# Patient Record
Sex: FEMALE | Race: BLACK OR AFRICAN AMERICAN | Employment: OTHER | URBAN - METROPOLITAN AREA
[De-identification: names, ages, dates, MRNs, and addresses within clinical notes are randomized per-mention and may not be internally consistent; named-entity substitution may affect disease eponyms.]

---

## 2018-04-18 ENCOUNTER — OFFICE VISIT (OUTPATIENT)
Dept: CARDIOLOGY CLINIC | Age: 58
End: 2018-04-18
Payer: MEDICAID

## 2018-04-18 VITALS
BODY MASS INDEX: 32.43 KG/M2 | DIASTOLIC BLOOD PRESSURE: 70 MMHG | HEIGHT: 63 IN | WEIGHT: 183 LBS | SYSTOLIC BLOOD PRESSURE: 128 MMHG | HEART RATE: 81 BPM

## 2018-04-18 DIAGNOSIS — I10 ESSENTIAL HYPERTENSION: Primary | ICD-10-CM

## 2018-04-18 DIAGNOSIS — R94.31 ABNORMAL RESTING ECG FINDINGS: ICD-10-CM

## 2018-04-18 PROCEDURE — 93000 ELECTROCARDIOGRAM COMPLETE: CPT | Performed by: INTERNAL MEDICINE

## 2018-04-18 PROCEDURE — 99213 OFFICE O/P EST LOW 20 MIN: CPT | Performed by: NURSE PRACTITIONER

## 2018-04-18 RX ORDER — METHIMAZOLE 5 MG/1
5 TABLET ORAL DAILY
COMMUNITY
End: 2018-04-19 | Stop reason: SDUPTHER

## 2018-04-19 ENCOUNTER — OFFICE VISIT (OUTPATIENT)
Dept: FAMILY MEDICINE CLINIC | Age: 58
End: 2018-04-19
Payer: MEDICAID

## 2018-04-19 VITALS
WEIGHT: 182 LBS | SYSTOLIC BLOOD PRESSURE: 134 MMHG | BODY MASS INDEX: 32.25 KG/M2 | HEART RATE: 84 BPM | RESPIRATION RATE: 20 BRPM | OXYGEN SATURATION: 99 % | DIASTOLIC BLOOD PRESSURE: 72 MMHG | HEIGHT: 63 IN

## 2018-04-19 DIAGNOSIS — E05.90 HYPERTHYROIDISM: ICD-10-CM

## 2018-04-19 DIAGNOSIS — I10 ESSENTIAL HYPERTENSION: ICD-10-CM

## 2018-04-19 DIAGNOSIS — Z79.4 TYPE 2 DIABETES MELLITUS WITH HYPERGLYCEMIA, WITH LONG-TERM CURRENT USE OF INSULIN (HCC): Primary | ICD-10-CM

## 2018-04-19 DIAGNOSIS — E11.65 TYPE 2 DIABETES MELLITUS WITH HYPERGLYCEMIA, WITH LONG-TERM CURRENT USE OF INSULIN (HCC): Primary | ICD-10-CM

## 2018-04-19 PROCEDURE — 99203 OFFICE O/P NEW LOW 30 MIN: CPT | Performed by: FAMILY MEDICINE

## 2018-04-19 RX ORDER — ATENOLOL 50 MG/1
50 TABLET ORAL DAILY
Qty: 30 TABLET | Refills: 2 | Status: SHIPPED | OUTPATIENT
Start: 2018-04-19 | End: 2018-05-21 | Stop reason: HOSPADM

## 2018-04-19 RX ORDER — AMLODIPINE BESYLATE 10 MG/1
10 TABLET ORAL DAILY
Qty: 30 TABLET | Refills: 2 | Status: SHIPPED | OUTPATIENT
Start: 2018-04-19 | End: 2018-05-21 | Stop reason: SDUPTHER

## 2018-04-19 RX ORDER — GLIPIZIDE 10 MG/1
10 TABLET ORAL
Qty: 60 TABLET | Refills: 2 | Status: SHIPPED | OUTPATIENT
Start: 2018-04-19 | End: 2018-05-21 | Stop reason: SDUPTHER

## 2018-04-19 RX ORDER — METHIMAZOLE 5 MG/1
5 TABLET ORAL DAILY
Qty: 30 TABLET | Refills: 2 | Status: SHIPPED | OUTPATIENT
Start: 2018-04-19 | End: 2018-05-21 | Stop reason: SDUPTHER

## 2018-04-19 ASSESSMENT — ENCOUNTER SYMPTOMS
NAUSEA: 0
VOMITING: 0
SHORTNESS OF BREATH: 0
COUGH: 0
WHEEZING: 0
DIARRHEA: 0

## 2018-04-19 ASSESSMENT — PATIENT HEALTH QUESTIONNAIRE - PHQ9
SUM OF ALL RESPONSES TO PHQ QUESTIONS 1-9: 0
1. LITTLE INTEREST OR PLEASURE IN DOING THINGS: 0
2. FEELING DOWN, DEPRESSED OR HOPELESS: 0
SUM OF ALL RESPONSES TO PHQ9 QUESTIONS 1 & 2: 0

## 2018-05-04 ENCOUNTER — HOSPITAL ENCOUNTER (OUTPATIENT)
Age: 58
Discharge: HOME | End: 2018-05-06
Payer: MEDICAID

## 2018-05-04 DIAGNOSIS — Z79.4 TYPE 2 DIABETES MELLITUS WITH HYPERGLYCEMIA, WITH LONG-TERM CURRENT USE OF INSULIN (HCC): ICD-10-CM

## 2018-05-04 DIAGNOSIS — E11.65 TYPE 2 DIABETES MELLITUS WITH HYPERGLYCEMIA, WITH LONG-TERM CURRENT USE OF INSULIN (HCC): ICD-10-CM

## 2018-05-04 DIAGNOSIS — I10 ESSENTIAL HYPERTENSION: ICD-10-CM

## 2018-05-04 LAB
ALBUMIN SERPL-MCNC: 4.5 G/DL (ref 3.5–5.2)
ALP BLD-CCNC: 106 U/L (ref 35–104)
ALT SERPL-CCNC: 19 U/L (ref 0–32)
ANION GAP SERPL CALCULATED.3IONS-SCNC: 11 MMOL/L (ref 7–16)
AST SERPL-CCNC: 17 U/L (ref 0–31)
BILIRUB SERPL-MCNC: 0.6 MG/DL (ref 0–1.2)
BUN BLDV-MCNC: 12 MG/DL (ref 6–20)
CALCIUM SERPL-MCNC: 9.6 MG/DL (ref 8.6–10.2)
CHLORIDE BLD-SCNC: 100 MMOL/L (ref 98–107)
CHOLESTEROL, TOTAL: 227 MG/DL (ref 0–199)
CO2: 26 MMOL/L (ref 22–29)
CREAT SERPL-MCNC: 0.6 MG/DL (ref 0.5–1)
CREATININE URINE: 118 MG/DL (ref 29–226)
GFR AFRICAN AMERICAN: >60
GFR NON-AFRICAN AMERICAN: >60 ML/MIN/1.73
GLUCOSE BLD-MCNC: 247 MG/DL (ref 74–109)
HBA1C MFR BLD: 9.6 % (ref 4.8–5.9)
HCT VFR BLD CALC: 40 % (ref 34–48)
HDLC SERPL-MCNC: 27 MG/DL
HEMOGLOBIN: 13.3 G/DL (ref 11.5–15.5)
LDL CHOLESTEROL CALCULATED: 168 MG/DL (ref 0–99)
MCH RBC QN AUTO: 25.6 PG (ref 26–35)
MCHC RBC AUTO-ENTMCNC: 33.3 % (ref 32–34.5)
MCV RBC AUTO: 77.1 FL (ref 80–99.9)
MICROALBUMIN UR-MCNC: 65.9 MG/L
MICROALBUMIN/CREAT UR-RTO: 55.8 (ref 0–30)
PDW BLD-RTO: 15.1 FL (ref 11.5–15)
PLATELET # BLD: 352 E9/L (ref 130–450)
PMV BLD AUTO: 10.8 FL (ref 7–12)
POTASSIUM SERPL-SCNC: 4 MMOL/L (ref 3.5–5)
RBC # BLD: 5.19 E12/L (ref 3.5–5.5)
SODIUM BLD-SCNC: 137 MMOL/L (ref 132–146)
TOTAL PROTEIN: 7.7 G/DL (ref 6.4–8.3)
TRIGL SERPL-MCNC: 158 MG/DL (ref 0–149)
VLDLC SERPL CALC-MCNC: 32 MG/DL
WBC # BLD: 6.9 E9/L (ref 4.5–11.5)

## 2018-05-04 PROCEDURE — 80061 LIPID PANEL: CPT

## 2018-05-04 PROCEDURE — 80053 COMPREHEN METABOLIC PANEL: CPT

## 2018-05-04 PROCEDURE — 82044 UR ALBUMIN SEMIQUANTITATIVE: CPT

## 2018-05-04 PROCEDURE — 36415 COLL VENOUS BLD VENIPUNCTURE: CPT

## 2018-05-04 PROCEDURE — 83036 HEMOGLOBIN GLYCOSYLATED A1C: CPT

## 2018-05-04 PROCEDURE — 85027 COMPLETE CBC AUTOMATED: CPT

## 2018-05-04 PROCEDURE — 82570 ASSAY OF URINE CREATININE: CPT

## 2018-05-21 ENCOUNTER — HOSPITAL ENCOUNTER (OUTPATIENT)
Age: 58
Discharge: HOME | End: 2018-05-23
Payer: MEDICAID

## 2018-05-21 ENCOUNTER — OFFICE VISIT (OUTPATIENT)
Dept: FAMILY MEDICINE CLINIC | Age: 58
End: 2018-05-21
Payer: MEDICAID

## 2018-05-21 VITALS
SYSTOLIC BLOOD PRESSURE: 132 MMHG | BODY MASS INDEX: 31.89 KG/M2 | WEIGHT: 180 LBS | HEIGHT: 63 IN | DIASTOLIC BLOOD PRESSURE: 78 MMHG | RESPIRATION RATE: 20 BRPM | HEART RATE: 84 BPM | OXYGEN SATURATION: 98 %

## 2018-05-21 DIAGNOSIS — I10 ESSENTIAL HYPERTENSION: ICD-10-CM

## 2018-05-21 DIAGNOSIS — E05.90 HYPERTHYROIDISM: ICD-10-CM

## 2018-05-21 DIAGNOSIS — E11.65 TYPE 2 DIABETES MELLITUS WITH HYPERGLYCEMIA, WITH LONG-TERM CURRENT USE OF INSULIN (HCC): Primary | ICD-10-CM

## 2018-05-21 DIAGNOSIS — Z79.4 TYPE 2 DIABETES MELLITUS WITH HYPERGLYCEMIA, WITH LONG-TERM CURRENT USE OF INSULIN (HCC): Primary | ICD-10-CM

## 2018-05-21 DIAGNOSIS — R80.9 POSITIVE FOR MICROALBUMINURIA: ICD-10-CM

## 2018-05-21 PROCEDURE — 84443 ASSAY THYROID STIM HORMONE: CPT

## 2018-05-21 PROCEDURE — 36415 COLL VENOUS BLD VENIPUNCTURE: CPT

## 2018-05-21 PROCEDURE — 99214 OFFICE O/P EST MOD 30 MIN: CPT | Performed by: FAMILY MEDICINE

## 2018-05-21 RX ORDER — GLIPIZIDE 10 MG/1
10 TABLET ORAL
Qty: 60 TABLET | Refills: 5 | Status: SHIPPED | OUTPATIENT
Start: 2018-05-21 | End: 2018-09-25 | Stop reason: SDUPTHER

## 2018-05-21 RX ORDER — BENAZEPRIL HYDROCHLORIDE 10 MG/1
10 TABLET ORAL DAILY
Qty: 30 TABLET | Refills: 3 | Status: SHIPPED | OUTPATIENT
Start: 2018-05-21 | End: 2018-07-23 | Stop reason: SDUPTHER

## 2018-05-21 RX ORDER — METHIMAZOLE 5 MG/1
5 TABLET ORAL DAILY
Qty: 30 TABLET | Refills: 5 | Status: SHIPPED | OUTPATIENT
Start: 2018-05-21 | End: 2018-07-23 | Stop reason: SDUPTHER

## 2018-05-21 RX ORDER — ATORVASTATIN CALCIUM 20 MG/1
20 TABLET, FILM COATED ORAL DAILY
Qty: 30 TABLET | Refills: 3 | Status: SHIPPED | OUTPATIENT
Start: 2018-05-21 | End: 2018-07-23 | Stop reason: SDUPTHER

## 2018-05-21 RX ORDER — ATENOLOL 50 MG/1
50 TABLET ORAL DAILY
Qty: 30 TABLET | Refills: 2 | Status: CANCELLED | OUTPATIENT
Start: 2018-05-21

## 2018-05-21 RX ORDER — AMLODIPINE BESYLATE 10 MG/1
10 TABLET ORAL DAILY
Qty: 30 TABLET | Refills: 5 | Status: SHIPPED | OUTPATIENT
Start: 2018-05-21 | End: 2018-07-23 | Stop reason: SDUPTHER

## 2018-05-21 ASSESSMENT — ENCOUNTER SYMPTOMS
NAUSEA: 0
VOMITING: 0
DIARRHEA: 0
BLURRED VISION: 0
SHORTNESS OF BREATH: 0

## 2018-05-22 LAB — TSH SERPL DL<=0.05 MIU/L-ACNC: 1.08 UIU/ML (ref 0.27–4.2)

## 2018-07-19 DIAGNOSIS — E11.65 TYPE 2 DIABETES MELLITUS WITH HYPERGLYCEMIA, WITH LONG-TERM CURRENT USE OF INSULIN (HCC): ICD-10-CM

## 2018-07-19 DIAGNOSIS — Z79.4 TYPE 2 DIABETES MELLITUS WITH HYPERGLYCEMIA, WITH LONG-TERM CURRENT USE OF INSULIN (HCC): ICD-10-CM

## 2018-07-23 ENCOUNTER — OFFICE VISIT (OUTPATIENT)
Dept: FAMILY MEDICINE CLINIC | Age: 58
End: 2018-07-23
Payer: MEDICARE

## 2018-07-23 VITALS
WEIGHT: 181 LBS | SYSTOLIC BLOOD PRESSURE: 138 MMHG | OXYGEN SATURATION: 98 % | HEIGHT: 63 IN | DIASTOLIC BLOOD PRESSURE: 78 MMHG | RESPIRATION RATE: 20 BRPM | HEART RATE: 100 BPM | BODY MASS INDEX: 32.07 KG/M2

## 2018-07-23 DIAGNOSIS — Z79.4 TYPE 2 DIABETES MELLITUS WITH HYPERGLYCEMIA, WITH LONG-TERM CURRENT USE OF INSULIN (HCC): Primary | ICD-10-CM

## 2018-07-23 DIAGNOSIS — E05.90 HYPERTHYROIDISM: ICD-10-CM

## 2018-07-23 DIAGNOSIS — Z12.39 BREAST CANCER SCREENING: ICD-10-CM

## 2018-07-23 DIAGNOSIS — E11.65 TYPE 2 DIABETES MELLITUS WITH HYPERGLYCEMIA, WITH LONG-TERM CURRENT USE OF INSULIN (HCC): Primary | ICD-10-CM

## 2018-07-23 DIAGNOSIS — I10 ESSENTIAL HYPERTENSION: ICD-10-CM

## 2018-07-23 LAB — HBA1C MFR BLD: 9.3 %

## 2018-07-23 PROCEDURE — 83036 HEMOGLOBIN GLYCOSYLATED A1C: CPT | Performed by: FAMILY MEDICINE

## 2018-07-23 PROCEDURE — 99214 OFFICE O/P EST MOD 30 MIN: CPT | Performed by: FAMILY MEDICINE

## 2018-07-23 RX ORDER — AMLODIPINE BESYLATE 10 MG/1
10 TABLET ORAL DAILY
Qty: 30 TABLET | Refills: 5 | Status: SHIPPED | OUTPATIENT
Start: 2018-07-23 | End: 2018-12-19 | Stop reason: SDUPTHER

## 2018-07-23 RX ORDER — METHIMAZOLE 5 MG/1
5 TABLET ORAL DAILY
Qty: 30 TABLET | Refills: 5 | Status: SHIPPED | OUTPATIENT
Start: 2018-07-23 | End: 2018-12-19 | Stop reason: SDUPTHER

## 2018-07-23 RX ORDER — BENAZEPRIL HYDROCHLORIDE 10 MG/1
10 TABLET ORAL DAILY
Qty: 30 TABLET | Refills: 5 | Status: SHIPPED | OUTPATIENT
Start: 2018-07-23 | End: 2018-12-19 | Stop reason: SDUPTHER

## 2018-07-23 RX ORDER — ATORVASTATIN CALCIUM 20 MG/1
20 TABLET, FILM COATED ORAL DAILY
Qty: 30 TABLET | Refills: 5 | Status: SHIPPED | OUTPATIENT
Start: 2018-07-23 | End: 2018-12-19 | Stop reason: SDUPTHER

## 2018-07-23 RX ORDER — TRIAMCINOLONE ACETONIDE 1 MG/G
CREAM TOPICAL
Qty: 80 G | Refills: 3 | Status: SHIPPED | OUTPATIENT
Start: 2018-07-23 | End: 2018-09-25 | Stop reason: SDUPTHER

## 2018-07-23 ASSESSMENT — ENCOUNTER SYMPTOMS
DIARRHEA: 0
BLURRED VISION: 0
NAUSEA: 0
VOMITING: 0
SHORTNESS OF BREATH: 0

## 2018-07-23 NOTE — PATIENT INSTRUCTIONS
Patient Education        Learning About Meal Planning for Diabetes  Why plan your meals? Meal planning can be a key part of managing diabetes. Planning meals and snacks with the right balance of carbohydrate, protein, and fat can help you keep your blood sugar at the target level you set with your doctor. You don't have to eat special foods. You can eat what your family eats, including sweets once in a while. But you do have to pay attention to how often you eat and how much you eat of certain foods. You may want to work with a dietitian or a certified diabetes educator. He or she can give you tips and meal ideas and can answer your questions about meal planning. This health professional can also help you reach a healthy weight if that is one of your goals. What plan is right for you? Your dietitian or diabetes educator may suggest that you start with the plate format or carbohydrate counting. The plate format  The plate format is a simple way to help you manage how you eat. You plan meals by learning how much space each food should take on a plate. Using the plate format helps you spread carbohydrate throughout the day. It can make it easier to keep your blood sugar level within your target range. It also helps you see if you're eating healthy portion sizes. To use the plate format, you put non-starchy vegetables on half your plate. Add meat or meat substitutes on one-quarter of the plate. Put a grain or starchy vegetable (such as brown rice or a potato) on the final quarter of the plate. You can add a small piece of fruit and some low-fat or fat-free milk or yogurt, depending on your carbohydrate goal for each meal.  Here are some tips for using the plate format:  · Make sure that you are not using an oversized plate. A 9-inch plate is best. Many restaurants use larger plates. · Get used to using the plate format at home. Then you can use it when you eat out.   · Write down your questions about using the rapid-acting insulin to take before you eat. If you use an insulin pump, you get a constant rate of insulin during the day. So the pump must be programmed at meals to give you extra insulin to cover the rise in blood sugar after meals. When you know how much carbohydrate you will eat, you can take the right amount of insulin. Or, if you always use the same amount of insulin, you need to make sure that you eat the same amount of carbohydrate at meals. If you need more help to understand carbohydrate counting and food labels, ask your doctor, dietitian, or diabetes educator. How do you get started with meal planning? Here are some tips to get started:  · Plan your meals a week at a time. Don't forget to include snacks too. · Use cookbooks or online recipes to plan several main meals. Plan some quick meals for busy nights. You also can double some recipes that freeze well. Then you can save half for other busy nights when you don't have time to cook. · Make sure you have the ingredients you need for your recipes. If you're running low on basic items, put these items on your shopping list too. · List foods that you use to make breakfasts, lunches, and snacks. List plenty of fruits and vegetables. · Post this list on the refrigerator. Add to it as you think of more things you need. · Take the list to the store to do your weekly shopping. Follow-up care is a key part of your treatment and safety. Be sure to make and go to all appointments, and call your doctor if you are having problems. It's also a good idea to know your test results and keep a list of the medicines you take. Where can you learn more? Go to https://rios."[employer]. org and sign in to your [employer] account. Enter S178 in the [facility] box to learn more about \"Learning About Meal Planning for Diabetes. \"     If you do not have an account, please click on the \"Sign Up Now\" link.   Current as of: December 7, 2017  Content Version: 11.6  © 9825-1039 [employer]. Care instructions adapted under license by [facility] ([facility]). If you have questions about a medical condition or this instruction, always ask your healthcare professional. [address] any warranty or liability for your use of this information.

## 2018-07-23 NOTE — PROGRESS NOTES
OFFICE PROGRESS NOTE      SUBJECTIVE:        Patient ID:   Angel Luis Ochoa is a 60 y.o. female who presents for   Chief Complaint   Patient presents with    Diabetes     feet burn       HPI:   Patient is here to follow up on diabetes. Fasting blood sugars:200's Midday blood sugars: not checking. Patient checks blood glucose 1 times per day. Patient is following diabetic diet. Patient is a nonsmoker. Last ophthalmology visit: 5/2018. Patient is taking a daily statin. Patient noticing more burning discomfort in feet--worried about neuropathy. Patient doing well on current regimen for hyperthyroidism. Patient doing well on current regimen for hypertension. Prior to Admission medications    Medication Sig Start Date End Date Taking? Authorizing Provider   insulin detemir (LEVEMIR FLEXPEN) 100 UNIT/ML injection pen Inject 35 Units into the skin 2 times daily 7/23/18  Yes Errol Guillen MD   methimazole (TAPAZOLE) 5 MG tablet Take 1 tablet by mouth daily 7/23/18  Yes Errol Guillen MD   amLODIPine (NORVASC) 10 MG tablet Take 1 tablet by mouth daily 7/23/18  Yes Errol Guillen MD   metFORMIN (GLUCOPHAGE) 1000 MG tablet Take 1 tablet by mouth 2 times daily (with meals) 7/23/18  Yes Errol Guillen MD   atorvastatin (LIPITOR) 20 MG tablet Take 1 tablet by mouth daily 7/23/18  Yes Errol Guillen MD   benazepril (LOTENSIN) 10 MG tablet Take 1 tablet by mouth daily 7/23/18  Yes Errol Guillen MD   triamcinolone (KENALOG) 0.1 % cream Apply topically 2 times daily for up to 2 weeks then as needed for flareups.  7/23/18  Yes Errol Guillen MD   glipiZIDE (GLUCOTROL) 10 MG tablet Take 1 tablet by mouth 2 times daily (before meals) 5/21/18  Yes Errol Guillen MD   haloperidol (HALDOL) 2 MG tablet Take 2 mg by mouth daily   Yes Historical Provider, MD   diazepam (VALIUM) 2 MG tablet Take 2 mg by mouth every 6 hours as needed

## 2018-07-25 ENCOUNTER — TELEPHONE (OUTPATIENT)
Dept: FAMILY MEDICINE CLINIC | Age: 58
End: 2018-07-25

## 2018-07-25 DIAGNOSIS — E11.65 TYPE 2 DIABETES MELLITUS WITH HYPERGLYCEMIA, WITH LONG-TERM CURRENT USE OF INSULIN (HCC): Primary | ICD-10-CM

## 2018-07-25 DIAGNOSIS — Z79.4 TYPE 2 DIABETES MELLITUS WITH HYPERGLYCEMIA, WITH LONG-TERM CURRENT USE OF INSULIN (HCC): Primary | ICD-10-CM

## 2018-07-26 ENCOUNTER — HOSPITAL ENCOUNTER (OUTPATIENT)
Dept: MAMMOGRAPHY | Age: 58
Discharge: HOME | End: 2018-07-28
Payer: MEDICARE

## 2018-07-26 DIAGNOSIS — Z12.39 BREAST CANCER SCREENING: ICD-10-CM

## 2018-07-26 PROCEDURE — 77063 BREAST TOMOSYNTHESIS BI: CPT

## 2018-08-08 ENCOUNTER — TELEPHONE (OUTPATIENT)
Dept: FAMILY MEDICINE CLINIC | Age: 58
End: 2018-08-08

## 2018-08-08 ENCOUNTER — TELEPHONE (OUTPATIENT)
Dept: ADMINISTRATIVE | Age: 58
End: 2018-08-08

## 2018-08-10 ENCOUNTER — HOSPITAL ENCOUNTER (OUTPATIENT)
Dept: INTERVENTIONAL RADIOLOGY/VASCULAR | Age: 58
Discharge: HOME | End: 2018-08-12
Payer: MEDICARE

## 2018-08-10 DIAGNOSIS — E11.49 DIABETIC NEUROPATHY WITH NEUROLOGIC COMPLICATION (HCC): ICD-10-CM

## 2018-08-10 DIAGNOSIS — G89.29 CHRONIC PAIN OF TOE OF LEFT FOOT: ICD-10-CM

## 2018-08-10 DIAGNOSIS — G89.29 CHRONIC PAIN OF TOE, RIGHT: ICD-10-CM

## 2018-08-10 DIAGNOSIS — M79.675 CHRONIC PAIN OF TOE OF LEFT FOOT: ICD-10-CM

## 2018-08-10 DIAGNOSIS — E11.40 DIABETIC NEUROPATHY WITH NEUROLOGIC COMPLICATION (HCC): ICD-10-CM

## 2018-08-10 DIAGNOSIS — M79.674 CHRONIC PAIN OF TOE, RIGHT: ICD-10-CM

## 2018-08-10 PROCEDURE — 93923 UPR/LXTR ART STDY 3+ LVLS: CPT

## 2018-08-24 ENCOUNTER — TELEPHONE (OUTPATIENT)
Dept: FAMILY MEDICINE CLINIC | Age: 58
End: 2018-08-24

## 2018-08-24 DIAGNOSIS — E11.42 DIABETIC PERIPHERAL NEUROPATHY ASSOCIATED WITH TYPE 2 DIABETES MELLITUS (HCC): ICD-10-CM

## 2018-09-25 ENCOUNTER — OFFICE VISIT (OUTPATIENT)
Dept: FAMILY MEDICINE CLINIC | Age: 58
End: 2018-09-25
Payer: MEDICARE

## 2018-09-25 VITALS
RESPIRATION RATE: 20 BRPM | HEIGHT: 63 IN | HEART RATE: 88 BPM | DIASTOLIC BLOOD PRESSURE: 78 MMHG | SYSTOLIC BLOOD PRESSURE: 132 MMHG | WEIGHT: 181 LBS | BODY MASS INDEX: 32.07 KG/M2 | OXYGEN SATURATION: 99 %

## 2018-09-25 DIAGNOSIS — E05.90 HYPERTHYROIDISM: ICD-10-CM

## 2018-09-25 DIAGNOSIS — E11.42 TYPE 2 DIABETES MELLITUS WITH DIABETIC POLYNEUROPATHY, WITH LONG-TERM CURRENT USE OF INSULIN (HCC): Primary | ICD-10-CM

## 2018-09-25 DIAGNOSIS — I10 ESSENTIAL HYPERTENSION: ICD-10-CM

## 2018-09-25 DIAGNOSIS — Z79.4 TYPE 2 DIABETES MELLITUS WITH DIABETIC POLYNEUROPATHY, WITH LONG-TERM CURRENT USE OF INSULIN (HCC): Primary | ICD-10-CM

## 2018-09-25 LAB — HBA1C MFR BLD: 7.1 %

## 2018-09-25 PROCEDURE — 99214 OFFICE O/P EST MOD 30 MIN: CPT | Performed by: FAMILY MEDICINE

## 2018-09-25 PROCEDURE — 1036F TOBACCO NON-USER: CPT | Performed by: FAMILY MEDICINE

## 2018-09-25 PROCEDURE — 2022F DILAT RTA XM EVC RTNOPTHY: CPT | Performed by: FAMILY MEDICINE

## 2018-09-25 PROCEDURE — G8417 CALC BMI ABV UP PARAM F/U: HCPCS | Performed by: FAMILY MEDICINE

## 2018-09-25 PROCEDURE — 3017F COLORECTAL CA SCREEN DOC REV: CPT | Performed by: FAMILY MEDICINE

## 2018-09-25 PROCEDURE — 83036 HEMOGLOBIN GLYCOSYLATED A1C: CPT | Performed by: FAMILY MEDICINE

## 2018-09-25 PROCEDURE — 3045F PR MOST RECENT HEMOGLOBIN A1C LEVEL 7.0-9.0%: CPT | Performed by: FAMILY MEDICINE

## 2018-09-25 PROCEDURE — G8427 DOCREV CUR MEDS BY ELIG CLIN: HCPCS | Performed by: FAMILY MEDICINE

## 2018-09-25 RX ORDER — GLIPIZIDE 10 MG/1
10 TABLET ORAL
Qty: 60 TABLET | Refills: 5 | Status: SHIPPED | OUTPATIENT
Start: 2018-09-25 | End: 2019-03-27 | Stop reason: SDUPTHER

## 2018-09-25 RX ORDER — TRIAMCINOLONE ACETONIDE 1 MG/G
CREAM TOPICAL
Qty: 80 G | Refills: 3 | Status: SHIPPED | OUTPATIENT
Start: 2018-09-25 | End: 2018-12-05

## 2018-09-25 RX ORDER — GABAPENTIN 100 MG/1
CAPSULE ORAL
COMMUNITY
Start: 2018-08-08 | End: 2018-09-25

## 2018-09-25 ASSESSMENT — ENCOUNTER SYMPTOMS
NAUSEA: 0
DIARRHEA: 0
VOMITING: 0
SHORTNESS OF BREATH: 0
BLURRED VISION: 0

## 2018-09-25 ASSESSMENT — PATIENT HEALTH QUESTIONNAIRE - PHQ9
1. LITTLE INTEREST OR PLEASURE IN DOING THINGS: 0
2. FEELING DOWN, DEPRESSED OR HOPELESS: 0
SUM OF ALL RESPONSES TO PHQ QUESTIONS 1-9: 0
SUM OF ALL RESPONSES TO PHQ9 QUESTIONS 1 & 2: 0
SUM OF ALL RESPONSES TO PHQ QUESTIONS 1-9: 0

## 2018-09-25 NOTE — PATIENT INSTRUCTIONS
Patient Education        Learning About Meal Planning for Diabetes  Why plan your meals? Meal planning can be a key part of managing diabetes. Planning meals and snacks with the right balance of carbohydrate, protein, and fat can help you keep your blood sugar at the target level you set with your doctor. You don't have to eat special foods. You can eat what your family eats, including sweets once in a while. But you do have to pay attention to how often you eat and how much you eat of certain foods. You may want to work with a dietitian or a certified diabetes educator. He or she can give you tips and meal ideas and can answer your questions about meal planning. This health professional can also help you reach a healthy weight if that is one of your goals. What plan is right for you? Your dietitian or diabetes educator may suggest that you start with the plate format or carbohydrate counting. The plate format  The plate format is a simple way to help you manage how you eat. You plan meals by learning how much space each food should take on a plate. Using the plate format helps you spread carbohydrate throughout the day. It can make it easier to keep your blood sugar level within your target range. It also helps you see if you're eating healthy portion sizes. To use the plate format, you put non-starchy vegetables on half your plate. Add meat or meat substitutes on one-quarter of the plate. Put a grain or starchy vegetable (such as brown rice or a potato) on the final quarter of the plate. You can add a small piece of fruit and some low-fat or fat-free milk or yogurt, depending on your carbohydrate goal for each meal.  Here are some tips for using the plate format:  · Make sure that you are not using an oversized plate. A 9-inch plate is best. Many restaurants use larger plates. · Get used to using the plate format at home. Then you can use it when you eat out.   · Write down your questions about using the plate format. Talk to your doctor, a dietitian, or a diabetes educator about your concerns. Carbohydrate counting  With carbohydrate counting, you plan meals based on the amount of carbohydrate in each food. Carbohydrate raises blood sugar higher and more quickly than any other nutrient. It is found in desserts, breads and cereals, and fruit. It's also found in starchy vegetables such as potatoes and corn, grains such as rice and pasta, and milk and yogurt. Spreading carbohydrate throughout the day helps keep your blood sugar levels within your target range. Your daily amount depends on several things, including your weight, how active you are, which diabetes medicines you take, and what your goals are for your blood sugar levels. A registered dietitian or diabetes educator can help you plan how much carbohydrate to include in each meal and snack. A guideline for your daily amount of carbohydrate is:  · 45 to 60 grams at each meal. That's about the same as 3 to 4 carbohydrate servings. · 15 to 20 grams at each snack. That's about the same as 1 carbohydrate serving. The Nutrition Facts label on packaged foods tells you how much carbohydrate is in a serving of the food. First, look at the serving size on the food label. Is that the amount you eat in a serving? All of the nutrition information on a food label is based on that serving size. So if you eat more or less than that, you'll need to adjust the other numbers. Total carbohydrate is the next thing you need to look for on the label. If you count carbohydrate servings, one serving of carbohydrate is 15 grams. For foods that don't come with labels, such as fresh fruits and vegetables, you'll need a guide that lists carbohydrate in these foods. Ask your doctor, dietitian, or diabetes educator about books or other nutrition guides you can use.   If you take insulin, you need to know how many grams of carbohydrate are in a meal. This lets you know how much

## 2018-09-25 NOTE — PROGRESS NOTES
OFFICE PROGRESS NOTE      SUBJECTIVE:        Patient ID:   Maury Barrera is a 60 y.o. female who presents for   Chief Complaint   Patient presents with    Diabetes           HPI:   Patient is here to follow up on diabetes. Fasting blood sugars: Midday blood sugars: not checking. Patient checks blood glucose 1 times per day. Patient is following diabetic diet. Patient is a nonsmoker. Last ophthalmology visit: 5/2018. Patient is taking a daily statin. Patient doing well on current regimen for hypertension. Patient doing well on current regimen for hyperthyroidism. Prior to Admission medications    Medication Sig Start Date End Date Taking? Authorizing Provider   insulin glargine (LANTUS SOLOSTAR) 100 UNIT/ML injection pen Inject 35 Units into the skin 2 times daily 9/25/18  Yes Gatito Quintanilla MD   glipiZIDE (GLUCOTROL) 10 MG tablet Take 1 tablet by mouth 2 times daily (before meals) 9/25/18  Yes Gatito Quintanilla MD   triamcinolone (KENALOG) 0.1 % cream Apply topically 2 times daily for up to 2 weeks then as needed for flareups.  9/25/18  Yes Gatito Quintanilla MD   methimazole (TAPAZOLE) 5 MG tablet Take 1 tablet by mouth daily 7/23/18  Yes Gatito Quintanilla MD   amLODIPine (NORVASC) 10 MG tablet Take 1 tablet by mouth daily 7/23/18  Yes Gatito Quintanilla MD   metFORMIN (GLUCOPHAGE) 1000 MG tablet Take 1 tablet by mouth 2 times daily (with meals) 7/23/18  Yes Gatito Quintanilla MD   atorvastatin (LIPITOR) 20 MG tablet Take 1 tablet by mouth daily 7/23/18  Yes Gatito Quintanilla MD   benazepril (LOTENSIN) 10 MG tablet Take 1 tablet by mouth daily 7/23/18  Yes Gatito Quintanilla MD   haloperidol (HALDOL) 2 MG tablet Take 2 mg by mouth daily   Yes Historical Provider, MD   diazepam (VALIUM) 2 MG tablet Take 2 mg by mouth every 6 hours as needed for Anxiety   Yes Historical Provider, MD     Social History     Social History    Marital status:      Spouse name: N/A    Number of children: N/A    Years of education: N/A     Social History Main Topics    Smoking status: Former Smoker     Packs/day: 0.00     Years: 2.00     Types: Cigarettes     Quit date: 3/23/2015    Smokeless tobacco: Never Used      Comment: quit smoking 2015    Alcohol use No    Drug use: No    Sexual activity: Not Asked     Other Topics Concern    None     Social History Narrative    None       I have reviewed Sofia's allergies, medications, problem list, medical, social and family history and have updated as needed in the electronic medical record    Current Outpatient Prescriptions   Medication Sig Dispense Refill    insulin glargine (LANTUS SOLOSTAR) 100 UNIT/ML injection pen Inject 35 Units into the skin 2 times daily 10 pen 5    glipiZIDE (GLUCOTROL) 10 MG tablet Take 1 tablet by mouth 2 times daily (before meals) 60 tablet 5    triamcinolone (KENALOG) 0.1 % cream Apply topically 2 times daily for up to 2 weeks then as needed for flareups. 80 g 3    methimazole (TAPAZOLE) 5 MG tablet Take 1 tablet by mouth daily 30 tablet 5    amLODIPine (NORVASC) 10 MG tablet Take 1 tablet by mouth daily 30 tablet 5    metFORMIN (GLUCOPHAGE) 1000 MG tablet Take 1 tablet by mouth 2 times daily (with meals) 60 tablet 5    atorvastatin (LIPITOR) 20 MG tablet Take 1 tablet by mouth daily 30 tablet 5    benazepril (LOTENSIN) 10 MG tablet Take 1 tablet by mouth daily 30 tablet 5    haloperidol (HALDOL) 2 MG tablet Take 2 mg by mouth daily      diazepam (VALIUM) 2 MG tablet Take 2 mg by mouth every 6 hours as needed for Anxiety       No current facility-administered medications for this visit. Review Of Systems:    Review of Systems   Eyes: Negative for blurred vision. Respiratory: Negative for shortness of breath. Cardiovascular: Negative for chest pain, palpitations and leg swelling.    Gastrointestinal: Negative for diarrhea, nausea and vomiting. Genitourinary: Negative for dysuria, frequency and urgency. Skin: Negative for rash. Psychiatric/Behavioral: Negative for depression. OBJECTIVE:     VS:  Wt Readings from Last 3 Encounters:   09/25/18 181 lb (82.1 kg)   07/23/18 181 lb (82.1 kg)   05/21/18 180 lb (81.6 kg)                   Vitals:    09/25/18 1041   BP: 132/78   Pulse: 88   Resp: 20   SpO2: 99%       Physical Exam   Constitutional: She is oriented to person, place, and time and well-developed, well-nourished, and in no distress. Neck: Neck supple. Carotid bruit is not present. Cardiovascular: Normal rate, regular rhythm and normal heart sounds. Exam reveals no gallop. No murmur heard. Pulmonary/Chest: Effort normal and breath sounds normal. She has no wheezes. She has no rales. Abdominal: Soft. Bowel sounds are normal. She exhibits no distension. There is no tenderness. Musculoskeletal: She exhibits no edema. Neurological: She is alert and oriented to person, place, and time. Skin: Skin is warm and dry. Psychiatric: Affect normal.   Vitals reviewed. Results for orders placed or performed in visit on 09/25/18   POCT glycosylated hemoglobin (Hb A1C)   Result Value Ref Range    Hemoglobin A1C 7.1 %         Natalie Bright was seen today for diabetes. Diagnoses and all orders for this visit:    Type 2 diabetes mellitus with diabetic polyneuropathy, with long-term current use of insulin (HCC)  -     insulin glargine (LANTUS SOLOSTAR) 100 UNIT/ML injection pen; Inject 35 Units into the skin 2 times daily  -     glipiZIDE (GLUCOTROL) 10 MG tablet; Take 1 tablet by mouth daily (before meals)  -     POCT glycosylated hemoglobin (Hb A1C)    Hyperthyroidism        -     Continue methimazole    Essential hypertension        -     Continue antihypertensive regimen          Phone/MyChart follow up if tests abnormal.    Return in about 3 months (around 12/25/2018) for diabetes.        BMI was elevated today, and weight

## 2018-11-16 ENCOUNTER — OFFICE VISIT (OUTPATIENT)
Dept: CARDIOLOGY CLINIC | Age: 58
End: 2018-11-16
Payer: MEDICARE

## 2018-11-16 VITALS
HEIGHT: 63 IN | WEIGHT: 184 LBS | HEART RATE: 102 BPM | SYSTOLIC BLOOD PRESSURE: 150 MMHG | DIASTOLIC BLOOD PRESSURE: 68 MMHG | BODY MASS INDEX: 32.6 KG/M2

## 2018-11-16 DIAGNOSIS — R00.0 SINUS TACHYCARDIA: ICD-10-CM

## 2018-11-16 DIAGNOSIS — E78.5 DYSLIPIDEMIA: ICD-10-CM

## 2018-11-16 DIAGNOSIS — R94.39 ABNORMAL STRESS ECG: ICD-10-CM

## 2018-11-16 DIAGNOSIS — E05.90 HYPERTHYROIDISM: ICD-10-CM

## 2018-11-16 DIAGNOSIS — I10 ESSENTIAL HYPERTENSION: Primary | ICD-10-CM

## 2018-11-16 PROCEDURE — 1036F TOBACCO NON-USER: CPT | Performed by: INTERNAL MEDICINE

## 2018-11-16 PROCEDURE — 93000 ELECTROCARDIOGRAM COMPLETE: CPT | Performed by: INTERNAL MEDICINE

## 2018-11-16 PROCEDURE — G8427 DOCREV CUR MEDS BY ELIG CLIN: HCPCS | Performed by: INTERNAL MEDICINE

## 2018-11-16 PROCEDURE — 99214 OFFICE O/P EST MOD 30 MIN: CPT | Performed by: INTERNAL MEDICINE

## 2018-11-16 PROCEDURE — G8484 FLU IMMUNIZE NO ADMIN: HCPCS | Performed by: INTERNAL MEDICINE

## 2018-11-16 PROCEDURE — G8417 CALC BMI ABV UP PARAM F/U: HCPCS | Performed by: INTERNAL MEDICINE

## 2018-11-16 PROCEDURE — 3017F COLORECTAL CA SCREEN DOC REV: CPT | Performed by: INTERNAL MEDICINE

## 2018-11-16 NOTE — PROGRESS NOTES
Epigastric hernia     Hyperlipidemia     Hypertension     [facility]     Umbilical hernia             Past Surgical History:   Procedure Laterality Date    [facility]  09[facility]30/2016     [facility]    COLONOSCOPY  08/2016    Dr. Leyla Mix OTHER SURGICAL HISTORY  11/04/2016    epigastric hernia umbilical hernia with mesh    OVARIAN CYST REMOVAL      OVARY REMOVAL Right     OVARY REMOVAL  2014          Family History   Problem Relation Age of Onset    Cancer Brother           Social History   Substance Use Topics    Smoking status: Former Smoker     Packs/day: 0.00     Years: 2.00     Types: Cigarettes     Quit date: 3/23/2015    Smokeless tobacco: Never Used      Comment: quit smoking 2015    Alcohol use No      Comment: coffee daily         Review of Systems:  HEENT: negative for acute visual symptoms or auditory problems, no dysphagia  Constitutional: negative for fever and chills, or significant weight loss  Respiratory: negative for cough, wheezing, or hemoptysis  Cardiovascular: negative for chest pain, palpitations, and dyspnea  Gastrointestinal: negative for abdominal pain, diarrhea, nausea and vomiting  Endocrine: Negative for polyuria and polydyspsia  Genitourinary:negative for dysuria and hematuria  Derm: negative for rash and skin lesion(s)  Neurological: negative for tingling, numbness, weakness, seizures and tremors  Endocrine: negative for polydipsia and polyuria  Musculoskeletal: negative for pain or tenderness  Psychiatric: negative for anxiety, depression, or suicidal ideations         O:  COMPLETE PHYSICAL EXAM:       BP (!) 150/68   Pulse 102   Ht 5' 3\" (1.6 m)   Wt 184 lb (83.5 kg)   BMI 32.59 kg/m²       General:   Patient alert, comfortable, no distress. Appears stated age. HEENT:    Pupils equal, no icterus, no nasal drainage, tongue moist.   Neck:              No masses, Thyroid not palpable.     Chest:   Normal configuration, non tender. Lungs:   Clear to auscultation bilaterally, few scattered rhonchi. Cardiovascular:  Regular rhythm, 1/6 systolic murmur, No S3, no palpable thrills, No elevated JVD, No carotid bruit. Abdomen:  Soft, Non tender, Bowel sounds normal, no pulsatile abdominal aorta, no palpable masses. Extremities:  No edema. Distal pulses palpable. No cyanosis, no clubbing. Skin:   Good turgor, warm and dry, no cyanosis. Musculoskeletal: No joint swelling or deformity. Neuro:   Cranial nerves grossly intact; No focal neurologic deficit. Psych:   Alert, good mood and effect. REVIEW OF DIAGNOSTIC TESTS:        Electrocardiogram: NSR, rate 102             A/P:   ASSESSMENT / PLAN:    Aida Mariano was seen today for hypertension. Diagnoses and all orders for this visit:    Essential hypertension  -stable  -     EKG 12 Lead    Sinus tachycardia - Asymptomatic  -     TSH without Reflex  -     T3, Free  -     T4    Dyslipidemia - on Statins    Abnormal stress ECG - Normal cath 2016    Hyperthyroidism - On tapazole  -     TSH without Reflex  -     T3, Free  -     T4    Non morbid obesity - Diet, exercise and weight loss discussed. Preventive Cardiology: Low cholesterol diet, regular exercise as tolerate, and gradual weight loss discussed. Monitor BP and heart rates. All questions answered about cardiac diagnoses and cardiac medications. Continue current medications. Compliance with medications and f/u with all physicians discussed. Risk factor modification based on risk profile discussed. Call if any exertional chest pain, short of breath, dizzy or palpitations   Follow up in 9 months or earlier if needed.    Call with lab results      [facility]  [address], [place], [address] [facility]  [phone]

## 2018-11-19 ENCOUNTER — HOSPITAL ENCOUNTER (OUTPATIENT)
Age: 58
Discharge: HOME | End: 2018-11-19
Payer: MEDICARE

## 2018-11-19 LAB
T3 FREE: 2.5 PG/ML (ref 2–4.4)
T4 TOTAL: 6.6 MCG/DL (ref 4.5–11.7)
TSH SERPL DL<=0.05 MIU/L-ACNC: 1.26 UIU/ML (ref 0.27–4.2)

## 2018-11-19 PROCEDURE — 36415 COLL VENOUS BLD VENIPUNCTURE: CPT

## 2018-11-19 PROCEDURE — 84443 ASSAY THYROID STIM HORMONE: CPT

## 2018-11-19 PROCEDURE — 84481 FREE ASSAY (FT-3): CPT

## 2018-11-19 PROCEDURE — 84436 ASSAY OF TOTAL THYROXINE: CPT

## 2018-12-05 ENCOUNTER — OFFICE VISIT (OUTPATIENT)
Dept: FAMILY MEDICINE CLINIC | Age: 58
End: 2018-12-05
Payer: MEDICARE

## 2018-12-05 VITALS
TEMPERATURE: 98.9 F | HEART RATE: 120 BPM | WEIGHT: 184 LBS | DIASTOLIC BLOOD PRESSURE: 82 MMHG | SYSTOLIC BLOOD PRESSURE: 138 MMHG | BODY MASS INDEX: 32.59 KG/M2 | OXYGEN SATURATION: 98 % | RESPIRATION RATE: 20 BRPM

## 2018-12-05 DIAGNOSIS — L30.9 DERMATITIS: ICD-10-CM

## 2018-12-05 DIAGNOSIS — J01.01 ACUTE RECURRENT MAXILLARY SINUSITIS: Primary | ICD-10-CM

## 2018-12-05 PROCEDURE — 1036F TOBACCO NON-USER: CPT | Performed by: FAMILY MEDICINE

## 2018-12-05 PROCEDURE — G8484 FLU IMMUNIZE NO ADMIN: HCPCS | Performed by: FAMILY MEDICINE

## 2018-12-05 PROCEDURE — G8417 CALC BMI ABV UP PARAM F/U: HCPCS | Performed by: FAMILY MEDICINE

## 2018-12-05 PROCEDURE — 99214 OFFICE O/P EST MOD 30 MIN: CPT | Performed by: FAMILY MEDICINE

## 2018-12-05 PROCEDURE — 3017F COLORECTAL CA SCREEN DOC REV: CPT | Performed by: FAMILY MEDICINE

## 2018-12-05 PROCEDURE — G8427 DOCREV CUR MEDS BY ELIG CLIN: HCPCS | Performed by: FAMILY MEDICINE

## 2018-12-05 RX ORDER — BENAZEPRIL HYDROCHLORIDE 10 MG/1
10 TABLET ORAL DAILY
Qty: 30 TABLET | Refills: 5 | Status: CANCELLED | OUTPATIENT
Start: 2018-12-05

## 2018-12-05 RX ORDER — TRIAMCINOLONE ACETONIDE 1 MG/G
CREAM TOPICAL
Qty: 80 G | Refills: 3 | Status: SHIPPED | OUTPATIENT
Start: 2018-12-05 | End: 2019-12-30

## 2018-12-05 RX ORDER — ATORVASTATIN CALCIUM 20 MG/1
20 TABLET, FILM COATED ORAL DAILY
Qty: 30 TABLET | Refills: 5 | Status: CANCELLED | OUTPATIENT
Start: 2018-12-05

## 2018-12-05 RX ORDER — AMLODIPINE BESYLATE 10 MG/1
10 TABLET ORAL DAILY
Qty: 30 TABLET | Refills: 5 | Status: CANCELLED | OUTPATIENT
Start: 2018-12-05

## 2018-12-05 RX ORDER — METHIMAZOLE 5 MG/1
5 TABLET ORAL DAILY
Qty: 30 TABLET | Refills: 5 | Status: CANCELLED | OUTPATIENT
Start: 2018-12-05

## 2018-12-05 RX ORDER — AMOXICILLIN 500 MG/1
500 CAPSULE ORAL 2 TIMES DAILY
Qty: 20 CAPSULE | Refills: 0 | Status: SHIPPED | OUTPATIENT
Start: 2018-12-05 | End: 2018-12-15

## 2018-12-05 RX ORDER — FLUTICASONE PROPIONATE 50 MCG
2 SPRAY, SUSPENSION (ML) NASAL DAILY
Qty: 1 BOTTLE | Refills: 0 | Status: SHIPPED | OUTPATIENT
Start: 2018-12-05 | End: 2019-03-27 | Stop reason: SDUPTHER

## 2018-12-05 ASSESSMENT — ENCOUNTER SYMPTOMS
RHINORRHEA: 1
COUGH: 1
VOMITING: 0
CHEST TIGHTNESS: 0
NAUSEA: 1
SORE THROAT: 1
SHORTNESS OF BREATH: 0

## 2018-12-19 ENCOUNTER — OFFICE VISIT (OUTPATIENT)
Dept: FAMILY MEDICINE CLINIC | Age: 58
End: 2018-12-19
Payer: MEDICARE

## 2018-12-19 VITALS
HEART RATE: 88 BPM | RESPIRATION RATE: 20 BRPM | OXYGEN SATURATION: 99 % | DIASTOLIC BLOOD PRESSURE: 80 MMHG | WEIGHT: 182 LBS | HEIGHT: 63 IN | BODY MASS INDEX: 32.25 KG/M2 | SYSTOLIC BLOOD PRESSURE: 138 MMHG

## 2018-12-19 DIAGNOSIS — J01.01 ACUTE RECURRENT MAXILLARY SINUSITIS: ICD-10-CM

## 2018-12-19 DIAGNOSIS — E11.42 TYPE 2 DIABETES MELLITUS WITH DIABETIC POLYNEUROPATHY, WITHOUT LONG-TERM CURRENT USE OF INSULIN (HCC): Primary | ICD-10-CM

## 2018-12-19 DIAGNOSIS — I10 ESSENTIAL HYPERTENSION: ICD-10-CM

## 2018-12-19 LAB — HBA1C MFR BLD: 6.4 %

## 2018-12-19 PROCEDURE — 83036 HEMOGLOBIN GLYCOSYLATED A1C: CPT | Performed by: FAMILY MEDICINE

## 2018-12-19 PROCEDURE — G8427 DOCREV CUR MEDS BY ELIG CLIN: HCPCS | Performed by: FAMILY MEDICINE

## 2018-12-19 PROCEDURE — G8484 FLU IMMUNIZE NO ADMIN: HCPCS | Performed by: FAMILY MEDICINE

## 2018-12-19 PROCEDURE — 3017F COLORECTAL CA SCREEN DOC REV: CPT | Performed by: FAMILY MEDICINE

## 2018-12-19 PROCEDURE — 2022F DILAT RTA XM EVC RTNOPTHY: CPT | Performed by: FAMILY MEDICINE

## 2018-12-19 PROCEDURE — G8417 CALC BMI ABV UP PARAM F/U: HCPCS | Performed by: FAMILY MEDICINE

## 2018-12-19 PROCEDURE — 1036F TOBACCO NON-USER: CPT | Performed by: FAMILY MEDICINE

## 2018-12-19 PROCEDURE — 99214 OFFICE O/P EST MOD 30 MIN: CPT | Performed by: FAMILY MEDICINE

## 2018-12-19 PROCEDURE — 3044F HG A1C LEVEL LT 7.0%: CPT | Performed by: FAMILY MEDICINE

## 2018-12-19 RX ORDER — METHIMAZOLE 5 MG/1
5 TABLET ORAL DAILY
Qty: 30 TABLET | Refills: 5 | Status: SHIPPED | OUTPATIENT
Start: 2018-12-19 | End: 2019-06-21 | Stop reason: SDUPTHER

## 2018-12-19 RX ORDER — AMLODIPINE BESYLATE 10 MG/1
10 TABLET ORAL DAILY
Qty: 30 TABLET | Refills: 5 | Status: SHIPPED | OUTPATIENT
Start: 2018-12-19 | End: 2019-08-01 | Stop reason: SDUPTHER

## 2018-12-19 RX ORDER — ATORVASTATIN CALCIUM 20 MG/1
20 TABLET, FILM COATED ORAL DAILY
Qty: 30 TABLET | Refills: 5 | Status: SHIPPED | OUTPATIENT
Start: 2018-12-19 | End: 2019-07-28 | Stop reason: SDUPTHER

## 2018-12-19 RX ORDER — AZITHROMYCIN 250 MG/1
TABLET, FILM COATED ORAL
Qty: 6 TABLET | Refills: 0 | Status: SHIPPED | OUTPATIENT
Start: 2018-12-19 | End: 2019-01-17

## 2018-12-19 RX ORDER — BENAZEPRIL HYDROCHLORIDE 10 MG/1
10 TABLET ORAL DAILY
Qty: 30 TABLET | Refills: 5 | Status: SHIPPED | OUTPATIENT
Start: 2018-12-19 | End: 2019-08-22 | Stop reason: SDUPTHER

## 2018-12-19 ASSESSMENT — ENCOUNTER SYMPTOMS
DIARRHEA: 0
SORE THROAT: 1
COUGH: 1
VOICE CHANGE: 1
VOMITING: 0
NAUSEA: 0
SHORTNESS OF BREATH: 0

## 2018-12-19 ASSESSMENT — PATIENT HEALTH QUESTIONNAIRE - PHQ9
2. FEELING DOWN, DEPRESSED OR HOPELESS: 0
SUM OF ALL RESPONSES TO PHQ QUESTIONS 1-9: 0
SUM OF ALL RESPONSES TO PHQ9 QUESTIONS 1 & 2: 0
SUM OF ALL RESPONSES TO PHQ QUESTIONS 1-9: 0
1. LITTLE INTEREST OR PLEASURE IN DOING THINGS: 0

## 2018-12-19 NOTE — PATIENT INSTRUCTIONS
plate format. Talk to your doctor, a dietitian, or a diabetes educator about your concerns. Carbohydrate counting  With carbohydrate counting, you plan meals based on the amount of carbohydrate in each food. Carbohydrate raises blood sugar higher and more quickly than any other nutrient. It is found in desserts, breads and cereals, and fruit. It's also found in starchy vegetables such as potatoes and corn, grains such as rice and pasta, and milk and yogurt. Spreading carbohydrate throughout the day helps keep your blood sugar levels within your target range. Your daily amount depends on several things, including your weight, how active you are, which diabetes medicines you take, and what your goals are for your blood sugar levels. A registered dietitian or diabetes educator can help you plan how much carbohydrate to include in each meal and snack. A guideline for your daily amount of carbohydrate is:  · 45 to 60 grams at each meal. That's about the same as 3 to 4 carbohydrate servings. · 15 to 20 grams at each snack. That's about the same as 1 carbohydrate serving. The Nutrition Facts label on packaged foods tells you how much carbohydrate is in a serving of the food. First, look at the serving size on the food label. Is that the amount you eat in a serving? All of the nutrition information on a food label is based on that serving size. So if you eat more or less than that, you'll need to adjust the other numbers. Total carbohydrate is the next thing you need to look for on the label. If you count carbohydrate servings, one serving of carbohydrate is 15 grams. For foods that don't come with labels, such as fresh fruits and vegetables, you'll need a guide that lists carbohydrate in these foods. Ask your doctor, dietitian, or diabetes educator about books or other nutrition guides you can use.   If you take insulin, you need to know how many grams of carbohydrate are in a meal. This lets you know how much rapid-acting insulin to take before you eat. If you use an insulin pump, you get a constant rate of insulin during the day. So the pump must be programmed at meals to give you extra insulin to cover the rise in blood sugar after meals. When you know how much carbohydrate you will eat, you can take the right amount of insulin. Or, if you always use the same amount of insulin, you need to make sure that you eat the same amount of carbohydrate at meals. If you need more help to understand carbohydrate counting and food labels, ask your doctor, dietitian, or diabetes educator. How do you get started with meal planning? Here are some tips to get started:  · Plan your meals a week at a time. Don't forget to include snacks too. · Use cookbooks or online recipes to plan several main meals. Plan some quick meals for busy nights. You also can double some recipes that freeze well. Then you can save half for other busy nights when you don't have time to cook. · Make sure you have the ingredients you need for your recipes. If you're running low on basic items, put these items on your shopping list too. · List foods that you use to make breakfasts, lunches, and snacks. List plenty of fruits and vegetables. · Post this list on the refrigerator. Add to it as you think of more things you need. · Take the list to the store to do your weekly shopping. Follow-up care is a key part of your treatment and safety. Be sure to make and go to all appointments, and call your doctor if you are having problems. It's also a good idea to know your test results and keep a list of the medicines you take. Where can you learn more? Go to https://rios.[employer]. org and sign in to your [employer] account. Enter I388 in the [facility] box to learn more about \"Learning About Meal Planning for Diabetes. \"     If you do not have an account, please click on the \"Sign Up Now\" link.   Current as of: December 7, 2017  Content

## 2019-01-17 ENCOUNTER — OFFICE VISIT (OUTPATIENT)
Dept: FAMILY MEDICINE CLINIC | Age: 59
End: 2019-01-17
Payer: MEDICARE

## 2019-01-17 VITALS
TEMPERATURE: 99.1 F | RESPIRATION RATE: 20 BRPM | HEIGHT: 63 IN | HEART RATE: 88 BPM | BODY MASS INDEX: 31.71 KG/M2 | DIASTOLIC BLOOD PRESSURE: 82 MMHG | SYSTOLIC BLOOD PRESSURE: 138 MMHG | WEIGHT: 179 LBS | OXYGEN SATURATION: 98 %

## 2019-01-17 DIAGNOSIS — J01.01 ACUTE RECURRENT MAXILLARY SINUSITIS: Primary | ICD-10-CM

## 2019-01-17 PROCEDURE — 99213 OFFICE O/P EST LOW 20 MIN: CPT | Performed by: FAMILY MEDICINE

## 2019-01-17 PROCEDURE — G8427 DOCREV CUR MEDS BY ELIG CLIN: HCPCS | Performed by: FAMILY MEDICINE

## 2019-01-17 PROCEDURE — 1036F TOBACCO NON-USER: CPT | Performed by: FAMILY MEDICINE

## 2019-01-17 PROCEDURE — G8484 FLU IMMUNIZE NO ADMIN: HCPCS | Performed by: FAMILY MEDICINE

## 2019-01-17 PROCEDURE — 3017F COLORECTAL CA SCREEN DOC REV: CPT | Performed by: FAMILY MEDICINE

## 2019-01-17 PROCEDURE — G8417 CALC BMI ABV UP PARAM F/U: HCPCS | Performed by: FAMILY MEDICINE

## 2019-01-17 RX ORDER — AMOXICILLIN 500 MG/1
500 CAPSULE ORAL 2 TIMES DAILY
Qty: 20 CAPSULE | Refills: 0 | Status: SHIPPED | OUTPATIENT
Start: 2019-01-17 | End: 2019-01-27

## 2019-01-17 RX ORDER — HALOPERIDOL 5 MG
2.5 TABLET ORAL DAILY
COMMUNITY
Start: 2019-01-14 | End: 2020-01-23

## 2019-01-17 RX ORDER — DEXTROMETHORPHAN HYDROBROMIDE AND PROMETHAZINE HYDROCHLORIDE 15; 6.25 MG/5ML; MG/5ML
5 SYRUP ORAL 4 TIMES DAILY PRN
Qty: 240 ML | Refills: 0 | Status: SHIPPED | OUTPATIENT
Start: 2019-01-17 | End: 2019-10-04

## 2019-01-17 ASSESSMENT — ENCOUNTER SYMPTOMS
COUGH: 1
RHINORRHEA: 1
SORE THROAT: 0

## 2019-02-04 ENCOUNTER — HOSPITAL ENCOUNTER (OUTPATIENT)
Age: 59
Discharge: HOME | End: 2019-02-04
Payer: MEDICARE

## 2019-02-04 DIAGNOSIS — I10 ESSENTIAL HYPERTENSION: ICD-10-CM

## 2019-02-04 LAB
ALBUMIN SERPL-MCNC: 4.9 G/DL (ref 3.5–5.2)
ALP BLD-CCNC: 118 U/L (ref 35–104)
ALT SERPL-CCNC: 16 U/L (ref 0–32)
ANION GAP SERPL CALCULATED.3IONS-SCNC: 15 MMOL/L (ref 7–16)
AST SERPL-CCNC: 22 U/L (ref 0–31)
BILIRUB SERPL-MCNC: 0.7 MG/DL (ref 0–1.2)
BUN BLDV-MCNC: 8 MG/DL (ref 6–20)
CALCIUM SERPL-MCNC: 10.1 MG/DL (ref 8.6–10.2)
CHLORIDE BLD-SCNC: 100 MMOL/L (ref 98–107)
CHOLESTEROL, TOTAL: 141 MG/DL (ref 0–199)
CO2: 27 MMOL/L (ref 22–29)
CREAT SERPL-MCNC: 0.7 MG/DL (ref 0.5–1)
GFR AFRICAN AMERICAN: >60
GFR NON-AFRICAN AMERICAN: >60 ML/MIN/1.73
GLUCOSE BLD-MCNC: 112 MG/DL (ref 74–99)
HCT VFR BLD CALC: 39.5 % (ref 34–48)
HDLC SERPL-MCNC: 33 MG/DL
HEMOGLOBIN: 12.5 G/DL (ref 11.5–15.5)
LDL CHOLESTEROL CALCULATED: 84 MG/DL (ref 0–99)
MCH RBC QN AUTO: 25.4 PG (ref 26–35)
MCHC RBC AUTO-ENTMCNC: 31.6 % (ref 32–34.5)
MCV RBC AUTO: 80.3 FL (ref 80–99.9)
PDW BLD-RTO: 15.1 FL (ref 11.5–15)
PLATELET # BLD: 367 E9/L (ref 130–450)
PMV BLD AUTO: 11 FL (ref 7–12)
POTASSIUM SERPL-SCNC: 3.8 MMOL/L (ref 3.5–5)
RBC # BLD: 4.92 E12/L (ref 3.5–5.5)
SODIUM BLD-SCNC: 142 MMOL/L (ref 132–146)
TOTAL PROTEIN: 8.5 G/DL (ref 6.4–8.3)
TRIGL SERPL-MCNC: 121 MG/DL (ref 0–149)
VLDLC SERPL CALC-MCNC: 24 MG/DL
WBC # BLD: 8.2 E9/L (ref 4.5–11.5)

## 2019-02-04 PROCEDURE — 80053 COMPREHEN METABOLIC PANEL: CPT

## 2019-02-04 PROCEDURE — 85027 COMPLETE CBC AUTOMATED: CPT

## 2019-02-04 PROCEDURE — 36415 COLL VENOUS BLD VENIPUNCTURE: CPT

## 2019-02-04 PROCEDURE — 80061 LIPID PANEL: CPT

## 2019-03-27 ENCOUNTER — OFFICE VISIT (OUTPATIENT)
Dept: FAMILY MEDICINE CLINIC | Age: 59
End: 2019-03-27
Payer: MEDICARE

## 2019-03-27 VITALS
RESPIRATION RATE: 20 BRPM | HEART RATE: 88 BPM | WEIGHT: 183 LBS | HEIGHT: 63 IN | SYSTOLIC BLOOD PRESSURE: 132 MMHG | OXYGEN SATURATION: 99 % | BODY MASS INDEX: 32.43 KG/M2 | DIASTOLIC BLOOD PRESSURE: 72 MMHG

## 2019-03-27 DIAGNOSIS — Z79.4 TYPE 2 DIABETES MELLITUS WITH DIABETIC POLYNEUROPATHY, WITH LONG-TERM CURRENT USE OF INSULIN (HCC): Primary | ICD-10-CM

## 2019-03-27 DIAGNOSIS — E11.42 TYPE 2 DIABETES MELLITUS WITH DIABETIC POLYNEUROPATHY, WITH LONG-TERM CURRENT USE OF INSULIN (HCC): Primary | ICD-10-CM

## 2019-03-27 DIAGNOSIS — I10 ESSENTIAL HYPERTENSION: ICD-10-CM

## 2019-03-27 DIAGNOSIS — E66.9 OBESITY (BMI 30-39.9): ICD-10-CM

## 2019-03-27 LAB — HBA1C MFR BLD: 6.7 %

## 2019-03-27 PROCEDURE — 3044F HG A1C LEVEL LT 7.0%: CPT | Performed by: FAMILY MEDICINE

## 2019-03-27 PROCEDURE — 3017F COLORECTAL CA SCREEN DOC REV: CPT | Performed by: FAMILY MEDICINE

## 2019-03-27 PROCEDURE — 99213 OFFICE O/P EST LOW 20 MIN: CPT | Performed by: FAMILY MEDICINE

## 2019-03-27 PROCEDURE — G8417 CALC BMI ABV UP PARAM F/U: HCPCS | Performed by: FAMILY MEDICINE

## 2019-03-27 PROCEDURE — 83036 HEMOGLOBIN GLYCOSYLATED A1C: CPT | Performed by: FAMILY MEDICINE

## 2019-03-27 PROCEDURE — G8427 DOCREV CUR MEDS BY ELIG CLIN: HCPCS | Performed by: FAMILY MEDICINE

## 2019-03-27 PROCEDURE — 2022F DILAT RTA XM EVC RTNOPTHY: CPT | Performed by: FAMILY MEDICINE

## 2019-03-27 PROCEDURE — G8484 FLU IMMUNIZE NO ADMIN: HCPCS | Performed by: FAMILY MEDICINE

## 2019-03-27 PROCEDURE — 1036F TOBACCO NON-USER: CPT | Performed by: FAMILY MEDICINE

## 2019-03-27 RX ORDER — GLIPIZIDE 10 MG/1
10 TABLET ORAL
Qty: 60 TABLET | Refills: 5 | Status: SHIPPED | OUTPATIENT
Start: 2019-03-27 | End: 2019-10-23 | Stop reason: SDUPTHER

## 2019-03-27 RX ORDER — FLUTICASONE PROPIONATE 50 MCG
2 SPRAY, SUSPENSION (ML) NASAL DAILY
Qty: 1 BOTTLE | Refills: 0 | Status: SHIPPED | OUTPATIENT
Start: 2019-03-27 | End: 2019-10-25 | Stop reason: ALTCHOICE

## 2019-03-27 ASSESSMENT — PATIENT HEALTH QUESTIONNAIRE - PHQ9
SUM OF ALL RESPONSES TO PHQ9 QUESTIONS 1 & 2: 0
SUM OF ALL RESPONSES TO PHQ QUESTIONS 1-9: 0
1. LITTLE INTEREST OR PLEASURE IN DOING THINGS: 0
SUM OF ALL RESPONSES TO PHQ QUESTIONS 1-9: 0
2. FEELING DOWN, DEPRESSED OR HOPELESS: 0

## 2019-03-27 ASSESSMENT — ENCOUNTER SYMPTOMS
DIARRHEA: 0
SHORTNESS OF BREATH: 0
VOMITING: 0
NAUSEA: 0

## 2019-06-21 RX ORDER — METHIMAZOLE 5 MG/1
5 TABLET ORAL DAILY
Qty: 30 TABLET | Refills: 5 | Status: SHIPPED | OUTPATIENT
Start: 2019-06-21 | End: 2019-10-23 | Stop reason: SDUPTHER

## 2019-07-24 DIAGNOSIS — E11.42 TYPE 2 DIABETES MELLITUS WITH DIABETIC POLYNEUROPATHY, WITHOUT LONG-TERM CURRENT USE OF INSULIN (HCC): ICD-10-CM

## 2019-07-28 RX ORDER — ATORVASTATIN CALCIUM 20 MG/1
20 TABLET, FILM COATED ORAL DAILY
Qty: 30 TABLET | Refills: 5 | Status: SHIPPED
Start: 2019-07-28 | End: 2020-02-13 | Stop reason: SDUPTHER

## 2019-07-31 DIAGNOSIS — I10 ESSENTIAL HYPERTENSION: ICD-10-CM

## 2019-08-01 RX ORDER — AMLODIPINE BESYLATE 10 MG/1
10 TABLET ORAL DAILY
Qty: 30 TABLET | Refills: 2 | Status: SHIPPED | OUTPATIENT
Start: 2019-08-01 | End: 2019-10-04 | Stop reason: ALTCHOICE

## 2019-08-02 DIAGNOSIS — E11.42 TYPE 2 DIABETES MELLITUS WITH DIABETIC POLYNEUROPATHY, WITHOUT LONG-TERM CURRENT USE OF INSULIN (HCC): ICD-10-CM

## 2019-08-22 DIAGNOSIS — I10 ESSENTIAL HYPERTENSION: ICD-10-CM

## 2019-08-22 RX ORDER — BENAZEPRIL HYDROCHLORIDE 10 MG/1
10 TABLET ORAL DAILY
Qty: 30 TABLET | Refills: 5 | Status: SHIPPED
Start: 2019-08-22 | End: 2020-02-13 | Stop reason: SDUPTHER

## 2019-10-04 ENCOUNTER — OFFICE VISIT (OUTPATIENT)
Dept: CARDIOLOGY CLINIC | Age: 59
End: 2019-10-04
Payer: MEDICARE

## 2019-10-04 VITALS
SYSTOLIC BLOOD PRESSURE: 136 MMHG | DIASTOLIC BLOOD PRESSURE: 58 MMHG | HEIGHT: 63 IN | BODY MASS INDEX: 32.25 KG/M2 | WEIGHT: 182 LBS | HEART RATE: 112 BPM

## 2019-10-04 DIAGNOSIS — E66.9 NON MORBID OBESITY: ICD-10-CM

## 2019-10-04 DIAGNOSIS — E78.5 DYSLIPIDEMIA: ICD-10-CM

## 2019-10-04 DIAGNOSIS — R00.0 SINUS TACHYCARDIA: ICD-10-CM

## 2019-10-04 DIAGNOSIS — I10 ESSENTIAL HYPERTENSION: Primary | ICD-10-CM

## 2019-10-04 DIAGNOSIS — E05.90 HYPERTHYROIDISM: ICD-10-CM

## 2019-10-04 PROCEDURE — G8427 DOCREV CUR MEDS BY ELIG CLIN: HCPCS | Performed by: INTERNAL MEDICINE

## 2019-10-04 PROCEDURE — 93000 ELECTROCARDIOGRAM COMPLETE: CPT | Performed by: INTERNAL MEDICINE

## 2019-10-04 PROCEDURE — G8484 FLU IMMUNIZE NO ADMIN: HCPCS | Performed by: INTERNAL MEDICINE

## 2019-10-04 PROCEDURE — 99214 OFFICE O/P EST MOD 30 MIN: CPT | Performed by: INTERNAL MEDICINE

## 2019-10-04 PROCEDURE — 1036F TOBACCO NON-USER: CPT | Performed by: INTERNAL MEDICINE

## 2019-10-04 PROCEDURE — G8417 CALC BMI ABV UP PARAM F/U: HCPCS | Performed by: INTERNAL MEDICINE

## 2019-10-04 PROCEDURE — 3017F COLORECTAL CA SCREEN DOC REV: CPT | Performed by: INTERNAL MEDICINE

## 2019-10-04 RX ORDER — BENZTROPINE MESYLATE 0.5 MG/1
0.5 TABLET ORAL DAILY
Status: ON HOLD | COMMUNITY
Start: 2019-09-12 | End: 2020-06-04 | Stop reason: HOSPADM

## 2019-10-08 ENCOUNTER — APPOINTMENT (OUTPATIENT)
Dept: GENERAL RADIOLOGY | Age: 59
End: 2019-10-08
Payer: MEDICARE

## 2019-10-08 ENCOUNTER — APPOINTMENT (OUTPATIENT)
Dept: CT IMAGING | Age: 59
End: 2019-10-08
Payer: MEDICARE

## 2019-10-08 ENCOUNTER — HOSPITAL ENCOUNTER (EMERGENCY)
Age: 59
Discharge: ANOTHER HOSPITAL | End: 2019-10-08
Attending: EMERGENCY MEDICINE
Payer: MEDICARE

## 2019-10-08 ENCOUNTER — HOSPITAL ENCOUNTER (EMERGENCY)
Age: 59
Discharge: HOME | End: 2019-10-08
Attending: EMERGENCY MEDICINE
Payer: MEDICARE

## 2019-10-08 VITALS
WEIGHT: 183 LBS | SYSTOLIC BLOOD PRESSURE: 159 MMHG | HEIGHT: 63 IN | BODY MASS INDEX: 32.43 KG/M2 | OXYGEN SATURATION: 96 % | RESPIRATION RATE: 16 BRPM | HEART RATE: 100 BPM | TEMPERATURE: 98.9 F | DIASTOLIC BLOOD PRESSURE: 75 MMHG

## 2019-10-08 VITALS
WEIGHT: 180 LBS | HEART RATE: 107 BPM | DIASTOLIC BLOOD PRESSURE: 73 MMHG | OXYGEN SATURATION: 96 % | BODY MASS INDEX: 31.89 KG/M2 | HEIGHT: 63 IN | TEMPERATURE: 100.7 F | SYSTOLIC BLOOD PRESSURE: 135 MMHG | RESPIRATION RATE: 18 BRPM

## 2019-10-08 DIAGNOSIS — B34.9 VIRAL SYNDROME: Primary | ICD-10-CM

## 2019-10-08 DIAGNOSIS — R50.9 FEVER, UNSPECIFIED FEVER CAUSE: ICD-10-CM

## 2019-10-08 DIAGNOSIS — R42 DIZZINESS: ICD-10-CM

## 2019-10-08 DIAGNOSIS — M54.2 NECK PAIN: ICD-10-CM

## 2019-10-08 DIAGNOSIS — H81.10 BENIGN PAROXYSMAL POSITIONAL VERTIGO, UNSPECIFIED LATERALITY: Primary | ICD-10-CM

## 2019-10-08 DIAGNOSIS — H81.10 BENIGN PAROXYSMAL POSITIONAL VERTIGO, UNSPECIFIED LATERALITY: ICD-10-CM

## 2019-10-08 LAB
ALBUMIN SERPL-MCNC: 4 G/DL (ref 3.5–5.2)
ALP BLD-CCNC: 87 U/L (ref 35–104)
ALT SERPL-CCNC: 6 U/L (ref 0–32)
ANION GAP SERPL CALCULATED.3IONS-SCNC: 15 MMOL/L (ref 7–16)
AST SERPL-CCNC: 10 U/L (ref 0–31)
BACTERIA: ABNORMAL /HPF
BILIRUB SERPL-MCNC: 0.6 MG/DL (ref 0–1.2)
BILIRUBIN URINE: NEGATIVE
BLOOD, URINE: NEGATIVE
BUN BLDV-MCNC: 10 MG/DL (ref 8–23)
CALCIUM SERPL-MCNC: 9.2 MG/DL (ref 8.6–10.2)
CHLORIDE BLD-SCNC: 96 MMOL/L (ref 98–107)
CLARITY: CLEAR
CO2: 24 MMOL/L (ref 22–29)
COLOR: YELLOW
CREAT SERPL-MCNC: 0.8 MG/DL (ref 0.5–1)
EKG ATRIAL RATE: 105 BPM
EKG P AXIS: 62 DEGREES
EKG P-R INTERVAL: 184 MS
EKG Q-T INTERVAL: 338 MS
EKG QRS DURATION: 86 MS
EKG QTC CALCULATION (BAZETT): 446 MS
EKG R AXIS: 66 DEGREES
EKG T AXIS: 58 DEGREES
EKG VENTRICULAR RATE: 105 BPM
GFR AFRICAN AMERICAN: >60
GFR NON-AFRICAN AMERICAN: >60 ML/MIN/1.73
GLUCOSE BLD-MCNC: 72 MG/DL (ref 74–99)
GLUCOSE URINE: NEGATIVE MG/DL
HCT VFR BLD CALC: 31.4 % (ref 34–48)
HEMOGLOBIN: 9.8 G/DL (ref 11.5–15.5)
KETONES, URINE: NEGATIVE MG/DL
LEUKOCYTE ESTERASE, URINE: NEGATIVE
MAGNESIUM: 1.6 MG/DL (ref 1.6–2.6)
MCH RBC QN AUTO: 24 PG (ref 26–35)
MCHC RBC AUTO-ENTMCNC: 31.2 % (ref 32–34.5)
MCV RBC AUTO: 76.8 FL (ref 80–99.9)
NITRITE, URINE: NEGATIVE
PDW BLD-RTO: 14.2 FL (ref 11.5–15)
PH UA: 5.5 (ref 5–9)
PLATELET # BLD: 325 E9/L (ref 130–450)
PMV BLD AUTO: 9.9 FL (ref 7–12)
POTASSIUM REFLEX MAGNESIUM: 3.5 MMOL/L (ref 3.5–5)
PROTEIN UA: NEGATIVE MG/DL
RBC # BLD: 4.09 E12/L (ref 3.5–5.5)
RBC UA: ABNORMAL /HPF (ref 0–2)
SODIUM BLD-SCNC: 135 MMOL/L (ref 132–146)
SPECIFIC GRAVITY UA: <=1.005 (ref 1–1.03)
TOTAL PROTEIN: 8.1 G/DL (ref 6.4–8.3)
UROBILINOGEN, URINE: 1 E.U./DL
WBC # BLD: 10.5 E9/L (ref 4.5–11.5)
WBC UA: ABNORMAL /HPF (ref 0–5)
YEAST: ABNORMAL

## 2019-10-08 PROCEDURE — 81001 URINALYSIS AUTO W/SCOPE: CPT

## 2019-10-08 PROCEDURE — 70450 CT HEAD/BRAIN W/O DYE: CPT

## 2019-10-08 PROCEDURE — G0383 LEV 4 HOSP TYPE B ED VISIT: HCPCS

## 2019-10-08 PROCEDURE — 93005 ELECTROCARDIOGRAM TRACING: CPT | Performed by: EMERGENCY MEDICINE

## 2019-10-08 PROCEDURE — 36415 COLL VENOUS BLD VENIPUNCTURE: CPT

## 2019-10-08 PROCEDURE — 99284 EMERGENCY DEPT VISIT MOD MDM: CPT

## 2019-10-08 PROCEDURE — 83735 ASSAY OF MAGNESIUM: CPT

## 2019-10-08 PROCEDURE — 93010 ELECTROCARDIOGRAM REPORT: CPT | Performed by: INTERNAL MEDICINE

## 2019-10-08 PROCEDURE — 71046 X-RAY EXAM CHEST 2 VIEWS: CPT

## 2019-10-08 PROCEDURE — 85027 COMPLETE CBC AUTOMATED: CPT

## 2019-10-08 PROCEDURE — 6370000000 HC RX 637 (ALT 250 FOR IP): Performed by: EMERGENCY MEDICINE

## 2019-10-08 PROCEDURE — 80053 COMPREHEN METABOLIC PANEL: CPT

## 2019-10-08 PROCEDURE — 87088 URINE BACTERIA CULTURE: CPT

## 2019-10-08 RX ORDER — ACETAMINOPHEN 500 MG
1000 TABLET ORAL ONCE
Status: COMPLETED | OUTPATIENT
Start: 2019-10-08 | End: 2019-10-08

## 2019-10-08 RX ADMIN — ACETAMINOPHEN 1000 MG: 500 TABLET, FILM COATED ORAL at 18:44

## 2019-10-08 ASSESSMENT — PAIN SCALES - GENERAL
PAINLEVEL_OUTOF10: 10

## 2019-10-08 ASSESSMENT — PAIN DESCRIPTION - FREQUENCY
FREQUENCY: CONTINUOUS
FREQUENCY: CONTINUOUS

## 2019-10-08 ASSESSMENT — PAIN DESCRIPTION - PAIN TYPE: TYPE: ACUTE PAIN

## 2019-10-08 ASSESSMENT — PAIN DESCRIPTION - PROGRESSION: CLINICAL_PROGRESSION: NOT CHANGED

## 2019-10-08 ASSESSMENT — PAIN DESCRIPTION - LOCATION
LOCATION: NECK;LEG
LOCATION: LEG

## 2019-10-08 ASSESSMENT — PAIN DESCRIPTION - ORIENTATION: ORIENTATION: RIGHT;LEFT;ANTERIOR

## 2019-10-08 ASSESSMENT — PAIN DESCRIPTION - DESCRIPTORS: DESCRIPTORS: CONSTANT

## 2019-10-08 ASSESSMENT — PAIN DESCRIPTION - ONSET: ONSET: ON-GOING

## 2019-10-11 ENCOUNTER — TELEPHONE (OUTPATIENT)
Dept: CARDIOLOGY CLINIC | Age: 59
End: 2019-10-11

## 2019-10-11 LAB — URINE CULTURE, ROUTINE: NORMAL

## 2019-10-18 ENCOUNTER — TELEPHONE (OUTPATIENT)
Dept: FAMILY MEDICINE CLINIC | Age: 59
End: 2019-10-18

## 2019-10-18 RX ORDER — METOPROLOL TARTRATE 100 MG/1
50 TABLET ORAL 2 TIMES DAILY
Qty: 60 TABLET | Refills: 6 | Status: ON HOLD
Start: 2019-10-18 | End: 2020-03-03 | Stop reason: HOSPADM

## 2019-10-18 RX ORDER — METOPROLOL TARTRATE 100 MG/1
50 TABLET ORAL 2 TIMES DAILY
COMMUNITY
End: 2019-10-18 | Stop reason: SDUPTHER

## 2019-10-23 ENCOUNTER — OFFICE VISIT (OUTPATIENT)
Dept: FAMILY MEDICINE CLINIC | Age: 59
End: 2019-10-23
Payer: MEDICARE

## 2019-10-23 VITALS
SYSTOLIC BLOOD PRESSURE: 124 MMHG | BODY MASS INDEX: 29.77 KG/M2 | WEIGHT: 168 LBS | RESPIRATION RATE: 20 BRPM | HEIGHT: 63 IN | OXYGEN SATURATION: 98 % | DIASTOLIC BLOOD PRESSURE: 82 MMHG | HEART RATE: 88 BPM

## 2019-10-23 DIAGNOSIS — Z12.31 ENCOUNTER FOR SCREENING MAMMOGRAM FOR BREAST CANCER: ICD-10-CM

## 2019-10-23 DIAGNOSIS — E11.42 TYPE 2 DIABETES MELLITUS WITH DIABETIC POLYNEUROPATHY, WITH LONG-TERM CURRENT USE OF INSULIN (HCC): ICD-10-CM

## 2019-10-23 DIAGNOSIS — Z00.00 ROUTINE GENERAL MEDICAL EXAMINATION AT A HEALTH CARE FACILITY: Primary | ICD-10-CM

## 2019-10-23 DIAGNOSIS — Z79.4 TYPE 2 DIABETES MELLITUS WITH DIABETIC POLYNEUROPATHY, WITH LONG-TERM CURRENT USE OF INSULIN (HCC): ICD-10-CM

## 2019-10-23 DIAGNOSIS — R11.0 NAUSEA: ICD-10-CM

## 2019-10-23 LAB — HBA1C MFR BLD: 5.8 %

## 2019-10-23 PROCEDURE — G0438 PPPS, INITIAL VISIT: HCPCS | Performed by: FAMILY MEDICINE

## 2019-10-23 PROCEDURE — 83036 HEMOGLOBIN GLYCOSYLATED A1C: CPT | Performed by: FAMILY MEDICINE

## 2019-10-23 PROCEDURE — 3044F HG A1C LEVEL LT 7.0%: CPT | Performed by: FAMILY MEDICINE

## 2019-10-23 PROCEDURE — G8484 FLU IMMUNIZE NO ADMIN: HCPCS | Performed by: FAMILY MEDICINE

## 2019-10-23 PROCEDURE — 3017F COLORECTAL CA SCREEN DOC REV: CPT | Performed by: FAMILY MEDICINE

## 2019-10-23 RX ORDER — PROMETHAZINE HYDROCHLORIDE 25 MG/1
25 TABLET ORAL 3 TIMES DAILY PRN
Qty: 30 TABLET | Refills: 0 | Status: SHIPPED | OUTPATIENT
Start: 2019-10-23 | End: 2019-10-25 | Stop reason: ALTCHOICE

## 2019-10-23 RX ORDER — GLIPIZIDE 10 MG/1
10 TABLET ORAL DAILY
Qty: 30 TABLET | Refills: 5 | Status: SHIPPED
Start: 2019-10-23 | End: 2020-02-13 | Stop reason: SDUPTHER

## 2019-10-23 RX ORDER — METHIMAZOLE 5 MG/1
5 TABLET ORAL DAILY
Qty: 30 TABLET | Refills: 5 | Status: SHIPPED
Start: 2019-10-23 | End: 2020-02-13 | Stop reason: SDUPTHER

## 2019-10-23 ASSESSMENT — LIFESTYLE VARIABLES: HOW OFTEN DO YOU HAVE A DRINK CONTAINING ALCOHOL: 0

## 2019-10-23 ASSESSMENT — PATIENT HEALTH QUESTIONNAIRE - PHQ9
SUM OF ALL RESPONSES TO PHQ QUESTIONS 1-9: 0
SUM OF ALL RESPONSES TO PHQ QUESTIONS 1-9: 0

## 2019-10-24 ENCOUNTER — TELEPHONE (OUTPATIENT)
Dept: CARDIOLOGY CLINIC | Age: 59
End: 2019-10-24

## 2019-10-25 ENCOUNTER — HOSPITAL ENCOUNTER (INPATIENT)
Age: 59
LOS: 4 days | Discharge: HOME | DRG: 661 | End: 2019-10-29
Attending: EMERGENCY MEDICINE | Admitting: INTERNAL MEDICINE
Payer: MEDICARE

## 2019-10-25 ENCOUNTER — APPOINTMENT (OUTPATIENT)
Dept: CT IMAGING | Age: 59
DRG: 661 | End: 2019-10-25
Payer: MEDICARE

## 2019-10-25 DIAGNOSIS — N20.0 CALCULUS OF KIDNEY: ICD-10-CM

## 2019-10-25 DIAGNOSIS — R00.0 TACHYCARDIA: ICD-10-CM

## 2019-10-25 DIAGNOSIS — D73.5 SPLENIC INFARCT: Primary | ICD-10-CM

## 2019-10-25 PROBLEM — K92.2 ACUTE GI BLEEDING: Status: ACTIVE | Noted: 2019-10-25

## 2019-10-25 LAB
ALBUMIN SERPL-MCNC: 3.7 G/DL (ref 3.5–5.2)
ALP BLD-CCNC: 105 U/L (ref 35–104)
ALT SERPL-CCNC: 11 U/L (ref 0–32)
AMPHETAMINE SCREEN, URINE: NOT DETECTED
ANION GAP SERPL CALCULATED.3IONS-SCNC: 13 MMOL/L (ref 7–16)
APTT: 35 SEC (ref 24.5–35.1)
AST SERPL-CCNC: 15 U/L (ref 0–31)
BACTERIA: ABNORMAL /HPF
BARBITURATE SCREEN URINE: NOT DETECTED
BASOPHILS ABSOLUTE: 0 E9/L (ref 0–0.2)
BASOPHILS ABSOLUTE: 0.11 E9/L (ref 0–0.2)
BASOPHILS RELATIVE PERCENT: 0.6 % (ref 0–2)
BASOPHILS RELATIVE PERCENT: 0.9 % (ref 0–2)
BENZODIAZEPINE SCREEN, URINE: POSITIVE
BILIRUB SERPL-MCNC: 0.6 MG/DL (ref 0–1.2)
BILIRUBIN URINE: NEGATIVE
BLOOD, URINE: NEGATIVE
BUN BLDV-MCNC: 11 MG/DL (ref 8–23)
BURR CELLS: ABNORMAL
CALCIUM SERPL-MCNC: 9.7 MG/DL (ref 8.6–10.2)
CANNABINOID SCREEN URINE: NOT DETECTED
CHLORIDE BLD-SCNC: 97 MMOL/L (ref 98–107)
CLARITY: CLEAR
CO2: 27 MMOL/L (ref 22–29)
COCAINE METABOLITE SCREEN URINE: NOT DETECTED
COLOR: YELLOW
CREAT SERPL-MCNC: 1 MG/DL (ref 0.5–1)
EKG ATRIAL RATE: 103 BPM
EKG P AXIS: 56 DEGREES
EKG P-R INTERVAL: 174 MS
EKG Q-T INTERVAL: 360 MS
EKG QRS DURATION: 82 MS
EKG QTC CALCULATION (BAZETT): 471 MS
EKG R AXIS: 48 DEGREES
EKG T AXIS: 48 DEGREES
EKG VENTRICULAR RATE: 103 BPM
EOSINOPHILS ABSOLUTE: 0 E9/L (ref 0.05–0.5)
EOSINOPHILS ABSOLUTE: 0 E9/L (ref 0.05–0.5)
EOSINOPHILS RELATIVE PERCENT: 1.5 % (ref 0–6)
EOSINOPHILS RELATIVE PERCENT: 1.5 % (ref 0–6)
EPITHELIAL CELLS, UA: ABNORMAL /HPF
GFR AFRICAN AMERICAN: >60
GFR NON-AFRICAN AMERICAN: >60 ML/MIN/1.73
GLUCOSE BLD-MCNC: 61 MG/DL (ref 74–99)
GLUCOSE URINE: NEGATIVE MG/DL
HCT VFR BLD CALC: 25.1 % (ref 34–48)
HCT VFR BLD CALC: 28.5 % (ref 34–48)
HEMOGLOBIN: 7.9 G/DL (ref 11.5–15.5)
HEMOGLOBIN: 8.9 G/DL (ref 11.5–15.5)
HYPOCHROMIA: ABNORMAL
HYPOCHROMIA: ABNORMAL
INR BLD: 1.5
KETONES, URINE: NEGATIVE MG/DL
LACTIC ACID: 1.7 MMOL/L (ref 0.5–2.2)
LEUKOCYTE ESTERASE, URINE: ABNORMAL
LIPASE: 11 U/L (ref 13–60)
LYMPHOCYTES ABSOLUTE: 1.94 E9/L (ref 1.5–4)
LYMPHOCYTES ABSOLUTE: 3.25 E9/L (ref 1.5–4)
LYMPHOCYTES RELATIVE PERCENT: 15.7 % (ref 20–42)
LYMPHOCYTES RELATIVE PERCENT: 25.4 % (ref 20–42)
Lab: ABNORMAL
MCH RBC QN AUTO: 23.1 PG (ref 26–35)
MCH RBC QN AUTO: 23.1 PG (ref 26–35)
MCHC RBC AUTO-ENTMCNC: 31.2 % (ref 32–34.5)
MCHC RBC AUTO-ENTMCNC: 31.5 % (ref 32–34.5)
MCV RBC AUTO: 73.4 FL (ref 80–99.9)
MCV RBC AUTO: 74 FL (ref 80–99.9)
METER GLUCOSE: 176 MG/DL (ref 74–99)
METHADONE SCREEN, URINE: NOT DETECTED
MONOCYTES ABSOLUTE: 1.21 E9/L (ref 0.1–0.95)
MONOCYTES ABSOLUTE: 2.08 E9/L (ref 0.1–0.95)
MONOCYTES RELATIVE PERCENT: 15.8 % (ref 2–12)
MONOCYTES RELATIVE PERCENT: 9.6 % (ref 2–12)
MYELOCYTE PERCENT: 0.9 % (ref 0–0)
NEUTROPHILS ABSOLUTE: 7.67 E9/L (ref 1.8–7.3)
NEUTROPHILS ABSOLUTE: 8.95 E9/L (ref 1.8–7.3)
NEUTROPHILS RELATIVE PERCENT: 58.8 % (ref 43–80)
NEUTROPHILS RELATIVE PERCENT: 73 % (ref 43–80)
NITRITE, URINE: NEGATIVE
OPIATE SCREEN URINE: NOT DETECTED
OVALOCYTES: ABNORMAL
OVALOCYTES: ABNORMAL
PDW BLD-RTO: 14.8 FL (ref 11.5–15)
PDW BLD-RTO: 14.9 FL (ref 11.5–15)
PH UA: 5.5 (ref 5–9)
PHENCYCLIDINE SCREEN URINE: NOT DETECTED
PLATELET # BLD: 399 E9/L (ref 130–450)
PLATELET # BLD: 451 E9/L (ref 130–450)
PMV BLD AUTO: 10.5 FL (ref 7–12)
PMV BLD AUTO: 10.9 FL (ref 7–12)
POIKILOCYTES: ABNORMAL
POIKILOCYTES: ABNORMAL
POLYCHROMASIA: ABNORMAL
POLYCHROMASIA: ABNORMAL
POTASSIUM SERPL-SCNC: 4.2 MMOL/L (ref 3.5–5)
PROPOXYPHENE SCREEN: NOT DETECTED
PROTEIN UA: 30 MG/DL
PROTHROMBIN TIME: 16.9 SEC (ref 9.3–12.4)
RBC # BLD: 3.42 E12/L (ref 3.5–5.5)
RBC # BLD: 3.85 E12/L (ref 3.5–5.5)
RBC UA: ABNORMAL /HPF (ref 0–2)
SCHISTOCYTES: ABNORMAL
SODIUM BLD-SCNC: 137 MMOL/L (ref 132–146)
SPECIFIC GRAVITY UA: 1.01 (ref 1–1.03)
T4 FREE: 0.98 NG/DL (ref 0.93–1.7)
TARGET CELLS: ABNORMAL
TOTAL CK: 77 U/L (ref 20–180)
TOTAL PROTEIN: 8.2 G/DL (ref 6.4–8.3)
TROPONIN: <0.01 NG/ML (ref 0–0.03)
TSH SERPL DL<=0.05 MIU/L-ACNC: 0.57 UIU/ML (ref 0.27–4.2)
UROBILINOGEN, URINE: 1 E.U./DL
VACUOLATED NEUTROPHILS: ABNORMAL
WBC # BLD: 12.1 E9/L (ref 4.5–11.5)
WBC # BLD: 13 E9/L (ref 4.5–11.5)
WBC UA: ABNORMAL /HPF (ref 0–5)

## 2019-10-25 PROCEDURE — 85730 THROMBOPLASTIN TIME PARTIAL: CPT

## 2019-10-25 PROCEDURE — 84439 ASSAY OF FREE THYROXINE: CPT

## 2019-10-25 PROCEDURE — 82962 GLUCOSE BLOOD TEST: CPT

## 2019-10-25 PROCEDURE — 96372 THER/PROPH/DIAG INJ SC/IM: CPT

## 2019-10-25 PROCEDURE — 84484 ASSAY OF TROPONIN QUANT: CPT

## 2019-10-25 PROCEDURE — 84443 ASSAY THYROID STIM HORMONE: CPT

## 2019-10-25 PROCEDURE — 74177 CT ABD & PELVIS W/CONTRAST: CPT

## 2019-10-25 PROCEDURE — 99285 EMERGENCY DEPT VISIT HI MDM: CPT

## 2019-10-25 PROCEDURE — 1200000000 HC SEMI PRIVATE

## 2019-10-25 PROCEDURE — 70450 CT HEAD/BRAIN W/O DYE: CPT

## 2019-10-25 PROCEDURE — 80307 DRUG TEST PRSMV CHEM ANLYZR: CPT

## 2019-10-25 PROCEDURE — 93010 ELECTROCARDIOGRAM REPORT: CPT | Performed by: INTERNAL MEDICINE

## 2019-10-25 PROCEDURE — 96375 TX/PRO/DX INJ NEW DRUG ADDON: CPT

## 2019-10-25 PROCEDURE — 96374 THER/PROPH/DIAG INJ IV PUSH: CPT

## 2019-10-25 PROCEDURE — 6360000002 HC RX W HCPCS: Performed by: INTERNAL MEDICINE

## 2019-10-25 PROCEDURE — 81001 URINALYSIS AUTO W/SCOPE: CPT

## 2019-10-25 PROCEDURE — 2500000003 HC RX 250 WO HCPCS: Performed by: EMERGENCY MEDICINE

## 2019-10-25 PROCEDURE — 6370000000 HC RX 637 (ALT 250 FOR IP): Performed by: INTERNAL MEDICINE

## 2019-10-25 PROCEDURE — 83605 ASSAY OF LACTIC ACID: CPT

## 2019-10-25 PROCEDURE — 80053 COMPREHEN METABOLIC PANEL: CPT

## 2019-10-25 PROCEDURE — 2580000003 HC RX 258: Performed by: INTERNAL MEDICINE

## 2019-10-25 PROCEDURE — 6360000002 HC RX W HCPCS: Performed by: EMERGENCY MEDICINE

## 2019-10-25 PROCEDURE — 93005 ELECTROCARDIOGRAM TRACING: CPT | Performed by: EMERGENCY MEDICINE

## 2019-10-25 PROCEDURE — 85610 PROTHROMBIN TIME: CPT

## 2019-10-25 PROCEDURE — C9113 INJ PANTOPRAZOLE SODIUM, VIA: HCPCS | Performed by: INTERNAL MEDICINE

## 2019-10-25 PROCEDURE — 87088 URINE BACTERIA CULTURE: CPT

## 2019-10-25 PROCEDURE — 6360000004 HC RX CONTRAST MEDICATION: Performed by: RADIOLOGY

## 2019-10-25 PROCEDURE — 83690 ASSAY OF LIPASE: CPT

## 2019-10-25 PROCEDURE — 82550 ASSAY OF CK (CPK): CPT

## 2019-10-25 PROCEDURE — G0480 DRUG TEST DEF 1-7 CLASSES: HCPCS

## 2019-10-25 PROCEDURE — 85025 COMPLETE CBC W/AUTO DIFF WBC: CPT

## 2019-10-25 PROCEDURE — 36415 COLL VENOUS BLD VENIPUNCTURE: CPT

## 2019-10-25 PROCEDURE — 2580000003 HC RX 258: Performed by: EMERGENCY MEDICINE

## 2019-10-25 RX ORDER — DEXTROSE MONOHYDRATE 50 MG/ML
100 INJECTION, SOLUTION INTRAVENOUS PRN
Status: DISCONTINUED | OUTPATIENT
Start: 2019-10-25 | End: 2019-10-29 | Stop reason: HOSPADM

## 2019-10-25 RX ORDER — DIAZEPAM 2 MG/1
2 TABLET ORAL EVERY 12 HOURS PRN
Status: DISCONTINUED | OUTPATIENT
Start: 2019-10-25 | End: 2019-10-29 | Stop reason: HOSPADM

## 2019-10-25 RX ORDER — PROMETHAZINE HYDROCHLORIDE 25 MG/ML
25 INJECTION, SOLUTION INTRAMUSCULAR; INTRAVENOUS ONCE
Status: COMPLETED | OUTPATIENT
Start: 2019-10-25 | End: 2019-10-25

## 2019-10-25 RX ORDER — KETOROLAC TROMETHAMINE 15 MG/ML
15 INJECTION, SOLUTION INTRAMUSCULAR; INTRAVENOUS ONCE
Status: COMPLETED | OUTPATIENT
Start: 2019-10-25 | End: 2019-10-25

## 2019-10-25 RX ORDER — SODIUM CHLORIDE 0.9 % (FLUSH) 0.9 %
10 SYRINGE (ML) INJECTION PRN
Status: DISCONTINUED | OUTPATIENT
Start: 2019-10-25 | End: 2019-10-29 | Stop reason: HOSPADM

## 2019-10-25 RX ORDER — PANTOPRAZOLE SODIUM 40 MG/10ML
40 INJECTION, POWDER, LYOPHILIZED, FOR SOLUTION INTRAVENOUS 2 TIMES DAILY
Status: DISCONTINUED | OUTPATIENT
Start: 2019-10-25 | End: 2019-10-29

## 2019-10-25 RX ORDER — DEXTROSE MONOHYDRATE 25 G/50ML
12.5 INJECTION, SOLUTION INTRAVENOUS PRN
Status: DISCONTINUED | OUTPATIENT
Start: 2019-10-25 | End: 2019-10-29 | Stop reason: HOSPADM

## 2019-10-25 RX ORDER — SODIUM CHLORIDE 0.9 % (FLUSH) 0.9 %
SYRINGE (ML) INJECTION
Status: COMPLETED
Start: 2019-10-25 | End: 2019-10-25

## 2019-10-25 RX ORDER — TRIAMCINOLONE ACETONIDE 1 MG/G
1 CREAM TOPICAL DAILY PRN
Status: DISCONTINUED | OUTPATIENT
Start: 2019-10-25 | End: 2019-10-29 | Stop reason: HOSPADM

## 2019-10-25 RX ORDER — SODIUM CHLORIDE 9 MG/ML
INJECTION, SOLUTION INTRAVENOUS CONTINUOUS
Status: DISCONTINUED | OUTPATIENT
Start: 2019-10-25 | End: 2019-10-28

## 2019-10-25 RX ORDER — AMLODIPINE BESYLATE 10 MG/1
10 TABLET ORAL DAILY
Status: DISCONTINUED | OUTPATIENT
Start: 2019-10-25 | End: 2019-10-29 | Stop reason: HOSPADM

## 2019-10-25 RX ORDER — METOCLOPRAMIDE HYDROCHLORIDE 5 MG/ML
10 INJECTION INTRAMUSCULAR; INTRAVENOUS ONCE
Status: COMPLETED | OUTPATIENT
Start: 2019-10-25 | End: 2019-10-25

## 2019-10-25 RX ORDER — ACETAMINOPHEN 325 MG/1
650 TABLET ORAL EVERY 4 HOURS PRN
Status: DISCONTINUED | OUTPATIENT
Start: 2019-10-25 | End: 2019-10-29 | Stop reason: HOSPADM

## 2019-10-25 RX ORDER — METHIMAZOLE 5 MG/1
5 TABLET ORAL DAILY
Status: DISCONTINUED | OUTPATIENT
Start: 2019-10-25 | End: 2019-10-29 | Stop reason: HOSPADM

## 2019-10-25 RX ORDER — 0.9 % SODIUM CHLORIDE 0.9 %
1000 INTRAVENOUS SOLUTION INTRAVENOUS ONCE
Status: COMPLETED | OUTPATIENT
Start: 2019-10-25 | End: 2019-10-25

## 2019-10-25 RX ORDER — NICOTINE POLACRILEX 4 MG
15 LOZENGE BUCCAL PRN
Status: DISCONTINUED | OUTPATIENT
Start: 2019-10-25 | End: 2019-10-29 | Stop reason: HOSPADM

## 2019-10-25 RX ORDER — BENZTROPINE MESYLATE 0.5 MG/1
0.5 TABLET ORAL DAILY
Status: DISCONTINUED | OUTPATIENT
Start: 2019-10-25 | End: 2019-10-29 | Stop reason: HOSPADM

## 2019-10-25 RX ORDER — LISINOPRIL 10 MG/1
10 TABLET ORAL DAILY
Status: DISCONTINUED | OUTPATIENT
Start: 2019-10-25 | End: 2019-10-29 | Stop reason: HOSPADM

## 2019-10-25 RX ORDER — OXYCODONE HYDROCHLORIDE AND ACETAMINOPHEN 5; 325 MG/1; MG/1
1 TABLET ORAL ONCE
Status: DISCONTINUED | OUTPATIENT
Start: 2019-10-25 | End: 2019-10-29 | Stop reason: HOSPADM

## 2019-10-25 RX ORDER — SODIUM CHLORIDE 0.9 % (FLUSH) 0.9 %
10 SYRINGE (ML) INJECTION EVERY 12 HOURS SCHEDULED
Status: DISCONTINUED | OUTPATIENT
Start: 2019-10-25 | End: 2019-10-29 | Stop reason: HOSPADM

## 2019-10-25 RX ORDER — METOPROLOL TARTRATE 5 MG/5ML
5 INJECTION INTRAVENOUS ONCE
Status: COMPLETED | OUTPATIENT
Start: 2019-10-25 | End: 2019-10-25

## 2019-10-25 RX ORDER — AMLODIPINE BESYLATE 10 MG/1
10 TABLET ORAL DAILY
COMMUNITY
End: 2019-11-06

## 2019-10-25 RX ORDER — METOPROLOL TARTRATE 50 MG/1
50 TABLET, FILM COATED ORAL 2 TIMES DAILY
Status: DISCONTINUED | OUTPATIENT
Start: 2019-10-25 | End: 2019-10-29 | Stop reason: HOSPADM

## 2019-10-25 RX ORDER — PROCHLORPERAZINE EDISYLATE 5 MG/ML
5 INJECTION INTRAMUSCULAR; INTRAVENOUS EVERY 6 HOURS PRN
Status: DISCONTINUED | OUTPATIENT
Start: 2019-10-25 | End: 2019-10-29 | Stop reason: HOSPADM

## 2019-10-25 RX ORDER — ATORVASTATIN CALCIUM 20 MG/1
20 TABLET, FILM COATED ORAL DAILY
Status: DISCONTINUED | OUTPATIENT
Start: 2019-10-25 | End: 2019-10-29 | Stop reason: HOSPADM

## 2019-10-25 RX ADMIN — METOPROLOL TARTRATE 5 MG: 5 INJECTION, SOLUTION INTRAVENOUS at 15:45

## 2019-10-25 RX ADMIN — SODIUM CHLORIDE: 9 INJECTION, SOLUTION INTRAVENOUS at 23:26

## 2019-10-25 RX ADMIN — Medication 10 ML: at 23:41

## 2019-10-25 RX ADMIN — BENZTROPINE MESYLATE 0.5 MG: 0.5 TABLET ORAL at 23:34

## 2019-10-25 RX ADMIN — METHIMAZOLE 5 MG: 5 TABLET ORAL at 23:33

## 2019-10-25 RX ADMIN — Medication 10 ML: at 15:22

## 2019-10-25 RX ADMIN — KETOROLAC TROMETHAMINE 15 MG: 15 INJECTION, SOLUTION INTRAMUSCULAR; INTRAVENOUS at 14:59

## 2019-10-25 RX ADMIN — PROMETHAZINE HYDROCHLORIDE 25 MG: 25 INJECTION INTRAMUSCULAR; INTRAVENOUS at 14:07

## 2019-10-25 RX ADMIN — HALOPERIDOL 2.5 MG: 0.5 TABLET ORAL at 23:33

## 2019-10-25 RX ADMIN — METOCLOPRAMIDE 10 MG: 5 INJECTION, SOLUTION INTRAMUSCULAR; INTRAVENOUS at 11:35

## 2019-10-25 RX ADMIN — WATER 1 G: 1 INJECTION INTRAMUSCULAR; INTRAVENOUS; SUBCUTANEOUS at 23:31

## 2019-10-25 RX ADMIN — INSULIN LISPRO 1 UNITS: 100 INJECTION, SOLUTION INTRAVENOUS; SUBCUTANEOUS at 23:25

## 2019-10-25 RX ADMIN — METOPROLOL TARTRATE 50 MG: 50 TABLET ORAL at 23:36

## 2019-10-25 RX ADMIN — PANTOPRAZOLE SODIUM 40 MG: 40 INJECTION, POWDER, FOR SOLUTION INTRAVENOUS at 23:32

## 2019-10-25 RX ADMIN — LISINOPRIL 10 MG: 10 TABLET ORAL at 23:35

## 2019-10-25 RX ADMIN — ATORVASTATIN CALCIUM 20 MG: 20 TABLET, FILM COATED ORAL at 23:35

## 2019-10-25 RX ADMIN — AMLODIPINE BESYLATE 10 MG: 10 TABLET ORAL at 23:35

## 2019-10-25 RX ADMIN — IOPAMIDOL 80 ML: 755 INJECTION, SOLUTION INTRAVENOUS at 13:54

## 2019-10-25 RX ADMIN — SODIUM CHLORIDE 1000 ML: 9 INJECTION, SOLUTION INTRAVENOUS at 11:35

## 2019-10-25 ASSESSMENT — PAIN SCALES - GENERAL
PAINLEVEL_OUTOF10: 10
PAINLEVEL_OUTOF10: 0

## 2019-10-25 ASSESSMENT — ENCOUNTER SYMPTOMS
SHORTNESS OF BREATH: 0
ABDOMINAL PAIN: 0
CHEST TIGHTNESS: 0
NAUSEA: 1
VOMITING: 1

## 2019-10-26 ENCOUNTER — APPOINTMENT (OUTPATIENT)
Dept: CT IMAGING | Age: 59
DRG: 661 | End: 2019-10-26
Payer: MEDICARE

## 2019-10-26 LAB
ABO/RH: NORMAL
ALBUMIN SERPL-MCNC: 3.1 G/DL (ref 3.5–5.2)
ALP BLD-CCNC: 87 U/L (ref 35–104)
ALT SERPL-CCNC: 10 U/L (ref 0–32)
ANION GAP SERPL CALCULATED.3IONS-SCNC: 15 MMOL/L (ref 7–16)
ANTIBODY SCREEN: NORMAL
AST SERPL-CCNC: 16 U/L (ref 0–31)
BILIRUB SERPL-MCNC: 0.6 MG/DL (ref 0–1.2)
BUN BLDV-MCNC: 12 MG/DL (ref 8–23)
CALCIUM SERPL-MCNC: 8.7 MG/DL (ref 8.6–10.2)
CHLORIDE BLD-SCNC: 100 MMOL/L (ref 98–107)
CHOLESTEROL, TOTAL: 84 MG/DL (ref 0–199)
CO2: 23 MMOL/L (ref 22–29)
CREAT SERPL-MCNC: 1 MG/DL (ref 0.5–1)
FERRITIN: 714 NG/ML
GFR AFRICAN AMERICAN: >60
GFR NON-AFRICAN AMERICAN: >60 ML/MIN/1.73
GLUCOSE BLD-MCNC: 135 MG/DL (ref 74–99)
HCT VFR BLD CALC: 24.6 % (ref 34–48)
HCT VFR BLD CALC: 24.9 % (ref 34–48)
HDLC SERPL-MCNC: 25 MG/DL
HEMOGLOBIN: 7 G/DL (ref 11.5–15.5)
HEMOGLOBIN: 7.6 G/DL (ref 11.5–15.5)
HEMOGLOBIN: 7.8 G/DL (ref 11.5–15.5)
HEMOGLOBIN: 7.9 G/DL (ref 11.5–15.5)
IMMATURE RETIC FRACT: 24.2 % (ref 3–15.9)
IRON SATURATION: 17 % (ref 15–50)
IRON: 29 MCG/DL (ref 37–145)
LACTATE DEHYDROGENASE: 251 U/L (ref 135–214)
LDL CHOLESTEROL CALCULATED: 38 MG/DL (ref 0–99)
LV EF: 65 %
LVEF MODALITY: NORMAL
MAGNESIUM: 2 MG/DL (ref 1.6–2.6)
MCH RBC QN AUTO: 23.4 PG (ref 26–35)
MCHC RBC AUTO-ENTMCNC: 31.3 % (ref 32–34.5)
MCV RBC AUTO: 74.6 FL (ref 80–99.9)
METER GLUCOSE: 115 MG/DL (ref 74–99)
METER GLUCOSE: 207 MG/DL (ref 74–99)
METER GLUCOSE: 241 MG/DL (ref 74–99)
METER GLUCOSE: 269 MG/DL (ref 74–99)
PDW BLD-RTO: 15 FL (ref 11.5–15)
PHOSPHORUS: 2.9 MG/DL (ref 2.5–4.5)
PLATELET # BLD: 406 E9/L (ref 130–450)
PMV BLD AUTO: 10.6 FL (ref 7–12)
POTASSIUM SERPL-SCNC: 4.3 MMOL/L (ref 3.5–5)
RBC # BLD: 3.34 E12/L (ref 3.5–5.5)
RETIC HGB EQUIVALENT: 22.6 PG (ref 28.2–36.6)
RETICULOCYTE ABSOLUTE COUNT: 0.07 E12/L
RETICULOCYTE COUNT PCT: 2 % (ref 0.4–1.9)
SODIUM BLD-SCNC: 138 MMOL/L (ref 132–146)
TOTAL IRON BINDING CAPACITY: 166 MCG/DL (ref 250–450)
TOTAL PROTEIN: 7.4 G/DL (ref 6.4–8.3)
TRIGL SERPL-MCNC: 107 MG/DL (ref 0–149)
VLDLC SERPL CALC-MCNC: 21 MG/DL
WBC # BLD: 9.8 E9/L (ref 4.5–11.5)

## 2019-10-26 PROCEDURE — 51702 INSERT TEMP BLADDER CATH: CPT

## 2019-10-26 PROCEDURE — 83550 IRON BINDING TEST: CPT

## 2019-10-26 PROCEDURE — 82962 GLUCOSE BLOOD TEST: CPT

## 2019-10-26 PROCEDURE — 84100 ASSAY OF PHOSPHORUS: CPT

## 2019-10-26 PROCEDURE — 85014 HEMATOCRIT: CPT

## 2019-10-26 PROCEDURE — 80053 COMPREHEN METABOLIC PANEL: CPT

## 2019-10-26 PROCEDURE — 36415 COLL VENOUS BLD VENIPUNCTURE: CPT

## 2019-10-26 PROCEDURE — 93306 TTE W/DOPPLER COMPLETE: CPT

## 2019-10-26 PROCEDURE — 85027 COMPLETE CBC AUTOMATED: CPT

## 2019-10-26 PROCEDURE — 2580000003 HC RX 258: Performed by: INTERNAL MEDICINE

## 2019-10-26 PROCEDURE — 6370000000 HC RX 637 (ALT 250 FOR IP): Performed by: INTERNAL MEDICINE

## 2019-10-26 PROCEDURE — 82728 ASSAY OF FERRITIN: CPT

## 2019-10-26 PROCEDURE — C9113 INJ PANTOPRAZOLE SODIUM, VIA: HCPCS | Performed by: INTERNAL MEDICINE

## 2019-10-26 PROCEDURE — 86900 BLOOD TYPING SEROLOGIC ABO: CPT

## 2019-10-26 PROCEDURE — 86850 RBC ANTIBODY SCREEN: CPT

## 2019-10-26 PROCEDURE — 83615 LACTATE (LD) (LDH) ENZYME: CPT

## 2019-10-26 PROCEDURE — 85018 HEMOGLOBIN: CPT

## 2019-10-26 PROCEDURE — 86920 COMPATIBILITY TEST SPIN: CPT

## 2019-10-26 PROCEDURE — 85045 AUTOMATED RETICULOCYTE COUNT: CPT

## 2019-10-26 PROCEDURE — 83735 ASSAY OF MAGNESIUM: CPT

## 2019-10-26 PROCEDURE — 71260 CT THORAX DX C+: CPT

## 2019-10-26 PROCEDURE — 1200000000 HC SEMI PRIVATE

## 2019-10-26 PROCEDURE — 80061 LIPID PANEL: CPT

## 2019-10-26 PROCEDURE — 83540 ASSAY OF IRON: CPT

## 2019-10-26 PROCEDURE — 6360000004 HC RX CONTRAST MEDICATION: Performed by: RADIOLOGY

## 2019-10-26 PROCEDURE — 99222 1ST HOSP IP/OBS MODERATE 55: CPT | Performed by: INTERNAL MEDICINE

## 2019-10-26 PROCEDURE — 6360000002 HC RX W HCPCS: Performed by: INTERNAL MEDICINE

## 2019-10-26 PROCEDURE — 51798 US URINE CAPACITY MEASURE: CPT

## 2019-10-26 PROCEDURE — 86901 BLOOD TYPING SEROLOGIC RH(D): CPT

## 2019-10-26 RX ADMIN — LISINOPRIL 10 MG: 10 TABLET ORAL at 08:42

## 2019-10-26 RX ADMIN — METHIMAZOLE 5 MG: 5 TABLET ORAL at 08:41

## 2019-10-26 RX ADMIN — HALOPERIDOL 2.5 MG: 0.5 TABLET ORAL at 08:41

## 2019-10-26 RX ADMIN — INSULIN LISPRO 2 UNITS: 100 INJECTION, SOLUTION INTRAVENOUS; SUBCUTANEOUS at 17:07

## 2019-10-26 RX ADMIN — METOPROLOL TARTRATE 50 MG: 50 TABLET ORAL at 21:40

## 2019-10-26 RX ADMIN — DIAZEPAM 2 MG: 2 TABLET ORAL at 08:51

## 2019-10-26 RX ADMIN — INSULIN LISPRO 2 UNITS: 100 INJECTION, SOLUTION INTRAVENOUS; SUBCUTANEOUS at 21:40

## 2019-10-26 RX ADMIN — Medication 10 ML: at 21:48

## 2019-10-26 RX ADMIN — METOPROLOL TARTRATE 50 MG: 50 TABLET ORAL at 08:42

## 2019-10-26 RX ADMIN — ACETAMINOPHEN 650 MG: 325 TABLET, FILM COATED ORAL at 21:41

## 2019-10-26 RX ADMIN — ATORVASTATIN CALCIUM 20 MG: 20 TABLET, FILM COATED ORAL at 08:42

## 2019-10-26 RX ADMIN — WATER 1 G: 1 INJECTION INTRAMUSCULAR; INTRAVENOUS; SUBCUTANEOUS at 21:48

## 2019-10-26 RX ADMIN — BENZTROPINE MESYLATE 0.5 MG: 0.5 TABLET ORAL at 08:41

## 2019-10-26 RX ADMIN — ACETAMINOPHEN 650 MG: 325 TABLET, FILM COATED ORAL at 14:22

## 2019-10-26 RX ADMIN — AMLODIPINE BESYLATE 10 MG: 10 TABLET ORAL at 08:42

## 2019-10-26 RX ADMIN — PANTOPRAZOLE SODIUM 40 MG: 40 INJECTION, POWDER, FOR SOLUTION INTRAVENOUS at 08:42

## 2019-10-26 RX ADMIN — IOPAMIDOL 80 ML: 755 INJECTION, SOLUTION INTRAVENOUS at 14:02

## 2019-10-26 RX ADMIN — Medication 10 ML: at 08:41

## 2019-10-26 RX ADMIN — PANTOPRAZOLE SODIUM 40 MG: 40 INJECTION, POWDER, FOR SOLUTION INTRAVENOUS at 21:47

## 2019-10-26 ASSESSMENT — PAIN SCALES - GENERAL
PAINLEVEL_OUTOF10: 4
PAINLEVEL_OUTOF10: 7
PAINLEVEL_OUTOF10: 6
PAINLEVEL_OUTOF10: 9
PAINLEVEL_OUTOF10: 0

## 2019-10-27 ENCOUNTER — APPOINTMENT (OUTPATIENT)
Dept: NUCLEAR MEDICINE | Age: 59
DRG: 661 | End: 2019-10-27
Payer: MEDICARE

## 2019-10-27 LAB
ALBUMIN SERPL-MCNC: 3 G/DL (ref 3.5–5.2)
ALP BLD-CCNC: 84 U/L (ref 35–104)
ALT SERPL-CCNC: 7 U/L (ref 0–32)
ANION GAP SERPL CALCULATED.3IONS-SCNC: 12 MMOL/L (ref 7–16)
AST SERPL-CCNC: 14 U/L (ref 0–31)
BILIRUB SERPL-MCNC: 0.5 MG/DL (ref 0–1.2)
BUN BLDV-MCNC: 7 MG/DL (ref 8–23)
CALCIUM SERPL-MCNC: 8.9 MG/DL (ref 8.6–10.2)
CHLORIDE BLD-SCNC: 106 MMOL/L (ref 98–107)
CO2: 25 MMOL/L (ref 22–29)
CREAT SERPL-MCNC: 0.8 MG/DL (ref 0.5–1)
GFR AFRICAN AMERICAN: >60
GFR NON-AFRICAN AMERICAN: >60 ML/MIN/1.73
GLUCOSE BLD-MCNC: 156 MG/DL (ref 74–99)
HCT VFR BLD CALC: 24.2 % (ref 34–48)
HEMOGLOBIN: 6.9 G/DL (ref 11.5–15.5)
HEMOGLOBIN: 7.4 G/DL (ref 11.5–15.5)
HEMOGLOBIN: 7.4 G/DL (ref 11.5–15.5)
HEMOGLOBIN: 7.9 G/DL (ref 11.5–15.5)
HEMOGLOBIN: 9.6 G/DL (ref 11.5–15.5)
MCH RBC QN AUTO: 23.1 PG (ref 26–35)
MCHC RBC AUTO-ENTMCNC: 30.6 % (ref 32–34.5)
MCV RBC AUTO: 75.4 FL (ref 80–99.9)
METER GLUCOSE: 158 MG/DL (ref 74–99)
METER GLUCOSE: 196 MG/DL (ref 74–99)
METER GLUCOSE: 217 MG/DL (ref 74–99)
METER GLUCOSE: 259 MG/DL (ref 74–99)
PDW BLD-RTO: 14.9 FL (ref 11.5–15)
PLATELET # BLD: 412 E9/L (ref 130–450)
PMV BLD AUTO: 10.8 FL (ref 7–12)
POTASSIUM SERPL-SCNC: 4.2 MMOL/L (ref 3.5–5)
RBC # BLD: 3.21 E12/L (ref 3.5–5.5)
SODIUM BLD-SCNC: 143 MMOL/L (ref 132–146)
TOTAL PROTEIN: 7.1 G/DL (ref 6.4–8.3)
URINE CULTURE, ROUTINE: NORMAL
WBC # BLD: 9 E9/L (ref 4.5–11.5)

## 2019-10-27 PROCEDURE — 78708 K FLOW/FUNCT IMAGE W/DRUG: CPT

## 2019-10-27 PROCEDURE — 97162 PT EVAL MOD COMPLEX 30 MIN: CPT | Performed by: PHYSICAL THERAPIST

## 2019-10-27 PROCEDURE — 99233 SBSQ HOSP IP/OBS HIGH 50: CPT | Performed by: INTERNAL MEDICINE

## 2019-10-27 PROCEDURE — 2580000003 HC RX 258: Performed by: INTERNAL MEDICINE

## 2019-10-27 PROCEDURE — 82962 GLUCOSE BLOOD TEST: CPT

## 2019-10-27 PROCEDURE — 6370000000 HC RX 637 (ALT 250 FOR IP): Performed by: INTERNAL MEDICINE

## 2019-10-27 PROCEDURE — 88112 CYTOPATH CELL ENHANCE TECH: CPT

## 2019-10-27 PROCEDURE — 3430000000 HC RX DIAGNOSTIC RADIOPHARMACEUTICAL: Performed by: RADIOLOGY

## 2019-10-27 PROCEDURE — A9562 TC99M MERTIATIDE: HCPCS | Performed by: RADIOLOGY

## 2019-10-27 PROCEDURE — 80053 COMPREHEN METABOLIC PANEL: CPT

## 2019-10-27 PROCEDURE — 97165 OT EVAL LOW COMPLEX 30 MIN: CPT

## 2019-10-27 PROCEDURE — 36415 COLL VENOUS BLD VENIPUNCTURE: CPT

## 2019-10-27 PROCEDURE — 6360000002 HC RX W HCPCS: Performed by: RADIOLOGY

## 2019-10-27 PROCEDURE — 85027 COMPLETE CBC AUTOMATED: CPT

## 2019-10-27 PROCEDURE — C9113 INJ PANTOPRAZOLE SODIUM, VIA: HCPCS | Performed by: INTERNAL MEDICINE

## 2019-10-27 PROCEDURE — 85018 HEMOGLOBIN: CPT

## 2019-10-27 PROCEDURE — 1200000000 HC SEMI PRIVATE

## 2019-10-27 PROCEDURE — 97530 THERAPEUTIC ACTIVITIES: CPT

## 2019-10-27 PROCEDURE — 6360000002 HC RX W HCPCS: Performed by: INTERNAL MEDICINE

## 2019-10-27 RX ORDER — OXYCODONE HYDROCHLORIDE AND ACETAMINOPHEN 5; 325 MG/1; MG/1
1 TABLET ORAL EVERY 4 HOURS PRN
Status: DISCONTINUED | OUTPATIENT
Start: 2019-10-27 | End: 2019-10-28 | Stop reason: SDUPTHER

## 2019-10-27 RX ORDER — SODIUM CHLORIDE 0.9 % (FLUSH) 0.9 %
10 SYRINGE (ML) INJECTION EVERY 12 HOURS SCHEDULED
Status: DISCONTINUED | OUTPATIENT
Start: 2019-10-27 | End: 2019-10-29 | Stop reason: HOSPADM

## 2019-10-27 RX ORDER — SODIUM CHLORIDE 0.9 % (FLUSH) 0.9 %
10 SYRINGE (ML) INJECTION PRN
Status: DISCONTINUED | OUTPATIENT
Start: 2019-10-27 | End: 2019-10-29 | Stop reason: HOSPADM

## 2019-10-27 RX ORDER — 0.9 % SODIUM CHLORIDE 0.9 %
250 INTRAVENOUS SOLUTION INTRAVENOUS ONCE
Status: DISCONTINUED | OUTPATIENT
Start: 2019-10-27 | End: 2019-10-27

## 2019-10-27 RX ADMIN — WATER 1 G: 1 INJECTION INTRAMUSCULAR; INTRAVENOUS; SUBCUTANEOUS at 20:08

## 2019-10-27 RX ADMIN — INSULIN LISPRO 1 UNITS: 100 INJECTION, SOLUTION INTRAVENOUS; SUBCUTANEOUS at 20:10

## 2019-10-27 RX ADMIN — PANTOPRAZOLE SODIUM 40 MG: 40 INJECTION, POWDER, FOR SOLUTION INTRAVENOUS at 20:09

## 2019-10-27 RX ADMIN — Medication 10 ML: at 09:24

## 2019-10-27 RX ADMIN — PANTOPRAZOLE SODIUM 40 MG: 40 INJECTION, POWDER, FOR SOLUTION INTRAVENOUS at 09:26

## 2019-10-27 RX ADMIN — SODIUM CHLORIDE: 9 INJECTION, SOLUTION INTRAVENOUS at 18:41

## 2019-10-27 RX ADMIN — METHIMAZOLE 5 MG: 5 TABLET ORAL at 09:25

## 2019-10-27 RX ADMIN — FUROSEMIDE 40 MG: 10 INJECTION, SOLUTION INTRAMUSCULAR; INTRAVENOUS at 10:39

## 2019-10-27 RX ADMIN — AMLODIPINE BESYLATE 10 MG: 10 TABLET ORAL at 09:25

## 2019-10-27 RX ADMIN — INSULIN LISPRO 1 UNITS: 100 INJECTION, SOLUTION INTRAVENOUS; SUBCUTANEOUS at 16:51

## 2019-10-27 RX ADMIN — ACETAMINOPHEN 650 MG: 325 TABLET, FILM COATED ORAL at 12:49

## 2019-10-27 RX ADMIN — ACETAMINOPHEN 650 MG: 325 TABLET, FILM COATED ORAL at 23:31

## 2019-10-27 RX ADMIN — METOPROLOL TARTRATE 50 MG: 50 TABLET ORAL at 20:09

## 2019-10-27 RX ADMIN — OXYCODONE HYDROCHLORIDE AND ACETAMINOPHEN 1 TABLET: 5; 325 TABLET ORAL at 06:42

## 2019-10-27 RX ADMIN — INSULIN LISPRO 1 UNITS: 100 INJECTION, SOLUTION INTRAVENOUS; SUBCUTANEOUS at 09:26

## 2019-10-27 RX ADMIN — BENZTROPINE MESYLATE 0.5 MG: 0.5 TABLET ORAL at 09:25

## 2019-10-27 RX ADMIN — DIAZEPAM 2 MG: 2 TABLET ORAL at 09:25

## 2019-10-27 RX ADMIN — LISINOPRIL 10 MG: 10 TABLET ORAL at 09:25

## 2019-10-27 RX ADMIN — Medication 10 MILLICURIE: at 10:05

## 2019-10-27 RX ADMIN — METOPROLOL TARTRATE 50 MG: 50 TABLET ORAL at 09:25

## 2019-10-27 RX ADMIN — HALOPERIDOL 2.5 MG: 0.5 TABLET ORAL at 09:25

## 2019-10-27 RX ADMIN — OXYCODONE HYDROCHLORIDE AND ACETAMINOPHEN 1 TABLET: 5; 325 TABLET ORAL at 18:38

## 2019-10-27 RX ADMIN — Medication 10 ML: at 20:09

## 2019-10-27 RX ADMIN — ATORVASTATIN CALCIUM 20 MG: 20 TABLET, FILM COATED ORAL at 09:25

## 2019-10-27 ASSESSMENT — PAIN SCALES - GENERAL
PAINLEVEL_OUTOF10: 10
PAINLEVEL_OUTOF10: 8
PAINLEVEL_OUTOF10: 0
PAINLEVEL_OUTOF10: 2
PAINLEVEL_OUTOF10: 0

## 2019-10-27 ASSESSMENT — PAIN DESCRIPTION - ORIENTATION: ORIENTATION: RIGHT;LEFT

## 2019-10-27 ASSESSMENT — PAIN DESCRIPTION - DESCRIPTORS: DESCRIPTORS: ACHING;DISCOMFORT;SORE

## 2019-10-27 ASSESSMENT — PAIN - FUNCTIONAL ASSESSMENT
PAIN_FUNCTIONAL_ASSESSMENT: PREVENTS OR INTERFERES SOME ACTIVE ACTIVITIES AND ADLS
PAIN_FUNCTIONAL_ASSESSMENT: ACTIVITIES ARE NOT PREVENTED

## 2019-10-27 ASSESSMENT — PAIN DESCRIPTION - LOCATION
LOCATION: FLANK;HEAD
LOCATION: FLANK

## 2019-10-27 ASSESSMENT — PAIN DESCRIPTION - PAIN TYPE
TYPE: ACUTE PAIN
TYPE: ACUTE PAIN

## 2019-10-28 ENCOUNTER — ANESTHESIA EVENT (OUTPATIENT)
Dept: OPERATING ROOM | Age: 59
DRG: 661 | End: 2019-10-28
Payer: MEDICARE

## 2019-10-28 ENCOUNTER — APPOINTMENT (OUTPATIENT)
Dept: GENERAL RADIOLOGY | Age: 59
DRG: 661 | End: 2019-10-28
Payer: MEDICARE

## 2019-10-28 ENCOUNTER — ANESTHESIA (OUTPATIENT)
Dept: OPERATING ROOM | Age: 59
DRG: 661 | End: 2019-10-28
Payer: MEDICARE

## 2019-10-28 VITALS
OXYGEN SATURATION: 96 % | RESPIRATION RATE: 17 BRPM | SYSTOLIC BLOOD PRESSURE: 103 MMHG | TEMPERATURE: 98.6 F | DIASTOLIC BLOOD PRESSURE: 66 MMHG

## 2019-10-28 LAB
ALBUMIN SERPL-MCNC: 3.4 G/DL (ref 3.5–5.2)
ALP BLD-CCNC: 90 U/L (ref 35–104)
ALT SERPL-CCNC: 9 U/L (ref 0–32)
ANION GAP SERPL CALCULATED.3IONS-SCNC: 15 MMOL/L (ref 7–16)
AST SERPL-CCNC: 9 U/L (ref 0–31)
BILIRUB SERPL-MCNC: 0.5 MG/DL (ref 0–1.2)
BUN BLDV-MCNC: 8 MG/DL (ref 8–23)
CALCIUM SERPL-MCNC: 9 MG/DL (ref 8.6–10.2)
CHLORIDE BLD-SCNC: 101 MMOL/L (ref 98–107)
CO2: 24 MMOL/L (ref 22–29)
CREAT SERPL-MCNC: 0.7 MG/DL (ref 0.5–1)
GFR AFRICAN AMERICAN: >60
GFR NON-AFRICAN AMERICAN: >60 ML/MIN/1.73
GLUCOSE BLD-MCNC: 193 MG/DL (ref 74–99)
HCT VFR BLD CALC: 26.7 % (ref 34–48)
HEMOGLOBIN: 7.5 G/DL (ref 11.5–15.5)
HEMOGLOBIN: 8.1 G/DL (ref 11.5–15.5)
HEMOGLOBIN: 8.5 G/DL (ref 11.5–15.5)
MCH RBC QN AUTO: 22.6 PG (ref 26–35)
MCHC RBC AUTO-ENTMCNC: 30.3 % (ref 32–34.5)
MCV RBC AUTO: 74.6 FL (ref 80–99.9)
METER GLUCOSE: 167 MG/DL (ref 74–99)
METER GLUCOSE: 195 MG/DL (ref 74–99)
METER GLUCOSE: 201 MG/DL (ref 74–99)
METER GLUCOSE: 239 MG/DL (ref 74–99)
PDW BLD-RTO: 15.3 FL (ref 11.5–15)
PLATELET # BLD: 446 E9/L (ref 130–450)
PMV BLD AUTO: 10.1 FL (ref 7–12)
POTASSIUM SERPL-SCNC: 3.7 MMOL/L (ref 3.5–5)
RBC # BLD: 3.58 E12/L (ref 3.5–5.5)
SODIUM BLD-SCNC: 140 MMOL/L (ref 132–146)
TOTAL PROTEIN: 7.6 G/DL (ref 6.4–8.3)
WBC # BLD: 11.3 E9/L (ref 4.5–11.5)

## 2019-10-28 PROCEDURE — 6360000002 HC RX W HCPCS: Performed by: INTERNAL MEDICINE

## 2019-10-28 PROCEDURE — 6360000002 HC RX W HCPCS: Performed by: NURSE ANESTHETIST, CERTIFIED REGISTERED

## 2019-10-28 PROCEDURE — 7100000000 HC PACU RECOVERY - FIRST 15 MIN: Performed by: UROLOGY

## 2019-10-28 PROCEDURE — 2580000003 HC RX 258: Performed by: NURSE ANESTHETIST, CERTIFIED REGISTERED

## 2019-10-28 PROCEDURE — 6370000000 HC RX 637 (ALT 250 FOR IP): Performed by: INTERNAL MEDICINE

## 2019-10-28 PROCEDURE — 7100000001 HC PACU RECOVERY - ADDTL 15 MIN: Performed by: UROLOGY

## 2019-10-28 PROCEDURE — C2617 STENT, NON-COR, TEM W/O DEL: HCPCS | Performed by: UROLOGY

## 2019-10-28 PROCEDURE — 0T788DZ DILATION OF BILATERAL URETERS WITH INTRALUMINAL DEVICE, VIA NATURAL OR ARTIFICIAL OPENING ENDOSCOPIC: ICD-10-PCS | Performed by: UROLOGY

## 2019-10-28 PROCEDURE — C1769 GUIDE WIRE: HCPCS | Performed by: UROLOGY

## 2019-10-28 PROCEDURE — 74400 UROGRAPHY IV +-KUB TOMOG: CPT

## 2019-10-28 PROCEDURE — C1758 CATHETER, URETERAL: HCPCS | Performed by: UROLOGY

## 2019-10-28 PROCEDURE — 80053 COMPREHEN METABOLIC PANEL: CPT

## 2019-10-28 PROCEDURE — 99231 SBSQ HOSP IP/OBS SF/LOW 25: CPT | Performed by: INTERNAL MEDICINE

## 2019-10-28 PROCEDURE — 3700000001 HC ADD 15 MINUTES (ANESTHESIA): Performed by: UROLOGY

## 2019-10-28 PROCEDURE — 36415 COLL VENOUS BLD VENIPUNCTURE: CPT

## 2019-10-28 PROCEDURE — BT141ZZ FLUOROSCOPY OF KIDNEYS, URETERS AND BLADDER USING LOW OSMOLAR CONTRAST: ICD-10-PCS | Performed by: UROLOGY

## 2019-10-28 PROCEDURE — 87088 URINE BACTERIA CULTURE: CPT

## 2019-10-28 PROCEDURE — C9113 INJ PANTOPRAZOLE SODIUM, VIA: HCPCS | Performed by: INTERNAL MEDICINE

## 2019-10-28 PROCEDURE — 85027 COMPLETE CBC AUTOMATED: CPT

## 2019-10-28 PROCEDURE — 2709999900 HC NON-CHARGEABLE SUPPLY: Performed by: UROLOGY

## 2019-10-28 PROCEDURE — 2580000003 HC RX 258: Performed by: INTERNAL MEDICINE

## 2019-10-28 PROCEDURE — 3700000000 HC ANESTHESIA ATTENDED CARE: Performed by: UROLOGY

## 2019-10-28 PROCEDURE — 3600000013 HC SURGERY LEVEL 3 ADDTL 15MIN: Performed by: UROLOGY

## 2019-10-28 PROCEDURE — 85018 HEMOGLOBIN: CPT

## 2019-10-28 PROCEDURE — 82962 GLUCOSE BLOOD TEST: CPT

## 2019-10-28 PROCEDURE — 3600000003 HC SURGERY LEVEL 3 BASE: Performed by: UROLOGY

## 2019-10-28 PROCEDURE — 1200000000 HC SEMI PRIVATE

## 2019-10-28 DEVICE — URETERAL STENT
Type: IMPLANTABLE DEVICE | Site: URETER | Status: FUNCTIONAL
Brand: POLARIS™ ULTRA

## 2019-10-28 RX ORDER — PROPOFOL 10 MG/ML
INJECTION, EMULSION INTRAVENOUS PRN
Status: DISCONTINUED | OUTPATIENT
Start: 2019-10-28 | End: 2019-10-28 | Stop reason: SDUPTHER

## 2019-10-28 RX ORDER — OXYCODONE HYDROCHLORIDE AND ACETAMINOPHEN 5; 325 MG/1; MG/1
1 TABLET ORAL EVERY 4 HOURS PRN
Status: DISCONTINUED | OUTPATIENT
Start: 2019-10-28 | End: 2019-10-29 | Stop reason: HOSPADM

## 2019-10-28 RX ORDER — LIDOCAINE HYDROCHLORIDE 20 MG/ML
INJECTION, SOLUTION INTRAVENOUS PRN
Status: DISCONTINUED | OUTPATIENT
Start: 2019-10-28 | End: 2019-10-28 | Stop reason: SDUPTHER

## 2019-10-28 RX ORDER — MIDAZOLAM HYDROCHLORIDE 1 MG/ML
INJECTION INTRAMUSCULAR; INTRAVENOUS PRN
Status: DISCONTINUED | OUTPATIENT
Start: 2019-10-28 | End: 2019-10-28 | Stop reason: SDUPTHER

## 2019-10-28 RX ORDER — PROPOFOL 10 MG/ML
INJECTION, EMULSION INTRAVENOUS CONTINUOUS PRN
Status: DISCONTINUED | OUTPATIENT
Start: 2019-10-28 | End: 2019-10-28 | Stop reason: SDUPTHER

## 2019-10-28 RX ORDER — SODIUM CHLORIDE AND POTASSIUM CHLORIDE .9; .15 G/100ML; G/100ML
SOLUTION INTRAVENOUS CONTINUOUS
Status: DISCONTINUED | OUTPATIENT
Start: 2019-10-28 | End: 2019-10-29 | Stop reason: HOSPADM

## 2019-10-28 RX ORDER — FENTANYL CITRATE 50 UG/ML
INJECTION, SOLUTION INTRAMUSCULAR; INTRAVENOUS PRN
Status: DISCONTINUED | OUTPATIENT
Start: 2019-10-28 | End: 2019-10-28 | Stop reason: SDUPTHER

## 2019-10-28 RX ORDER — PHENYLEPHRINE HYDROCHLORIDE 10 MG/ML
INJECTION INTRAVENOUS PRN
Status: DISCONTINUED | OUTPATIENT
Start: 2019-10-28 | End: 2019-10-28 | Stop reason: SDUPTHER

## 2019-10-28 RX ORDER — SODIUM CHLORIDE, SODIUM LACTATE, POTASSIUM CHLORIDE, CALCIUM CHLORIDE 600; 310; 30; 20 MG/100ML; MG/100ML; MG/100ML; MG/100ML
INJECTION, SOLUTION INTRAVENOUS CONTINUOUS
Status: DISCONTINUED | OUTPATIENT
Start: 2019-10-28 | End: 2019-10-28

## 2019-10-28 RX ORDER — SODIUM CHLORIDE, SODIUM LACTATE, POTASSIUM CHLORIDE, CALCIUM CHLORIDE 600; 310; 30; 20 MG/100ML; MG/100ML; MG/100ML; MG/100ML
INJECTION, SOLUTION INTRAVENOUS CONTINUOUS PRN
Status: DISCONTINUED | OUTPATIENT
Start: 2019-10-28 | End: 2019-10-28 | Stop reason: SDUPTHER

## 2019-10-28 RX ADMIN — Medication 10 ML: at 20:34

## 2019-10-28 RX ADMIN — PROPOFOL 120 MCG/KG/MIN: 10 INJECTION, EMULSION INTRAVENOUS at 13:31

## 2019-10-28 RX ADMIN — LIDOCAINE HYDROCHLORIDE 100 MG: 20 INJECTION, SOLUTION INTRAVENOUS at 13:31

## 2019-10-28 RX ADMIN — PROPOFOL 10 MG: 10 INJECTION, EMULSION INTRAVENOUS at 13:36

## 2019-10-28 RX ADMIN — WATER 1 G: 1 INJECTION INTRAMUSCULAR; INTRAVENOUS; SUBCUTANEOUS at 20:29

## 2019-10-28 RX ADMIN — POTASSIUM CHLORIDE AND SODIUM CHLORIDE: 900; 150 INJECTION, SOLUTION INTRAVENOUS at 10:26

## 2019-10-28 RX ADMIN — LISINOPRIL 10 MG: 10 TABLET ORAL at 09:24

## 2019-10-28 RX ADMIN — INSULIN LISPRO 1 UNITS: 100 INJECTION, SOLUTION INTRAVENOUS; SUBCUTANEOUS at 20:29

## 2019-10-28 RX ADMIN — HALOPERIDOL 2.5 MG: 0.5 TABLET ORAL at 09:24

## 2019-10-28 RX ADMIN — METHIMAZOLE 5 MG: 5 TABLET ORAL at 09:23

## 2019-10-28 RX ADMIN — PANTOPRAZOLE SODIUM 40 MG: 40 INJECTION, POWDER, FOR SOLUTION INTRAVENOUS at 20:29

## 2019-10-28 RX ADMIN — Medication 10 ML: at 09:25

## 2019-10-28 RX ADMIN — PROPOFOL 50 MG: 10 INJECTION, EMULSION INTRAVENOUS at 13:31

## 2019-10-28 RX ADMIN — FENTANYL CITRATE 25 MCG: 50 INJECTION, SOLUTION INTRAMUSCULAR; INTRAVENOUS at 13:31

## 2019-10-28 RX ADMIN — POTASSIUM CHLORIDE AND SODIUM CHLORIDE: 900; 150 INJECTION, SOLUTION INTRAVENOUS at 18:58

## 2019-10-28 RX ADMIN — MIDAZOLAM 2 MG: 1 INJECTION INTRAMUSCULAR; INTRAVENOUS at 13:23

## 2019-10-28 RX ADMIN — ACETAMINOPHEN 650 MG: 325 TABLET, FILM COATED ORAL at 09:23

## 2019-10-28 RX ADMIN — FENTANYL CITRATE 25 MCG: 50 INJECTION, SOLUTION INTRAMUSCULAR; INTRAVENOUS at 13:34

## 2019-10-28 RX ADMIN — ACETAMINOPHEN 650 MG: 325 TABLET, FILM COATED ORAL at 20:26

## 2019-10-28 RX ADMIN — PHENYLEPHRINE HYDROCHLORIDE 100 MCG: 10 INJECTION INTRAVENOUS at 13:42

## 2019-10-28 RX ADMIN — PANTOPRAZOLE SODIUM 40 MG: 40 INJECTION, POWDER, FOR SOLUTION INTRAVENOUS at 09:24

## 2019-10-28 RX ADMIN — FENTANYL CITRATE 50 MCG: 50 INJECTION, SOLUTION INTRAMUSCULAR; INTRAVENOUS at 13:27

## 2019-10-28 RX ADMIN — PROPOFOL 40 MG: 10 INJECTION, EMULSION INTRAVENOUS at 13:32

## 2019-10-28 RX ADMIN — INSULIN LISPRO 1 UNITS: 100 INJECTION, SOLUTION INTRAVENOUS; SUBCUTANEOUS at 17:21

## 2019-10-28 RX ADMIN — METOPROLOL TARTRATE 50 MG: 50 TABLET ORAL at 09:24

## 2019-10-28 RX ADMIN — METOPROLOL TARTRATE 50 MG: 50 TABLET ORAL at 20:30

## 2019-10-28 RX ADMIN — BENZTROPINE MESYLATE 0.5 MG: 0.5 TABLET ORAL at 09:24

## 2019-10-28 RX ADMIN — SODIUM CHLORIDE, POTASSIUM CHLORIDE, SODIUM LACTATE AND CALCIUM CHLORIDE: 600; 310; 30; 20 INJECTION, SOLUTION INTRAVENOUS at 13:23

## 2019-10-28 RX ADMIN — SODIUM CHLORIDE: 9 INJECTION, SOLUTION INTRAVENOUS at 08:51

## 2019-10-28 RX ADMIN — ATORVASTATIN CALCIUM 20 MG: 20 TABLET, FILM COATED ORAL at 09:24

## 2019-10-28 RX ADMIN — AMLODIPINE BESYLATE 10 MG: 10 TABLET ORAL at 09:24

## 2019-10-28 RX ADMIN — PROPOFOL 20 MG: 10 INJECTION, EMULSION INTRAVENOUS at 13:34

## 2019-10-28 RX ADMIN — DIAZEPAM 2 MG: 2 TABLET ORAL at 10:23

## 2019-10-28 RX ADMIN — OXYCODONE HYDROCHLORIDE AND ACETAMINOPHEN 1 TABLET: 5; 325 TABLET ORAL at 07:51

## 2019-10-28 ASSESSMENT — PULMONARY FUNCTION TESTS
PIF_VALUE: 1
PIF_VALUE: 2
PIF_VALUE: 1
PIF_VALUE: 2
PIF_VALUE: 1
PIF_VALUE: 0
PIF_VALUE: 1
PIF_VALUE: 2
PIF_VALUE: 2
PIF_VALUE: 1

## 2019-10-28 ASSESSMENT — ENCOUNTER SYMPTOMS
ABDOMINAL PAIN: 1
CONSTIPATION: 1
EYES NEGATIVE: 1
NAUSEA: 0
COLOR CHANGE: 0
VOMITING: 0
RESPIRATORY NEGATIVE: 1
BACK PAIN: 1

## 2019-10-28 ASSESSMENT — PAIN - FUNCTIONAL ASSESSMENT
PAIN_FUNCTIONAL_ASSESSMENT: PREVENTS OR INTERFERES SOME ACTIVE ACTIVITIES AND ADLS
PAIN_FUNCTIONAL_ASSESSMENT: PREVENTS OR INTERFERES SOME ACTIVE ACTIVITIES AND ADLS

## 2019-10-28 ASSESSMENT — PAIN DESCRIPTION - FREQUENCY: FREQUENCY: CONTINUOUS

## 2019-10-28 ASSESSMENT — PAIN SCALES - GENERAL
PAINLEVEL_OUTOF10: 7
PAINLEVEL_OUTOF10: 0
PAINLEVEL_OUTOF10: 3
PAINLEVEL_OUTOF10: 0
PAINLEVEL_OUTOF10: 10
PAINLEVEL_OUTOF10: 10
PAINLEVEL_OUTOF10: 0
PAINLEVEL_OUTOF10: 7
PAINLEVEL_OUTOF10: 7

## 2019-10-28 ASSESSMENT — PAIN DESCRIPTION - DESCRIPTORS
DESCRIPTORS: PRESSURE
DESCRIPTORS: ACHING;DISCOMFORT;DULL
DESCRIPTORS: ACHING;DISCOMFORT;SORE

## 2019-10-28 ASSESSMENT — PAIN DESCRIPTION - PAIN TYPE
TYPE: ACUTE PAIN
TYPE: CHRONIC PAIN

## 2019-10-28 ASSESSMENT — PAIN DESCRIPTION - ORIENTATION: ORIENTATION: LEFT;LOWER

## 2019-10-28 ASSESSMENT — PAIN DESCRIPTION - LOCATION
LOCATION: BACK;NECK
LOCATION: BACK

## 2019-10-28 ASSESSMENT — PAIN DESCRIPTION - ONSET: ONSET: ON-GOING

## 2019-10-28 ASSESSMENT — PAIN DESCRIPTION - PROGRESSION: CLINICAL_PROGRESSION: GRADUALLY WORSENING

## 2019-10-29 ENCOUNTER — ANESTHESIA EVENT (OUTPATIENT)
Dept: ENDOSCOPY | Age: 59
DRG: 661 | End: 2019-10-29
Payer: MEDICARE

## 2019-10-29 ENCOUNTER — APPOINTMENT (OUTPATIENT)
Dept: ULTRASOUND IMAGING | Age: 59
DRG: 661 | End: 2019-10-29
Payer: MEDICARE

## 2019-10-29 ENCOUNTER — ANESTHESIA (OUTPATIENT)
Dept: ENDOSCOPY | Age: 59
DRG: 661 | End: 2019-10-29
Payer: MEDICARE

## 2019-10-29 VITALS
OXYGEN SATURATION: 98 % | HEART RATE: 98 BPM | WEIGHT: 169.4 LBS | HEIGHT: 63 IN | TEMPERATURE: 99.3 F | RESPIRATION RATE: 12 BRPM | BODY MASS INDEX: 30.02 KG/M2 | DIASTOLIC BLOOD PRESSURE: 55 MMHG | SYSTOLIC BLOOD PRESSURE: 144 MMHG

## 2019-10-29 VITALS
OXYGEN SATURATION: 100 % | DIASTOLIC BLOOD PRESSURE: 59 MMHG | RESPIRATION RATE: 35 BRPM | SYSTOLIC BLOOD PRESSURE: 129 MMHG

## 2019-10-29 LAB
ALBUMIN SERPL-MCNC: 3.2 G/DL (ref 3.5–5.2)
ALP BLD-CCNC: 86 U/L (ref 35–104)
ALT SERPL-CCNC: 7 U/L (ref 0–32)
ANION GAP SERPL CALCULATED.3IONS-SCNC: 13 MMOL/L (ref 7–16)
AST SERPL-CCNC: 8 U/L (ref 0–31)
BILIRUB SERPL-MCNC: 0.5 MG/DL (ref 0–1.2)
BUN BLDV-MCNC: 10 MG/DL (ref 8–23)
CALCIUM SERPL-MCNC: 8.9 MG/DL (ref 8.6–10.2)
CHLORIDE BLD-SCNC: 101 MMOL/L (ref 98–107)
CO2: 25 MMOL/L (ref 22–29)
CREAT SERPL-MCNC: 0.8 MG/DL (ref 0.5–1)
GFR AFRICAN AMERICAN: >60
GFR NON-AFRICAN AMERICAN: >60 ML/MIN/1.73
GLUCOSE BLD-MCNC: 183 MG/DL (ref 74–99)
HCT VFR BLD CALC: 23.9 % (ref 34–48)
HEMOGLOBIN: 7.3 G/DL (ref 11.5–15.5)
MCH RBC QN AUTO: 22.9 PG (ref 26–35)
MCHC RBC AUTO-ENTMCNC: 30.5 % (ref 32–34.5)
MCV RBC AUTO: 74.9 FL (ref 80–99.9)
METER GLUCOSE: 170 MG/DL (ref 74–99)
METER GLUCOSE: 180 MG/DL (ref 74–99)
METER GLUCOSE: 181 MG/DL (ref 74–99)
PDW BLD-RTO: 15.2 FL (ref 11.5–15)
PLATELET # BLD: 414 E9/L (ref 130–450)
PMV BLD AUTO: 11.2 FL (ref 7–12)
POTASSIUM SERPL-SCNC: 4.1 MMOL/L (ref 3.5–5)
RBC # BLD: 3.19 E12/L (ref 3.5–5.5)
SODIUM BLD-SCNC: 139 MMOL/L (ref 132–146)
TOTAL PROTEIN: 7.1 G/DL (ref 6.4–8.3)
WBC # BLD: 9.1 E9/L (ref 4.5–11.5)

## 2019-10-29 PROCEDURE — 7100000011 HC PHASE II RECOVERY - ADDTL 15 MIN: Performed by: INTERNAL MEDICINE

## 2019-10-29 PROCEDURE — 82962 GLUCOSE BLOOD TEST: CPT

## 2019-10-29 PROCEDURE — 76830 TRANSVAGINAL US NON-OB: CPT

## 2019-10-29 PROCEDURE — 85027 COMPLETE CBC AUTOMATED: CPT

## 2019-10-29 PROCEDURE — 6360000002 HC RX W HCPCS: Performed by: INTERNAL MEDICINE

## 2019-10-29 PROCEDURE — 3609017100 HC EGD: Performed by: INTERNAL MEDICINE

## 2019-10-29 PROCEDURE — C9113 INJ PANTOPRAZOLE SODIUM, VIA: HCPCS | Performed by: INTERNAL MEDICINE

## 2019-10-29 PROCEDURE — 3700000000 HC ANESTHESIA ATTENDED CARE: Performed by: INTERNAL MEDICINE

## 2019-10-29 PROCEDURE — 2709999900 HC NON-CHARGEABLE SUPPLY: Performed by: INTERNAL MEDICINE

## 2019-10-29 PROCEDURE — 6370000000 HC RX 637 (ALT 250 FOR IP): Performed by: INTERNAL MEDICINE

## 2019-10-29 PROCEDURE — 80053 COMPREHEN METABOLIC PANEL: CPT

## 2019-10-29 PROCEDURE — 2580000003 HC RX 258: Performed by: INTERNAL MEDICINE

## 2019-10-29 PROCEDURE — 7100000010 HC PHASE II RECOVERY - FIRST 15 MIN: Performed by: INTERNAL MEDICINE

## 2019-10-29 PROCEDURE — 6360000002 HC RX W HCPCS: Performed by: NURSE ANESTHETIST, CERTIFIED REGISTERED

## 2019-10-29 PROCEDURE — 36415 COLL VENOUS BLD VENIPUNCTURE: CPT

## 2019-10-29 PROCEDURE — 97530 THERAPEUTIC ACTIVITIES: CPT

## 2019-10-29 PROCEDURE — 2580000003 HC RX 258: Performed by: NURSE ANESTHETIST, CERTIFIED REGISTERED

## 2019-10-29 PROCEDURE — 0DJ08ZZ INSPECTION OF UPPER INTESTINAL TRACT, VIA NATURAL OR ARTIFICIAL OPENING ENDOSCOPIC: ICD-10-PCS | Performed by: INTERNAL MEDICINE

## 2019-10-29 RX ORDER — PROPOFOL 10 MG/ML
INJECTION, EMULSION INTRAVENOUS PRN
Status: DISCONTINUED | OUTPATIENT
Start: 2019-10-29 | End: 2019-10-29 | Stop reason: SDUPTHER

## 2019-10-29 RX ORDER — FERROUS SULFATE 325(65) MG
325 TABLET ORAL
Qty: 30 TABLET | Refills: 3 | Status: SHIPPED | OUTPATIENT
Start: 2019-10-30 | End: 2020-06-04 | Stop reason: SDUPTHER

## 2019-10-29 RX ORDER — POLYETHYLENE GLYCOL 3350 17 G/17G
17 POWDER, FOR SOLUTION ORAL DAILY
Status: DISCONTINUED | OUTPATIENT
Start: 2019-10-29 | End: 2019-10-29 | Stop reason: HOSPADM

## 2019-10-29 RX ORDER — SODIUM CHLORIDE 9 MG/ML
INJECTION, SOLUTION INTRAVENOUS CONTINUOUS PRN
Status: DISCONTINUED | OUTPATIENT
Start: 2019-10-29 | End: 2019-10-29 | Stop reason: SDUPTHER

## 2019-10-29 RX ORDER — FERROUS SULFATE 325(65) MG
325 TABLET ORAL
Status: DISCONTINUED | OUTPATIENT
Start: 2019-10-30 | End: 2019-10-29 | Stop reason: HOSPADM

## 2019-10-29 RX ORDER — ASCORBIC ACID 500 MG
500 TABLET ORAL
Status: DISCONTINUED | OUTPATIENT
Start: 2019-10-30 | End: 2019-10-29 | Stop reason: HOSPADM

## 2019-10-29 RX ADMIN — BENZTROPINE MESYLATE 0.5 MG: 0.5 TABLET ORAL at 10:38

## 2019-10-29 RX ADMIN — METHIMAZOLE 5 MG: 5 TABLET ORAL at 10:39

## 2019-10-29 RX ADMIN — Medication 10 ML: at 10:38

## 2019-10-29 RX ADMIN — SODIUM CHLORIDE: 9 INJECTION, SOLUTION INTRAVENOUS at 15:41

## 2019-10-29 RX ADMIN — AMLODIPINE BESYLATE 10 MG: 10 TABLET ORAL at 10:39

## 2019-10-29 RX ADMIN — PANTOPRAZOLE SODIUM 40 MG: 40 INJECTION, POWDER, FOR SOLUTION INTRAVENOUS at 10:37

## 2019-10-29 RX ADMIN — POTASSIUM CHLORIDE AND SODIUM CHLORIDE: 900; 150 INJECTION, SOLUTION INTRAVENOUS at 05:42

## 2019-10-29 RX ADMIN — METOPROLOL TARTRATE 50 MG: 50 TABLET ORAL at 10:39

## 2019-10-29 RX ADMIN — ACETAMINOPHEN 650 MG: 325 TABLET, FILM COATED ORAL at 10:39

## 2019-10-29 RX ADMIN — LISINOPRIL 10 MG: 10 TABLET ORAL at 10:39

## 2019-10-29 RX ADMIN — HALOPERIDOL 2.5 MG: 0.5 TABLET ORAL at 10:38

## 2019-10-29 RX ADMIN — ACETAMINOPHEN 650 MG: 325 TABLET, FILM COATED ORAL at 00:26

## 2019-10-29 RX ADMIN — ATORVASTATIN CALCIUM 20 MG: 20 TABLET, FILM COATED ORAL at 10:39

## 2019-10-29 RX ADMIN — PROPOFOL 200 MG: 10 INJECTION, EMULSION INTRAVENOUS at 15:46

## 2019-10-29 RX ADMIN — POTASSIUM CHLORIDE AND SODIUM CHLORIDE: 900; 150 INJECTION, SOLUTION INTRAVENOUS at 10:54

## 2019-10-29 ASSESSMENT — PAIN DESCRIPTION - LOCATION: LOCATION: BACK

## 2019-10-29 ASSESSMENT — PAIN SCALES - GENERAL
PAINLEVEL_OUTOF10: 0
PAINLEVEL_OUTOF10: 7
PAINLEVEL_OUTOF10: 0
PAINLEVEL_OUTOF10: 0

## 2019-10-29 ASSESSMENT — PAIN DESCRIPTION - PAIN TYPE: TYPE: CHRONIC PAIN

## 2019-10-29 ASSESSMENT — PAIN DESCRIPTION - DESCRIPTORS: DESCRIPTORS: ACHING;DISCOMFORT;SORE

## 2019-10-29 ASSESSMENT — PAIN DESCRIPTION - ORIENTATION: ORIENTATION: LEFT;LOWER

## 2019-10-30 ENCOUNTER — CARE COORDINATION (OUTPATIENT)
Dept: CARE COORDINATION | Age: 59
End: 2019-10-30

## 2019-10-30 LAB
BLOOD BANK DISPENSE STATUS: NORMAL
BLOOD BANK PRODUCT CODE: NORMAL
BPU ID: NORMAL
DESCRIPTION BLOOD BANK: NORMAL
URINE CULTURE, ROUTINE: NORMAL

## 2019-10-31 LAB
7-AMINOCLONAZEPAM, URINE: <5 NG/ML
ALPHA-HYDROXYALPRAZOLAM, URINE: <5 NG/ML
ALPHA-HYDROXYMIDAZOLAM, URINE: <20 NG/ML
ALPRAZOLAM, URINE: <5 NG/ML
CHLORDIAZEPOXIDE, URINE: <20 NG/ML
CLONAZEPAM, URINE: <5 NG/ML
DIAZEPAM, URINE: <20 NG/ML
LORAZEPAM, URINE: <20 NG/ML
MIDAZOLAM, URINE: <20 NG/ML
NORDIAZEPAM, URINE: 163 NG/ML
OXAZEPAM, URINE: 330 NG/ML
TEMAZEPAM, URINE: 291 NG/ML

## 2019-11-01 ENCOUNTER — TELEPHONE (OUTPATIENT)
Dept: CARDIOLOGY CLINIC | Age: 59
End: 2019-11-01

## 2019-11-06 ENCOUNTER — OFFICE VISIT (OUTPATIENT)
Dept: FAMILY MEDICINE CLINIC | Age: 59
End: 2019-11-06
Payer: MEDICARE

## 2019-11-06 VITALS
OXYGEN SATURATION: 98 % | RESPIRATION RATE: 20 BRPM | HEART RATE: 110 BPM | HEIGHT: 63 IN | WEIGHT: 164 LBS | BODY MASS INDEX: 29.06 KG/M2 | SYSTOLIC BLOOD PRESSURE: 124 MMHG | DIASTOLIC BLOOD PRESSURE: 84 MMHG

## 2019-11-06 DIAGNOSIS — N13.4 HYDROURETER: ICD-10-CM

## 2019-11-06 DIAGNOSIS — K92.2 ACUTE GI BLEEDING: ICD-10-CM

## 2019-11-06 DIAGNOSIS — D73.5 SPLENIC INFARCT: Primary | ICD-10-CM

## 2019-11-06 PROCEDURE — 99495 TRANSJ CARE MGMT MOD F2F 14D: CPT | Performed by: FAMILY MEDICINE

## 2019-11-06 PROCEDURE — 1111F DSCHRG MED/CURRENT MED MERGE: CPT | Performed by: FAMILY MEDICINE

## 2019-11-06 RX ORDER — GABAPENTIN 100 MG/1
400 CAPSULE ORAL NIGHTLY
COMMUNITY
End: 2020-04-24 | Stop reason: SDUPTHER

## 2019-11-06 ASSESSMENT — ENCOUNTER SYMPTOMS
ABDOMINAL PAIN: 0
VOMITING: 0
BLOOD IN STOOL: 0
SHORTNESS OF BREATH: 0
CONSTIPATION: 1
DIARRHEA: 0
NAUSEA: 0

## 2019-11-08 PROBLEM — N13.4 HYDROURETER: Status: ACTIVE | Noted: 2019-11-08

## 2019-11-08 PROBLEM — R80.9 POSITIVE FOR MICROALBUMINURIA: Status: RESOLVED | Noted: 2018-05-21 | Resolved: 2019-11-08

## 2019-12-05 ENCOUNTER — OFFICE VISIT (OUTPATIENT)
Dept: FAMILY MEDICINE CLINIC | Age: 59
End: 2019-12-05
Payer: MEDICARE

## 2019-12-05 VITALS
WEIGHT: 157 LBS | DIASTOLIC BLOOD PRESSURE: 72 MMHG | BODY MASS INDEX: 27.82 KG/M2 | RESPIRATION RATE: 20 BRPM | HEART RATE: 90 BPM | OXYGEN SATURATION: 98 % | SYSTOLIC BLOOD PRESSURE: 124 MMHG | HEIGHT: 63 IN

## 2019-12-05 DIAGNOSIS — D50.0 IRON DEFICIENCY ANEMIA DUE TO CHRONIC BLOOD LOSS: ICD-10-CM

## 2019-12-05 DIAGNOSIS — E11.42 TYPE 2 DIABETES MELLITUS WITH DIABETIC POLYNEUROPATHY, WITH LONG-TERM CURRENT USE OF INSULIN (HCC): ICD-10-CM

## 2019-12-05 DIAGNOSIS — N13.4 HYDROURETER: ICD-10-CM

## 2019-12-05 DIAGNOSIS — Z79.4 TYPE 2 DIABETES MELLITUS WITH DIABETIC POLYNEUROPATHY, WITH LONG-TERM CURRENT USE OF INSULIN (HCC): ICD-10-CM

## 2019-12-05 DIAGNOSIS — R10.13 DYSPEPSIA: Primary | ICD-10-CM

## 2019-12-05 LAB — HBA1C MFR BLD: 5.2 %

## 2019-12-05 PROCEDURE — G8484 FLU IMMUNIZE NO ADMIN: HCPCS | Performed by: FAMILY MEDICINE

## 2019-12-05 PROCEDURE — G8427 DOCREV CUR MEDS BY ELIG CLIN: HCPCS | Performed by: FAMILY MEDICINE

## 2019-12-05 PROCEDURE — 99213 OFFICE O/P EST LOW 20 MIN: CPT | Performed by: FAMILY MEDICINE

## 2019-12-05 PROCEDURE — G8417 CALC BMI ABV UP PARAM F/U: HCPCS | Performed by: FAMILY MEDICINE

## 2019-12-05 PROCEDURE — 2022F DILAT RTA XM EVC RTNOPTHY: CPT | Performed by: FAMILY MEDICINE

## 2019-12-05 PROCEDURE — 3017F COLORECTAL CA SCREEN DOC REV: CPT | Performed by: FAMILY MEDICINE

## 2019-12-05 PROCEDURE — 83036 HEMOGLOBIN GLYCOSYLATED A1C: CPT | Performed by: FAMILY MEDICINE

## 2019-12-05 PROCEDURE — 1036F TOBACCO NON-USER: CPT | Performed by: FAMILY MEDICINE

## 2019-12-05 PROCEDURE — 3044F HG A1C LEVEL LT 7.0%: CPT | Performed by: FAMILY MEDICINE

## 2019-12-05 RX ORDER — FAMOTIDINE 20 MG/1
20 TABLET, FILM COATED ORAL 2 TIMES DAILY
Qty: 60 TABLET | Refills: 1 | Status: SHIPPED | OUTPATIENT
Start: 2019-12-05 | End: 2019-12-30

## 2019-12-05 ASSESSMENT — ENCOUNTER SYMPTOMS
CONSTIPATION: 1
DIARRHEA: 0
VOMITING: 0
ABDOMINAL PAIN: 1
SHORTNESS OF BREATH: 0
HEMATOCHEZIA: 0
NAUSEA: 0

## 2019-12-09 ENCOUNTER — TELEPHONE (OUTPATIENT)
Dept: FAMILY MEDICINE CLINIC | Age: 59
End: 2019-12-09

## 2019-12-12 ENCOUNTER — HOSPITAL ENCOUNTER (INPATIENT)
Age: 59
LOS: 3 days | Discharge: HOME | DRG: 698 | End: 2019-12-15
Attending: EMERGENCY MEDICINE | Admitting: INTERNAL MEDICINE
Payer: MEDICARE

## 2019-12-12 ENCOUNTER — APPOINTMENT (OUTPATIENT)
Dept: CT IMAGING | Age: 59
DRG: 698 | End: 2019-12-12
Payer: MEDICARE

## 2019-12-12 DIAGNOSIS — E87.8 HYPOCHLOREMIA: ICD-10-CM

## 2019-12-12 DIAGNOSIS — R79.89 LOW TSH LEVEL: ICD-10-CM

## 2019-12-12 DIAGNOSIS — R31.9 URINARY TRACT INFECTION WITH HEMATURIA, SITE UNSPECIFIED: ICD-10-CM

## 2019-12-12 DIAGNOSIS — N39.0 URINARY TRACT INFECTION WITH HEMATURIA, SITE UNSPECIFIED: ICD-10-CM

## 2019-12-12 DIAGNOSIS — R63.4 WEIGHT LOSS: ICD-10-CM

## 2019-12-12 DIAGNOSIS — R62.7 FAILURE TO THRIVE IN ADULT: ICD-10-CM

## 2019-12-12 DIAGNOSIS — G93.41 ACUTE METABOLIC ENCEPHALOPATHY: ICD-10-CM

## 2019-12-12 DIAGNOSIS — R94.31 ABNORMAL EKG: ICD-10-CM

## 2019-12-12 DIAGNOSIS — E16.2 HYPOGLYCEMIA: ICD-10-CM

## 2019-12-12 DIAGNOSIS — D64.9 ANEMIA, UNSPECIFIED TYPE: ICD-10-CM

## 2019-12-12 DIAGNOSIS — A41.9 SEPSIS, DUE TO UNSPECIFIED ORGANISM, UNSPECIFIED WHETHER ACUTE ORGAN DYSFUNCTION PRESENT (HCC): Primary | ICD-10-CM

## 2019-12-12 PROBLEM — D50.9 MICROCYTIC HYPOCHROMIC ANEMIA: Status: ACTIVE | Noted: 2019-12-12

## 2019-12-12 LAB
ABO/RH: NORMAL
ALBUMIN SERPL-MCNC: 3.6 G/DL (ref 3.5–5.2)
ALP BLD-CCNC: 63 U/L (ref 35–104)
ALT SERPL-CCNC: 8 U/L (ref 0–32)
AMYLASE: 33 U/L (ref 20–100)
ANION GAP SERPL CALCULATED.3IONS-SCNC: 14 MMOL/L (ref 7–16)
ANTIBODY SCREEN: NORMAL
AST SERPL-CCNC: 13 U/L (ref 0–31)
BACTERIA: ABNORMAL /HPF
BASOPHILS ABSOLUTE: 0.05 E9/L (ref 0–0.2)
BASOPHILS RELATIVE PERCENT: 0.6 % (ref 0–2)
BILIRUB SERPL-MCNC: <0.2 MG/DL (ref 0–1.2)
BILIRUBIN URINE: NEGATIVE
BLOOD, URINE: ABNORMAL
BUN BLDV-MCNC: 12 MG/DL (ref 8–23)
C-REACTIVE PROTEIN: 16.2 MG/DL (ref 0–0.4)
CALCIUM SERPL-MCNC: 9.2 MG/DL (ref 8.6–10.2)
CHLORIDE BLD-SCNC: 91 MMOL/L (ref 98–107)
CHP ED QC CHECK: YES
CLARITY: CLEAR
CO2: 27 MMOL/L (ref 22–29)
COLOR: YELLOW
CREAT SERPL-MCNC: 0.7 MG/DL (ref 0.5–1)
EOSINOPHILS ABSOLUTE: 0.09 E9/L (ref 0.05–0.5)
EOSINOPHILS RELATIVE PERCENT: 1.1 % (ref 0–6)
EPITHELIAL CELLS, UA: ABNORMAL /HPF
GFR AFRICAN AMERICAN: >60
GFR NON-AFRICAN AMERICAN: >60 ML/MIN/1.73
GLUCOSE BLD-MCNC: 203 MG/DL
GLUCOSE BLD-MCNC: 49 MG/DL (ref 74–99)
GLUCOSE URINE: NEGATIVE MG/DL
HCT VFR BLD CALC: 25.5 % (ref 34–48)
HEMOGLOBIN: 7.5 G/DL (ref 11.5–15.5)
IMMATURE GRANULOCYTES #: 0.11 E9/L
IMMATURE GRANULOCYTES %: 1.3 % (ref 0–5)
INFLUENZA A BY PCR: NOT DETECTED
INFLUENZA B BY PCR: NOT DETECTED
KETONES, URINE: NEGATIVE MG/DL
LACTIC ACID, SEPSIS: 1.2 MMOL/L (ref 0.5–1.9)
LACTIC ACID, SEPSIS: 1.4 MMOL/L (ref 0.5–1.9)
LACTIC ACID, SEPSIS: 1.8 MMOL/L (ref 0.5–1.9)
LEUKOCYTE ESTERASE, URINE: ABNORMAL
LIPASE: 9 U/L (ref 13–60)
LIPASE: 9 U/L (ref 13–60)
LYMPHOCYTES ABSOLUTE: 1.21 E9/L (ref 1.5–4)
LYMPHOCYTES RELATIVE PERCENT: 14.4 % (ref 20–42)
MCH RBC QN AUTO: 21.6 PG (ref 26–35)
MCHC RBC AUTO-ENTMCNC: 29.4 % (ref 32–34.5)
MCV RBC AUTO: 73.3 FL (ref 80–99.9)
METER GLUCOSE: 125 MG/DL (ref 74–99)
METER GLUCOSE: 203 MG/DL (ref 74–99)
MONOCYTES ABSOLUTE: 1.45 E9/L (ref 0.1–0.95)
MONOCYTES RELATIVE PERCENT: 17.3 % (ref 2–12)
NEUTROPHILS ABSOLUTE: 5.49 E9/L (ref 1.8–7.3)
NEUTROPHILS RELATIVE PERCENT: 65.3 % (ref 43–80)
NITRITE, URINE: NEGATIVE
PDW BLD-RTO: 18.6 FL (ref 11.5–15)
PH UA: 6 (ref 5–9)
PLATELET # BLD: 498 E9/L (ref 130–450)
PMV BLD AUTO: 9.8 FL (ref 7–12)
POTASSIUM REFLEX MAGNESIUM: 4.2 MMOL/L (ref 3.5–5)
PRO-BNP: 215 PG/ML (ref 0–125)
PROCALCITONIN: 0.13 NG/ML (ref 0–0.08)
PROTEIN UA: 100 MG/DL
RBC # BLD: 3.48 E12/L (ref 3.5–5.5)
RBC UA: >20 /HPF (ref 0–2)
SEDIMENTATION RATE, ERYTHROCYTE: 89 MM/HR (ref 0–20)
SODIUM BLD-SCNC: 132 MMOL/L (ref 132–146)
SPECIFIC GRAVITY UA: <=1.005 (ref 1–1.03)
T4 FREE: 1.16 NG/DL (ref 0.93–1.7)
TOTAL PROTEIN: 7.6 G/DL (ref 6.4–8.3)
TROPONIN: <0.01 NG/ML (ref 0–0.03)
TSH SERPL DL<=0.05 MIU/L-ACNC: 0.19 UIU/ML (ref 0.27–4.2)
UROBILINOGEN, URINE: 1 E.U./DL
WBC # BLD: 8.4 E9/L (ref 4.5–11.5)
WBC UA: ABNORMAL /HPF (ref 0–5)

## 2019-12-12 PROCEDURE — 86923 COMPATIBILITY TEST ELECTRIC: CPT

## 2019-12-12 PROCEDURE — 2580000003 HC RX 258: Performed by: EMERGENCY MEDICINE

## 2019-12-12 PROCEDURE — P9016 RBC LEUKOCYTES REDUCED: HCPCS

## 2019-12-12 PROCEDURE — 83880 ASSAY OF NATRIURETIC PEPTIDE: CPT

## 2019-12-12 PROCEDURE — 36415 COLL VENOUS BLD VENIPUNCTURE: CPT

## 2019-12-12 PROCEDURE — 74177 CT ABD & PELVIS W/CONTRAST: CPT

## 2019-12-12 PROCEDURE — 85651 RBC SED RATE NONAUTOMATED: CPT

## 2019-12-12 PROCEDURE — 86850 RBC ANTIBODY SCREEN: CPT

## 2019-12-12 PROCEDURE — 82150 ASSAY OF AMYLASE: CPT

## 2019-12-12 PROCEDURE — 99285 EMERGENCY DEPT VISIT HI MDM: CPT

## 2019-12-12 PROCEDURE — 96365 THER/PROPH/DIAG IV INF INIT: CPT

## 2019-12-12 PROCEDURE — 96375 TX/PRO/DX INJ NEW DRUG ADDON: CPT

## 2019-12-12 PROCEDURE — 6370000000 HC RX 637 (ALT 250 FOR IP): Performed by: NURSE PRACTITIONER

## 2019-12-12 PROCEDURE — 2580000003 HC RX 258: Performed by: NURSE PRACTITIONER

## 2019-12-12 PROCEDURE — 86901 BLOOD TYPING SEROLOGIC RH(D): CPT

## 2019-12-12 PROCEDURE — 80053 COMPREHEN METABOLIC PANEL: CPT

## 2019-12-12 PROCEDURE — 87088 URINE BACTERIA CULTURE: CPT

## 2019-12-12 PROCEDURE — 6360000002 HC RX W HCPCS: Performed by: NURSE PRACTITIONER

## 2019-12-12 PROCEDURE — 86900 BLOOD TYPING SEROLOGIC ABO: CPT

## 2019-12-12 PROCEDURE — 84145 PROCALCITONIN (PCT): CPT

## 2019-12-12 PROCEDURE — 84484 ASSAY OF TROPONIN QUANT: CPT

## 2019-12-12 PROCEDURE — 6360000004 HC RX CONTRAST MEDICATION: Performed by: RADIOLOGY

## 2019-12-12 PROCEDURE — 82962 GLUCOSE BLOOD TEST: CPT

## 2019-12-12 PROCEDURE — 84439 ASSAY OF FREE THYROXINE: CPT

## 2019-12-12 PROCEDURE — 86140 C-REACTIVE PROTEIN: CPT

## 2019-12-12 PROCEDURE — 71275 CT ANGIOGRAPHY CHEST: CPT

## 2019-12-12 PROCEDURE — 70450 CT HEAD/BRAIN W/O DYE: CPT

## 2019-12-12 PROCEDURE — 6360000002 HC RX W HCPCS: Performed by: EMERGENCY MEDICINE

## 2019-12-12 PROCEDURE — 85025 COMPLETE CBC W/AUTO DIFF WBC: CPT

## 2019-12-12 PROCEDURE — 81001 URINALYSIS AUTO W/SCOPE: CPT

## 2019-12-12 PROCEDURE — 93005 ELECTROCARDIOGRAM TRACING: CPT | Performed by: EMERGENCY MEDICINE

## 2019-12-12 PROCEDURE — 84443 ASSAY THYROID STIM HORMONE: CPT

## 2019-12-12 PROCEDURE — 87040 BLOOD CULTURE FOR BACTERIA: CPT

## 2019-12-12 PROCEDURE — 1200000000 HC SEMI PRIVATE

## 2019-12-12 PROCEDURE — 83690 ASSAY OF LIPASE: CPT

## 2019-12-12 PROCEDURE — 83605 ASSAY OF LACTIC ACID: CPT

## 2019-12-12 PROCEDURE — 87502 INFLUENZA DNA AMP PROBE: CPT

## 2019-12-12 RX ORDER — FERROUS SULFATE 325(65) MG
325 TABLET ORAL
Status: DISCONTINUED | OUTPATIENT
Start: 2019-12-13 | End: 2019-12-15 | Stop reason: HOSPADM

## 2019-12-12 RX ORDER — INSULIN GLARGINE 100 [IU]/ML
35 INJECTION, SOLUTION SUBCUTANEOUS NIGHTLY
Status: DISCONTINUED | OUTPATIENT
Start: 2019-12-12 | End: 2019-12-15 | Stop reason: HOSPADM

## 2019-12-12 RX ORDER — METOPROLOL TARTRATE 50 MG/1
50 TABLET, FILM COATED ORAL 2 TIMES DAILY
Status: DISCONTINUED | OUTPATIENT
Start: 2019-12-12 | End: 2019-12-15 | Stop reason: HOSPADM

## 2019-12-12 RX ORDER — 0.9 % SODIUM CHLORIDE 0.9 %
1000 INTRAVENOUS SOLUTION INTRAVENOUS ONCE
Status: COMPLETED | OUTPATIENT
Start: 2019-12-12 | End: 2019-12-12

## 2019-12-12 RX ORDER — SODIUM CHLORIDE 9 MG/ML
INJECTION, SOLUTION INTRAVENOUS CONTINUOUS
Status: DISCONTINUED | OUTPATIENT
Start: 2019-12-12 | End: 2019-12-14

## 2019-12-12 RX ORDER — DEXTROSE MONOHYDRATE 25 G/50ML
50 INJECTION, SOLUTION INTRAVENOUS ONCE
Status: COMPLETED | OUTPATIENT
Start: 2019-12-12 | End: 2019-12-12

## 2019-12-12 RX ORDER — SODIUM CHLORIDE 0.9 % (FLUSH) 0.9 %
10 SYRINGE (ML) INJECTION EVERY 12 HOURS SCHEDULED
Status: DISCONTINUED | OUTPATIENT
Start: 2019-12-12 | End: 2019-12-13 | Stop reason: SDUPTHER

## 2019-12-12 RX ORDER — METHIMAZOLE 5 MG/1
5 TABLET ORAL DAILY
Status: DISCONTINUED | OUTPATIENT
Start: 2019-12-12 | End: 2019-12-13

## 2019-12-12 RX ORDER — DEXTROSE MONOHYDRATE 25 G/50ML
12.5 INJECTION, SOLUTION INTRAVENOUS PRN
Status: DISCONTINUED | OUTPATIENT
Start: 2019-12-12 | End: 2019-12-15 | Stop reason: HOSPADM

## 2019-12-12 RX ORDER — DIAZEPAM 2 MG/1
2 TABLET ORAL EVERY 6 HOURS PRN
Status: DISCONTINUED | OUTPATIENT
Start: 2019-12-12 | End: 2019-12-15 | Stop reason: HOSPADM

## 2019-12-12 RX ORDER — ATORVASTATIN CALCIUM 20 MG/1
20 TABLET, FILM COATED ORAL NIGHTLY
Status: DISCONTINUED | OUTPATIENT
Start: 2019-12-12 | End: 2019-12-15 | Stop reason: HOSPADM

## 2019-12-12 RX ORDER — DEXTROSE MONOHYDRATE 50 MG/ML
100 INJECTION, SOLUTION INTRAVENOUS PRN
Status: DISCONTINUED | OUTPATIENT
Start: 2019-12-12 | End: 2019-12-15 | Stop reason: HOSPADM

## 2019-12-12 RX ORDER — BENZTROPINE MESYLATE 0.5 MG/1
0.5 TABLET ORAL DAILY
Status: DISCONTINUED | OUTPATIENT
Start: 2019-12-12 | End: 2019-12-15 | Stop reason: HOSPADM

## 2019-12-12 RX ORDER — NICOTINE POLACRILEX 4 MG
15 LOZENGE BUCCAL PRN
Status: DISCONTINUED | OUTPATIENT
Start: 2019-12-12 | End: 2019-12-15 | Stop reason: HOSPADM

## 2019-12-12 RX ORDER — SODIUM CHLORIDE 0.9 % (FLUSH) 0.9 %
10 SYRINGE (ML) INJECTION PRN
Status: DISCONTINUED | OUTPATIENT
Start: 2019-12-12 | End: 2019-12-13 | Stop reason: SDUPTHER

## 2019-12-12 RX ORDER — ACETAMINOPHEN 325 MG/1
650 TABLET ORAL EVERY 4 HOURS PRN
Status: DISCONTINUED | OUTPATIENT
Start: 2019-12-12 | End: 2019-12-15 | Stop reason: HOSPADM

## 2019-12-12 RX ORDER — GABAPENTIN 300 MG/1
300 CAPSULE ORAL DAILY
Status: DISCONTINUED | OUTPATIENT
Start: 2019-12-12 | End: 2019-12-15 | Stop reason: HOSPADM

## 2019-12-12 RX ADMIN — GABAPENTIN 300 MG: 300 CAPSULE ORAL at 18:07

## 2019-12-12 RX ADMIN — ACETAMINOPHEN 650 MG: 325 TABLET, FILM COATED ORAL at 18:07

## 2019-12-12 RX ADMIN — DEXTROSE MONOHYDRATE 50 ML: 500 INJECTION PARENTERAL at 15:04

## 2019-12-12 RX ADMIN — SODIUM CHLORIDE 1000 ML: 900 INJECTION, SOLUTION INTRAVENOUS at 13:00

## 2019-12-12 RX ADMIN — METOPROLOL TARTRATE 50 MG: 50 TABLET, FILM COATED ORAL at 20:06

## 2019-12-12 RX ADMIN — SODIUM CHLORIDE 1000 ML: 900 INJECTION, SOLUTION INTRAVENOUS at 16:25

## 2019-12-12 RX ADMIN — SODIUM CHLORIDE: 9 INJECTION, SOLUTION INTRAVENOUS at 17:52

## 2019-12-12 RX ADMIN — DIAZEPAM 2 MG: 2 TABLET ORAL at 17:23

## 2019-12-12 RX ADMIN — Medication 10 ML: at 20:03

## 2019-12-12 RX ADMIN — IOPAMIDOL 80 ML: 755 INJECTION, SOLUTION INTRAVENOUS at 14:57

## 2019-12-12 RX ADMIN — ATORVASTATIN CALCIUM 20 MG: 20 TABLET, FILM COATED ORAL at 20:06

## 2019-12-12 RX ADMIN — CEFEPIME 2 G: 2 INJECTION, POWDER, FOR SOLUTION INTRAMUSCULAR; INTRAVENOUS at 15:03

## 2019-12-12 RX ADMIN — ENOXAPARIN SODIUM 40 MG: 40 INJECTION, SOLUTION INTRAVENOUS; SUBCUTANEOUS at 18:07

## 2019-12-12 RX ADMIN — INSULIN GLARGINE 35 UNITS: 100 INJECTION, SOLUTION SUBCUTANEOUS at 20:05

## 2019-12-12 ASSESSMENT — ENCOUNTER SYMPTOMS
NAUSEA: 0
DIARRHEA: 0
PHOTOPHOBIA: 0
BACK PAIN: 0
SHORTNESS OF BREATH: 0
EYE PAIN: 0
CONSTIPATION: 0
VOMITING: 0
SORE THROAT: 0
BLOOD IN STOOL: 0
ABDOMINAL DISTENTION: 0
RHINORRHEA: 0
COUGH: 0
EYE REDNESS: 0
WHEEZING: 0
ABDOMINAL PAIN: 0

## 2019-12-12 ASSESSMENT — PAIN DESCRIPTION - LOCATION: LOCATION: LEG

## 2019-12-12 ASSESSMENT — PAIN - FUNCTIONAL ASSESSMENT: PAIN_FUNCTIONAL_ASSESSMENT: PREVENTS OR INTERFERES SOME ACTIVE ACTIVITIES AND ADLS

## 2019-12-12 ASSESSMENT — PAIN DESCRIPTION - FREQUENCY: FREQUENCY: CONTINUOUS

## 2019-12-12 ASSESSMENT — PAIN SCALES - GENERAL
PAINLEVEL_OUTOF10: 0
PAINLEVEL_OUTOF10: 10
PAINLEVEL_OUTOF10: 10

## 2019-12-12 ASSESSMENT — PAIN DESCRIPTION - ONSET: ONSET: ON-GOING

## 2019-12-12 ASSESSMENT — PAIN DESCRIPTION - PAIN TYPE: TYPE: ACUTE PAIN

## 2019-12-13 ENCOUNTER — APPOINTMENT (OUTPATIENT)
Dept: INTERVENTIONAL RADIOLOGY/VASCULAR | Age: 59
DRG: 698 | End: 2019-12-13
Payer: MEDICARE

## 2019-12-13 PROBLEM — E43 SEVERE PROTEIN-CALORIE MALNUTRITION (HCC): Status: ACTIVE | Noted: 2019-12-13

## 2019-12-13 LAB
ALBUMIN SERPL-MCNC: 2.9 G/DL (ref 3.5–5.2)
ALP BLD-CCNC: 50 U/L (ref 35–104)
ALT SERPL-CCNC: 9 U/L (ref 0–32)
ANION GAP SERPL CALCULATED.3IONS-SCNC: 14 MMOL/L (ref 7–16)
ANISOCYTOSIS: ABNORMAL
AST SERPL-CCNC: 15 U/L (ref 0–31)
BASOPHILS ABSOLUTE: 0 E9/L (ref 0–0.2)
BASOPHILS RELATIVE PERCENT: 0.6 % (ref 0–2)
BILIRUB SERPL-MCNC: 0.4 MG/DL (ref 0–1.2)
BLOOD BANK DISPENSE STATUS: NORMAL
BLOOD BANK DISPENSE STATUS: NORMAL
BLOOD BANK PRODUCT CODE: NORMAL
BLOOD BANK PRODUCT CODE: NORMAL
BPU ID: NORMAL
BPU ID: NORMAL
BUN BLDV-MCNC: 9 MG/DL (ref 8–23)
BURR CELLS: ABNORMAL
CALCIUM SERPL-MCNC: 8.4 MG/DL (ref 8.6–10.2)
CHLORIDE BLD-SCNC: 99 MMOL/L (ref 98–107)
CO2: 23 MMOL/L (ref 22–29)
CREAT SERPL-MCNC: 0.6 MG/DL (ref 0.5–1)
DESCRIPTION BLOOD BANK: NORMAL
DESCRIPTION BLOOD BANK: NORMAL
EOSINOPHILS ABSOLUTE: 0.05 E9/L (ref 0.05–0.5)
EOSINOPHILS RELATIVE PERCENT: 0.9 % (ref 0–6)
FERRITIN: 691 NG/ML
FOLATE: 8.5 NG/ML (ref 4.8–24.2)
GFR AFRICAN AMERICAN: >60
GFR NON-AFRICAN AMERICAN: >60 ML/MIN/1.73
GLUCOSE BLD-MCNC: 95 MG/DL (ref 74–99)
HCT VFR BLD CALC: 20.4 % (ref 34–48)
HCT VFR BLD CALC: 21 % (ref 34–48)
HCT VFR BLD CALC: 23.6 % (ref 34–48)
HCT VFR BLD CALC: 29.2 % (ref 34–48)
HEMOGLOBIN: 5.9 G/DL (ref 11.5–15.5)
HEMOGLOBIN: 6.9 G/DL (ref 11.5–15.5)
HEMOGLOBIN: 9.3 G/DL (ref 11.5–15.5)
IMMATURE RETIC FRACT: 27.2 % (ref 3–15.9)
IRON SATURATION: 15 % (ref 15–50)
IRON: 28 MCG/DL (ref 37–145)
LACTIC ACID, SEPSIS: 1.5 MMOL/L (ref 0.5–1.9)
LYMPHOCYTES ABSOLUTE: 0.75 E9/L (ref 1.5–4)
LYMPHOCYTES RELATIVE PERCENT: 14.8 % (ref 20–42)
MAGNESIUM: 1.7 MG/DL (ref 1.6–2.6)
MCH RBC QN AUTO: 21.7 PG (ref 26–35)
MCHC RBC AUTO-ENTMCNC: 28.9 % (ref 32–34.5)
MCV RBC AUTO: 75 FL (ref 80–99.9)
METER GLUCOSE: 130 MG/DL (ref 74–99)
METER GLUCOSE: 57 MG/DL (ref 74–99)
METER GLUCOSE: 86 MG/DL (ref 74–99)
MONOCYTES ABSOLUTE: 0.85 E9/L (ref 0.1–0.95)
MONOCYTES RELATIVE PERCENT: 17.4 % (ref 2–12)
MYELOCYTE PERCENT: 0.9 % (ref 0–0)
NEUTROPHILS ABSOLUTE: 3.35 E9/L (ref 1.8–7.3)
NEUTROPHILS RELATIVE PERCENT: 66.1 % (ref 43–80)
OVALOCYTES: ABNORMAL
PDW BLD-RTO: 18.8 FL (ref 11.5–15)
PHOSPHORUS: 2.8 MG/DL (ref 2.5–4.5)
PLATELET # BLD: 402 E9/L (ref 130–450)
PMV BLD AUTO: 10.1 FL (ref 7–12)
POIKILOCYTES: ABNORMAL
POLYCHROMASIA: ABNORMAL
POTASSIUM SERPL-SCNC: 3.6 MMOL/L (ref 3.5–5)
RBC # BLD: 2.72 E12/L (ref 3.5–5.5)
RETIC HGB EQUIVALENT: 22.1 PG (ref 28.2–36.6)
RETICULOCYTE ABSOLUTE COUNT: 0.09 E12/L
RETICULOCYTE COUNT PCT: 3.1 % (ref 0.4–1.9)
SODIUM BLD-SCNC: 136 MMOL/L (ref 132–146)
TOTAL IRON BINDING CAPACITY: 184 MCG/DL (ref 250–450)
TOTAL PROTEIN: 6.3 G/DL (ref 6.4–8.3)
URINE CULTURE, ROUTINE: NORMAL
VITAMIN B-12: 313 PG/ML (ref 211–946)
WBC # BLD: 5 E9/L (ref 4.5–11.5)

## 2019-12-13 PROCEDURE — 84207 ASSAY OF VITAMIN B-6: CPT

## 2019-12-13 PROCEDURE — 82607 VITAMIN B-12: CPT

## 2019-12-13 PROCEDURE — 83540 ASSAY OF IRON: CPT

## 2019-12-13 PROCEDURE — 2580000003 HC RX 258: Performed by: NURSE PRACTITIONER

## 2019-12-13 PROCEDURE — 2580000003 HC RX 258: Performed by: INTERNAL MEDICINE

## 2019-12-13 PROCEDURE — 36430 TRANSFUSION BLD/BLD COMPNT: CPT

## 2019-12-13 PROCEDURE — 83605 ASSAY OF LACTIC ACID: CPT

## 2019-12-13 PROCEDURE — 85045 AUTOMATED RETICULOCYTE COUNT: CPT

## 2019-12-13 PROCEDURE — 97161 PT EVAL LOW COMPLEX 20 MIN: CPT | Performed by: PHYSICAL THERAPIST

## 2019-12-13 PROCEDURE — 82962 GLUCOSE BLOOD TEST: CPT

## 2019-12-13 PROCEDURE — 93923 UPR/LXTR ART STDY 3+ LVLS: CPT

## 2019-12-13 PROCEDURE — 85014 HEMATOCRIT: CPT

## 2019-12-13 PROCEDURE — 36415 COLL VENOUS BLD VENIPUNCTURE: CPT

## 2019-12-13 PROCEDURE — 83550 IRON BINDING TEST: CPT

## 2019-12-13 PROCEDURE — 85018 HEMOGLOBIN: CPT

## 2019-12-13 PROCEDURE — 97530 THERAPEUTIC ACTIVITIES: CPT

## 2019-12-13 PROCEDURE — 80053 COMPREHEN METABOLIC PANEL: CPT

## 2019-12-13 PROCEDURE — 97165 OT EVAL LOW COMPLEX 30 MIN: CPT

## 2019-12-13 PROCEDURE — 97116 GAIT TRAINING THERAPY: CPT | Performed by: PHYSICAL THERAPIST

## 2019-12-13 PROCEDURE — 83735 ASSAY OF MAGNESIUM: CPT

## 2019-12-13 PROCEDURE — 6360000002 HC RX W HCPCS: Performed by: NURSE PRACTITIONER

## 2019-12-13 PROCEDURE — 1200000000 HC SEMI PRIVATE

## 2019-12-13 PROCEDURE — 6370000000 HC RX 637 (ALT 250 FOR IP): Performed by: NURSE PRACTITIONER

## 2019-12-13 PROCEDURE — 6360000002 HC RX W HCPCS: Performed by: INTERNAL MEDICINE

## 2019-12-13 PROCEDURE — 85025 COMPLETE CBC W/AUTO DIFF WBC: CPT

## 2019-12-13 PROCEDURE — 87040 BLOOD CULTURE FOR BACTERIA: CPT

## 2019-12-13 PROCEDURE — 82746 ASSAY OF FOLIC ACID SERUM: CPT

## 2019-12-13 PROCEDURE — 84100 ASSAY OF PHOSPHORUS: CPT

## 2019-12-13 PROCEDURE — 82728 ASSAY OF FERRITIN: CPT

## 2019-12-13 PROCEDURE — P9016 RBC LEUKOCYTES REDUCED: HCPCS

## 2019-12-13 RX ORDER — SODIUM CHLORIDE 0.9 % (FLUSH) 0.9 %
10 SYRINGE (ML) INJECTION EVERY 12 HOURS SCHEDULED
Status: DISCONTINUED | OUTPATIENT
Start: 2019-12-13 | End: 2019-12-15 | Stop reason: HOSPADM

## 2019-12-13 RX ORDER — SODIUM CHLORIDE 0.9 % (FLUSH) 0.9 %
10 SYRINGE (ML) INJECTION PRN
Status: DISCONTINUED | OUTPATIENT
Start: 2019-12-13 | End: 2019-12-15 | Stop reason: HOSPADM

## 2019-12-13 RX ADMIN — CEFEPIME 2 G: 2 INJECTION, POWDER, FOR SOLUTION INTRAVENOUS at 15:00

## 2019-12-13 RX ADMIN — METHIMAZOLE 5 MG: 5 TABLET ORAL at 10:08

## 2019-12-13 RX ADMIN — ENOXAPARIN SODIUM 40 MG: 40 INJECTION, SOLUTION INTRAVENOUS; SUBCUTANEOUS at 17:23

## 2019-12-13 RX ADMIN — METOPROLOL TARTRATE 50 MG: 50 TABLET, FILM COATED ORAL at 20:31

## 2019-12-13 RX ADMIN — ACETAMINOPHEN 650 MG: 325 TABLET, FILM COATED ORAL at 14:46

## 2019-12-13 RX ADMIN — CEFEPIME 2 G: 2 INJECTION, POWDER, FOR SOLUTION INTRAVENOUS at 02:31

## 2019-12-13 RX ADMIN — ACETAMINOPHEN 650 MG: 325 TABLET, FILM COATED ORAL at 21:02

## 2019-12-13 RX ADMIN — GABAPENTIN 300 MG: 300 CAPSULE ORAL at 20:31

## 2019-12-13 RX ADMIN — ACETAMINOPHEN 650 MG: 325 TABLET, FILM COATED ORAL at 09:33

## 2019-12-13 RX ADMIN — FERROUS SULFATE TAB 325 MG (65 MG ELEMENTAL FE) 325 MG: 325 (65 FE) TAB at 09:31

## 2019-12-13 RX ADMIN — SODIUM CHLORIDE: 9 INJECTION, SOLUTION INTRAVENOUS at 03:15

## 2019-12-13 RX ADMIN — METOPROLOL TARTRATE 50 MG: 50 TABLET, FILM COATED ORAL at 09:31

## 2019-12-13 RX ADMIN — INSULIN GLARGINE 35 UNITS: 100 INJECTION, SOLUTION SUBCUTANEOUS at 20:32

## 2019-12-13 RX ADMIN — DIAZEPAM 2 MG: 2 TABLET ORAL at 17:22

## 2019-12-13 RX ADMIN — SODIUM CHLORIDE 125 MG: 9 INJECTION, SOLUTION INTRAVENOUS at 17:30

## 2019-12-13 RX ADMIN — Medication 10 ML: at 10:08

## 2019-12-13 RX ADMIN — BENZTROPINE MESYLATE 0.5 MG: 0.5 TABLET ORAL at 09:31

## 2019-12-13 RX ADMIN — HALOPERIDOL 2.5 MG: 0.5 TABLET ORAL at 09:31

## 2019-12-13 RX ADMIN — Medication 10 ML: at 20:31

## 2019-12-13 RX ADMIN — ACETAMINOPHEN 650 MG: 325 TABLET, FILM COATED ORAL at 05:17

## 2019-12-13 RX ADMIN — ATORVASTATIN CALCIUM 20 MG: 20 TABLET, FILM COATED ORAL at 20:31

## 2019-12-13 ASSESSMENT — PAIN DESCRIPTION - ONSET
ONSET: ON-GOING
ONSET: ON-GOING

## 2019-12-13 ASSESSMENT — PAIN DESCRIPTION - PAIN TYPE
TYPE: ACUTE PAIN
TYPE: ACUTE PAIN

## 2019-12-13 ASSESSMENT — PAIN SCALES - GENERAL
PAINLEVEL_OUTOF10: 3
PAINLEVEL_OUTOF10: 7
PAINLEVEL_OUTOF10: 0
PAINLEVEL_OUTOF10: 4
PAINLEVEL_OUTOF10: 10
PAINLEVEL_OUTOF10: 4
PAINLEVEL_OUTOF10: 0
PAINLEVEL_OUTOF10: 7
PAINLEVEL_OUTOF10: 5
PAINLEVEL_OUTOF10: 2

## 2019-12-13 ASSESSMENT — PAIN DESCRIPTION - FREQUENCY
FREQUENCY: CONTINUOUS
FREQUENCY: CONTINUOUS

## 2019-12-13 ASSESSMENT — PAIN DESCRIPTION - LOCATION
LOCATION: LEG
LOCATION: LEG

## 2019-12-14 LAB
ALBUMIN SERPL-MCNC: 3.2 G/DL (ref 3.5–5.2)
ALP BLD-CCNC: 62 U/L (ref 35–104)
ALT SERPL-CCNC: 8 U/L (ref 0–32)
ANION GAP SERPL CALCULATED.3IONS-SCNC: 13 MMOL/L (ref 7–16)
AST SERPL-CCNC: 13 U/L (ref 0–31)
BASOPHILS ABSOLUTE: 0.07 E9/L (ref 0–0.2)
BASOPHILS RELATIVE PERCENT: 1.3 % (ref 0–2)
BILIRUB SERPL-MCNC: 0.5 MG/DL (ref 0–1.2)
BUN BLDV-MCNC: 6 MG/DL (ref 8–23)
CALCIUM SERPL-MCNC: 8.8 MG/DL (ref 8.6–10.2)
CHLORIDE BLD-SCNC: 106 MMOL/L (ref 98–107)
CO2: 23 MMOL/L (ref 22–29)
CREAT SERPL-MCNC: 0.5 MG/DL (ref 0.5–1)
EKG ATRIAL RATE: 122 BPM
EKG P AXIS: 58 DEGREES
EKG P-R INTERVAL: 158 MS
EKG Q-T INTERVAL: 302 MS
EKG QRS DURATION: 84 MS
EKG QTC CALCULATION (BAZETT): 430 MS
EKG R AXIS: 56 DEGREES
EKG T AXIS: 4 DEGREES
EKG VENTRICULAR RATE: 122 BPM
EOSINOPHILS ABSOLUTE: 0.13 E9/L (ref 0.05–0.5)
EOSINOPHILS RELATIVE PERCENT: 2.5 % (ref 0–6)
GFR AFRICAN AMERICAN: >60
GFR NON-AFRICAN AMERICAN: >60 ML/MIN/1.73
GLUCOSE BLD-MCNC: 46 MG/DL (ref 74–99)
HCT VFR BLD CALC: 28.5 % (ref 34–48)
HCT VFR BLD CALC: 29.4 % (ref 34–48)
HCT VFR BLD CALC: 30.4 % (ref 34–48)
HEMOGLOBIN: 8.9 G/DL (ref 11.5–15.5)
HEMOGLOBIN: 9.2 G/DL (ref 11.5–15.5)
HEMOGLOBIN: 9.6 G/DL (ref 11.5–15.5)
IMMATURE GRANULOCYTES #: 0.1 E9/L
IMMATURE GRANULOCYTES %: 1.9 % (ref 0–5)
LACTIC ACID, SEPSIS: 1.3 MMOL/L (ref 0.5–1.9)
LYMPHOCYTES ABSOLUTE: 1.01 E9/L (ref 1.5–4)
LYMPHOCYTES RELATIVE PERCENT: 19.1 % (ref 20–42)
MAGNESIUM: 1.9 MG/DL (ref 1.6–2.6)
MCH RBC QN AUTO: 23.7 PG (ref 26–35)
MCHC RBC AUTO-ENTMCNC: 31.3 % (ref 32–34.5)
MCV RBC AUTO: 75.8 FL (ref 80–99.9)
METER GLUCOSE: 101 MG/DL (ref 74–99)
METER GLUCOSE: 174 MG/DL (ref 74–99)
METER GLUCOSE: 52 MG/DL (ref 74–99)
METER GLUCOSE: 83 MG/DL (ref 74–99)
MONOCYTES ABSOLUTE: 0.85 E9/L (ref 0.1–0.95)
MONOCYTES RELATIVE PERCENT: 16.1 % (ref 2–12)
NEUTROPHILS ABSOLUTE: 3.13 E9/L (ref 1.8–7.3)
NEUTROPHILS RELATIVE PERCENT: 59.1 % (ref 43–80)
PDW BLD-RTO: 17.1 FL (ref 11.5–15)
PHOSPHORUS: 2.8 MG/DL (ref 2.5–4.5)
PLATELET # BLD: 387 E9/L (ref 130–450)
PMV BLD AUTO: 9.7 FL (ref 7–12)
POTASSIUM SERPL-SCNC: 3.6 MMOL/L (ref 3.5–5)
RBC # BLD: 3.88 E12/L (ref 3.5–5.5)
SODIUM BLD-SCNC: 142 MMOL/L (ref 132–146)
TOTAL PROTEIN: 6.9 G/DL (ref 6.4–8.3)
WBC # BLD: 5.3 E9/L (ref 4.5–11.5)

## 2019-12-14 PROCEDURE — 82962 GLUCOSE BLOOD TEST: CPT

## 2019-12-14 PROCEDURE — 2580000003 HC RX 258: Performed by: INTERNAL MEDICINE

## 2019-12-14 PROCEDURE — 80053 COMPREHEN METABOLIC PANEL: CPT

## 2019-12-14 PROCEDURE — 93010 ELECTROCARDIOGRAM REPORT: CPT | Performed by: INTERNAL MEDICINE

## 2019-12-14 PROCEDURE — 84100 ASSAY OF PHOSPHORUS: CPT

## 2019-12-14 PROCEDURE — 6370000000 HC RX 637 (ALT 250 FOR IP): Performed by: UROLOGY

## 2019-12-14 PROCEDURE — 83605 ASSAY OF LACTIC ACID: CPT

## 2019-12-14 PROCEDURE — 85018 HEMOGLOBIN: CPT

## 2019-12-14 PROCEDURE — 1200000000 HC SEMI PRIVATE

## 2019-12-14 PROCEDURE — 2580000003 HC RX 258: Performed by: NURSE PRACTITIONER

## 2019-12-14 PROCEDURE — 84207 ASSAY OF VITAMIN B-6: CPT

## 2019-12-14 PROCEDURE — 6370000000 HC RX 637 (ALT 250 FOR IP): Performed by: SPECIALIST

## 2019-12-14 PROCEDURE — 6370000000 HC RX 637 (ALT 250 FOR IP): Performed by: INTERNAL MEDICINE

## 2019-12-14 PROCEDURE — 6370000000 HC RX 637 (ALT 250 FOR IP): Performed by: NURSE PRACTITIONER

## 2019-12-14 PROCEDURE — 36415 COLL VENOUS BLD VENIPUNCTURE: CPT

## 2019-12-14 PROCEDURE — 6360000002 HC RX W HCPCS: Performed by: NURSE PRACTITIONER

## 2019-12-14 PROCEDURE — 83735 ASSAY OF MAGNESIUM: CPT

## 2019-12-14 PROCEDURE — 85014 HEMATOCRIT: CPT

## 2019-12-14 PROCEDURE — 85025 COMPLETE CBC W/AUTO DIFF WBC: CPT

## 2019-12-14 RX ORDER — OXYBUTYNIN CHLORIDE 5 MG/1
5 TABLET ORAL 3 TIMES DAILY
Status: DISCONTINUED | OUTPATIENT
Start: 2019-12-14 | End: 2019-12-15 | Stop reason: HOSPADM

## 2019-12-14 RX ORDER — LANOLIN ALCOHOL/MO/W.PET/CERES
50 CREAM (GRAM) TOPICAL DAILY
Status: DISCONTINUED | OUTPATIENT
Start: 2019-12-14 | End: 2019-12-15 | Stop reason: HOSPADM

## 2019-12-14 RX ORDER — AMOXICILLIN 500 MG/1
500 CAPSULE ORAL EVERY 12 HOURS SCHEDULED
Status: DISCONTINUED | OUTPATIENT
Start: 2019-12-14 | End: 2019-12-15 | Stop reason: HOSPADM

## 2019-12-14 RX ADMIN — ACETAMINOPHEN 650 MG: 325 TABLET, FILM COATED ORAL at 14:29

## 2019-12-14 RX ADMIN — ENOXAPARIN SODIUM 40 MG: 40 INJECTION, SOLUTION INTRAVENOUS; SUBCUTANEOUS at 17:46

## 2019-12-14 RX ADMIN — OXYBUTYNIN CHLORIDE 5 MG: 5 TABLET ORAL at 15:48

## 2019-12-14 RX ADMIN — Medication 15 G: at 06:36

## 2019-12-14 RX ADMIN — METOPROLOL TARTRATE 50 MG: 50 TABLET, FILM COATED ORAL at 09:24

## 2019-12-14 RX ADMIN — SODIUM CHLORIDE: 9 INJECTION, SOLUTION INTRAVENOUS at 06:27

## 2019-12-14 RX ADMIN — GABAPENTIN 300 MG: 300 CAPSULE ORAL at 21:20

## 2019-12-14 RX ADMIN — METOPROLOL TARTRATE 50 MG: 50 TABLET, FILM COATED ORAL at 21:19

## 2019-12-14 RX ADMIN — Medication 10 ML: at 21:24

## 2019-12-14 RX ADMIN — HALOPERIDOL 2.5 MG: 0.5 TABLET ORAL at 09:23

## 2019-12-14 RX ADMIN — FERROUS SULFATE TAB 325 MG (65 MG ELEMENTAL FE) 325 MG: 325 (65 FE) TAB at 09:23

## 2019-12-14 RX ADMIN — ACETAMINOPHEN 650 MG: 325 TABLET, FILM COATED ORAL at 02:56

## 2019-12-14 RX ADMIN — OXYBUTYNIN CHLORIDE 5 MG: 5 TABLET ORAL at 21:19

## 2019-12-14 RX ADMIN — PYRIDOXINE HCL TAB 50 MG 50 MG: 50 TAB at 17:46

## 2019-12-14 RX ADMIN — DIAZEPAM 2 MG: 2 TABLET ORAL at 21:23

## 2019-12-14 RX ADMIN — ACETAMINOPHEN 650 MG: 325 TABLET, FILM COATED ORAL at 06:58

## 2019-12-14 RX ADMIN — AMOXICILLIN 500 MG: 500 CAPSULE ORAL at 21:19

## 2019-12-14 RX ADMIN — ATORVASTATIN CALCIUM 20 MG: 20 TABLET, FILM COATED ORAL at 21:20

## 2019-12-14 RX ADMIN — DIAZEPAM 2 MG: 2 TABLET ORAL at 03:30

## 2019-12-14 RX ADMIN — Medication 10 ML: at 09:24

## 2019-12-14 RX ADMIN — OXYBUTYNIN CHLORIDE 5 MG: 5 TABLET ORAL at 12:00

## 2019-12-14 RX ADMIN — CEFEPIME 2 G: 2 INJECTION, POWDER, FOR SOLUTION INTRAVENOUS at 03:29

## 2019-12-14 RX ADMIN — DIAZEPAM 2 MG: 2 TABLET ORAL at 09:22

## 2019-12-14 RX ADMIN — ACETAMINOPHEN 650 MG: 325 TABLET, FILM COATED ORAL at 13:45

## 2019-12-14 RX ADMIN — BENZTROPINE MESYLATE 0.5 MG: 0.5 TABLET ORAL at 09:23

## 2019-12-14 ASSESSMENT — PAIN SCALES - GENERAL
PAINLEVEL_OUTOF10: 6
PAINLEVEL_OUTOF10: 10
PAINLEVEL_OUTOF10: 6
PAINLEVEL_OUTOF10: 10
PAINLEVEL_OUTOF10: 6
PAINLEVEL_OUTOF10: 10
PAINLEVEL_OUTOF10: 0
PAINLEVEL_OUTOF10: 7
PAINLEVEL_OUTOF10: 5
PAINLEVEL_OUTOF10: 0

## 2019-12-14 ASSESSMENT — PAIN DESCRIPTION - ONSET
ONSET: ON-GOING

## 2019-12-14 ASSESSMENT — PAIN DESCRIPTION - LOCATION
LOCATION: LEG

## 2019-12-14 ASSESSMENT — PAIN - FUNCTIONAL ASSESSMENT
PAIN_FUNCTIONAL_ASSESSMENT: PREVENTS OR INTERFERES SOME ACTIVE ACTIVITIES AND ADLS

## 2019-12-14 ASSESSMENT — PAIN DESCRIPTION - ORIENTATION
ORIENTATION: RIGHT;LEFT

## 2019-12-14 ASSESSMENT — PAIN DESCRIPTION - FREQUENCY
FREQUENCY: CONTINUOUS

## 2019-12-14 ASSESSMENT — PAIN DESCRIPTION - DESCRIPTORS
DESCRIPTORS: ACHING;CONSTANT;PINS AND NEEDLES

## 2019-12-14 ASSESSMENT — PAIN DESCRIPTION - PAIN TYPE
TYPE: ACUTE PAIN

## 2019-12-14 ASSESSMENT — PAIN DESCRIPTION - PROGRESSION
CLINICAL_PROGRESSION: NOT CHANGED

## 2019-12-15 VITALS
HEART RATE: 100 BPM | WEIGHT: 166.5 LBS | OXYGEN SATURATION: 97 % | TEMPERATURE: 98.3 F | DIASTOLIC BLOOD PRESSURE: 68 MMHG | HEIGHT: 63 IN | RESPIRATION RATE: 20 BRPM | SYSTOLIC BLOOD PRESSURE: 152 MMHG | BODY MASS INDEX: 29.5 KG/M2

## 2019-12-15 LAB
ALBUMIN SERPL-MCNC: 3.4 G/DL (ref 3.5–5.2)
ALP BLD-CCNC: 313 U/L (ref 35–104)
ALT SERPL-CCNC: 125 U/L (ref 0–32)
ANION GAP SERPL CALCULATED.3IONS-SCNC: 17 MMOL/L (ref 7–16)
AST SERPL-CCNC: 77 U/L (ref 0–31)
BASOPHILS ABSOLUTE: 0.04 E9/L (ref 0–0.2)
BASOPHILS RELATIVE PERCENT: 0.7 % (ref 0–2)
BILIRUB SERPL-MCNC: 0.8 MG/DL (ref 0–1.2)
BUN BLDV-MCNC: 9 MG/DL (ref 8–23)
CALCIUM SERPL-MCNC: 9.3 MG/DL (ref 8.6–10.2)
CHLORIDE BLD-SCNC: 98 MMOL/L (ref 98–107)
CO2: 24 MMOL/L (ref 22–29)
CREAT SERPL-MCNC: 0.7 MG/DL (ref 0.5–1)
EOSINOPHILS ABSOLUTE: 0.09 E9/L (ref 0.05–0.5)
EOSINOPHILS RELATIVE PERCENT: 1.6 % (ref 0–6)
GFR AFRICAN AMERICAN: >60
GFR NON-AFRICAN AMERICAN: >60 ML/MIN/1.73
GLUCOSE BLD-MCNC: 80 MG/DL (ref 74–99)
HCT VFR BLD CALC: 33.2 % (ref 34–48)
HCT VFR BLD CALC: 36 % (ref 34–48)
HEMOGLOBIN: 10.3 G/DL (ref 11.5–15.5)
HEMOGLOBIN: 11.1 G/DL (ref 11.5–15.5)
IMMATURE GRANULOCYTES #: 0.06 E9/L
IMMATURE GRANULOCYTES %: 1 % (ref 0–5)
LYMPHOCYTES ABSOLUTE: 0.87 E9/L (ref 1.5–4)
LYMPHOCYTES RELATIVE PERCENT: 15.1 % (ref 20–42)
MAGNESIUM: 1.9 MG/DL (ref 1.6–2.6)
MCH RBC QN AUTO: 23.5 PG (ref 26–35)
MCHC RBC AUTO-ENTMCNC: 31 % (ref 32–34.5)
MCV RBC AUTO: 75.8 FL (ref 80–99.9)
METER GLUCOSE: 94 MG/DL (ref 74–99)
MONOCYTES ABSOLUTE: 0.74 E9/L (ref 0.1–0.95)
MONOCYTES RELATIVE PERCENT: 12.9 % (ref 2–12)
NEUTROPHILS ABSOLUTE: 3.95 E9/L (ref 1.8–7.3)
NEUTROPHILS RELATIVE PERCENT: 68.7 % (ref 43–80)
PDW BLD-RTO: 17.7 FL (ref 11.5–15)
PHOSPHORUS: 4.1 MG/DL (ref 2.5–4.5)
PLATELET # BLD: 459 E9/L (ref 130–450)
PMV BLD AUTO: 9.7 FL (ref 7–12)
POTASSIUM SERPL-SCNC: 3.8 MMOL/L (ref 3.5–5)
RBC # BLD: 4.38 E12/L (ref 3.5–5.5)
SODIUM BLD-SCNC: 139 MMOL/L (ref 132–146)
TOTAL PROTEIN: 7.4 G/DL (ref 6.4–8.3)
WBC # BLD: 5.8 E9/L (ref 4.5–11.5)

## 2019-12-15 PROCEDURE — 85025 COMPLETE CBC W/AUTO DIFF WBC: CPT

## 2019-12-15 PROCEDURE — 6370000000 HC RX 637 (ALT 250 FOR IP): Performed by: NURSE PRACTITIONER

## 2019-12-15 PROCEDURE — 6370000000 HC RX 637 (ALT 250 FOR IP): Performed by: SPECIALIST

## 2019-12-15 PROCEDURE — 2580000003 HC RX 258: Performed by: INTERNAL MEDICINE

## 2019-12-15 PROCEDURE — 6370000000 HC RX 637 (ALT 250 FOR IP): Performed by: UROLOGY

## 2019-12-15 PROCEDURE — 6370000000 HC RX 637 (ALT 250 FOR IP): Performed by: INTERNAL MEDICINE

## 2019-12-15 PROCEDURE — 83735 ASSAY OF MAGNESIUM: CPT

## 2019-12-15 PROCEDURE — 85018 HEMOGLOBIN: CPT

## 2019-12-15 PROCEDURE — 85014 HEMATOCRIT: CPT

## 2019-12-15 PROCEDURE — 80053 COMPREHEN METABOLIC PANEL: CPT

## 2019-12-15 PROCEDURE — 36415 COLL VENOUS BLD VENIPUNCTURE: CPT

## 2019-12-15 PROCEDURE — 82962 GLUCOSE BLOOD TEST: CPT

## 2019-12-15 PROCEDURE — 84100 ASSAY OF PHOSPHORUS: CPT

## 2019-12-15 RX ORDER — OXYBUTYNIN CHLORIDE 5 MG/1
5 TABLET ORAL 3 TIMES DAILY
Qty: 90 TABLET | Refills: 1 | Status: SHIPPED | OUTPATIENT
Start: 2019-12-15 | End: 2020-03-26

## 2019-12-15 RX ORDER — AMOXICILLIN 500 MG/1
500 CAPSULE ORAL EVERY 12 HOURS SCHEDULED
Qty: 14 CAPSULE | Refills: 0 | Status: SHIPPED | OUTPATIENT
Start: 2019-12-15 | End: 2019-12-22

## 2019-12-15 RX ORDER — PYRIDOXINE HCL (VITAMIN B6) 50 MG
50 TABLET ORAL DAILY
Qty: 30 TABLET | Refills: 3 | Status: SHIPPED | OUTPATIENT
Start: 2019-12-16 | End: 2020-10-20 | Stop reason: SDUPTHER

## 2019-12-15 RX ADMIN — DIAZEPAM 2 MG: 2 TABLET ORAL at 08:51

## 2019-12-15 RX ADMIN — ACETAMINOPHEN 650 MG: 325 TABLET, FILM COATED ORAL at 00:35

## 2019-12-15 RX ADMIN — Medication 10 ML: at 08:52

## 2019-12-15 RX ADMIN — FERROUS SULFATE TAB 325 MG (65 MG ELEMENTAL FE) 325 MG: 325 (65 FE) TAB at 08:51

## 2019-12-15 RX ADMIN — HALOPERIDOL 2.5 MG: 0.5 TABLET ORAL at 08:50

## 2019-12-15 RX ADMIN — BENZTROPINE MESYLATE 0.5 MG: 0.5 TABLET ORAL at 08:50

## 2019-12-15 RX ADMIN — OXYBUTYNIN CHLORIDE 5 MG: 5 TABLET ORAL at 08:50

## 2019-12-15 RX ADMIN — AMOXICILLIN 500 MG: 500 CAPSULE ORAL at 08:50

## 2019-12-15 RX ADMIN — PYRIDOXINE HCL TAB 50 MG 50 MG: 50 TAB at 08:51

## 2019-12-15 RX ADMIN — METOPROLOL TARTRATE 50 MG: 50 TABLET, FILM COATED ORAL at 08:50

## 2019-12-15 ASSESSMENT — PAIN SCALES - GENERAL
PAINLEVEL_OUTOF10: 3
PAINLEVEL_OUTOF10: 6
PAINLEVEL_OUTOF10: 5

## 2019-12-15 ASSESSMENT — PAIN DESCRIPTION - PAIN TYPE: TYPE: ACUTE PAIN

## 2019-12-15 ASSESSMENT — PAIN DESCRIPTION - LOCATION: LOCATION: LEG

## 2019-12-16 ENCOUNTER — CARE COORDINATION (OUTPATIENT)
Dept: CASE MANAGEMENT | Age: 59
End: 2019-12-16

## 2019-12-16 LAB — PATHOLOGIST REVIEW: NORMAL

## 2019-12-17 ENCOUNTER — CARE COORDINATION (OUTPATIENT)
Dept: CASE MANAGEMENT | Age: 59
End: 2019-12-17

## 2019-12-17 LAB
BLOOD CULTURE, ROUTINE: NORMAL
CULTURE, BLOOD 2: NORMAL
VITAMIN B6: 5.6 NMOL/L (ref 20–125)
VITAMIN B6: 6.6 NMOL/L (ref 20–125)

## 2019-12-19 DIAGNOSIS — D64.9 ANEMIA, UNSPECIFIED TYPE: Primary | ICD-10-CM

## 2019-12-19 LAB
BLOOD CULTURE, ROUTINE: NORMAL
CULTURE, BLOOD 2: NORMAL

## 2019-12-30 ENCOUNTER — HOSPITAL ENCOUNTER (EMERGENCY)
Age: 59
Discharge: HOME | End: 2019-12-30
Attending: EMERGENCY MEDICINE
Payer: MEDICARE

## 2019-12-30 VITALS
WEIGHT: 140 LBS | BODY MASS INDEX: 24.8 KG/M2 | TEMPERATURE: 97.1 F | SYSTOLIC BLOOD PRESSURE: 113 MMHG | RESPIRATION RATE: 16 BRPM | OXYGEN SATURATION: 99 % | HEART RATE: 104 BPM | DIASTOLIC BLOOD PRESSURE: 65 MMHG

## 2019-12-30 LAB
BACTERIA: ABNORMAL /HPF
BILIRUBIN URINE: NEGATIVE
BLOOD, URINE: ABNORMAL
CLARITY: ABNORMAL
COLOR: YELLOW
EPITHELIAL CELLS, UA: ABNORMAL /HPF
GLUCOSE URINE: NEGATIVE MG/DL
KETONES, URINE: NEGATIVE MG/DL
LEUKOCYTE ESTERASE, URINE: ABNORMAL
NITRITE, URINE: NEGATIVE
PH UA: 5.5 (ref 5–9)
PROTEIN UA: 100 MG/DL
RBC UA: ABNORMAL /HPF (ref 0–2)
RENAL EPITHELIAL, UA: ABNORMAL /HPF
SPECIFIC GRAVITY UA: 1.01 (ref 1–1.03)
UROBILINOGEN, URINE: 2 E.U./DL
WBC UA: ABNORMAL /HPF (ref 0–5)
YEAST: ABNORMAL

## 2019-12-30 PROCEDURE — 81001 URINALYSIS AUTO W/SCOPE: CPT

## 2019-12-30 PROCEDURE — G0382 LEV 3 HOSP TYPE B ED VISIT: HCPCS

## 2019-12-30 PROCEDURE — 87088 URINE BACTERIA CULTURE: CPT

## 2019-12-30 RX ORDER — FLUCONAZOLE 150 MG/1
150 TABLET ORAL ONCE
Qty: 1 TABLET | Refills: 0 | Status: SHIPPED | OUTPATIENT
Start: 2019-12-30 | End: 2019-12-30

## 2019-12-30 RX ORDER — NITROFURANTOIN 25; 75 MG/1; MG/1
100 CAPSULE ORAL 2 TIMES DAILY
Qty: 20 CAPSULE | Refills: 0 | Status: SHIPPED | OUTPATIENT
Start: 2019-12-30 | End: 2020-04-27 | Stop reason: SDUPTHER

## 2019-12-30 ASSESSMENT — ENCOUNTER SYMPTOMS
BACK PAIN: 0
ABDOMINAL DISTENTION: 0
NAUSEA: 0
SHORTNESS OF BREATH: 0
WHEEZING: 0
EYE PAIN: 0
EYE DISCHARGE: 0
SORE THROAT: 0
EYE REDNESS: 0
COUGH: 0
VOMITING: 0
SINUS PRESSURE: 0
DIARRHEA: 0

## 2019-12-30 NOTE — ED PROVIDER NOTES
The history is provided by the patient. Dysuria    This is a new problem. The current episode started more than 2 days ago. The problem occurs every urination. The quality of the pain is described as burning. The pain is moderate. There has been no fever. Associated symptoms include frequency, hesitancy and urgency. Pertinent negatives include no chills, no nausea, no vomiting, no hematuria and no flank pain. Review of Systems   Constitutional: Negative for chills and fever. HENT: Negative for ear pain, sinus pressure and sore throat. Eyes: Negative for pain, discharge and redness. Respiratory: Negative for cough, shortness of breath and wheezing. Cardiovascular: Negative for chest pain. Gastrointestinal: Negative for abdominal distention, diarrhea, nausea and vomiting. Genitourinary: Positive for dysuria, frequency, hesitancy and urgency. Negative for flank pain and hematuria. Musculoskeletal: Negative for arthralgias and back pain. Skin: Negative for rash and wound. Neurological: Negative for weakness and headaches. Hematological: Negative for adenopathy. All other systems reviewed and are negative. Physical Exam  Vitals signs and nursing note reviewed. Constitutional:       Appearance: She is well-developed. HENT:      Head: Normocephalic and atraumatic. Eyes:      Pupils: Pupils are equal, round, and reactive to light. Neck:      Musculoskeletal: Normal range of motion and neck supple. Cardiovascular:      Rate and Rhythm: Normal rate and regular rhythm. Heart sounds: Normal heart sounds. No murmur. Pulmonary:      Effort: Pulmonary effort is normal. No respiratory distress. Breath sounds: Normal breath sounds. No wheezing or rales. Abdominal:      General: Bowel sounds are normal.      Palpations: Abdomen is soft. Tenderness: There is tenderness in the suprapubic area. There is no guarding or rebound.    Skin:     General: Skin is warm and 2.0 (A) <2.0 E.U./dL    Nitrite, Urine Negative Negative    Leukocyte Esterase, Urine SMALL (A) Negative   Microscopic Urinalysis   Result Value Ref Range    WBC, UA 5-10 0 - 5 /HPF    RBC, UA 10-20 (A) 0 - 2 /HPF    Epi Cells MANY /HPF    Renal Epithelial, Urine FEW /HPF    Bacteria, UA MODERATE (A) /HPF    Yeast, UA MODERATE        Radiology:  No orders to display       ------------------------- NURSING NOTES AND VITALS REVIEWED ---------------------------  Date / Time Roomed:  12/30/2019  3:33 PM  ED Bed Assignment:  01/01    The nursing notes within the ED encounter and vital signs as below have been reviewed. /65   Pulse 104   Temp 97.1 °F (36.2 °C) (Oral)   Resp 16   Wt 140 lb (63.5 kg)   SpO2 99%   BMI 24.80 kg/m²   Oxygen Saturation Interpretation: Normal      ------------------------------------------ PROGRESS NOTES ------------------------------------------  I have spoken with the patient and discussed todays results, in addition to providing specific details for the plan of care and counseling regarding the diagnosis and prognosis. Their questions are answered at this time and they are agreeable with the plan. I discussed at length with them reasons for immediate return here for re evaluation. They will followup with primary care by calling their office tomorrow. --------------------------------- ADDITIONAL PROVIDER NOTES ---------------------------------  At this time the patient is without objective evidence of an acute process requiring hospitalization or inpatient management. They have remained hemodynamically stable throughout their entire ED visit and are stable for discharge with outpatient follow-up. The plan has been discussed in detail and they are aware of the specific conditions for emergent return, as well as the importance of follow-up.       New Prescriptions    FLUCONAZOLE (DIFLUCAN) 150 MG TABLET    Take 1 tablet by mouth once for 1 dose    NITROFURANTOIN, MACROCRYSTAL-MONOHYDRATE, (MACROBID) 100 MG CAPSULE    Take 1 capsule by mouth 2 times daily for 10 days       Diagnosis:  1. Urinary tract infection without hematuria, site unspecified    2. Yeast infection        Disposition:  Patient's disposition: Discharge to home  Patient's condition is stable.                     Sully Stover MD  12/30/19 1236

## 2020-01-01 LAB — URINE CULTURE, ROUTINE: NORMAL

## 2020-01-23 RX ORDER — SODIUM CHLORIDE 9 MG/ML
INJECTION, SOLUTION INTRAVENOUS CONTINUOUS
Status: CANCELLED | OUTPATIENT
Start: 2020-01-28

## 2020-01-23 NOTE — PROGRESS NOTES
Unable to complete patient history via phone call prior to new patient's first appointment. Patient not available.

## 2020-01-24 ENCOUNTER — OFFICE VISIT (OUTPATIENT)
Dept: ONCOLOGY | Age: 60
End: 2020-01-24
Payer: MEDICARE

## 2020-01-24 ENCOUNTER — HOSPITAL ENCOUNTER (OUTPATIENT)
Dept: INFUSION THERAPY | Age: 60
Discharge: HOME | End: 2020-01-24
Payer: MEDICARE

## 2020-01-24 ENCOUNTER — HOSPITAL ENCOUNTER (OUTPATIENT)
Dept: PREADMISSION TESTING | Age: 60
Discharge: HOME | End: 2020-01-24
Payer: MEDICARE

## 2020-01-24 VITALS
OXYGEN SATURATION: 96 % | WEIGHT: 141.38 LBS | BODY MASS INDEX: 25.05 KG/M2 | HEIGHT: 63 IN | TEMPERATURE: 98.1 F | DIASTOLIC BLOOD PRESSURE: 60 MMHG | SYSTOLIC BLOOD PRESSURE: 141 MMHG | HEART RATE: 105 BPM | RESPIRATION RATE: 16 BRPM

## 2020-01-24 VITALS
HEIGHT: 63 IN | WEIGHT: 141.3 LBS | TEMPERATURE: 97.9 F | SYSTOLIC BLOOD PRESSURE: 166 MMHG | DIASTOLIC BLOOD PRESSURE: 70 MMHG | HEART RATE: 104 BPM | BODY MASS INDEX: 25.04 KG/M2

## 2020-01-24 LAB
ANION GAP SERPL CALCULATED.3IONS-SCNC: 14 MMOL/L (ref 7–16)
BUN BLDV-MCNC: 14 MG/DL (ref 8–23)
CALCIUM SERPL-MCNC: 9.6 MG/DL (ref 8.6–10.2)
CHLORIDE BLD-SCNC: 97 MMOL/L (ref 98–107)
CO2: 26 MMOL/L (ref 22–29)
CREAT SERPL-MCNC: 0.9 MG/DL (ref 0.5–1)
GFR AFRICAN AMERICAN: >60
GFR NON-AFRICAN AMERICAN: >60 ML/MIN/1.73
GLUCOSE BLD-MCNC: 65 MG/DL (ref 74–99)
HCT VFR BLD CALC: 29.7 % (ref 34–48)
HEMOGLOBIN: 9 G/DL (ref 11.5–15.5)
MCH RBC QN AUTO: 23.9 PG (ref 26–35)
MCHC RBC AUTO-ENTMCNC: 30.3 % (ref 32–34.5)
MCV RBC AUTO: 79 FL (ref 80–99.9)
PDW BLD-RTO: 18.6 FL (ref 11.5–15)
PLATELET # BLD: 483 E9/L (ref 130–450)
PMV BLD AUTO: 9.4 FL (ref 7–12)
POTASSIUM REFLEX MAGNESIUM: 4.2 MMOL/L (ref 3.5–5)
RBC # BLD: 3.76 E12/L (ref 3.5–5.5)
SODIUM BLD-SCNC: 137 MMOL/L (ref 132–146)
WBC # BLD: 5.8 E9/L (ref 4.5–11.5)

## 2020-01-24 PROCEDURE — 85027 COMPLETE CBC AUTOMATED: CPT

## 2020-01-24 PROCEDURE — 80048 BASIC METABOLIC PNL TOTAL CA: CPT

## 2020-01-24 PROCEDURE — 36415 COLL VENOUS BLD VENIPUNCTURE: CPT

## 2020-01-24 PROCEDURE — 99204 OFFICE O/P NEW MOD 45 MIN: CPT

## 2020-01-24 ASSESSMENT — PAIN SCALES - GENERAL: PAINLEVEL_OUTOF10: 5

## 2020-01-24 ASSESSMENT — PAIN DESCRIPTION - LOCATION: LOCATION: ABDOMEN

## 2020-01-24 ASSESSMENT — PAIN DESCRIPTION - DESCRIPTORS: DESCRIPTORS: ACHING;THROBBING

## 2020-01-24 ASSESSMENT — PAIN DESCRIPTION - PAIN TYPE: TYPE: ACUTE PAIN

## 2020-01-24 ASSESSMENT — PAIN DESCRIPTION - FREQUENCY: FREQUENCY: INTERMITTENT

## 2020-01-24 NOTE — PROGRESS NOTES
[address]                                                                                                                     PRE OP INSTRUCTIONS FOR  Gabe Burtms        Date: 1/24/2020    Date and time of surgery: 1/28/20   Arrival Time: [facility] will call with time between 5-7pm.     1. Do not eat or drink anything after 12 midnight prior to surgery. This includes no water, chewing gum, mints or ice chips. 2. Take the following pills with a small sip of water on the morning of Surgery: Metoprolol     3. Diabetics may take evening dose of insulin but none after midnight. If you feel symptomatic or low blood sugar take 1-2 ounces of apple juice only. 4. Aspirin, Ibuprofen, Advil, Naproxen, Vitamin E and other Anti-inflammatory products should be stopped  before surgery  as directed by your physician. 5. Check with your Doctor regarding stopping Plavix, Coumadin, Lovenox, Fragmin or other blood thinners. 6. Do not smoke,use illicit drugs and do not drink any alcoholic beverages 24 hours prior to surgery. 7. You may brush your teeth and gargle the morning of surgery. DO NOT SWALLOW WATER    8. You MUST make arrangements for a responsible adult to take you home after your surgery. You will not be allowed to leave alone or drive yourself home. It is strongly suggested someone stay with you the first 24 hrs. Your surgery will be cancelled if you do not have a ride home. 9. A parent/legal guardian must accompany a child scheduled for surgery and plan to stay at the hospital until the child is discharged. Please do not bring other children with you. 10. Please wear simple, loose fitting clothing to the hospital.  Erica Le not bring valuables (money, credit cards, checkbooks, etc.) Do not wear any makeup (including no eye makeup) or nail polish on your fingers or toes. 11. DO NOT wear any jewelry or piercings on day of surgery. All body piercing jewelry must be removed. 12.  Shower the

## 2020-01-24 NOTE — PROGRESS NOTES
polyneuropathy, with long-term current use of insulin ([address] 09/19/2016    Epigastric hernia     Umbilical hernia         Past Surgical History:   Procedure Laterality Date    CARDIAC [facility]HETERIZATION  09/30/2016     [facility]    COLONOSCOPY  08/2016    Dr. Claudene Pupa / [address] / Philip Soodle Bilateral 10/28/2019    CYSTOSCOPY. RETROGRADE. PYELOGRAM. BILATERAL STENT INSERTION performed by Dave Jones MD at [address] [place] [address] OTHER SURGICAL HISTORY  11/04/2016    epigastric hernia umbilical hernia with mesh    OVARIAN CYST REMOVAL      OVARY REMOVAL Right     OVARY REMOVAL  2014    UPPER GASTROINTESTINAL ENDOSCOPY N/A 10/29/2019    EGD ESOPHAGOGASTRODUODENOSCOPY performed by Roger Craig MD at [facility] ENDOSCOPY       Family History  Family History   Problem Relation Age of Onset    Cancer Brother        Social History    TOBACCO:   reports that she quit smoking about 4 years ago. Her smoking use included cigarettes. She smoked 0.00 packs per day for 2.00 years. She has never used smokeless tobacco.  ETOH:   reports no history of alcohol use. Home Medications  Prior to Admission medications    Medication Sig Start Date End Date Taking? Authorizing Provider   oxybutynin (DITROPAN) 5 MG tablet Take 1 tablet by mouth 3 times daily 12/15/19   Ashwini Reynaga,    vitamin B-6 (B-6) 50 MG tablet Take 1 tablet by mouth daily 12/16/19   Armand Reynaga DO   gabapentin (NEURONTIN) 100 MG capsule Take 300 mg by mouth nightly.      Historical Provider, MD   ferrous sulfate 325 (65 Fe) MG tablet Take 1 tablet by mouth daily (with breakfast) 10/30/19   Haylee Sanchez DO   methIMAzole (TAPAZOLE) 5 MG tablet Take 1 tablet by mouth daily 10/23/19   Kala Smith MD   glipiZIDE (GLUCOTROL) 10 MG tablet Take 1 tablet by mouth daily 10/23/19   Kala Smith MD   insulin glargine (LANTUS SOLOSTAR) 100 UNIT/ML injection pen Inject 35 Units into the skin nightly 10/23/19   Kenneth Ohara MD   metoprolol (LOPRESSOR) 100 MG tablet Take 0.5 tablets by mouth 2 times daily 10/18/19   Jacqueline Herr MD   benztropine (COGENTIN) 0.5 MG tablet Take 0.5 mg by mouth daily  9/12/19   Historical Provider, MD   benazepril (LOTENSIN) 10 MG tablet Take 1 tablet by mouth daily 8/22/19   Kenneth Ohara MD   metFORMIN (GLUCOPHAGE) 1000 MG tablet TAKE 1 TABLET BY MOUTH TWICE DAILY WITH MEALS 8/5/19   Geraldine Basilio MD   atorvastatin (LIPITOR) 20 MG tablet Take 1 tablet by mouth daily 7/28/19   Kenneth Ohara MD   diazepam (VALIUM) 2 MG tablet Take 2 mg by mouth every 6 hours as needed for Anxiety    Historical Provider, MD       Allergies  No Known Allergies    Review of Systems:    Constitutional:  No fever chills or rigors. +fatigue  Eyes: No changes in vision, discharge, or pain  ENT: No Headaches, hearing loss or vertigo. No mouth sores or sore throat. No change in taste or smell. Cardiovascular: No chest discomfort, dyspnea on exertion, palpitations or loss of consciousness. or phlebitis. Respiratory: Has no cough or wheezing, Has no sputum production. Has no hemoptysis, Has no pleuritic pain, . Gastrointestinal: No abdominal pain, appetite loss, blood in stools. No change in bowel habits. No hematemesis   Genitourinary: + dysuria. No trouble voiding, or hematuria. No nocturia or increased frequency. Musculoskeletal: No gait disturbance, weakness or joint complaints. Integumentary: No rash or pruritis. Neurological: No headache, diplopia, change in muscle strength, numbness or tingling. No change in gait, balance, coordination, mood, affect, memory, mentation, behavior. Psychiatric: No anxiety, or depression. Endocrine: No temperature intolerance. No excessive thirst, fluid intake, or urination. No tremor. Hematologic/Lymphatic: No abnormal bruising or bleeding, blood clots or swollen lymph nodes.    Allergic/Immunologic: No to display         ASSESSMENT/PLAN :  61 yo female  UTI  Microcytic acute on chronic anemia  Ureteral stents    - Her anemia is most likely chronic given elevated ferritin and depressed serum iron and TIBC and ESR of 89, with an acute component due to current myelosuppression from infection and possible JIE  - Reportedly had a normal GI work up? Primary note from 12/5 states she canceled that appointment  - Retics noted  - She has been on ferrous sulfate as an outpatient  - Will give a dose of Ferrlecit inpatient. Additional IV iron can be considered as outpatient  - Transfuse for Hgb <7, already s/p 1 unit  - Obtain smear  - ID has been consulted  - CT C/A/P with obstructive changes in L kidney with good stent placement. Renal function is normal    1/24/20  - Hgb stable at 9.0, MCV 79  - Platelets mildly elevated, WBC normal  - GI work up not yet completed  - Had IV iron as inpatient with no significant improvement.  Anemia due to AOCD and will hold on further iron at this time  - Dr. Chris Smith taking her to operating room on 1/28/2020 to undergo cystoscopy, retrograde pyelography, bilateral ureteroscopy, bilateral JJ ureteral stent exchange versus removal  - RTC in 3 months with labs and iron profile      Electronically signed by Adalberto Joyce MD on 1/24/2020 at 2:13 PM

## 2020-01-24 NOTE — PROGRESS NOTES
atorvastatin (LIPITOR) 20 MG tablet, Take 1 tablet by mouth daily, Disp: 30 tablet, Rfl: 5    diazepam (VALIUM) 2 MG tablet, Take 2 mg by mouth every 6 hours as needed for Anxiety, Disp: , Rfl:     ferrous sulfate 325 (65 Fe) MG tablet, Take 1 tablet by mouth daily (with breakfast) (Patient not taking: Reported on 1/24/2020), Disp: 30 tablet, Rfl: 3       Past Medical History:   Diagnosis Date    Diabetes mellitus ([facility]     Diabetic peripheral neuropathy associated with type 2 diabetes mellitus ([facility] 8/24/2018    Epigastric hernia     History of blood transfusion     Hyperlipidemia     Hypertension     Schizophrenia ([facility]     Umbilical hernia        Past Surgical History:   Procedure Laterality Date    C[facility]AC [facility]  09/30/2016     [facility]    COLONOSCOPY  08/2016    Dr. Loretta Street / [place] Bella / Venus Mahan Bilateral 10/28/2019    CYSTOSCOPY. RETROGRADE.  PYELOGRAM. BILATERAL STENT INSERTION performed by Tarsha Manriquez MD at [address] [place] [address] OTHER SURGICAL HISTORY  11/04/2016    epigastric hernia umbilical hernia with mesh    OVARIAN CYST REMOVAL      OVARY REMOVAL Right     OVARY REMOVAL  2014    UPPER GASTROINTESTINAL ENDOSCOPY N/A 10/29/2019    EGD ESOPHAGOGASTRODUODENOSCOPY performed by Aristides Vásquez MD at [address] [facility] ENDOSCOPY       Family History   Problem Relation Age of Onset    Cancer Brother        Social History     Socioeconomic History    Marital status: Legally      Spouse name: Not on file    Number of children: Not on file    Years of education: Not on file    Highest education level: Not on file   Occupational History    Not on file   Social Needs    Financial resource strain: Not on file    Food insecurity:     Worry: Not on file     Inability: Not on file    Transportation needs:     Medical: Not on file     Non-medical: Not on file   Tobacco Use    Smoking status: Former Smoker     Packs/day: 0.00     Years: antihypertensive agents   Yes - 3   7. Falls:  recent history of falls within the last 3 months (not to include slipping or tripping)   No - 0   TOTAL 3    If score of 4 or greater was education given? NA       TABLE 2   Risk Score Risk Level Plan of Care   0-3 Little or  No Risk 1. Provide assistance as indicated for ambulation activities  2. Reorient confused/cognitively impaired patient  3. Call-light/bell within patient's reach  4. Chair/bed in low position, stretcher/bed with siderails up except when performing patient care activities  5. Educate patient/family/caregiver on falls prevention  6.  Reassess in 12 weeks or with any noted change in patient condition which places them at a risk for a fall   4-6 Moderate Risk 1. Provide assistance as indicated for ambulation activities  2. Reorient confused/cognitively impaired patient  3. Call-light/bell within patient's reach  4. Chair/bed in low position, stretcher/bed with siderails up except when performing patient care activities  5. Educate patient/family/caregiver on falls prevention  6. Falls risk precaution (Yellow sticker Level II) placed on patient chart   7 or   Higher High Risk 1. Place patient in easily observable treatment room  2. Patient attended at all times by family member or staff  3. Provide assistance as indicated for ambulation activities  4. Reorient confused/cognitively impaired patient  5. Call-light/bell within patient's reach  6. Chair/bed in low position, stretcher/bed with siderails up except when performing patient care activities  7. Educate patient/family/caregiver on falls prevention  8. Falls risk precaution (Yellow sticker Level III) placed on patient chart           MALNUTRITION RISK SCREENING ASSESSMENT    Instructions:  Assess the patient and enter the appropriate indicators that are present for nutrition risk identification. Total the numbers entered and assign a risk score.  Follow the appropriate action

## 2020-01-28 ENCOUNTER — HOSPITAL ENCOUNTER (OUTPATIENT)
Dept: GENERAL RADIOLOGY | Age: 60
Discharge: HOME | End: 2020-01-30
Payer: MEDICARE

## 2020-01-31 ENCOUNTER — TELEPHONE (OUTPATIENT)
Dept: FAMILY MEDICINE CLINIC | Age: 60
End: 2020-01-31

## 2020-01-31 NOTE — TELEPHONE ENCOUNTER
Pt called and is requesting an order for a bedside commode and would like it faxed to [address]. Please advise.

## 2020-02-13 ENCOUNTER — OFFICE VISIT (OUTPATIENT)
Dept: FAMILY MEDICINE CLINIC | Age: 60
End: 2020-02-13
Payer: MEDICARE

## 2020-02-13 VITALS
WEIGHT: 131 LBS | OXYGEN SATURATION: 98 % | SYSTOLIC BLOOD PRESSURE: 136 MMHG | BODY MASS INDEX: 23.21 KG/M2 | HEART RATE: 117 BPM | DIASTOLIC BLOOD PRESSURE: 78 MMHG

## 2020-02-13 PROBLEM — E43 SEVERE PROTEIN-CALORIE MALNUTRITION (HCC): Status: RESOLVED | Noted: 2019-12-13 | Resolved: 2020-02-13

## 2020-02-13 PROBLEM — A41.9 SEPSIS (HCC): Status: RESOLVED | Noted: 2019-12-12 | Resolved: 2020-02-13

## 2020-02-13 PROCEDURE — 3017F COLORECTAL CA SCREEN DOC REV: CPT | Performed by: FAMILY MEDICINE

## 2020-02-13 PROCEDURE — 2022F DILAT RTA XM EVC RTNOPTHY: CPT | Performed by: FAMILY MEDICINE

## 2020-02-13 PROCEDURE — G8484 FLU IMMUNIZE NO ADMIN: HCPCS | Performed by: FAMILY MEDICINE

## 2020-02-13 PROCEDURE — G8420 CALC BMI NORM PARAMETERS: HCPCS | Performed by: FAMILY MEDICINE

## 2020-02-13 PROCEDURE — 1036F TOBACCO NON-USER: CPT | Performed by: FAMILY MEDICINE

## 2020-02-13 PROCEDURE — G8427 DOCREV CUR MEDS BY ELIG CLIN: HCPCS | Performed by: FAMILY MEDICINE

## 2020-02-13 PROCEDURE — 99214 OFFICE O/P EST MOD 30 MIN: CPT | Performed by: FAMILY MEDICINE

## 2020-02-13 PROCEDURE — 3046F HEMOGLOBIN A1C LEVEL >9.0%: CPT | Performed by: FAMILY MEDICINE

## 2020-02-13 RX ORDER — METHIMAZOLE 5 MG/1
5 TABLET ORAL DAILY
Qty: 30 TABLET | Refills: 5 | Status: ON HOLD
Start: 2020-02-13 | End: 2020-06-04 | Stop reason: HOSPADM

## 2020-02-13 RX ORDER — BENAZEPRIL HYDROCHLORIDE 10 MG/1
10 TABLET ORAL DAILY
Qty: 30 TABLET | Refills: 5 | Status: SHIPPED
Start: 2020-02-13 | End: 2020-03-26

## 2020-02-13 RX ORDER — ATORVASTATIN CALCIUM 20 MG/1
20 TABLET, FILM COATED ORAL DAILY
Qty: 30 TABLET | Refills: 5 | Status: ON HOLD
Start: 2020-02-13 | End: 2020-03-03 | Stop reason: HOSPADM

## 2020-02-13 RX ORDER — GLIPIZIDE 10 MG/1
10 TABLET ORAL DAILY
Qty: 30 TABLET | Refills: 5 | Status: ON HOLD
Start: 2020-02-13 | End: 2020-03-03 | Stop reason: HOSPADM

## 2020-02-13 RX ORDER — MEGESTROL ACETATE 20 MG/1
20 TABLET ORAL DAILY
Qty: 30 TABLET | Refills: 3 | Status: SHIPPED
Start: 2020-02-13 | End: 2020-07-06 | Stop reason: SDUPTHER

## 2020-02-13 RX ORDER — CIPROFLOXACIN 500 MG/1
500 TABLET, FILM COATED ORAL 2 TIMES DAILY
Qty: 14 TABLET | Refills: 0 | Status: SHIPPED | OUTPATIENT
Start: 2020-02-13 | End: 2020-02-20

## 2020-02-13 ASSESSMENT — PATIENT HEALTH QUESTIONNAIRE - PHQ9
1. LITTLE INTEREST OR PLEASURE IN DOING THINGS: 1
SUM OF ALL RESPONSES TO PHQ QUESTIONS 1-9: 2
SUM OF ALL RESPONSES TO PHQ9 QUESTIONS 1 & 2: 2
2. FEELING DOWN, DEPRESSED OR HOPELESS: 1
SUM OF ALL RESPONSES TO PHQ QUESTIONS 1-9: 2

## 2020-02-13 ASSESSMENT — ENCOUNTER SYMPTOMS
SHORTNESS OF BREATH: 0
NAUSEA: 1
VOMITING: 0
DIARRHEA: 0

## 2020-02-13 NOTE — PATIENT INSTRUCTIONS
the plate format. Talk to your doctor, a dietitian, or a diabetes educator about your concerns. Carbohydrate counting  With carbohydrate counting, you plan meals based on the amount of carbohydrate in each food. Carbohydrate raises blood sugar higher and more quickly than any other nutrient. It is found in desserts, breads and cereals, and fruit. It's also found in starchy vegetables such as potatoes and corn, grains such as rice and pasta, and milk and yogurt. Spreading carbohydrate throughout the day helps keep your blood sugar levels within your target range. Your daily amount depends on several things, including your weight, how active you are, which diabetes medicines you take, and what your goals are for your blood sugar levels. A registered dietitian or diabetes educator can help you plan how much carbohydrate to include in each meal and snack. A guideline for your daily amount of carbohydrate is:  · 45 to 60 grams at each meal. That's about the same as 3 to 4 carbohydrate servings. · 15 to 20 grams at each snack. That's about the same as 1 carbohydrate serving. The Nutrition Facts label on packaged foods tells you how much carbohydrate is in a serving of the food. First, look at the serving size on the food label. Is that the amount you eat in a serving? All of the nutrition information on a food label is based on that serving size. So if you eat more or less than that, you'll need to adjust the other numbers. Total carbohydrate is the next thing you need to look for on the label. If you count carbohydrate servings, one serving of carbohydrate is 15 grams. For foods that don't come with labels, such as fresh fruits and vegetables, you'll need a guide that lists carbohydrate in these foods. Ask your doctor, dietitian, or diabetes educator about books or other nutrition guides you can use.   If you take insulin, you need to know how many grams of carbohydrate are in a meal. This lets you know how much Version: 12.3  © 8852-6394 [employer]. Care instructions adapted under license by [facility] ([facility]). If you have questions about a medical condition or this instruction, always ask your healthcare professional. [address] any warranty or liability for your use of this information.

## 2020-02-13 NOTE — PROGRESS NOTES
Lifestyle    Physical activity:     Days per week: None     Minutes per session: None    Stress: None   Relationships    Social connections:     Talks on phone: None     Gets together: None     Attends Oriental orthodox service: None     Active member of club or organization: None     Attends meetings of clubs or organizations: None     Relationship status: None    Intimate partner violence:     Fear of current or ex partner: None     Emotionally abused: None     Physically abused: None     Forced sexual activity: None   Other Topics Concern    None   Social History Narrative    None       I have reviewed Sofia's allergies, medications, problem list, medical, social and family history and have updated as needed in the electronic medical record    Current Outpatient Medications   Medication Sig Dispense Refill    methIMAzole (TAPAZOLE) 5 MG tablet Take 1 tablet by mouth daily 30 tablet 5    glipiZIDE (GLUCOTROL) 10 MG tablet Take 1 tablet by mouth daily 30 tablet 5    benazepril (LOTENSIN) 10 MG tablet Take 1 tablet by mouth daily 30 tablet 5    atorvastatin (LIPITOR) 20 MG tablet Take 1 tablet by mouth daily 30 tablet 5    ciprofloxacin (CIPRO) 500 MG tablet Take 1 tablet by mouth 2 times daily for 7 days 14 tablet 0    megestrol (MEGACE) 20 MG tablet Take 1 tablet by mouth daily 30 tablet 3    Misc. Devices MISC Bedside commode 1 Device 0    oxybutynin (DITROPAN) 5 MG tablet Take 1 tablet by mouth 3 times daily 90 tablet 1    vitamin B-6 (B-6) 50 MG tablet Take 1 tablet by mouth daily 30 tablet 3    gabapentin (NEURONTIN) 100 MG capsule Take 300 mg by mouth nightly.        ferrous sulfate 325 (65 Fe) MG tablet Take 1 tablet by mouth daily (with breakfast) 30 tablet 3    metoprolol (LOPRESSOR) 100 MG tablet Take 0.5 tablets by mouth 2 times daily 60 tablet 6    benztropine (COGENTIN) 0.5 MG tablet Take 0.5 mg by mouth daily       diazepam (VALIUM) 2 MG tablet Take 2 mg by mouth every 6 hours as needed for Anxiety       No current facility-administered medications for this visit. Review Of Systems:    Review of Systems   Constitutional: Negative for chills and fever. Eyes: Negative for visual disturbance. Respiratory: Negative for shortness of breath. Cardiovascular: Negative for chest pain, palpitations and leg swelling. Gastrointestinal: Positive for nausea. Negative for diarrhea and vomiting. Genitourinary: Positive for difficulty urinating. Negative for dysuria, frequency and hematuria. Skin: Negative for rash. Neurological: Positive for tingling (in both feet) and numbness (in both feet). Psychiatric/Behavioral: Negative for dysphoric mood. OBJECTIVE:     VS:  Wt Readings from Last 3 Encounters:   02/13/20 131 lb (59.4 kg)   01/24/20 141 lb 4.8 oz (64.1 kg)   01/24/20 141 lb 6 oz (64.1 kg)     Vitals:    02/13/20 1014   BP: 136/78   Pulse: 117   SpO2: 98%       Physical Exam  Vitals signs reviewed. Constitutional:       General: She is not in acute distress. Appearance: She is well-developed. Neck:      Musculoskeletal: Neck supple. Vascular: No carotid bruit. Cardiovascular:      Rate and Rhythm: Normal rate and regular rhythm. Heart sounds: Normal heart sounds. No murmur. No gallop. Pulmonary:      Effort: Pulmonary effort is normal.      Breath sounds: Normal breath sounds. No wheezing or rales. Abdominal:      General: Bowel sounds are normal. There is no distension. Palpations: Abdomen is soft. Tenderness: There is no abdominal tenderness. Skin:     General: Skin is warm and dry. Neurological:      Mental Status: She is alert and oriented to person, place, and time.          Results for orders placed or performed during the hospital encounter of 01/24/20   CBC (Hemogram)   Result Value Ref Range    WBC 5.8 4.5 - 11.5 E9/L    RBC 3.76 3.50 - 5.50 E12/L    Hemoglobin 9.0 (L) 11.5 - 15.5 g/dL    Hematocrit 29.7 (L) 34.0 - 48.0

## 2020-02-20 ENCOUNTER — TELEPHONE (OUTPATIENT)
Dept: FAMILY MEDICINE CLINIC | Age: 60
End: 2020-02-20

## 2020-02-20 RX ORDER — MELOXICAM 7.5 MG/1
7.5 TABLET ORAL DAILY
Qty: 30 TABLET | Refills: 3 | Status: SHIPPED
Start: 2020-02-20 | End: 2020-03-26

## 2020-02-20 NOTE — TELEPHONE ENCOUNTER
Pt is wondering if you could send a medication over to [facility] in [place] for her arthritis. She was also requesting a cream? Please advise, thank you.

## 2020-02-23 ENCOUNTER — HOSPITAL ENCOUNTER (INPATIENT)
Age: 60
LOS: 9 days | Discharge: NURSING, REHAB OR LONG-TERM CARE | DRG: 853 | End: 2020-03-03
Attending: EMERGENCY MEDICINE | Admitting: INTERNAL MEDICINE
Payer: MEDICARE

## 2020-02-23 ENCOUNTER — APPOINTMENT (OUTPATIENT)
Dept: CT IMAGING | Age: 60
DRG: 853 | End: 2020-02-23
Payer: MEDICARE

## 2020-02-23 PROBLEM — I70.90 ARTERIAL OCCLUSION: Status: ACTIVE | Noted: 2020-02-23

## 2020-02-23 LAB
ANION GAP SERPL CALCULATED.3IONS-SCNC: 14 MMOL/L (ref 7–16)
APTT: 30.8 SEC (ref 24.5–35.1)
BASOPHILS ABSOLUTE: 0.04 E9/L (ref 0–0.2)
BASOPHILS RELATIVE PERCENT: 0.7 % (ref 0–2)
BUN BLDV-MCNC: 20 MG/DL (ref 8–23)
CALCIUM SERPL-MCNC: 9 MG/DL (ref 8.6–10.2)
CHLORIDE BLD-SCNC: 104 MMOL/L (ref 98–107)
CHP ED QC CHECK: YES
CO2: 21 MMOL/L (ref 22–29)
CREAT SERPL-MCNC: 1 MG/DL (ref 0.5–1)
EOSINOPHILS ABSOLUTE: 0.05 E9/L (ref 0.05–0.5)
EOSINOPHILS RELATIVE PERCENT: 0.9 % (ref 0–6)
GFR AFRICAN AMERICAN: >60
GFR NON-AFRICAN AMERICAN: >60 ML/MIN/1.73
GLUCOSE BLD-MCNC: 175 MG/DL
GLUCOSE BLD-MCNC: 185 MG/DL (ref 74–99)
HCT VFR BLD CALC: 24.8 % (ref 34–48)
HEMOGLOBIN: 7.6 G/DL (ref 11.5–15.5)
IMMATURE GRANULOCYTES #: 0.05 E9/L
IMMATURE GRANULOCYTES %: 0.9 % (ref 0–5)
INR BLD: 1.2
LYMPHOCYTES ABSOLUTE: 2.05 E9/L (ref 1.5–4)
LYMPHOCYTES RELATIVE PERCENT: 37.3 % (ref 20–42)
MCH RBC QN AUTO: 23.8 PG (ref 26–35)
MCHC RBC AUTO-ENTMCNC: 30.6 % (ref 32–34.5)
MCV RBC AUTO: 77.5 FL (ref 80–99.9)
METER GLUCOSE: 175 MG/DL (ref 74–99)
MONOCYTES ABSOLUTE: 0.87 E9/L (ref 0.1–0.95)
MONOCYTES RELATIVE PERCENT: 15.8 % (ref 2–12)
NEUTROPHILS ABSOLUTE: 2.43 E9/L (ref 1.8–7.3)
NEUTROPHILS RELATIVE PERCENT: 44.4 % (ref 43–80)
PDW BLD-RTO: 18 FL (ref 11.5–15)
PLATELET # BLD: 426 E9/L (ref 130–450)
PMV BLD AUTO: 10 FL (ref 7–12)
POTASSIUM REFLEX MAGNESIUM: 5.1 MMOL/L (ref 3.5–5)
POTASSIUM SERPL-SCNC: 3.7 MMOL/L (ref 3.5–5)
PROTHROMBIN TIME: 14 SEC (ref 9.3–12.4)
RBC # BLD: 3.2 E12/L (ref 3.5–5.5)
SODIUM BLD-SCNC: 139 MMOL/L (ref 132–146)
WBC # BLD: 5.5 E9/L (ref 4.5–11.5)

## 2020-02-23 PROCEDURE — 6360000004 HC RX CONTRAST MEDICATION: Performed by: RADIOLOGY

## 2020-02-23 PROCEDURE — 96376 TX/PRO/DX INJ SAME DRUG ADON: CPT

## 2020-02-23 PROCEDURE — 75635 CT ANGIO ABDOMINAL ARTERIES: CPT

## 2020-02-23 PROCEDURE — 85610 PROTHROMBIN TIME: CPT

## 2020-02-23 PROCEDURE — 96374 THER/PROPH/DIAG INJ IV PUSH: CPT

## 2020-02-23 PROCEDURE — 36415 COLL VENOUS BLD VENIPUNCTURE: CPT

## 2020-02-23 PROCEDURE — 84132 ASSAY OF SERUM POTASSIUM: CPT

## 2020-02-23 PROCEDURE — 2060000000 HC ICU INTERMEDIATE R&B

## 2020-02-23 PROCEDURE — 6360000002 HC RX W HCPCS: Performed by: EMERGENCY MEDICINE

## 2020-02-23 PROCEDURE — 85025 COMPLETE CBC W/AUTO DIFF WBC: CPT

## 2020-02-23 PROCEDURE — 85730 THROMBOPLASTIN TIME PARTIAL: CPT

## 2020-02-23 PROCEDURE — 93005 ELECTROCARDIOGRAM TRACING: CPT | Performed by: STUDENT IN AN ORGANIZED HEALTH CARE EDUCATION/TRAINING PROGRAM

## 2020-02-23 PROCEDURE — 99285 EMERGENCY DEPT VISIT HI MDM: CPT

## 2020-02-23 PROCEDURE — 96375 TX/PRO/DX INJ NEW DRUG ADDON: CPT

## 2020-02-23 PROCEDURE — 6360000002 HC RX W HCPCS: Performed by: STUDENT IN AN ORGANIZED HEALTH CARE EDUCATION/TRAINING PROGRAM

## 2020-02-23 PROCEDURE — 82962 GLUCOSE BLOOD TEST: CPT

## 2020-02-23 PROCEDURE — 80048 BASIC METABOLIC PNL TOTAL CA: CPT

## 2020-02-23 PROCEDURE — 6370000000 HC RX 637 (ALT 250 FOR IP): Performed by: INTERNAL MEDICINE

## 2020-02-23 RX ORDER — FERROUS SULFATE 325(65) MG
325 TABLET ORAL
Status: DISCONTINUED | OUTPATIENT
Start: 2020-02-24 | End: 2020-03-03 | Stop reason: HOSPADM

## 2020-02-23 RX ORDER — OXYBUTYNIN CHLORIDE 5 MG/1
5 TABLET ORAL 3 TIMES DAILY
Status: DISCONTINUED | OUTPATIENT
Start: 2020-02-23 | End: 2020-03-03 | Stop reason: HOSPADM

## 2020-02-23 RX ORDER — METOPROLOL TARTRATE 50 MG/1
50 TABLET, FILM COATED ORAL 2 TIMES DAILY
Status: DISCONTINUED | OUTPATIENT
Start: 2020-02-23 | End: 2020-02-25

## 2020-02-23 RX ORDER — MORPHINE SULFATE 2 MG/ML
2 INJECTION, SOLUTION INTRAMUSCULAR; INTRAVENOUS EVERY 4 HOURS PRN
Status: DISCONTINUED | OUTPATIENT
Start: 2020-02-23 | End: 2020-02-24

## 2020-02-23 RX ORDER — LANOLIN ALCOHOL/MO/W.PET/CERES
50 CREAM (GRAM) TOPICAL DAILY
Status: DISCONTINUED | OUTPATIENT
Start: 2020-02-24 | End: 2020-03-03 | Stop reason: HOSPADM

## 2020-02-23 RX ORDER — ATORVASTATIN CALCIUM 40 MG/1
80 TABLET, FILM COATED ORAL DAILY
Status: DISCONTINUED | OUTPATIENT
Start: 2020-02-24 | End: 2020-03-03

## 2020-02-23 RX ORDER — HEPARIN SODIUM 1000 [USP'U]/ML
40 INJECTION, SOLUTION INTRAVENOUS; SUBCUTANEOUS PRN
Status: DISCONTINUED | OUTPATIENT
Start: 2020-02-23 | End: 2020-02-24 | Stop reason: ALTCHOICE

## 2020-02-23 RX ORDER — PROMETHAZINE HYDROCHLORIDE 25 MG/ML
25 INJECTION, SOLUTION INTRAMUSCULAR; INTRAVENOUS EVERY 6 HOURS PRN
Status: DISCONTINUED | OUTPATIENT
Start: 2020-02-23 | End: 2020-03-03 | Stop reason: HOSPADM

## 2020-02-23 RX ORDER — HEPARIN SODIUM 1000 [USP'U]/ML
80 INJECTION, SOLUTION INTRAVENOUS; SUBCUTANEOUS ONCE
Status: COMPLETED | OUTPATIENT
Start: 2020-02-23 | End: 2020-02-23

## 2020-02-23 RX ORDER — HEPARIN SODIUM 10000 [USP'U]/100ML
18 INJECTION, SOLUTION INTRAVENOUS CONTINUOUS
Status: DISCONTINUED | OUTPATIENT
Start: 2020-02-23 | End: 2020-02-24 | Stop reason: ALTCHOICE

## 2020-02-23 RX ORDER — BENZTROPINE MESYLATE 0.5 MG/1
0.5 TABLET ORAL DAILY
Status: DISCONTINUED | OUTPATIENT
Start: 2020-02-24 | End: 2020-03-03 | Stop reason: HOSPADM

## 2020-02-23 RX ORDER — MORPHINE SULFATE 5 MG/ML
5 INJECTION, SOLUTION INTRAMUSCULAR; INTRAVENOUS ONCE
Status: COMPLETED | OUTPATIENT
Start: 2020-02-23 | End: 2020-02-23

## 2020-02-23 RX ORDER — HEPARIN SODIUM 1000 [USP'U]/ML
80 INJECTION, SOLUTION INTRAVENOUS; SUBCUTANEOUS PRN
Status: DISCONTINUED | OUTPATIENT
Start: 2020-02-23 | End: 2020-02-24 | Stop reason: ALTCHOICE

## 2020-02-23 RX ORDER — DIAZEPAM 2 MG/1
2 TABLET ORAL EVERY 6 HOURS PRN
Status: DISCONTINUED | OUTPATIENT
Start: 2020-02-23 | End: 2020-03-03 | Stop reason: HOSPADM

## 2020-02-23 RX ORDER — LISINOPRIL 10 MG/1
10 TABLET ORAL DAILY
Status: DISCONTINUED | OUTPATIENT
Start: 2020-02-24 | End: 2020-03-03 | Stop reason: HOSPADM

## 2020-02-23 RX ORDER — METHIMAZOLE 5 MG/1
5 TABLET ORAL DAILY
Status: DISCONTINUED | OUTPATIENT
Start: 2020-02-24 | End: 2020-03-03 | Stop reason: HOSPADM

## 2020-02-23 RX ORDER — SODIUM CHLORIDE 9 MG/ML
INJECTION, SOLUTION INTRAVENOUS CONTINUOUS
Status: DISCONTINUED | OUTPATIENT
Start: 2020-02-23 | End: 2020-03-01

## 2020-02-23 RX ORDER — HYDROCODONE BITARTRATE AND ACETAMINOPHEN 7.5; 325 MG/1; MG/1
1 TABLET ORAL EVERY 6 HOURS PRN
Status: DISCONTINUED | OUTPATIENT
Start: 2020-02-23 | End: 2020-02-25

## 2020-02-23 RX ORDER — GABAPENTIN 300 MG/1
300 CAPSULE ORAL NIGHTLY
Status: DISCONTINUED | OUTPATIENT
Start: 2020-02-23 | End: 2020-03-03 | Stop reason: HOSPADM

## 2020-02-23 RX ORDER — MORPHINE SULFATE 4 MG/ML
4 INJECTION, SOLUTION INTRAMUSCULAR; INTRAVENOUS ONCE
Status: COMPLETED | OUTPATIENT
Start: 2020-02-23 | End: 2020-02-23

## 2020-02-23 RX ORDER — ONDANSETRON 2 MG/ML
4 INJECTION INTRAMUSCULAR; INTRAVENOUS EVERY 6 HOURS PRN
Status: DISCONTINUED | OUTPATIENT
Start: 2020-02-23 | End: 2020-03-03 | Stop reason: HOSPADM

## 2020-02-23 RX ADMIN — METOPROLOL TARTRATE 50 MG: 50 TABLET, FILM COATED ORAL at 23:28

## 2020-02-23 RX ADMIN — HEPARIN SODIUM 5080 UNITS: 1000 INJECTION INTRAVENOUS; SUBCUTANEOUS at 20:57

## 2020-02-23 RX ADMIN — IOPAMIDOL 150 ML: 755 INJECTION, SOLUTION INTRAVENOUS at 18:14

## 2020-02-23 RX ADMIN — MORPHINE SULFATE 5 MG: 5 INJECTION, SOLUTION INTRAMUSCULAR; INTRAVENOUS at 18:39

## 2020-02-23 RX ADMIN — GABAPENTIN 300 MG: 300 CAPSULE ORAL at 23:28

## 2020-02-23 RX ADMIN — HEPARIN SODIUM AND DEXTROSE 18 UNITS/KG/HR: 10000; 5 INJECTION INTRAVENOUS at 20:58

## 2020-02-23 RX ADMIN — OXYBUTYNIN CHLORIDE 5 MG: 5 TABLET ORAL at 23:28

## 2020-02-23 RX ADMIN — NITROGLYCERIN 0.5 INCH: 20 OINTMENT TOPICAL at 23:29

## 2020-02-23 RX ADMIN — MORPHINE SULFATE 4 MG: 4 INJECTION, SOLUTION INTRAMUSCULAR; INTRAVENOUS at 17:08

## 2020-02-23 ASSESSMENT — PAIN DESCRIPTION - FREQUENCY: FREQUENCY: CONTINUOUS

## 2020-02-23 ASSESSMENT — PAIN DESCRIPTION - LOCATION
LOCATION: LEG
LOCATION: FOOT

## 2020-02-23 ASSESSMENT — ENCOUNTER SYMPTOMS
WHEEZING: 0
SORE THROAT: 0
ABDOMINAL PAIN: 0
COUGH: 0
SHORTNESS OF BREATH: 0
NAUSEA: 0
DIARRHEA: 0
BLOOD IN STOOL: 0
VOMITING: 0

## 2020-02-23 ASSESSMENT — PAIN DESCRIPTION - ONSET: ONSET: ON-GOING

## 2020-02-23 ASSESSMENT — PAIN DESCRIPTION - DESCRIPTORS: DESCRIPTORS: NAGGING

## 2020-02-23 ASSESSMENT — PAIN SCALES - GENERAL
PAINLEVEL_OUTOF10: 10

## 2020-02-23 ASSESSMENT — PAIN DESCRIPTION - PROGRESSION: CLINICAL_PROGRESSION: NOT CHANGED

## 2020-02-23 ASSESSMENT — PAIN DESCRIPTION - PAIN TYPE: TYPE: ACUTE PAIN

## 2020-02-23 ASSESSMENT — PAIN DESCRIPTION - ORIENTATION
ORIENTATION: RIGHT;LEFT;MID;LOWER
ORIENTATION: LEFT;RIGHT

## 2020-02-23 NOTE — ED PROVIDER NOTES
The patient is a 79-year-old female who presents with left foot pain. Patient states that prior to arrival she noticed discoloration which she describes as a darkening to her toes and it felt colder than her right foot. She denies any numbness or tingling. It is painful to the medial malleolus of that foot. She has not tried anything for pain. She has a pertinent medical history of diabetes, CAD, hypertension, schizophrenia. Review of Systems   Constitutional: Negative for chills and fever. HENT: Negative for congestion and sore throat. Eyes: Negative for visual disturbance. Respiratory: Negative for cough, shortness of breath and wheezing. Cardiovascular: Negative for chest pain and palpitations. Gastrointestinal: Negative for abdominal pain, blood in stool, diarrhea, nausea and vomiting. Genitourinary: Negative for dysuria, frequency and hematuria. Musculoskeletal: Positive for myalgias (Left foot pain and discoloration). Skin: Negative. Neurological: Negative for dizziness, weakness, light-headedness and headaches. Hematological: Negative. Psychiatric/Behavioral: Negative. Physical Exam  Vitals signs and nursing note reviewed. Constitutional:       Appearance: She is well-developed. HENT:      Head: Normocephalic and atraumatic. Nose: Nose normal.      Mouth/Throat:      Pharynx: Oropharynx is clear. Eyes:      Conjunctiva/sclera: Conjunctivae normal.      Pupils: Pupils are equal, round, and reactive to light. Neck:      Musculoskeletal: Normal range of motion. Vascular: No JVD. Cardiovascular:      Rate and Rhythm: Regular rhythm. Tachycardia present. Pulses: Normal pulses. Heart sounds: Normal heart sounds. Comments: Pulses not palpable in left lower leg. Pulmonary:      Effort: Pulmonary effort is normal. No respiratory distress. Breath sounds: Normal breath sounds. No wheezing, rhonchi or rales.    Abdominal: General: Bowel sounds are normal. There is no distension. Palpations: Abdomen is soft. There is no mass. Tenderness: There is no abdominal tenderness. There is no guarding or rebound. Genitourinary:     Rectum: Guaiac result negative. Musculoskeletal:         General: No swelling or deformity. Right ankle: Tenderness. Medial malleolus tenderness found. Left ankle: Normal.      Left foot: Normal range of motion. Decreased capillary refill: Skin darkened in color around all 5 toes. Cool to touch when compared bilaterally. No tenderness, bony tenderness or swelling. Comments: Skin darkened in color around all 5 toes. Cool to touch when compared bilaterally    Skin:     General: Skin is warm and dry. Neurological:      Mental Status: She is alert. Procedures     MDM     ED Course as of Feb 23 2229   Sun Feb 23, 2020   1647 ATTENDING PROVIDER ATTESTATION:     I have personally performed and/or participated in the history, exam, medical decision making, and procedures and agree with all pertinent clinical information unless otherwise noted. I have also reviewed and agree with the past medical, family and social history unless otherwise noted. I have discussed this patient in detail with the resident, and provided the instruction and education regarding patient here complaining of about a week of worsening left foot and ankle pain. States she has a history of chronic arthritis and pain however left foot feels cold with increased pain for the last week but no swelling. No trauma. No redness. .  My findings/plan: Patient is sitting in bed no acute distress, does appear slightly uncomfortable when her left leg is examined. Left leg has cold skin with pale skin from the mid calf region distally. No palpable pulse. Right foot is warm skin with strong dorsal pedal pulse. She has no pitting edema or calf pain bilaterally otherwise.   The rest of her extremities are all neurovascularly intact. Abdomen soft and nontender with no pulsatile mass. Heart rate minimally tachycardic. Lungs are clear and equal.          [NC]   1947 During exam patient does have a cool left foot however is able to move the foot and digits, wiggling her toes. Has some tenderness diffusely to the foot but is controllable. [NC]   2030 Spoke with Dr. Alanna Riedel, vascular surgeon, he agrees to consult on the patient.    [WL]   2058 Case discussed with  [facility], detailed over given, he will admit the patient    [NC]   [phone] Patient presents to the ED for evaluation. Work-up was performed with concerns for but not limited to acute arterial occlusion. CTA with bilateral runoff was performed demonstrating occlusion of the right femoral artery. Patient was given high-dose heparin and pain is treated well with IV pain medication. Patient requires continued workup and management of their symptoms and will be admitted to the hospital for further evaluation and treatment.        [WL]      ED Course User Index  [NC] Carter Morocho DO  [WL] Wendy Mcgraw DO          --------------------------------------------- PAST HISTORY ---------------------------------------------  Past Medical History:  has a past medical history of Diabetes mellitus ([facility] [address] Diabetic peripheral neuropathy associated with type 2 diabetes mellitus ([facility] [address] Epigastric hernia, History of blood transfusion, Hyperlipidemia, Hypertension, Kidney stone, Schizophrenia ([facility] [address] Type 2 diabetes mellitus with diabetic polyneuropathy, with long-term current use of insulin ([facility] [address] and Umbilical hernia. Past Surgical History:  has a past surgical history that includes Ovary removal (Right); Ovary removal (2014); ovarian cyst removal; Colonoscopy (08/2016);  Cardiac catheterization (09/30/2016); other surgical history (11/04/2016); hernia repair; CYSTOSCOPY INSERTION / REMOVAL STENT / STONE (Bilateral, 10/28/2019); and Upper gastrointestinal endoscopy (N/A, 10/29/2019). Social History:  reports that she quit smoking about 4 years ago. Her smoking use included cigarettes. She smoked 0.00 packs per day for 2.00 years. She has never used smokeless tobacco. She reports that she does not drink alcohol or use drugs. Family History: family history includes Cancer in her brother. The patients home medications have been reviewed. Allergies: Patient has no known allergies.     -------------------------------------------------- RESULTS -------------------------------------------------    Lab  Results for orders placed or performed during the hospital encounter of 02/23/20   CBC Auto Differential   Result Value Ref Range    WBC 5.5 4.5 - 11.5 E9/L    RBC 3.20 (L) 3.50 - 5.50 E12/L    Hemoglobin 7.6 (L) 11.5 - 15.5 g/dL    Hematocrit 24.8 (L) 34.0 - 48.0 %    MCV 77.5 (L) 80.0 - 99.9 fL    MCH 23.8 (L) 26.0 - 35.0 pg    MCHC 30.6 (L) 32.0 - 34.5 %    RDW 18.0 (H) 11.5 - 15.0 fL    Platelets 958 467 - 193 E9/L    MPV 10.0 7.0 - 12.0 fL    Neutrophils % 44.4 43.0 - 80.0 %    Immature Granulocytes % 0.9 0.0 - 5.0 %    Lymphocytes % 37.3 20.0 - 42.0 %    Monocytes % 15.8 (H) 2.0 - 12.0 %    Eosinophils % 0.9 0.0 - 6.0 %    Basophils % 0.7 0.0 - 2.0 %    Neutrophils Absolute 2.43 1.80 - 7.30 E9/L    Immature Granulocytes # 0.05 E9/L    Lymphocytes Absolute 2.05 1.50 - 4.00 E9/L    Monocytes Absolute 0.87 0.10 - 0.95 E9/L    Eosinophils Absolute 0.05 0.05 - 0.50 E9/L    Basophils Absolute 0.04 0.00 - 0.20 E9/L   APTT   Result Value Ref Range    aPTT 30.8 24.5 - 35.1 sec   Protime-INR   Result Value Ref Range    Protime 14.0 (H) 9.3 - 12.4 sec    INR 1.2    Basic Metabolic Panel w/ Reflex to MG   Result Value Ref Range    Sodium 139 132 - 146 mmol/L    Potassium reflex Magnesium 5.1 (H) 3.5 - 5.0 mmol/L    Chloride 104 98 - 107 mmol/L    CO2 21 (L) 22 - 29 mmol/L    Anion Gap 14 7 - 16 mmol/L    Glucose 185 (H) 74 - 99 mg/dL    BUN 20 8 - 23 mg/dL    CREATININE 1.0 0.5 - 1.0 spoken with the patient and discussed todays results, in addition to providing specific details for the plan of care and counseling regarding the diagnosis and prognosis. Their questions are answered at this time and they are agreeable with the plan. This patient's ED course included: a personal history and physicial examination, re-evaluation prior to disposition, multiple bedside re-evaluations, IV medications, cardiac monitoring, continuous pulse oximetry and complex medical decision making and emergency management    This patient has remained hemodynamically stable during their ED course. Please note that the withdrawal or failure to initiate urgent interventions for this patient would likely result in a life threatening deterioration or permanent disability. Accordingly this patient received 30 minutes of critical care time, excluding separately billable procedures. Clinical Impression  1. Arterial occlusion          Disposition  Patient's disposition: Admit to telemetry  Patient's condition is serious.            Jeffrey Lai DO  Resident  02/23/20 [id number]

## 2020-02-23 NOTE — ED NOTES
For about a week the patient has had numbness, tingling, to B/L lower feet. Pain Increases with weight bearing. The left leg is greater in pain then right. Noted tempeture difference between left foot, much cooler to touch then right. Does have a 2+ pedal pulse that is palpable. Pain is #10/10 on pain scale. Patient does state pain also behind left knee. She was medicated per order, will re-evaluate.          Kiera Scales RN  02/23/20 [id number]

## 2020-02-23 NOTE — ED NOTES
Physician team made aware that pain subsided about 15minutes and returned to #10/10 on pain scale. Orders received.      Dori Conklin RN  02/23/20 9315

## 2020-02-24 ENCOUNTER — APPOINTMENT (OUTPATIENT)
Dept: INTERVENTIONAL RADIOLOGY/VASCULAR | Age: 60
DRG: 853 | End: 2020-02-24
Payer: MEDICARE

## 2020-02-24 ENCOUNTER — APPOINTMENT (OUTPATIENT)
Dept: GENERAL RADIOLOGY | Age: 60
DRG: 853 | End: 2020-02-24
Payer: MEDICARE

## 2020-02-24 LAB
ABO/RH: NORMAL
ANION GAP SERPL CALCULATED.3IONS-SCNC: 15 MMOL/L (ref 7–16)
ANTIBODY SCREEN: NORMAL
APTT: 108.2 SEC (ref 24.5–35.1)
APTT: 34.3 SEC (ref 24.5–35.1)
BASOPHILS ABSOLUTE: 0.07 E9/L (ref 0–0.2)
BASOPHILS RELATIVE PERCENT: 0.8 % (ref 0–2)
BUN BLDV-MCNC: 15 MG/DL (ref 8–23)
CALCIUM SERPL-MCNC: 9.5 MG/DL (ref 8.6–10.2)
CHLORIDE BLD-SCNC: 102 MMOL/L (ref 98–107)
CHOLESTEROL, TOTAL: 161 MG/DL (ref 0–199)
CO2: 21 MMOL/L (ref 22–29)
CREAT SERPL-MCNC: 0.8 MG/DL (ref 0.5–1)
CREAT SERPL-MCNC: 0.8 MG/DL (ref 0.5–1)
EOSINOPHILS ABSOLUTE: 0.07 E9/L (ref 0.05–0.5)
EOSINOPHILS RELATIVE PERCENT: 0.8 % (ref 0–6)
GFR AFRICAN AMERICAN: >60
GFR AFRICAN AMERICAN: >60
GFR NON-AFRICAN AMERICAN: >60 ML/MIN/1.73
GFR NON-AFRICAN AMERICAN: >60 ML/MIN/1.73
GLUCOSE BLD-MCNC: 159 MG/DL (ref 74–99)
HCT VFR BLD CALC: 26.6 % (ref 34–48)
HDLC SERPL-MCNC: 34 MG/DL
HEMOGLOBIN: 8.1 G/DL (ref 11.5–15.5)
IMMATURE GRANULOCYTES #: 0.08 E9/L
IMMATURE GRANULOCYTES %: 0.9 % (ref 0–5)
INR BLD: 1.3
LDL CHOLESTEROL CALCULATED: 100 MG/DL (ref 0–99)
LV EF: 67 %
LVEF MODALITY: NORMAL
LYMPHOCYTES ABSOLUTE: 2.4 E9/L (ref 1.5–4)
LYMPHOCYTES RELATIVE PERCENT: 26.7 % (ref 20–42)
MAGNESIUM: 1.8 MG/DL (ref 1.6–2.6)
MCH RBC QN AUTO: 23.5 PG (ref 26–35)
MCHC RBC AUTO-ENTMCNC: 30.5 % (ref 32–34.5)
MCV RBC AUTO: 77.1 FL (ref 80–99.9)
MONOCYTES ABSOLUTE: 1.24 E9/L (ref 0.1–0.95)
MONOCYTES RELATIVE PERCENT: 13.8 % (ref 2–12)
NEUTROPHILS ABSOLUTE: 5.12 E9/L (ref 1.8–7.3)
NEUTROPHILS RELATIVE PERCENT: 57 % (ref 43–80)
PDW BLD-RTO: 18.2 FL (ref 11.5–15)
PHOSPHORUS: 3.5 MG/DL (ref 2.5–4.5)
PLATELET # BLD: 502 E9/L (ref 130–450)
PMV BLD AUTO: 10.1 FL (ref 7–12)
POC ACT LR: 132 SECONDS
POTASSIUM SERPL-SCNC: 4 MMOL/L (ref 3.5–5)
PROTHROMBIN TIME: 14.5 SEC (ref 9.3–12.4)
RBC # BLD: 3.45 E12/L (ref 3.5–5.5)
SODIUM BLD-SCNC: 138 MMOL/L (ref 132–146)
T4 FREE: 1.08 NG/DL (ref 0.93–1.7)
TRIGL SERPL-MCNC: 136 MG/DL (ref 0–149)
TROPONIN: 0.05 NG/ML (ref 0–0.03)
TROPONIN: 0.07 NG/ML (ref 0–0.03)
TROPONIN: 0.11 NG/ML (ref 0–0.03)
TSH SERPL DL<=0.05 MIU/L-ACNC: 0.12 UIU/ML (ref 0.27–4.2)
VLDLC SERPL CALC-MCNC: 27 MG/DL
WBC # BLD: 9 E9/L (ref 4.5–11.5)

## 2020-02-24 PROCEDURE — 80061 LIPID PANEL: CPT

## 2020-02-24 PROCEDURE — 86901 BLOOD TYPING SEROLOGIC RH(D): CPT

## 2020-02-24 PROCEDURE — 75710 ARTERY X-RAYS ARM/LEG: CPT | Performed by: THORACIC SURGERY (CARDIOTHORACIC VASCULAR SURGERY)

## 2020-02-24 PROCEDURE — 85347 COAGULATION TIME ACTIVATED: CPT

## 2020-02-24 PROCEDURE — 2580000003 HC RX 258: Performed by: THORACIC SURGERY (CARDIOTHORACIC VASCULAR SURGERY)

## 2020-02-24 PROCEDURE — 2500000003 HC RX 250 WO HCPCS: Performed by: INTERNAL MEDICINE

## 2020-02-24 PROCEDURE — 84443 ASSAY THYROID STIM HORMONE: CPT

## 2020-02-24 PROCEDURE — 6360000004 HC RX CONTRAST MEDICATION: Performed by: THORACIC SURGERY (CARDIOTHORACIC VASCULAR SURGERY)

## 2020-02-24 PROCEDURE — 2000000000 HC ICU R&B

## 2020-02-24 PROCEDURE — 86900 BLOOD TYPING SEROLOGIC ABO: CPT

## 2020-02-24 PROCEDURE — 84100 ASSAY OF PHOSPHORUS: CPT

## 2020-02-24 PROCEDURE — 6370000000 HC RX 637 (ALT 250 FOR IP): Performed by: INTERNAL MEDICINE

## 2020-02-24 PROCEDURE — C1894 INTRO/SHEATH, NON-LASER: HCPCS

## 2020-02-24 PROCEDURE — 36415 COLL VENOUS BLD VENIPUNCTURE: CPT

## 2020-02-24 PROCEDURE — 80048 BASIC METABOLIC PNL TOTAL CA: CPT

## 2020-02-24 PROCEDURE — 047L3DZ DILATION OF LEFT FEMORAL ARTERY WITH INTRALUMINAL DEVICE, PERCUTANEOUS APPROACH: ICD-10-PCS | Performed by: THORACIC SURGERY (CARDIOTHORACIC VASCULAR SURGERY)

## 2020-02-24 PROCEDURE — 86850 RBC ANTIBODY SCREEN: CPT

## 2020-02-24 PROCEDURE — 93005 ELECTROCARDIOGRAM TRACING: CPT | Performed by: INTERNAL MEDICINE

## 2020-02-24 PROCEDURE — 86923 COMPATIBILITY TEST ELECTRIC: CPT

## 2020-02-24 PROCEDURE — 85730 THROMBOPLASTIN TIME PARTIAL: CPT

## 2020-02-24 PROCEDURE — 6360000002 HC RX W HCPCS: Performed by: INTERNAL MEDICINE

## 2020-02-24 PROCEDURE — 84439 ASSAY OF FREE THYROXINE: CPT

## 2020-02-24 PROCEDURE — 85610 PROTHROMBIN TIME: CPT

## 2020-02-24 PROCEDURE — 6370000000 HC RX 637 (ALT 250 FOR IP)

## 2020-02-24 PROCEDURE — 6360000002 HC RX W HCPCS: Performed by: THORACIC SURGERY (CARDIOTHORACIC VASCULAR SURGERY)

## 2020-02-24 PROCEDURE — 6370000000 HC RX 637 (ALT 250 FOR IP): Performed by: THORACIC SURGERY (CARDIOTHORACIC VASCULAR SURGERY)

## 2020-02-24 PROCEDURE — B41G1ZZ FLUOROSCOPY OF LEFT LOWER EXTREMITY ARTERIES USING LOW OSMOLAR CONTRAST: ICD-10-PCS | Performed by: THORACIC SURGERY (CARDIOTHORACIC VASCULAR SURGERY)

## 2020-02-24 PROCEDURE — 71045 X-RAY EXAM CHEST 1 VIEW: CPT

## 2020-02-24 PROCEDURE — P9016 RBC LEUKOCYTES REDUCED: HCPCS

## 2020-02-24 PROCEDURE — 83735 ASSAY OF MAGNESIUM: CPT

## 2020-02-24 PROCEDURE — 85025 COMPLETE CBC W/AUTO DIFF WBC: CPT

## 2020-02-24 PROCEDURE — 82565 ASSAY OF CREATININE: CPT

## 2020-02-24 PROCEDURE — 93306 TTE W/DOPPLER COMPLETE: CPT

## 2020-02-24 PROCEDURE — 2060000000 HC ICU INTERMEDIATE R&B

## 2020-02-24 PROCEDURE — 2580000003 HC RX 258: Performed by: INTERNAL MEDICINE

## 2020-02-24 PROCEDURE — 84484 ASSAY OF TROPONIN QUANT: CPT

## 2020-02-24 PROCEDURE — 37226 HC FEM POP TERR PLASTY AND STENT: CPT | Performed by: THORACIC SURGERY (CARDIOTHORACIC VASCULAR SURGERY)

## 2020-02-24 RX ORDER — CLOPIDOGREL BISULFATE 75 MG/1
75 TABLET ORAL DAILY
Status: DISCONTINUED | OUTPATIENT
Start: 2020-02-24 | End: 2020-03-03 | Stop reason: HOSPADM

## 2020-02-24 RX ORDER — METOPROLOL TARTRATE 5 MG/5ML
5 INJECTION INTRAVENOUS ONCE
Status: COMPLETED | OUTPATIENT
Start: 2020-02-24 | End: 2020-02-24

## 2020-02-24 RX ORDER — ACETAMINOPHEN 325 MG/1
650 TABLET ORAL EVERY 4 HOURS PRN
Status: DISCONTINUED | OUTPATIENT
Start: 2020-02-24 | End: 2020-02-25

## 2020-02-24 RX ORDER — FENTANYL CITRATE 50 UG/ML
25 INJECTION, SOLUTION INTRAMUSCULAR; INTRAVENOUS ONCE
Status: COMPLETED | OUTPATIENT
Start: 2020-02-24 | End: 2020-02-24

## 2020-02-24 RX ORDER — MIDAZOLAM HYDROCHLORIDE 1 MG/ML
1 INJECTION INTRAMUSCULAR; INTRAVENOUS ONCE
Status: COMPLETED | OUTPATIENT
Start: 2020-02-24 | End: 2020-02-24

## 2020-02-24 RX ORDER — MIDAZOLAM HYDROCHLORIDE 1 MG/ML
1 INJECTION INTRAMUSCULAR; INTRAVENOUS ONCE
Status: DISCONTINUED | OUTPATIENT
Start: 2020-02-24 | End: 2020-02-24 | Stop reason: ALTCHOICE

## 2020-02-24 RX ORDER — HEPARIN SODIUM 5000 [USP'U]/ML
4000 INJECTION, SOLUTION INTRAVENOUS; SUBCUTANEOUS ONCE
Status: COMPLETED | OUTPATIENT
Start: 2020-02-24 | End: 2020-02-24

## 2020-02-24 RX ORDER — MORPHINE SULFATE 4 MG/ML
4 INJECTION, SOLUTION INTRAMUSCULAR; INTRAVENOUS
Status: DISCONTINUED | OUTPATIENT
Start: 2020-02-24 | End: 2020-02-25

## 2020-02-24 RX ORDER — HYDRALAZINE HYDROCHLORIDE 20 MG/ML
10 INJECTION INTRAMUSCULAR; INTRAVENOUS EVERY 6 HOURS PRN
Status: DISCONTINUED | OUTPATIENT
Start: 2020-02-24 | End: 2020-02-28

## 2020-02-24 RX ORDER — MORPHINE SULFATE 2 MG/ML
2 INJECTION, SOLUTION INTRAMUSCULAR; INTRAVENOUS
Status: DISCONTINUED | OUTPATIENT
Start: 2020-02-24 | End: 2020-02-25

## 2020-02-24 RX ORDER — HYDRALAZINE HYDROCHLORIDE 20 MG/ML
5 INJECTION INTRAMUSCULAR; INTRAVENOUS ONCE
Status: COMPLETED | OUTPATIENT
Start: 2020-02-24 | End: 2020-02-24

## 2020-02-24 RX ORDER — HYDRALAZINE HYDROCHLORIDE 20 MG/ML
5 INJECTION INTRAMUSCULAR; INTRAVENOUS EVERY 6 HOURS PRN
Status: DISCONTINUED | OUTPATIENT
Start: 2020-02-24 | End: 2020-02-24

## 2020-02-24 RX ORDER — FENTANYL CITRATE 50 UG/ML
25 INJECTION, SOLUTION INTRAMUSCULAR; INTRAVENOUS ONCE
Status: DISCONTINUED | OUTPATIENT
Start: 2020-02-24 | End: 2020-02-24 | Stop reason: ALTCHOICE

## 2020-02-24 RX ORDER — IODIXANOL 320 MG/ML
30 INJECTION, SOLUTION INTRAVASCULAR
Status: COMPLETED | OUTPATIENT
Start: 2020-02-24 | End: 2020-02-24

## 2020-02-24 RX ADMIN — LISINOPRIL 10 MG: 10 TABLET ORAL at 08:18

## 2020-02-24 RX ADMIN — CLOPIDOGREL BISULFATE 75 MG: 75 TABLET ORAL at 16:08

## 2020-02-24 RX ADMIN — MORPHINE SULFATE 4 MG: 4 INJECTION, SOLUTION INTRAMUSCULAR; INTRAVENOUS at 15:46

## 2020-02-24 RX ADMIN — METOPROLOL TARTRATE 5 MG: 5 INJECTION INTRAVENOUS at 11:27

## 2020-02-24 RX ADMIN — METOPROLOL TARTRATE 50 MG: 50 TABLET, FILM COATED ORAL at 08:18

## 2020-02-24 RX ADMIN — NITROGLYCERIN 0.5 INCH: 20 OINTMENT TOPICAL at 19:00

## 2020-02-24 RX ADMIN — MORPHINE SULFATE 4 MG: 4 INJECTION, SOLUTION INTRAMUSCULAR; INTRAVENOUS at 11:21

## 2020-02-24 RX ADMIN — METHIMAZOLE 5 MG: 5 TABLET ORAL at 08:27

## 2020-02-24 RX ADMIN — METOPROLOL TARTRATE 5 MG: 5 INJECTION INTRAVENOUS at 17:25

## 2020-02-24 RX ADMIN — HYDRALAZINE HYDROCHLORIDE 5 MG: 20 INJECTION INTRAMUSCULAR; INTRAVENOUS at 06:04

## 2020-02-24 RX ADMIN — DIAZEPAM 2 MG: 2 TABLET ORAL at 08:18

## 2020-02-24 RX ADMIN — IODIXANOL 30 ML: 320 INJECTION, SOLUTION INTRAVASCULAR at 14:56

## 2020-02-24 RX ADMIN — ATORVASTATIN CALCIUM 80 MG: 40 TABLET, FILM COATED ORAL at 08:18

## 2020-02-24 RX ADMIN — MIDAZOLAM 1 MG: 1 INJECTION INTRAMUSCULAR; INTRAVENOUS at 14:40

## 2020-02-24 RX ADMIN — HYDRALAZINE HYDROCHLORIDE 5 MG: 20 INJECTION INTRAMUSCULAR; INTRAVENOUS at 00:57

## 2020-02-24 RX ADMIN — HEPARIN SODIUM 5080 UNITS: 1000 INJECTION INTRAVENOUS; SUBCUTANEOUS at 03:50

## 2020-02-24 RX ADMIN — ACETAMINOPHEN 650 MG: 325 TABLET, FILM COATED ORAL at 16:08

## 2020-02-24 RX ADMIN — SODIUM CHLORIDE: 9 INJECTION, SOLUTION INTRAVENOUS at 00:10

## 2020-02-24 RX ADMIN — NITROGLYCERIN 0.5 INCH: 20 OINTMENT TOPICAL at 23:48

## 2020-02-24 RX ADMIN — DIAZEPAM 2 MG: 2 TABLET ORAL at 21:05

## 2020-02-24 RX ADMIN — MORPHINE SULFATE 2 MG: 2 INJECTION, SOLUTION INTRAMUSCULAR; INTRAVENOUS at 08:14

## 2020-02-24 RX ADMIN — NITROGLYCERIN 0.5 INCH: 20 OINTMENT TOPICAL at 11:21

## 2020-02-24 RX ADMIN — FENTANYL CITRATE 25 MCG: 50 INJECTION, SOLUTION INTRAMUSCULAR; INTRAVENOUS at 14:28

## 2020-02-24 RX ADMIN — OXYBUTYNIN CHLORIDE 5 MG: 5 TABLET ORAL at 08:18

## 2020-02-24 RX ADMIN — FERROUS SULFATE TAB 325 MG (65 MG ELEMENTAL FE) 325 MG: 325 (65 FE) TAB at 08:18

## 2020-02-24 RX ADMIN — NITROGLYCERIN 0.5 INCH: 20 OINTMENT TOPICAL at 05:58

## 2020-02-24 RX ADMIN — FENTANYL CITRATE 25 MCG: 50 INJECTION, SOLUTION INTRAMUSCULAR; INTRAVENOUS at 14:41

## 2020-02-24 RX ADMIN — WATER 1 G: 1 INJECTION INTRAMUSCULAR; INTRAVENOUS; SUBCUTANEOUS at 13:54

## 2020-02-24 RX ADMIN — METOPROLOL TARTRATE 50 MG: 50 TABLET, FILM COATED ORAL at 20:27

## 2020-02-24 RX ADMIN — GABAPENTIN 300 MG: 300 CAPSULE ORAL at 20:28

## 2020-02-24 RX ADMIN — SODIUM CHLORIDE: 9 INJECTION, SOLUTION INTRAVENOUS at 19:31

## 2020-02-24 RX ADMIN — PYRIDOXINE HCL TAB 50 MG 50 MG: 50 TAB at 08:19

## 2020-02-24 RX ADMIN — MORPHINE SULFATE 2 MG: 2 INJECTION, SOLUTION INTRAMUSCULAR; INTRAVENOUS at 00:57

## 2020-02-24 RX ADMIN — MIDAZOLAM 1 MG: 1 INJECTION INTRAMUSCULAR; INTRAVENOUS at 14:27

## 2020-02-24 RX ADMIN — MORPHINE SULFATE 2 MG: 2 INJECTION, SOLUTION INTRAMUSCULAR; INTRAVENOUS at 20:31

## 2020-02-24 RX ADMIN — BENZTROPINE MESYLATE 0.5 MG: 0.5 TABLET ORAL at 08:19

## 2020-02-24 RX ADMIN — HEPARIN SODIUM 4000 UNITS: 5000 INJECTION INTRAVENOUS; SUBCUTANEOUS at 14:39

## 2020-02-24 RX ADMIN — OXYBUTYNIN CHLORIDE 5 MG: 5 TABLET ORAL at 20:28

## 2020-02-24 ASSESSMENT — PAIN SCALES - GENERAL
PAINLEVEL_OUTOF10: 9
PAINLEVEL_OUTOF10: 8
PAINLEVEL_OUTOF10: 0
PAINLEVEL_OUTOF10: 8
PAINLEVEL_OUTOF10: 8
PAINLEVEL_OUTOF10: 0
PAINLEVEL_OUTOF10: 9
PAINLEVEL_OUTOF10: 0
PAINLEVEL_OUTOF10: 8

## 2020-02-24 ASSESSMENT — PAIN DESCRIPTION - ORIENTATION
ORIENTATION: LEFT

## 2020-02-24 ASSESSMENT — PAIN DESCRIPTION - FREQUENCY
FREQUENCY: CONTINUOUS

## 2020-02-24 ASSESSMENT — PAIN DESCRIPTION - PROGRESSION
CLINICAL_PROGRESSION: NOT CHANGED

## 2020-02-24 ASSESSMENT — PAIN DESCRIPTION - PAIN TYPE
TYPE: ACUTE PAIN

## 2020-02-24 ASSESSMENT — PAIN DESCRIPTION - ONSET
ONSET: ON-GOING

## 2020-02-24 ASSESSMENT — PAIN DESCRIPTION - DESCRIPTORS
DESCRIPTORS: ACHING;BURNING;DISCOMFORT
DESCRIPTORS: ACHING;BURNING;CONSTANT
DESCRIPTORS: ACHING;BURNING;CONSTANT
DESCRIPTORS: ACHING;BURNING

## 2020-02-24 ASSESSMENT — PAIN DESCRIPTION - LOCATION
LOCATION: LEG

## 2020-02-24 NOTE — H&P
Family History   Problem Relation Age of Onset    Cancer Brother        Home Medications  Prior to Admission medications    Medication Sig Start Date End Date Taking? Authorizing Provider   meloxicam (MOBIC) 7.5 MG tablet Take 1 tablet by mouth daily 2/20/20  Yes March MD Charisma   diclofenac sodium 1 % GEL Apply 4 g topically 4 times daily 2/20/20  Yes March MD Charisma   methIMAzole (TAPAZOLE) 5 MG tablet Take 1 tablet by mouth daily 2/13/20  Yes March MD Charisma   glipiZIDE (GLUCOTROL) 10 MG tablet Take 1 tablet by mouth daily 2/13/20  Yes March MD Charisma   benazepril (LOTENSIN) 10 MG tablet Take 1 tablet by mouth daily 2/13/20  Yes March Must, MD   atorvastatin (LIPITOR) 20 MG tablet Take 1 tablet by mouth daily 2/13/20  Yes March Must, MD   megestrol (MEGACE) 20 MG tablet Take 1 tablet by mouth daily 2/13/20  Yes March MD Charisma   [facility] Bedside commode 2/3/20  Yes March MD Charisma   oxybutynin (DITROPAN) 5 MG tablet Take 1 tablet by mouth 3 times daily 12/15/19  Yes Armand Reynaga, DO   vitamin B-6 (B-6) 50 MG tablet Take 1 tablet by mouth daily 12/16/19  Yes Armand Reynaga, DO   gabapentin (NEURONTIN) 100 MG capsule Take 300 mg by mouth nightly. Yes Historical Provider, MD   ferrous sulfate 325 (65 Fe) MG tablet Take 1 tablet by mouth daily (with breakfast) 10/30/19  Yes Lorelei Caruso,    metoprolol (LOPRESSOR) 100 MG tablet Take 0.5 tablets by mouth 2 times daily 10/18/19  Yes Bridgette Bryan MD   benztropine (COGENTIN) 0.5 MG tablet Take 0.5 mg by mouth daily  9/12/19  Yes Historical Provider, MD   diazepam (VALIUM) 2 MG tablet Take 2 mg by mouth every 6 hours as needed for Anxiety   Yes Historical Provider, MD       Allergies  Patient has no known allergies.     Social Hx  Social History     Socioeconomic History    Marital status: Legally      Spouse name: Not on diarrhea, heartburn, constipation, abdominal pain, hematemesis, hematochezia, melena, acholic stools  Genito-Urinary:  Dysuria, urgency, frequency, hematuria  Musculoskeletal:  Joint pain, joint stiffness, joint swelling, muscle pain, foot pain  Neurology:  Headache, focal neurological deficits, weakness, numbness, paresthesia  Derm:  Rashes, ulcers, excoriations, bruising  Extremities:  Decreased ROM, peripheral edema, mottling    Physical Examination  Vitals:  BP (!) 198/108   Pulse 110   Temp 101.6 °F (38.7 °C) (Oral)   Resp 15   Ht 5' 2\" (1.575 m)   Wt 131 lb (59.4 kg)   SpO2 95%   BMI 23.96 kg/m²   General Appearance:  awake, alert, and oriented to person, place, time, and purpose; appears stated age and cooperative; no apparent distress no labored breathing  states that patient does have some intermittent memory issues  HEENT:  NCAT; PERRL; conjunctiva pink, sclera clear  Neck:  no adenopathy, bruit, JVD, tenderness, masses, or nodules; supple, symmetrical, trachea midline, thyroid not enlarged  Lung:  clear to auscultation bilaterally; no use of accessory muscles; no rhonchi, rales, or crackles  Heart:  tachycardic regular rhythm without murmur, rub, or gallop  Abdomen:  soft, nontender, nondistended; normoactive bowel sounds; no organomegaly  Extremities:  extremities normal, atraumatic, no cyanosis or edema dusky L toes, cool to touch, no palpable pulse  Musculokeletal:  no joint swelling, no muscle tenderness. ROM normal in all joints of extremities.    Neurologic:  mental status A&Ox3, thought content appropriate; CN II-XII grossly intact; sensation intact, motor strength 5/5 globally; no slurred speech  Osteopathic:  Patient examined in seated position; normal lumbar lordosis and thoracic kyphosis; no TART changes to paraspinal musculature    Laboratory Data  Recent Results (from the past 24 hour(s))   POCT Glucose    Collection Time: 02/23/20  4:49 PM   Result Value Ref Range    Meter Glucose 175 (H) 74 - 99 mg/dL   POCT Glucose    Collection Time: 02/23/20  4:51 PM   Result Value Ref Range    Glucose 175 mg/dL    QC OK?  yes    EKG 12 Lead    Collection Time: 02/23/20  4:52 PM   Result Value Ref Range    Ventricular Rate 118 BPM    Atrial Rate 118 BPM    P-R Interval 140 ms    QRS Duration 78 ms    Q-T Interval 346 ms    QTc Calculation (Bazett) 484 ms    P Axis 67 degrees    R Axis 51 degrees    T Axis 50 degrees   CBC Auto Differential    Collection Time: 02/23/20  5:00 PM   Result Value Ref Range    WBC 5.5 4.5 - 11.5 E9/L    RBC 3.20 (L) 3.50 - 5.50 E12/L    Hemoglobin 7.6 (L) 11.5 - 15.5 g/dL    Hematocrit 24.8 (L) 34.0 - 48.0 %    MCV 77.5 (L) 80.0 - 99.9 fL    MCH 23.8 (L) 26.0 - 35.0 pg    MCHC 30.6 (L) 32.0 - 34.5 %    RDW 18.0 (H) 11.5 - 15.0 fL    Platelets 410 493 - 177 E9/L    MPV 10.0 7.0 - 12.0 fL    Neutrophils % 44.4 43.0 - 80.0 %    Immature Granulocytes % 0.9 0.0 - 5.0 %    Lymphocytes % 37.3 20.0 - 42.0 %    Monocytes % 15.8 (H) 2.0 - 12.0 %    Eosinophils % 0.9 0.0 - 6.0 %    Basophils % 0.7 0.0 - 2.0 %    Neutrophils Absolute 2.43 1.80 - 7.30 E9/L    Immature Granulocytes # 0.05 E9/L    Lymphocytes Absolute 2.05 1.50 - 4.00 E9/L    Monocytes Absolute 0.87 0.10 - 0.95 E9/L    Eosinophils Absolute 0.05 0.05 - 0.50 E9/L    Basophils Absolute 0.04 0.00 - 0.20 E9/L   APTT    Collection Time: 02/23/20  5:00 PM   Result Value Ref Range    aPTT 30.8 24.5 - 35.1 sec   Protime-INR    Collection Time: 02/23/20  5:00 PM   Result Value Ref Range    Protime 14.0 (H) 9.3 - 12.4 sec    INR 1.2    Basic Metabolic Panel w/ Reflex to MG    Collection Time: 02/23/20  5:00 PM   Result Value Ref Range    Sodium 139 132 - 146 mmol/L    Potassium reflex Magnesium 5.1 (H) 3.5 - 5.0 mmol/L    Chloride 104 98 - 107 mmol/L    CO2 21 (L) 22 - 29 mmol/L    Anion Gap 14 7 - 16 mmol/L    Glucose 185 (H) 74 - 99 mg/dL    BUN 20 8 - 23 mg/dL    CREATININE 1.0 0.5 - 1.0 mg/dL    GFR Non-African American >60 Value Ref Range    Magnesium 1.8 1.6 - 2.6 mg/dL   Phosphorus    Collection Time: 02/24/20  3:10 AM   Result Value Ref Range    Phosphorus 3.5 2.5 - 4.5 mg/dL   Protime-INR    Collection Time: 02/24/20  3:10 AM   Result Value Ref Range    Protime 14.5 (H) 9.3 - 12.4 sec    INR 1.3    TSH without Reflex    Collection Time: 02/24/20  3:10 AM   Result Value Ref Range    TSH 0.124 (L) 0.270 - 4.200 uIU/mL   APTT    Collection Time: 02/24/20  3:10 AM   Result Value Ref Range    aPTT 34.3 24.5 - 35.1 sec   EKG 12 Lead    Collection Time: 02/24/20  5:33 AM   Result Value Ref Range    Ventricular Rate 114 BPM    Atrial Rate 114 BPM    P-R Interval 138 ms    QRS Duration 74 ms    Q-T Interval 340 ms    QTc Calculation (Bazett) 468 ms    P Axis 63 degrees    R Axis 38 degrees    T Axis 37 degrees       Imaging  Xr Knee Left (3 Views)    Result Date: 2/13/2020  LOCATION: 200 EXAM: XR KNEE RIGHT (3 VIEWS), XR KNEE LEFT (3 VIEWS) COMPARISON: None HISTORY: Knee pain TECHNIQUE: 6 views of the knee joints. FINDINGS: Mild bilateral tricompartmental degenerative changes are noted, greatest in the medial compartment. There is no joint effusion. There is normal alignment. Normal patellar alignment seen. No fractures are identified. Mild bilateral knee joint arthritis. Xr Knee Right (3 Views)    Result Date: 2/13/2020  LOCATION: 200 EXAM: XR KNEE RIGHT (3 VIEWS), XR KNEE LEFT (3 VIEWS) COMPARISON: None HISTORY: Knee pain TECHNIQUE: 6 views of the knee joints. FINDINGS: Mild bilateral tricompartmental degenerative changes are noted, greatest in the medial compartment. There is no joint effusion. There is normal alignment. Normal patellar alignment seen. No fractures are identified. Mild bilateral knee joint arthritis. Cta Abdominal Aorta W Bilat Runoff W Contrast    Result Date: 2/23/2020  Reading location: 200 Indication: Left foot, pain. Comparison: None available.  Technique: Multidetector CT angiography of the Visualized portion bilateral lung bases demonstrate minimal dependent changes within bilateral lower lobes. There is no pleural effusion. The liver is normal in morphology. There is slight heterogeneous attenuation. The gallbladder is mildly distended. The common bile duct is normal in caliber. The pancreas and spleen are unremarkable. The distal esophagus, stomach, and duodenum are normal in appearance. The small bowel loops are normal in caliber. The appendix is identified and is unremarkable. The colon is normal in caliber and grossly unremarkable. Bilateral adrenal glands are normal in appearance. Bilateral kidneys are normal in morphology and demonstrate symmetric enhancement. There are bilateral ureteral stents with mild bilateral hydronephrosis and periureteric fat stranding. The distal pigtails are located within the bladder. The uterus is grossly unremarkable. There is no abdominal, retroperitoneal, or pelvic lymphadenopathy. The inferior vena cava is normal in caliber. There is extrinsic compression of the left common iliac vein by the left internal iliac artery (series 2, image 108). There is lack of venous flow on the delayed images within the left calf. There is no abdominal, retroperitoneal, or pelvic lymphadenopathy. There is no suspicious lytic or blastic osseous lesion. There is mild lower lumbar spondylosis. Bilateral hip, knee, and ankle joints are grossly maintained. The visualized muscles demonstrate normal morphology and CT attenuation. 1. Approximately 5 cm segment of occlusion of the mid to distal left superficial femoral artery with reconstitution at the distal superficial femoral artery via collaterals. 2. Occlusion of the left anterior tibial and peroneal arteries at the level the mid tibial diaphysis and distal posterior tibial artery at the level of the distal tibial diaphysis. 3. Three-vessel runoff on the right.  4. Extrinsic compression on the left common iliac vein by the left internal iliac artery. 5. Bilateral ureteral stents with mild bilateral hydroureteronephrosis and periureteric fat stranding. Assessment and Plan  Patient is a 60 y.o. female who presented with foot pain. The active problem list is as follows:    · Left lower extremity arterial occlusion  · Ureteral stents with bilateral hydroureteronephrosis and fat stranding due to recurrent UTIs and bilateral ureteral obstruction   · Elevated troponin--NSTEMI  · Chronic microcytic hypochromic anemia with history of recurrent transfusion  · Diabetes mellitus with associated diabetic polyneuropathy without long-term use of insulin   · Hypertension  · Hyperlipidemia  · Hyperthyroidism    -Continue heparin drip  -Vascular surgery has been consulted  -Echocardigoram to evaluate LV function  -Pain control  -Check free T4  -Continue to trend troponin and EKGs  -Iron studies  -Type and screen    · Routine labs in the morning. · DVT prophylaxis. · Please see orders for further management and care. The plan was discussed with Dr. Phi Mello.     Ronn Hancock DO, PGYIII  6:49 AM  2/24/2020

## 2020-02-24 NOTE — PLAN OF CARE
Problem: Pain:  Goal: Control of acute pain  Description  Control of acute pain  Outcome: Met This Shift     Problem: Falls - Risk of:  Goal: Will remain free from falls  Description  Will remain free from falls  Outcome: Met This Shift     Problem: Falls - Risk of:  Goal: Absence of physical injury  Description  Absence of physical injury  Outcome: Met This Shift     Problem: Risk for Impaired Skin Integrity  Goal: Tissue integrity - skin and mucous membranes  Description  Structural intactness and normal physiological function of skin and  mucous membranes.   Outcome: Met This Shift

## 2020-02-24 NOTE — CONSULTS
tablet Take 1 tablet by mouth daily 20  Yes Darrell Resendiz MD   megestrol (MEGACE) 20 MG tablet Take 1 tablet by mouth daily 20  Yes Darrell Resendiz MD   [facility] [place] [facility] [place] 2[place]20  Yes Darrell Resendiz MD   oxybutynin (DITROPAN) 5 MG tablet Take 1 tablet by mouth 3 times daily 12/15/19  Yes Armand Reynaga,    vitamin B-6 (B-6) 50 MG tablet Take 1 tablet by mouth daily 19  Yes Armand Reynaga DO   gabapentin (NEURONTIN) 100 MG capsule Take 300 mg by mouth nightly. Yes Historical Provider, MD   ferrous sulfate 325 (65 Fe) MG tablet Take 1 tablet by mouth daily (with breakfast) 10/30/19  Yes Timmy Ruggiero DO   metoprolol (LOPRESSOR) 100 MG tablet Take 0.5 tablets by mouth 2 times daily 10/18/19  Yes Thuan Hi MD   benztropine (COGENTIN) 0.5 MG tablet Take 0.5 mg by mouth daily  19  Yes Historical Provider, MD   diazepam (VALIUM) 2 MG tablet Take 2 mg by mouth every 6 hours as needed for Anxiety   Yes Historical Provider, MD         Patient has no known allergies. Social History     Tobacco Use    Smoking status: Former Smoker     Packs/day: 0.00     Years: 2.00     Pack years: 0.00     Types: Cigarettes     Last attempt to quit: 3/23/2015     Years since quittin.9    Smokeless tobacco: Never Used    Tobacco comment: quit smoking    Substance Use Topics    Alcohol use: No     Comment: coffee daily            Physical Exam:  Vitals:    20 0800   BP: (!) 192/103   Pulse: 126   Resp: 22   Temp: 100.5 °F (38.1 °C)   SpO2: 97%      Skin:  Warm and dry. No rash or bruises  HEENT:  PERRLA, EOMI  Neck:  No JVD, No thyromegaly, No carotid bruit  Cardiac:  RRR, No gallop or murmur  Lungs:  CTA, Normal percussion  Abdomen: Normal bowel sounds, no HSM, non-tender.   No pulsatile masses  Extremities:  Cool cyanotic lt foot, rt pedal signals biphasic, lt pedal signals absent  Neurological:  Moves all extremities, normal normal enhancement. There is occlusion of   the anterior tibial and peroneal arteries at the level of the mid   tibial diaphysis. There is occlusion of the distal posterior tibial   artery at the level of the distal tibial diaphysis.       Visualized portion bilateral lung bases demonstrate minimal dependent   changes within bilateral lower lobes. There is no pleural effusion.       The liver is normal in morphology. There is slight heterogeneous   attenuation. The gallbladder is mildly distended. The common bile duct   is normal in caliber. The pancreas and spleen are unremarkable.       The distal esophagus, stomach, and duodenum are normal in appearance. The small bowel loops are normal in caliber. The appendix is   identified and is unremarkable. The colon is normal in caliber and   grossly unremarkable.       Bilateral adrenal glands are normal in appearance. Bilateral kidneys   are normal in morphology and demonstrate symmetric enhancement. There   are bilateral ureteral stents with mild bilateral hydronephrosis and   periureteric fat stranding. The distal pigtails are located within the   bladder. The uterus is grossly unremarkable.       There is no abdominal, retroperitoneal, or pelvic lymphadenopathy. The   inferior vena cava is normal in caliber. There is extrinsic   compression of the left common iliac vein by the left internal iliac   artery (series 2, image 108). There is lack of venous flow on the   delayed images within the left calf.       There is no abdominal, retroperitoneal, or pelvic lymphadenopathy.       There is no suspicious lytic or blastic osseous lesion. There is mild   lower lumbar spondylosis. Bilateral hip, knee, and ankle joints are   grossly maintained. The visualized muscles demonstrate normal   morphology and CT attenuation.           Impression   1.  Approximately 5 cm segment of occlusion of the mid to distal left   superficial femoral artery with reconstitution at the distal

## 2020-02-24 NOTE — CARE COORDINATION
2/24/2020 transition of care:  Spoke to patient she lives at home with her . She has Medicare and Medicaid health coverage. She gets her medications from [address] . She just saw her pcp Dr. Radhika Smith on Friday. She has a bedside commode listed as recent DME. CM/SS to follow. No needs identified at this time. Watch for anticoagulant at discharge.  LLK  Electronically signed by JEANETTE Erickson  on 2/24/2020 at 11:54 AM

## 2020-02-25 PROBLEM — E44.0 MODERATE PROTEIN-CALORIE MALNUTRITION (HCC): Chronic | Status: ACTIVE | Noted: 2020-02-25

## 2020-02-25 LAB
ADENOVIRUS BY PCR: NOT DETECTED
ANION GAP SERPL CALCULATED.3IONS-SCNC: 20 MMOL/L (ref 7–16)
ANISOCYTOSIS: ABNORMAL
APTT: 29.3 SEC (ref 24.5–35.1)
APTT: 71.7 SEC (ref 24.5–35.1)
B.E.: -2 MMOL/L (ref -3–3)
BASOPHILS ABSOLUTE: 0.09 E9/L (ref 0–0.2)
BASOPHILS RELATIVE PERCENT: 0.9 % (ref 0–2)
BORDETELLA PARAPERTUSSIS BY PCR: NOT DETECTED
BORDETELLA PERTUSSIS BY PCR: NOT DETECTED
BUN BLDV-MCNC: 12 MG/DL (ref 8–23)
C-REACTIVE PROTEIN: 19.8 MG/DL (ref 0–0.4)
CALCIUM SERPL-MCNC: 8.9 MG/DL (ref 8.6–10.2)
CHLAMYDOPHILIA PNEUMONIAE BY PCR: NOT DETECTED
CHLORIDE BLD-SCNC: 98 MMOL/L (ref 98–107)
CO2: 17 MMOL/L (ref 22–29)
CORONAVIRUS 229E BY PCR: NOT DETECTED
CORONAVIRUS HKU1 BY PCR: NOT DETECTED
CORONAVIRUS NL63 BY PCR: NOT DETECTED
CORONAVIRUS OC43 BY PCR: NOT DETECTED
CREAT SERPL-MCNC: 0.8 MG/DL (ref 0.5–1)
DEVICE: ABNORMAL
EOSINOPHILS ABSOLUTE: 0 E9/L (ref 0.05–0.5)
EOSINOPHILS RELATIVE PERCENT: 0.2 % (ref 0–6)
FERRITIN: 1509 NG/ML
FOLATE: 3.8 NG/ML (ref 4.8–24.2)
GFR AFRICAN AMERICAN: >60
GFR NON-AFRICAN AMERICAN: >60 ML/MIN/1.73
GLUCOSE BLD-MCNC: 139 MG/DL (ref 74–99)
HCO3 ARTERIAL: 20.3 MMOL/L (ref 22–26)
HCT VFR BLD CALC: 22.2 % (ref 34–48)
HCT VFR BLD CALC: 23.5 % (ref 34–48)
HCT VFR BLD CALC: 26.5 % (ref 34–48)
HEMOGLOBIN: 6.8 G/DL (ref 11.5–15.5)
HEMOGLOBIN: 7 G/DL (ref 11.5–15.5)
HEMOGLOBIN: 8 G/DL (ref 11.5–15.5)
HUMAN METAPNEUMOVIRUS BY PCR: NOT DETECTED
HUMAN RHINOVIRUS/ENTEROVIRUS BY PCR: NOT DETECTED
HYPOCHROMIA: ABNORMAL
IMMATURE RETIC FRACT: 23.7 % (ref 3–15.9)
INFLUENZA A BY PCR: NOT DETECTED
INFLUENZA B BY PCR: NOT DETECTED
INR BLD: 1.4
IRON SATURATION: 11 % (ref 15–50)
IRON: 19 MCG/DL (ref 37–145)
LYMPHOCYTES ABSOLUTE: 2.1 E9/L (ref 1.5–4)
LYMPHOCYTES RELATIVE PERCENT: 20.2 % (ref 20–42)
MCH RBC QN AUTO: 23 PG (ref 26–35)
MCH RBC QN AUTO: 23.4 PG (ref 26–35)
MCH RBC QN AUTO: 23.5 PG (ref 26–35)
MCHC RBC AUTO-ENTMCNC: 29.8 % (ref 32–34.5)
MCHC RBC AUTO-ENTMCNC: 30.2 % (ref 32–34.5)
MCHC RBC AUTO-ENTMCNC: 30.6 % (ref 32–34.5)
MCV RBC AUTO: 76.8 FL (ref 80–99.9)
MCV RBC AUTO: 77.3 FL (ref 80–99.9)
MCV RBC AUTO: 77.5 FL (ref 80–99.9)
METAMYELOCYTES RELATIVE PERCENT: 0.9 % (ref 0–1)
METER GLUCOSE: 163 MG/DL (ref 74–99)
METER GLUCOSE: 188 MG/DL (ref 74–99)
MONOCYTES ABSOLUTE: 0.63 E9/L (ref 0.1–0.95)
MONOCYTES RELATIVE PERCENT: 6.1 % (ref 2–12)
MYCOPLASMA PNEUMONIAE BY PCR: NOT DETECTED
NEUTROPHILS ABSOLUTE: 7.67 E9/L (ref 1.8–7.3)
NEUTROPHILS RELATIVE PERCENT: 71.9 % (ref 43–80)
O2 SATURATION: 92.9 % (ref 92–98.5)
OPERATOR ID: 40
OVALOCYTES: ABNORMAL
PARAINFLUENZA VIRUS 1 BY PCR: NOT DETECTED
PARAINFLUENZA VIRUS 2 BY PCR: NOT DETECTED
PARAINFLUENZA VIRUS 3 BY PCR: NOT DETECTED
PARAINFLUENZA VIRUS 4 BY PCR: NOT DETECTED
PCO2 ARTERIAL: 24.8 MMHG (ref 35–45)
PDW BLD-RTO: 18 FL (ref 11.5–15)
PDW BLD-RTO: 18.2 FL (ref 11.5–15)
PDW BLD-RTO: 18.2 FL (ref 11.5–15)
PH BLOOD GAS: 7.52 (ref 7.35–7.45)
PLATELET # BLD: 387 E9/L (ref 130–450)
PLATELET # BLD: 408 E9/L (ref 130–450)
PLATELET # BLD: 431 E9/L (ref 130–450)
PMV BLD AUTO: 10 FL (ref 7–12)
PMV BLD AUTO: 10.6 FL (ref 7–12)
PMV BLD AUTO: 10.7 FL (ref 7–12)
PO2 ARTERIAL: 57.6 MMHG (ref 60–80)
POIKILOCYTES: ABNORMAL
POLYCHROMASIA: ABNORMAL
POTASSIUM SERPL-SCNC: 4.5 MMOL/L (ref 3.5–5)
PROCALCITONIN: 0.39 NG/ML (ref 0–0.08)
PROTHROMBIN TIME: 15.8 SEC (ref 9.3–12.4)
RBC # BLD: 2.89 E12/L (ref 3.5–5.5)
RBC # BLD: 3.04 E12/L (ref 3.5–5.5)
RBC # BLD: 3.42 E12/L (ref 3.5–5.5)
RESPIRATORY SYNCYTIAL VIRUS BY PCR: NOT DETECTED
RETIC HGB EQUIVALENT: 27.7 PG (ref 28.2–36.6)
RETICULOCYTE ABSOLUTE COUNT: 0.08 E12/L
RETICULOCYTE COUNT PCT: 2.6 % (ref 0.4–1.9)
SCHISTOCYTES: ABNORMAL
SEDIMENTATION RATE, ERYTHROCYTE: 115 MM/HR (ref 0–20)
SODIUM BLD-SCNC: 135 MMOL/L (ref 132–146)
SOURCE, BLOOD GAS: ABNORMAL
SPHEROCYTES: ABNORMAL
T3 TOTAL: 71.47 NG/DL (ref 80–200)
TEAR DROP CELLS: ABNORMAL
TOTAL IRON BINDING CAPACITY: 172 MCG/DL (ref 250–450)
TRANSFERRIN: 141 MG/DL (ref 200–360)
VITAMIN B-12: 403 PG/ML (ref 211–946)
WBC # BLD: 10.5 E9/L (ref 4.5–11.5)
WBC # BLD: 10.9 E9/L (ref 4.5–11.5)
WBC # BLD: 13.1 E9/L (ref 4.5–11.5)

## 2020-02-25 PROCEDURE — 36415 COLL VENOUS BLD VENIPUNCTURE: CPT

## 2020-02-25 PROCEDURE — 0100U HC RESPIRPTHGN MULT REV TRANS & AMP PRB TECH 21 TRGT: CPT

## 2020-02-25 PROCEDURE — 84466 ASSAY OF TRANSFERRIN: CPT

## 2020-02-25 PROCEDURE — 85651 RBC SED RATE NONAUTOMATED: CPT

## 2020-02-25 PROCEDURE — 86140 C-REACTIVE PROTEIN: CPT

## 2020-02-25 PROCEDURE — 83550 IRON BINDING TEST: CPT

## 2020-02-25 PROCEDURE — 85610 PROTHROMBIN TIME: CPT

## 2020-02-25 PROCEDURE — 6370000000 HC RX 637 (ALT 250 FOR IP): Performed by: INTERNAL MEDICINE

## 2020-02-25 PROCEDURE — 2000000000 HC ICU R&B

## 2020-02-25 PROCEDURE — 84145 PROCALCITONIN (PCT): CPT

## 2020-02-25 PROCEDURE — 82728 ASSAY OF FERRITIN: CPT

## 2020-02-25 PROCEDURE — 87040 BLOOD CULTURE FOR BACTERIA: CPT

## 2020-02-25 PROCEDURE — 6360000002 HC RX W HCPCS: Performed by: THORACIC SURGERY (CARDIOTHORACIC VASCULAR SURGERY)

## 2020-02-25 PROCEDURE — 82607 VITAMIN B-12: CPT

## 2020-02-25 PROCEDURE — 85045 AUTOMATED RETICULOCYTE COUNT: CPT

## 2020-02-25 PROCEDURE — 83540 ASSAY OF IRON: CPT

## 2020-02-25 PROCEDURE — 82803 BLOOD GASES ANY COMBINATION: CPT

## 2020-02-25 PROCEDURE — 82962 GLUCOSE BLOOD TEST: CPT

## 2020-02-25 PROCEDURE — 2580000003 HC RX 258: Performed by: INTERNAL MEDICINE

## 2020-02-25 PROCEDURE — 85730 THROMBOPLASTIN TIME PARTIAL: CPT

## 2020-02-25 PROCEDURE — 85025 COMPLETE CBC W/AUTO DIFF WBC: CPT

## 2020-02-25 PROCEDURE — 85027 COMPLETE CBC AUTOMATED: CPT

## 2020-02-25 PROCEDURE — 84480 ASSAY TRIIODOTHYRONINE (T3): CPT

## 2020-02-25 PROCEDURE — 6370000000 HC RX 637 (ALT 250 FOR IP): Performed by: THORACIC SURGERY (CARDIOTHORACIC VASCULAR SURGERY)

## 2020-02-25 PROCEDURE — 2500000003 HC RX 250 WO HCPCS: Performed by: INTERNAL MEDICINE

## 2020-02-25 PROCEDURE — 80048 BASIC METABOLIC PNL TOTAL CA: CPT

## 2020-02-25 PROCEDURE — 6360000002 HC RX W HCPCS: Performed by: SPECIALIST

## 2020-02-25 PROCEDURE — 6360000002 HC RX W HCPCS: Performed by: INTERNAL MEDICINE

## 2020-02-25 PROCEDURE — 2060000000 HC ICU INTERMEDIATE R&B

## 2020-02-25 PROCEDURE — 82746 ASSAY OF FOLIC ACID SERUM: CPT

## 2020-02-25 RX ORDER — HEPARIN SODIUM 10000 [USP'U]/100ML
18 INJECTION, SOLUTION INTRAVENOUS CONTINUOUS
Status: DISCONTINUED | OUTPATIENT
Start: 2020-02-25 | End: 2020-03-01

## 2020-02-25 RX ORDER — HEPARIN SODIUM 1000 [USP'U]/ML
40 INJECTION, SOLUTION INTRAVENOUS; SUBCUTANEOUS PRN
Status: DISCONTINUED | OUTPATIENT
Start: 2020-02-25 | End: 2020-03-01

## 2020-02-25 RX ORDER — OXYCODONE HYDROCHLORIDE 5 MG/1
10 TABLET ORAL EVERY 4 HOURS PRN
Status: DISCONTINUED | OUTPATIENT
Start: 2020-02-25 | End: 2020-03-03 | Stop reason: HOSPADM

## 2020-02-25 RX ORDER — 0.9 % SODIUM CHLORIDE 0.9 %
250 INTRAVENOUS SOLUTION INTRAVENOUS ONCE
Status: DISCONTINUED | OUTPATIENT
Start: 2020-02-25 | End: 2020-02-28

## 2020-02-25 RX ORDER — ACETAMINOPHEN 650 MG/1
325 SUPPOSITORY RECTAL ONCE
Status: DISCONTINUED | OUTPATIENT
Start: 2020-02-25 | End: 2020-02-28

## 2020-02-25 RX ORDER — ACETAMINOPHEN 500 MG
1000 TABLET ORAL EVERY 6 HOURS
Status: COMPLETED | OUTPATIENT
Start: 2020-02-25 | End: 2020-02-26

## 2020-02-25 RX ORDER — FLUCONAZOLE 2 MG/ML
400 INJECTION, SOLUTION INTRAVENOUS EVERY 24 HOURS
Status: DISCONTINUED | OUTPATIENT
Start: 2020-02-25 | End: 2020-03-03

## 2020-02-25 RX ORDER — OXYCODONE HYDROCHLORIDE 5 MG/1
10 TABLET ORAL EVERY 4 HOURS PRN
Status: DISCONTINUED | OUTPATIENT
Start: 2020-02-25 | End: 2020-02-25

## 2020-02-25 RX ORDER — ACETAMINOPHEN 325 MG/1
325 TABLET ORAL ONCE
Status: DISCONTINUED | OUTPATIENT
Start: 2020-02-25 | End: 2020-02-28

## 2020-02-25 RX ORDER — HEPARIN SODIUM 1000 [USP'U]/ML
80 INJECTION, SOLUTION INTRAVENOUS; SUBCUTANEOUS ONCE
Status: COMPLETED | OUTPATIENT
Start: 2020-02-25 | End: 2020-02-25

## 2020-02-25 RX ORDER — SODIUM CHLORIDE 9 MG/ML
25 INJECTION, SOLUTION INTRAVENOUS EVERY 12 HOURS
Status: DISCONTINUED | OUTPATIENT
Start: 2020-02-25 | End: 2020-03-03 | Stop reason: HOSPADM

## 2020-02-25 RX ORDER — OXYCODONE HYDROCHLORIDE 5 MG/1
5 TABLET ORAL EVERY 4 HOURS PRN
Status: DISCONTINUED | OUTPATIENT
Start: 2020-02-25 | End: 2020-02-25

## 2020-02-25 RX ORDER — OXYCODONE HYDROCHLORIDE 5 MG/1
5 TABLET ORAL EVERY 4 HOURS PRN
Status: DISCONTINUED | OUTPATIENT
Start: 2020-02-25 | End: 2020-03-03 | Stop reason: HOSPADM

## 2020-02-25 RX ORDER — HEPARIN SODIUM 1000 [USP'U]/ML
80 INJECTION, SOLUTION INTRAVENOUS; SUBCUTANEOUS PRN
Status: DISCONTINUED | OUTPATIENT
Start: 2020-02-25 | End: 2020-03-01

## 2020-02-25 RX ADMIN — OXYCODONE HYDROCHLORIDE 5 MG: 5 TABLET ORAL at 12:55

## 2020-02-25 RX ADMIN — METRONIDAZOLE 500 MG: 500 INJECTION, SOLUTION INTRAVENOUS at 18:30

## 2020-02-25 RX ADMIN — GABAPENTIN 300 MG: 300 CAPSULE ORAL at 21:50

## 2020-02-25 RX ADMIN — FERROUS SULFATE TAB 325 MG (65 MG ELEMENTAL FE) 325 MG: 325 (65 FE) TAB at 08:59

## 2020-02-25 RX ADMIN — ATORVASTATIN CALCIUM 80 MG: 40 TABLET, FILM COATED ORAL at 08:59

## 2020-02-25 RX ADMIN — OXYCODONE HYDROCHLORIDE 5 MG: 5 TABLET ORAL at 21:49

## 2020-02-25 RX ADMIN — FLUCONAZOLE 400 MG: 2 INJECTION, SOLUTION INTRAVENOUS at 23:56

## 2020-02-25 RX ADMIN — NITROGLYCERIN 0.5 INCH: 20 OINTMENT TOPICAL at 12:10

## 2020-02-25 RX ADMIN — DIAZEPAM 2 MG: 2 TABLET ORAL at 11:13

## 2020-02-25 RX ADMIN — METRONIDAZOLE 500 MG: 500 INJECTION, SOLUTION INTRAVENOUS at 10:28

## 2020-02-25 RX ADMIN — ACETAMINOPHEN 650 MG: 325 TABLET, FILM COATED ORAL at 10:20

## 2020-02-25 RX ADMIN — METOPROLOL TARTRATE 75 MG: 50 TABLET, FILM COATED ORAL at 21:50

## 2020-02-25 RX ADMIN — PYRIDOXINE HCL TAB 50 MG 50 MG: 50 TAB at 11:42

## 2020-02-25 RX ADMIN — NITROGLYCERIN 0.5 INCH: 20 OINTMENT TOPICAL at 18:30

## 2020-02-25 RX ADMIN — ACETAMINOPHEN 1000 MG: 500 TABLET, FILM COATED ORAL at 16:05

## 2020-02-25 RX ADMIN — HYDROCODONE BITARTRATE AND ACETAMINOPHEN 1 TABLET: 7.5; 325 TABLET ORAL at 09:00

## 2020-02-25 RX ADMIN — HEPARIN SODIUM 4750 UNITS: 1000 INJECTION INTRAVENOUS; SUBCUTANEOUS at 10:01

## 2020-02-25 RX ADMIN — ACETAMINOPHEN 1000 MG: 500 TABLET, FILM COATED ORAL at 21:49

## 2020-02-25 RX ADMIN — HEPARIN SODIUM AND DEXTROSE 18 UNITS/KG/HR: 10000; 5 INJECTION INTRAVENOUS at 22:07

## 2020-02-25 RX ADMIN — NITROGLYCERIN 0.5 INCH: 20 OINTMENT TOPICAL at 05:12

## 2020-02-25 RX ADMIN — METOPROLOL TARTRATE 75 MG: 50 TABLET, FILM COATED ORAL at 12:11

## 2020-02-25 RX ADMIN — OXYBUTYNIN CHLORIDE 5 MG: 5 TABLET ORAL at 09:00

## 2020-02-25 RX ADMIN — ACETAMINOPHEN 650 MG: 325 TABLET, FILM COATED ORAL at 05:47

## 2020-02-25 RX ADMIN — SODIUM CHLORIDE: 9 INJECTION, SOLUTION INTRAVENOUS at 05:45

## 2020-02-25 RX ADMIN — OXYBUTYNIN CHLORIDE 5 MG: 5 TABLET ORAL at 21:50

## 2020-02-25 RX ADMIN — BENZTROPINE MESYLATE 0.5 MG: 0.5 TABLET ORAL at 11:42

## 2020-02-25 RX ADMIN — CEFEPIME HYDROCHLORIDE 2 G: 2 INJECTION, POWDER, FOR SOLUTION INTRAVENOUS at 11:47

## 2020-02-25 RX ADMIN — VANCOMYCIN HYDROCHLORIDE 1000 MG: 1 INJECTION, POWDER, LYOPHILIZED, FOR SOLUTION INTRAVENOUS at 10:33

## 2020-02-25 RX ADMIN — LISINOPRIL 10 MG: 10 TABLET ORAL at 09:00

## 2020-02-25 RX ADMIN — HEPARIN SODIUM AND DEXTROSE 18 UNITS/KG/HR: 10000; 5 INJECTION INTRAVENOUS at 10:01

## 2020-02-25 RX ADMIN — CLOPIDOGREL BISULFATE 75 MG: 75 TABLET ORAL at 14:08

## 2020-02-25 RX ADMIN — CEFEPIME HYDROCHLORIDE 2 G: 2 INJECTION, POWDER, FOR SOLUTION INTRAVENOUS at 23:51

## 2020-02-25 RX ADMIN — METHIMAZOLE 5 MG: 5 TABLET ORAL at 11:42

## 2020-02-25 ASSESSMENT — PAIN SCALES - GENERAL
PAINLEVEL_OUTOF10: 10
PAINLEVEL_OUTOF10: 0
PAINLEVEL_OUTOF10: 4
PAINLEVEL_OUTOF10: 10
PAINLEVEL_OUTOF10: 6
PAINLEVEL_OUTOF10: 0
PAINLEVEL_OUTOF10: 0

## 2020-02-25 NOTE — CONSULTS
included cigarettes. She smoked 0.00 packs per day for 2.00 years. She has never used smokeless tobacco.  ETOH:   reports no history of alcohol use. DRUGS:   reports no history of drug use.   ACTIVITIES OF DAILY LIVING:    OCCUPATION:    Family History:       Problem Relation Age of Onset    Cancer Brother        REVIEW OF SYSTEMS:  Not answering questions/ confused on exam    PHYSICAL EXAM:    VITALS:  BP (!) 153/77   Pulse 127   Temp 101.1 °F (38.4 °C) (Oral)   Resp 24   Ht 5' 2\" (1.575 m)   Wt 131 lb (59.4 kg)   SpO2 100%   BMI 23.96 kg/m²   CONSTITUTIONAL:  Awake, confused  MUSCULOSKELETAL:  Left lower Extremity:  Skin is intact circumferentially  +TTP left ankle   Compartments soft and compressible  Un able to palpate dorsalis pedis and posterior tibialis pulse but dopplerable  Diminished cap refill  2-3 mm of necrosis to small toe  Sensation diminishedt to light touch in sural/deep peroneal/superficial peroneal/saphenous/posterior tibial nerve distributions to foot/ankle      DATA:    CBC:   Lab Results   Component Value Date    WBC 10.9 02/25/2020    RBC 2.89 02/25/2020    HGB 6.8 02/25/2020    HCT 22.2 02/25/2020    MCV 76.8 02/25/2020    MCH 23.5 02/25/2020    MCHC 30.6 02/25/2020    RDW 18.2 02/25/2020     02/25/2020    MPV 10.0 02/25/2020     PT/INR:    Lab Results   Component Value Date    PROTIME 15.8 02/25/2020    INR 1.4 02/25/2020       IMPRESSION:  · Decreased circulation   · S/p revascularization of superficial femoral artery by vascular surgery on 2/24/2020    PLAN:  · Continue current care and attempt to salvage lower extremity  · Continue care per vascular surgery  · Will continue to monitor- no amputation at this time  · Discuss with Dr. Sofia Tolbert

## 2020-02-25 NOTE — PLAN OF CARE
Problem: Malnutrition  (NI-5.2)  Goal: Food and/or Nutrient Delivery  Pt will tolerate nutrition progression to meet needs  Description  Individualized approach for food/nutrient provision.   Outcome: Ongoing

## 2020-02-25 NOTE — OP NOTE
[address]                                OPERATIVE REPORT    PATIENT NAMEFrancisco Javier Polo                       :        1960  MED REC NO:   [id number]                            ROOM:       06  ACCOUNT NO:   [de-identified]                           ADMIT DATE: 2020  PROVIDER:     Brandon Garrison MD    DATE OF PROCEDURE:  2020    PREOPERATIVE DIAGNOSES:  Left SFA occlusion, left leg small vessel  disease. POSTOPERATIVE DIAGNOSES:  Left SFA occlusion, left distal anterior  tibial and posterior tibial artery occlusion. OPERATIVE PROCEDURE:  Right common femoral artery, left popliteal artery  catheterization, left femoral angiography, nitroglycerin infusion left  SFA 1000 mcg, PTA of the left SFA with 5 x 10 Saber balloon, post PTA  left femoral angiogram, stent placement left SFA with 6 x 100 VIABAHN  balloon, VIABAHN stent, post stent PTA of the left SFA with 5 x 10 Saber  balloon, completion left femoral angiogram, ProGlide closure right  common femoral artery access site. SURGEON:  Brandon Garrison MD    ANESTHESIA:  1% lidocaine with IV sedation. EBL:  Less than 5 mL. COMPLICATIONS:  Nil. INDICATIONS:  This 27-year-old -American female admitted to the  hospital with ischemic left foot pain. She has had pain for about a  week. This procedure was indicated as the CTA scan shows evidence of  left SFA occlusion. In addition, the small vessels are poorly _____,  suggesting small vessel disease as well. Risks and benefits were  explained. The patient understands, agrees to proceed. This is a limb  salvage case and there is a potential for limb loss. OPERATIVE NOTE:  The patient was brought to the angio suite at [facility].  After the patient was placed under conscious sedation  protocol, right groin was anesthetized with 1% lidocaine.   Right common  femoral artery is accessed in a retrograde fashion using a micropuncture  kit. A 4-[place] sheath was placed and subsized over 0.035 stiff angled  Glidewire to a 6-[place] sheath, with aid of RIM catheter we crossed up  and over from the right to the left side. We selectively catheterized  the left SFA, performed the left femoral angiogram.  The findings as  mentioned before. The patient is heparinized. The patient received  1000 mcg of nitroglycerin in the left SFA, we managed to get a 0.014  Command wire through the occluded segment of left distal SFA with 0.018  Quick-Cross catheter. We changed then over that wire. We performed PTA  of the left mid SFA with a 5 x 10 Saber balloon at 8 atmospheres  pressure for 30 seconds. Postballoon angioplasty angiography shows  severe remaining stenosis, moderately severe remaining stenosis, left  mid SFA which was stented with a 6 x 100 VIABAHN stent and postdilated  with a 5 x 10 Saber balloon at 8 atmospheres pressure. Completion left  femoral angiogram shows brisk flow down the SFA into popliteal artery  and I can see the takeoff the anterior tibial artery but there was no  flow distal to that, the peroneal artery to main runoff, the posterior  tibial artery is also patent until distally in the calf area where it is  completely occluded. The flow though appears to be fast, there was  significant collateral formation. The small-vessel disease may be  chronic. At this point, we proceeded with terminating the procedure,  pulled the sheath back to right groin, closed the access site with  ProGlide with good hemostasis. The patient tolerated the procedure  well, was hemodynamically stable. There was adequate hemostasis in the  right groin, left foot became pinker and warmer and the pedal signals  are present but monophasic by Doppler as expected. There has been  significant improvement in the perfusion of the left foot.   We will wait  and see what that does

## 2020-02-25 NOTE — PROGRESS NOTES
states that patient does have some intermittent memory issues  HEENT:  NCAT; PERRL; conjunctiva pink, sclera clear  Neck:  no adenopathy, bruit, JVD, tenderness, masses, or nodules; supple, symmetrical, trachea midline, thyroid not enlarged  Lung:  clear to auscultation bilaterally; no use of accessory muscles; no rhonchi, rales, or crackles  Heart:  tachycardic regular rhythm without murmur, rub, or gallop  Abdomen:  soft, nontender, nondistended; normoactive bowel sounds; no organomegaly  Extremities:  extremities normal, atraumatic, no cyanosis or edema dusky L toes, cool to touch, no palpable pulse  Musculokeletal:  no joint swelling, no muscle tenderness. ROM normal in all joints of extremities.    Neurologic:  mental status A&Ox3, thought content appropriate; CN II-XII grossly intact; sensation intact, motor strength 5/5 globally; no slurred speech  Osteopathic:  Patient examined in seated position; normal lumbar lordosis and thoracic kyphosis; no TART changes to paraspinal musculature    Medications  Scheduled Meds    clopidogrel  75 mg Oral Daily    atorvastatin  80 mg Oral Daily    lisinopril  10 mg Oral Daily    benztropine  0.5 mg Oral Daily    ferrous sulfate  325 mg Oral Daily with breakfast    gabapentin  300 mg Oral Nightly    methIMAzole  5 mg Oral Daily    metoprolol  50 mg Oral BID    oxybutynin  5 mg Oral TID    pyridoxine  50 mg Oral Daily    nitroglycerin  0.5 inch Topical 4 times per day     Infusion Meds    sodium chloride 75 mL/hr at 02/25/20 0545     PRN Meds morphine **OR** morphine, hydrALAZINE, acetaminophen, diazePAM, HYDROcodone-acetaminophen, ondansetron, promethazine    Laboratory Data  Recent Results (from the past 24 hour(s))   Troponin    Collection Time: 02/24/20 10:20 AM   Result Value Ref Range    Troponin 0.07 (H) 0.00 - 0.03 ng/mL   APTT    Collection Time: 02/24/20 10:20 AM   Result Value Ref Range    aPTT 108.2 (H) 24.5 - 35.1 sec   TYPE AND SCREEN Collection Time: 02/24/20 10:20 AM   Result Value Ref Range    ABO/Rh B POS     Antibody Screen NEG    POC ACT-Low Range    Collection Time: 02/24/20  2:39 PM   Result Value Ref Range    POC ACT  Not established seconds   Troponin    Collection Time: 02/24/20  4:15 PM   Result Value Ref Range    Troponin 0.05 (H) 0.00 - 0.03 ng/mL   Creatinine, Serum    Collection Time: 02/24/20  4:15 PM   Result Value Ref Range    CREATININE 0.8 0.5 - 1.0 mg/dL    GFR Non-African American >60 >=60 mL/min/1.73    GFR African American >53    Basic Metabolic Panel    Collection Time: 02/25/20  5:30 AM   Result Value Ref Range    Sodium 135 132 - 146 mmol/L    Potassium 4.5 3.5 - 5.0 mmol/L    Chloride 98 98 - 107 mmol/L    CO2 17 (L) 22 - 29 mmol/L    Anion Gap 20 (H) 7 - 16 mmol/L    Glucose 139 (H) 74 - 99 mg/dL    BUN 12 8 - 23 mg/dL    CREATININE 0.8 0.5 - 1.0 mg/dL    GFR Non-African American >60 >=60 mL/min/1.73    GFR African American >60     Calcium 8.9 8.6 - 10.2 mg/dL   CBC Auto Differential    Collection Time: 02/25/20  5:30 AM   Result Value Ref Range    WBC 10.5 4.5 - 11.5 E9/L    RBC 3.04 (L) 3.50 - 5.50 E12/L    Hemoglobin 7.0 (L) 11.5 - 15.5 g/dL    Hematocrit 23.5 (L) 34.0 - 48.0 %    MCV 77.3 (L) 80.0 - 99.9 fL    MCH 23.0 (L) 26.0 - 35.0 pg    MCHC 29.8 (L) 32.0 - 34.5 %    RDW 18.2 (H) 11.5 - 15.0 fL    Platelets 694 848 - 041 E9/L    MPV 10.6 7.0 - 12.0 fL    Neutrophils % 71.9 43.0 - 80.0 %    Lymphocytes % 20.2 20.0 - 42.0 %    Monocytes % 6.1 2.0 - 12.0 %    Eosinophils % 0.2 0.0 - 6.0 %    Basophils % 0.9 0.0 - 2.0 %    Neutrophils Absolute 7.67 (H) 1.80 - 7.30 E9/L    Lymphocytes Absolute 2.10 1.50 - 4.00 E9/L    Monocytes Absolute 0.63 0.10 - 0.95 E9/L    Eosinophils Absolute 0.00 (L) 0.05 - 0.50 E9/L    Basophils Absolute 0.09 0.00 - 0.20 E9/L    Metamyelocytes Relative 0.9 0.0 - 1.0 %    Anisocytosis 2+     Polychromasia 1+     Hypochromia 2+     Poikilocytes 1+     Schistocytes 1+     Ovalocytes 1+ Spherocytes 1+     Tear Drop Cells 1+    Protime-INR    Collection Time: 02/25/20  5:30 AM   Result Value Ref Range    Protime 15.8 (H) 9.3 - 12.4 sec    INR 1.4    Reticulocytes    Collection Time: 02/25/20  5:30 AM   Result Value Ref Range    Retic Ct Pct 2.6 (H) 0.4 - 1.9 %    Retic Ct Abs 0.078 E12/L    Immature Retic Fract 23.7 (H) 3.0 - 15.9 %    Retic HGB Equivalent 27.7 (L) 28.2 - 36.6 pg       Imaging  Xr Knee Left (3 Views)    Result Date: 2/13/2020  LOCATION: 200 EXAM: XR KNEE RIGHT (3 VIEWS), XR KNEE LEFT (3 VIEWS) COMPARISON: None HISTORY: Knee pain TECHNIQUE: 6 views of the knee joints. FINDINGS: Mild bilateral tricompartmental degenerative changes are noted, greatest in the medial compartment. There is no joint effusion. There is normal alignment. Normal patellar alignment seen. No fractures are identified. Mild bilateral knee joint arthritis. Xr Knee Right (3 Views)    Result Date: 2/13/2020  LOCATION: 200 EXAM: XR KNEE RIGHT (3 VIEWS), XR KNEE LEFT (3 VIEWS) COMPARISON: None HISTORY: Knee pain TECHNIQUE: 6 views of the knee joints. FINDINGS: Mild bilateral tricompartmental degenerative changes are noted, greatest in the medial compartment. There is no joint effusion. There is normal alignment. Normal patellar alignment seen. No fractures are identified. Mild bilateral knee joint arthritis. Ir Angiogram Extremity Left    Result Date: 2/24/2020  Radiology exam is complete. No Radiologist dictation. Please follow up with ordering provider. Xr Chest Portable    Result Date: 2/24/2020  Location:200 Exam: XR CHEST PORTABLE Comparison:  Previous CT scan of 12/12/2019 History: Tachycardia Findings: A single frontal portable view of the chest shows the mediastinum, great vessels and heart to be unremarkable. No acute pulmonary parenchymal opacity.      1.  Unremarkable portable chest.     Cta Abdominal Aorta W Bilat Runoff W Contrast    Result Date: 2/23/2020  Reading location: 200

## 2020-02-25 NOTE — CARE COORDINATION
Attempted to meet with patient today 2/25/20 at bedside ICU-6. Patient sound asleep with no family at bedside. CTN will continue to follow for Care Transition.

## 2020-02-25 NOTE — PROGRESS NOTES
Remains in ICU  Febrile  Tachycardic  Rt groin without hematoma  Lt toes dusky but foot is warm  Lt distal at, pt and peroneal signals present on doppler  No signals in lt foot  Small vessel disease lt foot with occluded lt foot vessels  According to family patient has been having foot pain and dark toes for months  Will start heparin drip  Fever work up in progress

## 2020-02-25 NOTE — CONSULTS
stents with mild bilateral hydroureteronephrosis   and periureteric fat stranding. ON 2/24 Right common femoral artery, left popliteal artery  catheterization, left femoral angiography, nitroglycerin infusion left  SFA 1000 mcg, PTA of the left SFA with 5 x 10 Saber balloon, post PTA  left femoral angiogram, stent placement left SFA with 6 x 100 VIABAHN  balloon, VIABAHN stent, post stent PTA of the left SFA with 5 x 10 Saber  balloon, completion left femoral angiogram, ProGlide closure right  common femoral artery access site.     Pt has fevers today tmax 102.9  Currently on vanco/cefepime  resp panel neg   cxry neg   Family present   Pt restless  Has pain left le  Wbc13.1 cr0.8    REVIEW OF SYSTEMS    (2-9 systems for level 4, 10 or more for level 5)     REVIEW OFSYSTEMS:    CONSTITUTIONAL:   Poor historian       CURRENT MEDICATIONS     Current Facility-Administered Medications:     metoprolol tartrate (LOPRESSOR) tablet 75 mg, 75 mg, Oral, BID, Gabriel Carey DO    0.9 % sodium chloride bolus, 250 mL, Intravenous, Once, Gabriel Carey DO    heparin (porcine) injection 4,750 Units, 80 Units/kg, Intravenous, PRN, Bryon Smith MD    heparin (porcine) injection 2,380 Units, 40 Units/kg, Intravenous, PRN, Bryon Smith MD    heparin 25,000 units in dextrose 5% 250 mL infusion, 18 Units/kg/hr, Intravenous, Continuous, Bryon Smith MD, Last Rate: 10.7 mL/hr at 02/25/20 1001, 18 Units/kg/hr at 02/25/20 1001    metronidazole (FLAGYL) 500 mg in NaCl 100 mL IVPB premix, 500 mg, Intravenous, Q8H, Rupali Bernal MD, Stopped at 02/25/20 1150    cefepime (MAXIPIME) 2 g IVPB extended (mini-bag), 2 g, Intravenous, Q12H, Last Rate: 12.5 mL/hr at 02/25/20 1147, 2 g at 02/25/20 1147 **AND** 0.9 % sodium chloride infusion, 25 mL, Intravenous, Q12H, Rupali Bernal MD  Will Mari  [START ON 2/26/2020] vancomycin 1000 mg IVPB in 250 mL D5W addavial, 1,000 mg, Intravenous, Q18H, Karen Winters Yahaira Márquez [id number]     Patient has no known allergies. Immunization History   Administered Date(s) Administered    Pneumococcal Conjugate 13-valent (Preet Borges) 06/21/2017     PAST MEDICAL HISTORY     Past Medical History:   Diagnosis Date    Diabetic peripheral neuropathy associated with type 2 diabetes mellitus ([facility] [address] 8/24/2018    Epigastric hernia     History of blood transfusion     Hyperlipidemia     Hypertension     Kidney stone     Schizophrenia ([facility] [address]     Type 2 diabetes mellitus with diabetic polyneuropathy, with long-term current use of insulin [facility] 3/25/1775    Umbilical hernia      SURGICAL HISTORY       Past Surgical History:   Procedure Laterality Date    CARDIAC CATHETERIZATION  09/30/2016     [facility]    COLONOSCOPY  08/2016    Dr. Lucas Cota / Sonido Butcher / [facility] Bilateral 10/28/2019    CYSTOSCOPY. RETROGRADE.  PYELOGRAM. BILATERAL STENT INSERTION performed by Mari Santos MD at [address] [place] [address] OTHER SURGICAL HISTORY  11/04/2016    epigastric hernia umbilical hernia with mesh    OVARIAN CYST REMOVAL      OVARY REMOVAL Right     OVARY REMOVAL  2014    UPPER GASTROINTESTINAL ENDOSCOPY N/A 10/29/2019    EGD ESOPHAGOGASTRODUODENOSCOPY performed by Lachelle Greer MD at [facility] HISTORY       Family History   Problem Relation Age of Onset    Cancer Brother      SOCIAL HISTORY       Social History     Socioeconomic History    Marital status: Legally      Spouse name: None    Number of children: None    Years of education: None    Highest education level: None   Occupational History    None   Social Needs    Financial resource strain: None    Food insecurity:     Worry: None     Inability: None    Transportation needs:     Medical: None     Non-medical: None   Tobacco Use    Smoking status: Former Smoker     Packs/day: 0.00     Years: 2.00     Pack years: 0.00     Types: Cigarettes     Last attempt to (peripheral vascular disease) ([facility] [address]    s/p  2/24  revascularization left le does not appear infected at this time  Fevers/leukocytosis r/o sepsis   CT with perinephric stranding ? UTI/pyelonephritis ua and cx pending  H/o urine cx with mixed chioma  On vanco /cefepime  Add antifungal       Imaging and labs were reviewed per medical records and any ID pertinent labs were addressed with the patient. The patient/FAMILY  was educated about the diagnosis, prognosis, indications, risks and benefits of treatment. An opportunity to ask questions was given to the patient/FAMILY and questions were answered. Thank you for the consult. We will follow with you.        Electronically signed by Steph Nichols MD on 2/25/2020 at 11:51 AM

## 2020-02-25 NOTE — PROGRESS NOTES
Pharmacy Consultation Note  (Antibiotic Dosing and Monitoring)    Initial consult date: 02/25/2020  Consulting physician: Dr. Danelle Harper   Drug(s): Vancomycin   Indication: Diabetic foot infection     Ht Readings from Last 1 Encounters:   02/23/20 5' 2\" (1.575 m)     Wt Readings from Last 1 Encounters:   02/23/20 131 lb (59.4 kg)       Age/  Gender IBW DW  Allergy Information   60 y.o.   female 50.1 kg 59.4 kg  Patient has no known allergies. Date  Tmax WBC BUN/CR UOP  (mL/kg/hr) Drug/Dose Time   Given Level(s)   (Time) Comments   2/25  (#1) 100.4 10.9 12/0.8 -- Vancomycin 1000 mg q18hr  1033       2/26  (#2)     Vancomycin 1000 mg q18hr  <0430>       (#3)             (#4)             (#5)             (#6)             (#7)             Estimated Creatinine Clearance: 59 mL/min (based on SCr of 0.8 mg/dL). UOP over the past 24 hours:     Intake/Output Summary (Last 24 hours) at 2/25/2020 1046  Last data filed at 2/25/2020 0549  Gross per 24 hour   Intake 1847 ml   Output --   Net 1847 ml     Other anti-infectives: Anti-infective Dose Date Initiated Date Stopped   Cefazolin 1000 mg 2/24 2/24   Metronidazole  500 mg 2/25    Cefepime 2g IV q12hr 2/25      Cultures:  available culture and sensitivity results were reviewed in EPIC  Cultures sent and are pending. Culture Date Result    Blood Culture  02/24 Sent    Urine Culture   Sent      Assessment:  · Consulted by Dr. Danelle Harper to dose/monitor vancomycin  · Goal trough level:  15-20 mcg/mL  · Pt is a 61 y/o female who presented with left foot pain. Patient was transferred to ICU following left lower extremity stent placement. · Serum creatinine today: 0.8 mg/dL; CrCl ~ 59 mL/min; baseline Scr ~ 0.8 mg/dL    Plan:  · Vancomycin 1000 mg ever 18 hours   · Draw trough level once patient reaches steady state.   · Follow renal function  · Pharmacist will follow and monitor/adjust dosing as necessary      Thank you for the consult,    Samantha Mahmood PharmD Candidate [id number] I have reviewed the above information with the pharmacy student/resident. Any changes can made are noted in bold/italics and or .     Prudy Gottron, PharmD, BCPS 2/25/2020 11:30 AM   Pager: [phone]  Ext: [phone]

## 2020-02-26 ENCOUNTER — APPOINTMENT (OUTPATIENT)
Dept: ULTRASOUND IMAGING | Age: 60
DRG: 853 | End: 2020-02-26
Payer: MEDICARE

## 2020-02-26 LAB
ANION GAP SERPL CALCULATED.3IONS-SCNC: 14 MMOL/L (ref 7–16)
ANISOCYTOSIS: ABNORMAL
ANISOCYTOSIS: ABNORMAL
APTT: 89.8 SEC (ref 24.5–35.1)
BACTERIA: ABNORMAL /HPF
BASOPHILS ABSOLUTE: 0 E9/L (ref 0–0.2)
BASOPHILS ABSOLUTE: 0 E9/L (ref 0–0.2)
BASOPHILS RELATIVE PERCENT: 0.3 % (ref 0–2)
BASOPHILS RELATIVE PERCENT: 0.3 % (ref 0–2)
BILIRUBIN URINE: NEGATIVE
BLOOD BANK DISPENSE STATUS: NORMAL
BLOOD BANK PRODUCT CODE: NORMAL
BLOOD, URINE: ABNORMAL
BPU ID: NORMAL
BUN BLDV-MCNC: 11 MG/DL (ref 8–23)
BURR CELLS: ABNORMAL
CALCIUM SERPL-MCNC: 8.2 MG/DL (ref 8.6–10.2)
CHLORIDE BLD-SCNC: 100 MMOL/L (ref 98–107)
CLARITY: ABNORMAL
CO2: 19 MMOL/L (ref 22–29)
COLOR: YELLOW
CREAT SERPL-MCNC: 0.6 MG/DL (ref 0.5–1)
DESCRIPTION BLOOD BANK: NORMAL
EKG ATRIAL RATE: 114 BPM
EKG ATRIAL RATE: 118 BPM
EKG P AXIS: 63 DEGREES
EKG P AXIS: 67 DEGREES
EKG P-R INTERVAL: 138 MS
EKG P-R INTERVAL: 140 MS
EKG Q-T INTERVAL: 340 MS
EKG Q-T INTERVAL: 346 MS
EKG QRS DURATION: 74 MS
EKG QRS DURATION: 78 MS
EKG QTC CALCULATION (BAZETT): 468 MS
EKG QTC CALCULATION (BAZETT): 484 MS
EKG R AXIS: 38 DEGREES
EKG R AXIS: 51 DEGREES
EKG T AXIS: 37 DEGREES
EKG T AXIS: 50 DEGREES
EKG VENTRICULAR RATE: 114 BPM
EKG VENTRICULAR RATE: 118 BPM
EOSINOPHILS ABSOLUTE: 0 E9/L (ref 0.05–0.5)
EOSINOPHILS ABSOLUTE: 0.11 E9/L (ref 0.05–0.5)
EOSINOPHILS RELATIVE PERCENT: 0.2 % (ref 0–6)
EOSINOPHILS RELATIVE PERCENT: 0.9 % (ref 0–6)
GFR AFRICAN AMERICAN: >60
GFR NON-AFRICAN AMERICAN: >60 ML/MIN/1.73
GLUCOSE BLD-MCNC: 211 MG/DL (ref 74–99)
GLUCOSE URINE: NEGATIVE MG/DL
HCT VFR BLD CALC: 19.5 % (ref 34–48)
HCT VFR BLD CALC: 22.6 % (ref 34–48)
HCT VFR BLD CALC: 22.7 % (ref 34–48)
HEMOGLOBIN: 5.9 G/DL (ref 11.5–15.5)
HEMOGLOBIN: 7.4 G/DL (ref 11.5–15.5)
HEMOGLOBIN: 7.5 G/DL (ref 11.5–15.5)
HYPOCHROMIA: ABNORMAL
HYPOCHROMIA: ABNORMAL
INR BLD: 1.9
KETONES, URINE: NEGATIVE MG/DL
LEUKOCYTE ESTERASE, URINE: NEGATIVE
LYMPHOCYTES ABSOLUTE: 1.9 E9/L (ref 1.5–4)
LYMPHOCYTES ABSOLUTE: 3.13 E9/L (ref 1.5–4)
LYMPHOCYTES RELATIVE PERCENT: 15.7 % (ref 20–42)
LYMPHOCYTES RELATIVE PERCENT: 27.4 % (ref 20–42)
MCH RBC QN AUTO: 23.3 PG (ref 26–35)
MCH RBC QN AUTO: 25.1 PG (ref 26–35)
MCH RBC QN AUTO: 25.2 PG (ref 26–35)
MCHC RBC AUTO-ENTMCNC: 30.3 % (ref 32–34.5)
MCHC RBC AUTO-ENTMCNC: 32.6 % (ref 32–34.5)
MCHC RBC AUTO-ENTMCNC: 33.2 % (ref 32–34.5)
MCV RBC AUTO: 75.6 FL (ref 80–99.9)
MCV RBC AUTO: 77.1 FL (ref 80–99.9)
MCV RBC AUTO: 77.2 FL (ref 80–99.9)
MONOCYTES ABSOLUTE: 0.7 E9/L (ref 0.1–0.95)
MONOCYTES ABSOLUTE: 1.67 E9/L (ref 0.1–0.95)
MONOCYTES RELATIVE PERCENT: 13.9 % (ref 2–12)
MONOCYTES RELATIVE PERCENT: 6.2 % (ref 2–12)
NEUTROPHILS ABSOLUTE: 7.66 E9/L (ref 1.8–7.3)
NEUTROPHILS ABSOLUTE: 8.33 E9/L (ref 1.8–7.3)
NEUTROPHILS RELATIVE PERCENT: 66.4 % (ref 43–80)
NEUTROPHILS RELATIVE PERCENT: 69.6 % (ref 43–80)
NITRITE, URINE: NEGATIVE
OVALOCYTES: ABNORMAL
OVALOCYTES: ABNORMAL
PDW BLD-RTO: 17.5 FL (ref 11.5–15)
PDW BLD-RTO: 18.1 FL (ref 11.5–15)
PDW BLD-RTO: 18.6 FL (ref 11.5–15)
PH UA: 6.5 (ref 5–9)
PLATELET # BLD: 295 E9/L (ref 130–450)
PLATELET # BLD: 321 E9/L (ref 130–450)
PLATELET # BLD: 353 E9/L (ref 130–450)
PMV BLD AUTO: 10.4 FL (ref 7–12)
PMV BLD AUTO: 10.7 FL (ref 7–12)
PMV BLD AUTO: 11.3 FL (ref 7–12)
POIKILOCYTES: ABNORMAL
POIKILOCYTES: ABNORMAL
POLYCHROMASIA: ABNORMAL
POLYCHROMASIA: ABNORMAL
POTASSIUM SERPL-SCNC: 3.2 MMOL/L (ref 3.5–5)
PROTEIN UA: NEGATIVE MG/DL
PROTHROMBIN TIME: 21.2 SEC (ref 9.3–12.4)
RBC # BLD: 2.53 E12/L (ref 3.5–5.5)
RBC # BLD: 2.94 E12/L (ref 3.5–5.5)
RBC # BLD: 2.99 E12/L (ref 3.5–5.5)
RBC UA: >20 /HPF (ref 0–2)
SCHISTOCYTES: ABNORMAL
SCHISTOCYTES: ABNORMAL
SODIUM BLD-SCNC: 133 MMOL/L (ref 132–146)
SPECIFIC GRAVITY UA: 1.01 (ref 1–1.03)
TEAR DROP CELLS: ABNORMAL
UROBILINOGEN, URINE: 0.2 E.U./DL
WBC # BLD: 11.6 E9/L (ref 4.5–11.5)
WBC # BLD: 11.9 E9/L (ref 4.5–11.5)
WBC # BLD: 15.8 E9/L (ref 4.5–11.5)
WBC UA: ABNORMAL /HPF (ref 0–5)

## 2020-02-26 PROCEDURE — 36430 TRANSFUSION BLD/BLD COMPNT: CPT

## 2020-02-26 PROCEDURE — 81001 URINALYSIS AUTO W/SCOPE: CPT

## 2020-02-26 PROCEDURE — 2000000000 HC ICU R&B

## 2020-02-26 PROCEDURE — 76770 US EXAM ABDO BACK WALL COMP: CPT

## 2020-02-26 PROCEDURE — 6370000000 HC RX 637 (ALT 250 FOR IP): Performed by: INTERNAL MEDICINE

## 2020-02-26 PROCEDURE — 87088 URINE BACTERIA CULTURE: CPT

## 2020-02-26 PROCEDURE — 6370000000 HC RX 637 (ALT 250 FOR IP): Performed by: THORACIC SURGERY (CARDIOTHORACIC VASCULAR SURGERY)

## 2020-02-26 PROCEDURE — 80048 BASIC METABOLIC PNL TOTAL CA: CPT

## 2020-02-26 PROCEDURE — 85730 THROMBOPLASTIN TIME PARTIAL: CPT

## 2020-02-26 PROCEDURE — 2500000003 HC RX 250 WO HCPCS: Performed by: INTERNAL MEDICINE

## 2020-02-26 PROCEDURE — 6360000002 HC RX W HCPCS: Performed by: INTERNAL MEDICINE

## 2020-02-26 PROCEDURE — 6360000002 HC RX W HCPCS: Performed by: THORACIC SURGERY (CARDIOTHORACIC VASCULAR SURGERY)

## 2020-02-26 PROCEDURE — 6360000002 HC RX W HCPCS: Performed by: SPECIALIST

## 2020-02-26 PROCEDURE — 87040 BLOOD CULTURE FOR BACTERIA: CPT

## 2020-02-26 PROCEDURE — 85025 COMPLETE CBC W/AUTO DIFF WBC: CPT

## 2020-02-26 PROCEDURE — 93010 ELECTROCARDIOGRAM REPORT: CPT | Performed by: INTERNAL MEDICINE

## 2020-02-26 PROCEDURE — P9016 RBC LEUKOCYTES REDUCED: HCPCS

## 2020-02-26 PROCEDURE — 2580000003 HC RX 258: Performed by: INTERNAL MEDICINE

## 2020-02-26 PROCEDURE — 85027 COMPLETE CBC AUTOMATED: CPT

## 2020-02-26 PROCEDURE — 36415 COLL VENOUS BLD VENIPUNCTURE: CPT

## 2020-02-26 PROCEDURE — 85610 PROTHROMBIN TIME: CPT

## 2020-02-26 RX ORDER — POTASSIUM CHLORIDE 7.45 MG/ML
20 INJECTION INTRAVENOUS ONCE
Status: DISCONTINUED | OUTPATIENT
Start: 2020-02-26 | End: 2020-02-26 | Stop reason: CLARIF

## 2020-02-26 RX ORDER — 0.9 % SODIUM CHLORIDE 0.9 %
20 INTRAVENOUS SOLUTION INTRAVENOUS ONCE
Status: COMPLETED | OUTPATIENT
Start: 2020-02-26 | End: 2020-02-26

## 2020-02-26 RX ORDER — POTASSIUM BICARBONATE 25 MEQ/1
50 TABLET, EFFERVESCENT ORAL ONCE
Status: DISCONTINUED | OUTPATIENT
Start: 2020-02-26 | End: 2020-02-26

## 2020-02-26 RX ORDER — POTASSIUM BICARBONATE 25 MEQ/1
25 TABLET, EFFERVESCENT ORAL 2 TIMES DAILY
Status: DISCONTINUED | OUTPATIENT
Start: 2020-02-26 | End: 2020-02-26 | Stop reason: CLARIF

## 2020-02-26 RX ORDER — POTASSIUM CHLORIDE 7.45 MG/ML
10 INJECTION INTRAVENOUS
Status: COMPLETED | OUTPATIENT
Start: 2020-02-26 | End: 2020-02-26

## 2020-02-26 RX ORDER — ACETAMINOPHEN 325 MG/1
650 TABLET ORAL EVERY 6 HOURS PRN
Status: DISCONTINUED | OUTPATIENT
Start: 2020-02-26 | End: 2020-03-03 | Stop reason: HOSPADM

## 2020-02-26 RX ADMIN — ATORVASTATIN CALCIUM 80 MG: 40 TABLET, FILM COATED ORAL at 09:20

## 2020-02-26 RX ADMIN — GABAPENTIN 300 MG: 300 CAPSULE ORAL at 23:55

## 2020-02-26 RX ADMIN — ACETAMINOPHEN 1000 MG: 500 TABLET, FILM COATED ORAL at 10:50

## 2020-02-26 RX ADMIN — OXYCODONE HYDROCHLORIDE 10 MG: 5 TABLET ORAL at 11:48

## 2020-02-26 RX ADMIN — SODIUM CHLORIDE: 9 INJECTION, SOLUTION INTRAVENOUS at 03:34

## 2020-02-26 RX ADMIN — CEFEPIME HYDROCHLORIDE 2 G: 2 INJECTION, POWDER, FOR SOLUTION INTRAVENOUS at 10:51

## 2020-02-26 RX ADMIN — FERROUS SULFATE TAB 325 MG (65 MG ELEMENTAL FE) 325 MG: 325 (65 FE) TAB at 09:19

## 2020-02-26 RX ADMIN — POTASSIUM BICARBONATE 40 MEQ: 782 TABLET, EFFERVESCENT ORAL at 10:51

## 2020-02-26 RX ADMIN — SODIUM CHLORIDE 20 ML: 9 INJECTION, SOLUTION INTRAVENOUS at 03:25

## 2020-02-26 RX ADMIN — SODIUM CHLORIDE: 9 INJECTION, SOLUTION INTRAVENOUS at 23:56

## 2020-02-26 RX ADMIN — PYRIDOXINE HCL TAB 50 MG 50 MG: 50 TAB at 09:21

## 2020-02-26 RX ADMIN — CLOPIDOGREL BISULFATE 75 MG: 75 TABLET ORAL at 09:20

## 2020-02-26 RX ADMIN — SODIUM CHLORIDE 25 ML: 9 INJECTION, SOLUTION INTRAVENOUS at 02:48

## 2020-02-26 RX ADMIN — BENZTROPINE MESYLATE 0.5 MG: 0.5 TABLET ORAL at 09:20

## 2020-02-26 RX ADMIN — METHIMAZOLE 5 MG: 5 TABLET ORAL at 09:21

## 2020-02-26 RX ADMIN — METOPROLOL TARTRATE 75 MG: 50 TABLET, FILM COATED ORAL at 09:19

## 2020-02-26 RX ADMIN — VANCOMYCIN HYDROCHLORIDE 1000 MG: 1 INJECTION, POWDER, LYOPHILIZED, FOR SOLUTION INTRAVENOUS at 04:34

## 2020-02-26 RX ADMIN — METOPROLOL TARTRATE 75 MG: 50 TABLET, FILM COATED ORAL at 23:54

## 2020-02-26 RX ADMIN — METRONIDAZOLE 500 MG: 500 INJECTION, SOLUTION INTRAVENOUS at 19:44

## 2020-02-26 RX ADMIN — NITROGLYCERIN 0.5 INCH: 20 OINTMENT TOPICAL at 19:44

## 2020-02-26 RX ADMIN — METRONIDAZOLE 500 MG: 500 INJECTION, SOLUTION INTRAVENOUS at 01:49

## 2020-02-26 RX ADMIN — POTASSIUM CHLORIDE 10 MEQ: 10 INJECTION, SOLUTION INTRAVENOUS at 10:51

## 2020-02-26 RX ADMIN — HEPARIN SODIUM AND DEXTROSE 17.8 UNITS/KG/HR: 10000; 5 INJECTION INTRAVENOUS at 23:56

## 2020-02-26 RX ADMIN — DIAZEPAM 2 MG: 2 TABLET ORAL at 10:50

## 2020-02-26 RX ADMIN — OXYBUTYNIN CHLORIDE 5 MG: 5 TABLET ORAL at 09:19

## 2020-02-26 RX ADMIN — NITROGLYCERIN 0.5 INCH: 20 OINTMENT TOPICAL at 06:17

## 2020-02-26 RX ADMIN — HYDRALAZINE HYDROCHLORIDE 10 MG: 20 INJECTION INTRAMUSCULAR; INTRAVENOUS at 11:13

## 2020-02-26 RX ADMIN — NITROGLYCERIN 0.5 INCH: 20 OINTMENT TOPICAL at 01:49

## 2020-02-26 RX ADMIN — OXYBUTYNIN CHLORIDE 5 MG: 5 TABLET ORAL at 23:55

## 2020-02-26 RX ADMIN — METRONIDAZOLE 500 MG: 500 INJECTION, SOLUTION INTRAVENOUS at 10:51

## 2020-02-26 RX ADMIN — OXYBUTYNIN CHLORIDE 5 MG: 5 TABLET ORAL at 14:54

## 2020-02-26 RX ADMIN — NITROGLYCERIN 0.5 INCH: 20 OINTMENT TOPICAL at 13:26

## 2020-02-26 RX ADMIN — ACETAMINOPHEN 1000 MG: 500 TABLET, FILM COATED ORAL at 06:12

## 2020-02-26 RX ADMIN — POTASSIUM CHLORIDE 10 MEQ: 10 INJECTION, SOLUTION INTRAVENOUS at 13:25

## 2020-02-26 RX ADMIN — SODIUM CHLORIDE: 9 INJECTION, SOLUTION INTRAVENOUS at 13:50

## 2020-02-26 RX ADMIN — SODIUM CHLORIDE 25 ML: 9 INJECTION, SOLUTION INTRAVENOUS at 14:55

## 2020-02-26 RX ADMIN — LISINOPRIL 10 MG: 10 TABLET ORAL at 09:20

## 2020-02-26 RX ADMIN — FLUCONAZOLE 400 MG: 2 INJECTION, SOLUTION INTRAVENOUS at 23:56

## 2020-02-26 ASSESSMENT — PAIN SCALES - GENERAL
PAINLEVEL_OUTOF10: 0
PAINLEVEL_OUTOF10: 9
PAINLEVEL_OUTOF10: 0

## 2020-02-26 NOTE — PROGRESS NOTES
CRITICAL CARE PROGRESS NOTE    The patient's case was discussed in multidisciplinary rounds including critical care specialist, nursing, RT and pharmacy. Her evaluation is as follows:     60year old woman with PMH of diabetes mellitus, schizophrenia, hypertension, hyperlipidemia, peripheral vascular disease, admitted to ICU for management of left leg ischemia. The patient is febrile, with tachycardia, anemia and leukocytosis. Review of Systems - History obtained from the patient  General ROS: positive for  - chills and fever  Psychological ROS: positive for - anxiety  Ophthalmic ROS: negative for - decreased vision  ENT ROS: negative for - nasal discharge  Allergy and Immunology ROS: negative for - postnasal drip  Hematological and Lymphatic ROS: negative for - blood clots  Endocrine ROS: negative for - polydipsia/polyuria  Breast ROS: negative for breast lumps  Respiratory ROS: no cough, shortness of breath, or wheezing  Cardiovascular ROS: no chest pain or dyspnea on exertion  Gastrointestinal ROS: no abdominal pain, change in bowel habits, or black or bloody stools  Genito-Urinary ROS: no dysuria, trouble voiding, or hematuria  Musculoskeletal ROS: positive for - gait disturbance, muscle pain and pain in left leg  Neurological ROS: no TIA or stroke symptoms  Dermatological ROS: negative for - rash    Recent Labs     02/25/20  0530      K 4.5   CL 98   CO2 17*   BUN 12   CREATININE 0.8   GLUCOSE 139*   CALCIUM 8.9     Recent Labs     02/25/20  1630   WBC 13.1*   RBC 3.42*   HGB 8.0*   HCT 26.5*   MCV 77.5*   MCH 23.4*   MCHC 30.2*   RDW 18.0*      MPV 10.7     No results for input(s): BC in the last 72 hours. No results for input(s): Holland Michael in the last 72 hours.     24 HR INTAKE/OUTPUT:      Intake/Output Summary (Last 24 hours) at 2/25/2020 2132  Last data filed at 2/25/2020 1417  Gross per 24 hour   Intake 1691 ml   Output --   Net 1691 ml     /76   Pulse 126   Temp 102.6 °F (39.2 °C) (Oral)   Resp 24   Ht 5' 2\" (1.575 m)   Wt 131 lb (59.4 kg)   SpO2 100%   BMI 23.96 kg/m²   General: Awake, alert, oriented to place, time and person  HEENT: No head lesions, PERRL, EOMI, mouth without lesions, no nasal lesions, no cervical adenopathy palpated  Respiratory: Lungs with equal breath sounds bilaterally, no adventitious sounds auscultated, no accessory muscle use  CV: Regular rate, no murmurs, JVD, no leg edema  Abdomen: Soft, non tender, + bowel sounds, no lesions  Skin: Hydrated, adequate turgor, no rash, capillary refill <2 seconds  Extremities: Muscular strength 4/5 in 4 limbs, moves 4 limbs spontaneously, left foot with cyanosis and mottling, no pulses palpated in left foot, multiple toes are purple  Neurology: Awake and alert, follows commands, moves 4 limbs on command and spontaneously, equal sensation, no dysmetria, neck is supple, no meningitic signs present. A/P:  1) Sepsis secondary to left foot gangrene, possible bacteremia as patient is febrile  --Antibiotics administered: Cefepime and vancomycin   --Cultures reviewed  --On heparin infusion     2) Metabolic encephalopathy  --Reorientation    3) Normocytic anemia  --Transfuse if Hb <7      Nutrition:PO  Vascular catheters: peripheral lines  GI prophylaxis with PPI  Goals of care: Full code    ATTESTATION:  ICU Staff Physician note of personal involvement in Care  As the attending physician, I certify that I personally reviewed the patients history and personnally examined the patient to confirm the physical findings described above,  And that I reviewed the relevant imaging studies and available reports. I also discussed the differential diagnosis and all of the proposed management plans with the patient and individuals accompanying the patient to this visit. They had the opportunity to ask questions about the proposed management plans and to have those questions answered.      This patient has a high probability of sudden, clinically significant deterioration, which requires the highest level of physician preparedness to intervene urgently. I managed/supervised life or organ supporting interventions that required frequent physician assessment. I devoted my full attention to the direct care of this patient for the amount of time indicated below. Time I spent with the family or surrogate(s) is included only if the patient was incapable of providing the necessary information or participating in medical decisions - Time devoted to teaching and to any procedures I billed separately is not included.      CRITICAL CARE TIME:  30 minutes    Naomy Hatch MD  Pulmonary and Critical Care Medicine

## 2020-02-26 NOTE — PROGRESS NOTES
Pharmacy Consultation Note  (Antibiotic Dosing and Monitoring)    Initial consult date: 02/25/2020  Consulting physician: Dr. Mary Schroeder   Drug(s): Vancomycin   Indication: Diabetic foot infection     Ht Readings from Last 1 Encounters:   02/23/20 5' 2\" (1.575 m)     Wt Readings from Last 1 Encounters:   02/26/20 136 lb 3.2 oz (61.8 kg)       Age/  Gender IBW DW  Allergy Information   60 y.o.   female 50.1 kg 59.4 kg  Patient has no known allergies. Date  Tmax WBC BUN/CR UOP  (mL/kg/hr) Drug/Dose Time   Given Level(s)   (Time) Comments   2/25  (#1) 100.4 10.9 12/0.8 -- Vancomycin 1000 mg q18hr  1033       2/26  (#2) 103.2 11.9 11/0.6 -- Vancomycin 1000 mg q18hr      0434    <2230>     2/27  (#3)     Vancomycin 1000 mg q18hr <1630> <Trough @ 1600>      (#4)             (#5)             (#6)             (#7)             Estimated Creatinine Clearance: 86 mL/min (based on SCr of 0.6 mg/dL). UOP over the past 24 hours:     Intake/Output Summary (Last 24 hours) at 2/26/2020 1108  Last data filed at 2/26/2020 1003  Gross per 24 hour   Intake 3801.41 ml   Output 300 ml   Net 3501.41 ml     Other anti-infectives: Anti-infective Dose Date Initiated Date Stopped   Cefazolin 1000 mg 2/24 2/24   Metronidazole  500 mg 2/25    Cefepime 2g IV q12hr 2/25      Cultures:  available culture and sensitivity results were reviewed in EPIC  Cultures sent and are pending. Culture Date Result    Blood Culture  02/24 Sent    Urine Culture   Sent    Respiratory Panel 02/25 Not detected                Assessment:  · Consulted by Dr. Mary Schroeder to dose/monitor vancomycin  · Goal trough level:  15-20 mcg/mL  · Pt is a 61 y/o female who presented with left foot pain. Patient was transferred to ICU following left lower extremity stent placement. · Serum creatinine today: 0.6 mg/dL; CrCl ~ 59 mL/min; baseline Scr ~ 0.8 mg/dL    Plan:  · Continue Vancomycin 1000 mg ever 18 hours   · Draw trough level prior to 4th dose on 2/27.   · Follow renal

## 2020-02-26 NOTE — PROGRESS NOTES
[facility]  Internal Medicine  -Resident Progress Note-    PCP:  Wendie Willis MD  Admitting Physician:  Alvaro Rashid DO  Consultants:  Vascular surgery, infectious disease, orthopedic [facility]  Chief Complaint:    Chief Complaint   Patient presents with    Foot Pain     DAVID COLD NUMB TO FEET/ NO RECENT INJ       History of Present Illness  Yue Canales was seen and examined at bedside today;  was present for the examination. She underwent stent placement yesterday 2/24. There is still no pulse in left foot. She has been febrile since procedure Tmax 103.4. She remains tachycardic. She is now anemic 5.9. She is on heparin drip. No bloody stools overnight. Review of Systems  All bolded are positive; please see HPI  General:  Fever, chills, diaphoresis, fatigue, malaise, night sweats, weight loss  Psychological:  Anxiety, disorientation, hallucinations. ENT:  Epistaxis, headaches, vertigo, visual changes. Cardiovascular:  Chest pain, irregular heartbeats, palpitations, paroxysmal nocturnal dyspnea. Respiratory:  Shortness of breath, coughing, sputum production, hemoptysis, wheezing, orthopnea.   Gastrointestinal:  Nausea, vomiting, diarrhea, heartburn, constipation, abdominal pain, hematemesis, hematochezia, melena, acholic stools  Genito-Urinary:  Dysuria, urgency, frequency, hematuria  Musculoskeletal:  Joint pain, joint stiffness, joint swelling, muscle pain  Neurology:  Headache, focal neurological deficits, weakness, numbness, paresthesia  Derm:  Rashes, ulcers, excoriations, bruising  Extremities:  Decreased ROM, peripheral edema, mottling    Physical Examination  Vitals:  /66   Pulse 112   Temp 99.9 °F (37.7 °C) (Oral)   Resp 22   Ht 5' 2\" (1.575 m)   Wt 136 lb 3.2 oz (61.8 kg)   SpO2 100%   BMI 24.91 kg/m²   General Appearance:  awake, alert, and oriented to person, place, time, and purpose; appears stated age and cooperative; no apparent distress no labored Meds heparin (porcine), heparin (porcine), oxyCODONE **OR** oxyCODONE, hydrALAZINE, diazePAM, ondansetron, promethazine    Laboratory Data  Recent Results (from the past 24 hour(s))   CBC    Collection Time: 02/25/20  8:50 AM   Result Value Ref Range    WBC 10.9 4.5 - 11.5 E9/L    RBC 2.89 (L) 3.50 - 5.50 E12/L    Hemoglobin 6.8 (L) 11.5 - 15.5 g/dL    Hematocrit 22.2 (L) 34.0 - 48.0 %    MCV 76.8 (L) 80.0 - 99.9 fL    MCH 23.5 (L) 26.0 - 35.0 pg    MCHC 30.6 (L) 32.0 - 34.5 %    RDW 18.2 (H) 11.5 - 15.0 fL    Platelets 842 301 - 410 E9/L    MPV 10.0 7.0 - 12.0 fL   APTT    Collection Time: 02/25/20  8:50 AM   Result Value Ref Range    aPTT 29.3 24.5 - 35.1 sec   Sedimentation Rate    Collection Time: 02/25/20  8:50 AM   Result Value Ref Range    Sed Rate 115 (H) 0 - 20 mm/Hr   Arterial Blood Gas, Respiratory Only    Collection Time: 02/25/20 10:02 AM   Result Value Ref Range    Source: Arterial     pH, Blood Gas 7.521 (H) 7.350 - 7.450    pCO2, Arterial 24.8 (L) 35.0 - 45.0 mmHg    pO2, Arterial 57.6 (L) 60.0 - 80.0 mmHg    HCO3, Arterial 20.3 (L) 22.0 - 26.0 mmol/L    B.E. -2.0 -3.0 - 3.0 mmol/L    O2 Sat 92.9 92.0 - 98.5 %     ID 40     DEVICE [id number]    Respiratory Panel, Molecular    Collection Time: 02/25/20 12:15 PM   Result Value Ref Range    Adenovirus by PCR Not Detected Not Detected    Bordetella parapertussis by PCR Not Detected Not Detected    Bordetella pertussis by PCR Not Detected Not Detected    Chlamydophilia pneumoniae by PCR Not Detected Not Detected    Coronavirus 229E by PCR Not Detected Not Detected    Coronavirus HKU1 by PCR Not Detected Not Detected    Coronavirus NL63 by PCR Not Detected Not Detected    Coronavirus OC43 by PCR Not Detected Not Detected    Human Metapneumovirus by PCR Not Detected Not Detected    Human Rhinovirus/Enterovirus by PCR Not Detected Not Detected    Influenza A by PCR Not Detected Not Detected    Influenza B by PCR Not Detected Not Detected FINDINGS: Visualized portion of the descending thoracic aorta is normal in caliber. The abdominal aorta is normal in caliber and enhancement. There is mild scattered atherosclerotic plaque. There is no evidence of dissection or significant stenosis. The celiac axis and superior mesenteric arteries demonstrate normal enhancement. Bilateral renal arteries demonstrate normal enhancement. There is an accessory renal artery on the right. There is mild stenosis at the inferior mesenteric artery origin. The remainder of the inferior mesenteric artery demonstrates normal enhancement. There is mild atherosclerotic plaque along the right common iliac artery without significant stenosis. The right external iliac and common femoral arteries are patent. The superficial femoral artery and profunda femoris arteries demonstrates normal enhancement. The right popliteal and tibioperitoneal trunk demonstrate normal enhancement. There is a three-vessel runoff on the delayed images. There is mild atherosclerotic plaque along the left common iliac artery without significant stenosis. The left external iliac artery is patent. The common femoral artery and profunda femoris arteries are patent. There is occlusion of the mid to distal left superficial femoral artery for segment of approximate 5 cm (series 307, image 52) with reconstitution of the distal superficial femoral artery via collaterals. The left popliteal artery, peritoneal trunk, and proximal tibial arteries demonstrate normal enhancement. There is occlusion of the anterior tibial and peroneal arteries at the level of the mid tibial diaphysis. There is occlusion of the distal posterior tibial artery at the level of the distal tibial diaphysis. Visualized portion bilateral lung bases demonstrate minimal dependent changes within bilateral lower lobes. There is no pleural effusion. The liver is normal in morphology. There is slight heterogeneous attenuation.  The gallbladder is mildly distended. The common bile duct is normal in caliber. The pancreas and spleen are unremarkable. The distal esophagus, stomach, and duodenum are normal in appearance. The small bowel loops are normal in caliber. The appendix is identified and is unremarkable. The colon is normal in caliber and grossly unremarkable. Bilateral adrenal glands are normal in appearance. Bilateral kidneys are normal in morphology and demonstrate symmetric enhancement. There are bilateral ureteral stents with mild bilateral hydronephrosis and periureteric fat stranding. The distal pigtails are located within the bladder. The uterus is grossly unremarkable. There is no abdominal, retroperitoneal, or pelvic lymphadenopathy. The inferior vena cava is normal in caliber. There is extrinsic compression of the left common iliac vein by the left internal iliac artery (series 2, image 108). There is lack of venous flow on the delayed images within the left calf. There is no abdominal, retroperitoneal, or pelvic lymphadenopathy. There is no suspicious lytic or blastic osseous lesion. There is mild lower lumbar spondylosis. Bilateral hip, knee, and ankle joints are grossly maintained. The visualized muscles demonstrate normal morphology and CT attenuation. 1. Approximately 5 cm segment of occlusion of the mid to distal left superficial femoral artery with reconstitution at the distal superficial femoral artery via collaterals. 2. Occlusion of the left anterior tibial and peroneal arteries at the level the mid tibial diaphysis and distal posterior tibial artery at the level of the distal tibial diaphysis. 3. Three-vessel runoff on the right. 4. Extrinsic compression on the left common iliac vein by the left internal iliac artery. 5. Bilateral ureteral stents with mild bilateral hydroureteronephrosis and periureteric fat stranding.       Microbiology  Blood and urine cultures ordered        Assessment and Plan  Patient is a 60 y.o. female who presented with painful left foot. 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  · Ureteral stents with bilateral hydroureteronephrosis and fat stranding due to recurrent UTIs and bilateral ureteral obstruction   · Elevated troponin--NSTEMI  · Chronic microcytic hypochromic anemia with history of recurrent transfusion  · Diabetes mellitus with associated diabetic polyneuropathy without long-term use of insulin   · Hypertension  · Hyperlipidemia  · Hyperthyroidism     -Underwent stenting 2/24  -Echocardigoram showing LVH, EF 34%, stage I diastolic dysfunction  -Blood cultures and urine cultures are pending. Infectious disease has been consulted. Patient is on cefepime, vancomycin, flagyl, diflucan. - and CRP 19.8  -Procalcitonin 0.39  -Tylenol for fever  -Orthopedic surgery consulted, question possible need for future amputation.   -Patient remains tachycardic  -Transfuse 1 unit for now    · Routine labs in AM.  · DVT prophylaxis. · Please see orders for further management and care.     The plan was discussed with Dr. Luz Curling    7:02 AM  2/26/2020

## 2020-02-26 NOTE — PROGRESS NOTES
[address] [place] [address] [facility]  Progress Note    Darrell Romero  1960  DATE:20    F/U: fevers    SUBJECTIVE:  Chief Complaint   Patient presents with    Foot Pain     DAVID COLD NUMB TO FEET/ NO RECENT INJ     Per family less le pain  More alert    FEVERS, CHILLS, no  nausea, vomiting, diarrhea. Patient is tolerating medications. No reported adverse drug reactions. Review of systems:  As stated above in the chief complaint, otherwise negative. Medications:  Scheduled Meds:   metoprolol  75 mg Oral BID    sodium chloride  250 mL Intravenous Once    metroNIDAZOLE  500 mg Intravenous Q8H    cefepime  2 g Intravenous Q12H    vancomycin  1,000 mg Intravenous Q18H    acetaminophen  325 mg Oral Once    acetaminophen  1,000 mg Oral Q6H    acetaminophen  325 mg Rectal Once    fluconazole  400 mg Intravenous Q24H    clopidogrel  75 mg Oral Daily    atorvastatin  80 mg Oral Daily    lisinopril  10 mg Oral Daily    benztropine  0.5 mg Oral Daily    ferrous sulfate  325 mg Oral Daily with breakfast    gabapentin  300 mg Oral Nightly    methIMAzole  5 mg Oral Daily    oxybutynin  5 mg Oral TID    pyridoxine  50 mg Oral Daily    nitroglycerin  0.5 inch Topical 4 times per day     Continuous Infusions:   heparin (porcine) 10.6 Units/kg/hr (20 0730)    sodium chloride Stopped (20 0617)    sodium chloride 100 mL/hr at 20 0334     PRN Meds:heparin (porcine), heparin (porcine), oxyCODONE **OR** oxyCODONE, hydrALAZINE, diazePAM, ondansetron, promethazine    OBJECTIVE:  /88   Pulse 111   Temp 100.1 °F (37.8 °C) (Oral)   Resp 22   Ht 5' 2\" (1.575 m)   Wt 136 lb 3.2 oz (61.8 kg)   SpO2 100%   BMI 24.91 kg/m²   Temp  Av °F (38.3 °C)  Min: 99.8 °F (37.7 °C)  Max: 103.4 °F (39.7 °C)  Constitutional:  The patient is awake, alert, and oriented. Skin:    Warm and dry. No rashes were noted. HEENT:   Round and reactive pupils.   AT/NC  Neck: Supple to movements. Chest:   No use of accessory muscles to breathe. Symmetrical expansion. Cardiovascular:  S1 and S2 are rhythmic and regular. No murmurs appreciated. Abdomen:   Positive bowel sounds to auscultation. Benign to palpation. Extremities:   No clubbing, no cyanosis, no edema left foot ischemic changes no bulla or wounds.   CNS    AAxO   Lines: piv    Radiology:  Laboratory and Tests Review:  Lab Results   Component Value Date    WBC 11.9 (H) 02/26/2020    WBC 11.6 (H) 02/26/2020    WBC 13.1 (H) 02/25/2020    HGB 7.4 (L) 02/26/2020    HCT 22.7 (L) 02/26/2020    MCV 77.2 (L) 02/26/2020     02/26/2020     No results found for: CRPHS  Lab Results   Component Value Date     (H) 12/15/2019    AST 77 (H) 12/15/2019    ALKPHOS 313 (H) 12/15/2019    BILITOT 0.8 12/15/2019     Lab Results   Component Value Date     02/26/2020    K 3.2 02/26/2020    K 5.1 02/23/2020     02/26/2020    CO2 19 02/26/2020    BUN 11 02/26/2020    CREATININE 0.6 02/26/2020    CREATININE 0.8 02/25/2020    CREATININE 0.8 02/24/2020    GFRAA >60 02/26/2020    LABGLOM >60 02/26/2020    GLUCOSE 211 02/26/2020    GLUCOSE 171 04/26/2012    PROT 7.4 12/15/2019    LABALBU 3.4 12/15/2019    LABALBU 4.3 04/26/2012    CALCIUM 8.2 02/26/2020    BILITOT 0.8 12/15/2019    ALKPHOS 313 12/15/2019    AST 77 12/15/2019     12/15/2019     Lab Results   Component Value Date    CRP 19.8 (H) 02/25/2020    CRP 16.2 (H) 12/12/2019    CRP 1.7 (H) 04/26/2012     Lab Results   Component Value Date    SEDRATE 115 (H) 02/25/2020    SEDRATE 89 (H) 12/12/2019       Microbiology:   Lab Results   Component Value Date    BLOODCULT2 5 Days- no growth 12/13/2019    BLOODCULT2 5 Days- no growth 12/12/2019    BLOODCULT2 5 Days- no growth 12/11/2017      ASSESSMENT/PLAN:  MSMariana Ruiz a past medical history of anemia, diabetes, hyperlipidemia, hypertension, hypothyroidism, ureteral stent secondary to recurrent UTIs   PAD s/p  2/24  revascularization left le   does not appear infected at this time same Fevers/leukocytosis r/o sepsis [id number]  CT with perinephric stranding ? UTI/pyelonephritis   ua and cx pending  H/o urine cx with mixed chioma  On vanco /cefepime  Cont  antifungal   Await cultures      · Monitor labs    Imaging and labs were reviewed per medical records. The patient was educated about the diagnosis, prognosis, indications, risks and benefits of treatment. An opportunity to ask questions was given to the patient/FAMILY.       Electronically signed by Yen Hanley MD on 2/26/2020 at 8:00 AM

## 2020-02-27 LAB
ANION GAP SERPL CALCULATED.3IONS-SCNC: 11 MMOL/L (ref 7–16)
ANISOCYTOSIS: ABNORMAL
APTT: 85.7 SEC (ref 24.5–35.1)
BASOPHILS ABSOLUTE: 0 E9/L (ref 0–0.2)
BASOPHILS RELATIVE PERCENT: 0.4 % (ref 0–2)
BUN BLDV-MCNC: 10 MG/DL (ref 8–23)
BURR CELLS: ABNORMAL
CALCIUM SERPL-MCNC: 7.9 MG/DL (ref 8.6–10.2)
CHLORIDE BLD-SCNC: 101 MMOL/L (ref 98–107)
CO2: 19 MMOL/L (ref 22–29)
CREAT SERPL-MCNC: 0.6 MG/DL (ref 0.5–1)
EOSINOPHILS ABSOLUTE: 0 E9/L (ref 0.05–0.5)
EOSINOPHILS RELATIVE PERCENT: 0.2 % (ref 0–6)
GFR AFRICAN AMERICAN: >60
GFR NON-AFRICAN AMERICAN: >60 ML/MIN/1.73
GLUCOSE BLD-MCNC: 177 MG/DL (ref 74–99)
HCT VFR BLD CALC: 24.3 % (ref 34–48)
HEMOGLOBIN: 7.9 G/DL (ref 11.5–15.5)
HYPOCHROMIA: ABNORMAL
INR BLD: 2
LYMPHOCYTES ABSOLUTE: 1.58 E9/L (ref 1.5–4)
LYMPHOCYTES RELATIVE PERCENT: 12.2 % (ref 20–42)
MCH RBC QN AUTO: 24.6 PG (ref 26–35)
MCHC RBC AUTO-ENTMCNC: 32.5 % (ref 32–34.5)
MCV RBC AUTO: 75.7 FL (ref 80–99.9)
METER GLUCOSE: 124 MG/DL (ref 74–99)
METER GLUCOSE: 154 MG/DL (ref 74–99)
MONOCYTES ABSOLUTE: 1.06 E9/L (ref 0.1–0.95)
MONOCYTES RELATIVE PERCENT: 7.8 % (ref 2–12)
NEUTROPHILS ABSOLUTE: 10.56 E9/L (ref 1.8–7.3)
NEUTROPHILS RELATIVE PERCENT: 80 % (ref 43–80)
OVALOCYTES: ABNORMAL
PDW BLD-RTO: 17.8 FL (ref 11.5–15)
PLATELET # BLD: 344 E9/L (ref 130–450)
PMV BLD AUTO: 11.7 FL (ref 7–12)
POIKILOCYTES: ABNORMAL
POLYCHROMASIA: ABNORMAL
POTASSIUM SERPL-SCNC: 3.1 MMOL/L (ref 3.5–5)
PROCALCITONIN: 24.16 NG/ML (ref 0–0.08)
PROTHROMBIN TIME: 22.2 SEC (ref 9.3–12.4)
RBC # BLD: 3.21 E12/L (ref 3.5–5.5)
SCHISTOCYTES: ABNORMAL
SODIUM BLD-SCNC: 131 MMOL/L (ref 132–146)
TARGET CELLS: ABNORMAL
WBC # BLD: 13.2 E9/L (ref 4.5–11.5)

## 2020-02-27 PROCEDURE — 6370000000 HC RX 637 (ALT 250 FOR IP): Performed by: INTERNAL MEDICINE

## 2020-02-27 PROCEDURE — 2000000000 HC ICU R&B

## 2020-02-27 PROCEDURE — 6360000002 HC RX W HCPCS: Performed by: INTERNAL MEDICINE

## 2020-02-27 PROCEDURE — 2580000003 HC RX 258: Performed by: INTERNAL MEDICINE

## 2020-02-27 PROCEDURE — 84145 PROCALCITONIN (PCT): CPT

## 2020-02-27 PROCEDURE — 85730 THROMBOPLASTIN TIME PARTIAL: CPT

## 2020-02-27 PROCEDURE — 85025 COMPLETE CBC W/AUTO DIFF WBC: CPT

## 2020-02-27 PROCEDURE — 36415 COLL VENOUS BLD VENIPUNCTURE: CPT

## 2020-02-27 PROCEDURE — 6370000000 HC RX 637 (ALT 250 FOR IP): Performed by: THORACIC SURGERY (CARDIOTHORACIC VASCULAR SURGERY)

## 2020-02-27 PROCEDURE — 80048 BASIC METABOLIC PNL TOTAL CA: CPT

## 2020-02-27 PROCEDURE — 2500000003 HC RX 250 WO HCPCS: Performed by: INTERNAL MEDICINE

## 2020-02-27 PROCEDURE — 82962 GLUCOSE BLOOD TEST: CPT

## 2020-02-27 PROCEDURE — 6360000002 HC RX W HCPCS: Performed by: SPECIALIST

## 2020-02-27 PROCEDURE — 85610 PROTHROMBIN TIME: CPT

## 2020-02-27 RX ORDER — DEXTROSE MONOHYDRATE 50 MG/ML
100 INJECTION, SOLUTION INTRAVENOUS PRN
Status: DISCONTINUED | OUTPATIENT
Start: 2020-02-27 | End: 2020-03-03 | Stop reason: HOSPADM

## 2020-02-27 RX ORDER — POTASSIUM CHLORIDE 7.45 MG/ML
40 INJECTION INTRAVENOUS ONCE
Status: COMPLETED | OUTPATIENT
Start: 2020-02-27 | End: 2020-02-27

## 2020-02-27 RX ORDER — NICOTINE POLACRILEX 4 MG
15 LOZENGE BUCCAL PRN
Status: DISCONTINUED | OUTPATIENT
Start: 2020-02-27 | End: 2020-03-03 | Stop reason: HOSPADM

## 2020-02-27 RX ORDER — DEXTROSE MONOHYDRATE 25 G/50ML
12.5 INJECTION, SOLUTION INTRAVENOUS PRN
Status: DISCONTINUED | OUTPATIENT
Start: 2020-02-27 | End: 2020-03-03 | Stop reason: HOSPADM

## 2020-02-27 RX ADMIN — OXYCODONE HYDROCHLORIDE 10 MG: 5 TABLET ORAL at 10:32

## 2020-02-27 RX ADMIN — DIAZEPAM 2 MG: 2 TABLET ORAL at 15:36

## 2020-02-27 RX ADMIN — CEFEPIME HYDROCHLORIDE 2 G: 2 INJECTION, POWDER, FOR SOLUTION INTRAVENOUS at 10:32

## 2020-02-27 RX ADMIN — NITROGLYCERIN 0.5 INCH: 20 OINTMENT TOPICAL at 12:28

## 2020-02-27 RX ADMIN — GABAPENTIN 300 MG: 300 CAPSULE ORAL at 23:42

## 2020-02-27 RX ADMIN — NITROGLYCERIN 0.5 INCH: 20 OINTMENT TOPICAL at 18:26

## 2020-02-27 RX ADMIN — OXYCODONE HYDROCHLORIDE 10 MG: 5 TABLET ORAL at 18:57

## 2020-02-27 RX ADMIN — NITROGLYCERIN 0.5 INCH: 20 OINTMENT TOPICAL at 01:08

## 2020-02-27 RX ADMIN — FLUCONAZOLE 400 MG: 2 INJECTION, SOLUTION INTRAVENOUS at 21:54

## 2020-02-27 RX ADMIN — METRONIDAZOLE 500 MG: 500 INJECTION, SOLUTION INTRAVENOUS at 10:10

## 2020-02-27 RX ADMIN — CEFEPIME HYDROCHLORIDE 2 G: 2 INJECTION, POWDER, FOR SOLUTION INTRAVENOUS at 03:34

## 2020-02-27 RX ADMIN — NITROGLYCERIN 0.5 INCH: 20 OINTMENT TOPICAL at 23:43

## 2020-02-27 RX ADMIN — OXYBUTYNIN CHLORIDE 5 MG: 5 TABLET ORAL at 15:08

## 2020-02-27 RX ADMIN — ACETAMINOPHEN 650 MG: 325 TABLET, FILM COATED ORAL at 10:32

## 2020-02-27 RX ADMIN — METOPROLOL TARTRATE 75 MG: 50 TABLET, FILM COATED ORAL at 10:10

## 2020-02-27 RX ADMIN — CLOPIDOGREL BISULFATE 75 MG: 75 TABLET ORAL at 10:10

## 2020-02-27 RX ADMIN — SODIUM CHLORIDE 25 ML: 9 INJECTION, SOLUTION INTRAVENOUS at 15:07

## 2020-02-27 RX ADMIN — METRONIDAZOLE 500 MG: 500 INJECTION, SOLUTION INTRAVENOUS at 18:26

## 2020-02-27 RX ADMIN — POTASSIUM CHLORIDE 40 MEQ: 7.46 INJECTION, SOLUTION INTRAVENOUS at 12:34

## 2020-02-27 RX ADMIN — LISINOPRIL 10 MG: 10 TABLET ORAL at 10:11

## 2020-02-27 RX ADMIN — PYRIDOXINE HCL TAB 50 MG 50 MG: 50 TAB at 10:11

## 2020-02-27 RX ADMIN — BENZTROPINE MESYLATE 0.5 MG: 0.5 TABLET ORAL at 10:11

## 2020-02-27 RX ADMIN — INSULIN LISPRO 1 UNITS: 100 INJECTION, SOLUTION INTRAVENOUS; SUBCUTANEOUS at 17:12

## 2020-02-27 RX ADMIN — OXYBUTYNIN CHLORIDE 5 MG: 5 TABLET ORAL at 23:42

## 2020-02-27 RX ADMIN — POTASSIUM BICARBONATE 40 MEQ: 782 TABLET, EFFERVESCENT ORAL at 23:42

## 2020-02-27 RX ADMIN — NITROGLYCERIN 0.5 INCH: 20 OINTMENT TOPICAL at 06:41

## 2020-02-27 RX ADMIN — FERROUS SULFATE TAB 325 MG (65 MG ELEMENTAL FE) 325 MG: 325 (65 FE) TAB at 10:10

## 2020-02-27 RX ADMIN — METHIMAZOLE 5 MG: 5 TABLET ORAL at 10:11

## 2020-02-27 RX ADMIN — METRONIDAZOLE 500 MG: 500 INJECTION, SOLUTION INTRAVENOUS at 02:29

## 2020-02-27 RX ADMIN — SODIUM CHLORIDE: 9 INJECTION, SOLUTION INTRAVENOUS at 10:10

## 2020-02-27 RX ADMIN — ATORVASTATIN CALCIUM 80 MG: 40 TABLET, FILM COATED ORAL at 10:10

## 2020-02-27 RX ADMIN — METOPROLOL TARTRATE 75 MG: 50 TABLET, FILM COATED ORAL at 23:42

## 2020-02-27 RX ADMIN — VANCOMYCIN HYDROCHLORIDE 1000 MG: 1 INJECTION, POWDER, LYOPHILIZED, FOR SOLUTION INTRAVENOUS at 01:08

## 2020-02-27 RX ADMIN — OXYBUTYNIN CHLORIDE 5 MG: 5 TABLET ORAL at 10:11

## 2020-02-27 ASSESSMENT — PAIN SCALES - GENERAL
PAINLEVEL_OUTOF10: 0
PAINLEVEL_OUTOF10: 0
PAINLEVEL_OUTOF10: 9
PAINLEVEL_OUTOF10: 0
PAINLEVEL_OUTOF10: 8
PAINLEVEL_OUTOF10: 0
PAINLEVEL_OUTOF10: 9

## 2020-02-27 NOTE — CARE COORDINATION
[address] Transitions Interview     2020    Patient: Briseyda Bello Patient : 1960   MRN: [id number]  Reason for Admission: Arterial Occlusion  RARS: Readmission Risk Score: 21         Spoke with: Briseyda Bello (Patient) and Trudi Ochoa ()      Readmission Risk  Patient Active Problem List   Diagnosis    Epigastric hernia    Umbilical hernia    Essential hypertension    Dyslipidemia    S/P cardiac catheterization    Abnormal stress ECG    Hyperthyroidism    Acute GI bleeding    Splenic infarct    Hydroureter    Microcytic hypochromic anemia    Severe protein-calorie malnutrition (HCC)    Arterial occlusion    Moderate protein-calorie malnutrition [facility])       Inpatient Assessment  Care Transitions Summary    Care Transitions Inpatient Review  Medication Review  Do you have all of your prescriptions and are they filled?:  Yes   Are you able to afford your medications?:  Yes  How often do you have difficulty taking your medications?:  I always take them as prescribed. Housing Review  Who do you live with?:  Child, Partner/Spouse/SO  Are you an active caregiver in your home?:  No  Social Support  Do you have a ?:  No  Do you have a [address] [place] [address]?:  No  Durable Medical Equipment  Patient DME:  [employer]omin[employer]  Functional Review  Ability to seek help/take action for Emergent/Urgent situations i.e. fire, crime, inclement weather or health crisis. :  Independent  Ability handle personal hygiene needs (bathing/dressing/grooming): Independent  Ability to manage medications:   Independent  Ability to prepare food:  Needs Assistance  Ability to maintain home (clean home, laundry):  Needs Assistance  Ability to drive and/or has transportation:  Independent  Ability to do shopping:  Needs Assistance  Ability to manage finances:  Needs Assistance  Is patient able to live independently?:  Yes  Hearing and Vision  Visual Impairment:  Reading glasses  Hearing Impairment: None  Care Transitions Interventions  No Identified Needs                               Met with patient and her  Tyler Araiza) today 2/27/20 at bedside. Explained role and reason for visit. Patient is receptive to be followed by CTN post hospital discharge for Care Transition. Confirmed with patient she follows with Dr. Hector Wyman for primary care, Dr. Kailey Harris for podiatry and dr. Yobany Akhtar (urology) as patient had biltateral ureteral stents placed in October 2019. Patient reports having left foot pain off/on for several months but has worsened over the past 2-3 weeks PTA making it difficult to ambulate. States having associated darkened toes and left foot cold to touch. Denies any shortness of breath, chest pain, abdominal pain, nausea, vomiting or fever. CTA of abdominal aorta obtained on admission noted to show the following below:      1. Approximately 5 cm segment of occlusion of the mid to distal left   superficial femoral artery with reconstitution at the distal   superficial femoral artery via collaterals. 2. Occlusion of the left anterior tibial and peroneal arteries at the   level the mid tibial diaphysis and distal posterior tibial artery at   the level of the distal tibial diaphysis. 3. Three-vessel runoff on the right. 4. Extrinsic compression on the left common iliac vein by the left   internal iliac artery. 5. Bilateral ureteral stents with mild bilateral hydroureteronephrosis   and periureteric fat stranding. Initial labs noted include: Potassium= 5.1, Glucose= 185, Hgb= 7.6 and Hct= 24.5. Patient states she monitors BS twice daily at home. States BS has been running \"below one hundred\" at home. Noted last Hgb A1C on 5/4/18 was 9.6. Patient admits she is compliant with taking all medications as prescribed and following a low sugar/low carbohydrate diet. CTN provided and reviewed DM zone tool including action steps and knowing when to seek medical attention.      Patient states she

## 2020-02-27 NOTE — PROGRESS NOTES
[address] [facility]  Internal Medicine  -Resident Progress Note-    PCP:  Clementina Goldman MD  Admitting Physician:  Mable Guerrier DO  Consultants:  Vascular surgery, infectious disease, orthopedic [facility] [place]  Chief Complaint:    Chief Complaint   Patient presents with    Foot Pain     DAVID COLD NUMB TO FEET/ NO RECENT INJ       History of Present Illness  Nishi Dawson was seen and examined at bedside today;  was present for the examination. She underwent stent placement 2/24. There is still no palpable pulse in left foot. She has been febrile since procedure. She remains tachycardic. She does seem to be developing gangrene in toes    Review of Systems  All bolded are positive; please see HPI  General:  Fever, chills, diaphoresis, fatigue, malaise, night sweats, weight loss  Psychological:  Anxiety, disorientation, hallucinations. ENT:  Epistaxis, headaches, vertigo, visual changes. Cardiovascular:  Chest pain, irregular heartbeats, palpitations, paroxysmal nocturnal dyspnea. Respiratory:  Shortness of breath, coughing, sputum production, hemoptysis, wheezing, orthopnea.   Gastrointestinal:  Nausea, vomiting, diarrhea, heartburn, constipation, abdominal pain, hematemesis, hematochezia, melena, acholic stools  Genito-Urinary:  Dysuria, urgency, frequency, hematuria  Musculoskeletal:  Joint pain, joint stiffness, joint swelling, muscle pain  Neurology:  Headache, focal neurological deficits, weakness, numbness, paresthesia  Derm:  Rashes, ulcers, excoriations, bruising  Extremities:  Decreased ROM, peripheral edema, mottling    Physical Examination  Vitals:  /65   Pulse 105   Temp 99.7 °F (37.6 °C) (Oral)   Resp (!) 32   Ht 5' 2\" (1.575 m)   Wt 135 lb 7 oz (61.4 kg)   SpO2 100%   BMI 24.77 kg/m²   General Appearance:  awake, alert, and oriented to person, place, time, and purpose; appears stated age and cooperative; no apparent distress no labored breathing  states that patient (porcine), oxyCODONE **OR** oxyCODONE, hydrALAZINE, diazePAM, ondansetron, promethazine    Laboratory Data  Recent Results (from the past 24 hour(s))   Urinalysis with Microscopic    Collection Time: 02/26/20  9:00 AM   Result Value Ref Range    Color, UA Yellow Straw/Yellow    Clarity, UA SLCLOUDY Clear    Glucose, Ur Negative Negative mg/dL    Bilirubin Urine Negative Negative    Ketones, Urine Negative Negative mg/dL    Specific Gravity, UA 1.010 1.005 - 1.030    Blood, Urine LARGE (A) Negative    pH, UA 6.5 5.0 - 9.0    Protein, UA Negative Negative mg/dL    Urobilinogen, Urine 0.2 <2.0 E.U./dL    Nitrite, Urine Negative Negative    Leukocyte Esterase, Urine Negative Negative    WBC, UA 0-1 0 - 5 /HPF    RBC, UA >20 0 - 2 /HPF    Bacteria, UA FEW (A) None Seen /HPF   CBC    Collection Time: 02/26/20  7:50 PM   Result Value Ref Range    WBC 15.8 (H) 4.5 - 11.5 E9/L    RBC 2.99 (L) 3.50 - 5.50 E12/L    Hemoglobin 7.5 (L) 11.5 - 15.5 g/dL    Hematocrit 22.6 (L) 34.0 - 48.0 %    MCV 75.6 (L) 80.0 - 99.9 fL    MCH 25.1 (L) 26.0 - 35.0 pg    MCHC 33.2 32.0 - 34.5 %    RDW 17.5 (H) 11.5 - 15.0 fL    Platelets 024 198 - 570 E9/L    MPV 10.4 7.0 - 12.0 fL   Basic Metabolic Panel    Collection Time: 02/27/20  6:00 AM   Result Value Ref Range    Sodium 131 (L) 132 - 146 mmol/L    Potassium 3.1 (L) 3.5 - 5.0 mmol/L    Chloride 101 98 - 107 mmol/L    CO2 19 (L) 22 - 29 mmol/L    Anion Gap 11 7 - 16 mmol/L    Glucose 177 (H) 74 - 99 mg/dL    BUN 10 8 - 23 mg/dL    CREATININE 0.6 0.5 - 1.0 mg/dL    GFR Non-African American >60 >=60 mL/min/1.73    GFR African American >60     Calcium 7.9 (L) 8.6 - 10.2 mg/dL   CBC Auto Differential    Collection Time: 02/27/20  6:00 AM   Result Value Ref Range    WBC 13.2 (H) 4.5 - 11.5 E9/L    RBC 3.21 (L) 3.50 - 5.50 E12/L    Hemoglobin 7.9 (L) 11.5 - 15.5 g/dL    Hematocrit 24.3 (L) 34.0 - 48.0 %    MCV 75.7 (L) 80.0 - 99.9 fL    MCH 24.6 (L) 26.0 - 35.0 pg    MCHC 32.5 32.0 - 34.5 %    RDW femoral artery and profunda femoris arteries are patent. There is occlusion of the mid to distal left superficial femoral artery for segment of approximate 5 cm (series 307, image 52) with reconstitution of the distal superficial femoral artery via collaterals. The left popliteal artery, peritoneal trunk, and proximal tibial arteries demonstrate normal enhancement. There is occlusion of the anterior tibial and peroneal arteries at the level of the mid tibial diaphysis. There is occlusion of the distal posterior tibial artery at the level of the distal tibial diaphysis. Visualized portion bilateral lung bases demonstrate minimal dependent changes within bilateral lower lobes. There is no pleural effusion. The liver is normal in morphology. There is slight heterogeneous attenuation. The gallbladder is mildly distended. The common bile duct is normal in caliber. The pancreas and spleen are unremarkable. The distal esophagus, stomach, and duodenum are normal in appearance. The small bowel loops are normal in caliber. The appendix is identified and is unremarkable. The colon is normal in caliber and grossly unremarkable. Bilateral adrenal glands are normal in appearance. Bilateral kidneys are normal in morphology and demonstrate symmetric enhancement. There are bilateral ureteral stents with mild bilateral hydronephrosis and periureteric fat stranding. The distal pigtails are located within the bladder. The uterus is grossly unremarkable. There is no abdominal, retroperitoneal, or pelvic lymphadenopathy. The inferior vena cava is normal in caliber. There is extrinsic compression of the left common iliac vein by the left internal iliac artery (series 2, image 108). There is lack of venous flow on the delayed images within the left calf. There is no abdominal, retroperitoneal, or pelvic lymphadenopathy. There is no suspicious lytic or blastic osseous lesion. There is mild lower lumbar spondylosis.  Bilateral hip, knee, and ankle joints are grossly maintained. The visualized muscles demonstrate normal morphology and CT attenuation. 1. Approximately 5 cm segment of occlusion of the mid to distal left superficial femoral artery with reconstitution at the distal superficial femoral artery via collaterals. 2. Occlusion of the left anterior tibial and peroneal arteries at the level the mid tibial diaphysis and distal posterior tibial artery at the level of the distal tibial diaphysis. 3. Three-vessel runoff on the right. 4. Extrinsic compression on the left common iliac vein by the left internal iliac artery. 5. Bilateral ureteral stents with mild bilateral hydroureteronephrosis and periureteric fat stranding. Microbiology  Blood and urine cultures ordered        Assessment and Plan  Patient is a 60 y.o. female who presented with painful left foot. 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 Infectious disease has been consulted. Patient is on cefepime, vancomycin, flagyl, diflucan. - and CRP 19.8  -Procalcitonin 0.39  -Tylenol for fever  -Continuing to have fevers  -Replace potassium  -Left toes developing gangrene. Orthopedic surgery has been following  -Consult hyperbarics, PTCO2      · Routine labs in AM.  · DVT prophylaxis. · Please see orders for further management and care.     The plan was discussed with Dr. Kenia Cannon DO, PGYIII    7:44 AM  2/27/2020     Greater than 30 minutes of critical care time was spent with the patient. This includes chart review, , and discussion with those consultants involved in the patient's care. I saw and evaluated the patient. I agree with the findings and the plan of care as documented in the resident's note.     Mai Ledezma D.O., [facility]  2:40 PM  2/27/2020

## 2020-02-27 NOTE — PLAN OF CARE
Problem: Falls - Risk of:  Goal: Will remain free from falls  Description  Will remain free from falls  2/27/2020 0551 by Daniel Alonzo RN  Outcome: Met This Shift

## 2020-02-27 NOTE — PROGRESS NOTES
CRITICAL CARE PROGRESS NOTE    The patient's case was discussed in multidisciplinary rounds including critical care specialist, nursing, RT and pharmacy. Her evaluation is as follows:     60year old woman with PMH of diabetes mellitus, schizophrenia, hypertension, hyperlipidemia, peripheral vascular disease, admitted to ICU for management of left leg ischemia. The patient is febrile, with tachycardia, anemia and leukocytosis. --Today she is more awake and alert than yesterday, has worsening pain of left toes, cyanosis getting confined to tip of toes. --Worsening leukocytosis, received 1 unit of PRBC  --No growth on cultures, no vegetations observed on echocardiogram, remains febrile    Recent Labs     02/26/20  0701      K 3.2*      CO2 19*   BUN 11   CREATININE 0.6   GLUCOSE 211*   CALCIUM 8.2*     Recent Labs     02/26/20  1950   WBC 15.8*   RBC 2.99*   HGB 7.5*   HCT 22.6*   MCV 75.6*   MCH 25.1*   MCHC 33.2   RDW 17.5*      MPV 10.4     Recent Labs     02/25/20  0750   BC 24 Hours- no growth       Recent Labs     02/25/20  0850   BLOODCULT2 24 Hours- no growth       24 HR INTAKE/OUTPUT:      Intake/Output Summary (Last 24 hours) at 2/26/2020 2010  Last data filed at 2/26/2020 1800  Gross per 24 hour   Intake 4397.46 ml   Output 300 ml   Net 4097.46 ml     BP (!) 166/85   Pulse 118   Temp 102.8 °F (39.3 °C) (Oral)   Resp 26   Ht 5' 2\" (1.575 m)   Wt 136 lb 3.2 oz (61.8 kg)   SpO2 99%   BMI 24.91 kg/m²   General: Awake , oriented to place and person  HEENT: No head lesions, PERRL, EOMI, mouth without lesions, no nasal lesions, no cervical adenopathy palpated  Respiratory: Lungs with equal breath sounds bilaterally, no adventitious sounds auscultated, no accessory muscle use  CV: Regular rate, no murmurs, no JVD, no leg edema  Abdomen: Soft, non tender, + bowel sounds, no lesions  Skin: Hydrated, adequate turgor, capillary refill <2 seconds.    Left foot with blue toe tips, warm,

## 2020-02-27 NOTE — PROGRESS NOTES
[address] [place] [address] [facility]  Progress Note    Chantel Badillo  1960  DATE:20    F/U: fevers    SUBJECTIVE:  Chief Complaint   Patient presents with    Foot Pain     DAVID COLD NUMB TO FEET/ NO RECENT INJ     More alert   Tmax 102.8 FEVERS non toxic comfortable  No CHILLS, no  nausea, vomiting, diarrhea. Patient is tolerating medications. No reported adverse drug reactions. Review of systems:  As stated above in the chief complaint, otherwise negative. Medications:  Scheduled Meds:   potassium bicarb-citric acid  40 mEq Oral BID    metoprolol  75 mg Oral BID    sodium chloride  250 mL Intravenous Once    metroNIDAZOLE  500 mg Intravenous Q8H    cefepime  2 g Intravenous Q12H    vancomycin  1,000 mg Intravenous Q18H    acetaminophen  325 mg Oral Once    acetaminophen  325 mg Rectal Once    fluconazole  400 mg Intravenous Q24H    clopidogrel  75 mg Oral Daily    atorvastatin  80 mg Oral Daily    lisinopril  10 mg Oral Daily    benztropine  0.5 mg Oral Daily    ferrous sulfate  325 mg Oral Daily with breakfast    gabapentin  300 mg Oral Nightly    methIMAzole  5 mg Oral Daily    oxybutynin  5 mg Oral TID    pyridoxine  50 mg Oral Daily    nitroglycerin  0.5 inch Topical 4 times per day     Continuous Infusions:   heparin (porcine) 17.8 Units/kg/hr (20 235)    sodium chloride Stopped (20 1549)    sodium chloride 100 mL/hr at 20 235     PRN Meds:acetaminophen, heparin (porcine), heparin (porcine), oxyCODONE **OR** oxyCODONE, hydrALAZINE, diazePAM, ondansetron, promethazine    OBJECTIVE:  /65   Pulse 105   Temp 99.7 °F (37.6 °C) (Oral)   Resp (!) 32   Ht 5' 2\" (1.575 m)   Wt 135 lb 7 oz (61.4 kg)   SpO2 100%   BMI 24.77 kg/m²   Temp  Av.4 °F (38.6 °C)  Min: 99.7 °F (37.6 °C)  Max: 103.2 °F (39.6 °C)  Constitutional:  The patient is awake, alert, and oriented. In bed  Skin:    Warm and dry. No rashes were noted.    HEENT: Round and reactive pupils. AT/NC  Neck:    Supple to movements. Chest:   No use of accessory muscles to breathe. Symmetrical expansion. Ct ant  Cardiovascular:  S1 and S2 are rhythmic and regular. No murmurs appreciated. Abdomen:   Positive bowel sounds to auscultation. Benign to palpation. Extremities:   No clubbing, no cyanosis, no edema left foot ischemic changes no bulla or wounds.  warm  CNS    AAxO   Lines: piv    Radiology:  Laboratory and Tests Review:  Lab Results   Component Value Date    WBC 13.2 (H) 02/27/2020    WBC 15.8 (H) 02/26/2020    WBC 11.9 (H) 02/26/2020    HGB 7.9 (L) 02/27/2020    HCT 24.3 (L) 02/27/2020    MCV 75.7 (L) 02/27/2020     02/27/2020     No results found for: [facility]  Lab Results   Component Value Date     (H) 12/15/2019    AST 77 (H) 12/15/2019    ALKPHOS 313 (H) 12/15/2019    BILITOT 0.8 12/15/2019     Lab Results   Component Value Date     02/27/2020    K 3.1 02/27/2020    K 5.1 02/23/2020     02/27/2020    CO2 19 02/27/2020    BUN 10 02/27/2020    CREATININE 0.6 02/27/2020    CREATININE 0.6 02/26/2020    CREATININE 0.8 02/25/2020    GFRAA >60 02/27/2020    LABGLOM >60 02/27/2020    GLUCOSE 177 02/27/2020    GLUCOSE 171 04/26/2012    PROT 7.4 12/15/2019    LABALBU 3.4 12/15/2019    LABALBU 4.3 04/26/2012    CALCIUM 7.9 02/27/2020    BILITOT 0.8 12/15/2019    ALKPHOS 313 12/15/2019    AST 77 12/15/2019     12/15/2019     Lab Results   Component Value Date    CRP 19.8 (H) 02/25/2020    CRP 16.2 (H) 12/12/2019    CRP 1.7 (H) 04/26/2012     Lab Results   Component Value Date    SEDRATE 115 (H) 02/25/2020    SEDRATE 89 (H) 12/12/2019       Microbiology:   Lab Results   Component Value Date    BLOODCULT2 24 Hours- no growth 02/25/2020    BLOODCULT2 5 Days- no growth 12/13/2019    BLOODCULT2 5 Days- no growth 12/12/2019      ASSESSMENT/PLAN:  MSMariana Leonard Ramiro a past medical history of anemia, diabetes, hyperlipidemia, hypertension, hypothyroidism, ureteral stent secondary to recurrent UTIs presented with left le pain     PAD   s/p  2/24  revascularization left le -does not appear infected at this time same   Fevers/leukocytosis r/o sepsis   CT with perinephric stranding ? UTI/pyelonephritis   ua and cx pending  H/o urine cx with mixed chioma-Mild left hydronephrosis. Bilateral ureteral stents in  place. Stop   vanco   cont cefepime  Cont  fluconazole  Check pct  Await cultures      · Monitor labs    Imaging and labs were reviewed per medical records. The patient was educated about the diagnosis, prognosis, indications, risks and benefits of treatment. An opportunity to ask questions was given to the patient/FAMILY.       Electronically signed by Tania William MD on 2/27/2020 at 9:04 AM

## 2020-02-28 ENCOUNTER — CASE MANAGEMENT (OUTPATIENT)
Dept: ANESTHESIOLOGY | Age: 60
End: 2020-02-28

## 2020-02-28 ENCOUNTER — APPOINTMENT (OUTPATIENT)
Dept: CT IMAGING | Age: 60
DRG: 853 | End: 2020-02-28
Payer: MEDICARE

## 2020-02-28 ENCOUNTER — ANESTHESIA EVENT (OUTPATIENT)
Dept: OPERATING ROOM | Age: 60
DRG: 853 | End: 2020-02-28
Payer: MEDICARE

## 2020-02-28 ENCOUNTER — APPOINTMENT (OUTPATIENT)
Dept: HYPERBARIC MEDICINE | Age: 60
DRG: 853 | End: 2020-02-28
Payer: MEDICARE

## 2020-02-28 ENCOUNTER — APPOINTMENT (OUTPATIENT)
Dept: GENERAL RADIOLOGY | Age: 60
DRG: 853 | End: 2020-02-28
Payer: MEDICARE

## 2020-02-28 LAB
ABO/RH: NORMAL
ANION GAP SERPL CALCULATED.3IONS-SCNC: 9 MMOL/L (ref 7–16)
ANISOCYTOSIS: ABNORMAL
ANTIBODY SCREEN: NORMAL
APTT: 111.3 SEC (ref 24.5–35.1)
APTT: 127.3 SEC (ref 24.5–35.1)
B.E.: -6.1 MMOL/L (ref -3–3)
BASOPHILS ABSOLUTE: 0.14 E9/L (ref 0–0.2)
BASOPHILS RELATIVE PERCENT: 0.9 % (ref 0–2)
BLOOD BANK DISPENSE STATUS: NORMAL
BLOOD BANK PRODUCT CODE: NORMAL
BPU ID: NORMAL
BUN BLDV-MCNC: 12 MG/DL (ref 8–23)
BURR CELLS: ABNORMAL
CALCIUM SERPL-MCNC: 7.6 MG/DL (ref 8.6–10.2)
CHLORIDE BLD-SCNC: 105 MMOL/L (ref 98–107)
CO2: 18 MMOL/L (ref 22–29)
COHB: 0.1 % (ref 0–1.5)
CREAT SERPL-MCNC: 0.7 MG/DL (ref 0.5–1)
CRITICAL: ABNORMAL
DATE ANALYZED: ABNORMAL
DATE OF COLLECTION: ABNORMAL
DESCRIPTION BLOOD BANK: NORMAL
EOSINOPHILS ABSOLUTE: 0 E9/L (ref 0.05–0.5)
EOSINOPHILS RELATIVE PERCENT: 0.2 % (ref 0–6)
GFR AFRICAN AMERICAN: >60
GFR NON-AFRICAN AMERICAN: >60 ML/MIN/1.73
GLUCOSE BLD-MCNC: 155 MG/DL (ref 74–99)
HCO3: 17.2 MMOL/L (ref 22–26)
HCT VFR BLD CALC: 19.8 % (ref 34–48)
HEMOGLOBIN: 6.4 G/DL (ref 11.5–15.5)
HHB: 7.8 % (ref 0–5)
HYPOCHROMIA: ABNORMAL
INR BLD: 1.9
LAB: ABNORMAL
LACTIC ACID: 0.8 MMOL/L (ref 0.5–2.2)
LYMPHOCYTES ABSOLUTE: 2.42 E9/L (ref 1.5–4)
LYMPHOCYTES RELATIVE PERCENT: 15 % (ref 20–42)
Lab: ABNORMAL
MCH RBC QN AUTO: 24.6 PG (ref 26–35)
MCHC RBC AUTO-ENTMCNC: 32.3 % (ref 32–34.5)
MCV RBC AUTO: 76.2 FL (ref 80–99.9)
METER GLUCOSE: 108 MG/DL (ref 74–99)
METHB: 0.7 % (ref 0–1.5)
MODE: ABNORMAL
MONOCYTES ABSOLUTE: 1.13 E9/L (ref 0.1–0.95)
MONOCYTES RELATIVE PERCENT: 7.1 % (ref 2–12)
NEUTROPHILS ABSOLUTE: 12.4 E9/L (ref 1.8–7.3)
NEUTROPHILS RELATIVE PERCENT: 77 % (ref 43–80)
O2 CONTENT: 8.4 ML/DL
O2 SATURATION: 92.1 % (ref 92–98.5)
O2HB: 91.4 % (ref 94–97)
OPERATOR ID: ABNORMAL
OVALOCYTES: ABNORMAL
PATIENT TEMP: 37 C
PCO2: 25.2 MMHG (ref 35–45)
PDW BLD-RTO: 19.1 FL (ref 11.5–15)
PH BLOOD GAS: 7.45 (ref 7.35–7.45)
PLATELET # BLD: 413 E9/L (ref 130–450)
PMV BLD AUTO: 11.8 FL (ref 7–12)
PO2: 61.1 MMHG (ref 75–100)
POIKILOCYTES: ABNORMAL
POLYCHROMASIA: ABNORMAL
POTASSIUM SERPL-SCNC: 4 MMOL/L (ref 3.5–5)
PROTHROMBIN TIME: 21.6 SEC (ref 9.3–12.4)
RBC # BLD: 2.6 E12/L (ref 3.5–5.5)
SODIUM BLD-SCNC: 132 MMOL/L (ref 132–146)
SOURCE, BLOOD GAS: ABNORMAL
T3 TOTAL: 55.49 NG/DL (ref 80–200)
T4 TOTAL: 4.6 MCG/DL (ref 4.5–11.7)
TARGET CELLS: ABNORMAL
TEAR DROP CELLS: ABNORMAL
THB: 6.5 G/DL (ref 11.5–16.5)
TIME ANALYZED: 1806
TOTAL CK: 455 U/L (ref 20–180)
TSH SERPL DL<=0.05 MIU/L-ACNC: 0.54 UIU/ML (ref 0.27–4.2)
URINE CULTURE, ROUTINE: NORMAL
VACUOLATED NEUTROPHILS: ABNORMAL
WBC # BLD: 16.1 E9/L (ref 4.5–11.5)

## 2020-02-28 PROCEDURE — 82550 ASSAY OF CK (CPK): CPT

## 2020-02-28 PROCEDURE — 2580000003 HC RX 258: Performed by: INTERNAL MEDICINE

## 2020-02-28 PROCEDURE — 74018 RADEX ABDOMEN 1 VIEW: CPT

## 2020-02-28 PROCEDURE — 6370000000 HC RX 637 (ALT 250 FOR IP): Performed by: INTERNAL MEDICINE

## 2020-02-28 PROCEDURE — 2580000003 HC RX 258: Performed by: SPECIALIST

## 2020-02-28 PROCEDURE — 6360000002 HC RX W HCPCS: Performed by: INTERNAL MEDICINE

## 2020-02-28 PROCEDURE — 93923 UPR/LXTR ART STDY 3+ LVLS: CPT

## 2020-02-28 PROCEDURE — 2000000000 HC ICU R&B

## 2020-02-28 PROCEDURE — 6370000000 HC RX 637 (ALT 250 FOR IP): Performed by: THORACIC SURGERY (CARDIOTHORACIC VASCULAR SURGERY)

## 2020-02-28 PROCEDURE — 85610 PROTHROMBIN TIME: CPT

## 2020-02-28 PROCEDURE — 6360000002 HC RX W HCPCS: Performed by: SPECIALIST

## 2020-02-28 PROCEDURE — 86900 BLOOD TYPING SEROLOGIC ABO: CPT

## 2020-02-28 PROCEDURE — 84480 ASSAY TRIIODOTHYRONINE (T3): CPT

## 2020-02-28 PROCEDURE — 82805 BLOOD GASES W/O2 SATURATION: CPT

## 2020-02-28 PROCEDURE — 84436 ASSAY OF TOTAL THYROXINE: CPT

## 2020-02-28 PROCEDURE — 73702 CT LWR EXTREMITY W/O&W/DYE: CPT

## 2020-02-28 PROCEDURE — 36415 COLL VENOUS BLD VENIPUNCTURE: CPT

## 2020-02-28 PROCEDURE — 82962 GLUCOSE BLOOD TEST: CPT

## 2020-02-28 PROCEDURE — P9016 RBC LEUKOCYTES REDUCED: HCPCS

## 2020-02-28 PROCEDURE — 83605 ASSAY OF LACTIC ACID: CPT

## 2020-02-28 PROCEDURE — 36430 TRANSFUSION BLD/BLD COMPNT: CPT

## 2020-02-28 PROCEDURE — 86923 COMPATIBILITY TEST ELECTRIC: CPT

## 2020-02-28 PROCEDURE — 85730 THROMBOPLASTIN TIME PARTIAL: CPT

## 2020-02-28 PROCEDURE — 86850 RBC ANTIBODY SCREEN: CPT

## 2020-02-28 PROCEDURE — 6360000004 HC RX CONTRAST MEDICATION: Performed by: RADIOLOGY

## 2020-02-28 PROCEDURE — 2500000003 HC RX 250 WO HCPCS: Performed by: INTERNAL MEDICINE

## 2020-02-28 PROCEDURE — 86901 BLOOD TYPING SEROLOGIC RH(D): CPT

## 2020-02-28 PROCEDURE — 84443 ASSAY THYROID STIM HORMONE: CPT

## 2020-02-28 PROCEDURE — 80048 BASIC METABOLIC PNL TOTAL CA: CPT

## 2020-02-28 PROCEDURE — 87040 BLOOD CULTURE FOR BACTERIA: CPT

## 2020-02-28 PROCEDURE — 85025 COMPLETE CBC W/AUTO DIFF WBC: CPT

## 2020-02-28 RX ORDER — SODIUM CHLORIDE, SODIUM LACTATE, POTASSIUM CHLORIDE, CALCIUM CHLORIDE 600; 310; 30; 20 MG/100ML; MG/100ML; MG/100ML; MG/100ML
INJECTION, SOLUTION INTRAVENOUS CONTINUOUS
Status: ACTIVE | OUTPATIENT
Start: 2020-02-28 | End: 2020-02-28

## 2020-02-28 RX ORDER — 0.9 % SODIUM CHLORIDE 0.9 %
20 INTRAVENOUS SOLUTION INTRAVENOUS ONCE
Status: DISCONTINUED | OUTPATIENT
Start: 2020-02-28 | End: 2020-03-01

## 2020-02-28 RX ADMIN — IOPAMIDOL 80 ML: 755 INJECTION, SOLUTION INTRAVENOUS at 16:42

## 2020-02-28 RX ADMIN — OXYCODONE HYDROCHLORIDE 10 MG: 5 TABLET ORAL at 02:39

## 2020-02-28 RX ADMIN — NITROGLYCERIN 0.5 INCH: 20 OINTMENT TOPICAL at 06:01

## 2020-02-28 RX ADMIN — SODIUM CHLORIDE, POTASSIUM CHLORIDE, SODIUM LACTATE AND CALCIUM CHLORIDE: 600; 310; 30; 20 INJECTION, SOLUTION INTRAVENOUS at 15:56

## 2020-02-28 RX ADMIN — ACETAMINOPHEN 650 MG: 325 TABLET, FILM COATED ORAL at 18:22

## 2020-02-28 RX ADMIN — FERROUS SULFATE TAB 325 MG (65 MG ELEMENTAL FE) 325 MG: 325 (65 FE) TAB at 07:58

## 2020-02-28 RX ADMIN — CEFEPIME HYDROCHLORIDE 2 G: 2 INJECTION, POWDER, FOR SOLUTION INTRAVENOUS at 15:55

## 2020-02-28 RX ADMIN — NITROGLYCERIN 0.5 INCH: 20 OINTMENT TOPICAL at 17:45

## 2020-02-28 RX ADMIN — VANCOMYCIN HYDROCHLORIDE 1250 MG: 5 INJECTION, POWDER, LYOPHILIZED, FOR SOLUTION INTRAVENOUS at 09:33

## 2020-02-28 RX ADMIN — OXYCODONE HYDROCHLORIDE 10 MG: 5 TABLET ORAL at 16:15

## 2020-02-28 RX ADMIN — SODIUM CHLORIDE: 9 INJECTION, SOLUTION INTRAVENOUS at 17:37

## 2020-02-28 RX ADMIN — METRONIDAZOLE 500 MG: 500 INJECTION, SOLUTION INTRAVENOUS at 03:06

## 2020-02-28 RX ADMIN — ACETAMINOPHEN 650 MG: 325 TABLET, FILM COATED ORAL at 07:06

## 2020-02-28 RX ADMIN — OXYCODONE HYDROCHLORIDE 10 MG: 5 TABLET ORAL at 08:09

## 2020-02-28 RX ADMIN — CEFEPIME HYDROCHLORIDE 2 G: 2 INJECTION, POWDER, FOR SOLUTION INTRAVENOUS at 00:14

## 2020-02-28 ASSESSMENT — PAIN SCALES - GENERAL
PAINLEVEL_OUTOF10: 10
PAINLEVEL_OUTOF10: 0
PAINLEVEL_OUTOF10: 8
PAINLEVEL_OUTOF10: 0
PAINLEVEL_OUTOF10: 10
PAINLEVEL_OUTOF10: 0
PAINLEVEL_OUTOF10: 8
PAINLEVEL_OUTOF10: 10
PAINLEVEL_OUTOF10: 0
PAINLEVEL_OUTOF10: 0

## 2020-02-28 ASSESSMENT — PAIN DESCRIPTION - PROGRESSION: CLINICAL_PROGRESSION: NOT CHANGED

## 2020-02-28 ASSESSMENT — PAIN DESCRIPTION - FREQUENCY
FREQUENCY: CONTINUOUS
FREQUENCY: CONTINUOUS

## 2020-02-28 ASSESSMENT — PAIN DESCRIPTION - ONSET: ONSET: ON-GOING

## 2020-02-28 ASSESSMENT — PAIN DESCRIPTION - DESCRIPTORS
DESCRIPTORS: ACHING;TENDER;THROBBING
DESCRIPTORS: ACHING;THROBBING

## 2020-02-28 ASSESSMENT — PAIN DESCRIPTION - PAIN TYPE
TYPE: ACUTE PAIN
TYPE: ACUTE PAIN

## 2020-02-28 ASSESSMENT — PAIN DESCRIPTION - LOCATION
LOCATION: LEG
LOCATION: LEG

## 2020-02-28 ASSESSMENT — PAIN DESCRIPTION - ORIENTATION
ORIENTATION: LEFT
ORIENTATION: LEFT

## 2020-02-28 NOTE — PROGRESS NOTES
[address] [place] [address] [facility]  Progress Note    Washington Mcgrath  1960  DATE:20    F/U: fevers    SUBJECTIVE:  Chief Complaint   Patient presents with    Foot Pain     DAVID COLD NUMB TO FEET/ NO RECENT INJ     More alert  Tmax 102.1 FEVERS non toxic comfortable  No CHILLS, no  nausea, vomiting, diarrhea. ABD pain   Patient is tolerating medications. No reported adverse drug reactions. Review of systems:  As stated above in the chief complaint, otherwise negative. Medications:  Scheduled Meds:   sodium chloride  20 mL Intravenous Once    insulin lispro  0-6 Units Subcutaneous TID WC    insulin lispro  0-3 Units Subcutaneous Nightly    metoprolol  75 mg Oral BID    metroNIDAZOLE  500 mg Intravenous Q8H    cefepime  2 g Intravenous Q12H    fluconazole  400 mg Intravenous Q24H    clopidogrel  75 mg Oral Daily    atorvastatin  80 mg Oral Daily    lisinopril  10 mg Oral Daily    benztropine  0.5 mg Oral Daily    ferrous sulfate  325 mg Oral Daily with breakfast    gabapentin  300 mg Oral Nightly    methIMAzole  5 mg Oral Daily    oxybutynin  5 mg Oral TID    pyridoxine  50 mg Oral Daily    nitroglycerin  0.5 inch Topical 4 times per day     Continuous Infusions:   dextrose      heparin (porcine) 15.88 Units/kg/hr (20 0645)    sodium chloride Stopped (20 1538)    sodium chloride 100 mL/hr at 20 1010     PRN Meds:glucose, dextrose, glucagon (rDNA), dextrose, acetaminophen, heparin (porcine), heparin (porcine), oxyCODONE **OR** oxyCODONE, hydrALAZINE, diazePAM, ondansetron, promethazine    OBJECTIVE:  /76   Pulse 106   Temp 100 °F (37.8 °C) (Oral)   Resp (!) 36   Ht 5' 2\" (1.575 m)   Wt 141 lb 1 oz (64 kg)   SpO2 95%   BMI 25.80 kg/m²   Temp  Av °F (38.3 °C)  Min: 99.1 °F (37.3 °C)  Max: 103.2 °F (39.6 °C)  Constitutional:  The patient is awake, alert, and oriented. In bed  Skin:    Warm and dry. No rashes were noted.    HEENT: to recurrent UTIs presented with left le pain     PAD   s/p  2/24  revascularization left le -does not appear infected at this time same   Fevers/leukocytosis r/o sepsis  F/u blood cx  CT with perinephric stranding ? UTI/pyelonephritis   ua and cx pending  H/o urine cx with mixed chioma-Mild left hydronephrosis. Bilateral ureteral stents in  place. Cont vanco   cont cefepime  Cont  Fluconazole  Stop flagyl   pct 24.16 check lactic acid/cpk  abd xry   Await cultures      · Monitor labs    Imaging and labs were reviewed per medical records. The patient was educated about the diagnosis, prognosis, indications, risks and benefits of treatment. An opportunity to ask questions was given to the patient/FAMILY.       Electronically signed by Bekah Gomez MD on 2/28/2020 at 8:28 AM

## 2020-02-28 NOTE — PROGRESS NOTES
CRITICAL CARE PROGRESS NOTE    The patient's case was discussed in multidisciplinary rounds including critical care specialist, nursing, RT and pharmacy. Her evaluation is as follows:     60year old woman with PMH of diabetes mellitus, schizophrenia, hypertension, hyperlipidemia, peripheral vascular disease, admitted to ICU for management of left leg ischemia. The patient is febrile, with tachycardia, anemia and leukocytosis. --Mentation seems better today, her toes have marked cyanosis in the tips, febrile    Recent Labs     02/27/20  0600   *   K 3.1*      CO2 19*   BUN 10   CREATININE 0.6   GLUCOSE 177*   CALCIUM 7.9*     Recent Labs     02/27/20  0600   WBC 13.2*   RBC 3.21*   HGB 7.9*   HCT 24.3*   MCV 75.7*   MCH 24.6*   MCHC 32.5   RDW 17.8*      MPV 11.7     Recent Labs     02/25/20  0750 02/26/20  0017   BC 24 Hours- no growth 24 Hours- no growth       Recent Labs     02/25/20  0850 02/26/20  0200   BLOODCULT2 24 Hours- no growth 24 Hours- no growth       24 HR INTAKE/OUTPUT:      Intake/Output Summary (Last 24 hours) at 2/27/2020 1915  Last data filed at 2/27/2020 4857  Gross per 24 hour   Intake 1638.93 ml   Output --   Net 1638.93 ml     BP (!) 115/51   Pulse 92   Temp 99.1 °F (37.3 °C) (Oral)   Resp 21   Ht 5' 2\" (1.575 m)   Wt 135 lb 7 oz (61.4 kg)   SpO2 99%   BMI 24.77 kg/m²   General: Awake, oriented to place, time and person  HEENT: No head lesions, PERRL, EOMI, mouth without lesions, no nasal lesions, no cervical adenopathy palpated  Respiratory: Lungs with equal breath sounds bilaterally, no adventitious sounds auscultated, no accessory muscle use  CV: Regular rate, no murmurs, no JVD, no leg edema  Abdomen: Soft, non tender, + bowel sounds  Skin: Hydrated, adequate turgor, capillary refill 3 seconds in left foot, has mottling of the left sole (improved from yesterday), tips of his left toes are cyanotic.    Extremities: Muscular strength 5/5 in 4 limbs, moves 4 limbs spontaneously, distal pulses present  Neurology: Awake and alert, follows commands, moves 4 limbs on command and spontaneously,  neck is supple, no meningitic signs present. A/P:  1) Sepsis secondary to possible pyelonephritis, so far there is no growth on cultures  --Antibiotic regimen: Cefepime and vancomycin   --Cultures reviewed    2) Left foot/toes gangrene  --On heparin infusion     2) Metabolic encephalopathy  --Reorientation    3) Normocytic anemia  --Transfuse if Hb <7      Nutrition:PO  Vascular catheters: peripheral lines  GI prophylaxis with PPI  Goals of care: Full code    ATTESTATION:  ICU Staff Physician note of personal involvement in Care  As the attending physician, I certify that I personally reviewed the patients history and personnally examined the patient to confirm the physical findings described above,  And that I reviewed the relevant imaging studies and available reports. I also discussed the differential diagnosis and all of the proposed management plans with the patient and individuals accompanying the patient to this visit. They had the opportunity to ask questions about the proposed management plans and to have those questions answered. This patient has a high probability of sudden, clinically significant deterioration, which requires the highest level of physician preparedness to intervene urgently. I managed/supervised life or organ supporting interventions that required frequent physician assessment. I devoted my full attention to the direct care of this patient for the amount of time indicated below. Time I spent with the family or surrogate(s) is included only if the patient was incapable of providing the necessary information or participating in medical decisions - Time devoted to teaching and to any procedures I billed separately is not included.      CRITICAL CARE TIME:  30 minutes    Sagar Watson MD  Pulmonary and Critical Care Medicine

## 2020-02-28 NOTE — PLAN OF CARE
Problem: Pain:  Goal: Control of acute pain  Description  Control of acute pain  Outcome: Met This Shift     Problem: Falls - Risk of:  Goal: Will remain free from falls  Description  Will remain free from falls  2/28/2020 7642 by Alison Mishra RN  Outcome: Met This Shift

## 2020-02-28 NOTE — PROGRESS NOTES
Orders: Full Liquid, Carb Control 4 Carbs/Meal, No Added Salt (3-4gm), Low Fat   · Oral Diet intake: 1-25%  · Anthropometric Measures:  · Ht: 5' 2\" (157.5 cm)   · Current Body Wt: 141 lb (64 kg)(2/28 bedwt)  · Admission Body Wt: 140 lb (63.5 kg)(stated)  · Usual Body Wt: 180 lb (81.6 kg)(per EMR in October 2019 and no edema at that time)  · % Weight Change:  ,  27% loss in 3 mo  · Ideal Body Wt: 110 lb (49.9 kg), % Ideal Body 127% (admit)  · BMI Classification: BMI 25.0 - 29.9 Overweight    Nutrition Interventions:   Modify current diet, Start ONS(Continue to ADAT)  Continued Inpatient Monitoring, Education Not Indicated    Nutrition Evaluation:   · Evaluation: Progressing toward goals   · Goals: Pt will tolerate PO diet progression and ONS    · Monitoring: Nutrition Progression, Meal Intake, Supplement Intake, Skin Integrity, Wound Healing, I&O, Mental Status/Confusion, Weight, Pertinent Labs, Monitor Bowel Function      Electronically signed by Shahram Garcia RD, CNSC, LD on 2/28/20 at 11:44 AM    Contact Number:

## 2020-02-28 NOTE — PROGRESS NOTES
[id number]  Internal Medicine  -Resident Progress Note-    PCP:  Juanito Parks MD  Admitting Physician:  Mane Hathaway DO  Consultants:  Vascular surgery, infectious disease, orthopedic [facility] [place]  Chief Complaint:    Chief Complaint   Patient presents with    Foot Pain     DAVID COLD NUMB TO FEET/ NO RECENT INJ       History of Present Illness  Glendy Gaitan was seen and examined at bedside today;  was present for the examination. She underwent stent placement 2/24. She has been febrile since procedure. She remains tachycardic. She does seem to be developing gangrene in toes. Pads of toes becoming more dusky and dark. TPCO2 studies poor. Due to warmth of foot but with poor circulation, suspect infection. Review of Systems  All bolded are positive; please see HPI  General:  Fever, chills, diaphoresis, fatigue, malaise, night sweats, weight loss  Psychological:  Anxiety, disorientation, hallucinations. ENT:  Epistaxis, headaches, vertigo, visual changes. Cardiovascular:  Chest pain, irregular heartbeats, palpitations, paroxysmal nocturnal dyspnea. Respiratory:  Shortness of breath, coughing, sputum production, hemoptysis, wheezing, orthopnea.   Gastrointestinal:  Nausea, vomiting, diarrhea, heartburn, constipation, abdominal pain, hematemesis, hematochezia, melena, acholic stools  Genito-Urinary:  Dysuria, urgency, frequency, hematuria  Musculoskeletal:  Joint pain, joint stiffness, joint swelling, muscle pain  Neurology:  Headache, focal neurological deficits, weakness, numbness, paresthesia  Derm:  Rashes, ulcers, excoriations, bruising  Extremities:  Decreased ROM, peripheral edema, mottling    Physical Examination  Vitals:  BP (!) 123/58   Pulse 105   Temp 102.1 °F (38.9 °C) (Oral)   Resp 28   Ht 5' 2\" (1.575 m)   Wt 141 lb 1 oz (64 kg)   SpO2 98%   BMI 25.80 kg/m²   General Appearance:  awake, alert, and oriented to person, place, time, and purpose; appears stated age and cooperative; no apparent distress no labored breathing  states that patient does have some intermittent memory issues  HEENT:  NCAT; PERRL; conjunctiva pink, sclera clear  Neck:  no adenopathy, bruit, JVD, tenderness, masses, or nodules; supple, symmetrical, trachea midline, thyroid not enlarged  Lung:  clear to auscultation bilaterally; no use of accessory muscles; no rhonchi, rales, or crackles  Heart:  tachycardic regular rhythm without murmur, rub, or gallop  Abdomen:  soft, nontender, nondistended; normoactive bowel sounds; no organomegaly  Extremities:  extremities normal, atraumatic, no cyanosis or edema dusky L toes, cool to touch. L toes developing gangrene  Musculokeletal:  no joint swelling, no muscle tenderness. ROM normal in all joints of extremities.    Neurologic:  mental status A&Ox3, thought content appropriate; CN II-XII grossly intact; sensation intact, motor strength 5/5 globally; no slurred speech  Osteopathic:  Patient examined in seated position; normal lumbar lordosis and thoracic kyphosis; no TART changes to paraspinal musculature    Medications  Scheduled Meds    sodium chloride  20 mL Intravenous Once    potassium bicarb-citric acid  40 mEq Oral BID    insulin lispro  0-6 Units Subcutaneous TID WC    insulin lispro  0-3 Units Subcutaneous Nightly    metoprolol  75 mg Oral BID    sodium chloride  250 mL Intravenous Once    metroNIDAZOLE  500 mg Intravenous Q8H    cefepime  2 g Intravenous Q12H    acetaminophen  325 mg Oral Once    acetaminophen  325 mg Rectal Once    fluconazole  400 mg Intravenous Q24H    clopidogrel  75 mg Oral Daily    atorvastatin  80 mg Oral Daily    lisinopril  10 mg Oral Daily    benztropine  0.5 mg Oral Daily    ferrous sulfate  325 mg Oral Daily with breakfast    gabapentin  300 mg Oral Nightly    methIMAzole  5 mg Oral Daily    oxybutynin  5 mg Oral TID    pyridoxine  50 mg Oral Daily    nitroglycerin  0.5 inch Topical 4 times per day Infusion Meds    dextrose      heparin (porcine) 15.88 Units/kg/hr (02/28/20 0645)    sodium chloride Stopped (02/27/20 1538)    sodium chloride 100 mL/hr at 02/27/20 1010     PRN Meds glucose, dextrose, glucagon (rDNA), dextrose, acetaminophen, heparin (porcine), heparin (porcine), oxyCODONE **OR** oxyCODONE, hydrALAZINE, diazePAM, ondansetron, promethazine    Laboratory Data  Recent Results (from the past 24 hour(s))   POCT Glucose    Collection Time: 02/27/20 12:18 PM   Result Value Ref Range    Meter Glucose 124 (H) 74 - 99 mg/dL   POCT Glucose    Collection Time: 02/27/20  5:07 PM   Result Value Ref Range    Meter Glucose 154 (H) 74 - 99 mg/dL   Basic Metabolic Panel    Collection Time: 02/28/20  6:00 AM   Result Value Ref Range    Sodium 132 132 - 146 mmol/L    Potassium 4.0 3.5 - 5.0 mmol/L    Chloride 105 98 - 107 mmol/L    CO2 18 (L) 22 - 29 mmol/L    Anion Gap 9 7 - 16 mmol/L    Glucose 155 (H) 74 - 99 mg/dL    BUN 12 8 - 23 mg/dL    CREATININE 0.7 0.5 - 1.0 mg/dL    GFR Non-African American >60 >=60 mL/min/1.73    GFR African American >60     Calcium 7.6 (L) 8.6 - 10.2 mg/dL   CBC Auto Differential    Collection Time: 02/28/20  6:00 AM   Result Value Ref Range    WBC 16.1 (H) 4.5 - 11.5 E9/L    RBC 2.60 (L) 3.50 - 5.50 E12/L    Hemoglobin 6.4 (L) 11.5 - 15.5 g/dL    Hematocrit 19.8 (L) 34.0 - 48.0 %    MCV 76.2 (L) 80.0 - 99.9 fL    MCH 24.6 (L) 26.0 - 35.0 pg    MCHC 32.3 32.0 - 34.5 %    RDW 19.1 (H) 11.5 - 15.0 fL    Platelets 897 065 - 311 E9/L    MPV 11.8 7.0 - 12.0 fL    Neutrophils % 77.0 43.0 - 80.0 %    Lymphocytes % 15.0 (L) 20.0 - 42.0 %    Monocytes % 7.1 2.0 - 12.0 %    Eosinophils % 0.2 0.0 - 6.0 %    Basophils % 0.9 0.0 - 2.0 %    Neutrophils Absolute 12.40 (H) 1.80 - 7.30 E9/L    Lymphocytes Absolute 2.42 1.50 - 4.00 E9/L    Monocytes Absolute 1.13 (H) 0.10 - 0.95 E9/L    Eosinophils Absolute 0.00 (L) 0.05 - 0.50 E9/L    Basophils Absolute 0.14 0.00 - 0.20 E9/L    Vacuolated portable chest.     Cta Abdominal Aorta W Bilat Runoff W Contrast    Result Date: 2/23/2020  Reading location: 200 Indication: Left foot, pain. Comparison: None available. Technique: Multidetector CT angiography of the abdomen and pelvis with bilateral lower extremity runoffs was performed. Additional delayed images through bilateral lower extremities was obtained. Coronal, sagittal, curved, MIP, and 3-D volume rendered reformatted images were provided. FINDINGS: Visualized portion of the descending thoracic aorta is normal in caliber. The abdominal aorta is normal in caliber and enhancement. There is mild scattered atherosclerotic plaque. There is no evidence of dissection or significant stenosis. The celiac axis and superior mesenteric arteries demonstrate normal enhancement. Bilateral renal arteries demonstrate normal enhancement. There is an accessory renal artery on the right. There is mild stenosis at the inferior mesenteric artery origin. The remainder of the inferior mesenteric artery demonstrates normal enhancement. There is mild atherosclerotic plaque along the right common iliac artery without significant stenosis. The right external iliac and common femoral arteries are patent. The superficial femoral artery and profunda femoris arteries demonstrates normal enhancement. The right popliteal and tibioperitoneal trunk demonstrate normal enhancement. There is a three-vessel runoff on the delayed images. There is mild atherosclerotic plaque along the left common iliac artery without significant stenosis. The left external iliac artery is patent. The common femoral artery and profunda femoris arteries are patent. There is occlusion of the mid to distal left superficial femoral artery for segment of approximate 5 cm (series 307, image 52) with reconstitution of the distal superficial femoral artery via collaterals.  The left popliteal artery, peritoneal trunk, and proximal tibial arteries demonstrate normal enhancement. There is occlusion of the anterior tibial and peroneal arteries at the level of the mid tibial diaphysis. There is occlusion of the distal posterior tibial artery at the level of the distal tibial diaphysis. Visualized portion bilateral lung bases demonstrate minimal dependent changes within bilateral lower lobes. There is no pleural effusion. The liver is normal in morphology. There is slight heterogeneous attenuation. The gallbladder is mildly distended. The common bile duct is normal in caliber. The pancreas and spleen are unremarkable. The distal esophagus, stomach, and duodenum are normal in appearance. The small bowel loops are normal in caliber. The appendix is identified and is unremarkable. The colon is normal in caliber and grossly unremarkable. Bilateral adrenal glands are normal in appearance. Bilateral kidneys are normal in morphology and demonstrate symmetric enhancement. There are bilateral ureteral stents with mild bilateral hydronephrosis and periureteric fat stranding. The distal pigtails are located within the bladder. The uterus is grossly unremarkable. There is no abdominal, retroperitoneal, or pelvic lymphadenopathy. The inferior vena cava is normal in caliber. There is extrinsic compression of the left common iliac vein by the left internal iliac artery (series 2, image 108). There is lack of venous flow on the delayed images within the left calf. There is no abdominal, retroperitoneal, or pelvic lymphadenopathy. There is no suspicious lytic or blastic osseous lesion. There is mild lower lumbar spondylosis. Bilateral hip, knee, and ankle joints are grossly maintained. The visualized muscles demonstrate normal morphology and CT attenuation. 1. Approximately 5 cm segment of occlusion of the mid to distal left superficial femoral artery with reconstitution at the distal superficial femoral artery via collaterals.  2. Occlusion of the left anterior tibial and peroneal arteries at the level the mid tibial diaphysis and distal posterior tibial artery at the level of the distal tibial diaphysis. 3. Three-vessel runoff on the right. 4. Extrinsic compression on the left common iliac vein by the left internal iliac artery. 5. Bilateral ureteral stents with mild bilateral hydroureteronephrosis and periureteric fat stranding. Microbiology  Blood and urine cultures ordered        Assessment and Plan  Patient is a 60 y.o. female who presented with painful left foot. 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  · Gangrene of the left foot and toes  · Sepsis secondary to left foot gangrene and likely pyelonephritis  · Ureteral stents with bilateral hydroureteronephrosis and fat stranding due to recurrent UTIs and bilateral ureteral obstruction   · Elevated troponin--NSTEMI  · Chronic microcytic hypochromic anemia with history of recurrent transfusion  · Diabetes mellitus with associated diabetic polyneuropathy without long-term use of insulin   · Hypertension  · Hyperlipidemia  · Hyperthyroidism     -Underwent stenting 2/24  -Echocardigoram showing LVH, EF 23%, stage I diastolic dysfunction  -Blood cultures and urine cultures are pending. Infectious disease has been consulted. Patient is on cefepime, flagyl, diflucan. - and CRP 19.8  -Procalcitonin 0.39  -Tylenol for fever  -Continuing to have fevers  -Replace potassium  -Left toes developing gangrene. Orthopedic surgery has been following  -Transfuse 1 unit PRBC  -TPCO2 poor in foot, recommend BKA      · Routine labs in AM.  · DVT prophylaxis. · Please see orders for further management and care. The plan was discussed with Dr. Sascha Briggs    7:58 AM  2/28/2020     Greater than 30 minutes of critical care time was spent with the patient. This includes chart review, , and discussion with those consultants involved in the patient's care.   I

## 2020-02-28 NOTE — CARE COORDINATION
Requesting pt/ot when appropriate for discharge direction. If amputation - Acute Rehab: If No amputation then home per family.  Electronically signed by JEANETTE Bucio  on 2/28/2020 at 11:17 AM

## 2020-02-28 NOTE — PROGRESS NOTES
Patient off the floor to [facility].handoff report given. Patient remains on heparin drip,ivf,vanc abx currently. Patient currently having pain 4/10 and tolerable.

## 2020-02-28 NOTE — PROGRESS NOTES
Lt foot pain improved  vss  Remains tachycardic  Lt toes becoming necrotic slowly  Lt heel and mid foot warm  Lt distal at/ pt/and peroneal signals + by doppler  Continue current care

## 2020-02-28 NOTE — PROGRESS NOTES
Transported patient back to room from [facility]. Patient continues to be a/o and states her pain is very tolerable at this time.

## 2020-02-29 ENCOUNTER — ANESTHESIA (OUTPATIENT)
Dept: OPERATING ROOM | Age: 60
DRG: 853 | End: 2020-02-29
Payer: MEDICARE

## 2020-02-29 VITALS
RESPIRATION RATE: 23 BRPM | OXYGEN SATURATION: 96 % | DIASTOLIC BLOOD PRESSURE: 122 MMHG | TEMPERATURE: 97.7 F | SYSTOLIC BLOOD PRESSURE: 154 MMHG

## 2020-02-29 LAB
ALBUMIN SERPL-MCNC: 2 G/DL (ref 3.5–5.2)
ALP BLD-CCNC: 105 U/L (ref 35–104)
ALT SERPL-CCNC: 25 U/L (ref 0–32)
ANION GAP SERPL CALCULATED.3IONS-SCNC: 14 MMOL/L (ref 7–16)
ANISOCYTOSIS: ABNORMAL
APTT: 32.3 SEC (ref 24.5–35.1)
APTT: 63.7 SEC (ref 24.5–35.1)
AST SERPL-CCNC: 64 U/L (ref 0–31)
B.E.: -10.4 MMOL/L (ref -3–3)
B.E.: -5.1 MMOL/L (ref -3–3)
BASOPHILS ABSOLUTE: 0 E9/L (ref 0–0.2)
BASOPHILS RELATIVE PERCENT: 0.5 % (ref 0–2)
BILIRUB SERPL-MCNC: 0.6 MG/DL (ref 0–1.2)
BILIRUBIN DIRECT: 0.2 MG/DL (ref 0–0.3)
BILIRUBIN, INDIRECT: 0.4 MG/DL (ref 0–1)
BUN BLDV-MCNC: 13 MG/DL (ref 8–23)
BURR CELLS: ABNORMAL
CALCIUM SERPL-MCNC: 7.4 MG/DL (ref 8.6–10.2)
CHLORIDE BLD-SCNC: 108 MMOL/L (ref 98–107)
CO2: 16 MMOL/L (ref 22–29)
COHB: 0.2 % (ref 0–1.5)
COHB: 0.5 % (ref 0–1.5)
CREAT SERPL-MCNC: 0.7 MG/DL (ref 0.5–1)
CRITICAL: ABNORMAL
CRITICAL: ABNORMAL
DATE ANALYZED: ABNORMAL
DATE ANALYZED: ABNORMAL
DATE OF COLLECTION: ABNORMAL
DATE OF COLLECTION: ABNORMAL
EOSINOPHILS ABSOLUTE: 0.16 E9/L (ref 0.05–0.5)
EOSINOPHILS RELATIVE PERCENT: 0.9 % (ref 0–6)
FIO2: 60 %
GFR AFRICAN AMERICAN: >60
GFR NON-AFRICAN AMERICAN: >60 ML/MIN/1.73
GLUCOSE BLD-MCNC: 128 MG/DL (ref 74–99)
HCO3: 15.5 MMOL/L (ref 22–26)
HCO3: 19.1 MMOL/L (ref 22–26)
HCT VFR BLD CALC: 19.4 % (ref 34–48)
HCT VFR BLD CALC: 24.6 % (ref 34–48)
HCT VFR BLD CALC: 26.8 % (ref 34–48)
HEMOGLOBIN: 6.7 G/DL (ref 11.5–15.5)
HEMOGLOBIN: 8.4 G/DL (ref 11.5–15.5)
HEMOGLOBIN: 8.8 G/DL (ref 11.5–15.5)
HHB: 2.2 % (ref 0–5)
HHB: 3.7 % (ref 0–5)
HYPOCHROMIA: ABNORMAL
INR BLD: 1.6
LAB: ABNORMAL
LAB: ABNORMAL
LYMPHOCYTES ABSOLUTE: 2.73 E9/L (ref 1.5–4)
LYMPHOCYTES RELATIVE PERCENT: 14.8 % (ref 20–42)
Lab: ABNORMAL
Lab: ABNORMAL
MCH RBC QN AUTO: 25.6 PG (ref 26–35)
MCH RBC QN AUTO: 27.2 PG (ref 26–35)
MCHC RBC AUTO-ENTMCNC: 32.8 % (ref 32–34.5)
MCHC RBC AUTO-ENTMCNC: 34.5 % (ref 32–34.5)
MCV RBC AUTO: 77.9 FL (ref 80–99.9)
MCV RBC AUTO: 78.9 FL (ref 80–99.9)
METAMYELOCYTES RELATIVE PERCENT: 0.9 % (ref 0–1)
METER GLUCOSE: 194 MG/DL (ref 74–99)
METHB: 0.7 % (ref 0–1.5)
METHB: 1.1 % (ref 0–1.5)
MODE: ABNORMAL
MODE: ABNORMAL
MONOCYTES ABSOLUTE: 1.82 E9/L (ref 0.1–0.95)
MONOCYTES RELATIVE PERCENT: 10.4 % (ref 2–12)
NEUTROPHILS ABSOLUTE: 13.47 E9/L (ref 1.8–7.3)
NEUTROPHILS RELATIVE PERCENT: 73 % (ref 43–80)
O2 CONTENT: 10.3 ML/DL
O2 CONTENT: 9.6 ML/DL
O2 SATURATION: 96.3 % (ref 92–98.5)
O2 SATURATION: 97.8 % (ref 92–98.5)
O2HB: 95 % (ref 94–97)
O2HB: 96.6 % (ref 94–97)
OPERATOR ID: 3
OPERATOR ID: ABNORMAL
OVALOCYTES: ABNORMAL
PATIENT TEMP: 37 C
PATIENT TEMP: 37 C
PCO2: 31.4 MMHG (ref 35–45)
PCO2: 33.9 MMHG (ref 35–45)
PDW BLD-RTO: 17.1 FL (ref 11.5–15)
PDW BLD-RTO: 18 FL (ref 11.5–15)
PEEP/CPAP: 5 CMH2O
PFO2: 2.58 MMHG/%
PH BLOOD GAS: 7.28 (ref 7.35–7.45)
PH BLOOD GAS: 7.4 (ref 7.35–7.45)
PLATELET # BLD: 305 E9/L (ref 130–450)
PLATELET # BLD: 429 E9/L (ref 130–450)
PMV BLD AUTO: 10.5 FL (ref 7–12)
PMV BLD AUTO: 11 FL (ref 7–12)
PO2: 154.9 MMHG (ref 75–100)
PO2: 97.9 MMHG (ref 75–100)
POIKILOCYTES: ABNORMAL
POLYCHROMASIA: ABNORMAL
POTASSIUM SERPL-SCNC: 3.6 MMOL/L (ref 3.5–5)
PROTHROMBIN TIME: 18.2 SEC (ref 9.3–12.4)
RBC # BLD: 2.46 E12/L (ref 3.5–5.5)
RBC # BLD: 3.44 E12/L (ref 3.5–5.5)
REASON FOR REJECTION: NORMAL
REJECTED TEST: NORMAL
RI(T): 1.42
RR MECHANICAL: 10 B/MIN
SODIUM BLD-SCNC: 138 MMOL/L (ref 132–146)
SOURCE, BLOOD GAS: ABNORMAL
SOURCE, BLOOD GAS: ABNORMAL
THB: 7 G/DL (ref 11.5–16.5)
THB: 7.3 G/DL (ref 11.5–16.5)
TIME ANALYZED: 1225
TIME ANALYZED: 1351
TOTAL CK: 665 U/L (ref 20–180)
TOTAL PROTEIN: 6 G/DL (ref 6.4–8.3)
TROPONIN: 0.04 NG/ML (ref 0–0.03)
VT MECHANICAL: 500 ML
WBC # BLD: 12.5 E9/L (ref 4.5–11.5)
WBC # BLD: 18.2 E9/L (ref 4.5–11.5)

## 2020-02-29 PROCEDURE — 0Y6J0Z3 DETACHMENT AT LEFT LOWER LEG, LOW, OPEN APPROACH: ICD-10-PCS | Performed by: ORTHOPAEDIC SURGERY

## 2020-02-29 PROCEDURE — 36430 TRANSFUSION BLD/BLD COMPNT: CPT

## 2020-02-29 PROCEDURE — 85025 COMPLETE CBC W/AUTO DIFF WBC: CPT

## 2020-02-29 PROCEDURE — 6370000000 HC RX 637 (ALT 250 FOR IP): Performed by: INTERNAL MEDICINE

## 2020-02-29 PROCEDURE — 3700000001 HC ADD 15 MINUTES (ANESTHESIA): Performed by: ORTHOPAEDIC SURGERY

## 2020-02-29 PROCEDURE — 84484 ASSAY OF TROPONIN QUANT: CPT

## 2020-02-29 PROCEDURE — 2709999900 HC NON-CHARGEABLE SUPPLY: Performed by: ORTHOPAEDIC SURGERY

## 2020-02-29 PROCEDURE — 2000000000 HC ICU R&B

## 2020-02-29 PROCEDURE — 3600000013 HC SURGERY LEVEL 3 ADDTL 15MIN: Performed by: ORTHOPAEDIC SURGERY

## 2020-02-29 PROCEDURE — 2580000003 HC RX 258: Performed by: INTERNAL MEDICINE

## 2020-02-29 PROCEDURE — 6360000002 HC RX W HCPCS: Performed by: NURSE ANESTHETIST, CERTIFIED REGISTERED

## 2020-02-29 PROCEDURE — P9016 RBC LEUKOCYTES REDUCED: HCPCS

## 2020-02-29 PROCEDURE — 82805 BLOOD GASES W/O2 SATURATION: CPT

## 2020-02-29 PROCEDURE — 3700000000 HC ANESTHESIA ATTENDED CARE: Performed by: ORTHOPAEDIC SURGERY

## 2020-02-29 PROCEDURE — 3600000003 HC SURGERY LEVEL 3 BASE: Performed by: ORTHOPAEDIC SURGERY

## 2020-02-29 PROCEDURE — 85014 HEMATOCRIT: CPT

## 2020-02-29 PROCEDURE — 6360000002 HC RX W HCPCS: Performed by: INTERNAL MEDICINE

## 2020-02-29 PROCEDURE — 36415 COLL VENOUS BLD VENIPUNCTURE: CPT

## 2020-02-29 PROCEDURE — 6360000002 HC RX W HCPCS: Performed by: SPECIALIST

## 2020-02-29 PROCEDURE — 88311 DECALCIFY TISSUE: CPT

## 2020-02-29 PROCEDURE — 2580000003 HC RX 258: Performed by: NURSE PRACTITIONER

## 2020-02-29 PROCEDURE — 80048 BASIC METABOLIC PNL TOTAL CA: CPT

## 2020-02-29 PROCEDURE — 2580000003 HC RX 258: Performed by: NURSE ANESTHETIST, CERTIFIED REGISTERED

## 2020-02-29 PROCEDURE — 2500000003 HC RX 250 WO HCPCS: Performed by: NURSE ANESTHETIST, CERTIFIED REGISTERED

## 2020-02-29 PROCEDURE — 88307 TISSUE EXAM BY PATHOLOGIST: CPT

## 2020-02-29 PROCEDURE — 37799 UNLISTED PX VASCULAR SURGERY: CPT

## 2020-02-29 PROCEDURE — 80076 HEPATIC FUNCTION PANEL: CPT

## 2020-02-29 PROCEDURE — 85730 THROMBOPLASTIN TIME PARTIAL: CPT

## 2020-02-29 PROCEDURE — 85027 COMPLETE CBC AUTOMATED: CPT

## 2020-02-29 PROCEDURE — 85610 PROTHROMBIN TIME: CPT

## 2020-02-29 PROCEDURE — 82550 ASSAY OF CK (CPK): CPT

## 2020-02-29 PROCEDURE — 85018 HEMOGLOBIN: CPT

## 2020-02-29 PROCEDURE — 82962 GLUCOSE BLOOD TEST: CPT

## 2020-02-29 RX ORDER — ETOMIDATE 2 MG/ML
INJECTION INTRAVENOUS PRN
Status: DISCONTINUED | OUTPATIENT
Start: 2020-02-29 | End: 2020-02-29 | Stop reason: SDUPTHER

## 2020-02-29 RX ORDER — SODIUM CHLORIDE 9 MG/ML
INJECTION, SOLUTION INTRAVENOUS CONTINUOUS PRN
Status: DISCONTINUED | OUTPATIENT
Start: 2020-02-29 | End: 2020-02-29 | Stop reason: SDUPTHER

## 2020-02-29 RX ORDER — MIDAZOLAM HYDROCHLORIDE 1 MG/ML
INJECTION INTRAMUSCULAR; INTRAVENOUS PRN
Status: DISCONTINUED | OUTPATIENT
Start: 2020-02-29 | End: 2020-02-29

## 2020-02-29 RX ORDER — DEXAMETHASONE SODIUM PHOSPHATE 10 MG/ML
INJECTION, SOLUTION INTRAMUSCULAR; INTRAVENOUS PRN
Status: DISCONTINUED | OUTPATIENT
Start: 2020-02-29 | End: 2020-02-29 | Stop reason: SDUPTHER

## 2020-02-29 RX ORDER — LIDOCAINE HYDROCHLORIDE 20 MG/ML
INJECTION, SOLUTION INTRAVENOUS PRN
Status: DISCONTINUED | OUTPATIENT
Start: 2020-02-29 | End: 2020-02-29 | Stop reason: SDUPTHER

## 2020-02-29 RX ORDER — NEOSTIGMINE METHYLSULFATE 1 MG/ML
INJECTION, SOLUTION INTRAVENOUS PRN
Status: DISCONTINUED | OUTPATIENT
Start: 2020-02-29 | End: 2020-02-29 | Stop reason: SDUPTHER

## 2020-02-29 RX ORDER — 0.9 % SODIUM CHLORIDE 0.9 %
20 INTRAVENOUS SOLUTION INTRAVENOUS ONCE
Status: COMPLETED | OUTPATIENT
Start: 2020-02-29 | End: 2020-02-29

## 2020-02-29 RX ORDER — ONDANSETRON 2 MG/ML
INJECTION INTRAMUSCULAR; INTRAVENOUS PRN
Status: DISCONTINUED | OUTPATIENT
Start: 2020-02-29 | End: 2020-02-29 | Stop reason: SDUPTHER

## 2020-02-29 RX ORDER — FENTANYL CITRATE 50 UG/ML
INJECTION, SOLUTION INTRAMUSCULAR; INTRAVENOUS PRN
Status: DISCONTINUED | OUTPATIENT
Start: 2020-02-29 | End: 2020-02-29 | Stop reason: SDUPTHER

## 2020-02-29 RX ORDER — GLYCOPYRROLATE 1 MG/5 ML
SYRINGE (ML) INTRAVENOUS PRN
Status: DISCONTINUED | OUTPATIENT
Start: 2020-02-29 | End: 2020-02-29 | Stop reason: SDUPTHER

## 2020-02-29 RX ORDER — 0.9 % SODIUM CHLORIDE 0.9 %
20 INTRAVENOUS SOLUTION INTRAVENOUS ONCE
Status: DISCONTINUED | OUTPATIENT
Start: 2020-02-29 | End: 2020-03-01

## 2020-02-29 RX ORDER — ROCURONIUM BROMIDE 10 MG/ML
INJECTION, SOLUTION INTRAVENOUS PRN
Status: DISCONTINUED | OUTPATIENT
Start: 2020-02-29 | End: 2020-02-29 | Stop reason: SDUPTHER

## 2020-02-29 RX ORDER — MIDAZOLAM HYDROCHLORIDE 1 MG/ML
INJECTION INTRAMUSCULAR; INTRAVENOUS PRN
Status: DISCONTINUED | OUTPATIENT
Start: 2020-02-29 | End: 2020-02-29 | Stop reason: SDUPTHER

## 2020-02-29 RX ORDER — PHENYLEPHRINE HYDROCHLORIDE 10 MG/ML
INJECTION INTRAVENOUS PRN
Status: DISCONTINUED | OUTPATIENT
Start: 2020-02-29 | End: 2020-02-29 | Stop reason: SDUPTHER

## 2020-02-29 RX ORDER — OXYCODONE HYDROCHLORIDE AND ACETAMINOPHEN 5; 325 MG/1; MG/1
1 TABLET ORAL EVERY 6 HOURS PRN
Qty: 28 TABLET | Refills: 0 | Status: SHIPPED | OUTPATIENT
Start: 2020-02-29 | End: 2020-03-07

## 2020-02-29 RX ORDER — CALCIUM CHLORIDE 100 MG/ML
INJECTION INTRAVENOUS; INTRAVENTRICULAR PRN
Status: DISCONTINUED | OUTPATIENT
Start: 2020-02-29 | End: 2020-02-29 | Stop reason: SDUPTHER

## 2020-02-29 RX ADMIN — ACETAMINOPHEN 650 MG: 325 TABLET, FILM COATED ORAL at 06:08

## 2020-02-29 RX ADMIN — PHENYLEPHRINE HYDROCHLORIDE 100 MCG: 10 INJECTION INTRAVENOUS at 11:46

## 2020-02-29 RX ADMIN — LIDOCAINE HYDROCHLORIDE 50 MG: 20 INJECTION, SOLUTION INTRAVENOUS at 10:43

## 2020-02-29 RX ADMIN — Medication 1 MG: at 12:21

## 2020-02-29 RX ADMIN — FENTANYL CITRATE 50 MCG: 50 INJECTION, SOLUTION INTRAMUSCULAR; INTRAVENOUS at 12:27

## 2020-02-29 RX ADMIN — ETOMIDATE 10 MG: 2 INJECTION, SOLUTION INTRAVENOUS at 10:43

## 2020-02-29 RX ADMIN — FLUCONAZOLE 400 MG: 2 INJECTION, SOLUTION INTRAVENOUS at 22:13

## 2020-02-29 RX ADMIN — METOPROLOL TARTRATE 75 MG: 50 TABLET, FILM COATED ORAL at 21:14

## 2020-02-29 RX ADMIN — CALCIUM CHLORIDE 0.5 G: 100 INJECTION, SOLUTION INTRAVENOUS at 12:14

## 2020-02-29 RX ADMIN — DEXAMETHASONE SODIUM PHOSPHATE 5 MG: 10 INJECTION, SOLUTION INTRAMUSCULAR; INTRAVENOUS at 10:54

## 2020-02-29 RX ADMIN — GABAPENTIN 300 MG: 300 CAPSULE ORAL at 21:15

## 2020-02-29 RX ADMIN — CALCIUM CHLORIDE 0.5 G: 100 INJECTION, SOLUTION INTRAVENOUS at 12:19

## 2020-02-29 RX ADMIN — Medication 5 MG: at 12:21

## 2020-02-29 RX ADMIN — VANCOMYCIN HYDROCHLORIDE 1.25 G: 5 INJECTION, POWDER, LYOPHILIZED, FOR SOLUTION INTRAVENOUS at 10:38

## 2020-02-29 RX ADMIN — OXYBUTYNIN CHLORIDE 5 MG: 5 TABLET ORAL at 00:08

## 2020-02-29 RX ADMIN — MIDAZOLAM 2 MG: 1 INJECTION INTRAMUSCULAR; INTRAVENOUS at 10:34

## 2020-02-29 RX ADMIN — FENTANYL CITRATE 100 MCG: 50 INJECTION, SOLUTION INTRAMUSCULAR; INTRAVENOUS at 10:34

## 2020-02-29 RX ADMIN — NITROGLYCERIN 0.5 INCH: 20 OINTMENT TOPICAL at 01:37

## 2020-02-29 RX ADMIN — OXYCODONE HYDROCHLORIDE 10 MG: 5 TABLET ORAL at 06:08

## 2020-02-29 RX ADMIN — METOPROLOL TARTRATE 75 MG: 50 TABLET, FILM COATED ORAL at 00:07

## 2020-02-29 RX ADMIN — SODIUM CHLORIDE 25 ML: 9 INJECTION, SOLUTION INTRAVENOUS at 15:00

## 2020-02-29 RX ADMIN — GABAPENTIN 300 MG: 300 CAPSULE ORAL at 00:08

## 2020-02-29 RX ADMIN — FENTANYL CITRATE 50 MCG: 50 INJECTION, SOLUTION INTRAMUSCULAR; INTRAVENOUS at 12:23

## 2020-02-29 RX ADMIN — CEFEPIME HYDROCHLORIDE 2 G: 2 INJECTION, POWDER, FOR SOLUTION INTRAVENOUS at 14:32

## 2020-02-29 RX ADMIN — METOPROLOL TARTRATE 75 MG: 50 TABLET, FILM COATED ORAL at 08:57

## 2020-02-29 RX ADMIN — SODIUM CHLORIDE: 9 INJECTION, SOLUTION INTRAVENOUS at 14:30

## 2020-02-29 RX ADMIN — ONDANSETRON HYDROCHLORIDE 4 MG: 2 INJECTION, SOLUTION INTRAMUSCULAR; INTRAVENOUS at 10:54

## 2020-02-29 RX ADMIN — DIAZEPAM 2 MG: 2 TABLET ORAL at 01:05

## 2020-02-29 RX ADMIN — OXYCODONE HYDROCHLORIDE 10 MG: 5 TABLET ORAL at 00:08

## 2020-02-29 RX ADMIN — NITROGLYCERIN 0.5 INCH: 20 OINTMENT TOPICAL at 06:08

## 2020-02-29 RX ADMIN — FENTANYL CITRATE 100 MCG: 50 INJECTION, SOLUTION INTRAMUSCULAR; INTRAVENOUS at 10:43

## 2020-02-29 RX ADMIN — PHENYLEPHRINE HYDROCHLORIDE 50 MCG: 10 INJECTION INTRAVENOUS at 11:35

## 2020-02-29 RX ADMIN — DIAZEPAM 2 MG: 2 TABLET ORAL at 19:45

## 2020-02-29 RX ADMIN — SODIUM CHLORIDE: 9 INJECTION, SOLUTION INTRAVENOUS at 18:36

## 2020-02-29 RX ADMIN — PHENYLEPHRINE HYDROCHLORIDE 50 MCG: 10 INJECTION INTRAVENOUS at 11:27

## 2020-02-29 RX ADMIN — OXYBUTYNIN CHLORIDE 5 MG: 5 TABLET ORAL at 21:15

## 2020-02-29 RX ADMIN — FENTANYL CITRATE 50 MCG: 50 INJECTION, SOLUTION INTRAMUSCULAR; INTRAVENOUS at 11:03

## 2020-02-29 RX ADMIN — SODIUM BICARBONATE 50 MEQ: 84 INJECTION, SOLUTION INTRAVENOUS at 12:36

## 2020-02-29 RX ADMIN — Medication 50 MG: at 10:43

## 2020-02-29 RX ADMIN — OXYCODONE HYDROCHLORIDE 10 MG: 5 TABLET ORAL at 14:13

## 2020-02-29 RX ADMIN — PHENYLEPHRINE HYDROCHLORIDE 50 MCG: 10 INJECTION INTRAVENOUS at 11:19

## 2020-02-29 RX ADMIN — CEFEPIME HYDROCHLORIDE 2 G: 2 INJECTION, POWDER, FOR SOLUTION INTRAVENOUS at 01:37

## 2020-02-29 RX ADMIN — SODIUM BICARBONATE 50 MEQ: 84 INJECTION, SOLUTION INTRAVENOUS at 12:30

## 2020-02-29 RX ADMIN — SODIUM BICARBONATE 50 MEQ: 84 INJECTION, SOLUTION INTRAVENOUS at 12:33

## 2020-02-29 RX ADMIN — SODIUM CHLORIDE: 9 INJECTION, SOLUTION INTRAVENOUS at 11:40

## 2020-02-29 RX ADMIN — OXYCODONE HYDROCHLORIDE 10 MG: 5 TABLET ORAL at 18:00

## 2020-02-29 ASSESSMENT — PULMONARY FUNCTION TESTS
PIF_VALUE: 26
PIF_VALUE: 23
PIF_VALUE: 16
PIF_VALUE: 26
PIF_VALUE: 22
PIF_VALUE: 26
PIF_VALUE: 23
PIF_VALUE: 3
PIF_VALUE: 27
PIF_VALUE: 4
PIF_VALUE: 23
PIF_VALUE: 26
PIF_VALUE: 26
PIF_VALUE: 24
PIF_VALUE: 26
PIF_VALUE: 13
PIF_VALUE: 2
PIF_VALUE: 26
PIF_VALUE: 26
PIF_VALUE: 27
PIF_VALUE: 26
PIF_VALUE: 25
PIF_VALUE: 28
PIF_VALUE: 3
PIF_VALUE: 2
PIF_VALUE: 26
PIF_VALUE: 0
PIF_VALUE: 25
PIF_VALUE: 13
PIF_VALUE: 31
PIF_VALUE: 26
PIF_VALUE: 27
PIF_VALUE: 13
PIF_VALUE: 26
PIF_VALUE: 2
PIF_VALUE: 26
PIF_VALUE: 25
PIF_VALUE: 26
PIF_VALUE: 26
PIF_VALUE: 13
PIF_VALUE: 26
PIF_VALUE: 26
PIF_VALUE: 3
PIF_VALUE: 27
PIF_VALUE: 12
PIF_VALUE: 26
PIF_VALUE: 14
PIF_VALUE: 26
PIF_VALUE: 24
PIF_VALUE: 2
PIF_VALUE: 26
PIF_VALUE: 13
PIF_VALUE: 23
PIF_VALUE: 27
PIF_VALUE: 26
PIF_VALUE: 7
PIF_VALUE: 0
PIF_VALUE: 26
PIF_VALUE: 2
PIF_VALUE: 12
PIF_VALUE: 26
PIF_VALUE: 13
PIF_VALUE: 23
PIF_VALUE: 27
PIF_VALUE: 0
PIF_VALUE: 14
PIF_VALUE: 23
PIF_VALUE: 27
PIF_VALUE: 26
PIF_VALUE: 13
PIF_VALUE: 3
PIF_VALUE: 26
PIF_VALUE: 23
PIF_VALUE: 26
PIF_VALUE: 14
PIF_VALUE: 26
PIF_VALUE: 27
PIF_VALUE: 2
PIF_VALUE: 1
PIF_VALUE: 26
PIF_VALUE: 2
PIF_VALUE: 25
PIF_VALUE: 25
PIF_VALUE: 24
PIF_VALUE: 1
PIF_VALUE: 24
PIF_VALUE: 26
PIF_VALUE: 26
PIF_VALUE: 20
PIF_VALUE: 26
PIF_VALUE: 26
PIF_VALUE: 24
PIF_VALUE: 25
PIF_VALUE: 2
PIF_VALUE: 29
PIF_VALUE: 23
PIF_VALUE: 0
PIF_VALUE: 0
PIF_VALUE: 26
PIF_VALUE: 26
PIF_VALUE: 21
PIF_VALUE: 23
PIF_VALUE: 23
PIF_VALUE: 26
PIF_VALUE: 13
PIF_VALUE: 2
PIF_VALUE: 26
PIF_VALUE: 26
PIF_VALUE: 19
PIF_VALUE: 13
PIF_VALUE: 13
PIF_VALUE: 27
PIF_VALUE: 27
PIF_VALUE: 26
PIF_VALUE: 23
PIF_VALUE: 26
PIF_VALUE: 13
PIF_VALUE: 0
PIF_VALUE: 26
PIF_VALUE: 13
PIF_VALUE: 24
PIF_VALUE: 23
PIF_VALUE: 19
PIF_VALUE: 26
PIF_VALUE: 26
PIF_VALUE: 12

## 2020-02-29 ASSESSMENT — PAIN SCALES - GENERAL
PAINLEVEL_OUTOF10: 0
PAINLEVEL_OUTOF10: 0
PAINLEVEL_OUTOF10: 8
PAINLEVEL_OUTOF10: 10
PAINLEVEL_OUTOF10: 0
PAINLEVEL_OUTOF10: 6
PAINLEVEL_OUTOF10: 10
PAINLEVEL_OUTOF10: 8
PAINLEVEL_OUTOF10: 0

## 2020-02-29 ASSESSMENT — PAIN DESCRIPTION - LOCATION: LOCATION: LEG

## 2020-02-29 ASSESSMENT — PAIN DESCRIPTION - ONSET: ONSET: ON-GOING

## 2020-02-29 ASSESSMENT — ENCOUNTER SYMPTOMS: SHORTNESS OF BREATH: 0

## 2020-02-29 ASSESSMENT — PAIN DESCRIPTION - PAIN TYPE: TYPE: SURGICAL PAIN

## 2020-02-29 ASSESSMENT — PAIN DESCRIPTION - FREQUENCY: FREQUENCY: CONTINUOUS

## 2020-02-29 ASSESSMENT — PAIN DESCRIPTION - ORIENTATION: ORIENTATION: LEFT

## 2020-02-29 ASSESSMENT — PAIN DESCRIPTION - PROGRESSION: CLINICAL_PROGRESSION: NOT CHANGED

## 2020-02-29 ASSESSMENT — PAIN DESCRIPTION - DESCRIPTORS: DESCRIPTORS: ACHING;THROBBING

## 2020-02-29 NOTE — PROGRESS NOTES
Patient transported to pacu holding area with 1 prbc infusing,ivf infusing. Patient alert to self,patients family escorted to surgery waiting room. patient will be having left BKA by dr. Lars Whitehead and anesthesia by dr. Kim Duggan

## 2020-02-29 NOTE — PROGRESS NOTES
Department of Orthopedic Surgery  Resident Progress Note    Patient seen and examined with family at bedside. Patient laying comfortably in bed. Denies subjective fevers, chills, chest pain or shortness of breath. Heparin help as of yesterday per nursing staff. Denies decreased sensation. No other orthopedic complaints at this time. VITALS:  BP (!) 119/54   Pulse 103   Temp 99.5 °F (37.5 °C) (Oral)   Resp 23   Ht 5' 2\" (1.575 m)   Wt 149 lb 4 oz (67.7 kg)   SpO2 94%   BMI 27.30 kg/m²     General: In no acute distress. Alert and oriented. MUSCULOSKELETAL:   left lower extremity:  · Dressings over foot, no open wounds over pretibial or posterior lower leg. · Small necrotic lesion about small toe  · Dusky pads of pedal area  · Compartments soft and compressible  · Diminished capillary refill. · Distal sensation diminished, subjectively can feel sensation to light touch to posterior calf. CBC:   Lab Results   Component Value Date    WBC 18.2 02/29/2020    HGB 8.8 02/29/2020    HCT 26.8 02/29/2020     02/29/2020     PT/INR:    Lab Results   Component Value Date    PROTIME 18.2 02/29/2020    INR 1.6 02/29/2020       ASSESSMENT  · Left vasculopathic lower extremity. · S/P revascularization of SFA by vascular surgery on 2/24/20    PLAN      · For left sided BKA today. · I have explained the risks and complications of the recommended surgery with the patient at length, as well as discussed potential treatment alternatives including nonoperative management. These risks include but are not limited to death or complication from anesthesia, pain, nerve tendon or vascular injury, infection, hematoma, deep vein thrombosis or pulmonary embolism, and need for further surgery, etc. Patient and family understood this, asked appropriate questions, which were all answered, and elected to proceed with the procedure.   · Post operative antibiotics  · Deep venous thrombosis prophylaxis - holding, early mobilization  · PT/OT assessment post operatively  · Pain Control: per primary  · Monitor H&H  · Discuss with Dr. Kat Lopes

## 2020-02-29 NOTE — ANESTHESIA PRE PROCEDURE
Department of Anesthesiology  Preprocedure Note       Name:  Andrea Casey   Age:  60 y.o.  :  1960                                          MRN:  [id number]         Date:  2020      Surgeon: Naomy De):  Gerhard Pandya DO    Procedure: LEG AMPUTATION BELOW KNEE (Left )    Medications prior to admission:   Prior to Admission medications    Medication Sig Start Date End Date Taking? Authorizing Provider   meloxicam (MOBIC) 7.5 MG tablet Take 1 tablet by mouth daily 20   Isacc Alonso MD   diclofenac sodium 1 % GEL Apply 4 g topically 4 times daily 20   Isacc Alonso MD   methIMAzole (TAPAZOLE) 5 MG tablet Take 1 tablet by mouth daily 20   Isacc Alonso MD   glipiZIDE (GLUCOTROL) 10 MG tablet Take 1 tablet by mouth daily 20   Isacc Alonso MD   benazepril (LOTENSIN) 10 MG tablet Take 1 tablet by mouth daily 20   Isacc Alonso MD   atorvastatin (LIPITOR) 20 MG tablet Take 1 tablet by mouth daily 20   Isacc Alnoso MD   megestrol (MEGACE) 20 MG tablet Take 1 tablet by mouth daily 20   Isacc Alonso MD   [facility]20   Isacc Alonso MD   oxybutynin (DITROPAN) 5 MG tablet Take 1 tablet by mouth 3 times daily 12/15/19   Anderson Reynaga DO   vitamin B-6 (B-6) 50 MG tablet Take 1 tablet by mouth daily 19   Anderson Reynaga DO   gabapentin (NEURONTIN) 100 MG capsule Take 300 mg by mouth nightly.      Historical Provider, MD   ferrous sulfate 325 (65 Fe) MG tablet Take 1 tablet by mouth daily (with breakfast) 10/30/19   Cece Perez DO   metoprolol (LOPRESSOR) 100 MG tablet Take 0.5 tablets by mouth 2 times daily 10/18/19   Corey Bravo MD   benztropine (COGENTIN) 0.5 MG tablet Take 0.5 mg by mouth daily  19   Historical Provider, MD   diazepam (VALIUM) 2 MG tablet Take 2 mg by mouth every 6 hours as needed for Anxiety Historical Provider, MD       Current medications:    No current facility-administered medications for this visit. No current outpatient medications on file.      Facility-Administered Medications Ordered in Other Visits   Medication Dose Route Frequency Provider Last Rate Last Dose    0.9 % sodium chloride bolus  20 mL Intravenous Once Rupali Bernal MD        vancomycin (VANCOCIN) 1,250 mg in dextrose 5 % 250 mL IVPB  1,250 mg Intravenous Q24H Musa Ramirez MD   Stopped at 02/28/20 1103    insulin lispro (HUMALOG) injection vial 0-6 Units  0-6 Units Subcutaneous TID WC Rupali Bernal MD   1 Units at 02/27/20 1712    insulin lispro (HUMALOG) injection vial 0-3 Units  0-3 Units Subcutaneous Nightly Rupali Bernal MD        glucose (GLUTOSE) 40 % oral gel 15 g  15 g Oral PRN Rupali Bernal MD        dextrose 50 % IV solution  12.5 g Intravenous PRN Rupali Bernal MD        glucagon (rDNA) injection 1 mg  1 mg Intramuscular PRN Rupali Bernal MD        dextrose 5 % solution  100 mL/hr Intravenous PRN Rupali Bernal MD        acetaminophen (TYLENOL) tablet 650 mg  650 mg Oral Q6H PRN Rupali Bernal MD   650 mg at 02/29/20 1549    metoprolol tartrate (LOPRESSOR) tablet 75 mg  75 mg Oral BID Magalethiago Carey, DO   75 mg at 02/29/20 0007    heparin (porcine) injection 4,750 Units  80 Units/kg Intravenous PRN Bryon Smith MD        heparin (porcine) injection 2,380 Units  40 Units/kg Intravenous PRN Bryon Smith MD        heparin 25,000 units in dextrose 5% 250 mL infusion  18 Units/kg/hr Intravenous Continuous Bryon Smith MD   Stopped at 02/29/20 0205    cefepime (MAXIPIME) 2 g IVPB extended (mini-bag)  2 g Intravenous Q12H Rupali Bernal MD   Stopped at 02/29/20 7195    And    0.9 % sodium chloride infusion  25 mL Intravenous Q12H Rupali Bernal MD   Stopped at 02/27/20 1539  oxyCODONE (ROXICODONE) immediate release tablet 5 mg  5 mg Oral Q4H PRN Jurgen Cueva MD   5 mg at 02/25/20 2149    Or    oxyCODONE (ROXICODONE) immediate release tablet 10 mg  10 mg Oral Q4H PRN Jurgen Cueva MD   10 mg at 02/29/20 0608    fluconazole (DIFLUCAN) in 0.9 % sodium chloride IVPB 400 mg  400 mg Intravenous Q24H Marizol Morales  mL/hr at 02/27/20 2154 400 mg at 02/27/20 2154    clopidogrel (PLAVIX) tablet 75 mg  75 mg Oral Daily Adan Bryant MD   75 mg at 02/27/20 1010    atorvastatin (LIPITOR) tablet 80 mg  80 mg Oral Daily Blackburn Jakes, DO   80 mg at 02/27/20 1010    lisinopril (PRINIVIL;ZESTRIL) tablet 10 mg  10 mg Oral Daily Blackburn Jakes, DO   10 mg at 02/27/20 1011    benztropine (COGENTIN) tablet 0.5 mg  0.5 mg Oral Daily Dexter Jakes, DO   0.5 mg at 02/27/20 1011    diazePAM (VALIUM) tablet 2 mg  2 mg Oral Q6H PRN Blackburn Jakes, DO   2 mg at 02/29/20 0105    ferrous sulfate tablet 325 mg  325 mg Oral Daily with breakfast Blackburn Jakes, DO   325 mg at 02/28/20 0568    gabapentin (NEURONTIN) capsule 300 mg  300 mg Oral Nightly Blackburn Jakes, DO   300 mg at 02/29/20 0008    methIMAzole (TAPAZOLE) tablet 5 mg  5 mg Oral Daily Dexter Jakes, DO   5 mg at 02/27/20 1011    oxybutynin (DITROPAN) tablet 5 mg  5 mg Oral TID Blackburn Jakes, DO   5 mg at 02/29/20 0008    vitamin B-6 (PYRIDOXINE) tablet 50 mg  50 mg Oral Daily Blackburn Jakes, DO   50 mg at 02/27/20 1011    0.9 % sodium chloride infusion   Intravenous Continuous Davi Carey,  mL/hr at 02/29/20 0800      ondansetron (ZOFRAN) injection 4 mg  4 mg Intravenous Q6H PRN Blackburn Jakes, DO        promethazine (PHENERGAN) injection 25 mg  25 mg Intravenous Q6H PRN Dexter Sarah, DO        nitroglycerin (NITRO-BID) 2 % ointment 0.5 inch  0.5 inch Topical 4 times per day Dexter Sarah, DO   0.5 inch at 02/29/20 0608       Allergies:  No Known Allergies    Problem List:    Patient Active Problem List   Diagnosis Counseling given: Not Answered  Comment: quit smoking 2015      Vital Signs (Current): There were no vitals filed for this visit. BP Readings from Last 3 Encounters:   02/29/20 (!) 125/57   02/13/20 136/78   01/24/20 (!) 166/70       NPO Status:                                                                                 BMI:   Wt Readings from Last 3 Encounters:   02/29/20 149 lb 4 oz (67.7 kg)   02/13/20 131 lb (59.4 kg)   01/24/20 141 lb 4.8 oz (64.1 kg)     There is no height or weight on file to calculate BMI.    CBC:   Lab Results   Component Value Date    WBC 18.2 02/29/2020    RBC 3.44 02/29/2020    HGB 8.8 02/29/2020    HCT 26.8 02/29/2020    MCV 77.9 02/29/2020    RDW 18.0 02/29/2020     02/29/2020       CMP:   Lab Results   Component Value Date     02/29/2020    K 3.6 02/29/2020    K 5.1 02/23/2020     02/29/2020    CO2 16 02/29/2020    BUN 13 02/29/2020    CREATININE 0.7 02/29/2020    GFRAA >60 02/29/2020    LABGLOM >60 02/29/2020    GLUCOSE 128 02/29/2020    GLUCOSE 171 04/26/2012    PROT 7.4 12/15/2019    CALCIUM 7.4 02/29/2020    BILITOT 0.8 12/15/2019    ALKPHOS 313 12/15/2019    AST 77 12/15/2019     12/15/2019       POC Tests: No results for input(s): POCGLU, POCNA, POCK, POCCL, POCBUN, POCHEMO, POCHCT in the last 72 hours.     Coags:   Lab Results   Component Value Date    PROTIME 18.2 02/29/2020    INR 1.6 02/29/2020    APTT 32.3 02/29/2020       HCG (If Applicable): No results found for: PREGTESTUR, PREGSERUM, HCG, HCGQUANT     ABGs:   Lab Results   Component Value Date    PO2ART 57.6 02/25/2020    DGF8FYD 24.8 02/25/2020    URJ3VCN 20.3 02/25/2020        Type & Screen (If Applicable):  No results found for: LABABO, [address]    Anesthesia Evaluation  Patient summary reviewed and Nursing notes reviewed  Airway: Mallampati: II  TM distance: >3 FB   Neck ROM: full  Mouth opening: > = 3 FB Dental:    (+) edentulous      Pulmonary:

## 2020-02-29 NOTE — PROGRESS NOTES
GLUCOSE 171 04/26/2012    PROT 6.0 02/29/2020    LABALBU 2.0 02/29/2020    LABALBU 4.3 04/26/2012    CALCIUM 7.4 02/29/2020    BILITOT 0.6 02/29/2020    ALKPHOS 105 02/29/2020    AST 64 02/29/2020    ALT 25 02/29/2020     Lab Results   Component Value Date    CRP 19.8 (H) 02/25/2020    CRP 16.2 (H) 12/12/2019    CRP 1.7 (H) 04/26/2012     Lab Results   Component Value Date    SEDRATE 115 (H) 02/25/2020    SEDRATE 89 (H) 12/12/2019       Microbiology:   Lab Results   Component Value Date    BLOODCULT2 24 Hours- no growth 02/26/2020    BLOODCULT2 24 Hours- no growth 02/25/2020    BLOODCULT2 5 Days- no growth 12/13/2019      ASSESSMENT/PLAN:  MS. Suki Saha a past medical history of anemia, diabetes, hyperlipidemia, hypertension, hypothyroidism, ureteral stent secondary to recurrent UTIs presented with left le pain     PAD   s/p  2/24  revascularization left le -does not appear infected at this time same   Fevers/leukocytosis r/o sepsis  F/u blood cx  CT with perinephric stranding ? UTI/pyelonephritis   ua and cx pending  H/o urine cx with mixed chioma-Mild left hydronephrosis. Bilateral ureteral stents in  Place. Emergent left BKA due to ischemic changes/color changes 2/29    PLAN  Continue Vancomycin and Cefepime for 24-48 hours then DC and follow off ATB  Local wound Care to left BKA   Continue Fluconazole   Repeat procal  Follow cultures  Monitor labs       Electronically signed by BENJI Davis NP on 2/29/2020 at 3:42 PM     Patient examined along with the nurse practitioner  Patient denies any complaint denies any fevers chills nausea vomiting cough or sputum production  Patient is status post left BKA the dressing present on the BKA stump  Family present in the room  We will discontinue antibiotic within 24-48 hours    I have discussed the case, including pertinent history and physical  exam findings .  I have seen and examined the patient and the key elements of the encounter have been performed

## 2020-02-29 NOTE — PROGRESS NOTES
[facility]  Internal Medicine  -Resident Progress Note-    PCP:  Rosana Cano MD  Admitting Physician:  Brien Cabot, DO  Consultants:  Vascular surgery, infectious disease, [facility]  Chief Complaint:    Chief Complaint   Patient presents with    Foot Pain     DAVID COLD NUMB TO FEET/ NO RECENT INJ       History of Present Illness  Elliot Shah was seen and examined at bedside today;  was present for the examination. She underwent stent placement 2/24. She has been febrile since procedure. She remains tachycardic. She does seem to be developing gangrene in toes. Pads of toes becoming more dusky and dark. TPCO2 studies poor. Due to warmth of foot but with poor circulation, suspect infection. CK worsening. For amputation today. Review of Systems  All bolded are positive; please see HPI  General:  Fever, chills, diaphoresis, fatigue, malaise, night sweats, weight loss  Psychological:  Anxiety, disorientation, hallucinations. ENT:  Epistaxis, headaches, vertigo, visual changes. Cardiovascular:  Chest pain, irregular heartbeats, palpitations, paroxysmal nocturnal dyspnea. Respiratory:  Shortness of breath, coughing, sputum production, hemoptysis, wheezing, orthopnea.   Gastrointestinal:  Nausea, vomiting, diarrhea, heartburn, constipation, abdominal pain, hematemesis, hematochezia, melena, acholic stools  Genito-Urinary:  Dysuria, urgency, frequency, hematuria  Musculoskeletal:  Joint pain, joint stiffness, joint swelling, muscle pain  Neurology:  Headache, focal neurological deficits, weakness, numbness, paresthesia  Derm:  Rashes, ulcers, excoriations, bruising  Extremities:  Decreased ROM, peripheral edema, mottling    Physical Examination  Vitals:  BP (!) 162/82   Pulse 114   Temp 100.2 °F (37.9 °C) (Oral)   Resp 29   Ht 5' 2\" (1.575 m)   Wt 149 lb 4 oz (67.7 kg)   SpO2 95%   BMI 27.30 kg/m²   General Appearance:  awake, alert, and oriented to person, place, time, and purpose; appears stated age and cooperative; no apparent distress no labored breathing  states that patient does have some intermittent memory issues  HEENT:  NCAT; PERRL; conjunctiva pink, sclera clear  Neck:  no adenopathy, bruit, JVD, tenderness, masses, or nodules; supple, symmetrical, trachea midline, thyroid not enlarged  Lung:  clear to auscultation bilaterally; no use of accessory muscles; no rhonchi, rales, or crackles  Heart:  tachycardic regular rhythm without murmur, rub, or gallop  Abdomen:  soft, nontender, nondistended; normoactive bowel sounds; no organomegaly  Extremities:  extremities normal, atraumatic, no cyanosis or edema  L toes developing gangrene  Musculokeletal:  no joint swelling, no muscle tenderness. ROM normal in all joints of extremities.    Neurologic:  mental status A&Ox3, thought content appropriate; CN II-XII grossly intact; sensation intact, motor strength 5/5 globally; no slurred speech  Osteopathic:  Patient examined in seated position; normal lumbar lordosis and thoracic kyphosis; no TART changes to paraspinal musculature    Medications  Scheduled Meds    sodium chloride  20 mL Intravenous Once    vancomycin  1,250 mg Intravenous Q24H    insulin lispro  0-6 Units Subcutaneous TID WC    insulin lispro  0-3 Units Subcutaneous Nightly    metoprolol  75 mg Oral BID    cefepime  2 g Intravenous Q12H    fluconazole  400 mg Intravenous Q24H    clopidogrel  75 mg Oral Daily    atorvastatin  80 mg Oral Daily    lisinopril  10 mg Oral Daily    benztropine  0.5 mg Oral Daily    ferrous sulfate  325 mg Oral Daily with breakfast    gabapentin  300 mg Oral Nightly    methIMAzole  5 mg Oral Daily    oxybutynin  5 mg Oral TID    pyridoxine  50 mg Oral Daily    nitroglycerin  0.5 inch Topical 4 times per day     Infusion Meds    dextrose      heparin (porcine) Stopped (02/29/20 0205)    sodium chloride Stopped (02/27/20 1538)    sodium chloride 100 mL/hr at 02/28/20 1737     PRN Meds glucose, dextrose, glucagon (rDNA), dextrose, acetaminophen, heparin (porcine), heparin (porcine), oxyCODONE **OR** oxyCODONE, diazePAM, ondansetron, promethazine    Laboratory Data  Recent Results (from the past 24 hour(s))   POCT Glucose    Collection Time: 02/28/20  7:52 AM   Result Value Ref Range    Meter Glucose 108 (H) 74 - 99 mg/dL   APTT    Collection Time: 02/28/20  1:00 PM   Result Value Ref Range    aPTT 127.3 (H) 24.5 - 35.1 sec   PREPARE RBC (CROSSMATCH), 1 Units    Collection Time: 02/28/20  1:00 PM   Result Value Ref Range    Product Code Blood Bank [id number]     Description Blood Bank Red Blood Cells, Leuko-reduced     Unit Number [id number]     Dispense Status Blood Bank transfused    Lactic acid, plasma    Collection Time: 02/28/20  1:00 PM   Result Value Ref Range    Lactic Acid 0.8 0.5 - 2.2 mmol/L   CK    Collection Time: 02/28/20  1:00 PM   Result Value Ref Range    Total  (H) 20 - 180 U/L   T3    Collection Time: 02/28/20  1:00 PM   Result Value Ref Range    T3, Total 55.49 (L) 80.00 - 200.00 ng/dL   T4    Collection Time: 02/28/20  1:00 PM   Result Value Ref Range    T4, Total 4.6 4.5 - 11.7 mcg/dL   TYPE AND SCREEN    Collection Time: 02/28/20  1:00 PM   Result Value Ref Range    ABO/Rh B POS     Antibody Screen NEG    Blood Gas, Arterial    Collection Time: 02/28/20  6:06 PM   Result Value Ref Range    Date Analyzed [id number]     Time Analyzed 1806     Source: Blood Arterial     pH, Blood Gas 7.452 (H) 7.350 - 7.450    PCO2 25.2 (L) 35.0 - 45.0 mmHg    PO2 61.1 (L) 75.0 - 100.0 mmHg    HCO3 17.2 (L) 22.0 - 26.0 mmol/L    B.E. -6.1 (L) -3.0 - 3.0 mmol/L    O2 Sat 92.1 92.0 - 98.5 %    O2Hb 91.4 (L) 94.0 - 97.0 %    COHb 0.1 0.0 - 1.5 %    MetHb 0.7 0.0 - 1.5 %    O2 Content 8.4 mL/dL    HHb 7.8 (H) 0.0 - 5.0 %    tHb (est) 6.5 (L) 11.5 - 16.5 g/dL    Mode NC- 2 L     Date Of Collection      Time Collected      Pt Temp 37.0 C     ID [id number]     Lab [id number]     Critical(s) Notified .  No Critical Values    SPECIMEN REJECTION    Collection Time: 02/29/20  1:07 AM   Result Value Ref Range    Rejected Test PTT     Reason for Rejection see below    APTT    Collection Time: 02/29/20  1:15 AM   Result Value Ref Range    aPTT 63.7 (H) 24.5 - 35.1 sec   Basic Metabolic Panel    Collection Time: 02/29/20  5:30 AM   Result Value Ref Range    Sodium 138 132 - 146 mmol/L    Potassium 3.6 3.5 - 5.0 mmol/L    Chloride 108 (H) 98 - 107 mmol/L    CO2 16 (L) 22 - 29 mmol/L    Anion Gap 14 7 - 16 mmol/L    Glucose 128 (H) 74 - 99 mg/dL    BUN 13 8 - 23 mg/dL    CREATININE 0.7 0.5 - 1.0 mg/dL    GFR Non-African American >60 >=60 mL/min/1.73    GFR African American >60     Calcium 7.4 (L) 8.6 - 10.2 mg/dL   CBC Auto Differential    Collection Time: 02/29/20  5:30 AM   Result Value Ref Range    WBC 18.2 (H) 4.5 - 11.5 E9/L    RBC 3.44 (L) 3.50 - 5.50 E12/L    Hemoglobin 8.8 (L) 11.5 - 15.5 g/dL    Hematocrit 26.8 (L) 34.0 - 48.0 %    MCV 77.9 (L) 80.0 - 99.9 fL    MCH 25.6 (L) 26.0 - 35.0 pg    MCHC 32.8 32.0 - 34.5 %    RDW 18.0 (H) 11.5 - 15.0 fL    Platelets 252 386 - 516 E9/L    MPV 10.5 7.0 - 12.0 fL    Neutrophils % 73.0 43.0 - 80.0 %    Lymphocytes % 14.8 (L) 20.0 - 42.0 %    Monocytes % 10.4 2.0 - 12.0 %    Eosinophils % 0.9 0.0 - 6.0 %    Basophils % 0.5 0.0 - 2.0 %    Neutrophils Absolute 13.47 (H) 1.80 - 7.30 E9/L    Lymphocytes Absolute 2.73 1.50 - 4.00 E9/L    Monocytes Absolute 1.82 (H) 0.10 - 0.95 E9/L    Eosinophils Absolute 0.16 0.05 - 0.50 E9/L    Basophils Absolute 0.00 0.00 - 0.20 E9/L    Metamyelocytes Relative 0.9 0.0 - 1.0 %    Anisocytosis 2+     Polychromasia 1+     Hypochromia 1+     Poikilocytes 2+     Fifty Six Cells 2+     Ovalocytes 1+    Protime-INR    Collection Time: 02/29/20  5:30 AM   Result Value Ref Range    Protime 18.2 (H) 9.3 - 12.4 sec    INR 1.6    APTT    Collection Time: 02/29/20  5:30 AM   Result Value Ref Range    aPTT 32.3 24.5 - 35.1 sec delayed images through bilateral lower extremities was obtained. Coronal, sagittal, curved, MIP, and 3-D volume rendered reformatted images were provided. FINDINGS: Visualized portion of the descending thoracic aorta is normal in caliber. The abdominal aorta is normal in caliber and enhancement. There is mild scattered atherosclerotic plaque. There is no evidence of dissection or significant stenosis. The celiac axis and superior mesenteric arteries demonstrate normal enhancement. Bilateral renal arteries demonstrate normal enhancement. There is an accessory renal artery on the right. There is mild stenosis at the inferior mesenteric artery origin. The remainder of the inferior mesenteric artery demonstrates normal enhancement. There is mild atherosclerotic plaque along the right common iliac artery without significant stenosis. The right external iliac and common femoral arteries are patent. The superficial femoral artery and profunda femoris arteries demonstrates normal enhancement. The right popliteal and tibioperitoneal trunk demonstrate normal enhancement. There is a three-vessel runoff on the delayed images. There is mild atherosclerotic plaque along the left common iliac artery without significant stenosis. The left external iliac artery is patent. The common femoral artery and profunda femoris arteries are patent. There is occlusion of the mid to distal left superficial femoral artery for segment of approximate 5 cm (series 307, image 52) with reconstitution of the distal superficial femoral artery via collaterals. The left popliteal artery, peritoneal trunk, and proximal tibial arteries demonstrate normal enhancement. There is occlusion of the anterior tibial and peroneal arteries at the level of the mid tibial diaphysis. There is occlusion of the distal posterior tibial artery at the level of the distal tibial diaphysis.  Visualized portion bilateral lung bases demonstrate minimal dependent changes within

## 2020-02-29 NOTE — PROGRESS NOTES
CRITICAL CARE PROGRESS NOTE    The patient's case was discussed in multidisciplinary rounds including critical care specialist, nursing, RT and pharmacy. Her evaluation is as follows:     60year old woman with PMH of diabetes mellitus, schizophrenia, hypertension, hyperlipidemia, peripheral vascular disease, admitted to ICU for management of left leg ischemia. The patient is febrile, with tachycardia, anemia and leukocytosis. --Awake. Her left foot seems worse today, purple/black distal to midmetatarsals  Surgery consult, for BKA today       Recent Labs     02/29/20  0530      K 3.6   *   CO2 16*   BUN 13   CREATININE 0.7   GLUCOSE 128*   CALCIUM 7.4*     Recent Labs     02/29/20  0530   WBC 18.2*   RBC 3.44*   HGB 8.8*   HCT 26.8*   MCV 77.9*   MCH 25.6*   MCHC 32.8   RDW 18.0*      MPV 10.5     No results for input(s): BC in the last 72 hours. No results for input(s): Holland Rodriguez in the last 72 hours.     24 HR INTAKE/OUTPUT:      Intake/Output Summary (Last 24 hours) at 2/29/2020 1055  Last data filed at 2/29/2020 0900  Gross per 24 hour   Intake 1549.24 ml   Output --   Net 1549.24 ml     BP (!) 119/54   Pulse 103   Temp 99.5 °F (37.5 °C) (Oral)   Resp 23   Ht 5' 2\" (1.575 m)   Wt 149 lb 4 oz (67.7 kg)   SpO2 94%   BMI 27.30 kg/m²   General: Awake, oriented to place, time and person  HEENT: No head lesions, PERRL, EOMI, mouth without lesions, no nasal lesions, no cervical adenopathy palpated  Respiratory: Lungs with good breath sounds bilaterally, no adventitious sounds auscultated, no accessory muscle use  CV: Regular rate, no murmurs, no JVD, no leg edema  Abdomen: Soft, non tender, + bowel sounds, no lesions  Skin: Hydrated, adequate turgor, left foot is purple/black, with pain when palpated, distal pulses absent on that extremity  Extremities: Muscular strength 4/5 in 4 limbs, moves 4 limbs spontaneously  Neurology: Awake and alert, follows commands, moves 4 limbs on command and spontaneously,neck is supple, no meningitic signs present. A/P:  1) Sepsis secondary to left foot gangrene/possible pyelonephritis, so far there is no growth on cultures  --Antibiotic regimen: Cefepime and vancomycin   --Cultures reviewed  --CT left foot without osteo, for BKA     2) Left foot/toes gangrene  --On heparin infusion     2) Metabolic encephalopathy  --Reorientation    3) Normocytic anemia  --Transfuse if Hb <7      Nutrition:PO  Vascular catheters: peripheral lines  GI prophylaxis with PPI  Goals of care: Full code    ATTESTATION:  ICU Staff Physician note of personal involvement in Care  As the attending physician, I certify that I personally reviewed the patients history and personnally examined the patient to confirm the physical findings described above,  And that I reviewed the relevant imaging studies and available reports. I also discussed the differential diagnosis and all of the proposed management plans with the patient and individuals accompanying the patient to this visit. They had the opportunity to ask questions about the proposed management plans and to have those questions answered. This patient has a high probability of sudden, clinically significant deterioration, which requires the highest level of physician preparedness to intervene urgently. I managed/supervised life or organ supporting interventions that required frequent physician assessment. I devoted my full attention to the direct care of this patient for the amount of time indicated below. Time I spent with the family or surrogate(s) is included only if the patient was incapable of providing the necessary information or participating in medical decisions - Time devoted to teaching and to any procedures I billed separately is not included.      CRITICAL CARE TIME:  30 minutes    Libby Nelson MD  Pulmonary and Critical Care Medicine  02/29/20

## 2020-03-01 LAB
ACANTHOCYTES: ABNORMAL
ANION GAP SERPL CALCULATED.3IONS-SCNC: 14 MMOL/L (ref 7–16)
ANISOCYTOSIS: ABNORMAL
APTT: 32.5 SEC (ref 24.5–35.1)
BASOPHILS ABSOLUTE: 0 E9/L (ref 0–0.2)
BASOPHILS RELATIVE PERCENT: 0.4 % (ref 0–2)
BLOOD CULTURE, ROUTINE: NORMAL
BUN BLDV-MCNC: 19 MG/DL (ref 8–23)
BURR CELLS: ABNORMAL
CALCIUM SERPL-MCNC: 8 MG/DL (ref 8.6–10.2)
CHLORIDE BLD-SCNC: 111 MMOL/L (ref 98–107)
CO2: 15 MMOL/L (ref 22–29)
CREAT SERPL-MCNC: 0.8 MG/DL (ref 0.5–1)
CULTURE, BLOOD 2: NORMAL
EOSINOPHILS ABSOLUTE: 0 E9/L (ref 0.05–0.5)
EOSINOPHILS RELATIVE PERCENT: 0 % (ref 0–6)
GFR AFRICAN AMERICAN: >60
GFR NON-AFRICAN AMERICAN: >60 ML/MIN/1.73
GLUCOSE BLD-MCNC: 173 MG/DL (ref 74–99)
HCT VFR BLD CALC: 24.5 % (ref 34–48)
HCT VFR BLD CALC: 25.3 % (ref 34–48)
HEMOGLOBIN: 8.3 G/DL (ref 11.5–15.5)
HEMOGLOBIN: 8.7 G/DL (ref 11.5–15.5)
INR BLD: 1.9
LYMPHOCYTES ABSOLUTE: 0.77 E9/L (ref 1.5–4)
LYMPHOCYTES RELATIVE PERCENT: 5.2 % (ref 20–42)
MCH RBC QN AUTO: 27.1 PG (ref 26–35)
MCHC RBC AUTO-ENTMCNC: 33.9 % (ref 32–34.5)
MCV RBC AUTO: 80.1 FL (ref 80–99.9)
METER GLUCOSE: 191 MG/DL (ref 74–99)
METER GLUCOSE: 193 MG/DL (ref 74–99)
MONOCYTES ABSOLUTE: 1.54 E9/L (ref 0.1–0.95)
MONOCYTES RELATIVE PERCENT: 9.6 % (ref 2–12)
NEUTROPHILS ABSOLUTE: 13.09 E9/L (ref 1.8–7.3)
NEUTROPHILS RELATIVE PERCENT: 85.2 % (ref 43–80)
NUCLEATED RED BLOOD CELLS: 0.9 /100 WBC
OVALOCYTES: ABNORMAL
PDW BLD-RTO: 16.4 FL (ref 11.5–15)
PLATELET # BLD: 365 E9/L (ref 130–450)
PMV BLD AUTO: 10.7 FL (ref 7–12)
POIKILOCYTES: ABNORMAL
POLYCHROMASIA: ABNORMAL
POTASSIUM SERPL-SCNC: 4 MMOL/L (ref 3.5–5)
PROTHROMBIN TIME: 21.9 SEC (ref 9.3–12.4)
RBC # BLD: 3.06 E12/L (ref 3.5–5.5)
SCHISTOCYTES: ABNORMAL
SODIUM BLD-SCNC: 140 MMOL/L (ref 132–146)
VACUOLATED NEUTROPHILS: ABNORMAL
WBC # BLD: 15.4 E9/L (ref 4.5–11.5)

## 2020-03-01 PROCEDURE — 6370000000 HC RX 637 (ALT 250 FOR IP): Performed by: THORACIC SURGERY (CARDIOTHORACIC VASCULAR SURGERY)

## 2020-03-01 PROCEDURE — 36415 COLL VENOUS BLD VENIPUNCTURE: CPT

## 2020-03-01 PROCEDURE — 6360000002 HC RX W HCPCS: Performed by: INTERNAL MEDICINE

## 2020-03-01 PROCEDURE — 1200000000 HC SEMI PRIVATE

## 2020-03-01 PROCEDURE — 2580000003 HC RX 258: Performed by: INTERNAL MEDICINE

## 2020-03-01 PROCEDURE — 6360000002 HC RX W HCPCS: Performed by: SPECIALIST

## 2020-03-01 PROCEDURE — 85014 HEMATOCRIT: CPT

## 2020-03-01 PROCEDURE — 85730 THROMBOPLASTIN TIME PARTIAL: CPT

## 2020-03-01 PROCEDURE — 6370000000 HC RX 637 (ALT 250 FOR IP): Performed by: INTERNAL MEDICINE

## 2020-03-01 PROCEDURE — 82962 GLUCOSE BLOOD TEST: CPT

## 2020-03-01 PROCEDURE — 37799 UNLISTED PX VASCULAR SURGERY: CPT

## 2020-03-01 PROCEDURE — 85025 COMPLETE CBC W/AUTO DIFF WBC: CPT

## 2020-03-01 PROCEDURE — 80048 BASIC METABOLIC PNL TOTAL CA: CPT

## 2020-03-01 PROCEDURE — 85018 HEMOGLOBIN: CPT

## 2020-03-01 PROCEDURE — 85610 PROTHROMBIN TIME: CPT

## 2020-03-01 RX ORDER — SODIUM CHLORIDE AND POTASSIUM CHLORIDE .9; .15 G/100ML; G/100ML
SOLUTION INTRAVENOUS CONTINUOUS
Status: DISCONTINUED | OUTPATIENT
Start: 2020-03-01 | End: 2020-03-02

## 2020-03-01 RX ADMIN — CEFEPIME HYDROCHLORIDE 2 G: 2 INJECTION, POWDER, FOR SOLUTION INTRAVENOUS at 00:27

## 2020-03-01 RX ADMIN — FLUCONAZOLE 400 MG: 2 INJECTION, SOLUTION INTRAVENOUS at 21:46

## 2020-03-01 RX ADMIN — OXYCODONE HYDROCHLORIDE 10 MG: 5 TABLET ORAL at 05:11

## 2020-03-01 RX ADMIN — INSULIN LISPRO 1 UNITS: 100 INJECTION, SOLUTION INTRAVENOUS; SUBCUTANEOUS at 13:07

## 2020-03-01 RX ADMIN — CEFEPIME HYDROCHLORIDE 2 G: 2 INJECTION, POWDER, FOR SOLUTION INTRAVENOUS at 10:55

## 2020-03-01 RX ADMIN — SODIUM CHLORIDE 25 ML: 9 INJECTION, SOLUTION INTRAVENOUS at 15:30

## 2020-03-01 RX ADMIN — VANCOMYCIN HYDROCHLORIDE 1250 MG: 5 INJECTION, POWDER, LYOPHILIZED, FOR SOLUTION INTRAVENOUS at 10:39

## 2020-03-01 RX ADMIN — SODIUM CHLORIDE 25 ML: 9 INJECTION, SOLUTION INTRAVENOUS at 04:10

## 2020-03-01 RX ADMIN — POTASSIUM CHLORIDE AND SODIUM CHLORIDE: 900; 150 INJECTION, SOLUTION INTRAVENOUS at 09:09

## 2020-03-01 RX ADMIN — BENZTROPINE MESYLATE 0.5 MG: 0.5 TABLET ORAL at 09:47

## 2020-03-01 RX ADMIN — CEFEPIME HYDROCHLORIDE 2 G: 2 INJECTION, POWDER, FOR SOLUTION INTRAVENOUS at 23:38

## 2020-03-01 RX ADMIN — PYRIDOXINE HCL TAB 50 MG 50 MG: 50 TAB at 09:47

## 2020-03-01 RX ADMIN — GABAPENTIN 300 MG: 300 CAPSULE ORAL at 21:45

## 2020-03-01 RX ADMIN — INSULIN LISPRO 1 UNITS: 100 INJECTION, SOLUTION INTRAVENOUS; SUBCUTANEOUS at 17:55

## 2020-03-01 RX ADMIN — OXYBUTYNIN CHLORIDE 5 MG: 5 TABLET ORAL at 21:46

## 2020-03-01 RX ADMIN — FERROUS SULFATE TAB 325 MG (65 MG ELEMENTAL FE) 325 MG: 325 (65 FE) TAB at 09:38

## 2020-03-01 RX ADMIN — LISINOPRIL 10 MG: 10 TABLET ORAL at 09:38

## 2020-03-01 RX ADMIN — OXYBUTYNIN CHLORIDE 5 MG: 5 TABLET ORAL at 09:38

## 2020-03-01 RX ADMIN — ATORVASTATIN CALCIUM 80 MG: 40 TABLET, FILM COATED ORAL at 09:38

## 2020-03-01 RX ADMIN — INSULIN LISPRO 1 UNITS: 100 INJECTION, SOLUTION INTRAVENOUS; SUBCUTANEOUS at 09:39

## 2020-03-01 RX ADMIN — METOPROLOL TARTRATE 75 MG: 50 TABLET, FILM COATED ORAL at 09:38

## 2020-03-01 RX ADMIN — POTASSIUM CHLORIDE AND SODIUM CHLORIDE: 900; 150 INJECTION, SOLUTION INTRAVENOUS at 21:46

## 2020-03-01 RX ADMIN — OXYCODONE HYDROCHLORIDE 10 MG: 5 TABLET ORAL at 09:38

## 2020-03-01 RX ADMIN — OXYBUTYNIN CHLORIDE 5 MG: 5 TABLET ORAL at 13:15

## 2020-03-01 RX ADMIN — CLOPIDOGREL BISULFATE 75 MG: 75 TABLET ORAL at 09:38

## 2020-03-01 RX ADMIN — METHIMAZOLE 5 MG: 5 TABLET ORAL at 09:47

## 2020-03-01 RX ADMIN — OXYCODONE HYDROCHLORIDE 10 MG: 5 TABLET ORAL at 16:39

## 2020-03-01 RX ADMIN — METOPROLOL TARTRATE 75 MG: 50 TABLET, FILM COATED ORAL at 21:45

## 2020-03-01 ASSESSMENT — PAIN DESCRIPTION - PAIN TYPE: TYPE: SURGICAL PAIN

## 2020-03-01 ASSESSMENT — PAIN SCALES - GENERAL
PAINLEVEL_OUTOF10: 7
PAINLEVEL_OUTOF10: 0
PAINLEVEL_OUTOF10: 7
PAINLEVEL_OUTOF10: 0
PAINLEVEL_OUTOF10: 8
PAINLEVEL_OUTOF10: 0
PAINLEVEL_OUTOF10: 8
PAINLEVEL_OUTOF10: 3
PAINLEVEL_OUTOF10: 3
PAINLEVEL_OUTOF10: 0

## 2020-03-01 ASSESSMENT — PAIN DESCRIPTION - FREQUENCY: FREQUENCY: INTERMITTENT

## 2020-03-01 ASSESSMENT — PAIN DESCRIPTION - ONSET: ONSET: ON-GOING

## 2020-03-01 ASSESSMENT — PAIN DESCRIPTION - LOCATION: LOCATION: LEG

## 2020-03-01 ASSESSMENT — PAIN DESCRIPTION - DESCRIPTORS: DESCRIPTORS: ACHING;DISCOMFORT;THROBBING

## 2020-03-01 ASSESSMENT — PAIN DESCRIPTION - PROGRESSION: CLINICAL_PROGRESSION: GRADUALLY IMPROVING

## 2020-03-01 ASSESSMENT — PAIN DESCRIPTION - ORIENTATION: ORIENTATION: LEFT

## 2020-03-01 NOTE — CARE COORDINATION
3/1/2020 1016 CM note: Met with pt and  at bedside regarding [address] [place] [address] Dr referral. Pt/spouse prefer [facility] for rehab. Referral placed to [facility]. Will await acceptance. Will NEED PT/OT KAVITA, signed GLORIA, NO PRECERT for acute rehab. Denise SLAUGHTER       The Plan for Transition of Care is related to the following treatment goals: Acute rehab    The Patient and pt  were provided with a choice of provider and agrees   with the discharge plan. [x] Yes [] No    Freedom of choice list was provided with basic dialogue that supports the patient's individualized plan of care/goals, treatment preferences and shares the quality data associated with the providers.  [] Yes [x] No   PREFER [place]

## 2020-03-01 NOTE — PROGRESS NOTES
Internal Medicine Progress Note    Suze Beny. Lorin Lugo., & [address] Lorin Lugo., F.A.C.O.I. Jeanne Ashraf D.O., F.A.C.O.I. Primary Care Physician: Kemi Souza MD   Admitting Physician:  Bharat Still DO  Admission date and time: 2/23/2020  4:19 PM    Room:  [place] [address]    Patient Name: Macie Fisher  MRN: [id number]    Date of Service: 3/1/2020     Subjective:  Rhoda Thurman underwent below the knee amputation yesterday and tolerated this without complication. She admits to mild postoperative pain as to be expected. She did require additional packed red blood cell transfusion yesterday but her hemoglobin remained stable at this point. She is requesting advancement in her diet. Her  was not present during my examination today but was updated at the bedside yesterday. Review of System: HEENT:  Justice ear pain, sore throat, sinus or eye problems  Cardiovascular:   Denies any chest pain, irregular heartbeats, or palpitations. Respiratory:   Denies shortness of breath, coughing, sputum production, hemoptysis, or wheezing. Gastrointestinal:   Denies nausea, vomiting, diarrhea, or constipation. Denies any abdominal pain. Extremities:   Admits to postoperative surgical pain as to be expected. Neurology:    Denies any headache or focal neurological deficits. Admits to generalized weakness and deconditioning. Derm:    Denies any rashes, ulcers, or excoriations. Denies bruising. Surgical site is appropriately dressed. Genitourinary:    Denies any urgency, frequency, hematuria. Voiding without difficulty. Musculoskeletal:  Denies myalgias, joint complaints or back pain      Physical Exam:  No intake/output data recorded. Blood pressure (!) 164/68, pulse 91, temperature 99.7 °F (37.6 °C), temperature source Core, resp. rate (!) 39, height 5' 2\" (1.575 m), weight 149 lb 4 oz (67.7 kg), SpO2 96 %, not currently breastfeeding. HEENT:    ELIE critical care time was spent with the patient. This time included chart review, , and discussion with those consultants involved in the patient's care. More than 50% of my  time was spent at the bedside counseling/coordinating care with the patient and/or family with face to face contact. This time was spent reviewing notes and laboratory data as well as instructing and counseling the patient. Time I spent with the family or surrogate(s) is included only if the patient was incapable of providing the necessary information or participating in medical decisions. I also discussed the differential diagnosis and all of the proposed management plans with the patient and individuals accompanying the patient. Rhoda Awe requires this high level of physician care and nursing on the IMC/Telemetry unit due the complexity of decision management and chance of rapid decline or death. Continued cardiac monitoring and higher level of nursing are required. I am readily available for any further decision-making and intervention.        Bharat Still DO, F.A.C.O.I.  3/1/2020  7:21 AM

## 2020-03-01 NOTE — ANESTHESIA POSTPROCEDURE EVALUATION
Department of Anesthesiology  Postprocedure Note    Patient: Carlos Garza  MRN: [id number]  YOB: 1960  Date of evaluation: 3/1/2020  Time:  10:06 AM     Procedure Summary     Date:  02/29/20 Room / Location:  [address] [place] [address] / [address]    Anesthesia Start:  2535 Anesthesia Stop:  4735    Procedures:       [address] [facility] (Left )      LEFT BELOW KNEE AMPUTATION. Diagnosis:  (GANGRENE LEFT LEG)    Surgeon:  Tommie Owens DO Responsible Provider:  Ruddy Gramajo MD    Anesthesia Type:  general ASA Status:  4          Anesthesia Type: general    Jake Phase I: Jake Score: 4    Jake Phase II:      Last vitals: Reviewed and per EMR flowsheets.        Anesthesia Post Evaluation    Patient location during evaluation: ICU  Patient participation: complete - patient participated  Level of consciousness: awake and alert  Airway patency: patent  Nausea & Vomiting: no vomiting and no nausea  Complications: no  Cardiovascular status: hemodynamically stable  Respiratory status: acceptable  Hydration status: stable

## 2020-03-01 NOTE — PROGRESS NOTES
[address] [place] [address] [facility]  Progress Note    F/U: left BKA     Chief Complaint   Patient presents with    Foot Pain     DAVID COLD NUMB TO FEET/ NO RECENT INJ     SUBJECTIVE:  Awake and alert   Seen at bedside   No complaints   S/p left BKA   Afebrile  Tolerating antibiotics without side effects     Review of systems:  As stated above in the chief complaint, otherwise negative. OBJECTIVE:  BP (!) 147/76   Pulse 85   Temp 98.8 °F (37.1 °C) (Core)   Resp 20   Ht 5' 2\" (1.575 m)   Wt 149 lb 4 oz (67.7 kg)   SpO2 95%   BMI 27.30 kg/m²   Temp  Av.5 °F (36.9 °C)  Min: 97.7 °F (36.5 °C)  Max: 99.7 °F (37.6 °C)  Constitutional:  The patient is awake, alert, and oriented. In bed   Skin:    Warm and dry. No rashes were noted. HEENT:   Round and reactive pupils. AT/NC  Neck:    Supple to movements. Chest:   No use of accessory muscles to breathe. Symmetrical expansion. Ct ant  Cardiovascular:  S1 and S2 are rhythmic and regular. No murmurs appreciated. Abdomen:   Positive bowel sounds to auscultation.  tender  Extremities:     no edema, LEFT BKA surgical dressing intact unable to visualize   CNS    AAxO   Lines: piv    Radiology:  Laboratory and Tests Review:  Lab Results   Component Value Date    WBC 15.4 (H) 2020    WBC 12.5 (H) 2020    WBC 18.2 (H) 2020    HGB 8.3 (L) 2020    HCT 24.5 (L) 2020    MCV 80.1 2020     2020     No results found for: [facility]  Lab Results   Component Value Date    ALT 25 2020    AST 64 (H) 2020    ALKPHOS 105 (H) 2020    BILITOT 0.6 2020     Lab Results   Component Value Date     2020    K 4.0 2020    K 5.1 2020     2020    CO2 15 2020    BUN 19 2020    CREATININE 0.8 2020    CREATININE 0.7 2020    CREATININE 0.7 2020    GFRAA >60 2020    LABGLOM >60 2020    GLUCOSE 173 2020    GLUCOSE 171 2012 PROT 6.0 02/29/2020    LABALBU 2.0 02/29/2020    LABALBU 4.3 04/26/2012    CALCIUM 8.0 03/01/2020    BILITOT 0.6 02/29/2020    ALKPHOS 105 02/29/2020    AST 64 02/29/2020    ALT 25 02/29/2020     Lab Results   Component Value Date    CRP 19.8 (H) 02/25/2020    CRP 16.2 (H) 12/12/2019    CRP 1.7 (H) 04/26/2012     Lab Results   Component Value Date    SEDRATE 115 (H) 02/25/2020    SEDRATE 89 (H) 12/12/2019       Microbiology:   Lab Results   Component Value Date    BLOODCULT2 24 Hours- no growth 02/26/2020    BLOODCULT2 24 Hours- no growth 02/25/2020    BLOODCULT2 5 Days- no growth 12/13/2019      ASSESSMENT/PLAN:  MS. Dawood Ferrell a past medical history of anemia, diabetes, hyperlipidemia, hypertension, hypothyroidism, ureteral stent secondary to recurrent UTIs presented with left le pain     PAD   s/p  2/24  revascularization left le  Fevers/leukocytosis r/o sepsis  F/u blood cx  CT with perinephric stranding ? UTI/pyelonephritis   ua and cx pending  H/o urine cx with mixed chioma-Mild left hydronephrosis. Bilateral ureteral stents in  Place. Emergent left BKA due to ischemic changes/gangrene/severe PVD  2/29    PLAN  Continue Vancomycin and Cefepime until TODAY then follow off ATB  Local wound Care to left BKA   Continue Fluconazole   Monitor labs     BENJI Keane - NP  3/1/2020  9:56 AM     I have discussed the case, including pertinent history and physical  exam findings . I have seen and examined the patient and the key elements of the encounter have been performed by me. I agree with the assessment, plan and orders as documented.       Treatment plan as per my recommendation     Shaji Dumont MD, FACP  3/1/2020  10:45 PM

## 2020-03-01 NOTE — PROGRESS NOTES
CRITICAL CARE PROGRESS NOTE    The patient's case was discussed in multidisciplinary rounds including critical care specialist, nursing, RT and pharmacy. Her evaluation is as follows:     60year old woman with PMH of diabetes mellitus, schizophrenia, hypertension, hyperlipidemia, peripheral vascular disease, admitted to ICU for management of left leg ischemia. The patient is febrile, with tachycardia, anemia and leukocytosis. She had stent placed but leg remained hot, pt with fevers, concern for infection  Left BKA 2/29     Hb initially dropped after surgery, pRBC admin, now improved       Recent Labs     03/01/20  0505      K 4.0   *   CO2 15*   BUN 19   CREATININE 0.8   GLUCOSE 173*   CALCIUM 8.0*     Recent Labs     03/01/20  0505   WBC 15.4*   RBC 3.06*   HGB 8.3*   HCT 24.5*   MCV 80.1   MCH 27.1   MCHC 33.9   RDW 16.4*      MPV 10.7     Recent Labs     02/28/20  1300   BC 24 Hours- no growth       No results for input(s): BLOODCULT2 in the last 72 hours.     24 HR INTAKE/OUTPUT:      Intake/Output Summary (Last 24 hours) at 3/1/2020 1026  Last data filed at 3/1/2020 0548  Gross per 24 hour   Intake 3414.8 ml   Output 1550 ml   Net 1864.8 ml     BP (!) 147/76   Pulse 85   Temp 98.8 °F (37.1 °C) (Core)   Resp 20   Ht 5' 2\" (1.575 m)   Wt 149 lb 4 oz (67.7 kg)   SpO2 95%   BMI 27.30 kg/m²   General: Awake, oriented to place, time and person  HEENT: No head lesions, PERRL, EOMI, mouth without lesions, no nasal lesions, no cervical adenopathy palpated  Respiratory: Lungs with good breath sounds bilaterally, no adventitious sounds auscultated, no accessory muscle use  CV: Regular rate, no murmurs, no JVD, no leg edema  Abdomen: Soft, non tender, + bowel sounds, no lesions  Skin: Hydrated, adequate turgor, left foot is purple/black, with pain when palpated, distal pulses absent on that extremity  Extremities: Muscular strength 4/5 in 4 limbs, moves 4 limbs spontaneously  Neurology:

## 2020-03-02 LAB
ANION GAP SERPL CALCULATED.3IONS-SCNC: 11 MMOL/L (ref 7–16)
ANISOCYTOSIS: ABNORMAL
BASOPHILS ABSOLUTE: 0 E9/L (ref 0–0.2)
BASOPHILS RELATIVE PERCENT: 0.3 % (ref 0–2)
BLOOD CULTURE, ROUTINE: NORMAL
BUN BLDV-MCNC: 26 MG/DL (ref 8–23)
BURR CELLS: ABNORMAL
CALCIUM SERPL-MCNC: 8.1 MG/DL (ref 8.6–10.2)
CHLORIDE BLD-SCNC: 112 MMOL/L (ref 98–107)
CO2: 17 MMOL/L (ref 22–29)
CREAT SERPL-MCNC: 1 MG/DL (ref 0.5–1)
CULTURE, BLOOD 2: NORMAL
EOSINOPHILS ABSOLUTE: 0 E9/L (ref 0.05–0.5)
EOSINOPHILS RELATIVE PERCENT: 0.1 % (ref 0–6)
GFR AFRICAN AMERICAN: >60
GFR NON-AFRICAN AMERICAN: >60 ML/MIN/1.73
GLUCOSE BLD-MCNC: 143 MG/DL (ref 74–99)
HCT VFR BLD CALC: 24.4 % (ref 34–48)
HEMOGLOBIN: 8.2 G/DL (ref 11.5–15.5)
LYMPHOCYTES ABSOLUTE: 2.13 E9/L (ref 1.5–4)
LYMPHOCYTES RELATIVE PERCENT: 14.4 % (ref 20–42)
MCH RBC QN AUTO: 27.2 PG (ref 26–35)
MCHC RBC AUTO-ENTMCNC: 33.6 % (ref 32–34.5)
MCV RBC AUTO: 81.1 FL (ref 80–99.9)
METAMYELOCYTES RELATIVE PERCENT: 0.9 % (ref 0–1)
METER GLUCOSE: 127 MG/DL (ref 74–99)
METER GLUCOSE: 137 MG/DL (ref 74–99)
METER GLUCOSE: 151 MG/DL (ref 74–99)
MONOCYTES ABSOLUTE: 2.13 E9/L (ref 0.1–0.95)
MONOCYTES RELATIVE PERCENT: 14.4 % (ref 2–12)
MYELOCYTE PERCENT: 2.7 % (ref 0–0)
NEUTROPHILS ABSOLUTE: 10.79 E9/L (ref 1.8–7.3)
NEUTROPHILS RELATIVE PERCENT: 67.6 % (ref 43–80)
NUCLEATED RED BLOOD CELLS: 1.8 /100 WBC
OVALOCYTES: ABNORMAL
PDW BLD-RTO: 17.9 FL (ref 11.5–15)
PLATELET # BLD: 422 E9/L (ref 130–450)
PMV BLD AUTO: 10.5 FL (ref 7–12)
POIKILOCYTES: ABNORMAL
POLYCHROMASIA: ABNORMAL
POTASSIUM SERPL-SCNC: 4.5 MMOL/L (ref 3.5–5)
RBC # BLD: 3.01 E12/L (ref 3.5–5.5)
SCHISTOCYTES: ABNORMAL
SODIUM BLD-SCNC: 140 MMOL/L (ref 132–146)
WBC # BLD: 15.2 E9/L (ref 4.5–11.5)

## 2020-03-02 PROCEDURE — 6370000000 HC RX 637 (ALT 250 FOR IP): Performed by: INTERNAL MEDICINE

## 2020-03-02 PROCEDURE — 97165 OT EVAL LOW COMPLEX 30 MIN: CPT

## 2020-03-02 PROCEDURE — 36415 COLL VENOUS BLD VENIPUNCTURE: CPT

## 2020-03-02 PROCEDURE — 6370000000 HC RX 637 (ALT 250 FOR IP): Performed by: THORACIC SURGERY (CARDIOTHORACIC VASCULAR SURGERY)

## 2020-03-02 PROCEDURE — 97112 NEUROMUSCULAR REEDUCATION: CPT | Performed by: PHYSICAL THERAPIST

## 2020-03-02 PROCEDURE — 1200000000 HC SEMI PRIVATE

## 2020-03-02 PROCEDURE — 6360000002 HC RX W HCPCS: Performed by: INTERNAL MEDICINE

## 2020-03-02 PROCEDURE — 6360000002 HC RX W HCPCS: Performed by: SPECIALIST

## 2020-03-02 PROCEDURE — 97530 THERAPEUTIC ACTIVITIES: CPT | Performed by: PHYSICAL THERAPIST

## 2020-03-02 PROCEDURE — 82962 GLUCOSE BLOOD TEST: CPT

## 2020-03-02 PROCEDURE — 85025 COMPLETE CBC W/AUTO DIFF WBC: CPT

## 2020-03-02 PROCEDURE — 97530 THERAPEUTIC ACTIVITIES: CPT

## 2020-03-02 PROCEDURE — 80048 BASIC METABOLIC PNL TOTAL CA: CPT

## 2020-03-02 PROCEDURE — 97110 THERAPEUTIC EXERCISES: CPT | Performed by: PHYSICAL THERAPIST

## 2020-03-02 PROCEDURE — 97162 PT EVAL MOD COMPLEX 30 MIN: CPT | Performed by: PHYSICAL THERAPIST

## 2020-03-02 RX ADMIN — LISINOPRIL 10 MG: 10 TABLET ORAL at 08:00

## 2020-03-02 RX ADMIN — OXYCODONE HYDROCHLORIDE 5 MG: 5 TABLET ORAL at 14:48

## 2020-03-02 RX ADMIN — DIAZEPAM 2 MG: 2 TABLET ORAL at 08:00

## 2020-03-02 RX ADMIN — DIAZEPAM 2 MG: 2 TABLET ORAL at 20:27

## 2020-03-02 RX ADMIN — INSULIN LISPRO 1 UNITS: 100 INJECTION, SOLUTION INTRAVENOUS; SUBCUTANEOUS at 20:28

## 2020-03-02 RX ADMIN — FERROUS SULFATE TAB 325 MG (65 MG ELEMENTAL FE) 325 MG: 325 (65 FE) TAB at 08:03

## 2020-03-02 RX ADMIN — FLUCONAZOLE 400 MG: 2 INJECTION, SOLUTION INTRAVENOUS at 20:49

## 2020-03-02 RX ADMIN — PYRIDOXINE HCL TAB 50 MG 50 MG: 50 TAB at 08:05

## 2020-03-02 RX ADMIN — OXYCODONE HYDROCHLORIDE 10 MG: 5 TABLET ORAL at 05:57

## 2020-03-02 RX ADMIN — CLOPIDOGREL BISULFATE 75 MG: 75 TABLET ORAL at 08:03

## 2020-03-02 RX ADMIN — BENZTROPINE MESYLATE 0.5 MG: 0.5 TABLET ORAL at 08:05

## 2020-03-02 RX ADMIN — METOPROLOL TARTRATE 75 MG: 50 TABLET, FILM COATED ORAL at 20:28

## 2020-03-02 RX ADMIN — OXYBUTYNIN CHLORIDE 5 MG: 5 TABLET ORAL at 20:28

## 2020-03-02 RX ADMIN — METOPROLOL TARTRATE 75 MG: 50 TABLET, FILM COATED ORAL at 08:08

## 2020-03-02 RX ADMIN — METHIMAZOLE 5 MG: 5 TABLET ORAL at 08:05

## 2020-03-02 RX ADMIN — GABAPENTIN 300 MG: 300 CAPSULE ORAL at 20:28

## 2020-03-02 RX ADMIN — OXYBUTYNIN CHLORIDE 5 MG: 5 TABLET ORAL at 12:26

## 2020-03-02 RX ADMIN — OXYBUTYNIN CHLORIDE 5 MG: 5 TABLET ORAL at 07:59

## 2020-03-02 RX ADMIN — ENOXAPARIN SODIUM 40 MG: 40 INJECTION SUBCUTANEOUS at 08:08

## 2020-03-02 RX ADMIN — ATORVASTATIN CALCIUM 80 MG: 40 TABLET, FILM COATED ORAL at 08:01

## 2020-03-02 ASSESSMENT — PAIN DESCRIPTION - ORIENTATION: ORIENTATION: LEFT

## 2020-03-02 ASSESSMENT — PAIN SCALES - GENERAL
PAINLEVEL_OUTOF10: 0
PAINLEVEL_OUTOF10: 8
PAINLEVEL_OUTOF10: 5
PAINLEVEL_OUTOF10: 0
PAINLEVEL_OUTOF10: 10
PAINLEVEL_OUTOF10: 2
PAINLEVEL_OUTOF10: 0

## 2020-03-02 ASSESSMENT — PAIN DESCRIPTION - LOCATION: LOCATION: LEG

## 2020-03-02 ASSESSMENT — PAIN DESCRIPTION - PAIN TYPE: TYPE: SURGICAL PAIN

## 2020-03-02 ASSESSMENT — PAIN DESCRIPTION - DESCRIPTORS: DESCRIPTORS: ACHING

## 2020-03-02 NOTE — PLAN OF CARE
Problem: Pain:  Description  Pain management should include both nonpharmacologic and pharmacologic interventions. Goal: Pain level will decrease  Description  Pain level will decrease  3/2/2020 1723 by Qian Cuevas RN  Outcome: Met This Shift  3/2/2020 0654 by Daniel Alonzo RN  Outcome: Met This Shift     Problem: Falls - Risk of:  Goal: Will remain free from falls  Description  Will remain free from falls  3/2/2020 1723 by Qian Cuevas RN  Outcome: Met This Shift  3/2/2020 0654 by Daniel Alonzo RN  Outcome: Met This Shift     Problem: Risk for Impaired Skin Integrity  Goal: Tissue integrity - skin and mucous membranes  Description  Structural intactness and normal physiological function of skin and  mucous membranes.   Outcome: Met This Shift

## 2020-03-02 NOTE — PROGRESS NOTES
Physical Therapy    Physical Therapy Initial Evaluation    Room #:  IC06/IC06-01  Patient Name: Luis E Wade  YOB: 1960  MRN: [id number]    Referring Provider:  Elisabeth Dorman    Date of Service: 3/2/2020    Evaluating Physical Therapist:  Michael Miller PT  Lic. # [id number]        Diagnosis:   Arterial occlusion [I70.90]   S/p below-knee amputation  Dr Kerri De Leon    Patient Active Problem List   Diagnosis    Epigastric hernia    Umbilical hernia    Essential hypertension    Dyslipidemia    S/P cardiac catheterization    Abnormal stress ECG    Hyperthyroidism    Acute GI bleeding    Splenic infarct    Hydroureter    Microcytic hypochromic anemia    Severe protein-calorie malnutrition (HCC)    Arterial occlusion    Moderate protein-calorie malnutrition ([address]        Tentative placement recommendation: Inpatient Rehab    Equipment recommendation: To be determined      Prior Level of Function: Patient ambulated independently    Rehab Potential: good  for baseline    Past medical history:   Past Medical History:   Diagnosis Date    Diabetic peripheral neuropathy associated with type 2 diabetes mellitus ([address] 8/24/2018    Epigastric hernia     History of blood transfusion     Hyperlipidemia     Hypertension     Kidney stone     Schizophrenia ([address]     Type 2 diabetes mellitus with diabetic polyneuropathy, with long-term current use of insulin ([address] 0/17/4489    Umbilical hernia      Past Surgical History:   Procedure Laterality Date    CARDIAC CATHETERIZATION  09/30/2016     [facility]    COLONOSCOPY  08/2016    Dr. Scar Farr / Omar Dorsey / [facility] Bilateral 10/28/2019    CYSTOSCOPY. RETROGRADE.  PYELOGRAM. BILATERAL STENT INSERTION performed by Karla Sands MD at [address] Left 2/29/2020    LEG AMPUTATION BELOW KNEE performed by Amirah Neri DO at [facility]  11/04/2016 epigastric hernia umbilical hernia with mesh    OVARIAN CYST REMOVAL      OVARY REMOVAL Right     OVARY REMOVAL  2014    UPPER GASTROINTESTINAL ENDOSCOPY N/A 10/29/2019    EGD ESOPHAGOGASTRODUODENOSCOPY performed by Marissa King MD at [facility] ENDOSCOPY       Precautions: falls, alarm and left  BKA   ,      SUBJECTIVE:    Social history: Patient lives with spouse  in a duplex  with No steps  to enter       Equipment owned: Transport chair,       [address]   How much difficulty turning over in bed?: A Lot  How much difficulty sitting down on / standing up from a chair with arms?: Unable  How much difficulty moving from lying on back to sitting on side of bed?: A Lot  How much help from another person moving to and from a bed to a chair?: Total  How much help from another person needed to walk in hospital room?: Total  How much help from another person for climbing 3-5 steps with a railing?: Total  AM-PAC Inpatient Mobility Raw Score : 8  AM-PAC Inpatient T-Scale Score : 28.52  Mobility Inpatient CMS 0-100% Score: 86.62  Mobility Inpatient CMS G-Code Modifier : CM    Nursing cleared patient for PT evaluation. The admitting diagnosis and active problem list as listed above have been reviewed prior to the initiation of this evaluation. OBJECTIVE:   Initial Evaluation  Date: 3/2/2020 Treatment Short Term/ Long Term   Goals   Was pt agreeable to Eval/treatment? Yes    To be met in 3 days   Pain level   8/10  left residual limb     Bed Mobility    Rolling: Maximal assist of 1    Supine to sit: Maximal assist of  2    Sit to supine: Maximal assist of  2    Scooting: Maximal assist of  2    Rolling: Moderate assist of 1    Supine to sit: Moderate assist of 1    Sit to supine: Moderate assist of 1    Scooting: Moderate assist of 1     Transfers Sit to stand: Not assessed     Sit to stand:  Moderate assist of  2     Ambulation    not assessed             ROM Within functional limits with exception of left  Residual limb 20-30 deg    Increase range of motion 10% of affected joints    Strength BUE: 2/5  RLE:  2/5  LLE:  2/5   Increase strength in affected mm groups by 1/3 grade   Balance Sitting EOB:  poor lateral lean to right needing mod a to maintain upright  Dynamic Standing:  not assessed    Sitting EOB:  fair    Dynamic Standing: fair       Patient is Alert & Oriented x person, place, time and situation and follows directions one step  Sensation:  Patient denies numbness and tingling to extremities    Edema:  yes left lower extremity    Endurance: poor           Patient education  Patient educated on role of Physical Therapy, risks of immobility and plan of care and safety       Patient response to education:   Pt verbalized understanding Pt demonstrated skill Pt requires further education in this area   Yes Partial Yes      ASSESSMENT: Patient exhibits decreased strength, balance, coordination impairing functional mobility. Therapist educated and facilitated patient on techniques to increase safety and independence with bed mobility, balance, functional transfers, and functional mobility. Treatment:  Patient practiced and was instructed in the following treatment: Sat edge of bed 10 minutes with Moderate assist of 1 to increase dynamic sitting balance and activity tolerance. educated re: phantom sensations and pain, desensitization techniques of tapping and rubbing, need for improved rom for pre prosthetic training     Therapeutic Exercises:  Knee flexion and manual stretch to increase extension  x 5 reps. passively    At end of session, patient in bed with  call light and phone within reach,   all lines and tubes intact, nursing notified. Patient would benefit from continued skilled Physical Therapy to improve functional independence and quality of life.           Patient's/ family goals   rehab  [place]      Patient and or family understand(s)

## 2020-03-02 NOTE — PROGRESS NOTES
Occupational Therapy  Occupational Therapy Initial Assessment      Date:3/2/2020  Patient Name: Ben Torrez  MRN: [id number]  : 1960  Room: [address] [place] [address]      Evaluating OT: Ivana Gee #[id number]    Referring Physician: Dr. Tex Aragon    Recommendation for Placement: Inpatient  Recommended Adaptive Equipment:  TBD   AM-PAC Daily Activity Raw Score:         Diagnosis: AA Occlusion   Surgery: 3/1/20   Pertinent Medical History:    Past Medical History:   Diagnosis Date    Diabetic peripheral neuropathy associated with type 2 diabetes mellitus ([facility] 2018    Epigastric hernia     History of blood transfusion     Hyperlipidemia     Hypertension     Kidney stone     Schizophrenia ([facility]     Type 2 diabetes mellitus with diabetic polyneuropathy, with long-term current use of insulin ([facility]     Umbilical hernia       Precautions:  Falls, R BKA     Home Living: Pt lives spouse in duplex home, 2 story however pt is able to reside on first floor, No steps to enter rail, tub shower.    Equipment owned: transport chair    Prior Level of Function: Independent with ADLs , and with IADLs; ambulated no device    Pain Level: 10/10 c/o pain in RLE; nursing aware and administered pain meds   Cognition: A&O: 3/4; Follows 2 step directions, lethargic   Memory:  fair     Sequencing:  good     Problem solving:  good     Judgement/safety:  good       Functional Assessment:   Initial Eval Status  Date: 3/2/20 Treatment Status  Date: Short Term Goals  Treatment frequency: PRN 1-3x/week   Feeding Independent   Independent    Grooming Moderate Assist   Supervision    UB Dressing Supervision   Independent    LB Dressing Maximal Assist   Modified [place]    Bathing Moderate Assist  Modified [place]    Toileting Moderate Assist   Modified [place]    Bed Mobility  Supine to sit: Maximal  Assist x2  Sit to supine: Maximal Assist x2  Supine to sit: supervision  Sit to supine: supervision Functional Transfers n/a  Minimal Assist    Functional Mobility n/a   Modified [place]    Balance Sitting:     Static:  good    Dynamic:good  Standing: n/a     Activity Tolerance poor     Visual/  Perceptual Glasses: ?                   Strength ROM Additional Info:    RUE  3+/5  WFL good  and wfl FMC/dexterity noted during ADL tasks     LUE 3+/5  WFL good  and wfl FMC/dexterity noted during ADL tasks         Hearing:  WFL   Sensation: c/o numbness or tingling in RLE   Tone:  WFL  Edema: yes                            Comments: Upon arrival patient supine in bed with . Pt educated on the role of Occupational Therapy. Supine and left in the care of transport  At end of session, patient with call light and phone within reach, all lines and tubes intact. Overall patient demonstrated  decreased independence and safety during completion of ADL/functional transfer/mobility tasks. Pt would benefit from continued skilled OT to increase safety and independence with completion of ADL/IADL tasks for functional independence and quality of life. Treatment: OT facilitated, Bed Mobility, sitting balance at EOB for 10 minutes with Mod A x2 to increase endurence for functional participation , transfer training with verbal cues for hand placement, Family and pt educated on role of rehab. Facilitated development of skills for increased participation for completion of ADLs/IADLs.      Eval Complexity: Low      Assessment of current deficits   Functional mobility [x]  ADLs [x] Strength [x]  Cognition []  Functional transfers  [x] IADLs [x] Safety Awareness [x]  Endurance [x]  Fine Motor Coordination [] Balance [x] Vision/perception [] Sensation []   Gross Motor Coordination [] ROM [] Delirium []                  Motor Control []    Plan of Care:   ADL retraining [x]   Equipment needs [x]   Neuromuscular re-education [x] Energy Conservation Techniques [x]  Functional Transfer training [x] Patient and/or Family Education [x]  Functional Mobility training [x]  Environmental Modifications [x]  Cognitive re-training []   Compensatory techniques for ADLs [x]  Splinting Needs []   Positioning to improve overall function [x]   Therapeutic Activity [x]  Therapeutic Exercise  [x]  Visual/Perceptual: []    Delirium prevention/treatment  []   Other:  []    Rehab Potential: Good for established goals     Patient / Family Goal: rehab      Patient and/or family were instructed diagnosis, prognosis/goals and plan of care. Demonstrated good understanding.       Treatment Time In:224            Treatment Time Out: 305               Treatment Charges: Mins Units   Ther Ex  [id number]     Manual Therapy Marina Miranda [id number] 16 1   ADL/Home Mgt [id number]     Neuro Re-ed [id number] 12 1   Orthotic manage/training  15956     Total Timed Treatment 28 [address] OTR/L [id number]

## 2020-03-02 NOTE — PROGRESS NOTES
Internal Medicine Progress Note    Dirk Shelby. Dania Escobar., & [address] Dania Escobar., F.A.C.O.I. Chiara Whitt D.O., F.A.C.O.I. Primary Care Physician: Jass Zuniga MD   Admitting Physician:  Elisabeth Gonzales DO  Admission date and time: 2/23/2020  4:19 PM    Room:  [place] [address]    Patient Name: Roman Li  MRN: [id number]    Date of Service: 3/2/2020     Subjective:  Marques Anderson is resting very comfortably during my examination today and appears in better spirits. Her hemoglobin has stabilized at this point. Her pain is well controlled. Plans are for discontinuation of antibiotics today. We discussed discharge planning as we are looking into acute rehabilitation. Review of System: HEENT:  Justice ear pain, sore throat, sinus or eye problems  Cardiovascular:   Denies any chest pain, irregular heartbeats, or palpitations. Respiratory:   Denies shortness of breath, coughing, sputum production, hemoptysis, or wheezing. Gastrointestinal:   Denies nausea, vomiting, diarrhea, or constipation. Denies any abdominal pain. Extremities:   Admits to postoperative surgical pain as to be expected. Neurology:    Denies any headache or focal neurological deficits. Admits to generalized weakness and deconditioning. Derm:    Denies any rashes, ulcers, or excoriations. Denies bruising. Surgical site is appropriately dressed. Genitourinary:    Denies any urgency, frequency, hematuria. Voiding without difficulty. Musculoskeletal:  Denies myalgias, joint complaints or back pain      Physical Exam:  No intake/output data recorded. Blood pressure (!) 160/78, pulse 88, temperature 99.5 °F (37.5 °C), temperature source Core, resp. rate 22, height 5' 2\" (1.575 m), weight 149 lb 4 oz (67.7 kg), SpO2 (!) 88 %, not currently breastfeeding. HEENT:    PERRLA. EOMI. Sclera clear. Buccal mucosa moist.  Neck:    Supple. Trachea midline. No thyromegaly. No JVD.  No bruits. Heart:    Rhythm regular, rate controlled. Mild systolic murmur  Lungs:    Symmetrical. Clear to auscultation bilaterally. No wheezes. No rhonchi. No rales. Abdomen:   Soft. Non-tender. Non-distended. Bowel sounds positive. No organomegaly or masses. No pain on palpation  Extremities:    Left below the knee amputation is appropriately dressed. Neurologic:    Alert x 3. No focal deficit. Cranial nerves grossly intact. Skin:    No petechia. No hemorrhage. Surgical wound is dressed. Psych:   Behavior is normal. Mood appears normal. Speech is not rapid or pressured. Musculokeletal:  Spine ROM normal. Muscular strength intact. Gait not assessed.   Genitalia/Breast:  Deferred    Scheduled Meds:   enoxaparin  40 mg Subcutaneous Daily    insulin lispro  0-6 Units Subcutaneous TID WC    insulin lispro  0-3 Units Subcutaneous Nightly    metoprolol  75 mg Oral BID    fluconazole  400 mg Intravenous Q24H    clopidogrel  75 mg Oral Daily    atorvastatin  80 mg Oral Daily    lisinopril  10 mg Oral Daily    benztropine  0.5 mg Oral Daily    ferrous sulfate  325 mg Oral Daily with breakfast    gabapentin  300 mg Oral Nightly    methIMAzole  5 mg Oral Daily    oxybutynin  5 mg Oral TID    pyridoxine  50 mg Oral Daily       Continuous Infusions:   dextrose      sodium chloride Stopped (03/01/20 1752)       Objective Data:  CBC with Differential:    Lab Results   Component Value Date    WBC 15.2 03/02/2020    RBC 3.01 03/02/2020    HGB 8.2 03/02/2020    HCT 24.4 03/02/2020     03/02/2020    MCV 81.1 03/02/2020    MCH 27.2 03/02/2020    MCHC 33.6 03/02/2020    RDW 17.9 03/02/2020    NRBC 1.8 03/02/2020    SEGSPCT 51 04/26/2012    METASPCT 0.9 03/02/2020    LYMPHOPCT 14.4 03/02/2020    MONOPCT 14.4 03/02/2020    MYELOPCT 2.7 03/02/2020    BASOPCT 0.3 03/02/2020    MONOSABS 2.13 03/02/2020    LYMPHSABS 2.13 03/02/2020    EOSABS 0.00 03/02/2020    BASOSABS 0.00 03/02/2020     BMP:    Lab Results Component Value Date     03/02/2020    K 4.5 03/02/2020    K 5.1 02/23/2020     03/02/2020    CO2 17 03/02/2020    BUN 26 03/02/2020    LABALBU 2.0 02/29/2020    LABALBU 4.3 04/26/2012    CREATININE 1.0 03/02/2020    CALCIUM 8.1 03/02/2020    GFRAA >60 03/02/2020    LABGLOM >60 03/02/2020    GLUCOSE 143 03/02/2020    GLUCOSE 171 04/26/2012       Assessment:   1. Left lower extremity arterial occlusion s/p stenting 2/24 ultimately necessitating below the knee amputation on 2/29/2020  2. Sepsis secondary to left foot gangrene and likely pyelonephritis  3. Ureteral stents with bilateral hydroureteronephrosis and fat stranding due to recurrent UTIs and bilateral ureteral obstruction   4. Elevated troponin--NSTEMI  5. Chronic microcytic hypochromic anemia with history of recurrent transfusion  6. Diabetes mellitus with associated diabetic polyneuropathy without long-term use of insulin   7. Hypertension  8. Hyperlipidemia  9. Hyperthyroidism    Plan:   The patient's hemoglobin has stabilized at this point with no outward signs of GI bleeding. IV fluid resuscitation will be discontinued. Antibiotics are to be discontinued today as per the infectious disease team.  Diet is being advanced. She will work with the therapy teams today. Pain is well controlled. The patient is acceptable for transfer from the intensive care unit. We are looking into acute rehabilitation upon discharge. I suspect the patient will be acceptable for discharge tomorrow. Greater than 30 minutes of critical care time was spent with the patient. This time included chart review, , and discussion with those consultants involved in the patient's care. More than 50% of my  time was spent at the bedside counseling/coordinating care with the patient and/or family with face to face contact. This time was spent reviewing notes and laboratory data as well as instructing and counseling the patient.  Time I spent with the family or surrogate(s) is included only if the patient was incapable of providing the necessary information or participating in medical decisions. I also discussed the differential diagnosis and all of the proposed management plans with the patient and individuals accompanying the patient. Domitila Castillo requires this high level of physician care and nursing on the IMC/Telemetry unit due the complexity of decision management and chance of rapid decline or death. Continued cardiac monitoring and higher level of nursing are required. I am readily available for any further decision-making and intervention.        Asa Jacinto DO, F.A.C.O.I.  3/2/2020  7:20 AM

## 2020-03-03 VITALS
OXYGEN SATURATION: 97 % | DIASTOLIC BLOOD PRESSURE: 89 MMHG | BODY MASS INDEX: 27.47 KG/M2 | RESPIRATION RATE: 16 BRPM | TEMPERATURE: 99.5 F | WEIGHT: 149.25 LBS | HEART RATE: 92 BPM | SYSTOLIC BLOOD PRESSURE: 172 MMHG | HEIGHT: 62 IN

## 2020-03-03 LAB
ACANTHOCYTES: ABNORMAL
ANION GAP SERPL CALCULATED.3IONS-SCNC: 10 MMOL/L (ref 7–16)
ANISOCYTOSIS: ABNORMAL
BASOPHILS ABSOLUTE: 0 E9/L (ref 0–0.2)
BASOPHILS RELATIVE PERCENT: 0.4 % (ref 0–2)
BLOOD BANK DISPENSE STATUS: NORMAL
BLOOD BANK PRODUCT CODE: NORMAL
BPU ID: NORMAL
BUN BLDV-MCNC: 27 MG/DL (ref 8–23)
BURR CELLS: ABNORMAL
CALCIUM SERPL-MCNC: 8.5 MG/DL (ref 8.6–10.2)
CHLORIDE BLD-SCNC: 113 MMOL/L (ref 98–107)
CO2: 18 MMOL/L (ref 22–29)
CREAT SERPL-MCNC: 0.9 MG/DL (ref 0.5–1)
DESCRIPTION BLOOD BANK: NORMAL
EOSINOPHILS ABSOLUTE: 0 E9/L (ref 0.05–0.5)
EOSINOPHILS RELATIVE PERCENT: 0.6 % (ref 0–6)
GFR AFRICAN AMERICAN: >60
GFR NON-AFRICAN AMERICAN: >60 ML/MIN/1.73
GLUCOSE BLD-MCNC: 120 MG/DL (ref 74–99)
HCT VFR BLD CALC: 24.7 % (ref 34–48)
HEMOGLOBIN: 8.1 G/DL (ref 11.5–15.5)
LYMPHOCYTES ABSOLUTE: 4 E9/L (ref 1.5–4)
LYMPHOCYTES RELATIVE PERCENT: 27 % (ref 20–42)
MCH RBC QN AUTO: 26.6 PG (ref 26–35)
MCHC RBC AUTO-ENTMCNC: 32.8 % (ref 32–34.5)
MCV RBC AUTO: 81.3 FL (ref 80–99.9)
METER GLUCOSE: 126 MG/DL (ref 74–99)
METER GLUCOSE: 144 MG/DL (ref 74–99)
MONOCYTES ABSOLUTE: 1.63 E9/L (ref 0.1–0.95)
MONOCYTES RELATIVE PERCENT: 11.3 % (ref 2–12)
MYELOCYTE PERCENT: 0.9 % (ref 0–0)
NEUTROPHILS ABSOLUTE: 9.18 E9/L (ref 1.8–7.3)
NEUTROPHILS RELATIVE PERCENT: 60.9 % (ref 43–80)
NUCLEATED RED BLOOD CELLS: 1.7 /100 WBC
OVALOCYTES: ABNORMAL
PDW BLD-RTO: 18.5 FL (ref 11.5–15)
PLATELET # BLD: 446 E9/L (ref 130–450)
PMV BLD AUTO: 10.5 FL (ref 7–12)
POIKILOCYTES: ABNORMAL
POLYCHROMASIA: ABNORMAL
POTASSIUM SERPL-SCNC: 4.1 MMOL/L (ref 3.5–5)
RBC # BLD: 3.04 E12/L (ref 3.5–5.5)
SCHISTOCYTES: ABNORMAL
SODIUM BLD-SCNC: 141 MMOL/L (ref 132–146)
SPHEROCYTES: ABNORMAL
TARGET CELLS: ABNORMAL
TEAR DROP CELLS: ABNORMAL
VACUOLATED NEUTROPHILS: ABNORMAL
WBC # BLD: 14.8 E9/L (ref 4.5–11.5)

## 2020-03-03 PROCEDURE — 6370000000 HC RX 637 (ALT 250 FOR IP): Performed by: INTERNAL MEDICINE

## 2020-03-03 PROCEDURE — 97110 THERAPEUTIC EXERCISES: CPT

## 2020-03-03 PROCEDURE — 6370000000 HC RX 637 (ALT 250 FOR IP): Performed by: THORACIC SURGERY (CARDIOTHORACIC VASCULAR SURGERY)

## 2020-03-03 PROCEDURE — 82962 GLUCOSE BLOOD TEST: CPT

## 2020-03-03 PROCEDURE — 6360000002 HC RX W HCPCS: Performed by: INTERNAL MEDICINE

## 2020-03-03 PROCEDURE — 85025 COMPLETE CBC W/AUTO DIFF WBC: CPT

## 2020-03-03 PROCEDURE — 80048 BASIC METABOLIC PNL TOTAL CA: CPT

## 2020-03-03 PROCEDURE — 97530 THERAPEUTIC ACTIVITIES: CPT

## 2020-03-03 PROCEDURE — 36415 COLL VENOUS BLD VENIPUNCTURE: CPT

## 2020-03-03 RX ORDER — ATORVASTATIN CALCIUM 40 MG/1
80 TABLET, FILM COATED ORAL NIGHTLY
Status: DISCONTINUED | OUTPATIENT
Start: 2020-03-04 | End: 2020-03-03 | Stop reason: SDUPTHER

## 2020-03-03 RX ORDER — BISACODYL 10 MG
10 SUPPOSITORY, RECTAL RECTAL DAILY PRN
Status: DISCONTINUED | OUTPATIENT
Start: 2020-03-03 | End: 2020-03-03 | Stop reason: HOSPADM

## 2020-03-03 RX ORDER — FLUCONAZOLE 100 MG/1
200 TABLET ORAL DAILY
Status: DISCONTINUED | OUTPATIENT
Start: 2020-03-03 | End: 2020-03-03 | Stop reason: HOSPADM

## 2020-03-03 RX ORDER — ATORVASTATIN CALCIUM 40 MG/1
80 TABLET, FILM COATED ORAL NIGHTLY
Status: DISCONTINUED | OUTPATIENT
Start: 2020-03-03 | End: 2020-03-03 | Stop reason: HOSPADM

## 2020-03-03 RX ORDER — METOPROLOL TARTRATE 75 MG/1
75 TABLET, FILM COATED ORAL 2 TIMES DAILY
Qty: 60 TABLET | Refills: 3 | Status: ON HOLD
Start: 2020-03-03 | End: 2020-06-04 | Stop reason: HOSPADM

## 2020-03-03 RX ORDER — ATORVASTATIN CALCIUM 80 MG/1
80 TABLET, FILM COATED ORAL NIGHTLY
Qty: 30 TABLET | Refills: 3 | Status: SHIPPED | OUTPATIENT
Start: 2020-03-03 | End: 2021-07-13

## 2020-03-03 RX ORDER — CLOPIDOGREL BISULFATE 75 MG/1
75 TABLET ORAL DAILY
Qty: 30 TABLET | Refills: 3 | Status: SHIPPED
Start: 2020-03-04 | End: 2020-04-28 | Stop reason: SDUPTHER

## 2020-03-03 RX ADMIN — FERROUS SULFATE TAB 325 MG (65 MG ELEMENTAL FE) 325 MG: 325 (65 FE) TAB at 09:18

## 2020-03-03 RX ADMIN — CLOPIDOGREL BISULFATE 75 MG: 75 TABLET ORAL at 09:18

## 2020-03-03 RX ADMIN — LISINOPRIL 10 MG: 10 TABLET ORAL at 09:26

## 2020-03-03 RX ADMIN — ENOXAPARIN SODIUM 40 MG: 40 INJECTION SUBCUTANEOUS at 09:20

## 2020-03-03 RX ADMIN — BISACODYL 10 MG: 10 SUPPOSITORY RECTAL at 11:08

## 2020-03-03 RX ADMIN — BENZTROPINE MESYLATE 0.5 MG: 0.5 TABLET ORAL at 12:28

## 2020-03-03 RX ADMIN — OXYCODONE HYDROCHLORIDE 5 MG: 5 TABLET ORAL at 11:42

## 2020-03-03 RX ADMIN — OXYCODONE HYDROCHLORIDE 10 MG: 5 TABLET ORAL at 07:41

## 2020-03-03 RX ADMIN — PYRIDOXINE HCL TAB 50 MG 50 MG: 50 TAB at 09:19

## 2020-03-03 RX ADMIN — OXYBUTYNIN CHLORIDE 5 MG: 5 TABLET ORAL at 09:18

## 2020-03-03 RX ADMIN — METHIMAZOLE 5 MG: 5 TABLET ORAL at 12:28

## 2020-03-03 RX ADMIN — METOPROLOL TARTRATE 75 MG: 50 TABLET, FILM COATED ORAL at 09:27

## 2020-03-03 ASSESSMENT — PAIN DESCRIPTION - LOCATION: LOCATION: LEG

## 2020-03-03 ASSESSMENT — PAIN DESCRIPTION - ONSET: ONSET: ON-GOING

## 2020-03-03 ASSESSMENT — PAIN DESCRIPTION - ORIENTATION: ORIENTATION: LEFT

## 2020-03-03 ASSESSMENT — PAIN SCALES - GENERAL
PAINLEVEL_OUTOF10: 2
PAINLEVEL_OUTOF10: 10
PAINLEVEL_OUTOF10: 4

## 2020-03-03 ASSESSMENT — PAIN DESCRIPTION - DESCRIPTORS: DESCRIPTORS: ACHING;DISCOMFORT;DULL

## 2020-03-03 ASSESSMENT — PAIN DESCRIPTION - PAIN TYPE: TYPE: SURGICAL PAIN

## 2020-03-03 ASSESSMENT — PAIN DESCRIPTION - FREQUENCY: FREQUENCY: CONTINUOUS

## 2020-03-03 ASSESSMENT — PAIN DESCRIPTION - PROGRESSION: CLINICAL_PROGRESSION: GRADUALLY WORSENING

## 2020-03-03 NOTE — DISCHARGE SUMMARY
Internal Medicine  Discharge Summary    NAME: Nori Rodriguez  :  1960  MRN:  [id number]  PCP:Cortez Gamez MD  ADMITTED: 2020      DISCHARGED: 3/3/20    ADMITTING PHYSICIAN: Jose Casas DO    CONSULTANT(S):   IP CONSULT TO PRIMARY CARE PROVIDER  IP CONSULT TO VASCULAR SURGERY  IP CONSULT TO INFECTIOUS DISEASES  IP CONSULT TO ORTHOPEDIC SURGERY  IP CONSULT TO PHARMACY  IP CONSULT TO SOCIAL WORK     ADMITTING DIAGNOSIS:   Arterial occlusion [I70.90]     DISCHARGE DIAGNOSES:   1. 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 Sepsis secondary to left foot gangrene and likely pyelonephritis  3. Ureteral stents with bilateral hydroureteronephrosis and fat stranding due to recurrent UTIs and bilateral ureteral obstruction   4. Elevated troponin--NSTEMI  5. Chronic microcytic hypochromic anemia with history of recurrent transfusion  6. Diabetes mellitus with associated diabetic polyneuropathy without long-term use of insulin   7. Hypertension  8. Hyperlipidemia  9. Hyperthyroidism    BRIEF HISTORY OF PRESENT ILLNESS:   Nori Rodriguez is a 60 y.o. female who presents to [facility] ER complaining of foot pain.     Nori Rodriguez has a past medical history that includes anemia, diabetes, hyperlipidemia, hypertension, hypothyroidism, ureteral stent secondary to recurrent UTIs.    Lisset Jeong presented to the ER with complaint of left foot pain. She states she has been having intermittent pain in her foot for months but over the past few days it has gotten worse. She states her foot became colder and she noticed darkening of her toes. She has associated pain. She has history of diabetes and hypertension. She is not on any blood thinners at home.  is present at bedside. He states she does have some memory problems. She was found to have arterial occlusion in left lower extremity.  Heparin drip was started and vascular surgery was EXAM: XR KNEE RIGHT (3 VIEWS), XR KNEE LEFT (3 VIEWS) COMPARISON: None HISTORY: Knee pain TECHNIQUE: 6 views of the knee joints. FINDINGS: Mild bilateral tricompartmental degenerative changes are noted, greatest in the medial compartment. There is no joint effusion. There is normal alignment. Normal patellar alignment seen. No fractures are identified. Mild bilateral knee joint arthritis. Xr Abdomen (kub) (single Ap View)    Result Date: 2/28/2020  Reading location: 200 INDICATION: Pain FINDINGS: AP supine views of the abdomen. Spondylosis. Bilateral ureteral stents. Moderate amount of gas and stool the colon. Other bowel normal caliber. Nonobstructive bowel gas pattern    Ir Angiogram Extremity Left    Result Date: 2/24/2020  Radiology exam is complete. No Radiologist dictation. Please follow up with ordering provider. Ct Foot Left W Wo Contrast    Result Date: 2/28/2020  Reading location:  200 HISTORY: Pain Serial axial images left foot were obtained following the uneventful ministration of 80 mL of Isovue 370 degrees contrast. Reformatted sagittal images were obtained. Please note the patient was uncooperative for examination and there is significant motion artifact involving the left foot the images through the left foot are significantly limited. Grossly there is soft tissue swelling of the left foot. There is no enhancing fluid collection to suggest an abscess. I cannot evaluate the left foot for osteomyelitis given the significant motion artifact through the left foot. 1. There is no soft tissue abscess. 2. Examination is significantly limited due to the significant motion artifact. One cannot evaluate for osteomyelitis of the left foot due to the significant motion artifact.     Xr Chest Portable    Result Date: 2/24/2020  Location:200 Exam: XR CHEST PORTABLE Comparison:  Previous CT scan of 12/12/2019 History: Tachycardia Findings: A single frontal portable view of the chest shows the mediastinum, great vessels and heart to be unremarkable. No acute pulmonary parenchymal opacity. 1.  Unremarkable portable chest.     Ct Tibia Fibula Left W Wo Contrast    Result Date: 2/28/2020  READING LOCATION: 200 HISTORY: Fasciitis, osteomyelitis. TECHNIQUE: Serial axial images of the left tibia and fibula were obtained before and following the uneventful administration of 80 cc of Isovue-370 intravenous contrast. Reformatted sagittal and coronal images were obtained. Automated dose exposure control was utilized for this examination. FINDINGS: Significant motion artifact limits fine detail of the left tibia and fibula. There are no findings of myositis or cellulitis. No soft tissue abscess is identified. The fascial planes are intact. Bone windows are negative for any findings of osteomyelitis. 1. There is no evidence of osteomyelitis, myositis or cellulitis. Cta Abdominal Aorta W Bilat Runoff W Contrast    Result Date: 2/23/2020  Reading location: 200 Indication: Left foot, pain. Comparison: None available. Technique: Multidetector CT angiography of the abdomen and pelvis with bilateral lower extremity runoffs was performed. Additional delayed images through bilateral lower extremities was obtained. Coronal, sagittal, curved, MIP, and 3-D volume rendered reformatted images were provided. FINDINGS: Visualized portion of the descending thoracic aorta is normal in caliber. The abdominal aorta is normal in caliber and enhancement. There is mild scattered atherosclerotic plaque. There is no evidence of dissection or significant stenosis. The celiac axis and superior mesenteric arteries demonstrate normal enhancement. Bilateral renal arteries demonstrate normal enhancement. There is an accessory renal artery on the right. There is mild stenosis at the inferior mesenteric artery origin. The remainder of the inferior mesenteric artery demonstrates normal enhancement.  There is mild atherosclerotic plaque of care and counseling regarding the diagnosis and prognosis. The plan has been discussed in detail and they are aware of the specific conditions for emergent return, as well as the importance of follow-up. Their questions are answered at this time and they are agreeable with the plan for discharge to [facility]. DISCHARGE MEDICATIONS:    Muriel Bo [facility] Medication Instructions JNS:[id number]    Printed on:03/03/20 8268   Medication Information                      atorvastatin (LIPITOR) 80 MG tablet  Take 1 tablet by mouth nightly             benazepril (LOTENSIN) 10 MG tablet  Take 1 tablet by mouth daily             benztropine (COGENTIN) 0.5 MG tablet  Take 0.5 mg by mouth daily              clopidogrel (PLAVIX) 75 MG tablet  Take 1 tablet by mouth daily             diazepam (VALIUM) 2 MG tablet  Take 2 mg by mouth every 6 hours as needed for Anxiety             ferrous sulfate 325 (65 Fe) MG tablet  Take 1 tablet by mouth daily (with breakfast)             gabapentin (NEURONTIN) 100 MG capsule  Take 300 mg by mouth nightly. insulin lispro (HUMALOG) 100 UNIT/ML injection vial  Inject 0-6 Units into the skin 3 times daily (with meals)             megestrol (MEGACE) 20 MG tablet  Take 1 tablet by mouth daily             meloxicam (MOBIC) 7.5 MG tablet  Take 1 tablet by mouth daily             methIMAzole (TAPAZOLE) 5 MG tablet  Take 1 tablet by mouth daily             metoprolol 75 MG TABS  Take 75 mg by mouth 2 times daily             Misc. Devices MISC  Bedside commode             oxybutynin (DITROPAN) 5 MG tablet  Take 1 tablet by mouth 3 times daily             oxyCODONE-acetaminophen (PERCOCET) 5-325 MG per tablet  Take 1 tablet by mouth every 6 hours as needed for Pain for up to 7 days. Intended supply: 7 days.  Take lowest dose possible to manage pain             vitamin B-6 (B-6) 50 MG tablet  Take 1 tablet by mouth daily                 FOLLOW UP/INSTRUCTIONS:  · This patient is

## 2020-03-03 NOTE — PLAN OF CARE
Problem: Pain:  Goal: Pain level will decrease  Description  Pain level will decrease  Outcome: Completed     Problem: Pain:  Goal: Control of acute pain  Description  Control of acute pain  Outcome: Completed     Problem: Pain:  Goal: Control of chronic pain  Description  Control of chronic pain  Outcome: Completed     Problem: Falls - Risk of:  Goal: Will remain free from falls  Description  Will remain free from falls  Outcome: Completed     Problem: Falls - Risk of:  Goal: Absence of physical injury  Description  Absence of physical injury  Outcome: Completed     Problem: Risk for Impaired Skin Integrity  Goal: Tissue integrity - skin and mucous membranes  Description  Structural intactness and normal physiological function of skin and  mucous membranes.   Outcome: Completed     Problem: Tissue Perfusion:  Goal: Peripheral tissue perfusion will improve  Description  Peripheral tissue perfusion will improve  Outcome: Completed     Problem: Tissue Perfusion:  Goal: Peripheral tissue perfusion will improve  Description  Peripheral tissue perfusion will improve  Outcome: Completed     Problem: Physical Regulation:  Goal: Will show no signs and symptoms of excessive bleeding  Description  Will show no signs and symptoms of excessive bleeding  Outcome: Completed     Problem: Physical Regulation:  Goal: Complications related to the disease process, condition or treatment will be avoided or minimized  Description  Complications related to the disease process, condition or treatment will be avoided or minimized  Outcome: Completed

## 2020-03-03 NOTE — CARE COORDINATION
SS Note/Discharge plan:  Contacted [facility] admissions for [facility] confirming pt's transfer for today at 2:00pm [facility], pt and pt's  notified and receptive to transfer arrangements, support offered, nursing notified. Electronically signed by MAGNUS Dimas on 3/3/2020 at 9:46 AM

## 2020-03-03 NOTE — PROGRESS NOTES
completion of ADL/IADL tasks for functional independence and quality of life.     · Pt has made fair minus progress towards set goals as noted through :  · Instruction/training on safety during transfers, positioning of L residual limb  · B UE ROM exs in attempts to increase endurance for ADL/IADL tasks and transfers  · Continue with current plan of care    Treatment Time In: 9:50 AM   Treatment Time Out: 10:09AM           Treatment Charges: Mins Units   ADL/Home Mgt     22169     Thera Activities     54031 4    Ther Ex                 34303 15 1   Manual Therapy    [phone]     Neuro Re-ed         30416     Orthotic manage/training                               [address] [facility]     Non Billable Time     Total Timed Treatment 19 [address] [place], [facility] James Hailey

## 2020-03-03 NOTE — PROGRESS NOTES
Attempted tx with pt, however pt currently receiving meds and  states he would prefer to wait until later for tx with pt. Will attempt tx with pt at later time/date.  Carmela Candelario, [address]

## 2020-03-03 NOTE — PROGRESS NOTES
epigastric hernia umbilical hernia with mesh    OVARIAN CYST REMOVAL      OVARY REMOVAL Right     OVARY REMOVAL  2014    UPPER GASTROINTESTINAL ENDOSCOPY N/A 10/29/2019    EGD ESOPHAGOGASTRODUODENOSCOPY performed by John Orr MD at [facility] ENDOSCOPY       Precautions: falls, alarm and left  BKA   ,      SUBJECTIVE:    Social history: Patient lives with spouse  in a duplex  with No steps  to enter       Equipment owned: Transport chair,       [address]   How much difficulty turning over in bed?: A Lot  How much difficulty sitting down on / standing up from a chair with arms?: Unable  How much difficulty moving from lying on back to sitting on side of bed?: A Lot  How much help from another person moving to and from a bed to a chair?: Total  How much help from another person needed to walk in hospital room?: Total  How much help from another person for climbing 3-5 steps with a railing?: Total  AM-PAC Inpatient Mobility Raw Score : 8  AM-PAC Inpatient T-Scale Score : 28.52  Mobility Inpatient CMS 0-100% Score: 86.62  Mobility Inpatient CMS G-Code Modifier : CM    Nursing cleared patient for PT evaluation. The admitting diagnosis and active problem list as listed above have been reviewed prior to the initiation of this evaluation. OBJECTIVE:   Initial Evaluation  Date: 3/2/2020 Treatment  3/3/2020   Short Term/ Long Term   Goals   Was pt agreeable to Eval/treatment? Yes   yes To be met in 3 days   Pain level   8/10  left residual limb Yes 8/10  Left residual limb    Bed Mobility    Rolling: Maximal assist of 1    Supine to sit: Maximal assist of  2    Sit to supine: Maximal assist of  2    Scooting: Maximal assist of  2   Rolling: Maximal assist of 1   Supine to sit: Maximal assist of  2   Sit to supine: Maximal assist of 1   Scooting: Maximal assist of 1    Rolling: Moderate assist of 1    Supine to sit: Moderate assist of 1    Sit to supine:  Moderate assist of 1 Scooting: Moderate assist of 1     Transfers Sit to stand: Not assessed    Sit to stand: Not assessed    Stand pivot: Dependent assist of 1    Sit to stand: Moderate assist of  2     Ambulation    not assessed   Not assessed          ROM Within functional limits with exception of left  Residual limb 20-30 deg    Increase range of motion 10% of affected joints    Strength BUE: 2/5  RLE:  2/5  LLE:  2/5   Increase strength in affected mm groups by 1/3 grade   Balance Sitting EOB:  poor lateral lean to right needing mod a to maintain upright  Dynamic Standing:  not assessed   Sitting EOB:  poor  Right lateral lean in the chair and on the edge of the bed  Dynamic Standing:  not assessed   Sitting EOB:  fair    Dynamic Standing: fair       Patient is Alert & Oriented x situation  and follows one step directions    Sensation:  Pt denies numbness and tingling to extremities  Edema:  yes left lower extremity      Patient education  Patient educated on  role of Physical Therapy, risks of immobility and plan of care and safety       Patient response to education:   Pt verbalized understanding Pt demonstrated skill Pt requires further education in this area    Yes Partial Yes        ASSESSMENT:    Comments:  Pt needing moderate assist with sitting balance d/t R lateral lean. Attempted one sit to stand transfer with assist x 2 but pt with no volitional movement with RLE. I performed a stand-pivot transfer bed<>chair was Dependent x 1. Pt not able to assist with the transfers. Treatment:  Patient practiced and was instructed in the following treatment:  Pt transferred to edge of bed, sat for 5 minutes, attempted one sit to stand transfer, pt not able to assist with RLE, I performed a stand-pivot transfer to the chair. Propped a pillow on her R side to maintain mid-line with her sitting balance. Pt sat up for 45 minutes, and I performed a stand-pivot transfer to get her back to bed.  Pt's L residual limb propped up with a

## 2020-03-03 NOTE — PROGRESS NOTES
[address] [place] [address] [facility]  Progress Note    F/U: left BKA     Chief Complaint   Patient presents with    Foot Pain     DAVID COLD NUMB TO FEET/ NO RECENT INJ     SUBJECTIVE:  Family present  She seems flat this am   Temp better tmax 99.7  No specific complaints   S/p left BKA   Afebrile   HAS PAIN   Tolerating antibiotics without side effects     Review of systems:  As stated above in the chief complaint, otherwise negative. OBJECTIVE:  BP (!) 172/89   Pulse 92   Temp 99.5 °F (37.5 °C) (Oral)   Resp 16   Ht 5' 2\" (1.575 m)   Wt 149 lb 4 oz (67.7 kg)   SpO2 97%   BMI 27.30 kg/m²   Temp  Av.3 °F (36.8 °C)  Min: 97.7 °F (36.5 °C)  Max: 99.5 °F (37.5 °C)  Constitutional:  The patient is awake, alert, and oriented. In bed   Skin:    Warm and dry. No rashes were noted. HEENT:   Round and reactive pupils. AT/NC  Chest:   No use of accessory muscles to breathe. Symmetrical expansion. Ct ant  Cardiovascular:  S1 and S2 are rhythmic and regular. No murmurs appreciated. Abdomen:   Positive bowel sounds to auscultation.  tender  Extremities:     no edema, LEFT BKA surgical dressing intact unable to visualize   CNS    AAxO   PSYCH PLEASANT   Lines: piv    Radiology:  Laboratory and Tests Review:  Lab Results   Component Value Date    WBC 14.8 (H) 2020    WBC 15.2 (H) 2020    WBC 15.4 (H) 2020    HGB 8.1 (L) 2020    HCT 24.7 (L) 2020    MCV 81.3 2020     2020     No results found for: CRPHS  Lab Results   Component Value Date    ALT 25 2020    AST 64 (H) 2020    ALKPHOS 105 (H) 2020    BILITOT 0.6 2020     Lab Results   Component Value Date     2020    K 4.1 2020    K 5.1 2020     2020    CO2 18 2020    BUN 27 2020    CREATININE 0.9 2020    CREATININE 1.0 2020    CREATININE 0.8 2020    GFRAA >60 2020    LABGLOM >60 2020    GLUCOSE 120

## 2020-03-04 LAB — BLOOD CULTURE, ROUTINE: NORMAL

## 2020-03-06 LAB
EKG ATRIAL RATE: 112 BPM
EKG P AXIS: 74 DEGREES
EKG P-R INTERVAL: 146 MS
EKG Q-T INTERVAL: 352 MS
EKG QRS DURATION: 80 MS
EKG QTC CALCULATION (BAZETT): 480 MS
EKG R AXIS: 79 DEGREES
EKG T AXIS: -2 DEGREES
EKG VENTRICULAR RATE: 112 BPM

## 2020-03-19 ENCOUNTER — TELEPHONE (OUTPATIENT)
Dept: ADMINISTRATIVE | Age: 60
End: 2020-03-19

## 2020-03-23 ENCOUNTER — TELEPHONE (OUTPATIENT)
Dept: FAMILY MEDICINE CLINIC | Age: 60
End: 2020-03-23

## 2020-03-23 RX ORDER — POLYETHYLENE GLYCOL 3350 17 G/17G
17 POWDER, FOR SOLUTION ORAL DAILY PRN
Qty: 527 G | Refills: 1 | Status: SHIPPED
Start: 2020-03-23 | End: 2020-03-26

## 2020-03-26 ENCOUNTER — OFFICE VISIT (OUTPATIENT)
Dept: FAMILY MEDICINE CLINIC | Age: 60
End: 2020-03-26
Payer: MEDICARE

## 2020-03-26 VITALS
HEIGHT: 65 IN | TEMPERATURE: 98.2 F | BODY MASS INDEX: 24.84 KG/M2 | OXYGEN SATURATION: 99 % | SYSTOLIC BLOOD PRESSURE: 124 MMHG | RESPIRATION RATE: 20 BRPM | HEART RATE: 73 BPM | DIASTOLIC BLOOD PRESSURE: 72 MMHG

## 2020-03-26 LAB
BILIRUBIN, POC: NORMAL
BLOOD URINE, POC: NORMAL
CLARITY, POC: NORMAL
COLOR, POC: YELLOW
GLUCOSE URINE, POC: NORMAL
KETONES, POC: NORMAL
LEUKOCYTE EST, POC: NORMAL
NITRITE, POC: NORMAL
PH, POC: 6.5
PROTEIN, POC: NORMAL
SPECIFIC GRAVITY, POC: >1.03
UROBILINOGEN, POC: 0.2

## 2020-03-26 PROCEDURE — 81002 URINALYSIS NONAUTO W/O SCOPE: CPT | Performed by: FAMILY MEDICINE

## 2020-03-26 PROCEDURE — 3017F COLORECTAL CA SCREEN DOC REV: CPT | Performed by: FAMILY MEDICINE

## 2020-03-26 PROCEDURE — G8420 CALC BMI NORM PARAMETERS: HCPCS | Performed by: FAMILY MEDICINE

## 2020-03-26 PROCEDURE — 1111F DSCHRG MED/CURRENT MED MERGE: CPT | Performed by: FAMILY MEDICINE

## 2020-03-26 PROCEDURE — G8427 DOCREV CUR MEDS BY ELIG CLIN: HCPCS | Performed by: FAMILY MEDICINE

## 2020-03-26 PROCEDURE — G8484 FLU IMMUNIZE NO ADMIN: HCPCS | Performed by: FAMILY MEDICINE

## 2020-03-26 PROCEDURE — 1036F TOBACCO NON-USER: CPT | Performed by: FAMILY MEDICINE

## 2020-03-26 PROCEDURE — 99214 OFFICE O/P EST MOD 30 MIN: CPT | Performed by: FAMILY MEDICINE

## 2020-03-26 RX ORDER — DOCUSATE SODIUM 100 MG/1
100 CAPSULE, LIQUID FILLED ORAL 2 TIMES DAILY
Qty: 60 CAPSULE | Refills: 3 | Status: SHIPPED
Start: 2020-03-26 | End: 2020-07-06 | Stop reason: SDUPTHER

## 2020-03-26 RX ORDER — HYDRALAZINE HYDROCHLORIDE 25 MG/1
30 TABLET, FILM COATED ORAL 3 TIMES DAILY
Status: ON HOLD | COMMUNITY
End: 2020-06-04 | Stop reason: HOSPADM

## 2020-03-26 RX ORDER — CIPROFLOXACIN 500 MG/1
500 TABLET, FILM COATED ORAL 2 TIMES DAILY
Qty: 20 TABLET | Refills: 0 | Status: SHIPPED | OUTPATIENT
Start: 2020-03-26 | End: 2020-04-05

## 2020-03-26 RX ORDER — LISINOPRIL 40 MG/1
40 TABLET ORAL DAILY
Status: ON HOLD | COMMUNITY
End: 2020-06-04 | Stop reason: HOSPADM

## 2020-03-26 RX ORDER — DIAZEPAM 2 MG/1
2 TABLET ORAL EVERY 6 HOURS PRN
Qty: 90 TABLET | Refills: 2 | Status: ON HOLD
Start: 2020-03-26 | End: 2020-06-04 | Stop reason: HOSPADM

## 2020-03-26 ASSESSMENT — ENCOUNTER SYMPTOMS
NAUSEA: 0
VOMITING: 0
SHORTNESS OF BREATH: 0
DIARRHEA: 0

## 2020-03-26 NOTE — PATIENT INSTRUCTIONS
understand these feelings. Talking with your family, friends, and health professionals about your frustrations is an important part of your recovery. You may also find that it helps to talk with a person who has had an amputation. Remember that even though losing a limb is difficult, it does not change who you are or prevent you from enjoying life. You will have to adapt and learn new ways to do things, but you will still be able to work and take part in sports and activities. And you can still learn, love, play, and live life to its fullest.  Many organizations can help you adjust to your new life. For example, you can go to amputee-coalition. org for information and support. This care sheet gives you a general idea about how long it will take for you to recover. But each person recovers at a different pace. Follow the steps below to get better as quickly as possible. How can you care for yourself at home? Activity    · Be active. Talk to your doctor about what you can do. If you are active and use your remaining limb, it will heal faster.     · You may shower when your doctor okays it. Wash the remaining limb with soap and water, and pat it dry. You may need help doing this at first.     · You may need to adapt your car to your situation before you drive.     · You will probably be able to return to work and your usual routine when your remaining limb heals. This can be as soon as 4 to 8 weeks after surgery, but it may take longer. Diet    · You can eat your normal diet. If your stomach is upset, try bland, low-fat foods like plain rice, broiled chicken, toast, and yogurt.     · You may notice that your bowel movements are not regular right after your surgery. This is common. Try to avoid constipation and straining with bowel movements. Take a fiber supplement every day. If you have not had a bowel movement after a couple of days, ask your doctor about taking a mild laxative.    Medicines    · Your doctor will tell you if and when you can restart your medicines. He or she will also give you instructions about taking any new medicines.     · If you take aspirin or some other blood thinner, ask your doctor if and when to start taking it again. Make sure that you understand exactly what your doctor wants you to do.     · Be safe with medicines. Take pain medicines exactly as directed. ? If the doctor gave you a prescription medicine for pain, take it as prescribed. ? If you are not taking a prescription pain medicine, ask your doctor if you can take an over-the-counter medicine.     · If you think your pain medicine is making you sick to your stomach:  ? Take your medicine after meals (unless your doctor has told you not to). ? Ask your doctor for a different pain medicine.     · If your doctor prescribed antibiotics, take them as directed. Do not stop taking them just because you feel better. You need to take the full course of antibiotics.    Remaining limb care    · You may have bandages, a rigid dressing, or a cast on your remaining limb. Your doctor will tell you how to take care of it. Depending on your dressing and the doctor's instructions:  ? Check your remaining limb daily for irritation, skin breaks, and redness. Tell your doctor about any problems you see. ? Wash your remaining limb with mild soap and warm water every night. Pat it dry.     · If you have a temporary artificial leg, remove it before you go to sleep. Exercise    · Rehabilitation is a series of exercises you do after your surgery. This helps you learn to use your remaining limb and artificial leg. You will work with your doctor and physical therapist to plan this exercise program. To get the best results, you need to do the exercises correctly and as often and as long as your doctor tells you. Your rehab program will give you a number of exercises to do. Always do them as your therapist tells you.    Other instructions    · Preventing contractures is very important. A contracture occurs when a joint becomes stuck in one position. If this happens, it may be hard or impossible to straighten your remaining limb and use an artificial leg.  ? Make sure you put equal weight on both hips when you sit. Use firm chairs, and sit up straight. ? Keep your remaining limb flat with your knees straight and your legs together while you are lying on your back. ? Lie on your stomach as much as possible to stretch your hip joint. ? Do not sit for more than an hour or two. Stand, or lie on your stomach now and then. ? Do not put pillows under your hips or knees or between your thighs. ? Do not rest your remaining limb on crutch handles or a wheelchair. Follow-up care is a key part of your treatment and safety. Be sure to make and go to all appointments, and call your doctor if you are having problems. It's also a good idea to know your test results and keep a list of the medicines you take. When should you call for help? Call 911 anytime you think you may need emergency care. For example, call if:    · You passed out (lost consciousness).     · You have chest pain, are short of breath, or you cough up blood.    Call your doctor now or seek immediate medical care if:    · You have pain that does not get better after you take pain medicine.     · You are sick to your stomach or cannot drink fluids.     · You have loose stitches, or your incision comes open.     · You have signs of a blood clot in your leg (called a deep vein thrombosis), such as:  ? Pain in your calf, back of the knee, thigh, or groin. ? Redness or swelling in your leg.     · You have signs of infection, such as:  ? Increased pain, swelling, warmth, or redness. ? Red streaks leading from the incision. ? Pus draining from the incision. ?  A fever.     · You bleed through your bandage.    Watch closely for any changes in your health, and be sure to contact your doctor if you have any problems. Where can you learn more? Go to https://chpepiceweb.healthQmerce. org and sign in to your [employer] account. Enter C398 in the [employer] box to learn more about \"Below-the-Knee Leg Amputation: What to Expect at Home. \"     If you do not have an account, please click on the \"Sign Up Now\" link. Current as of: June 26, 2019Content Version: 12.4  © 7442-0044 Healthwise, Incorporated. Care instructions adapted under license by [facility] [place] ([facility]). If you have questions about a medical condition or this instruction, always ask your healthcare professional. [address] any warranty or liability for your use of this information.

## 2020-03-26 NOTE — PROGRESS NOTES
Breath sounds: Normal breath sounds. No wheezing or rales. Abdominal:      General: Bowel sounds are normal. There is no distension. Palpations: Abdomen is soft. Tenderness: There is no abdominal tenderness. Musculoskeletal:      Comments: S/p left BKA   Skin:     General: Skin is warm and dry. Neurological:      Mental Status: She is alert and oriented to person, place, and time. Results for orders placed or performed in visit on 03/26/20   POCT Urinalysis no Micro   Result Value Ref Range    Color, UA yellow     Clarity, UA cloudy     Glucose, UA POC neg     Bilirubin, UA neg     Ketones, UA neg     Spec Grav, UA >1.030     Blood, UA POC large     pH, UA 6.5     Protein, UA POC trace     Urobilinogen, UA 0.2     Leukocytes, UA large     Nitrite, UA neg        ASSESSMENT/PLAN  Angeline Tucker was seen today for follow-up from hospital and urinary tract infection. Diagnoses and all orders for this visit:    Arterial occlusion        -     S/p below knee amputation        -    Has upcoming appointment with vascular surgeon in 2 weeks    Acute cystitis without hematuria  -     ciprofloxacin (CIPRO) 500 MG tablet; Take 1 tablet by mouth 2 times daily for 10 days  -     POCT Urinalysis no Micro  -     Cancel: Culture, Urine; Future due to too small of urine sample provided by patient    Anxiety  -     diazePAM (VALIUM) 2 MG tablet; Take 1 tablet by mouth every 6 hours as needed for Anxiety. Phone/MyChart follow up if tests abnormal.    Return in about 2 months (around 5/26/2020) for diabetes. or sooner if necessary. I have reviewed my findings and recommendations with Angeline Tucker.      Ashwini Sandoval M.D

## 2020-03-27 PROBLEM — K92.2 ACUTE GI BLEEDING: Status: RESOLVED | Noted: 2019-10-25 | Resolved: 2020-03-27

## 2020-03-27 PROBLEM — E44.0 MODERATE PROTEIN-CALORIE MALNUTRITION (HCC): Chronic | Status: RESOLVED | Noted: 2020-02-25 | Resolved: 2020-03-27

## 2020-03-27 PROBLEM — D73.5 SPLENIC INFARCT: Status: RESOLVED | Noted: 2019-10-25 | Resolved: 2020-03-27

## 2020-03-31 ENCOUNTER — TELEPHONE (OUTPATIENT)
Dept: FAMILY MEDICINE CLINIC | Age: 60
End: 2020-03-31

## 2020-04-03 ENCOUNTER — TELEPHONE (OUTPATIENT)
Dept: FAMILY MEDICINE CLINIC | Age: 60
End: 2020-04-03

## 2020-04-03 NOTE — TELEPHONE ENCOUNTER
Last Appointment   3/26/2020  Next Appointment  5/20/2020        Called about a order for a walker. Called a couple days ago about same issue.

## 2020-04-24 ENCOUNTER — TELEPHONE (OUTPATIENT)
Dept: FAMILY MEDICINE CLINIC | Age: 60
End: 2020-04-24

## 2020-04-27 RX ORDER — NITROFURANTOIN 25; 75 MG/1; MG/1
100 CAPSULE ORAL 2 TIMES DAILY
Qty: 20 CAPSULE | Refills: 0 | Status: SHIPPED | OUTPATIENT
Start: 2020-04-27 | End: 2020-05-07

## 2020-04-28 RX ORDER — GABAPENTIN 100 MG/1
300 CAPSULE ORAL NIGHTLY
Qty: 90 CAPSULE | Refills: 3 | Status: ON HOLD
Start: 2020-04-28 | End: 2020-05-22 | Stop reason: HOSPADM

## 2020-04-28 RX ORDER — OXYBUTYNIN CHLORIDE 5 MG/1
5 TABLET ORAL 3 TIMES DAILY
Qty: 90 TABLET | Refills: 3 | Status: SHIPPED
Start: 2020-04-28 | End: 2020-07-06 | Stop reason: SDUPTHER

## 2020-04-28 RX ORDER — CLOPIDOGREL BISULFATE 75 MG/1
75 TABLET ORAL DAILY
Qty: 30 TABLET | Refills: 3 | Status: SHIPPED
Start: 2020-04-28 | End: 2020-07-06

## 2020-05-16 ENCOUNTER — HOSPITAL ENCOUNTER (INPATIENT)
Age: 60
LOS: 5 days | Discharge: HOME | DRG: 853 | End: 2020-05-22
Attending: EMERGENCY MEDICINE | Admitting: INTERNAL MEDICINE
Payer: MEDICARE

## 2020-05-16 LAB
BASOPHILS ABSOLUTE: 0.04 E9/L (ref 0–0.2)
BASOPHILS RELATIVE PERCENT: 0.7 % (ref 0–2)
EOSINOPHILS ABSOLUTE: 0.01 E9/L (ref 0.05–0.5)
EOSINOPHILS RELATIVE PERCENT: 0.2 % (ref 0–6)
HCT VFR BLD CALC: 33.7 % (ref 34–48)
HEMOGLOBIN: 10 G/DL (ref 11.5–15.5)
IMMATURE GRANULOCYTES #: 0.02 E9/L
IMMATURE GRANULOCYTES %: 0.4 % (ref 0–5)
LYMPHOCYTES ABSOLUTE: 1.03 E9/L (ref 1.5–4)
LYMPHOCYTES RELATIVE PERCENT: 18.1 % (ref 20–42)
MCH RBC QN AUTO: 23.9 PG (ref 26–35)
MCHC RBC AUTO-ENTMCNC: 29.7 % (ref 32–34.5)
MCV RBC AUTO: 80.4 FL (ref 80–99.9)
METER GLUCOSE: 102 MG/DL (ref 74–99)
MONOCYTES ABSOLUTE: 0.88 E9/L (ref 0.1–0.95)
MONOCYTES RELATIVE PERCENT: 15.5 % (ref 2–12)
NEUTROPHILS ABSOLUTE: 3.71 E9/L (ref 1.8–7.3)
NEUTROPHILS RELATIVE PERCENT: 65.1 % (ref 43–80)
PDW BLD-RTO: 18.8 FL (ref 11.5–15)
PLATELET # BLD: 693 E9/L (ref 130–450)
PMV BLD AUTO: 10.4 FL (ref 7–12)
RBC # BLD: 4.19 E12/L (ref 3.5–5.5)
WBC # BLD: 5.7 E9/L (ref 4.5–11.5)

## 2020-05-16 PROCEDURE — 83605 ASSAY OF LACTIC ACID: CPT

## 2020-05-16 PROCEDURE — 93005 ELECTROCARDIOGRAM TRACING: CPT | Performed by: STUDENT IN AN ORGANIZED HEALTH CARE EDUCATION/TRAINING PROGRAM

## 2020-05-16 PROCEDURE — 2580000003 HC RX 258: Performed by: STUDENT IN AN ORGANIZED HEALTH CARE EDUCATION/TRAINING PROGRAM

## 2020-05-16 PROCEDURE — 83690 ASSAY OF LIPASE: CPT

## 2020-05-16 PROCEDURE — 82962 GLUCOSE BLOOD TEST: CPT

## 2020-05-16 PROCEDURE — 87088 URINE BACTERIA CULTURE: CPT

## 2020-05-16 PROCEDURE — 85025 COMPLETE CBC W/AUTO DIFF WBC: CPT

## 2020-05-16 PROCEDURE — 99285 EMERGENCY DEPT VISIT HI MDM: CPT

## 2020-05-16 PROCEDURE — 87186 SC STD MICRODIL/AGAR DIL: CPT

## 2020-05-16 PROCEDURE — 87077 CULTURE AEROBIC IDENTIFY: CPT

## 2020-05-16 PROCEDURE — 87040 BLOOD CULTURE FOR BACTERIA: CPT

## 2020-05-16 PROCEDURE — 81001 URINALYSIS AUTO W/SCOPE: CPT

## 2020-05-16 RX ORDER — 0.9 % SODIUM CHLORIDE 0.9 %
1000 INTRAVENOUS SOLUTION INTRAVENOUS ONCE
Status: COMPLETED | OUTPATIENT
Start: 2020-05-16 | End: 2020-05-17

## 2020-05-16 RX ADMIN — SODIUM CHLORIDE 1000 ML: 9 INJECTION, SOLUTION INTRAVENOUS at 23:57

## 2020-05-17 ENCOUNTER — APPOINTMENT (OUTPATIENT)
Dept: CT IMAGING | Age: 60
DRG: 853 | End: 2020-05-17
Payer: MEDICARE

## 2020-05-17 ENCOUNTER — APPOINTMENT (OUTPATIENT)
Dept: GENERAL RADIOLOGY | Age: 60
DRG: 853 | End: 2020-05-17
Payer: MEDICARE

## 2020-05-17 PROBLEM — E16.2 HYPOGLYCEMIA: Status: ACTIVE | Noted: 2020-05-17

## 2020-05-17 LAB
ALBUMIN SERPL-MCNC: 3.3 G/DL (ref 3.5–5.2)
ALP BLD-CCNC: 228 U/L (ref 35–104)
ALT SERPL-CCNC: 58 U/L (ref 0–32)
ANION GAP SERPL CALCULATED.3IONS-SCNC: 14 MMOL/L (ref 7–16)
AST SERPL-CCNC: 61 U/L (ref 0–31)
BACTERIA: ABNORMAL /HPF
BILIRUB SERPL-MCNC: 0.3 MG/DL (ref 0–1.2)
BILIRUBIN URINE: NEGATIVE
BLOOD, URINE: ABNORMAL
BUN BLDV-MCNC: 20 MG/DL (ref 8–23)
CALCIUM SERPL-MCNC: 9.1 MG/DL (ref 8.6–10.2)
CHLORIDE BLD-SCNC: 102 MMOL/L (ref 98–107)
CHP ED QC CHECK: YES
CLARITY: ABNORMAL
CO2: 21 MMOL/L (ref 22–29)
COLOR: YELLOW
CREAT SERPL-MCNC: 0.6 MG/DL (ref 0.5–1)
EKG ATRIAL RATE: 86 BPM
EKG P AXIS: 65 DEGREES
EKG P-R INTERVAL: 152 MS
EKG Q-T INTERVAL: 386 MS
EKG QRS DURATION: 72 MS
EKG QTC CALCULATION (BAZETT): 461 MS
EKG R AXIS: 51 DEGREES
EKG T AXIS: 25 DEGREES
EKG VENTRICULAR RATE: 86 BPM
GFR AFRICAN AMERICAN: >60
GFR NON-AFRICAN AMERICAN: >60 ML/MIN/1.73
GLUCOSE BLD-MCNC: 103 MG/DL (ref 74–99)
GLUCOSE BLD-MCNC: 109 MG/DL (ref 74–99)
GLUCOSE BLD-MCNC: 121 MG/DL
GLUCOSE BLD-MCNC: 60 MG/DL
GLUCOSE URINE: NEGATIVE MG/DL
KETONES, URINE: NEGATIVE MG/DL
LACTIC ACID: 1.1 MMOL/L (ref 0.5–2.2)
LEUKOCYTE ESTERASE, URINE: ABNORMAL
LIPASE: 14 U/L (ref 13–60)
MAGNESIUM: 2.2 MG/DL (ref 1.6–2.6)
METER GLUCOSE: 118 MG/DL (ref 74–99)
METER GLUCOSE: 121 MG/DL (ref 74–99)
METER GLUCOSE: 134 MG/DL (ref 74–99)
METER GLUCOSE: 163 MG/DL (ref 74–99)
METER GLUCOSE: 45 MG/DL (ref 74–99)
METER GLUCOSE: 60 MG/DL (ref 74–99)
METER GLUCOSE: 61 MG/DL (ref 74–99)
METER GLUCOSE: 61 MG/DL (ref 74–99)
METER GLUCOSE: 69 MG/DL (ref 74–99)
METER GLUCOSE: 75 MG/DL (ref 74–99)
METER GLUCOSE: 90 MG/DL (ref 74–99)
METER GLUCOSE: 97 MG/DL (ref 74–99)
METER GLUCOSE: <40 MG/DL (ref 74–99)
METER GLUCOSE: <40 MG/DL (ref 74–99)
NITRITE, URINE: NEGATIVE
PH UA: 6.5 (ref 5–9)
PHOSPHORUS: 4.7 MG/DL (ref 2.5–4.5)
POTASSIUM REFLEX MAGNESIUM: 5.3 MMOL/L (ref 3.5–5)
PROTEIN UA: 30 MG/DL
RBC UA: ABNORMAL /HPF (ref 0–2)
REASON FOR REJECTION: NORMAL
REJECTED TEST: NORMAL
SODIUM BLD-SCNC: 137 MMOL/L (ref 132–146)
SPECIFIC GRAVITY UA: 1.02 (ref 1–1.03)
TOTAL PROTEIN: 8.5 G/DL (ref 6.4–8.3)
TROPONIN: 0.03 NG/ML (ref 0–0.03)
UROBILINOGEN, URINE: 0.2 E.U./DL
WBC UA: >20 /HPF (ref 0–5)

## 2020-05-17 PROCEDURE — 1200000000 HC SEMI PRIVATE

## 2020-05-17 PROCEDURE — 96375 TX/PRO/DX INJ NEW DRUG ADDON: CPT

## 2020-05-17 PROCEDURE — 2500000003 HC RX 250 WO HCPCS: Performed by: STUDENT IN AN ORGANIZED HEALTH CARE EDUCATION/TRAINING PROGRAM

## 2020-05-17 PROCEDURE — 82962 GLUCOSE BLOOD TEST: CPT

## 2020-05-17 PROCEDURE — 70450 CT HEAD/BRAIN W/O DYE: CPT

## 2020-05-17 PROCEDURE — 96374 THER/PROPH/DIAG INJ IV PUSH: CPT

## 2020-05-17 PROCEDURE — 2580000003 HC RX 258: Performed by: STUDENT IN AN ORGANIZED HEALTH CARE EDUCATION/TRAINING PROGRAM

## 2020-05-17 PROCEDURE — 2580000003 HC RX 258: Performed by: INTERNAL MEDICINE

## 2020-05-17 PROCEDURE — 6360000002 HC RX W HCPCS: Performed by: STUDENT IN AN ORGANIZED HEALTH CARE EDUCATION/TRAINING PROGRAM

## 2020-05-17 PROCEDURE — 36415 COLL VENOUS BLD VENIPUNCTURE: CPT

## 2020-05-17 PROCEDURE — 6360000004 HC RX CONTRAST MEDICATION: Performed by: RADIOLOGY

## 2020-05-17 PROCEDURE — 80053 COMPREHEN METABOLIC PANEL: CPT

## 2020-05-17 PROCEDURE — 2500000003 HC RX 250 WO HCPCS: Performed by: EMERGENCY MEDICINE

## 2020-05-17 PROCEDURE — 83735 ASSAY OF MAGNESIUM: CPT

## 2020-05-17 PROCEDURE — 84484 ASSAY OF TROPONIN QUANT: CPT

## 2020-05-17 PROCEDURE — 6370000000 HC RX 637 (ALT 250 FOR IP): Performed by: INTERNAL MEDICINE

## 2020-05-17 PROCEDURE — 84100 ASSAY OF PHOSPHORUS: CPT

## 2020-05-17 PROCEDURE — 82947 ASSAY GLUCOSE BLOOD QUANT: CPT

## 2020-05-17 PROCEDURE — 2580000003 HC RX 258: Performed by: EMERGENCY MEDICINE

## 2020-05-17 PROCEDURE — 71045 X-RAY EXAM CHEST 1 VIEW: CPT

## 2020-05-17 PROCEDURE — 74177 CT ABD & PELVIS W/CONTRAST: CPT

## 2020-05-17 RX ORDER — CLOPIDOGREL BISULFATE 75 MG/1
75 TABLET ORAL DAILY
Status: DISCONTINUED | OUTPATIENT
Start: 2020-05-17 | End: 2020-05-22 | Stop reason: HOSPADM

## 2020-05-17 RX ORDER — LANOLIN ALCOHOL/MO/W.PET/CERES
50 CREAM (GRAM) TOPICAL DAILY
Status: DISCONTINUED | OUTPATIENT
Start: 2020-05-17 | End: 2020-05-22 | Stop reason: HOSPADM

## 2020-05-17 RX ORDER — NICOTINE POLACRILEX 4 MG
15 LOZENGE BUCCAL PRN
Status: DISCONTINUED | OUTPATIENT
Start: 2020-05-17 | End: 2020-05-22 | Stop reason: HOSPADM

## 2020-05-17 RX ORDER — FERROUS SULFATE 325(65) MG
325 TABLET ORAL
Status: DISCONTINUED | OUTPATIENT
Start: 2020-05-17 | End: 2020-05-22 | Stop reason: HOSPADM

## 2020-05-17 RX ORDER — DEXTROSE MONOHYDRATE 50 MG/ML
100 INJECTION, SOLUTION INTRAVENOUS PRN
Status: DISCONTINUED | OUTPATIENT
Start: 2020-05-17 | End: 2020-05-22 | Stop reason: HOSPADM

## 2020-05-17 RX ORDER — DEXTROSE MONOHYDRATE 25 G/50ML
INJECTION, SOLUTION INTRAVENOUS
Status: DISPENSED
Start: 2020-05-17 | End: 2020-05-17

## 2020-05-17 RX ORDER — DEXTROSE MONOHYDRATE 100 MG/ML
INJECTION, SOLUTION INTRAVENOUS CONTINUOUS
Status: DISCONTINUED | OUTPATIENT
Start: 2020-05-17 | End: 2020-05-19

## 2020-05-17 RX ORDER — DEXTROSE MONOHYDRATE 25 G/50ML
12.5 INJECTION, SOLUTION INTRAVENOUS PRN
Status: DISCONTINUED | OUTPATIENT
Start: 2020-05-17 | End: 2020-05-22 | Stop reason: HOSPADM

## 2020-05-17 RX ORDER — HYDRALAZINE HYDROCHLORIDE 10 MG/1
30 TABLET, FILM COATED ORAL 3 TIMES DAILY
Status: DISCONTINUED | OUTPATIENT
Start: 2020-05-17 | End: 2020-05-22 | Stop reason: HOSPADM

## 2020-05-17 RX ORDER — 0.9 % SODIUM CHLORIDE 0.9 %
1000 INTRAVENOUS SOLUTION INTRAVENOUS ONCE
Status: DISCONTINUED | OUTPATIENT
Start: 2020-05-17 | End: 2020-05-17

## 2020-05-17 RX ORDER — HYDRALAZINE HYDROCHLORIDE 20 MG/ML
5 INJECTION INTRAMUSCULAR; INTRAVENOUS ONCE
Status: COMPLETED | OUTPATIENT
Start: 2020-05-17 | End: 2020-05-17

## 2020-05-17 RX ORDER — GABAPENTIN 300 MG/1
300 CAPSULE ORAL NIGHTLY
Status: DISCONTINUED | OUTPATIENT
Start: 2020-05-17 | End: 2020-05-17

## 2020-05-17 RX ORDER — ACETAMINOPHEN 325 MG/1
650 TABLET ORAL EVERY 6 HOURS PRN
Status: DISCONTINUED | OUTPATIENT
Start: 2020-05-17 | End: 2020-05-22 | Stop reason: HOSPADM

## 2020-05-17 RX ORDER — DEXTROSE AND SODIUM CHLORIDE 5; .45 G/100ML; G/100ML
INJECTION, SOLUTION INTRAVENOUS ONCE
Status: COMPLETED | OUTPATIENT
Start: 2020-05-17 | End: 2020-05-17

## 2020-05-17 RX ORDER — OXYBUTYNIN CHLORIDE 5 MG/1
5 TABLET ORAL 3 TIMES DAILY
Status: DISCONTINUED | OUTPATIENT
Start: 2020-05-17 | End: 2020-05-22 | Stop reason: HOSPADM

## 2020-05-17 RX ORDER — DEXTROSE MONOHYDRATE 100 MG/ML
INJECTION, SOLUTION INTRAVENOUS ONCE
Status: COMPLETED | OUTPATIENT
Start: 2020-05-17 | End: 2020-05-17

## 2020-05-17 RX ORDER — METOPROLOL TARTRATE 5 MG/5ML
5 INJECTION INTRAVENOUS EVERY 5 MIN PRN
Status: DISCONTINUED | OUTPATIENT
Start: 2020-05-17 | End: 2020-05-17

## 2020-05-17 RX ORDER — DOCUSATE SODIUM 100 MG/1
100 CAPSULE, LIQUID FILLED ORAL 2 TIMES DAILY
Status: DISCONTINUED | OUTPATIENT
Start: 2020-05-17 | End: 2020-05-22 | Stop reason: HOSPADM

## 2020-05-17 RX ORDER — LISINOPRIL 20 MG/1
40 TABLET ORAL DAILY
Status: DISCONTINUED | OUTPATIENT
Start: 2020-05-17 | End: 2020-05-22 | Stop reason: HOSPADM

## 2020-05-17 RX ORDER — METHIMAZOLE 5 MG/1
5 TABLET ORAL DAILY
Status: DISCONTINUED | OUTPATIENT
Start: 2020-05-17 | End: 2020-05-22 | Stop reason: HOSPADM

## 2020-05-17 RX ORDER — ATORVASTATIN CALCIUM 40 MG/1
80 TABLET, FILM COATED ORAL NIGHTLY
Status: DISCONTINUED | OUTPATIENT
Start: 2020-05-17 | End: 2020-05-22 | Stop reason: HOSPADM

## 2020-05-17 RX ORDER — BENZTROPINE MESYLATE 0.5 MG/1
0.5 TABLET ORAL DAILY
Status: DISCONTINUED | OUTPATIENT
Start: 2020-05-17 | End: 2020-05-22 | Stop reason: HOSPADM

## 2020-05-17 RX ORDER — DIAZEPAM 2 MG/1
2 TABLET ORAL EVERY 6 HOURS PRN
Status: DISCONTINUED | OUTPATIENT
Start: 2020-05-17 | End: 2020-05-22 | Stop reason: HOSPADM

## 2020-05-17 RX ORDER — GABAPENTIN 100 MG/1
100 CAPSULE ORAL NIGHTLY
Status: DISCONTINUED | OUTPATIENT
Start: 2020-05-17 | End: 2020-05-22 | Stop reason: HOSPADM

## 2020-05-17 RX ORDER — MEGESTROL ACETATE 40 MG/1
20 TABLET ORAL DAILY
Status: DISCONTINUED | OUTPATIENT
Start: 2020-05-17 | End: 2020-05-22 | Stop reason: HOSPADM

## 2020-05-17 RX ORDER — DEXTROSE AND SODIUM CHLORIDE 5; .45 G/100ML; G/100ML
INJECTION, SOLUTION INTRAVENOUS CONTINUOUS
Status: DISCONTINUED | OUTPATIENT
Start: 2020-05-17 | End: 2020-05-17

## 2020-05-17 RX ADMIN — IOPAMIDOL 75 ML: 755 INJECTION, SOLUTION INTRAVENOUS at 02:53

## 2020-05-17 RX ADMIN — HYDRALAZINE HYDROCHLORIDE 5 MG: 20 INJECTION INTRAMUSCULAR; INTRAVENOUS at 04:32

## 2020-05-17 RX ADMIN — DEXTROSE MONOHYDRATE 12.5 G: 25 INJECTION, SOLUTION INTRAVENOUS at 18:41

## 2020-05-17 RX ADMIN — DEXTROSE MONOHYDRATE 12.5 G: 25 INJECTION, SOLUTION INTRAVENOUS at 15:59

## 2020-05-17 RX ADMIN — HYDRALAZINE HYDROCHLORIDE 30 MG: 10 TABLET, FILM COATED ORAL at 22:03

## 2020-05-17 RX ADMIN — METOPROLOL TARTRATE 75 MG: 25 TABLET, FILM COATED ORAL at 22:03

## 2020-05-17 RX ADMIN — GLUCAGON HYDROCHLORIDE 1 MG: KIT at 06:24

## 2020-05-17 RX ADMIN — DEXTROSE AND SODIUM CHLORIDE: 5; 450 INJECTION, SOLUTION INTRAVENOUS at 19:58

## 2020-05-17 RX ADMIN — DEXTROSE AND SODIUM CHLORIDE 75 ML/HR: 5; 450 INJECTION, SOLUTION INTRAVENOUS at 06:27

## 2020-05-17 RX ADMIN — OXYBUTYNIN CHLORIDE 5 MG: 5 TABLET ORAL at 22:03

## 2020-05-17 RX ADMIN — GABAPENTIN 100 MG: 100 CAPSULE ORAL at 22:04

## 2020-05-17 RX ADMIN — DEXTROSE MONOHYDRATE 12.5 G: 25 INJECTION, SOLUTION INTRAVENOUS at 21:43

## 2020-05-17 RX ADMIN — DEXTROSE MONOHYDRATE: 100 INJECTION, SOLUTION INTRAVENOUS at 21:54

## 2020-05-17 RX ADMIN — DEXTROSE MONOHYDRATE 12.5 G: 25 INJECTION, SOLUTION INTRAVENOUS at 11:47

## 2020-05-17 RX ADMIN — ATORVASTATIN CALCIUM 80 MG: 40 TABLET, FILM COATED ORAL at 22:04

## 2020-05-17 RX ADMIN — SODIUM CHLORIDE 1000 ML: 9 INJECTION, SOLUTION INTRAVENOUS at 05:54

## 2020-05-17 RX ADMIN — DEXTROSE AND SODIUM CHLORIDE: 5; 450 INJECTION, SOLUTION INTRAVENOUS at 16:06

## 2020-05-17 RX ADMIN — WATER 1 G: 1 INJECTION INTRAMUSCULAR; INTRAVENOUS; SUBCUTANEOUS at 01:29

## 2020-05-17 RX ADMIN — DEXTROSE MONOHYDRATE 12.5 G: 25 INJECTION, SOLUTION INTRAVENOUS at 14:44

## 2020-05-17 RX ADMIN — METOPROLOL TARTRATE 5 MG: 5 INJECTION INTRAVENOUS at 06:36

## 2020-05-17 RX ADMIN — DEXTROSE MONOHYDRATE: 100 INJECTION, SOLUTION INTRAVENOUS at 01:29

## 2020-05-17 ASSESSMENT — ENCOUNTER SYMPTOMS: ABDOMINAL PAIN: 1

## 2020-05-17 ASSESSMENT — PAIN SCALES - GENERAL
PAINLEVEL_OUTOF10: 0

## 2020-05-17 NOTE — ED NOTES
Bed: 04  Expected date:   Expected time:   Means of arrival:   Comments:  3 [address] Yemi Jessica RN  05/16/20 6520

## 2020-05-17 NOTE — CONSULTS
Elizabeth Reese  Has urinary incontinence  Small ulcer poa scral  Pt had f/ at home  She denies urinary complaints/dysuria      REVIEW OF SYSTEMS    (2-9 systems for level 4, 10 or more for level 5)     REVIEW OFSYSTEMS:    CONSTITUTIONAL:     fever, chills at home, weight loss  ALLERGIES:    No urticaria, hay fever,    EYES:     No blurry vision, loss of vision,eye pain  ENT:      No hearing loss, sore throat  CARDIOVASCULAR:  No chest pain or palpitations  RESPIRATORY:   No cough, sob  ENDOCRINE:    No increase thirst, urination   HEME-LYMPH:   No easy bruising or bleeding  GI:     No nausea, vomiting or diarrhea  :     No urinary complaints  NEURO:    No seizures, stroke, HA  MUSCULOSKELETAL:  No muscle aches or pain, no jointpain  SKIN:     No rash or itch  PSYCH:    No depression or anxiety  Medications Prior to Admission: gabapentin (NEURONTIN) 100 MG capsule, Take 3 capsules by mouth nightly for 30 days. clopidogrel (PLAVIX) 75 MG tablet, Take 1 tablet by mouth daily  oxybutynin (DITROPAN) 5 MG tablet, Take 1 tablet by mouth 3 times daily  diazePAM (VALIUM) 2 MG tablet, Take 1 tablet by mouth every 6 hours as needed for Anxiety.   lisinopril (PRINIVIL;ZESTRIL) 40 MG tablet, Take 40 mg by mouth daily  hydrALAZINE (APRESOLINE) 25 MG tablet, Take 30 mg by mouth 3 times daily  docusate sodium (COLACE) 100 MG capsule, Take 1 capsule by mouth 2 times daily  [DISCONTINUED] oxyCODONE HCl (ROXICODONE PO), Take 10 mg by mouth 2 times daily  atorvastatin (LIPITOR) 80 MG tablet, Take 1 tablet by mouth nightly  insulin lispro (HUMALOG) 100 UNIT/ML injection vial, Inject 0-6 Units into the skin 3 times daily (with meals)  metoprolol 75 MG TABS, Take 75 mg by mouth 2 times daily  methIMAzole (TAPAZOLE) 5 MG tablet, Take 1 tablet by mouth daily  megestrol (MEGACE) 20 MG tablet, Take 1 tablet by mouth daily  vitamin B-6 (B-6) 50 MG tablet, Take 1 tablet by mouth daily  ferrous sulfate 325 (65 Fe) MG tablet, Take 1 tablet by mouth daily (with breakfast)  benztropine (COGENTIN) 0.5 MG tablet, Take 0.5 mg by mouth daily '  CURRENT MEDICATIONS     Current Facility-Administered Medications:     dextrose 50 % solution, , , ,     atorvastatin (LIPITOR) tablet 80 mg, 80 mg, Oral, Nightly, Armand Reynaga DO    benztropine (COGENTIN) tablet 0.5 mg, 0.5 mg, Oral, Daily, Armand Reynaga DO    clopidogrel (PLAVIX) tablet 75 mg, 75 mg, Oral, Daily, Armand Reynaga DO    diazePAM (VALIUM) tablet 2 mg, 2 mg, Oral, Q6H PRN, Teresa Reynaga DO    docusate sodium (COLACE) capsule 100 mg, 100 mg, Oral, BID, Armand Reynaga DO    ferrous sulfate (IRON 325) tablet 325 mg, 325 mg, Oral, Daily with breakfast, Armand Reynaga DO    gabapentin (NEURONTIN) capsule 300 mg, 300 mg, Oral, Nightly, Armand Reynaga DO    hydrALAZINE (APRESOLINE) tablet 30 mg, 30 mg, Oral, TID, Armand Reynaga DO    vitamin B-6 (PYRIDOXINE) tablet 50 mg, 50 mg, Oral, Daily, Armand Reynaga DO    oxybutynin (DITROPAN) tablet 5 mg, 5 mg, Oral, TID, Armand Reynaga DO    metoprolol tartrate (LOPRESSOR) tablet 75 mg, 75 mg, Oral, BID, Armand Reynaga DO    methIMAzole (TAPAZOLE) tablet 5 mg, 5 mg, Oral, Daily, Armand Reynaga DO    megestrol (MEGACE) tablet 20 mg, 20 mg, Oral, Daily, Armand Reynaga DO    lisinopril (PRINIVIL;ZESTRIL) tablet 40 mg, 40 mg, Oral, Daily, Armand Reynaga DO    acetaminophen (TYLENOL) tablet 650 mg, 650 mg, Oral, Q6H PRN, Armand Reynaga DO    glucose (GLUTOSE) 40 % oral gel 15 g, 15 g, Oral, PRN, Armand Reynaga DO    dextrose 50 % IV solution, 12.5 g, Intravenous, PRN, Teresa Reynaga DO    glucagon (rDNA) injection 1 mg, 1 mg, Intramuscular, PRN, Treesa Reynaga, DO    dextrose 5 % solution, 100 mL/hr, Intravenous, PRN, Teresa Reynaga, DO    insulin lispro (HUMALOG) injection vial 0-6 Units, 0-6 Units, Subcutaneous, TID WC, Armand Reynaga, DO    insulin lispro (HUMALOG) injection vial 0-3 Units, 0-3 Units, Subcutaneous, Nightly, Aparna Santiago DRAGAN Reynaga DO  ALLERGIES     Patient has no known allergies. Immunization History   Administered Date(s) Administered    Pneumococcal Conjugate 13-valent (Branden Khan) 06/21/2017     PAST MEDICAL HISTORY     Past Medical History:   Diagnosis Date    Diabetic peripheral neuropathy associated with type 2 diabetes mellitus ([facility] [address] 8/24/2018    Epigastric hernia     History of blood transfusion     Hyperlipidemia     Hypertension     Kidney stone     [facility]     Type 2 diabetes mellitus with diabetic polyneuropathy, with long-term current use of insulin ([facility] 4/32/2985    Umbilical hernia      SURGICAL HISTORY       Past Surgical History:   Procedure Laterality Date    C[facility]AC [facility]HETERIZATION  09/30/2016     [facility]    COLONOSCOPY  08/2016    Dr. Ana Moran / [facility]ia Pfeiffer / [place] Bilateral 10/28/2019    CYSTOSCOPY. RETROGRADE.  PYELOGRAM. BILATERAL STENT INSERTION performed by Ashwini Razo MD at [address] Left 2/29/2020    LEG AMPUTATION BELOW KNEE performed by Katherine Morales DO at [address]  11/04/2016    epigastric hernia umbilical hernia with mesh    OVARIAN CYST REMOVAL      OVARY REMOVAL Right     OVARY REMOVAL  2014    UPPER GASTROINTESTINAL ENDOSCOPY N/A 10/29/2019    EGD ESOPHAGOGASTRODUODENOSCOPY performed by Doug Barrera MD at [address]       Family History   Problem Relation Age of Onset    Cancer Brother      SOCIAL HISTORY       Social History     Socioeconomic History    Marital status:      Spouse name: None    Number of children: 3    Years of education: None    Highest education level: None   Occupational History    None   Social Needs    Financial resource strain: None    Food insecurity     Worry: None     Inability: None    Transportation needs     Medical: None     Non-medical: None   Tobacco Use    Smoking status: Former Smoker     Packs/day: 0.00     Years: 2.00     Pack years: 0.00     Types: Cigarettes     Last attempt to quit: 3/23/2015     Years since quittin.1    Smokeless tobacco: Never Used    Tobacco comment: quit smoking    Substance and Sexual Activity    Alcohol use: No     Comment: coffee daily    Drug use: No    Sexual activity: Not Currently     Partners: Male   Lifestyle    Physical activity     Days per week: None     Minutes per session: None    Stress: None   Relationships    Social connections     Talks on phone: None     Gets together: None     Attends Shinto service: None     Active member of club or organization: None     Attends meetings of clubs or organizations: None     Relationship status: None    Intimate partner violence     Fear of current or ex partner: None     Emotionally abused: None     Physically abused: None     Forced sexual activity: None   Other Topics Concern    None   Social History Narrative    None     · home    PHYSICAL EXAM    (up to 7 for level 4, 8 or more forlevel 5)     ED Triage Vitals [20 2301]   BP Temp Temp Source Pulse Resp SpO2 Height Weight   (!) 154/91 98.6 °F (37 °C) Oral 87 18 99 % 5' 4\" (1.626 m) 119 lb (54 kg)     Vitals:    Vitals:    20 0633 20 0639 20 0754 20 0832   BP: (!) 183/137 (!) 156/101 (!) 153/63    Pulse: 133 109 118    Resp:     Temp: 98.5 °F (36.9 °C) 98.5 °F (36.9 °C) 99.8 °F (37.7 °C)    TempSrc: Oral Oral Oral    SpO2: 99% 99% 96%    Weight:    119 lb 7 oz (54.2 kg)   Height:    5' 4\" (1.626 m)     Physical Exam   Constitutional/General: Alert and oriented, NAD thin  Head: NC/AT  Eyes: PERRL, EOMI  Mouth: Normal mucosa, no thrush   Neck: Supple, full ROM,    Pulmonary: Lungs clear to auscultation bilaterally. Not in respiratory distress  Cardiovascular:  Regular rate and rhythm, no murmurs, gallops, or rubs. Abdomen: Soft, + BS. No distension. Nontender.     Extremities: Moves all ALKPHOS 228*  --   --    AST 61*  --   --    ALT 58*  --   --      No results for input(s): PROCAL in the last 72 hours. Lab Results   Component Value Date    CRP 19.8 (H) 02/25/2020    CRP 16.2 (H) 12/12/2019    CRP 1.7 (H) 04/26/2012     Lab Results   Component Value Date    SEDRATE 115 (H) 02/25/2020    SEDRATE 89 (H) 12/12/2019       MICROBIOLOGY:    Cultures :   Lab Results   Component Value Date    BC 5 Days- no growth 02/28/2020    BC 5 Days- no growth 02/26/2020    BC 5 Days- no growth 02/25/2020     Lab Results   Component Value Date    BLOODCULT2 5 Days- no growth 02/26/2020    BLOODCULT2 5 Days- no growth 02/25/2020    BLOODCULT2 5 Days- no growth 12/13/2019     Urine Culture, Routine   Date Value Ref Range Status   02/26/2020 Growth not present  Final   12/30/2019   Final    <10,000 CFU/mL  Gram negative rods  Gram positive organism     12/12/2019   Final    ,000 CFU/mL  Mixed chioma isolated. Further workup and sensitivity testing  is not routinely indicated and will not be performed. Mixed chioma isolated includes:  Gram negative rods  Mixed gram positive organisms         Patient is a 63 y.o. female who presented with   Chief Complaint   Patient presents with    Hypoglycemia     EMS called to house for unresponsive. Family states that pt had been like that for 1 hour. Pt sugar was 19 upon arrival. EMS gave 1/2 amp dextrose glucose was 102. FINAL IMPRESSION      1. Hypoglycemia    2.  Altered mental status, unspecified altered mental status type    uti   urinary incontinence  TRANASMINITIS  Await final cx  RECENT LE BKA  Orders Placed This Encounter   Procedures    Culture, Blood 1    Culture, Blood 2    Culture, Urine    XR CHEST PORTABLE    CT Head WO Contrast    CT ABDOMEN PELVIS W IV CONTRAST Additional Contrast? None    CBC Auto Differential    Lipase    Lactic Acid, Plasma    Urinalysis, reflex to microscopic    Microscopic Urinalysis    SPECIMEN REJECTION   

## 2020-05-17 NOTE — ED NOTES
Provider notified that pt's bgl was really low. Orders obtained.       Cherry Halsted, RN  05/17/20 9816

## 2020-05-17 NOTE — ED PROVIDER NOTES
79-year-old female with past medical history of hypertension and diabetes presents with with hypoglycemia. As per note from family patient was unresponsive for 1 hour at home. EMS was contacted and found the patient to have a blood sugar of 21. She was given a half amp of glucose and her repeat point-of-care glucose was 212. Patient on arrival was altered. Her point-of-care glucose was 112. She is complaining of epigastric pain at the moment. She is able to specify the length and duration of the pain. She is unable to quantify or describe the pain. History was limited to patient's mental status. Altered Mental Status   Presenting symptoms: disorientation and unresponsiveness    Presenting symptoms: no lethargy and no memory loss    Severity:  Severe  Most recent episode: Today  Episode history:  Multiple  Duration:  1 hour  Timing:  Constant  Progression:  Unchanged  Chronicity:  Chronic  Context: dementia    Context comment:  Hypoglycemia  Associated symptoms: abdominal pain    Abdominal pain:     Location:  Epigastric    Quality: aching      Severity:  Moderate    Onset quality:  Unable to specify    Timing:  Unable to specify    Progression:  Unable to specify       Review of Systems   Unable to perform ROS: Other (Altered mental status)   Gastrointestinal: Positive for abdominal pain. Psychiatric/Behavioral: Negative for memory loss. Physical Exam  Constitutional:       Appearance: She is not toxic-appearing. Comments: Altered, uncomfortable with akathisia, in no acute distress   HENT:      Head: Normocephalic and atraumatic. Right Ear: Tympanic membrane and ear canal normal. There is no impacted cerumen. Left Ear: Tympanic membrane and ear canal normal. There is no impacted cerumen. Nose: Nose normal. No congestion or rhinorrhea. Mouth/Throat:      Mouth: Mucous membranes are dry. Pharynx: Oropharynx is clear.  No oropharyngeal exudate or posterior blood transfusion, Hyperlipidemia, Hypertension, Kidney stone, Schizophrenia ([facility] [address] Type 2 diabetes mellitus with diabetic polyneuropathy, with long-term current use of insulin ([facility] [address] and Umbilical hernia. Past Surgical History:  has a past surgical history that includes Ovary removal (Right); Ovary removal (2014); ovarian cyst removal; Colonoscopy (08/2016); Cardiac catheterization (09/30/2016); other surgical history (11/04/2016); hernia repair; CYSTOSCOPY INSERTION / REMOVAL STENT / STONE (Bilateral, 10/28/2019); Upper gastrointestinal endoscopy (N/A, 10/29/2019); and Leg amputation below knee (Left, 2/29/2020). Social History:  reports that she quit smoking about 5 years ago. Her smoking use included cigarettes. She smoked 0.00 packs per day for 2.00 years. She has never used smokeless tobacco. She reports that she does not drink alcohol or use drugs. Family History: family history includes Cancer in her brother. The patients home medications have been reviewed. Allergies: Patient has no known allergies.     -------------------------------------------------- RESULTS -------------------------------------------------    LABS:  Results for orders placed or performed during the hospital encounter of 05/16/20   CBC Auto Differential   Result Value Ref Range    WBC 5.7 4.5 - 11.5 E9/L    RBC 4.19 3.50 - 5.50 E12/L    Hemoglobin 10.0 (L) 11.5 - 15.5 g/dL    Hematocrit 33.7 (L) 34.0 - 48.0 %    MCV 80.4 80.0 - 99.9 fL    MCH 23.9 (L) 26.0 - 35.0 pg    MCHC 29.7 (L) 32.0 - 34.5 %    RDW 18.8 (H) 11.5 - 15.0 fL    Platelets 672 (H) 685 - 450 E9/L    MPV 10.4 7.0 - 12.0 fL    Neutrophils % 65.1 43.0 - 80.0 %    Immature Granulocytes % 0.4 0.0 - 5.0 %    Lymphocytes % 18.1 (L) 20.0 - 42.0 %    Monocytes % 15.5 (H) 2.0 - 12.0 %    Eosinophils % 0.2 0.0 - 6.0 %    Basophils % 0.7 0.0 - 2.0 %    Neutrophils Absolute 3.71 1.80 - 7.30 E9/L    Immature Granulocytes # 0.02 E9/L    Lymphocytes Absolute 1.03 (L) 1.50 - 4.00 E9/L    Monocytes Absolute 0.88 0.10 - 0.95 E9/L    Eosinophils Absolute 0.01 (L) 0.05 - 0.50 E9/L    Basophils Absolute 0.04 0.00 - 0.20 E9/L   Lipase   Result Value Ref Range    Lipase 14 13 - 60 U/L   Lactic Acid, Plasma   Result Value Ref Range    Lactic Acid 1.1 0.5 - 2.2 mmol/L   Urinalysis, reflex to microscopic   Result Value Ref Range    Color, UA Yellow Straw/Yellow    Clarity, UA TURBID (A) Clear    Glucose, Ur Negative Negative mg/dL    Bilirubin Urine Negative Negative    Ketones, Urine Negative Negative mg/dL    Specific Gravity, UA 1.025 1.005 - 1.030    Blood, Urine LARGE (A) Negative    pH, UA 6.5 5.0 - 9.0    Protein, UA 30 (A) Negative mg/dL    Urobilinogen, Urine 0.2 <2.0 E.U./dL    Nitrite, Urine Negative Negative    Leukocyte Esterase, Urine LARGE (A) Negative   Microscopic Urinalysis   Result Value Ref Range    WBC, UA >20 (A) 0 - 5 /HPF    RBC, UA 5-10 (A) 0 - 2 /HPF    Bacteria, UA MANY (A) None Seen /HPF   SPECIMEN REJECTION   Result Value Ref Range    Rejected Test CMPX TROP     Reason for Rejection see below    Comprehensive Metabolic Panel w/ Reflex to MG   Result Value Ref Range    Sodium 137 132 - 146 mmol/L    Potassium reflex Magnesium 5.3 (H) 3.5 - 5.0 mmol/L    Chloride 102 98 - 107 mmol/L    CO2 21 (L) 22 - 29 mmol/L    Anion Gap 14 7 - 16 mmol/L    Glucose 109 (H) 74 - 99 mg/dL    BUN 20 8 - 23 mg/dL    CREATININE 0.6 0.5 - 1.0 mg/dL    GFR Non-African American >60 >=60 mL/min/1.73    GFR African American >60     Calcium 9.1 8.6 - 10.2 mg/dL    Total Protein 8.5 (H) 6.4 - 8.3 g/dL    Alb 3.3 (L) 3.5 - 5.2 g/dL    Total Bilirubin 0.3 0.0 - 1.2 mg/dL    Alkaline Phosphatase 228 (H) 35 - 104 U/L    ALT 58 (H) 0 - 32 U/L    AST 61 (H) 0 - 31 U/L   Troponin   Result Value Ref Range    Troponin 0.03 0.00 - 0.03 ng/mL   POCT Glucose   Result Value Ref Range    Meter Glucose 102 (H) 74 - 99 mg/dL   POCT Glucose   Result Value Ref Range    Meter Glucose <40 (L) 74 - 99 mg/dL   POCT

## 2020-05-17 NOTE — H&P
diabetic polyneuropathy, epigastric pain, history  of blood transfusion, arterial occlusion of left leg with status post  below-the-knee amputation, history of pyelonephritis and sepsis,  hyperlipidemia, hypertension, history of schizophrenia, type 2 diabetes  mellitus, and umbilical hernia. PAST SURGICAL HISTORY:  Includes cardiac catheterization in 2016,  colonoscopic exam, cystoscopy with stent placement on 10/28/2019,  history of hernia repair, below-the-knee amputation of the left leg on  2020, history of hernia repair in 2016, ovarian cyst removal,  and upper GI panendoscopy. FAMILY HISTORY:  Parents were . Has two sisters alive and  brother . The patient is a former smoker, quit in . Denies  any use of alcohol or drugs. SOCIAL HISTORY:  She is currently . She does have three  children. REVIEW OF THE CHART:  Her glucose has improved with 61. Her EKG shows  normal sinus rhythm. CT of the abdomen and pelvis shows ureteral stents  with mild dilatation in the collecting system. BUN and creatinine are  normal.  Alk phos is elevated at 228. Troponin is 0.03. Urinalysis  does show a large amount of leukocyte esterase. PHYSICAL EXAMINATION:  VITAL SIGNS:  Show height to be 5 feet 4 inches. Weight is 119 pounds. Her blood pressure is 153/63, pulse 118, respirations  20, and afebrile. GENERAL APPEARANCE:  At this time revealed a pleasant 74-year-old  Afro-American female, who is alert to person and place, not time. HEENT:  Normal grossly to inspection. Mouth, buccal mucosa  is dry. NECK:  Slightly diminished carotid upstrokes. No JVD. LUNGS:  Coarse. HEART:  Mildly tachycardic. Grade 1/6 murmur. ABDOMEN:  Soft. No guarding, rebound, or rigidity. EXTREMITIES:  Without any cyanosis, clubbing, or edema. Peripheral  pulses were diminished. Below-the-knee amputation was noted on the left  leg. The patient did have urge incontinent of stool.

## 2020-05-18 ENCOUNTER — ANESTHESIA EVENT (OUTPATIENT)
Dept: OPERATING ROOM | Age: 60
DRG: 853 | End: 2020-05-18
Payer: MEDICARE

## 2020-05-18 LAB
ALBUMIN SERPL-MCNC: 2.6 G/DL (ref 3.5–5.2)
ALP BLD-CCNC: 161 U/L (ref 35–104)
ALT SERPL-CCNC: 33 U/L (ref 0–32)
ANION GAP SERPL CALCULATED.3IONS-SCNC: 12 MMOL/L (ref 7–16)
AST SERPL-CCNC: 39 U/L (ref 0–31)
BASOPHILS ABSOLUTE: 0.08 E9/L (ref 0–0.2)
BASOPHILS RELATIVE PERCENT: 1.7 % (ref 0–2)
BILIRUB SERPL-MCNC: 0.4 MG/DL (ref 0–1.2)
BUN BLDV-MCNC: 17 MG/DL (ref 8–23)
CALCIUM SERPL-MCNC: 8.7 MG/DL (ref 8.6–10.2)
CHLORIDE BLD-SCNC: 102 MMOL/L (ref 98–107)
CHOLESTEROL, TOTAL: 107 MG/DL (ref 0–199)
CO2: 21 MMOL/L (ref 22–29)
CREAT SERPL-MCNC: 0.9 MG/DL (ref 0.5–1)
EOSINOPHILS ABSOLUTE: 0.06 E9/L (ref 0.05–0.5)
EOSINOPHILS RELATIVE PERCENT: 1.3 % (ref 0–6)
GFR AFRICAN AMERICAN: >60
GFR NON-AFRICAN AMERICAN: >60 ML/MIN/1.73
GLUCOSE BLD-MCNC: 66 MG/DL (ref 74–99)
HBA1C MFR BLD: 4.6 % (ref 4–5.6)
HCT VFR BLD CALC: 25.1 % (ref 34–48)
HDLC SERPL-MCNC: 34 MG/DL
HEMOGLOBIN: 7.4 G/DL (ref 11.5–15.5)
IMMATURE GRANULOCYTES #: 0.04 E9/L
IMMATURE GRANULOCYTES %: 0.9 % (ref 0–5)
LDL CHOLESTEROL CALCULATED: 61 MG/DL (ref 0–99)
LYMPHOCYTES ABSOLUTE: 1.64 E9/L (ref 1.5–4)
LYMPHOCYTES RELATIVE PERCENT: 35.5 % (ref 20–42)
MCH RBC QN AUTO: 23.1 PG (ref 26–35)
MCHC RBC AUTO-ENTMCNC: 29.5 % (ref 32–34.5)
MCV RBC AUTO: 78.4 FL (ref 80–99.9)
METER GLUCOSE: 102 MG/DL (ref 74–99)
METER GLUCOSE: 113 MG/DL (ref 74–99)
METER GLUCOSE: 113 MG/DL (ref 74–99)
METER GLUCOSE: 151 MG/DL (ref 74–99)
METER GLUCOSE: 243 MG/DL (ref 74–99)
METER GLUCOSE: 67 MG/DL (ref 74–99)
METER GLUCOSE: 98 MG/DL (ref 74–99)
METER GLUCOSE: 99 MG/DL (ref 74–99)
MONOCYTES ABSOLUTE: 1.07 E9/L (ref 0.1–0.95)
MONOCYTES RELATIVE PERCENT: 23.2 % (ref 2–12)
NEUTROPHILS ABSOLUTE: 1.73 E9/L (ref 1.8–7.3)
NEUTROPHILS RELATIVE PERCENT: 37.4 % (ref 43–80)
PDW BLD-RTO: 18.7 FL (ref 11.5–15)
PLATELET # BLD: 547 E9/L (ref 130–450)
PMV BLD AUTO: 10.2 FL (ref 7–12)
POTASSIUM SERPL-SCNC: 4.2 MMOL/L (ref 3.5–5)
RBC # BLD: 3.2 E12/L (ref 3.5–5.5)
SEDIMENTATION RATE, ERYTHROCYTE: 96 MM/HR (ref 0–20)
SODIUM BLD-SCNC: 135 MMOL/L (ref 132–146)
TOTAL PROTEIN: 6.8 G/DL (ref 6.4–8.3)
TRIGL SERPL-MCNC: 62 MG/DL (ref 0–149)
TSH SERPL DL<=0.05 MIU/L-ACNC: 0.26 UIU/ML (ref 0.27–4.2)
VLDLC SERPL CALC-MCNC: 12 MG/DL
WBC # BLD: 4.6 E9/L (ref 4.5–11.5)

## 2020-05-18 PROCEDURE — 6370000000 HC RX 637 (ALT 250 FOR IP): Performed by: SPECIALIST

## 2020-05-18 PROCEDURE — 97162 PT EVAL MOD COMPLEX 30 MIN: CPT | Performed by: PHYSICAL THERAPIST

## 2020-05-18 PROCEDURE — 51798 US URINE CAPACITY MEASURE: CPT

## 2020-05-18 PROCEDURE — 87070 CULTURE OTHR SPECIMN AEROBIC: CPT

## 2020-05-18 PROCEDURE — 1200000000 HC SEMI PRIVATE

## 2020-05-18 PROCEDURE — 85025 COMPLETE CBC W/AUTO DIFF WBC: CPT

## 2020-05-18 PROCEDURE — 80061 LIPID PANEL: CPT

## 2020-05-18 PROCEDURE — 2580000003 HC RX 258: Performed by: INTERNAL MEDICINE

## 2020-05-18 PROCEDURE — 97165 OT EVAL LOW COMPLEX 30 MIN: CPT

## 2020-05-18 PROCEDURE — 36415 COLL VENOUS BLD VENIPUNCTURE: CPT

## 2020-05-18 PROCEDURE — 99221 1ST HOSP IP/OBS SF/LOW 40: CPT | Performed by: SURGERY

## 2020-05-18 PROCEDURE — 82962 GLUCOSE BLOOD TEST: CPT

## 2020-05-18 PROCEDURE — 87077 CULTURE AEROBIC IDENTIFY: CPT

## 2020-05-18 PROCEDURE — 84681 ASSAY OF C-PEPTIDE: CPT

## 2020-05-18 PROCEDURE — 97530 THERAPEUTIC ACTIVITIES: CPT

## 2020-05-18 PROCEDURE — 6370000000 HC RX 637 (ALT 250 FOR IP): Performed by: INTERNAL MEDICINE

## 2020-05-18 PROCEDURE — 83036 HEMOGLOBIN GLYCOSYLATED A1C: CPT

## 2020-05-18 PROCEDURE — 87186 SC STD MICRODIL/AGAR DIL: CPT

## 2020-05-18 PROCEDURE — 84443 ASSAY THYROID STIM HORMONE: CPT

## 2020-05-18 PROCEDURE — 85651 RBC SED RATE NONAUTOMATED: CPT

## 2020-05-18 PROCEDURE — 80053 COMPREHEN METABOLIC PANEL: CPT

## 2020-05-18 RX ORDER — LINEZOLID 600 MG/1
600 TABLET, FILM COATED ORAL EVERY 12 HOURS SCHEDULED
Status: DISCONTINUED | OUTPATIENT
Start: 2020-05-18 | End: 2020-05-22 | Stop reason: HOSPADM

## 2020-05-18 RX ORDER — CEFAZOLIN SODIUM 2 G/50ML
2 SOLUTION INTRAVENOUS
Status: COMPLETED | OUTPATIENT
Start: 2020-05-19 | End: 2020-05-19

## 2020-05-18 RX ADMIN — HYDRALAZINE HYDROCHLORIDE 30 MG: 10 TABLET, FILM COATED ORAL at 09:15

## 2020-05-18 RX ADMIN — HYDRALAZINE HYDROCHLORIDE 30 MG: 10 TABLET, FILM COATED ORAL at 14:03

## 2020-05-18 RX ADMIN — LISINOPRIL 40 MG: 20 TABLET ORAL at 09:15

## 2020-05-18 RX ADMIN — METOPROLOL TARTRATE 75 MG: 25 TABLET, FILM COATED ORAL at 21:27

## 2020-05-18 RX ADMIN — OXYBUTYNIN CHLORIDE 5 MG: 5 TABLET ORAL at 14:07

## 2020-05-18 RX ADMIN — MEGESTROL ACETATE 20 MG: 40 TABLET ORAL at 09:15

## 2020-05-18 RX ADMIN — INSULIN LISPRO 1 UNITS: 100 INJECTION, SOLUTION INTRAVENOUS; SUBCUTANEOUS at 21:31

## 2020-05-18 RX ADMIN — DOCUSATE SODIUM 100 MG: 100 CAPSULE, LIQUID FILLED ORAL at 09:15

## 2020-05-18 RX ADMIN — ACETAMINOPHEN 650 MG: 325 TABLET, FILM COATED ORAL at 00:38

## 2020-05-18 RX ADMIN — OXYBUTYNIN CHLORIDE 5 MG: 5 TABLET ORAL at 21:27

## 2020-05-18 RX ADMIN — ATORVASTATIN CALCIUM 80 MG: 40 TABLET, FILM COATED ORAL at 21:27

## 2020-05-18 RX ADMIN — DEXTROSE MONOHYDRATE: 100 INJECTION, SOLUTION INTRAVENOUS at 21:30

## 2020-05-18 RX ADMIN — DEXTROSE MONOHYDRATE 12.5 G: 25 INJECTION, SOLUTION INTRAVENOUS at 05:34

## 2020-05-18 RX ADMIN — FERROUS SULFATE TAB 325 MG (65 MG ELEMENTAL FE) 325 MG: 325 (65 FE) TAB at 09:15

## 2020-05-18 RX ADMIN — HYDRALAZINE HYDROCHLORIDE 30 MG: 10 TABLET, FILM COATED ORAL at 21:26

## 2020-05-18 RX ADMIN — GABAPENTIN 100 MG: 100 CAPSULE ORAL at 21:26

## 2020-05-18 RX ADMIN — DOCUSATE SODIUM 100 MG: 100 CAPSULE, LIQUID FILLED ORAL at 21:27

## 2020-05-18 RX ADMIN — DEXTROSE MONOHYDRATE: 100 INJECTION, SOLUTION INTRAVENOUS at 11:04

## 2020-05-18 RX ADMIN — BENZTROPINE MESYLATE 0.5 MG: 0.5 TABLET ORAL at 09:16

## 2020-05-18 RX ADMIN — LINEZOLID 600 MG: 600 TABLET, FILM COATED ORAL at 12:33

## 2020-05-18 RX ADMIN — METOPROLOL TARTRATE 75 MG: 25 TABLET, FILM COATED ORAL at 09:11

## 2020-05-18 RX ADMIN — METHIMAZOLE 5 MG: 5 TABLET ORAL at 09:16

## 2020-05-18 RX ADMIN — OXYBUTYNIN CHLORIDE 5 MG: 5 TABLET ORAL at 09:16

## 2020-05-18 RX ADMIN — CLOPIDOGREL BISULFATE 75 MG: 75 TABLET ORAL at 09:15

## 2020-05-18 RX ADMIN — LINEZOLID 600 MG: 600 TABLET, FILM COATED ORAL at 21:29

## 2020-05-18 RX ADMIN — DIAZEPAM 2 MG: 2 TABLET ORAL at 22:50

## 2020-05-18 RX ADMIN — PYRIDOXINE HCL TAB 50 MG 50 MG: 50 TAB at 09:15

## 2020-05-18 ASSESSMENT — PAIN SCALES - GENERAL
PAINLEVEL_OUTOF10: 0

## 2020-05-18 NOTE — CONSULTS
capsule Take 3 capsules by mouth nightly for 30 days. 20  Kaitlyn Quinones MD   clopidogrel (PLAVIX) 75 MG tablet Take 1 tablet by mouth daily 20   Kaitlyn Quinones MD   oxybutynin (DITROPAN) 5 MG tablet Take 1 tablet by mouth 3 times daily 20   Kaitlyn Quinones MD   diazePAM (VALIUM) 2 MG tablet Take 1 tablet by mouth every 6 hours as needed for Anxiety.  3/26/20 3/26/21  Kaitlyn Quinones MD   lisinopril (PRINIVIL;ZESTRIL) 40 MG tablet Take 40 mg by mouth daily    Historical Provider, MD   hydrALAZINE (APRESOLINE) 25 MG tablet Take 30 mg by mouth 3 times daily    Historical Provider, MD   docusate sodium (COLACE) 100 MG capsule Take 1 capsule by mouth 2 times daily 3/26/20   Kaitlyn Quinones MD   atorvastatin (LIPITOR) 80 MG tablet Take 1 tablet by mouth nightly 3/3/20   Virgen Chow DO   insulin lispro (HUMALOG) 100 UNIT/ML injection vial Inject 0-6 Units into the skin 3 times daily (with meals) 3/3/20   Virgen Chow DO   metoprolol 75 MG TABS Take 75 mg by mouth 2 times daily 3/3/20   Virgen Chow DO   methIMAzole (TAPAZOLE) 5 MG tablet Take 1 tablet by mouth daily 20   Kaitlyn Quinones MD   megestrol (MEGACE) 20 MG tablet Take 1 tablet by mouth daily 20   Kaitlyn Quinones MD   vitamin B-6 (B-6) 50 MG tablet Take 1 tablet by mouth daily 19   George Reynaga,    ferrous sulfate 325 (65 Fe) MG tablet Take 1 tablet by mouth daily (with breakfast) 10/30/19   Virgen Chow DO   benztropine (COGENTIN) 0.5 MG tablet Take 0.5 mg by mouth daily  19   Historical Provider, MD       No Known Allergies    Family History   Problem Relation Age of Onset    Cancer Brother        Social History     Tobacco Use    Smoking status: Former Smoker     Packs/day: 0.00     Years: 2.00     Pack years: 0.00     Types: Cigarettes     Last attempt to quit: 3/23/2015     Years since quittin.1    Smokeless tobacco: Never

## 2020-05-18 NOTE — CONSULTS
Smiley AlcarazjennifferDO at [address] HISTORY  11/04/2016    epigastric hernia umbilical hernia with mesh    OVARIAN CYST REMOVAL      OVARY REMOVAL Right     OVARY REMOVAL  2014    UPPER GASTROINTESTINAL ENDOSCOPY N/A 10/29/2019    EGD ESOPHAGOGASTRODUODENOSCOPY performed by Tyler Shah MD at [facility] ENDOSCOPY       Medications Prior to Admission:    Medications Prior to Admission: gabapentin (NEURONTIN) 100 MG capsule, Take 3 capsules by mouth nightly for 30 days. clopidogrel (PLAVIX) 75 MG tablet, Take 1 tablet by mouth daily  oxybutynin (DITROPAN) 5 MG tablet, Take 1 tablet by mouth 3 times daily  diazePAM (VALIUM) 2 MG tablet, Take 1 tablet by mouth every 6 hours as needed for Anxiety. lisinopril (PRINIVIL;ZESTRIL) 40 MG tablet, Take 40 mg by mouth daily  hydrALAZINE (APRESOLINE) 25 MG tablet, Take 30 mg by mouth 3 times daily  docusate sodium (COLACE) 100 MG capsule, Take 1 capsule by mouth 2 times daily  [DISCONTINUED] oxyCODONE HCl (ROXICODONE PO), Take 10 mg by mouth 2 times daily  atorvastatin (LIPITOR) 80 MG tablet, Take 1 tablet by mouth nightly  insulin lispro (HUMALOG) 100 UNIT/ML injection vial, Inject 0-6 Units into the skin 3 times daily (with meals)  metoprolol 75 MG TABS, Take 75 mg by mouth 2 times daily  methIMAzole (TAPAZOLE) 5 MG tablet, Take 1 tablet by mouth daily  megestrol (MEGACE) 20 MG tablet, Take 1 tablet by mouth daily  vitamin B-6 (B-6) 50 MG tablet, Take 1 tablet by mouth daily  ferrous sulfate 325 (65 Fe) MG tablet, Take 1 tablet by mouth daily (with breakfast)  benztropine (COGENTIN) 0.5 MG tablet, Take 0.5 mg by mouth daily     Allergies:    Patient has no known allergies. Social History:    reports that she quit smoking about 5 years ago. Her smoking use included cigarettes. She smoked 0.00 packs per day for 2.00 years. She has never used smokeless tobacco. She reports that she does not drink alcohol or use drugs.     Family History:   Non-contributory to this

## 2020-05-18 NOTE — PROGRESS NOTES
[address] [place] [address] [facility]  YOGIIDA  Progress Note    Russ Daniels  1960  DATE:20    Pt was seen FACE TO FACE FOR fevers    SUBJECTIVE:  Chief Complaint   Patient presents with    Hypoglycemia     EMS called to house for unresponsive. Family states that pt had been like that for 1 hour. Pt sugar was 19 upon arrival. EMS gave 1/2 amp dextrose glucose was 102. [id number]  Patient is tolerating medications. No reported adverse drug reactions. Seen pt with nurse  Review of systems:  As stated above in the chief complaint, otherwise negative. Medications:  Scheduled Meds:   [START ON 2020] ceFAZolin  2 g Intravenous On Call to OR    linezolid  600 mg Oral 2 times per day    atorvastatin  80 mg Oral Nightly    benztropine  0.5 mg Oral Daily    [Held by provider] clopidogrel  75 mg Oral Daily    docusate sodium  100 mg Oral BID    ferrous sulfate  325 mg Oral Daily with breakfast    hydrALAZINE  30 mg Oral TID    pyridoxine  50 mg Oral Daily    oxybutynin  5 mg Oral TID    metoprolol tartrate  75 mg Oral BID    methIMAzole  5 mg Oral Daily    megestrol  20 mg Oral Daily    lisinopril  40 mg Oral Daily    insulin lispro  0-6 Units Subcutaneous TID WC    insulin lispro  0-3 Units Subcutaneous Nightly    gabapentin  100 mg Oral Nightly     Continuous Infusions:   dextrose      dextrose 75 mL/hr at 20 1104     PRN Meds:diazePAM, acetaminophen, glucose, dextrose, glucagon (rDNA), dextrose    OBJECTIVE:  /60   Pulse 106   Temp 98.3 °F (36.8 °C) (Oral)   Resp 16   Ht 5' 4\" (1.626 m)   Wt 119 lb 7 oz (54.2 kg)   SpO2 99%   BMI 20.50 kg/m²   Temp  Av.2 °F (37.3 °C)  Min: 98.3 °F (36.8 °C)  Max: 100.3 °F (37.9 °C)  Constitutional:  The patient is awake, alert   Skin:    Warm and dry. No rashes were noted. HEENT:   Round and reactive pupils. AT/NC  Neck:    Supple to movements. Chest:   No use of accessory muscles to breathe.  Symmetrical expansion. Cardiovascular:  S1 and S2 are rhythmic and regular. No murmurs appreciated. Abdomen:   Positive bowel sounds to auscultation. Benign to palpation. Sacral ulcer has tunnel about 1cm tan d/c  Extremities:   No clubbing, no cyanosis, no edema. Left bka healed  CNS    AAxO   Lines: piv    Radiology:  Laboratory and Tests Review:  Lab Results   Component Value Date    WBC 4.6 05/18/2020    WBC 5.7 05/16/2020    WBC 14.8 (H) 03/03/2020    HGB 7.4 (L) 05/18/2020    HCT 25.1 (L) 05/18/2020    MCV 78.4 (L) 05/18/2020     (H) 05/18/2020     No results found for: [facility]  Lab Results   Component Value Date    ALT 33 (H) 05/18/2020    AST 39 (H) 05/18/2020    ALKPHOS 161 (H) 05/18/2020    BILITOT 0.4 05/18/2020     Lab Results   Component Value Date     05/18/2020    K 4.2 05/18/2020    K 5.3 05/17/2020     05/18/2020    CO2 21 05/18/2020    BUN 17 05/18/2020    CREATININE 0.9 05/18/2020    CREATININE 0.6 05/17/2020    CREATININE 0.9 03/03/2020    GFRAA >60 05/18/2020    LABGLOM >60 05/18/2020    GLUCOSE 66 05/18/2020    GLUCOSE 171 04/26/2012    PROT 6.8 05/18/2020    LABALBU 2.6 05/18/2020    LABALBU 4.3 04/26/2012    CALCIUM 8.7 05/18/2020    BILITOT 0.4 05/18/2020    ALKPHOS 161 05/18/2020    AST 39 05/18/2020    ALT 33 05/18/2020     Lab Results   Component Value Date    CRP 19.8 (H) 02/25/2020    CRP 16.2 (H) 12/12/2019    CRP 1.7 (H) 04/26/2012     Lab Results   Component Value Date    SEDRATE 96 (H) 05/18/2020    SEDRATE 115 (H) 02/25/2020    SEDRATE 89 (H) 12/12/2019       Microbiology:   No results for input(s): COVID19 in the last 72 hours.   Lab Results   Component Value Date    BLOODCULT2 24 Hours- no growth 05/16/2020    BLOODCULT2 5 Days- no growth 02/26/2020    BLOODCULT2 5 Days- no growth 02/25/2020    ORG Gram positive cocci 05/16/2020        Recent Labs     05/16/20  2319   ORG Gram positive cocci*     Recent Labs     05/16/20  2319   ORG Gram positive cocci*     Recent Labs

## 2020-05-18 NOTE — PROGRESS NOTES
Spoke to patient's spouse, Feroz Mcclure, who is in patient's emergency contacts. Per spouse, states [facility] NOT WANT patient to be discharged anywhere OTHER THAN home. Passed this information on to day-shift nurse.     Electronically signed by Prasanna Valenzuela RN on 5/18/2020 at 7:42 AM

## 2020-05-18 NOTE — PROGRESS NOTES
Internal Medicine Progress Note    Susie Keenan. [facility]., & [address] [facility]  [facility] F.A.C.O.I. Ciro Lyons D.O., F.A.C.O.I. Primary Care Physician: Karey Stern MD   Admitting Physician:  Sumaya Felipe DO  Admission date and time: 5/16/2020 10:50 PM    Room:  [address] [place], [place] [address]    Patient Name: Oscar Stahl  MRN: [id number]    Date of Service: 5/18/2020     Subjective:  Redd Crump was seen and evaluated in the presence of the nursing staff today. As per the nursing team, she has improved dramatically when compared to yesterday's examination. She is awake and alert and answers all questions appropriately. She admits to decreased oral intake at home but ongoing insulin administration. This ultimately resulted in hypoglycemia which has resolved following the implementation of D10 infusion. Multiple subspecialists have been asked to provide consultation including the infectious disease, general surgery, and urology teams. We discussed each of these issues at length. Review of System: HEENT:  Justice ear pain, sore throat, sinus or eye problems  Cardiovascular:   Denies any chest pain, irregular heartbeats, or palpitations. Respiratory:   Denies shortness of breath, coughing, sputum production, hemoptysis, or wheezing. Gastrointestinal:   Denies nausea, vomiting, diarrhea, or constipation. Denies any abdominal pain. Extremities:   Denies any lower extremity swelling or edema. Neurology:    Denies any headache or focal neurological deficits. Admits to generalized weakness and deconditioning. Derm:    Admits the sacral decubitus ulceration. Genitourinary:    Denies any urgency, frequency, hematuria. Voiding without difficulty. Musculoskeletal:  Denies myalgias, joint complaints or back pain      Physical Exam:  I/O this shift:  In: 120 [P.O.:120]  Out: -   Blood pressure 120/60, pulse 106, temperature 98.3 °F (36.8 °C), temperature source Oral, resp.

## 2020-05-18 NOTE — ANESTHESIA PRE PROCEDURE
Department of Anesthesiology  Preprocedure Note       Name:  Antonia Corrales   Age:  63 y.o.  :  1960                                          MRN:  [id number]         Date:  2020      Surgeon: Raymond Mcdaniel):  Ashwini Razo MD    Procedure: CYSTOSCOPY RETROGRADE PYELOGRAM BILATERAL STENT EXCHANGE (Bilateral )    Medications prior to admission:   Prior to Admission medications    Medication Sig Start Date End Date Taking? Authorizing Provider   gabapentin (NEURONTIN) 100 MG capsule Take 3 capsules by mouth nightly for 30 days. 20  Sanford Gillespie MD   clopidogrel (PLAVIX) 75 MG tablet Take 1 tablet by mouth daily 20   Sanford Gillespie MD   oxybutynin (DITROPAN) 5 MG tablet Take 1 tablet by mouth 3 times daily 20   Sanford Gillespie MD   diazePAM (VALIUM) 2 MG tablet Take 1 tablet by mouth every 6 hours as needed for Anxiety.  3/26/20 3/26/21  Sanford Gillespie MD   lisinopril (PRINIVIL;ZESTRIL) 40 MG tablet Take 40 mg by mouth daily    Historical Provider, MD   hydrALAZINE (APRESOLINE) 25 MG tablet Take 30 mg by mouth 3 times daily    Historical Provider, MD   docusate sodium (COLACE) 100 MG capsule Take 1 capsule by mouth 2 times daily 3/26/20   Sanford Gillespie MD   atorvastatin (LIPITOR) 80 MG tablet Take 1 tablet by mouth nightly 3/3/20   Yavapai Hoist, DO   insulin lispro (HUMALOG) 100 UNIT/ML injection vial Inject 0-6 Units into the skin 3 times daily (with meals) 3/3/20   [place]ai Hoist, DO   metoprolol 75 MG TABS Take 75 mg by mouth 2 times daily 3/3/20   [place] Hoist, DO   methIMAzole (TAPAZOLE) 5 MG tablet Take 1 tablet by mouth daily 20   Sanford Gillespie MD   megestrol (MEGACE) 20 MG tablet Take 1 tablet by mouth daily 20   Sanford Gillespie MD   vitamin B-6 (B-6) 50 MG tablet Take 1 tablet by mouth daily 19   Valiant Sherri Rootel, DO   ferrous sulfate 325 (65 Fe) MG tablet Take 1 tablet by mouth daily GI/Hepatic/Renal:   (+) renal disease: kidney stones,           Endo/Other:    (+) DiabetesType II DM, using insulin, hyperthyroidism, blood dyscrasia (Microcytic hypochromic anemia): anemia:., .                 Abdominal:           Vascular:   + PVD, aortic or cerebral, . ROS comment: History of LLE arterial occlusion. Anesthesia Plan      MAC     ASA 3       Induction: intravenous. Anesthetic plan and risks discussed with patient. Use of blood products discussed with patient whom consented to blood products. Plan discussed with CRNA and attending.           Cody Lees RN   5/18/2020

## 2020-05-18 NOTE — PROGRESS NOTES
[facility] Quality Flow/Interdisciplinary Rounds Progress Note        Quality Flow Rounds held on May 18, 2020    Disciplines Attending:  Bedside Nurse, ,  and Nursing Unit Leadership    Kelley Shallow was admitted on 5/16/2020 10:50 PM    Anticipated Discharge Date:       Disposition:    Naif Score:  Naif Scale Score: 12    Readmission Risk              Risk of Unplanned Readmission:        20           Discussed patient goal for the day, patient clinical progression, and barriers to discharge.   The following Goal(s) of the Day/Commitment(s) have been identified:  From H, activity progression, bedridden at Home, IV F, L BKA, Need Code status      Aaprna Jacobsen  May 18, 2020

## 2020-05-18 NOTE — PROGRESS NOTES
to sit: Supervision     Sit to supine: Supervision     Scooting: Supervision      Transfers Pivot: Moderate assist of 1 bed <-> chair; poor safety and poor insight re: deficits of mobility  Pivot: Minimal assist of 1     ROM Within functional limits        Strength BUE: 4-/5  RLE:  3-/5  LLE:  4-/5   Increase strength in affected mm groups by 1/3 grade   Balance Sitting EOB:  good    Dynamic Standing:  poor    Sitting EOB:  good    Dynamic Standing: fair  During trf     Patient is Alert & Oriented x person, place and time and follows one step directions distractible, ? Historian   Sensation:  Patient with  numbness and tingling to extremities bilaterally   Edema:  yes right lower extremity residual limb stocking applied however no  in room  Endurance: poor fatigues quickly        Patient education  Patient educated on role of Physical Therapy, risks of immobility and plan of care and safety       Patient response to education:   Pt verbalized understanding Pt demonstrated skill Pt requires further education in this area   Yes Partial Yes      ASSESSMENT: Patient exhibits decreased strength, balance, coordination impairing functional mobility. Therapist educated and facilitated patient on techniques to increase safety and independence with bed mobility, balance, functional transfers, and functional mobility. Treatment:  Patient practiced and was instructed in the following treatment: Sat edge of bed 10 minutes with Supervision  to increase dynamic sitting balance and activity tolerance. practiced pivot transfers and required mod a for safety, foot and hand placement and physical assist to intiate and complete safe transfer d/t weakness and poor insight re: deficits at this time          At end of session, patient in bed with alarm call light and phone within reach,   all lines and tubes intact, nursing notified.     Patient would benefit from continued skilled Physical Therapy to improve functional

## 2020-05-18 NOTE — FLOWSHEET NOTE
Inpatient Wound Care    Admit Date: 5/16/2020 10:50 PM    Reason for consult: sacral wound    Significant history:    Past Medical History:   Diagnosis Date    Diabetic peripheral neuropathy associated with type 2 diabetes mellitus ([facility] 8/24/2018    Epigastric hernia     History of blood transfusion     Hyperlipidemia     Hypertension     Kidney stone     [facility]     Type 2 diabetes mellitus with diabetic polyneuropathy, with long-term current use of insulin ([facility] 5/79/3371    Umbilical hernia        Wound history:      Findings:       05/18/20 1344   Wound 05/17/20 Sacrum Medial   Date First Assessed/Time First Assessed: 05/17/20 0800   Present on Hospital Admission: Yes  Location: Sacrum  Wound Location Orientation: Medial   Dressing Changed Changed/New   Dressing/Treatment Alginate; Foam   Wound Cleansed Rinsed/Irrigated with saline   Wound Length (cm) 4 cm   Wound Width (cm) 0.5 cm   Wound Depth (cm) 0.5 cm   Wound Surface Area (cm^2) 2 cm^2   Wound Volume (cm^3) 1 cm^3   Wound Assessment Red   Drainage Amount Small   Odor None   Dayana-wound Assessment Intact   Culture Taken Yes       Impression:      Interventions in place:  opticell and mepilex dressing    Plan:  Culture taken today  Applied opticell and foam dressing      Concha Cao 5/18/2020 1:46 PM

## 2020-05-18 NOTE — PROGRESS NOTES
solving:  Fair   Judgement/safety:  Fair     Functional Assessment:   Initial Eval Status  Date: 5/18/20 Treatment Status  Date: STGs = LTGs  Time frame: 5-7 days   Feeding Independent        Grooming Minimal Assist   Sitting EOB  Independent    UB Dressing Minimal Assist   Adjust sleeves of gown and tie around neck while sitting EOB  Independent    LB Dressing Maximal Assist   Don sock sitting EOB  Minimal Assist    Bathing Maximal Assist    Minimal Assist    Toileting Maximal Assist     Minimal Assist    Bed Mobility  Supine to sit: Moderate Assist   Sit to supine: Minimal Assist   Assist with trunk out of bed, legs  Supine to sit: Stand by Assist   Sit to supine: Stand by Assist    Functional Transfers Moderate Assist   Stand-pivot to chair  Minimal Assist    Functional Mobility NT   Pt currently non-ambulatory      Balance Sitting:     Static:  Fair+    Dynamic:Fair+  Standing: Poor  Sitting:     Static:  Good    Dynamic:Good  Standing: Fair   Activity Tolerance Fair  Due to weakness in R LE. Fair+   Visual/  Perceptual Glasses: Reading  WFL          Hand Dominance Left     Strength ROM Additional Info:    RUE  4/5  WFL good  and wfl FMC/dexterity noted during ADL tasks       LUE 4/5  WFL good  and wfl FMC/dexterity noted during ADL tasks       Hearing: WFL   Sensation:  No c/o numbness or tingling   Tone: WFL   Edema: LLE                            Comments:   Nursing approved therapy session. Upon arrival, patient supine in bed and agreeable to OT session with PT collaboration. Therapist educated pt on role of OT. At end of session, patient supine in bed with call light and phone within reach, bed alarm on, all lines and tubes intact; nursing aware. Pt required cuing throughout session. Pt demonstrated fair understanding of education/techniques and decreased independence and safety during completion of ADL/functional transfer/mobility tasks.   Pt would benefit from continued skilled OT to increase safety and independence with completion of ADL/IADL tasks for functional independence and quality of life. Treatment:  Skilled occupational therapy services provided include instruction/training on safety and adapted techniques for completion of therapeutic activities, ADLs/IADLs. Therapist facilitated bed mobility, functional transfers, graded functional activities (static/dynamic sitting, static/dynamic standing, functional reaching) - providing mod cuing (verbal, visual, tactile) on hand placement, body mechanics, posture, breathing techniques, energy conservation, compensatory strategies, and safety. Therapist facilitated self-care retraining: UB dressing, LB dressing tasks - providing min cuing (verbal, visual, tactile) on body mechanics, posture, breathing techniques, energy conservation, compensatory strategies, and safety. Therapist educated pt on compensatory strategies/energy conservation techniques to safely complete ADLs/IADLs. Skilled monitoring of O2 sats, HR, and pt response throughout treatment.       Eval Complexity: Low    Evaluation time includes thorough review of current medical information, gathering information on past medical history/social history and prior level of function, completion of standardized testing/informal observation of tasks, assessment of data, and development of POC/Goals    Assessment of current deficits   Functional mobility [x]  ADLs [x] Strength [x]  Cognition []  Functional transfers  [x] IADLs [x] Safety Awareness [x]  Endurance [x]  Fine Motor Coordination [] Balance [x] Vision/perception [] Sensation []   Gross Motor Coordination [] ROM [] Delirium []                  Motor Control []    Plan of Care: OT 1-3x/week PRN during hospitalization  ADL retraining [x]   Equipment needs [x]   Neuromuscular re-education [x] Energy Conservation Techniques [x]  Functional Transfer training [x] Patient and/or Family Education [x]  Functional Mobility training

## 2020-05-19 ENCOUNTER — ANESTHESIA (OUTPATIENT)
Dept: OPERATING ROOM | Age: 60
DRG: 853 | End: 2020-05-19
Payer: MEDICARE

## 2020-05-19 ENCOUNTER — APPOINTMENT (OUTPATIENT)
Dept: GENERAL RADIOLOGY | Age: 60
DRG: 853 | End: 2020-05-19
Payer: MEDICARE

## 2020-05-19 ENCOUNTER — APPOINTMENT (OUTPATIENT)
Dept: CT IMAGING | Age: 60
DRG: 853 | End: 2020-05-19
Payer: MEDICARE

## 2020-05-19 VITALS
OXYGEN SATURATION: 100 % | DIASTOLIC BLOOD PRESSURE: 61 MMHG | RESPIRATION RATE: 13 BRPM | SYSTOLIC BLOOD PRESSURE: 85 MMHG

## 2020-05-19 LAB
ANION GAP SERPL CALCULATED.3IONS-SCNC: 11 MMOL/L (ref 7–16)
BASOPHILS ABSOLUTE: 0.04 E9/L (ref 0–0.2)
BASOPHILS RELATIVE PERCENT: 0.8 % (ref 0–2)
BUN BLDV-MCNC: 13 MG/DL (ref 8–23)
CALCIUM SERPL-MCNC: 8.7 MG/DL (ref 8.6–10.2)
CHLORIDE BLD-SCNC: 95 MMOL/L (ref 98–107)
CO2: 22 MMOL/L (ref 22–29)
CREAT SERPL-MCNC: 0.7 MG/DL (ref 0.5–1)
EOSINOPHILS ABSOLUTE: 0.06 E9/L (ref 0.05–0.5)
EOSINOPHILS RELATIVE PERCENT: 1.2 % (ref 0–6)
GFR AFRICAN AMERICAN: >60
GFR NON-AFRICAN AMERICAN: >60 ML/MIN/1.73
GLUCOSE BLD-MCNC: 68 MG/DL (ref 74–99)
HCT VFR BLD CALC: 27 % (ref 34–48)
HEMOGLOBIN: 8.1 G/DL (ref 11.5–15.5)
IMMATURE GRANULOCYTES #: 0.04 E9/L
IMMATURE GRANULOCYTES %: 0.8 % (ref 0–5)
LYMPHOCYTES ABSOLUTE: 1.29 E9/L (ref 1.5–4)
LYMPHOCYTES RELATIVE PERCENT: 26.3 % (ref 20–42)
MCH RBC QN AUTO: 23.5 PG (ref 26–35)
MCHC RBC AUTO-ENTMCNC: 30 % (ref 32–34.5)
MCV RBC AUTO: 78.5 FL (ref 80–99.9)
METER GLUCOSE: 101 MG/DL (ref 74–99)
METER GLUCOSE: 138 MG/DL (ref 74–99)
METER GLUCOSE: 160 MG/DL (ref 74–99)
METER GLUCOSE: 56 MG/DL (ref 74–99)
METER GLUCOSE: 84 MG/DL (ref 74–99)
MONOCYTES ABSOLUTE: 1.08 E9/L (ref 0.1–0.95)
MONOCYTES RELATIVE PERCENT: 22 % (ref 2–12)
NEUTROPHILS ABSOLUTE: 2.39 E9/L (ref 1.8–7.3)
NEUTROPHILS RELATIVE PERCENT: 48.9 % (ref 43–80)
ORGANISM: ABNORMAL
PDW BLD-RTO: 18.6 FL (ref 11.5–15)
PLATELET # BLD: 577 E9/L (ref 130–450)
PMV BLD AUTO: 10.2 FL (ref 7–12)
POTASSIUM SERPL-SCNC: 4 MMOL/L (ref 3.5–5)
RBC # BLD: 3.44 E12/L (ref 3.5–5.5)
SODIUM BLD-SCNC: 128 MMOL/L (ref 132–146)
T4 FREE: 0.89 NG/DL (ref 0.93–1.7)
URINE CULTURE, ROUTINE: ABNORMAL
URINE CULTURE, ROUTINE: ABNORMAL
WBC # BLD: 4.9 E9/L (ref 4.5–11.5)

## 2020-05-19 PROCEDURE — 74400 UROGRAPHY IV +-KUB TOMOG: CPT

## 2020-05-19 PROCEDURE — C2617 STENT, NON-COR, TEM W/O DEL: HCPCS | Performed by: UROLOGY

## 2020-05-19 PROCEDURE — 70450 CT HEAD/BRAIN W/O DYE: CPT

## 2020-05-19 PROCEDURE — 6370000000 HC RX 637 (ALT 250 FOR IP): Performed by: SPECIALIST

## 2020-05-19 PROCEDURE — 3600000003 HC SURGERY LEVEL 3 BASE: Performed by: UROLOGY

## 2020-05-19 PROCEDURE — 82962 GLUCOSE BLOOD TEST: CPT

## 2020-05-19 PROCEDURE — 84439 ASSAY OF FREE THYROXINE: CPT

## 2020-05-19 PROCEDURE — 87088 URINE BACTERIA CULTURE: CPT

## 2020-05-19 PROCEDURE — 7100000000 HC PACU RECOVERY - FIRST 15 MIN: Performed by: UROLOGY

## 2020-05-19 PROCEDURE — 7100000001 HC PACU RECOVERY - ADDTL 15 MIN: Performed by: UROLOGY

## 2020-05-19 PROCEDURE — C1758 CATHETER, URETERAL: HCPCS | Performed by: UROLOGY

## 2020-05-19 PROCEDURE — 3600000013 HC SURGERY LEVEL 3 ADDTL 15MIN: Performed by: UROLOGY

## 2020-05-19 PROCEDURE — 6360000002 HC RX W HCPCS: Performed by: NURSE PRACTITIONER

## 2020-05-19 PROCEDURE — 1200000000 HC SEMI PRIVATE

## 2020-05-19 PROCEDURE — 0T788DZ DILATION OF BILATERAL URETERS WITH INTRALUMINAL DEVICE, VIA NATURAL OR ARTIFICIAL OPENING ENDOSCOPIC: ICD-10-PCS | Performed by: UROLOGY

## 2020-05-19 PROCEDURE — 80048 BASIC METABOLIC PNL TOTAL CA: CPT

## 2020-05-19 PROCEDURE — 6360000004 HC RX CONTRAST MEDICATION: Performed by: UROLOGY

## 2020-05-19 PROCEDURE — 87077 CULTURE AEROBIC IDENTIFY: CPT

## 2020-05-19 PROCEDURE — 85025 COMPLETE CBC W/AUTO DIFF WBC: CPT

## 2020-05-19 PROCEDURE — 6370000000 HC RX 637 (ALT 250 FOR IP): Performed by: INTERNAL MEDICINE

## 2020-05-19 PROCEDURE — 2709999900 HC NON-CHARGEABLE SUPPLY: Performed by: UROLOGY

## 2020-05-19 PROCEDURE — 51798 US URINE CAPACITY MEASURE: CPT

## 2020-05-19 PROCEDURE — 51702 INSERT TEMP BLADDER CATH: CPT

## 2020-05-19 PROCEDURE — 2580000003 HC RX 258: Performed by: NURSE ANESTHETIST, CERTIFIED REGISTERED

## 2020-05-19 PROCEDURE — 72125 CT NECK SPINE W/O DYE: CPT

## 2020-05-19 PROCEDURE — 0TP98DZ REMOVAL OF INTRALUMINAL DEVICE FROM URETER, VIA NATURAL OR ARTIFICIAL OPENING ENDOSCOPIC: ICD-10-PCS | Performed by: UROLOGY

## 2020-05-19 PROCEDURE — 3700000000 HC ANESTHESIA ATTENDED CARE: Performed by: UROLOGY

## 2020-05-19 PROCEDURE — 6360000002 HC RX W HCPCS: Performed by: UROLOGY

## 2020-05-19 PROCEDURE — 36415 COLL VENOUS BLD VENIPUNCTURE: CPT

## 2020-05-19 PROCEDURE — BT1D1ZZ FLUOROSCOPY OF RIGHT KIDNEY, URETER AND BLADDER USING LOW OSMOLAR CONTRAST: ICD-10-PCS | Performed by: UROLOGY

## 2020-05-19 PROCEDURE — 6360000002 HC RX W HCPCS: Performed by: NURSE ANESTHETIST, CERTIFIED REGISTERED

## 2020-05-19 PROCEDURE — 2580000003 HC RX 258: Performed by: INTERNAL MEDICINE

## 2020-05-19 PROCEDURE — 87186 SC STD MICRODIL/AGAR DIL: CPT

## 2020-05-19 PROCEDURE — 2580000003 HC RX 258: Performed by: UROLOGY

## 2020-05-19 PROCEDURE — 3700000001 HC ADD 15 MINUTES (ANESTHESIA): Performed by: UROLOGY

## 2020-05-19 DEVICE — URETERAL STENT
Type: IMPLANTABLE DEVICE | Status: FUNCTIONAL
Brand: POLARIS™ ULTRA

## 2020-05-19 RX ORDER — FENTANYL CITRATE 50 UG/ML
INJECTION, SOLUTION INTRAMUSCULAR; INTRAVENOUS PRN
Status: DISCONTINUED | OUTPATIENT
Start: 2020-05-19 | End: 2020-05-19 | Stop reason: SDUPTHER

## 2020-05-19 RX ORDER — SODIUM CHLORIDE 9 MG/ML
INJECTION, SOLUTION INTRAVENOUS CONTINUOUS PRN
Status: DISCONTINUED | OUTPATIENT
Start: 2020-05-19 | End: 2020-05-19 | Stop reason: SDUPTHER

## 2020-05-19 RX ORDER — SODIUM CHLORIDE AND POTASSIUM CHLORIDE .9; .15 G/100ML; G/100ML
SOLUTION INTRAVENOUS CONTINUOUS
Status: DISCONTINUED | OUTPATIENT
Start: 2020-05-19 | End: 2020-05-19

## 2020-05-19 RX ORDER — CEFAZOLIN SODIUM 2 G/50ML
SOLUTION INTRAVENOUS
Status: DISPENSED
Start: 2020-05-19 | End: 2020-05-20

## 2020-05-19 RX ORDER — CLOPIDOGREL BISULFATE 75 MG/1
75 TABLET ORAL DAILY
Status: CANCELLED | OUTPATIENT
Start: 2020-05-20

## 2020-05-19 RX ORDER — LINEZOLID 600 MG/1
600 TABLET, FILM COATED ORAL 2 TIMES DAILY
Qty: 28 TABLET | Refills: 0 | Status: SHIPPED | OUTPATIENT
Start: 2020-05-19 | End: 2020-05-22 | Stop reason: SDUPTHER

## 2020-05-19 RX ORDER — LIDOCAINE HYDROCHLORIDE 20 MG/ML
INJECTION, SOLUTION INTRAVENOUS PRN
Status: DISCONTINUED | OUTPATIENT
Start: 2020-05-19 | End: 2020-05-19 | Stop reason: SDUPTHER

## 2020-05-19 RX ORDER — PROPOFOL 10 MG/ML
INJECTION, EMULSION INTRAVENOUS PRN
Status: DISCONTINUED | OUTPATIENT
Start: 2020-05-19 | End: 2020-05-19 | Stop reason: SDUPTHER

## 2020-05-19 RX ORDER — SODIUM CHLORIDE, SODIUM LACTATE, POTASSIUM CHLORIDE, CALCIUM CHLORIDE 600; 310; 30; 20 MG/100ML; MG/100ML; MG/100ML; MG/100ML
INJECTION, SOLUTION INTRAVENOUS CONTINUOUS
Status: DISCONTINUED | OUTPATIENT
Start: 2020-05-19 | End: 2020-05-22 | Stop reason: HOSPADM

## 2020-05-19 RX ORDER — PROPOFOL 10 MG/ML
INJECTION, EMULSION INTRAVENOUS CONTINUOUS PRN
Status: DISCONTINUED | OUTPATIENT
Start: 2020-05-19 | End: 2020-05-19 | Stop reason: SDUPTHER

## 2020-05-19 RX ORDER — MIDAZOLAM HYDROCHLORIDE 1 MG/ML
INJECTION INTRAMUSCULAR; INTRAVENOUS PRN
Status: DISCONTINUED | OUTPATIENT
Start: 2020-05-19 | End: 2020-05-19 | Stop reason: SDUPTHER

## 2020-05-19 RX ADMIN — LINEZOLID 600 MG: 600 TABLET, FILM COATED ORAL at 09:42

## 2020-05-19 RX ADMIN — HYDRALAZINE HYDROCHLORIDE 30 MG: 10 TABLET, FILM COATED ORAL at 09:41

## 2020-05-19 RX ADMIN — GABAPENTIN 100 MG: 100 CAPSULE ORAL at 21:19

## 2020-05-19 RX ADMIN — FERROUS SULFATE TAB 325 MG (65 MG ELEMENTAL FE) 325 MG: 325 (65 FE) TAB at 09:41

## 2020-05-19 RX ADMIN — CEFAZOLIN SODIUM 2 G: 2 SOLUTION INTRAVENOUS at 13:09

## 2020-05-19 RX ADMIN — MIDAZOLAM 2 MG: 1 INJECTION INTRAMUSCULAR; INTRAVENOUS at 13:07

## 2020-05-19 RX ADMIN — OXYBUTYNIN CHLORIDE 5 MG: 5 TABLET ORAL at 15:06

## 2020-05-19 RX ADMIN — WATER 1 G: 1 INJECTION INTRAMUSCULAR; INTRAVENOUS; SUBCUTANEOUS at 21:21

## 2020-05-19 RX ADMIN — PROPOFOL 60 MG: 10 INJECTION, EMULSION INTRAVENOUS at 13:14

## 2020-05-19 RX ADMIN — DOCUSATE SODIUM 100 MG: 100 CAPSULE, LIQUID FILLED ORAL at 21:20

## 2020-05-19 RX ADMIN — FENTANYL CITRATE 25 MCG: 50 INJECTION, SOLUTION INTRAMUSCULAR; INTRAVENOUS at 13:19

## 2020-05-19 RX ADMIN — SODIUM CHLORIDE: 9 INJECTION, SOLUTION INTRAVENOUS at 13:09

## 2020-05-19 RX ADMIN — DIAZEPAM 2 MG: 2 TABLET ORAL at 21:18

## 2020-05-19 RX ADMIN — HYDRALAZINE HYDROCHLORIDE 30 MG: 10 TABLET, FILM COATED ORAL at 15:06

## 2020-05-19 RX ADMIN — LISINOPRIL 40 MG: 20 TABLET ORAL at 09:42

## 2020-05-19 RX ADMIN — LIDOCAINE HYDROCHLORIDE 20 MG: 20 INJECTION, SOLUTION INTRAVENOUS at 13:14

## 2020-05-19 RX ADMIN — MEGESTROL ACETATE 20 MG: 40 TABLET ORAL at 09:42

## 2020-05-19 RX ADMIN — METOPROLOL TARTRATE 75 MG: 25 TABLET, FILM COATED ORAL at 21:32

## 2020-05-19 RX ADMIN — ATORVASTATIN CALCIUM 80 MG: 40 TABLET, FILM COATED ORAL at 21:19

## 2020-05-19 RX ADMIN — PYRIDOXINE HCL TAB 50 MG 50 MG: 50 TAB at 09:42

## 2020-05-19 RX ADMIN — DEXTROSE MONOHYDRATE 12.5 G: 25 INJECTION, SOLUTION INTRAVENOUS at 12:21

## 2020-05-19 RX ADMIN — ACETAMINOPHEN 650 MG: 325 TABLET, FILM COATED ORAL at 10:15

## 2020-05-19 RX ADMIN — FENTANYL CITRATE 25 MCG: 50 INJECTION, SOLUTION INTRAMUSCULAR; INTRAVENOUS at 13:37

## 2020-05-19 RX ADMIN — METOPROLOL TARTRATE 75 MG: 25 TABLET, FILM COATED ORAL at 09:40

## 2020-05-19 RX ADMIN — OXYBUTYNIN CHLORIDE 5 MG: 5 TABLET ORAL at 21:24

## 2020-05-19 RX ADMIN — PROPOFOL 100 MCG/KG/MIN: 10 INJECTION, EMULSION INTRAVENOUS at 13:14

## 2020-05-19 RX ADMIN — OXYBUTYNIN CHLORIDE 5 MG: 5 TABLET ORAL at 09:42

## 2020-05-19 RX ADMIN — DOCUSATE SODIUM 100 MG: 100 CAPSULE, LIQUID FILLED ORAL at 09:42

## 2020-05-19 RX ADMIN — POTASSIUM CHLORIDE AND SODIUM CHLORIDE: 900; 150 INJECTION, SOLUTION INTRAVENOUS at 12:21

## 2020-05-19 RX ADMIN — FENTANYL CITRATE 50 MCG: 50 INJECTION, SOLUTION INTRAMUSCULAR; INTRAVENOUS at 13:14

## 2020-05-19 RX ADMIN — LINEZOLID 600 MG: 600 TABLET, FILM COATED ORAL at 21:23

## 2020-05-19 RX ADMIN — BENZTROPINE MESYLATE 0.5 MG: 0.5 TABLET ORAL at 09:43

## 2020-05-19 RX ADMIN — METHIMAZOLE 5 MG: 5 TABLET ORAL at 09:41

## 2020-05-19 RX ADMIN — SODIUM CHLORIDE, POTASSIUM CHLORIDE, SODIUM LACTATE AND CALCIUM CHLORIDE: 600; 310; 30; 20 INJECTION, SOLUTION INTRAVENOUS at 17:49

## 2020-05-19 ASSESSMENT — PULMONARY FUNCTION TESTS
PIF_VALUE: 1

## 2020-05-19 ASSESSMENT — PAIN SCALES - GENERAL
PAINLEVEL_OUTOF10: 0
PAINLEVEL_OUTOF10: 5

## 2020-05-19 ASSESSMENT — PAIN DESCRIPTION - PAIN TYPE: TYPE: ACUTE PAIN

## 2020-05-19 ASSESSMENT — PAIN DESCRIPTION - DESCRIPTORS: DESCRIPTORS: HEADACHE

## 2020-05-19 ASSESSMENT — PAIN DESCRIPTION - LOCATION: LOCATION: HEAD

## 2020-05-19 NOTE — BRIEF OP NOTE
Brief Postoperative Note    Bernice Narrow  YOB: 1960  [id number]    Pre-operative Diagnosis: bilateral ureteral obst    Post-operative Diagnosis: Same    Procedure: cysto retro bilateral ureteral obst    Anesthesia: MAC    Surgeons/Assistants: Marc Jefferson      Estimated Blood Loss: less than 50     Complications: None    Specimens: Was Not Obtained    Findings:     Electronically signed by Aurelio Levy MD on 5/19/2020 at 1:45 PM

## 2020-05-19 NOTE — PROGRESS NOTES
At 2220 this evening, KASANDRA Richards responding to a sounding bed alarm where she found Mrs. Leno Metcalf on the floor. Pt. Denies any injuries and was placed back into the bed. RRT called  and Dee Alejo (charge nurse) responded. Pt. Vitals were stable, orders for CT of head and spine were ordered. Family was notified.

## 2020-05-19 NOTE — PROGRESS NOTES
Patients  Fifi Walshable notified of patient fall. Patricia Whittington NP also notified of fall. Orders pending.

## 2020-05-19 NOTE — PLAN OF CARE
Problem: Falls - Risk of:  Goal: Will remain free from falls  Description: Will remain free from falls  5/19/2020 0152 by Smith Quinonez RN  Outcome: Met This Shift     Problem: Falls - Risk of:  Goal: Absence of physical injury  Description: Absence of physical injury  Outcome: Met This Shift

## 2020-05-19 NOTE — CARE COORDINATION
5/19/2020 1151 CM note: NO COVID-19 TESTING. Per [facility],zyvox copay is $3.60, no prior auth required. [facility] ordered zyvox and will have available Thursday 5/21/20 after 4pm.  [facility] accepted pt and has orders. Plan is home,family will provide ride.  Denise SLAUGHTER

## 2020-05-19 NOTE — PROGRESS NOTES
urgency, frequency, hematuria. Voiding without difficulty. Musculoskeletal:  Denies myalgias, joint complaints or back pain      Physical Exam:  No intake/output data recorded. Blood pressure 129/65, pulse 110, temperature 98.7 °F (37.1 °C), temperature source Oral, resp. rate 19, height 5' 4\" (1.626 m), weight 119 lb 7 oz (54.2 kg), SpO2 99 %, not currently breastfeeding. HEENT:    PERRLA. EOMI. Sclera clear. Buccal mucosa moist.  Neck:    Supple. Trachea midline. No thyromegaly. No JVD. No bruits. Heart:    Rhythm regular, rate controlled. Mild systolic murmur. Lungs:    Symmetrical. Clear to auscultation bilaterally. No wheezes. No rhonchi. No rales. Abdomen:   Soft. Non-tender. Non-distended. Bowel sounds positive. No organomegaly or masses. No pain on palpation  Extremities:    Peripheral pulses present. Amputation to the left lower extremity. Neurologic:    Alert x 3, mildly confused. No focal deficit. Cranial nerves grossly intact. Skin:    No petechia. No hemorrhage. Sacral decubitus ulceration. Psych:   Behavior is normal. Mood appears normal. Speech is not rapid or pressured. Musculokeletal:  Spine ROM normal. Muscular strength intact. Gait not assessed.   Genitalia/Breast:  Deferred    Scheduled Meds:   ceFAZolin  2 g Intravenous On Call to OR    linezolid  600 mg Oral 2 times per day    atorvastatin  80 mg Oral Nightly    benztropine  0.5 mg Oral Daily    [Held by provider] clopidogrel  75 mg Oral Daily    docusate sodium  100 mg Oral BID    ferrous sulfate  325 mg Oral Daily with breakfast    hydrALAZINE  30 mg Oral TID    pyridoxine  50 mg Oral Daily    oxybutynin  5 mg Oral TID    metoprolol tartrate  75 mg Oral BID    methIMAzole  5 mg Oral Daily    megestrol  20 mg Oral Daily    lisinopril  40 mg Oral Daily    gabapentin  100 mg Oral Nightly       Continuous Infusions:   IV infusion builder      dextrose         Objective Data:  CBC with Differential:    Lab amputation on 2/29/2020   6. Coronary artery disease  7. Microcytic anemia of chronic disease  8. Essential hypertension  9. Hyperlipidemia  10. Hypothyroidism    Plan:   The patient's mentation has improved but remains mildly confused. Blood sugars remain borderline low, and all insulin therapy has been discontinued. We will continue to address any hypoglycemic events accordingly. Nutritional supplements have been added as well due to poor oral intake over the patient is presently n.p.o. for planned urological procedure. Sodium has trended downward in the setting of D10 infusion, and IV fluids have been changed to normal saline with supplemental potassium. We will monitor metabolic panel going forward for gradual improvement. The infectious disease team has been asked to provide consultation and antibiotics at their discretion. General surgery team is evaluated regarding the patient's wound and does not feel that surgical procedure is warranted, offloading and other optimization is recommended in the case of the sacral decubitus ulceration. Plans at this point are for urologic intervention today with b/l ureteral stent removal.  Plavix has been placed on hold. Thyroid function studies will be monitored accordingly. We will maintain IV fluid resuscitation. The patient's ultimate goal is to return home with home health care. Continue current therapy. See orders for further plan of care. More than 50% of my  time was spent at the bedside counseling/coordinating care with the patient and/or family with face to face contact. This time was spent reviewing notes and laboratory data as well as instructing and counseling the patient. Time I spent with the family or surrogate(s) is included only if the patient was incapable of providing the necessary information or participating in medical decisions.  I also discussed the differential diagnosis and all of the proposed management plans with the patient and individuals accompanying the patient. Linda Whittaker requires this high level of physician care and nursing on the IMC/Telemetry unit due the complexity of decision management and chance of rapid decline or death. Continued cardiac monitoring and higher level of nursing are required. I am readily available for any further decision-making and intervention.        Fernando Kim, BENJI - CNP  5/19/2020  9:19 AM

## 2020-05-19 NOTE — PROGRESS NOTES
[address] [place] [address] [facility]  Progress Note    Betty Tracy  1960  DATE:20    Pt was seen FACE TO FACE FOR fevers    SUBJECTIVE:  Chief Complaint   Patient presents with    Hypoglycemia     EMS called to house for unresponsive. Family states that pt had been like that for 1 hour. Pt sugar was 19 upon arrival. EMS gave 1/2 amp dextrose glucose was 102. [id number]. Patient is tolerating medications. No reported adverse drug reactions. Seen pt with nurse  Buzz was just placed has purulence  Has sitter s/p FOB  Review of systems:  As stated above in the chief complaint, otherwise negative. Medications:  Scheduled Meds:   ceFAZolin  2 g Intravenous On Call to OR    linezolid  600 mg Oral 2 times per day    atorvastatin  80 mg Oral Nightly    benztropine  0.5 mg Oral Daily    [Held by provider] clopidogrel  75 mg Oral Daily    docusate sodium  100 mg Oral BID    ferrous sulfate  325 mg Oral Daily with breakfast    hydrALAZINE  30 mg Oral TID    pyridoxine  50 mg Oral Daily    oxybutynin  5 mg Oral TID    metoprolol tartrate  75 mg Oral BID    methIMAzole  5 mg Oral Daily    megestrol  20 mg Oral Daily    lisinopril  40 mg Oral Daily    gabapentin  100 mg Oral Nightly     Continuous Infusions:   0.9% NaCl with KCl 20 mEq      dextrose       PRN Meds:diazePAM, acetaminophen, glucose, dextrose, glucagon (rDNA), dextrose    OBJECTIVE:  /60   Pulse 97   Temp 98.7 °F (37.1 °C) (Oral)   Resp 19   Ht 5' 4\" (1.626 m)   Wt 119 lb 7 oz (54.2 kg)   SpO2 99%   BMI 20.50 kg/m²   Temp  Av.4 °F (37.4 °C)  Min: 98.7 °F (37.1 °C)  Max: 100 °F (37.8 °C)  Constitutional:  The patient is awake, alert   Skin:    Warm and dry. HEENT:     AT/NC  Neck:    Supple to movements. Chest:   No use of accessory muscles to breathe. Cardiovascular:  S1 and S2 are rhythmic and regular. No murmurs appreciated. Abdomen:   Positive bowel sounds to auscultation.  Benign

## 2020-05-20 LAB
ANION GAP SERPL CALCULATED.3IONS-SCNC: 9 MMOL/L (ref 7–16)
BASOPHILS ABSOLUTE: 0.04 E9/L (ref 0–0.2)
BASOPHILS RELATIVE PERCENT: 1.2 % (ref 0–2)
BUN BLDV-MCNC: 12 MG/DL (ref 8–23)
CALCIUM SERPL-MCNC: 8.6 MG/DL (ref 8.6–10.2)
CHLORIDE BLD-SCNC: 104 MMOL/L (ref 98–107)
CO2: 22 MMOL/L (ref 22–29)
CREAT SERPL-MCNC: 0.7 MG/DL (ref 0.5–1)
EOSINOPHILS ABSOLUTE: 0.05 E9/L (ref 0.05–0.5)
EOSINOPHILS RELATIVE PERCENT: 1.5 % (ref 0–6)
GFR AFRICAN AMERICAN: >60
GFR NON-AFRICAN AMERICAN: >60 ML/MIN/1.73
GLUCOSE BLD-MCNC: 72 MG/DL (ref 74–99)
HCT VFR BLD CALC: 22.9 % (ref 34–48)
HCT VFR BLD CALC: 23.1 % (ref 34–48)
HEMOGLOBIN: 6.9 G/DL (ref 11.5–15.5)
HEMOGLOBIN: 7.1 G/DL (ref 11.5–15.5)
IMMATURE GRANULOCYTES #: 0.04 E9/L
IMMATURE GRANULOCYTES %: 1.2 % (ref 0–5)
LYMPHOCYTES ABSOLUTE: 1.4 E9/L (ref 1.5–4)
LYMPHOCYTES RELATIVE PERCENT: 41.2 % (ref 20–42)
MCH RBC QN AUTO: 23.5 PG (ref 26–35)
MCHC RBC AUTO-ENTMCNC: 30.1 % (ref 32–34.5)
MCV RBC AUTO: 78.2 FL (ref 80–99.9)
METER GLUCOSE: 168 MG/DL (ref 74–99)
METER GLUCOSE: 198 MG/DL (ref 74–99)
METER GLUCOSE: 206 MG/DL (ref 74–99)
METER GLUCOSE: 79 MG/DL (ref 74–99)
MONOCYTES ABSOLUTE: 0.79 E9/L (ref 0.1–0.95)
MONOCYTES RELATIVE PERCENT: 23.2 % (ref 2–12)
NEUTROPHILS ABSOLUTE: 1.08 E9/L (ref 1.8–7.3)
NEUTROPHILS RELATIVE PERCENT: 31.7 % (ref 43–80)
PDW BLD-RTO: 18.4 FL (ref 11.5–15)
PLATELET # BLD: 497 E9/L (ref 130–450)
PMV BLD AUTO: 10.1 FL (ref 7–12)
POTASSIUM SERPL-SCNC: 4 MMOL/L (ref 3.5–5)
RBC # BLD: 2.93 E12/L (ref 3.5–5.5)
SODIUM BLD-SCNC: 135 MMOL/L (ref 132–146)
WBC # BLD: 3.4 E9/L (ref 4.5–11.5)

## 2020-05-20 PROCEDURE — 82962 GLUCOSE BLOOD TEST: CPT

## 2020-05-20 PROCEDURE — 1200000000 HC SEMI PRIVATE

## 2020-05-20 PROCEDURE — 85014 HEMATOCRIT: CPT

## 2020-05-20 PROCEDURE — 85018 HEMOGLOBIN: CPT

## 2020-05-20 PROCEDURE — 36415 COLL VENOUS BLD VENIPUNCTURE: CPT

## 2020-05-20 PROCEDURE — 6360000002 HC RX W HCPCS: Performed by: UROLOGY

## 2020-05-20 PROCEDURE — 85025 COMPLETE CBC W/AUTO DIFF WBC: CPT

## 2020-05-20 PROCEDURE — 80048 BASIC METABOLIC PNL TOTAL CA: CPT

## 2020-05-20 PROCEDURE — 2580000003 HC RX 258: Performed by: UROLOGY

## 2020-05-20 PROCEDURE — 6370000000 HC RX 637 (ALT 250 FOR IP): Performed by: INTERNAL MEDICINE

## 2020-05-20 PROCEDURE — 6370000000 HC RX 637 (ALT 250 FOR IP): Performed by: SPECIALIST

## 2020-05-20 RX ADMIN — HYDRALAZINE HYDROCHLORIDE 30 MG: 10 TABLET, FILM COATED ORAL at 13:59

## 2020-05-20 RX ADMIN — LISINOPRIL 40 MG: 20 TABLET ORAL at 09:48

## 2020-05-20 RX ADMIN — LINEZOLID 600 MG: 600 TABLET, FILM COATED ORAL at 21:02

## 2020-05-20 RX ADMIN — ATORVASTATIN CALCIUM 80 MG: 40 TABLET, FILM COATED ORAL at 21:03

## 2020-05-20 RX ADMIN — OXYBUTYNIN CHLORIDE 5 MG: 5 TABLET ORAL at 21:02

## 2020-05-20 RX ADMIN — GABAPENTIN 100 MG: 100 CAPSULE ORAL at 21:02

## 2020-05-20 RX ADMIN — DOCUSATE SODIUM 100 MG: 100 CAPSULE, LIQUID FILLED ORAL at 09:49

## 2020-05-20 RX ADMIN — ACETAMINOPHEN 650 MG: 325 TABLET, FILM COATED ORAL at 04:27

## 2020-05-20 RX ADMIN — SODIUM CHLORIDE, POTASSIUM CHLORIDE, SODIUM LACTATE AND CALCIUM CHLORIDE: 600; 310; 30; 20 INJECTION, SOLUTION INTRAVENOUS at 17:21

## 2020-05-20 RX ADMIN — HYDRALAZINE HYDROCHLORIDE 30 MG: 10 TABLET, FILM COATED ORAL at 21:02

## 2020-05-20 RX ADMIN — FERROUS SULFATE TAB 325 MG (65 MG ELEMENTAL FE) 325 MG: 325 (65 FE) TAB at 09:50

## 2020-05-20 RX ADMIN — DOCUSATE SODIUM 100 MG: 100 CAPSULE, LIQUID FILLED ORAL at 21:02

## 2020-05-20 RX ADMIN — METHIMAZOLE 5 MG: 5 TABLET ORAL at 09:48

## 2020-05-20 RX ADMIN — METOPROLOL TARTRATE 75 MG: 25 TABLET, FILM COATED ORAL at 21:03

## 2020-05-20 RX ADMIN — BENZTROPINE MESYLATE 0.5 MG: 0.5 TABLET ORAL at 09:48

## 2020-05-20 RX ADMIN — PYRIDOXINE HCL TAB 50 MG 50 MG: 50 TAB at 09:49

## 2020-05-20 RX ADMIN — MEGESTROL ACETATE 20 MG: 40 TABLET ORAL at 09:49

## 2020-05-20 RX ADMIN — HYDRALAZINE HYDROCHLORIDE 30 MG: 10 TABLET, FILM COATED ORAL at 09:49

## 2020-05-20 RX ADMIN — OXYBUTYNIN CHLORIDE 5 MG: 5 TABLET ORAL at 09:49

## 2020-05-20 RX ADMIN — CLOPIDOGREL BISULFATE 75 MG: 75 TABLET ORAL at 09:49

## 2020-05-20 RX ADMIN — SODIUM CHLORIDE, POTASSIUM CHLORIDE, SODIUM LACTATE AND CALCIUM CHLORIDE: 600; 310; 30; 20 INJECTION, SOLUTION INTRAVENOUS at 00:27

## 2020-05-20 RX ADMIN — METOPROLOL TARTRATE 75 MG: 25 TABLET, FILM COATED ORAL at 09:48

## 2020-05-20 RX ADMIN — WATER 1 G: 1 INJECTION INTRAMUSCULAR; INTRAVENOUS; SUBCUTANEOUS at 04:27

## 2020-05-20 RX ADMIN — LINEZOLID 600 MG: 600 TABLET, FILM COATED ORAL at 09:49

## 2020-05-20 RX ADMIN — ACETAMINOPHEN 650 MG: 325 TABLET, FILM COATED ORAL at 16:22

## 2020-05-20 RX ADMIN — OXYBUTYNIN CHLORIDE 5 MG: 5 TABLET ORAL at 13:59

## 2020-05-20 ASSESSMENT — PAIN SCALES - GENERAL
PAINLEVEL_OUTOF10: 0

## 2020-05-20 NOTE — PLAN OF CARE
Problem: Falls - Risk of:  Goal: Will remain free from falls  Description: Will remain free from falls  5/20/2020 0140 by Galdino Ragsdale RN  Outcome: Met This Shift     Problem: Falls - Risk of:  Goal: Absence of physical injury  Description: Absence of physical injury  5/20/2020 0140 by Galdino Ragsdale RN  Outcome: Met This Shift

## 2020-05-20 NOTE — PROGRESS NOTES
[address] [place] [address] [facility]  Progress Note    Aldo Hurst  1960  DATE:20    Pt was seen FACE TO FACE FOR fevers    SUBJECTIVE:  Chief Complaint   Patient presents with    Hypoglycemia     EMS called to house for unresponsive. Family states that pt had been like that for 1 hour. Pt sugar was 19 upon arrival. EMS gave 1/2 amp dextrose glucose was 102. tmax-100.4 this am  Patient is tolerating medications. No reported adverse drug reactions. Patient is awake- answering questions - no acute distress    Has sitter s/p FOB    Review of systems:  As stated above in the chief complaint, otherwise negative. Medications:  Scheduled Meds:   ceFAZolin  1 g Intravenous Q8H    linezolid  600 mg Oral 2 times per day    atorvastatin  80 mg Oral Nightly    benztropine  0.5 mg Oral Daily    clopidogrel  75 mg Oral Daily    docusate sodium  100 mg Oral BID    ferrous sulfate  325 mg Oral Daily with breakfast    hydrALAZINE  30 mg Oral TID    pyridoxine  50 mg Oral Daily    oxybutynin  5 mg Oral TID    metoprolol tartrate  75 mg Oral BID    methIMAzole  5 mg Oral Daily    megestrol  20 mg Oral Daily    lisinopril  40 mg Oral Daily    gabapentin  100 mg Oral Nightly     Continuous Infusions:   lactated ringers 125 mL/hr at 20 0027    dextrose       PRN Meds:diazePAM, acetaminophen, glucose, dextrose, glucagon (rDNA), dextrose    OBJECTIVE:  BP (!) 142/63   Pulse 80   Temp 97.9 °F (36.6 °C) (Oral)   Resp 18   Ht 5' 4\" (1.626 m)   Wt 119 lb 7 oz (54.2 kg)   SpO2 99%   BMI 20.50 kg/m²   Temp  Av.4 °F (36.9 °C)  Min: 97.3 °F (36.3 °C)  Max: 100.4 °F (38 °C)  Constitutional:  The patient is awake, alert   Skin:    Warm and dry. HEENT:     AT/NC  Neck:    Supple to movements. Chest:   No use of accessory muscles to breathe. Cardiovascular:  S1 and S2 are rhythmic and regular. No murmurs appreciated. Abdomen:   Positive bowel sounds to auscultation. (ZYVOX) tablet 600 mg, 2 times per day  · Will stop ancef  · Urology following   · Follow-up urine pending  · Monitor labs  · Continue wound care   · Med rec done- can d/c from ID POV    Imaging and labs were reviewed per medical records. Thank you for involving me in the care of Nikolas Oakley will continue to follow. Please do not hesitate to call for any questions or concerns. Electronically signed by BENJI Miller on 5/20/2020 at 10:00 AM   As above    I have performed and participated in the history, exam, assessment and plan on [facility] today  with NP. Pertinent notes, labs and imaging were reviewed. POC was discussed with patient including medications and side effects    Pt had the opportunity to ask questions. All questions were answered. In bed has sitter more awake and appropriate pleasant no issues with meds   Has rowe clear  On linezolid for VRE  S/p ureteral stent exchange  [id number] follow  Sacral ulcer poa stage 2 POA memo wound care nurtrition   pressure relief  Can d/c  Thank you for involving me in the care of [facility]. Please do not hesitate to call for any questions or concerns.     Electronically signed by Zoila Rodriguez MD on 5/20/2020 at 2:14 PM    Phone [phone]  Fax [phone]

## 2020-05-20 NOTE — FLOWSHEET NOTE
Inpatient Wound Care    Admit Date: 5/16/2020 10:50 PM    Reason for consult:  coccyx    Significant history:    Past Medical History:   Diagnosis Date    Diabetic peripheral neuropathy associated with type 2 diabetes mellitus ([facility] 8/24/2018    Epigastric hernia     History of blood transfusion     Hyperlipidemia     Hypertension     Kidney stone     Schizophrenia ([facility]     Type 2 diabetes mellitus with diabetic polyneuropathy, with long-term current use of insulin ([facility] 5/36/0857    Umbilical hernia        Wound history:      Findings:       05/20/20 1411   Wound 05/17/20 Sacrum Medial   Date First Assessed/Time First Assessed: 05/17/20 0800   Present on Hospital Admission: Yes  Location: Sacrum  Wound Location Orientation: Medial   Dressing Changed Changed/New   Dressing/Treatment Alginate; Foam   Wound Cleansed Rinsed/Irrigated with saline   Wound Length (cm) 4 cm   Wound Width (cm) 0.5 cm   Wound Depth (cm) 0.5 cm   Wound Surface Area (cm^2) 2 cm^2   Change in Wound Size % (l*w) 0   Wound Volume (cm^3) 1 cm^3   Wound Healing % 0   Wound Assessment Red   Drainage Amount None       Impression:  Stage 2 pressure injury    Interventions in place:  opticell and foam    Plan:  Cont dressing        Eva Pettit 5/20/2020 2:14 PM

## 2020-05-20 NOTE — PROGRESS NOTES
Internal Medicine Progress Note    Stefano Kapoor. Lynda Butcher., & [address] Lynda Butcher., F.A.C.O.I. Suki Pope D.O., F.A.C.O.I. Primary Care Physician: Jer Solares MD   Admitting Physician:  Cierra Servin DO  Admission date and time: 5/16/2020 10:50 PM    Room:  [address] [place], [place] [address]-    Patient Name: Alie Dumont  MRN: [id number]    Date of Service: 5/20/2020     Subjective:  Colby Agudelo is seen and examined at bedside today. She is doing better from mental status standpoint. We have reviewed the results of her urologic procedure yesterday. Her symptoms are well controlled. Unfortunately, her appetite remains poor. Blood glucose values have improved despite the discontinuation of the dextrose infusion yesterday and we await the patient's metabolic panel this morning for response in regards to her sodium. Review of System: HEENT:  Justice ear pain, sore throat, sinus or eye problems  Cardiovascular:   Denies any chest pain, irregular heartbeats, or palpitations. Respiratory:   Denies shortness of breath, coughing, sputum production, hemoptysis, or wheezing. Gastrointestinal:   Denies nausea, vomiting, diarrhea, or constipation. Denies any abdominal pain. Extremities:   Denies any lower extremity swelling or edema. Neurology:    Denies any headache or focal neurological deficits. Admits to generalized weakness and deconditioning. Derm:    Admits the sacral decubitus ulceration. Genitourinary:    Denies any urgency, frequency, hematuria. Voiding without difficulty. Musculoskeletal:  Denies myalgias, joint complaints or back pain      Physical Exam:  I/O this shift:  In: -   Out: 400 [Urine:400]  Blood pressure 132/62, pulse 92, temperature 99 °F (37.2 °C), temperature source Oral, resp. rate 17, height 5' 4\" (1.626 m), weight 119 lb 7 oz (54.2 kg), SpO2 99 %, not currently breastfeeding. HEENT:    PERRLA. EOMI. Sclera clear.   Buccal mucosa insulins have been discontinued. We await metabolic panel today to assess the patient's response in regards to the sodium and renal function. We will continue to address any hypoglycemic events accordingly. Nutritional supplements have been added as well due to poor oral intake and appetite stimulant has been in place. The infectious disease team has been asked to provide consultation and antibiotics at their discretion. General surgery team is evaluated regarding the patient's wound and does not feel that surgical procedure is warranted, offloading and other optimization is recommended in the case of the sacral decubitus ulceration. The patient underwent urological intervention with stent exchange and tolerated this well, Gerber catheter was left in place. We will need to determine how long the catheter needs to remain before can be removed with urology. Plavix has been placed on hold. The patient's ultimate goal is to return home with home health care. Continue current therapy. See orders for further plan of care. More than 50% of my  time was spent at the bedside counseling/coordinating care with the patient and/or family with face to face contact. This time was spent reviewing notes and laboratory data as well as instructing and counseling the patient. Time I spent with the family or surrogate(s) is included only if the patient was incapable of providing the necessary information or participating in medical decisions. I also discussed the differential diagnosis and all of the proposed management plans with the patient and individuals accompanying the patient. Linda Whittaker requires this high level of physician care and nursing on the IMC/Telemetry unit due the complexity of decision management and chance of rapid decline or death. Continued cardiac monitoring and higher level of nursing are required. I am readily available for any further decision-making and intervention.        BENJI Rios - CNP  5/20/2020  8:19 AM     Sofia tolerated urologic intervention yesterday without complication. Antibiotics are being administered at the discretion of the infectious disease team with plans for transition to oral antibiotics. Home health care will be necessary upon discharge for wound care management and ongoing rehabilitation and physical therapy. Chronic comorbidities are otherwise well controlled. I suspect the patient will be acceptable for discharge home tomorrow. I saw, examined, and discussed the patient with the resident/nurse practioner and agree with their assessment and plan.     Electronically signed by Stacy Padilla DO on 5/20/2020 at 8:28 AM

## 2020-05-21 ENCOUNTER — TELEPHONE (OUTPATIENT)
Dept: FAMILY MEDICINE CLINIC | Age: 60
End: 2020-05-21

## 2020-05-21 LAB
ABO/RH: NORMAL
ANION GAP SERPL CALCULATED.3IONS-SCNC: 8 MMOL/L (ref 7–16)
ANTIBODY SCREEN: NORMAL
BASOPHILS ABSOLUTE: 0.06 E9/L (ref 0–0.2)
BASOPHILS RELATIVE PERCENT: 1.7 % (ref 0–2)
BLOOD BANK DISPENSE STATUS: NORMAL
BLOOD BANK DISPENSE STATUS: NORMAL
BLOOD BANK PRODUCT CODE: NORMAL
BLOOD BANK PRODUCT CODE: NORMAL
BPU ID: NORMAL
BPU ID: NORMAL
BUN BLDV-MCNC: 13 MG/DL (ref 8–23)
C-PEPTIDE: <0.1 NG/ML (ref 0.8–3.5)
CALCIUM SERPL-MCNC: 8.6 MG/DL (ref 8.6–10.2)
CHLORIDE BLD-SCNC: 105 MMOL/L (ref 98–107)
CO2: 22 MMOL/L (ref 22–29)
CREAT SERPL-MCNC: 0.6 MG/DL (ref 0.5–1)
DESCRIPTION BLOOD BANK: NORMAL
DESCRIPTION BLOOD BANK: NORMAL
EOSINOPHILS ABSOLUTE: 0.06 E9/L (ref 0.05–0.5)
EOSINOPHILS RELATIVE PERCENT: 1.7 % (ref 0–6)
GFR AFRICAN AMERICAN: >60
GFR NON-AFRICAN AMERICAN: >60 ML/MIN/1.73
GLUCOSE BLD-MCNC: 135 MG/DL (ref 74–99)
HCT VFR BLD CALC: 22.8 % (ref 34–48)
HEMOGLOBIN: 6.8 G/DL (ref 11.5–15.5)
IMMATURE GRANULOCYTES #: 0.03 E9/L
IMMATURE GRANULOCYTES %: 0.9 % (ref 0–5)
LYMPHOCYTES ABSOLUTE: 1.21 E9/L (ref 1.5–4)
LYMPHOCYTES RELATIVE PERCENT: 34.5 % (ref 20–42)
MCH RBC QN AUTO: 23.4 PG (ref 26–35)
MCHC RBC AUTO-ENTMCNC: 29.8 % (ref 32–34.5)
MCV RBC AUTO: 78.6 FL (ref 80–99.9)
METER GLUCOSE: 166 MG/DL (ref 74–99)
METER GLUCOSE: 189 MG/DL (ref 74–99)
METER GLUCOSE: 220 MG/DL (ref 74–99)
METER GLUCOSE: 222 MG/DL (ref 74–99)
MONOCYTES ABSOLUTE: 0.83 E9/L (ref 0.1–0.95)
MONOCYTES RELATIVE PERCENT: 23.6 % (ref 2–12)
NEUTROPHILS ABSOLUTE: 1.32 E9/L (ref 1.8–7.3)
NEUTROPHILS RELATIVE PERCENT: 37.6 % (ref 43–80)
ORGANISM: ABNORMAL
PDW BLD-RTO: 18 FL (ref 11.5–15)
PLATELET # BLD: 460 E9/L (ref 130–450)
PMV BLD AUTO: 9.8 FL (ref 7–12)
POTASSIUM SERPL-SCNC: 4.1 MMOL/L (ref 3.5–5)
RBC # BLD: 2.9 E12/L (ref 3.5–5.5)
SODIUM BLD-SCNC: 135 MMOL/L (ref 132–146)
URINE CULTURE, ROUTINE: ABNORMAL
WBC # BLD: 3.5 E9/L (ref 4.5–11.5)

## 2020-05-21 PROCEDURE — 80048 BASIC METABOLIC PNL TOTAL CA: CPT

## 2020-05-21 PROCEDURE — 82962 GLUCOSE BLOOD TEST: CPT

## 2020-05-21 PROCEDURE — 1200000000 HC SEMI PRIVATE

## 2020-05-21 PROCEDURE — 36430 TRANSFUSION BLD/BLD COMPNT: CPT

## 2020-05-21 PROCEDURE — 6370000000 HC RX 637 (ALT 250 FOR IP): Performed by: SPECIALIST

## 2020-05-21 PROCEDURE — 97530 THERAPEUTIC ACTIVITIES: CPT

## 2020-05-21 PROCEDURE — 2580000003 HC RX 258: Performed by: INTERNAL MEDICINE

## 2020-05-21 PROCEDURE — 6370000000 HC RX 637 (ALT 250 FOR IP): Performed by: INTERNAL MEDICINE

## 2020-05-21 PROCEDURE — 86900 BLOOD TYPING SEROLOGIC ABO: CPT

## 2020-05-21 PROCEDURE — P9016 RBC LEUKOCYTES REDUCED: HCPCS

## 2020-05-21 PROCEDURE — 36415 COLL VENOUS BLD VENIPUNCTURE: CPT

## 2020-05-21 PROCEDURE — 86923 COMPATIBILITY TEST ELECTRIC: CPT

## 2020-05-21 PROCEDURE — 6360000002 HC RX W HCPCS: Performed by: INTERNAL MEDICINE

## 2020-05-21 PROCEDURE — 86901 BLOOD TYPING SEROLOGIC RH(D): CPT

## 2020-05-21 PROCEDURE — 86850 RBC ANTIBODY SCREEN: CPT

## 2020-05-21 PROCEDURE — 2580000003 HC RX 258

## 2020-05-21 PROCEDURE — 85025 COMPLETE CBC W/AUTO DIFF WBC: CPT

## 2020-05-21 PROCEDURE — 2580000003 HC RX 258: Performed by: UROLOGY

## 2020-05-21 RX ORDER — SODIUM CHLORIDE 0.9 % (FLUSH) 0.9 %
SYRINGE (ML) INJECTION
Status: COMPLETED
Start: 2020-05-21 | End: 2020-05-21

## 2020-05-21 RX ORDER — FUROSEMIDE 10 MG/ML
20 INJECTION INTRAMUSCULAR; INTRAVENOUS ONCE
Status: COMPLETED | OUTPATIENT
Start: 2020-05-21 | End: 2020-05-21

## 2020-05-21 RX ADMIN — ATORVASTATIN CALCIUM 80 MG: 40 TABLET, FILM COATED ORAL at 21:21

## 2020-05-21 RX ADMIN — HYDRALAZINE HYDROCHLORIDE 30 MG: 10 TABLET, FILM COATED ORAL at 21:21

## 2020-05-21 RX ADMIN — OXYBUTYNIN CHLORIDE 5 MG: 5 TABLET ORAL at 21:21

## 2020-05-21 RX ADMIN — HYDRALAZINE HYDROCHLORIDE 30 MG: 10 TABLET, FILM COATED ORAL at 14:57

## 2020-05-21 RX ADMIN — METOPROLOL TARTRATE 75 MG: 25 TABLET, FILM COATED ORAL at 09:51

## 2020-05-21 RX ADMIN — LISINOPRIL 40 MG: 20 TABLET ORAL at 09:51

## 2020-05-21 RX ADMIN — SODIUM CHLORIDE 125 MG: 9 INJECTION, SOLUTION INTRAVENOUS at 12:55

## 2020-05-21 RX ADMIN — SODIUM CHLORIDE, POTASSIUM CHLORIDE, SODIUM LACTATE AND CALCIUM CHLORIDE: 600; 310; 30; 20 INJECTION, SOLUTION INTRAVENOUS at 00:03

## 2020-05-21 RX ADMIN — OXYBUTYNIN CHLORIDE 5 MG: 5 TABLET ORAL at 15:01

## 2020-05-21 RX ADMIN — GABAPENTIN 100 MG: 100 CAPSULE ORAL at 21:21

## 2020-05-21 RX ADMIN — BENZTROPINE MESYLATE 0.5 MG: 0.5 TABLET ORAL at 09:50

## 2020-05-21 RX ADMIN — METHIMAZOLE 5 MG: 5 TABLET ORAL at 09:50

## 2020-05-21 RX ADMIN — MEGESTROL ACETATE 20 MG: 40 TABLET ORAL at 09:51

## 2020-05-21 RX ADMIN — CLOPIDOGREL BISULFATE 75 MG: 75 TABLET ORAL at 09:51

## 2020-05-21 RX ADMIN — FERROUS SULFATE TAB 325 MG (65 MG ELEMENTAL FE) 325 MG: 325 (65 FE) TAB at 09:51

## 2020-05-21 RX ADMIN — OXYBUTYNIN CHLORIDE 5 MG: 5 TABLET ORAL at 09:51

## 2020-05-21 RX ADMIN — ACETAMINOPHEN 650 MG: 325 TABLET, FILM COATED ORAL at 15:01

## 2020-05-21 RX ADMIN — LINEZOLID 600 MG: 600 TABLET, FILM COATED ORAL at 09:51

## 2020-05-21 RX ADMIN — DOCUSATE SODIUM 100 MG: 100 CAPSULE, LIQUID FILLED ORAL at 09:51

## 2020-05-21 RX ADMIN — Medication: at 06:54

## 2020-05-21 RX ADMIN — DOCUSATE SODIUM 100 MG: 100 CAPSULE, LIQUID FILLED ORAL at 21:21

## 2020-05-21 RX ADMIN — PYRIDOXINE HCL TAB 50 MG 50 MG: 50 TAB at 09:50

## 2020-05-21 RX ADMIN — METOPROLOL TARTRATE 75 MG: 25 TABLET, FILM COATED ORAL at 21:21

## 2020-05-21 RX ADMIN — FUROSEMIDE 20 MG: 10 INJECTION, SOLUTION INTRAMUSCULAR; INTRAVENOUS at 21:16

## 2020-05-21 RX ADMIN — HYDRALAZINE HYDROCHLORIDE 30 MG: 10 TABLET, FILM COATED ORAL at 09:50

## 2020-05-21 RX ADMIN — LINEZOLID 600 MG: 600 TABLET, FILM COATED ORAL at 22:02

## 2020-05-21 ASSESSMENT — PAIN SCALES - GENERAL
PAINLEVEL_OUTOF10: 0

## 2020-05-21 NOTE — PROGRESS NOTES
[address] [place] [address] [facility]  Progress Note    Doyle Minor  1960  DATE:20    Pt was seen FACE TO FACE FOR fevers    SUBJECTIVE:  Chief Complaint   Patient presents with    Hypoglycemia     EMS called to house for unresponsive. Family states that pt had been like that for 1 hour. Pt sugar was 19 upon arrival. EMS gave 1/2 amp dextrose glucose was 102. Temp better getting  A bath   Patient is tolerating medications. No reported adverse drug reactions. Patient is awake- answering questions - no acute distress    Review of systems:  As stated above in the chief complaint, otherwise negative. Medications:  Scheduled Meds:   furosemide  20 mg Intravenous Once    ferric gluconate (FERRLECIT) IVPB  125 mg Intravenous Daily    linezolid  600 mg Oral 2 times per day    atorvastatin  80 mg Oral Nightly    benztropine  0.5 mg Oral Daily    clopidogrel  75 mg Oral Daily    docusate sodium  100 mg Oral BID    ferrous sulfate  325 mg Oral Daily with breakfast    hydrALAZINE  30 mg Oral TID    pyridoxine  50 mg Oral Daily    oxybutynin  5 mg Oral TID    metoprolol tartrate  75 mg Oral BID    methIMAzole  5 mg Oral Daily    megestrol  20 mg Oral Daily    lisinopril  40 mg Oral Daily    gabapentin  100 mg Oral Nightly     Continuous Infusions:   lactated ringers 125 mL/hr at 20 0645    dextrose       PRN Meds:diazePAM, acetaminophen, glucose, dextrose, glucagon (rDNA), dextrose    OBJECTIVE:  BP (!) 177/73   Pulse 97   Temp 98.3 °F (36.8 °C) (Oral)   Resp 16   Ht 5' 4\" (1.626 m)   Wt 119 lb 7 oz (54.2 kg)   SpO2 98%   BMI 20.50 kg/m²   Temp  Av.1 °F (37.3 °C)  Min: 98.3 °F (36.8 °C)  Max: 100.3 °F (37.9 °C)  Constitutional:  The patient is awake, alert   Skin:    Warm and dry. HEENT:     AT/NC  Chest:   No use of accessory muscles to breathe. Cardiovascular:  S1 and S2 are rhythmic and regular.    Abdomen:   Positive bowel sounds to

## 2020-05-21 NOTE — PROGRESS NOTES
Memory:  Fair              Sequencing:  Fair              Problem solving:  Fair              Judgement/safety:  Fair                Functional Assessment:    Initial Eval Status  Date: 5/18/20 Treatment Status  Date: 5/21/20 STGs = LTGs  Time frame: 5-7 days   Feeding Independent      NT     Grooming Minimal Assist   Sitting EOB  Minimal Assist  Brushing hair sitting EOB Independent    UB Dressing Minimal Assist   Adjust sleeves of gown and tie around neck while sitting EOB Minimal Assist  Pull sleeves to shoulders sitting EOB  Independent    LB Dressing Maximal Assist   Don sock sitting EOB Maximal Assist  Don slipper sitting EOB  Minimal Assist    Bathing Maximal Assist    NT  Minimal Assist    Toileting Maximal Assist      NT Minimal Assist    Bed Mobility  Supine to sit: Moderate Assist   Sit to supine: Minimal Assist   Assist with trunk out of bed, legs  Supine to sit: Minimal Assist   Sit to supine: Stand by Assist    Supine to sit: Stand by Assist   Sit to supine: Stand by Assist    Functional Transfers Moderate Assist   Stand-pivot to chair Moderate assist of 2  Sit<>stand  Bed 3x  Minimal Assist    Functional Mobility NT   Pt currently non-ambulatory  NT  Unable to shuffle foot     Balance Sitting:     Static:  Fair+    Dynamic:Fair+  Standing: Poor  Sitting:     Static:  Fair+    Dynamic:Fair+  Standing: Fair- Sitting:     Static:  Good    Dynamic:Good  Standing: Fair   Activity Tolerance Fair  Due to weakness in R LE. Fair+  Fair+   Visual/  Perceptual Glasses: Reading  WFL             Hand Dominance Left       Strength ROM Additional Info:    RUE  4/5  WFL good  and wfl FMC/dexterity noted during ADL tasks         LUE 4/5  WFL good  and wfl FMC/dexterity noted during ADL tasks         Hearing: WFL   Sensation:  No c/o numbness or tingling   Tone: WFL   Edema: LLE    Comments:   Nursing approved therapy session.  Upon arrival, patient supine in bed and agreeable to OT session with PT collaboration. Therapist educated pt on role of OT. At end of session, patient supine in bed with call light and phone within reach, sitter present, all lines and tubes intact; nursing aware. Pt demonstrated fair understanding of education/techniques and decreased independence and safety during completion of ADL/functional transfer/mobility tasks. Pt would benefit from continued skilled OT to increase safety and independence with completion of ADL/IADL tasks for functional independence and quality of life. Pt has made fair progress towards set goals. Continue with current plan of care. Treatment:  Skilled occupational therapy services provided include instruction/training on safety and adapted techniques for completion of therapeutic activities, ADLs/IADLs. Therapist facilitated bed mobility, functional transfers, graded functional activities (static/dynamic sitting, static/dynamic standing, functional reaching) - providing min cuing (verbal, visual, tactile) on hand placement, body mechanics, posture, breathing techniques, energy conservation, compensatory strategies, and safety. Therapist facilitated self-care retraining: UB dressing, LB dressing, grooming tasks - providing min cuing (verbal, visual, tactile) on body mechanics, posture, breathing techniques, energy conservation, compensatory strategies, and safety. Therapist educated pt on compensatory strategies/energy conservation techniques to safely complete ADLs/IADLs. Skilled monitoring of O2 sats, HR, and pt response throughout treatment.       Treatment Time In:1524         Treatment Time Out: 0735               Treatment Charges: Mins Units   Ther Ex  44455     Manual Therapy 14517     Thera Activities 17494 10 1   ADL/Home Mgt 28433 3    Neuro Re-ed 58660     Group Therapy      Orthotic manage/training  47002     Non-Billable Time     Total Timed Treatment 13 [address] [place], [place] #[id number]

## 2020-05-21 NOTE — TELEPHONE ENCOUNTER
LAKE Jade, called stating patient will be discharged from [facility] and would like to know if Dr. Manjarrez Like will sign and follow for home care, PT and OT. Please advise.       Last seen 3/26/2020  Next appt Visit date not found      Kale Nunez - [phone]

## 2020-05-21 NOTE — PROGRESS NOTES
04/26/2012    METASPCT 0.9 03/02/2020    LYMPHOPCT 34.5 05/21/2020    MONOPCT 23.6 05/21/2020    MYELOPCT 0.9 03/03/2020    BASOPCT 1.7 05/21/2020    MONOSABS 0.83 05/21/2020    LYMPHSABS 1.21 05/21/2020    EOSABS 0.06 05/21/2020    BASOSABS 0.06 05/21/2020     CMP:    Lab Results   Component Value Date     05/21/2020    K 4.1 05/21/2020    K 5.3 05/17/2020     05/21/2020    CO2 22 05/21/2020    BUN 13 05/21/2020    CREATININE 0.6 05/21/2020    GFRAA >60 05/21/2020    LABGLOM >60 05/21/2020    GLUCOSE 135 05/21/2020    GLUCOSE 171 04/26/2012    PROT 6.8 05/18/2020    LABALBU 2.6 05/18/2020    LABALBU 4.3 04/26/2012    CALCIUM 8.6 05/21/2020    BILITOT 0.4 05/18/2020    ALKPHOS 161 05/18/2020    AST 39 05/18/2020    ALT 33 05/18/2020       Wound Documentation:   Wound 05/17/20 Sacrum Medial (Active)   Dressing Status Dry; Intact; Old drainage 5/18/2020  8:00 AM   Dressing Changed Changed/New 5/17/2020  2:48 PM   Dressing/Treatment Foam 5/18/2020  8:00 AM   Wound Cleansed Rinsed/Irrigated with saline 5/17/2020  2:48 PM   Wound Length (cm) 4 cm 5/17/2020  2:48 PM   Wound Assessment Other (Comment) 5/18/2020 12:15 AM   Drainage Amount Small 5/18/2020  8:00 AM   Odor None 5/17/2020  2:48 PM   Dayana-wound Assessment Other (Comment) 5/18/2020 12:15 AM   Number of days: 1         Assessment:   1. Sepsis secondary to sacral decubitus ulceration along with urinary tract infection with ureteral stents in place status post cystoscopy and stent exchange  2. Acute on chronic microcytic anemia   3. UTI with urine culture + for VRE  4. Insulin-dependent diabetes mellitus type 2 with refractory hypoglycemia  5. Hyponatremia secondary to D10 infusion  6. history of left lower extremity arterial occlusion status post below the knee amputation on 2/29/2020   7. Coronary artery disease  8. Essential hypertension  9. Hyperlipidemia  10. Hypothyroidism    Plan:   Patient's hemoglobin has been slowly trending downward.   Current hemoglobin is 6.8. Discussed the need for blood transfusion with the patient and she is agreeable. Therefore will transfuse 2 units packed red blood cells with Lasix in between. Monitor I&O. Patient has iron deficiency as well therefore will give 2 IV doses of iron while inpatient. No urologic complaints at this time. Antibiotics are being administered at the discretion of the infectious disease team with plans for transition to oral antibiotics. Home health care will be necessary upon discharge for wound care management and ongoing rehabilitation and physical therapy- to assist with discharge planning. Chronic comorbidities are otherwise well controlled. Continue current therapy. See orders for further plan of care. More than 50% of my  time was spent at the bedside counseling/coordinating care with the patient and/or family with face to face contact. This time was spent reviewing notes and laboratory data as well as instructing and counseling the patient. Time I spent with the family or surrogate(s) is included only if the patient was incapable of providing the necessary information or participating in medical decisions. I also discussed the differential diagnosis and all of the proposed management plans with the patient and individuals accompanying the patient. Yuliya Romero requires this high level of physician care and nursing on the IMC/Telemetry unit due the complexity of decision management and chance of rapid decline or death. Continued cardiac monitoring and higher level of nursing are required. I am readily available for any further decision-making and intervention. Aureliano Licona DO  5/21/2020  2:08 PM     Sofia tolerated urologic intervention yesterday without complication. Antibiotics are being administered at the discretion of the infectious disease team with plans for transition to oral antibiotics.   Home health care will be necessary upon discharge for wound

## 2020-05-21 NOTE — PROGRESS NOTES
Room #:  6071/3184-01    Date: 2020       Patient Name: Caryl French  : 1960      MRN: [id number]     Patient unavailable for physical therapy treatment due to pt declining activity this am. Agrees to pm treatment     Thomas Diaz, PTA

## 2020-05-21 NOTE — PLAN OF CARE
Problem: Falls - Risk of:  Goal: Will remain free from falls  Description: Will remain free from falls  5/21/2020 1621 by Edelmira Zhou RN  Outcome: Met This Shift     Problem: Falls - Risk of:  Goal: Absence of physical injury  Description: Absence of physical injury  5/21/2020 1621 by Edelmira Zhou RN  Outcome: Met This Shift     Problem: Serum Glucose Level - Abnormal:  Goal: Ability to maintain appropriate glucose levels will improve  Description: Ability to maintain appropriate glucose levels will improve  5/21/2020 1621 by Edelmira Zhou RN  Outcome: Met This Shift

## 2020-05-21 NOTE — PROGRESS NOTES
Incoming Results From [facility]     Specimen Information: Urine, indwelling catheter        Component Collected Lab   Organism Abnormal  05/19/2020 10:24 AM  [facility]   Enterococcus faecium    Urine Culture, Routine 05/19/2020 10:24 AM  - [facility]   >100,000 CFU/ml   Vancomycin resistant Enterococci isolated    Testing Performed By     Lab - 10 [facility]. Name Director Address Valid Date Range   49- - [facility]  [address] [facility] Marla Jose MD [address] [place] [address]. [address] [place] [address] 12/03/19 1502-Present   Narrative   Performed by: [address] [facility]   Source: [facility]       Site:              Susceptibility     Enterococcus faecium (1)     Antibiotic Interpretation DYLLAN Status    ampicillin Resistant >=^32 mcg/mL     doxycycline Resistant >=^16 mcg/mL     gentamicin-syn Sensitive SYN-S mcg/mL     linezolid Sensitive ^2 mcg/mL     nitrofurantoin Resistant ^128 mcg/mL     vancomycin Resistant >=^32 mcg/mL           Assessment/Plan:  POD#2 Cystopanendoscopy, retrograde pyelogram, bilateral stent exchange   Enterococcus faeciu/ VRE UTI    Creatinine stable   KUB reviewed, stents in good position  Continue antibiotics per ID, currently on Linezolid   Continue rowe catheter  Voiding trial prior to discharge  She will need a cystoscopy, retrograde pyelogram, and bilateral stent exchange vs removal in the future.  This will be set up as an outpatient through our office  No further  interventions planned at this time  Please call with any further questions or concerns     Cuate Tellez, APRN - CNP   [facility]  Urology

## 2020-05-22 VITALS
HEART RATE: 73 BPM | OXYGEN SATURATION: 98 % | RESPIRATION RATE: 16 BRPM | HEIGHT: 64 IN | WEIGHT: 119.44 LBS | SYSTOLIC BLOOD PRESSURE: 156 MMHG | DIASTOLIC BLOOD PRESSURE: 72 MMHG | BODY MASS INDEX: 20.39 KG/M2 | TEMPERATURE: 98.2 F

## 2020-05-22 LAB
ALBUMIN SERPL-MCNC: 2.7 G/DL (ref 3.5–5.2)
ALP BLD-CCNC: 110 U/L (ref 35–104)
ALT SERPL-CCNC: 12 U/L (ref 0–32)
ANION GAP SERPL CALCULATED.3IONS-SCNC: 11 MMOL/L (ref 7–16)
AST SERPL-CCNC: 22 U/L (ref 0–31)
BASOPHILS ABSOLUTE: 0.06 E9/L (ref 0–0.2)
BASOPHILS RELATIVE PERCENT: 1.2 % (ref 0–2)
BILIRUB SERPL-MCNC: 0.6 MG/DL (ref 0–1.2)
BLOOD CULTURE, ROUTINE: NORMAL
BUN BLDV-MCNC: 10 MG/DL (ref 8–23)
CALCIUM SERPL-MCNC: 8.6 MG/DL (ref 8.6–10.2)
CHLORIDE BLD-SCNC: 99 MMOL/L (ref 98–107)
CO2: 23 MMOL/L (ref 22–29)
CREAT SERPL-MCNC: 0.5 MG/DL (ref 0.5–1)
CULTURE, BLOOD 2: NORMAL
EOSINOPHILS ABSOLUTE: 0.03 E9/L (ref 0.05–0.5)
EOSINOPHILS RELATIVE PERCENT: 0.6 % (ref 0–6)
GFR AFRICAN AMERICAN: >60
GFR NON-AFRICAN AMERICAN: >60 ML/MIN/1.73
GLUCOSE BLD-MCNC: 133 MG/DL (ref 74–99)
HCT VFR BLD CALC: 31.4 % (ref 34–48)
HEMOGLOBIN: 10 G/DL (ref 11.5–15.5)
IMMATURE GRANULOCYTES #: 0.05 E9/L
IMMATURE GRANULOCYTES %: 1 % (ref 0–5)
LYMPHOCYTES ABSOLUTE: 1.39 E9/L (ref 1.5–4)
LYMPHOCYTES RELATIVE PERCENT: 27.7 % (ref 20–42)
MCH RBC QN AUTO: 25.4 PG (ref 26–35)
MCHC RBC AUTO-ENTMCNC: 31.8 % (ref 32–34.5)
MCV RBC AUTO: 79.7 FL (ref 80–99.9)
METER GLUCOSE: 127 MG/DL (ref 74–99)
METER GLUCOSE: 134 MG/DL (ref 74–99)
METER GLUCOSE: 195 MG/DL (ref 74–99)
MONOCYTES ABSOLUTE: 0.95 E9/L (ref 0.1–0.95)
MONOCYTES RELATIVE PERCENT: 18.9 % (ref 2–12)
NEUTROPHILS ABSOLUTE: 2.54 E9/L (ref 1.8–7.3)
NEUTROPHILS RELATIVE PERCENT: 50.6 % (ref 43–80)
ORGANISM: ABNORMAL
ORGANISM: ABNORMAL
PDW BLD-RTO: 16.6 FL (ref 11.5–15)
PLATELET # BLD: 436 E9/L (ref 130–450)
PMV BLD AUTO: 9.3 FL (ref 7–12)
POTASSIUM SERPL-SCNC: 3.9 MMOL/L (ref 3.5–5)
RBC # BLD: 3.94 E12/L (ref 3.5–5.5)
SODIUM BLD-SCNC: 133 MMOL/L (ref 132–146)
TOTAL PROTEIN: 6.4 G/DL (ref 6.4–8.3)
WBC # BLD: 5 E9/L (ref 4.5–11.5)
WOUND/ABSCESS: ABNORMAL
WOUND/ABSCESS: ABNORMAL

## 2020-05-22 PROCEDURE — 82962 GLUCOSE BLOOD TEST: CPT

## 2020-05-22 PROCEDURE — 97110 THERAPEUTIC EXERCISES: CPT

## 2020-05-22 PROCEDURE — 97530 THERAPEUTIC ACTIVITIES: CPT

## 2020-05-22 PROCEDURE — 85025 COMPLETE CBC W/AUTO DIFF WBC: CPT

## 2020-05-22 PROCEDURE — 6370000000 HC RX 637 (ALT 250 FOR IP): Performed by: INTERNAL MEDICINE

## 2020-05-22 PROCEDURE — 36415 COLL VENOUS BLD VENIPUNCTURE: CPT

## 2020-05-22 PROCEDURE — 80053 COMPREHEN METABOLIC PANEL: CPT

## 2020-05-22 PROCEDURE — 6370000000 HC RX 637 (ALT 250 FOR IP): Performed by: SPECIALIST

## 2020-05-22 RX ORDER — GABAPENTIN 100 MG/1
100 CAPSULE ORAL NIGHTLY
Qty: 90 CAPSULE | Refills: 0 | Status: ON HOLD
Start: 2020-05-22 | End: 2020-06-04 | Stop reason: HOSPADM

## 2020-05-22 RX ORDER — LINEZOLID 600 MG/1
600 TABLET, FILM COATED ORAL 2 TIMES DAILY
Qty: 28 TABLET | Refills: 0 | Status: ON HOLD
Start: 2020-05-22 | End: 2020-06-04 | Stop reason: HOSPADM

## 2020-05-22 RX ADMIN — ACETAMINOPHEN 650 MG: 325 TABLET, FILM COATED ORAL at 06:24

## 2020-05-22 RX ADMIN — HYDRALAZINE HYDROCHLORIDE 30 MG: 10 TABLET, FILM COATED ORAL at 15:36

## 2020-05-22 RX ADMIN — FERROUS SULFATE TAB 325 MG (65 MG ELEMENTAL FE) 325 MG: 325 (65 FE) TAB at 09:28

## 2020-05-22 RX ADMIN — CLOPIDOGREL BISULFATE 75 MG: 75 TABLET ORAL at 09:26

## 2020-05-22 RX ADMIN — METOPROLOL TARTRATE 75 MG: 25 TABLET, FILM COATED ORAL at 09:27

## 2020-05-22 RX ADMIN — METHIMAZOLE 5 MG: 5 TABLET ORAL at 09:27

## 2020-05-22 RX ADMIN — PYRIDOXINE HCL TAB 50 MG 50 MG: 50 TAB at 09:27

## 2020-05-22 RX ADMIN — DOCUSATE SODIUM 100 MG: 100 CAPSULE, LIQUID FILLED ORAL at 09:28

## 2020-05-22 RX ADMIN — OXYBUTYNIN CHLORIDE 5 MG: 5 TABLET ORAL at 09:27

## 2020-05-22 RX ADMIN — BENZTROPINE MESYLATE 0.5 MG: 0.5 TABLET ORAL at 09:27

## 2020-05-22 RX ADMIN — DIAZEPAM 2 MG: 2 TABLET ORAL at 09:26

## 2020-05-22 RX ADMIN — LISINOPRIL 40 MG: 20 TABLET ORAL at 09:27

## 2020-05-22 RX ADMIN — HYDRALAZINE HYDROCHLORIDE 30 MG: 10 TABLET, FILM COATED ORAL at 09:27

## 2020-05-22 RX ADMIN — OXYBUTYNIN CHLORIDE 5 MG: 5 TABLET ORAL at 15:35

## 2020-05-22 RX ADMIN — LINEZOLID 600 MG: 600 TABLET, FILM COATED ORAL at 09:28

## 2020-05-22 RX ADMIN — MEGESTROL ACETATE 20 MG: 40 TABLET ORAL at 09:27

## 2020-05-22 ASSESSMENT — PAIN SCALES - GENERAL
PAINLEVEL_OUTOF10: 0

## 2020-05-22 NOTE — PROGRESS NOTES
pending  · Monitor labs  · plt [id number]  · Continue wound care   · Med rec done- can d/c from ID POV   F/U 2 WEEKS    Thank you for involving me in the care of [facility]. Please do not hesitate to call for any questions or concerns.     Electronically signed by Shiloh Cano MD on 5/22/2020 at 11:45 AM    Phone [phone]  Fax [phone]

## 2020-05-22 NOTE — PROGRESS NOTES
Nutrition Assessment    Type and Reason for Visit: Reassess    Nutrition Recommendations: Continue current diet, Continue current ONS    Nutrition Assessment: Pt is declining from a nutritional standpoint with ongoing poor intake. Pt s/p cystoscopy retrograde pyelogram w/ bilateral stent replacement. Will continue ONS and monitor. Malnutrition Assessment:  · Malnutrition Status: At risk for malnutrition  · Context: Chronic illness  · Findings of the 6 clinical characteristics of malnutrition (Minimum of 2 out of 6 clinical characteristics is required to make the diagnosis of moderate or severe Protein Calorie Malnutrition based on AND/ASPEN Guidelines):  1. Energy Intake-Less than or equal to 75% of estimated energy requirement, Greater than or equal to 1 month    2. Weight Loss-20% loss or greater(compounded by BKA), (7 months)  3. Fat Loss-Unable to assess,    4. Muscle Loss-Unable to assess,    5. Fluid Accumulation-No significant fluid accumulation,    6.  Strength-Not measured    Nutrition Risk Level:  Moderate    Nutrient Needs:  · Estimated Daily Total Kcal: 2613-0013(28-30 kcal/kg )  · Estimated Daily Protein (g): 70-80(1.3-1.5 gm/kg)  · Estimated Daily Total Fluid (ml/day): 8688-2199    Nutrition Diagnosis:   · Problem: Inadequate oral intake  · Etiology: related to Increased demand for energy/nutrients     Signs and symptoms:  as evidenced by Presence of wounds, Weight loss, Intake 25-50%, Diet history of poor intake    Objective Information:  · Nutrition-Focused Physical Findings: pt disoriented to time, abd WDL, +BS, +8.4L I/O, +1 LUE edema, generalized weakness, hyperglycemia  · Wound Type: Pressure Ulcer, Stage II  · Current Nutrition Therapies:  · Oral Diet Orders: General   · Oral Diet intake: 1-25%  · Oral Nutrition Supplement (ONS) Orders: Diabetic Oral Supplement, Wound Healing Oral Supplement  · ONS intake: Unable to assess(no ONS intake recorded)  · Anthropometric Measures:  · Ht: 5'

## 2020-05-22 NOTE — PLAN OF CARE
Problem: Falls - Risk of:  Goal: Will remain free from falls  Description: Will remain free from falls  5/22/2020 0522 by Leobardo Aggarwal RN  Outcome: Met This Shift  5/21/2020 1621 by Lesa Diego RN  Outcome: Met This Shift  Goal: Absence of physical injury  Description: Absence of physical injury  5/22/2020 0522 by Leobardo Aggarwal RN  Outcome: Met This Shift  5/21/2020 1621 by Lesa Diego RN  Outcome: Met This Shift     Problem: Serum Glucose Level - Abnormal:  Goal: Ability to maintain appropriate glucose levels will improve  Description: Ability to maintain appropriate glucose levels will improve  5/22/2020 0522 by Leobardo Aggarwal RN  Outcome: Met This Shift  5/21/2020 1621 by Lesa Diego RN  Outcome: Met This Shift

## 2020-05-22 NOTE — PROGRESS NOTES
Patient would benefit from continued skilled Physical Therapy to improve functional independence and quality of life. PLAN:    Patient is making good progress towards established goals. Will continue with current Plan of care.       Time in  1124  Time out  1147    Total Treatment Time  23 minutes    CPT codes:  Therapeutic activities (01239)   13 minutes  1 unit(s)  Therapeutic exercises (81329)   10 minutes  1 unit(s)  Sakina Wick [facility]  LIC# [id number]

## 2020-05-23 ENCOUNTER — CARE COORDINATION (OUTPATIENT)
Dept: CASE MANAGEMENT | Age: 60
End: 2020-05-23

## 2020-05-23 PROCEDURE — 1111F DSCHRG MED/CURRENT MED MERGE: CPT | Performed by: FAMILY MEDICINE

## 2020-05-23 NOTE — CARE COORDINATION
[facility] Transitions Initial Follow Up Call    Call within 2 business days of discharge: Yes    Patient: Bobby Talley Patient : 1960   MRN: [id number]  Reason for Admission: Hypoglycemia  Discharge Date: 20 RARS: Readmission Risk Score: 20      Last Discharge [address] [address]       Complaint Diagnosis Description Type Department Provider    20 Hypoglycemia Hypoglycemia . .. ED to Hosp-Admission (Discharged) (ADMITTED) DO Shiva; Kylah Rangel,... Spoke with: Bobby Talley, patient    Facility: [facility]    Non-face-to-face services provided:  Scheduled appointment with PCP-Patient's  to schedule follow up appt with Dr. Shira Amador. Patient's  transports patient to appts. Obtained and reviewed discharge summary and/or continuity of care documents  Communication with home health agencies or other community services the patient is currently using-Per patient's , no home care until after holiday weekend. Care Transitions 24 Hour Call    Do you have any ongoing symptoms?:  No  Do you have a copy of your discharge instructions?:  Yes  Do you have all of your prescriptions and are they filled?:  Yes  Have you been contacted by a [facility] Pharmacist?:  No  Have you scheduled your follow up appointment?:  No  Were you discharged with any Home Care or Post Acute Services:  Yes  Post Acute Services:  Home Health (Comment: [facility]. Patient's spouse reports he doesn't want caregivers or delivered meals)  Patient DME:  Adilene Loser you have support at home?:  Child, Partner/Spouse/SO  Do you feel like you have everything you need to keep you well at home?:  Yes  Are you an active caregiver in your home?:  No  Care Transitions Interventions       Spoke with patient's spouse for initial care transition call post hospital discharge. Med review completed; 1111F entered. Patient's spouse reports Hydralazine discontinued by Dr. Shira Amador.   This CTN to route note to physician. Per patient's , not giving any insulin until he speaks to Dr. Carlos Manuel Dougherty due to hypoglycemic event. No sliding scale noted in med list and patient's  and daughters do not know sliding scale. A1C on 20 4.6%. FBS 20 138 mg/dl. Dr. Carlos Manuel Dougherty paged by this CTN to contact patient's  at [phone]. Contact information for this CTN provided also. Patient updated. Per patient's , patient follows a low carb diet. Patient's  instructed patient is to take Zyvox as directed until completely finished and not to stop unless directed by physician. Patient's  verbalized understanding. Due to Plavix, safety and bleeding precautions reviewed with patient's . Patient's  denies any abnormal or uncontrolled bleeding. Patient's  instructed to report any abnormal bleeding and to call 911 for any uncontrolled bleeding. Patient's  verbalized understanding. Per patient's , Roxicodone discontinued over a month ago. Patient presented to the emergency department on 20 for unresponsive. BGL found to be 21. Per patient's , patient is doing great, eating and drinking. Patient ambulates with a ww. Patient's  is independent with sacral wound care. Patient's  denies any urinary issues. Patient contacted regarding recent discharge and COVID-19 risk. Discussed COVID-19 related testing which was not done at this time. Test results were not done. Patient informed of results, if available? Testing not done     Care Transition Nurse/ Ambulatory Care Manager contacted the family by telephone to perform post discharge assessment. Verified name and  with family as identifiers. Patient has following risk factors of: diabetes. CTN/ACM reviewed discharge instructions, medical action plan and red flags related to discharge diagnosis.  Reviewed and educated them on any new and changed medications related to discharge diagnosis. Advised obtaining a 90-day supply of all daily and as-needed medications. Education provided regarding infection prevention, and signs and symptoms of COVID-19 and when to seek medical attention with family who verbalized understanding. Discussed exposure protocols and quarantine from [address] you at higher risk for severe illness 2019 and given an opportunity for questions and concerns. The family agrees to contact PCP office for questions related to their healthcare. Patient's  declined Covid hotline number. From [facility]: Are you at higher risk for severe illness?  Wash your hands often.  Avoid close contact (6 feet, which is about two arm lengths) with people who are sick.  Put distance between yourself and other people if COVID-19 is spreading in your community.  Clean and disinfect frequently touched surfaces.  Avoid all cruise travel and non-essential air travel.  Call your healthcare professional if you have concerns about COVID-19 and your underlying condition or if you are sick. For more information on steps you can take to protect yourself, see CDC's How to Kelsie for follow-up call in 3-5 days based on severity of symptoms and risk factors. Follow Up  No future appointments.     Dee Salazar RN

## 2020-05-26 ENCOUNTER — TELEPHONE (OUTPATIENT)
Dept: ADMINISTRATIVE | Age: 60
End: 2020-05-26

## 2020-05-27 ENCOUNTER — CARE COORDINATION (OUTPATIENT)
Dept: CASE MANAGEMENT | Age: 60
End: 2020-05-27

## 2020-05-27 NOTE — CARE COORDINATION
[facility] 45 Transitions Follow Up Call    2020    Patient: Toone Skyler  Patient : 1960   MRN: [id number]  Reason for Admission: Hypoglycemia/Sepsis/s/p cystoscopy with biltateralstent exchange  Discharge Date: 20 RARS: Readmission Risk Score: 20         Spoke with: Vidhya Shirley (Patient daughter)    Care Transitions Subsequent and Final Call    Subsequent and Final Calls  Do you have any ongoing symptoms?:  No  Have your medications changed?:  No  Do you have any questions related to your medications?:  No  Do you currently have any active services?:  Yes  Are you currently active with any services?:  Home Health  Do you have any needs or concerns that I can assist you with?:  No  Identified Barriers:  None  Care Transitions Interventions  No Identified Needs  Other Interventions:          Spoke with patient daughter Adelita Palacio) today 20 for TCM/hospital discharge follow up sub call. Mamie states patient is improving. Denies patient having any shortness of breath, chest pain, abdominal pain, nausea, vomiting, chills or fever. States patient is tolerating eating and drinking. States patient continues to tolerate Zyvox that was ordered on discharge. States sacral ulcer \"is healed\". States last BS was 101 and has not had any s/s of hypoglycemia. Reiterated DM zone tool and knowing when to seek medical attention. Alberto Juares states patient is scheduled to follow up with PCP on 20. States they have tried to move PCP appointment but unable too. Mamie states [facility] is scheduled to come out today for first visit. Denies any complaints, concerns or issues at this time.     Follow Up  Future Appointments   Date Time Provider Naomi Alvarado   2020  1:45 PM Martha Snyder MD [facility]       BENJI De La Paz

## 2020-05-27 NOTE — CARE COORDINATION
Spoke with Jordyn Hagan at [facility] who advises family wanted contacted after the holiday. Jordyn Hagan states she doesn't know when patient is scheduled for first visit saying \" My  is on the other line and I don't know what to tell you\".

## 2020-05-29 ENCOUNTER — APPOINTMENT (OUTPATIENT)
Dept: CT IMAGING | Age: 60
DRG: 077 | End: 2020-05-29
Payer: MEDICARE

## 2020-05-29 ENCOUNTER — APPOINTMENT (OUTPATIENT)
Dept: GENERAL RADIOLOGY | Age: 60
DRG: 077 | End: 2020-05-29
Payer: MEDICARE

## 2020-05-29 ENCOUNTER — HOSPITAL ENCOUNTER (INPATIENT)
Age: 60
LOS: 6 days | Discharge: HOME | DRG: 077 | End: 2020-06-04
Attending: EMERGENCY MEDICINE | Admitting: INTERNAL MEDICINE
Payer: MEDICARE

## 2020-05-29 PROBLEM — E27.49: Status: ACTIVE | Noted: 2020-05-29

## 2020-05-29 PROBLEM — I16.1 HYPERTENSIVE EMERGENCY: Status: ACTIVE | Noted: 2020-05-29

## 2020-05-29 PROBLEM — E11.10 DIABETIC KETOACIDOSIS (HCC): Status: ACTIVE | Noted: 2020-05-29

## 2020-05-29 LAB
ACETAMINOPHEN LEVEL: <5 MCG/ML (ref 10–30)
ALBUMIN SERPL-MCNC: 3.2 G/DL (ref 3.5–5.2)
ALP BLD-CCNC: 119 U/L (ref 35–104)
ALT SERPL-CCNC: 10 U/L (ref 0–32)
AMPHETAMINE SCREEN, URINE: NOT DETECTED
ANION GAP SERPL CALCULATED.3IONS-SCNC: 13 MMOL/L (ref 7–16)
ANION GAP SERPL CALCULATED.3IONS-SCNC: 20 MMOL/L (ref 7–16)
ANION GAP SERPL CALCULATED.3IONS-SCNC: 21 MMOL/L (ref 7–16)
AST SERPL-CCNC: 26 U/L (ref 0–31)
B.E.: -2.3 MMOL/L (ref -3–3)
BACTERIA: ABNORMAL /HPF
BARBITURATE SCREEN URINE: NOT DETECTED
BASOPHILS ABSOLUTE: 0.04 E9/L (ref 0–0.2)
BASOPHILS RELATIVE PERCENT: 0.4 % (ref 0–2)
BENZODIAZEPINE SCREEN, URINE: NOT DETECTED
BETA-HYDROXYBUTYRATE: 2.62 MMOL/L (ref 0.02–0.27)
BILIRUB SERPL-MCNC: 0.9 MG/DL (ref 0–1.2)
BILIRUBIN URINE: NEGATIVE
BLOOD, URINE: ABNORMAL
BUN BLDV-MCNC: 11 MG/DL (ref 8–23)
BUN BLDV-MCNC: 11 MG/DL (ref 8–23)
BUN BLDV-MCNC: 12 MG/DL (ref 8–23)
C-REACTIVE PROTEIN: 8.7 MG/DL (ref 0–0.4)
CALCIUM SERPL-MCNC: 8.3 MG/DL (ref 8.6–10.2)
CALCIUM SERPL-MCNC: 9 MG/DL (ref 8.6–10.2)
CALCIUM SERPL-MCNC: 9.6 MG/DL (ref 8.6–10.2)
CANNABINOID SCREEN URINE: NOT DETECTED
CHLORIDE BLD-SCNC: 88 MMOL/L (ref 98–107)
CHLORIDE BLD-SCNC: 94 MMOL/L (ref 98–107)
CHLORIDE BLD-SCNC: 97 MMOL/L (ref 98–107)
CLARITY: CLEAR
CO2: 17 MMOL/L (ref 22–29)
CO2: 17 MMOL/L (ref 22–29)
CO2: 22 MMOL/L (ref 22–29)
COCAINE METABOLITE SCREEN URINE: NOT DETECTED
COHB: 0.2 % (ref 0–1.5)
COLOR: YELLOW
CORTISOL TOTAL: 34.49 MCG/DL (ref 2.68–18.4)
CREAT SERPL-MCNC: 0.5 MG/DL (ref 0.5–1)
CREAT SERPL-MCNC: 0.5 MG/DL (ref 0.5–1)
CREAT SERPL-MCNC: 0.6 MG/DL (ref 0.5–1)
CRITICAL: ABNORMAL
D DIMER: 803 NG/ML DDU
DATE ANALYZED: ABNORMAL
DATE OF COLLECTION: ABNORMAL
EKG ATRIAL RATE: 123 BPM
EKG P AXIS: 66 DEGREES
EKG P-R INTERVAL: 134 MS
EKG Q-T INTERVAL: 334 MS
EKG QRS DURATION: 80 MS
EKG QTC CALCULATION (BAZETT): 478 MS
EKG R AXIS: 59 DEGREES
EKG T AXIS: 60 DEGREES
EKG VENTRICULAR RATE: 123 BPM
EOSINOPHILS ABSOLUTE: 0 E9/L (ref 0.05–0.5)
EOSINOPHILS RELATIVE PERCENT: 0 % (ref 0–6)
EPITHELIAL CELLS, UA: ABNORMAL /HPF
ETHANOL: <10 MG/DL (ref 0–0.08)
FENTANYL SCREEN, URINE: NOT DETECTED
FERRITIN: 2447 NG/ML
GFR AFRICAN AMERICAN: >60
GFR NON-AFRICAN AMERICAN: >60 ML/MIN/1.73
GLUCOSE BLD-MCNC: 295 MG/DL (ref 74–99)
GLUCOSE BLD-MCNC: 356 MG/DL (ref 74–99)
GLUCOSE BLD-MCNC: 97 MG/DL (ref 74–99)
GLUCOSE URINE: 500 MG/DL
HBA1C MFR BLD: 5 % (ref 4–5.6)
HCO3: 18.8 MMOL/L (ref 22–26)
HCT VFR BLD CALC: 36.2 % (ref 34–48)
HCT VFR BLD CALC: 40.9 % (ref 34–48)
HEMOGLOBIN: 11.4 G/DL (ref 11.5–15.5)
HEMOGLOBIN: 12.8 G/DL (ref 11.5–15.5)
HHB: 2.1 % (ref 0–5)
IMMATURE GRANULOCYTES #: 0.03 E9/L
IMMATURE GRANULOCYTES %: 0.3 % (ref 0–5)
KETONES, URINE: >=80 MG/DL
LAB: ABNORMAL
LACTATE DEHYDROGENASE: 314 U/L (ref 135–214)
LACTIC ACID: 2.8 MMOL/L (ref 0.5–2.2)
LACTIC ACID: 3.7 MMOL/L (ref 0.5–2.2)
LACTIC ACID: 4.6 MMOL/L (ref 0.5–2.2)
LEUKOCYTE ESTERASE, URINE: NEGATIVE
LYMPHOCYTES ABSOLUTE: 0.86 E9/L (ref 1.5–4)
LYMPHOCYTES RELATIVE PERCENT: 8.8 % (ref 20–42)
Lab: ABNORMAL
Lab: NORMAL
MAGNESIUM: 1.9 MG/DL (ref 1.6–2.6)
MCH RBC QN AUTO: 25.2 PG (ref 26–35)
MCH RBC QN AUTO: 25.4 PG (ref 26–35)
MCHC RBC AUTO-ENTMCNC: 31.3 % (ref 32–34.5)
MCHC RBC AUTO-ENTMCNC: 31.5 % (ref 32–34.5)
MCV RBC AUTO: 79.9 FL (ref 80–99.9)
MCV RBC AUTO: 81.3 FL (ref 80–99.9)
METER GLUCOSE: 102 MG/DL (ref 74–99)
METER GLUCOSE: 104 MG/DL (ref 74–99)
METER GLUCOSE: 143 MG/DL (ref 74–99)
METER GLUCOSE: 199 MG/DL (ref 74–99)
METER GLUCOSE: 275 MG/DL (ref 74–99)
METHADONE SCREEN, URINE: NOT DETECTED
METHB: 0.5 % (ref 0–1.5)
MODE: ABNORMAL
MONOCYTES ABSOLUTE: 0.7 E9/L (ref 0.1–0.95)
MONOCYTES RELATIVE PERCENT: 7.2 % (ref 2–12)
NEUTROPHILS ABSOLUTE: 8.09 E9/L (ref 1.8–7.3)
NEUTROPHILS RELATIVE PERCENT: 83.3 % (ref 43–80)
NITRITE, URINE: NEGATIVE
O2 CONTENT: 18.3 ML/DL
O2 SATURATION: 97.9 % (ref 92–98.5)
O2HB: 97.2 % (ref 94–97)
OPERATOR ID: 3
OPIATE SCREEN URINE: NOT DETECTED
OXYCODONE URINE: NOT DETECTED
PATIENT TEMP: 37 C
PCO2: 23.4 MMHG (ref 35–45)
PDW BLD-RTO: 18.6 FL (ref 11.5–15)
PDW BLD-RTO: 18.7 FL (ref 11.5–15)
PH BLOOD GAS: 7.52 (ref 7.35–7.45)
PH UA: 6 (ref 5–9)
PH VENOUS: 7.44 (ref 7.35–7.45)
PHENCYCLIDINE SCREEN URINE: NOT DETECTED
PHOSPHORUS: 2.2 MG/DL (ref 2.5–4.5)
PLATELET # BLD: 341 E9/L (ref 130–450)
PLATELET # BLD: 367 E9/L (ref 130–450)
PMV BLD AUTO: 9.1 FL (ref 7–12)
PMV BLD AUTO: 9.3 FL (ref 7–12)
PO2: 100.5 MMHG (ref 75–100)
POTASSIUM REFLEX MAGNESIUM: 4.3 MMOL/L (ref 3.5–5)
POTASSIUM SERPL-SCNC: 3.8 MMOL/L (ref 3.5–5)
POTASSIUM SERPL-SCNC: 4.4 MMOL/L (ref 3.5–5)
PROCALCITONIN: 0.15 NG/ML (ref 0–0.08)
PROCALCITONIN: 0.19 NG/ML (ref 0–0.08)
PROTEIN UA: 100 MG/DL
RBC # BLD: 4.53 E12/L (ref 3.5–5.5)
RBC # BLD: 5.03 E12/L (ref 3.5–5.5)
RBC UA: ABNORMAL /HPF (ref 0–2)
SALICYLATE, SERUM: <0.3 MG/DL (ref 0–30)
SARS-COV-2, NAAT: NOT DETECTED
SEDIMENTATION RATE, ERYTHROCYTE: 60 MM/HR (ref 0–20)
SODIUM BLD-SCNC: 126 MMOL/L (ref 132–146)
SODIUM BLD-SCNC: 131 MMOL/L (ref 132–146)
SODIUM BLD-SCNC: 132 MMOL/L (ref 132–146)
SOURCE, BLOOD GAS: ABNORMAL
SPECIFIC GRAVITY UA: 1.02 (ref 1–1.03)
THB: 13.3 G/DL (ref 11.5–16.5)
TIME ANALYZED: 1402
TOTAL CK: 56 U/L (ref 20–180)
TOTAL PROTEIN: 8.6 G/DL (ref 6.4–8.3)
TRICYCLIC ANTIDEPRESSANTS SCREEN SERUM: NEGATIVE NG/ML
TROPONIN: 0.02 NG/ML (ref 0–0.03)
TROPONIN: 0.02 NG/ML (ref 0–0.03)
TROPONIN: 0.03 NG/ML (ref 0–0.03)
UROBILINOGEN, URINE: 0.2 E.U./DL
WBC # BLD: 12 E9/L (ref 4.5–11.5)
WBC # BLD: 9.7 E9/L (ref 4.5–11.5)
WBC UA: ABNORMAL /HPF (ref 0–5)

## 2020-05-29 PROCEDURE — 85378 FIBRIN DEGRADE SEMIQUANT: CPT

## 2020-05-29 PROCEDURE — 84145 PROCALCITONIN (PCT): CPT

## 2020-05-29 PROCEDURE — 86140 C-REACTIVE PROTEIN: CPT

## 2020-05-29 PROCEDURE — 85027 COMPLETE CBC AUTOMATED: CPT

## 2020-05-29 PROCEDURE — 84100 ASSAY OF PHOSPHORUS: CPT

## 2020-05-29 PROCEDURE — 82533 TOTAL CORTISOL: CPT

## 2020-05-29 PROCEDURE — 70450 CT HEAD/BRAIN W/O DYE: CPT

## 2020-05-29 PROCEDURE — 94761 N-INVAS EAR/PLS OXIMETRY MLT: CPT

## 2020-05-29 PROCEDURE — 85651 RBC SED RATE NONAUTOMATED: CPT

## 2020-05-29 PROCEDURE — 36592 COLLECT BLOOD FROM PICC: CPT

## 2020-05-29 PROCEDURE — 2580000003 HC RX 258: Performed by: INTERNAL MEDICINE

## 2020-05-29 PROCEDURE — 93010 ELECTROCARDIOGRAM REPORT: CPT | Performed by: INTERNAL MEDICINE

## 2020-05-29 PROCEDURE — 6360000002 HC RX W HCPCS: Performed by: SPECIALIST

## 2020-05-29 PROCEDURE — 87088 URINE BACTERIA CULTURE: CPT

## 2020-05-29 PROCEDURE — 82010 KETONE BODYS QUAN: CPT

## 2020-05-29 PROCEDURE — U0002 COVID-19 LAB TEST NON-CDC: HCPCS

## 2020-05-29 PROCEDURE — 71045 X-RAY EXAM CHEST 1 VIEW: CPT

## 2020-05-29 PROCEDURE — 82805 BLOOD GASES W/O2 SATURATION: CPT

## 2020-05-29 PROCEDURE — 36415 COLL VENOUS BLD VENIPUNCTURE: CPT

## 2020-05-29 PROCEDURE — 83735 ASSAY OF MAGNESIUM: CPT

## 2020-05-29 PROCEDURE — 82962 GLUCOSE BLOOD TEST: CPT

## 2020-05-29 PROCEDURE — 87070 CULTURE OTHR SPECIMN AEROBIC: CPT

## 2020-05-29 PROCEDURE — 84484 ASSAY OF TROPONIN QUANT: CPT

## 2020-05-29 PROCEDURE — 99291 CRITICAL CARE FIRST HOUR: CPT

## 2020-05-29 PROCEDURE — 6370000000 HC RX 637 (ALT 250 FOR IP): Performed by: INTERNAL MEDICINE

## 2020-05-29 PROCEDURE — 81001 URINALYSIS AUTO W/SCOPE: CPT

## 2020-05-29 PROCEDURE — 80307 DRUG TEST PRSMV CHEM ANLYZR: CPT

## 2020-05-29 PROCEDURE — 80048 BASIC METABOLIC PNL TOTAL CA: CPT

## 2020-05-29 PROCEDURE — 96375 TX/PRO/DX INJ NEW DRUG ADDON: CPT

## 2020-05-29 PROCEDURE — 80053 COMPREHEN METABOLIC PANEL: CPT

## 2020-05-29 PROCEDURE — 96365 THER/PROPH/DIAG IV INF INIT: CPT

## 2020-05-29 PROCEDURE — 85025 COMPLETE CBC W/AUTO DIFF WBC: CPT

## 2020-05-29 PROCEDURE — 36556 INSERT NON-TUNNEL CV CATH: CPT

## 2020-05-29 PROCEDURE — 96366 THER/PROPH/DIAG IV INF ADDON: CPT

## 2020-05-29 PROCEDURE — 82728 ASSAY OF FERRITIN: CPT

## 2020-05-29 PROCEDURE — 2500000003 HC RX 250 WO HCPCS: Performed by: EMERGENCY MEDICINE

## 2020-05-29 PROCEDURE — 6360000002 HC RX W HCPCS: Performed by: INTERNAL MEDICINE

## 2020-05-29 PROCEDURE — 2500000003 HC RX 250 WO HCPCS: Performed by: INTERNAL MEDICINE

## 2020-05-29 PROCEDURE — 93005 ELECTROCARDIOGRAM TRACING: CPT | Performed by: EMERGENCY MEDICINE

## 2020-05-29 PROCEDURE — 2580000003 HC RX 258: Performed by: EMERGENCY MEDICINE

## 2020-05-29 PROCEDURE — 83036 HEMOGLOBIN GLYCOSYLATED A1C: CPT

## 2020-05-29 PROCEDURE — 96361 HYDRATE IV INFUSION ADD-ON: CPT

## 2020-05-29 PROCEDURE — 74177 CT ABD & PELVIS W/CONTRAST: CPT

## 2020-05-29 PROCEDURE — 2000000000 HC ICU R&B

## 2020-05-29 PROCEDURE — 87081 CULTURE SCREEN ONLY: CPT

## 2020-05-29 PROCEDURE — 6370000000 HC RX 637 (ALT 250 FOR IP): Performed by: EMERGENCY MEDICINE

## 2020-05-29 PROCEDURE — 83615 LACTATE (LD) (LDH) ENZYME: CPT

## 2020-05-29 PROCEDURE — 6360000002 HC RX W HCPCS: Performed by: EMERGENCY MEDICINE

## 2020-05-29 PROCEDURE — 87040 BLOOD CULTURE FOR BACTERIA: CPT

## 2020-05-29 PROCEDURE — G0480 DRUG TEST DEF 1-7 CLASSES: HCPCS

## 2020-05-29 PROCEDURE — 2580000003 HC RX 258: Performed by: SPECIALIST

## 2020-05-29 PROCEDURE — 83605 ASSAY OF LACTIC ACID: CPT

## 2020-05-29 PROCEDURE — 82550 ASSAY OF CK (CPK): CPT

## 2020-05-29 PROCEDURE — 82800 BLOOD PH: CPT

## 2020-05-29 PROCEDURE — 6360000004 HC RX CONTRAST MEDICATION: Performed by: RADIOLOGY

## 2020-05-29 RX ORDER — SODIUM CHLORIDE 9 MG/ML
12.5 INJECTION, SOLUTION INTRAVENOUS EVERY 8 HOURS
Status: DISCONTINUED | OUTPATIENT
Start: 2020-05-30 | End: 2020-06-04 | Stop reason: HOSPADM

## 2020-05-29 RX ORDER — OXYBUTYNIN CHLORIDE 5 MG/1
5 TABLET ORAL NIGHTLY
Status: DISCONTINUED | OUTPATIENT
Start: 2020-05-29 | End: 2020-06-04 | Stop reason: HOSPADM

## 2020-05-29 RX ORDER — ATORVASTATIN CALCIUM 40 MG/1
80 TABLET, FILM COATED ORAL NIGHTLY
Status: DISCONTINUED | OUTPATIENT
Start: 2020-05-29 | End: 2020-06-04 | Stop reason: HOSPADM

## 2020-05-29 RX ORDER — 0.9 % SODIUM CHLORIDE 0.9 %
1000 INTRAVENOUS SOLUTION INTRAVENOUS ONCE
Status: COMPLETED | OUTPATIENT
Start: 2020-05-29 | End: 2020-05-29

## 2020-05-29 RX ORDER — MAGNESIUM SULFATE 1 G/100ML
1 INJECTION INTRAVENOUS PRN
Status: DISCONTINUED | OUTPATIENT
Start: 2020-05-29 | End: 2020-05-29

## 2020-05-29 RX ORDER — DEXTROSE MONOHYDRATE 25 G/50ML
12.5 INJECTION, SOLUTION INTRAVENOUS PRN
Status: DISCONTINUED | OUTPATIENT
Start: 2020-05-29 | End: 2020-05-29

## 2020-05-29 RX ORDER — METOPROLOL TARTRATE 5 MG/5ML
5 INJECTION INTRAVENOUS EVERY 6 HOURS PRN
Status: DISCONTINUED | OUTPATIENT
Start: 2020-05-29 | End: 2020-05-31

## 2020-05-29 RX ORDER — LISINOPRIL 20 MG/1
40 TABLET ORAL DAILY
Status: DISCONTINUED | OUTPATIENT
Start: 2020-05-29 | End: 2020-05-30

## 2020-05-29 RX ORDER — DOCUSATE SODIUM 100 MG/1
100 CAPSULE, LIQUID FILLED ORAL EVERY EVENING
Status: DISCONTINUED | OUTPATIENT
Start: 2020-05-29 | End: 2020-06-04 | Stop reason: HOSPADM

## 2020-05-29 RX ORDER — SODIUM CHLORIDE 0.9 % (FLUSH) 0.9 %
10 SYRINGE (ML) INJECTION PRN
Status: DISCONTINUED | OUTPATIENT
Start: 2020-05-29 | End: 2020-06-04 | Stop reason: HOSPADM

## 2020-05-29 RX ORDER — INSULIN GLARGINE 100 [IU]/ML
5 INJECTION, SOLUTION SUBCUTANEOUS NIGHTLY
Status: COMPLETED | OUTPATIENT
Start: 2020-05-29 | End: 2020-05-29

## 2020-05-29 RX ORDER — CLOPIDOGREL BISULFATE 75 MG/1
75 TABLET ORAL DAILY
Status: CANCELLED | OUTPATIENT
Start: 2020-05-29

## 2020-05-29 RX ORDER — LISINOPRIL 20 MG/1
40 TABLET ORAL DAILY
Status: DISCONTINUED | OUTPATIENT
Start: 2020-05-29 | End: 2020-05-29

## 2020-05-29 RX ORDER — MAGNESIUM SULFATE 1 G/100ML
1 INJECTION INTRAVENOUS PRN
Status: DISCONTINUED | OUTPATIENT
Start: 2020-05-29 | End: 2020-06-04 | Stop reason: HOSPADM

## 2020-05-29 RX ORDER — SODIUM CHLORIDE 9 MG/ML
INJECTION, SOLUTION INTRAVENOUS CONTINUOUS
Status: DISCONTINUED | OUTPATIENT
Start: 2020-05-29 | End: 2020-05-29

## 2020-05-29 RX ORDER — PROMETHAZINE HYDROCHLORIDE 25 MG/1
12.5 TABLET ORAL EVERY 6 HOURS PRN
Status: DISCONTINUED | OUTPATIENT
Start: 2020-05-29 | End: 2020-06-04 | Stop reason: HOSPADM

## 2020-05-29 RX ORDER — LANOLIN ALCOHOL/MO/W.PET/CERES
50 CREAM (GRAM) TOPICAL DAILY
Status: DISCONTINUED | OUTPATIENT
Start: 2020-05-29 | End: 2020-06-04 | Stop reason: HOSPADM

## 2020-05-29 RX ORDER — DEXTROSE MONOHYDRATE 25 G/50ML
12.5 INJECTION, SOLUTION INTRAVENOUS PRN
Status: DISCONTINUED | OUTPATIENT
Start: 2020-05-29 | End: 2020-06-04 | Stop reason: HOSPADM

## 2020-05-29 RX ORDER — NICOTINE POLACRILEX 4 MG
15 LOZENGE BUCCAL PRN
Status: DISCONTINUED | OUTPATIENT
Start: 2020-05-29 | End: 2020-05-29

## 2020-05-29 RX ORDER — SODIUM CHLORIDE 0.9 % (FLUSH) 0.9 %
10 SYRINGE (ML) INJECTION EVERY 12 HOURS SCHEDULED
Status: DISCONTINUED | OUTPATIENT
Start: 2020-05-29 | End: 2020-06-04 | Stop reason: HOSPADM

## 2020-05-29 RX ORDER — DEXTROSE MONOHYDRATE 50 MG/ML
100 INJECTION, SOLUTION INTRAVENOUS PRN
Status: DISCONTINUED | OUTPATIENT
Start: 2020-05-29 | End: 2020-05-29

## 2020-05-29 RX ORDER — SODIUM CHLORIDE 9 MG/ML
INJECTION, SOLUTION INTRAVENOUS CONTINUOUS
Status: DISCONTINUED | OUTPATIENT
Start: 2020-05-29 | End: 2020-05-31

## 2020-05-29 RX ORDER — ACETAMINOPHEN 325 MG/1
650 TABLET ORAL EVERY 6 HOURS PRN
Status: DISCONTINUED | OUTPATIENT
Start: 2020-05-29 | End: 2020-06-04 | Stop reason: HOSPADM

## 2020-05-29 RX ORDER — FLUCONAZOLE 2 MG/ML
400 INJECTION, SOLUTION INTRAVENOUS ONCE
Status: COMPLETED | OUTPATIENT
Start: 2020-05-29 | End: 2020-05-29

## 2020-05-29 RX ORDER — MEGESTROL ACETATE 40 MG/1
20 TABLET ORAL DAILY
Status: DISCONTINUED | OUTPATIENT
Start: 2020-05-29 | End: 2020-06-04 | Stop reason: HOSPADM

## 2020-05-29 RX ORDER — ONDANSETRON 2 MG/ML
4 INJECTION INTRAMUSCULAR; INTRAVENOUS EVERY 6 HOURS PRN
Status: DISCONTINUED | OUTPATIENT
Start: 2020-05-29 | End: 2020-06-04 | Stop reason: HOSPADM

## 2020-05-29 RX ORDER — HYDRALAZINE HYDROCHLORIDE 20 MG/ML
10 INJECTION INTRAMUSCULAR; INTRAVENOUS ONCE
Status: COMPLETED | OUTPATIENT
Start: 2020-05-29 | End: 2020-05-29

## 2020-05-29 RX ORDER — METOPROLOL TARTRATE 5 MG/5ML
5 INJECTION INTRAVENOUS ONCE
Status: COMPLETED | OUTPATIENT
Start: 2020-05-29 | End: 2020-05-29

## 2020-05-29 RX ORDER — DEXTROSE AND SODIUM CHLORIDE 5; .45 G/100ML; G/100ML
INJECTION, SOLUTION INTRAVENOUS CONTINUOUS PRN
Status: DISCONTINUED | OUTPATIENT
Start: 2020-05-29 | End: 2020-06-04 | Stop reason: HOSPADM

## 2020-05-29 RX ORDER — LINEZOLID 600 MG/1
600 TABLET, FILM COATED ORAL EVERY 12 HOURS SCHEDULED
Status: DISCONTINUED | OUTPATIENT
Start: 2020-05-29 | End: 2020-05-29

## 2020-05-29 RX ORDER — SODIUM CHLORIDE 9 MG/ML
INJECTION, SOLUTION INTRAVENOUS CONTINUOUS
Status: ACTIVE | OUTPATIENT
Start: 2020-05-29 | End: 2020-05-29

## 2020-05-29 RX ORDER — ACETAMINOPHEN 650 MG/1
650 SUPPOSITORY RECTAL EVERY 6 HOURS PRN
Status: DISCONTINUED | OUTPATIENT
Start: 2020-05-29 | End: 2020-06-04 | Stop reason: HOSPADM

## 2020-05-29 RX ORDER — DEXTROSE MONOHYDRATE 25 G/50ML
12.5 INJECTION, SOLUTION INTRAVENOUS PRN
Status: DISCONTINUED | OUTPATIENT
Start: 2020-05-29 | End: 2020-05-29 | Stop reason: SDUPTHER

## 2020-05-29 RX ORDER — DEXTROSE AND SODIUM CHLORIDE 5; .45 G/100ML; G/100ML
INJECTION, SOLUTION INTRAVENOUS CONTINUOUS PRN
Status: DISCONTINUED | OUTPATIENT
Start: 2020-05-29 | End: 2020-05-29

## 2020-05-29 RX ORDER — POTASSIUM CHLORIDE 7.45 MG/ML
10 INJECTION INTRAVENOUS PRN
Status: DISCONTINUED | OUTPATIENT
Start: 2020-05-29 | End: 2020-05-29

## 2020-05-29 RX ORDER — NICOTINE POLACRILEX 4 MG
15 LOZENGE BUCCAL PRN
Status: DISCONTINUED | OUTPATIENT
Start: 2020-05-29 | End: 2020-06-04 | Stop reason: HOSPADM

## 2020-05-29 RX ORDER — SODIUM CHLORIDE 9 MG/ML
INJECTION, SOLUTION INTRAVENOUS CONTINUOUS
Status: CANCELLED | OUTPATIENT
Start: 2020-05-29

## 2020-05-29 RX ORDER — SODIUM CHLORIDE 0.9 % (FLUSH) 0.9 %
SYRINGE (ML) INJECTION
Status: DISPENSED
Start: 2020-05-29 | End: 2020-05-29

## 2020-05-29 RX ORDER — POTASSIUM CHLORIDE 7.45 MG/ML
10 INJECTION INTRAVENOUS PRN
Status: DISCONTINUED | OUTPATIENT
Start: 2020-05-29 | End: 2020-06-04 | Stop reason: HOSPADM

## 2020-05-29 RX ORDER — FERROUS SULFATE 325(65) MG
325 TABLET ORAL
Status: DISCONTINUED | OUTPATIENT
Start: 2020-05-30 | End: 2020-06-04 | Stop reason: HOSPADM

## 2020-05-29 RX ORDER — METOPROLOL TARTRATE 50 MG/1
50 TABLET, FILM COATED ORAL 2 TIMES DAILY
Status: DISCONTINUED | OUTPATIENT
Start: 2020-05-29 | End: 2020-05-30

## 2020-05-29 RX ORDER — LINEZOLID 2 MG/ML
600 INJECTION, SOLUTION INTRAVENOUS EVERY 12 HOURS
Status: DISCONTINUED | OUTPATIENT
Start: 2020-05-29 | End: 2020-05-29

## 2020-05-29 RX ADMIN — DEXTROSE AND SODIUM CHLORIDE: 5; 450 INJECTION, SOLUTION INTRAVENOUS at 16:03

## 2020-05-29 RX ADMIN — METOROPROLOL TARTRATE 5 MG: 5 INJECTION, SOLUTION INTRAVENOUS at 10:11

## 2020-05-29 RX ADMIN — IOPAMIDOL 75 ML: 755 INJECTION, SOLUTION INTRAVENOUS at 12:02

## 2020-05-29 RX ADMIN — POTASSIUM CHLORIDE 10 MEQ: 7.46 INJECTION, SOLUTION INTRAVENOUS at 22:00

## 2020-05-29 RX ADMIN — INSULIN GLARGINE 5 UNITS: 100 INJECTION, SOLUTION SUBCUTANEOUS at 20:02

## 2020-05-29 RX ADMIN — PIPERACILLIN AND TAZOBACTAM 3.38 G: 3; .375 INJECTION, POWDER, LYOPHILIZED, FOR SOLUTION INTRAVENOUS at 21:15

## 2020-05-29 RX ADMIN — PIPERACILLIN AND TAZOBACTAM 3.38 G: 3; .375 INJECTION, POWDER, FOR SOLUTION INTRAVENOUS at 13:24

## 2020-05-29 RX ADMIN — SODIUM CHLORIDE 325 MG: 9 INJECTION, SOLUTION INTRAVENOUS at 18:24

## 2020-05-29 RX ADMIN — HYDRALAZINE HYDROCHLORIDE 10 MG: 20 INJECTION INTRAMUSCULAR; INTRAVENOUS at 11:43

## 2020-05-29 RX ADMIN — METOROPROLOL TARTRATE 5 MG: 5 INJECTION, SOLUTION INTRAVENOUS at 14:03

## 2020-05-29 RX ADMIN — POTASSIUM CHLORIDE 10 MEQ: 7.46 INJECTION, SOLUTION INTRAVENOUS at 20:05

## 2020-05-29 RX ADMIN — Medication 10 ML: at 21:13

## 2020-05-29 RX ADMIN — SODIUM CHLORIDE 1000 ML: 9 INJECTION, SOLUTION INTRAVENOUS at 09:09

## 2020-05-29 RX ADMIN — METOROPROLOL TARTRATE 5 MG: 5 INJECTION, SOLUTION INTRAVENOUS at 20:13

## 2020-05-29 RX ADMIN — SODIUM CHLORIDE: 9 INJECTION, SOLUTION INTRAVENOUS at 12:10

## 2020-05-29 RX ADMIN — ENOXAPARIN SODIUM 40 MG: 40 INJECTION SUBCUTANEOUS at 18:23

## 2020-05-29 RX ADMIN — SODIUM CHLORIDE 1000 ML: 9 INJECTION, SOLUTION INTRAVENOUS at 08:15

## 2020-05-29 RX ADMIN — POTASSIUM CHLORIDE 10 MEQ: 7.46 INJECTION, SOLUTION INTRAVENOUS at 21:03

## 2020-05-29 RX ADMIN — SODIUM CHLORIDE 0.1 UNITS/KG/HR: 9 INJECTION, SOLUTION INTRAVENOUS at 13:41

## 2020-05-29 RX ADMIN — SODIUM CHLORIDE: 9 INJECTION, SOLUTION INTRAVENOUS at 21:02

## 2020-05-29 RX ADMIN — FLUCONAZOLE 400 MG: 2 INJECTION, SOLUTION INTRAVENOUS at 18:24

## 2020-05-29 RX ADMIN — SODIUM CHLORIDE 5 MG/HR: 9 INJECTION, SOLUTION INTRAVENOUS at 21:08

## 2020-05-29 RX ADMIN — SODIUM PHOSPHATE, MONOBASIC, MONOHYDRATE 15 MMOL: 276; 142 INJECTION, SOLUTION INTRAVENOUS at 20:32

## 2020-05-29 NOTE — CONSULTS
[address] [place] [address] [facility]  Consult Note    [address] [address]  [place], [address]  Phone [phone]   [phone]      Admit Date: 2020  7:00 AM  Pt Name: Terrence Romero  MRN: [id number]  : 1960  Reason for Consult:    Chief Complaint   Patient presents with    Altered Mental Status     Pt brought in A&Ox1 normally A&Ox3, LKW 2 days ago. Yesterday pt was lethargic but with it. Requesting Physician:  Aureliano Licona DO  PCP: Steven Arzate MD  History Obtained From:  patient  ID consulted for Hypertensive emergency [I16.1]  Adrenal infarction ([facility] [address] [E27.49]  on hospital day 0  CHIEF COMPLAINT       Chief Complaint   Patient presents with    Altered Mental Status     Pt brought in A&Ox1 normally A&Ox3, LKW 2 days ago. Yesterday pt was lethargic but with it. HISTORYOF PRESENT ILLNESS      Terrence Romero is a 63 y.o. female who presents with significant past medical history of  has a past medical history of Diabetic peripheral neuropathy associated with type 2 diabetes mellitus ([address] Epigastric hernia, History of blood transfusion, Hyperlipidemia, Hypertension, Kidney stone, Schizophrenia ([address] Type 2 diabetes mellitus with diabetic polyneuropathy, with long-term current use of insulin ([address] and Umbilical hernia. who presents with   Chief Complaint   Patient presents with    Altered Mental Status     Pt brought in A&Ox1 normally A&Ox3, LKW 2 days ago. Yesterday pt was lethargic but with it.       ED TRIAGEVITALS  BP: (!) 183/79, Temp: 99.3 °F (37.4 °C), Pulse: 92, Resp: 22, SpO2: 100 %  HPI  Pt known to ID last seen on  for uti  Pt was d/c with linezolid for Uti /VRE Bilateral Hydronephrosis with bilateral retained ureteral stents  S/P exchange stents   She presented to ER  for evaluation of altered mental status  Per chart pt had fatigued  No reported fevers  Pt admitted to ICU   ubable to get ROS from pt  Wbc9.7 plt 367 Intravenous, PRN, Karen Pattee, DO    glucagon (rDNA) injection 1 mg, 1 mg, Intramuscular, PRN, Pretty Carey, DO    dextrose 5 % solution, 100 mL/hr, Intravenous, PRN, Pretty Carey, DO    0.9 % sodium chloride infusion, , Intravenous, Continuous, Pretty Carey, DO, Last Rate: 125 mL/hr at 05/29/20 1210    glucose (GLUTOSE) 40 % oral gel 15 g, 15 g, Oral, PRN, Wydavid Torre, DO    glucagon (rDNA) injection 1 mg, 1 mg, Intramuscular, PRN, Wyonia Torre, DO    dextrose 5 % solution, 100 mL/hr, Intravenous, PRN, Wyonia Torre, DO    insulin regular (HUMULIN R;NOVOLIN R) 100 Units in sodium chloride 0.9 % 100 mL infusion, 0.1 Units/kg/hr, Intravenous, Continuous, Arsh Stevensa, DO, Last Rate: 2.7 mL/hr at 05/29/20 1455, 2.7 Units/hr at 05/29/20 1455    dextrose 50 % IV solution, 12.5 g, Intravenous, PRN, Karen Pattee, DO    potassium chloride 10 mEq/100 mL IVPB (Peripheral Line), 10 mEq, Intravenous, PRN, Karen Pattee, DO    magnesium sulfate 1 g in dextrose 5% 100 mL IVPB, 1 g, Intravenous, PRN, Karen Pattee, DO    sodium phosphate 10 mmol in dextrose 5 % 250 mL IVPB, 10 mmol, Intravenous, PRN **OR** sodium phosphate 15 mmol in dextrose 5 % 250 mL IVPB, 15 mmol, Intravenous, PRN **OR** sodium phosphate 20 mmol in dextrose 5 % 500 mL IVPB, 20 mmol, Intravenous, PRN, Gabriel Carey, DO    0.9 % sodium chloride infusion, , Intravenous, Continuous, Gabriel Carey DO    dextrose 5 % and 0.45 % sodium chloride infusion, , Intravenous, Continuous PRN, Gabriel Carey, DO    insulin regular (HUMULIN R;NOVOLIN R) 100 Units in sodium chloride 0.9 % 100 mL infusion, 0.1 Units/kg/hr, Intravenous, Continuous, Gabriel Carey DO    enoxaparin (LOVENOX) injection 40 mg, 40 mg, Subcutaneous, Daily, Bishr Younis, MD    dextrose 50 % IV solution, 12.5 g, Intravenous, PRN, Mark Christianson MD    potassium chloride 10 mEq/100 mL IVPB (Peripheral Line), 10 mEq,  CYSTOSCOPY INSERTION / REMOVAL STENT / STONE Bilateral 2020    CYSTOSCOPY RETROGRADE PYELOGRAM BILATERAL STENT EXCHANGE performed by Araceli Hernandez MD at [address] Left 2020    LEG AMPUTATION BELOW KNEE performed by Radha Rangel DO at [address],[facility] [address]  2016    epigastric hernia umbilical hernia with mesh    OVARIAN CYST REMOVAL      OVARY REMOVAL Right     OVARY REMOVAL      UPPER GASTROINTESTINAL ENDOSCOPY N/A 10/29/2019    EGD ESOPHAGOGASTRODUODENOSCOPY performed by Светлана Charles MD at [address]       Family History   Problem Relation Age of Onset    Cancer Brother      SOCIAL HISTORY       Social History     Socioeconomic History    Marital status:      Spouse name: None    Number of children: 3    Years of education: None    Highest education level: None   Occupational History    None   Social Needs    Financial resource strain: None    Food insecurity     Worry: None     Inability: None    Transportation needs     Medical: None     Non-medical: None   Tobacco Use    Smoking status: Former Smoker     Packs/day: 0.00     Years: 2.00     Pack years: 0.00     Types: Cigarettes     Last attempt to quit: 3/23/2015     Years since quittin.1    Smokeless tobacco: Never Used    Tobacco comment: quit smoking    Substance and Sexual Activity    Alcohol use: No     Comment: coffee daily    Drug use: No    Sexual activity: Not Currently     Partners: Male   Lifestyle    Physical activity     Days per week: None     Minutes per session: None    Stress: None   Relationships    Social connections     Talks on phone: None     Gets together: None     Attends [employer] service: None     Active member of club or organization: None     Attends meetings of clubs or organizations: None     Relationship status: None    Intimate partner violence     Fear of current or ex could be related to contrast injection in this region if this was performed through the central venous catheter. This finding was not present on the prior exam.  A hematoma is noted in the region of the right groin which may be intramuscular in location, which measures 6.5 x 3.8 cm. 1. A 6.5 x 3.8 cm hematoma is present in the right groin, which is new from the prior exam on 05/17/2020, and may be intramuscular in location. 2. Findings are concerning for an infarct involving the medial limb of the left adrenal gland. There is hyperenhancement of the remaining adrenal gland tissue, which can be seen in a shock state. 3. The tip of a left groin central venous catheter terminates along the anterior margin of the sacrum, with the tip likely positioned in a small collateral vein in this region. Repositioning is suggested. 4. Heterogeneous hyperdensity in the adjacent leftward sacrum is new from the prior exam, and may be related to contrast injection through the central venous catheter. 5. Soft tissue thickening overlies the sacrum and coccyx, compatible with history of decubitus ulcer in this region. There is no definite of osteomyelitis, although a bone scan or MRI would be a more sensitive exam. 6. Stable position of the ureteral stents, without hydronephrosis. 7. Thickening of the endometrium to 12 mm. Further evaluation with pelvic ultrasound on a nonemergent basis is recommended. Ct Abdomen Pelvis W Iv Contrast Additional Contrast? None    Result Date: 5/17/2020  EXAMINATION: CT OF THE ABDOMEN AND PELVIS WITH CONTRAST 5/17/2020 2:09 am TECHNIQUE: CT of the abdomen and pelvis was performed with the administration of intravenous contrast. Multiplanar reformatted images are provided for review. Dose modulation, iterative reconstruction, and/or weight based adjustment of the mA/kV was utilized to reduce the radiation dose to as low as reasonably achievable.  COMPARISON: 02/23/2020 HISTORY: Acute epigastric pain.  Hypoglycemia with 1 hour unresponsive episode at home. FINDINGS: Image quality mildly degraded by motion artifact. Lower Chest: Unremarkable. Organs: Bilateral ureteral stents remain in place. Unchanged mild dilatation of the renal collecting systems. Solid abdominal organs and gallbladder are unremarkable. GI/Bowel: No bowel obstruction. Normal appendix. Large amount of stool throughout the colon. Pelvis: Bladder is unremarkable. Endometrium appears slightly prominent for patient's age. No lymphadenopathy or free fluid. Peritoneum/Retroperitoneum: No lymphadenopathy or ascites. Moderate atherosclerotic disease without abdominal aortic aneurysm. Bones/Soft Tissues: Unremarkable. Unchanged ureteral stents and mild dilatation of the renal collecting systems. Otherwise no acute abnormality. Xr Chest Portable    Result Date: 5/29/2020  EXAMINATION: ONE XRAY VIEW OF THE CHEST 5/29/2020 8:22 am COMPARISON: May 17, 2020 HISTORY: ORDERING SYSTEM PROVIDED HISTORY: AMS TECHNOLOGIST PROVIDED HISTORY: Reason for exam:->AMS FINDINGS: Cardiac silhouette is normal in size. No pneumothorax. No pleural effusion. No consolidation. No acute bony abnormality. No acute findings     Xr Chest Portable    Result Date: 5/17/2020  EXAMINATION: ONE XRAY VIEW OF THE CHEST 5/17/2020 1:30 am COMPARISON: 02/24/2020 HISTORY: ORDERING SYSTEM PROVIDED HISTORY: altered TECHNOLOGIST PROVIDED HISTORY: Reason for exam:->altered FINDINGS: The cardiac silhouette appears within normal limits for size given portable technique. No convincing evidence of a focal consolidation. No pleural effusion or pneumothorax seen. No acute cardiopulmonary abnormality. Angelito Cm Wo Kub W Wo Tomogram    Result Date: 5/19/2020  EXAMINATION: SPOT FLUOROSCOPIC IMAGES 5/19/2020 1:05 pm TECHNIQUE: Fluoroscopy was provided by the radiology department for procedure. Radiologist was not present during examination.  FLUOROSCOPY DOSE AND TYPE CFU/mL  Gram positive organism   (A)  Final   05/16/2020   Final    >100,000 CFU/ml  Vancomycin resistant Enterococci isolated          Patient is a 63 y.o. female who presented with   Chief Complaint   Patient presents with    Altered Mental Status     Pt brought in A&Ox1 normally A&Ox3, LKW 2 days ago. Yesterday pt was lethargic but with it.          FINAL IMPRESSION    Lactic acidosis r/o sepsis vs meds  Right groin hematoma  Left adrenal infarction  Uti /VRE Bilateral Hydronephrosis with bilateral retained ureteral stents  S/P exchange stents 5/19   -f/u cx  -dapto  piptazo  R/o covid 19 check labs  stools vre    linezolid (ZYVOX) IVPB 600 mg, Q12H will stop start dapto   piperacillin-tazobactam (ZOSYN) 3.375 g in dextrose 5 % 50 mL IVPB extended infusion (mini-bag), Q8H     diflucan x1    Orders Placed This Encounter   Procedures    Culture, Blood 1    Culture, Blood 2    Culture, Blood 1    Culture, Blood 2    Culture, Urine    CT Head WO Contrast    XR CHEST PORTABLE    CT ABDOMEN PELVIS W IV CONTRAST Additional Contrast? None    Comprehensive Metabolic Panel w/ Reflex to MG    CBC Auto Differential    Troponin    pH, venous    Beta-Hydroxybutyrate    Urinalysis    Lactic Acid, Plasma    Microscopic Urinalysis    Lactic Acid, Plasma    Basic metabolic panel    Procalcitonin    Hemoglobin A1C    Lipid Panel    Magnesium    Phosphorus    TSH without Reflex    Comprehensive Metabolic Panel    CBC    Lactic Acid, Plasma    Troponin    Basic Metabolic Panel    Lactate dehydrogenase    Sedimentation Rate    C-reactive protein    Arterial Blood Gas, Respiratory Only    Basic Metabolic Panel    Magnesium    Phosphorus    Urine Drug Screen    Serum Drug Screen    Blood Gas, Arterial    Basic Metabolic Panel    Magnesium    Phosphorus    Hemoglobin A1c    Lactic acid, plasma    Troponin    CK    Cortisol    CBC    Diet NPO Effective Now Exceptions are: Sips with Meds    Telemetry monitoring    Pulse Oximetry    Vital signs per unit routine    Tobacco cessation education    Notify physician    Strict Bedrest    Daily weights    Intake and output    HYPOGLYCEMIA TREATMENT: blood glucose less than 50 mg/dL and patient  ALERT and TOLERATING PO    HYPOGLYCEMIA TREATMENT: blood glucose less than 70 mg/dL and patient ALERT and TOLERATING PO    HYPOGLYCEMIA TREATMENT: blood glucose less than 70 mg/dL and patient NOT ALERT or NPO    HYPOGLYCEMIA TREATMENT: blood glucose less than 50 mg/dL and patient  ALERT and TOLERATING PO    HYPOGLYCEMIA TREATMENT: blood glucose less than 70 mg/dL and patient ALERT and TOLERATING PO    HYPOGLYCEMIA TREATMENT: blood glucose less than 70 mg/dL and patient NOT ALERT or NPO    Vital signs per unit routine    Notify physician if blood glucose (BG) less than 80 mg/dL    Notify physician if blood glucose (BG) less than 200 mg/dL AND two of the following three criteria are met on two consecutive BMPs    Notify physician if multiplier less than 0.01 results in insulin rate of 0 units/hr    Daily weights    Intake and output    Nurse to change IV Fluid when blood glucose (BG) reaches 250 mg/dL    Vital signs per unit routine    Notify physician if blood glucose (BG) less than 80 mg/dL   [facility] physician if blood glucose (BG) less than 200 mg/dL AND two of the following three criteria are met on two consecutive BMPs    Notify physician if multiplier less than 0.01 results in insulin rate of 0 units/hr    Daily weights    Intake and output    Tobacco cessation education    Nurse to change IV Fluid when blood glucose (BG) reaches 250 mg/dL    Place intermittent pneumatic compression device    Telemetry monitoring - continuous duration    Strict Bedrest    Insert indwelling urinary catheter    Discontinue indwelling urinary catheter when documented indications no longer apply per hospital policy    Full Code    Inpatient consult to

## 2020-05-29 NOTE — CONSULTS
Consult ordered by Dr. Milagro Archer, Reason for consult is DKA, AMS, HTN EMERGENCY    CHIEF COMPLAINT / HPI:    63 F hx as below, recent admission to the hospital , was treated for hypoglycemia, VRE UTI, ureteral stenting. Presented to ED today with altered mental status, since yesterday. She is confused,  unable to provide HPI/systemic review. Data gathered from reviewing the medical record and discussion with ancillary staff. In the ER was hypertensive, tachycardic, data suggest DKA, lactic acidosis, , CT head NAD, CXR NAD. She had left femoral TLC inserted and had to be repositioned, after initial attempt on the right groin. CT abdomen showed right groin hematoma, left adrenal infarction, distended bowel loops with stool. She received 2 L NS. She is to receive insulin, abx     Past Medical History:    Reviewed; no changes. Past Medical History:   Diagnosis Date    Diabetic peripheral neuropathy associated with type 2 diabetes mellitus ([facility] [address] 2018    Epigastric hernia     History of blood transfusion     Hyperlipidemia     Hypertension     Kidney stone     Schizophrenia ([facility] [address]     Type 2 diabetes mellitus with diabetic polyneuropathy, with long-term current use of insulin ([facility] [address]     Umbilical hernia        Social History:    Reviewed; no changes.    Social History     Socioeconomic History    Marital status:      Spouse name: Not on file    Number of children: 3    Years of education: Not on file    Highest education level: Not on file   Occupational History    Not on file   Social Needs    Financial resource strain: Not on file    Food insecurity     Worry: Not on file     Inability: Not on file   [employer] needs     Medical: Not on file     Non-medical: Not on file   Tobacco Use    Smoking status: Former Smoker     Packs/day: 0.00     Years: 2.00     Pack years: 0.00     Types: Cigarettes     Last attempt to quit: 3/23/2015     Years since quittin.1    Smokeless tobacco: Never Used    Tobacco comment: quit smoking 2015   Substance and Sexual Activity    Alcohol use: No     Comment: coffee daily    Drug use: No    Sexual activity: Not Currently     Partners: Male   Lifestyle    Physical activity     Days per week: Not on file     Minutes per session: Not on file    Stress: Not on file   Relationships    Social connections     Talks on phone: Not on file     Gets together: Not on file     Attends Synagogue service: Not on file     Active member of club or organization: Not on file     Attends meetings of clubs or organizations: Not on file     Relationship status: Not on file    Intimate partner violence     Fear of current or ex partner: Not on file     Emotionally abused: Not on file     Physically abused: Not on file     Forced sexual activity: Not on file   Other Topics Concern    Not on file   Social History Narrative    Not on file       Allergies:No Known Allergies    Meds: reviewed   Prior to Admission medications    Medication Sig Start Date End Date Taking? Authorizing Provider   gabapentin (NEURONTIN) 100 MG capsule Take 1 capsule by mouth nightly for 30 days. 5/22/20 6/21/20 Yes Armand Reynaga DO   linezolid (ZYVOX) 600 MG tablet Take 1 tablet by mouth 2 times daily for 14 days  Patient taking differently: Take 600 mg by mouth daily  5/22/20 6/5/20 Yes Aurelia Reynaga DO   clopidogrel (PLAVIX) 75 MG tablet Take 1 tablet by mouth daily 4/28/20  Yes Trudi Higgins MD   oxybutynin (DITROPAN) 5 MG tablet Take 1 tablet by mouth 3 times daily  Patient taking differently: Take 5 mg by mouth nightly  4/28/20  Yes Trudi Higgins MD   diazePAM (VALIUM) 2 MG tablet Take 1 tablet by mouth every 6 hours as needed for Anxiety.  3/26/20 3/26/21 Yes Trudi Higgins MD   lisinopril (PRINIVIL;ZESTRIL) 40 MG tablet Take 40 mg by mouth daily   Yes Historical Provider, MD   hydrALAZINE (APRESOLINE) 25 MG tablet Take 30 mg by mouth 3 times daily   Yes Historical Provider, MD   docusate sodium (COLACE) 100 MG capsule Take 1 capsule by mouth 2 times daily  Patient taking differently: Take 100 mg by mouth every evening  3/26/20  Yes Bhargavi Hurley MD   atorvastatin (LIPITOR) 80 MG tablet Take 1 tablet by mouth nightly 3/3/20  Yes Oval Pallas, DO   insulin lispro (HUMALOG) 100 UNIT/ML injection vial Inject 0-6 Units into the skin 3 times daily (with meals) 3/3/20  Yes Oval Pallas, DO   metoprolol 75 MG TABS Take 75 mg by mouth 2 times daily 3/3/20  Yes Oval Pallas, DO   megestrol (MEGACE) 20 MG tablet Take 1 tablet by mouth daily  Patient taking differently: Take 20 mg by mouth 2 times daily  2/13/20  Yes Bhargavi Hurley MD   vitamin B-6 (B-6) 50 MG tablet Take 1 tablet by mouth daily 12/16/19  Yes Armand Reynaga, DO   ferrous sulfate 325 (65 Fe) MG tablet Take 1 tablet by mouth daily (with breakfast) 10/30/19  Yes Oval Pallas, DO   methIMAzole (TAPAZOLE) 5 MG tablet Take 1 tablet by mouth daily 2/13/20   Bhargavi Hurley MD   benztropine (COGENTIN) 0.5 MG tablet Take 0.5 mg by mouth daily  9/12/19   Historical Provider, MD       Family hx:   Family History   Problem Relation Age of Onset    Cancer Brother        REVIEW OF SYSTEMS:          PHYSICAL EXAM:        VITALS:  BP (!) 198/95   Pulse 124   Temp 99 °F (37.2 °C) (Bladder)   Resp 16   Ht 5' 4\" (1.626 m)   Wt 119 lb (54 kg)   SpO2 100%   BMI 20.43 kg/m²  on 2 L NC    24HR INTAKE/OUTPUT:      Intake/Output Summary (Last 24 hours) at 5/29/2020 1356  Last data filed at 5/29/2020 1211  Gross per 24 hour   Intake --   Output 1000 ml   Net -1000 ml     Level of Alertness:   Awake alert, confused  Orientation:  Oriented to 0   CONSTITUTIONAL:  awake, alert, + apparent distress  Throat: clear, no ulcerations or exudate  Ears: clear, no discharge  Neck:supple no LAP, or masses  LUNGS:   increased work of breathing and clear to auscultation, no crackles or central   venous catheter. 5. Soft tissue thickening overlies the sacrum and coccyx, compatible with   history of decubitus ulcer in this region. There is no definite of   osteomyelitis, although a bone scan or MRI would be a more sensitive exam.   6. Stable position of the ureteral stents, without hydronephrosis. 7. Thickening of the endometrium to 12 mm. Further evaluation with pelvic   ultrasound on a nonemergent basis is recommended. XR CHEST PORTABLE   Final Result   No acute findings         CT Head WO Contrast   Final Result   No acute intracranial abnormality.              Assessment:     DM2 insulin requiring with DKA  Hypertensive emergency, hx of medical noncompliance, tox screen pending  Metabolic encephalopathy, vs hypertensive encephalopathy  Lactic acidosis  Right groin hematoma on CT scan sp TLC insertion attempt  Left adrenal gland infarction showing on ct scan  Distended bowel loops with constipation  Sacral decubitus ulcer unspecified stage    Plan:     Monitor BS closely, initiate insulin gtt, hgb a1c  Monitor BP, adding BP Rx, may need to initiate cardene gtt if no response  Abx, fu cx, lactate trend  FU tox screen  Monitor HH and right groin site  Cortisol level  Wound care  Repeat ct abdomen with any sign of hemodynamic deterioration and blood loss    Discussed with RN re management    ICU Staff Physician note of personal involvement in Care  As the attending physician, I certify that I personally reviewed the patient's history and personally examined the patient to confirm the physical findings described above,  And that I reviewed the relevant imaging studies and available reports.      This patient has a high probability of sudden, clinically significant deterioration, which requires the highest level of physician preparedness to intervene urgently.  I managed/supervised life or organ supporting interventions that required frequent physician assessment.   I devoted my full

## 2020-05-29 NOTE — ED NOTES
Spoke with the patient's daughter Cat Canales, she was updated about the patient's condition and admission.      Jr Reinoso RN  05/29/20 2504

## 2020-05-29 NOTE — ED NOTES
The patient is in [address] [place] [address]  05/29/20 [address] [place] [address]

## 2020-05-29 NOTE — ED PROVIDER NOTES
central line had to be placed. In the right groin the wire could not thread and this side was aborted to the left femoral vein. The patient did developed a slight hematoma to her right side which did not seem to be expanding after rechecking at 145 this afternoon. The patient was found to be in DKA and was treated with 2 liters of fluid with some improvement in her BMP. The case was discussed with the intesnvisist Dr. Silvana Quevedo who came down to evaluate the patient himself in the ED. He recommended starting an insulin drip, giving Zosyn, obtaining an ABG and tox screen as well. He also agreed to consult in the ICU. The patient was also discussed with Dr. Brooks Haro who agreed to admit the patient. ED Course as of May 29 1617   Fri May 29, 2020   0732   ATTENDING PROVIDER ATTESTATION:     I have personally performed and/or participated in the history, exam, medical decision making, and procedures and agree with all pertinent clinical information unless otherwise noted. I have also reviewed and agree with the past medical, family and social history unless otherwise noted. I have discussed this patient in detail with the resident and provided the instruction and education regarding the evidence-based evaluation and treatment of [unfilled]  History: patient presents with complaint of AMS since yesterday. Patient is unable to provide any history. My findings: Ann Kelly is a 63 y.o. female whom is in no distress. Physical exam reveals partially responsive. PERRL, EOMI, dry mucous membranes. Heart mildly tachycardic, lungs CTA, abdomen is soft and nontender. She moves all extremitites. L BKA without evidence of infection. My plan: Symptomatic and supportive care. Will evaluate with labs.     Electronically signed by [id number], DO on 5/29/20 at 7:33 AM EDT          [JS]   1336 Pulled central line back 2 cm given read on CT scan and sutured CVC back in with 5-0 ethilon sutures and placed long-term current use of insulin ([facility] [address] and Umbilical hernia. Past Surgical History:  has a past surgical history that includes Ovary removal (Right); Ovary removal (2014); ovarian cyst removal; Colonoscopy (08/2016); Cardiac catheterization (09/30/2016); other surgical history (11/04/2016); hernia repair; CYSTOSCOPY INSERTION / REMOVAL STENT / STONE (Bilateral, 10/28/2019); Upper gastrointestinal endoscopy (N/A, 10/29/2019); Leg amputation below knee (Left, 2/29/2020); and CYSTOSCOPY INSERTION / REMOVAL STENT / STONE (Bilateral, 5/19/2020). Social History:  reports that she quit smoking about 5 years ago. Her smoking use included cigarettes. She smoked 0.00 packs per day for 2.00 years. She has never used smokeless tobacco. She reports that she does not drink alcohol or use drugs. Family History: family history includes Cancer in her brother. The patients home medications have been reviewed. Allergies: Patient has no known allergies.     -------------------------------------------------- RESULTS -------------------------------------------------    LABS:  Results for orders placed or performed during the hospital encounter of 05/29/20   Comprehensive Metabolic Panel w/ Reflex to MG   Result Value Ref Range    Sodium 126 (L) 132 - 146 mmol/L    Potassium reflex Magnesium 4.3 3.5 - 5.0 mmol/L    Chloride 88 (L) 98 - 107 mmol/L    CO2 17 (L) 22 - 29 mmol/L    Anion Gap 21 (H) 7 - 16 mmol/L    Glucose 356 (H) 74 - 99 mg/dL    BUN 12 8 - 23 mg/dL    CREATININE 0.6 0.5 - 1.0 mg/dL    GFR Non-African American >60 >=60 mL/min/1.73    GFR African American >60     Calcium 9.6 8.6 - 10.2 mg/dL    Total Protein 8.6 (H) 6.4 - 8.3 g/dL    Alb 3.2 (L) 3.5 - 5.2 g/dL    Total Bilirubin 0.9 0.0 - 1.2 mg/dL    Alkaline Phosphatase 119 (H) 35 - 104 U/L    ALT 10 0 - 32 U/L    AST 26 0 - 31 U/L   CBC Auto Differential   Result Value Ref Range    WBC 9.7 4.5 - 11.5 E9/L    RBC 5.03 3.50 - 5.50 E12/L    Hemoglobin 12.8 --   05/29/20 1320 -- -- -- -- -- 100 % -- --   05/29/20 1317 (!) 198/95 -- -- 124 16 100 % -- --   05/29/20 1302 (!) 167/88 -- -- 119 21 100 % -- --   05/29/20 1247 (!) 172/84 -- -- 117 22 -- -- --   05/29/20 1151 (!) 173/99 -- -- 112 23 100 % -- --   05/29/20 1146 (!) 194/92 99 °F (37.2 °C) Bladder 110 20 100 % -- --   05/29/20 1131 (!) 195/124 -- -- 109 23 100 % -- --   05/29/20 1118 (!) 193/93 -- -- 107 20 100 % -- --   05/29/20 1103 -- -- -- -- -- 100 % -- --   05/29/20 1102 (!) 205/100 99.1 °F (37.3 °C) Bladder 105 17 100 % -- --   05/29/20 1045 (!) 198/121 99 °F (37.2 °C) Bladder 105 20 100 % -- --   05/29/20 1044 -- -- -- -- -- 100 % -- --   05/29/20 1035 (!) 182/114 -- -- 102 15 -- -- --   05/29/20 1029 -- -- -- 96 17 100 % -- --   05/29/20 1020 (!) 209/97 -- -- 95 21 100 % -- --   05/29/20 1018 -- -- -- 96 23 100 % -- --   05/29/20 1017 (!) 209/97 98.2 °F (36.8 °C) Bladder 99 21 -- -- --   05/29/20 1015 -- -- -- -- -- 100 % -- --   05/29/20 1014 -- 98.2 °F (36.8 °C) Bladder 106 22 100 % -- --   05/29/20 1011 -- -- -- 131 19 100 % -- --   05/29/20 1001 (!) 202/104 -- -- 126 18 100 % -- --   05/29/20 0952 -- -- -- -- -- 100 % -- --   05/29/20 0946 (!) 197/128 97.7 °F (36.5 °C) Bladder 112 22 100 % -- --   05/29/20 0936 -- -- -- 120 27 -- -- --   05/29/20 0935 -- -- -- -- -- 100 % -- --   05/29/20 0933 -- -- -- -- -- 100 % -- --   05/29/20 0932 (!) 197/141 -- -- 117 18 -- -- --   05/29/20 0928 -- -- -- 117 20 -- -- --   05/29/20 0926 -- -- -- -- -- 100 % -- --   05/29/20 0925 -- -- -- 113 20 -- -- --   05/29/20 0924 -- -- -- -- -- 95 % -- --   05/29/20 0923 -- -- -- -- -- 100 % -- --   05/29/20 0920 -- 97.5 °F (36.4 °C) Bladder 109 22 100 % -- --   05/29/20 0909 -- -- -- 113 21 100 % -- --   05/29/20 0902 (!) 208/98 -- -- 114 17 100 % -- --   05/29/20 0837 -- 98.8 °F (37.1 °C) Bladder 114 24 -- -- --   05/29/20 0835 -- -- -- -- -- 100 % -- --   05/29/20 0831 (!) 226/105 -- -- 119 20 -- -- --   05/29/20 0830 -- -- -- 114 21 100 % -- --   05/29/20 0815 (!) 198/100 97.7 °F (36.5 °C) Bladder 125 22 -- -- --   05/29/20 0800 (!) 170/113 -- -- 132 30 -- -- --   05/29/20 0745 (!) 195/112 -- -- 126 22 100 % -- --   05/29/20 0703 (!) 194/98 97.7 °F (36.5 °C) Axillary 100 22 96 % 5' 4\" (1.626 m) 119 lb (54 kg)     Please note that the withdrawal or failure to initiate urgent interventions for this patient would likely result in a life threatening deterioration or permanent disability. Systems at risk for deterioration include: CV    Accordingly this patient received 30 minutes of critical care time, excluding separately billable procedures. Oxygen Saturation Interpretation: Normal    ------------------------------------------ PROGRESS NOTES ------------------------------------------  Re-evaluation(s):  Time: 2272  Patients symptoms show no change  Repeat physical examination is not changed    Counseling:  I have spoken with the patient and discussed todays results, in addition to providing specific details for the plan of care and counseling regarding the diagnosis and prognosis. Their questions are answered at this time and they are agreeable with the plan of admission.    --------------------------------- ADDITIONAL PROVIDER NOTES ---------------------------------  Consultations:  . Spoke with Dr. Anika Jesus  Discussed case. They will admit the patient. This patient's ED course included: a personal history and physicial examination, re-evaluation prior to disposition, multiple bedside re-evaluations, IV medications, cardiac monitoring, continuous pulse oximetry and complex medical decision making and emergency management    This patient has remained hemodynamically stable during their ED course. Diagnosis:  1. Dehydration    2. Diabetic ketoacidosis without coma associated with type 1 diabetes mellitus ([facility] [address]    3.  Adrenal infarction [facility])        Disposition:  Patient's disposition: Admit to telemetry  Patient's condition is stable.          Moraima Ordonez, DO  Resident  05/29/20 [address] [facility], DO  Resident  05/29/20 5718

## 2020-05-29 NOTE — ED NOTES
Several attempts by several staff members were made to place a peripheral IV without success. A 24 guage IV was placed in the left wrist but no blood was obtained. Dr. Davis Núeñz was notified.       Elyse Antunez RN  05/29/20 0109

## 2020-05-29 NOTE — H&P
[address] [facility]  Internal Medicine  -Resident History & Physical-    PCP:  Chi Galvez MD  Admitting Physician:  Tramaine Wills DO  Consultants:  None at this time   Chief Complaint:    Chief Complaint   Patient presents with    Altered Mental Status     Pt brought in A&Ox1 normally A&Ox3, LKW 2 days ago. Yesterday pt was lethargic but with it. History of Present Illness  Brooke Diego is a 63 y.o. female who presents to [address] [place] [address] complaining of AMS. Brooke Diego has a past medical history that includes diabetes mellitus, hypertension, hyperlipidemia, hypothyroidism, coronary artery disease, schizophrenia. Leno Maradiaga presented to ER with AMS. Per family, yesterday she was lethargic but still answering questions. Today she does not answer questions appropriately and answers \"mhm\" to every question. She is hypertensive, tachycardic, hyperglycemic, acidotic. CT ordered in the ER showed infarct involving medial limb of the left adrenal gland with hyperenhancement which can be seen in a shock state. A 6.5 x 3.8 cm hematoma in the right groin and may be intramuscular in origin. Left femoral CVC needs to be repositioned. Heterogeneous hyperdensity in leftward sacrum is new from prior exam but may be related to contrast injection through the central venous catheter. Soft tissue thickening overlies sacrum and coccyx compatible with history of decubitus ulcer. There is no definite osteomyelitis. Stable position of ureteral stents without hydronephrosis. Thickening of endometrium further evaluation with pelvic ultrasound is recommended. ER Course  Upon presentation to the ER, routine labwork was performed which revealed sodium 126, CO2 17, anion gap 21, lactic 3.7, glucose 356, beta hydroxybutyrate 2.62,. Imaging results are as outlined below in the Imaging section of this note. EKG revealed sinus tachycardia, LVH. Upon arrival to the ER, patient was 194/98.   The patient received 2L NS, metoprolol in the emergency room and was admitted to [facility] under the care of Dr. Amanda Trent and Layton Negron. Last Hospital Admission - 5/16/2020  ADMITTING DIAGNOSIS:  Hypoglycemic.     FINAL DISCHARGE DIAGNOSES:  Sepsis secondary to sacral decubitus ulcer  along with associated urinary tract infection with urethral stent in  place, status post cystoscopy and stent exchange; persistent  hypoglycemia.     SECONDARY DISCHARGE DIAGNOSES:  Acute on chronic microcytic anemia with  associated transfusion; urinary tract infection with urine culture  positive for VRE; type 2 diabetes mellitus on p.r.n. insulin at home;  electrolyte imbalance, improved; history of left lower extremity  arterial occlusion, status post below-the-knee amputation on 02/29/2020;  coronary artery disease; essential hypertension; hyperlipidemia;  hypothyroidism; schizophrenia. Last Echocardiogram - 2/23/2020  Mild left ventricular concentric hypertrophy noted. ef 67% by 2D. Stage I diastolic dysfunction. Physiologic and/or trace mitral regurgitation is present.      ED TRIAGE VITALS  BP: (!) 205/100, Temp: 99.1 °F (37.3 °C), Pulse: 105, Resp: 17, SpO2: 100 %    Vitals:    05/29/20 1044 05/29/20 1045 05/29/20 1102 05/29/20 1103   BP:  (!) 198/121 (!) 205/100    Pulse:  105 105    Resp:  20 17    Temp:  99 °F (37.2 °C) 99.1 °F (37.3 °C)    TempSrc:  Bladder Bladder    SpO2: 100% 100% 100% 100%   Weight:       Height:             Histories  Past Medical History:   Diagnosis Date    Diabetic peripheral neuropathy associated with type 2 diabetes mellitus ([address] 8/24/2018    Epigastric hernia     History of blood transfusion     Hyperlipidemia     Hypertension     Kidney stone     Schizophrenia ([address]     Type 2 diabetes mellitus with diabetic polyneuropathy, with long-term current use of insulin ([address] 0/76/2986    Umbilical hernia      Past Surgical History:   Procedure Laterality Date    CARDIAC CATHETERIZATION  09/30/2016  [facility]    COLONOSCOPY  08/2016    Dr. Timmy Maldonado / [address] / STONE Bilateral 10/28/2019    CYSTOSCOPY. RETROGRADE. PYELOGRAM. BILATERAL STENT INSERTION performed by Jessica Curran MD at [address] / [address] / STONE Bilateral 5/19/2020    CYSTOSCOPY RETROGRADE PYELOGRAM BILATERAL STENT EXCHANGE performed by Jessica Curran MD at [address] Left 2/29/2020    LEG AMPUTATION BELOW KNEE performed by [place] DO Ni at [place] [id number]  11/04/2016    epigastric hernia umbilical hernia with mesh    OVARIAN CYST REMOVAL      OVARY REMOVAL Right     OVARY REMOVAL  2014    UPPER GASTROINTESTINAL ENDOSCOPY N/A 10/29/2019    EGD ESOPHAGOGASTRODUODENOSCOPY performed by Brandy Douglass MD at [facility] ENDOSCOPY     Family History   Problem Relation Age of Onset    Cancer Brother        Home Medications  Prior to Admission medications    Medication Sig Start Date End Date Taking? Authorizing Provider   gabapentin (NEURONTIN) 100 MG capsule Take 1 capsule by mouth nightly for 30 days. 5/22/20 6/21/20 Yes Armand Reynaga DO   linezolid (ZYVOX) 600 MG tablet Take 1 tablet by mouth 2 times daily for 14 days  Patient taking differently: Take 600 mg by mouth daily  5/22/20 6/5/20 Yes Chiara Reynaga DO   clopidogrel (PLAVIX) 75 MG tablet Take 1 tablet by mouth daily 4/28/20  Yes Karey Stern MD   oxybutynin (DITROPAN) 5 MG tablet Take 1 tablet by mouth 3 times daily  Patient taking differently: Take 5 mg by mouth nightly  4/28/20  Yes Karey Stern MD   diazePAM (VALIUM) 2 MG tablet Take 1 tablet by mouth every 6 hours as needed for Anxiety.  3/26/20 3/26/21 Yes Karey Stern MD   lisinopril (PRINIVIL;ZESTRIL) 40 MG tablet Take 40 mg by mouth daily   Yes Historical Provider, MD   hydrALAZINE (APRESOLINE) 25 MG tablet Take 30 mg by mouth 3 times daily Yes Historical Provider, MD   docusate sodium (COLACE) 100 MG capsule Take 1 capsule by mouth 2 times daily  Patient taking differently: Take 100 mg by mouth every evening  3/26/20  Yes Melva Napier MD   atorvastatin (LIPITOR) 80 MG tablet Take 1 tablet by mouth nightly 3/3/20  Yes Benigno Hernandez DO   insulin lispro (HUMALOG) 100 UNIT/ML injection vial Inject 0-6 Units into the skin 3 times daily (with meals) 3/3/20  Yes Benigno Hernandez DO   metoprolol 75 MG TABS Take 75 mg by mouth 2 times daily 3/3/20  Yes Benigno Hernandez DO   megestrol (MEGACE) 20 MG tablet Take 1 tablet by mouth daily  Patient taking differently: Take 20 mg by mouth 2 times daily  20  Yes Melva Napier MD   vitamin B-6 (B-6) 50 MG tablet Take 1 tablet by mouth daily 19  Yes Armand Reynaga DO   ferrous sulfate 325 (65 Fe) MG tablet Take 1 tablet by mouth daily (with breakfast) 10/30/19  Yes Benigno Hernandez DO   methIMAzole (TAPAZOLE) 5 MG tablet Take 1 tablet by mouth daily 20   Melva Napier MD   benztropine (COGENTIN) 0.5 MG tablet Take 0.5 mg by mouth daily  19   Historical Provider, MD       Allergies  Patient has no known allergies.     Social Hx  Social History     Socioeconomic History    Marital status:      Spouse name: Not on file    Number of children: 3    Years of education: Not on file    Highest education level: Not on file   Occupational History    Not on file   Social Needs    Financial resource strain: Not on file    Food insecurity     Worry: Not on file     Inability: Not on file   [employer] needs     Medical: Not on file     Non-medical: Not on file   Tobacco Use    Smoking status: Former Smoker     Packs/day: 0.00     Years: 2.00     Pack years: 0.00     Types: Cigarettes     Last attempt to quit: 3/23/2015     Years since quittin.1    Smokeless tobacco: Never Used    Tobacco comment: quit smoking    Substance and Sexual Activity    Alcohol AM   Result Value Ref Range    Troponin 0.02 0.00 - 0.03 ng/mL   pH, venous    Collection Time: 05/29/20  8:11 AM   Result Value Ref Range    pH, Barrie 7.44 7.35 - 7.45   Beta-Hydroxybutyrate    Collection Time: 05/29/20  8:11 AM   Result Value Ref Range    Beta-Hydroxybutyrate 2.62 (H) 0.02 - 0.27 mmol/L   Urinalysis    Collection Time: 05/29/20  8:11 AM   Result Value Ref Range    Color, UA Yellow Straw/Yellow    Clarity, UA Clear Clear    Glucose, Ur 500 (A) Negative mg/dL    Bilirubin Urine Negative Negative    Ketones, Urine >=80 (A) Negative mg/dL    Specific Gravity, UA 1.025 1.005 - 1.030    Blood, Urine MODERATE (A) Negative    pH, UA 6.0 5.0 - 9.0    Protein,  (A) Negative mg/dL    Urobilinogen, Urine 0.2 <2.0 E.U./dL    Nitrite, Urine Negative Negative    Leukocyte Esterase, Urine Negative Negative   Lactic Acid, Plasma    Collection Time: 05/29/20  8:11 AM   Result Value Ref Range    Lactic Acid 3.7 (H) 0.5 - 2.2 mmol/L   Microscopic Urinalysis    Collection Time: 05/29/20  8:11 AM   Result Value Ref Range    WBC, UA 1-3 0 - 5 /HPF    RBC, UA 10-20 (A) 0 - 2 /HPF    Epithelial Cells, UA RARE /HPF    Bacteria, UA RARE (A) None Seen /HPF   Basic metabolic panel    Collection Time: 05/29/20  9:43 AM   Result Value Ref Range    Sodium 131 (L) 132 - 146 mmol/L    Potassium 4.4 3.5 - 5.0 mmol/L    Chloride 94 (L) 98 - 107 mmol/L    CO2 17 (L) 22 - 29 mmol/L    Anion Gap 20 (H) 7 - 16 mmol/L    Glucose 295 (H) 74 - 99 mg/dL    BUN 11 8 - 23 mg/dL    CREATININE 0.5 0.5 - 1.0 mg/dL    GFR Non-African American >60 >=60 mL/min/1.73    GFR African American >60     Calcium 8.3 (L) 8.6 - 10.2 mg/dL       Imaging  Ct Head Wo Contrast    Result Date: 5/29/2020  EXAMINATION: CT OF THE HEAD WITHOUT CONTRAST  5/29/2020 7:08 am TECHNIQUE: CT of the head was performed without the administration of intravenous contrast. Dose modulation, iterative reconstruction, and/or weight based adjustment of the mA/kV was utilized to TISSUES/SKULL:  No acute abnormality of the visualized skull or soft tissues. No acute intracranial abnormality. Ct Head Wo Contrast    Result Date: 5/17/2020  EXAMINATION: CT OF THE HEAD WITHOUT CONTRAST  5/17/2020 12:01 am TECHNIQUE: CT of the head was performed without the administration of intravenous contrast. Dose modulation, iterative reconstruction, and/or weight based adjustment of the mA/kV was utilized to reduce the radiation dose to as low as reasonably achievable. COMPARISON: 12/12/2019 HISTORY: ORDERING SYSTEM PROVIDED HISTORY: altered TECHNOLOGIST PROVIDED HISTORY: Reason for exam:->altered Has a \"code stroke\" or \"stroke alert\" been called? ->No FINDINGS: BRAIN/VENTRICLES: There is mild generalized atrophy. There is patchy periventricular and subcortical white matter low attenuation that is nonspecific but likely related to chronic small vessel ischemia. There is no acute intracranial hemorrhage, mass effect or midline shift. No abnormal extra-axial fluid collection. The gray-white differentiation is maintained without evidence of an acute infarct. There is no evidence of hydrocephalus. ORBITS: The visualized portion of the orbits demonstrate no acute abnormality. SINUSES: The visualized paranasal sinuses and mastoid air cells demonstrate no acute abnormality. SOFT TISSUES/SKULL:  No acute abnormality of the visualized skull or soft tissues. No acute intracranial abnormality. Mild generalized atrophy and mild small vessel ischemic white matter disease. Ct Cervical Spine Wo Contrast    Result Date: 5/19/2020  EXAMINATION: CT OF THE CERVICAL SPINE WITHOUT CONTRAST 5/19/2020 1:00 am TECHNIQUE: CT of the cervical spine was performed without the administration of intravenous contrast. Multiplanar reformatted images are provided for review.  Dose modulation, iterative reconstruction, and/or weight based adjustment of the mA/kV was utilized to reduce the radiation dose to as low as HISTORY: AMS TECHNOLOGIST PROVIDED HISTORY: Reason for exam:->AMS FINDINGS: Cardiac silhouette is normal in size. No pneumothorax. No pleural effusion. No consolidation. No acute bony abnormality. No acute findings     Xr Chest Portable    Result Date: 5/17/2020  EXAMINATION: ONE XRAY VIEW OF THE CHEST 5/17/2020 1:30 am COMPARISON: 02/24/2020 HISTORY: ORDERING SYSTEM PROVIDED HISTORY: altered TECHNOLOGIST PROVIDED HISTORY: Reason for exam:->altered FINDINGS: The cardiac silhouette appears within normal limits for size given portable technique. No convincing evidence of a focal consolidation. No pleural effusion or pneumothorax seen. No acute cardiopulmonary abnormality. Marcelo Avila Wo Kub W Wo Tomogram    Result Date: 5/19/2020  EXAMINATION: SPOT FLUOROSCOPIC IMAGES 5/19/2020 1:05 pm TECHNIQUE: Fluoroscopy was provided by the radiology department for procedure. Radiologist was not present during examination. FLUOROSCOPY DOSE AND TYPE OR TIME AND EXPOSURES: Ka,r = 12.17 mGy COMPARISON: None HISTORY: Intraprocedural imaging. Bilateral stent exchange FINDINGS: Spot images of the abdomen were obtained. Spot images demonstrate exchange of bilateral ureteral stents. Intraprocedural fluoroscopic spot images as above. See separate procedure report for more information. Assessment and Plan  Patient is a 63 y.o. female who presented with AMS.    The active problem list is as follows:    · Insulin dependent diabetes mellitus with hyperglycemia, possible early DKA  · Lactic acidosis  · L adrenal infarct  · 6.5 x 3.8 cm hematoma in the right groin may be intramuscular in location  · Endometrial thickening-- needs nonemergent follow up US  · Hypertension  · Sacral decubitus ulcer  · History of left lower extremity arterial occlusion, status post below-the-knee amputation on 02/29/2020;  · Coronary artery disease  · Essential

## 2020-05-30 LAB
ALBUMIN SERPL-MCNC: 3 G/DL (ref 3.5–5.2)
ALBUMIN SERPL-MCNC: 3.3 G/DL (ref 3.5–5.2)
ALP BLD-CCNC: 88 U/L (ref 35–104)
ALP BLD-CCNC: 95 U/L (ref 35–104)
ALT SERPL-CCNC: 7 U/L (ref 0–32)
ALT SERPL-CCNC: 9 U/L (ref 0–32)
AMMONIA: 25 UMOL/L (ref 11–51)
ANION GAP SERPL CALCULATED.3IONS-SCNC: 12 MMOL/L (ref 7–16)
ANION GAP SERPL CALCULATED.3IONS-SCNC: 13 MMOL/L (ref 7–16)
AST SERPL-CCNC: 14 U/L (ref 0–31)
AST SERPL-CCNC: 18 U/L (ref 0–31)
BILIRUB SERPL-MCNC: 0.5 MG/DL (ref 0–1.2)
BILIRUB SERPL-MCNC: 0.6 MG/DL (ref 0–1.2)
BUN BLDV-MCNC: 7 MG/DL (ref 8–23)
BUN BLDV-MCNC: 7 MG/DL (ref 8–23)
CALCIUM SERPL-MCNC: 8.4 MG/DL (ref 8.6–10.2)
CALCIUM SERPL-MCNC: 8.7 MG/DL (ref 8.6–10.2)
CHLORIDE BLD-SCNC: 93 MMOL/L (ref 98–107)
CHLORIDE BLD-SCNC: 94 MMOL/L (ref 98–107)
CHOLESTEROL, TOTAL: 163 MG/DL (ref 0–199)
CO2: 22 MMOL/L (ref 22–29)
CO2: 22 MMOL/L (ref 22–29)
CREAT SERPL-MCNC: 0.4 MG/DL (ref 0.5–1)
CREAT SERPL-MCNC: 0.5 MG/DL (ref 0.5–1)
GFR AFRICAN AMERICAN: >60
GFR AFRICAN AMERICAN: >60
GFR NON-AFRICAN AMERICAN: >60 ML/MIN/1.73
GFR NON-AFRICAN AMERICAN: >60 ML/MIN/1.73
GLUCOSE BLD-MCNC: 132 MG/DL (ref 74–99)
GLUCOSE BLD-MCNC: 165 MG/DL (ref 74–99)
HCT VFR BLD CALC: 32.8 % (ref 34–48)
HDLC SERPL-MCNC: 54 MG/DL
HEMOGLOBIN: 10.7 G/DL (ref 11.5–15.5)
LACTIC ACID: 1.2 MMOL/L (ref 0.5–2.2)
LACTIC ACID: 1.2 MMOL/L (ref 0.5–2.2)
LDL CHOLESTEROL CALCULATED: 89 MG/DL (ref 0–99)
MAGNESIUM: 1.5 MG/DL (ref 1.6–2.6)
MAGNESIUM: 2.1 MG/DL (ref 1.6–2.6)
MCH RBC QN AUTO: 25.6 PG (ref 26–35)
MCHC RBC AUTO-ENTMCNC: 32.6 % (ref 32–34.5)
MCV RBC AUTO: 78.5 FL (ref 80–99.9)
METER GLUCOSE: 119 MG/DL (ref 74–99)
METER GLUCOSE: 123 MG/DL (ref 74–99)
METER GLUCOSE: 149 MG/DL (ref 74–99)
METER GLUCOSE: 152 MG/DL (ref 74–99)
METER GLUCOSE: 172 MG/DL (ref 74–99)
PDW BLD-RTO: 18.6 FL (ref 11.5–15)
PHOSPHORUS: 2.3 MG/DL (ref 2.5–4.5)
PHOSPHORUS: 2.4 MG/DL (ref 2.5–4.5)
PLATELET # BLD: 303 E9/L (ref 130–450)
PMV BLD AUTO: 9.6 FL (ref 7–12)
POTASSIUM SERPL-SCNC: 3.7 MMOL/L (ref 3.5–5)
POTASSIUM SERPL-SCNC: 3.9 MMOL/L (ref 3.5–5)
RBC # BLD: 4.18 E12/L (ref 3.5–5.5)
SODIUM BLD-SCNC: 128 MMOL/L (ref 132–146)
SODIUM BLD-SCNC: 128 MMOL/L (ref 132–146)
TOTAL PROTEIN: 7 G/DL (ref 6.4–8.3)
TOTAL PROTEIN: 7.5 G/DL (ref 6.4–8.3)
TRIGL SERPL-MCNC: 101 MG/DL (ref 0–149)
TSH SERPL DL<=0.05 MIU/L-ACNC: 0.63 UIU/ML (ref 0.27–4.2)
VLDLC SERPL CALC-MCNC: 20 MG/DL
WBC # BLD: 13.4 E9/L (ref 4.5–11.5)

## 2020-05-30 PROCEDURE — 80053 COMPREHEN METABOLIC PANEL: CPT

## 2020-05-30 PROCEDURE — 84443 ASSAY THYROID STIM HORMONE: CPT

## 2020-05-30 PROCEDURE — 82140 ASSAY OF AMMONIA: CPT

## 2020-05-30 PROCEDURE — 6360000002 HC RX W HCPCS: Performed by: SPECIALIST

## 2020-05-30 PROCEDURE — 84100 ASSAY OF PHOSPHORUS: CPT

## 2020-05-30 PROCEDURE — 83735 ASSAY OF MAGNESIUM: CPT

## 2020-05-30 PROCEDURE — 2000000000 HC ICU R&B

## 2020-05-30 PROCEDURE — 2580000003 HC RX 258: Performed by: INTERNAL MEDICINE

## 2020-05-30 PROCEDURE — 85027 COMPLETE CBC AUTOMATED: CPT

## 2020-05-30 PROCEDURE — 2580000003 HC RX 258: Performed by: SPECIALIST

## 2020-05-30 PROCEDURE — 6360000002 HC RX W HCPCS: Performed by: INTERNAL MEDICINE

## 2020-05-30 PROCEDURE — 2500000003 HC RX 250 WO HCPCS: Performed by: INTERNAL MEDICINE

## 2020-05-30 PROCEDURE — 82962 GLUCOSE BLOOD TEST: CPT

## 2020-05-30 PROCEDURE — 80061 LIPID PANEL: CPT

## 2020-05-30 PROCEDURE — 2060000000 HC ICU INTERMEDIATE R&B

## 2020-05-30 PROCEDURE — 6370000000 HC RX 637 (ALT 250 FOR IP): Performed by: INTERNAL MEDICINE

## 2020-05-30 PROCEDURE — 36592 COLLECT BLOOD FROM PICC: CPT

## 2020-05-30 PROCEDURE — 83605 ASSAY OF LACTIC ACID: CPT

## 2020-05-30 PROCEDURE — 36415 COLL VENOUS BLD VENIPUNCTURE: CPT

## 2020-05-30 RX ORDER — LISINOPRIL 20 MG/1
20 TABLET ORAL DAILY
Status: DISCONTINUED | OUTPATIENT
Start: 2020-05-30 | End: 2020-06-04 | Stop reason: HOSPADM

## 2020-05-30 RX ORDER — CLONIDINE 0.3 MG/24H
1 PATCH, EXTENDED RELEASE TRANSDERMAL WEEKLY
Status: DISCONTINUED | OUTPATIENT
Start: 2020-05-30 | End: 2020-05-30

## 2020-05-30 RX ORDER — HYDRALAZINE HYDROCHLORIDE 20 MG/ML
10 INJECTION INTRAMUSCULAR; INTRAVENOUS EVERY 4 HOURS PRN
Status: DISCONTINUED | OUTPATIENT
Start: 2020-05-30 | End: 2020-05-31

## 2020-05-30 RX ADMIN — SODIUM PHOSPHATE, MONOBASIC, MONOHYDRATE 10 MMOL: 276; 142 INJECTION, SOLUTION INTRAVENOUS at 08:41

## 2020-05-30 RX ADMIN — SODIUM CHLORIDE: 9 INJECTION, SOLUTION INTRAVENOUS at 05:52

## 2020-05-30 RX ADMIN — POTASSIUM CHLORIDE 10 MEQ: 7.46 INJECTION, SOLUTION INTRAVENOUS at 18:09

## 2020-05-30 RX ADMIN — HYDRALAZINE HYDROCHLORIDE 10 MG: 20 INJECTION INTRAMUSCULAR; INTRAVENOUS at 20:09

## 2020-05-30 RX ADMIN — LISINOPRIL 20 MG: 20 TABLET ORAL at 12:32

## 2020-05-30 RX ADMIN — MAGNESIUM SULFATE HEPTAHYDRATE 1 G: 1 INJECTION, SOLUTION INTRAVENOUS at 07:43

## 2020-05-30 RX ADMIN — PIPERACILLIN AND TAZOBACTAM 3.38 G: 3; .375 INJECTION, POWDER, LYOPHILIZED, FOR SOLUTION INTRAVENOUS at 05:06

## 2020-05-30 RX ADMIN — SODIUM PHOSPHATE, MONOBASIC, MONOHYDRATE 10 MMOL: 276; 142 INJECTION, SOLUTION INTRAVENOUS at 18:10

## 2020-05-30 RX ADMIN — INSULIN LISPRO 1 UNITS: 100 INJECTION, SOLUTION INTRAVENOUS; SUBCUTANEOUS at 01:46

## 2020-05-30 RX ADMIN — MAGNESIUM SULFATE HEPTAHYDRATE 1 G: 1 INJECTION, SOLUTION INTRAVENOUS at 06:34

## 2020-05-30 RX ADMIN — METOROPROLOL TARTRATE 5 MG: 5 INJECTION, SOLUTION INTRAVENOUS at 10:44

## 2020-05-30 RX ADMIN — HYDRALAZINE HYDROCHLORIDE 10 MG: 20 INJECTION INTRAMUSCULAR; INTRAVENOUS at 11:18

## 2020-05-30 RX ADMIN — PIPERACILLIN AND TAZOBACTAM 3.38 G: 3; .375 INJECTION, POWDER, LYOPHILIZED, FOR SOLUTION INTRAVENOUS at 14:08

## 2020-05-30 RX ADMIN — INSULIN LISPRO 1 UNITS: 100 INJECTION, SOLUTION INTRAVENOUS; SUBCUTANEOUS at 17:11

## 2020-05-30 RX ADMIN — POTASSIUM CHLORIDE 10 MEQ: 7.46 INJECTION, SOLUTION INTRAVENOUS at 07:42

## 2020-05-30 RX ADMIN — SODIUM CHLORIDE 12.5 ML/HR: 9 INJECTION, SOLUTION INTRAVENOUS at 17:09

## 2020-05-30 RX ADMIN — METOROPROLOL TARTRATE 5 MG: 5 INJECTION, SOLUTION INTRAVENOUS at 01:51

## 2020-05-30 RX ADMIN — ENOXAPARIN SODIUM 40 MG: 40 INJECTION SUBCUTANEOUS at 14:09

## 2020-05-30 RX ADMIN — SODIUM CHLORIDE: 9 INJECTION, SOLUTION INTRAVENOUS at 14:55

## 2020-05-30 RX ADMIN — METOPROLOL TARTRATE 25 MG: 25 TABLET, FILM COATED ORAL at 12:32

## 2020-05-30 RX ADMIN — PIPERACILLIN AND TAZOBACTAM 3.38 G: 3; .375 INJECTION, POWDER, LYOPHILIZED, FOR SOLUTION INTRAVENOUS at 20:03

## 2020-05-30 RX ADMIN — POTASSIUM CHLORIDE 10 MEQ: 7.46 INJECTION, SOLUTION INTRAVENOUS at 06:35

## 2020-05-30 RX ADMIN — SODIUM CHLORIDE 325 MG: 9 INJECTION, SOLUTION INTRAVENOUS at 17:21

## 2020-05-30 RX ADMIN — POTASSIUM CHLORIDE 10 MEQ: 7.46 INJECTION, SOLUTION INTRAVENOUS at 19:32

## 2020-05-30 RX ADMIN — Medication 10 ML: at 20:13

## 2020-05-30 RX ADMIN — SODIUM CHLORIDE 12.5 ML/HR: 9 INJECTION, SOLUTION INTRAVENOUS at 08:59

## 2020-05-30 RX ADMIN — POTASSIUM CHLORIDE 10 MEQ: 7.46 INJECTION, SOLUTION INTRAVENOUS at 20:48

## 2020-05-30 RX ADMIN — INSULIN LISPRO 1 UNITS: 100 INJECTION, SOLUTION INTRAVENOUS; SUBCUTANEOUS at 08:50

## 2020-05-30 RX ADMIN — Medication 10 ML: at 08:47

## 2020-05-30 ASSESSMENT — PAIN SCALES - PAIN ASSESSMENT IN ADVANCED DEMENTIA (PAINAD)
TOTALSCORE: 1
BREATHING: 0
NEGVOCALIZATION: 0
FACIALEXPRESSION: 0
CONSOLABILITY: 0
BODYLANGUAGE: 0
CONSOLABILITY: 0
NEGVOCALIZATION: 0
FACIALEXPRESSION: 0
NEGVOCALIZATION: 0
TOTALSCORE: 0
BREATHING: 0
BODYLANGUAGE: 0
BODYLANGUAGE: 0
BREATHING: 0
TOTALSCORE: 0
FACIALEXPRESSION: 0
NEGVOCALIZATION: 1
FACIALEXPRESSION: 0
BODYLANGUAGE: 0
FACIALEXPRESSION: 0
BREATHING: 0
CONSOLABILITY: 0
BODYLANGUAGE: 0
TOTALSCORE: 0
BREATHING: 0
FACIALEXPRESSION: 0
NEGVOCALIZATION: 0
BODYLANGUAGE: 0
NEGVOCALIZATION: 0
NEGVOCALIZATION: 0
BREATHING: 0
FACIALEXPRESSION: 0
NEGVOCALIZATION: 0
BREATHING: 0
BODYLANGUAGE: 0
CONSOLABILITY: 0
BODYLANGUAGE: 0
CONSOLABILITY: 0
TOTALSCORE: 0
CONSOLABILITY: 0
FACIALEXPRESSION: 0
TOTALSCORE: 0
CONSOLABILITY: 0
TOTALSCORE: 0
TOTALSCORE: 0
CONSOLABILITY: 0
BREATHING: 0

## 2020-05-30 ASSESSMENT — PAIN SCALES - GENERAL
PAINLEVEL_OUTOF10: 0
PAINLEVEL_OUTOF10: 1
PAINLEVEL_OUTOF10: 0

## 2020-05-30 NOTE — PROGRESS NOTES
P/c from Dr. Vivek Mendoza - he stated that the pt is not at her baseline - usually talking and answering questions. I updated that she is much improved since this AM. She is now answering questions and opening her eyes. She passed a bedside swallow. If the pt should start to deteriorate again through the afternoon he would like an order for an MRI.     Perez Murray  5/30/2020

## 2020-05-30 NOTE — PROGRESS NOTES
Subjective:     She is confused,  unable to provide HPI/systemic review. Data gathered from reviewing the medical record and discussion with ancillary staff    PMH,Social Hx and Family Hx : Reviewed; no changes from initial note      Review of Systems    Unable to obtain    Objective:   Data Review:  Vital Signs: BP (!) 205/70   Pulse 116   Temp 99.8 °F (37.7 °C) (Core)   Resp 15   Ht 5' 4\" (1.626 m)   Wt 119 lb (54 kg)   SpO2 100%   BMI 20.43 kg/m²     24 Hour I&O Review:     Intake/Output Summary (Last 24 hours) at 5/30/2020 1124  Last data filed at 5/30/2020 0859  Gross per 24 hour   Intake 3008 ml   Output 2550 ml   Net 458 ml       Physical Exam   VS: reviewed, trend noted  Level of Alertness:   Awake alert, confused  Orientation:  Oriented to 0   CONSTITUTIONAL:  awake, alert, + apparent distress  Throat: clear, no ulcerations or exudate  Ears: clear, no discharge  Neck:supple no LAP, or masses  LUNGS:   increased work of breathing and clear to auscultation, no crackles or wheezes  CARDIOVASCULAR: tachy, regular, no M/R/G  ABDOMEN:  normal bowel sounds, non-distended and non-tender to palpation  EXT: No edema, no calf tenderness. Pulses are decreased bilaterally.  Right groin hematoma  NEUROLOGIC:  Non focal GROSSLY NORMAL SPEECH  SKIN:  Dry and cool  Psych: unable to assess      Continuous Infusions:   dextrose 5 % and 0.45 % NaCl 150 mL/hr at 05/29/20 1603    sodium chloride Stopped (05/30/20 1059)    sodium chloride 100 mL/hr at 05/30/20 0552    niCARdipine Stopped (05/30/20 0840)         Current Medications: Current Facility-Administered Medications: hydrALAZINE (APRESOLINE) injection 10 mg, 10 mg, Intravenous, Q4H PRN  cloNIDine (CATAPRES) 0.3 MG/24HR 1 patch, 1 patch, Transdermal, Weekly  [Held by provider] atorvastatin (LIPITOR) tablet 80 mg, 80 mg, Oral, Nightly  [Held by provider] docusate sodium (COLACE) capsule 100 mg, 100 mg, Oral, QPM  [Held by provider] ferrous sulfate (IRON 325)

## 2020-05-30 NOTE — PLAN OF CARE
Problem: Falls - Risk of:  Goal: Will remain free from falls  Description: Will remain free from falls  Outcome: Met This Shift     Problem: Falls - Risk of:  Goal: Absence of physical injury  Description: Absence of physical injury  Outcome: Met This Shift     Problem: Serum Glucose Level - Abnormal:  Goal: Ability to maintain appropriate glucose levels will improve to within specified parameters  Description: Ability to maintain appropriate glucose levels will improve to within specified parameters  Outcome: Met This Shift     Problem: Skin Integrity - Impaired:  Goal: Will show no infection signs and symptoms  Description: Will show no infection signs and symptoms  Outcome: Met This Shift     Problem: Skin Integrity - Impaired:  Goal: Absence of new skin breakdown  Description: Absence of new skin breakdown  Outcome: Met This Shift     Problem: Mental Status - Impaired:  Goal: Mental status will be restored to baseline  Description: Mental status will be restored to baseline  Outcome: Not Met This Shift     Problem: Nutrition Deficit:  Goal: Ability to achieve adequate nutritional intake will improve  Description: Ability to achieve adequate nutritional intake will improve  Outcome: Not Met This Shift

## 2020-05-30 NOTE — PLAN OF CARE
Problem: Falls - Risk of:  Goal: Will remain free from falls  Description: Will remain free from falls  5/30/2020 0047 by Paula Chester RN  Outcome: Met This Shift  5/29/2020 1859 by Speedy Renteria RN  Outcome: Met This Shift  Goal: Absence of physical injury  Description: Absence of physical injury  5/30/2020 0047 by Paula Chester RN  Outcome: Met This Shift  5/29/2020 1859 by Speedy Renteria RN  Outcome: Met This Shift

## 2020-05-31 LAB
ALBUMIN SERPL-MCNC: 2.8 G/DL (ref 3.5–5.2)
ALP BLD-CCNC: 81 U/L (ref 35–104)
ALT SERPL-CCNC: 7 U/L (ref 0–32)
ANION GAP SERPL CALCULATED.3IONS-SCNC: 10 MMOL/L (ref 7–16)
AST SERPL-CCNC: 13 U/L (ref 0–31)
BILIRUB SERPL-MCNC: 0.5 MG/DL (ref 0–1.2)
BUN BLDV-MCNC: 8 MG/DL (ref 8–23)
CALCIUM SERPL-MCNC: 8.2 MG/DL (ref 8.6–10.2)
CHLORIDE BLD-SCNC: 100 MMOL/L (ref 98–107)
CO2: 21 MMOL/L (ref 22–29)
CREAT SERPL-MCNC: 0.7 MG/DL (ref 0.5–1)
GFR AFRICAN AMERICAN: >60
GFR NON-AFRICAN AMERICAN: >60 ML/MIN/1.73
GLUCOSE BLD-MCNC: 119 MG/DL (ref 74–99)
HCT VFR BLD CALC: 26.7 % (ref 34–48)
HCT VFR BLD CALC: 26.8 % (ref 34–48)
HEMOGLOBIN: 8.4 G/DL (ref 11.5–15.5)
HEMOGLOBIN: 8.7 G/DL (ref 11.5–15.5)
MCH RBC QN AUTO: 25.5 PG (ref 26–35)
MCH RBC QN AUTO: 25.7 PG (ref 26–35)
MCHC RBC AUTO-ENTMCNC: 31.5 % (ref 32–34.5)
MCHC RBC AUTO-ENTMCNC: 32.5 % (ref 32–34.5)
MCV RBC AUTO: 79.3 FL (ref 80–99.9)
MCV RBC AUTO: 81.2 FL (ref 80–99.9)
METER GLUCOSE: 100 MG/DL (ref 74–99)
METER GLUCOSE: 90 MG/DL (ref 74–99)
METER GLUCOSE: 95 MG/DL (ref 74–99)
MRSA CULTURE ONLY: NORMAL
PDW BLD-RTO: 18.5 FL (ref 11.5–15)
PDW BLD-RTO: 18.6 FL (ref 11.5–15)
PLATELET # BLD: 231 E9/L (ref 130–450)
PLATELET # BLD: 247 E9/L (ref 130–450)
PMV BLD AUTO: 10 FL (ref 7–12)
PMV BLD AUTO: 10.3 FL (ref 7–12)
POTASSIUM SERPL-SCNC: 3.6 MMOL/L (ref 3.5–5)
RBC # BLD: 3.29 E12/L (ref 3.5–5.5)
RBC # BLD: 3.38 E12/L (ref 3.5–5.5)
SODIUM BLD-SCNC: 131 MMOL/L (ref 132–146)
TOTAL PROTEIN: 6.6 G/DL (ref 6.4–8.3)
URINE CULTURE, ROUTINE: NORMAL
WBC # BLD: 6.4 E9/L (ref 4.5–11.5)
WBC # BLD: 6.8 E9/L (ref 4.5–11.5)
WOUND/ABSCESS: NORMAL

## 2020-05-31 PROCEDURE — 2580000003 HC RX 258: Performed by: INTERNAL MEDICINE

## 2020-05-31 PROCEDURE — 36415 COLL VENOUS BLD VENIPUNCTURE: CPT

## 2020-05-31 PROCEDURE — 2000000000 HC ICU R&B

## 2020-05-31 PROCEDURE — 6370000000 HC RX 637 (ALT 250 FOR IP): Performed by: INTERNAL MEDICINE

## 2020-05-31 PROCEDURE — 85027 COMPLETE CBC AUTOMATED: CPT

## 2020-05-31 PROCEDURE — 6360000002 HC RX W HCPCS: Performed by: SPECIALIST

## 2020-05-31 PROCEDURE — 6360000002 HC RX W HCPCS: Performed by: INTERNAL MEDICINE

## 2020-05-31 PROCEDURE — 80053 COMPREHEN METABOLIC PANEL: CPT

## 2020-05-31 PROCEDURE — 36592 COLLECT BLOOD FROM PICC: CPT

## 2020-05-31 PROCEDURE — 2580000003 HC RX 258: Performed by: SPECIALIST

## 2020-05-31 PROCEDURE — 2060000000 HC ICU INTERMEDIATE R&B

## 2020-05-31 PROCEDURE — 82962 GLUCOSE BLOOD TEST: CPT

## 2020-05-31 RX ORDER — CLONIDINE 0.3 MG/24H
1 PATCH, EXTENDED RELEASE TRANSDERMAL WEEKLY
Status: DISCONTINUED | OUTPATIENT
Start: 2020-05-31 | End: 2020-06-04 | Stop reason: HOSPADM

## 2020-05-31 RX ORDER — HYDRALAZINE HYDROCHLORIDE 25 MG/1
25 TABLET, FILM COATED ORAL EVERY 8 HOURS SCHEDULED
Status: DISCONTINUED | OUTPATIENT
Start: 2020-05-31 | End: 2020-06-01

## 2020-05-31 RX ORDER — SODIUM CHLORIDE AND POTASSIUM CHLORIDE .9; .15 G/100ML; G/100ML
SOLUTION INTRAVENOUS CONTINUOUS
Status: DISCONTINUED | OUTPATIENT
Start: 2020-05-31 | End: 2020-06-01

## 2020-05-31 RX ORDER — CLOPIDOGREL BISULFATE 75 MG/1
75 TABLET ORAL DAILY
Status: DISCONTINUED | OUTPATIENT
Start: 2020-05-31 | End: 2020-06-04 | Stop reason: HOSPADM

## 2020-05-31 RX ADMIN — PIPERACILLIN AND TAZOBACTAM 3.38 G: 3; .375 INJECTION, POWDER, LYOPHILIZED, FOR SOLUTION INTRAVENOUS at 21:32

## 2020-05-31 RX ADMIN — POTASSIUM CHLORIDE AND SODIUM CHLORIDE: 900; 150 INJECTION, SOLUTION INTRAVENOUS at 23:24

## 2020-05-31 RX ADMIN — POTASSIUM CHLORIDE AND SODIUM CHLORIDE: 900; 150 INJECTION, SOLUTION INTRAVENOUS at 10:04

## 2020-05-31 RX ADMIN — PIPERACILLIN AND TAZOBACTAM 3.38 G: 3; .375 INJECTION, POWDER, LYOPHILIZED, FOR SOLUTION INTRAVENOUS at 05:20

## 2020-05-31 RX ADMIN — METOPROLOL TARTRATE 25 MG: 25 TABLET, FILM COATED ORAL at 09:15

## 2020-05-31 RX ADMIN — HYDRALAZINE HYDROCHLORIDE 25 MG: 25 TABLET, FILM COATED ORAL at 21:33

## 2020-05-31 RX ADMIN — HYDRALAZINE HYDROCHLORIDE 10 MG: 20 INJECTION INTRAMUSCULAR; INTRAVENOUS at 05:18

## 2020-05-31 RX ADMIN — SODIUM CHLORIDE: 9 INJECTION, SOLUTION INTRAVENOUS at 00:21

## 2020-05-31 RX ADMIN — PIPERACILLIN AND TAZOBACTAM 3.38 G: 3; .375 INJECTION, POWDER, LYOPHILIZED, FOR SOLUTION INTRAVENOUS at 15:00

## 2020-05-31 RX ADMIN — HYDRALAZINE HYDROCHLORIDE 25 MG: 25 TABLET, FILM COATED ORAL at 15:00

## 2020-05-31 RX ADMIN — SODIUM CHLORIDE 325 MG: 9 INJECTION, SOLUTION INTRAVENOUS at 18:00

## 2020-05-31 RX ADMIN — SODIUM CHLORIDE 12.5 ML/HR: 9 INJECTION, SOLUTION INTRAVENOUS at 01:15

## 2020-05-31 RX ADMIN — METOPROLOL TARTRATE 25 MG: 25 TABLET, FILM COATED ORAL at 21:32

## 2020-05-31 RX ADMIN — Medication 10 ML: at 09:18

## 2020-05-31 RX ADMIN — ENOXAPARIN SODIUM 40 MG: 40 INJECTION SUBCUTANEOUS at 15:00

## 2020-05-31 RX ADMIN — Medication 10 ML: at 21:36

## 2020-05-31 RX ADMIN — LISINOPRIL 20 MG: 20 TABLET ORAL at 09:15

## 2020-05-31 RX ADMIN — CLOPIDOGREL BISULFATE 75 MG: 75 TABLET ORAL at 15:01

## 2020-05-31 RX ADMIN — SODIUM CHLORIDE 12.5 ML/HR: 9 INJECTION, SOLUTION INTRAVENOUS at 09:27

## 2020-05-31 RX ADMIN — SODIUM CHLORIDE 12.5 ML/HR: 9 INJECTION, SOLUTION INTRAVENOUS at 17:00

## 2020-05-31 ASSESSMENT — PAIN SCALES - PAIN ASSESSMENT IN ADVANCED DEMENTIA (PAINAD)
CONSOLABILITY: 0
TOTALSCORE: 0
CONSOLABILITY: 0
FACIALEXPRESSION: 0
CONSOLABILITY: 0
BREATHING: 0
CONSOLABILITY: 0
BREATHING: 0
BODYLANGUAGE: 0
NEGVOCALIZATION: 0
BODYLANGUAGE: 0
BODYLANGUAGE: 0
CONSOLABILITY: 0
FACIALEXPRESSION: 0
BREATHING: 0
BODYLANGUAGE: 0
TOTALSCORE: 0
BREATHING: 0
NEGVOCALIZATION: 0
FACIALEXPRESSION: 0
BODYLANGUAGE: 0
NEGVOCALIZATION: 0
NEGVOCALIZATION: 0
FACIALEXPRESSION: 0
CONSOLABILITY: 0
CONSOLABILITY: 0
TOTALSCORE: 0
CONSOLABILITY: 0
CONSOLABILITY: 0
BREATHING: 0
BREATHING: 0
NEGVOCALIZATION: 0
BODYLANGUAGE: 0
FACIALEXPRESSION: 0
NEGVOCALIZATION: 0
TOTALSCORE: 0
BREATHING: 0
BODYLANGUAGE: 0
NEGVOCALIZATION: 0
NEGVOCALIZATION: 0
BREATHING: 0
BODYLANGUAGE: 0
TOTALSCORE: 0
BODYLANGUAGE: 0
FACIALEXPRESSION: 0
TOTALSCORE: 0
FACIALEXPRESSION: 0
BREATHING: 0
NEGVOCALIZATION: 0
FACIALEXPRESSION: 0
FACIALEXPRESSION: 0

## 2020-05-31 ASSESSMENT — PAIN SCALES - GENERAL: PAINLEVEL_OUTOF10: 0

## 2020-05-31 NOTE — PROGRESS NOTES
Internal Medicine Progress Note    Chandler Terrance. Jordyn Glasgow., & [address] Jordyn Glasgow., F.A.C.O.I. Odessa Hays D.O., F.A.C.O.I. Primary Care Physician: Jemima Stevenson MD   Admitting Physician:  Lauren Flowers DO  Admission date and time: 5/29/2020  7:00 AM    Room:  [place] [address]    Patient Name: Echo Vazquez  MRN: [id number]    Date of Service: 5/31/2020     Subjective:  Braden Del Rio remains markedly obtunded during my examination today. This remains a deviation from her baseline. She awakens to painful and verbal stimulus but is unable to carry on a conversation. She provides repetitive answers. Her laboratory values and vital signs are otherwise stable. I discussed moving forward with an MRI with the nursing staff. Review of System:   The patient remains deviated from her baseline and is difficult to obtain answers from. HEENT:  Justice ear pain, sore throat, sinus or eye problems  Cardiovascular:   Denies any chest pain, irregular heartbeats, or palpitations. Respiratory:   Denies shortness of breath, coughing, sputum production, hemoptysis, or wheezing. Gastrointestinal:   Denies nausea, vomiting, diarrhea, or constipation. Denies any abdominal pain. Extremities:   Denies any lower extremity swelling or edema. Neurology:    Denies any headache or focal neurological deficits. Chronic weakness and deconditioning. Derm:    Denies any rashes, ulcers, or excoriations. Healing amputation wound. Genitourinary:    Denies any urgency, frequency, hematuria. Voiding without difficulty. Musculoskeletal:  Denies myalgias, joint complaints or back pain      Physical Exam:  No intake/output data recorded. Blood pressure (!) 165/59, pulse 96, temperature 98.8 °F (37.1 °C), temperature source Core, resp. rate 18, height 5' 4\" (1.626 m), weight 119 lb (54 kg), SpO2 100 %, not currently breastfeeding. HEENT:    PERRLA. EOMI. Sclera clear.   Buccal mucosa

## 2020-05-31 NOTE — PROGRESS NOTES
Spoke to [facility] about transfer to [address] [place] [address], he is okay with it.

## 2020-05-31 NOTE — PROGRESS NOTES
Information  SpO2: 100 %   CBC:  Recent Labs     05/29/20  1820 05/30/20  0503 05/31/20  0440   WBC 12.0* 13.4* 6.8   RBC 4.53 4.18 3.38*   HGB 11.4* 10.7* 8.7*   HCT 36.2 32.8* 26.8*    303 247   MCV 79.9* 78.5* 79.3*   MCH 25.2* 25.6* 25.7*   MCHC 31.5* 32.6 32.5   RDW 18.7* 18.6* 18.6*      BMP:  Recent Labs     05/30/20  0503 05/30/20  1559 05/31/20  0440   * 128* 131*   K 3.9 3.7 3.6   CL 93* 94* 100   CO2 22 22 21*   BUN 7* 7* 8   CREATININE 0.4* 0.5 0.7   CALCIUM 8.7 8.4* 8.2*   GLUCOSE 132* 165* 119*      ABG:  Lab Results   Component Value Date    PH 7.522 05/29/2020    PCO2 23.4 05/29/2020    PO2 100.5 05/29/2020    HCO3 18.8 05/29/2020    O2SAT 97.9 05/29/2020       Cultures:  No results for input(s): BC in the last 72 hours. Recent Labs     05/29/20  1821   BLOODCULT2 24 Hours- no growth     No results for input(s): CULTRESP in the last 72 hours. Radiology Review:  Pertinent images / reports were reviewed as a part of this visit. CT ABDOMEN PELVIS W IV CONTRAST Additional Contrast? None   Final Result   1. A 6.5 x 3.8 cm hematoma is present in the right groin, which is new from   the prior exam on 05/17/2020, and may be intramuscular in location. 2. Findings are concerning for an infarct involving the medial limb of the   left adrenal gland. There is hyperenhancement of the remaining adrenal gland   tissue, which can be seen in a shock state. 3. The tip of a left groin central venous catheter terminates along the   anterior margin of the sacrum, with the tip likely positioned in a small   collateral vein in this region. Repositioning is suggested. 4. Heterogeneous hyperdensity in the adjacent leftward sacrum is new from the   prior exam, and may be related to contrast injection through the central   venous catheter. 5. Soft tissue thickening overlies the sacrum and coccyx, compatible with   history of decubitus ulcer in this region.   There is no definite of   osteomyelitis, although a bone scan or MRI would be a more sensitive exam.   6. Stable position of the ureteral stents, without hydronephrosis. 7. Thickening of the endometrium to 12 mm. Further evaluation with pelvic   ultrasound on a nonemergent basis is recommended. XR CHEST PORTABLE   Final Result   No acute findings         CT Head WO Contrast   Final Result   No acute intracranial abnormality.          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  Hypoosmolarity/hyponatremia  Recent VRE UTI, sp ureteral stents    Plan:     Monitor BS closely, adjust insulin orders as needed  Monitor BP, continue cardene gtt goal SBP<160, adjust BP Rx as needed  Abx, fu cx, lactate trend/ ID recs   Monitor HH and right groin site, will need to hold antiplatelets if dropping HH   Wound care        Discussed with RN re management     ICU Staff Physician note of personal involvement in Care  As the attending physician, I certify that I personally reviewed the patient's history and personally examined the patient to confirm the physical findings described above,  And that I reviewed the relevant imaging studies and available reports.      This patient has a high probability of sudden, clinically significant deterioration, which requires the highest level of physician preparedness to intervene urgently.  I managed/supervised life or organ supporting interventions that required frequent physician assessment.   I devoted my full attention to the direct care of this patient for the amount of time indicated below.  Time I spent with the family or

## 2020-06-01 ENCOUNTER — APPOINTMENT (OUTPATIENT)
Dept: MRI IMAGING | Age: 60
DRG: 077 | End: 2020-06-01
Payer: MEDICARE

## 2020-06-01 PROBLEM — R41.82 ALTERED MENTAL STATE: Status: ACTIVE | Noted: 2020-06-01

## 2020-06-01 LAB
ALBUMIN SERPL-MCNC: 2.6 G/DL (ref 3.5–5.2)
ALP BLD-CCNC: 68 U/L (ref 35–104)
ALT SERPL-CCNC: 9 U/L (ref 0–32)
ANION GAP SERPL CALCULATED.3IONS-SCNC: 8 MMOL/L (ref 7–16)
AST SERPL-CCNC: 14 U/L (ref 0–31)
BASOPHILS ABSOLUTE: 0.07 E9/L (ref 0–0.2)
BASOPHILS RELATIVE PERCENT: 1.2 % (ref 0–2)
BILIRUB SERPL-MCNC: 0.4 MG/DL (ref 0–1.2)
BUN BLDV-MCNC: 12 MG/DL (ref 8–23)
CALCIUM SERPL-MCNC: 7.9 MG/DL (ref 8.6–10.2)
CHLORIDE BLD-SCNC: 108 MMOL/L (ref 98–107)
CO2: 20 MMOL/L (ref 22–29)
CREAT SERPL-MCNC: 0.8 MG/DL (ref 0.5–1)
EOSINOPHILS ABSOLUTE: 0.05 E9/L (ref 0.05–0.5)
EOSINOPHILS RELATIVE PERCENT: 0.9 % (ref 0–6)
GFR AFRICAN AMERICAN: >60
GFR NON-AFRICAN AMERICAN: >60 ML/MIN/1.73
GLUCOSE BLD-MCNC: 87 MG/DL (ref 74–99)
HCT VFR BLD CALC: 24.4 % (ref 34–48)
HEMOGLOBIN: 7.6 G/DL (ref 11.5–15.5)
IMMATURE GRANULOCYTES #: 0.02 E9/L
IMMATURE GRANULOCYTES %: 0.4 % (ref 0–5)
LYMPHOCYTES ABSOLUTE: 1.67 E9/L (ref 1.5–4)
LYMPHOCYTES RELATIVE PERCENT: 29.6 % (ref 20–42)
MCH RBC QN AUTO: 25.5 PG (ref 26–35)
MCHC RBC AUTO-ENTMCNC: 31.1 % (ref 32–34.5)
MCV RBC AUTO: 81.9 FL (ref 80–99.9)
METER GLUCOSE: 107 MG/DL (ref 74–99)
METER GLUCOSE: 128 MG/DL (ref 74–99)
METER GLUCOSE: 151 MG/DL (ref 74–99)
METER GLUCOSE: 79 MG/DL (ref 74–99)
METER GLUCOSE: 88 MG/DL (ref 74–99)
MONOCYTES ABSOLUTE: 0.92 E9/L (ref 0.1–0.95)
MONOCYTES RELATIVE PERCENT: 16.3 % (ref 2–12)
NEUTROPHILS ABSOLUTE: 2.91 E9/L (ref 1.8–7.3)
NEUTROPHILS RELATIVE PERCENT: 51.6 % (ref 43–80)
PDW BLD-RTO: 18.7 FL (ref 11.5–15)
PLATELET # BLD: 185 E9/L (ref 130–450)
PMV BLD AUTO: 10.1 FL (ref 7–12)
POTASSIUM SERPL-SCNC: 3.7 MMOL/L (ref 3.5–5)
RBC # BLD: 2.98 E12/L (ref 3.5–5.5)
SODIUM BLD-SCNC: 136 MMOL/L (ref 132–146)
TOTAL PROTEIN: 5.8 G/DL (ref 6.4–8.3)
WBC # BLD: 5.6 E9/L (ref 4.5–11.5)

## 2020-06-01 PROCEDURE — 2000000000 HC ICU R&B

## 2020-06-01 PROCEDURE — 6370000000 HC RX 637 (ALT 250 FOR IP): Performed by: INTERNAL MEDICINE

## 2020-06-01 PROCEDURE — 97165 OT EVAL LOW COMPLEX 30 MIN: CPT

## 2020-06-01 PROCEDURE — 85025 COMPLETE CBC W/AUTO DIFF WBC: CPT

## 2020-06-01 PROCEDURE — 97530 THERAPEUTIC ACTIVITIES: CPT | Performed by: PHYSICAL THERAPIST

## 2020-06-01 PROCEDURE — 6360000002 HC RX W HCPCS: Performed by: INTERNAL MEDICINE

## 2020-06-01 PROCEDURE — 2580000003 HC RX 258: Performed by: INTERNAL MEDICINE

## 2020-06-01 PROCEDURE — 6370000000 HC RX 637 (ALT 250 FOR IP): Performed by: PSYCHIATRY & NEUROLOGY

## 2020-06-01 PROCEDURE — 92522 EVALUATE SPEECH PRODUCTION: CPT

## 2020-06-01 PROCEDURE — 2500000003 HC RX 250 WO HCPCS: Performed by: INTERNAL MEDICINE

## 2020-06-01 PROCEDURE — 80053 COMPREHEN METABOLIC PANEL: CPT

## 2020-06-01 PROCEDURE — 97161 PT EVAL LOW COMPLEX 20 MIN: CPT | Performed by: PHYSICAL THERAPIST

## 2020-06-01 PROCEDURE — 97530 THERAPEUTIC ACTIVITIES: CPT

## 2020-06-01 PROCEDURE — 82962 GLUCOSE BLOOD TEST: CPT

## 2020-06-01 PROCEDURE — 70551 MRI BRAIN STEM W/O DYE: CPT

## 2020-06-01 RX ORDER — THIAMINE MONONITRATE (VIT B1) 100 MG
100 TABLET ORAL DAILY
Status: DISCONTINUED | OUTPATIENT
Start: 2020-06-01 | End: 2020-06-04 | Stop reason: HOSPADM

## 2020-06-01 RX ORDER — HYDRALAZINE HYDROCHLORIDE 50 MG/1
50 TABLET, FILM COATED ORAL ONCE
Status: COMPLETED | OUTPATIENT
Start: 2020-06-01 | End: 2020-06-01

## 2020-06-01 RX ORDER — HYDRALAZINE HYDROCHLORIDE 50 MG/1
50 TABLET, FILM COATED ORAL EVERY 6 HOURS SCHEDULED
Status: DISCONTINUED | OUTPATIENT
Start: 2020-06-01 | End: 2020-06-04 | Stop reason: HOSPADM

## 2020-06-01 RX ORDER — DEXTROSE, SODIUM CHLORIDE, AND POTASSIUM CHLORIDE 5; .9; .15 G/100ML; G/100ML; G/100ML
INJECTION INTRAVENOUS CONTINUOUS
Status: DISCONTINUED | OUTPATIENT
Start: 2020-06-01 | End: 2020-06-04 | Stop reason: HOSPADM

## 2020-06-01 RX ADMIN — PIPERACILLIN AND TAZOBACTAM 3.38 G: 3; .375 INJECTION, POWDER, LYOPHILIZED, FOR SOLUTION INTRAVENOUS at 14:05

## 2020-06-01 RX ADMIN — SODIUM CHLORIDE 12.5 ML/HR: 9 INJECTION, SOLUTION INTRAVENOUS at 01:40

## 2020-06-01 RX ADMIN — HYDRALAZINE HYDROCHLORIDE 25 MG: 25 TABLET, FILM COATED ORAL at 06:29

## 2020-06-01 RX ADMIN — OXYBUTYNIN CHLORIDE 5 MG: 5 TABLET ORAL at 20:27

## 2020-06-01 RX ADMIN — METOPROLOL TARTRATE 25 MG: 25 TABLET, FILM COATED ORAL at 20:27

## 2020-06-01 RX ADMIN — DEXTROSE MONOHYDRATE, SODIUM CHLORIDE, AND POTASSIUM CHLORIDE: 50; 9; 1.49 INJECTION, SOLUTION INTRAVENOUS at 13:30

## 2020-06-01 RX ADMIN — METOPROLOL TARTRATE 25 MG: 25 TABLET, FILM COATED ORAL at 08:37

## 2020-06-01 RX ADMIN — PIPERACILLIN AND TAZOBACTAM 3.38 G: 3; .375 INJECTION, POWDER, LYOPHILIZED, FOR SOLUTION INTRAVENOUS at 05:02

## 2020-06-01 RX ADMIN — Medication 10 ML: at 20:20

## 2020-06-01 RX ADMIN — INSULIN LISPRO 1 UNITS: 100 INJECTION, SOLUTION INTRAVENOUS; SUBCUTANEOUS at 16:52

## 2020-06-01 RX ADMIN — HYDRALAZINE HYDROCHLORIDE 50 MG: 50 TABLET, FILM COATED ORAL at 16:09

## 2020-06-01 RX ADMIN — SODIUM CHLORIDE, PRESERVATIVE FREE 10 ML: 5 INJECTION INTRAVENOUS at 20:23

## 2020-06-01 RX ADMIN — LISINOPRIL 20 MG: 20 TABLET ORAL at 08:35

## 2020-06-01 RX ADMIN — SODIUM CHLORIDE, PRESERVATIVE FREE 10 ML: 5 INJECTION INTRAVENOUS at 20:22

## 2020-06-01 RX ADMIN — SODIUM CHLORIDE, PRESERVATIVE FREE 10 ML: 5 INJECTION INTRAVENOUS at 20:21

## 2020-06-01 RX ADMIN — CLOPIDOGREL BISULFATE 75 MG: 75 TABLET ORAL at 08:35

## 2020-06-01 RX ADMIN — PIPERACILLIN AND TAZOBACTAM 3.38 G: 3; .375 INJECTION, POWDER, LYOPHILIZED, FOR SOLUTION INTRAVENOUS at 20:30

## 2020-06-01 RX ADMIN — SODIUM CHLORIDE, PRESERVATIVE FREE 10 ML: 5 INJECTION INTRAVENOUS at 20:24

## 2020-06-01 RX ADMIN — ATORVASTATIN CALCIUM 80 MG: 40 TABLET, FILM COATED ORAL at 20:27

## 2020-06-01 RX ADMIN — SODIUM CHLORIDE 12.5 ML/HR: 9 INJECTION, SOLUTION INTRAVENOUS at 09:55

## 2020-06-01 RX ADMIN — SODIUM CHLORIDE 12.5 ML/HR: 9 INJECTION, SOLUTION INTRAVENOUS at 17:00

## 2020-06-01 RX ADMIN — Medication 100 MG: at 08:35

## 2020-06-01 RX ADMIN — Medication 10 ML: at 08:36

## 2020-06-01 RX ADMIN — ENOXAPARIN SODIUM 40 MG: 40 INJECTION SUBCUTANEOUS at 13:31

## 2020-06-01 ASSESSMENT — PAIN SCALES - PAIN ASSESSMENT IN ADVANCED DEMENTIA (PAINAD)
NEGVOCALIZATION: 0
TOTALSCORE: 0
TOTALSCORE: 0
NEGVOCALIZATION: 0
BREATHING: 0
TOTALSCORE: 0
FACIALEXPRESSION: 0
CONSOLABILITY: 0
TOTALSCORE: 0
BREATHING: 0
NEGVOCALIZATION: 0
BREATHING: 0
BODYLANGUAGE: 0
BREATHING: 0
CONSOLABILITY: 0
TOTALSCORE: 0
NEGVOCALIZATION: 0
BREATHING: 0
TOTALSCORE: 0
BREATHING: 0
NEGVOCALIZATION: 0
FACIALEXPRESSION: 0
BREATHING: 0
FACIALEXPRESSION: 0
FACIALEXPRESSION: 0
CONSOLABILITY: 0
NEGVOCALIZATION: 0
BREATHING: 0
BODYLANGUAGE: 0
BODYLANGUAGE: 0
TOTALSCORE: 0
BREATHING: 0
TOTALSCORE: 0
FACIALEXPRESSION: 0
BODYLANGUAGE: 0
BREATHING: 0
CONSOLABILITY: 0
TOTALSCORE: 0
CONSOLABILITY: 0
BODYLANGUAGE: 0
TOTALSCORE: 0
FACIALEXPRESSION: 0
BREATHING: 0
BODYLANGUAGE: 0
FACIALEXPRESSION: 0
NEGVOCALIZATION: 0
CONSOLABILITY: 0
BODYLANGUAGE: 0
NEGVOCALIZATION: 0
BREATHING: 0
BODYLANGUAGE: 0
FACIALEXPRESSION: 0
NEGVOCALIZATION: 0
BREATHING: 0
FACIALEXPRESSION: 0
CONSOLABILITY: 0
NEGVOCALIZATION: 0
CONSOLABILITY: 0
BODYLANGUAGE: 0
FACIALEXPRESSION: 0
CONSOLABILITY: 0
BODYLANGUAGE: 0
CONSOLABILITY: 0
NEGVOCALIZATION: 0
BODYLANGUAGE: 0
BODYLANGUAGE: 0
TOTALSCORE: 0
BODYLANGUAGE: 0

## 2020-06-01 ASSESSMENT — PAIN SCALES - GENERAL: PAINLEVEL_OUTOF10: 0

## 2020-06-01 NOTE — PROGRESS NOTES
OCCUPATIONAL THERAPY  Initial Evaluation  Date:2020  Patient Name: Ada Petty  MRN: [id number]  : 1960  ROOM #: IC03/IC03-01     Referring Provider: Basilio Kaur DO  Evaluating OT: Gatito Fulton OTR/L [id number]    Placement Recommendation: Subacute    Recommended Adaptive Equipment: none     [facility]   AM-PAC Daily Activity Inpatient   How much help for putting on and taking off regular lower body clothing?: A Lot  How much help for Bathing?: A Lot  How much help for Toileting?: A Lot  How much help for putting on and taking off regular upper body clothing?: A Lot  How much help for taking care of personal grooming?: A Little  How much help for eating meals?: A Little  AM-PAC Inpatient Daily Activity Raw Score: 14  AM-PAC Inpatient ADL T-Scale Score : 33.39  ADL Inpatient CMS 0-100% Score: 59.67  ADL Inpatient CMS G-Code Modifier : CK    Diagnosis:   1. Dehydration    2. Diabetic ketoacidosis without coma associated with type 1 diabetes mellitus ([facility]    3. Adrenal infarction [facility])      Pertinent Medical History:   Past Medical History:   Diagnosis Date    Diabetic peripheral neuropathy associated with type 2 diabetes mellitus ([facility] 2018    Epigastric hernia     History of blood transfusion     Hyperlipidemia     Hypertension     Kidney stone     Schizophrenia ([facility]     Type 2 diabetes mellitus with diabetic polyneuropathy, with long-term current use of insulin ([facility] 3/48/4867    Umbilical hernia         Precautions:  falls, L BKA   Pain Scale: Numeric Rate: no pain; Nursing notified.     Social history: with spouse and daughter      Home architecture: single family home, 2 story, bedroom on second floor, tub shower on 1st and 2nd floor   PLOF: independent with BADL and IADL, ambulated with wheeled walker, pt states she can hop short distances with wheeled walker, transfers to wheelchair   Equipment owned: wheeled walker, wheelchair, shower chair, grab bars   Cognition: oriented x 3; follows 2

## 2020-06-01 NOTE — CARE COORDINATION
CM note: COVID neg from 5/29. POA/daughter Mamie plans to bring pt back home, active with I C, will NEED ARSLAN order. Daughter open to additional plans if patient needs are too great, await PT/OT evals.

## 2020-06-01 NOTE — PROGRESS NOTES
placement  4. Sacral decubitus ulcer  5. History of left lower extremity arterial occlusion, status post below-the-knee amputation on 02/29/2020;  6. Coronary artery disease  7. Hyperlipidemia  8. Hypothyroidism  9. Schizophrenia    Plan:   MRI of the brain is confirming PRES syndrome suggesting the patient's hypertensive encephalopathy. We are obtaining better blood pressure control and medications have been transitioned to oral.  Chronic comorbidities are being monitored accordingly. The neurology and critical care teams continue to provide consultation. Diet is being advanced accordingly. The patient is acceptable for transfer from the intensive care unit. Greater than 40 minutes of critical care time was spent with the patient. This time included chart review, , and discussion with those consultants involved in the patient's care. More than 50% of my  time was spent at the bedside counseling/coordinating care with the patient and/or family with face to face contact. This time was spent reviewing notes and laboratory data as well as instructing and counseling the patient. Time I spent with the family or surrogate(s) is included only if the patient was incapable of providing the necessary information or participating in medical decisions. I also discussed the differential diagnosis and all of the proposed management plans with the patient and individuals accompanying the patient. Suresh Hodge requires this high level of physician care and nursing in the ICU due the complexity of decision management and chance of rapid decline or death. Continued cardiac monitoring and higher level of nursing are required. I am readily available for any further decision-making and intervention.        Cathleen Dougherty DO, F.A.C.O.I.  6/1/2020  12:31 PM

## 2020-06-01 NOTE — PLAN OF CARE
Problem: Falls - Risk of:  Goal: Will remain free from falls  Description: Will remain free from falls  Outcome: Met This Shift     Problem: Falls - Risk of:  Goal: Absence of physical injury  Description: Absence of physical injury  Outcome: Met This Shift     Problem: Aspiration:  Goal: Absence of aspiration  Description: Absence of aspiration  Outcome: Met This Shift     Problem: Mental Status - Impaired:  Goal: Mental status will be restored to baseline  Description: Mental status will be restored to baseline  Outcome: Met This Shift     Problem: Nutrition Deficit:  Goal: Ability to achieve adequate nutritional intake will improve  Description: Ability to achieve adequate nutritional intake will improve  Outcome: Met This Shift     Problem: Skin Integrity - Impaired:  Goal: Will show no infection signs and symptoms  Description: Will show no infection signs and symptoms  Outcome: Met This Shift     Problem: Skin Integrity - Impaired:  Goal: Absence of new skin breakdown  Description: Absence of new skin breakdown  Outcome: Met This Shift

## 2020-06-01 NOTE — PROGRESS NOTES
supine: Minimal assist of 1    Scooting: Minimal assist of 1    Rolling: Independent    Supine to sit: Independent    Sit to supine: Independent    Scooting: Independent     Transfers Sit to stand: Minimal assist of 2    Sit to stand: Minimal assist of 1     Ambulation    4 side steps using  wheeled walker with Minimal assist of 2   heel toe walk right foot   5 feet using  wheeled walker with Minimal assist of 1    Stair negotiation: ascended and descended   Not assessed            ROM Within functional limits lacking ~10* left knee extension    Increase range of motion 10% of affected joints    Strength BUE: 4/5  RLE:  3/5  LLE:  3/5   Increase strength in affected mm groups by 1/3 grade   Balance Sitting EOB:  good    Dynamic Standing:  fair wheeled walker  Sitting EOB:  good    Dynamic Standing: fair +wheeled walker     Patient is Alert & Oriented x person, place, time and situation and follows directions    Sensation:  Patient denies numbness and tingling to extremities    Edema:  none noted    Endurance: fair       Patient education  Patient educated on role of Physical Therapy, risks of immobility and plan of care and safety       Patient response to education:   Pt verbalized understanding Pt demonstrated skill Pt requires further education in this area   Yes Partial Yes      ASSESSMENT: Patient exhibits decreased strength, balance, coordination impairing functional mobility. Therapist educated and facilitated patient on techniques to increase safety and independence with bed mobility, balance, functional transfers, and functional mobility. Treatment:  Patient practiced and was instructed in the following treatment: Sat edge of bed 10 minutes with Minimal assist of 1 to increase dynamic sitting balance and activity tolerance. progressing to supervision. Stood with heel toe walk to head of bed elevated, returned to bed and set up with lunch tray.      Therapeutic Exercises:  ankle pumps and long arc quad

## 2020-06-01 NOTE — PROGRESS NOTES
Speech Language Pathology  SPEECH/LANGUAGE PATHOLOGY  SPEECH/LANGUAGE/COGNITIVE EVALUATION      PATIENT NAME:  Manjula Martin      :  1960         TODAY'S DATE:  2020 ROOM:  [facility]/[place] [address]     ADMITTING DIAGNOSIS: Hypertensive emergency [I16.1]  Adrenal infarction ([facility] [E27.49]    SPEECH PATHOLOGY DIAGNOSIS:    Mild-moderate cognitive linguistic disorder     THERAPY RECOMMENDATIONS:   Speech Pathology intervention is recommended 3-6 times per week for LOS or when goals are met with emphasis on the following:     Attention to increase safety awareness during transfers/ ambulation and completion of ADL/iADL tasks with minimal.  Immediate/ STM for functional information to complete tasks as instructed and recall pertinent medical information with minimal and/or use of external memory aide training. Problem solving/ insight/ judgement for various ADL/iADL tasks, including but not limited to: medication management and money/ finance management, overall safety awareness in the PLOF with 80% accuracy  Receptive and expressive language skills with adequate thought content, organization, and processing time to facilitate improved communication with minimal.  Expressive language skills to improve accuracy of completion of automatic speech tasks, object/picture naming, phrase completion, and phrasal expression of basic wants and needs with 80% accuracy                   MOTOR SPEECH       Oral Peripheral Examination   Adequate lingual/labial strength     Parameters of Speech Production  Respiration:  Adequate for speech production  Articulation:  Within functional limits  Resonance:  Within functional limits  Quality:   Within functional limits  Pitch: Within functional limits  Intensity: Quiet  Fluency:  Intact  Prosody Monotone    RECEPTIVE LANGUAGE    Comprehension of Yes/No Questions:    Within functional limits    Process  Simple Verbal Commands:   Cueing  Process Intermediate Verbal Commands:   Latent and

## 2020-06-01 NOTE — PROGRESS NOTES
CRITICAL CARE PROGRESS NOTE    The patient's case was discussed in multidisciplinary rounds including critical care specialist, nursing, RT and pharmacy. Her evaluation is as follows:     63year old woman with PMH of DM, schizophrenia, HTN, hyperlipidemia, admitted for management of hyperglycemia, hypertensive emergency, hypertensive encephalopathy, lactic acidosis, left adrenal infarction, hyponatremia, recent VRE UTI. Ms Rivera Carpenter has been receiving antibiotic therapy, IV cardene, IV hydration. Today she is awake, answers yes/no, still disoriented (replied \"yes, I'm in the hospital after I told her she is in the hospital)  --She is off IV cardene, on PO hydralazine/lisinopril and metoprolol, and clonidine patch.    --MRI suggestive of PRES    **Called the patient's  Mr Rivera Carpenter to update him on the patient's condition**  The patient's  would want Zoom team to call their daughter [address] to arrange video call. Component      Latest Ref Rng & Units 5/29/2020           3:52 PM   Cortisol      2.68 - 18.40 mcg/dL 34.49 (H)     Last 3 CMP:    Recent Labs     05/30/20  1559 05/31/20  0440 06/01/20  0456   * 131* 136   K 3.7 3.6 3.7   CL 94* 100 108*   CO2 22 21* 20*   BUN 7* 8 12   CREATININE 0.5 0.7 0.8   GLUCOSE 165* 119* 87   CALCIUM 8.4* 8.2* 7.9*   PROT 7.0 6.6 5.8*   LABALBU 3.0* 2.8* 2.6*   BILITOT 0.5 0.5 0.4   ALKPHOS 88 81 68   AST 14 13 14   ALT 7 7 9     Recent Labs     06/01/20  0456   WBC 5.6   RBC 2.98*   HGB 7.6*   HCT 24.4*   MCV 81.9   MCH 25.5*   MCHC 31.1*   RDW 18.7*      MPV 10.1     No results for input(s): BC in the last 72 hours.     Recent Labs     05/29/20  1821   BLOODCULT2 24 Hours- no growth     24 HR INTAKE/OUTPUT:      Intake/Output Summary (Last 24 hours) at 6/1/2020 1128  Last data filed at 6/1/2020 3206  Gross per 24 hour   Intake 2358 ml   Output 1300 ml   Net 1058 ml     MEDICATIONS:   thiamine  100 mg Oral Daily    clopidogrel  75 mg Oral Daily    hydrALAZINE  25 mg Oral 3 times per day    cloNIDine  1 patch Transdermal Weekly    metoprolol tartrate  25 mg Oral BID    lisinopril  20 mg Oral Daily    insulin lispro  0-6 Units Subcutaneous 4x Daily AC & HS    atorvastatin  80 mg Oral Nightly    docusate sodium  100 mg Oral QPM    ferrous sulfate  325 mg Oral Daily with breakfast    megestrol  20 mg Oral Daily    oxybutynin  5 mg Oral Nightly    [Held by provider] pyridoxine  50 mg Oral Daily    sodium chloride flush  10 mL Intravenous 2 times per day    enoxaparin  40 mg Subcutaneous Daily    piperacillin-tazobactam  3.375 g Intravenous Q8H      dextrose 5% and 0.9% NaCl with KCl 20 mEq      dextrose 5 % and 0.45 % NaCl 150 mL/hr at 05/29/20 1603    sodium chloride Stopped (06/01/20 0400)     sodium chloride flush, acetaminophen **OR** acetaminophen, promethazine **OR** ondansetron, glucose, glucagon (rDNA), dextrose, potassium chloride, magnesium sulfate, sodium phosphate IVPB **OR** sodium phosphate IVPB **OR** sodium phosphate IVPB, dextrose 5 % and 0.45 % NaCl    OBJECTIVE:  Vitals:    06/01/20 1000   BP: (!) 130/54   Pulse: 62   Resp: 17   Temp:    SpO2: 100%           O2 Device: None (Room air)        LABS:  WBC   Date Value Ref Range Status   06/01/2020 5.6 4.5 - 11.5 E9/L Final   05/31/2020 6.4 4.5 - 11.5 E9/L Final   05/31/2020 6.8 4.5 - 11.5 E9/L Final     Hemoglobin   Date Value Ref Range Status   06/01/2020 7.6 (L) 11.5 - 15.5 g/dL Final   05/31/2020 8.4 (L) 11.5 - 15.5 g/dL Final   05/31/2020 8.7 (L) 11.5 - 15.5 g/dL Final     Hematocrit   Date Value Ref Range Status   06/01/2020 24.4 (L) 34.0 - 48.0 % Final   05/31/2020 26.7 (L) 34.0 - 48.0 % Final   05/31/2020 26.8 (L) 34.0 - 48.0 % Final     MCV   Date Value Ref Range Status   06/01/2020 81.9 80.0 - 99.9 fL Final   05/31/2020 81.2 80.0 - 99.9 fL Final   05/31/2020 79.3 (L) 80.0 - 99.9 fL Final     Platelets   Date Value Ref Range Status   06/01/2020 185 130 - 450 E9/L Final Albumin   Date Value Ref Range Status   04/26/2012 4.3 3.2 - 4.8 g/dL Final     Alb   Date Value Ref Range Status   06/01/2020 2.6 (L) 3.5 - 5.2 g/dL Final   05/31/2020 2.8 (L) 3.5 - 5.2 g/dL Final   05/30/2020 3.0 (L) 3.5 - 5.2 g/dL Final     Total Bilirubin   Date Value Ref Range Status   06/01/2020 0.4 0.0 - 1.2 mg/dL Final   05/31/2020 0.5 0.0 - 1.2 mg/dL Final   05/30/2020 0.5 0.0 - 1.2 mg/dL Final     Alkaline Phosphatase   Date Value Ref Range Status   06/01/2020 68 35 - 104 U/L Final   05/31/2020 81 35 - 104 U/L Final   05/30/2020 88 35 - 104 U/L Final     AST   Date Value Ref Range Status   06/01/2020 14 0 - 31 U/L Final   05/31/2020 13 0 - 31 U/L Final   05/30/2020 14 0 - 31 U/L Final     ALT   Date Value Ref Range Status   06/01/2020 9 0 - 32 U/L Final   05/31/2020 7 0 - 32 U/L Final   05/30/2020 7 0 - 32 U/L Final     GFR Non-   Date Value Ref Range Status   06/01/2020 >60 >=60 mL/min/1.73 Final     Comment:     Chronic Kidney Disease: less than 60 ml/min/1.73 sq.m. Kidney Failure: less than 15 ml/min/1.73 sq.m. Results valid for patients 18 years and older. 05/31/2020 >60 >=60 mL/min/1.73 Final     Comment:     Chronic Kidney Disease: less than 60 ml/min/1.73 sq.m. Kidney Failure: less than 15 ml/min/1.73 sq.m. Results valid for patients 18 years and older. 05/30/2020 >60 >=60 mL/min/1.73 Final     Comment:     Chronic Kidney Disease: less than 60 ml/min/1.73 sq.m. Kidney Failure: less than 15 ml/min/1.73 sq.m. Results valid for patients 18 years and older.        GFR    Date Value Ref Range Status   06/01/2020 >60  Final   05/31/2020 >60  Final   05/30/2020 >60  Final     Magnesium   Date Value Ref Range Status   05/30/2020 2.1 1.6 - 2.6 mg/dL Final   05/30/2020 1.5 (L) 1.6 - 2.6 mg/dL Final   05/29/2020 1.9 1.6 - 2.6 mg/dL Final     Phosphorus   Date Value Ref Range Status   05/30/2020 2.4 (L) 2.5 - 4.5 mg/dL Final   05/30/2020 2.3 (L) 2.5 - 4.5 mg/dL Final   05/29/2020 2.2 (L) 2.5 - 4.5 mg/dL Final     Recent Labs     05/29/20  1402   PH 7.522*   PO2 100.5*   PCO2 23.4*   HCO3 18.8*   BE -2.3   O2SAT 97.9       RADIOLOGY:  CT ABDOMEN PELVIS W IV CONTRAST Additional Contrast? None   Final Result   1. A 6.5 x 3.8 cm hematoma is present in the right groin, which is new from   the prior exam on 05/17/2020, and may be intramuscular in location. 2. Findings are concerning for an infarct involving the medial limb of the   left adrenal gland. There is hyperenhancement of the remaining adrenal gland   tissue, which can be seen in a shock state. 3. The tip of a left groin central venous catheter terminates along the   anterior margin of the sacrum, with the tip likely positioned in a small   collateral vein in this region. Repositioning is suggested. 4. Heterogeneous hyperdensity in the adjacent leftward sacrum is new from the   prior exam, and may be related to contrast injection through the central   venous catheter. 5. Soft tissue thickening overlies the sacrum and coccyx, compatible with   history of decubitus ulcer in this region. There is no definite of   osteomyelitis, although a bone scan or MRI would be a more sensitive exam.   6. Stable position of the ureteral stents, without hydronephrosis. 7. Thickening of the endometrium to 12 mm. Further evaluation with pelvic   ultrasound on a nonemergent basis is recommended. XR CHEST PORTABLE   Final Result   No acute findings         CT Head WO Contrast   Final Result   No acute intracranial abnormality. MRI Brain WO Contrast    (Results Pending)     PROBLEM LIST:  Principal Problem:    Altered mental state  Active Problems:    Hypertensive emergency    Adrenal infarction ([address]    Diabetic ketoacidosis ([facility] [address]  Resolved Problems:    * No resolved hospital problems.  *  BP (!) 130/54   Pulse 62   Temp 99.5 °F (37.5 °C) (Core)   Resp 17   Ht 5' 4\" (1.626 m)   Wt 119

## 2020-06-01 NOTE — CONSULTS
History Of Present Illness: This is a 63year old female with a PMH of DM, HTN, Hyperlipidemia, Schizophrenia who presents to the ED for evaluation of altered mental status. The patient is here with EMS who is helping provide history. The patient for the past 48 hours has been fatigued and out of it according to the daughter for who she lives with. The patient has not been acting herself as she usually is alert and oriented to person, place, time and situation. The patient can only answer \"yes\" when asked to do any commands or tell me her name or to any question. The patient has no had any fevers. The patient is known to be noncompliant with her medications and was last here in the hospital for hypoglycemia. A complete review of systems and history are limited secondary to the patient's clinical condition. As above per ED staff. Neurology was asked to evaluate for \"altered mental status \"    The patient is a 63 y.o. female with significant past medical history of schizophrenia and hypertension and possible diabetes who presents with above.       The patient has the following symptoms:    Change in level of consciousness: alert    New Weakness: no    Numbness or Tingling: no    Difficulty Swallowing: no    Current Medications:   Scheduled Meds:   clopidogrel  75 mg Oral Daily    hydrALAZINE  25 mg Oral 3 times per day    cloNIDine  1 patch Transdermal Weekly    metoprolol tartrate  25 mg Oral BID    lisinopril  20 mg Oral Daily    insulin lispro  0-6 Units Subcutaneous 4x Daily AC & HS    [Held by provider] atorvastatin  80 mg Oral Nightly    [Held by provider] docusate sodium  100 mg Oral QPM    [Held by provider] ferrous sulfate  325 mg Oral Daily with breakfast    [Held by provider] megestrol  20 mg Oral Daily    [Held by provider] oxybutynin  5 mg Oral Nightly    [Held by provider] pyridoxine  50 mg Oral Daily    sodium chloride flush  10 mL Intravenous 2 times per day    enoxaparin  40 mg Armani Gunderson DO at [address] [facility]IST[facility]  11/04/2016    epigastric hernia umbilical hernia with mesh    OVARIAN CYST REMOVAL      OVARY REMOVAL Right     OVARY REMOVAL  2014    UPPER GASTROINTESTINAL ENDOSCOPY N/A 10/29/2019    EGD ESOPHAGOGASTRODUODENOSCOPY performed by Nav Ruvalcaba MD at [facility]         Outside reports reviewed: ER records, historical medical records, lab reports and radiology reports. Patient's medications, allergies, past medical, surgical, social and family histories were reviewed and updated as appropriate. Review of Systems  A comprehensive review of systems was negative except for:       Objective:     Neuro exam 152/54, pulse 70, temperature 99.3  General: awake and alert. There is minimal language output but she does follow commands and answers yes/no questions appropriately. Oral lingual dyskinesias noted. Cranial nerve testing  unable to cooperate. PERRL, corneal reflexes +  . Funduscopic eye exam revealed normal findings. Motor exam: No focal weakness with left BKA noted. Deep tendon reflexes were absent bilaterally. Plantar responses were . [place] Cerebellar exam noted . [place] Sensation was . [place] Assessment:   Altered mental state in a patient with recent negative head CT who apparently has suffered hypoglycemic episodes. B12 level in February 2020 normal.  Hemoglobin A1c's have been completely within normal range per review of labs earlier this year. Urine drug screen negative. Plan:   Continue to treat any infectious or metabolic disturbances and should see improvement toward baseline as antibiotics are discontinued. Need to avoid hypoglycemic episodes and suggest endocrinology evaluation of her diabetes which appears to be under excellent control when viewing hemoglobin A1c's. Consider psychiatric evaluation of her underlying schizophrenia. We will add empiric thiamine.   Thank you for consultation

## 2020-06-02 ENCOUNTER — APPOINTMENT (OUTPATIENT)
Dept: CT IMAGING | Age: 60
DRG: 077 | End: 2020-06-02
Payer: MEDICARE

## 2020-06-02 LAB
ABO/RH: NORMAL
ALBUMIN SERPL-MCNC: 2.5 G/DL (ref 3.5–5.2)
ALP BLD-CCNC: 63 U/L (ref 35–104)
ALT SERPL-CCNC: 7 U/L (ref 0–32)
ANION GAP SERPL CALCULATED.3IONS-SCNC: 8 MMOL/L (ref 7–16)
ANTIBODY SCREEN: NORMAL
AST SERPL-CCNC: 12 U/L (ref 0–31)
BASOPHILS ABSOLUTE: 0.05 E9/L (ref 0–0.2)
BASOPHILS RELATIVE PERCENT: 1.3 % (ref 0–2)
BILIRUB SERPL-MCNC: 0.4 MG/DL (ref 0–1.2)
BLOOD BANK DISPENSE STATUS: NORMAL
BLOOD BANK PRODUCT CODE: NORMAL
BPU ID: NORMAL
BUN BLDV-MCNC: 9 MG/DL (ref 8–23)
CALCIUM SERPL-MCNC: 8.1 MG/DL (ref 8.6–10.2)
CHLORIDE BLD-SCNC: 111 MMOL/L (ref 98–107)
CO2: 19 MMOL/L (ref 22–29)
CREAT SERPL-MCNC: 0.7 MG/DL (ref 0.5–1)
DESCRIPTION BLOOD BANK: NORMAL
EOSINOPHILS ABSOLUTE: 0.05 E9/L (ref 0.05–0.5)
EOSINOPHILS RELATIVE PERCENT: 1.3 % (ref 0–6)
FOLATE: 2.6 NG/ML (ref 4.8–24.2)
GFR AFRICAN AMERICAN: >60
GFR NON-AFRICAN AMERICAN: >60 ML/MIN/1.73
GLUCOSE BLD-MCNC: 153 MG/DL (ref 74–99)
HCT VFR BLD CALC: 21.7 % (ref 34–48)
HCT VFR BLD CALC: 21.8 % (ref 34–48)
HCT VFR BLD CALC: 22 % (ref 34–48)
HEMOGLOBIN: 6.6 G/DL (ref 11.5–15.5)
HEMOGLOBIN: 6.8 G/DL (ref 11.5–15.5)
IMMATURE GRANULOCYTES #: 0.01 E9/L
IMMATURE GRANULOCYTES %: 0.3 % (ref 0–5)
IMMATURE RETIC FRACT: 18.3 % (ref 3–15.9)
IRON SATURATION: 31 % (ref 15–50)
IRON: 40 MCG/DL (ref 37–145)
LYMPHOCYTES ABSOLUTE: 1.59 E9/L (ref 1.5–4)
LYMPHOCYTES RELATIVE PERCENT: 42.7 % (ref 20–42)
MCH RBC QN AUTO: 25.5 PG (ref 26–35)
MCH RBC QN AUTO: 25.6 PG (ref 26–35)
MCHC RBC AUTO-ENTMCNC: 30.4 % (ref 32–34.5)
MCHC RBC AUTO-ENTMCNC: 30.9 % (ref 32–34.5)
MCV RBC AUTO: 82.7 FL (ref 80–99.9)
MCV RBC AUTO: 83.8 FL (ref 80–99.9)
METER GLUCOSE: 135 MG/DL (ref 74–99)
METER GLUCOSE: 164 MG/DL (ref 74–99)
METER GLUCOSE: 208 MG/DL (ref 74–99)
MONOCYTES ABSOLUTE: 0.71 E9/L (ref 0.1–0.95)
MONOCYTES RELATIVE PERCENT: 19.1 % (ref 2–12)
NEUTROPHILS ABSOLUTE: 1.31 E9/L (ref 1.8–7.3)
NEUTROPHILS RELATIVE PERCENT: 35.3 % (ref 43–80)
PDW BLD-RTO: 18.7 FL (ref 11.5–15)
PDW BLD-RTO: 18.7 FL (ref 11.5–15)
PLATELET # BLD: 165 E9/L (ref 130–450)
PLATELET # BLD: 171 E9/L (ref 130–450)
PMV BLD AUTO: 10.7 FL (ref 7–12)
PMV BLD AUTO: 9.7 FL (ref 7–12)
POTASSIUM SERPL-SCNC: 4.1 MMOL/L (ref 3.5–5)
RBC # BLD: 2.59 E12/L (ref 3.5–5.5)
RBC # BLD: 2.66 E12/L (ref 3.5–5.5)
RETIC HGB EQUIVALENT: 30.3 PG (ref 28.2–36.6)
RETICULOCYTE ABSOLUTE COUNT: 0.01 E12/L
RETICULOCYTE COUNT PCT: 0.6 % (ref 0.4–1.9)
SODIUM BLD-SCNC: 138 MMOL/L (ref 132–146)
TOTAL IRON BINDING CAPACITY: 129 MCG/DL (ref 250–450)
TOTAL PROTEIN: 5.3 G/DL (ref 6.4–8.3)
VITAMIN B-12: 371 PG/ML (ref 211–946)
WBC # BLD: 3.2 E9/L (ref 4.5–11.5)
WBC # BLD: 3.7 E9/L (ref 4.5–11.5)

## 2020-06-02 PROCEDURE — 86900 BLOOD TYPING SEROLOGIC ABO: CPT

## 2020-06-02 PROCEDURE — 36430 TRANSFUSION BLD/BLD COMPNT: CPT

## 2020-06-02 PROCEDURE — 05H933Z INSERTION OF INFUSION DEVICE INTO RIGHT BRACHIAL VEIN, PERCUTANEOUS APPROACH: ICD-10-PCS | Performed by: INTERNAL MEDICINE

## 2020-06-02 PROCEDURE — 85027 COMPLETE CBC AUTOMATED: CPT

## 2020-06-02 PROCEDURE — 6370000000 HC RX 637 (ALT 250 FOR IP): Performed by: INTERNAL MEDICINE

## 2020-06-02 PROCEDURE — 86901 BLOOD TYPING SEROLOGIC RH(D): CPT

## 2020-06-02 PROCEDURE — 2500000003 HC RX 250 WO HCPCS: Performed by: INTERNAL MEDICINE

## 2020-06-02 PROCEDURE — 36410 VNPNXR 3YR/> PHY/QHP DX/THER: CPT

## 2020-06-02 PROCEDURE — 82746 ASSAY OF FOLIC ACID SERUM: CPT

## 2020-06-02 PROCEDURE — 82607 VITAMIN B-12: CPT

## 2020-06-02 PROCEDURE — 6360000002 HC RX W HCPCS: Performed by: INTERNAL MEDICINE

## 2020-06-02 PROCEDURE — 74176 CT ABD & PELVIS W/O CONTRAST: CPT

## 2020-06-02 PROCEDURE — 2580000003 HC RX 258: Performed by: INTERNAL MEDICINE

## 2020-06-02 PROCEDURE — 82962 GLUCOSE BLOOD TEST: CPT

## 2020-06-02 PROCEDURE — 36592 COLLECT BLOOD FROM PICC: CPT

## 2020-06-02 PROCEDURE — P9016 RBC LEUKOCYTES REDUCED: HCPCS

## 2020-06-02 PROCEDURE — 97110 THERAPEUTIC EXERCISES: CPT

## 2020-06-02 PROCEDURE — 86850 RBC ANTIBODY SCREEN: CPT

## 2020-06-02 PROCEDURE — 83550 IRON BINDING TEST: CPT

## 2020-06-02 PROCEDURE — 85025 COMPLETE CBC W/AUTO DIFF WBC: CPT

## 2020-06-02 PROCEDURE — 6370000000 HC RX 637 (ALT 250 FOR IP): Performed by: PSYCHIATRY & NEUROLOGY

## 2020-06-02 PROCEDURE — C1751 CATH, INF, PER/CENT/MIDLINE: HCPCS

## 2020-06-02 PROCEDURE — 76937 US GUIDE VASCULAR ACCESS: CPT

## 2020-06-02 PROCEDURE — 1200000000 HC SEMI PRIVATE

## 2020-06-02 PROCEDURE — 97110 THERAPEUTIC EXERCISES: CPT | Performed by: PHYSICAL THERAPIST

## 2020-06-02 PROCEDURE — 97530 THERAPEUTIC ACTIVITIES: CPT

## 2020-06-02 PROCEDURE — 83540 ASSAY OF IRON: CPT

## 2020-06-02 PROCEDURE — 97530 THERAPEUTIC ACTIVITIES: CPT | Performed by: PHYSICAL THERAPIST

## 2020-06-02 PROCEDURE — 80053 COMPREHEN METABOLIC PANEL: CPT

## 2020-06-02 PROCEDURE — 85045 AUTOMATED RETICULOCYTE COUNT: CPT

## 2020-06-02 PROCEDURE — 97129 THER IVNTJ 1ST 15 MIN: CPT | Performed by: SPEECH-LANGUAGE PATHOLOGIST

## 2020-06-02 PROCEDURE — 86923 COMPATIBILITY TEST ELECTRIC: CPT

## 2020-06-02 RX ORDER — HEPARIN SODIUM (PORCINE) LOCK FLUSH IV SOLN 100 UNIT/ML 100 UNIT/ML
3 SOLUTION INTRAVENOUS EVERY 12 HOURS SCHEDULED
Status: DISCONTINUED | OUTPATIENT
Start: 2020-06-02 | End: 2020-06-04 | Stop reason: HOSPADM

## 2020-06-02 RX ORDER — HEPARIN SODIUM (PORCINE) LOCK FLUSH IV SOLN 100 UNIT/ML 100 UNIT/ML
3 SOLUTION INTRAVENOUS PRN
Status: DISCONTINUED | OUTPATIENT
Start: 2020-06-02 | End: 2020-06-04 | Stop reason: HOSPADM

## 2020-06-02 RX ORDER — SODIUM CHLORIDE 0.9 % (FLUSH) 0.9 %
10 SYRINGE (ML) INJECTION PRN
Status: DISCONTINUED | OUTPATIENT
Start: 2020-06-02 | End: 2020-06-04 | Stop reason: HOSPADM

## 2020-06-02 RX ORDER — 0.9 % SODIUM CHLORIDE 0.9 %
20 INTRAVENOUS SOLUTION INTRAVENOUS ONCE
Status: DISCONTINUED | OUTPATIENT
Start: 2020-06-02 | End: 2020-06-04 | Stop reason: HOSPADM

## 2020-06-02 RX ADMIN — ATORVASTATIN CALCIUM 80 MG: 40 TABLET, FILM COATED ORAL at 20:48

## 2020-06-02 RX ADMIN — DOCUSATE SODIUM 100 MG: 100 CAPSULE, LIQUID FILLED ORAL at 16:59

## 2020-06-02 RX ADMIN — OXYBUTYNIN CHLORIDE 5 MG: 5 TABLET ORAL at 20:48

## 2020-06-02 RX ADMIN — SODIUM CHLORIDE 12.5 ML/HR: 9 INJECTION, SOLUTION INTRAVENOUS at 09:47

## 2020-06-02 RX ADMIN — SODIUM CHLORIDE 12.5 ML/HR: 9 INJECTION, SOLUTION INTRAVENOUS at 16:58

## 2020-06-02 RX ADMIN — PIPERACILLIN AND TAZOBACTAM 3.38 G: 3; .375 INJECTION, POWDER, LYOPHILIZED, FOR SOLUTION INTRAVENOUS at 13:20

## 2020-06-02 RX ADMIN — MEGESTROL ACETATE 20 MG: 40 TABLET ORAL at 09:39

## 2020-06-02 RX ADMIN — INSULIN LISPRO 2 UNITS: 100 INJECTION, SOLUTION INTRAVENOUS; SUBCUTANEOUS at 16:07

## 2020-06-02 RX ADMIN — HYDRALAZINE HYDROCHLORIDE 50 MG: 50 TABLET, FILM COATED ORAL at 06:36

## 2020-06-02 RX ADMIN — HYDRALAZINE HYDROCHLORIDE 50 MG: 50 TABLET, FILM COATED ORAL at 13:21

## 2020-06-02 RX ADMIN — PIPERACILLIN AND TAZOBACTAM 3.38 G: 3; .375 INJECTION, POWDER, LYOPHILIZED, FOR SOLUTION INTRAVENOUS at 21:34

## 2020-06-02 RX ADMIN — METOPROLOL TARTRATE 25 MG: 25 TABLET, FILM COATED ORAL at 09:25

## 2020-06-02 RX ADMIN — DEXTROSE MONOHYDRATE, SODIUM CHLORIDE, AND POTASSIUM CHLORIDE: 50; 9; 1.49 INJECTION, SOLUTION INTRAVENOUS at 03:33

## 2020-06-02 RX ADMIN — METOPROLOL TARTRATE 25 MG: 25 TABLET, FILM COATED ORAL at 20:48

## 2020-06-02 RX ADMIN — INSULIN LISPRO 1 UNITS: 100 INJECTION, SOLUTION INTRAVENOUS; SUBCUTANEOUS at 06:36

## 2020-06-02 RX ADMIN — Medication 10 ML: at 21:00

## 2020-06-02 RX ADMIN — PIPERACILLIN AND TAZOBACTAM 3.38 G: 3; .375 INJECTION, POWDER, LYOPHILIZED, FOR SOLUTION INTRAVENOUS at 04:40

## 2020-06-02 RX ADMIN — Medication 10 ML: at 09:29

## 2020-06-02 RX ADMIN — DEXTROSE MONOHYDRATE, SODIUM CHLORIDE, AND POTASSIUM CHLORIDE: 50; 9; 1.49 INJECTION, SOLUTION INTRAVENOUS at 21:50

## 2020-06-02 RX ADMIN — LISINOPRIL 20 MG: 20 TABLET ORAL at 09:25

## 2020-06-02 RX ADMIN — Medication 100 MG: at 09:25

## 2020-06-02 RX ADMIN — FERROUS SULFATE TAB 325 MG (65 MG ELEMENTAL FE) 325 MG: 325 (65 FE) TAB at 09:25

## 2020-06-02 RX ADMIN — HYDRALAZINE HYDROCHLORIDE 50 MG: 50 TABLET, FILM COATED ORAL at 00:18

## 2020-06-02 RX ADMIN — INSULIN LISPRO 1 UNITS: 100 INJECTION, SOLUTION INTRAVENOUS; SUBCUTANEOUS at 21:24

## 2020-06-02 RX ADMIN — SODIUM CHLORIDE 12.5 ML/HR: 9 INJECTION, SOLUTION INTRAVENOUS at 00:45

## 2020-06-02 RX ADMIN — HYDRALAZINE HYDROCHLORIDE 50 MG: 50 TABLET, FILM COATED ORAL at 16:58

## 2020-06-02 ASSESSMENT — PAIN SCALES - PAIN ASSESSMENT IN ADVANCED DEMENTIA (PAINAD)
BODYLANGUAGE: 0
NEGVOCALIZATION: 0
BREATHING: 0
FACIALEXPRESSION: 0
TOTALSCORE: 0
CONSOLABILITY: 0

## 2020-06-02 NOTE — PROGRESS NOTES
ferrous sulfate  325 mg Oral Daily with breakfast    megestrol  20 mg Oral Daily    oxybutynin  5 mg Oral Nightly    [Held by provider] pyridoxine  50 mg Oral Daily    sodium chloride flush  10 mL Intravenous 2 times per day    [Held by provider] enoxaparin  40 mg Subcutaneous Daily    piperacillin-tazobactam  3.375 g Intravenous Q8H      dextrose 5% and 0.9% NaCl with KCl 20 mEq 75 mL/hr at 06/02/20 0333    dextrose 5 % and 0.45 % NaCl 150 mL/hr at 05/29/20 1603    sodium chloride Stopped (06/02/20 1110)     sodium chloride flush, heparin flush, sodium chloride flush, acetaminophen **OR** acetaminophen, promethazine **OR** ondansetron, glucose, glucagon (rDNA), dextrose, potassium chloride, magnesium sulfate, sodium phosphate IVPB **OR** sodium phosphate IVPB **OR** sodium phosphate IVPB, dextrose 5 % and 0.45 % NaCl    OBJECTIVE:  Vitals:    06/02/20 1102   BP: (!) 138/52   Pulse: 61   Resp:    Temp:    SpO2: 94%           O2 Device: None (Room air)        LABS:  WBC   Date Value Ref Range Status   06/02/2020 3.2 (L) 4.5 - 11.5 E9/L Final   06/02/2020 3.7 (L) 4.5 - 11.5 E9/L Final   06/01/2020 5.6 4.5 - 11.5 E9/L Final     Hemoglobin   Date Value Ref Range Status   06/02/2020 6.8 (L) 11.5 - 15.5 g/dL Final   06/02/2020 6.6 (L) 11.5 - 15.5 g/dL Final   06/01/2020 7.6 (L) 11.5 - 15.5 g/dL Final     Hematocrit   Date Value Ref Range Status   06/02/2020 21.8 (L) 34.0 - 48.0 % Final   06/02/2020 22.0 (L) 34.0 - 48.0 % Final   06/02/2020 21.7 (L) 34.0 - 48.0 % Final     MCV   Date Value Ref Range Status   06/02/2020 82.7 80.0 - 99.9 fL Final   06/02/2020 83.8 80.0 - 99.9 fL Final   06/01/2020 81.9 80.0 - 99.9 fL Final     Platelets   Date Value Ref Range Status   06/02/2020 165 130 - 450 E9/L Final   06/02/2020 171 130 - 450 E9/L Final   06/01/2020 185 130 - 450 E9/L Final     Sodium   Date Value Ref Range Status   06/02/2020 138 132 - 146 mmol/L Final   06/01/2020 136 132 - 146 mmol/L Final   05/31/2020 131 Result   1. Several nonspecific areas of abnormal increased T2/FLAIR signal intensity   within the subcortical white matter of the frontal and parietal lobes. Although nonspecific, this can be seen in the setting of posterior reversible   encephalopathy syndrome (PRES). 2. No acute infarct identified. 3. Remote lacunar infarct within the left basal ganglia. 4. Mild diffuse cerebral volume loss. The findings were sent to the Radiology Results [address] at 11:48   am on 6/1/2020to be communicated to a licensed caregiver. CT ABDOMEN PELVIS W IV CONTRAST Additional Contrast? None   Final Result   1. A 6.5 x 3.8 cm hematoma is present in the right groin, which is new from   the prior exam on 05/17/2020, and may be intramuscular in location. 2. Findings are concerning for an infarct involving the medial limb of the   left adrenal gland. There is hyperenhancement of the remaining adrenal gland   tissue, which can be seen in a shock state. 3. The tip of a left groin central venous catheter terminates along the   anterior margin of the sacrum, with the tip likely positioned in a small   collateral vein in this region. Repositioning is suggested. 4. Heterogeneous hyperdensity in the adjacent leftward sacrum is new from the   prior exam, and may be related to contrast injection through the central   venous catheter. 5. Soft tissue thickening overlies the sacrum and coccyx, compatible with   history of decubitus ulcer in this region. There is no definite of   osteomyelitis, although a bone scan or MRI would be a more sensitive exam.   6. Stable position of the ureteral stents, without hydronephrosis. 7. Thickening of the endometrium to 12 mm. Further evaluation with pelvic   ultrasound on a nonemergent basis is recommended. XR CHEST PORTABLE   Final Result   No acute findings         CT Head WO Contrast   Final Result   No acute intracranial abnormality.          CT ABDOMEN PELVIS WO CONTRAST Additional Contrast? Oral    (Results Pending)     PROBLEM LIST:  Principal Problem:    Altered mental state  Active Problems:    Hypertensive emergency    Adrenal infarction ([facility] [address]    Diabetic ketoacidosis ([facility] [address]  Resolved Problems:    * No resolved hospital problems. *  BP (!) 138/52   Pulse 61   Temp 98.9 °F (37.2 °C) (Oral)   Resp 16   Ht 5' 4\" (1.626 m)   Wt 119 lb (54 kg)   SpO2 94%   BMI 20.43 kg/m²   General: Awake, oriented to place and person, knows it's Tuesday and June, but not the year  HEENT: No head lesions, PERRL, EOMI, mouth without lesions, no nasal lesions, no cervical adenopathy palpated  Respiratory: Lungs with equal breath sounds bilaterally, no adventitious sounds auscultated, no accessory muscle use  CV: Regular rate, no murmurs, no JVD, no leg edema  Abdomen: Soft, non tender, + bowel sounds, no lesions  Skin: Hydrated, adequate turgor, no rash, capillary refill <2 seconds  Extremities: Muscular strength 3/5 in 4 limbs, moves 4 limbs spontaneously, distal pulses present  Neurology: Awake and alert, follows commands, moves 4 limbs on command and spontaneously,  neck is supple, no meningitic signs present. A/P:  1) Hypertensive emergency, hypertensive encephalopathy and possible PRES in a patient with chronic hypertension   --Blood pressure control with metoprolol/hydralazine/clonidine patch, goal for SBP <140 mmHg    2) Anemia, possibly acute blood loss vs anemia of chronic disease  -- CT abdomen/pelvisdid not demonstrate retroperitoneal hemorrhage but right thigh hematoma that has not changed.   --Transfusion to keep Hb at 7    3) Left adrenal gland infarct  --Cortisol level normal    4) CAD  --Continue clopidogrel    5) Diabetes mellitus  --Management with insulin administration     6) VRE UTI with retained ureteral stents  --Continue antimicrobial therapy as per ID    7) Thickened endometrium (incidental finding)  --Needs evaluation as outpatient    DVT prophylaxis with enoxaparin  Nutrition: PO  Vascular catheters: Femoral TLC to be removed, to get midline cath  Physical therapy ordered  Goals of care:  Full code  Can transfer to [facility] Arvin Torres MD  Pulmonary and Critical Care Medicine

## 2020-06-02 NOTE — PROGRESS NOTES
COVID19 in the last 72 hours. Recent Labs     05/31/20  0440 06/01/20  0456 06/02/20  0532   AST 13 14 12   ALT 7 9 7     Lab Results   Component Value Date    CHOL 163 05/30/2020    TRIG 101 05/30/2020    HDL 54 05/30/2020    LDLCALC 89 05/30/2020    LABVLDL 20 05/30/2020       Lab Results   Component Value Date    BLOODCULT2 24 Hours- no growth 05/29/2020    BLOODCULT2 24 Hours- no growth 05/29/2020    BLOODCULT2 5 Days- no growth 05/16/2020    ORG Enterococcus faecium 05/19/2020    ORG Corynebacterium species 05/18/2020    ORG Strep agalactiae (Beta Strep Group B) 05/18/2020     WOUND/ABSCESS   Date Value Ref Range Status   05/29/2020 Growth not present  Final   05/18/2020 Heavy growth  Final   05/18/2020 Light growth  Final        ASSESSMENT/PLAN:  Leukocytosis resolved has leukopenia  Lactic acidosis r/o sepsis vs meds  Right groin hematoma  Left adrenal infarction  Uti /VRE Bilateral Hydronephrosis with bilateral retained ureteral stents  S/P exchange stents 5/19  MRSA neg  anemia dec h/h 6.8    Remove tlc when able    piperacillin-tazobactam (ZOSYN) 3.375 g in dextrose 5 % 50 mL IVPB extended infusion (mini-bag), Q8H will stop if ct neg  follow temp/labs/ct a/p    · Monitor labs    Imaging and labs were reviewed per medical records. Thank you for involving me in the care of Chastity Bettencourt will continue to follow. Please do not hesitate to call for any questions or concerns.     Electronically signed by Deb Hare MD on 6/2/2020 at 11:14 AM

## 2020-06-02 NOTE — PROGRESS NOTES
Sitting: good              Endurance: fair     Comments: Patient cleared by nursing staff. Upon arrival pt was side lying on the right. At end of session pt was returned to bed and side lying on the right. All lines and tubes intact, call light within reach. Overall, pt demonstrated decreased independence and safety during completion of ADL/functional transfers/mobility tasks. Pt would benefit from continued skilled OT to increase safety and independence with completion of ADL/IADL tasks for functional independence and quality of life. · Pt has made fair progress towards set goals (as noted through increased balance at EOB).   · Continue with current plan of care    Treatment Time In: 3:30pm          Treatment Time Out: 3:53pm                Treatment Charges: Mins Units   ADL/Home Mgt                    [phone] [id number]     Therapeutic Activities          [id number]    Therapeutic Exercise           [id number]    Manual Therapy                   [id number]     Neuro Re-ed                        [id number]     Orthotic manage/training     [id number]     Total Timed Treatment [address] OTR/L [id number]

## 2020-06-02 NOTE — PROGRESS NOTES
Speech Language Pathology  DAILY PROGRESS NOTE      Seen for SP intervention to address cognitive deficits identified on previous evaluation. Patient agreeable to treatment this date. Alert and grossly oriented to place and self had poor recall of time even with visual cues. Required min verbal cues to provide context-specific response to open-ended questions. Patient did not require repetition of stimulus items. good ability to sustain attention for duration of task in quiet environment. decreased immediate and recent recall noted for events that occurred earlier today. fair long term memory recall noted with review of biographical information. Will continue POC. Education completed. Will follow.     [id number]  therapeutic interventions that focus on cognitive function , initial  15 min    Costa Marr MSCCC/SLP  Speech Language Pathologist  [phone]

## 2020-06-02 NOTE — PROGRESS NOTES
Physical Therapy Treatment Note    Room #:  IC03/IC03-01  Patient Name: Linda Corbin  YOB: 1960  MRN: [id number]    Referring Provider: Gracy Edward DO    Date of Service: 6/2/2020    Evaluating Physical Therapist:  Sumaya Rudolph, PT #[id number]      Diagnosis:   Hypertensive emergency [I16.1]  Adrenal infarction [facility]) [E27.49]        Patient Active Problem List   Diagnosis    Epigastric hernia    Umbilical hernia    Essential hypertension    Dyslipidemia    S/P cardiac catheterization    Abnormal stress ECG    Hyperthyroidism    Hydroureter    Microcytic hypochromic anemia    Severe protein-calorie malnutrition ([address]    Arterial occlusion    Hypoglycemia    Hypertensive emergency    Adrenal infarction ([address]    Diabetic ketoacidosis ([address]    Altered mental state        Tentative placement recommendation: Subacute vs Home Health Physical Therapy if patient meets goals    Equipment recommendation: To be determined      Prior Level of Function: Patient transfers to wheelchair with assist     Rehab Potential: good  for baseline    Past medical history:   Past Medical History:   Diagnosis Date    Diabetic peripheral neuropathy associated with type 2 diabetes mellitus ([address] 8/24/2018    Epigastric hernia     History of blood transfusion     Hyperlipidemia     Hypertension     Kidney stone     Schizophrenia ([address]     Type 2 diabetes mellitus with diabetic polyneuropathy, with long-term current use of insulin ([address] 4/55/2139    Umbilical hernia      Past Surgical History:   Procedure Laterality Date    CARDIAC CATHETERIZATION  09/30/2016     [facility]    COLONOSCOPY  08/2016    Dr. Shelton Cochran / Ar Dang / Osman Andrews Bilateral 10/28/2019    CYSTOSCOPY. RETROGRADE.  PYELOGRAM. BILATERAL STENT INSERTION performed by Kelli Douglas MD at [facility] / Ar Dang / [place] Bilateral 5/19/2020    CYSTOSCOPY RETROGRADE PYELOGRAM BILATERAL STENT EXCHANGE performed by Meaghan Huston MD at [address] Left 2/29/2020    LEG AMPUTATION BELOW KNEE performed by Elvin Garcia DO at [address]  11/04/2016    epigastric hernia umbilical hernia with mesh    OVARIAN CYST REMOVAL      OVARY REMOVAL Right     OVARY REMOVAL  2014    UPPER GASTROINTESTINAL ENDOSCOPY N/A 10/29/2019    EGD ESOPHAGOGASTRODUODENOSCOPY performed by Luz Lozada MD at [facility] ENDOSCOPY       Precautions: falls, alarm and left BKA   ,  schizo    SUBJECTIVE:    Social history: Patient lives with spouse and dghtr in a two story home scoots up on bottom  with 7 steps  to enter with Rail  Tub shower grab bars    Equipment owned: Wheelchair, [address] [employer] chair,       [facility]   How much difficulty turning over in bed?: A Little  How much difficulty sitting down on / standing up from a chair with arms?: A Lot  How much difficulty moving from lying on back to sitting on side of bed?: A Little  How much help from another person moving to and from a bed to a chair?: A Lot  How much help from another person needed to walk in hospital room?: A Lot  How much help from another person for climbing 3-5 steps with a railing?: Total  AM-PAC Inpatient Mobility Raw Score : 13  AM-PAC Inpatient T-Scale Score : 36.74  Mobility Inpatient CMS 0-100% Score: 64.91  Mobility Inpatient CMS G-Code Modifier : CL    Nursing cleared patient for PT treatment. OBJECTIVE:   Initial Evaluation  Date: 6/1/2020 Treatment Date:  6/2/2020      Short Term/ Long Term   Goals   Was pt agreeable to Eval/treatment?  Yes   yes To be met in 4 days   Pain level   0/10    0/10    Bed Mobility    Rolling: Minimal assist of 1    Supine to sit: Moderate assist of 1    Sit to supine: Minimal assist of 1    Scooting: Minimal assist of 1   Rolling: Supervision     Supine to sit: Minimal

## 2020-06-02 NOTE — PROGRESS NOTES
pulse 64, temperature 98.9 °F (37.2 °C), temperature source Oral, resp. rate 16, height 5' 4\" (1.626 m), weight 119 lb (54 kg), SpO2 99 %, not currently breastfeeding. HEENT:    PERRLA. EOMI. Sclera clear. Buccal mucosa moist.  Neck:    Supple. Trachea midline. No thyromegaly. No JVD. No bruits. Heart:    Rhythm regular, rate controlled. Mild systolic murmur. Lungs:    Symmetrical. Clear to auscultation bilaterally. No wheezes. No rhonchi. No rales. Abdomen:   Soft. Non-tender. Non-distended. Bowel sounds positive. No organomegaly or masses. No pain on palpation  Extremities:    Peripheral pulses present. Amputation is noted to the lower extremity. Neurologic:    Awake and alert. She is oriented x3. Her neurologic status has improved dramatically since initial presentation. No focal deficits are identified. Skin:    No petechia. No hemorrhage. Amputation wound is healing well. Psych:   Behavior is normal. Mood appears normal. Speech is not rapid or pressured. Musculokeletal:  Spine ROM normal. Muscular strength intact. Gait not assessed.   Genitalia/Breast:  Deferred    Scheduled Meds:   sodium chloride  20 mL Intravenous Once    thiamine  100 mg Oral Daily    hydrALAZINE  50 mg Oral 4 times per day    [Held by provider] clopidogrel  75 mg Oral Daily    cloNIDine  1 patch Transdermal Weekly    metoprolol tartrate  25 mg Oral BID    lisinopril  20 mg Oral Daily    insulin lispro  0-6 Units Subcutaneous 4x Daily AC & HS    atorvastatin  80 mg Oral Nightly    docusate sodium  100 mg Oral QPM    ferrous sulfate  325 mg Oral Daily with breakfast    megestrol  20 mg Oral Daily    oxybutynin  5 mg Oral Nightly    [Held by provider] pyridoxine  50 mg Oral Daily    sodium chloride flush  10 mL Intravenous 2 times per day    [Held by provider] enoxaparin  40 mg Subcutaneous Daily    piperacillin-tazobactam  3.375 g Intravenous Q8H       Continuous Infusions:   dextrose 5% and 0.9% NaCl Culture Taken Yes 5/29/2020  3:00 PM   Number of days: 13         Assessment:   1. Acute metabolic encephalopathy that is multifactorial in nature including hypertensive urgency, early diabetic ketoacidosis, and MRI evidence of PRES syndrome  2. Left adrenal infarct  3. 6.5 x 3.8 cm hematoma in the setting of failed TLC placement  4. Sacral decubitus ulcer  5. History of left lower extremity arterial occlusion, status post below-the-knee amputation on 02/29/2020;  6. Coronary artery disease  7. Hyperlipidemia  8. Hypothyroidism  9. Schizophrenia    Plan:   MRI is suggestive of press syndrome and following improvement in her blood pressures, her altered mental status has entirely resolved. We continue to titrate her blood pressure medications accordingly. Hemoglobin has trended downward substantially since hospitalization without outward signs of bleeding. Plavix and Lovenox will be placed on hold and she will be transfused with 1 unit of packed red blood cells this morning. Occult stool will also be assessed. CT scan on admission indicated a hematoma after failed triple-lumen catheter placement to the right groin. This does not appear to have expanded on my examination today. We will repeat a CT scan of the abdomen and pelvis to rule out any other source of bleeding including retroperitoneal bleed. Pending the results of the test and stabilization of hemoglobin, the patient will likely be acceptable for transfer from the intensive care unit. Greater than 40 minutes of critical care time was spent with the patient. This time included chart review, , and discussion with those consultants involved in the patient's care. More than 50% of my  time was spent at the bedside counseling/coordinating care with the patient and/or family with face to face contact. This time was spent reviewing notes and laboratory data as well as instructing and counseling the patient.  Time I spent with the family

## 2020-06-03 ENCOUNTER — TELEPHONE (OUTPATIENT)
Dept: FAMILY MEDICINE CLINIC | Age: 60
End: 2020-06-03

## 2020-06-03 LAB
ALBUMIN SERPL-MCNC: 2.4 G/DL (ref 3.5–5.2)
ALP BLD-CCNC: 60 U/L (ref 35–104)
ALT SERPL-CCNC: 7 U/L (ref 0–32)
ANION GAP SERPL CALCULATED.3IONS-SCNC: 8 MMOL/L (ref 7–16)
AST SERPL-CCNC: 10 U/L (ref 0–31)
BASOPHILS ABSOLUTE: 0.04 E9/L (ref 0–0.2)
BASOPHILS RELATIVE PERCENT: 1 % (ref 0–2)
BILIRUB SERPL-MCNC: 0.5 MG/DL (ref 0–1.2)
BUN BLDV-MCNC: 10 MG/DL (ref 8–23)
CALCIUM SERPL-MCNC: 8.1 MG/DL (ref 8.6–10.2)
CHLORIDE BLD-SCNC: 111 MMOL/L (ref 98–107)
CO2: 19 MMOL/L (ref 22–29)
CREAT SERPL-MCNC: 0.8 MG/DL (ref 0.5–1)
CULTURE, BLOOD 2: NORMAL
CULTURE, BLOOD 2: NORMAL
EOSINOPHILS ABSOLUTE: 0.06 E9/L (ref 0.05–0.5)
EOSINOPHILS RELATIVE PERCENT: 1.5 % (ref 0–6)
GFR AFRICAN AMERICAN: >60
GFR NON-AFRICAN AMERICAN: >60 ML/MIN/1.73
GLUCOSE BLD-MCNC: 149 MG/DL (ref 74–99)
HCT VFR BLD CALC: 25.5 % (ref 34–48)
HEMOGLOBIN: 7.9 G/DL (ref 11.5–15.5)
IMMATURE GRANULOCYTES #: 0.03 E9/L
IMMATURE GRANULOCYTES %: 0.7 % (ref 0–5)
LYMPHOCYTES ABSOLUTE: 1.63 E9/L (ref 1.5–4)
LYMPHOCYTES RELATIVE PERCENT: 39.6 % (ref 20–42)
MCH RBC QN AUTO: 26.2 PG (ref 26–35)
MCHC RBC AUTO-ENTMCNC: 31 % (ref 32–34.5)
MCV RBC AUTO: 84.4 FL (ref 80–99.9)
METER GLUCOSE: 136 MG/DL (ref 74–99)
METER GLUCOSE: 152 MG/DL (ref 74–99)
METER GLUCOSE: 192 MG/DL (ref 74–99)
METER GLUCOSE: 198 MG/DL (ref 74–99)
MONOCYTES ABSOLUTE: 0.78 E9/L (ref 0.1–0.95)
MONOCYTES RELATIVE PERCENT: 18.9 % (ref 2–12)
NEUTROPHILS ABSOLUTE: 1.58 E9/L (ref 1.8–7.3)
NEUTROPHILS RELATIVE PERCENT: 38.3 % (ref 43–80)
PDW BLD-RTO: 18.3 FL (ref 11.5–15)
PLATELET # BLD: 180 E9/L (ref 130–450)
PMV BLD AUTO: 10.8 FL (ref 7–12)
POTASSIUM SERPL-SCNC: 4.4 MMOL/L (ref 3.5–5)
RBC # BLD: 3.02 E12/L (ref 3.5–5.5)
SODIUM BLD-SCNC: 138 MMOL/L (ref 132–146)
TOTAL PROTEIN: 5.4 G/DL (ref 6.4–8.3)
WBC # BLD: 4.1 E9/L (ref 4.5–11.5)

## 2020-06-03 PROCEDURE — 80053 COMPREHEN METABOLIC PANEL: CPT

## 2020-06-03 PROCEDURE — 6370000000 HC RX 637 (ALT 250 FOR IP): Performed by: INTERNAL MEDICINE

## 2020-06-03 PROCEDURE — 2580000003 HC RX 258: Performed by: INTERNAL MEDICINE

## 2020-06-03 PROCEDURE — 6370000000 HC RX 637 (ALT 250 FOR IP): Performed by: PSYCHIATRY & NEUROLOGY

## 2020-06-03 PROCEDURE — 85025 COMPLETE CBC W/AUTO DIFF WBC: CPT

## 2020-06-03 PROCEDURE — 82962 GLUCOSE BLOOD TEST: CPT

## 2020-06-03 PROCEDURE — 97530 THERAPEUTIC ACTIVITIES: CPT

## 2020-06-03 PROCEDURE — 6360000002 HC RX W HCPCS: Performed by: INTERNAL MEDICINE

## 2020-06-03 PROCEDURE — 2500000003 HC RX 250 WO HCPCS: Performed by: INTERNAL MEDICINE

## 2020-06-03 PROCEDURE — 36592 COLLECT BLOOD FROM PICC: CPT

## 2020-06-03 PROCEDURE — 97110 THERAPEUTIC EXERCISES: CPT

## 2020-06-03 PROCEDURE — 92507 TX SP LANG VOICE COMM INDIV: CPT | Performed by: SPEECH-LANGUAGE PATHOLOGIST

## 2020-06-03 PROCEDURE — 36415 COLL VENOUS BLD VENIPUNCTURE: CPT

## 2020-06-03 PROCEDURE — 1200000000 HC SEMI PRIVATE

## 2020-06-03 RX ORDER — FOLIC ACID 1 MG/1
1 TABLET ORAL DAILY
Status: DISCONTINUED | OUTPATIENT
Start: 2020-06-03 | End: 2020-06-04 | Stop reason: HOSPADM

## 2020-06-03 RX ADMIN — PIPERACILLIN AND TAZOBACTAM 3.38 G: 3; .375 INJECTION, POWDER, LYOPHILIZED, FOR SOLUTION INTRAVENOUS at 21:05

## 2020-06-03 RX ADMIN — FOLIC ACID 1 MG: 1 TABLET ORAL at 10:39

## 2020-06-03 RX ADMIN — INSULIN LISPRO 1 UNITS: 100 INJECTION, SOLUTION INTRAVENOUS; SUBCUTANEOUS at 21:05

## 2020-06-03 RX ADMIN — FERROUS SULFATE TAB 325 MG (65 MG ELEMENTAL FE) 325 MG: 325 (65 FE) TAB at 10:39

## 2020-06-03 RX ADMIN — Medication 10 ML: at 22:15

## 2020-06-03 RX ADMIN — ATORVASTATIN CALCIUM 80 MG: 40 TABLET, FILM COATED ORAL at 21:05

## 2020-06-03 RX ADMIN — SODIUM CHLORIDE 12.5 ML/HR: 9 INJECTION, SOLUTION INTRAVENOUS at 10:38

## 2020-06-03 RX ADMIN — HEPARIN 300 UNITS: 100 SYRINGE at 10:40

## 2020-06-03 RX ADMIN — METOPROLOL TARTRATE 25 MG: 25 TABLET, FILM COATED ORAL at 10:39

## 2020-06-03 RX ADMIN — OXYBUTYNIN CHLORIDE 5 MG: 5 TABLET ORAL at 21:05

## 2020-06-03 RX ADMIN — PIPERACILLIN AND TAZOBACTAM 3.38 G: 3; .375 INJECTION, POWDER, LYOPHILIZED, FOR SOLUTION INTRAVENOUS at 05:26

## 2020-06-03 RX ADMIN — SODIUM CHLORIDE 12.5 ML/HR: 9 INJECTION, SOLUTION INTRAVENOUS at 01:49

## 2020-06-03 RX ADMIN — SODIUM CHLORIDE 12.5 ML/HR: 9 INJECTION, SOLUTION INTRAVENOUS at 23:54

## 2020-06-03 RX ADMIN — HYDRALAZINE HYDROCHLORIDE 50 MG: 50 TABLET, FILM COATED ORAL at 05:26

## 2020-06-03 RX ADMIN — DEXTROSE MONOHYDRATE, SODIUM CHLORIDE, AND POTASSIUM CHLORIDE: 50; 9; 1.49 INJECTION, SOLUTION INTRAVENOUS at 10:38

## 2020-06-03 RX ADMIN — Medication 100 MG: at 10:39

## 2020-06-03 RX ADMIN — METOPROLOL TARTRATE 25 MG: 25 TABLET, FILM COATED ORAL at 21:05

## 2020-06-03 RX ADMIN — LISINOPRIL 20 MG: 20 TABLET ORAL at 10:39

## 2020-06-03 RX ADMIN — PYRIDOXINE HCL TAB 50 MG 50 MG: 50 TAB at 10:39

## 2020-06-03 RX ADMIN — PIPERACILLIN AND TAZOBACTAM 3.38 G: 3; .375 INJECTION, POWDER, LYOPHILIZED, FOR SOLUTION INTRAVENOUS at 13:52

## 2020-06-03 RX ADMIN — HYDRALAZINE HYDROCHLORIDE 50 MG: 50 TABLET, FILM COATED ORAL at 21:32

## 2020-06-03 RX ADMIN — HYDRALAZINE HYDROCHLORIDE 50 MG: 50 TABLET, FILM COATED ORAL at 13:52

## 2020-06-03 RX ADMIN — INSULIN LISPRO 1 UNITS: 100 INJECTION, SOLUTION INTRAVENOUS; SUBCUTANEOUS at 12:11

## 2020-06-03 RX ADMIN — DOCUSATE SODIUM 100 MG: 100 CAPSULE, LIQUID FILLED ORAL at 17:01

## 2020-06-03 RX ADMIN — MEGESTROL ACETATE 20 MG: 40 TABLET ORAL at 13:53

## 2020-06-03 ASSESSMENT — PAIN SCALES - GENERAL
PAINLEVEL_OUTOF10: 0
PAINLEVEL_OUTOF10: 0

## 2020-06-03 NOTE — PROGRESS NOTES
Speech Language Pathology      NAME:  Ada Petty  :  1960  DATE: 6/3/2020  ROOM:  4696/8697-64    Patient seen for speech therapy 15 minutes improved orientation to month still difficulty with year.   Good participation in conversational speech tasks however needs prompts to provide details regarding safety strategies that should be implemented to improve independence  Will continue POC    Hypertensive emergency [I16.1]  Adrenal infarction [facility]) [E27.49]    [address]  speech/language tx    Christoph Betancourt MSCCC/SLP  Speech Language Pathologist  [id number]

## 2020-06-03 NOTE — PROGRESS NOTES
bruits. Heart:    Rhythm regular, rate controlled. Mild systolic murmur. Lungs:    Symmetrical. Clear to auscultation bilaterally. No wheezes. No rhonchi. No rales. Abdomen:   Soft. Non-tender. Non-distended. Bowel sounds positive. No organomegaly or masses. No pain on palpation  Extremities:    Peripheral pulses present. Amputation is noted to the lower extremity. Neurologic:    Awake and alert. She is oriented x3. Her neurologic status has improved dramatically since initial presentation. No focal deficits are identified. Skin:    No petechia. No hemorrhage. Amputation wound is healing well. Psych:   Behavior is normal. Mood appears normal. Speech is not rapid or pressured. Musculokeletal:  Spine ROM normal. Muscular strength intact. Gait not assessed. Genitalia/Breast:  Voiding with the use of a Gerber catheter.     Scheduled Meds:   sodium chloride  20 mL Intravenous Once    lidocaine  5 mL Intradermal Once    heparin flush  3 mL Intravenous 2 times per day    thiamine  100 mg Oral Daily    hydrALAZINE  50 mg Oral 4 times per day    [Held by provider] clopidogrel  75 mg Oral Daily    cloNIDine  1 patch Transdermal Weekly    metoprolol tartrate  25 mg Oral BID    lisinopril  20 mg Oral Daily    insulin lispro  0-6 Units Subcutaneous 4x Daily AC & HS    atorvastatin  80 mg Oral Nightly    docusate sodium  100 mg Oral QPM    ferrous sulfate  325 mg Oral Daily with breakfast    megestrol  20 mg Oral Daily    oxybutynin  5 mg Oral Nightly    pyridoxine  50 mg Oral Daily    sodium chloride flush  10 mL Intravenous 2 times per day    [Held by provider] enoxaparin  40 mg Subcutaneous Daily    piperacillin-tazobactam  3.375 g Intravenous Q8H       Continuous Infusions:   dextrose 5% and 0.9% NaCl with KCl 20 mEq 75 mL/hr at 06/02/20 2150    dextrose 5 % and 0.45 % NaCl 150 mL/hr at 05/29/20 1603    sodium chloride Stopped (06/03/20 0349)       Objective Data:  CBC with Differential: decline or death. Continued cardiac monitoring and higher level of nursing are required. I am readily available for any further decision-making and intervention.        Cathleen Dougherty DO, KALIN.A.C.O.I.  6/3/2020  7:23 AM

## 2020-06-03 NOTE — PROGRESS NOTES
BILATERAL STENT EXCHANGE performed by Niki Desai MD at [address] Left 2/29/2020    LEG AMPUTATION BELOW KNEE performed by Jeanette Balderas DO at [address]  11/04/2016    epigastric hernia umbilical hernia with mesh    OVARIAN CYST REMOVAL      OVARY REMOVAL Right     OVARY REMOVAL  2014    UPPER GASTROINTESTINAL ENDOSCOPY N/A 10/29/2019    EGD ESOPHAGOGASTRODUODENOSCOPY performed by Randall Lu MD at [facility] ENDOSCOPY       Precautions: falls, alarm and left BKA   ,  schizo    SUBJECTIVE:    Social history: Patient lives with spouse and dghtr in a two story home scoots up on bottom  with 7 steps  to enter with Rail  Tub shower grab bars    Equipment owned: Wheelchair, [address] [facility] [address] [facility],       [address] [facility] [employer]   How much difficulty turning over in bed?: A Little  How much difficulty sitting down on / standing up from a chair with arms?: A Lot  How much difficulty moving from lying on back to sitting on side of bed?: A Little  How much help from another person moving to and from a bed to a chair?: A Lot  How much help from another person needed to walk in hospital room?: A Lot  How much help from another person for climbing 3-5 steps with a railing?: Total  AM-PAC Inpatient Mobility Raw Score : 13  AM-PAC Inpatient T-Scale Score : 36.74  Mobility Inpatient CMS 0-100% Score: 64.91  Mobility Inpatient CMS G-Code Modifier : CL    Nursing cleared patient for PT treatment. OBJECTIVE:   Initial Evaluation  Date: 6/1/2020 Treatment Date:  6/3/2020      Short Term/ Long Term   Goals   Was pt agreeable to Eval/treatment?  Yes   yes To be met in 4 days   Pain level   0/10    0/10    Bed Mobility    Rolling: Minimal assist of 1    Supine to sit: Moderate assist of 1    Sit to supine: Minimal assist of 1    Scooting: Minimal assist of 1   Rolling: Supervision     Supine to sit: Supervision     Sit to supine: Supervision     Scooting: Minimal assist of 1    Rolling: Independent    Supine to sit: Independent    Sit to supine: Independent    Scooting: Independent     Transfers Sit to stand: Minimal assist of 2   Sit to stand: Moderate assist of 1 for safety     Sit to stand: Minimal assist of 1     Ambulation    4 side steps using  wheeled walker with Minimal assist of 2   heel toe walk right foot Pt moved toward head of bed using wheeled walker with min/mod assist with pt heel /toe shuffle on right lower extremity. 5 feet using  wheeled walker with Minimal assist of 1    Stair negotiation: ascended and descended   Not assessed            ROM Within functional limits lacking ~10* left knee extension    Increase range of motion 10% of affected joints    Strength BUE: 4/5  RLE:  3/5  LLE:  3/5   Increase strength in affected mm groups by 1/3 grade   Balance Sitting EOB:  good    Dynamic Standing:  fair wheeled walker Sitting EOB:  good     Dynamic Standing:  fair  -wheeled walker   Sitting EOB:  good    Dynamic Standing: fair +wheeled walker     Patient is Alert & Oriented x person, place, time and situation  and follows directions    Sensation:  Pt denies numbness and tingling to extremities  Edema:  yes left residual limb    Patient education  Patient educated on role of Physical Therapy, risks of immobility and plan of care and safety       Patient response to education:   Pt verbalized understanding Pt demonstrated skill Pt requires further education in this area   Yes Partial Yes       ASSESSMENT:    Comments: pt with improved safety awareness this session. Pleasant and cooperative     Treatment:  Patient practiced and was instructed in the following treatment:      Sat edge of bed 15 minutes with Supervision  to increase dynamic sitting balance and activity tolerance. stood x 3 reps  heel toe walk to Head of bed as above. Returned to bed performed exercises.      Therapeutic Exercises: ankle pumps and long arc quad 2  x 10 reps. Right lower extremity emphasis on right knee extension. At end of session, patient in bed , alarm activated,  and nursing present,   all lines and tubes intact. Patient would benefit from continued skilled Physical Therapy to improve functional independence and quality of life. PLAN:    Patient is making fair progress towards established goals. Will continue with current Plan of care.       Time in 1025  Time out 1048    Total Treatment Time 23  minutes    CPT codes:  Therapeutic activities (97066)   15 minutes  1 unit(s)  Therapeutic exercises (37505)   8 minutes  1 unit(s)    Vallery  PTA  LIC# [id number]

## 2020-06-03 NOTE — PROGRESS NOTES
No results for input(s): COVID19 in the last 72 hours. Recent Labs     06/01/20  0456 06/02/20  0532 06/03/20  0540   AST 14 12 10   ALT 9 7 7     Lab Results   Component Value Date    CHOL 163 05/30/2020    TRIG 101 05/30/2020    HDL 54 05/30/2020    LDLCALC 89 05/30/2020    LABVLDL 20 05/30/2020       Lab Results   Component Value Date    BLOODCULT2 24 Hours- no growth 05/29/2020    BLOODCULT2 5 Days- no growth 05/29/2020    BLOODCULT2 5 Days- no growth 05/16/2020    ORG Enterococcus faecium 05/19/2020    ORG Corynebacterium species 05/18/2020    ORG Strep agalactiae (Beta Strep Group B) 05/18/2020     WOUND/ABSCESS   Date Value Ref Range Status   05/29/2020 Growth not present  Final   05/18/2020 Heavy growth  Final   05/18/2020 Light growth  Final     ASSESSMENT:  · Leukocytosis resolved has leukopenia  · Lactic acidosis r/o sepsis vs meds  · Right groin hematoma  · Left adrenal infarction  · Uti /VRE Bilateral Hydronephrosis with bilateral retained ureteral stents  · S/P exchange stents 5/19  · MRSA neg  · anemia     Plan:   · On zosyn about day #6  · Will stop zosyn on discharge and remove line  · Monitor temps  · Review CT A/P  · Monitor labs    Imaging and labs were reviewed per medical records. Thank you for involving me in the care of Nikolas Oakley will continue to follow. Please do not hesitate to call for any questions or concerns. Electronically signed by BENJI Miller on 6/3/2020 at 1:57 PM   As above    I have performed and participated in the history, exam, assessment and plan on [facility] today  with NP. Pertinent notes, labs and imaging were reviewed. POC was discussed with patient including medications and side effects    Pt had the opportunity to ask questions. All questions were answered. Pt is pleasant nad  Eating  Has rue picc  Will d/c atbx prior to d/c  Follow off atbx  Can d/c   Thank you for involving me in the care of [facility].  Please do

## 2020-06-04 VITALS
TEMPERATURE: 97.8 F | WEIGHT: 124 LBS | RESPIRATION RATE: 19 BRPM | SYSTOLIC BLOOD PRESSURE: 122 MMHG | DIASTOLIC BLOOD PRESSURE: 46 MMHG | HEIGHT: 64 IN | BODY MASS INDEX: 21.17 KG/M2 | OXYGEN SATURATION: 98 % | HEART RATE: 76 BPM

## 2020-06-04 LAB
METER GLUCOSE: 140 MG/DL (ref 74–99)
METER GLUCOSE: 148 MG/DL (ref 74–99)

## 2020-06-04 PROCEDURE — 6370000000 HC RX 637 (ALT 250 FOR IP): Performed by: INTERNAL MEDICINE

## 2020-06-04 PROCEDURE — 2580000003 HC RX 258: Performed by: INTERNAL MEDICINE

## 2020-06-04 PROCEDURE — 6360000002 HC RX W HCPCS: Performed by: INTERNAL MEDICINE

## 2020-06-04 PROCEDURE — 82962 GLUCOSE BLOOD TEST: CPT

## 2020-06-04 PROCEDURE — 2500000003 HC RX 250 WO HCPCS: Performed by: INTERNAL MEDICINE

## 2020-06-04 PROCEDURE — 6370000000 HC RX 637 (ALT 250 FOR IP): Performed by: PSYCHIATRY & NEUROLOGY

## 2020-06-04 RX ORDER — LISINOPRIL 20 MG/1
20 TABLET ORAL DAILY
Qty: 30 TABLET | Refills: 3 | Status: SHIPPED | OUTPATIENT
Start: 2020-06-04 | End: 2020-10-20

## 2020-06-04 RX ORDER — FERROUS SULFATE 325(65) MG
325 TABLET ORAL
Qty: 30 TABLET | Refills: 3 | Status: SHIPPED
Start: 2020-06-04 | End: 2020-10-31 | Stop reason: SDUPTHER

## 2020-06-04 RX ORDER — FOLIC ACID 1 MG/1
1 TABLET ORAL DAILY
Qty: 30 TABLET | Refills: 3 | Status: SHIPPED | OUTPATIENT
Start: 2020-06-04 | End: 2021-07-13

## 2020-06-04 RX ORDER — HYDRALAZINE HYDROCHLORIDE 50 MG/1
50 TABLET, FILM COATED ORAL 4 TIMES DAILY
Qty: 120 TABLET | Refills: 0 | Status: SHIPPED | OUTPATIENT
Start: 2020-06-04 | End: 2020-07-06

## 2020-06-04 RX ORDER — CLONIDINE 0.3 MG/24H
1 PATCH, EXTENDED RELEASE TRANSDERMAL WEEKLY
Qty: 4 PATCH | Refills: 1 | Status: SHIPPED | OUTPATIENT
Start: 2020-06-07 | End: 2020-07-06 | Stop reason: SDUPTHER

## 2020-06-04 RX ORDER — LANOLIN ALCOHOL/MO/W.PET/CERES
100 CREAM (GRAM) TOPICAL DAILY
Qty: 30 TABLET | Refills: 3 | Status: SHIPPED | OUTPATIENT
Start: 2020-06-04 | End: 2020-10-20 | Stop reason: SDUPTHER

## 2020-06-04 RX ADMIN — INSULIN LISPRO 1 UNITS: 100 INJECTION, SOLUTION INTRAVENOUS; SUBCUTANEOUS at 11:29

## 2020-06-04 RX ADMIN — FOLIC ACID 1 MG: 1 TABLET ORAL at 09:32

## 2020-06-04 RX ADMIN — HYDRALAZINE HYDROCHLORIDE 50 MG: 50 TABLET, FILM COATED ORAL at 03:07

## 2020-06-04 RX ADMIN — PIPERACILLIN AND TAZOBACTAM 3.38 G: 3; .375 INJECTION, POWDER, LYOPHILIZED, FOR SOLUTION INTRAVENOUS at 05:59

## 2020-06-04 RX ADMIN — FERROUS SULFATE TAB 325 MG (65 MG ELEMENTAL FE) 325 MG: 325 (65 FE) TAB at 09:32

## 2020-06-04 RX ADMIN — DEXTROSE MONOHYDRATE, SODIUM CHLORIDE, AND POTASSIUM CHLORIDE: 50; 9; 1.49 INJECTION, SOLUTION INTRAVENOUS at 03:42

## 2020-06-04 RX ADMIN — Medication 100 MG: at 09:32

## 2020-06-04 RX ADMIN — HYDRALAZINE HYDROCHLORIDE 50 MG: 50 TABLET, FILM COATED ORAL at 09:00

## 2020-06-04 RX ADMIN — METOPROLOL TARTRATE 25 MG: 25 TABLET, FILM COATED ORAL at 09:33

## 2020-06-04 RX ADMIN — LISINOPRIL 20 MG: 20 TABLET ORAL at 09:32

## 2020-06-04 RX ADMIN — PYRIDOXINE HCL TAB 50 MG 50 MG: 50 TAB at 09:33

## 2020-06-04 ASSESSMENT — PAIN SCALES - GENERAL
PAINLEVEL_OUTOF10: 0
PAINLEVEL_OUTOF10: 0

## 2020-06-04 NOTE — DISCHARGE SUMMARY
CT of the head was performed without the administration of intravenous contrast. Dose modulation, iterative reconstruction, and/or weight based adjustment of the mA/kV was utilized to reduce the radiation dose to as low as reasonably achievable. COMPARISON: 05/19/2020 HISTORY: ORDERING SYSTEM PROVIDED HISTORY: mental status change TECHNOLOGIST PROVIDED HISTORY: Has a \"code stroke\" or \"stroke alert\" been called? ->No Reason for exam:->mental status change Initial exam FINDINGS: BRAIN/VENTRICLES: There is no acute intracranial hemorrhage, mass effect or midline shift. No abnormal extra-axial fluid collection. The gray-white differentiation is maintained without evidence of an acute infarct. There is no evidence of hydrocephalus. ORBITS: The visualized portion of the orbits demonstrate no acute abnormality. SINUSES: The visualized paranasal sinuses and mastoid air cells demonstrate no acute abnormality. SOFT TISSUES/SKULL:  No acute abnormality of the visualized skull or soft tissues. No acute intracranial abnormality. Ct Head Wo Contrast    Result Date: 5/19/2020  EXAMINATION: CT OF THE HEAD WITHOUT CONTRAST  5/19/2020 1:00 am TECHNIQUE: CT of the head was performed without the administration of intravenous contrast. Dose modulation, iterative reconstruction, and/or weight based adjustment of the mA/kV was utilized to reduce the radiation dose to as low as reasonably achievable. COMPARISON: May 17, 2020 HISTORY: ORDERING SYSTEM PROVIDED HISTORY: fall TECHNOLOGIST PROVIDED HISTORY: Has a \"code stroke\" or \"stroke alert\" been called? ->No Reason for exam:->fall FINDINGS: BRAIN/VENTRICLES: There is no acute intracranial hemorrhage, mass effect or midline shift. No abnormal extra-axial fluid collection. The gray-white differentiation is maintained without evidence of an acute infarct. There is no evidence of hydrocephalus. Generalized atrophy and scattered senescent white matter changes are again noted.  ORBITS: 12 mm in the sagittal plane. Possible small subserosal fibroid along the uterine fundus. Peritoneum/Retroperitoneum: A left common femoral central venous catheter is present, with tip terminating near the level of the leftward sacrum (see series 2, image 127). No lymphadenopathy. Moderate atherosclerosis. The visceral arteries are patent. No free air or free fluid. Bones/Soft Tissues: Soft tissue thickening extends to the level of the coccyx and distal sacrum. Heterogeneous appearance of the leftward aspect of the sacrum, which could be related to contrast injection in this region if this was performed through the central venous catheter. This finding was not present on the prior exam.  A hematoma is noted in the region of the right groin which may be intramuscular in location, which measures 6.5 x 3.8 cm. 1. A 6.5 x 3.8 cm hematoma is present in the right groin, which is new from the prior exam on 05/17/2020, and may be intramuscular in location. 2. Findings are concerning for an infarct involving the medial limb of the left adrenal gland. There is hyperenhancement of the remaining adrenal gland tissue, which can be seen in a shock state. 3. The tip of a left groin central venous catheter terminates along the anterior margin of the sacrum, with the tip likely positioned in a small collateral vein in this region. Repositioning is suggested. 4. Heterogeneous hyperdensity in the adjacent leftward sacrum is new from the prior exam, and may be related to contrast injection through the central venous catheter. 5. Soft tissue thickening overlies the sacrum and coccyx, compatible with history of decubitus ulcer in this region. There is no definite of osteomyelitis, although a bone scan or MRI would be a more sensitive exam. 6. Stable position of the ureteral stents, without hydronephrosis. 7. Thickening of the endometrium to 12 mm.   Further evaluation with pelvic ultrasound on a nonemergent basis is PROVIDED HISTORY: Reason for exam:->altered FINDINGS: The cardiac silhouette appears within normal limits for size given portable technique. No convincing evidence of a focal consolidation. No pleural effusion or pneumothorax seen. No acute cardiopulmonary abnormality. Reuben Bulljavier Wo Kub W Wo Tomogram    Result Date: 5/19/2020  EXAMINATION: SPOT FLUOROSCOPIC IMAGES 5/19/2020 1:05 pm TECHNIQUE: Fluoroscopy was provided by the radiology department for procedure. Radiologist was not present during examination. FLUOROSCOPY DOSE AND TYPE OR TIME AND EXPOSURES: luis [place] = 12.17 mGy COMPARISON: None HISTORY: Intraprocedural imaging. Bilateral stent exchange FINDINGS: Spot images of the abdomen were obtained. Spot images demonstrate exchange of bilateral ureteral stents. Intraprocedural fluoroscopic spot images as above. See separate procedure report for more information. Mri Brain Wo Contrast    Result Date: 6/1/2020  EXAMINATION: MRI OF THE BRAIN WITHOUT CONTRAST  6/1/2020 11:19 am TECHNIQUE: Multiplanar multisequence MRI of the brain was performed without the administration of intravenous contrast. COMPARISON: CT brain 05/29/2020 HISTORY: ORDERING SYSTEM PROVIDED HISTORY: CVA TECHNOLOGIST PROVIDED HISTORY: Reason for exam:->CVA FINDINGS: INTRACRANIAL STRUCTURES/VENTRICLES: There are no areas of restricted diffusion identified to suggest an acute infarct. There is no acute intracranial hemorrhage. No mass effect or midline shift is present. There is mild diffuse cerebral volume loss. There are several foci of abnormal increased T2/FLAIR signal intensity within the subcortical white matter of the frontal and parietal lobes. There is a remote lacunar infarct within the left basal ganglia. There is no ventriculomegaly or abnormal extra-axial fluid collection present. There is no sellar or suprasellar mass present. The proximal portions of the [place] demonstrate normal flow voids.  ORBITS: Limited evaluation of the orbits is unremarkable. SINUSES: The paranasal sinuses and mastoid air cells are clear. BONES/SOFT TISSUES: Bone marrow signal intensity is normal.     1. Several nonspecific areas of abnormal increased T2/FLAIR signal intensity within the subcortical white matter of the frontal and parietal lobes. Although nonspecific, this can be seen in the setting of posterior reversible encephalopathy syndrome (PRES). 2. No acute infarct identified. 3. Remote lacunar infarct within the left basal ganglia. 4. Mild diffuse cerebral volume loss. The findings were sent to the Radiology Results [address] at 11:48 am on 6/1/2020to be communicated to a licensed caregiver. HOSPITAL COURSE:   Yuliya Romero spent an extended period of time hospitalized and this will be a brief synopsis of the events that occurred during the hospitalization. It is important to note, Yuliya Romero lives at home with her  and daughter. We have requested skilled nursing facility placement on multiple previous hospitalization and family continues to defer. I have extreme concern for noncompliance with her medications at home. She presented to the hospital acutely altered and an early diabetic ketoacidosis. She was placed in the intensive care unit. Her blood sugars responded accordingly but she remained markedly hypertensive. In the setting of acute encephalopathy, MRI was obtained indicative of PRES and drome. Following blood pressure medication titration and improvement in her blood pressure, her mental status entirely resolved. She was evaluated by the infectious disease and surgical teams. Antibiotic therapy will be discontinued upon discharge. The sacral wound was evaluated with no plans for debridement and local wound care only. Gerber catheter was removed. She began tolerating a diet. Her blood sugars were better controlled.   She was ultimately deemed acceptable for discharge home despite our recommendations for skilled nursing facility placement. She needs to monitor her blood pressure better at home. She voiced understanding and agreement. Home health care has been resumed. She is acceptable for discharge. Also to note, the patient underwent failed TLC catheter placement attempt in the emergency department. She developed a hematoma with mild blood loss anemia. Her Plavix was placed on hold temporarily. She required packed red blood cell transfusion and repeat CT indicated no active hemorrhage. She was placed back on antiplatelet therapy and hemoglobin stabilized. BRIEF PHYSICAL EXAMINATION AND LABORATORIES ON DAY OF DISCHARGE:  VITALS:  BP (!) 122/46   Pulse 76   Temp 97.8 °F (36.6 °C)   Resp 19   Ht 5' 4\" (1.626 m)   Wt 124 lb (56.2 kg)   SpO2 98%   BMI 21.28 kg/m²     HEENT:  PERRLA. EOMI. Sclera clear. Buccal mucosa moist.    Neck:  Supple. Trachea midline. No thyromegaly. No JVD. No bruits. Heart:  Rhythm regular, rate controlled. Systolic murmur. Lungs:  Symmetrical. Clear to auscultation bilaterally. No wheezes. No rhonchi. No rales. Abdomen: Soft. Non-tender. Non-distended. Bowel sounds positive. No organomegaly or masses. No pain on palpation    Extremities:  Peripheral pulses present. 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 Neurologic:  Alert x 3. No focal deficit. Cranial nerves grossly intact. Skin:  No petechia. No hemorrhage. Sacral wound is appropriately dressed. DISPOSITION:  The patient's condition is good. At this time the patient is without objective evidence of an acute process requiring continuing hospitalization or inpatient management. They are stable for discharge with outpatient follow-up. I have spoken with the patient and discussed the results of the current hospitalization, in addition to providing specific details for the plan of care and counseling regarding the diagnosis and prognosis.   The plan has been discussed in detail and go to the ED. Preparing for this patient's discharge, including paperwork, orders, instructions, and meeting with patient did require > 30 minutes.     Rupali Barfield DO   6/4/2020  7:44 AM

## 2020-06-04 NOTE — CARE COORDINATION
SS NOTE: COVID NEGATIVE 5/29. SW advised I [facility] pt's dch today- they will follow pt at home. Juan José Pedro. 6/4/2020.10:43 AM.

## 2020-06-04 NOTE — PLAN OF CARE
Problem: Falls - Risk of:  Goal: Will remain free from falls  Outcome: Met This Shift  Goal: Absence of physical injury  Outcome: Met This Shift     Problem: Discharge Planning:  Goal: Participates in care planning  Outcome: Met This Shift  Goal: Discharged to appropriate level of care  Outcome: Met This Shift     Problem: Aspiration:  Goal: Absence of aspiration  Outcome: Met This Shift     Problem: Mental Status - Impaired:  Goal: Mental status will be restored to baseline  Outcome: Met This Shift     Problem: Nutrition Deficit:  Goal: Ability to achieve adequate nutritional intake will improve  Outcome: Met This Shift     Problem: Skin Integrity - Impaired:  Goal: Will show no infection signs and symptoms  Outcome: Met This Shift  Goal: Absence of new skin breakdown  Outcome: Met This Shift     Problem: Falls - Risk of:  Goal: Will remain free from falls  Outcome: Met This Shift     Problem: Falls - Risk of:  Goal: Absence of physical injury  Outcome: Met This Shift     Problem: Discharge Planning:  Goal: Participates in care planning  Outcome: Met This Shift     Problem: Discharge Planning:  Goal: Discharged to appropriate level of care  Outcome: Met This Shift     Problem: Aspiration:  Goal: Absence of aspiration  Outcome: Met This Shift     Problem: Mental Status - Impaired:  Goal: Mental status will be restored to baseline  Outcome: Met This Shift

## 2020-06-05 ENCOUNTER — CARE COORDINATION (OUTPATIENT)
Dept: CASE MANAGEMENT | Age: 60
End: 2020-06-05

## 2020-06-05 LAB — BLOOD CULTURE, ROUTINE: NORMAL

## 2020-06-05 PROCEDURE — 1111F DSCHRG MED/CURRENT MED MERGE: CPT | Performed by: FAMILY MEDICINE

## 2020-06-09 NOTE — TELEPHONE ENCOUNTER
I have left them several messages (family and home care) that patient will not need Humalog since her blood sugars have been running low. Too high of risk for hypoglycemia.

## 2020-06-10 ENCOUNTER — TELEPHONE (OUTPATIENT)
Dept: FAMILY MEDICINE CLINIC | Age: 60
End: 2020-06-10

## 2020-06-10 NOTE — TELEPHONE ENCOUNTER
Order printed and will be faxed to [facility] (previously sent script for rollater walker but seems like patient did not receive it)

## 2020-06-12 ENCOUNTER — CARE COORDINATION (OUTPATIENT)
Dept: CASE MANAGEMENT | Age: 60
End: 2020-06-12

## 2020-06-19 ENCOUNTER — CARE COORDINATION (OUTPATIENT)
Dept: CASE MANAGEMENT | Age: 60
End: 2020-06-19

## 2020-06-19 NOTE — CARE COORDINATION
[facility] Transitions Follow Up Call    2020    Patient: Lyndsey Palmer  Patient : 1960   MRN: [id number]  Reason for Admission: AMS  Discharge Date: 20 RARS: Readmission Risk Score: 35       Attempted to contact patient/daughter today 20 for TCM/hospital discharge follow up sub call. Left message requesting a return call back to [facility] and provided contact information.        Follow Up  Future Appointments   Date Time Provider Naomi Alvarado   2020  9:45 AM Rojelio Robertson MD [facility] Celia Karimi, APRN

## 2020-06-26 ENCOUNTER — CARE COORDINATION (OUTPATIENT)
Dept: CASE MANAGEMENT | Age: 60
End: 2020-06-26

## 2020-06-26 ENCOUNTER — HOSPITAL ENCOUNTER (EMERGENCY)
Age: 60
Discharge: HOME | End: 2020-06-26
Attending: EMERGENCY MEDICINE
Payer: MEDICARE

## 2020-06-26 VITALS
TEMPERATURE: 97.9 F | RESPIRATION RATE: 16 BRPM | WEIGHT: 125 LBS | DIASTOLIC BLOOD PRESSURE: 58 MMHG | HEIGHT: 65 IN | SYSTOLIC BLOOD PRESSURE: 140 MMHG | HEART RATE: 74 BPM | BODY MASS INDEX: 20.83 KG/M2 | OXYGEN SATURATION: 96 %

## 2020-06-26 LAB
ANION GAP SERPL CALCULATED.3IONS-SCNC: 13 MMOL/L (ref 7–16)
BACTERIA: ABNORMAL /HPF
BASOPHILS ABSOLUTE: 0.04 E9/L (ref 0–0.2)
BASOPHILS RELATIVE PERCENT: 1.3 % (ref 0–2)
BILIRUBIN URINE: ABNORMAL
BLOOD, URINE: ABNORMAL
BUN BLDV-MCNC: 15 MG/DL (ref 8–23)
CALCIUM SERPL-MCNC: 9.1 MG/DL (ref 8.6–10.2)
CHLORIDE BLD-SCNC: 105 MMOL/L (ref 98–107)
CLARITY: ABNORMAL
CO2: 22 MMOL/L (ref 22–29)
COLOR: ABNORMAL
CREAT SERPL-MCNC: 0.7 MG/DL (ref 0.5–1)
EOSINOPHILS ABSOLUTE: 0.03 E9/L (ref 0.05–0.5)
EOSINOPHILS RELATIVE PERCENT: 1 % (ref 0–6)
EPITHELIAL CELLS, UA: ABNORMAL /HPF
GFR AFRICAN AMERICAN: >60
GFR NON-AFRICAN AMERICAN: >60 ML/MIN/1.73
GLUCOSE BLD-MCNC: 147 MG/DL (ref 74–99)
GLUCOSE URINE: NEGATIVE MG/DL
HCT VFR BLD CALC: 36 % (ref 34–48)
HEMOGLOBIN: 11.5 G/DL (ref 11.5–15.5)
IMMATURE GRANULOCYTES #: 0.02 E9/L
IMMATURE GRANULOCYTES %: 0.7 % (ref 0–5)
KETONES, URINE: NEGATIVE MG/DL
LEUKOCYTE ESTERASE, URINE: ABNORMAL
LYMPHOCYTES ABSOLUTE: 0.85 E9/L (ref 1.5–4)
LYMPHOCYTES RELATIVE PERCENT: 28.5 % (ref 20–42)
MCH RBC QN AUTO: 26.3 PG (ref 26–35)
MCHC RBC AUTO-ENTMCNC: 31.9 % (ref 32–34.5)
MCV RBC AUTO: 82.2 FL (ref 80–99.9)
MONOCYTES ABSOLUTE: 0.45 E9/L (ref 0.1–0.95)
MONOCYTES RELATIVE PERCENT: 15.1 % (ref 2–12)
NEUTROPHILS ABSOLUTE: 1.59 E9/L (ref 1.8–7.3)
NEUTROPHILS RELATIVE PERCENT: 53.4 % (ref 43–80)
NITRITE, URINE: NEGATIVE
PDW BLD-RTO: 17.9 FL (ref 11.5–15)
PH UA: 6.5 (ref 5–9)
PLATELET # BLD: 307 E9/L (ref 130–450)
PMV BLD AUTO: 10.1 FL (ref 7–12)
POTASSIUM REFLEX MAGNESIUM: 4.2 MMOL/L (ref 3.5–5)
PROTEIN UA: >=300 MG/DL
RBC # BLD: 4.38 E12/L (ref 3.5–5.5)
RBC UA: >20 /HPF (ref 0–2)
SODIUM BLD-SCNC: 140 MMOL/L (ref 132–146)
SPECIFIC GRAVITY UA: 1.02 (ref 1–1.03)
UROBILINOGEN, URINE: 0.2 E.U./DL
WBC # BLD: 3 E9/L (ref 4.5–11.5)
WBC UA: >20 /HPF (ref 0–5)

## 2020-06-26 PROCEDURE — 87088 URINE BACTERIA CULTURE: CPT

## 2020-06-26 PROCEDURE — 99283 EMERGENCY DEPT VISIT LOW MDM: CPT

## 2020-06-26 PROCEDURE — 81001 URINALYSIS AUTO W/SCOPE: CPT

## 2020-06-26 PROCEDURE — 87186 SC STD MICRODIL/AGAR DIL: CPT

## 2020-06-26 PROCEDURE — 87077 CULTURE AEROBIC IDENTIFY: CPT

## 2020-06-26 PROCEDURE — 80048 BASIC METABOLIC PNL TOTAL CA: CPT

## 2020-06-26 PROCEDURE — 85025 COMPLETE CBC W/AUTO DIFF WBC: CPT

## 2020-06-26 RX ORDER — CEFDINIR 300 MG/1
300 CAPSULE ORAL 2 TIMES DAILY
Qty: 20 CAPSULE | Refills: 0 | Status: SHIPPED | OUTPATIENT
Start: 2020-06-26 | End: 2020-07-06

## 2020-06-26 ASSESSMENT — ENCOUNTER SYMPTOMS
ABDOMINAL PAIN: 0
BACK PAIN: 0
VOMITING: 0
BLOOD IN STOOL: 0
DIARRHEA: 0
SHORTNESS OF BREATH: 0
NAUSEA: 0

## 2020-06-26 NOTE — ED PROVIDER NOTES
(A) None Seen /HPF       Radiology:  No orders to display       ------------------------- NURSING NOTES AND VITALS REVIEWED ---------------------------  Date / Time Roomed:  6/26/2020 10:52 AM  ED Bed Assignment:  19/19    The nursing notes within the ED encounter and vital signs as below have been reviewed. BP (!) 140/58   Pulse 74   Temp 97.9 °F (36.6 °C) (Oral)   Resp 16   Ht 5' 5\" (1.651 m)   Wt 125 lb (56.7 kg)   SpO2 96%   BMI 20.80 kg/m²   Oxygen Saturation Interpretation: Normal      ------------------------------------------ PROGRESS NOTES ------------------------------------------  I have spoken with the patient and discussed todays results, in addition to providing specific details for the plan of care and counseling regarding the diagnosis and prognosis. Their questions are answered at this time and they are agreeable with the plan. I discussed at length with them reasons for immediate return here for re evaluation. They will followup with primary care by calling their office tomorrow. 1401  Discussed results of labs and urinalysis. Discussed that she does have gross hematuria with evidence of urinary tract infection. Normal kidney function and hemoglobin level. Will prescribe her antibiotics and give her urology follow-up. She understands if she is worsening symptoms or new concerns that she can return to the ED for further evaluation.    --------------------------------- ADDITIONAL PROVIDER NOTES ---------------------------------  At this time the patient is without objective evidence of an acute process requiring hospitalization or inpatient management. They have remained hemodynamically stable throughout their entire ED visit and are stable for discharge with outpatient follow-up. The plan has been discussed in detail and they are aware of the specific conditions for emergent return, as well as the importance of follow-up.       New Prescriptions    CEFDINIR (OMNICEF) 300 MG

## 2020-06-28 LAB
ORGANISM: ABNORMAL
ORGANISM: ABNORMAL
URINE CULTURE, ROUTINE: ABNORMAL
URINE CULTURE, ROUTINE: ABNORMAL

## 2020-07-06 ENCOUNTER — OFFICE VISIT (OUTPATIENT)
Dept: FAMILY MEDICINE CLINIC | Age: 60
End: 2020-07-06
Payer: MEDICARE

## 2020-07-06 VITALS
SYSTOLIC BLOOD PRESSURE: 124 MMHG | RESPIRATION RATE: 18 BRPM | OXYGEN SATURATION: 99 % | HEART RATE: 84 BPM | HEIGHT: 65 IN | BODY MASS INDEX: 16.5 KG/M2 | WEIGHT: 99 LBS | DIASTOLIC BLOOD PRESSURE: 64 MMHG

## 2020-07-06 PROCEDURE — 99214 OFFICE O/P EST MOD 30 MIN: CPT | Performed by: FAMILY MEDICINE

## 2020-07-06 PROCEDURE — G8418 CALC BMI BLW LOW PARAM F/U: HCPCS | Performed by: FAMILY MEDICINE

## 2020-07-06 PROCEDURE — 3017F COLORECTAL CA SCREEN DOC REV: CPT | Performed by: FAMILY MEDICINE

## 2020-07-06 PROCEDURE — G8427 DOCREV CUR MEDS BY ELIG CLIN: HCPCS | Performed by: FAMILY MEDICINE

## 2020-07-06 PROCEDURE — 1036F TOBACCO NON-USER: CPT | Performed by: FAMILY MEDICINE

## 2020-07-06 RX ORDER — LORAZEPAM 0.5 MG/1
0.5 TABLET ORAL 2 TIMES DAILY PRN
Qty: 60 TABLET | Refills: 3 | Status: SHIPPED
Start: 2020-07-06 | End: 2020-08-22

## 2020-07-06 RX ORDER — DOCUSATE SODIUM 100 MG/1
100 CAPSULE, LIQUID FILLED ORAL EVERY EVENING
Qty: 90 CAPSULE | Refills: 1 | Status: SHIPPED
Start: 2020-07-06 | End: 2020-08-22

## 2020-07-06 RX ORDER — OXYBUTYNIN CHLORIDE 5 MG/1
5 TABLET ORAL 3 TIMES DAILY
Qty: 90 TABLET | Refills: 1 | Status: SHIPPED
Start: 2020-07-06 | End: 2021-03-04

## 2020-07-06 RX ORDER — CLONIDINE 0.3 MG/24H
1 PATCH, EXTENDED RELEASE TRANSDERMAL WEEKLY
Qty: 12 PATCH | Refills: 1 | Status: SHIPPED
Start: 2020-07-06 | End: 2021-07-13 | Stop reason: SDUPTHER

## 2020-07-06 RX ORDER — SULFAMETHOXAZOLE AND TRIMETHOPRIM 800; 160 MG/1; MG/1
1 TABLET ORAL 2 TIMES DAILY
Qty: 20 TABLET | Refills: 0 | Status: SHIPPED | OUTPATIENT
Start: 2020-07-06 | End: 2020-07-16

## 2020-07-06 RX ORDER — HYDRALAZINE HYDROCHLORIDE 50 MG/1
50 TABLET, FILM COATED ORAL 4 TIMES DAILY
Qty: 360 TABLET | Refills: 1 | Status: SHIPPED
Start: 2020-07-06 | End: 2021-01-04

## 2020-07-06 RX ORDER — MEGESTROL ACETATE 20 MG/1
20 TABLET ORAL 2 TIMES DAILY
Qty: 180 TABLET | Refills: 1 | Status: SHIPPED
Start: 2020-07-06 | End: 2020-10-20

## 2020-07-06 RX ORDER — CLOPIDOGREL BISULFATE 75 MG/1
75 TABLET ORAL DAILY
Qty: 90 TABLET | Refills: 1 | Status: SHIPPED
Start: 2020-07-06 | End: 2021-07-13 | Stop reason: SDUPTHER

## 2020-07-06 ASSESSMENT — ENCOUNTER SYMPTOMS
SHORTNESS OF BREATH: 0
NAUSEA: 1
DIARRHEA: 0
ABDOMINAL PAIN: 1
VOMITING: 0

## 2020-07-06 NOTE — PATIENT INSTRUCTIONS
Patient Education        Muscle Conditioning: Exercises  Introduction  Here are some examples of exercises for muscle conditioning. Start each exercise slowly. Ease off the exercise if you start to have pain. Your doctor or physical therapist will tell you when you can start these exercises and which ones will work best for you. How to do the exercises  Wall push-ups   When you can do this exercise against a wall comfortably (without your muscles feeling tired), you can try it against a counter. Start with 5 repetitions again and work up to 8 to 12. You can then slowly progress to the end of a couch or a sturdy chair, and finally to the floor. 1. Stand facing a wall, about 12 to 18 inches away. 2. Place your hands on the wall at shoulder height. 3. Slowly bend your elbows and bring your face toward the wall, moving your hips and shoulders forward together. 4. Push slowly back to the starting position. 5. Start with 5 repetitions and work up to 8 to 12. 6. Rest for a minute, and repeat the exercise. Knee extension   If this exercise becomes easy, you can add a light weight around your ankle or tie an elastic resistance band to a chair leg and one ankle. 1. While sitting in a chair, straighten one leg and hold while you slowly count to 5. Be sure you do not lock your knee. 2. Repeat 8 to 12 times. 3. Rest for a minute, and repeat the exercise. 4. Do the same exercise with the other leg. Side-lying leg lift   If this exercise becomes easy, you can add a light weight around your ankle or tie an elastic resistance band to both ankles. 1. Lie on your side, with your legs extended. Keep your hips straight up and down during this exercise. Do not let your top hip rock toward the back. Support your head with your hand, and place the other hand on the floor near your waist.  2. Slowly raise your upper leg until it is about in line with your shoulder. Keep your toes pointed forward.   3. Slowly lower your leg to the starting position. 4. Repeat 8 to 12 times. 5. Rest for a minute, and repeat the exercise. 6. Turn to your other side and do the same exercise with your other leg. Shallow standing knee bends   1. Stand with your hands lightly resting on a counter or chair in front of you with your feet shoulder-width apart. 2. Slowly bend your knees so that you squat down just like you were going to sit in a chair. Make sure your knees do not go in front of your toes. 3. Lower yourself about 6 inches. Your heels should remain on the floor at all times. 4. Rise slowly to a standing position. 5. Repeat 8 to 12 times. 6. Rest for a minute, and repeat the exercise. Follow-up care is a key part of your treatment and safety. Be sure to make and go to all appointments, and call your doctor if you are having problems. It's also a good idea to know your test results and keep a list of the medicines you take. Where can you learn more? Go to https://[employer].payleven. org and sign in to your [employer] account. Enter H325 in the [employer] box to learn more about \"Muscle Conditioning: Exercises. \"     If you do not have an account, please click on the \"Sign Up Now\" link. Current as of: January 16, 2020               Content Version: 12.5  © 5349-0075 [employer]. Care instructions adapted under license by [facility] [place] ([facility]). If you have questions about a medical condition or this instruction, always ask your healthcare professional. [place] [address] any warranty or liability for your use of this information.

## 2020-07-06 NOTE — PROGRESS NOTES
[address] [facility], [address]   [address] [facility]   Elizabeth Yates MD     Patient: Daryn Becker  [facility] Birth: 1960  Visit Date: 7/6/20    Rollen Bernheim is a 63y.o. year old female here today for   Chief Complaint   Patient presents with    Follow-Up from Hospital     admitted twice in May to [address] [facility],[address]       HPI  Patient was recently admitted to [address] [place] [address] twice in May--Once for hypoglycemia and once for dehydration. Patient states her appetite is poor. Patient has lost total of 50 pounds since her last visit here. Has been taking Megace BID for appetite augmentation. Drinking Ensure between meals. Patient went to ED recently for UTI and hematuria. Patient only took antibiotics for 2-3 days because Cefdinir caused nausea. +lower abdominal discomfort. Patient still has ureteral stents. Patient has not had any elevated blood sugar readings recently. Was discontinued from taking insulin due to hypoglycemia and severe weight loss. Patient doing well on current regimen for anxiety. Taking Ativan prn. Patient doing well on current regimen for hypertension. Review of Systems   Eyes: Negative for visual disturbance. Respiratory: Negative for shortness of breath. Cardiovascular: Negative for chest pain, palpitations and leg swelling. Gastrointestinal: Positive for abdominal pain (intermittent) and nausea. Negative for diarrhea and vomiting. Genitourinary: Positive for hematuria. Negative for difficulty urinating, dysuria and frequency. Skin: Negative for rash. Psychiatric/Behavioral: Negative for dysphoric mood. Past medical, surgical, social and/or family historyreviewed, updated as needed, and are non-contributory (unless otherwise stated). Medications, allergies, and problem list also reviewed and updated as needed in patient's record.      Current Outpatient Medications   Medication Sig Dispense Refill    megestrol (MEGACE) 20 MG tablet Take 1 tablet by mouth 2 times daily 180 tablet 1    cloNIDine (CATAPRES) 0.3 MG/24HR PTWK Place 1 patch onto the skin once a week 12 patch 1    clopidogrel (PLAVIX) 75 MG tablet Take 1 tablet by mouth daily 90 tablet 1    oxybutynin (DITROPAN) 5 MG tablet Take 1 tablet by mouth 3 times daily 90 tablet 1    docusate sodium (COLACE) 100 MG capsule Take 1 capsule by mouth every evening 90 capsule 1    hydrALAZINE (APRESOLINE) 50 MG tablet Take 1 tablet by mouth 4 times daily 360 tablet 1    sulfamethoxazole-trimethoprim (BACTRIM DS) 800-160 MG per tablet Take 1 tablet by mouth 2 times daily for 10 days 20 tablet 0    LORazepam (ATIVAN) 0.5 MG tablet Take 1 tablet by mouth 2 times daily as needed for Anxiety. 60 tablet 3    folic acid (FOLVITE) 1 MG tablet Take 1 tablet by mouth daily 30 tablet 3    lisinopril (PRINIVIL;ZESTRIL) 20 MG tablet Take 1 tablet by mouth daily 30 tablet 3    metoprolol tartrate (LOPRESSOR) 25 MG tablet Take 1 tablet by mouth 2 times daily 60 tablet 3    vitamin B-1 100 MG tablet Take 1 tablet by mouth daily 30 tablet 3    ferrous sulfate (IRON 325) 325 (65 Fe) MG tablet Take 1 tablet by mouth daily (with breakfast) 30 tablet 3    atorvastatin (LIPITOR) 80 MG tablet Take 1 tablet by mouth nightly 30 tablet 3    vitamin B-6 (B-6) 50 MG tablet Take 1 tablet by mouth daily 30 tablet 3    hydrALAZINE (APRESOLINE) 50 MG tablet Take 1 tablet by mouth 4 times daily 120 tablet 0    clopidogrel (PLAVIX) 75 MG tablet Take 1 tablet by mouth once daily 30 tablet 0     No current facility-administered medications for this visit. Wt Readings from Last 3 Encounters:   07/06/20 99 lb (44.9 kg)   06/26/20 125 lb (56.7 kg)   06/03/20 124 lb (56.2 kg)                   Vitals:    07/06/20 0959   BP: 124/64   Pulse: 84   Resp: 18   SpO2: 99%        Physical Exam  Vitals signs reviewed. Constitutional:       General: She is not in acute distress.      Appearance: She is well-developed. Neck:      Musculoskeletal: Neck supple. Vascular: No carotid bruit. Cardiovascular:      Rate and Rhythm: Normal rate and regular rhythm. Heart sounds: Normal heart sounds. No murmur. No gallop. Pulmonary:      Effort: Pulmonary effort is normal.      Breath sounds: Normal breath sounds. No wheezing or rales. Abdominal:      General: Bowel sounds are normal. There is no distension. Palpations: Abdomen is soft. Tenderness: There is no abdominal tenderness. Musculoskeletal:      Comments: S/p left BKA   Skin:     General: Skin is warm and dry. Neurological:      Mental Status: She is alert and oriented to person, place, and time. Results for orders placed or performed during the hospital encounter of 06/26/20   Culture, Urine   Result Value Ref Range    Organism Klebsiella pneumoniae ssp pneumoniae (A)     Urine Culture, Routine >100,000 CFU/ml     Organism Citrobacter koseri (A)     Urine Culture, Routine 50,000 CFU/ml        Susceptibility    Citrobacter koseri - BACTERIAL SUSCEPTIBILITY PANEL BY DYLLAN     ceFAZolin <=^4 Sensitive mcg/mL     cefepime <=^0.12 Sensitive mcg/mL     cefTRIAXone ^0. 5 Sensitive mcg/mL     ertapenem <=^0.12 Sensitive mcg/mL     gentamicin <=^1 Sensitive mcg/mL     levofloxacin <=^0.12 Sensitive mcg/mL     nitrofurantoin ^64 Intermediate mcg/mL     piperacillin-tazobactam ^16 Sensitive mcg/mL     trimethoprim-sulfamethoxazole <=^20 Sensitive mcg/mL    Klebsiella pneumoniae ssp pneumoniae - BACTERIAL SUSCEPTIBILITY PANEL BY DYLLAN     ampicillin >=^32 Resistant mcg/mL     ceFAZolin <=^4 Sensitive mcg/mL     cefepime <=^0.12 Sensitive mcg/mL     cefTRIAXone <=^0.25 Sensitive mcg/mL     Confirmatory Extended Spectrum Beta-Lactamase Neg  mcg/mL     ertapenem <=^0.12 Sensitive mcg/mL     gentamicin <=^1 Sensitive mcg/mL     levofloxacin <=^0.12 Sensitive mcg/mL     nitrofurantoin ^32 Sensitive mcg/mL     piperacillin-tazobactam <=^4 Sensitive mcg/mL     trimethoprim-sulfamethoxazole <=^20 Sensitive mcg/mL   CBC Auto Differential   Result Value Ref Range    WBC 3.0 (L) 4.5 - 11.5 E9/L    RBC 4.38 3.50 - 5.50 E12/L    Hemoglobin 11.5 11.5 - 15.5 g/dL    Hematocrit 36.0 34.0 - 48.0 %    MCV 82.2 80.0 - 99.9 fL    MCH 26.3 26.0 - 35.0 pg    MCHC 31.9 (L) 32.0 - 34.5 %    RDW 17.9 (H) 11.5 - 15.0 fL    Platelets 398 338 - 557 E9/L    MPV 10.1 7.0 - 12.0 fL    Neutrophils % 53.4 43.0 - 80.0 %    Immature Granulocytes % 0.7 0.0 - 5.0 %    Lymphocytes % 28.5 20.0 - 42.0 %    Monocytes % 15.1 (H) 2.0 - 12.0 %    Eosinophils % 1.0 0.0 - 6.0 %    Basophils % 1.3 0.0 - 2.0 %    Neutrophils Absolute 1.59 (L) 1.80 - 7.30 E9/L    Immature Granulocytes # 0.02 E9/L    Lymphocytes Absolute 0.85 (L) 1.50 - 4.00 E9/L    Monocytes Absolute 0.45 0.10 - 0.95 E9/L    Eosinophils Absolute 0.03 (L) 0.05 - 0.50 E9/L    Basophils Absolute 0.04 0.00 - 0.20 S5/Y   Basic Metabolic Panel w/ Reflex to MG   Result Value Ref Range    Sodium 140 132 - 146 mmol/L    Potassium reflex Magnesium 4.2 3.5 - 5.0 mmol/L    Chloride 105 98 - 107 mmol/L    CO2 22 22 - 29 mmol/L    Anion Gap 13 7 - 16 mmol/L    Glucose 147 (H) 74 - 99 mg/dL    BUN 15 8 - 23 mg/dL    CREATININE 0.7 0.5 - 1.0 mg/dL    GFR Non-African American >60 >=60 mL/min/1.73    GFR African American >60     Calcium 9.1 8.6 - 10.2 mg/dL   Urinalysis, reflex to microscopic   Result Value Ref Range    Color, UA NICKI (A) Straw/Yellow    Clarity, UA CLOUDY (A) Clear    Glucose, Ur Negative Negative mg/dL    Bilirubin Urine SMALL (A) Negative    Ketones, Urine Negative Negative mg/dL    Specific Gravity, UA 1.025 1.005 - 1.030    Blood, Urine LARGE (A) Negative    pH, UA 6.5 5.0 - 9.0    Protein, UA >=300 (A) Negative mg/dL    Urobilinogen, Urine 0.2 <2.0 E.U./dL    Nitrite, Urine Negative Negative    Leukocyte Esterase, Urine MODERATE (A) Negative   Microscopic Urinalysis   Result Value Ref Range    WBC, UA >20 (A) 0 - 5 /HPF    RBC, UA >20 0 - 2 /HPF    Epithelial Cells, UA FEW /HPF    Bacteria, UA MANY (A) None Seen /HPF       ASSESSMENT/PLAN  Kelechi Peng was seen today for follow-up from hospital.    Diagnoses and all orders for this visit:    Severe protein-calorie malnutrition ([address]  -     megestrol (MEGACE) 20 MG tablet; Take 1 tablet by mouth 2 times daily        -     Encouraged patient to increase protein intake and continue Ensure if she does not each much    Anxiety  -     LORazepam (ATIVAN) 0.5 MG tablet; Take 1 tablet by mouth 2 times daily as needed for Anxiety. Acute cystitis with hematuria  -     sulfamethoxazole-trimethoprim (BACTRIM DS) 800-160 MG per tablet; Take 1 tablet by mouth 2 times daily for 10 days    Essential hypertension  -     cloNIDine (CATAPRES) 0.3 MG/24HR PTWK; Place 1 patch onto the skin once a week  -     hydrALAZINE (APRESOLINE) 50 MG tablet; Take 1 tablet by mouth 4 times daily            Phone/MyChart follow up if tests abnormal.    Return in about 6 weeks (around 8/17/2020) for f/u weight loss. or sooner if necessary. I have reviewed my findings and recommendations with Kelechi Peng.      Lebron Archer M.D

## 2020-08-12 ENCOUNTER — TELEPHONE (OUTPATIENT)
Dept: CARDIOLOGY CLINIC | Age: 60
End: 2020-08-12

## 2020-08-17 ENCOUNTER — OFFICE VISIT (OUTPATIENT)
Dept: FAMILY MEDICINE CLINIC | Age: 60
End: 2020-08-17
Payer: MEDICARE

## 2020-08-17 VITALS
BODY MASS INDEX: 18.83 KG/M2 | SYSTOLIC BLOOD PRESSURE: 122 MMHG | DIASTOLIC BLOOD PRESSURE: 60 MMHG | HEIGHT: 65 IN | HEART RATE: 80 BPM | RESPIRATION RATE: 18 BRPM | WEIGHT: 113 LBS | OXYGEN SATURATION: 99 %

## 2020-08-17 PROBLEM — R20.2 PARESTHESIA OF LEFT FOOT: Status: ACTIVE | Noted: 2020-08-17

## 2020-08-17 PROBLEM — R41.82 ALTERED MENTAL STATE: Status: RESOLVED | Noted: 2020-06-01 | Resolved: 2020-08-17

## 2020-08-17 PROBLEM — R63.4 UNINTENDED WEIGHT LOSS: Status: ACTIVE | Noted: 2020-08-17

## 2020-08-17 PROBLEM — E43 SEVERE PROTEIN-CALORIE MALNUTRITION (HCC): Status: RESOLVED | Noted: 2019-12-13 | Resolved: 2020-08-17

## 2020-08-17 PROBLEM — E11.10 DIABETIC KETOACIDOSIS (HCC): Status: RESOLVED | Noted: 2020-05-29 | Resolved: 2020-08-17

## 2020-08-17 PROBLEM — R20.2 PARESTHESIA OF HAND, BILATERAL: Status: ACTIVE | Noted: 2020-08-17

## 2020-08-17 PROCEDURE — G8510 SCR DEP NEG, NO PLAN REQD: HCPCS | Performed by: FAMILY MEDICINE

## 2020-08-17 PROCEDURE — 1036F TOBACCO NON-USER: CPT | Performed by: FAMILY MEDICINE

## 2020-08-17 PROCEDURE — G8420 CALC BMI NORM PARAMETERS: HCPCS | Performed by: FAMILY MEDICINE

## 2020-08-17 PROCEDURE — G8427 DOCREV CUR MEDS BY ELIG CLIN: HCPCS | Performed by: FAMILY MEDICINE

## 2020-08-17 PROCEDURE — 99213 OFFICE O/P EST LOW 20 MIN: CPT | Performed by: FAMILY MEDICINE

## 2020-08-17 PROCEDURE — 3017F COLORECTAL CA SCREEN DOC REV: CPT | Performed by: FAMILY MEDICINE

## 2020-08-17 ASSESSMENT — ENCOUNTER SYMPTOMS
SHORTNESS OF BREATH: 0
VOMITING: 0
DIARRHEA: 1
NAUSEA: 0
BLOOD IN STOOL: 0
CONSTIPATION: 0

## 2020-08-17 ASSESSMENT — PATIENT HEALTH QUESTIONNAIRE - PHQ9
SUM OF ALL RESPONSES TO PHQ QUESTIONS 1-9: 0
SUM OF ALL RESPONSES TO PHQ9 QUESTIONS 1 & 2: 0
1. LITTLE INTEREST OR PLEASURE IN DOING THINGS: 0
SUM OF ALL RESPONSES TO PHQ QUESTIONS 1-9: 0
2. FEELING DOWN, DEPRESSED OR HOPELESS: 0

## 2020-08-17 NOTE — PATIENT INSTRUCTIONS
Patient Education        Patient Education        High-Calorie and High-Protein Diet: Care Instructions  Your Care Instructions     A high-calorie, high-protein diet gives you more energy and extra nutrition to help your body heal. Your doctor and dietitian can help you design a diet based on your health and what you prefer to eat. Always talk with your doctor or dietitian before you make changes in your diet. Follow-up care is a key part of your treatment and safety. Be sure to make and go to all appointments, and call your doctor if you are having problems. It's also a good idea to know your test results and keep a list of the medicines you take. How can you care for yourself at home? · Eat three meals a day, plus snacks in between and at bedtime. · Include favorite foods in your meals. This will help make meals more pleasant. · Drink your beverage at the end of the meal, because drinking before or during the meal can fill you up. · Eat high-protein foods, such as:  ? Meat, fish, and poultry. ? Milk and milk products. Add powdered milk to other foods (such as pudding or soups) to boost the protein. ? Eggs. ? Cooked dried beans and peas. ? Peanut butter, nuts, and seeds. ? Tofu.  ? Cheeses. ? Protein bars. · Eat high-calorie foods, such as:  ? Butter, honey, and brown sugar, added to foods to make them taste better. ? Oils, sauces, and gravies. ? Peanut butter. ? Whole milk, yogurt, mayonnaise, and sour cream.  ? Granola cereal with fruit and granola bars. ? Muffins, pancakes, waffles, and other breads. ? Milkshakes, puddings, and custard. · Try a liquid meal replacement, such as Ensure, Boost, or instant breakfast drinks, if you have trouble eating solid foods. They will give you both calories and protein. Soups and smoothies also are good sources of nutrition. · Keep snacks around that are easy to eat, such as pudding, energy bars, ice cream, and flavored ice pops.   · If you can, take a walk before you eat, to make you hungrier. · Drink plenty of fluids. If you have kidney, heart, or liver disease and have to limit fluids, talk with your doctor before you increase the amount of fluids you drink. · Try to avoid eating foods that don't give you much nutrition, such as potato chips, candy bars, and soft drinks. Where can you learn more? Go to https://Pogoapp.[employer]de[employer]. org and sign in to your Popdeem account. Enter A737 in the GetQuik box to learn more about \"High-Calorie and High-Protein Diet: Care Instructions. \"     If you do not have an account, please click on the \"Sign Up Now\" link. Current as of: August 22, 2019               Content Version: 12.5  © 5010-1098 [employer]wise, Incorporated. Care instructions adapted under license by [facility] ([facility]). If you have questions about a medical condition or this instruction, always ask your healthcare professional. [place] [address] any warranty or liability for your use of this information.

## 2020-08-17 NOTE — PROGRESS NOTES
0.5 MG tablet Take 1 tablet by mouth 2 times daily as needed for Anxiety. 60 tablet 3    folic acid (FOLVITE) 1 MG tablet Take 1 tablet by mouth daily 30 tablet 3    lisinopril (PRINIVIL;ZESTRIL) 20 MG tablet Take 1 tablet by mouth daily 30 tablet 3    metoprolol tartrate (LOPRESSOR) 25 MG tablet Take 1 tablet by mouth 2 times daily 60 tablet 3    vitamin B-1 100 MG tablet Take 1 tablet by mouth daily 30 tablet 3    ferrous sulfate (IRON 325) 325 (65 Fe) MG tablet Take 1 tablet by mouth daily (with breakfast) 30 tablet 3    atorvastatin (LIPITOR) 80 MG tablet Take 1 tablet by mouth nightly 30 tablet 3    vitamin B-6 (B-6) 50 MG tablet Take 1 tablet by mouth daily 30 tablet 3    hydrALAZINE (APRESOLINE) 50 MG tablet Take 1 tablet by mouth 4 times daily 120 tablet 0    cloNIDine (CATAPRES) 0.3 MG/24HR PTWK Place 1 patch onto the skin once a week 12 patch 1    hydrALAZINE (APRESOLINE) 50 MG tablet Take 1 tablet by mouth 4 times daily 360 tablet 1     No current facility-administered medications for this visit. Wt Readings from Last 3 Encounters:   08/17/20 113 lb (51.3 kg)   07/06/20 99 lb (44.9 kg)   06/26/20 125 lb (56.7 kg)                   Vitals:    08/17/20 1015   BP: 122/60   Pulse: 80   Resp: 18   SpO2: 99%        Physical Exam  Vitals signs reviewed. Constitutional:       General: She is not in acute distress. Appearance: She is well-developed. Neck:      Musculoskeletal: Neck supple. Vascular: No carotid bruit. Cardiovascular:      Rate and Rhythm: Normal rate and regular rhythm. Heart sounds: Normal heart sounds. No murmur. No gallop. Pulmonary:      Effort: Pulmonary effort is normal.      Breath sounds: Normal breath sounds. No wheezing or rales. Abdominal:      General: Bowel sounds are normal. There is no distension. Palpations: Abdomen is soft. Tenderness: There is no abdominal tenderness.    Musculoskeletal:      Comments: Left lower extremity prosthetic leg in place   Skin:     General: Skin is warm and dry. Neurological:      Mental Status: She is alert and oriented to person, place, and time. ASSESSMENT/PLAN  Mustapha Benoit was seen today for weight loss and cold extremity. Diagnoses and all orders for this visit:    Unintended weight loss        -     Weight improving; difficult to determine accurate weight gain due to new prosthesis        -     Continue Megace BID; explained to patient if/when weight goes up to 120 pounds will discontinue it    Paresthesia of hand, bilateral       -     Reassurance given that this is not due to vascular abnormality; has strong radial pulses and skin is warm and dry        -     Encouraged patient to move hands and fingers frequently throughout the day to stimulate her nerves    Paresthesia of right foot         -     reassurance given that this is not due to vascular abnormality; skin warm and dry        -     Encouraged patient to move right foot and toes throughout the day to prevent stiffness and to stimulate her nerves          Phone/MyChart follow up if tests abnormal.    Return in about 3 months (around 11/17/2020) for Virtual visit, diabetes. or sooner if necessary. I have reviewed my findings and recommendations with Mustapha Benoit.      Sherin Herr M.D

## 2020-08-22 ENCOUNTER — APPOINTMENT (OUTPATIENT)
Dept: CT IMAGING | Age: 60
End: 2020-08-22
Payer: MEDICARE

## 2020-08-22 ENCOUNTER — HOSPITAL ENCOUNTER (EMERGENCY)
Age: 60
Discharge: ANOTHER HOSPITAL | End: 2020-08-22
Attending: EMERGENCY MEDICINE
Payer: MEDICARE

## 2020-08-22 ENCOUNTER — APPOINTMENT (OUTPATIENT)
Dept: CT IMAGING | Age: 60
DRG: 064 | End: 2020-08-22
Payer: MEDICARE

## 2020-08-22 ENCOUNTER — APPOINTMENT (OUTPATIENT)
Dept: GENERAL RADIOLOGY | Age: 60
End: 2020-08-22
Payer: MEDICARE

## 2020-08-22 ENCOUNTER — HOSPITAL ENCOUNTER (INPATIENT)
Age: 60
LOS: 5 days | Discharge: HOME HEALTH | DRG: 064 | End: 2020-08-27
Attending: EMERGENCY MEDICINE | Admitting: INTERNAL MEDICINE
Payer: MEDICARE

## 2020-08-22 VITALS
DIASTOLIC BLOOD PRESSURE: 68 MMHG | TEMPERATURE: 97.8 F | HEART RATE: 85 BPM | HEIGHT: 63 IN | BODY MASS INDEX: 20.02 KG/M2 | RESPIRATION RATE: 17 BRPM | SYSTOLIC BLOOD PRESSURE: 174 MMHG | WEIGHT: 113 LBS | OXYGEN SATURATION: 100 %

## 2020-08-22 PROBLEM — Z86.31 HX OF DIABETIC FOOT ULCER: Status: ACTIVE | Noted: 2019-01-22

## 2020-08-22 PROBLEM — G51.0 FACIAL PARALYSIS ON LEFT SIDE: Status: ACTIVE | Noted: 2020-08-22

## 2020-08-22 PROBLEM — I63.9 ACUTE CVA (CEREBROVASCULAR ACCIDENT) (HCC): Status: ACTIVE | Noted: 2020-08-22

## 2020-08-22 PROBLEM — I63.511 ACUTE ISCHEMIC CEREBROVASCULAR ACCIDENT (CVA) INVOLVING RIGHT MIDDLE CEREBRAL ARTERY TERRITORY (HCC): Status: ACTIVE | Noted: 2020-08-22

## 2020-08-22 PROBLEM — Z89.512 HX OF BKA, LEFT (HCC): Status: ACTIVE | Noted: 2019-01-22

## 2020-08-22 LAB
ABO/RH: NORMAL
ANION GAP SERPL CALCULATED.3IONS-SCNC: 12 MMOL/L (ref 7–16)
ANTIBODY SCREEN: NORMAL
APTT: 32.4 SEC (ref 24.5–35.1)
BASOPHILS ABSOLUTE: 0.04 E9/L (ref 0–0.2)
BASOPHILS RELATIVE PERCENT: 0.9 % (ref 0–2)
BUN BLDV-MCNC: 12 MG/DL (ref 8–23)
CALCIUM SERPL-MCNC: 8.8 MG/DL (ref 8.6–10.2)
CHLORIDE BLD-SCNC: 105 MMOL/L (ref 98–107)
CHP ED QC CHECK: NORMAL
CO2: 21 MMOL/L (ref 22–29)
CREAT SERPL-MCNC: 1 MG/DL (ref 0.5–1)
EKG ATRIAL RATE: 82 BPM
EKG ATRIAL RATE: 89 BPM
EKG P AXIS: 66 DEGREES
EKG P AXIS: 69 DEGREES
EKG P-R INTERVAL: 158 MS
EKG P-R INTERVAL: 196 MS
EKG Q-T INTERVAL: 378 MS
EKG Q-T INTERVAL: 378 MS
EKG QRS DURATION: 78 MS
EKG QRS DURATION: 94 MS
EKG QTC CALCULATION (BAZETT): 441 MS
EKG QTC CALCULATION (BAZETT): 459 MS
EKG R AXIS: 61 DEGREES
EKG R AXIS: 66 DEGREES
EKG T AXIS: 4 DEGREES
EKG T AXIS: 47 DEGREES
EKG VENTRICULAR RATE: 82 BPM
EKG VENTRICULAR RATE: 89 BPM
EOSINOPHILS ABSOLUTE: 0.05 E9/L (ref 0.05–0.5)
EOSINOPHILS RELATIVE PERCENT: 1.1 % (ref 0–6)
GFR AFRICAN AMERICAN: >60
GFR NON-AFRICAN AMERICAN: >60 ML/MIN/1.73
GLUCOSE BLD-MCNC: 133 MG/DL (ref 74–99)
GLUCOSE BLD-MCNC: 139 MG/DL
HBA1C MFR BLD: 4.6 % (ref 4–5.6)
HCT VFR BLD CALC: 26.3 % (ref 34–48)
HEMOGLOBIN: 7.9 G/DL (ref 11.5–15.5)
IMMATURE GRANULOCYTES #: 0.02 E9/L
IMMATURE GRANULOCYTES %: 0.5 % (ref 0–5)
INR BLD: 1.1
INR BLD: 1.2
LYMPHOCYTES ABSOLUTE: 1.64 E9/L (ref 1.5–4)
LYMPHOCYTES RELATIVE PERCENT: 37.3 % (ref 20–42)
MCH RBC QN AUTO: 25.6 PG (ref 26–35)
MCHC RBC AUTO-ENTMCNC: 30 % (ref 32–34.5)
MCV RBC AUTO: 85.1 FL (ref 80–99.9)
METER GLUCOSE: 107 MG/DL (ref 74–99)
METER GLUCOSE: 136 MG/DL (ref 74–99)
METER GLUCOSE: 139 MG/DL (ref 74–99)
METER GLUCOSE: 139 MG/DL (ref 74–99)
MONOCYTES ABSOLUTE: 0.91 E9/L (ref 0.1–0.95)
MONOCYTES RELATIVE PERCENT: 20.7 % (ref 2–12)
NEUTROPHILS ABSOLUTE: 1.74 E9/L (ref 1.8–7.3)
NEUTROPHILS RELATIVE PERCENT: 39.5 % (ref 43–80)
PDW BLD-RTO: 16.3 FL (ref 11.5–15)
PLATELET # BLD: 420 E9/L (ref 130–450)
PMV BLD AUTO: 9.7 FL (ref 7–12)
POTASSIUM REFLEX MAGNESIUM: 3.6 MMOL/L (ref 3.5–5)
PRO-BNP: 2121 PG/ML (ref 0–125)
PROTHROMBIN TIME: 12.9 SEC (ref 9.3–12.4)
PROTHROMBIN TIME: 13 SEC (ref 9.3–12.4)
RBC # BLD: 3.09 E12/L (ref 3.5–5.5)
SODIUM BLD-SCNC: 138 MMOL/L (ref 132–146)
TROPONIN: 0.03 NG/ML (ref 0–0.03)
TROPONIN: 0.04 NG/ML (ref 0–0.03)
TROPONIN: 0.05 NG/ML (ref 0–0.03)
WBC # BLD: 4.4 E9/L (ref 4.5–11.5)

## 2020-08-22 PROCEDURE — 84484 ASSAY OF TROPONIN QUANT: CPT

## 2020-08-22 PROCEDURE — 82962 GLUCOSE BLOOD TEST: CPT

## 2020-08-22 PROCEDURE — 93010 ELECTROCARDIOGRAM REPORT: CPT | Performed by: INTERNAL MEDICINE

## 2020-08-22 PROCEDURE — 99221 1ST HOSP IP/OBS SF/LOW 40: CPT | Performed by: PSYCHIATRY & NEUROLOGY

## 2020-08-22 PROCEDURE — 2580000003 HC RX 258: Performed by: RADIOLOGY

## 2020-08-22 PROCEDURE — 70498 CT ANGIOGRAPHY NECK: CPT

## 2020-08-22 PROCEDURE — 6360000002 HC RX W HCPCS: Performed by: INTERNAL MEDICINE

## 2020-08-22 PROCEDURE — 86850 RBC ANTIBODY SCREEN: CPT

## 2020-08-22 PROCEDURE — 93005 ELECTROCARDIOGRAM TRACING: CPT | Performed by: GENERAL PRACTICE

## 2020-08-22 PROCEDURE — 99285 EMERGENCY DEPT VISIT HI MDM: CPT

## 2020-08-22 PROCEDURE — 85025 COMPLETE CBC W/AUTO DIFF WBC: CPT

## 2020-08-22 PROCEDURE — 36415 COLL VENOUS BLD VENIPUNCTURE: CPT

## 2020-08-22 PROCEDURE — 86900 BLOOD TYPING SEROLOGIC ABO: CPT

## 2020-08-22 PROCEDURE — 83036 HEMOGLOBIN GLYCOSYLATED A1C: CPT

## 2020-08-22 PROCEDURE — 2060000000 HC ICU INTERMEDIATE R&B

## 2020-08-22 PROCEDURE — 86923 COMPATIBILITY TEST ELECTRIC: CPT

## 2020-08-22 PROCEDURE — 99284 EMERGENCY DEPT VISIT MOD MDM: CPT

## 2020-08-22 PROCEDURE — 6370000000 HC RX 637 (ALT 250 FOR IP): Performed by: EMERGENCY MEDICINE

## 2020-08-22 PROCEDURE — 85610 PROTHROMBIN TIME: CPT

## 2020-08-22 PROCEDURE — 70496 CT ANGIOGRAPHY HEAD: CPT

## 2020-08-22 PROCEDURE — 83880 ASSAY OF NATRIURETIC PEPTIDE: CPT

## 2020-08-22 PROCEDURE — 0042T CT BRAIN PERFUSION: CPT

## 2020-08-22 PROCEDURE — 6370000000 HC RX 637 (ALT 250 FOR IP): Performed by: INTERNAL MEDICINE

## 2020-08-22 PROCEDURE — 71045 X-RAY EXAM CHEST 1 VIEW: CPT

## 2020-08-22 PROCEDURE — 85730 THROMBOPLASTIN TIME PARTIAL: CPT

## 2020-08-22 PROCEDURE — 80048 BASIC METABOLIC PNL TOTAL CA: CPT

## 2020-08-22 PROCEDURE — P9016 RBC LEUKOCYTES REDUCED: HCPCS

## 2020-08-22 PROCEDURE — 70450 CT HEAD/BRAIN W/O DYE: CPT

## 2020-08-22 PROCEDURE — 93005 ELECTROCARDIOGRAM TRACING: CPT | Performed by: EMERGENCY MEDICINE

## 2020-08-22 PROCEDURE — 2580000003 HC RX 258: Performed by: INTERNAL MEDICINE

## 2020-08-22 PROCEDURE — 6360000004 HC RX CONTRAST MEDICATION: Performed by: RADIOLOGY

## 2020-08-22 PROCEDURE — 2580000003 HC RX 258: Performed by: GENERAL PRACTICE

## 2020-08-22 PROCEDURE — 86901 BLOOD TYPING SEROLOGIC RH(D): CPT

## 2020-08-22 PROCEDURE — 99223 1ST HOSP IP/OBS HIGH 75: CPT | Performed by: INTERNAL MEDICINE

## 2020-08-22 RX ORDER — ATORVASTATIN CALCIUM 80 MG/1
80 TABLET, FILM COATED ORAL NIGHTLY
Status: DISCONTINUED | OUTPATIENT
Start: 2020-08-22 | End: 2020-08-27 | Stop reason: HOSPADM

## 2020-08-22 RX ORDER — SODIUM CHLORIDE 9 MG/ML
INJECTION, SOLUTION INTRAVENOUS CONTINUOUS
Status: DISCONTINUED | OUTPATIENT
Start: 2020-08-22 | End: 2020-08-27

## 2020-08-22 RX ORDER — ONDANSETRON 2 MG/ML
4 INJECTION INTRAMUSCULAR; INTRAVENOUS EVERY 6 HOURS PRN
Status: DISCONTINUED | OUTPATIENT
Start: 2020-08-22 | End: 2020-08-27 | Stop reason: HOSPADM

## 2020-08-22 RX ORDER — LISINOPRIL 5 MG/1
5 TABLET ORAL 2 TIMES DAILY
Status: DISCONTINUED | OUTPATIENT
Start: 2020-08-22 | End: 2020-08-23

## 2020-08-22 RX ORDER — DEXTROSE MONOHYDRATE 25 G/50ML
12.5 INJECTION, SOLUTION INTRAVENOUS PRN
Status: DISCONTINUED | OUTPATIENT
Start: 2020-08-22 | End: 2020-08-27 | Stop reason: HOSPADM

## 2020-08-22 RX ORDER — LABETALOL HYDROCHLORIDE 5 MG/ML
10 INJECTION, SOLUTION INTRAVENOUS EVERY 10 MIN PRN
Status: DISCONTINUED | OUTPATIENT
Start: 2020-08-22 | End: 2020-08-27 | Stop reason: HOSPADM

## 2020-08-22 RX ORDER — CLOPIDOGREL BISULFATE 75 MG/1
75 TABLET ORAL DAILY
Status: DISCONTINUED | OUTPATIENT
Start: 2020-08-23 | End: 2020-08-27 | Stop reason: HOSPADM

## 2020-08-22 RX ORDER — METOPROLOL SUCCINATE 25 MG/1
25 TABLET, EXTENDED RELEASE ORAL DAILY
Status: DISCONTINUED | OUTPATIENT
Start: 2020-08-22 | End: 2020-08-27 | Stop reason: HOSPADM

## 2020-08-22 RX ORDER — ASPIRIN 81 MG/1
81 TABLET ORAL DAILY
Status: DISCONTINUED | OUTPATIENT
Start: 2020-08-23 | End: 2020-08-24

## 2020-08-22 RX ORDER — ASPIRIN 81 MG/1
243 TABLET, CHEWABLE ORAL ONCE
Status: COMPLETED | OUTPATIENT
Start: 2020-08-22 | End: 2020-08-22

## 2020-08-22 RX ORDER — SODIUM CHLORIDE 0.9 % (FLUSH) 0.9 %
10 SYRINGE (ML) INJECTION ONCE
Status: COMPLETED | OUTPATIENT
Start: 2020-08-22 | End: 2020-08-22

## 2020-08-22 RX ORDER — PROMETHAZINE HYDROCHLORIDE 25 MG/1
12.5 TABLET ORAL EVERY 6 HOURS PRN
Status: DISCONTINUED | OUTPATIENT
Start: 2020-08-22 | End: 2020-08-27 | Stop reason: HOSPADM

## 2020-08-22 RX ORDER — BENAZEPRIL HYDROCHLORIDE 10 MG/1
10 TABLET ORAL DAILY
Status: ON HOLD | COMMUNITY
End: 2020-08-27 | Stop reason: HOSPADM

## 2020-08-22 RX ORDER — SODIUM CHLORIDE 0.9 % (FLUSH) 0.9 %
10 SYRINGE (ML) INJECTION EVERY 12 HOURS SCHEDULED
Status: DISCONTINUED | OUTPATIENT
Start: 2020-08-22 | End: 2020-08-27 | Stop reason: HOSPADM

## 2020-08-22 RX ORDER — NICOTINE POLACRILEX 4 MG
15 LOZENGE BUCCAL PRN
Status: DISCONTINUED | OUTPATIENT
Start: 2020-08-22 | End: 2020-08-27 | Stop reason: HOSPADM

## 2020-08-22 RX ORDER — CLOPIDOGREL 300 MG/1
300 TABLET, FILM COATED ORAL ONCE
Status: COMPLETED | OUTPATIENT
Start: 2020-08-22 | End: 2020-08-22

## 2020-08-22 RX ORDER — SODIUM CHLORIDE 0.9 % (FLUSH) 0.9 %
10 SYRINGE (ML) INJECTION PRN
Status: DISCONTINUED | OUTPATIENT
Start: 2020-08-22 | End: 2020-08-27 | Stop reason: HOSPADM

## 2020-08-22 RX ORDER — 0.9 % SODIUM CHLORIDE 0.9 %
1000 INTRAVENOUS SOLUTION INTRAVENOUS ONCE
Status: COMPLETED | OUTPATIENT
Start: 2020-08-22 | End: 2020-08-22

## 2020-08-22 RX ORDER — DEXTROSE MONOHYDRATE 50 MG/ML
100 INJECTION, SOLUTION INTRAVENOUS PRN
Status: DISCONTINUED | OUTPATIENT
Start: 2020-08-22 | End: 2020-08-27 | Stop reason: HOSPADM

## 2020-08-22 RX ORDER — POLYETHYLENE GLYCOL 3350 17 G/17G
17 POWDER, FOR SOLUTION ORAL DAILY PRN
Status: DISCONTINUED | OUTPATIENT
Start: 2020-08-22 | End: 2020-08-27 | Stop reason: HOSPADM

## 2020-08-22 RX ADMIN — LISINOPRIL 5 MG: 5 TABLET ORAL at 20:54

## 2020-08-22 RX ADMIN — METOPROLOL SUCCINATE 25 MG: 25 TABLET, EXTENDED RELEASE ORAL at 18:01

## 2020-08-22 RX ADMIN — CLOPIDOGREL BISULFATE 300 MG: 300 TABLET, FILM COATED ORAL at 11:36

## 2020-08-22 RX ADMIN — ASPIRIN 243 MG: 81 TABLET, CHEWABLE ORAL at 11:36

## 2020-08-22 RX ADMIN — SODIUM CHLORIDE 1000 ML: 9 INJECTION, SOLUTION INTRAVENOUS at 05:12

## 2020-08-22 RX ADMIN — SODIUM CHLORIDE: 9 INJECTION, SOLUTION INTRAVENOUS at 16:54

## 2020-08-22 RX ADMIN — IOPAMIDOL 100 ML: 755 INJECTION, SOLUTION INTRAVENOUS at 07:44

## 2020-08-22 RX ADMIN — ATORVASTATIN CALCIUM 80 MG: 80 TABLET, FILM COATED ORAL at 20:54

## 2020-08-22 RX ADMIN — Medication 10 ML: at 07:44

## 2020-08-22 RX ADMIN — ENOXAPARIN SODIUM 40 MG: 40 INJECTION SUBCUTANEOUS at 18:01

## 2020-08-22 ASSESSMENT — ENCOUNTER SYMPTOMS
SORE THROAT: 0
VOMITING: 0
COUGH: 0
DIARRHEA: 0
ABDOMINAL PAIN: 0
CHOKING: 0
SHORTNESS OF BREATH: 0
WHEEZING: 0
CHEST TIGHTNESS: 0
EYE PAIN: 0
NAUSEA: 0
SINUS PAIN: 0

## 2020-08-22 ASSESSMENT — PAIN SCALES - GENERAL
PAINLEVEL_OUTOF10: 0
PAINLEVEL_OUTOF10: 0

## 2020-08-22 NOTE — H&P
History & Physical        Patient:  Shawnee Roy  YOB: 1960    MRN: [id number]     Acct: [de-identified]    PCP: Howard Samuel MD    Date of Admission: 8/22/2020    Date of Service: Pt seen/examined on 08/22/20  and Admitted to Inpatient with expected LOS greater than two midnights due to medical therapy. Chief Complaint: Left facial droop and numbness      History Of Present Illness:  63 y.o. female who presented to [facility] with left facial droop and numbness. Shawnee Roy is a 63 y.o. old female presenting to the emergency department by EMS from [facility] with CC/O unknown onset of right-sided facial droop and right-sided numbness, which began during the night at home. Last known well time: 9pm last night prior to sleeping. Since recognized the symptoms have been intermittent. She has history of stroke. She has stroke risk factors of: hypertension, HLD, DM. At [facility] ED as transfer from [place], [place], she was seen there as a stroke alert, she presented outside the window for TPA. She was found to have NIH stroke scale of 5. CTA perfusion phase detected distal R-MCA occlusion. She was loaded with Aspirin/Plavix and refer for admission. [facility] service admitting for further evaluation and treatment. Past Medical History:          Diagnosis Date    Diabetic peripheral neuropathy associated with type 2 diabetes mellitus ([facility] [address] 8/24/2018    Epigastric hernia     History of blood transfusion     Hyperlipidemia     Hypertension     Kidney stone     Schizophrenia ([facility] [address]     Type 2 diabetes mellitus with diabetic polyneuropathy, with long-term current use of insulin ([facility] [address] 9/77/1698    Umbilical hernia        Past Surgical History:          Procedure Laterality Date    CARDIAC CATHETERIZATION  09/30/2016     [facility]    COLONOSCOPY  08/2016    Dr. Calvin Emerson / Lindsay Duran / Radha Alvarado Bilateral 10/28/2019    CYSTOSCOPY. RETROGRADE. Sydney Forget, DO   lisinopril (PRINIVIL;ZESTRIL) 20 MG tablet Take 1 tablet by mouth daily 6/4/20 Sydney Forget, DO   metoprolol tartrate (LOPRESSOR) 25 MG tablet Take 1 tablet by mouth 2 times daily 6/4/20 Sydney Forget, DO   vitamin B-1 100 MG tablet Take 1 tablet by mouth daily 6/4/20 Sydney Forget, DO   ferrous sulfate (IRON 325) 325 (65 Fe) MG tablet Take 1 tablet by mouth daily (with breakfast) 6/4/20   Cornelio Collier MD   atorvastatin (LIPITOR) 80 MG tablet Take 1 tablet by mouth nightly 3/3/20   Keshia Forget, DO   vitamin B-6 (B-6) 50 MG tablet Take 1 tablet by mouth daily 12/16/19   Lilly Reynaga DO       Allergies:  Patient has no known allergies. Social History:      The patient currently lives her  Haylee Arce; has 6 grandchildren    TOBACCO:   reports that she quit smoking about 5 years ago. Her smoking use included cigarettes. She smoked 0.00 packs per day for 2.00 years. She has never used smokeless tobacco.  ETOH:   reports no history of alcohol use. Family History:      Reviewed in detail and negative for DM, CAD, CVA. Positive as follows:        Problem Relation Age of Onset    Cancer Brother        Diet:  No diet orders on file    REVIEW OF SYSTEMS:   Pertinent positives as noted in the HPI. All other systems reviewed and negative. PHYSICAL EXAM:    BP (!) 132/57   Pulse 85   Temp 98.8 °F (37.1 °C) (Oral)   Resp 16   Wt 113 lb (51.3 kg)   SpO2 98%   BMI 20.02 kg/m²     General appearance:  No apparent distress, appears stated age and cooperative. Normal habitus  HEENT:  Normal cephalic, atraumatic without obvious deformity. Pupils equal, round, and reactive to light. Extra ocular muscles intact. Conjunctivae/corneas clear. Neck: Supple, with full range of motion. No jugular venous distention. Trachea midline. Respiratory:  Normal respiratory effort. Clear to auscultation, bilaterally without Rales/Wheezes/Rhonchi.   Cardiovascular:  Regular rate and rhythm with administration of intravenous contrast. Dose modulation, iterative reconstruction, and/or weight based adjustment of the mA/kV was utilized to reduce the radiation dose to as low as reasonably achievable. COMPARISON: None. HISTORY: ORDERING SYSTEM PROVIDED HISTORY: focal deficit TECHNOLOGIST PROVIDED HISTORY: Reason for exam:->focal deficit Has a \"code stroke\" or \"stroke alert\" been called? ->Yes FINDINGS: BRAIN/VENTRICLES: There is no acute intracranial hemorrhage, mass effect or midline shift. No abnormal extra-axial fluid collection. The gray-white differentiation is maintained without evidence of an acute infarct. There is no evidence of hydrocephalus. Mild diffuse decrease in cerebral volume is noted with corresponding prominence of the sulci and ventricles. Left putamen elongated remote lacunar infarct is noted which measures up to 8 mm in diameter. ORBITS: The visualized portion of the orbits demonstrate no acute abnormality. SINUSES: The visualized paranasal sinuses and mastoid air cells demonstrate no acute abnormality. SOFT TISSUES/SKULL:  No acute abnormality of the visualized skull or soft tissues. No acute intracranial abnormality. Mild diffuse cerebral atrophy. Left putamen 8 mm diameter remote lacunar infarct. Xr Chest Portable    Result Date: 2020  EXAMINATION: ONE XRAY VIEW OF THE CHEST 2020 4:59 am COMPARISON: Chest radiograph May 29, 2020 HISTORY: ORDERING SYSTEM PROVIDED HISTORY: AMS TECHNOLOGIST PROVIDED HISTORY: Reason for exam:->AMS FINDINGS: The lungs are without acute focal process. There is no effusion or pneumothorax. The cardiomediastinal silhouette is without acute process. The osseous structures are without acute process. No acute process.      Cta Neck W Contrast    Result Date: 2020  Patient MRN:  [id number] : 1960 Age: 63 years Gender: Female Order Date:  2020 7:32 AM EXAM: CTA NECK W CONTRAST, CTA HEAD W CONTRAST NUMBER OF IMAGES:  923 infarct with significant peripheral ischemic penumbra     Cta Head W Contrast    Result Date: 2020  Patient MRN:  [id number] : 1960 Age: 63 years Gender: Female Order Date:  2020 7:32 AM EXAM: CTA NECK W CONTRAST, CTA HEAD W CONTRAST NUMBER OF IMAGES:  18 INDICATION:  CVA CVA COMPARISON: None Technique: Low-dose CT  acquisition technique included one of following options; 1 . Automated exposure control, 2. Adjustment of MA and or KV according to patient's size or 3. Use of iterative reconstruction. Contiguous spiral images were obtained in the axial plane, following the administration of intravenous contrast using CT angiographic protocol. Sagittal and coronal images were reconstructed from the axial plane acquisition. Additional MIP reconstructions were presented to aid in the interpretation of this study. Images were obtained from the skull base cranially. There is mild calcified plaque identified in the vessels compatible with atherosclerotic disease. There is aortic arch and great vessels demonstrate mild atherosclerotic disease. The right carotid is unremarkable. The left carotid is unremarkable. The right vertebral artery is unremarkable. The left vertebral artery is unremarkable. The basilar artery is unremarkable. The middle cerebral arteries are abnormal on the right. There is a right M3 occlusion. This is difficult to clearly visualize on the CT of the head. Otherwise the middle cerebral artery is unremarkable The anterior cerebral arteries are unremarkable. The posterior cerebral arteries are unremarkable. Impression:  1. Right middle cerebral artery M 3 occlusion .  2. Estimated stenosis by NASCET criteria is not hemodynamically significant     Current Facility-Administered Medications   Medication Dose Route Frequency Provider Last Rate Last Dose    sodium chloride flush 0.9 % injection 10 mL  10 mL Intravenous 2 times per day Jt Avilez MD        sodium chloride flush 0.9 % injection Essential hypertension [I10] 09/19/2016     Priority: High    Dyslipidemia [E78.5] 10/17/2016     Priority: Medium    Type 2 diabetes mellitus without complication, with long-term current use of insulin ([address] [E11.9, Z79.4] 09/19/2016     Priority: Medium    Acute ischemic cerebrovascular accident (CVA) involving right middle cerebral artery territory [facility]) [I63.511] 08/22/2020    Acute CVA (cerebrovascular accident) ([facility] [address] [I63.9] 08/22/2020    Hx of diabetic foot ulcer [Z86.31] 01/22/2019    Hx of BKA, left ([facility] [address] [Z89.512] 01/22/2019    Diabetic peripheral neuropathy ([address] [E11.42] 08/24/2018       Diagnosis & management:    1. Acute CVA: Further evaluation with MRI without contrast, cardiac echo, lipid profile, neurology consult. - Loaded with ASA/Plavix, continue lower dose daily, atorvastatin 80 mg hs, OT/PT, speech swallow eval; IVF NS @100cc/h  2. DM 2 insulin-dependent/s/p AKA/Neuropathy: Glucose 130s with normal renal function.   - Obtain history of long-acting insulin, start insulin with meals once pass swallow tests lispro 4 units tid, medium sliding scale insulin with meals/hs. A1c level. 3. HTN-uncontrolled/?CAD: -194/57-87. TN 0.05-> 0.03  4. Dyslipidemia: Atorvastatin 80 mg/d    CODE STATUS: Full    Thank you Andres Flores MD for the opportunity to be involved in this patient's care.     Electronically signed by Marnie Gunter MD on 8/22/2020 at 11:43 AM

## 2020-08-22 NOTE — ED PROVIDER NOTES
Department of Emergency Medicine   ED  Provider Note  Admit Date/RoomTime: 8/22/2020  7:20 AM  ED Room: 22/22 8/22/20  7:23 AM EDT    Mamie Alert called: Yes     HISTORY OF PRESENT ILLNESS:  (Nurses Notes Reviewed)    Chief Complaint:   Aphasia (transfer from [facility] for possible mamie alert LKW 2100 last evening)      Source of history provided by:  patient, EMS personnel and past medical records. History/Exam Limitations: none. Bridget Heath is a 63 y.o. old female presenting to the emergency department by EMS from [facility] , with unknown onset of right-sided facial droop and right-sided numbness, which began during the night. Last known well time: 9pm last night . The episode occurred at home. Since recognized the symptoms have been intermittent. She has history of stroke. She has stroke risk factors of: hypertension. There has been no history of recent trauma. Patient presents as an ED to ED transfer from [place], [place]. She was seen there as a stroke alert, she presented outside the window for TPA as her last normal was 9 PM last night. She was found to have NIH stroke scale of 5. She is having fluctuating symptoms, states they have returned. She was seen upon arrival and made a mamie alert. EKG reviewed from [facility] which showed her to be in normal sinus rhythm. She had blood work obtained at [facility] including [address] which were which were within normal limits. Her troponin was mildly elevated at 0.05. Code Status on file: Prior. NIH Stroke Scale at time of initial evaluation:   Last known well time: 9pm last night   NIH Stroke Scale at time of initial evaluation: 0720  1A: Level of Consciousness 0 - alert; keenly responsive   1B: Ask Month and Age 0 - answers both questions correctly   1C:  Tell Patient To Open and Close Eyes, then Hand  Squeeze 0 - performs both tasks correctly   2: Test Horizontal Extraocular Movements 1 - partial gaze palsy   3: Test Visual Fields 0 - no visual loss   4: Test Facial Palsy 2 - partial paralysis (total or near total paralysis of the lower face)   5A: Test Left Arm Motor Drift 1 - drift, limb holds 90 (or 45) degrees but drifts down before full 10 seconds: does not hit bed   5B: Test Right Arm Motor Drift 0 - no drift, limb holds 90 (or 45) degrees for full 10 seconds   6A: Test Left Leg Motor Drift 0 - no drift; leg holds 30 degree position for full 5 seconds - left BKA   6B: Test Right Leg Motor Drift 0 - no drift; leg holds 30 degree position for full 5 seconds   7: Test Limb Ataxia   (FNF/Heel-Shin) 0 - absent (HTS untested 2.2 left BKA   8: Test Sensation 1 - mild to moderate sensory loss; patient feels pinprick is less sharp or is dull on the affected side; there is a loss of superficial pain with pinprick but patient is aware of being touched    9: Test Language/Aphasia 0 - no aphasia, normal   10: Test Dysarthria 1 - mild to moderate, patient slurs at least some words and at worst, can be understood with some difficulty   11: Test Extinction/Inattention 0 - no abnormality   Total NIH Stroke Score: 6     tPA Criteria*  Inclusion criteria:  - Ischemic stroke onset within 3 hours of drug administration  - Age 25 or older  - No hemorrhage or non-stroke cause of deficit on CT  - Measurable deficit on NIH Stroke Scale    Exclusion criteria: If the patient. ...  - has minor or improving symptoms  - had seizure at onset of stroke  - has had another stroke or serious head trauma within the last 3 months  - has had major surgery within the last 14 days  - has known history of intracranial hemorrhage  - has sustained systolic blood pressure >890 mmHg  - has sustained diastolic blood pressure >384 mmHg  - requires aggressive treatment is necessary to lower their blood pressure  - has symptoms suggestive of subarachnoid hemorrhage  - has had GI or urinary tract hemorrhage within the last 21 days  - has had an arterial puncture at a non-compressible site within the last 7 days  - received heparin within the last 48 hours and has an elevated PTT  - has a prothrombin time (PT) >15 seconds  - has a platelet count <620,234 uL  - serum blood glucose is <50 mg/dL or >400 mg/dL    Relative Contraindications:  - NIH Stroke score >22  - Patient's CT shows evidence of large MCA territory infarction (>1/3 the MCA territory)    *[facility] Policy Paper      Acute CVA Core Measures:     - t-PA Eligibility: IV t-PA was considered and not given due to violations in inclusion criteria including stroke onset was greater than 3 hours prior to presentation             Past Medical History:  has a past medical history of Diabetic peripheral neuropathy associated with type 2 diabetes mellitus ([facility] [address] Epigastric hernia, History of blood transfusion, Hyperlipidemia, Hypertension, Kidney stone, Schizophrenia ([facility] [address] Type 2 diabetes mellitus with diabetic polyneuropathy, with long-term current use of insulin ([facility] [address] and Umbilical hernia. Past Surgical History:  has a past surgical history that includes Ovary removal (Right); Ovary removal (2014); ovarian cyst removal; Colonoscopy (08/2016); Cardiac catheterization (09/30/2016); other surgical history (11/04/2016); hernia repair; CYSTOSCOPY INSERTION / REMOVAL STENT / STONE (Bilateral, 10/28/2019); Upper gastrointestinal endoscopy (N/A, 10/29/2019); Leg amputation below knee (Left, 2/29/2020); and CYSTOSCOPY INSERTION / REMOVAL STENT / STONE (Bilateral, 5/19/2020). Social History:  reports that she quit smoking about 5 years ago. Her smoking use included cigarettes. She smoked 0.00 packs per day for 2.00 years. She has never used smokeless tobacco. She reports that she does not drink alcohol or use drugs. Prior Functional Status(Modified [place] Scale):  1=No significant disability despite symptoms; able to carry out all usual duties and activities    Family History: family history includes Cancer in her brother.      The patients home medications have been reviewed. Prior to Admission medications    Medication Sig Start Date End Date Taking? Authorizing Provider   benazepril (LOTENSIN) 10 MG tablet Take 10 mg by mouth daily   Yes Historical Provider, MD   megestrol (MEGACE) 20 MG tablet Take 1 tablet by mouth 2 times daily 7/6/20  Yes Subha Sandoval MD   oxybutynin (DITROPAN) 5 MG tablet Take 1 tablet by mouth 3 times daily 7/6/20  Yes Subha Sandoval MD   folic acid (FOLVITE) 1 MG tablet Take 1 tablet by mouth daily 6/4/20  Yes Tim Velasco DO   lisinopril (PRINIVIL;ZESTRIL) 20 MG tablet Take 1 tablet by mouth daily 6/4/20  Yes Tim Velasco DO   metoprolol tartrate (LOPRESSOR) 25 MG tablet Take 1 tablet by mouth 2 times daily 6/4/20  Yes Tim Velasco DO   vitamin B-1 100 MG tablet Take 1 tablet by mouth daily 6/4/20  Yes Tim Velasco DO   ferrous sulfate (IRON 325) 325 (65 Fe) MG tablet Take 1 tablet by mouth daily (with breakfast) 6/4/20  Yes Subha Sandoval MD   atorvastatin (LIPITOR) 80 MG tablet Take 1 tablet by mouth nightly 3/3/20  Yes Tim Velasco DO   clopidogrel (PLAVIX) 75 MG tablet Take 1 tablet by mouth once daily 7/6/20   Tim Velasco DO   cloNIDine (CATAPRES) 0.3 MG/24HR PTWK Place 1 patch onto the skin once a week 7/6/20 8/5/20  Subha Sandoval MD   clopidogrel (PLAVIX) 75 MG tablet Take 1 tablet by mouth daily 7/6/20   Subha Sandoval MD   hydrALAZINE (APRESOLINE) 50 MG tablet Take 1 tablet by mouth 4 times daily 7/6/20 8/5/20  Subha Sandoval MD   vitamin B-6 (B-6) 50 MG tablet Take 1 tablet by mouth daily 12/16/19   Nelli Reynaga DO       Allergies: Patient has no known allergies.        Review of Systems:   Pertinent positives and negatives are stated within HPI, all other systems reviewed and are negative.    ---------------------------------------------------PHYSICAL EXAM--------------------------------------    Constitutional/General: Alert and oriented x3,    Head: Normocephalic and atraumatic  Eyes: PERRL, EOMI she has difficulty looking to the left but can do it with encouragement  Mouth: Oropharynx clear, handling secretions, no trismus. There is  Right sided facial droop  Neck: Supple, full ROM, non tender to palpation in the midline, no stridor, no crepitus, no meningeal signs  Pulmonary: Lungs clear to auscultation bilaterally, Not in respiratory distress  Cardiovascular:  Regular rate. Regular rhythm. 2+ distal pulses  Chest: no chest wall tenderness  Abdomen: Soft. Non tender. Non distended. +BS. No rebound, guarding, or rigidity. No pulsatile masses appreciated. Musculoskeletal: Moves all extremities x 4. Warm and well perfused, no clubbing, cyanosis, or edema. Capillary refill <3 seconds  Skin: warm and dry. No rashes. Neurologic: GCS 15, ,   Speech slurred. Normal finger to nose. Thomasena Dilling Please also see NIH stroke scale.   Psych: Normal Affect      -------------------------------------------------- RESULTS -------------------------------------------------  All laboratory and imaging studies have been reviewed by myself    LABS:  Results for orders placed or performed during the hospital encounter of 08/22/20   Protime-INR   Result Value Ref Range    Protime 13.0 (H) 9.3 - 12.4 sec    INR 1.2    APTT   Result Value Ref Range    aPTT 32.4 24.5 - 35.1 sec   Troponin   Result Value Ref Range    Troponin 0.03 0.00 - 0.03 ng/mL   POCT glucose   Result Value Ref Range    Glucose 139 mg/dL    QC OK? y    POCT Glucose   Result Value Ref Range    Meter Glucose 139 (H) 74 - 99 mg/dL   EKG 12 Lead   Result Value Ref Range    Ventricular Rate 89 BPM    Atrial Rate 89 BPM    P-R Interval 196 ms    QRS Duration 94 ms    Q-T Interval 378 ms    QTc Calculation (Bazett) 459 ms    P Axis 69 degrees    R Axis 66 degrees    T Axis 47 degrees   TYPE AND SCREEN   Result Value Ref Range    ABO/Rh B POS     Antibody Screen NEG        RADIOLOGY:  Interpreted by Radiologist.  CTA HEAD W CONTRAST   Final Result   1. Right middle cerebral artery M 3 occlusion . 2. Estimated stenosis by NASCET criteria is not hemodynamically   significant               CTA NECK W CONTRAST   Final Result   1. Right middle cerebral artery M 3 occlusion . 2. Estimated stenosis by NASCET criteria is not hemodynamically   significant               CT BRAIN PERFUSION   Final Result      Central core infarct with significant peripheral ischemic penumbra                EKG:  This EKG is signed and interpreted by me. Rate: 89  Rhythm: Sinus  Interpretation: Sinus rhythm, normal axis, W[place] is 196, QRS is 94, QTc 459, Non Specific ST Changes    Comparison: stable as compared to patient's most recent EKG          ------------------------- NURSING NOTES AND VITALS REVIEWED ---------------------------   The nursing notes within the ED encounter and vital signs as below have been reviewed. BP (!) 168/60   Pulse 88   Temp 98.8 °F (37.1 °C) (Oral)   Resp 20   Wt 113 lb (51.3 kg)   SpO2 100%   BMI 20.02 kg/m²   Oxygen Saturation Interpretation: Normal    The patients available past medical records and past encounters were reviewed. ------------------------------ ED COURSE/MEDICAL DECISION MAKING----------------------  Medications   sodium chloride flush 0.9 % injection 10 mL (10 mLs Intravenous Given 8/22/20 0744)   iopamidol (ISOVUE-370) 76 % injection 100 mL (100 mLs Intravenous Given 8/22/20 0744)   aspirin chewable tablet 243 mg (243 mg Oral Given 8/22/20 1136)   clopidogrel (PLAVIX) tablet 300 mg (300 mg Oral Given 8/22/20 1136)              Acute CVA Core Measures:     LWK > 4.5 hours, no indication for TPA. Medical Decision Making:    Patient presents with strokelike episode from outside hospital.  She had a Noncon CT performed there which did not demonstrate intraparenchymal hemorrhage.   She is greater than 4-1/2 last known well, she was not a TPA candidate, she was transferred here under a Mamie alert protocol for possible mechanical intervention. .Alert was called upon arrival, CTAs of the head neck and CT brain perfusions were obtained. There is evidence of ischemic penumbra with core infarct, spoke with neuro intervention, this occlusion is not amenable to mechanical intervention. Spoke with medicine patient will be admitted    Re-Evaluations:             Re-evaluation. Patients symptoms show no change    This patient's ED course included: a personal history and physicial examination, re-evaluation prior to disposition, multiple bedside re-evaluations, IV medications, cardiac monitoring, continuous pulse oximetry, complex medical decision making and emergency management and a personal history and physicial eaxmination    This patient has been closely monitored during their ED course. Consultations:  Spoke with Dr. Hilda Landeros (neuro intervention). Discussed case. They will provide consultation and states patient is not amenable to mechanical intervention. Spoke with Dr. Imer Borjas (Medicine). Discussed case. They will admit this patient. Counseling: The emergency provider has spoken with the patient and discussed todays presentation, condition, results and treatment options, in addition to providing specific details for the plan of care and counseling regarding the diagnosis and prognosis. Questions are answered at this time and they are agreeable with the plan. IV tPA not given because:  LWK > 4.5 hours      --------------------------------- IMPRESSION AND DISPOSITION ---------------------------------    IMPRESSION  1.  Acute ischemic stroke [facility])        DISPOSITION  Disposition: Admit to telemetry  Patient condition is stable             Ama Stuart, DO  08/22/20 Bishop Infante, DO  08/22/20 [id number]

## 2020-08-22 NOTE — CONSULTS
NEUROLOGY CONSULT NOTE      Requesting Physician: Lary Rao MD    Reason for Consult:  Evaluate for acute stroke    History of Present Illness:  Reva Leroy is a 63 y.o. female admitted to [facility] on 8/22/2020.        63year old woman with hx of anemia, DM2,  HTN, she was doing well yesterday and went to bed around 9-10pm, patient stated that she woke up around 4am and realized that she couldn't talk well. She was taken to the ED. tPA was not given as she is a wake up stroke and therefore being out of window. Telestroke was consulted and patient was loaded with aspirin and plavix and lipitor. On CTA it showed a R M3 acute occlusion, CTP is done and showed a penumbra without core infarct in the right frontal/temporal area. Pt otherwsise has no complaints. Reports no chest pain. No shortness of breath with exertion. Reports no neck pain. No vision changes. No dysphagia. No fever. No rash. No weight loss. Past Medical History:        Diagnosis Date    Diabetic peripheral neuropathy associated with type 2 diabetes mellitus ([address] 8/24/2018    Epigastric hernia     History of blood transfusion     Hyperlipidemia     Hypertension     Kidney stone     Schizophrenia ([address]     Type 2 diabetes mellitus with diabetic polyneuropathy, with long-term current use of insulin ([facility] [address] 8/98/4402    Umbilical hernia            Procedure Laterality Date    CARDIAC CATHETERIZATION  09/30/2016     [facility]    COLONOSCOPY  08/2016    Dr. David Francisco / Hassan Favre / [place] Bilateral 10/28/2019    CYSTOSCOPY. RETROGRADE.  PYELOGRAM. BILATERAL STENT INSERTION performed by Raul Rosas MD at [facility] / REMOVAL STENT / STONE Bilateral 5/19/2020    CYSTOSCOPY RETROGRADE PYELOGRAM BILATERAL STENT EXCHANGE performed by Raul Rosas MD at [address] Left 2/29/2020    LEG AMPUTATION BELOW normal.   Musculoskeletal: Has no hand arthritis, no limitation of ROM in any of the four extremities. no joint tenderness, deformity or swelling. There is no leg edema. The Heart was regular in rate and rhythm. No heart murmur  Chest- Clear  Abdomen: soft, intact bowel sounds. Labs:    CBC:   Recent Labs     08/22/20  0446   WBC 4.4*   HGB 7.9*      MCV 85.1   MCH 25.6*   MCHC 30.0*   RDW 16.3*     CMP:  Recent Labs     08/22/20  0446 08/22/20  0725     --    K 3.6  --      --    CO2 21*  --    BUN 12  --    CREATININE 1.0  --    GFRAA >60  --    LABGLOM >60  --    GLUCOSE 133* 139   CALCIUM 8.8  --      Liver: No results for input(s): AST, ALT, ALKPHOS, PROT, LABALBU, BILITOT in the last 72 hours. Invalid input(s): BILDIR  MRI BRAIN:  No results found for this or any previous visit. No results found for this or any previous visit. No results found for this or any previous visit. No results found for this or any previous visit. Results for orders placed during the hospital encounter of 05/29/20   MRI Brain WO Contrast    Narrative EXAMINATION:  MRI OF THE BRAIN WITHOUT CONTRAST  6/1/2020 11:19 am    TECHNIQUE:  Multiplanar multisequence MRI of the brain was performed without the  administration of intravenous contrast.    COMPARISON:  CT brain 05/29/2020    HISTORY:  ORDERING SYSTEM PROVIDED HISTORY: CVA  TECHNOLOGIST PROVIDED HISTORY:  Reason for exam:->CVA    FINDINGS:  INTRACRANIAL STRUCTURES/VENTRICLES: There are no areas of restricted  diffusion identified to suggest an acute infarct. There is no acute  intracranial hemorrhage. No mass effect or midline shift is present. There  is mild diffuse cerebral volume loss. There are several foci of abnormal  increased T2/FLAIR signal intensity within the subcortical white matter of  the frontal and parietal lobes. There is a remote lacunar infarct within the  left basal ganglia.     There is no ventriculomegaly or abnormal extra-axial fluid collection  present. There is no sellar or suprasellar mass present. The proximal  portions of the [place] demonstrate normal flow voids. ORBITS: Limited evaluation of the orbits is unremarkable. SINUSES: The paranasal sinuses and mastoid air cells are clear. BONES/SOFT TISSUES: Bone marrow signal intensity is normal.      Impression 1. Several nonspecific areas of abnormal increased T2/FLAIR signal intensity  within the subcortical white matter of the frontal and parietal lobes. Although nonspecific, this can be seen in the setting of posterior reversible  encephalopathy syndrome (PRES). 2. No acute infarct identified. 3. Remote lacunar infarct within the left basal ganglia. 4. Mild diffuse cerebral volume loss. The findings were sent to the Radiology Results [address] at 11:48  am on 6/1/2020to be communicated to a licensed caregiver. No results found for this or any previous visit. No results found for this or any previous visit. Results for orders placed during the hospital encounter of 08/22/20   CT Head WO Contrast    Narrative EXAMINATION:  CT OF THE HEAD WITHOUT CONTRAST  8/22/2020 4:45 am    TECHNIQUE:  CT of the head was performed without the administration of intravenous  contrast. Dose modulation, iterative reconstruction, and/or weight based  adjustment of the mA/kV was utilized to reduce the radiation dose to as low  as reasonably achievable. COMPARISON:  None. HISTORY:  ORDERING SYSTEM PROVIDED HISTORY: focal deficit  TECHNOLOGIST PROVIDED HISTORY:  Reason for exam:->focal deficit  Has a \"code stroke\" or \"stroke alert\" been called? ->Yes    FINDINGS:  BRAIN/VENTRICLES: There is no acute intracranial hemorrhage, mass effect or  midline shift. No abnormal extra-axial fluid collection. The gray-white  differentiation is maintained without evidence of an acute infarct. There is  no evidence of hydrocephalus.  Mild diffuse decrease in cerebral volume is  noted with corresponding prominence of the sulci and ventricles. Left  putamen elongated remote lacunar infarct is noted which measures up to 8 mm  in diameter. ORBITS: The visualized portion of the orbits demonstrate no acute abnormality. SINUSES: The visualized paranasal sinuses and mastoid air cells demonstrate  no acute abnormality. SOFT TISSUES/SKULL:  No acute abnormality of the visualized skull or soft  tissues. Impression No acute intracranial abnormality. Mild diffuse cerebral atrophy. Left putamen 8 mm diameter remote lacunar infarct. 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 *    63 year o      Recommendations:  - patient has an acute stroke in the R M3  - she is not a thrombectomy candidate  - aspirin and plavix loaded, con't aspirin 81mg daily and plavix 75mg daily for 21 days, patient was taking plavix before admission, will switch her to aspirin 81mg daily after 21 days. - lipitor 80mg daily  - pt will need holter monitor or loop recorder to r/o afib, this is a cardioembolic stroke. If afib is found, pt will need anticoagulant. - consult PT/OT and SLP  - lovenox for DVT prophylaxis. 1. Discussed with primary service. 2. Please call if any questions. It was my pleasure to evaluate Raghu Negron today. Please call with questions.       Electronically signed by Leodan Philippe MD on 8/22/2020 at 5:40 PM    Klever Rivas MD  Attending Neurologist/Neurointensivist

## 2020-08-22 NOTE — ED NOTES
Jasmin Grigsby (  ) waiting in car to be with pt, please call [phone]     Laureen Dietz  08/22/20 Myriam Sue  08/22/20 1995

## 2020-08-22 NOTE — ED PROVIDER NOTES
Filemon Guerrero is a 59-year-old female who presents with a chief complaint of focal neuro deficits. Past medical history history comes primarily from the patient. Last known normal was 9:00 yesterday evening when the patient went to bed. She woke up at approximately 330 this morning to go to the bathroom, and found that she was having difficulty with her speech. When she assessed herself in the mirror she noted that she was having droop in the left side of her face. For this reason she contacted a family member and was transported to [address] [place] [address] emergency department for further evaluation and treatment. On arrival, she was assessed with history, physical exam, imaging studies, laboratory studies, EKG, vital signs. Initial vital signs were stable on arrival and she was afebrile. Review of Systems   Constitutional: Negative for appetite change, chills and fever. HENT: Negative for sinus pain and sore throat. Eyes: Negative for pain and visual disturbance. Respiratory: Negative for cough, choking, chest tightness, shortness of breath and wheezing. Cardiovascular: Negative for chest pain and palpitations. Gastrointestinal: Negative for abdominal pain, diarrhea, nausea and vomiting. Genitourinary: Negative for hematuria. Musculoskeletal: Negative for neck pain and neck stiffness. Skin: Negative for rash. Neurological: Positive for facial asymmetry and speech difficulty. Negative for tremors, syncope, weakness and numbness. Psychiatric/Behavioral: Negative for confusion. Physical Exam  Vitals signs and nursing note reviewed. Constitutional:       Appearance: She is well-developed. HENT:      Head: Normocephalic and atraumatic. Eyes:      Pupils: Pupils are equal, round, and reactive to light. Neck:      Musculoskeletal: Normal range of motion and neck supple. Cardiovascular:      Rate and Rhythm: Normal rate and regular rhythm.    Pulmonary:      Effort: Pulmonary effort is normal. No respiratory distress. Breath sounds: Normal breath sounds. No wheezing or rales. Abdominal:      General: Bowel sounds are normal.      Palpations: Abdomen is soft. Tenderness: There is no abdominal tenderness. There is no guarding or rebound. Skin:     General: Skin is warm and dry. Neurological:      Mental Status: She is alert and oriented to person, place, and time. Cranial Nerves: Cranial nerve deficit present. Motor: Weakness present. Coordination: Coordination normal.      NIH Stroke Scale/Score at time of initial evaluation:  1A: Level of Consciousness 0 - alert; keenly responsive   1B: Ask Month and Age 0 - answers both questions correctly   1C: Tell Patient To Open and Close Eyes, then Hand  Squeeze 0 - performs both tasks correctly   2: Test Horizontal Extraocular Movements 0 - normal   3: Test Visual Fields 0 - no visual loss   4: Test Facial Palsy 2 - partial paralysis (total or near total paralysis of the lower face)   5A: Test Left Arm Motor Drift 1 - drift, limb holds 90 (or 45) degrees but drifts down before full 10 seconds: does not hit bed   5B: Test Right Arm Motor Drift 0 - no drift, limb holds 90 (or 45) degrees for full 10 seconds   6A: Test Left Leg Motor Drift 0 - no drift; leg holds 30 degree position for full 5 seconds   6B: Test Right Leg Motor Drift 0 - no drift; leg holds 30 degree position for full 5 seconds   7: Test Limb Ataxia   (FNF/Heel-Shin) 0 - absent   8: Test Sensation 0 - normal; no sensory loss   9: Test Language/Aphasia 0 - no aphasia, normal   10: Test Dysarthria 2 - severe; patient speech is so slurred as to be unintelligible in the absence of or our of proportion to any dysphagia, or is mute/anarthric    11: Test Extinction/Inattention 0 - no abnormality   Total Score: 5   8/22/20 at 5:08 AM EDT.       Acute CVA Core Measures:      - t-PA Eligibility: IV t-PA was considered and not given due to violations in she went to bed at 9:00 PM last evening and states that she was feeling fine, when she woke up to go the bathroom this morning she noticed that she was having speech problems. Patient denies any weakness of the arms or legs. She denies any headache, blurred vision, chest pain, shortness of breath, abdominal pain, nausea, vomiting. On examination the patient was immediately brought back. Clear breath sounds in all lung fields. Regular rate and rhythm of the heart. No abdominal tenderness palpation. Patient does have a BKA of the left lower extremity. Patient does have dysarthria. Slight drift of the left upper extremity. No sensory deficit. No facial droop. NIH 2.    Masood Daigle DO      [MS]   0145 Patient's glucose is 136. She will be sent over for CT of the head. [MS]      ED Course User Index  [MS] Alcide Mohs, DO        ED Course as of Aug 22 0602   Sat Aug 22, 2020   [id number] ATTENDING PROVIDER ATTESTATION:     Briseida Billings presented to the emergency department for evaluation of [unfilled]  I have reviewed and discussed the case, including pertinent history (medical, surgical, family and social) and exam findings with the Midlevel and the Nurse assigned to Briseida Billings. I have personally performed and/or participated in the history, exam, medical decision making, and procedures and agree with all pertinent clinical information. I have reviewed my findings and recommendations with Briseida Billings and members of family present at the time of disposition. My findings/plan: Patient is a 25-year-old female who presents the chief complaint of dysarthria. Patient states that she went to bed at 9:00 PM last evening and states that she was feeling fine, when she woke up to go the bathroom this morning she noticed that she was having speech problems. Patient denies any weakness of the arms or legs.   She denies any headache, blurred vision, chest pain, shortness of breath, abdominal pain, nausea, vomiting. On examination the patient was immediately brought back. Clear breath sounds in all lung fields. Regular rate and rhythm of the heart. No abdominal tenderness palpation. Patient does have a BKA of the left lower extremity. Patient does have dysarthria. Slight drift of the left upper extremity. No sensory deficit. No facial droop. [facility] 2.    Tej DO Catarina      [MS]   1027 Patient's glucose is 136. She will be sent over for CT of the head. [MS]      ED Course User Index  [MS] [facility], DO       --------------------------------------------- PAST HISTORY ---------------------------------------------  Past Medical History:  has a past medical history of Diabetic peripheral neuropathy associated with type 2 diabetes mellitus ([facility] [address] Epigastric hernia, History of blood transfusion, Hyperlipidemia, Hypertension, Kidney stone, Schizophrenia ([facility] [address] Type 2 diabetes mellitus with diabetic polyneuropathy, with long-term current use of insulin ([facility] [address] and Umbilical hernia. Past Surgical History:  has a past surgical history that includes Ovary removal (Right); Ovary removal (2014); ovarian cyst removal; Colonoscopy (08/2016); Cardiac catheterization (09/30/2016); other surgical history (11/04/2016); hernia repair; CYSTOSCOPY INSERTION / REMOVAL STENT / STONE (Bilateral, 10/28/2019); Upper gastrointestinal endoscopy (N/A, 10/29/2019); Leg amputation below knee (Left, 2/29/2020); and CYSTOSCOPY INSERTION / REMOVAL STENT / STONE (Bilateral, 5/19/2020). Social History:  reports that she quit smoking about 5 years ago. Her smoking use included cigarettes. She smoked 0.00 packs per day for 2.00 years. She has never used smokeless tobacco. She reports that she does not drink alcohol or use drugs. Family History: family history includes Cancer in her brother. The patients home medications have been reviewed.     Allergies: Patient has no known allergies.     -------------------------------------------------- RESULTS -------------------------------------------------    Lab  Results for orders placed or performed during the hospital encounter of 08/22/20   CBC Auto Differential   Result Value Ref Range    WBC 4.4 (L) 4.5 - 11.5 E9/L    RBC 3.09 (L) 3.50 - 5.50 E12/L    Hemoglobin 7.9 (L) 11.5 - 15.5 g/dL    Hematocrit 26.3 (L) 34.0 - 48.0 %    MCV 85.1 80.0 - 99.9 fL    MCH 25.6 (L) 26.0 - 35.0 pg    MCHC 30.0 (L) 32.0 - 34.5 %    RDW 16.3 (H) 11.5 - 15.0 fL    Platelets 478 394 - 410 E9/L    MPV 9.7 7.0 - 12.0 fL    Neutrophils % 39.5 (L) 43.0 - 80.0 %    Immature Granulocytes % 0.5 0.0 - 5.0 %    Lymphocytes % 37.3 20.0 - 42.0 %    Monocytes % 20.7 (H) 2.0 - 12.0 %    Eosinophils % 1.1 0.0 - 6.0 %    Basophils % 0.9 0.0 - 2.0 %    Neutrophils Absolute 1.74 (L) 1.80 - 7.30 E9/L    Immature Granulocytes # 0.02 E9/L    Lymphocytes Absolute 1.64 1.50 - 4.00 E9/L    Monocytes Absolute 0.91 0.10 - 0.95 E9/L    Eosinophils Absolute 0.05 0.05 - 0.50 E9/L    Basophils Absolute 0.04 0.00 - 0.20 S7/F   Basic Metabolic Panel w/ Reflex to MG   Result Value Ref Range    Sodium 138 132 - 146 mmol/L    Potassium reflex Magnesium 3.6 3.5 - 5.0 mmol/L    Chloride 105 98 - 107 mmol/L    CO2 21 (L) 22 - 29 mmol/L    Anion Gap 12 7 - 16 mmol/L    Glucose 133 (H) 74 - 99 mg/dL    BUN 12 8 - 23 mg/dL    CREATININE 1.0 0.5 - 1.0 mg/dL    GFR Non-African American >60 >=60 mL/min/1.73    GFR African American >60     Calcium 8.8 8.6 - 10.2 mg/dL   Troponin   Result Value Ref Range    Troponin 0.05 (H) 0.00 - 0.03 ng/mL   Protime-INR   Result Value Ref Range    Protime 12.9 (H) 9.3 - 12.4 sec    INR 1.1    POCT Glucose   Result Value Ref Range    Meter Glucose 136 (H) 74 - 99 mg/dL   EKG 12 Lead   Result Value Ref Range    Ventricular Rate 82 BPM    Atrial Rate 82 BPM    P-R Interval 158 ms    QRS Duration 78 ms    Q-T Interval 378 ms    QTc Calculation (Bazett) 441 ms    P Axis 66 degrees    R Axis 61 degrees    T Axis 4 degrees       Radiology  XR CHEST PORTABLE   Final Result   No acute process. CT Head WO Contrast   Final Result   No acute intracranial abnormality. Mild diffuse cerebral atrophy. Left putamen 8 mm diameter remote lacunar infarct. EKG #1:  Interpreted by emergency department physician unless otherwise noted. Time:  0453    Rate: 82  Rhythm: Sinus. Interpretation: normal sinus rhythm.      ------------------------- NURSING NOTES AND VITALS REVIEWED ---------------------------  Date / Time Roomed:  8/22/2020  4:28 AM  ED Bed Assignment:  05/05    The nursing notes within the ED encounter and vital signs as below have been reviewed. Patient Vitals for the past 24 hrs:   BP Temp Temp src Pulse Resp SpO2 Height Weight   08/22/20 0550 102/68 -- -- 83 16 100 % -- --   08/22/20 0529 (!) 174/59 -- -- 83 17 100 % -- --   08/22/20 0510 120/87 -- -- 83 16 100 % -- --   08/22/20 0439 (!) 194/83 -- -- 88 21 99 % 5' 3\" (1.6 m) 113 lb (51.3 kg)   08/22/20 0426 132/63 97.8 °F (36.6 °C) Temporal 91 18 99 % -- --       Oxygen Saturation Interpretation: Normal      ------------------------------------------ PROGRESS NOTES ------------------------------------------  Re-evaluation(s):  Time: 6:03 AM EDT  . Patients symptoms show no change  Repeat physical examination is not changed    Time: 6:03 AM EDT. Patients symptoms show no change  Repeat physical examination is not changed    I have spoken with the patient and discussed todays results, in addition to providing specific details for the plan of care and counseling regarding the diagnosis and prognosis. Their questions are answered at this time and they are agreeable with the plan. I have discussed the risks and benefits of transfer and they wish to proceed with the transfer.       --------------------------------- ADDITIONAL PROVIDER NOTES ---------------------------------      Reason for transfer: Requirement for further neurologic workup. This patient's ED course included: a personal history and physicial examination, re-evaluation prior to disposition, multiple bedside re-evaluations, continuous pulse oximetry and complex medical decision making and emergency management    This patient has remained hemodynamically stable during their ED course. Please note that the withdrawal or failure to initiate urgent interventions for this patient would likely result in a life threatening deterioration or permanent disability. Accordingly this patient received 30 minutes of critical care time, excluding separately billable procedures. Clinical Impression  1. Cerebrovascular accident (CVA), unspecified mechanism ([address]          Disposition  Patient's disposition: Transfer to [facility]. Transferred by: ground. Patient's condition is fair. Prince Hawley  PGY-2  6:04 AM EDT       Marisabel Alejo DO  Resident  08/22/20 [id number]

## 2020-08-23 PROBLEM — I63.9 ACUTE ISCHEMIC STROKE (HCC): Status: ACTIVE | Noted: 2020-08-22

## 2020-08-23 PROBLEM — I50.33 ACUTE ON CHRONIC DIASTOLIC HEART FAILURE (HCC): Status: ACTIVE | Noted: 2020-08-22

## 2020-08-23 LAB
ANION GAP SERPL CALCULATED.3IONS-SCNC: 12 MMOL/L (ref 7–16)
BASOPHILS ABSOLUTE: 0.03 E9/L (ref 0–0.2)
BASOPHILS RELATIVE PERCENT: 0.9 % (ref 0–2)
BLOOD BANK DISPENSE STATUS: NORMAL
BLOOD BANK PRODUCT CODE: NORMAL
BPU ID: NORMAL
BUN BLDV-MCNC: 10 MG/DL (ref 8–23)
CALCIUM SERPL-MCNC: 8.5 MG/DL (ref 8.6–10.2)
CHLORIDE BLD-SCNC: 112 MMOL/L (ref 98–107)
CHOLESTEROL, TOTAL: 122 MG/DL (ref 0–199)
CO2: 19 MMOL/L (ref 22–29)
CREAT SERPL-MCNC: 0.9 MG/DL (ref 0.5–1)
DESCRIPTION BLOOD BANK: NORMAL
EOSINOPHILS ABSOLUTE: 0.02 E9/L (ref 0.05–0.5)
EOSINOPHILS RELATIVE PERCENT: 0.6 % (ref 0–6)
GFR AFRICAN AMERICAN: >60
GFR NON-AFRICAN AMERICAN: >60 ML/MIN/1.73
GLUCOSE BLD-MCNC: 91 MG/DL (ref 74–99)
HBA1C MFR BLD: 5 % (ref 4–5.6)
HCT VFR BLD CALC: 21.2 % (ref 34–48)
HCT VFR BLD CALC: 21.4 % (ref 34–48)
HCT VFR BLD CALC: 29.8 % (ref 34–48)
HDLC SERPL-MCNC: 32 MG/DL
HEMOGLOBIN: 6.5 G/DL (ref 11.5–15.5)
HEMOGLOBIN: 8.9 G/DL (ref 11.5–15.5)
IMMATURE GRANULOCYTES #: 0.02 E9/L
IMMATURE GRANULOCYTES %: 0.6 % (ref 0–5)
IMMATURE RETIC FRACT: 21.9 % (ref 3–15.9)
IRON SATURATION: 11 % (ref 15–50)
IRON: 16 MCG/DL (ref 37–145)
LDL CHOLESTEROL CALCULATED: 68 MG/DL (ref 0–99)
LYMPHOCYTES ABSOLUTE: 0.79 E9/L (ref 1.5–4)
LYMPHOCYTES RELATIVE PERCENT: 24 % (ref 20–42)
MCH RBC QN AUTO: 25.3 PG (ref 26–35)
MCHC RBC AUTO-ENTMCNC: 30.4 % (ref 32–34.5)
MCV RBC AUTO: 83.3 FL (ref 80–99.9)
METER GLUCOSE: 91 MG/DL (ref 74–99)
METER GLUCOSE: 93 MG/DL (ref 74–99)
METER GLUCOSE: 98 MG/DL (ref 74–99)
MONOCYTES ABSOLUTE: 0.77 E9/L (ref 0.1–0.95)
MONOCYTES RELATIVE PERCENT: 23.4 % (ref 2–12)
NEUTROPHILS ABSOLUTE: 1.66 E9/L (ref 1.8–7.3)
NEUTROPHILS RELATIVE PERCENT: 50.5 % (ref 43–80)
PDW BLD-RTO: 16.3 FL (ref 11.5–15)
PLATELET # BLD: 329 E9/L (ref 130–450)
PMV BLD AUTO: 10.1 FL (ref 7–12)
POTASSIUM SERPL-SCNC: 3.8 MMOL/L (ref 3.5–5)
RBC # BLD: 2.57 E12/L (ref 3.5–5.5)
RETIC HGB EQUIVALENT: 26.3 PG (ref 28.2–36.6)
RETICULOCYTE ABSOLUTE COUNT: 0.1 E12/L
RETICULOCYTE COUNT PCT: 4 % (ref 0.4–1.9)
SODIUM BLD-SCNC: 143 MMOL/L (ref 132–146)
TOTAL IRON BINDING CAPACITY: 142 MCG/DL (ref 250–450)
TRANSFERRIN: 101 MG/DL (ref 200–360)
TRIGL SERPL-MCNC: 111 MG/DL (ref 0–149)
VLDLC SERPL CALC-MCNC: 22 MG/DL
WBC # BLD: 3.3 E9/L (ref 4.5–11.5)

## 2020-08-23 PROCEDURE — 83540 ASSAY OF IRON: CPT

## 2020-08-23 PROCEDURE — 2580000003 HC RX 258: Performed by: INTERNAL MEDICINE

## 2020-08-23 PROCEDURE — 2060000000 HC ICU INTERMEDIATE R&B

## 2020-08-23 PROCEDURE — 36415 COLL VENOUS BLD VENIPUNCTURE: CPT

## 2020-08-23 PROCEDURE — P9016 RBC LEUKOCYTES REDUCED: HCPCS

## 2020-08-23 PROCEDURE — 82962 GLUCOSE BLOOD TEST: CPT

## 2020-08-23 PROCEDURE — 99233 SBSQ HOSP IP/OBS HIGH 50: CPT | Performed by: INTERNAL MEDICINE

## 2020-08-23 PROCEDURE — 6370000000 HC RX 637 (ALT 250 FOR IP): Performed by: INTERNAL MEDICINE

## 2020-08-23 PROCEDURE — 2500000003 HC RX 250 WO HCPCS: Performed by: INTERNAL MEDICINE

## 2020-08-23 PROCEDURE — 36430 TRANSFUSION BLD/BLD COMPNT: CPT

## 2020-08-23 PROCEDURE — 84466 ASSAY OF TRANSFERRIN: CPT

## 2020-08-23 PROCEDURE — 6360000002 HC RX W HCPCS: Performed by: INTERNAL MEDICINE

## 2020-08-23 PROCEDURE — 80048 BASIC METABOLIC PNL TOTAL CA: CPT

## 2020-08-23 PROCEDURE — 97535 SELF CARE MNGMENT TRAINING: CPT

## 2020-08-23 PROCEDURE — 83550 IRON BINDING TEST: CPT

## 2020-08-23 PROCEDURE — 85018 HEMOGLOBIN: CPT

## 2020-08-23 PROCEDURE — 83036 HEMOGLOBIN GLYCOSYLATED A1C: CPT

## 2020-08-23 PROCEDURE — 85045 AUTOMATED RETICULOCYTE COUNT: CPT

## 2020-08-23 PROCEDURE — 85025 COMPLETE CBC W/AUTO DIFF WBC: CPT

## 2020-08-23 PROCEDURE — 97165 OT EVAL LOW COMPLEX 30 MIN: CPT

## 2020-08-23 PROCEDURE — 97161 PT EVAL LOW COMPLEX 20 MIN: CPT

## 2020-08-23 PROCEDURE — 80061 LIPID PANEL: CPT

## 2020-08-23 PROCEDURE — 85014 HEMATOCRIT: CPT

## 2020-08-23 PROCEDURE — 99232 SBSQ HOSP IP/OBS MODERATE 35: CPT | Performed by: CLINICAL NURSE SPECIALIST

## 2020-08-23 PROCEDURE — 86304 IMMUNOASSAY TUMOR CA 125: CPT

## 2020-08-23 RX ORDER — LISINOPRIL 20 MG/1
20 TABLET ORAL DAILY
Status: DISCONTINUED | OUTPATIENT
Start: 2020-08-23 | End: 2020-08-27 | Stop reason: HOSPADM

## 2020-08-23 RX ORDER — MEGESTROL ACETATE 20 MG/1
20 TABLET ORAL 2 TIMES DAILY
Status: DISCONTINUED | OUTPATIENT
Start: 2020-08-23 | End: 2020-08-27 | Stop reason: HOSPADM

## 2020-08-23 RX ORDER — FOLIC ACID 1 MG/1
1 TABLET ORAL DAILY
Status: DISCONTINUED | OUTPATIENT
Start: 2020-08-23 | End: 2020-08-27 | Stop reason: HOSPADM

## 2020-08-23 RX ORDER — FUROSEMIDE 10 MG/ML
40 INJECTION INTRAMUSCULAR; INTRAVENOUS ONCE
Status: COMPLETED | OUTPATIENT
Start: 2020-08-23 | End: 2020-08-23

## 2020-08-23 RX ORDER — OXYBUTYNIN CHLORIDE 5 MG/1
5 TABLET ORAL 3 TIMES DAILY
Status: DISCONTINUED | OUTPATIENT
Start: 2020-08-23 | End: 2020-08-27 | Stop reason: HOSPADM

## 2020-08-23 RX ORDER — FERROUS SULFATE 325(65) MG
325 TABLET ORAL
Status: DISCONTINUED | OUTPATIENT
Start: 2020-08-23 | End: 2020-08-27 | Stop reason: HOSPADM

## 2020-08-23 RX ORDER — THIAMINE MONONITRATE (VIT B1) 100 MG
100 TABLET ORAL DAILY
Status: DISCONTINUED | OUTPATIENT
Start: 2020-08-23 | End: 2020-08-27 | Stop reason: HOSPADM

## 2020-08-23 RX ORDER — SPIRONOLACTONE 25 MG/1
25 TABLET ORAL DAILY
Status: DISCONTINUED | OUTPATIENT
Start: 2020-08-23 | End: 2020-08-27 | Stop reason: HOSPADM

## 2020-08-23 RX ORDER — LANOLIN ALCOHOL/MO/W.PET/CERES
50 CREAM (GRAM) TOPICAL DAILY
Status: DISCONTINUED | OUTPATIENT
Start: 2020-08-23 | End: 2020-08-27 | Stop reason: HOSPADM

## 2020-08-23 RX ORDER — ENALAPRILAT 2.5 MG/2ML
1.25 INJECTION INTRAVENOUS EVERY 6 HOURS SCHEDULED
Status: DISCONTINUED | OUTPATIENT
Start: 2020-08-23 | End: 2020-08-27 | Stop reason: HOSPADM

## 2020-08-23 RX ORDER — 0.9 % SODIUM CHLORIDE 0.9 %
20 INTRAVENOUS SOLUTION INTRAVENOUS ONCE
Status: COMPLETED | OUTPATIENT
Start: 2020-08-23 | End: 2020-08-23

## 2020-08-23 RX ORDER — HYDRALAZINE HYDROCHLORIDE 50 MG/1
50 TABLET, FILM COATED ORAL 4 TIMES DAILY
Status: DISCONTINUED | OUTPATIENT
Start: 2020-08-23 | End: 2020-08-27 | Stop reason: HOSPADM

## 2020-08-23 RX ORDER — CLONIDINE 0.3 MG/24H
1 PATCH, EXTENDED RELEASE TRANSDERMAL WEEKLY
Status: DISCONTINUED | OUTPATIENT
Start: 2020-08-23 | End: 2020-08-27 | Stop reason: HOSPADM

## 2020-08-23 RX ADMIN — ENALAPRILAT 1.25 MG: 1.25 INJECTION INTRAVENOUS at 11:58

## 2020-08-23 RX ADMIN — SODIUM CHLORIDE 20 ML: 9 INJECTION, SOLUTION INTRAVENOUS at 11:58

## 2020-08-23 RX ADMIN — ENALAPRILAT 1.25 MG: 1.25 INJECTION INTRAVENOUS at 17:18

## 2020-08-23 RX ADMIN — ENALAPRILAT 1.25 MG: 1.25 INJECTION INTRAVENOUS at 23:33

## 2020-08-23 RX ADMIN — SODIUM CHLORIDE, PRESERVATIVE FREE 10 ML: 5 INJECTION INTRAVENOUS at 11:58

## 2020-08-23 RX ADMIN — FUROSEMIDE 40 MG: 10 INJECTION, SOLUTION INTRAMUSCULAR; INTRAVENOUS at 13:34

## 2020-08-23 RX ADMIN — SODIUM CHLORIDE: 9 INJECTION, SOLUTION INTRAVENOUS at 13:34

## 2020-08-23 RX ADMIN — ENOXAPARIN SODIUM 40 MG: 40 INJECTION SUBCUTANEOUS at 09:00

## 2020-08-23 ASSESSMENT — PAIN SCALES - GENERAL
PAINLEVEL_OUTOF10: 0

## 2020-08-23 NOTE — PROGRESS NOTES
Lillian Garza is a 63 y.o. right handed female     Patient admitted with a past history of DM, HTN and a left BKA     Unfortunately admitted on 8/22/20 with difficulty speaking   She was not a tPA candidate because she was a wake up stroke and outside of window from [address] [place] [address] St demonstrated a left M3 occlusion -- she was not a candidate for IR    On admission, she displayed left arm weakness     She was taking Plavix PTA and now takes DAPT -- it was recommended to take DAPT for 21 days and then ASA 81mg afterwards monotherapy     Echo pending and Holter/Loop on discharge recommended     No chest pain or palpitations  No SOB  No vertigo, lightheadedness or loss of consciousness  No falls, tripping or stumbling  No incontinence of bowels or bladder  No itching or bruising appreciated    ROS otherwise negative     Prior to Visit Medications    Medication Sig Taking?  Authorizing Provider   benazepril (LOTENSIN) 10 MG tablet Take 10 mg by mouth daily Yes Historical Provider, MD   clopidogrel (PLAVIX) 75 MG tablet Take 1 tablet by mouth once daily Yes Fredis Castillo DO   megestrol (MEGACE) 20 MG tablet Take 1 tablet by mouth 2 times daily Yes Collin Calderon MD   cloNIDine (CATAPRES) 0.3 MG/24HR PTWK Place 1 patch onto the skin once a week Yes Collin Calderon MD   oxybutynin (DITROPAN) 5 MG tablet Take 1 tablet by mouth 3 times daily Yes Collin Calderon MD   hydrALAZINE (APRESOLINE) 50 MG tablet Take 1 tablet by mouth 4 times daily Yes Collin Calderon MD   folic acid (FOLVITE) 1 MG tablet Take 1 tablet by mouth daily Yes Fredis Castillo DO   lisinopril (PRINIVIL;ZESTRIL) 20 MG tablet Take 1 tablet by mouth daily Yes Fredis Castillo DO   metoprolol tartrate (LOPRESSOR) 25 MG tablet Take 1 tablet by mouth 2 times daily Yes Fredis Castillo DO   vitamin B-1 100 MG tablet Take 1 tablet by mouth daily Yes Fredsi Castillo DO   ferrous sulfate (IRON 325) 325 (65 Fe) MG tablet Take 1 tablet by mouth daily (with breakfast) Yes Vannesa Rodriguez MD   atorvastatin (LIPITOR) 80 MG tablet Take 1 tablet by mouth nightly Yes Karina Shultz, DO   vitamin B-6 (B-6) 50 MG tablet Take 1 tablet by mouth daily Yes Pancho Reynaga,    clopidogrel (PLAVIX) 75 MG tablet Take 1 tablet by mouth daily  Vannesa Rodriguez MD       Allergies as of 08/22/2020    (No Known Allergies)       Objective:     BP (!) 158/72   Pulse 86   Temp 99.7 °F (37.6 °C) (Temporal)   Resp 16   Ht 5' 3\" (1.6 m)   Wt 110 lb (49.9 kg)   SpO2 100%   BMI 19.49 kg/m²      General appearance: alert, appears stated age and cooperative  Head: Normocephalic, without obvious abnormality, atraumatic  Extremities: left BKA  Pulses: 2+ and symmetric  Skin: no rashes or lesions    Mental Status: Alert, oriented, thought content appropriate    Speech: slight dysarthria   Language: appropriate    Cranial Nerves:  I: smell    II: visual acuity     II: visual fields Full   II: pupils SONU   III,VII: ptosis None   III,IV,VI: extraocular muscles  EOMI without nystagmus    V: mastication Normal   V: facial light touch sensation  Normal   V,VII: corneal reflex  Present   VII: facial muscle function - upper     VII: facial muscle function - lower Normal   VIII: hearing Normal   IX: soft palate elevation     IX,X: gag reflex    XI: trapezius strength  5/5   XI: sternocleidomastoid strength 5/5   XI: neck extension strength  5/5   XII: tongue strength  Normal     Motor:  5/5 right arm and leg  4+/5 left   5/5 left bicep  4/5 left tricep  4+/5 left IPS  Otherwise left BKA    Sensory:  Normal to LT     Coordination:   FN, FFM and KIRSTEN symmetrical    DTR:   No reflexes    No Babinski right  No Black's     Laboratory/Radiology:     CBC with Differential:    Lab Results   Component Value Date    WBC 3.3 08/23/2020    RBC 2.57 08/23/2020    HGB 6.5 08/23/2020    HCT 21.4 08/23/2020     08/23/2020    MCV 83.3 08/23/2020    MCH 25.3 08/23/2020 MCHC 30.4 08/23/2020    RDW 16.3 08/23/2020    NRBC 1.7 03/03/2020    SEGSPCT 51 04/26/2012    METASPCT 0.9 03/02/2020    LYMPHOPCT 24.0 08/23/2020    MONOPCT 23.4 08/23/2020    MYELOPCT 0.9 03/03/2020    BASOPCT 0.9 08/23/2020    MONOSABS 0.77 08/23/2020    LYMPHSABS 0.79 08/23/2020    EOSABS 0.02 08/23/2020    BASOSABS 0.03 08/23/2020     CMP:    Lab Results   Component Value Date     08/23/2020    K 3.8 08/23/2020    K 3.6 08/22/2020     08/23/2020    CO2 19 08/23/2020    BUN 10 08/23/2020    CREATININE 0.9 08/23/2020    GFRAA >60 08/23/2020    LABGLOM >60 08/23/2020    GLUCOSE 91 08/23/2020    GLUCOSE 171 04/26/2012    PROT 5.4 06/03/2020    LABALBU 2.4 06/03/2020    LABALBU 4.3 04/26/2012    CALCIUM 8.5 08/23/2020    BILITOT 0.5 06/03/2020    ALKPHOS 60 06/03/2020    AST 10 06/03/2020    ALT 7 06/03/2020     HgBA1c:    Lab Results   Component Value Date    LABA1C 5.0 08/23/2020     FLP:    Lab Results   Component Value Date    TRIG 111 08/23/2020    HDL 32 08/23/2020    LDLCALC 68 08/23/2020    LABVLDL 22 08/23/2020       CT Head:  No acute intracranial abnormality. Mild diffuse cerebral atrophy. Left putamen 8 mm diameter remote lacunar infarct. CTA:  1.  Right middle cerebral artery M 3 occlusion . 2. Estimated stenosis by NASCET criteria is not hemodynamically   significant     CTP:  Central core infarct with significant peripheral ischemic penumbra    I personally reviewed the patient's lab and imaging studies at this time.     Assessment:      right M3 occlusion causing left hemiparesis   Patient with numerous risk factors for stroke but a cardioembolic event needs investigated    Echo pending       Plan:     Echo to be read     DAPT for 21 days then ASA lifelong monotherapy afterwards    Will need Holter or Loop on discharge     All questions answered       Oral Sudeep  8:42 AM  8/23/2020

## 2020-08-23 NOTE — PLAN OF CARE
Problem: Skin Integrity:  Goal: Will show no infection signs and symptoms  Description: Will show no infection signs and symptoms  Outcome: Met This Shift  Goal: Absence of new skin breakdown  Description: Absence of new skin breakdown  Outcome: Met This Shift     Problem: Falls - Risk of:  Goal: Will remain free from falls  Description: Will remain free from falls  Outcome: Met This Shift  Goal: Absence of physical injury  Description: Absence of physical injury  Outcome: Met This Shift     Problem: Neurological  Goal: Maximum potential motor/sensory/cognitive function  Outcome: Met This Shift

## 2020-08-23 NOTE — PROGRESS NOTES
and oriented, thought content appropriate, normal insight  Capillary Refill: Brisk,< 3 seconds   Peripheral Pulses: +2 palpable, equal bilaterally     Gerber:No  Drains:No  Central Line/port:No   EKG:  I have reviewed the EKG with the following interpretation:  Normal sinus rhythm rate 89 bpm, possible anterior infarct with QS in V1. Imaging:Yes   Ct Head Wo Contrast    Result Date: 8/22/2020  EXAMINATION: CT OF THE HEAD WITHOUT CONTRAST  8/22/2020 4:45 am TECHNIQUE: CT of the head was performed without the administration of intravenous contrast. Dose modulation, iterative reconstruction, and/or weight based adjustment of the mA/kV was utilized to reduce the radiation dose to as low as reasonably achievable. COMPARISON: None. HISTORY: ORDERING SYSTEM PROVIDED HISTORY: focal deficit TECHNOLOGIST PROVIDED HISTORY: Reason for exam:->focal deficit Has a \"code stroke\" or \"stroke alert\" been called? ->Yes FINDINGS: BRAIN/VENTRICLES: There is no acute intracranial hemorrhage, mass effect or midline shift. No abnormal extra-axial fluid collection. The gray-white differentiation is maintained without evidence of an acute infarct. There is no evidence of hydrocephalus. Mild diffuse decrease in cerebral volume is noted with corresponding prominence of the sulci and ventricles. Left putamen elongated remote lacunar infarct is noted which measures up to 8 mm in diameter. ORBITS: The visualized portion of the orbits demonstrate no acute abnormality. SINUSES: The visualized paranasal sinuses and mastoid air cells demonstrate no acute abnormality. SOFT TISSUES/SKULL:  No acute abnormality of the visualized skull or soft tissues. No acute intracranial abnormality. Mild diffuse cerebral atrophy. Left putamen 8 mm diameter remote lacunar infarct.      Xr Chest Portable    Result Date: 8/22/2020  EXAMINATION: ONE XRAY VIEW OF THE CHEST 8/22/2020 4:59 am COMPARISON: Chest radiograph May 29, 2020 HISTORY: [address] PROVIDED HISTORY: AMS TECHNOLOGIST PROVIDED HISTORY: Reason for exam:->AMS FINDINGS: The lungs are without acute focal process. There is no effusion or pneumothorax. The cardiomediastinal silhouette is without acute process. The osseous structures are without acute process. No acute process. Cta Neck W Contrast    Result Date: 2020  Patient MRN:  [id number] : 1960 Age: 63 years Gender: Female Order Date:  2020 7:32 AM EXAM: CTA NECK W CONTRAST, CTA HEAD W CONTRAST NUMBER OF IMAGES:  18 INDICATION:  CVA CVA COMPARISON: None Technique: Low-dose CT  acquisition technique included one of following options; 1 . Automated exposure control, 2. Adjustment of MA and or KV according to patient's size or 3. Use of iterative reconstruction. Contiguous spiral images were obtained in the axial plane, following the administration of intravenous contrast using CT angiographic protocol. Sagittal and coronal images were reconstructed from the axial plane acquisition. Additional MIP reconstructions were presented to aid in the interpretation of this study. Images were obtained from the skull base cranially. There is mild calcified plaque identified in the vessels compatible with atherosclerotic disease. There is aortic arch and great vessels demonstrate mild atherosclerotic disease. The right carotid is unremarkable. The left carotid is unremarkable. The right vertebral artery is unremarkable. The left vertebral artery is unremarkable. The basilar artery is unremarkable. The middle cerebral arteries are abnormal on the right. There is a right M3 occlusion. This is difficult to clearly visualize on the CT of the head. Otherwise the middle cerebral artery is unremarkable The anterior cerebral arteries are unremarkable. The posterior cerebral arteries are unremarkable. 1.  Right middle cerebral artery M 3 occlusion .  2. Estimated stenosis by NASCET criteria is not hemodynamically significant     Ct Brain Perfusion    Result Date: 2020  Patient MRN: [id number] : 1960 Age:  63 years Gender: Female Order Date: 2020 7:32 AM Exam: CT BRAIN PERFUSION Number of Images: 762 views Indication:   cva - bernice alert cva - bernice alert Comparison: None. Findings: Perfusion images demonstrate asymmetric blood volume Blood flow images demonstrate asymmetric blood flow There is significant ischemic number identified. In the right posterior frontal lobe There is significant core infarct identified. Central core infarct with significant peripheral ischemic penumbra     Cta Head W Contrast    Result Date: 2020  Patient MRN:  [id number] : 1960 Age: 63 years Gender: Female Order Date:  2020 7:32 AM EXAM: CTA NECK W CONTRAST, CTA HEAD W CONTRAST NUMBER OF IMAGES:  18 INDICATION:  CVA CVA COMPARISON: None Technique: Low-dose CT  acquisition technique included one of following options; 1 . Automated exposure control, 2. Adjustment of MA and or KV according to patient's size or 3. Use of iterative reconstruction. Contiguous spiral images were obtained in the axial plane, following the administration of intravenous contrast using CT angiographic protocol. Sagittal and coronal images were reconstructed from the axial plane acquisition. Additional MIP reconstructions were presented to aid in the interpretation of this study. Images were obtained from the skull base cranially. There is mild calcified plaque identified in the vessels compatible with atherosclerotic disease. There is aortic arch and great vessels demonstrate mild atherosclerotic disease. The right carotid is unremarkable. The left carotid is unremarkable. The right vertebral artery is unremarkable. The left vertebral artery is unremarkable. The basilar artery is unremarkable. The middle cerebral arteries are abnormal on the right. There is a right M3 occlusion. This is difficult to clearly visualize on the CT of the head.  Otherwise the middle cerebral artery is unremarkable The anterior cerebral arteries are unremarkable. The posterior cerebral arteries are unremarkable. 1.  Right middle cerebral artery M 3 occlusion . 2. Estimated stenosis by NASCET criteria is not hemodynamically significant       Diet:  Diet NPO Effective Now    Medications:    cloNIDine  1 patch Transdermal Weekly    ferrous sulfate  325 mg Oral Daily with breakfast    folic acid  1 mg Oral Daily    hydrALAZINE  50 mg Oral 4x Daily    lisinopril  20 mg Oral Daily    megestrol  20 mg Oral BID    oxybutynin  5 mg Oral TID    thiamine  100 mg Oral Daily    pyridoxine  50 mg Oral Daily    sodium chloride flush  10 mL Intravenous 2 times per day    enoxaparin  40 mg Subcutaneous Daily    aspirin  81 mg Oral Daily    atorvastatin  80 mg Oral Nightly    clopidogrel  75 mg Oral Daily    metoprolol succinate  25 mg Oral Daily    insulin lispro  0.08 Units/kg Subcutaneous TID WC    insulin lispro  0-12 Units Subcutaneous TID WC    insulin lispro  0-6 Units Subcutaneous Nightly     Continuous Infusions:   dextrose      sodium chloride Stopped (08/23/20 0631)       PRNMeds:sodium chloride flush, polyethylene glycol, promethazine **OR** ondansetron, labetalol, perflutren lipid microspheres, glucose, dextrose, glucagon (rDNA), dextrose  DVT Prophylaxis:pharmacologic prophylaxis (with any of the following: enoxaparin (Lovenox) 40mg SQ 2h prior to surgery then QD) and Encourage ambulation, low risk for DVT, no chemical or mechanical prophylaxis necessary    Data:  CBC:  Recent Labs     08/22/20  0446 08/23/20  0550   WBC 4.4* 3.3*   RBC 3.09* 2.57*   HGB 7.9* 6.5*   HCT 26.3* 21.4*   MCV 85.1 83.3   RDW 16.3* 16.3*    329     BMP:  Recent Labs     08/22/20  0446 08/23/20  0550    143   K 3.6 3.8    112*   CO2 21* 19*   BUN 12 10   CREATININE 1.0 0.9     BNP: No results for input(s): BNP in the last 72 hours.   PT/INR:   Recent Labs     08/22/20 0446 08/22/20  0729   PROTIME 12.9* 13.0*   INR 1.1 1.2     :   Recent Labs     08/22/20  0729   APTT 32.4     CARDIAC ENZYMES:No results for input(s): CKMB, CKMBINDEX, TROPONINT in the last 72 hours. Invalid input(s): CKTOTAL;3  FASTING LIPID PANEL:  Lab Results   Component Value Date    CHOL 122 08/23/2020    HDL 32 08/23/2020    TRIG 111 08/23/2020     LIVER PROFILE: No results for input(s): AST, ALT, ALB, BILIDIR, BILITOT, ALKPHOS in the last 72 hours. ABGs:   Lab Results   Component Value Date    PH 7.522 05/29/2020    PCO2 23.4 05/29/2020    PO2 100.5 05/29/2020    HCO3 18.8 05/29/2020    O2SAT 97.9 05/29/2020       Transthoracic Echocardiography Report (2/24/2020)   Summary   Mild left ventricular concentric hypertrophy noted. ef 67% by 2D. Stage I diastolic dysfunction. Physiologic and/or trace mitral regurgitation is present. Neurology consultant Dr. Alena Perez [8/22/2020]:  Recommendations:  - patient has an acute stroke in the R M3  - she is not a thrombectomy candidate  - aspirin and plavix loaded, con't aspirin 81mg daily and plavix 75mg daily for 21 days, patient was taking plavix before admission, will switch her to aspirin 81mg daily after 21 days. - lipitor 80mg daily  - pt will need holter monitor or loop recorder to r/o afib, this is a cardioembolic stroke. If afib is found, pt will need anticoagulant. - consult PT/OT and SLP  - lovenox for DVT prophylaxis.                                                 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 *  · acute on chronic diastolic CHF class II, EF 67%. Admission - Diagnosis & management:  1. Acute CVA: Further evaluation with MRI without contrast, cardiac echo, lipid profile, neurology consult. - Loaded with ASA/Plavix, continue lower dose daily, atorvastatin 80 mg hs, OT/PT, speech swallow eval; IVF NS @100cc/h  2. DM 2 insulin-dependent/s/p AKA/Neuropathy: Glucose 130s with normal renal function.   - Obtain history of long-acting insulin, start insulin with meals once pass swallow tests lispro 4 units tid, medium sliding scale insulin with meals/hs. A1c level. 3. HTN-uncontrolled/?CAD: -194/57-87. TN 0.05-> 0.03  4. Dyslipidemia: Atorvastatin 80 mg/d     - [8/23]   . # Acute CVA/R-[facility]: CTA - Right middle cerebral artery M3 occlusion. lipid profile normal. Failed bedside swallow remains on IVF NS @100cc/h; speech swallow eval & OT/PT [8/24]; atorvastatin 80 mg hs.  - Neurology consult Dr. Marcheta Primrose believe this is a embolic stroke. Recommended ASA/Plavix for 21 days the ASA. Need Holter or loop recorder.     -  Pending: MRI without contrast, cardiac echo   . # DM 2 insulin-dependent/s/p AKA/Neuropathy: Glucose remains 130s, A1c 5 with normal renal function. - start insulin with meals once pass swallow tests lispro 4 units tid, medium sliding scale insulin with meals/hs. A1c level. .# ad-CHF/HTN-uncontrolled/?CAD: -175/58-83. TN 0.05-> 0.03. BNP 2121. ECHO [2/24/20] normal LV 26% with diastolic dysfunction; therefore the patient has acute on chronic diastolic CHF class II, EF 67%. Lisinopril 20 mg/d, metoprolol XL 25 mg/d, hydralazine 50 mg qid, will start spironolactone 25 mg/d. Lasix 40 mg IV x1 post transfusion. .# Anemia: History of microcytic anemia. hgb 11.5 [6/26/20]-> 7.9[8/22]-> 6.5. Stool occult blood, anemia panel, retake count. Type and cross 1 unit, being transfuse-currently.  May need parenteral ferric gluconate, cyanocobalamin and folic acid.   .# Dyslipidemia: Atorvastatin 80 mg/d      CODE STATUS: Full      Time Spent: 45 minutes  signed by Cyril Olson MD on 8/23/2020 at 9:47 AM    [facility]

## 2020-08-23 NOTE — PLAN OF CARE
Problem: Skin Integrity:  Goal: Will show no infection signs and symptoms  Description: Will show no infection signs and symptoms  8/23/2020 0118 by Clifton Lynn RN  Outcome: Met This Shift     Problem: Falls - Risk of:  Goal: Will remain free from falls  Description: Will remain free from falls  8/23/2020 0118 by Clifton Lynn RN  Outcome: Met This Shift

## 2020-08-23 NOTE — PROGRESS NOTES
Physical Therapy  Physical Therapy Initial Assessment     Name: Norm Melgar  : 1960  MRN: [id number]    Referring Provider:  Beverley Katz MD     Date of Service: 2020    Evaluating PT:  Ignacia Kebede, PT, DPT. [id number]    Room #:  2357/0871-K  Diagnosis:  Acute CVA   Reason for admission:  L facial droop, numbness  Precautions:  Falls, LUE weakness, L BKA, LLE prosthetic (not in room)  Pertinent PMHx: DM, neuropathy, HTN, HLD, schizophrenia   Procedures: none  Equipment Recommendations:  Return to use of walker and wheelchair    SUBJECTIVE:  Pt lives with  and daughter in a 2 story home with 7 stair(s) to enter and 1 rail(s). Bed is on 2nd floor and bath is on 2nd floor. Pt ambulated with [employer] and LLE prosthetic PTA. Pt independent for ADL performance. Pt states someone usually stands by/assists her with ambulation and stairs when needed. OBJECTIVE:   Initial Evaluation  Date:  Treatment   Short Term/ Long Term   Goals   AM-PAC 6 Clicks 63/45     Was pt agreeable to Eval/treatment? Yes      Does pt have pain? Denies pain     Bed Mobility  Rolling: Independent  Supine to sit: Independent  Sit to supine: Independent  Scooting: Independent  Independent   Transfers Sit to stand: [place]  Stand to sit: [place]  Stand pivot: NT  Independent    Ambulation    5 feet with [employer] [place]  -hopping on RLE    >25 feet with [employer] Mod I   Stair negotiation: ascended and descended  NT  >7 steps with 1 rail Mod I   ROM BUE:  See OT eval   LLE BKA     Strength BUE:  See OT eval   BLE: NT  Increase by 1/3 MMT grade    Balance Sitting EOB:  independent  Dynamic Standing:  [place]yl[place] [employer]  Sitting EOB:  indep  Dynamic Standing:   Mod I [employer]     -Pt is A & O x 3  -Sensation:  unremarkable   -Edema:  unremarkable     Therapeutic Exercises:  functional activity     Patient education  Pt educated on safety, sequencing of transfers, and role of PT    Patient response to education:   Pt verbalized understanding Pt demonstrated skill Pt requires further education in this area   Yes  Yes  Reinforce      ASSESSMENT:    Comments:  Pt received supine in bed and agreeable to PT session   Pt able to complete bed mobility without difficulty. Sitting balance good able to hold midline posture without assist. Pt seemed to have minor word finding difficulties, unknown if baseline for pt. Pt stood with light hands on assist for balance. Able to hop a few feet with use of walker. Displayed strong UEs on walker during hopping. Returned to bed to end session. Pt with all needs met and call light in reach. Pt would benefit from continued PT POC to address functional deficits described above. Treatment:  Patient practiced and was instructed in the following treatment:     Patient education provided continuously throughout session for sequencing, safety maintenance, and improving any deficits found during the evaluation.  STS and pivot transfer training - pt educated on proper hand and foot placement, safety and sequencing, and use of WW to safely complete sit<>stand and pivot transfers with hands on assistance to complete task safely     Gait training- pt was given verbal and tactile cues to facilitate proper sequencing for hopping during ambulation as well as provided with physical assistance to complete task. Pt's/ family goals   1. Return home     Patient and or family understand(s) diagnosis, prognosis, and plan of care. Yes     PLAN:    PT care will be provided in accordance with the objectives noted above. Exercises and functional mobility practice will be used as well as appropriate assistive devices or modalities to obtain goals. Patient and family education will also be administered as needed. Frequency of treatments: 2-5x/week x 1-2 weeks.     Time in  0919  Time out  0929    Total Treatment Time 5 minutes     Evaluation Time includes thorough review of current medical information, gathering information on past medical history/social history and prior level of function, completion of standardized testing/informal observation of tasks, assessment of data and education on plan of care and goals.     CPT codes:  [x] Low Complexity PT evaluation 17802  [] Moderate Complexity PT evaluation 36735  [] High Complexity PT evaluation 15102  [] PT Re-evaluation 14285  [] Gait training 92370 - minutes  [] Manual therapy 18376 - minutes  [] Therapeutic activities 41231 5 minutes  [] Therapeutic exercises 02046 - minutes  [] Neuromuscular reeducation 58498 - minutes     [facility], PT, DPT  [id number]

## 2020-08-23 NOTE — PROGRESS NOTES
OCCUPATIONAL THERAPY INITIAL EVALUATION      Date:2020  Patient Name: Briseida Billings  MRN: [id number]  : 1960  Room: [address] [place] [address]    Evaluating OT: REJI Matos, OTR/L [id number]    AM-PAC Daily Activity Raw Score:   Recommended Adaptive Equipment:  TBD     Modified [place] Scale (MRS)  Score     Description  0             No symptoms  1             No significant disability despite symptoms  2             Slight disability; able to look after own affairs  3             Moderate disability; able to ambulate without assist/ requires assist with ADLs  4             Moderate/Severe disability;requires assist to ambulate/assist with ADLs  5             Severe disability;bedridden/incontinent   6               Score: 4    Diagnosis: CVA  Referring Practitioner: Christian Lopez MD   Surgery: n/a  Pertinent Medical History: L BKA, schizophrenia, HLD, HTN, DM II   Precautions:  Falls, contact isolation, LLE prosthetic at home     Home Living: Pt lives with  in a 2 story home with 7 step(s) to enter and B rail(s); bed/bath on 2nd level, laundry in basement but  completes   Bathroom setup: walk in shower   Equipment owned: extended tub bench  Prior Level of Function: IND with ADLs , assist with IADLs; using ww for functional mobility   Driving: yes    Pain Level: 0/10  Cognition: A&O: 2/4 (self, date);  Follows 1 step directions, with repetition and increased time   Memory:  fair    Sequencing:  fair    Problem solving:  fair    Judgement/safety:  fair      Functional Assessment:   Initial Eval Status  Date: 2020 Treatment Status  Date: Short Term Goals  Treatment frequency: 2-5x/wk   Feeding SUP   IND   Grooming SUP (seated EOB for oral hygiene and facial washing)   IND   UB Dressing SUP   IND   LB Dressing Mod A  SUP    Bathing Min A (simulated)  SUP    Toileting Mod A  SUP   Bed Mobility  Log roll: SBA  Supine to sit: SBA   Sit to supine: SBA   Log roll IND  Supine to sit: IND   Sit to supine: IND   Functional Transfers Sit to stand:NT   Stand to sit:NT  Commode: NT  SBA (with slideboard if LLE prosthetic not present)   Functional Mobility NT  SBA (when prosthetic present)   Balance Sitting: SBA  Standing: NT     Activity Tolerance Fair (pt sat EOB ~10 mins)     Visual/  Perceptual Glasses: yes    pt able to reach clock on wall    visual tracking appears WFL                Hand dominance: L  UE ROM: BUE: elbow flex WFL, shoulder flex grossly 150'  Strength: RUE: grossly 4-/5 LUE: grossly 4-/5   Strength: B WFL  Fine Motor Coordination: fair, finger to nose assessment appeared to demonstrate decreased smoothness with L side    Hearing: WFL  Sensation:  No c/o numbness/tingling, light touch assessment appears WFL  Tone:  WFL  Edema: none noted                            Comments:Cleared by RN to see pt. Upon arrival, patient supine in bed and agreeable to OT session. At end of session, patient supine in bed with call light and phone within reach, all lines and tubes intact. Pt would benefit from continued OT to increase functional independence and quality of life. Treatment: Required re-orientation to place. Pt completed ADLs seated EOB to increase core strength/balance/activity tolerance for ease with ADLs. Pt able to sit EOB ~10 mins. Pt required vc's for sequencing/initiation of ADLs/functional transfers. Pt's limiting factor appears to be her problem solving and latency with sequencing ADLs. Pt appeared to have tolerated session well and appears motivated/cooperative/pleasant . Pt instructed on use of call light for assistance and fall prevention. Pt demo'ing fair understanding of education provided. Continue to educate.      Eval Complexity: Low    Assessment of current deficits   Functional mobility [x]  ADLs [x] Strength [x]  Cognition [x]  Functional transfers  [x] IADLs [x] Safety Awareness [x]  Endurance [x]  Fine Motor Coordination [] Balance [x] Vision/perception [] Sensation []   Gross Motor Coordination [] ROM [] Delirium []                  Motor Control []    Plan of Care:   ADL retraining [x]   Equipment needs [x]   Neuromuscular re-education [x] Energy Conservation Techniques [x]  Functional Transfer training [x] Patient and/or Family Education [x]  Functional Mobility training [x]  Environmental Modifications [x]  Cognitive re-training []   Compensatory techniques for ADLs [x]  Splinting Needs []   Positioning to improve overall function [x]   Therapeutic Activity [x]  Therapeutic Exercise  [x]  Visual/Perceptual: []    Delirium prevention/treatment  []   Other:  []    Rehab Potential: Good for established goals, pt. assisted in establishment of goals. LTG: maximize independence with ADLs to return to PLOF    Patient and/or family were instructed on diagnosis, prognosis/goals and plan of care. Demonstrated fair understanding. [] Malnutrition indicators have been identified and nursing has been notified to ensure a dietitian consult is ordered. Evaluation time includes thorough review of current medical information, gathering information on past medical & social history & PLOF, completion of standardized testing, informal observation of tasks, consultation with other medical professions/disciplines, assessment of data & development of POC/goals.      low Evaluation +     Treatment Time In: 9:50        Treatment Time Out: 10:00           Treatment Charges: Mins Units   Ther Ex  [id number]       Manual Therapy [id number]       Thera Activities [id number]       ADL/Home Mgt [id number] 10 1   Neuro Re-ed [id number]       Group Therapy        Orthotic manage/training  [id number]       Non-Billable Time       Total Timed Treatment [address] [place], [place], OTR/L [id number]

## 2020-08-24 ENCOUNTER — APPOINTMENT (OUTPATIENT)
Dept: MRI IMAGING | Age: 60
DRG: 064 | End: 2020-08-24
Payer: MEDICARE

## 2020-08-24 LAB
ANION GAP SERPL CALCULATED.3IONS-SCNC: 15 MMOL/L (ref 7–16)
BASOPHILS ABSOLUTE: 0.03 E9/L (ref 0–0.2)
BASOPHILS RELATIVE PERCENT: 0.7 % (ref 0–2)
BUN BLDV-MCNC: 11 MG/DL (ref 8–23)
CALCIUM SERPL-MCNC: 8.5 MG/DL (ref 8.6–10.2)
CHLORIDE BLD-SCNC: 109 MMOL/L (ref 98–107)
CO2: 20 MMOL/L (ref 22–29)
CREAT SERPL-MCNC: 0.9 MG/DL (ref 0.5–1)
EOSINOPHILS ABSOLUTE: 0.03 E9/L (ref 0.05–0.5)
EOSINOPHILS RELATIVE PERCENT: 0.7 % (ref 0–6)
GFR AFRICAN AMERICAN: >60
GFR NON-AFRICAN AMERICAN: >60 ML/MIN/1.73
GLUCOSE BLD-MCNC: 85 MG/DL (ref 74–99)
HCT VFR BLD CALC: 26.4 % (ref 34–48)
HEMOGLOBIN: 8.4 G/DL (ref 11.5–15.5)
IMMATURE GRANULOCYTES #: 0.03 E9/L
IMMATURE GRANULOCYTES %: 0.7 % (ref 0–5)
LV EF: 63 %
LVEF MODALITY: NORMAL
LYMPHOCYTES ABSOLUTE: 0.92 E9/L (ref 1.5–4)
LYMPHOCYTES RELATIVE PERCENT: 20.7 % (ref 20–42)
MCH RBC QN AUTO: 26.8 PG (ref 26–35)
MCHC RBC AUTO-ENTMCNC: 31.8 % (ref 32–34.5)
MCV RBC AUTO: 84.3 FL (ref 80–99.9)
METER GLUCOSE: 104 MG/DL (ref 74–99)
METER GLUCOSE: 112 MG/DL (ref 74–99)
METER GLUCOSE: 122 MG/DL (ref 74–99)
METER GLUCOSE: 263 MG/DL (ref 74–99)
MONOCYTES ABSOLUTE: 0.97 E9/L (ref 0.1–0.95)
MONOCYTES RELATIVE PERCENT: 21.8 % (ref 2–12)
NEUTROPHILS ABSOLUTE: 2.47 E9/L (ref 1.8–7.3)
NEUTROPHILS RELATIVE PERCENT: 55.4 % (ref 43–80)
PDW BLD-RTO: 15.8 FL (ref 11.5–15)
PLATELET # BLD: 346 E9/L (ref 130–450)
PMV BLD AUTO: 10.2 FL (ref 7–12)
POTASSIUM SERPL-SCNC: 3.6 MMOL/L (ref 3.5–5)
PRO-BNP: 5572 PG/ML (ref 0–125)
RBC # BLD: 3.13 E12/L (ref 3.5–5.5)
SODIUM BLD-SCNC: 144 MMOL/L (ref 132–146)
WBC # BLD: 4.5 E9/L (ref 4.5–11.5)

## 2020-08-24 PROCEDURE — 83880 ASSAY OF NATRIURETIC PEPTIDE: CPT

## 2020-08-24 PROCEDURE — 2060000000 HC ICU INTERMEDIATE R&B

## 2020-08-24 PROCEDURE — 99233 SBSQ HOSP IP/OBS HIGH 50: CPT | Performed by: INTERNAL MEDICINE

## 2020-08-24 PROCEDURE — 99223 1ST HOSP IP/OBS HIGH 75: CPT | Performed by: INTERNAL MEDICINE

## 2020-08-24 PROCEDURE — 36415 COLL VENOUS BLD VENIPUNCTURE: CPT

## 2020-08-24 PROCEDURE — 92610 EVALUATE SWALLOWING FUNCTION: CPT | Performed by: SPEECH-LANGUAGE PATHOLOGIST

## 2020-08-24 PROCEDURE — 85025 COMPLETE CBC W/AUTO DIFF WBC: CPT

## 2020-08-24 PROCEDURE — 6370000000 HC RX 637 (ALT 250 FOR IP): Performed by: INTERNAL MEDICINE

## 2020-08-24 PROCEDURE — 6360000002 HC RX W HCPCS: Performed by: INTERNAL MEDICINE

## 2020-08-24 PROCEDURE — 2580000003 HC RX 258: Performed by: INTERNAL MEDICINE

## 2020-08-24 PROCEDURE — 82962 GLUCOSE BLOOD TEST: CPT

## 2020-08-24 PROCEDURE — 2500000003 HC RX 250 WO HCPCS: Performed by: INTERNAL MEDICINE

## 2020-08-24 PROCEDURE — 92523 SPEECH SOUND LANG COMPREHEN: CPT | Performed by: SPEECH-LANGUAGE PATHOLOGIST

## 2020-08-24 PROCEDURE — 6370000000 HC RX 637 (ALT 250 FOR IP): Performed by: CLINICAL NURSE SPECIALIST

## 2020-08-24 PROCEDURE — 80048 BASIC METABOLIC PNL TOTAL CA: CPT

## 2020-08-24 PROCEDURE — 93306 TTE W/DOPPLER COMPLETE: CPT

## 2020-08-24 PROCEDURE — 99232 SBSQ HOSP IP/OBS MODERATE 35: CPT | Performed by: CLINICAL NURSE SPECIALIST

## 2020-08-24 PROCEDURE — 70551 MRI BRAIN STEM W/O DYE: CPT

## 2020-08-24 RX ORDER — ASPIRIN 300 MG/1
300 SUPPOSITORY RECTAL DAILY
Status: DISCONTINUED | OUTPATIENT
Start: 2020-08-24 | End: 2020-08-26

## 2020-08-24 RX ORDER — FOLIC ACID 5 MG/ML
1 INJECTION, SOLUTION INTRAMUSCULAR; INTRAVENOUS; SUBCUTANEOUS ONCE
Status: COMPLETED | OUTPATIENT
Start: 2020-08-24 | End: 2020-08-24

## 2020-08-24 RX ORDER — CYANOCOBALAMIN 1000 UG/ML
1000 INJECTION INTRAMUSCULAR; SUBCUTANEOUS ONCE
Status: COMPLETED | OUTPATIENT
Start: 2020-08-24 | End: 2020-08-24

## 2020-08-24 RX ADMIN — ASPIRIN 300 MG: 300 SUPPOSITORY RECTAL at 10:21

## 2020-08-24 RX ADMIN — ENALAPRILAT 1.25 MG: 1.25 INJECTION INTRAVENOUS at 21:01

## 2020-08-24 RX ADMIN — ATORVASTATIN CALCIUM 80 MG: 80 TABLET, FILM COATED ORAL at 21:01

## 2020-08-24 RX ADMIN — INSULIN LISPRO 3 UNITS: 100 INJECTION, SOLUTION INTRAVENOUS; SUBCUTANEOUS at 21:16

## 2020-08-24 RX ADMIN — CYANOCOBALAMIN 1000 MCG: 1000 INJECTION, SOLUTION INTRAMUSCULAR; SUBCUTANEOUS at 17:46

## 2020-08-24 RX ADMIN — FOLIC ACID 1 MG: 5 INJECTION, SOLUTION INTRAMUSCULAR; INTRAVENOUS; SUBCUTANEOUS at 13:59

## 2020-08-24 RX ADMIN — MEGESTROL ACETATE 20 MG: 20 TABLET ORAL at 21:01

## 2020-08-24 RX ADMIN — ENALAPRILAT 1.25 MG: 1.25 INJECTION INTRAVENOUS at 14:01

## 2020-08-24 RX ADMIN — OXYBUTYNIN CHLORIDE 5 MG: 5 TABLET ORAL at 21:01

## 2020-08-24 RX ADMIN — ENALAPRILAT 1.25 MG: 1.25 INJECTION INTRAVENOUS at 05:48

## 2020-08-24 RX ADMIN — SODIUM CHLORIDE 125 MG: 9 INJECTION, SOLUTION INTRAVENOUS at 14:02

## 2020-08-24 RX ADMIN — ENOXAPARIN SODIUM 40 MG: 40 INJECTION SUBCUTANEOUS at 10:21

## 2020-08-24 RX ADMIN — HYDRALAZINE HYDROCHLORIDE 50 MG: 50 TABLET, FILM COATED ORAL at 17:46

## 2020-08-24 ASSESSMENT — PAIN SCALES - GENERAL
PAINLEVEL_OUTOF10: 0

## 2020-08-24 NOTE — CONSULTS
[facility],[address] and [facility] Electrophysiology  Consultation Report  PATIENT: Kacey Howell  MEDICAL RECORD NUMBER: [id number]  DATE OF SERVICE:  8/24/2020  ATTENDING ELECTROPHYSIOLOGIST: Shira Flores MD   PRIMARY ELECTROPHYSIOLOGIST: Shira Flores MD   REFERRING PHYSICIAN: BENJI Galloway and Lebron Archer MD  CHIEF COMPLAINT: Acute CVA     HPI: This is a 63 y.o. female with a history of   Patient Active Problem List   Diagnosis    Epigastric hernia    Umbilical hernia    Essential hypertension    Type 2 diabetes mellitus without complication, with long-term current use of insulin ([address]    Dyslipidemia    S/P cardiac catheterization    Abnormal stress ECG    Hyperthyroidism    Diabetic peripheral neuropathy ([address]    Hydroureter    Microcytic hypochromic anemia    Arterial occlusion    Hypoglycemia    Hypertensive emergency    Adrenal infarction ([address]    Paresthesia of hand, bilateral    Paresthesia of left foot    Unintended weight loss    Acute ischemic stroke ([address]    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 Kacey Howell is not known to [facility], she has a past medical history of diabetes, hypertension and left below-knee amputation. On August 22 she awoke with difficulty speaking and presented emergency department at [address] [place] [address] in [place] was subsequently transferred to [facility]. She was seen by neurology underwent CTA of her head and neck demonstrating a left M3 occlusion however she was out of the window for TPA and not an IR candidate. To this point on telemetry she has not demonstrated atrial fibrillation. She is in contact isolation for VRE of her urine.   Speech her speech remains slurred but appropriate. Cardiac electrophysiology service is consulted for ЕКАТЕРИНА/ILR placement .     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 2020    Acute CVA (cerebrovascular accident) ([address] 2020    Acute on chronic diastolic heart failure ([address] 2020    Paresthesia of hand, bilateral 2020    Paresthesia of left foot 2020    Unintended weight loss 2020    Hypertensive emergency 2020    Adrenal infarction ([address] 2020    Hypoglycemia 2020    Arterial occlusion 2020    Microcytic hypochromic anemia 2019    Hydroureter 2019    Hx of diabetic foot ulcer 2019    Hx of BKA, left ([address] 2019    Diabetic peripheral neuropathy ([address] 2018    Hyperthyroidism 2018    Dyslipidemia 10/17/2016    S/P cardiac catheterization 10/17/2016     Overview Note:     Normal epicardial coronary arteries, 2016       Abnormal stress ECG 10/17/2016    Essential hypertension 2016    Type 2 diabetes mellitus without complication, with long-term current use of insulin ([address] 2016    Epigastric hernia     Umbilical hernia        Past Medical History:   Diagnosis Date    Diabetic peripheral neuropathy associated with type 2 diabetes mellitus ([address] 2018    Epigastric hernia     History of blood transfusion     Hyperlipidemia     Hypertension     Kidney stone     Schizophrenia ([address]     Type 2 diabetes mellitus with diabetic polyneuropathy, with long-term current use of insulin ([address] 3/52/0623    Umbilical hernia        Family History   Problem Relation Age of Onset    Cancer Brother        Social History     Tobacco Use    Smoking status: Former Smoker     Packs/day: 0.00     Years: 2.00     Pack years: 0.00     Types: Cigarettes     Last attempt to quit: 3/23/2015     Years since quittin.4    Smokeless tobacco: Never Used    Tobacco comment: quit smoking 2015   Substance Use Topics    Alcohol use: No     Comment: coffee daily       Current Facility-Administered Medications   Medication Dose Route Frequency Provider Last Rate Last Dose    aspirin suppository 300 mg  300 mg Rectal Daily Jean Marie ReeseBENJI - CNS   300 mg at 08/24/20 1021    cyanocobalamin injection 1,000 mcg  1,000 mcg Intramuscular Once Beverley Katz MD        cloNIDine (CATAPRES) 0.3 MG/24HR 1 patch  1 patch Transdermal Weekly Beverley Katz MD   1 patch at 08/23/20 1159    ferrous sulfate (IRON 325) tablet 325 mg  325 mg Oral Daily with breakfast Beverley Katz MD        folic acid (FOLVITE) tablet 1 mg  1 mg Oral Daily Beverley Katz MD        hydrALAZINE (APRESOLINE) tablet 50 mg  50 mg Oral 4x Daily Beverley Katz MD        lisinopril (PRINIVIL;ZESTRIL) tablet 20 mg  20 mg Oral Daily Beverley Katz MD        megestrol (MEGACE) tablet 20 mg  20 mg Oral BID Beverley Katz MD   Stopped at 08/23/20 1935    oxybutynin (DITROPAN) tablet 5 mg  5 mg Oral TID Beverley Katz MD        vitamin B-1 (THIAMINE) tablet 100 mg  100 mg Oral Daily Beverley Katz MD        vitamin B-6 (PYRIDOXINE) tablet 50 mg  50 mg Oral Daily Beverley Katz MD        enalaprilat (VASOTEC) injection 1.25 mg  1.25 mg Intravenous 4 times per day Beverley Katz MD   1.25 mg at 08/24/20 1401    spironolactone (ALDACTONE) tablet 25 mg  25 mg Oral Daily Beverley Katz MD        sodium chloride flush 0.9 % injection 10 mL  10 mL Intravenous 2 times per day Beverley Katz MD        sodium chloride flush 0.9 % injection 10 mL  10 mL Intravenous PRN Beverley Katz MD   10 mL at 08/23/20 1158    polyethylene glycol (GLYCOLAX) packet 17 g  17 g Oral Daily PRN Beverley Katz MD        promethazine (PHENERGAN) tablet 12.5 mg  12.5 mg Oral Q6H PRN Beverley Katz MD        Or    ondansetron [facility]) injection 4 mg  4 mg Intravenous Q6H PRN Beverley Katz MD        enoxaparin (LOVENOX) injection 40 mg  40 mg Subcutaneous Daily Beverley Katz MD   40 mg at 08/24/20 1022    atorvastatin (LIPITOR) tablet 80 mg  80 mg Oral Nightly Celia Ruffin MD   Stopped at 08/23/20 1935    clopidogrel (PLAVIX) tablet 75 mg  75 mg Oral Daily Celia Ruffin MD        labetalol (NORMODYNE;TRANDATE) injection 10 mg  10 mg Intravenous Q10 Min PRN Celia Ruffin MD        perflutren lipid microspheres (DEFINITY) injection 1.65 mg  1.5 mL Intravenous ONCE PRN Celia Ruffin MD        metoprolol succinate (TOPROL XL) extended release tablet 25 mg  25 mg Oral Daily Celia Ruffin MD   25 mg at 08/22/20 1801    glucose (GLUTOSE) 40 % oral gel 15 g  15 g Oral PRN Celia Ruffin MD        dextrose 50 % IV solution  12.5 g Intravenous PRN Cleia Ruffin MD        glucagon (rDNA) injection 1 mg  1 mg Intramuscular PRN Celia Ruffin MD        dextrose 5 % solution  100 mL/hr Intravenous PRN Celia Ruffin MD        insulin lispro (HUMALOG) injection vial 4 Units  0.08 Units/kg Subcutaneous TID  Celia Ruffin MD   Stopped at 08/23/20 1640    insulin lispro (HUMALOG) injection vial 0-12 Units  0-12 Units Subcutaneous TID  Celia Ruffin MD   Stopped at 08/23/20 1640    insulin lispro (HUMALOG) injection vial 0-6 Units  0-6 Units Subcutaneous Nightly Celia Ruffin MD   Stopped at 08/23/20 2027    0.9 % sodium chloride infusion   Intravenous Continuous Celia Ruffin  mL/hr at 08/23/20 1334          No Known Allergies    ROS:   Constitutional: Negative for fever, activity change and appetite change. HENT: Negative for epistaxis. Eyes: Negative for diploplia, blurred vision. Respiratory: Negative for cough, chest tightness, shortness of breath and wheezing. Cardiovascular: pertinent positives in HPI  Gastrointestinal: Negative for abdominal pain and blood in stool.    All other review of systems are negative     PHYSICAL EXAM:   Vitals:    08/23/20 1515 08/23/20 2330 08/24/20 0810 08/24/20 1338   BP: (!) 140/70 120/73 (!) 154/62 (!) 169/58   Pulse: 94 74 82 82   Resp: 16 18 16 18   Temp: 98.4 °F (36.9 °C) 97.1 °F (36.2 °C) 99.8 °F (37.7 °C) 99.5 °F (37.5 °C) TempSrc: Temporal  Temporal Temporal   SpO2: 96% 99% 99%    Weight:       Height:          Constitutional: Well-developed, no acute distress, examined in room laying nearly flat  Eyes: conjunctivae normal, no xanthelasma   Ears, Nose, Throat: oral mucosa moist, no cyanosis   CV: no JVD. Regular rate and rhythm. Normal S1S2 and no S3. No murmurs, rubs, or gallops. PMI is nondisplaced  Lungs: clear to auscultation bilaterally, normal respiratory effort without used of accessory muscles  Abdomen: soft, non-tender, bowel sounds present, no masses or hepatomegaly   Musculoskeletal: no digital clubbing, no edema left BKA  Skin: warm, no rashes     I have personally reviewed the laboratory, cardiac diagnostic and radiographic testing as outlined below:    Data:    Recent Labs     08/22/20  0446 08/23/20  0550 08/23/20  1815 08/24/20  0521   WBC 4.4* 3.3*  --  4.5   HGB 7.9* 6.5* 8.9* 8.4*   HCT 26.3* 21.2*  21.4* 29.8* 26.4*    329  --  346     Recent Labs     08/22/20  0446 08/23/20  0550 08/24/20  0521    143 144   K 3.6 3.8 3.6    112* 109*   CO2 21* 19* 20*   BUN 12 10 11   CREATININE 1.0 0.9 0.9   CALCIUM 8.8 8.5* 8.5*      Lab Results   Component Value Date    MG 2.1 05/30/2020     No results for input(s): TSH in the last 72 hours. Recent Labs     08/22/20  0446 08/22/20  0729   INR 1.1 1.2       CXR 08/22/2020: No acute process. Telemetry 08/24/20: Sinus rhythm with PVCs    EKG 08/22/2020: Sinus rhythm, rate 89 bpm, QRS 94 MS, QTc 459 MS. Please see scan in Cardiology. Echocardiogram 02/24/2020:    Findings      Left Ventricle   Mild left ventricular concentric hypertrophy noted. ef 67% by 2D. Stage I diastolic dysfunction. Right Ventricle   Normal right ventricle structure and function. Left Atrium   Normal left atrium. Interatrial septum not well visualized. Right Atrium   Normal right atrium.       Mitral Valve   Physiologic and/or trace mitral regurgitation is present. Tricuspid Valve   Normal tricuspid valve structure and function. Aortic Valve   Trileaflet aortic valve. Normal leaflet mobility. No aortic stenosis. No aortic regurgitation. Pulmonic Valve   Normal pulmonic valve structure and function. Pericardial Effusion   No evidence for hemodynamically significant pericardial effusion. Pleural Effusion   No evidence of pleural effusion. Aorta   Normal aortic root and ascending aorta. Miscellaneous   The inferior vena cava diameter is normal with normal respiratory   variation. Conclusions      Summary   Mild left ventricular concentric hypertrophy noted. ef 67% by 2D. Stage I diastolic dysfunction. Physiologic and/or trace mitral regurgitation is present.       Signature      ----------------------------------------------------------------   Electronically signed by Coretta Fernando DO(Interpreting   physician) on 02/25/2020 11:12 AM   ----------------------------------------------------------------     M-Mode/2D Measurements & Calculations      LV Diastolic    LV Systolic Dimension: 3 cm  AV Cusp Separation: 2.4 cmLA   Dimension: 4.8  LV Volume Diastolic: 898.1   Dimension: 3.2 cmAO Root   cm              ml                           Dimension: 3.2 cm   LV FS:37.5 %    LV Volume Systolic: 17.9 ml   LV PW           LV EDV/LV EDV Index: 756.1   Diastolic: 1.1  AC/05 VQ/S^1XT ESV/LV ESV   cm              Index: 35.5 ml/22ml/ m^2     RV Diastolic Dimension: 2.2   Septum          EF Calculated: 66.7 %        cm   Diastolic: 1.1  LV Mass Index: 118 l/min*m^2   cm   CO: 8.63 l/min                               LA volume/Index: 43.9 ml   CI: 5.26        LVOT: 2 cm   l/m*m^2   LV Mass: 193.96   g     Doppler Measurements & Calculations      MV Peak E-Wave: 0.64 AV Peak Velocity: 1.3  LVOT Peak Velocity: 1.13 m/s   m/s                  m/s                    LVOT Mean Velocity: 0.89 m/s   MV Peak A-Wave: 1.2  AV Peak Gradient: 6. 77 LVOT Peak Gradient: 5.1   m/s                  mmHg                   mmHgLVOT Mean Gradient: 3.4   MV E/A Ratio: 0.53   AV Mean Velocity: 1.04 mmHg   MV Peak Gradient:    m/s   5.6 mmHg             AV Mean Gradient: 4.6   MV Mean Gradient:    mmHg   2.6 mmHg             AV VTI: 24.7 cm   MV Mean Velocity:    AV Area   0.76 m/s             (Continuity):3.01 cm^2 PV Peak Velocity: 1.43 m/s   MV Deceleration                             PV Peak Gradient: 8.19 mmHg   Time: 159 msec       LVOT VTI: 23.7 cm      PV Mean Velocity: 1.02 m/s   MV P1/2t: 54.1 msec  IVRT: 85.4 msec        PV Mean Gradient: 4.9 mmHg   MVA by PHT:4.07 cm^2   MV Area   (continuity): 4.1   cm^2     [id number]  [id number]    Cardiac Catheterization 09/30/16:   Indication:  1. CP and moderate risk stress test.   2. AUC indication: 20  3. AUC score: 7     Procedure: Left Heart Catheterization, coronary angiography  Anesthesia: local and moderate conscious sedation     LHC performed via right radial approach using a Slender 6F sheath. 2.5mg of diluted Verapamil and 200mcg of nitroglycerine administered through the sheath. 4000U heparin administered IV.      Informed consent was obtained. The patient was transferred to   the catheterization laboratory in a stable condition. The right groin and  Right wrist were sterilely prepped and draped. Two mg of Versed and 50mcg of fentanyl was administered for conscious sedation. Then, 2% Xylocaine was infiltrated in  the right wrist. Using Seldinger technique and US guidance, the right radial arteriotomy was  performed and a Slender 6F sheath was placed in the right radial artery. Sheath was adequately aspirated and flushed. Using diluted contrast,  angiogram of the radial and brachial artery was obtained.  Then, 2.5 mg of  diluted verapamil and 200 mcg of diluted nitroglycerin was administered  through a sheath. Angiogram of the left and right coronary system were  obtained using a 5-[place] Layo catheter. Heparin 4000 units was  administered intravenously after access. The [employer] catheterwas then used to prolapse  into the aortic valve of the left ventricle. LVEDP was obtained.      At the end of the procedure, a TR band was successfully deployed with good  hemostasis. There were no complications. Patient tolerated the procedure  well and was transferred to floor for monitoring.        Findings:  Left main: 0% stenosis  LAD: large and wraps around the apex to distal 1/2 of inferior septum. No stenosis  Circumflex: CD. No stenosis  RI: mild ostial disease. Otherwise normal.   RCA: CD. Small. Small RPDA. no stenosis  LV angio: not performed.      Hemodynamics:  LVEDP: 7mmHg. No gradient across AV. Ao: 117/57(82)mmHg.      Sheath removed and TR band applied. There was good hemostasis achieved and the distal pulses were intact.               Complication: None   Estimated blood loss: 10ml. Contrast use: 27cc     Post op diagnosis:  1. Normal epicardial coronary arteries. 2. Normal LVEDP  3. Non cardiac CP     PLAN:  1. Continue medical therapy. 2. Look for non cardiac causes for her CP. I have independently reviewed all of the ECGs and rhythm strips per above     Assessment/Plan:  This is a 63 y.o. female with a history of   Patient Active Problem List   Diagnosis    Epigastric hernia    Umbilical hernia    Essential hypertension    Type 2 diabetes mellitus without complication, with long-term current use of insulin ([address]    Dyslipidemia    S/P cardiac catheterization    Abnormal stress ECG    Hyperthyroidism    Diabetic peripheral neuropathy (HCC)    Hydroureter    Microcytic hypochromic anemia    Arterial occlusion    Hypoglycemia    Hypertensive emergency    Adrenal infarction ([address]    Paresthesia of hand, bilateral    Paresthesia of left foot    Unintended weight loss    Acute ischemic stroke [facility]    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 Bridgett Velasquez 1. Acute CVA   - Neurology consulted, CVA appears to be cardio embolic  -Was not a TPa or IR candidate  -Slight residual effect, dysphasia, dysarthria   - Reccommended to have ЕКАТЕРИНА/ILR  - ASA, Plavix, statin    2. Diabetes mellitus, type II now insulin-dependent  -  Lab Results   Component Value Date    LABA1C 5.0 08/23/2020     No results found for: EAG    3. Hypertension   -Catapres, lisinopril. Hydralazine, Toprol, Aldactone    4. Anemia   -Iron supplement, folic acid supplement    5. Dyslipidemia    6. UTI  -6/26/2028, Klebsiella pneumoniae, Citrobacter  -Now on isolation        Recommendations to follow per Dr. Monique Polanco. Thank you for allowing me to participate in your patient's care. Please call me if there are any questions or concerns. Discussed with Dr. Monique Polanco. BENJI Szymanski - CNP  Cardiac Electrophysiology  [address] [place] [address]  [facility]  4:23 PM  8/24/2020    ______________________________________________________________________    I have personally seen and evaluated the patient. I personally obtained the history and performed the physical exam. I personally reviewed all of the above labs and data. All of the assessments and recommendations are from me. I have reviewed the above documentation completed by the MARLEY. Please see my additional contributions to the HPI, physical exam, and assessment, and recommendations below. History of Present Illness:   The patient is a 63year-old lady with significant past medical history of hypertension, diabetes mellitus and peripheral vascular disease status post left below-knee amputation who presents to the hospital on 8/22/20 complaining of difficulty speaking. Per note, she woke up on 8/22/20 with difficulty speaking. She  presented to [address] [place] [address] ED before she was transferred to [facility] for further management. She was seen by Neurology and underwent CTA of her head and neck which showed a left M3 occlusion. She was out of the window for TPA and not an IR candidate. She denies previous history of AF. Thus far no AF was seen on telemetry monitor. Cardiac electrophysiology service is consulted for ЕКАТЕРИНА and possible ILR placement. The patient denies chest pain, dyspnea on exertion, palpitations, lightheadedness, dizziness, syncope, PND, or orthopnea. ROS:   Constitutional: Negative for fever. Positive for activity change and negative for appetite change. HENT: Negative for epistaxis. Eyes: Negative for diploplia, blurred vision. Respiratory: Negative for cough, chest tightness, shortness of breath and wheezing. Cardiovascular: pertinent positives in HPI  Gastrointestinal: Negative for abdominal pain and blood in stool. All other review of systems are negative     PHYSICAL EXAM:   Vitals:    08/23/20 2330 08/24/20 0810 08/24/20 1338 08/24/20 1615   BP: 120/73 (!) 154/62 (!) 169/58 (!) 150/57   Pulse: 74 82 82 78   Resp: 18 16 18 18   Temp: 97.1 °F (36.2 °C) 99.8 °F (37.7 °C) 99.5 °F (37.5 °C) 98.2 °F (36.8 °C)   TempSrc:  Temporal Temporal Temporal   SpO2: 99% 99%  97%   Weight:       Height:          Constitutional: Well-developed, no acute distress  Eyes: conjunctivae normal, no xanthelasma   Ears, Nose, Throat: oral mucosa moist, no cyanosis   CV: no JVD. Regular rate and rhythm. Normal S1S2 and no S3. No murmurs, rubs, or gallops.  PMI is nondisplaced  Lungs: clear to auscultation bilaterally, normal respiratory effort without used of accessory muscles  Abdomen: soft, non-tender, bowel sounds present, no masses or hepatomegaly   Musculoskeletal: no digital clubbing, no edema of R LE  Skin: warm, no rashes EKG 8/22/20: NSR 89, possible old anterior MI, QTc 459 msec. Telemetry 8/24/20: NSR, no PAF    Echocardiogram: Pending    Echocardiogram 2/24/20:   Findings      Left Ventricle   Mild left ventricular concentric hypertrophy noted. ef 67% by 2D. Stage I diastolic dysfunction. Right Ventricle   Normal right ventricle structure and function. Left Atrium   Normal left atrium. Interatrial septum not well visualized. Right Atrium   Normal right atrium. Mitral Valve   Physiologic and/or trace mitral regurgitation is present. Tricuspid Valve   Normal tricuspid valve structure and function. Aortic Valve   Trileaflet aortic valve. Normal leaflet mobility. No aortic stenosis. No aortic regurgitation. Pulmonic Valve   Normal pulmonic valve structure and function. Pericardial Effusion   No evidence for hemodynamically significant pericardial effusion. Pleural Effusion   No evidence of pleural effusion. Aorta   Normal aortic root and ascending aorta. Miscellaneous   The inferior vena cava diameter is normal with normal respiratory   variation. Conclusions      Summary   Mild left ventricular concentric hypertrophy noted. ef 67% by 2D. Stage I diastolic dysfunction. Physiologic and/or trace mitral regurgitation is present.       Signature      ----------------------------------------------------------------   Electronically signed by Daryn Vazquez DO(Interpreting   physician) on 02/25/2020 11:12 AM   ----------------------------------------------------------------     M-Mode/2D Measurements & Calculations      LV Diastolic    LV Systolic Dimension: 3 cm  AV Cusp Separation: 2.4 cmLA   Dimension: 4.8  LV Volume Diastolic: 358.5   Dimension: 3.2 cmAO Root   cm              ml                           Dimension: 3.2 cm   LV FS:37.5 %    LV Volume Systolic: 59.3 ml   LV PW           LV EDV/LV EDV Index: 231.9   Diastolic: 1.1  OW/11 NT/D^0JJ ESV/LV Xylocaine was infiltrated in  the right wrist. Using Seldinger technique and US guidance, the right radial arteriotomy was  performed and a Slender 6F sheath was placed in the right radial artery. Sheath was adequately aspirated and flushed. Using diluted contrast,  angiogram of the radial and brachial artery was obtained. Then, 2.5 mg of  diluted verapamil and 200 mcg of diluted nitroglycerin was administered  through a sheath. Angiogram of the left and right coronary system were  obtained using a 5-[place] Layo catheter. Heparin 4000 units was  administered intravenously after access. The Babs Rower catheterwas then used to prolapse  into the aortic valve of the left ventricle. LVEDP was obtained.      At the end of the procedure, a TR band was successfully deployed with good  hemostasis. There were no complications. Patient tolerated the procedure  well and was transferred to floor for monitoring.        Findings:  Left main: 0% stenosis  LAD: large and wraps around the apex to distal 1/2 of inferior septum. No stenosis  Circumflex: CD. No stenosis  RI: mild ostial disease. Otherwise normal.   RCA: CD. Small. Small RPDA. no stenosis  LV angio: not performed.      Hemodynamics:  LVEDP: 7mmHg. No gradient across AV. Ao: 117/57(82)mmHg.      Sheath removed and TR band applied. There was good hemostasis achieved and the distal pulses were intact.               Complication: None   Estimated blood loss: 10ml. Contrast use: 27cc     Post op diagnosis:  1. Normal epicardial coronary arteries. 2. Normal LVEDP  3. Non cardiac CP     PLAN:  1. Continue medical therapy. 2. Look for non cardiac causes for her CP. Assessment/Plan:    1. Acute CVA   - CTA of her head and neck which showed a left M3 occlusion.  - Was not a TPa or IR candidate  - Cardio embolic source per Neurology. - On ASA, Plavix and Lipitor    2. Hypertension   - Elevated. - On Catapres, Lisinopril, Hydralazine, Toprol XL and Aldactone     3. Diabetes mellitus,  - On Insulin. 4. Hyperlipidemia  - On Lipitor. 5. Anemia   - On Iron supplement, folic acid supplement    6. History of UTI  - Now on isolation for VRE    Recommendations:  1. Transesophageal echocardiogram to exclude cardio embolic source. 2. Implantable loop recorder insertion if ЕКАТЕРИНА is negative. I have spent a total of 110 minutes with the patient and the family reviewing the above stated recommendations. And a total of >50% of that time involved face-to-face time providing counseling and or coordination of care with the other providers. Thank you for allowing me to participate in your patient's care. Please call me if there are any questions or concerns.      Sharyn Espinoza MD  Cardiac Electrophysiology  [facility]  [facility] [place]: [facility]  5:20 PM  8/24/2020

## 2020-08-24 NOTE — PROGRESS NOTES
improved since earlier per her , He Pino, grossly non-focal.  Psychiatric:  Alert and oriented, thought content appropriate, normal insight  Capillary Refill: Brisk,< 3 seconds   Peripheral Pulses: +2 palpable, equal bilaterally     Gerber:No  Drains:No  Central Line/port:No   EKG:  I have reviewed the EKG with the following interpretation:  Normal sinus rhythm rate 89 bpm, possible anterior infarct with QS in V1. Imaging:Yes   Ct Head Wo Contrast    Result Date: 8/22/2020  EXAMINATION: CT OF THE HEAD WITHOUT CONTRAST  8/22/2020 4:45 am TECHNIQUE: CT of the head was performed without the administration of intravenous contrast. Dose modulation, iterative reconstruction, and/or weight based adjustment of the mA/kV was utilized to reduce the radiation dose to as low as reasonably achievable. COMPARISON: None. HISTORY: ORDERING SYSTEM PROVIDED HISTORY: focal deficit TECHNOLOGIST PROVIDED HISTORY: Reason for exam:->focal deficit Has a \"code stroke\" or \"stroke alert\" been called? ->Yes FINDINGS: BRAIN/VENTRICLES: There is no acute intracranial hemorrhage, mass effect or midline shift. No abnormal extra-axial fluid collection. The gray-white differentiation is maintained without evidence of an acute infarct. There is no evidence of hydrocephalus. Mild diffuse decrease in cerebral volume is noted with corresponding prominence of the sulci and ventricles. Left putamen elongated remote lacunar infarct is noted which measures up to 8 mm in diameter. ORBITS: The visualized portion of the orbits demonstrate no acute abnormality. SINUSES: The visualized paranasal sinuses and mastoid air cells demonstrate no acute abnormality. SOFT TISSUES/SKULL:  No acute abnormality of the visualized skull or soft tissues. No acute intracranial abnormality. Mild diffuse cerebral atrophy. Left putamen 8 mm diameter remote lacunar infarct.      Xr Chest Portable    Result Date: 8/22/2020  EXAMINATION: ONE XRAY VIEW OF THE CHEST 2020 4:59 am COMPARISON: Chest radiograph May 29, 2020 HISTORY: ORDERING SYSTEM PROVIDED HISTORY: AMS TECHNOLOGIST PROVIDED HISTORY: Reason for exam:->AMS FINDINGS: The lungs are without acute focal process. There is no effusion or pneumothorax. The cardiomediastinal silhouette is without acute process. The osseous structures are without acute process. No acute process. Cta Neck W Contrast    Result Date: 2020  Patient MRN:  [id number] : 1960 Age: 63 years Gender: Female Order Date:  2020 7:32 AM EXAM: CTA NECK W CONTRAST, CTA HEAD W CONTRAST NUMBER OF IMAGES:  18 INDICATION:  CVA CVA COMPARISON: None Technique: Low-dose CT  acquisition technique included one of following options; 1 . Automated exposure control, 2. Adjustment of MA and or KV according to patient's size or 3. Use of iterative reconstruction. Contiguous spiral images were obtained in the axial plane, following the administration of intravenous contrast using CT angiographic protocol. Sagittal and coronal images were reconstructed from the axial plane acquisition. Additional MIP reconstructions were presented to aid in the interpretation of this study. Images were obtained from the skull base cranially. There is mild calcified plaque identified in the vessels compatible with atherosclerotic disease. There is aortic arch and great vessels demonstrate mild atherosclerotic disease. The right carotid is unremarkable. The left carotid is unremarkable. The right vertebral artery is unremarkable. The left vertebral artery is unremarkable. The basilar artery is unremarkable. The middle cerebral arteries are abnormal on the right. There is a right M3 occlusion. This is difficult to clearly visualize on the CT of the head. Otherwise the middle cerebral artery is unremarkable The anterior cerebral arteries are unremarkable. The posterior cerebral arteries are unremarkable. 1.  Right middle cerebral artery M 3 occlusion .  2. Estimated stenosis by NASCET criteria is not hemodynamically significant     Ct Brain Perfusion    Result Date: 2020  Patient MRN: [id number] : 1960 Age:  63 years Gender: Female Order Date: 2020 7:32 AM Exam: CT BRAIN PERFUSION Number of Images: 865 views Indication:   cva - bernice alert cva - bernice alert Comparison: None. Findings: Perfusion images demonstrate asymmetric blood volume Blood flow images demonstrate asymmetric blood flow There is significant ischemic number identified. In the right posterior frontal lobe There is significant core infarct identified. Central core infarct with significant peripheral ischemic penumbra     Cta Head W Contrast    Result Date: 2020  Patient MRN:  [id number] : 1960 Age: 63 years Gender: Female Order Date:  2020 7:32 AM EXAM: CTA NECK W CONTRAST, CTA HEAD W CONTRAST NUMBER OF IMAGES:  18 INDICATION:  CVA CVA COMPARISON: None Technique: Low-dose CT  acquisition technique included one of following options; 1 . Automated exposure control, 2. Adjustment of MA and or KV according to patient's size or 3. Use of iterative reconstruction. Contiguous spiral images were obtained in the axial plane, following the administration of intravenous contrast using CT angiographic protocol. Sagittal and coronal images were reconstructed from the axial plane acquisition. Additional MIP reconstructions were presented to aid in the interpretation of this study. Images were obtained from the skull base cranially. There is mild calcified plaque identified in the vessels compatible with atherosclerotic disease. There is aortic arch and great vessels demonstrate mild atherosclerotic disease. The right carotid is unremarkable. The left carotid is unremarkable. The right vertebral artery is unremarkable. The left vertebral artery is unremarkable. The basilar artery is unremarkable. The middle cerebral arteries are abnormal on the right. There is a right M3 occlusion. This is difficult to clearly visualize on the CT of the head. Otherwise the middle cerebral artery is unremarkable The anterior cerebral arteries are unremarkable. The posterior cerebral arteries are unremarkable. 1.  Right middle cerebral artery M 3 occlusion .  2. Estimated stenosis by NASCET criteria is not hemodynamically significant       Diet:  Diet NPO Effective Now    Medications:    aspirin  300 mg Rectal Daily    cloNIDine  1 patch Transdermal Weekly    ferrous sulfate  325 mg Oral Daily with breakfast    folic acid  1 mg Oral Daily    hydrALAZINE  50 mg Oral 4x Daily    lisinopril  20 mg Oral Daily    megestrol  20 mg Oral BID    oxybutynin  5 mg Oral TID    thiamine  100 mg Oral Daily    pyridoxine  50 mg Oral Daily    enalaprilat  1.25 mg Intravenous 4 times per day    spironolactone  25 mg Oral Daily    sodium chloride flush  10 mL Intravenous 2 times per day    enoxaparin  40 mg Subcutaneous Daily    atorvastatin  80 mg Oral Nightly    clopidogrel  75 mg Oral Daily    metoprolol succinate  25 mg Oral Daily    insulin lispro  0.08 Units/kg Subcutaneous TID WC    insulin lispro  0-12 Units Subcutaneous TID WC    insulin lispro  0-6 Units Subcutaneous Nightly     Continuous Infusions:   dextrose      sodium chloride 100 mL/hr at 08/23/20 1334       PRNMeds:sodium chloride flush, polyethylene glycol, promethazine **OR** ondansetron, labetalol, perflutren lipid microspheres, glucose, dextrose, glucagon (rDNA), dextrose  DVT Prophylaxis:pharmacologic prophylaxis (with any of the following: enoxaparin (Lovenox) 40mg SQ 2h prior to surgery then QD) and Encourage ambulation, low risk for DVT, no chemical or mechanical prophylaxis necessary    Data:  CBC:  Recent Labs     08/22/20  0446 08/23/20  0550 08/23/20  1815 08/24/20  0521   WBC 4.4* 3.3*  --  4.5   RBC 3.09* 2.57*  --  3.13*   HGB 7.9* 6.5* 8.9* 8.4*   HCT 26.3* 21.2*  21.4* 29.8* 26.4*   MCV 85.1 83.3  --  84.3   RDW 16.3* 16.3*  --  15.8*    329  --  346     BMP:  Recent Labs     08/22/20  0446 08/23/20  0550 08/24/20  0521    143 144   K 3.6 3.8 3.6    112* 109*   CO2 21* 19* 20*   BUN 12 10 11   CREATININE 1.0 0.9 0.9     BNP: No results for input(s): BNP in the last 72 hours. PT/INR:   Recent Labs     08/22/20  0446 08/22/20  0729   PROTIME 12.9* 13.0*   INR 1.1 1.2     :   Recent Labs     08/22/20  0729   APTT 32.4     CARDIAC ENZYMES:No results for input(s): CKMB, CKMBINDEX, TROPONINT in the last 72 hours. Invalid input(s): CKTOTAL;3  FASTING LIPID PANEL:  Lab Results   Component Value Date    CHOL 122 08/23/2020    HDL 32 08/23/2020    TRIG 111 08/23/2020     LIVER PROFILE: No results for input(s): AST, ALT, ALB, BILIDIR, BILITOT, ALKPHOS in the last 72 hours. ABGs:   Lab Results   Component Value Date    PH 7.522 05/29/2020    PCO2 23.4 05/29/2020    PO2 100.5 05/29/2020    HCO3 18.8 05/29/2020    O2SAT 97.9 05/29/2020       Transthoracic Echocardiography Report (2/24/2020)   Summary   Mild left ventricular concentric hypertrophy noted. ef 67% by 2D. Stage I diastolic dysfunction. Physiologic and/or trace mitral regurgitation is present. Neurology consultant Dr. Shaka Tinsley [8/22/2020]:  Recommendations:  - patient has an acute stroke in the R M3  - she is not a thrombectomy candidate  - aspirin and plavix loaded, con't aspirin 81mg daily and plavix 75mg daily for 21 days, patient was taking plavix before admission, will switch her to aspirin 81mg daily after 21 days. - lipitor 80mg daily  - pt will need holter monitor or loop recorder to r/o afib, this is a cardioembolic stroke. If afib is found, pt will need anticoagulant. - consult PT/OT and SLP  - lovenox for DVT prophylaxis.                                             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 *  · acute on chronic diastolic CHF class II, EF 67%. Summary: Susi Walls is a 63 y.o. old female presenting to the emergency department by EMS from [facility] with CC/O unknown onset of right-sided facial droop and right-sided numbness, which began during the night at home. She was beyond the window for TPA. She has history of stroke with stroke risk factors: hypertension, HLD, DM. On admission NIH stroke scale of 5. CTA perfusion phase detected distal R-MCA occlusion. She was loaded with Aspirin/Plavix and refer for admission. She has confirmed Right middle cerebral artery M3 occlusion. For DM: Glucose remains 130s, A1c 5 with normal renal function. On lispro with meals    Admission - Diagnosis & management:  - [8/23]   . # Acute CVA/N-PQU-qqmwepwdz: CTA - Right middle cerebral artery M3 occlusion. lipid profile normal. Failed bedside swallow remains on IVF NS @100cc/h; speech swallow eval & OT/PT [8/24]; atorvastatin 80 mg hs.  - Neurology consult Dr. Charbel Macario believe this is a embolic stroke. Recommended ASA/Plavix for 21 days the ASA. Need Holter or loop recorder.     -  Pending: MRI without contrast, cardiac echo   . # DM 2 insulin-dependent/s/p AKA/Neuropathy: Glucose remains 130s, A1c 5 with normal renal function. - start insulin with meals once pass swallow tests lispro 4 units tid, medium sliding scale insulin with meals/hs. A1c level. .# ad-CHF/HTN-uncontrolled/?CAD: -175/58-83. TN 0.05-> 0.03. BNP 2121.  ECHO [2/24/20] normal LV 20% with diastolic dysfunction; therefore the patient has acute on chronic diastolic CHF class II, EF 67%. Lisinopril 20 mg/d, metoprolol XL 25 mg/d, hydralazine 50 mg qid, will start spironolactone 25 mg/d. Lasix 40 mg IV x1 post transfusion. .# Anemia: History of microcytic anemia. hgb 11.5 [6/26/20]-> 7.9[8/22]-> 6.5. Stool occult blood, anemia panel, retic count. Type and cross 1 unit, being transfuse-currently. .# Dyslipidemia: Atorvastatin 80 mg/d    - [8/24] Appears a little slow in understanding her current situation and plans  . # Acute CVA/Q-DKA-rumkfdtiv: Failed bedside swallow remains on IVF NS @100cc/h; speech swallow eval & OT/PT [8/24]; atorvastatin 80 mg hs. Neurology consult Dr. Laura Gates believe this is a embolic stroke. On ASA/Plavix for 21 days then ASA alone. Need Holter or loop recorder. MRI done: Several nonspecific areas of abnormal increased T2/FLAIR signal intensity within the subcortical white matter of the frontal and parietal lobes. Although nonspecific, this can be seen in the setting of posterior reversible encephalopathy syndrome (PRES). -  Pending: cardiac echo    . # DM 2 insulin-dependent/s/p AKA/Neuropathy: Glucose remains .  - insulin with meals once pass swallow tests lispro 4 units tid, medium sliding scale insulin with meals/hs. A1c level. .# ad-CHF/HTN-uncontrolled/?CAD: -164/58-82. BNP 2121-> 5,572. Acute/chronic diastolic CHF class II, EF 67%. On Lisinopril 20 mg/d, metoprolol XL 25 mg/d, hydralazine 50 mg qid, and spironolactone 25 mg/d. Lasix 40 mg IV x1 post transfusion. .# Anemia:Microcytic anemia. hgb 11.5 [6/26/20]-> 7.9[8/22]-> 6.5 s/p 1u-PRBC -> 8.4. Patient's marrow is responding with high reticulocyte count and is iron deficient [iron 16, iron saturation 11%, TIBC 442, transferrin 101 reticulocyte immature 21.9 (elevated]. Gave parenteral ferric gluconate, Y82 and folic acid x1.  - Stool occult blood    . # Dyslipidemia: Atorvastatin 80 mg/d      CODE STATUS: Full      Time Spent: 45 minutes  signed by Paula Caldwell MD on 8/24/2020 at 9:51 AM    [facility]

## 2020-08-24 NOTE — PROGRESS NOTES
SPEECH/LANGUAGE PATHOLOGY  CLINICAL ASSESSMENT OF SWALLOW FUNCTION    PATIENT NAME:  Sunni Judd      :  1960      TODAY'S DATE:  2020  ROOM:  [id number]/6139-K    SUMMARY OF EVALUATION     DYSPHAGIA DIAGNOSIS:  Mild oral dysphagia functional pharyngeal phase      DIET RECOMMENDATIONS:  Dysphagia 1, Pureed solids with  thin liquids--per patient request     FEEDING RECOMMENDATIONS:     Assistance level:  Set-up is required for all oral intake      Compensatory strategies recommended: Check for oral pocketing    THERAPY RECOMMENDATIONS:     Dysphagia therapy is recommended 3-5 times per week for LOS or when goals are met. Pt will complete PO trials of upgraded diet textures with SLP only to determine the least restrictive PO diet to maintain adequate nutrition/hydration with no more than 1 overt s/s of pen/asp. If she was not on puree prior to admission      Patient report:  She reports having her foods ground up then requested pureed saying she could eat it better right now    Diet prior to admit:      Unclear     Communication/Cognition:  Impaired                PROCEDURE     Consistencies Administered During the Evaluation   Liquids: thin liquid   Solids:  pureed foods and soft solid foods      Method of Intake:   cup, spoon  Self fed      Position:   Seated, upright    Current Respiratory Status   room air                RESULTS     Oral Stage:         Decreased mastication due to:  cognitive function and disorganized chewing pattern      Pharyngeal Stage:      No signs of aspiration were noted during this evaluation however, silent aspiration cannot be ruled out at bedside. If silent aspiration is suspected, a Videofluoroscopic Study of Swallowing (MBS) is recommended and requires a physician order. The Speech Language Pathologist (SLP) completed education with the patient regarding results of evaluation.    Explained that Speech Pathology intervention is warranted  at this time Prognosis for improvements is good     This plan will be re-evaluated and revised in 1 week  if warranted. Patient stated goals: Agreed with above,   Treatment goals discussed with Patient   The Patient did not demonstrate understanding of the diagnosis, prognosis and plan of care     CPT code:  68 21 97  bedside swallow eval    Evaluation time includes  review of current medical information, gathering information on past medical history/social history and prior level of function, completion of standardized testing/informal observation of tasks, assessment of data, and development of POC/Goals. [x]The admitting diagnosis and active problem list, as listed below have been reviewed prior to initiation of this evaluation.      ADMITTING DIAGNOSIS: Acute CVA (cerebrovascular accident) ([address] [I63.9]  Acute CVA (cerebrovascular accident) ([address] [I63.9]     ACTIVE PROBLEM LIST:   Patient Active Problem List   Diagnosis    Epigastric hernia    Umbilical hernia    Essential hypertension    Type 2 diabetes mellitus without complication, with long-term current use of insulin (HCC)    Dyslipidemia    S/P cardiac catheterization    Abnormal stress ECG    Hyperthyroidism    Diabetic peripheral neuropathy (HCC)    Hydroureter    Microcytic hypochromic anemia    Arterial occlusion    Hypoglycemia    Hypertensive emergency    Adrenal infarction ([address]    Paresthesia of hand, bilateral    Paresthesia of left foot    Unintended weight loss    Acute ischemic stroke ([address]    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

## 2020-08-24 NOTE — PROGRESS NOTES
PEECH/LANGUAGE PATHOLOGY  SPEECH/LANGUAGE/COGNITIVE EVALUATION      PATIENT NAME:  Farheen Frederick      :  1960      TODAY'S DATE:  2020      SPEECH PATHOLOGY DIAGNOSIS:    Mild to moderate cognitive linguistic deficit    THERAPY RECOMMENDATIONS:   Speech Pathology intervention is recommended 3-6 times per week for LOS or when goals are met with emphasis on the following:     Improve awareness of surroundings to include consistent orientation to person, place and time. Increase patient's ability to sustain attention to treatment task for duration of activity despite introduction of extraneous enviromental stimuli. Increase STM recall of previously reviewed procedure for safe execution of transfers and ambulation within a dynamic environment. Improve functional problem solving/reasoning for successful/safe completion of familiar ADL tasks. MOTOR SPEECH       Oral Peripheral Examination   Generalized oral weakness    Parameters of Speech Production  Respiration:  Adequate for speech production  Articulation:  Distortion  Resonance:  Within functional limits  Quality:   Within functional limits  Pitch: Within functional limits  Intensity: Within functional limits  Fluency:  Intact  Prosody Intact  Current Respiratory Status   room air    RECEPTIVE LANGUAGE    Comprehension of Yes/No Questions:    Within functional limits    Process  Simple Verbal Commands:   Latent  Process Intermediate Verbal Commands:   Latent  Process Complex Verbal Commands:     Latent, Inconsistent and Cueing    Comprehension of Conversation:      Latent and Cueing      EXPRESSIVE LANGUAGE     Serials: Functional    Imitation:  Words   Functional   Sentences Functional    Naming:  (Modality used:  Verbal)  Confrontation Naming  Functional  Functional Description  Functional  Response Naming: Functional    Conversation:      Confusion was noted during conversation    COGNITION Attention/Orientation  Attention: Sustained attention   Orientation:  Oriented to Person  Thought she was in the nursing home     Memory   Biographical:  recalled Age  Delayed recall:  recalled TBA/3 words    Organization/Problem Solving/Reasoning   Verbal Sequencing:  Impaired     Problem solving (verbal): Impaired     Mathematics: (Simple arithmetic) : Impaired    CLINICAL OBSERVATIONS NOTED DURING THE EVALUATION  Latent responses and Cueing was required                The Speech Language Pathologist (SLP) completed education with the patient regarding results of evaluation. Explained that Speech Pathology intervention is warranted  at this time   Prognosis for improvements is good     This plan will be re-evaluated and revised in 1 week  if warranted. Patient stated goals: Agreed with above,   Treatment goals discussed with Patient   The Patient did not demonstrate understanding of the diagnosis, prognosis and plan of care     Evaluation time includes  review of current medical information, gathering information on past medical history/social history and prior level of function, completion of standardized testing/informal observation of tasks, assessment of data, and development of POC/Goals. CPT code:    66951  eval speech sound lang comprehension    The admitting diagnosis and active problem list, as listed below have been reviewed prior to initiation of this evaluation.      ADMITTING DIAGNOSIS: Acute CVA (cerebrovascular accident) ([address] [I63.9]  Acute CVA (cerebrovascular accident) ([address] [I63.9]     ACTIVE PROBLEM LIST:   Patient Active Problem List   Diagnosis    Epigastric hernia    Umbilical hernia    Essential hypertension    Type 2 diabetes mellitus without complication, with long-term current use of insulin (HCC)    Dyslipidemia    S/P cardiac catheterization    Abnormal stress ECG    Hyperthyroidism    Diabetic peripheral neuropathy (HCC)    Hydroureter    Microcytic hypochromic anemia    Arterial occlusion    Hypoglycemia    Hypertensive emergency    Adrenal infarction ([address]    Paresthesia of hand, bilateral    Paresthesia of left foot    Unintended weight loss    Acute ischemic stroke ([address]    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

## 2020-08-24 NOTE — PROGRESS NOTES
Call placed and conversation had with patient's daughter, Mateus Evans [employer] [facility]) discussion had that patient will have an implantable recorder placed in her chest to monitor her heart for any abnormal rhythms and to see if they are able to identify the cause of her stroke. Discussion had that they will likely have the patient follow up outpatient with the doctor to make sure that she is okay. Received correspondence from patient's mail order company requesting medication refills.  Request sent to MD.

## 2020-08-24 NOTE — PROGRESS NOTES
Delphine Tavares is a 63 y.o. right handed female     Patient admitted with a past history of DM, HTN and a left BKA     Unfortunately admitted on 8/22/20 with difficulty speaking   She was not a tPA candidate because she was a wake up stroke and outside of window from [address] [place] [address] demonstrated a left M3 occlusion -- she was not a candidate for IR    On admission, she displayed left arm weakness and dysarthria     She was taking Plavix PTA and now takes DAPT -- it was recommended to take DAPT for 21 days and then ASA 81mg afterwards monotherapy     Echo pending and Holter/Loop on discharge recommended     Now failed swallow study and NPO   medications adjusted     No chest pain or palpitations  No SOB  No vertigo, lightheadedness or loss of consciousness  No falls, tripping or stumbling  No incontinence of bowels or bladder  No itching or bruising appreciated    ROS otherwise negative     Prior to Visit Medications    Medication Sig Taking?  Authorizing Provider   benazepril (LOTENSIN) 10 MG tablet Take 10 mg by mouth daily Yes Historical Provider, MD   clopidogrel (PLAVIX) 75 MG tablet Take 1 tablet by mouth once daily Yes Moni Gonzalez DO   megestrol (MEGACE) 20 MG tablet Take 1 tablet by mouth 2 times daily Yes Edenilson Wilson MD   cloNIDine (CATAPRES) 0.3 MG/24HR PTWK Place 1 patch onto the skin once a week Yes Edenilson Wilson MD   oxybutynin (DITROPAN) 5 MG tablet Take 1 tablet by mouth 3 times daily Yes Edenilson Wilson MD   hydrALAZINE (APRESOLINE) 50 MG tablet Take 1 tablet by mouth 4 times daily Yes Edenilson Wilson MD   folic acid (FOLVITE) 1 MG tablet Take 1 tablet by mouth daily Yes Moni Gonzalez DO   lisinopril (PRINIVIL;ZESTRIL) 20 MG tablet Take 1 tablet by mouth daily Yes Moni Gonzalez DO   metoprolol tartrate (LOPRESSOR) 25 MG tablet Take 1 tablet by mouth 2 times daily Yes Moni Gonzalez DO   vitamin B-1 100 MG tablet Take 1 tablet by mouth daily Yes Moni Gonzalez DO AM  8/24/2020

## 2020-08-24 NOTE — CARE COORDINATION
Met with the patient at the bedside briefly to discuss transition of care planning. Explained that she would need home health care at discharge per her therapy evaluations. Patient is agreeable stating she has had [facility] in the past.  Patient is agreeable to CM speaking with her  Rhys Alexander or her daughter Daniella Lai. Call placed to Rhys Alexander and  left asking for return call. Call placed to patient's daughter Daniella Lai and  left asking for return call. Chart reviewed from previous admission: Patient living with her daughter Daniella Lai who is her primary care giver. Patient has a prosthetic for LLE, WC, and Glucometer at home. Patient does have a history of HHC through [facility]. Call placed to Helene Smith, liaison with [facility] and referral made for [facility] at discharge. They will follow the patient while admitted and will see patient at home for [facility] when discharged. Await results of the patient's MRI of the Brain, Echocardiogram, and Swallow Evaluation per Speech therapy. Patient did fail her bedside swallow and is currently NPO. Per neurology notes, patient will need a ЕКАТЕРИНА and Loop prior to discharge. Will continue to follow.       Lakia Salgado.  P:  [phone]

## 2020-08-24 NOTE — PLAN OF CARE
Problem: Skin Integrity:  Goal: Will show no infection signs and symptoms  Description: Will show no infection signs and symptoms  8/23/2020 2339 by Lawyer Botello RN  Outcome: Met This Shift     Problem: Neurological  Goal: Maximum potential motor/sensory/cognitive function  8/23/2020 2339 by Lawyer Botello RN  Outcome: Met This Shift

## 2020-08-25 ENCOUNTER — ANESTHESIA (OUTPATIENT)
Dept: CARDIAC CATH/INVASIVE PROCEDURES | Age: 60
End: 2020-08-25

## 2020-08-25 ENCOUNTER — TELEPHONE (OUTPATIENT)
Dept: FAMILY MEDICINE CLINIC | Age: 60
End: 2020-08-25

## 2020-08-25 ENCOUNTER — ANESTHESIA EVENT (OUTPATIENT)
Dept: CARDIAC CATH/INVASIVE PROCEDURES | Age: 60
End: 2020-08-25

## 2020-08-25 LAB
AMORPHOUS: ABNORMAL
ANION GAP SERPL CALCULATED.3IONS-SCNC: 11 MMOL/L (ref 7–16)
BACTERIA: ABNORMAL /HPF
BASOPHILS ABSOLUTE: 0.03 E9/L (ref 0–0.2)
BASOPHILS RELATIVE PERCENT: 0.7 % (ref 0–2)
BILIRUBIN URINE: NEGATIVE
BLOOD, URINE: ABNORMAL
BUN BLDV-MCNC: 11 MG/DL (ref 8–23)
CA 125: 10.8 U/ML (ref 0–35)
CALCIUM SERPL-MCNC: 8.1 MG/DL (ref 8.6–10.2)
CHLORIDE BLD-SCNC: 109 MMOL/L (ref 98–107)
CLARITY: ABNORMAL
CO2: 21 MMOL/L (ref 22–29)
COLOR: YELLOW
CREAT SERPL-MCNC: 0.8 MG/DL (ref 0.5–1)
EOSINOPHILS ABSOLUTE: 0.04 E9/L (ref 0.05–0.5)
EOSINOPHILS RELATIVE PERCENT: 1 % (ref 0–6)
GFR AFRICAN AMERICAN: >60
GFR NON-AFRICAN AMERICAN: >60 ML/MIN/1.73
GLUCOSE BLD-MCNC: 132 MG/DL (ref 74–99)
GLUCOSE URINE: NEGATIVE MG/DL
HCT VFR BLD CALC: 24.2 % (ref 34–48)
HEMOGLOBIN: 7.7 G/DL (ref 11.5–15.5)
IMMATURE GRANULOCYTES #: 0.02 E9/L
IMMATURE GRANULOCYTES %: 0.5 % (ref 0–5)
KETONES, URINE: NEGATIVE MG/DL
LEUKOCYTE ESTERASE, URINE: NEGATIVE
LYMPHOCYTES ABSOLUTE: 1.01 E9/L (ref 1.5–4)
LYMPHOCYTES RELATIVE PERCENT: 24.5 % (ref 20–42)
MCH RBC QN AUTO: 26.6 PG (ref 26–35)
MCHC RBC AUTO-ENTMCNC: 31.8 % (ref 32–34.5)
MCV RBC AUTO: 83.7 FL (ref 80–99.9)
METER GLUCOSE: 160 MG/DL (ref 74–99)
METER GLUCOSE: 164 MG/DL (ref 74–99)
METER GLUCOSE: 170 MG/DL (ref 74–99)
METER GLUCOSE: 180 MG/DL (ref 74–99)
MONOCYTES ABSOLUTE: 1.03 E9/L (ref 0.1–0.95)
MONOCYTES RELATIVE PERCENT: 24.9 % (ref 2–12)
MUCUS: PRESENT /LPF
NEUTROPHILS ABSOLUTE: 2 E9/L (ref 1.8–7.3)
NEUTROPHILS RELATIVE PERCENT: 48.4 % (ref 43–80)
NITRITE, URINE: NEGATIVE
PDW BLD-RTO: 16 FL (ref 11.5–15)
PH UA: 6 (ref 5–9)
PLATELET # BLD: 336 E9/L (ref 130–450)
PMV BLD AUTO: 10 FL (ref 7–12)
POTASSIUM SERPL-SCNC: 3.5 MMOL/L (ref 3.5–5)
PROTEIN UA: 100 MG/DL
RBC # BLD: 2.89 E12/L (ref 3.5–5.5)
RBC UA: >20 /HPF (ref 0–2)
SODIUM BLD-SCNC: 141 MMOL/L (ref 132–146)
SPECIFIC GRAVITY UA: 1.02 (ref 1–1.03)
UROBILINOGEN, URINE: 0.2 E.U./DL
WBC # BLD: 4.1 E9/L (ref 4.5–11.5)
WBC UA: >20 /HPF (ref 0–5)

## 2020-08-25 PROCEDURE — 36415 COLL VENOUS BLD VENIPUNCTURE: CPT

## 2020-08-25 PROCEDURE — 2580000003 HC RX 258: Performed by: INTERNAL MEDICINE

## 2020-08-25 PROCEDURE — 97530 THERAPEUTIC ACTIVITIES: CPT

## 2020-08-25 PROCEDURE — 87077 CULTURE AEROBIC IDENTIFY: CPT

## 2020-08-25 PROCEDURE — 6370000000 HC RX 637 (ALT 250 FOR IP): Performed by: INTERNAL MEDICINE

## 2020-08-25 PROCEDURE — 99233 SBSQ HOSP IP/OBS HIGH 50: CPT | Performed by: INTERNAL MEDICINE

## 2020-08-25 PROCEDURE — 82962 GLUCOSE BLOOD TEST: CPT

## 2020-08-25 PROCEDURE — 81001 URINALYSIS AUTO W/SCOPE: CPT

## 2020-08-25 PROCEDURE — 97130 THER IVNTJ EA ADDL 15 MIN: CPT

## 2020-08-25 PROCEDURE — 97129 THER IVNTJ 1ST 15 MIN: CPT

## 2020-08-25 PROCEDURE — 2500000003 HC RX 250 WO HCPCS: Performed by: INTERNAL MEDICINE

## 2020-08-25 PROCEDURE — 87088 URINE BACTERIA CULTURE: CPT

## 2020-08-25 PROCEDURE — 97535 SELF CARE MNGMENT TRAINING: CPT

## 2020-08-25 PROCEDURE — 2060000000 HC ICU INTERMEDIATE R&B

## 2020-08-25 PROCEDURE — 85025 COMPLETE CBC W/AUTO DIFF WBC: CPT

## 2020-08-25 PROCEDURE — 80048 BASIC METABOLIC PNL TOTAL CA: CPT

## 2020-08-25 PROCEDURE — 87186 SC STD MICRODIL/AGAR DIL: CPT

## 2020-08-25 RX ADMIN — ENALAPRILAT 1.25 MG: 1.25 INJECTION INTRAVENOUS at 21:24

## 2020-08-25 RX ADMIN — INSULIN LISPRO 2 UNITS: 100 INJECTION, SOLUTION INTRAVENOUS; SUBCUTANEOUS at 17:50

## 2020-08-25 RX ADMIN — HYDRALAZINE HYDROCHLORIDE 50 MG: 50 TABLET, FILM COATED ORAL at 17:47

## 2020-08-25 RX ADMIN — OXYBUTYNIN CHLORIDE 5 MG: 5 TABLET ORAL at 21:24

## 2020-08-25 RX ADMIN — SODIUM CHLORIDE: 9 INJECTION, SOLUTION INTRAVENOUS at 17:46

## 2020-08-25 RX ADMIN — SODIUM CHLORIDE: 9 INJECTION, SOLUTION INTRAVENOUS at 00:51

## 2020-08-25 RX ADMIN — HYDRALAZINE HYDROCHLORIDE 50 MG: 50 TABLET, FILM COATED ORAL at 00:51

## 2020-08-25 RX ADMIN — HYDRALAZINE HYDROCHLORIDE 50 MG: 50 TABLET, FILM COATED ORAL at 05:08

## 2020-08-25 RX ADMIN — MEGESTROL ACETATE 20 MG: 20 TABLET ORAL at 21:25

## 2020-08-25 RX ADMIN — ENALAPRILAT 1.25 MG: 1.25 INJECTION INTRAVENOUS at 14:03

## 2020-08-25 RX ADMIN — ENALAPRILAT 1.25 MG: 1.25 INJECTION INTRAVENOUS at 00:51

## 2020-08-25 RX ADMIN — ATORVASTATIN CALCIUM 80 MG: 80 TABLET, FILM COATED ORAL at 21:24

## 2020-08-25 RX ADMIN — LABETALOL HYDROCHLORIDE 10 MG: 5 INJECTION INTRAVENOUS at 15:09

## 2020-08-25 RX ADMIN — ENALAPRILAT 1.25 MG: 1.25 INJECTION INTRAVENOUS at 09:24

## 2020-08-25 ASSESSMENT — PAIN SCALES - GENERAL
PAINLEVEL_OUTOF10: 0

## 2020-08-25 NOTE — PROGRESS NOTES
Cardiac Electrophysiology Inpatient Progress Note    King Jeff  1960  Date of Service: 8/25/2020  PCP: Rene Asher MD  Electrophysiologist: Jenny Howard MD        Subjective: King Jeff is seen in hospital follow-up and management of:  Cryptogenic CVA     08/24/20-- Initial hospital consult: The patient is a 63year-old lady with significant past medical history of hypertension, diabetes mellitus and peripheral vascular disease status post left below-knee amputation who presents to the hospital on 8/22/20 complaining of difficulty speaking. Per note, she woke up on 8/22/20 with difficulty speaking. She  presented to [facility] ED before she was transferred to [facility] for further management. She was seen by Neurology and underwent CTA of her head and neck which showed a left M3 occlusion. She was out of the window for TPA and not an IR candidate. She denies previous history of AF. Thus far no AF was seen on telemetry monitor. Cardiac electrophysiology service is consulted for ЕКАТЕРИНА and possible ILR placement. The patient denies chest pain, dyspnea on exertion, palpitations, lightheadedness, dizziness, syncope, PND, or orthopnea. 08/25/2020: King Jeff is seen in hospital follow up, she has not had any atrial arrhythmias and does not have any cardiac complaints at this time.         Patient Active Problem List   Diagnosis    Epigastric hernia    Umbilical hernia    Essential hypertension    Type 2 diabetes mellitus without complication, with long-term current use of insulin (HCC)    Dyslipidemia    S/P cardiac catheterization    Abnormal stress ECG    Hyperthyroidism    Diabetic peripheral neuropathy (HCC)    Hydroureter    Microcytic hypochromic anemia    Arterial occlusion    Hypoglycemia    Hypertensive emergency    Adrenal infarction ([address]    Paresthesia of hand, bilateral    Paresthesia of left foot    Unintended weight loss    Acute ischemic stroke ([address]    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    Acute [facility] accident) [facility]    Hx of diabetic foot ulcer    Hx of BKA, left ([facility]    Acute on chronic diastolic heart failure (HCC)         Scheduled Medications:   aspirin  300 mg Rectal Daily    cloNIDine  1 patch Transdermal Weekly    ferrous sulfate  325 mg Oral Daily with breakfast    folic acid  1 mg Oral Daily    hydrALAZINE  50 mg Oral 4x Daily    lisinopril  20 mg Oral Daily    megestrol  20 mg Oral BID    oxybutynin  5 mg Oral TID    thiamine  100 mg Oral Daily    pyridoxine  50 mg Oral Daily    enalaprilat  1.25 mg Intravenous 4 times per day    spironolactone  25 mg Oral Daily    sodium chloride flush  10 mL Intravenous 2 times per day    enoxaparin  40 mg Subcutaneous Daily    atorvastatin  80 mg Oral Nightly    clopidogrel  75 mg Oral Daily    metoprolol succinate  25 mg Oral Daily    insulin lispro  0.08 Units/kg Subcutaneous TID WC    insulin lispro  0-12 Units Subcutaneous TID WC    insulin lispro  0-6 Units Subcutaneous Nightly       Infusion Medications:   dextrose      sodium chloride 100 mL/hr at 08/25/20 0051       PRN Medications:  sodium chloride flush, polyethylene glycol, promethazine **OR** ondansetron, labetalol, perflutren lipid microspheres, glucose, dextrose, glucagon (rDNA), dextrose    No Known Allergies    Wt Readings from Last 3 Encounters:   08/23/20 110 lb (49.9 kg)   08/22/20 113 lb (51.3 kg)   08/17/20 113 lb (51.3 kg)     Temp Readings from Last 3 Encounters:   08/25/20 99.7 °F (37.6 °C) (Temporal)   08/22/20 97.8 °F (36.6 °C) (Temporal)   06/26/20 97.9 °F (36.6 °C) (Oral)     BP Readings from Last 3 Encounters:   08/25/20 (!) 205/82   08/22/20 (!) 174/68   08/17/20 122/60     Pulse Readings from Last 3 Encounters:   08/25/20 70   08/22/20 85   08/17/20 80         Intake/Output Summary (Last 24 hours) at 8/25/2020 1425  Last data filed at 8/25/2020 1359  Gross per 24 hour   Intake 2334 ml   Output 180 ml 05/30/2020      Cardiac Injury Profile:   Recent Labs     08/22/20 2041   TROPONINI 0.04*      Lipid Profile:   Lab Results   Component Value Date    TRIG 111 08/23/2020    HDL 32 08/23/2020    LDLCALC 68 08/23/2020    CHOL 122 08/23/2020      Hemoglobin A1C: No components found for: HGBA1C   Xray: Ct Head Wo Contrast    Result Date: 8/22/2020  EXAMINATION: CT OF THE HEAD WITHOUT CONTRAST  8/22/2020 4:45 am TECHNIQUE: CT of the head was performed without the administration of intravenous contrast. Dose modulation, iterative reconstruction, and/or weight based adjustment of the mA/kV was utilized to reduce the radiation dose to as low as reasonably achievable. COMPARISON: None. HISTORY: ORDERING SYSTEM PROVIDED HISTORY: focal deficit TECHNOLOGIST PROVIDED HISTORY: Reason for exam:->focal deficit Has a \"code stroke\" or \"stroke alert\" been called? ->Yes FINDINGS: BRAIN/VENTRICLES: There is no acute intracranial hemorrhage, mass effect or midline shift. No abnormal extra-axial fluid collection. The gray-white differentiation is maintained without evidence of an acute infarct. There is no evidence of hydrocephalus. Mild diffuse decrease in cerebral volume is noted with corresponding prominence of the sulci and ventricles. Left putamen elongated remote lacunar infarct is noted which measures up to 8 mm in diameter. ORBITS: The visualized portion of the orbits demonstrate no acute abnormality. SINUSES: The visualized paranasal sinuses and mastoid air cells demonstrate no acute abnormality. SOFT TISSUES/SKULL:  No acute abnormality of the visualized skull or soft tissues. No acute intracranial abnormality. Mild diffuse cerebral atrophy. Left putamen 8 mm diameter remote lacunar infarct.      Xr Chest Portable    Result Date: 8/22/2020  EXAMINATION: ONE XRAY VIEW OF THE CHEST 8/22/2020 4:59 am COMPARISON: Chest radiograph May 29, 2020 HISTORY: ORDERING SYSTEM PROVIDED HISTORY: AMS TECHNOLOGIST PROVIDED HISTORY: Reason for exam:->AMS FINDINGS: The lungs are without acute focal process. There is no effusion or pneumothorax. The cardiomediastinal silhouette is without acute process. The osseous structures are without acute process. No acute process. Cta Neck W Contrast    Result Date: 2020  Patient MRN:  [id number] : 1960 Age: 63 years Gender: Female Order Date:  2020 7:32 AM EXAM: CTA NECK W CONTRAST, CTA HEAD W CONTRAST NUMBER OF IMAGES:  18 INDICATION:  CVA CVA COMPARISON: None Technique: Low-dose CT  acquisition technique included one of following options; 1 . Automated exposure control, 2. Adjustment of MA and or KV according to patient's size or 3. Use of iterative reconstruction. Contiguous spiral images were obtained in the axial plane, following the administration of intravenous contrast using CT angiographic protocol. Sagittal and coronal images were reconstructed from the axial plane acquisition. Additional MIP reconstructions were presented to aid in the interpretation of this study. Images were obtained from the skull base cranially. There is mild calcified plaque identified in the vessels compatible with atherosclerotic disease. There is aortic arch and great vessels demonstrate mild atherosclerotic disease. The right carotid is unremarkable. The left carotid is unremarkable. The right vertebral artery is unremarkable. The left vertebral artery is unremarkable. The basilar artery is unremarkable. The middle cerebral arteries are abnormal on the right. There is a right M3 occlusion. This is difficult to clearly visualize on the CT of the head. Otherwise the middle cerebral artery is unremarkable The anterior cerebral arteries are unremarkable. The posterior cerebral arteries are unremarkable. 1.  Right middle cerebral artery M 3 occlusion .  2. Estimated stenosis by NASCET criteria is not hemodynamically significant     Ct Brain Perfusion    Result Date: 2020  Patient MRN: [id number] : 1960 Age:  63 years Gender: Female Order Date: 2020 7:32 AM Exam: CT BRAIN PERFUSION Number of Images: 545 views Indication:   cva - bernice alert cva - bernice alert Comparison: None. Findings: Perfusion images demonstrate asymmetric blood volume Blood flow images demonstrate asymmetric blood flow There is significant ischemic number identified. In the right posterior frontal lobe There is significant core infarct identified. Central core infarct with significant peripheral ischemic penumbra     Cta Head W Contrast    Result Date: 2020  Patient MRN:  [id number] : 1960 Age: 63 years Gender: Female Order Date:  2020 7:32 AM EXAM: CTA NECK W CONTRAST, CTA HEAD W CONTRAST NUMBER OF IMAGES:  18 INDICATION:  CVA CVA COMPARISON: None Technique: Low-dose CT  acquisition technique included one of following options; 1 . Automated exposure control, 2. Adjustment of MA and or KV according to patient's size or 3. Use of iterative reconstruction. Contiguous spiral images were obtained in the axial plane, following the administration of intravenous contrast using CT angiographic protocol. Sagittal and coronal images were reconstructed from the axial plane acquisition. Additional MIP reconstructions were presented to aid in the interpretation of this study. Images were obtained from the skull base cranially. There is mild calcified plaque identified in the vessels compatible with atherosclerotic disease. There is aortic arch and great vessels demonstrate mild atherosclerotic disease. The right carotid is unremarkable. The left carotid is unremarkable. The right vertebral artery is unremarkable. The left vertebral artery is unremarkable. The basilar artery is unremarkable. The middle cerebral arteries are abnormal on the right. There is a right M3 occlusion. This is difficult to clearly visualize on the CT of the head.  Otherwise the middle cerebral artery is unremarkable The anterior cerebral arteries are unremarkable. The posterior cerebral arteries are unremarkable. 1.  Right middle cerebral artery M 3 occlusion . 2. Estimated stenosis by NASCET criteria is not hemodynamically significant         Pertinent Cardiac Testing:     Echo 08/24/2020:   Type of Study      TTE procedure     Procedure Date  Date: 08/24/2020 Start: 02:06 PM     Study Location: Portable  Technical Quality: Adequate visualization     Indications:CVA.     Patient Status: Routine     Contrast Medium: Bubble Study.     Height: 63 inches Weight: 113 pounds BSA: 1.52 m^2 BMI: 20.02 kg/m^2     BP: 169/58 mmHg     Allergies    - No known allergies.      Findings      Left Ventricle   Normal left ventricular chamber size. Normal left ventricular systolic function. Visually estimated LVEF is 60-65 %. No wall motion abnormalities. Normal diastolic function. Right Ventricle   Normal right ventricle structure and function. Left Atrium   Normal left atrial size. Right Atrium   Normal right atrium size. Mitral Valve   Normal mitral valve structure and function. Mild mitral valve regurgitation. Tricuspid Valve   Normal tricuspid valve structure and function. No tricuspid valve regurgitation. Aortic Valve   Aortic valve sclerosis. No aortic regurgitation. Pulmonic Valve   Normal pulmonic valve structure and function. No pulmonic valve regurgitation. Pericardial Effusion   No pericardial effusion. Conclusions      Summary   Negative bubble study. Normal left ventricular chamber size. Normal left ventricular systolic function. Visually estimated LVEF is 60-65 %. No wall motion abnormalities. Normal diastolic function. Normal right ventricle structure and function. No significant valvular abnormalities. No comparison study available.       Signature      ----------------------------------------------------------------   Electronically signed by Yuliya Mendoza MD(Interpreting physician) on 08/24/2020 06:29 PM   ----------------------------------------------------------------     M-Mode/2D Measurements & Calculations      LV Diastolic    LV Systolic Dimension: 2.6   AV Cusp Separation: 1.9 cmLA   Dimension: 3.6  cm                           Dimension: 3.4 cmAO Root   cm              LV Volume Diastolic: 27.1 ml Dimension: 3 cm   LV FS:27.8 %    LV Volume Systolic: 94.0 ml   LV PW           LV EDV/LV EDV Index: 24.8   Diastolic: 0.8  IR/11 RAMIRO/G^9QK ESV/LV ESV   cm              Index: 23.6 ml/16ml/ m^2     RV Diastolic Dimension: 3.2   LV PW Systolic: EF Calculated: 81.4 %        cm   1.4 cm          LV Mass Index: 66 l/min*m^2   Septum                                       LA/Aorta: 9.33   Diastolic: 1.1                               Ascending Aorta: 2.4 cm   cm              LVOT: 2.1 cm                 LA volume/Index: 45.3 ml   Septum                                       /74.58DQ/J^7   Systolic: 1.2                                RA Area: 12.6 cm^2   cm      LV Mass: 100.21   g     Doppler Measurements & Calculations      MV Peak E-Wave:    AV Peak Velocity: 1.38 m/s      LVOT Peak Velocity:   0.76 m/s           AV Peak Gradient: 7.66 mmHg     1.42 m/s   MV Peak A-Wave:    AV Mean Velocity: 1.01 m/s      LVOT Mean Velocity:   1.26 m/s           AV Mean Gradient: 4.5 mmHg      0.83 m/s   MV E/A Ratio: 0.6  AV VTI: 35.8 cm                 LVOT Peak Gradient: 8.1   MV Peak Gradient:  AV Area (Continuity):3.16 cm^2  mmHgLVOT Mean Gradient:   8.9 mmHg                                           3.2 mmHg   MV Mean Gradient:  LVOT VTI: 32.7 cm   2.8 mmHg           IVRT: 115.3 msec   MV Mean Velocity:   0.78 m/s           Pulm. Vein A Reversal   MV Deceleration    Duration:156.9 msec   Time: 312.2 msec   Pulm. Vein D Velocity:0.38      PV Peak Velocity: 1.18   MV P1/2t: 66.3     m/sPulm.  Vein A Reversal        m/s   msec               Velocity:0.33 m/s               PV Peak Gradient: 5.61   MVA by PHT:3.32    Pulm. Vein S Velocity: 0.91 m/s mmHg   cm^2                                               PV Mean Velocity: 0.78   MV Area                                            m/s   (continuity): 4.3                                  PV Mean Gradient: 2.9   cm^2                                               mmHg   MV E' Septal   Velocity: 0.07 m/s                                 HI ED Velocity: 1.28   MV E' Lateral                                      m/s   Velocity: 11 m/s     [email or web address] [id number]%3d      Telemetry8/25/2020: sinus no AF seen     EKG 8/22/20: NSR 89, possible old anterior MI, QTc 459 msec. I have independently reviewed all of the ECGs and rhythm strips per above. I have personally reviewed the laboratory, cardiac diagnostic and radiographic testing as outlined above. I have reviewed previous records noted [place] [address] [place] [place]. Impression:    1. Acute CVA   - CTA of her head and neck which showed a left M3 occlusion.  - Was not a TPa or IR candidate  - Cardio embolic source per Neurology. - On ASA, Plavix and Lipitor     2. Hypertension   - Elevated. - On Catapres, Lisinopril, Hydralazine, Toprol XL and Aldactone      3. Diabetes mellitus,  - On Insulin.     4. Hyperlipidemia  - On Lipitor.     5. Anemia   - On Iron supplement, folic acid supplement     6. History of UTI  - Now on isolation for VRE      Recommendations:    1. Transesophageal echocardiogram to exclude cardio embolic source. 2. Implantable loop recorder insertion if ЕКАТЕРИНА is negative. Thank you for allowing me to participate in your patient's care.     Discussed with Dr. Fabian Warren   Electronically signed by BENJI Ramirez CNP on 8/25/2020 at 2:55 PM   [address] [facility]    Attending Physician's Statement    Patient seen with the ANP. Agree with the findings, assessment and plan. Management plan was discussed. I have personally interviewed the patient, independently performed a focused cardiac examination, reviewed the pertinent laboratory and diagnostic testing with the patient and directly participated in the medical decision-making as noted above with the following additions:     Feeling well. No chest pain or dyspnea. No PAF seen overnight. The case is rescheduled to tomorrow. Physical exam showed no JVD, RRR, normal S1S2, no murmur, clear lungs bilaterally, no edema    Plan for ЕКАТЕРИНА and possible ILR insertion tomorrow.     Ariel Barros MD  Cardiac Electrophysiology  [address]  [facility]  6:24 PM  8/25/2020

## 2020-08-25 NOTE — PROGRESS NOTES
verbalized understanding Pt demonstrated skill Pt requires further education in this area   x X with verbal instruction  x     ASSESSMENT:    Comments:  Nursing cleared pt for physical therapy. Pt in bed with  present upon arrival and agreed to participate in therapy. Pt completed functional mobility as noted above. Pt required min/mod A for sitting balance as pt donned sock. Pt able to hop on R LE with good balance however fatigues quickly with activity. Pt reports ability to wear L LE prothesis for a few hours daily. Pt returned to supine as pt reports just returned to bed prior to therapy arriving. Pt left in supine with call light in reach with  present. Treatment:  Patient practiced and was instructed in the following treatment:     Transfer training. - Verbal instruction for proper hand  Placement and technique with transfers. Assistance required to complete task      Gait training- verbal instruction  to facilitate proper sequencing and technique  for hopping during ambulation. Assistance required  to complete task. Time in  1025  Time out  1050    Total Treatment Time 25 minutes.     CPT codes:  [] Low Complexity PT evaluation 02819  [] Moderate Complexity PT evaluation 88068  [] High Complexity PT evaluation 19940  [] PT Re-evaluation 03055  [] Gait training 02749 - minutes  [] Manual therapy 55411 - minutes  [x] Therapeutic activities 27985  25 minutes  [] Therapeutic exercises 26458 - minutes  [] Neuromuscular reeducation 19422 - minutes     Rashmi Salgado [id number]

## 2020-08-25 NOTE — CARE COORDINATION
Met with patient and her  Rhys Benjamin at the bedside to discuss transition of care plans. Discussed HHC through [facility]. Both patient and  are in agreement. Patient lives in a ranch style home with her  [place] Benjamin with level entry. Patient's PCP is Dr Daphne Reich. She uses [facility] on [address]. Patient has a prosthetic leg, WW, BSC, and shower chair at home. Patient encouraged to use [employer] at discharge and is in agreement. Reviewed ЕКАТЕРИНА and possible Linq placement today. Patient and  expressed understanding. HC orders received and Helene Smith with [facility] notified. Transition of care plan is to home with Poornima Rivera at discharge.      Lakia Salgado.  P:  [phone]

## 2020-08-25 NOTE — PROGRESS NOTES
OT BEDSIDE TREATMENT NOTE      Date:2020  Patient Name: Raghu Negron  MRN: [id number]  : 1960  Room: [address] [place] [address]     Evaluating OT: REJI Deng, OTR/L [id number]     AM-PAC Daily Activity Raw Score:   Recommended Adaptive Equipment:  TBD      Modified Maria Del Carmen Scale (MRS)  Score     Description  0             No symptoms  1             No significant disability despite symptoms  2             Slight disability; able to look after own affairs  3             Moderate disability; able to ambulate without assist/ requires assist with ADLs  4             Moderate/Severe disability;requires assist to ambulate/assist with ADLs  5             Severe disability;bedridden/incontinent   6               Score: 4     Diagnosis: CVA  Referring Practitioner: Andrea Norton MD   Surgery: n/a  Pertinent Medical History: L BKA, schizophrenia, HLD, HTN, DM II   Precautions:  Falls, contact isolation, LLE prosthetic at home     Home Living: Pt lives with  in a 2 story home with 7 step(s) to enter and B rail(s); bed/bath on 2nd level, laundry in basement but  completes   Bathroom setup: walk in shower   Equipment owned: extended tub bench  Prior Level of Function: IND with ADLs , assist with IADLs; using ww for functional mobility   Driving: yes     Pain Level: 0/10  Cognition: A&O: 2/4 (self, date);  Follows 1 step directions, with repetition and increased time              Memory:  fair               Sequencing:  fair               Problem solving:  fair               Judgement/safety:  fair                 Functional Assessment:    Initial Eval Status  Date: 2020 Treatment Status  Date: 20 Short Term Goals  Treatment frequency: 2-5x/wk   Feeding SUP  n/t  IND   Grooming SUP (seated EOB for oral hygiene and facial washing)  Supervision seated EOB simulated  IND   UB Dressing SUP  MIN A to mil/doff hospital gown simulating jacket  IND   LB Dressing Mod A MIN A to mil/doff sock sitting EOB requiring assist to maintain sitting balance noting posterior lean;   MOD A over all simulated requiring assist to maintain standing balance to pull pants up around waist   SUP    Bathing Min A (simulated) n/t SUP    Toileting Mod A n/t SUP   Bed Mobility  Log roll: SBA  Supine to sit: SBA   Sit to supine: SBA  Supine<>sit SBA v/c's for fall prevention  Log roll IND  Supine to sit: IND   Sit to supine: IND   Functional Transfers Sit to stand:NT   Stand to sit:NT  Commode: NT MOD A sit<>stand from EOB to w/w v/c's for hand placement  SBA (with slideboard if LLE prosthetic not present)   Functional Mobility NT MOD A less than house hold distances with w/w using hoping technique v/c's for walker management/safety  SBA (when prosthetic present)   Balance Sitting: SBA  Standing: NT Sitting:   Static: SBA  Dynamic: MIN A   Standing: MOD with w/w      Activity Tolerance Fair (pt sat EOB ~10 mins) Fair+ completing light functional tasks      Visual/  Perceptual Glasses: yes     pt able to reach clock on wall     visual tracking appears [facility]                          Comments: Upon arrival pt supine in bed with  present. Pt educated with regards to bed mobility, functional transfers, functional mobility, hand placement, walker safety, fall prevention, LE/UE dressing, grooming tasks, static/dynamic grooming tasks. At end of session pt returned to supine in bed with  present,  all lines and tubes intact, call light within reach. · Pt has made good  progress towards set goals.    · Continue with current plan of care      Treatment Time In:1025            Treatment Time Out: 1050                Treatment Charges: Mins Units   Ther Ex  [id number]     Manual Therapy [address] [facility]     Thera Activities 57029 10 1   ADL/Home Mgt 84365 15 1   Neuro Re-ed 20471     Group Therapy      Orthotic manage/training  21492     Non-Billable Time     Total Timed Treatment 25 [address]01 [facility] [id number]

## 2020-08-25 NOTE — TELEPHONE ENCOUNTER
Solitario Atkinson, [address], called stating patient was admitted and will be needing home health care upon discharge and she's asking if Dr. Prashant Khan will sign for/follow. Please advise.

## 2020-08-25 NOTE — PROGRESS NOTES
SPEECH LANGUAGE PATHOLOGY  DAILY PROGRESS NOTE        PATIENT NAME:  Angelica Marrero      :  1960          TODAY'S DATE:  2020 ROOM:  [address] [place] [address]    Pt seen for cognition. Oriented x4 with min cues for exact date. Recall of personal information was adequate. Difficulty with STM tasks. Thought processing during sequencing and problem solving tasks was mildly disorganized and vague.      Pt NPO for procedure      CPT code(s)   [id number]  therapeutic interventions that focus on cognitive function , initial  15 min  92657  therapeutic interventions that focus on cognitive function, each additional 15 min    Total minutes :  30 minutes

## 2020-08-25 NOTE — PLAN OF CARE
Problem: Skin Integrity:  Goal: Will show no infection signs and symptoms  Description: Will show no infection signs and symptoms  8/25/2020 0119 by Bi Roy RN  Outcome: Met This Shift     Problem: Falls - Risk of:  Goal: Will remain free from falls  Description: Will remain free from falls  8/25/2020 0119 by Bi Roy RN  Outcome: Met This Shift     Problem: Neurological  Goal: Maximum potential motor/sensory/cognitive function  8/25/2020 0119 by Bi Roy RN  Outcome: Met This Shift

## 2020-08-25 NOTE — PROGRESS NOTES
Hospitalist Progress Note  [facility] NEURO IMCU       Patient: Cammy Valentin  Unit/Bed: 6829/9535-C  YOB: 1960  MRN: [id number]  Acct: [de-identified]   AdmittingDiagnosis: Acute CVA (cerebrovascular accident) [facility]) [I63.9]  Acute CVA (cerebrovascular accident) [facility]) [I63.9]  Admit Date: 8/22/2020  Hospital Day: 3    Subjective:      Patient Seen, Chart, Labs, Radiology studies, and Consults reviewed. Objective:   BP (!) 156/76   Pulse 76   Temp 99.7 °F (37.6 °C) (Temporal)   Resp 16   Ht 5' 3\" (1.6 m)   Wt 110 lb (49.9 kg)   SpO2 98%   BMI 19.49 kg/m²       Intake/Output Summary (Last 24 hours) at 8/25/2020 1238  Last data filed at 8/25/2020 0510  Gross per 24 hour   Intake 4095 ml   Output 180 ml   Net 3915 ml       Physical Exam  General appearance:  No acute distress, breathing without difficulty  HEENT:  Normal cephalic, atraumatic without obvious deformity. EOMI  Neck: Supple, with full range of motion. No jugular venous distention. Trachea midline. Respiratory:  Normal respiratory effort. Clear to auscultation  Cardiovascular:  Regular rate and rhythm  Abdomen: Soft, non-tender, non-distended with normal bowel sounds. Musculoskeletal:  No clubbing, cyanosis or edema bilaterally. Full range of motion without deformity. Skin: Skin color, texture, turgor normal.  No rashes or lesions. Neurologic:  Neurovascularly intact without any focal sensory/motor deficits. Cranial nerves: II-XII intact  Psychiatric:  Alert and oriented, thought content appropriate, normal insight    Result Date: 8/22/2020  EXAMINATION: CT OF THE HEAD WITHOUT CONTRAST  8/22/2020 4:45 am TECHNIQUE: CT of the head was performed without the administration of intravenous contrast. Dose modulation, iterative reconstruction, and/or weight based adjustment of the mA/kV was utilized to reduce the radiation dose to as low as reasonably achievable. COMPARISON: None.  HISTORY: ORDERING SYSTEM PROVIDED HISTORY: focal deficit TECHNOLOGIST PROVIDED HISTORY: Reason for exam:->focal deficit Has a \"code stroke\" or \"stroke alert\" been called? ->Yes FINDINGS: BRAIN/VENTRICLES: There is no acute intracranial hemorrhage, mass effect or midline shift. No abnormal extra-axial fluid collection. The gray-white differentiation is maintained without evidence of an acute infarct. There is no evidence of hydrocephalus. Mild diffuse decrease in cerebral volume is noted with corresponding prominence of the sulci and ventricles. Left putamen elongated remote lacunar infarct is noted which measures up to 8 mm in diameter. ORBITS: The visualized portion of the orbits demonstrate no acute abnormality. SINUSES: The visualized paranasal sinuses and mastoid air cells demonstrate no acute abnormality. SOFT TISSUES/SKULL:  No acute abnormality of the visualized skull or soft tissues. No acute intracranial abnormality. Mild diffuse cerebral atrophy. Left putamen 8 mm diameter remote lacunar infarct. Xr Chest Portable    Result Date: 2020  EXAMINATION: ONE XRAY VIEW OF THE CHEST 2020 4:59 am COMPARISON: Chest radiograph May 29, 2020 HISTORY: ORDERING SYSTEM PROVIDED HISTORY: AMS TECHNOLOGIST PROVIDED HISTORY: Reason for exam:->AMS FINDINGS: The lungs are without acute focal process. There is no effusion or pneumothorax. The cardiomediastinal silhouette is without acute process. The osseous structures are without acute process. No acute process. Cta Neck W Contrast    Result Date: 2020  Patient MRN:  [id number] : 1960 Age: 63 years Gender: Female Order Date:  2020 7:32 AM EXAM: CTA NECK W CONTRAST, CTA HEAD W CONTRAST NUMBER OF IMAGES:  18 INDICATION:  CVA CVA COMPARISON: None Technique: Low-dose CT  acquisition technique included one of following options; 1 . Automated exposure control, 2. Adjustment of MA and or KV according to patient's size or 3. Use of iterative reconstruction.  Contiguous spiral images were obtained in the axial plane, following the administration of intravenous contrast using CT angiographic protocol. Sagittal and coronal images were reconstructed from the axial plane acquisition. Additional MIP reconstructions were presented to aid in the interpretation of this study. Images were obtained from the skull base cranially. There is mild calcified plaque identified in the vessels compatible with atherosclerotic disease. There is aortic arch and great vessels demonstrate mild atherosclerotic disease. The right carotid is unremarkable. The left carotid is unremarkable. The right vertebral artery is unremarkable. The left vertebral artery is unremarkable. The basilar artery is unremarkable. The middle cerebral arteries are abnormal on the right. There is a right M3 occlusion. This is difficult to clearly visualize on the CT of the head. Otherwise the middle cerebral artery is unremarkable The anterior cerebral arteries are unremarkable. The posterior cerebral arteries are unremarkable. 1.  Right middle cerebral artery M 3 occlusion . 2. Estimated stenosis by NASCET criteria is not hemodynamically significant     Ct Brain Perfusion    Result Date: 2020  Patient MRN: [id number] : 1960 Age:  63 years Gender: Female Order Date: 2020 7:32 AM Exam: CT BRAIN PERFUSION Number of Images: 489 views Indication:   cva - bernice alert cva - bernice alert Comparison: None. Findings: Perfusion images demonstrate asymmetric blood volume Blood flow images demonstrate asymmetric blood flow There is significant ischemic number identified. In the right posterior frontal lobe There is significant core infarct identified.      Central core infarct with significant peripheral ischemic penumbra     Cta Head W Contrast    Result Date: 2020  Patient MRN:  [id number] : 1960 Age: 63 years Gender: Female Order Date:  2020 7:32 AM EXAM: CTA NECK W CONTRAST, CTA HEAD W CONTRAST NUMBER OF IMAGES:  826 INDICATION:  CVA CVA COMPARISON: None Technique: Low-dose CT  acquisition technique included one of following options; 1 . Automated exposure control, 2. Adjustment of MA and or KV according to patient's size or 3. Use of iterative reconstruction. Contiguous spiral images were obtained in the axial plane, following the administration of intravenous contrast using CT angiographic protocol. Sagittal and coronal images were reconstructed from the axial plane acquisition. Additional MIP reconstructions were presented to aid in the interpretation of this study. Images were obtained from the skull base cranially. There is mild calcified plaque identified in the vessels compatible with atherosclerotic disease. There is aortic arch and great vessels demonstrate mild atherosclerotic disease. The right carotid is unremarkable. The left carotid is unremarkable. The right vertebral artery is unremarkable. The left vertebral artery is unremarkable. The basilar artery is unremarkable. The middle cerebral arteries are abnormal on the right. There is a right M3 occlusion. This is difficult to clearly visualize on the CT of the head. Otherwise the middle cerebral artery is unremarkable The anterior cerebral arteries are unremarkable. The posterior cerebral arteries are unremarkable. 1.  Right middle cerebral artery M 3 occlusion .  2. Estimated stenosis by NASCET criteria is not hemodynamically significant       Diet:  Diet NPO, After Midnight    Medications:    aspirin  300 mg Rectal Daily    cloNIDine  1 patch Transdermal Weekly    ferrous sulfate  325 mg Oral Daily with breakfast    folic acid  1 mg Oral Daily    hydrALAZINE  50 mg Oral 4x Daily    lisinopril  20 mg Oral Daily    megestrol  20 mg Oral BID    oxybutynin  5 mg Oral TID    thiamine  100 mg Oral Daily    pyridoxine  50 mg Oral Daily    enalaprilat  1.25 mg Intravenous 4 times per day    spironolactone  25 mg Oral Daily    sodium chloride flush  10 mL Intravenous 2 times per day    enoxaparin  40 mg Subcutaneous Daily    atorvastatin  80 mg Oral Nightly    clopidogrel  75 mg Oral Daily    metoprolol succinate  25 mg Oral Daily    insulin lispro  0.08 Units/kg Subcutaneous TID WC    insulin lispro  0-12 Units Subcutaneous TID WC    insulin lispro  0-6 Units Subcutaneous Nightly     Continuous Infusions:   dextrose      sodium chloride 100 mL/hr at 08/25/20 0051       PRNMeds:sodium chloride flush, polyethylene glycol, promethazine **OR** ondansetron, labetalol, perflutren lipid microspheres, glucose, dextrose, glucagon (rDNA), dextrose  DVT Prophylaxis:pharmacologic prophylaxis (with any of the following: enoxaparin (Lovenox) 40mg SQ 2h prior to surgery then QD) and Encourage ambulation, low risk for DVT, no chemical or mechanical prophylaxis necessary    Data:  CBC:  Recent Labs     08/23/20  0550 08/23/20  1815 08/24/20  0521 08/25/20  0531   WBC 3.3*  --  4.5 4.1*   RBC 2.57*  --  3.13* 2.89*   HGB 6.5* 8.9* 8.4* 7.7*   HCT 21.2*  21.4* 29.8* 26.4* 24.2*   MCV 83.3  --  84.3 83.7   RDW 16.3*  --  15.8* 16.0*     --  346 336     BMP:  Recent Labs     08/23/20  0550 08/24/20  0521 08/25/20  0531    144 141   K 3.8 3.6 3.5   * 109* 109*   CO2 19* 20* 21*   BUN 10 11 11   CREATININE 0.9 0.9 0.8     BNP: No results for input(s): BNP in the last 72 hours. PT/INR:   No results for input(s): PROTIME, INR in the last 72 hours.  :   No results for input(s): APTT in the last 72 hours. CARDIAC ENZYMES:No results for input(s): CKMB, CKMBINDEX, TROPONINT in the last 72 hours. Invalid input(s): CKTOTAL;3  FASTING LIPID PANEL:  Lab Results   Component Value Date    CHOL 122 08/23/2020    HDL 32 08/23/2020    TRIG 111 08/23/2020     LIVER PROFILE: No results for input(s): AST, ALT, ALB, BILIDIR, BILITOT, ALKPHOS in the last 72 hours.    ABGs:   Lab Results   Component Value Date    PH 7.522 05/29/2020    PCO2 23.4 05/29/2020    PO2 100.5 05/29/2020    HCO3 18.8 05/29/2020    O2SAT 97.9 05/29/2020       Transthoracic Echocardiography Report (2/24/2020)   Summary   Mild left ventricular concentric hypertrophy noted. ef 67% by 2D. Stage I diastolic dysfunction. Physiologic and/or trace mitral regurgitation is present. Neurology consultant Dr. Olivia Estrada [8/22/2020]:  Recommendations:  - patient has an acute stroke in the R M3  - she is not a thrombectomy candidate  - aspirin and plavix loaded, con't aspirin 81mg daily and plavix 75mg daily for 21 days, patient was taking plavix before admission, will switch her to aspirin 81mg daily after 21 days. - lipitor 80mg daily  - pt will need holter monitor or loop recorder to r/o afib, this is a cardioembolic stroke. If afib is found, pt will need anticoagulant. - consult PT/OT and SLP  - lovenox for DVT prophylaxis. Assessment/Plan:  Principal Problem:    Acute ischemic stroke 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 *  · acute on chronic diastolic CHF class II, EF 67%.     ASSESSMENT:  -Acute ischemic stroke  -Acute on chronic diastolic heart failure  -Hypertension  -Insulin-dependent diabetes mellitus  -Hyperlipidemia  -Anemia, s/p transfusion PRBC    PLAN:  -Neurology following  -ЕКАТЕРИНА and L IN Q placement if ЕКАТЕРИНА is negative  -DAPT x 21 days, then ASA monotherapy  -Continue statin  -Stroke clinic F/U on discharge    CODE STATUS: Full      Time Spent: 45 minutes  signed by Albert Green DO on 8/25/2020 at 12:38 PM    ELIAS Pelletier [facility] Medicine Service

## 2020-08-25 NOTE — ANESTHESIA PRE PROCEDURE
Department of Anesthesiology  Preprocedure Note       Name:  Norm Melgar   Age:  63 y.o.  :  1960                                          MRN:  [id number]         Date:  2020      Surgeon: * Surgery not found *    Procedure: SEY ЕКАТЕРИНА    Medications prior to admission:   Prior to Admission medications    Medication Sig Start Date End Date Taking?  Authorizing Provider   benazepril (LOTENSIN) 10 MG tablet Take 10 mg by mouth daily   Yes Historical Provider, MD   clopidogrel (PLAVIX) 75 MG tablet Take 1 tablet by mouth once daily 20  Yes , DO   megestrol (MEGACE) 20 MG tablet Take 1 tablet by mouth 2 times daily 20  Yes Cornelio Collier MD   cloNIDine (CATAPRES) 0.3 MG/24HR PTWK Place 1 patch onto the skin once a week 20 Yes Cornelio Collier MD   oxybutynin (DITROPAN) 5 MG tablet Take 1 tablet by mouth 3 times daily 20  Yes Cornelio Collier MD   hydrALAZINE (APRESOLINE) 50 MG tablet Take 1 tablet by mouth 4 times daily 20 Yes Cornelio Collier MD   folic acid (FOLVITE) 1 MG tablet Take 1 tablet by mouth daily 20  Yes , DO   lisinopril (PRINIVIL;ZESTRIL) 20 MG tablet Take 1 tablet by mouth daily 20  Yes , DO   metoprolol tartrate (LOPRESSOR) 25 MG tablet Take 1 tablet by mouth 2 times daily 20  Yes , DO   vitamin B-1 100 MG tablet Take 1 tablet by mouth daily 20  Yes , DO   ferrous sulfate (IRON 325) 325 (65 Fe) MG tablet Take 1 tablet by mouth daily (with breakfast) 20  Yes Cornelio Collier MD   atorvastatin (LIPITOR) 80 MG tablet Take 1 tablet by mouth nightly 3/3/20  Yes , DO   vitamin B-6 (B-6) 50 MG tablet Take 1 tablet by mouth daily 19  Yes Armand Reynaga, DO   clopidogrel (PLAVIX) 75 MG tablet Take 1 tablet by mouth daily 20   Cornelio Collier MD       Current medications:    Current Facility-Administered Medications   Medication Dose Route Frequency Provider Last Rate Last Dose    aspirin suppository 300 mg  300 mg Rectal Daily Bridget [place], APRN - CNS   300 mg at 08/24/20 1021    cloNIDine (CATAPRES) 0.3 MG/24HR 1 patch  1 patch Transdermal Weekly Anil Gutiérrez MD   1 patch at 08/23/20 1159    ferrous sulfate (IRON 325) tablet 325 mg  325 mg Oral Daily with breakfast Anil Gutiérrez MD        folic acid (FOLVITE) tablet 1 mg  1 mg Oral Daily Anil Gutiérrez MD        hydrALAZINE (APRESOLINE) tablet 50 mg  50 mg Oral 4x Daily Anil Gutiérrez MD   50 mg at 08/25/20 7119    lisinopril (PRINIVIL;ZESTRIL) tablet 20 mg  20 mg Oral Daily Anil Gutiérrez MD        megestrol (MEGACE) tablet 20 mg  20 mg Oral BID Anil Gutiérrez MD   20 mg at 08/24/20 2101    oxybutynin (DITROPAN) tablet 5 mg  5 mg Oral TID Anil Gutiérrez MD   5 mg at 08/24/20 2101    vitamin B-1 (THIAMINE) tablet 100 mg  100 mg Oral Daily Anil Gutiérrez MD        vitamin B-6 (PYRIDOXINE) tablet 50 mg  50 mg Oral Daily Anil Gutiérrez MD        enalaprilat (VASOTEC) injection 1.25 mg  1.25 mg Intravenous 4 times per day Anil Gutiérrez MD   1.25 mg at 08/25/20 1403    spironolactone (ALDACTONE) tablet 25 mg  25 mg Oral Daily Anil Gutiérrez MD        sodium chloride flush 0.9 % injection 10 mL  10 mL Intravenous 2 times per day Anil Gutiérrez MD        sodium chloride flush 0.9 % injection 10 mL  10 mL Intravenous PRN Anil Gutiérrez MD   10 mL at 08/23/20 1158    polyethylene glycol (GLYCOLAX) packet 17 g  17 g Oral Daily PRN Anil Gutiérrez MD        promethazine (PHENERGAN) tablet 12.5 mg  12.5 mg Oral Q6H PRN Anil Gutiérrez MD        Or    ondansetron [facility]) injection 4 mg  4 mg Intravenous Q6H PRN Anil Gutiérrez MD        enoxaparin (LOVENOX) injection 40 mg  40 mg Subcutaneous Daily Anil Gutiérrez MD   40 mg at 08/24/20 1021    atorvastatin (LIPITOR) tablet 80 mg  80 mg Oral Nightly Anil Gutiérrez MD   80 mg at 08/24/20 2101    clopidogrel (PLAVIX) tablet 75 mg  75 mg Oral Daily Anil Gutiérrez MD        labetalol (NORMODYNE;TRANDATE) injection 10 mg  10 mg Intravenous Q10 Min PRN Farheen Fajardo MD        perflutren lipid microspheres (DEFINITY) injection 1.65 mg  1.5 mL Intravenous ONCE PRN Farheen Fajardo MD        metoprolol succinate (TOPROL XL) extended release tablet 25 mg  25 mg Oral Daily Farheen Fajardo MD   25 mg at 08/22/20 1801    glucose (GLUTOSE) 40 % oral gel 15 g  15 g Oral PRN Farheen Fajardo MD        dextrose 50 % IV solution  12.5 g Intravenous PRN Farheen Fajardo MD        glucagon (rDNA) injection 1 mg  1 mg Intramuscular PRN Farheen Fajardo MD        dextrose 5 % solution  100 mL/hr Intravenous PRN Farheen Fajardo MD        insulin lispro (HUMALOG) injection vial 4 Units  0.08 Units/kg Subcutaneous TID  Farheen Fajardo MD   Stopped at 08/23/20 1640    insulin lispro (HUMALOG) injection vial 0-12 Units  0-12 Units Subcutaneous TID WC Farheen Fajardo MD   Stopped at 08/23/20 1640    insulin lispro (HUMALOG) injection vial 0-6 Units  0-6 Units Subcutaneous Nightly Farheen Fajardo MD   3 Units at 08/24/20 2116    0.9 % sodium chloride infusion   Intravenous Continuous Farheen Fajardo  mL/hr at 08/25/20 1164         Allergies:  No Known Allergies    Problem List:    Patient Active Problem List   Diagnosis Code    Epigastric hernia G67.9    Umbilical hernia Q70.1    Essential hypertension I10    Type 2 diabetes mellitus without complication, with long-term current use of insulin (HCC) E11.9, Z79.4    Dyslipidemia E78.5    S/P cardiac catheterization Z98.890    Abnormal stress ECG R94.39    Hyperthyroidism E05.90    Diabetic peripheral neuropathy (HCC) E11.42    Hydroureter N13.4    Microcytic hypochromic anemia D50.9    Arterial occlusion I70.90    Hypoglycemia E16.2    Hypertensive emergency I16.1    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    Paresthesia of hand, bilateral R20.2    Paresthesia of left foot R20.2    Unintended weight loss R63.4    Acute ischemic stroke (HCC) I63.9    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    Acute CVA (cerebrovascular accident) ([facility] [address] I63.9    Hx of smoking 2015      Vital Signs (Current):   Vitals:    08/25/20 0045 08/25/20 0508 08/25/20 0750 08/25/20 1359   BP: 130/72 (!) 148/78 (!) 156/76 (!) 205/82   Pulse: 78  76 70   Resp: 16  16    Temp: 97.4 °F (36.3 °C)  99.7 °F (37.6 °C)    TempSrc:   Temporal    SpO2: 99%  98%    Weight:       Height:                                                  BP Readings from Last 3 Encounters:   08/25/20 (!) 205/82   08/22/20 (!) 174/68   08/17/20 122/60       NPO Status:      Time of last liquid consumption: 2122                        Time of last solid consumption: 2121                        Date of last liquid consumption: 08/24/20                        Date of last solid food consumption: 08/24/20    EKG 5/16/2020  Component Value Ref Range & Units Status Collected Lab   Ventricular Rate 86  BPM Final 05/16/2020 11:51 PM [facility]   Atrial Rate 86  BPM Final 05/16/2020 11:51 PM [facility]   P-R Interval 152  ms Final 05/16/2020 11:51 PM [facility]   QRS Duration 72  ms Final 05/16/2020 11:51 PM [facility]   Q-T Interval 386  ms Final 05/16/2020 11:51 PM [facility]   QTc Calculation (Bazett) 461  ms Final 05/16/2020 11:51 PM [facility]   P Axis 65  degrees Final 05/16/2020 11:51 PM [facility]   R [place] 51  degrees Final 05/16/2020 11:51 PM [facility]   T Axis 25  degrees Final 05/16/2020 11:51 PM [facility]   Testing Performed By     Lab - Abbreviation Name Director Address Valid Date Range   360-[facility] MUSE Unknown Unknown 04/18/16 0721-Present   Narrative & Impression     Normal sinus rhythm  Possible Left atrial enlargement  Anterior infarct , age undetermined  Abnormal ECG       ECHO 2/24/2020   Findings   Left Ventricle   Mild left ventricular concentric hypertrophy noted. ef 67% by 2D. Stage I diastolic dysfunction. Right Ventricle   Normal right ventricle structure and function. Left Atrium   Normal left atrium. Interatrial septum not well visualized. Right Atrium   Normal right atrium.       Mitral Valve   Physiologic and/or trace mitral regurgitation is present. Tricuspid Valve   Normal tricuspid valve structure and function. Aortic Valve   Trileaflet aortic valve. Normal leaflet mobility. No aortic stenosis. No aortic regurgitation. Pulmonic Valve   Normal pulmonic valve structure and function. Pericardial Effusion   No evidence for hemodynamically significant pericardial effusion. Pleural Effusion   No evidence of pleural effusion. Aorta   Normal aortic root and ascending aorta. Miscellaneous   The inferior vena cava diameter is normal with normal respiratory   variation. Conclusions      Summary   Mild left ventricular concentric hypertrophy noted. ef 67% by 2D. Stage I diastolic dysfunction. Physiologic and/or trace mitral regurgitation is present. Chest Xray 5/17/2020  FINDINGS:   The cardiac silhouette appears within normal limits for size given portable   technique.  No convincing evidence of a focal consolidation.  No pleural   effusion or pneumothorax seen.           Impression   No acute cardiopulmonary abnormality. BMI:   Wt Readings from Last 3 Encounters:   08/23/20 110 lb (49.9 kg)   08/22/20 113 lb (51.3 kg)   08/17/20 113 lb (51.3 kg)     Body mass index is 19.49 kg/m².     CBC:   Lab Results   Component Value Date    WBC 4.1 08/25/2020    RBC 2.89 08/25/2020    HGB 7.7 08/25/2020    HCT 24.2 08/25/2020    MCV 83.7 08/25/2020    RDW 16.0 08/25/2020     08/25/2020       CMP:   Lab Results   Component Value Date     08/25/2020    K 3.5 08/25/2020    K 3.6 08/22/2020     08/25/2020    CO2 21 08/25/2020    BUN 11 08/25/2020    CREATININE 0.8 08/25/2020    GFRAA >60 08/25/2020    LABGLOM >60 08/25/2020    GLUCOSE 132 08/25/2020    GLUCOSE 171 04/26/2012    PROT 5.4 06/03/2020    CALCIUM 8.1 08/25/2020    BILITOT 0.5 06/03/2020    ALKPHOS 60 06/03/2020    AST 10 06/03/2020    ALT 7 06/03/2020       POC Tests: No results for input(s): POCGLU, POCNA, POCK, POCCL, POCBUN, POCHEMO, POCHCT in the last 72 hours. Coags:   Lab Results   Component Value Date    PROTIME 13.0 08/22/2020    INR 1.2 08/22/2020    APTT 32.4 08/22/2020       HCG (If Applicable): No results found for: PREGTESTUR, PREGSERUM, HCG, HCGQUANT     ABGs:   Lab Results   Component Value Date    PO2ART 57.6 02/25/2020    ERF5POQ 24.8 02/25/2020    KCK7PAF 20.3 02/25/2020        Type & Screen (If Applicable):  No results found for: LABABO, [address]    Anesthesia Evaluation  Patient summary reviewed and Nursing notes reviewed no history of anesthetic complications:   Airway: Mallampati: II  TM distance: >3 FB   Neck ROM: full  Mouth opening: > = 3 FB Dental:    (+) edentulous      Pulmonary: breath sounds clear to auscultation                            ROS comment: Former smoker   Cardiovascular:    (+) hypertension: moderate, hyperlipidemia      ECG reviewed  Rhythm: regular  Rate: normal  Echocardiogram reviewed         Beta Blocker:  Dose within 24 Hrs         Neuro/Psych:   (+) neuromuscular disease:, psychiatric history (schizophrenia): stable with treatmentdepression/anxiety              ROS comment: Hx of diabetic peripheral neuropathy GI/Hepatic/Renal:   (+) renal disease: kidney stones,           Endo/Other:    (+) DiabetesType II DM, using insulin, hyperthyroidism, blood dyscrasia (Microcytic hypochromic anemia): anemia:., .                 Abdominal:           Vascular:   + PVD, aortic or cerebral, . ROS comment: History of LLE arterial occlusion. Anesthesia Plan      MAC     ASA 3       Induction: intravenous. Anesthetic plan and risks discussed with patient. Use of blood products discussed with patient whom consented to blood products. Plan discussed with CRNA and attending.               Weston Stelring DO   8/25/2020

## 2020-08-26 ENCOUNTER — ANESTHESIA (OUTPATIENT)
Dept: CARDIAC CATH/INVASIVE PROCEDURES | Age: 60
DRG: 064 | End: 2020-08-26
Payer: MEDICARE

## 2020-08-26 ENCOUNTER — ANESTHESIA EVENT (OUTPATIENT)
Dept: CARDIAC CATH/INVASIVE PROCEDURES | Age: 60
DRG: 064 | End: 2020-08-26
Payer: MEDICARE

## 2020-08-26 ENCOUNTER — APPOINTMENT (OUTPATIENT)
Dept: CARDIAC CATH/INVASIVE PROCEDURES | Age: 60
DRG: 064 | End: 2020-08-26
Payer: MEDICARE

## 2020-08-26 VITALS
RESPIRATION RATE: 17 BRPM | DIASTOLIC BLOOD PRESSURE: 74 MMHG | SYSTOLIC BLOOD PRESSURE: 171 MMHG | OXYGEN SATURATION: 100 %

## 2020-08-26 LAB
ABO/RH: NORMAL
ANION GAP SERPL CALCULATED.3IONS-SCNC: 12 MMOL/L (ref 7–16)
ANTIBODY SCREEN: NORMAL
BUN BLDV-MCNC: 9 MG/DL (ref 8–23)
CALCIUM SERPL-MCNC: 7.7 MG/DL (ref 8.6–10.2)
CHLORIDE BLD-SCNC: 111 MMOL/L (ref 98–107)
CO2: 20 MMOL/L (ref 22–29)
CREAT SERPL-MCNC: 0.8 MG/DL (ref 0.5–1)
GFR AFRICAN AMERICAN: >60
GFR NON-AFRICAN AMERICAN: >60 ML/MIN/1.73
GLUCOSE BLD-MCNC: 123 MG/DL (ref 74–99)
LV EF: 63 %
LVEF MODALITY: NORMAL
METER GLUCOSE: 115 MG/DL (ref 74–99)
METER GLUCOSE: 145 MG/DL (ref 74–99)
METER GLUCOSE: 145 MG/DL (ref 74–99)
METER GLUCOSE: 149 MG/DL (ref 74–99)
POTASSIUM SERPL-SCNC: 3.5 MMOL/L (ref 3.5–5)
PRO-BNP: 3474 PG/ML (ref 0–125)
PRO-BNP: 4226 PG/ML (ref 0–125)
SODIUM BLD-SCNC: 143 MMOL/L (ref 132–146)

## 2020-08-26 PROCEDURE — 97129 THER IVNTJ 1ST 15 MIN: CPT

## 2020-08-26 PROCEDURE — 86850 RBC ANTIBODY SCREEN: CPT

## 2020-08-26 PROCEDURE — 97530 THERAPEUTIC ACTIVITIES: CPT

## 2020-08-26 PROCEDURE — 6360000002 HC RX W HCPCS: Performed by: NURSE ANESTHETIST, CERTIFIED REGISTERED

## 2020-08-26 PROCEDURE — 80048 BASIC METABOLIC PNL TOTAL CA: CPT

## 2020-08-26 PROCEDURE — 2709999900 HC NON-CHARGEABLE SUPPLY

## 2020-08-26 PROCEDURE — 93321 DOPPLER ECHO F-UP/LMTD STD: CPT

## 2020-08-26 PROCEDURE — 93325 DOPPLER ECHO COLOR FLOW MAPG: CPT

## 2020-08-26 PROCEDURE — 2580000003 HC RX 258: Performed by: INTERNAL MEDICINE

## 2020-08-26 PROCEDURE — 6370000000 HC RX 637 (ALT 250 FOR IP): Performed by: INTERNAL MEDICINE

## 2020-08-26 PROCEDURE — 86901 BLOOD TYPING SEROLOGIC RH(D): CPT

## 2020-08-26 PROCEDURE — 99233 SBSQ HOSP IP/OBS HIGH 50: CPT | Performed by: INTERNAL MEDICINE

## 2020-08-26 PROCEDURE — 6370000000 HC RX 637 (ALT 250 FOR IP): Performed by: FAMILY MEDICINE

## 2020-08-26 PROCEDURE — 97535 SELF CARE MNGMENT TRAINING: CPT

## 2020-08-26 PROCEDURE — 82962 GLUCOSE BLOOD TEST: CPT

## 2020-08-26 PROCEDURE — 83880 ASSAY OF NATRIURETIC PEPTIDE: CPT

## 2020-08-26 PROCEDURE — 2060000000 HC ICU INTERMEDIATE R&B

## 2020-08-26 PROCEDURE — B24BZZ4 ULTRASONOGRAPHY OF HEART WITH AORTA, TRANSESOPHAGEAL: ICD-10-PCS | Performed by: STUDENT IN AN ORGANIZED HEALTH CARE EDUCATION/TRAINING PROGRAM

## 2020-08-26 PROCEDURE — 2500000003 HC RX 250 WO HCPCS: Performed by: INTERNAL MEDICINE

## 2020-08-26 PROCEDURE — 93312 ECHO TRANSESOPHAGEAL: CPT

## 2020-08-26 PROCEDURE — 36415 COLL VENOUS BLD VENIPUNCTURE: CPT

## 2020-08-26 PROCEDURE — 2580000003 HC RX 258: Performed by: NURSE ANESTHETIST, CERTIFIED REGISTERED

## 2020-08-26 PROCEDURE — 86900 BLOOD TYPING SEROLOGIC ABO: CPT

## 2020-08-26 PROCEDURE — 97130 THER IVNTJ EA ADDL 15 MIN: CPT

## 2020-08-26 RX ORDER — ASPIRIN 81 MG/1
81 TABLET, CHEWABLE ORAL DAILY
Status: DISCONTINUED | OUTPATIENT
Start: 2020-08-26 | End: 2020-08-27 | Stop reason: HOSPADM

## 2020-08-26 RX ORDER — SODIUM CHLORIDE 9 MG/ML
INJECTION, SOLUTION INTRAVENOUS CONTINUOUS PRN
Status: DISCONTINUED | OUTPATIENT
Start: 2020-08-26 | End: 2020-08-26 | Stop reason: SDUPTHER

## 2020-08-26 RX ORDER — PROPOFOL 10 MG/ML
INJECTION, EMULSION INTRAVENOUS PRN
Status: DISCONTINUED | OUTPATIENT
Start: 2020-08-26 | End: 2020-08-26 | Stop reason: SDUPTHER

## 2020-08-26 RX ADMIN — ENALAPRILAT 1.25 MG: 1.25 INJECTION INTRAVENOUS at 19:36

## 2020-08-26 RX ADMIN — HYDRALAZINE HYDROCHLORIDE 50 MG: 50 TABLET, FILM COATED ORAL at 23:40

## 2020-08-26 RX ADMIN — PROPOFOL 180 MG: 10 INJECTION, EMULSION INTRAVENOUS at 13:56

## 2020-08-26 RX ADMIN — HYDRALAZINE HYDROCHLORIDE 50 MG: 50 TABLET, FILM COATED ORAL at 01:04

## 2020-08-26 RX ADMIN — MEGESTROL ACETATE 20 MG: 20 TABLET ORAL at 19:36

## 2020-08-26 RX ADMIN — SODIUM CHLORIDE: 9 INJECTION, SOLUTION INTRAVENOUS at 13:45

## 2020-08-26 RX ADMIN — INSULIN LISPRO 1 UNITS: 100 INJECTION, SOLUTION INTRAVENOUS; SUBCUTANEOUS at 19:55

## 2020-08-26 RX ADMIN — SPIRONOLACTONE 25 MG: 25 TABLET ORAL at 16:02

## 2020-08-26 RX ADMIN — ENALAPRILAT 1.25 MG: 1.25 INJECTION INTRAVENOUS at 02:17

## 2020-08-26 RX ADMIN — SODIUM CHLORIDE, PRESERVATIVE FREE 10 ML: 5 INJECTION INTRAVENOUS at 19:36

## 2020-08-26 RX ADMIN — OXYBUTYNIN CHLORIDE 5 MG: 5 TABLET ORAL at 19:35

## 2020-08-26 RX ADMIN — OXYBUTYNIN CHLORIDE 5 MG: 5 TABLET ORAL at 16:02

## 2020-08-26 RX ADMIN — ASPIRIN 81 MG: 81 TABLET, CHEWABLE ORAL at 18:13

## 2020-08-26 RX ADMIN — HYDRALAZINE HYDROCHLORIDE 50 MG: 50 TABLET, FILM COATED ORAL at 18:13

## 2020-08-26 RX ADMIN — METOPROLOL SUCCINATE 25 MG: 25 TABLET, EXTENDED RELEASE ORAL at 16:02

## 2020-08-26 RX ADMIN — SODIUM CHLORIDE: 9 INJECTION, SOLUTION INTRAVENOUS at 04:21

## 2020-08-26 RX ADMIN — CLOPIDOGREL 75 MG: 75 TABLET, FILM COATED ORAL at 16:02

## 2020-08-26 RX ADMIN — ATORVASTATIN CALCIUM 80 MG: 80 TABLET, FILM COATED ORAL at 19:35

## 2020-08-26 RX ADMIN — LISINOPRIL 20 MG: 20 TABLET ORAL at 16:02

## 2020-08-26 ASSESSMENT — PAIN SCALES - GENERAL
PAINLEVEL_OUTOF10: 0
PAINLEVEL_OUTOF10: 8
PAINLEVEL_OUTOF10: 0
PAINLEVEL_OUTOF10: 0

## 2020-08-26 ASSESSMENT — PAIN DESCRIPTION - LOCATION: LOCATION: HEAD

## 2020-08-26 ASSESSMENT — PAIN DESCRIPTION - FREQUENCY: FREQUENCY: CONTINUOUS

## 2020-08-26 ASSESSMENT — LIFESTYLE VARIABLES: SMOKING_STATUS: 0

## 2020-08-26 ASSESSMENT — PAIN DESCRIPTION - DESCRIPTORS: DESCRIPTORS: HEADACHE;DULL

## 2020-08-26 ASSESSMENT — PAIN DESCRIPTION - PAIN TYPE: TYPE: ACUTE PAIN

## 2020-08-26 ASSESSMENT — PAIN DESCRIPTION - ORIENTATION: ORIENTATION: MID;ANTERIOR

## 2020-08-26 NOTE — PROGRESS NOTES
SPEECH LANGUAGE PATHOLOGY  DAILY PROGRESS NOTE        PATIENT NAME:  Briseida Billings      :  1960          TODAY'S DATE:  2020 ROOM:  5069/4248-G    Pt seen for cognition. Appeared more confused  Oriented x3 with mod   Recall of personal information was adequate. Difficulty with STM tasks. Thought processing during sequencing and problem solving tasks was disorganized and vague.      Pt NPO for procedure      CPT code(s)   [id number]  therapeutic interventions that focus on cognitive function , initial  15 min  05434  therapeutic interventions that focus on cognitive function, each additional 15 min    Total minutes :  30 minutes

## 2020-08-26 NOTE — PROGRESS NOTES
Spoke with patient and daughter, Katherine Brownlee [facility]), regarding procedure for ЕКАТЕРИНА/linq. They are agreeable but daughter wanted me to speak with patient's , Holly Ledezma, first. Spoke with Holly Ledezma over the phone and explained procedure. He is agreeable to ЕКАТЕРИНА but does not want a linq recorder if it will be implanted for longer than 60 days. Attempted to explain reasoning for linq. Another RN spoke with cath lab, physician will speak with patient prior to procedure.

## 2020-08-26 NOTE — ANESTHESIA POSTPROCEDURE EVALUATION
Department of Anesthesiology  Postprocedure Note    Patient: Giuliano Polo  MRN: [id number]  YOB: 1960  Date of evaluation: 8/26/2020  Time:  3:04 PM     Procedure Summary     Date:  08/26/20 [place]  [facility] [facility]    Anesthesia Start:  5681 Anesthesia Stop:  8402    Procedure:  SEY ЕКАТЕРИНА Diagnosis:      Scheduled Providers:   Responsible Provider:  Noel Junior DO    Anesthesia Type:  MAC ASA Status:  3          Anesthesia Type: MAC    Jake Phase I:      Jake Phase II:      Last vitals: Reviewed and per EMR flowsheets.        Anesthesia Post Evaluation    Patient location during evaluation: bedside  Patient participation: complete - patient cannot participate  Level of consciousness: awake and alert  Airway patency: patent  Nausea & Vomiting: no nausea and no vomiting  Complications: no  Cardiovascular status: blood pressure returned to baseline  Respiratory status: acceptable  Hydration status: euvolemic

## 2020-08-26 NOTE — PROGRESS NOTES
Cardiac Electrophysiology Inpatient Progress Note    Sujit Singh  1960  Date of Service: 8/26/2020  PCP: Aly Philippe MD  Electrophysiologist: Marlon Parks MD        Subjective: Sujit Singh is seen in hospital follow-up and management of:  Cryptogenic CVA     08/24/20-- Initial hospital consult: The patient is a 63year-old lady with significant past medical history of hypertension, diabetes mellitus and peripheral vascular disease status post left below-knee amputation who presents to the hospital on 8/22/20 complaining of difficulty speaking. Per note, she woke up on 8/22/20 with difficulty speaking. She  presented to [facility] ED before she was transferred to [facility] for further management. She was seen by Neurology and underwent CTA of her head and neck which showed a left M3 occlusion. She was out of the window for TPA and not an IR candidate. She denies previous history of AF. Thus far no AF was seen on telemetry monitor. Cardiac electrophysiology service is consulted for ЕКАТЕРИНА and possible ILR placement. The patient denies chest pain, dyspnea on exertion, palpitations, lightheadedness, dizziness, syncope, PND, or orthopnea. 08/25/2020: Sujit Singh is seen in hospital follow up, she has not had any atrial arrhythmias and does not have any cardiac complaints at this time. 08/26/2020: Sujit Singh is seen in hospital follow-up, she has not had any atrial arrhythmias to this point nor does she have any cardiac complaints. After a prolonged discussion with both the patient and her daughter at bedside they have decided to forego placement of an ILR but would like to proceed with ЕКАТЕРИНА for evaluation of suspected cardioembolic CVA. They are willing to utilize a 30-day monitor but do not want prolonged rhythm monitoring at this time.   They have been informed of the risk of recurrent atrial fibrillation and the risk benefits and alternatives of ILR placement and wished  for the 30-day monitor  At this time and appear to understand risk of recurrent AF when off [facility].       Patient Active Problem List   Diagnosis    Epigastric hernia    Umbilical hernia    Essential hypertension    Type 2 diabetes mellitus without complication, with long-term current use of insulin (HCC)    Dyslipidemia    S/P cardiac catheterization    Abnormal stress ECG    Hyperthyroidism    Diabetic peripheral neuropathy ([facility])    Hydroureter    Microcytic hypochromic anemia    Arterial occlusion    Hypoglycemia    Hypertensive emergency    Adrenal infarction ([facility]    Paresthesia of hand, bilateral    Paresthesia of left foot    Unintended weight loss    Acute ischemic stroke ([facility])    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    Acute CVA (cerebrovascular accident) ([facility]    Hx of diabetic foot ulcer    Hx of BKA, left ([facility])    Acute on chronic diastolic heart failure ([facility])         Scheduled Medications:   aspirin  300 mg Rectal Daily    cloNIDine  1 patch Transdermal Weekly    ferrous sulfate  325 mg Oral Daily with breakfast    folic acid  1 mg Oral Daily    hydrALAZINE  50 mg Oral 4x Daily    lisinopril  20 mg Oral Daily    megestrol  20 mg Oral BID    oxybutynin  5 mg Oral TID    thiamine  100 mg Oral Daily    pyridoxine  50 mg Oral Daily    enalaprilat  1.25 mg Intravenous 4 times per day    spironolactone  25 mg Oral Daily    sodium chloride flush  10 mL Intravenous 2 times per day    enoxaparin  40 mg Subcutaneous Daily    atorvastatin  80 mg Oral Nightly    clopidogrel  75 mg Oral Daily    metoprolol succinate  25 mg Oral Daily    insulin lispro  0.08 Units/kg Subcutaneous TID WC    insulin lispro  0-12 Units Subcutaneous TID WC    insulin lispro  0-6 Units Subcutaneous Nightly       Infusion Medications:   dextrose      sodium chloride 100 mL/hr at 08/26/20 0421       PRN Medications:  sodium chloride flush, polyethylene glycol, promethazine **OR** ondansetron, labetalol, perflutren lipid microspheres, glucose, dextrose, glucagon (rDNA), dextrose    No Known Allergies    Wt Readings from Last 3 Encounters:   08/26/20 110 lb (49.9 kg)   08/22/20 113 lb (51.3 kg)   08/17/20 113 lb (51.3 kg)     Temp Readings from Last 3 Encounters:   08/26/20 98 °F (36.7 °C) (Temporal)   08/22/20 97.8 °F (36.6 °C) (Temporal)   06/26/20 97.9 °F (36.6 °C) (Oral)     BP Readings from Last 3 Encounters:   08/26/20 (!) 159/71   08/22/20 (!) 174/68   08/17/20 122/60     Pulse Readings from Last 3 Encounters:   08/26/20 88   08/22/20 85   08/17/20 80         Intake/Output Summary (Last 24 hours) at 8/26/2020 1239  Last data filed at 8/25/2020 2316  Gross per 24 hour   Intake 2014 ml   Output 200 ml   Net 1814 ml       ROS:   Constitutional: Negative for fever. Positive for activity change and negative for appetite change. HENT: Negative for epistaxis. Eyes: Negative for diploplia, blurred vision. Respiratory: Negative for cough, chest tightness, shortness of breath and wheezing. Cardiovascular: pertinent positives in HPI  Gastrointestinal: Negative for abdominal pain and blood in stool. All other review of systems are negative       PHYSICAL EXAM:  Vitals:    08/26/20 0104 08/26/20 0415 08/26/20 0800 08/26/20 1109   BP: (!) 147/63 (!) 121/58 133/63 (!) 159/71   Pulse:  91 85 88   Resp:  18 18 18   Temp:  98.8 °F (37.1 °C) 99.2 °F (37.3 °C) 98 °F (36.7 °C)   TempSrc:  Temporal Temporal Temporal   SpO2:  97% 97% 99%   Weight:    110 lb (49.9 kg)   Height:    5' 3\" (1.6 m)     Constitutional: Well-developed, no acute distress, daughter at bedside  Eyes: conjunctivae normal, no xanthelasma   Ears, Nose, Throat: oral mucosa moist, no cyanosis   CV: no JVD. Regular rate and rhythm. Normal S1S2 and no S3. No murmurs, rubs, or gallops.  PMI is nondisplaced  Lungs: clear to auscultation bilaterally, normal respiratory effort without used of accessory muscles  Abdomen: soft, non-tender, bowel sounds present, no masses or hepatomegaly   Musculoskeletal: no digital clubbing, no edema of R LE  Skin: warm, no rashes     Pertinent Labs:  CBC:   Recent Labs     08/23/20  1815 08/24/20  0521 08/25/20  0531   WBC  --  4.5 4.1*   HGB 8.9* 8.4* 7.7*   HCT 29.8* 26.4* 24.2*   PLT  --  346 336     BMP:  Recent Labs     08/24/20  0521 08/25/20  0531 08/26/20  0714    141 143   K 3.6 3.5 3.5   * 109* 111*   CO2 20* 21* 20*   BUN 11 11 9   CREATININE 0.9 0.8 0.8   CALCIUM 8.5* 8.1* 7.7*     ABGs:   Lab Results   Component Value Date    PO2ART 57.6 02/25/2020    IMS8SOB 24.8 02/25/2020     INR: No results for input(s): INR in the last 72 hours. BNP: No results for input(s): BNP in the last 72 hours. TSH:   Lab Results   Component Value Date    TSH 0.631 05/30/2020      Cardiac Injury Profile:   No results for input(s): CKTOTAL, CKMB, CKMBINDEX, TROPONINI in the last 72 hours. Lipid Profile:   Lab Results   Component Value Date    TRIG 111 08/23/2020    HDL 32 08/23/2020    LDLCALC 68 08/23/2020    CHOL 122 08/23/2020      Hemoglobin A1C: No components found for: HGBA1C   Xray: Ct Head Wo Contrast    Result Date: 8/22/2020  EXAMINATION: CT OF THE HEAD WITHOUT CONTRAST  8/22/2020 4:45 am TECHNIQUE: CT of the head was performed without the administration of intravenous contrast. Dose modulation, iterative reconstruction, and/or weight based adjustment of the mA/kV was utilized to reduce the radiation dose to as low as reasonably achievable. COMPARISON: None. HISTORY: ORDERING SYSTEM PROVIDED HISTORY: focal deficit TECHNOLOGIST PROVIDED HISTORY: Reason for exam:->focal deficit Has a \"code stroke\" or \"stroke alert\" been called? ->Yes FINDINGS: BRAIN/VENTRICLES: There is no acute intracranial hemorrhage, mass effect or midline shift. No abnormal extra-axial fluid collection. The gray-white differentiation is maintained without evidence of an acute infarct. There is no evidence of hydrocephalus.  Mild diffuse decrease in cerebral volume is noted with corresponding prominence of the sulci and ventricles. Left putamen elongated remote lacunar infarct is noted which measures up to 8 mm in diameter. ORBITS: The visualized portion of the orbits demonstrate no acute abnormality. SINUSES: The visualized paranasal sinuses and mastoid air cells demonstrate no acute abnormality. SOFT TISSUES/SKULL:  No acute abnormality of the visualized skull or soft tissues. No acute intracranial abnormality. Mild diffuse cerebral atrophy. Left putamen 8 mm diameter remote lacunar infarct. Xr Chest Portable    Result Date: 2020  EXAMINATION: ONE XRAY VIEW OF THE CHEST 2020 4:59 am COMPARISON: Chest radiograph May 29, 2020 HISTORY: ORDERING SYSTEM PROVIDED HISTORY: AMS TECHNOLOGIST PROVIDED HISTORY: Reason for exam:->AMS FINDINGS: The lungs are without acute focal process. There is no effusion or pneumothorax. The cardiomediastinal silhouette is without acute process. The osseous structures are without acute process. No acute process. Cta Neck W Contrast    Result Date: 2020  Patient MRN:  [id number] : 1960 Age: 63 years Gender: Female Order Date:  2020 7:32 AM EXAM: CTA NECK W CONTRAST, CTA HEAD W CONTRAST NUMBER OF IMAGES:  18 INDICATION:  CVA CVA COMPARISON: None Technique: Low-dose CT  acquisition technique included one of following options; 1 . Automated exposure control, 2. Adjustment of MA and or KV according to patient's size or 3. Use of iterative reconstruction. Contiguous spiral images were obtained in the axial plane, following the administration of intravenous contrast using CT angiographic protocol. Sagittal and coronal images were reconstructed from the axial plane acquisition. Additional MIP reconstructions were presented to aid in the interpretation of this study. Images were obtained from the skull base cranially. There is mild calcified plaque identified in the vessels compatible with atherosclerotic disease. There is aortic arch and great vessels demonstrate mild atherosclerotic disease. The right carotid is unremarkable. The left carotid is unremarkable. The right vertebral artery is unremarkable. The left vertebral artery is unremarkable. The basilar artery is unremarkable. The middle cerebral arteries are abnormal on the right. There is a right M3 occlusion. This is difficult to clearly visualize on the CT of the head. Otherwise the middle cerebral artery is unremarkable The anterior cerebral arteries are unremarkable. The posterior cerebral arteries are unremarkable. 1.  Right middle cerebral artery M 3 occlusion . 2. Estimated stenosis by NASCET criteria is not hemodynamically significant     Ct Brain Perfusion    Result Date: 2020  Patient MRN: [id number] : 1960 Age:  63 years Gender: Female Order Date: 2020 7:32 AM Exam: CT BRAIN PERFUSION Number of Images: 791 views Indication:   cva - bernice alert cva - bernice alert Comparison: None. Findings: Perfusion images demonstrate asymmetric blood volume Blood flow images demonstrate asymmetric blood flow There is significant ischemic number identified. In the right posterior frontal lobe There is significant core infarct identified. Central core infarct with significant peripheral ischemic penumbra     Cta Head W Contrast    Result Date: 2020  Patient MRN:  [id number] : 1960 Age: 63 years Gender: Female Order Date:  2020 7:32 AM EXAM: CTA NECK W CONTRAST, CTA HEAD W CONTRAST NUMBER OF IMAGES:  18 INDICATION:  CVA CVA COMPARISON: None Technique: Low-dose CT  acquisition technique included one of following options; 1 . Automated exposure control, 2. Adjustment of MA and or KV according to patient's size or 3. Use of iterative reconstruction. Contiguous spiral images were obtained in the axial plane, following the administration of intravenous contrast using CT angiographic protocol.  Sagittal and coronal images were reconstructed from the axial plane acquisition. Additional MIP reconstructions were presented to aid in the interpretation of this study. Images were obtained from the skull base cranially. There is mild calcified plaque identified in the vessels compatible with atherosclerotic disease. There is aortic arch and great vessels demonstrate mild atherosclerotic disease. The right carotid is unremarkable. The left carotid is unremarkable. The right vertebral artery is unremarkable. The left vertebral artery is unremarkable. The basilar artery is unremarkable. The middle cerebral arteries are abnormal on the right. There is a right M3 occlusion. This is difficult to clearly visualize on the CT of the head. Otherwise the middle cerebral artery is unremarkable The anterior cerebral arteries are unremarkable. The posterior cerebral arteries are unremarkable. 1.  Right middle cerebral artery M 3 occlusion . 2. Estimated stenosis by NASCET criteria is not hemodynamically significant         Pertinent Cardiac Testing:     Echo 08/24/2020:   Type of Study      TTE procedure     Procedure Date  Date: 08/24/2020 Start: 02:06 PM     Study Location: Portable  Technical Quality: Adequate visualization     Indications:CVA.     Patient Status: Routine     Contrast Medium: Bubble Study.     Height: 63 inches Weight: 113 pounds BSA: 1.52 m^2 BMI: 20.02 kg/m^2     BP: 169/58 mmHg     Allergies    - No known allergies.      Findings      Left Ventricle   Normal left ventricular chamber size. Normal left ventricular systolic function. Visually estimated LVEF is 60-65 %. No wall motion abnormalities. Normal diastolic function. Right Ventricle   Normal right ventricle structure and function. Left Atrium   Normal left atrial size. Right Atrium   Normal right atrium size. Mitral Valve   Normal mitral valve structure and function. Mild mitral valve regurgitation.       Tricuspid Valve   Normal tricuspid valve structure and function. No tricuspid valve regurgitation. Aortic Valve   Aortic valve sclerosis. No aortic regurgitation. Pulmonic Valve   Normal pulmonic valve structure and function. No pulmonic valve regurgitation. Pericardial Effusion   No pericardial effusion. Conclusions      Summary   Negative bubble study. Normal left ventricular chamber size. Normal left ventricular systolic function. Visually estimated LVEF is 60-65 %. No wall motion abnormalities. Normal diastolic function. Normal right ventricle structure and function. No significant valvular abnormalities. No comparison study available.       Signature      ----------------------------------------------------------------   Electronically signed by Pee Calixto MD(Interpreting   physician) on 08/24/2020 06:29 PM   ----------------------------------------------------------------     M-Mode/2D Measurements & Calculations      LV Diastolic    LV Systolic Dimension: 2.6   AV Cusp Separation: 1.9 cmLA   Dimension: 3.6  cm                           Dimension: 3.4 cmAO Root   cm              LV Volume Diastolic: 99.4 ml Dimension: 3 cm   LV FS:27.8 %    LV Volume Systolic: 43.7 ml   LV PW           LV EDV/LV EDV Index: 39.1   Diastolic: 0.8  LO/27 PC/F^5HG ESV/LV ESV   cm              Index: 23.6 ml/16ml/ m^2     RV Diastolic Dimension: 3.2   LV PW Systolic: EF Calculated: 14.8 %        cm   1.4 cm          LV Mass Index: 66 l/min*m^2   Septum                                       LA/Aorta: 5.12   Diastolic: 1.1                               Ascending Aorta: 2.4 cm   cm              LVOT: 2.1 cm                 LA volume/Index: 45.3 ml   Septum                                       /90.71JA/K^8   Systolic: 1.2                                RA Area: 12.6 cm^2   cm      LV Mass: 100.21   g     Doppler Measurements & Calculations      MV Peak E-Wave:    AV Peak Velocity: 1.38 m/s      LVOT Peak Velocity:   0.76 m/s           AV Peak Gradient: 7.66 mmHg     1.42 m/s   MV Peak A-Wave:    AV Mean Velocity: 1.01 m/s      LVOT Mean Velocity:   1.26 m/s           AV Mean Gradient: 4.5 mmHg      0.83 m/s   MV E/A Ratio: 0.6  AV VTI: 35.8 cm                 LVOT Peak Gradient: 8.1   MV Peak Gradient:  AV Area (Continuity):3.16 cm^2  mmHgLVOT Mean Gradient:   8.9 mmHg                                           3.2 mmHg   MV Mean Gradient:  LVOT VTI: 32.7 cm   2.8 mmHg           IVRT: 115.3 msec   MV Mean Velocity:   0.78 m/s           Pulm. Vein A Reversal   MV Deceleration    Duration:156.9 msec   Time: 312.2 msec   Pulm. Vein D Velocity:0.38      PV Peak Velocity: 1.18   MV P1/2t: 66.3     m/sPulm. Vein A Reversal        m/s   msec               Velocity:0.33 m/s               PV Peak Gradient: 5.61   MVA by PHT:3.32    Pulm. Vein S Velocity: 0.91 m/s mmHg   cm^2                                               PV Mean Velocity: 0.78   MV Area                                            m/s   (continuity): 4.3                                  PV Mean Gradient: 2.9   cm^2                                               mmHg   MV E' Septal   Velocity: 0.07 m/s                                 SC ED Velocity: 1.28   MV E' Lateral                                      m/s   Velocity: 11 m/s     [id number]      Telemetry8/26/2020: sinus no AF seen     EKG 8/22/20: NSR 89, possible old anterior MI, QTc 459 msec. I have independently reviewed all of the ECGs and rhythm strips per above. I have personally reviewed the laboratory, cardiac diagnostic and radiographic testing as outlined above. I have reviewed previous records noted in [address] [facility] [address]. Impression:    1.  Acute CVA   - CTA of her head and neck which showed a left M3 occlusion.  - Was not a TPa or IR candidate  - Cardio embolic source per Neurology.  -Agreeable for ЕКАТЕРИНА, refuses ILR at this time, prefers 30-day monitor   - On ASA, Plavix and Lipitor     2. Hypertension   - Elevated. - On Catapres, Lisinopril, Hydralazine, Toprol XL and Aldactone      3. Diabetes mellitus,  - On Insulin.     4. Hyperlipidemia  - On Lipitor.     5. Anemia   - On Iron supplement, folic acid supplement     6. History of UTI  - Now on isolation for VRE      Recommendations:    1. Transesophageal echocardiogram to exclude cardio embolic source. 2. 30 day monitor post hospital discharge if 30-day monitor does not show atrial arrhythmia they may reconsider ILR placement at that time. Thank you for allowing me to participate in your patient's care. Discussed with Dr. Francisca Dowell   Electronically signed by BENJI Hardy CNP on 8/26/2020 at 12:39 PM   [address] [facility]      Attending Physician's Statement    Patient seen with the ANP. Agree with the findings, assessment and plan. Management plan was discussed. I have personally interviewed the patient, independently performed a focused cardiac examination, reviewed the pertinent laboratory and diagnostic testing with the patient and directly participated in the medical decision-making as noted above with the following additions:     Feeling well. Denies any chest pain or dyspnea. Would like to hold off ILR insertion and proceed with 30 day outpatient cardiac monitor first. ЕКАТЕРИНА showed no interatrial shunt. Physical exam showed no JVD, RRR, normal S1S2, no murmur, clear lungs bilaterally, no edema    Will schedule 30 day outpatient cardiac monitor and consider implantable loop recorder insertion if 30 day monitor is negative. EP will sign off. Please call for any questions or concerns.     Mckenzie Cooley MD  Cardiac Electrophysiology  [facility]) [facility]  [facility] [place]: [facility]  5:23

## 2020-08-26 NOTE — PROGRESS NOTES
Physical Therapy  Physical Therapy treatment note     Name: Raghu Negron  : 1960  MRN: [id number]    Referring Provider:  Andrea Norton MD     Date of Service: 2020    Evaluating PT:  Alexus Mate, PT, DPT. [id number]    Room #:  2719/8745-G  Diagnosis:  Acute CVA   Reason for admission:  L facial droop, numbness  Precautions:  Falls, LUE weakness, L BKA, LLE prosthetic (not in room)  Pertinent PMHx: DM, neuropathy, HTN, HLD, schizophrenia   Procedures: none  Equipment Recommendations:  Return to use of walker and wheelchair    SUBJECTIVE:  Pt lives with  and daughter in a 1 story home with 3 steps to enter and 1 rail. Home setup confirmed by daughter. Pt ambulated with [employer] and LLE prosthetic PTA. Pt independent for ADL performance. Pt states someone usually stands by/assists her with ambulation and stairs when needed. Per daughter and pt pt's prosthesis needs refitted as she has lost a significant amount of weight recently so she has been getting around the home without it. OBJECTIVE:   Initial Evaluation  Date:  Treatment  2020   Short Term/ Long Term   Goals   AM-PAC 6 Clicks 69/87 20/46    Was pt agreeable to Eval/treatment? Yes  Yes     Does pt have pain? Denies pain None     Bed Mobility  Rolling: Independent  Supine to sit: Independent  Sit to supine: Independent  Scooting: Independent SBA with bed rail Independent   Transfers Sit to stand: Sonny  Stand to sit: Sonny  Stand pivot: NT Sit<>stand: Sonny  Stand pivot: Sonny front [employer]   Independent    Ambulation    5 feet with [employer] Sonny  -hopping on RLE   20 feet with front [employer] Sonny via hopping as pt's LLE prothesis not present.  >25 feet with [employer] Mod I   Stair negotiation: ascended and descended  NT 4 steps with seated posterior scoot, modA >7 steps with 1 rail Mod I   ROM BUE:  See OT eval   LLE BKA     Strength BUE:  See OT eval   BLE: NT  Increase by 1/3 MMT grade    Balance Sitting EOB:  independent  Dynamic Standing:  Sonny [employer] Static Completed:  o Functional mobility as noted above:   - Bed mobility: SBA for safety. Pt reported feeling unsteady performing dynamic tasks at EOB. Pt relied on bed rail for all mobility.    - Transfer training: sit<>stand x 10 reps at SBA/Sonny. Pt occasionally retropulsive. Pt required cues for proper hand placement. Stand pivot x 4 reps at Sonny level. As pt fatigued transfers became increasingly unsafe as she would dive for surface when turning to sit despite max cues of the contrary.    - Ambulation: 20 feet x 2 reps with front [employer] Sonny. Pt quickly fatigued and unable to hop on single leg for long distances.    - Stair negotiation:  4 steps with seated scooting ascending backwards descending forwards. modA for sit<>stand from stairs and min/modA to scoot from step to step.   o Skilled repositioning in supine with HOB elevated for comfort.  o Pt education as noted above. PLAN:    Patient is making fair progress towards established goals. Will continue with current POC.       Time in  0930  Time out  1010    Total Treatment Time  40 minutes     CPT codes:  [] Gait training 71862 0 minutes  [] Manual therapy 50708 0 minutes  [x] Therapeutic activities 46027 40 minutes  [] Therapeutic exercises 19654 0 minutes  [] Neuromuscular reeducation 97375 0 minutes    Eduardo Turner PT, DPT  [id number]

## 2020-08-26 NOTE — PROGRESS NOTES
Hospitalist Progress Note  [facility] 8SE NEURO IMCU       Patient: Raghu Negron  Unit/Bed: 9788/4720-Q  YOB: 1960  MRN: [id number]  Acct: [de-identified]   AdmittingDiagnosis: Acute CVA (cerebrovascular accident) [facility]) [I63.9]  Acute CVA (cerebrovascular accident) [facility]) [I63.9]  Admit Date: 8/22/2020  Hospital Day: 4    Subjective:      Patient Seen, Chart, Labs, Radiology studies, and Consults reviewed. Objective:   /63   Pulse 85   Temp 99.2 °F (37.3 °C) (Temporal)   Resp 18   Ht 5' 3\" (1.6 m)   Wt 110 lb (49.9 kg)   SpO2 97%   BMI 19.49 kg/m²       Intake/Output Summary (Last 24 hours) at 8/26/2020 0904  Last data filed at 8/25/2020 2316  Gross per 24 hour   Intake 2014 ml   Output 200 ml   Net 1814 ml       Physical Exam  General appearance:  No acute distress, breathing without difficulty  HEENT:  Normal cephalic, atraumatic without obvious deformity. EOMI  Neck: Supple, with full range of motion. No jugular venous distention. Trachea midline. Respiratory:  Normal respiratory effort. Clear to auscultation  Cardiovascular:  Regular rate and rhythm  Abdomen: Soft, non-tender, non-distended with normal bowel sounds. Musculoskeletal:  No clubbing, cyanosis or edema bilaterally. Full range of motion without deformity. Skin: Skin color, texture, turgor normal.  No rashes or lesions. Neurologic:  Neurovascularly intact without any focal sensory/motor deficits. Cranial nerves: II-XII intact  Psychiatric:  Alert and oriented, thought content appropriate, normal insight    Result Date: 8/22/2020  EXAMINATION: CT OF THE HEAD WITHOUT CONTRAST  8/22/2020 4:45 am TECHNIQUE: CT of the head was performed without the administration of intravenous contrast. Dose modulation, iterative reconstruction, and/or weight based adjustment of the mA/kV was utilized to reduce the radiation dose to as low as reasonably achievable. COMPARISON: None.  HISTORY: ORDERING SYSTEM PROVIDED HISTORY: focal deficit TECHNOLOGIST PROVIDED HISTORY: Reason for exam:->focal deficit Has a \"code stroke\" or \"stroke alert\" been called? ->Yes FINDINGS: BRAIN/VENTRICLES: There is no acute intracranial hemorrhage, mass effect or midline shift. No abnormal extra-axial fluid collection. The gray-white differentiation is maintained without evidence of an acute infarct. There is no evidence of hydrocephalus. Mild diffuse decrease in cerebral volume is noted with corresponding prominence of the sulci and ventricles. Left putamen elongated remote lacunar infarct is noted which measures up to 8 mm in diameter. ORBITS: The visualized portion of the orbits demonstrate no acute abnormality. SINUSES: The visualized paranasal sinuses and mastoid air cells demonstrate no acute abnormality. SOFT TISSUES/SKULL:  No acute abnormality of the visualized skull or soft tissues. No acute intracranial abnormality. Mild diffuse cerebral atrophy. Left putamen 8 mm diameter remote lacunar infarct. Xr Chest Portable    Result Date: 2020  EXAMINATION: ONE XRAY VIEW OF THE CHEST 2020 4:59 am COMPARISON: Chest radiograph May 29, 2020 HISTORY: ORDERING SYSTEM PROVIDED HISTORY: AMS TECHNOLOGIST PROVIDED HISTORY: Reason for exam:->AMS FINDINGS: The lungs are without acute focal process. There is no effusion or pneumothorax. The cardiomediastinal silhouette is without acute process. The osseous structures are without acute process. No acute process. Cta Neck W Contrast    Result Date: 2020  Patient MRN:  [id number] : 1960 Age: 63 years Gender: Female Order Date:  2020 7:32 AM EXAM: CTA NECK W CONTRAST, CTA HEAD W CONTRAST NUMBER OF IMAGES:  18 INDICATION:  CVA CVA COMPARISON: None Technique: Low-dose CT  acquisition technique included one of following options; 1 . Automated exposure control, 2. Adjustment of MA and or KV according to patient's size or 3. Use of iterative reconstruction.  Contiguous spiral images were obtained in the axial plane, following the administration of intravenous contrast using CT angiographic protocol. Sagittal and coronal images were reconstructed from the axial plane acquisition. Additional MIP reconstructions were presented to aid in the interpretation of this study. Images were obtained from the skull base cranially. There is mild calcified plaque identified in the vessels compatible with atherosclerotic disease. There is aortic arch and great vessels demonstrate mild atherosclerotic disease. The right carotid is unremarkable. The left carotid is unremarkable. The right vertebral artery is unremarkable. The left vertebral artery is unremarkable. The basilar artery is unremarkable. The middle cerebral arteries are abnormal on the right. There is a right M3 occlusion. This is difficult to clearly visualize on the CT of the head. Otherwise the middle cerebral artery is unremarkable The anterior cerebral arteries are unremarkable. The posterior cerebral arteries are unremarkable. 1.  Right middle cerebral artery M 3 occlusion . 2. Estimated stenosis by NASCET criteria is not hemodynamically significant     Ct Brain Perfusion    Result Date: 2020  Patient MRN: [id number] : 1960 Age:  63 years Gender: Female Order Date: 2020 7:32 AM Exam: CT BRAIN PERFUSION Number of Images: 453 views Indication:   cva - bernice alert cva - bernice alert Comparison: None. Findings: Perfusion images demonstrate asymmetric blood volume Blood flow images demonstrate asymmetric blood flow There is significant ischemic number identified. In the right posterior frontal lobe There is significant core infarct identified.      Central core infarct with significant peripheral ischemic penumbra     Cta Head W Contrast    Result Date: 2020  Patient MRN:  [id number] : 1960 Age: 63 years Gender: Female Order Date:  2020 7:32 AM EXAM: CTA NECK W CONTRAST, CTA HEAD W CONTRAST NUMBER OF IMAGES:  826 INDICATION:  CVA CVA COMPARISON: None Technique: Low-dose CT  acquisition technique included one of following options; 1 . Automated exposure control, 2. Adjustment of MA and or KV according to patient's size or 3. Use of iterative reconstruction. Contiguous spiral images were obtained in the axial plane, following the administration of intravenous contrast using CT angiographic protocol. Sagittal and coronal images were reconstructed from the axial plane acquisition. Additional MIP reconstructions were presented to aid in the interpretation of this study. Images were obtained from the skull base cranially. There is mild calcified plaque identified in the vessels compatible with atherosclerotic disease. There is aortic arch and great vessels demonstrate mild atherosclerotic disease. The right carotid is unremarkable. The left carotid is unremarkable. The right vertebral artery is unremarkable. The left vertebral artery is unremarkable. The basilar artery is unremarkable. The middle cerebral arteries are abnormal on the right. There is a right M3 occlusion. This is difficult to clearly visualize on the CT of the head. Otherwise the middle cerebral artery is unremarkable The anterior cerebral arteries are unremarkable. The posterior cerebral arteries are unremarkable. 1.  Right middle cerebral artery M 3 occlusion .  2. Estimated stenosis by NASCET criteria is not hemodynamically significant       Diet:  Diet NPO, After Midnight    Medications:    aspirin  300 mg Rectal Daily    cloNIDine  1 patch Transdermal Weekly    ferrous sulfate  325 mg Oral Daily with breakfast    folic acid  1 mg Oral Daily    hydrALAZINE  50 mg Oral 4x Daily    lisinopril  20 mg Oral Daily    megestrol  20 mg Oral BID    oxybutynin  5 mg Oral TID    thiamine  100 mg Oral Daily    pyridoxine  50 mg Oral Daily    enalaprilat  1.25 mg Intravenous 4 times per day    spironolactone  25 mg Oral Daily    sodium chloride flush  10 mL Intravenous 2 Echocardiography Report (2/24/2020)   Summary   Mild left ventricular concentric hypertrophy noted. ef 67% by 2D. Stage I diastolic dysfunction. Physiologic and/or trace mitral regurgitation is present. Neurology consultant Dr. Celso Laurent [8/22/2020]:  Recommendations:  - patient has an acute stroke in the R M3  - she is not a thrombectomy candidate  - aspirin and plavix loaded, con't aspirin 81mg daily and plavix 75mg daily for 21 days, patient was taking plavix before admission, will switch her to aspirin 81mg daily after 21 days. - lipitor 80mg daily  - pt will need holter monitor or loop recorder to r/o afib, this is a cardioembolic stroke. If afib is found, pt will need anticoagulant. - consult PT/OT and SLP  - lovenox for DVT prophylaxis.                                              ASSESSMENT:  -Acute ischemic stroke  -Acute on chronic diastolic heart failure  -Hypertension  -Insulin-dependent diabetes mellitus  -Hyperlipidemia  -Anemia, s/p transfusion PRBC    PLAN:  -Neurology following  -Monitor hemoglobin levels  -Check FOBT  -DAPT x 21 days, then ASA monotherapy  -Continue statin  -Stroke clinic F/U on discharge    CODE STATUS: Full      Time Spent: 45 minutes  signed by Brittany Barton DO on 8/26/2020 at 9:04 [place] [id number] Medicine Service

## 2020-08-26 NOTE — PROGRESS NOTES
OT BEDSIDE TREATMENT NOTE      Date:2020  Patient Name: Rachel Finnegan  MRN: [id number]  : 1960  Room: [address] [place] [address]     Evaluating OT: REJI Shelton, OTR/L [id number]     AM-PAC Daily Activity Raw Score:   Recommended Adaptive Equipment:  TBD      Modified [place] Scale (MRS)  Score     Description  0             No symptoms  1             No significant disability despite symptoms  2             Slight disability; able to look after own affairs  3             Moderate disability; able to ambulate without assist/ requires assist with ADLs  4             Moderate/Severe disability;requires assist to ambulate/assist with ADLs  5             Severe disability;bedridden/incontinent   6               Score: 4     Diagnosis: CVA  Referring Practitioner: Roz Ruffin MD   Surgery: n/a  Pertinent Medical History: L BKA, schizophrenia, HLD, HTN, DM II   Precautions:  Falls, contact isolation, LLE prosthetic at home     Home Living: Pt lives with  in a 2 story home with 7 step(s) to enter and B rail(s); bed/bath on 2nd level, laundry in basement but  completes   Bathroom setup: walk in shower   Equipment owned: extended tub bench  Prior Level of Function: IND with ADLs , assist with IADLs; using ww for functional mobility   Driving: yes     Pain Level: 0/10  Cognition: A&O: 2/4 (self, date);  Follows 1 step directions, pleasant & cooperative              Memory:  fair               Sequencing:  fair               Problem solving:  fair               Judgement/safety:  fair                 Functional Assessment:    Initial Eval Status  Date: 2020 Treatment Status  Date:   20 Short Term Goals  Treatment frequency: 2-5x/wk   Feeding SUP  NPO  For procedure IND   Grooming SUP (seated EOB for oral hygiene and facial washing)  SBA  Seated at EOB IND   UB Dressing SUP  Min A  Doff & mil gown seated at EOB IND   LB Dressing Mod A Mod A  [place] & donning brief & pants x 2  (due to pt became incontinent after donning first brief & pants)    MIN A to mil/doff sock sitting EOB requiring assist to maintain sitting balance posterior lean;    SUP    Bathing Min A (simulated) Mod A SUP    Toileting Mod A Dep  Incontinent of urine per family member present is baseline  (strong smell from urine noted)  Completed commode transfer with pt unable to void, therefore no hygiene completed  SUP   Bed Mobility  Log roll: SBA  Supine to sit: SBA   Sit to supine: SBA  SBA   supine < > sit with bed rail  Log roll IND  Supine to sit: IND   Sit to supine: IND   Functional Transfers Sit to stand:NT   Stand to sit:NT  Commode: NT Min A  From various surfaces, multiple times   Cues for hand placement & safety  SBA (with slideboard if LLE prosthetic not present)   Functional Mobility NT Min-Mod A   less than house hold distances with w/w using hoping technique v/c's for walker management/safety  Increased assist needed as pt fatigued   SBA (when prosthetic present)   Balance Sitting: SBA  Standing: NT Sitting:   Static: SBA  Dynamic: CGA  Standing: Min-Mod with w/w      Activity Tolerance Fair (pt sat EOB ~10 mins) Fair  Moderate tasks, pt fatigues, rest periods needed between transfers & mobility      Visual/  Perceptual Glasses: yes     pt able to reach clock on wall     visual tracking appears [place]                      Comments: Upon arrival pt supine in bed with family member present. Pt educated with regards to bed mobility, functional transfers, functional mobility, hand placement, walker safety, fall prevention, LE/UE dressing, grooming tasks, static/dynamic grooming tasks. At end of session pt returned to supine in bed with family member still present, all lines and tubes intact, call light within reach. · Pt has made Fair+ progress towards set goals.    · Continue with current plan of care      Treatment Time In: 9:30            Treatment Time Out: 10:10                Treatment Charges: Mins Units   Ther Ex  85038 Manual Therapy Marina Miranda [id number] 20 1   ADL/Home Mgt [id number] 25 2   Neuro Re-ed [id number]     Group Therapy      Orthotic manage/training  [id number]     Non-Billable Time     Total Timed Treatment 40 3         . Denisse Clock.  [address], [address] [facility], [address] [facility], OTR/L [id number]

## 2020-08-26 NOTE — ANESTHESIA PRE PROCEDURE
Department of Anesthesiology  Preprocedure Note       Name:  Norm Melgar   Age:  63 y.o.  :  1960                                          MRN:  [id number]         Date:  2020      Surgeon: Annia Rodríguez    Procedure: ЕКАТЕРИНА    Medications prior to admission:   Prior to Admission medications    Medication Sig Start Date End Date Taking?  Authorizing Provider   benazepril (LOTENSIN) 10 MG tablet Take 10 mg by mouth daily   Yes Historical Provider, MD   clopidogrel (PLAVIX) 75 MG tablet Take 1 tablet by mouth once daily 20  Yes , DO   megestrol (MEGACE) 20 MG tablet Take 1 tablet by mouth 2 times daily 20  Yes Cornelio Collier MD   cloNIDine (CATAPRES) 0.3 MG/24HR PTWK Place 1 patch onto the skin once a week 20 Yes Cornelio Collier MD   oxybutynin (DITROPAN) 5 MG tablet Take 1 tablet by mouth 3 times daily 20  Yes Cornelio Collier MD   hydrALAZINE (APRESOLINE) 50 MG tablet Take 1 tablet by mouth 4 times daily 20 Yes Cornelio Collier MD   folic acid (FOLVITE) 1 MG tablet Take 1 tablet by mouth daily 20  Yes , DO   lisinopril (PRINIVIL;ZESTRIL) 20 MG tablet Take 1 tablet by mouth daily 20  Yes , DO   metoprolol tartrate (LOPRESSOR) 25 MG tablet Take 1 tablet by mouth 2 times daily 20  Yes , DO   vitamin B-1 100 MG tablet Take 1 tablet by mouth daily 20  Yes , DO   ferrous sulfate (IRON 325) 325 (65 Fe) MG tablet Take 1 tablet by mouth daily (with breakfast) 20  Yes Cornelio Collier MD   atorvastatin (LIPITOR) 80 MG tablet Take 1 tablet by mouth nightly 3/3/20  Yes , DO   vitamin B-6 (B-6) 50 MG tablet Take 1 tablet by mouth daily 19  Yes Shayna Reynaga, DO   clopidogrel (PLAVIX) 75 MG tablet Take 1 tablet by mouth daily 20   Cornelio Collier MD       Current medications:    Current Facility-Administered Medications   Medication Dose Route Frequency Provider Last Rate Last Dose    aspirin suppository 300 mg  300 mg Rectal Daily Joce Riser, APRN - CNS   Stopped at 08/26/20 1116    cloNIDine (CATAPRES) 0.3 MG/24HR 1 patch  1 patch Transdermal Weekly Joyce Tobar MD   1 patch at 08/23/20 1159    ferrous sulfate (IRON 325) tablet 325 mg  325 mg Oral Daily with breakfast Joyce Tobar MD        folic acid (FOLVITE) tablet 1 mg  1 mg Oral Daily Joyce Tobar MD        hydrALAZINE (APRESOLINE) tablet 50 mg  50 mg Oral 4x Daily Joyce Tobar MD   Stopped at 08/26/20 1129    lisinopril (PRINIVIL;ZESTRIL) tablet 20 mg  20 mg Oral Daily Joyce Tobar MD        megestrol (MEGACE) tablet 20 mg  20 mg Oral BID Joyce Tobar MD   20 mg at 08/25/20 2125    oxybutynin (DITROPAN) tablet 5 mg  5 mg Oral TID Joyce Tobar MD   5 mg at 08/25/20 2124    vitamin B-1 (THIAMINE) tablet 100 mg  100 mg Oral Daily Joyce Tobar MD        vitamin B-6 (PYRIDOXINE) tablet 50 mg  50 mg Oral Daily Joyce Tobar MD        enalaprilat (VASOTEC) injection 1.25 mg  1.25 mg Intravenous 4 times per day Joyce Tobar MD   Stopped at 08/26/20 1117    spironolactone (ALDACTONE) tablet 25 mg  25 mg Oral Daily Joyce Tobar MD        sodium chloride flush 0.9 % injection 10 mL  10 mL Intravenous 2 times per day Joyce Tobar MD   Stopped at 08/25/20 2100    sodium chloride flush 0.9 % injection 10 mL  10 mL Intravenous PRN Joyce Tobar MD   10 mL at 08/23/20 1158    polyethylene glycol (GLYCOLAX) packet 17 g  17 g Oral Daily PRN Joyce Tobar MD        promethazine (PHENERGAN) tablet 12.5 mg  12.5 mg Oral Q6H PRN Joyce Tobar MD        Or    ondansetron [facility]) injection 4 mg  4 mg Intravenous Q6H PRN Joyce Tobar MD        enoxaparin (LOVENOX) injection 40 mg  40 mg Subcutaneous Daily Joyce Tobar MD   Stopped at 08/26/20 1117    atorvastatin (LIPITOR) tablet 80 mg  80 mg Oral Nightly Joyce Tobar MD   80 mg at 08/25/20 2124    clopidogrel (PLAVIX) tablet 75 mg  75 mg Oral Daily Joyce Tobar MD   Stopped at 08/26/20 1116    labetalol (NORMODYNE;TRANDATE) injection 10 mg  10 mg Intravenous Q10 Min PRN Red Henning MD   10 mg at 08/25/20 1509    perflutren lipid microspheres (DEFINITY) injection 1.65 mg  1.5 mL Intravenous ONCE PRN Red Henning MD        metoprolol succinate (TOPROL XL) extended release tablet 25 mg  25 mg Oral Daily Red Henning MD   25 mg at 08/22/20 1801    glucose (GLUTOSE) 40 % oral gel 15 g  15 g Oral PRN Red Henning MD        dextrose 50 % IV solution  12.5 g Intravenous PRN Red Henning MD        glucagon (rDNA) injection 1 mg  1 mg Intramuscular PRN Red Henning MD        dextrose 5 % solution  100 mL/hr Intravenous PRN Red Henning MD        insulin lispro (HUMALOG) injection vial 4 Units  0.08 Units/kg Subcutaneous TID  Red Henning MD   Stopped at 08/23/20 1640    insulin lispro (HUMALOG) injection vial 0-12 Units  0-12 Units Subcutaneous TID WC Red Henning MD   2 Units at 08/25/20 1750    insulin lispro (HUMALOG) injection vial 0-6 Units  0-6 Units Subcutaneous Nightly Red Henning MD   Stopped at 08/25/20 2127    0.9 % sodium chloride infusion   Intravenous Continuous Red Henning  mL/hr at 08/26/20 0421         Allergies:  No Known Allergies    Problem List:    Patient Active Problem List   Diagnosis Code    Epigastric hernia B63.4    Umbilical hernia X68.8    Essential hypertension I10    Type 2 diabetes mellitus without complication, with long-term current use of insulin ([facility] E11.9, Z79.4    Dyslipidemia E78.5    S/P cardiac catheterization Z98.890    Abnormal stress ECG R94.39    Hyperthyroidism E05.90    Diabetic peripheral neuropathy (HCC) E11.42    Hydroureter N13.4    Microcytic hypochromic anemia D50.9    Arterial occlusion I70.90    Hypoglycemia E16.2    Hypertensive emergency I16.1    Adrenal infarction ([address] E27.49    Paresthesia of hand, bilateral R20.2    Paresthesia of left foot R20.2    Unintended weight loss R63.4    Acute ischemic stroke ([facility] I63.9    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    Acute CVA (cerebrovascular accident) ([facility] I63.9    Hx of diabetic foot ulcer Z86.31    Hx of BKA, left ([facility] Z89.512    Acute on chronic diastolic heart failure (HCC) I50.33       Past Medical History:        Diagnosis Date    Diabetic peripheral neuropathy associated with type 2 diabetes mellitus ([facility] 2018    Epigastric hernia     History of blood transfusion     Hyperlipidemia     Hypertension     Kidney stone     Schizophrenia ([facility]     Type 2 diabetes mellitus with diabetic polyneuropathy, with long-term current use of insulin ([facility] 7745    Umbilical hernia        Past Surgical History:        Procedure Laterality Date    CARDIAC CATHETERIZATION  2016     [facility]    COLONOSCOPY  2016    Dr. Florence Rashid / [facility] / STONE Bilateral 10/28/2019    CYSTOSCOPY. RETROGRADE.  PYELOGRAM. BILATERAL STENT INSERTION performed by Atif Singh MD at [facility] / [facility] / [place] Bilateral 2020    CYSTOSCOPY RETROGRADE PYELOGRAM BILATERAL STENT EXCHANGE performed by Atif Singh MD at [address] Left 2020    LEG AMPUTATION BELOW KNEE performed by Rafal Naqvi DO at [address]  2016    epigastric hernia umbilical hernia with mesh    OVARIAN CYST REMOVAL      OVARY REMOVAL Right     OVARY REMOVAL      UPPER GASTROINTESTINAL ENDOSCOPY N/A 10/29/2019    EGD ESOPHAGOGASTRODUODENOSCOPY performed by Mandie Rader MD at [facility] History:    Social History     Tobacco Use    Smoking status: Former Smoker     Packs/day: 0.00     Years: 2.00     Pack years: 0.00     Types: Cigarettes     Last attempt to quit: 3/23/2015     Years since quittin.4    Smokeless tobacco: Never Used    Tobacco comment: quit smoking 2015   Substance Use Topics    Alcohol use: No     Comment: coffee daily Counseling given: Not Answered  Comment: quit smoking 2015      Vital Signs (Current):   Vitals:    08/26/20 0104 08/26/20 0415 08/26/20 0800 08/26/20 1109   BP: (!) 147/63 (!) 121/58 133/63 (!) 159/71   Pulse:  91 85 88   Resp:  18 18 18   Temp:  37.1 °C (98.8 °F) 37.3 °C (99.2 °F) 36.7 °C (98 °F)   TempSrc:  Temporal Temporal Temporal   SpO2:  97% 97% 99%   Weight:    110 lb (49.9 kg)   Height:    5' 3\" (1.6 m)                                              BP Readings from Last 3 Encounters:   08/26/20 (!) 159/71   08/22/20 (!) 174/68   08/17/20 122/60       NPO Status: Time of last liquid consumption: 2122                        Time of last solid consumption: 2121                        Date of last liquid consumption: 08/24/20                        Date of last solid food consumption: 08/24/20    BMI:   Wt Readings from Last 3 Encounters:   08/26/20 110 lb (49.9 kg)   08/22/20 113 lb (51.3 kg)   08/17/20 113 lb (51.3 kg)     Body mass index is 19.49 kg/m². CBC:   Lab Results   Component Value Date    WBC 4.1 08/25/2020    RBC 2.89 08/25/2020    HGB 7.7 08/25/2020    HCT 24.2 08/25/2020    MCV 83.7 08/25/2020    RDW 16.0 08/25/2020     08/25/2020       CMP:   Lab Results   Component Value Date     08/26/2020    K 3.5 08/26/2020    K 3.6 08/22/2020     08/26/2020    CO2 20 08/26/2020    BUN 9 08/26/2020    CREATININE 0.8 08/26/2020    GFRAA >60 08/26/2020    LABGLOM >60 08/26/2020    GLUCOSE 123 08/26/2020    GLUCOSE 171 04/26/2012    PROT 5.4 06/03/2020    CALCIUM 7.7 08/26/2020    BILITOT 0.5 06/03/2020    ALKPHOS 60 06/03/2020    AST 10 06/03/2020    ALT 7 06/03/2020       POC Tests: No results for input(s): POCGLU, POCNA, POCK, POCCL, POCBUN, POCHEMO, POCHCT in the last 72 hours.     Coags:   Lab Results   Component Value Date    PROTIME 13.0 08/22/2020    INR 1.2 08/22/2020    APTT 32.4 08/22/2020       HCG (If Applicable): No results found for: PREGTESTUR, PREGSERUM, HCG, HCGQUANT ABGs:   Lab Results   Component Value Date    PO2ART 57.6 02/25/2020    IIU4IVX 24.8 02/25/2020    YAV0CED 20.3 02/25/2020        Type & Screen (If Applicable):  No results found for: LABABO, LABRH    Drug/Infectious Status (If Applicable):  No results found for: HIV, HEPCAB    COVID-19 Screening (If Applicable):   Lab Results   Component Value Date    COVID19 Not Detected 05/29/2020         Anesthesia Evaluation  Patient summary reviewed and Nursing notes reviewed no history of anesthetic complications:   Airway: Mallampati: II  TM distance: >3 FB   Neck ROM: full  Mouth opening: > = 3 FB Dental:    (+) edentulous      Pulmonary: breath sounds clear to auscultation      (-) not a current smoker (FORMER)                           Cardiovascular:  Exercise tolerance: poor (<4 METS),   (+) hypertension:,       ECG reviewed  Rhythm: regular  Rate: normal  Echocardiogram reviewed  Stress test reviewed  Cleared by cardiology     Beta Blocker:  Dose within 24 Hrs         Neuro/Psych:   (+) CVA:, neuromuscular disease:, psychiatric history: stable with treatment            GI/Hepatic/Renal:   (+) renal disease: kidney stones,           Endo/Other:    (+) DiabetesType II DM, well controlled, using insulin, hyperthyroidism, blood dyscrasia (MICROCYTIC HYPOCHROMIC ANEMIA): anemia:., .          Pt had no PAT visit       Abdominal:       Abdomen: soft. Vascular:   + PVD, aortic or cerebral, . ROS comment: LLE ARTERIAL OCCLUSION. Anesthesia Plan      MAC     ASA 3       Induction: intravenous. ЕКАТЕРИНА    Anesthetic plan and risks discussed with patient. Use of blood products discussed with patient whom. Plan discussed with attending.                   BENJI Johnson - CRNA   8/26/2020

## 2020-08-27 VITALS
WEIGHT: 110 LBS | OXYGEN SATURATION: 97 % | HEART RATE: 118 BPM | RESPIRATION RATE: 18 BRPM | DIASTOLIC BLOOD PRESSURE: 66 MMHG | TEMPERATURE: 97.8 F | BODY MASS INDEX: 19.49 KG/M2 | SYSTOLIC BLOOD PRESSURE: 138 MMHG | HEIGHT: 63 IN

## 2020-08-27 LAB
ANION GAP SERPL CALCULATED.3IONS-SCNC: 14 MMOL/L (ref 7–16)
BUN BLDV-MCNC: 7 MG/DL (ref 8–23)
CALCIUM SERPL-MCNC: 8.1 MG/DL (ref 8.6–10.2)
CHLORIDE BLD-SCNC: 110 MMOL/L (ref 98–107)
CO2: 18 MMOL/L (ref 22–29)
CREAT SERPL-MCNC: 0.7 MG/DL (ref 0.5–1)
GFR AFRICAN AMERICAN: >60
GFR NON-AFRICAN AMERICAN: >60 ML/MIN/1.73
GLUCOSE BLD-MCNC: 86 MG/DL (ref 74–99)
HCT VFR BLD CALC: 23.5 % (ref 34–48)
HCT VFR BLD CALC: 27.7 % (ref 34–48)
HEMOGLOBIN: 7.3 G/DL (ref 11.5–15.5)
HEMOGLOBIN: 8.2 G/DL (ref 11.5–15.5)
MAGNESIUM: 1.7 MG/DL (ref 1.6–2.6)
MCH RBC QN AUTO: 26.3 PG (ref 26–35)
MCHC RBC AUTO-ENTMCNC: 31.1 % (ref 32–34.5)
MCV RBC AUTO: 84.5 FL (ref 80–99.9)
METER GLUCOSE: 74 MG/DL (ref 74–99)
METER GLUCOSE: 98 MG/DL (ref 74–99)
ORGANISM: ABNORMAL
PDW BLD-RTO: 16.3 FL (ref 11.5–15)
PLATELET # BLD: 375 E9/L (ref 130–450)
PMV BLD AUTO: 10.8 FL (ref 7–12)
POTASSIUM SERPL-SCNC: 3.3 MMOL/L (ref 3.5–5)
PRO-BNP: 3022 PG/ML (ref 0–125)
RBC # BLD: 2.78 E12/L (ref 3.5–5.5)
SODIUM BLD-SCNC: 142 MMOL/L (ref 132–146)
URINE CULTURE, ROUTINE: ABNORMAL
WBC # BLD: 5 E9/L (ref 4.5–11.5)

## 2020-08-27 PROCEDURE — 85027 COMPLETE CBC AUTOMATED: CPT

## 2020-08-27 PROCEDURE — 82962 GLUCOSE BLOOD TEST: CPT

## 2020-08-27 PROCEDURE — 2500000003 HC RX 250 WO HCPCS: Performed by: INTERNAL MEDICINE

## 2020-08-27 PROCEDURE — 36415 COLL VENOUS BLD VENIPUNCTURE: CPT

## 2020-08-27 PROCEDURE — 6370000000 HC RX 637 (ALT 250 FOR IP): Performed by: INTERNAL MEDICINE

## 2020-08-27 PROCEDURE — 6370000000 HC RX 637 (ALT 250 FOR IP): Performed by: FAMILY MEDICINE

## 2020-08-27 PROCEDURE — 80048 BASIC METABOLIC PNL TOTAL CA: CPT

## 2020-08-27 PROCEDURE — 85014 HEMATOCRIT: CPT

## 2020-08-27 PROCEDURE — 83735 ASSAY OF MAGNESIUM: CPT

## 2020-08-27 PROCEDURE — 83880 ASSAY OF NATRIURETIC PEPTIDE: CPT

## 2020-08-27 PROCEDURE — 85018 HEMOGLOBIN: CPT

## 2020-08-27 PROCEDURE — 6360000002 HC RX W HCPCS: Performed by: INTERNAL MEDICINE

## 2020-08-27 RX ORDER — SULFAMETHOXAZOLE AND TRIMETHOPRIM 800; 160 MG/1; MG/1
1 TABLET ORAL EVERY 12 HOURS SCHEDULED
Status: DISCONTINUED | OUTPATIENT
Start: 2020-08-27 | End: 2020-08-27 | Stop reason: HOSPADM

## 2020-08-27 RX ORDER — SPIRONOLACTONE 25 MG/1
25 TABLET ORAL DAILY
Qty: 30 TABLET | Refills: 3 | Status: SHIPPED | OUTPATIENT
Start: 2020-08-28 | End: 2020-10-20 | Stop reason: SDUPTHER

## 2020-08-27 RX ORDER — ACETAMINOPHEN 325 MG/1
650 TABLET ORAL EVERY 4 HOURS PRN
Status: DISCONTINUED | OUTPATIENT
Start: 2020-08-27 | End: 2020-08-27 | Stop reason: HOSPADM

## 2020-08-27 RX ORDER — SULFAMETHOXAZOLE AND TRIMETHOPRIM 800; 160 MG/1; MG/1
1 TABLET ORAL EVERY 12 HOURS SCHEDULED
Qty: 20 TABLET | Refills: 0 | Status: SHIPPED | OUTPATIENT
Start: 2020-08-27 | End: 2020-10-20 | Stop reason: SDUPTHER

## 2020-08-27 RX ORDER — ASPIRIN 81 MG/1
81 TABLET, CHEWABLE ORAL DAILY
Qty: 30 TABLET | Refills: 3 | Status: SHIPPED | OUTPATIENT
Start: 2020-08-28 | End: 2021-01-11 | Stop reason: SDUPTHER

## 2020-08-27 RX ORDER — POTASSIUM CHLORIDE 20 MEQ/1
40 TABLET, EXTENDED RELEASE ORAL ONCE
Status: COMPLETED | OUTPATIENT
Start: 2020-08-27 | End: 2020-08-27

## 2020-08-27 RX ADMIN — HYDRALAZINE HYDROCHLORIDE 50 MG: 50 TABLET, FILM COATED ORAL at 12:26

## 2020-08-27 RX ADMIN — ENALAPRILAT 1.25 MG: 1.25 INJECTION INTRAVENOUS at 01:23

## 2020-08-27 RX ADMIN — Medication 100 MG: at 08:25

## 2020-08-27 RX ADMIN — OXYBUTYNIN CHLORIDE 5 MG: 5 TABLET ORAL at 13:47

## 2020-08-27 RX ADMIN — HYDRALAZINE HYDROCHLORIDE 50 MG: 50 TABLET, FILM COATED ORAL at 05:57

## 2020-08-27 RX ADMIN — ENOXAPARIN SODIUM 40 MG: 40 INJECTION SUBCUTANEOUS at 08:25

## 2020-08-27 RX ADMIN — LISINOPRIL 20 MG: 20 TABLET ORAL at 08:25

## 2020-08-27 RX ADMIN — ENALAPRILAT 1.25 MG: 1.25 INJECTION INTRAVENOUS at 13:47

## 2020-08-27 RX ADMIN — ENALAPRILAT 1.25 MG: 1.25 INJECTION INTRAVENOUS at 08:25

## 2020-08-27 RX ADMIN — SULFAMETHOXAZOLE AND TRIMETHOPRIM 1 TABLET: 800; 160 TABLET ORAL at 12:26

## 2020-08-27 RX ADMIN — FOLIC ACID 1 MG: 1 TABLET ORAL at 08:25

## 2020-08-27 RX ADMIN — METOPROLOL SUCCINATE 25 MG: 25 TABLET, EXTENDED RELEASE ORAL at 08:25

## 2020-08-27 RX ADMIN — Medication 50 MG: at 08:25

## 2020-08-27 RX ADMIN — ASPIRIN 81 MG: 81 TABLET, CHEWABLE ORAL at 08:25

## 2020-08-27 RX ADMIN — MEGESTROL ACETATE 20 MG: 20 TABLET ORAL at 08:25

## 2020-08-27 RX ADMIN — INSULIN LISPRO 4 UNITS: 100 INJECTION, SOLUTION INTRAVENOUS; SUBCUTANEOUS at 08:30

## 2020-08-27 RX ADMIN — SPIRONOLACTONE 25 MG: 25 TABLET ORAL at 08:25

## 2020-08-27 RX ADMIN — POTASSIUM CHLORIDE 40 MEQ: 1500 TABLET, EXTENDED RELEASE ORAL at 15:33

## 2020-08-27 RX ADMIN — OXYBUTYNIN CHLORIDE 5 MG: 5 TABLET ORAL at 08:25

## 2020-08-27 RX ADMIN — CLOPIDOGREL 75 MG: 75 TABLET, FILM COATED ORAL at 08:25

## 2020-08-27 RX ADMIN — FERROUS SULFATE TAB 325 MG (65 MG ELEMENTAL FE) 325 MG: 325 (65 FE) TAB at 08:25

## 2020-08-27 RX ADMIN — ACETAMINOPHEN 650 MG: 325 TABLET, FILM COATED ORAL at 10:56

## 2020-08-27 ASSESSMENT — PAIN SCALES - GENERAL
PAINLEVEL_OUTOF10: 0
PAINLEVEL_OUTOF10: 7
PAINLEVEL_OUTOF10: 0

## 2020-08-27 ASSESSMENT — PAIN DESCRIPTION - PAIN TYPE: TYPE: ACUTE PAIN

## 2020-08-27 ASSESSMENT — PAIN DESCRIPTION - FREQUENCY: FREQUENCY: INTERMITTENT

## 2020-08-27 ASSESSMENT — PAIN DESCRIPTION - PROGRESSION: CLINICAL_PROGRESSION: GRADUALLY IMPROVING

## 2020-08-27 ASSESSMENT — PAIN - FUNCTIONAL ASSESSMENT: PAIN_FUNCTIONAL_ASSESSMENT: ACTIVITIES ARE NOT PREVENTED

## 2020-08-27 ASSESSMENT — PAIN DESCRIPTION - ONSET: ONSET: ON-GOING

## 2020-08-27 ASSESSMENT — PAIN DESCRIPTION - LOCATION: LOCATION: HEAD

## 2020-08-27 ASSESSMENT — PAIN DESCRIPTION - DESCRIPTORS: DESCRIPTORS: HEADACHE

## 2020-08-27 NOTE — PROGRESS NOTES
Hospitalist Progress Note  [facility]URO IMCU       Patient: Caio Patino  Unit/Bed: 3621/5263-A  YOB: 1960  MRN: [id number]  Acct: [de-identified]   AdmittingDiagnosis: Acute CVA (cerebrovascular accident) [facility]) [I63.9]  Acute CVA (cerebrovascular accident) [facility]) [I63.9]  Admit Date: 8/22/2020  Hospital Day: 5    Subjective:      Patient Seen, Chart, Labs, Radiology studies, and Consults reviewed. Objective:   BP (!) 142/68   Pulse 88   Temp 98.4 °F (36.9 °C) (Temporal)   Resp 18   Ht 5' 3\" (1.6 m)   Wt 110 lb (49.9 kg)   SpO2 96%   BMI 19.49 kg/m²       Intake/Output Summary (Last 24 hours) at 8/27/2020 0945  Last data filed at 8/27/2020 0422  Gross per 24 hour   Intake 370 ml   Output --   Net 370 ml       Physical Exam  General appearance:  No acute distress, breathing without difficulty  HEENT:  Normal cephalic, atraumatic without obvious deformity. EOMI  Neck: Supple, with full range of motion. No jugular venous distention. Trachea midline. Respiratory:  Normal respiratory effort. Clear to auscultation  Cardiovascular:  Regular rate and rhythm  Abdomen: Soft, non-tender, non-distended with normal bowel sounds. Musculoskeletal:  No clubbing, cyanosis or edema bilaterally. Full range of motion without deformity. Skin: Skin color, texture, turgor normal.  No rashes or lesions. Neurologic:  Neurovascularly intact without any focal sensory/motor deficits. Cranial nerves: II-XII intact  Psychiatric:  Alert and oriented, thought content appropriate, normal insight    Result Date: 8/22/2020  EXAMINATION: CT OF THE HEAD WITHOUT CONTRAST  8/22/2020 4:45 am TECHNIQUE: CT of the head was performed without the administration of intravenous contrast. Dose modulation, iterative reconstruction, and/or weight based adjustment of the mA/kV was utilized to reduce the radiation dose to as low as reasonably achievable. COMPARISON: None.  HISTORY: ORDERING SYSTEM PROVIDED HISTORY: focal deficit TECHNOLOGIST PROVIDED HISTORY: Reason for exam:->focal deficit Has a \"code stroke\" or \"stroke alert\" been called? ->Yes FINDINGS: BRAIN/VENTRICLES: There is no acute intracranial hemorrhage, mass effect or midline shift. No abnormal extra-axial fluid collection. The gray-white differentiation is maintained without evidence of an acute infarct. There is no evidence of hydrocephalus. Mild diffuse decrease in cerebral volume is noted with corresponding prominence of the sulci and ventricles. Left putamen elongated remote lacunar infarct is noted which measures up to 8 mm in diameter. ORBITS: The visualized portion of the orbits demonstrate no acute abnormality. SINUSES: The visualized paranasal sinuses and mastoid air cells demonstrate no acute abnormality. SOFT TISSUES/SKULL:  No acute abnormality of the visualized skull or soft tissues. No acute intracranial abnormality. Mild diffuse cerebral atrophy. Left putamen 8 mm diameter remote lacunar infarct. Xr Chest Portable    Result Date: 2020  EXAMINATION: ONE XRAY VIEW OF THE CHEST 2020 4:59 am COMPARISON: Chest radiograph May 29, 2020 HISTORY: ORDERING SYSTEM PROVIDED HISTORY: AMS TECHNOLOGIST PROVIDED HISTORY: Reason for exam:->AMS FINDINGS: The lungs are without acute focal process. There is no effusion or pneumothorax. The cardiomediastinal silhouette is without acute process. The osseous structures are without acute process. No acute process. Cta Neck W Contrast    Result Date: 2020  Patient MRN:  [id number] : 1960 Age: 63 years Gender: Female Order Date:  2020 7:32 AM EXAM: CTA NECK W CONTRAST, CTA HEAD W CONTRAST NUMBER OF IMAGES:  18 INDICATION:  CVA CVA COMPARISON: None Technique: Low-dose CT  acquisition technique included one of following options; 1 . Automated exposure control, 2. Adjustment of MA and or KV according to patient's size or 3. Use of iterative reconstruction.  Contiguous spiral images were obtained in the axial plane, following the administration of intravenous contrast using CT angiographic protocol. Sagittal and coronal images were reconstructed from the axial plane acquisition. Additional MIP reconstructions were presented to aid in the interpretation of this study. Images were obtained from the skull base cranially. There is mild calcified plaque identified in the vessels compatible with atherosclerotic disease. There is aortic arch and great vessels demonstrate mild atherosclerotic disease. The right carotid is unremarkable. The left carotid is unremarkable. The right vertebral artery is unremarkable. The left vertebral artery is unremarkable. The basilar artery is unremarkable. The middle cerebral arteries are abnormal on the right. There is a right M3 occlusion. This is difficult to clearly visualize on the CT of the head. Otherwise the middle cerebral artery is unremarkable The anterior cerebral arteries are unremarkable. The posterior cerebral arteries are unremarkable. 1.  Right middle cerebral artery M 3 occlusion . 2. Estimated stenosis by NASCET criteria is not hemodynamically significant     Ct Brain Perfusion    Result Date: 2020  Patient MRN: [id number] : 1960 Age:  63 years Gender: Female Order Date: 2020 7:32 AM Exam: CT BRAIN PERFUSION Number of Images: 348 views Indication:   cva - bernice alert cva - bernice alert Comparison: None. Findings: Perfusion images demonstrate asymmetric blood volume Blood flow images demonstrate asymmetric blood flow There is significant ischemic number identified. In the right posterior frontal lobe There is significant core infarct identified.      Central core infarct with significant peripheral ischemic penumbra     Cta Head W Contrast    Result Date: 2020  Patient MRN:  [id number] : 1960 Age: 63 years Gender: Female Order Date:  2020 7:32 AM EXAM: CTA NECK W CONTRAST, CTA HEAD W CONTRAST NUMBER OF IMAGES:  826 INDICATION:  CVA CVA COMPARISON: None Technique: Low-dose CT  acquisition technique included one of following options; 1 . Automated exposure control, 2. Adjustment of MA and or KV according to patient's size or 3. Use of iterative reconstruction. Contiguous spiral images were obtained in the axial plane, following the administration of intravenous contrast using CT angiographic protocol. Sagittal and coronal images were reconstructed from the axial plane acquisition. Additional MIP reconstructions were presented to aid in the interpretation of this study. Images were obtained from the skull base cranially. There is mild calcified plaque identified in the vessels compatible with atherosclerotic disease. There is aortic arch and great vessels demonstrate mild atherosclerotic disease. The right carotid is unremarkable. The left carotid is unremarkable. The right vertebral artery is unremarkable. The left vertebral artery is unremarkable. The basilar artery is unremarkable. The middle cerebral arteries are abnormal on the right. There is a right M3 occlusion. This is difficult to clearly visualize on the CT of the head. Otherwise the middle cerebral artery is unremarkable The anterior cerebral arteries are unremarkable. The posterior cerebral arteries are unremarkable. 1.  Right middle cerebral artery M 3 occlusion .  2. Estimated stenosis by NASCET criteria is not hemodynamically significant       Diet:  DIET DYSPHAGIA PUREED;    Medications:    aspirin  81 mg Oral Daily    cloNIDine  1 patch Transdermal Weekly    ferrous sulfate  325 mg Oral Daily with breakfast    folic acid  1 mg Oral Daily    hydrALAZINE  50 mg Oral 4x Daily    lisinopril  20 mg Oral Daily    megestrol  20 mg Oral BID    oxybutynin  5 mg Oral TID    thiamine  100 mg Oral Daily    pyridoxine  50 mg Oral Daily    enalaprilat  1.25 mg Intravenous 4 times per day    spironolactone  25 mg Oral Daily    sodium chloride flush  10 mL Intravenous 2 times per day    enoxaparin  40 mg Subcutaneous Daily    atorvastatin  80 mg Oral Nightly    clopidogrel  75 mg Oral Daily    metoprolol succinate  25 mg Oral Daily    insulin lispro  0.08 Units/kg Subcutaneous TID WC    insulin lispro  0-12 Units Subcutaneous TID WC    insulin lispro  0-6 Units Subcutaneous Nightly     Continuous Infusions:   dextrose         PRNMeds:sodium chloride flush, polyethylene glycol, promethazine **OR** ondansetron, labetalol, perflutren lipid microspheres, glucose, dextrose, glucagon (rDNA), dextrose  DVT Prophylaxis:pharmacologic prophylaxis (with any of the following: enoxaparin (Lovenox) 40mg SQ 2h prior to surgery then QD) and Encourage ambulation, low risk for DVT, no chemical or mechanical prophylaxis necessary    Data:  CBC:  Recent Labs     08/25/20  0531   WBC 4.1*   RBC 2.89*   HGB 7.7*   HCT 24.2*   MCV 83.7   RDW 16.0*        BMP:  Recent Labs     08/25/20  0531 08/26/20  0714    143   K 3.5 3.5   * 111*   CO2 21* 20*   BUN 11 9   CREATININE 0.8 0.8     BNP: No results for input(s): BNP in the last 72 hours. PT/INR:   No results for input(s): PROTIME, INR in the last 72 hours.  :   No results for input(s): APTT in the last 72 hours. CARDIAC ENZYMES:No results for input(s): CKMB, CKMBINDEX, TROPONINT in the last 72 hours. Invalid input(s): CKTOTAL;3  FASTING LIPID PANEL:  Lab Results   Component Value Date    CHOL 122 08/23/2020    HDL 32 08/23/2020    TRIG 111 08/23/2020     LIVER PROFILE: No results for input(s): AST, ALT, ALB, BILIDIR, BILITOT, ALKPHOS in the last 72 hours.    ABGs:   Lab Results   Component Value Date    PH 7.522 05/29/2020    PCO2 23.4 05/29/2020    PO2 100.5 05/29/2020    HCO3 18.8 05/29/2020    O2SAT 97.9 05/29/2020                                                ASSESSMENT:  -Acute ischemic stroke  -Acute on chronic diastolic heart failure  -Urinary tract infection  -Hypertension  -Insulin-dependent diabetes mellitus  -Hyperlipidemia  -Anemia, s/p transfusion PRBC    PLAN:  -Start Bactrim twice daily  -Neurology and EP have signed off  -Monitor hemoglobin levels  -Check FOBT  -DAPT x 21 days, then ASA monotherapy  -Continue statin  -OP with EP for 30-day monitor  -Stroke clinic F/U on discharge    CODE STATUS: Full      Time Spent: 45 minutes  signed by Óscar Ortze DO on 8/27/2020 at [facility]

## 2020-08-27 NOTE — PLAN OF CARE
ЕКАТЕРИНА negative for cardio-embolic source of stroke     She will f/u OP with EP for 30 day monitor.      DAPT x 21 days, then ASA monotherapy      Continue statin      Risk factor control      Stroke education     Stroke clinic f/u on discharge     Okay to d/c from neuro POV    Neuro will sign off, please call with questions     Laly Becker PA-C

## 2020-08-27 NOTE — CARE COORDINATION
Notified [facility] with [facility] of patient discharging home and orders for [address] placed. They will follow up after discharge.

## 2020-08-28 ENCOUNTER — CARE COORDINATION (OUTPATIENT)
Dept: CASE MANAGEMENT | Age: 60
End: 2020-08-28

## 2020-08-28 ENCOUNTER — TELEPHONE (OUTPATIENT)
Dept: NON INVASIVE DIAGNOSTICS | Age: 60
End: 2020-08-28

## 2020-08-28 NOTE — DISCHARGE SUMMARY
Hospitalist Discharge Summary    Patient ID: Bailey Burr   Patient : 1960  Patient's PCP: Lee Ann Doyle MD    Admit Date: 2020   Admitting Physician: Ahser Juárez MD    Discharge Date:  2020  Discharge Physician: Gracy Hummel DO   Discharge Condition: Stable  Discharge Disposition: [facility] course in brief:  (Please refer to daily progress notes for a comprehensive review of the hospitalization by requesting medical records)  Subjective: Bailey Burr is seen in hospital follow-up and management of:  Cryptogenic CVA      20-- Initial hospital consult: The patient is Charlott Husbands significant past medical history of hypertension, diabetes mellitus and peripheral vascular disease status post left below-knee amputation who presents to the hospital on 20 complaining of difficulty speaking. Per note, she woke up on 20 with difficulty speaking. She  presented to [facility] ED before she was transferred to [facility] for further management. She was seen by Neurology and underwent CTA of her head and neck which showed a left M3 occlusion. She was out of the window for TPA and not an IR candidate. She denies previous history of AF. Thus far no AF was seen on telemetry monitor. Cardiac electrophysiology service is consulted for ЕКАТЕРИНА and possible ILR placement. The patient denies chest pain, dyspnea on exertion, palpitations, lightheadedness, dizziness, syncope, PND, or orthopnea.     2020: Bailey Burr is seen in hospital follow up, she has not had any atrial arrhythmias and does not have any cardiac complaints at this time.       2020: Bailey Burr is seen in hospital follow-up, she has not had any atrial arrhythmias to this point nor does she have any cardiac complaints.   After a prolonged discussion with both the patient and her daughter at bedside they have decided to forego placement of an ILR but would like to proceed with ЕКАТЕРИНА for evaluation of suspected cardioembolic CVA. They are willing to utilize a 30-day monitor but do not want prolonged rhythm monitoring at this time. They have been informed of the risk of recurrent atrial fibrillation and the risk benefits and alternatives of ILR placement and wished  for the 30-day monitor  At this time and appear to understand risk of recurrent AF when off [address]       Patient seen with the ANP. Agree with the findings, assessment and plan. Management plan was discussed. I have personally interviewed the patient, independently performed a focused cardiac examination, reviewed the pertinent laboratory and diagnostic testing with the patient and directly participated in the medical decision-making as noted above with the following additions:      Feeling well. Denies any chest pain or dyspnea. Would like to hold off ILR insertion and proceed with 30 day outpatient cardiac monitor first. ЕКАТЕРИНА showed no interatrial shunt.     Physical exam showed no JVD, RRR, normal S1S2, no murmur, clear lungs bilaterally, no edema     Will schedule 30 day outpatient cardiac monitor and consider implantable loop recorder insertion if 30 day monitor is negative. EP will sign off. Please call for any questions or concerns. Signed              Show:Clear all  [x]Manual[]Template[]Copied    Added by:  JESSICA Smith    []Terry for details  Spoke with patient and her  at the bedside.      Patient is feeling well and denies any neurologic problems.  Her speech is still mildly dysarthric.      She is planned to have ЕКАТЕРИНА and LINQ placement if ЕКАТЕРИНА is negtaive     Plan:  ЕКАТЕРИНА-- if negative LINQ              EP is following      DAPT x 21 days, then ASA monotherapy      Continue statin      Risk factor control      Stroke education     Stroke clinic f/u on discharge      Okay to discharge from neuro once ЕКАТЕРИНА/LINQ completed                Consults:   IP CONSULT TO NEUROLOGY  IP CONSULT TO INTERNAL MEDICINE  IP CONSULT TO NEUROLOGY  IP CONSULT Adjustment of MA and or KV according to patient's size or 3. Use of iterative reconstruction. Contiguous spiral images were obtained in the axial plane, following the administration of intravenous contrast using CT angiographic protocol. Sagittal and coronal images were reconstructed from the axial plane acquisition. Additional MIP reconstructions were presented to aid in the interpretation of this study. Images were obtained from the skull base cranially. There is mild calcified plaque identified in the vessels compatible with atherosclerotic disease. There is aortic arch and great vessels demonstrate mild atherosclerotic disease. The right carotid is unremarkable. The left carotid is unremarkable. The right vertebral artery is unremarkable. The left vertebral artery is unremarkable. The basilar artery is unremarkable. The middle cerebral arteries are abnormal on the right. There is a right M3 occlusion. This is difficult to clearly visualize on the CT of the head. Otherwise the middle cerebral artery is unremarkable The anterior cerebral arteries are unremarkable. The posterior cerebral arteries are unremarkable. 1.  Right middle cerebral artery M 3 occlusion . 2. Estimated stenosis by NASCET criteria is not hemodynamically significant     Ct Brain Perfusion    Result Date: 2020  Patient MRN: [id number] : 1960 Age:  63 years Gender: Female Order Date: 2020 7:32 AM Exam: CT BRAIN PERFUSION Number of Images: 659 views Indication:   cva - bernice alert cva - bernice alert Comparison: None. Findings: Perfusion images demonstrate asymmetric blood volume Blood flow images demonstrate asymmetric blood flow There is significant ischemic number identified. In the right posterior frontal lobe There is significant core infarct identified.      Central core infarct with significant peripheral ischemic penumbra     Cta Head W Contrast    Result Date: 2020  Patient MRN:  [id number] : 1960 Age: 63 years contrast. Contrast: None. Comparison: MRI dated 6/1/2020. Correlation with CTA and CT perfusion studies dated 8/22/2020. FINDINGS:  A small area of restricted diffusion and corresponding hyperintense T2-weighted FLAIR signal is seen in the right frontal operculum extending into the corona radiata, compatible with a subacute infarct. There is no evidence of hemorrhagic conversion. No intercranial mass lesion or mass effect is seen. Tiny cystic foci with adjacent hyperintense T2-weighted FLAIR signal are seen in the left basal ganglia, present on the prior MRI and compatible with chronic lacunar infarcts. The T2-weighted FLAIR images are degraded by artifact but reveal a small hyperintense focus with corresponding hypointense T1-weighted signal in the medial aspect of the left middle cerebellar peduncle, suggestive of a subacute to chronic infarct. A tiny focus of restricted diffusion is seen in the left cerebellar hemisphere, which may represent an acute or subacute infarct. There is mild parenchymal volume loss. The sella is empty. Small subacute infarct in the right frontal operculum and nearby white matter. Subacute to chronic infarct in the left middle cerebellar peduncle. Possible tiny acute infarct in the left cerebellar hemisphere. Chronic lacunar infarcts in the left basal ganglia. Empty sella. Discharge Medications:      Medication List      START taking these medications    aspirin 81 MG chewable tablet  Take 1 tablet by mouth daily     spironolactone 25 MG tablet  Commonly known as:  ALDACTONE  Take 1 tablet by mouth daily     sulfamethoxazole-trimethoprim 800-160 MG per tablet  Commonly known as:   Bactrim DS  Take 1 tablet by mouth every 12 hours for 10 days        CONTINUE taking these medications    atorvastatin 80 MG tablet  Commonly known as:  LIPITOR  Take 1 tablet by mouth nightly     cloNIDine 0.3 MG/24HR Ptwk  Commonly known as:  CATAPRES  Place 1 patch onto the skin once a week     * clopidogrel 75 MG tablet  Commonly known as:  PLAVIX  Take 1 tablet by mouth once daily     * clopidogrel 75 MG tablet  Commonly known as:  PLAVIX  Take 1 tablet by mouth daily     ferrous sulfate 325 (65 Fe) MG tablet  Commonly known as:  IRON 325  Take 1 tablet by mouth daily (with breakfast)     folic acid 1 MG tablet  Commonly known as:  FOLVITE  Take 1 tablet by mouth daily     hydrALAZINE 50 MG tablet  Commonly known as:  APRESOLINE  Take 1 tablet by mouth 4 times daily     lisinopril 20 MG tablet  Commonly known as:  PRINIVIL;ZESTRIL  Take 1 tablet by mouth daily     megestrol 20 MG tablet  Commonly known as:  MEGACE  Take 1 tablet by mouth 2 times daily     metoprolol tartrate 25 MG tablet  Commonly known as:  LOPRESSOR  Take 1 tablet by mouth 2 times daily     oxybutynin 5 MG tablet  Commonly known as:  DITROPAN  Take 1 tablet by mouth 3 times daily     pyridoxine 50 MG tablet  Commonly known as:  B-6  Take 1 tablet by mouth daily     thiamine 100 MG tablet  Take 1 tablet by mouth daily         * This list has 2 medication(s) that are the same as other medications prescribed for you. Read the directions carefully, and ask your doctor or other care provider to review them with you. STOP taking these medications    benazepril 10 MG tablet  Commonly known as:  LOTENSIN           Where to Get Your Medications      These medications were sent to [facility] [address] ADDISON HUERTA [address] [place] [address], [place] - [address] [facility] Nora Chavezadrián [phone] Emili Ismaamy [phone]  [address] [facility]) [address] [facility]    Phone:  [phone]   · aspirin 81 MG chewable tablet  · spironolactone 25 MG tablet  · sulfamethoxazole-trimethoprim 800-160 MG per tablet         Time Spent on discharge is more than 35 minutes in the examination, evaluation, counseling and review of medications and discharge plan. NOTE: This report was transcribed using voice recognition software.  Every effort was made to ensure accuracy; however, inadvertent computerized transcription errors may be present.

## 2020-08-28 NOTE — CARE COORDINATION
[facility] Transitions Initial Follow Up Call    Call within 2 business days of discharge: Yes    Patient: Sunni Judd Patient : 1960   MRN: [id number]  Reason for Admission: acute ischemic stroke   Discharge Date: 20 RARS: Readmission Risk Score: 35      Last Discharge [facility]       Complaint Diagnosis Description Type Department Provider    20 Aphasia Acute ischemic stroke [facility]) ED to Hosp-Admission (Discharged) (ADMITTED) [facility] [address] [address] Hailey BROWER DO; Ricardo Sever Eggl. .. 20 Speech Problem Cerebrovascular accident (CVA), unspecified mechanism [facility]) ED (TRANSFER) [facility] ED Lynn Eastman DO        Spoke with: [address]: [address]     Non-face-to-face services provided:  Scheduled appointment with PCP-stated her daughters will call to schedule based off their work schedules. Obtained and reviewed discharge summary and/or continuity of care documents    Care Transitions 24 Hour Call    Schedule Follow Up Appointment with PCP:  Declined  Do you have any ongoing symptoms?:  No  Do you have a copy of your discharge instructions?:  Yes  Do you have all of your prescriptions and are they filled?:  Yes  Have you been contacted by a [facility] Pharmacist?:  No  Have you scheduled your follow up appointment?:  No  Were you discharged with any Home Care or Post Acute Services:  Yes  Post Acute Services:  Home Health (Comment: [facility])  Patient DME:  Alonzozita Ricki you have support at home?:  Child, Partner/Spouse/SO  Do you feel like you have everything you need to keep you well at home?:  Yes  Are you an active caregiver in your home?:  No  Care Transitions Interventions  No Identified Needs     Initially spoke with Sofia's  Dixie Shi who stated he was admitted yesterday for an apparent heart attack, he asked CTN to contact their other listed number or their daughter. CTN in agreement.     Spoke with Silvina Billings for initial [facility] care transition call post hospital discharge. Explained the role of Care Transition Nurse and the BPCI-A program, she is agreeable to follow up post discharge from the hospital. Sheba Bennett reports that she is feeling \"better\" today. She stated he daughters are staying with her while her  is hospitalized. She denies any further issues or concerns with her speech or strength since discharge. She stated her daughter will pick her medications up after work today. Med review not completed at this time per Sofia's request. She stated that [facility] will begin services on Monday, 8/31/2020. ANDREW inquired if she had a message from [place] at the EP office, she stated she did not, she does not have a pencil nearby to write down Anila's contact information. ANDREW will reach out to [place] and ask her to re-call Sheba Bennett at [phone] since her  has the other phone at the hospital with him. ANDREW explained that a member of the Care Transition Central Team will be contacting her for further follow up calls, Sheba Camachokuldeep is in agreement and denies any other needs or concerns at this time. Spoke with [place] at [facility], she stated she will call Sheba Bennett back regarding the 30 day event monitor.      Follow Up  Future Appointments   Date Time Provider Naomi Alvarado   11/17/2020  9:45 AM Samantha Robertson MD [facility]       Marcelo Pride RN

## 2020-09-04 ENCOUNTER — CARE COORDINATION (OUTPATIENT)
Dept: CASE MANAGEMENT | Age: 60
End: 2020-09-04

## 2020-09-04 NOTE — CARE COORDINATION
[facility] Transitions Follow Up Call    2020     Patient: Cindy Hameed  Patient : 1960   MRN: <[id number]>  Reason for Admission:   Discharge Date: 20 RARS: Readmission Risk Score: 28         Spoke with: Cindy Hameed, patient    Care Transitions Subsequent and Final Call    Subsequent and Final Calls  Have your medications changed?:  No  Do you have any questions related to your medications?:  No  Are you currently active with any services?:  Home Health  Do you have any needs or concerns that I can assist you with?:  No  Identified Barriers:  None  Care Transitions Interventions  Other Interventions: Follow Up:  Contacted patient for BPCI-A follow up. Mustapha Benoit stated she has been doing good. She denies having any lightheadedness or dizziness but reports that she has \"slight headaches\"at times. She is taking Tylenol as needed. She denies having any shortness of breath, numbness, tingling. She reports she is eating well and drinking adequate amounts of fluid. She denies having any difficulty with swallowing. Discussed signs/symptoms and when to contact MD with any new or worsening symptoms. She verbalized understanding. She does not have any questions or concerns at this time. Will continue to follow.       Future Appointments   Date Time Provider Naomi Alvarado   2020  9:45 AM Sherin Herr MD [facility]       Niyah Chow RN

## 2020-09-15 ENCOUNTER — CARE COORDINATION (OUTPATIENT)
Dept: CASE MANAGEMENT | Age: 60
End: 2020-09-15

## 2020-09-22 ENCOUNTER — CARE COORDINATION (OUTPATIENT)
Dept: CASE MANAGEMENT | Age: 60
End: 2020-09-22

## 2020-09-22 NOTE — CARE COORDINATION
[facility] Transitions Follow Up Call    2020    Patient: Farheen Frederick  Patient : 1960   MRN: <[id number]>  Reason for Admission:   Discharge Date: 20 RARS: Readmission Risk Score: 35    Follow Up: Attempted to contact patient for BPCI-A follow up. Unable to reach patient. Left message with contact information and request for call back. Received return phone call from patient. Dee Dee Méndez stated she is doing good. She placed her daughter on the phone who was short with CTN. Daughter reports her mother continues to work with therapy and is doing well. She denies her mother having any c/o numbness, tingling, lightheadedness, headaches or difficulty swallowing. She is eating and drinking w/o issues. Daughter asked why we continue to call. Provided explanation of BPCI-A bundle and 90 day follow up. Daughter stated it is okay to continue to call. No questions or concerns at this time. Will continue to follow.       Future Appointments   Date Time Provider Naomi Alvarado   2020  9:45 AM Magdalena León MD [facility]       Narda Vanegas RN

## 2020-10-06 ENCOUNTER — CARE COORDINATION (OUTPATIENT)
Dept: CASE MANAGEMENT | Age: 60
End: 2020-10-06

## 2020-10-06 NOTE — CARE COORDINATION
[facility] Transitions Follow Up Call    10/6/2020    Patient: Fam Matute  Patient : 1960   MRN: [id number]  Reason for Admission:   Discharge Date: 20 RARS: Readmission Risk Score: 35       Attempted to reach patient by phone for follow up; BPCI-A. HIPAA compliant message left on patient's voicemail; CTN contact information provided.

## 2020-10-13 ENCOUNTER — CARE COORDINATION (OUTPATIENT)
Dept: CASE MANAGEMENT | Age: 60
End: 2020-10-13

## 2020-10-20 ENCOUNTER — OFFICE VISIT (OUTPATIENT)
Dept: FAMILY MEDICINE CLINIC | Age: 60
End: 2020-10-20
Payer: MEDICARE

## 2020-10-20 VITALS
HEIGHT: 63 IN | DIASTOLIC BLOOD PRESSURE: 78 MMHG | SYSTOLIC BLOOD PRESSURE: 138 MMHG | BODY MASS INDEX: 20.2 KG/M2 | HEART RATE: 68 BPM | RESPIRATION RATE: 20 BRPM | WEIGHT: 114 LBS | OXYGEN SATURATION: 98 %

## 2020-10-20 PROCEDURE — 99214 OFFICE O/P EST MOD 30 MIN: CPT | Performed by: FAMILY MEDICINE

## 2020-10-20 PROCEDURE — G8427 DOCREV CUR MEDS BY ELIG CLIN: HCPCS | Performed by: FAMILY MEDICINE

## 2020-10-20 PROCEDURE — 90686 IIV4 VACC NO PRSV 0.5 ML IM: CPT | Performed by: FAMILY MEDICINE

## 2020-10-20 PROCEDURE — 3017F COLORECTAL CA SCREEN DOC REV: CPT | Performed by: FAMILY MEDICINE

## 2020-10-20 PROCEDURE — 1036F TOBACCO NON-USER: CPT | Performed by: FAMILY MEDICINE

## 2020-10-20 PROCEDURE — G0008 ADMIN INFLUENZA VIRUS VAC: HCPCS | Performed by: FAMILY MEDICINE

## 2020-10-20 PROCEDURE — G8420 CALC BMI NORM PARAMETERS: HCPCS | Performed by: FAMILY MEDICINE

## 2020-10-20 PROCEDURE — G8482 FLU IMMUNIZE ORDER/ADMIN: HCPCS | Performed by: FAMILY MEDICINE

## 2020-10-20 RX ORDER — SPIRONOLACTONE 25 MG/1
25 TABLET ORAL DAILY
Qty: 30 TABLET | Refills: 5 | Status: SHIPPED
Start: 2020-10-20 | End: 2021-07-13 | Stop reason: SDUPTHER

## 2020-10-20 RX ORDER — LISINOPRIL 20 MG/1
20 TABLET ORAL DAILY
Qty: 30 TABLET | Refills: 3 | Status: CANCELLED | OUTPATIENT
Start: 2020-10-20

## 2020-10-20 RX ORDER — BENAZEPRIL HYDROCHLORIDE 10 MG/1
10 TABLET ORAL DAILY
COMMUNITY
End: 2020-10-20 | Stop reason: SDUPTHER

## 2020-10-20 RX ORDER — PYRIDOXINE HCL (VITAMIN B6) 50 MG
50 TABLET ORAL DAILY
Qty: 30 TABLET | Refills: 5 | Status: SHIPPED
Start: 2020-10-20 | End: 2022-04-26

## 2020-10-20 RX ORDER — SULFAMETHOXAZOLE AND TRIMETHOPRIM 800; 160 MG/1; MG/1
1 TABLET ORAL EVERY 12 HOURS SCHEDULED
Qty: 20 TABLET | Refills: 5 | Status: SHIPPED | OUTPATIENT
Start: 2020-10-20 | End: 2020-10-30

## 2020-10-20 RX ORDER — DIAZEPAM 2 MG/1
2 TABLET ORAL EVERY 6 HOURS PRN
COMMUNITY

## 2020-10-20 RX ORDER — LANOLIN ALCOHOL/MO/W.PET/CERES
100 CREAM (GRAM) TOPICAL DAILY
Qty: 30 TABLET | Refills: 5 | Status: SHIPPED
Start: 2020-10-20 | End: 2022-04-26

## 2020-10-20 RX ORDER — BENAZEPRIL HYDROCHLORIDE 10 MG/1
10 TABLET ORAL DAILY
Qty: 30 TABLET | Refills: 5 | Status: SHIPPED
Start: 2020-10-20 | End: 2020-10-31 | Stop reason: SDUPTHER

## 2020-10-20 ASSESSMENT — ENCOUNTER SYMPTOMS
NAUSEA: 0
SHORTNESS OF BREATH: 0
VOMITING: 0
DIARRHEA: 0

## 2020-10-20 NOTE — PROGRESS NOTES
[address] [facility], [address]   [facility]   Mily Lara MD     Patient: Sada Schroeder  [facility] Birth: 1960  Visit Date: 10/20/20    Clifton Bo is a 63y.o. year old female here today for   Chief Complaint   Patient presents with    Cerebrovascular Accident     was hospitalized 8/2020       HPI  Patient was discharged from hospital in August for acute stroke. It affected speech but has improved. Patient no longer needs speech therapy. Denies difficulty swallowing. Will start outpatient physical therapy soon. Patient doing well on current regimen for hypertension. Review of Systems   Constitutional: Positive for appetite change (increasing). Negative for chills and fever. Eyes: Negative for visual disturbance. Respiratory: Negative for shortness of breath. Cardiovascular: Negative for chest pain, palpitations and leg swelling. Gastrointestinal: Negative for diarrhea, nausea and vomiting. Genitourinary: Positive for dysuria and frequency. Negative for difficulty urinating and hematuria.        +malodor to urine   Skin: Negative for rash. Psychiatric/Behavioral: Negative for dysphoric mood. Past medical, surgical, social and/or family historyreviewed, updated as needed, and are non-contributory (unless otherwise stated). Medications, allergies, and problem list also reviewed and updated as needed in patient's record. Current Outpatient Medications   Medication Sig Dispense Refill    diazePAM (VALIUM) 2 MG tablet Take 2 mg by mouth every 6 hours as needed for Anxiety.       thiamine 100 MG tablet Take 1 tablet by mouth daily 30 tablet 5    spironolactone (ALDACTONE) 25 MG tablet Take 1 tablet by mouth daily 30 tablet 5    pyridoxine (B-6) 50 MG tablet Take 1 tablet by mouth daily 30 tablet 5    sulfamethoxazole-trimethoprim (BACTRIM DS) 800-160 MG per tablet Take 1 tablet by mouth every 12 hours for 10 days 20 tablet 5    benazepril (LOTENSIN) 10 MG tablet Take 1 tablet by mouth daily 30 tablet 5    aspirin 81 MG chewable tablet Take 1 tablet by mouth daily 30 tablet 3    clopidogrel (PLAVIX) 75 MG tablet Take 1 tablet by mouth daily 90 tablet 1    oxybutynin (DITROPAN) 5 MG tablet Take 1 tablet by mouth 3 times daily 90 tablet 1    folic acid (FOLVITE) 1 MG tablet Take 1 tablet by mouth daily 30 tablet 3    metoprolol tartrate (LOPRESSOR) 25 MG tablet Take 1 tablet by mouth 2 times daily 60 tablet 3    ferrous sulfate (IRON 325) 325 (65 Fe) MG tablet Take 1 tablet by mouth daily (with breakfast) 30 tablet 3    atorvastatin (LIPITOR) 80 MG tablet Take 1 tablet by mouth nightly 30 tablet 3    cloNIDine (CATAPRES) 0.3 MG/24HR PTWK Place 1 patch onto the skin once a week 12 patch 1    hydrALAZINE (APRESOLINE) 50 MG tablet Take 1 tablet by mouth 4 times daily 360 tablet 1     No current facility-administered medications for this visit. Wt Readings from Last 3 Encounters:   10/20/20 114 lb (51.7 kg)   08/26/20 110 lb (49.9 kg)   08/22/20 113 lb (51.3 kg)                   Vitals:    10/20/20 0944   BP: 138/78   Pulse: 68   Resp: 20   SpO2: 98%        Physical Exam  Vitals signs reviewed. Constitutional:       General: She is not in acute distress. Appearance: She is well-developed. Neck:      Musculoskeletal: Neck supple. Vascular: No carotid bruit. Cardiovascular:      Rate and Rhythm: Normal rate and regular rhythm. Heart sounds: Normal heart sounds. No murmur. No gallop. Pulmonary:      Effort: Pulmonary effort is normal.      Breath sounds: Normal breath sounds. No wheezing or rales. Abdominal:      General: Bowel sounds are normal. There is no distension. Palpations: Abdomen is soft. Tenderness: There is no abdominal tenderness. Musculoskeletal:      Right lower leg: No edema. Comments: Left lower leg prosthesis   Skin:     General: Skin is warm and dry. Neurological:      Mental Status: She is alert and oriented to person, place, and time.        Results for orders placed or performed during the hospital encounter of 08/22/20   Culture, Urine    Specimen: Urine voided   Result Value Ref Range    Organism Citrobacter freundii (A)     Urine Culture, Routine >100,000 CFU/ml        Susceptibility    Citrobacter freundii - BACTERIAL SUSCEPTIBILITY PANEL BY DYLLAN     ceFAZolin >=^64 Resistant mcg/mL     cefepime <=^0.12 Sensitive mcg/mL     cefTRIAXone <=^0.25 Sensitive mcg/mL     ertapenem <=^0.12 Sensitive mcg/mL     gentamicin <=^1 Sensitive mcg/mL     levofloxacin <=^0.12 Sensitive mcg/mL     nitrofurantoin <=^16 Sensitive mcg/mL     piperacillin-tazobactam <=^4 Sensitive mcg/mL     trimethoprim-sulfamethoxazole <=^20 Sensitive mcg/mL   Protime-INR   Result Value Ref Range    Protime 13.0 (H) 9.3 - 12.4 sec    INR 1.2    APTT   Result Value Ref Range    aPTT 32.4 24.5 - 35.1 sec   Troponin   Result Value Ref Range    Troponin 0.03 0.00 - 0.03 ng/mL   Troponin   Result Value Ref Range    Troponin 0.04 (H) 0.00 - 0.03 ng/mL   Brain Natriuretic Peptide   Result Value Ref Range    Pro-BNP 2,121 (H) 0 - 125 pg/mL   Hemoglobin A1c   Result Value Ref Range    Hemoglobin A1C 4.6 4.0 - 5.6 %   Hemoglobin A1c   Result Value Ref Range    Hemoglobin A1C 5.0 4.0 - 5.6 %   Lipid panel - fasting   Result Value Ref Range    Cholesterol, Total 122 0 - 199 mg/dL    Triglycerides 111 0 - 149 mg/dL    HDL 32 >40 mg/dL    LDL Calculated 68 0 - 99 mg/dL    VLDL Cholesterol Calculated 22 mg/dL   CBC Auto Differential   Result Value Ref Range    WBC 3.3 (L) 4.5 - 11.5 E9/L    RBC 2.57 (L) 3.50 - 5.50 E12/L    Hemoglobin 6.5 (L) 11.5 - 15.5 g/dL    Hematocrit 21.4 (L) 34.0 - 48.0 %    MCV 83.3 80.0 - 99.9 fL    MCH 25.3 (L) 26.0 - 35.0 pg    MCHC 30.4 (L) 32.0 - 34.5 %    RDW 16.3 (H) 11.5 - 15.0 fL    Platelets 813 112 - 097 E9/L    MPV 10.1 7.0 - 12.0 fL    Neutrophils % 50.5 43.0 - 80.0 % Immature Granulocytes % 0.6 0.0 - 5.0 %    Lymphocytes % 24.0 20.0 - 42.0 %    Monocytes % 23.4 (H) 2.0 - 12.0 %    Eosinophils % 0.6 0.0 - 6.0 %    Basophils % 0.9 0.0 - 2.0 %    Neutrophils Absolute 1.66 (L) 1.80 - 7.30 E9/L    Immature Granulocytes # 0.02 E9/L    Lymphocytes Absolute 0.79 (L) 1.50 - 4.00 E9/L    Monocytes Absolute 0.77 0.10 - 0.95 E9/L    Eosinophils Absolute 0.02 (L) 0.05 - 0.50 E9/L    Basophils Absolute 0.03 0.00 - 0.20 V4/V   Basic Metabolic Panel   Result Value Ref Range    Sodium 143 132 - 146 mmol/L    Potassium 3.8 3.5 - 5.0 mmol/L    Chloride 112 (H) 98 - 107 mmol/L    CO2 19 (L) 22 - 29 mmol/L    Anion Gap 12 7 - 16 mmol/L    Glucose 91 74 - 99 mg/dL    BUN 10 8 - 23 mg/dL    CREATININE 0.9 0.5 - 1.0 mg/dL    GFR Non-African American >60 >=60 mL/min/1.73    GFR African American >60     Calcium 8.5 (L) 8.6 - 10.2 mg/dL      Result Value Ref Range     10.8 0.0 - 35.0 U/ml   Iron and TIBC   Result Value Ref Range    Iron 16 (L) 37 - 145 mcg/dL    TIBC 142 (L) 250 - 450 mcg/dL    Iron Saturation 11 (L) 15 - 50 %   TRANSFERRIN   Result Value Ref Range    Transferrin 101 (L) 200 - 360 mg/dL   Reticulocytes   Result Value Ref Range    Retic Ct Pct 4.0 (H) 0.4 - 1.9 %    Retic Ct Abs 0.100 E12/L    Immature Retic Fract 21.9 (H) 3.0 - 15.9 %    Hematocrit 21.2 (L) 34.0 - 48.0 %    Retic HGB Equivalent 26.3 (L) 28.2 - 36.6 pg   HEMOGLOBIN AND HEMATOCRIT, BLOOD   Result Value Ref Range    Hemoglobin 8.9 (L) 11.5 - 15.5 g/dL    Hematocrit 29.8 (L) 34.0 - 48.0 %   CBC Auto Differential   Result Value Ref Range    WBC 4.5 4.5 - 11.5 E9/L    RBC 3.13 (L) 3.50 - 5.50 E12/L    Hemoglobin 8.4 (L) 11.5 - 15.5 g/dL    Hematocrit 26.4 (L) 34.0 - 48.0 %    MCV 84.3 80.0 - 99.9 fL    MCH 26.8 26.0 - 35.0 pg    MCHC 31.8 (L) 32.0 - 34.5 %    RDW 15.8 (H) 11.5 - 15.0 fL    Platelets 748 177 - 817 E9/L    MPV 10.2 7.0 - 12.0 fL    Neutrophils % 55.4 43.0 - 80.0 %    Immature Granulocytes % 0.7 0.0 Anion Gap 11 7 - 16 mmol/L    Glucose 132 (H) 74 - 99 mg/dL    BUN 11 8 - 23 mg/dL    CREATININE 0.8 0.5 - 1.0 mg/dL    GFR Non-African American >60 >=60 mL/min/1.73    GFR African American >60     Calcium 8.1 (L) 8.6 - 10.2 mg/dL   Urinalysis   Result Value Ref Range    Color, UA Yellow Straw/Yellow    Clarity, UA CLOUDY (A) Clear    Glucose, Ur Negative Negative mg/dL    Bilirubin Urine Negative Negative    Ketones, Urine Negative Negative mg/dL    Specific Gravity, UA 1.020 1.005 - 1.030    Blood, Urine LARGE (A) Negative    pH, UA 6.0 5.0 - 9.0    Protein,  (A) Negative mg/dL    Urobilinogen, Urine 0.2 <2.0 E.U./dL    Nitrite, Urine Negative Negative    Leukocyte Esterase, Urine Negative Negative   Brain Natriuretic Peptide   Result Value Ref Range    Pro-BNP 4,226 (H) 0 - 125 pg/mL   Microscopic Urinalysis   Result Value Ref Range    Mucus, UA Present (A) None Seen /LPF    WBC, UA >20 (A) 0 - 5 /HPF    RBC, UA >20 0 - 2 /HPF    Bacteria, UA MANY (A) None Seen /HPF    Amorphous, UA FEW    Basic Metabolic Panel   Result Value Ref Range    Sodium 143 132 - 146 mmol/L    Potassium 3.5 3.5 - 5.0 mmol/L    Chloride 111 (H) 98 - 107 mmol/L    CO2 20 (L) 22 - 29 mmol/L    Anion Gap 12 7 - 16 mmol/L    Glucose 123 (H) 74 - 99 mg/dL    BUN 9 8 - 23 mg/dL    CREATININE 0.8 0.5 - 1.0 mg/dL    GFR Non-African American >60 >=60 mL/min/1.73    GFR African American >60     Calcium 7.7 (L) 8.6 - 10.2 mg/dL   Brain Natriuretic Peptide   Result Value Ref Range    Pro-BNP 3,474 (H) 0 - 125 pg/mL   Brain Natriuretic Peptide   Result Value Ref Range    Pro-BNP 3,022 (H) 0 - 125 pg/mL   CBC   Result Value Ref Range    WBC 5.0 4.5 - 11.5 E9/L    RBC 2.78 (L) 3.50 - 5.50 E12/L    Hemoglobin 7.3 (L) 11.5 - 15.5 g/dL    Hematocrit 23.5 (L) 34.0 - 48.0 %    MCV 84.5 80.0 - 99.9 fL    MCH 26.3 26.0 - 35.0 pg    MCHC 31.1 (L) 32.0 - 34.5 %    RDW 16.3 (H) 11.5 - 15.0 fL    Platelets 520 494 - 524 E9/L    MPV 10.8 7.0 - 12.0 fL Basic metabolic panel   Result Value Ref Range    Sodium 142 132 - 146 mmol/L    Potassium 3.3 (L) 3.5 - 5.0 mmol/L    Chloride 110 (H) 98 - 107 mmol/L    CO2 18 (L) 22 - 29 mmol/L    Anion Gap 14 7 - 16 mmol/L    Glucose 86 74 - 99 mg/dL    BUN 7 (L) 8 - 23 mg/dL    CREATININE 0.7 0.5 - 1.0 mg/dL    GFR Non-African American >60 >=60 mL/min/1.73    GFR African American >60     Calcium 8.1 (L) 8.6 - 10.2 mg/dL   Magnesium   Result Value Ref Range    Magnesium 1.7 1.6 - 2.6 mg/dL   Hemoglobin and hematocrit, blood   Result Value Ref Range    Hemoglobin 8.2 (L) 11.5 - 15.5 g/dL    Hematocrit 27.7 (L) 34.0 - 48.0 %   POCT glucose   Result Value Ref Range    Glucose 139 mg/dL    QC OK? y    POCT Glucose   Result Value Ref Range    Meter Glucose 139 (H) 74 - 99 mg/dL   POCT Glucose   Result Value Ref Range    Meter Glucose 107 (H) 74 - 99 mg/dL   POCT Glucose   Result Value Ref Range    Meter Glucose 139 (H) 74 - 99 mg/dL   POCT Glucose   Result Value Ref Range    Meter Glucose 93 74 - 99 mg/dL   POCT Glucose   Result Value Ref Range    Meter Glucose 91 74 - 99 mg/dL   POCT Glucose   Result Value Ref Range    Meter Glucose 98 74 - 99 mg/dL   POCT Glucose   Result Value Ref Range    Meter Glucose 104 (H) 74 - 99 mg/dL   POCT Glucose   Result Value Ref Range    Meter Glucose 122 (H) 74 - 99 mg/dL   POCT Glucose   Result Value Ref Range    Meter Glucose 112 (H) 74 - 99 mg/dL   POCT Glucose   Result Value Ref Range    Meter Glucose 263 (H) 74 - 99 mg/dL   POCT Glucose   Result Value Ref Range    Meter Glucose 180 (H) 74 - 99 mg/dL   POCT Glucose   Result Value Ref Range    Meter Glucose 170 (H) 74 - 99 mg/dL   POCT Glucose   Result Value Ref Range    Meter Glucose 160 (H) 74 - 99 mg/dL   POCT Glucose   Result Value Ref Range    Meter Glucose 164 (H) 74 - 99 mg/dL   POCT Glucose   Result Value Ref Range    Meter Glucose 149 (H) 74 - 99 mg/dL   POCT Glucose   Result Value Ref Range    Meter Glucose 145 (H) 74 - 99 mg/dL POCT Glucose   Result Value Ref Range    Meter Glucose 115 (H) 74 - 99 mg/dL   POCT Glucose   Result Value Ref Range    Meter Glucose 145 (H) 74 - 99 mg/dL   POCT Glucose   Result Value Ref Range    Meter Glucose 98 74 - 99 mg/dL   POCT Glucose   Result Value Ref Range    Meter Glucose 74 74 - 99 mg/dL   EKG 12 Lead   Result Value Ref Range    Ventricular Rate 89 BPM    Atrial Rate 89 BPM    P-R Interval 196 ms    QRS Duration 94 ms    Q-T Interval 378 ms    QTc Calculation (Bazett) 459 ms    P Axis 69 degrees    R Axis 66 degrees    T Axis 47 degrees   Echo Complete   Result Value Ref Range    Left Ventricular Ejection Fraction 63     LVEF MODALITY ECHO    ECHO Transesophageal   Result Value Ref Range    Left Ventricular Ejection Fraction 63     LVEF MODALITY ECHO    TYPE AND SCREEN   Result Value Ref Range    ABO/Rh B POS     Antibody Screen NEG    PREPARE RBC (CROSSMATCH), 1 Units   Result Value Ref Range    Product Code Blood Bank [id number]     Description Blood Bank Red Blood Cells, Leuko-reduced     Unit Number [id number]     Dispense Status Blood Bank transfused    TYPE AND SCREEN   Result Value Ref Range    ABO/Rh B POS     Antibody Screen NEG        ASSESSMENT/PLAN  Adelita Waller was seen today for cerebrovascular accident. Diagnoses and all orders for this visit:    Acute ischemic stroke [facility])    Essential hypertension  -     spironolactone (ALDACTONE) 25 MG tablet; Take 1 tablet by mouth daily  -     benazepril (LOTENSIN) 10 MG tablet; Take 1 tablet by mouth daily        -      Continue metoprolol    Acute cystitis without hematuria  -     sulfamethoxazole-trimethoprim (BACTRIM DS) 800-160 MG per tablet;  Take 1 tablet by mouth every 12 hours for 10 days         -     Patient unable to leave urine sample but is high risk due to ureteral stents in place    Need for prophylactic vaccination and inoculation against influenza  -     INFLUENZA, QUADV, 3 YRS AND OLDER, IM PF, PREFILL SYR OR SDV, 0.5ML (AFLURIA ISACC RING)                Phone/MyChart follow up if tests abnormal.    Return in about 2 months (around 12/20/2020) for Virtual visit, Annual Medicare Wellness Visit--30 minutes. or sooner if necessary. I have reviewed my findings and recommendations with Flores Rangel.      Gordon Evans M.D

## 2020-10-20 NOTE — PATIENT INSTRUCTIONS
Patient Education        Learning About Stroke Rehabilitation  What is stroke rehabilitation? Stroke rehabilitation (rehab) is training and therapy to help you relearn to do everyday things you have not been able to do since your stroke. The focus will depend on how the stroke has affected your ability to do the things you want and need to do. Rehab begins in the hospital. It starts as soon as you are able. You will have a team of doctors, nurses, and therapists to help you relearn daily activities. This can include eating, bathing, and dressing. You also may need help to learn how to walk or talk again. If the stroke damaged your memory, you will learn ways to improve it. You will get better faster if you begin rehab soon after the stroke. But even with rehab, you may not be able to do all the things you could before the stroke. You may recover the most in the first few weeks or months after your stroke. But you can keep getting better for years. It just may happen more slowly. And it may take a long time and a lot of hard work. Don't give up hope. After the hospital, you may go to a rehab facility or a nursing home for a while. Or you may go home. Wherever you go, keep working on your rehab and do a little every day. It's going to be important for you to get the support you need. Let your loved ones help you. Involve them in your treatment. Talk to others who have had a stroke, and find out how they handled ups and downs. How can stroke rehab specialists help? Your stroke rehab team will include doctors and nurses who specialize in stroke rehab, as well as other professionals. Each team member will help you in specific ways. The team may include the following professionals. Rehab doctor    A rehab doctor is a specialist in charge of your rehab program. He or she may also work on special problems, such as muscle cramps and spasms.   Rehab nurses    Rehab nurses can help you relearn basic activities of daily life. For example, they can help you learn how to: Take care of your health, including a schedule for medicine. Get from your bed to a wheelchair. Bathe. Control your bowels or bladder. Physical therapist    A stroke often takes away a person's ability to move in certain ways. A physical therapist helps you get back as much movement, balance, and coordination as possible. Physical therapy usually includes exercises. The exercises can help you get back your ability to walk and move as much as possible. It's important to practice these exercises over and over again. Your therapist may also help you learn to use a wheelchair or walker. And he or she may teach you how to use stairs safely. Occupational therapist    An occupational therapist helps you practice daily tasks like eating, bathing, dressing, and writing. For example, he or she may help you learn how to:  Prepare meals and clean your house. Drive your car. Use tools and devices that can help if you no longer have full use of both hands. For example, velcro can replace buttons on clothing. Get grab bars for your bathroom. Make your home safe if you have strength, balance, or vision problems. Speech therapist    A speech therapist can help you relearn how to talk or find new ways to express yourself. Swallowing is sometimes a problem after a stroke. This therapist can help you improve your ability to swallow. A speech therapist can also help you work on reading and writing skills. Psychologist or counselor    Emotions like fear, anxiety, anger, sadness, frustration, and grief are common after a stroke. A psychologist or counselor can help you deal with your emotions. They can also help you get treatment if you have depression. Vocational counselor    Stroke can leave you with disabilities that make it hard to do your job. A vocational counselor can help you return to your job or find a new one.  He or she can help you:  Identify your current skills and prepare a new resume. Search for a job. Understand the laws that protect disabled workers. Recreational therapist    A recreational therapist helps you return to doing things you enjoy. This may include the arts, hobbies, sports, or leisure activities. Follow-up care is a key part of your treatment and safety. Be sure to make and go to all appointments, and call your doctor if you are having problems. It's also a good idea to know your test results and keep a list of the medicines you take. Where can you learn more? Go to [email or web address] and sign in to your [employer] account. Enter Z540 in the [employer] box to learn more about \"Learning About Stroke Rehabilitation. \"     If you do not have an account, please click on the \"Sign Up Now\" link. Current as of: March 4, 2020               Content Version: 12.6  © 4491-6147 [employer]. Care instructions adapted under license by [facility] ([facility]). If you have questions about a medical condition or this instruction, always ask your healthcare professional. [address] any warranty or liability for your use of this information.

## 2020-10-30 ENCOUNTER — CARE COORDINATION (OUTPATIENT)
Dept: CASE MANAGEMENT | Age: 60
End: 2020-10-30

## 2020-10-30 NOTE — CARE COORDINATION
[facility] Transitions Follow Up Call    10/30/2020    Patient: Claudia Yarbrough  Patient : 1960   MRN: <[id number]>  Reason for Admission: stroke  Discharge Date: 20 RARS: Readmission Risk Score: 28         Spoke with: [address] Transitions Subsequent and Final Call    Subsequent and Final Calls  Do you have any ongoing symptoms?:  No  Have your medications changed?:  No  Do you have any questions related to your medications?:  No  Do you currently have any active services?:  Yes  Are you currently active with any services?:  Home Health  Do you have any needs or concerns that I can assist you with?:  No  Care Transitions Interventions  Other Interventions:          States she's doing well. Denies weakness, difficulty with speech or mental status changes. Denies CP, palpitations, SOB, fever or flu-like symptoms. States she's taking medications as prescribed and denies questions or concerns at this time.    Follow Up  Future Appointments   Date Time Provider Naomi Alvarado   2020 11:30 AM Claus Blancas MD [facility]       Miroslava Sin

## 2020-10-31 RX ORDER — BENAZEPRIL HYDROCHLORIDE 10 MG/1
10 TABLET ORAL DAILY
Qty: 30 TABLET | Refills: 5 | Status: SHIPPED
Start: 2020-10-31 | End: 2021-04-05 | Stop reason: SDUPTHER

## 2020-10-31 RX ORDER — FERROUS SULFATE 325(65) MG
325 TABLET ORAL
Qty: 30 TABLET | Refills: 3 | Status: SHIPPED
Start: 2020-10-31 | End: 2021-04-12 | Stop reason: SDUPTHER

## 2020-11-06 ENCOUNTER — CARE COORDINATION (OUTPATIENT)
Dept: CASE MANAGEMENT | Age: 60
End: 2020-11-06

## 2020-11-06 NOTE — CARE COORDINATION
[facility] Transitions Follow Up Call    2020    Patient: Charles Snyder  Patient : 1960   MRN: <[id number]>  Reason for Admission:   Discharge Date: 20 RARS: Readmission Risk Score: 35    Follow Up: Attempted to contact patient for BPCI-A follow up. Unable to reach patient. Left message with contact information and request for call back.       Future Appointments   Date Time Provider Naomi Alvarado   2020 11:30 AM Julian Agarwal MD [facility]       Brian Rossi RN

## 2020-11-13 ENCOUNTER — CARE COORDINATION (OUTPATIENT)
Dept: CASE MANAGEMENT | Age: 60
End: 2020-11-13

## 2020-11-13 NOTE — CARE COORDINATION
[facility] Transitions Follow Up Call    2020    Patient: Yassine Bedoya  Patient : 1960   MRN: <[id number]>  Reason for Admission:   Discharge Date: 20 RARS: Readmission Risk Score: 28         Spoke with: Yassine Bedoya, patient    Care Transitions Subsequent and Final Call    Subsequent and Final Calls  Do you have any ongoing symptoms?:  No  Have your medications changed?:  No  Do you have any questions related to your medications?:  No  Do you currently have any active services?:  Yes  Are you currently active with any services?:  Outpatient/Community Services  Do you have any needs or concerns that I can assist you with?:  No  Identified Barriers:  None  Care Transitions Interventions  Other Interventions: Follow Up:  Contacted patient for BPCI-A follow up. Deidra Sarabia stated she is doing good. Denies having any weakness, difficulty swallowing, numbness. She reports she is going to [facility] for outpatient PT. She confirmed she has all of her medications. No questions or concerns at this time. Will continue to follow.       Future Appointments   Date Time Provider Naomi Alvarado   2020 11:30 AM Mikel Vega MD [facility]       Mike Griffith RN

## 2020-11-25 ENCOUNTER — CARE COORDINATION (OUTPATIENT)
Dept: CASE MANAGEMENT | Age: 60
End: 2020-11-25

## 2020-11-25 NOTE — CARE COORDINATION
[facility] Transitions Follow Up Call    2020    Patient: Kehinde Cristobal  Patient : 1960   MRN: <[id number]>  Reason for Admission:   Discharge Date: 20 RARS: Readmission Risk Score: 35    Follow Up: Attempted to contact patient for fiinal BPCI-A follow up. Unable to reach patient. Left message with contact information and request for call back. Received return phone call from patient. Marilu Montes stated she is doing good. Continues to go to outpatient therapy. She denies having one sided weakness, numbness, dizziness. She reports she may wake up with a headache and takes Tylenol which has been effective. She stated if she takes Tylenol in the morning, she feels fine throughout the day. She is eating and drinking w/o issues. Discussed when to contact provider with any new or worsening symptoms and when to report to the ER. She verbalized understanding. She confirmed she has all of her medications and taking them as ordered. Informed her that this will be the final BPCI-A follow up call. No questions or concerns at this time.         Future Appointments   Date Time Provider Naomi Alvarado   2020 11:30 AM Ashli Perez MD [facility]       Vargas Butler RN

## 2020-12-02 ENCOUNTER — TELEPHONE (OUTPATIENT)
Dept: FAMILY MEDICINE CLINIC | Age: 60
End: 2020-12-02

## 2020-12-02 RX ORDER — SULFAMETHOXAZOLE AND TRIMETHOPRIM 800; 160 MG/1; MG/1
1 TABLET ORAL 2 TIMES DAILY
Qty: 10 TABLET | Refills: 0 | Status: SHIPPED | OUTPATIENT
Start: 2020-12-02 | End: 2020-12-07

## 2020-12-02 NOTE — TELEPHONE ENCOUNTER
Thinks she had a uti a couple days ago. Feels better now. Today got up and walked around. Urine smelled bad and felt tired. No dysuria. Also had urinary symptoms. Had some back and stomach pain. All these symptoms are currently gone. Took 4 antibiotics that she had left over and started feeling better. Took bactrim for two days    Still has urinary stents in per patient that have been there since January. .     Has taken two days of bactrim. Will send 5 more days for a total of 7. Patient advised to leave urine sample tomorrow. Patient advised to go to ER if worsening symptoms.

## 2020-12-03 DIAGNOSIS — N39.0 URINARY TRACT INFECTION WITHOUT HEMATURIA, SITE UNSPECIFIED: ICD-10-CM

## 2020-12-03 LAB
BACTERIA: ABNORMAL /HPF
BILIRUBIN URINE: NEGATIVE
BLOOD, URINE: ABNORMAL
CLARITY: ABNORMAL
COLOR: YELLOW
GLUCOSE URINE: NEGATIVE MG/DL
KETONES, URINE: NEGATIVE MG/DL
LEUKOCYTE ESTERASE, URINE: ABNORMAL
NITRITE, URINE: POSITIVE
PH UA: 6.5 (ref 5–9)
PROTEIN UA: 100 MG/DL
RBC UA: ABNORMAL /HPF (ref 0–2)
SPECIFIC GRAVITY UA: 1.02 (ref 1–1.03)
UROBILINOGEN, URINE: 0.2 E.U./DL
WBC UA: ABNORMAL /HPF (ref 0–5)

## 2020-12-06 LAB
ORGANISM: ABNORMAL
URINE CULTURE, ROUTINE: ABNORMAL

## 2021-01-04 DIAGNOSIS — E43 SEVERE PROTEIN-CALORIE MALNUTRITION (HCC): ICD-10-CM

## 2021-01-04 RX ORDER — PROMETHAZINE HYDROCHLORIDE 25 MG/1
25 TABLET ORAL EVERY 8 HOURS PRN
Qty: 60 TABLET | Refills: 2 | Status: SHIPPED
Start: 2021-01-04 | End: 2021-07-13

## 2021-01-04 RX ORDER — MEGESTROL ACETATE 20 MG/1
20 TABLET ORAL 2 TIMES DAILY
Qty: 180 TABLET | Refills: 1 | Status: SHIPPED
Start: 2021-01-04 | End: 2021-07-13 | Stop reason: SDUPTHER

## 2021-01-04 NOTE — TELEPHONE ENCOUNTER
PT CALLED FOR REFILL AND SHE HAS BE VOMITTING TIME 1 WEEK WOULD LIKE RX FOR THAT ALSO     Last seen 10/20/2020  Next appt 1/11/2021    [facility]

## 2021-01-11 ENCOUNTER — OFFICE VISIT (OUTPATIENT)
Dept: FAMILY MEDICINE CLINIC | Age: 61
End: 2021-01-11
Payer: MEDICARE

## 2021-01-11 VITALS
OXYGEN SATURATION: 100 % | BODY MASS INDEX: 19.14 KG/M2 | DIASTOLIC BLOOD PRESSURE: 70 MMHG | HEART RATE: 86 BPM | RESPIRATION RATE: 20 BRPM | SYSTOLIC BLOOD PRESSURE: 90 MMHG | TEMPERATURE: 96.9 F | WEIGHT: 108 LBS | HEIGHT: 63 IN

## 2021-01-11 DIAGNOSIS — E11.42 TYPE 2 DIABETES MELLITUS WITH DIABETIC POLYNEUROPATHY, WITHOUT LONG-TERM CURRENT USE OF INSULIN (HCC): ICD-10-CM

## 2021-01-11 DIAGNOSIS — Z00.00 ROUTINE GENERAL MEDICAL EXAMINATION AT A HEALTH CARE FACILITY: Primary | ICD-10-CM

## 2021-01-11 LAB
ALBUMIN SERPL-MCNC: 3.7 G/DL (ref 3.5–5.2)
ALP BLD-CCNC: 62 U/L (ref 35–104)
ALT SERPL-CCNC: 6 U/L (ref 0–32)
ANION GAP SERPL CALCULATED.3IONS-SCNC: 17 MMOL/L (ref 7–16)
AST SERPL-CCNC: 13 U/L (ref 0–31)
BILIRUB SERPL-MCNC: 0.2 MG/DL (ref 0–1.2)
BUN BLDV-MCNC: 110 MG/DL (ref 8–23)
CALCIUM SERPL-MCNC: 9.4 MG/DL (ref 8.6–10.2)
CHLORIDE BLD-SCNC: 107 MMOL/L (ref 98–107)
CO2: 17 MMOL/L (ref 22–29)
CREAT SERPL-MCNC: 2.7 MG/DL (ref 0.5–1)
GFR AFRICAN AMERICAN: 22
GFR NON-AFRICAN AMERICAN: 22 ML/MIN/1.73
GLUCOSE BLD-MCNC: 131 MG/DL (ref 74–99)
HBA1C MFR BLD: 5.7 %
POTASSIUM SERPL-SCNC: 4 MMOL/L (ref 3.5–5)
SODIUM BLD-SCNC: 141 MMOL/L (ref 132–146)
TOTAL PROTEIN: 7.8 G/DL (ref 6.4–8.3)

## 2021-01-11 PROCEDURE — G8482 FLU IMMUNIZE ORDER/ADMIN: HCPCS | Performed by: FAMILY MEDICINE

## 2021-01-11 PROCEDURE — G0439 PPPS, SUBSEQ VISIT: HCPCS | Performed by: FAMILY MEDICINE

## 2021-01-11 PROCEDURE — 3017F COLORECTAL CA SCREEN DOC REV: CPT | Performed by: FAMILY MEDICINE

## 2021-01-11 PROCEDURE — 3044F HG A1C LEVEL LT 7.0%: CPT | Performed by: FAMILY MEDICINE

## 2021-01-11 PROCEDURE — 83036 HEMOGLOBIN GLYCOSYLATED A1C: CPT | Performed by: FAMILY MEDICINE

## 2021-01-11 RX ORDER — ASPIRIN 81 MG/1
81 TABLET, CHEWABLE ORAL DAILY
Qty: 30 TABLET | Refills: 5 | Status: SHIPPED
Start: 2021-01-11 | End: 2021-07-13

## 2021-01-11 ASSESSMENT — PATIENT HEALTH QUESTIONNAIRE - PHQ9
1. LITTLE INTEREST OR PLEASURE IN DOING THINGS: 0
SUM OF ALL RESPONSES TO PHQ QUESTIONS 1-9: 0
2. FEELING DOWN, DEPRESSED OR HOPELESS: 0
SUM OF ALL RESPONSES TO PHQ QUESTIONS 1-9: 0
SUM OF ALL RESPONSES TO PHQ9 QUESTIONS 1 & 2: 0

## 2021-01-11 ASSESSMENT — LIFESTYLE VARIABLES: HOW OFTEN DO YOU HAVE A DRINK CONTAINING ALCOHOL: 0

## 2021-01-11 NOTE — PATIENT INSTRUCTIONS
Personalized Preventive Plan for [place] List - 1/11/2021  Medicare offers a range of preventive health benefits. Some of the tests and screenings are paid in full while other may be subject to a deductible, co-insurance, and/or copay. Some of these benefits include a comprehensive review of your medical history including lifestyle, illnesses that may run in your family, and various assessments and screenings as appropriate. After reviewing your medical record and screening and assessments performed today your provider may have ordered immunizations, labs, imaging, and/or referrals for you. A list of these orders (if applicable) as well as your Preventive Care list are included within your After Visit Summary for your review. Other Preventive Recommendations:    · A preventive eye exam performed by an eye specialist is recommended every 1-2 years to screen for glaucoma; cataracts, macular degeneration, and other eye disorders. · A preventive dental visit is recommended every 6 months. · Try to get at least 150 minutes of exercise per week or 10,000 steps per day on a pedometer . · Order or download the FREE \"Exercise & Physical Activity: Your Everyday Guide\" from [employer]. Call 5-992.165.8932 or search [employer] online. · You need 1682-8683 mg of calcium and 9495-6480 IU of vitamin D per day. It is possible to meet your calcium requirement with diet alone, but a vitamin D supplement is usually necessary to meet this goal.  · When exposed to the sun, use a sunscreen that protects against both UVA and UVB radiation with an SPF of 30 or greater. Reapply every 2 to 3 hours or after sweating, drying off with a towel, or swimming. · Always wear a seat belt when traveling in a car. Always wear a helmet when riding a bicycle or motorcycle. Heart-Healthy Diet   Sodium, Fat, and Cholesterol Controlled Diet       What Is a Heart Healthy Diet?    A heart-healthy diet is one that limits sodium , certain types of fat , and cholesterol . This type of diet is recommended for:   People with any form of cardiovascular disease (eg, coronary heart disease , peripheral vascular disease , previous heart attack , previous stroke )   People with risk factors for cardiovascular disease, such as high blood pressure , high cholesterol , or diabetes   Anyone who wants to lower their risk of developing cardiovascular disease   Sodium    Sodium is a mineral found in many foods. In general, most people consume much more sodium than they need. Diets high in sodium can increase blood pressure and lead to edema (water retention). On a heart-healthy diet, you should consume no more than 2,300 mg (milligrams) of sodium per dayabout the amount in one teaspoon of table salt. The foods highest in sodium include table salt (about 50% sodium), processed foods, convenience foods, and preserved foods. Cholesterol    Cholesterol is a fat-like, waxy substance in your blood. Our bodies make some cholesterol. It is also found in animal products, with the highest amounts in fatty meat, egg yolks, whole milk, cheese, shellfish, and organ meats. On a heart-healthy diet, you should limit your cholesterol intake to less than 200 mg per day. It is normal and important to have some cholesterol in your bloodstream. But too much cholesterol can cause plaque to build up within your arteries, which can eventually lead to a heart attack or stroke. The two types of cholesterol that are most commonly referred to are:   Low-density lipoprotein (LDL) cholesterol  Also known as bad cholesterol, this is the cholesterol that tends to build up along your arteries. Bad cholesterol levels are increased by eating fats that are saturated or hydrogenated. Optimal level of this cholesterol is less than 100. Over 130 starts to get risky for heart disease.    High-density lipoprotein (HDL) cholesterol  Also known as good cholesterol, this type of cholesterol actually carries cholesterol away from your arteries and may, therefore, help lower your risk of having a heart attack. You want this level to be high (ideally greater than 60). It is a risk to have a level less than 40. You can raise this good cholesterol by eating olive oil, canola oil, avocados, or nuts. Exercise raises this level, too. Fat    Fat is calorie dense and packs a lot of calories into a small amount of food. Even though fats should be limited due to their high calorie content, not all fats are bad. In fact, some fats are quite healthful. Fat can be broken down into four main types. The good-for-you fats are:   Monounsaturated fat  found in oils such as olive and canola, avocados, and nuts and natural nut butters; can decrease cholesterol levels, while keeping levels of HDL cholesterol high   Polyunsaturated fat  found in oils such as safflower, sunflower, soybean, corn, and sesame; can decrease total cholesterol and LDL cholesterol   Omega-3 fatty acids  particularly those found in fatty fish (such as salmon, trout, tuna, mackerel, herring, and sardines); can decrease risk of arrhythmias, decrease triglyceride levels, and slightly lower blood pressure   The fats that you want to limit are:   Saturated fat  found in animal products, many fast foods, and a few vegetables; increases total blood cholesterol, including LDL levels   Animal fats that are saturated include: butter, lard, whole-milk dairy products, meat fat, and poultry skin   Vegetable fats that are saturated include: hydrogenated shortening, palm oil, coconut oil, cocoa butter   Hydrogenated or trans fat  found in margarine and vegetable shortening, most shelf stable snack foods, and fried foods; increases LDL and decreases HDL     It is generally recommended that you limit your total fat for the day to less than 30% of your total calories.  If you follow an 1800-calorie heart healthy diet, for example, this would mean 60 grams of fat or less per day. Saturated fat and trans fat in your diet raises your blood cholesterol the most, much more than dietary cholesterol does. For this reason, on a heart-healthy diet, less than 7% of your calories should come from saturated fat and ideally 0% from trans fat. On an 1800-calorie diet, this translates into less than 14 grams of saturated fat per day, leaving 46 grams of fat to come from mono- and polyunsaturated fats.    Food Choices on a Heart Healthy Diet   Food Category   Foods Recommended   Foods to Avoid   Grains   Breads and rolls without salted tops Most dry and cooked cereals Unsalted crackers and breadsticks Low-sodium or homemade breadcrumbs or stuffing All rice and pastas   Breads, rolls, and crackers with salted tops High-fat baked goods (eg, muffins, donuts, pastries) Quick breads, self-rising flour, and biscuit mixes Regular bread crumbs Instant hot cereals Commercially prepared rice, pasta, or stuffing mixes   Vegetables   Most fresh, frozen, and low-sodium canned vegetables Low-sodium and salt-free vegetable juices Canned vegetables if unsalted or rinsed   Regular canned vegetables and juices, including sauerkraut and pickled vegetables Frozen vegetables with sauces Commercially prepared potato and vegetable mixes   Fruits   Most fresh, frozen, and canned fruits All fruit juices   Fruits processed with salt or sodium   Milk   Nonfat or low-fat (1%) milk Nonfat or low-fat yogurt Cottage cheese, low-fat ricotta, cheeses labeled as low-fat and low-sodium   Whole milk Reduced-fat (2%) milk Malted and chocolate milk Full fat yogurt Most cheeses (unless low-fat and low salt) Buttermilk (no more than 1 cup per week)   Meats and Beans   Lean cuts of fresh or frozen beef, veal, lamb, or pork (look for the word loin) Fresh or frozen poultry without the skin Fresh or frozen fish and some shellfish Egg whites and egg substitutes (Limit whole eggs to three per week) Tofu Nuts or seeds (unsalted, dry-roasted), low-sodium peanut butter Dried peas, beans, and lentils   Any smoked, cured, salted, or canned meat, fish, or poultry (including ferris, chipped beef, cold cuts, hot dogs, sausages, sardines, and anchovies) Poultry skins Breaded and/or fried fish or meats Canned peas, beans, and lentils Salted nuts   Fats and Oils   Olive oil and canola oil Low-sodium, low-fat salad dressings and mayonnaise   Butter, margarine, coconut and palm oils, ferris fat   Snacks, Sweets, and Condiments   Low-sodium or unsalted versions of broths, soups, soy sauce, and condiments Pepper, herbs, and spices; vinegar, lemon, or lime juice Low-fat frozen desserts (yogurt, sherbet, fruit bars) Sugar, cocoa powder, honey, syrup, jam, and preserves Low-fat, trans-fat free cookies, cakes, and pies Aldo and animal crackers, fig bars, christopher snaps   High-fat desserts Broth, soups, gravies, and sauces, made from instant mixes or other high-sodium ingredients Salted snack foods Canned olives Meat tenderizers, seasoning salt, and most flavored vinegars   Beverages   Low-sodium carbonated beverages Tea and coffee in moderation Soy milk   Commercially softened water   Suggestions   Make whole grains, fruits, and vegetables the base of your diet. Choose heart-healthy fats such as canola, olive, and flaxseed oil, and foods high in heart-healthy fats, such as nuts, seeds, soybeans, tofu, and fish. Eat fish at least twice per week; the fish highest in omega-3 fatty acids and lowest in mercury include salmon, herring, mackerel, sardines, and canned chunk light tuna. If you eat fish less than twice per week or have high triglycerides, talk to your doctor about taking fish oil supplements. Read food labels.    For products low in fat and cholesterol, look for fat free, low-fat, cholesterol free, saturated fat free, and trans fat freeAlso scan the Nutrition Facts Label, which lists saturated fat, trans fat, and cholesterol amounts. For products low in sodium, look for sodium free, very low sodium, low sodium, no added salt, and unsalted   Skip the salt when cooking or at the table; if food needs more flavor, get creative and try out different herbs and spices. Garlic and onion also add substantial flavor to foods. Trim any visible fat off meat and poultry before cooking, and drain the fat off after naylor. Use cooking methods that require little or no added fat, such as grilling, boiling, baking, poaching, broiling, roasting, steaming, stir-frying, and sauting. Avoid fast food and convenience food. They tend to be high in saturated and trans fat and have a lot of added salt. Talk to a registered dietitian for individualized diet advice. Last Reviewed: March 2011 Ar Rivera MS, MPH, RD   Updated: 3/29/2011   ·     Keep Your Memory [place] Colon       Many factors can affect your ability to remembera hectic lifestyle, aging, stress, chronic disease, and certain medicines. But, there are steps you can take to sharpen your mind and help preserve your memory. Challenge Your Brain   Regularly challenging your mind may help keeps it in top shape. Good mental exercises include:   Crossword puzzlesUse a dictionary if you need it; you will learn more that way. Brainteasers Try some! Crafts, such as wood working and sewing   Hobbies, such as gardening and building model airplanes   SocializingVisit old friends or join groups to meet new ones. Reading   Learning a new language   Taking a class, whether it be art history or jr chi   TravelingExperience the food, history, and culture of your destination   Learning to use a computer   Going to museums, the theater, or thought-provoking movies   Changing things in your daily life, such as reversing your pattern in the grocery store or brushing your teeth using your nondominant hand   Use Memory Aids   There is no need to remember every detail on your own.  These memory aids can help:   Calendars and day planners   Electronic organizers to store all sorts of helpful informationThese devices can \"beep\" to remind you of appointments. A book of days to record birthdays, anniversaries, and other occasions that occur on the same date every year   Detailed \"to-do\" lists and strategically placed sticky notes   Quick \"study\" sessionsBefore a gathering, review who will be there so their names will be fresh in your mind. Establish routinesFor example, keep your keys, wallet, and umbrella in the same place all the time or take medicine with your 8:00 AM glass of juice   Live a Healthy Life   Many actions that will keep your body strong will do the same for your mind. For example:   Talk to Your Doctor About Herbs and Supplements    Malnutrition and vitamin deficiencies can impair your mental function. For example, vitamin B12 deficiency can cause a range of symptoms, including confusion. But, what if your nutritional needs are being met? Can herbs and supplements still offer a benefit? Researchers have investigated a range of natural remedies, such as ginkgo , ginseng , and the supplement phosphatidylserine (PS). So far, though, the evidence is inconsistent as to whether these products can improve memory or thinking. If you are interested in taking herbs and supplements, talk to your doctor first because they may interact with other medicines that you are taking. Exercise Regularly    Among the many benefits of regular exercise are increased blood flow to the brain and decreased risk of certain diseases that can interfere with memory function. One study found that even moderate exercise has a beneficial effect. Examples of \"moderate\" exercise include:   Playing 18 holes of golf once a week, without a cart   Playing tennis twice a week   Walking one mile per day   Manage Stress    It can be tough to remember what is important when your mind is cluttered.  Make time for sleep, talk to your doctor. Limit your intake of alcohol. If necessary, use a cane or walker to help maintain your balance. Wear supportive, rubber-soled shoes, even at home. If you live in a region that gets wintry weather, you may want to put special cleats on your shoes to prevent you from slipping on the snow and ice. Exercise regularly to help maintain muscle tone, agility, and balance. Always hold the banister when going up or down stairs. Also, use  bars when getting in or out of the bath or shower, or using the toilet. To avoid dizziness, get up slowly from a lying down position. Sit up first, dangling your legs for a minute or two before rising to a standing position. Overall Home Safety Check   According to the Consumer Product Safety Commision's \"Older Consumer Home Safety Checklist,\" it is important to check for potential hazards in each room. And remember, proper lighting is an essential factor in home safety. If you cannot see clearly, you are more likely to fall. Important questions to ask yourself include:   Are lamp, electric, extension, and telephone cords placed out of the flow of traffic and maintained in good condition? Have frayed cords been replaced? Are all small rugs and runners slip resistant? If not, you can secure them to the floor with a special double-sided carpet tape. Are smoke detectors properly locatedone on every floor of your home and one outside of every sleeping area? Are they in good working order? Are batteries replaced at least once a year? Do you have a well-maintained carbon monoxide detector outside every sleeping are in your home? Does your furniture layout leave plenty of space to maneuver between and around chairs, tables, beds, and sofas? Are hallways, stairs and passages between rooms well lit? Can you reach a lamp without getting out of bed? Are floor surfaces well maintained?  Shag rugs, high-pile carpeting, tile floors, and polished wood floors can be particularly slippery. Stairs should always have handrails and be carpeted or fitted with a non-skid tread. Is your telephone easily reachable. Is the cord safely tucked away? Room by Room   According to the Association of Aging, bathrooms and michelle are the two most potentially hazardous rooms in your home. In the Kitchen    Be sure your stove is in proper working order and always make sure burners and the oven are off before you go out or go to sleep. Keep pots on the back burners, turn handles away from the front of the stove, and keep stove clean and free of grease build-up. Kitchen ventilation systems and range exhausts should be working properly. Keep flammable objects such as towels and pot holders away from the cooking area except when in use. Make sure kitchen curtains are tied back. Move cords and appliances away from the sink and hot surfaces. If extension cords are needed, install wiring guides so they do not hang over the sink, range, or working areas. Look for coffee pots, kettles and toaster ovens with automatic shut-offs. Keep a mop handy in the kitchen so you can wipe up spills instantly. You should also have a small fire extinguisher. Arrange your kitchen with frequently used items on lower shelves to avoid the need to stand on a stepstool to reach them. Make sure countertops are well-lit to avoid injuries while cutting and preparing food. In the Bathroom    Use a non-slip mat or decals in the tub and shower, since wet, soapy tile or porcelain surfaces are extremely slippery. Make sure bathroom rugs are non-skid or tape them firmly to the floor. Bathtubs should have at least one, preferably two, grab bars, firmly attached to structural supports in the wall. (Do not use built-in soap holders or glass shower doors as grab bars.)    Tub seats fitted with non-slip material on the legs allow you to wash sitting down.  For people with limited mobility, bathtub transfer benches allow you to slide safely into the tub. Raised toilet seats and toilet safety rails are helpful for those with knee or hip problems. In the [facility]    Make sure you use a nightlight and that the area around your bed is clear of potential obstacles. Be careful with electric blankets and never go to sleep with a heating pad, which can cause serious burns even if on a low setting. Use fire-resistant mattress covers and pillows, and NEVER smoke in bed. Keep a phone next to the bed that is programmed to dial 911 at the push of a button. If you have a chronic condition, you may want to sign on with an automatic call-in service. Typically the system includes a small pendant that connects directly to an emergency medical voice-response system. You should also make arrangements to stay in contact with someonefriend, neighbor, family memberon a regular schedule. Fire Prevention   According to the [employer] (Smoke Alarms for Every) [address] [place] [address], senior citizens are one of the two highest risk groups for death and serious injuries due to residential fires. When cooking, wear short-sleeved items, never a bulky long-sleeved robe. The Psychiatric's Safety Checklist for Older Consumers emphasizes the importance of checking basements, garages, workshops and storage areas for fire hazards, such as volatile liquids, piles of old rags or clothing and overloaded circuits. Never smoke in bed or when lying down on a couch or recliner chair. Small portable electric or kerosene heaters are responsible for many home fires and should be used cautiously if at all. If you do use one, be sure to keep them away from flammable materials. In case of fire, make sure you have a pre-established emergency exit plan. Have a professional check your fireplace and other fuel-burning appliances yearly.     Helping Hands   Baby boomers entering the lopez years will continue to see the development of new products to help older adults live safely and independently in spite of age-related changes. Making Life More Livable  , by [employer], lists over 1,000 products for \"living well in the mature years,\" such as bathing and mobility aids, household security devices, ergonomically designed knives and peelers, and faucet valves and knobs for temperature control. Medical supply stores and organizations are good sources of information about products that improve your quality of life and insure your safety. Last Reviewed: November 2009 Tati Mcmullen MD   Updated: 3/7/2011     ·        Learning About Living [place]  What is a living will? A living will, also called a declaration, is a legal form. It tells your family and your doctor your wishes when you can't speak for yourself. It's used by the health professionals who will treat you as you near the end of your life or if you get seriously hurt or ill. If you put your wishes in writing, your loved ones and others will know what kind of care you want. They won't need to guess. This can ease your mind and be helpful to others. And you can change or cancel your living will at any time. A living will is not the same as an estate or property will. An estate will explains what you want to happen with your money and property after you die. How do you use it? A living will is used to describe the kinds of treatment or life support you want as you near the end of your life or if you get seriously hurt or ill. Keep these facts in mind about living corral. Your living will is used only if you can't speak or make decisions for yourself. Most often, one or more doctors must certify that you can't speak or decide for yourself before your living will takes effect. If you get better and can speak for yourself again, you can accept or refuse any treatment. It doesn't matter what you said in your living will.   Some states may limit your right to refuse treatment in certain cases. For example, you may need to clearly state in your living will that you don't want artificial hydration and nutrition, such as being fed through a tube. Is a living will a legal document? A living will is a legal document. Each state has its own laws about living corral. And a living will may be called something else in your state. Here are some things to know about living corral. You don't need an  to complete a living will. But legal advice can be helpful if your state's laws are unclear. It can also help if your health history is complicated or your family can't agree on what should be in your living will. You can change your living will at any time. Some people find that their wishes about end-of-life care change as their health changes. If you make big changes to your living will, complete a new form. If you move to another state, make sure that your living will is legal in the state where you now live. In most cases, doctors will respect your wishes even if you have a form from a different state. You might use a universal form that has been approved by many states. This kind of form can sometimes be filled out and stored online. Your digital copy will then be available wherever you have a connection to the internet. The doctors and nurses who need to treat you can find it right away. Your state may offer an online registry. This is another place where you can store your living will online. It's a good idea to get your living will notarized. This means using a person called a  to watch two people sign, or witness, your living will. What should you know when you create a living will? Here are some questions to ask yourself as you make your living will:  Do you know enough about life support methods that might be used? If not, talk to your doctor so you know what might be done if you can't breathe on your own, your heart stops, or you can't swallow.   What things would you still want to be able to do after you receive life-support methods? Would you want to be able to walk? To speak? To eat on your own? To live without the help of machines? Do you want certain Orthodox practices performed if you become very ill? If you have a choice, where do you want to be cared for? In your home? At a hospital or nursing home? If you have a choice at the end of your life, where would you prefer to die? At home? In a hospital or nursing home? Somewhere else? Would you prefer to be buried or cremated? Do you want your organs to be donated after you die? What should you do with your living will? Make sure that your family members and your health care agent have copies of your living will (also called a declaration). Give your doctor a copy of your living will. Ask him or her to keep it as part of your medical record. If you have more than one doctor, make sure that each one has a copy. Put a copy of your living will where it can be easily found. For example, some people may put a copy on their refrigerator door. If you are using a digital copy, be sure your doctor, family members, and health care agent know how to find and access it. Where can you learn more? Go to [email or web address] and sign in to your Hango account. Enter I504 in the Sequent box to learn more about \"Learning About Living [place]. \"     If you do not have an account, please click on the \"Sign Up Now\" link. Current as of: December 9, 2019               Content Version: 12.6  © 7368-3996 [employer]. Care instructions adapted under license by [facility] ([facility]). If you have questions about a medical condition or this instruction, always ask your healthcare professional. [place] [address] any warranty or liability for your use of this information.     ·

## 2021-01-11 NOTE — PROGRESS NOTES
Patient agreed to COVID test on 1/2 at the  [facility]  located at  [address] [place] [address]. between the hours of 6 am- 2:30 pm, instructed to bring ID. Patient instructed to self isolate until day of surgery.

## 2021-01-11 NOTE — PROGRESS NOTES
Confirmed with Emy Devine, that she needs isolated for VRE. Urine and Stool specimen needed to confirm to remove from isolation.

## 2021-01-11 NOTE — PROGRESS NOTES
Medicare Annual Wellness Visit  Name: Karin Montes Date: 1/15/2021   MRN: [id number] Sex: Female   Age: 63 y.o. Ethnicity: Non-/Non    : 1960 Race: Black      Adalberto Dunlap is here for [facility]    Patient is here to follow up on diabetes. Fasting blood sugars: Midday blood sugars: not checking. Patient checks blood glucose 1 times per day. Patient is following diabetic diet. Patient had nausea and vomiting the past week. Feeling better now. Screenings for behavioral, psychosocial and functional/safety risks, and cognitive dysfunction are all negative except as indicated below. These results, as well as other patient data from the [address] form, are documented in Flowsheets linked to this Encounter. No Known Allergies      Prior to Visit Medications    Medication Sig Taking? Authorizing Provider   aspirin 81 MG chewable tablet Take 1 tablet by mouth daily Yes Chirag Palacio MD   megestrol (MEGACE) 20 MG tablet Take 1 tablet by mouth 2 times daily Yes Chirag Palacio MD   hydrALAZINE (APRESOLINE) 50 MG tablet Take 1 tablet by mouth 4 times daily Yes Chirag Palacio MD   promethazine (PHENERGAN) 25 MG tablet Take 1 tablet by mouth every 8 hours as needed for Nausea Yes Chirag Palacio MD   benazepril (LOTENSIN) 10 MG tablet Take 1 tablet by mouth daily Yes Chirag Palacio MD   ferrous sulfate (IRON 325) 325 (65 Fe) MG tablet Take 1 tablet by mouth daily (with breakfast) Yes Chirag Palacio MD   diazePAM (VALIUM) 2 MG tablet Take 2 mg by mouth every 6 hours as needed for Anxiety.  Yes Dorothy Ochoa MD   thiamine 100 MG tablet Take 1 tablet by mouth daily Yes Chirag Palacio MD   spironolactone (ALDACTONE) 25 MG tablet Take 1 tablet by mouth daily Yes Chirag Palacio MD   pyridoxine (B-6) 50 MG tablet Take 1 tablet by mouth daily Yes Chirag Palacio MD clopidogrel (PLAVIX) 75 MG tablet Take 1 tablet by mouth daily Yes Sera Mas MD   oxybutynin (DITROPAN) 5 MG tablet Take 1 tablet by mouth 3 times daily Yes Sera Mas MD   folic acid (FOLVITE) 1 MG tablet Take 1 tablet by mouth daily Yes Shyla Francois DO   metoprolol tartrate (LOPRESSOR) 25 MG tablet Take 1 tablet by mouth 2 times daily Yes Shyla Francois DO   atorvastatin (LIPITOR) 80 MG tablet Take 1 tablet by mouth nightly Yes Shyla Francois DO   cloNIDine (CATAPRES) 0.3 MG/24HR PTWK Place 1 patch onto the skin once a week  Patient taking differently: Place 1 patch onto the skin once a week Applies Sunday  Sera Mas MD         Past Medical History:   Diagnosis Date    Diabetic peripheral neuropathy associated with type 2 diabetes mellitus ([facility] [address] 8/24/2018    Epigastric hernia     History of blood transfusion     Hyperlipidemia     Hypertension     Kidney stone     Schizophrenia ([facility] [address]     Type 2 diabetes mellitus with diabetic polyneuropathy, with long-term current use of insulin ([facility] [address] 9/19/2016    no longer requiring medication. 1/13/21.  Umbilical hernia        Past Surgical History:   Procedure Laterality Date    CARDIAC CATHETERIZATION  09/30/2016     [facility]    COLONOSCOPY  08/2016    Dr. Carolina Levy / [facility] / STONE Bilateral 10/28/2019    CYSTOSCOPY. RETROGRADE.  PYELOGRAM. BILATERAL STENT INSERTION performed by Zeke Gaines MD at [facility] / [facility] / STONE Bilateral 5/19/2020    CYSTOSCOPY RETROGRADE PYELOGRAM BILATERAL STENT EXCHANGE performed by Zeke Gaines MD at [address] Left 2/29/2020    LEG AMPUTATION BELOW KNEE performed by Anthony Pike DO at [address] [facility]  11/04/2016    epigastric hernia umbilical hernia with mesh    OVARIAN CYST REMOVAL      OVARY REMOVAL Right     OVARY REMOVAL  2014    UPPER GASTROINTESTINAL ENDOSCOPY N/A 10/29/2019    EGD ESOPHAGOGASTRODUODENOSCOPY performed by Radha Dao MD at [facility] ENDOSCOPY         Family History   Problem Relation Age of Onset    Cancer Brother        CareTeam (Including outside providers/suppliers regularly involved in providing care):   Patient Care Team:  Miladys Patino MD as PCP - General (Family Medicine)  Miladys Patino MD as PCP - [facility] Empaneled Provider  Pratima Godoy MD as Consulting Physician (Cardiology)    Wt Readings from Last 3 Encounters:   01/11/21 108 lb (49 kg)   10/20/20 114 lb (51.7 kg)   08/26/20 110 lb (49.9 kg)     Vitals:    01/11/21 1039   BP: 90/70   Pulse: 86   Resp: 20   Temp: 96.9 °F (36.1 °C)   SpO2: 100%   Weight: 108 lb (49 kg)   Height: 5' 3\" (1.6 m)     Body mass index is 19.13 kg/m². Based upon direct observation of the patient, evaluation of cognition reveals recent and remote memory intact. Physical Exam  Vitals signs reviewed. Constitutional:       General: She is not in acute distress. Appearance: She is well-developed. Neck:      Musculoskeletal: Neck supple. Vascular: No carotid bruit. Cardiovascular:      Rate and Rhythm: Normal rate and regular rhythm. Heart sounds: Normal heart sounds. No murmur. No gallop. Pulmonary:      Effort: Pulmonary effort is normal.      Breath sounds: Normal breath sounds. No wheezing or rales. Abdominal:      General: Bowel sounds are normal. There is no distension. Palpations: Abdomen is soft. Tenderness: There is no abdominal tenderness. Musculoskeletal:      Right lower leg: No edema. Left lower leg: No edema. Comments: +left lower extremity prosthesis   Skin:     General: Skin is warm and dry. Neurological:      Mental Status: She is alert and oriented to person, place, and time.            Patient's complete Health Risk Assessment and screening values have been reviewed and are found in [address] [facility]. The following problems were reviewed today and where indicated follow up appointments were made and/or referrals ordered. Positive Risk Factor Screenings with Interventions:           Health Habits/Nutrition:  Health Habits/Nutrition  Do you exercise for at least 20 minutes 2-3 times per week?: Yes  Have you lost any weight without trying in the past 3 months?: (!) Yes  Do you eat fewer than 2 meals per day?: (!) Yes  Have you seen a dentist within the past year?: Yes  Body mass index: 19.13  Health Habits/Nutrition Interventions:  · Nutritional issues:  patient usually eats 3 times per day but the past week had nausea and vomiting. ADL:  ADLs  In the past 7 days, did you need help from others to perform any of the following everyday activities? Eating, dressing, grooming, bathing, toileting, or walking/balance?: (!) Eating  In the past 7 days, did you need help from others to take care of any of the following?  Laundry, housekeeping, banking/finances, shopping, telephone use, food preparation, transportation, or taking medications?: None  ADL Interventions:  · Patient declines any further evaluation/treatment for this issue    Personalized Preventive Plan   Current Health Maintenance Status  Immunization History   Administered Date(s) Administered    Influenza, Quadv, IM, PF (6 mo and older Fluzone, Flulaval, Fluarix, and 3 yrs and older Afluria) 10/20/2020    Pneumococcal Conjugate 13-valent (Giovanna Auguste) 06/21/2017        Health Maintenance   Topic Date Due    Hepatitis C screen  1960    HIV screen  03/16/1974    DTaP/Tdap/Td vaccine (1 - Tdap) 03/16/1978    Shingles Vaccine (1 of 2) 03/16/2009    Cervical cancer screen  03/09/2015    Pneumococcal 0-64 years Vaccine (1 of 1 - PPSV23) 08/16/2017    Diabetic microalbuminuria test  05/04/2019    Annual Wellness Visit (AWV)  05/29/2019    Diabetic foot exam  07/23/2019    Diabetic retinal exam  07/01/2020    Breast cancer

## 2021-01-12 ENCOUNTER — HOSPITAL ENCOUNTER (OUTPATIENT)
Age: 61
Discharge: HOME | End: 2021-01-14
Payer: MEDICARE

## 2021-01-12 DIAGNOSIS — U07.1 COVID-19: ICD-10-CM

## 2021-01-12 PROCEDURE — U0003 INFECTIOUS AGENT DETECTION BY NUCLEIC ACID (DNA OR RNA); SEVERE ACUTE RESPIRATORY SYNDROME CORONAVIRUS 2 (SARS-COV-2) (CORONAVIRUS DISEASE [COVID-19]), AMPLIFIED PROBE TECHNIQUE, MAKING USE OF HIGH THROUGHPUT TECHNOLOGIES AS DESCRIBED BY CMS-2020-01-R: HCPCS

## 2021-01-13 ENCOUNTER — PREP FOR PROCEDURE (OUTPATIENT)
Dept: UROLOGY | Age: 61
End: 2021-01-13

## 2021-01-13 ENCOUNTER — TELEPHONE (OUTPATIENT)
Dept: FAMILY MEDICINE CLINIC | Age: 61
End: 2021-01-13

## 2021-01-13 RX ORDER — SODIUM CHLORIDE 0.9 % (FLUSH) 0.9 %
10 SYRINGE (ML) INJECTION EVERY 12 HOURS SCHEDULED
Status: CANCELLED | OUTPATIENT
Start: 2021-01-13

## 2021-01-13 RX ORDER — SODIUM CHLORIDE 0.9 % (FLUSH) 0.9 %
10 SYRINGE (ML) INJECTION PRN
Status: CANCELLED | OUTPATIENT
Start: 2021-01-13

## 2021-01-13 NOTE — PROGRESS NOTES
[facility] PRE-ADMISSION TESTING GENERAL INSTRUCTIONS- [facility]-phone number:[phone]    GENERAL INSTRUCTIONS  [x] Antibacterial Soap shower Night before and/or AM of Surgery  [] Jose wipe instruction sheet and wipes given. [x] Nothing by mouth after midnight, including gum, candy, mints, or water. [x] You may brush your teeth, gargle, but do NOT swallow water. []Hibiclens shower  the night before and the morning of surgery. Do not use             Hibiclens on your face or head. []No smoking, chewing tobacco, illegal drugs, or alcohol within 24 hours of your surgery. [x] Jewelry, valuables or body piercing's should not be brought to the hospital. All body and/or tongue piercing's must be removed prior to arriving to hospital.  ALL hair pins must be removed. [x] Do not wear makeup, lotions, powders, deodorant. Nail polish as directed by the nurse. [x] Arrange transportation with a responsible adult  to and from the hospital. If you do not have a responsible adult  to transport you, you will need to make arrangements with a medical transportation company (i.e. [employer]. A Uber/taxi/bus is not appropriate unless you are accompanied by a responsible adult ). Arrange for someone to be with you for the remainder of the day and for 24 hours after your procedure due to having had anesthesia. Who will be your  for transportation? _Dawn_________________   Who will be staying with you for 24 hrs after your procedure? __Dawn________________  [x] Bring insurance card and photo ID.  [] Transfusion Bracelet: Please bring with you to hospital, day of surgery  [] Bring urine specimen day of surgery. Any small container is acceptable. [] Use inhalers the morning of surgery and bring with you to hospital.  [] Bring copy of living will or healthcare power of  papers to be placed in your electronic record. [] CPAP/BI-PAP: Please bring your machine if you are to spend the night in the hospital.     PARKING INSTRUCTIONS:   [x] Arrival Time:1200. Park on [address] Enter the main entrance. One person may accompany you. Wear a mask. [x] To reach the [facility] from [address] [facility], upon entering the hospital, take elevator B to the 3rd floor. Check in with .  A parking token will be provided if needed. EDUCATION INSTRUCTIONS:      [] Knee or hip replacement booklet & exercise pamphlets given. [] [address] placed in chart. [] Pre-admission Testing educational folder given  [] Incentive Spirometry,coughing & deep breathing exercises reviewed. []Medication information sheet(s)   [x]Fluoroscopy-Xray used in surgery reviewed with patient. Educational pamphlet placed in chart. [x]Pain: Post-op pain is normal and to be expected. You will be asked to rate your pain from 0-10(a zero is not acceptable-education is needed). Your post-op pain goal is:5  [x] Ask your nurse for your pain medication. [] Joint camp offered. [] Joint replacement booklets given. [x] Other:__burning, urgency is wnl.  wear a pad._________________________    MEDICATION INSTRUCTIONS:   [x]Bring a complete list of your medications, please write the last time you took the medicine, give this list to the nurse. [x] Take the following medications the morning of surgery with 1-2 ounces of water: see list  [] Stop herbal supplements and vitamins 5 days before your surgery. [] DO NOT take any diabetic medicine the morning of surgery. Follow instructions for insulin the day before surgery. [] If you are diabetic and your blood sugar is low or you feel symptomatic, you may drink 1-2 ounces of apple juice or take a glucose tablet. The morning of your procedure, you may call the pre-op area if you have concerns about your blood sugar [phone]. [] Use your inhalers the morning of surgery. Bring your emergency inhaler with you day of surgery. [x] Follow physician instructions regarding any blood thinners you may be taking. WHAT TO EXPECT:  [x] The day of surgery you will be greeted and checked in by the [employer].  In addition, you will be registered in the [place] by a Patient Access Representative. Please bring your photo ID and insurance card. A nurse will greet you in accordance to the time you are needed in the pre-op area to prepare you for surgery. Please do not be discouraged if you are not greeted in the order you arrive as there are many variables that are involved in patient preparation. Your patience is greatly appreciated as you wait for your nurse. [x]  Delays may occur with surgery and staff will make a sincere effort to keep you informed of delays. If any delays occur with your procedure, we apologize ahead of time for your inconvenience as we recognize the value of your time.

## 2021-01-13 NOTE — TELEPHONE ENCOUNTER
Kathy Rodriguez, [facility], called regarding Plavix and Aspirin, 81 mg since patient is scheduled for SX on 1/18/21. OK to hold and for how long. Please advise.

## 2021-01-13 NOTE — PROGRESS NOTES
Left message for office to have Dr. Raissa Dutta instruct on holding plavix for surgery 1/18. I told the family we will contact them. I also messaged Dr. Cristiana Cota office for instructions.

## 2021-01-14 LAB
SARS-COV-2: NOT DETECTED
SOURCE: NORMAL

## 2021-01-14 NOTE — TELEPHONE ENCOUNTER
I called PAT and spoke w/Mira, informing. Criss Salcido stated she will notify patient with instructions.

## 2021-01-14 NOTE — PROGRESS NOTES
Luis Delgado from Dr. Paola Carrillo states patient is to hold the Plavix and aspirin 3 days before the procedure and to resume the day after the procedure. 1/14Spoke with patients daughter, Brittney Greco, instructed her of  the above, LD to take the above medications before surgery is 1/14. Daughter states patient has not taken metoprolol for more than a year. Request to review medications patient is taking, daughter declined stating no other changes in patient's medications.

## 2021-01-14 NOTE — PROGRESS NOTES
Spoke with Mikey Reynaga at Dr. Raissa Dutta office calling to follow up regarding instructions for Plavix 75 mg and aspirin 81 mg patient is scheduled for surgery on 1/18. Mikey Reynaga states she will check with  And call PAT back with instructions regarding the above medications.

## 2021-01-18 ENCOUNTER — ANESTHESIA (OUTPATIENT)
Dept: OPERATING ROOM | Age: 61
End: 2021-01-18
Payer: MEDICARE

## 2021-01-18 ENCOUNTER — ANESTHESIA EVENT (OUTPATIENT)
Dept: OPERATING ROOM | Age: 61
End: 2021-01-18
Payer: MEDICARE

## 2021-01-18 ENCOUNTER — HOSPITAL ENCOUNTER (OUTPATIENT)
Age: 61
Setting detail: OUTPATIENT SURGERY
Discharge: HOME | End: 2021-01-18
Attending: UROLOGY | Admitting: UROLOGY
Payer: MEDICARE

## 2021-01-18 ENCOUNTER — APPOINTMENT (OUTPATIENT)
Dept: GENERAL RADIOLOGY | Age: 61
End: 2021-01-18
Attending: UROLOGY
Payer: MEDICARE

## 2021-01-18 VITALS
BODY MASS INDEX: 19.14 KG/M2 | HEIGHT: 63 IN | TEMPERATURE: 98 F | OXYGEN SATURATION: 100 % | DIASTOLIC BLOOD PRESSURE: 72 MMHG | RESPIRATION RATE: 17 BRPM | HEART RATE: 81 BPM | WEIGHT: 108 LBS | SYSTOLIC BLOOD PRESSURE: 129 MMHG

## 2021-01-18 VITALS
OXYGEN SATURATION: 100 % | SYSTOLIC BLOOD PRESSURE: 104 MMHG | DIASTOLIC BLOOD PRESSURE: 76 MMHG | RESPIRATION RATE: 13 BRPM

## 2021-01-18 DIAGNOSIS — U07.1 COVID-19: Primary | ICD-10-CM

## 2021-01-18 DIAGNOSIS — Z01.818 PREOP TESTING: ICD-10-CM

## 2021-01-18 LAB — METER GLUCOSE: 105 MG/DL (ref 74–99)

## 2021-01-18 PROCEDURE — C2617 STENT, NON-COR, TEM W/O DEL: HCPCS | Performed by: UROLOGY

## 2021-01-18 PROCEDURE — C1769 GUIDE WIRE: HCPCS | Performed by: UROLOGY

## 2021-01-18 PROCEDURE — 2709999900 HC NON-CHARGEABLE SUPPLY: Performed by: UROLOGY

## 2021-01-18 PROCEDURE — C1758 CATHETER, URETERAL: HCPCS | Performed by: UROLOGY

## 2021-01-18 PROCEDURE — 3600000013 HC SURGERY LEVEL 3 ADDTL 15MIN: Performed by: UROLOGY

## 2021-01-18 PROCEDURE — 6360000002 HC RX W HCPCS: Performed by: NURSE ANESTHETIST, CERTIFIED REGISTERED

## 2021-01-18 PROCEDURE — 6360000002 HC RX W HCPCS: Performed by: NURSE PRACTITIONER

## 2021-01-18 PROCEDURE — 3600000003 HC SURGERY LEVEL 3 BASE: Performed by: UROLOGY

## 2021-01-18 PROCEDURE — 7100000010 HC PHASE II RECOVERY - FIRST 15 MIN: Performed by: UROLOGY

## 2021-01-18 PROCEDURE — 74420 UROGRAPHY RTRGR +-KUB: CPT

## 2021-01-18 PROCEDURE — 3700000001 HC ADD 15 MINUTES (ANESTHESIA): Performed by: UROLOGY

## 2021-01-18 PROCEDURE — 2580000003 HC RX 258: Performed by: NURSE ANESTHETIST, CERTIFIED REGISTERED

## 2021-01-18 PROCEDURE — 82962 GLUCOSE BLOOD TEST: CPT

## 2021-01-18 PROCEDURE — 7100000011 HC PHASE II RECOVERY - ADDTL 15 MIN: Performed by: UROLOGY

## 2021-01-18 PROCEDURE — 3700000000 HC ANESTHESIA ATTENDED CARE: Performed by: UROLOGY

## 2021-01-18 DEVICE — URETERAL STENT
Type: IMPLANTABLE DEVICE | Site: URETER | Status: FUNCTIONAL
Brand: PERCUFLEX™

## 2021-01-18 RX ORDER — FENTANYL CITRATE 50 UG/ML
INJECTION, SOLUTION INTRAMUSCULAR; INTRAVENOUS PRN
Status: DISCONTINUED | OUTPATIENT
Start: 2021-01-18 | End: 2021-01-18 | Stop reason: SDUPTHER

## 2021-01-18 RX ORDER — ONDANSETRON 2 MG/ML
4 INJECTION INTRAMUSCULAR; INTRAVENOUS
Status: DISCONTINUED | OUTPATIENT
Start: 2021-01-18 | End: 2021-01-18 | Stop reason: HOSPADM

## 2021-01-18 RX ORDER — SODIUM CHLORIDE 9 MG/ML
INJECTION, SOLUTION INTRAVENOUS CONTINUOUS PRN
Status: DISCONTINUED | OUTPATIENT
Start: 2021-01-18 | End: 2021-01-18 | Stop reason: SDUPTHER

## 2021-01-18 RX ORDER — MIDAZOLAM HYDROCHLORIDE 1 MG/ML
INJECTION INTRAMUSCULAR; INTRAVENOUS PRN
Status: DISCONTINUED | OUTPATIENT
Start: 2021-01-18 | End: 2021-01-18 | Stop reason: SDUPTHER

## 2021-01-18 RX ORDER — FENTANYL CITRATE 50 UG/ML
50 INJECTION, SOLUTION INTRAMUSCULAR; INTRAVENOUS EVERY 5 MIN PRN
Status: DISCONTINUED | OUTPATIENT
Start: 2021-01-18 | End: 2021-01-18 | Stop reason: HOSPADM

## 2021-01-18 RX ORDER — PROPOFOL 10 MG/ML
INJECTION, EMULSION INTRAVENOUS CONTINUOUS PRN
Status: DISCONTINUED | OUTPATIENT
Start: 2021-01-18 | End: 2021-01-18 | Stop reason: SDUPTHER

## 2021-01-18 RX ORDER — FENTANYL CITRATE 50 UG/ML
25 INJECTION, SOLUTION INTRAMUSCULAR; INTRAVENOUS EVERY 5 MIN PRN
Status: DISCONTINUED | OUTPATIENT
Start: 2021-01-18 | End: 2021-01-18 | Stop reason: HOSPADM

## 2021-01-18 RX ORDER — MORPHINE SULFATE 2 MG/ML
1 INJECTION, SOLUTION INTRAMUSCULAR; INTRAVENOUS EVERY 5 MIN PRN
Status: DISCONTINUED | OUTPATIENT
Start: 2021-01-18 | End: 2021-01-18 | Stop reason: HOSPADM

## 2021-01-18 RX ORDER — SODIUM CHLORIDE 0.9 % (FLUSH) 0.9 %
10 SYRINGE (ML) INJECTION EVERY 12 HOURS SCHEDULED
Status: DISCONTINUED | OUTPATIENT
Start: 2021-01-18 | End: 2021-01-18 | Stop reason: HOSPADM

## 2021-01-18 RX ORDER — MORPHINE SULFATE 2 MG/ML
2 INJECTION, SOLUTION INTRAMUSCULAR; INTRAVENOUS EVERY 5 MIN PRN
Status: DISCONTINUED | OUTPATIENT
Start: 2021-01-18 | End: 2021-01-18 | Stop reason: HOSPADM

## 2021-01-18 RX ORDER — SODIUM CHLORIDE 0.9 % (FLUSH) 0.9 %
10 SYRINGE (ML) INJECTION PRN
Status: DISCONTINUED | OUTPATIENT
Start: 2021-01-18 | End: 2021-01-18 | Stop reason: HOSPADM

## 2021-01-18 RX ORDER — CEPHALEXIN 250 MG/1
250 CAPSULE ORAL 3 TIMES DAILY
Qty: 9 CAPSULE | Refills: 0 | Status: SHIPPED | OUTPATIENT
Start: 2021-01-18 | End: 2021-07-13

## 2021-01-18 RX ORDER — PROPOFOL 10 MG/ML
INJECTION, EMULSION INTRAVENOUS PRN
Status: DISCONTINUED | OUTPATIENT
Start: 2021-01-18 | End: 2021-01-18 | Stop reason: SDUPTHER

## 2021-01-18 RX ADMIN — PROPOFOL 70 MG: 10 INJECTION, EMULSION INTRAVENOUS at 14:43

## 2021-01-18 RX ADMIN — PROPOFOL 75 MCG/KG/MIN: 10 INJECTION, EMULSION INTRAVENOUS at 14:43

## 2021-01-18 RX ADMIN — SODIUM CHLORIDE: 9 INJECTION, SOLUTION INTRAVENOUS at 14:38

## 2021-01-18 RX ADMIN — MIDAZOLAM 2 MG: 1 INJECTION INTRAMUSCULAR; INTRAVENOUS at 14:43

## 2021-01-18 RX ADMIN — Medication 2 G: at 14:41

## 2021-01-18 RX ADMIN — FENTANYL CITRATE 50 MCG: 50 INJECTION, SOLUTION INTRAMUSCULAR; INTRAVENOUS at 14:43

## 2021-01-18 RX ADMIN — FENTANYL CITRATE 50 MCG: 50 INJECTION, SOLUTION INTRAMUSCULAR; INTRAVENOUS at 15:04

## 2021-01-18 ASSESSMENT — PULMONARY FUNCTION TESTS
PIF_VALUE: 1
PIF_VALUE: 0
PIF_VALUE: 1
PIF_VALUE: 1
PIF_VALUE: 0
PIF_VALUE: 0
PIF_VALUE: 1
PIF_VALUE: 0
PIF_VALUE: 1
PIF_VALUE: 0
PIF_VALUE: 1
PIF_VALUE: 0
PIF_VALUE: 1

## 2021-01-18 ASSESSMENT — LIFESTYLE VARIABLES: SMOKING_STATUS: 0

## 2021-01-18 ASSESSMENT — PAIN SCALES - GENERAL: PAINLEVEL_OUTOF10: 0

## 2021-01-18 NOTE — PROGRESS NOTES
Incontinent x2 of moderate amount of pink tinged urine. ,  Ready for discharge. Instructions reviewed with patient and family. Verbalizes understanding. Dressed and discharge via wheelchair with family.  To home

## 2021-01-18 NOTE — PROGRESS NOTES
Admitted to Same Day Surgery Unit. Preop instructions given to patient & family. Covid Test done:1/12/21    Results:Negative      Self-quarantine guidelines followed since tested? Yes    Any unusual S/S or concerns expressed or observed? No

## 2021-01-18 NOTE — ANESTHESIA POSTPROCEDURE EVALUATION
Department of Anesthesiology  Postprocedure Note    Patient: Elis Hummel  MRN: [id number]  YOB: 1960  Date of evaluation: 1/18/2021  Time:  2:50 PM     Procedure Summary     Date: 01/18/21 Room / Location: [place] [address] / [facility]    Anesthesia Start: 1440 Anesthesia Stop:     Procedure: Carolina Professor Con Archib[address] (Bilateral ) Diagnosis: (URETERAL OBSTRUCTION)    Surgeons: Patrick Winkler MD Responsible Provider: Dann Whittington MD    Anesthesia Type: MAC ASA Status: 3          Anesthesia Type: MAC    Jake Phase I: Jake Score: 10    Jake Phase II:      Last vitals: Reviewed and per EMR flowsheets.        Anesthesia Post Evaluation    Patient location during evaluation: PACU  Patient participation: complete - patient participated  Level of consciousness: awake  Pain score: 3  Airway patency: patent  Nausea & Vomiting: no nausea and no vomiting  Complications: no  Cardiovascular status: blood pressure returned to baseline  Respiratory status: acceptable  Hydration status: euvolemic

## 2021-01-18 NOTE — ANESTHESIA POSTPROCEDURE EVALUATION
Department of Anesthesiology  Postprocedure Note    Patient: Boaz Brownlee  MRN: [id number]  YOB: 1960  Date of evaluation: 1/18/2021  Time:  4:00 PM     Procedure Summary     Date: 01/18/21 Room / Location: [facility] OR [facility] / [facility]    Anesthesia Start: 1440 Anesthesia Stop: 7982    Procedure: Carolina Professor Con Archibald [address] (Bilateral Bladder) Diagnosis: (URETERAL OBSTRUCTION)    Surgeons: Carson Castro MD Responsible Provider: Nataly Hernandez MD    Anesthesia Type: MAC ASA Status: 3          Anesthesia Type: MAC    Jake Phase I: Jake Score: 10    Jake Phase II: Jake Score: 10    Last vitals: Reviewed and per EMR flowsheets.        Anesthesia Post Evaluation    Patient location during evaluation: bedside  Patient participation: complete - patient cannot participate  Level of consciousness: awake and alert  Airway patency: patent  Nausea & Vomiting: no nausea and no vomiting  Complications: no  Cardiovascular status: blood pressure returned to baseline  Respiratory status: acceptable  Hydration status: euvolemic

## 2021-01-18 NOTE — OP NOTE
Operative Note      Patient: Kiya Bello  YOB: 1960  MRN: [id number]    Date of Procedure: 1/18/2021    Pre-Op Diagnosis: BILATERAL URETERAL OBSTRUCTION    Post-Op Diagnosis: Same       Procedure(s):  CYSTOSCOPY RETROGRADE PYELOGRAM BILATERAL  STENT CHANGE    Surgeon(s):  Lobo Gill MD    Assistant:   * No surgical staff found *    Anesthesia: Monitor Anesthesia Care    Estimated Blood Loss (mL): Minimal    Complications: None    Specimens:   * No specimens in log *    Implants:  Implant Name Type Inv. Item Serial No.  Lot No. LRB No. Used Action   STENT URET 6FR L24CM PERCFLX FIRM DUROMETER DBL PGTL TAPR  STENT URET 6FR L24CM PERCFLX FIRM DUROMETER DBL PGTL TAPR  [employer]- [id number] Left 1 Implanted   STENT URET 6FR L24CM PERCFLX FIRM DUROMETER DBL PGTL TAPR  STENT URET 6FR L24CM PERCFLX FIRM DUROMETER DBL PGTL TAPR  [employer]- [id number] Right 1 Implanted         Drains:   [REMOVED] External Urinary Catheter (Removed)         INDICATION FOR PROCEDURE: Kiya Bello is a 63 y.o. female who presents for cystoscopy, retrograde pyelograms, stent exchange and understands the risks, benefits, and alternatives of the procedure, signed informed consent, and agreed to proceed. PROCEDURE:   The patient was brought into the operating room and placed under anesthesia in the dorsal lithotomy position. She was prepped and draped in a sterile fashion. A 21-Occitan cystoscope with a 30-degree lens was passed through the urethra and into the bladder. The entire length of the urethra was examined and found to be without strictures or other abnormalities. The entire bladder mucosal surface was examined under 30- degree endoscopy and found to be without calculi, tumors, diverticula, or other abnormalities. The ureteral orifices were normal in size, number, and location. Bilateral stents were identified and were black in color with debris. The stent was grasped and removed in entirety. A 5-Greenlandic open-ended catheter was passed into the left ureteral orifice and 10 mL of Cysto-Conray were injected under fluoroscopic guidance. The entire length of the ureter, the UPJ, renal pelvis, and calices were examined and the distal ureter was narrowed. The calyces were blunted. There were no filling defects other than bubbles. A wire was left indwelling and a 6 x 24 double-J ureteral stent was left indwelling with a good curl seen in the kidney as well as in the bladder. 5 [place] open-ended catheter was passed into the right ureteral orifice and the distal ureter was narrow and difficult to navigate. An angled Glidewire was used to allow the open-ended catheter to enter the ureter. The remainder the pyelogram was performed and the distal ureter was severely narrowed. There were no filling defects noted. There is hydronephrosis proximal to the distal ureter. A wire was left indwelling and a 6 x 24 double-J ureteral stent was left indwelling with a good curl seen in the kidney as well as in the bladder. The bladder was drained. The patient was awakened from anesthesia and taken to recovery in stable condition.       Ezequiel Rico M.D.  1/18/2021  3:26 PM            Electronically signed by Ezequiel Rico MD on 1/18/2021 at 3:22 PM

## 2021-01-18 NOTE — ANESTHESIA PRE PROCEDURE
Department of Anesthesiology  Preprocedure Note       Name:  Edilia Prather   Age:  63 y.o.  :  1960                                          MRN:  [id number]         Date:  2021      Surgeon: Allegra Gonzales    Procedure: ЕКАТЕРИНА    Medications prior to admission:   Prior to Admission medications    Medication Sig Start Date End Date Taking? Authorizing Provider   aspirin 81 MG chewable tablet Take 1 tablet by mouth daily 21   Tosin Evans MD   megestrol (MEGACE) 20 MG tablet Take 1 tablet by mouth 2 times daily 21   Tosin Evans MD   hydrALAZINE (APRESOLINE) 50 MG tablet Take 1 tablet by mouth 4 times daily 21   Tosin Evans MD   promethazine (PHENERGAN) 25 MG tablet Take 1 tablet by mouth every 8 hours as needed for Nausea 21   Tosin Evans MD   benazepril (LOTENSIN) 10 MG tablet Take 1 tablet by mouth daily 10/31/20   Tosin Evans MD   ferrous sulfate (IRON 325) 325 (65 Fe) MG tablet Take 1 tablet by mouth daily (with breakfast) 10/31/20   Tosin Evans MD   diazePAM (VALIUM) 2 MG tablet Take 2 mg by mouth every 6 hours as needed for Anxiety.     Historical Provider, MD   thiamine 100 MG tablet Take 1 tablet by mouth daily 10/20/20   Tosin Evans MD   spironolactone (ALDACTONE) 25 MG tablet Take 1 tablet by mouth daily 10/20/20   Tosin Evans MD   pyridoxine (B-6) 50 MG tablet Take 1 tablet by mouth daily 10/20/20   Tosin Evans MD   cloNIDine (CATAPRES) 0.3 MG/24HR PTWK Place 1 patch onto the skin once a week  Patient taking differently: Place 1 patch onto the skin once a week Applies 20  Tosin Evans MD   clopidogrel (PLAVIX) 75 MG tablet Take 1 tablet by mouth daily 20   Tosin Evans MD   oxybutynin (DITROPAN) 5 MG tablet Take 1 tablet by mouth 3 times daily 20   Tosin Evans MD   folic acid (Deyanira Evelia) 1 MG tablet Take 1 tablet by mouth daily 6/4/20   Hero Lusty, DO   metoprolol tartrate (LOPRESSOR) 25 MG tablet Take 1 tablet by mouth 2 times daily 6/4/20   Hero Lusty, DO   atorvastatin (LIPITOR) 80 MG tablet Take 1 tablet by mouth nightly 3/3/20   Hero Lusty, DO       Current medications:    No current facility-administered medications for this visit. No current outpatient medications on file.      Facility-Administered Medications Ordered in Other Visits   Medication Dose Route Frequency Provider Last Rate Last Admin    ceFAZolin (ANCEF) 2 g in sterile water 20 mL IV syringe  2 g Intravenous On Call to 4050 BENJI Jurado - CNP        sodium chloride flush 0.9 % injection 10 mL  10 mL Intravenous 2 times per day BENJI Law - CNP        sodium chloride flush 0.9 % injection 10 mL  10 mL Intravenous PRN BENJI Law CNP           Allergies:  No Known Allergies    Problem List:    Patient Active Problem List   Diagnosis Code    Epigastric hernia M37.9    Umbilical hernia M89.8    Essential hypertension I10    Type 2 diabetes mellitus without complication, with long-term current use of insulin ([facility]) E11.9, Z79.4    Dyslipidemia E78.5    S/P cardiac catheterization Z98.890    Abnormal stress ECG R94.39    Hyperthyroidism E05.90    Diabetic peripheral neuropathy ([facility]) E11.42    Hydroureter N13.4    Microcytic hypochromic anemia D50.9    Arterial occlusion I70.90    Hypoglycemia E16.2    Hypertensive emergency I16.1    Adrenal infarction ([facility] E27.49    Paresthesia of hand, bilateral R20.2    Paresthesia of left foot R20.2    Unintended weight loss R63.4    Acute ischemic stroke ([address] I63.9    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    Acute CVA (cerebrovascular accident) ([facility] I63.9    Hx of diabetic foot ulcer Z86.31    Hx of BKA, left ([facility]) A62.478    Acute on chronic diastolic heart failure ([facility]) I50.33       Past Medical History:        Diagnosis Date  Diabetic peripheral neuropathy associated with type 2 diabetes mellitus ([facility] 2018    Epigastric hernia     History of blood transfusion     Hyperlipidemia     Hypertension     Kidney stone     Schizophrenia ([facility]     Type 2 diabetes mellitus with diabetic polyneuropathy, with long-term current use of insulin ([facility] 2016    no longer requiring medication. 21.  Umbilical hernia        Past Surgical History:        Procedure Laterality Date    CARDIAC CATHETERIZATION  2016     [facility]    COLONOSCOPY  2016    Dr. Constance Burns / Cheryl Reagin / STONE Bilateral 10/28/2019    CYSTOSCOPY. RETROGRADE. PYELOGRAM. BILATERAL STENT INSERTION performed by Echo Delarosa MD at [facility] / Cheryl Calderonin / STONE Bilateral 2020    CYSTOSCOPY RETROGRADE PYELOGRAM BILATERAL STENT EXCHANGE performed by Echo Delarosa MD at [address] Left 2020    LEG AMPUTATION BELOW KNEE performed by Sonam Davis DO at [address] [place] [address]  2016    epigastric hernia umbilical hernia with mesh    OVARIAN CYST REMOVAL      OVARY REMOVAL Right     OVARY REMOVAL  2014    UPPER GASTROINTESTINAL ENDOSCOPY N/A 10/29/2019    EGD ESOPHAGOGASTRODUODENOSCOPY performed by Sofiya Ferrell MD at [address] History:    Social History     Tobacco Use    Smoking status: Former Smoker     Packs/day: 0.00     Years: 2.00     Pack years: 0.00     Types: Cigarettes     Quit date: 3/23/2015     Years since quittin.8    Smokeless tobacco: Never Used    Tobacco comment: quit smoking    Substance Use Topics    Alcohol use: No     Comment: coffee daily                                Counseling given: Not Answered  Comment: quit smoking       Vital Signs (Current): There were no vitals filed for this visit.                                            BP Readings from Last 3 Encounters:   01/18/21 (!) 161/63   01/11/21 90/70   10/20/20 138/78       NPO Status:                                                                                 BMI:   Wt Readings from Last 3 Encounters:   01/18/21 108 lb (49 kg)   01/11/21 108 lb (49 kg)   10/20/20 114 lb (51.7 kg)     There is no height or weight on file to calculate BMI.    CBC:   Lab Results   Component Value Date    WBC 5.0 08/27/2020    RBC 2.78 08/27/2020    HGB 8.2 08/27/2020    HCT 27.7 08/27/2020    MCV 84.5 08/27/2020    RDW 16.3 08/27/2020     08/27/2020       CMP:   Lab Results   Component Value Date     01/11/2021    K 4.0 01/11/2021    K 3.6 08/22/2020     01/11/2021    CO2 17 01/11/2021     01/11/2021    CREATININE 2.7 01/11/2021    GFRAA 22 01/11/2021    LABGLOM 22 01/11/2021    GLUCOSE 131 01/11/2021    GLUCOSE 171 04/26/2012    PROT 7.8 01/11/2021    CALCIUM 9.4 01/11/2021    BILITOT 0.2 01/11/2021    ALKPHOS 62 01/11/2021    AST 13 01/11/2021    ALT 6 01/11/2021       POC Tests: No results for input(s): POCGLU, POCNA, POCK, POCCL, POCBUN, POCHEMO, POCHCT in the last 72 hours.     Coags:   Lab Results   Component Value Date    PROTIME 13.0 08/22/2020    INR 1.2 08/22/2020    APTT 32.4 08/22/2020       HCG (If Applicable): No results found for: PREGTESTUR, PREGSERUM, HCG, HCGQUANT     ABGs:   Lab Results   Component Value Date    PO2ART 57.6 02/25/2020    KUF5WWF 24.8 02/25/2020    EYT7OLU 20.3 02/25/2020        Type & Screen (If Applicable):  No results found for: LABABO, [address]    Drug/Infectious Status (If Applicable):  No results found for: HIV, HEPCAB    COVID-19 Screening (If Applicable):   Lab Results   Component Value Date    COVID19 Not Detected 01/12/2021         Anesthesia Evaluation  Patient summary reviewed and Nursing notes reviewed no history of anesthetic complications:   Airway: Mallampati: II  TM distance: >3 FB   Neck ROM: full  Mouth opening: > = 3 FB Dental: (+) edentulous      Pulmonary: breath sounds clear to auscultation      (-) not a current smoker (FORMER)                           Cardiovascular:  Exercise tolerance: poor (<4 METS),   (+) hypertension:,       ECG reviewed  Rhythm: regular  Rate: normal  Echocardiogram reviewed  Stress test reviewed  Cleared by cardiology     Beta Blocker:  Dose within 24 Hrs      ROS comment: EF 60%     Neuro/Psych:   (+) CVA (March 2019):, neuromuscular disease:, psychiatric history: stable with treatment            GI/Hepatic/Renal:   (+) renal disease: kidney stones,           Endo/Other:    (+) DiabetesType II DM, , hyperthyroidism, blood dyscrasia (MICROCYTIC HYPOCHROMIC ANEMIA): anemia:., .          Pt had no PAT visit        ROS comment:  Abdominal:           Vascular:   + PVD, aortic or cerebral, . ROS comment: PVD. Anesthesia Plan      MAC     ASA 3       Induction: intravenous. Anesthetic plan and risks discussed with patient and child/children. Plan discussed with CRNA. Kaylene Christopher DO   1/18/2021    Patient seen and examined, chart reviewed, agree with above findings. Anesthetic plan, risks, benefits, alternatives, and personnel involved discussed with patient and her daughter. Patient verbalized an understanding and agreed to proceed. NPO status confirmed. Anesthetic plan discussed with care team members and agreed upon.     Kaylene Christopher DO   1/18/2021  2:28 PM

## 2021-01-27 NOTE — H&P
Adalberto Dunlap is a 63 y.o. female with ureteral obstruction. Past Medical History:   Diagnosis Date    Diabetic peripheral neuropathy associated with type 2 diabetes mellitus ([facility] 8/24/2018    Epigastric hernia     History of blood transfusion     Hyperlipidemia     Hypertension     Kidney stone     Schizophrenia ([facility]     Type 2 diabetes mellitus with diabetic polyneuropathy, with long-term current use of insulin ([facility] 9/19/2016    no longer requiring medication. 1/13/21.  Umbilical hernia        Past Surgical History:   Procedure Laterality Date    CARDIAC CATHETERIZATION  09/30/2016     [facility]    [facility]  08/[facility] / Viky Caller / STONE Bilateral 10/28/2019    CYSTOSCOPY. RETROGRADE. PYELOGRAM. BILATERAL STENT INSERTION performed by Stefania Mullen MD at [facility] / Viky Caller / STONE Bilateral 5/19/2020    CYSTOSCOPY RETROGRADE PYELOGRAM BILATERAL STENT EXCHANGE performed by Stefania Mullen MD at [facility] / [place] Caller / Florian Limbo Bilateral 1/18/2021    CYSTOSCOPY RETROGRADE PYELOGRAM BILATERAL  STENT CHANGE performed by Lauren Arnett MD at [place] [address] Left 2/29/2020    LEG AMPUTATION BELOW KNEE performed by Tiffany Gaming DO at [address] [place] [address]  11/04/2016    epigastric hernia umbilical hernia with mesh    OVARIAN CYST REMOVAL      OVARY REMOVAL Right     OVARY REMOVAL  2014    UPPER GASTROINTESTINAL ENDOSCOPY N/A 10/29/2019    EGD ESOPHAGOGASTRODUODENOSCOPY performed by Mariya Darling MD at [facility] ENDOSCOPY       Family History   Problem Relation Age of Onset    Cancer Brother        Prior to Admission medications    Medication Sig Start Date End Date Taking?  Authorizing Provider   cephALEXin (KEFLEX) 250 MG capsule Take 1 capsule by mouth 3 times daily 1/18/21  Yes Lauren Arnett MD aspirin 81 MG chewable tablet Take 1 tablet by mouth daily 1/11/21  Yes Skylar Aguilar MD   megestrol (MEGACE) 20 MG tablet Take 1 tablet by mouth 2 times daily 1/4/21  Yes Skylar Aguilar MD   hydrALAZINE (APRESOLINE) 50 MG tablet Take 1 tablet by mouth 4 times daily 1/4/21  Yes Skylar Aguilar MD   promethazine (PHENERGAN) 25 MG tablet Take 1 tablet by mouth every 8 hours as needed for Nausea 1/4/21  Yes Skylar Aguilar MD   benazepril (LOTENSIN) 10 MG tablet Take 1 tablet by mouth daily 10/31/20  Yes Skylar Aguilar MD   ferrous sulfate (IRON 325) 325 (65 Fe) MG tablet Take 1 tablet by mouth daily (with breakfast) 10/31/20  Yes Skylar Aguilar MD   diazePAM (VALIUM) 2 MG tablet Take 2 mg by mouth every 6 hours as needed for Anxiety. Yes Dorothy Ochoa MD   thiamine 100 MG tablet Take 1 tablet by mouth daily 10/20/20  Yes Skylar Aguilar MD   spironolactone (ALDACTONE) 25 MG tablet Take 1 tablet by mouth daily 10/20/20  Yes Skylar Aguilar MD   pyridoxine (B-6) 50 MG tablet Take 1 tablet by mouth daily 10/20/20  Yes Skylar Aguilar MD   cloNIDine (CATAPRES) 0.3 MG/24HR PTWK Place 1 patch onto the skin once a week  Patient taking differently: Place 1 patch onto the skin once a week Applies Sunday 7/6/20 1/13/21 Yes Skylar Aguilar MD   clopidogrel (PLAVIX) 75 MG tablet Take 1 tablet by mouth daily 7/6/20  Yes Skylar Aguilar MD   oxybutynin (DITROPAN) 5 MG tablet Take 1 tablet by mouth 3 times daily 7/6/20  Yes Skylar Aguilar MD   folic acid (FOLVITE) 1 MG tablet Take 1 tablet by mouth daily 6/4/20  Yes Tim Tee DO   metoprolol tartrate (LOPRESSOR) 25 MG tablet Take 1 tablet by mouth 2 times daily 6/4/20  Yes Tim Tee DO   atorvastatin (LIPITOR) 80 MG tablet Take 1 tablet by mouth nightly 3/3/20  Yes Tim Tee DO        Allergies: Patient has no known allergies. Social History     Tobacco Use    Smoking status: Former Smoker     Packs/day: 0.00     Years: 2.00     Pack years: 0.00     Types: Cigarettes     Quit date: 3/23/2015     Years since quittin.8    Smokeless tobacco: Never Used    Tobacco comment: quit smoking    Substance Use Topics    Alcohol use: No     Comment: coffee daily        Review of Systems:  Respiratory: negative for cough and hemoptysis  Cardiovascular: negative for chest pain and dyspnea  Gastrointestinal: negative for abdominal pain, diarrhea, nausea and vomiting  Genitourinary: as per HPI, otherwise negative. Derm: negative for rash and skin lesion(s)  Neurological: negative for seizures and tremors  Endocrine: negative for diabetic symptoms including polydipsia and polyuria  All other systems negative    Physical Exam:  Vitals:    21 1550   BP: 129/72   Pulse: 81   Resp: 17   Temp: 98 °F (36.7 °C)   SpO2: 100%      Skin:  Warm and dry. No rash or bruises  HEENT:  PERRLA, EOMI  Neck:  No JVD, No thyromegaly  Cardiac:  RRR  Lungs:  No audible wheezes, symmetric respirations, non-labored  Abdomen: Soft, non-tender, non-distended  Extremities:  No clubbing, edema or cyanosis  Neurological:  Moves all extremities, normal DTR    Lab Results   Component Value Date    WBC 5.0 2020    HGB 8.2 (L) 2020    HCT 27.7 (L) 2020    MCV 84.5 2020     2020       Lab Results   Component Value Date    CREATININE 2.7 (H) 2021       No results found for: PSA    No results for input(s): LABURIN in the last 72 hours. No results for input(s): BC in the last 72 hours. No results for input(s): [address] in the last 72 hours. Assessment and Plan:  1. To OR for bilateral stent change.

## 2021-03-01 ENCOUNTER — CARE COORDINATION (OUTPATIENT)
Dept: CARE COORDINATION | Age: 61
End: 2021-03-01

## 2021-03-01 NOTE — LETTER
3/1/2021    [address] [place] [id number]      Dear Val Conklin,    My name is Chacha Aguilar and I am a registered nurse who partners with Sera Msa MD to improve patients' health. Sera Mas MD believes you would benefit from working with me. As a member of your health care team, I would work with other providers involved in your care, offer education for your specific health conditions, and connect you with additional resources as needed. I will collaborate with Sera Mas MD to support you in following your treatment plan. The additional support I provide is no additional cost to you. My primary focus is to help you achieve specific goals and improve your health. We are committed to walk with you on this journey and look forward to working with you. Please call me to further discuss your healthcare needs. I am available by phone. You can reach me at [phone].     In good health,     Chacha Aguilar RN

## 2021-03-31 DIAGNOSIS — I10 ESSENTIAL HYPERTENSION: ICD-10-CM

## 2021-04-05 RX ORDER — BENAZEPRIL HYDROCHLORIDE 10 MG/1
10 TABLET ORAL DAILY
Qty: 30 TABLET | Refills: 0 | Status: SHIPPED
Start: 2021-04-05 | End: 2021-04-12 | Stop reason: SDUPTHER

## 2021-04-12 ENCOUNTER — OFFICE VISIT (OUTPATIENT)
Dept: FAMILY MEDICINE CLINIC | Age: 61
End: 2021-04-12
Payer: MEDICARE

## 2021-04-12 VITALS
HEART RATE: 98 BPM | WEIGHT: 120 LBS | RESPIRATION RATE: 20 BRPM | OXYGEN SATURATION: 98 % | DIASTOLIC BLOOD PRESSURE: 84 MMHG | SYSTOLIC BLOOD PRESSURE: 132 MMHG | HEIGHT: 63 IN | BODY MASS INDEX: 21.26 KG/M2

## 2021-04-12 DIAGNOSIS — I73.9 PVD (PERIPHERAL VASCULAR DISEASE) (HCC): ICD-10-CM

## 2021-04-12 DIAGNOSIS — K59.01 SLOW TRANSIT CONSTIPATION: ICD-10-CM

## 2021-04-12 DIAGNOSIS — I10 ESSENTIAL HYPERTENSION: ICD-10-CM

## 2021-04-12 DIAGNOSIS — I50.32 CHRONIC DIASTOLIC HEART FAILURE (HCC): ICD-10-CM

## 2021-04-12 DIAGNOSIS — E11.51 TYPE 2 DIABETES MELLITUS WITH DIABETIC PERIPHERAL ANGIOPATHY WITHOUT GANGRENE, WITHOUT LONG-TERM CURRENT USE OF INSULIN (HCC): Primary | ICD-10-CM

## 2021-04-12 DIAGNOSIS — Z89.512 HX OF BKA, LEFT (HCC): ICD-10-CM

## 2021-04-12 PROBLEM — Z86.73 HISTORY OF ARTERIAL ISCHEMIC STROKE: Status: ACTIVE | Noted: 2020-08-22

## 2021-04-12 PROBLEM — E27.49: Status: RESOLVED | Noted: 2020-05-29 | Resolved: 2021-04-12

## 2021-04-12 PROBLEM — I63.9 ACUTE CVA (CEREBROVASCULAR ACCIDENT) (HCC): Status: RESOLVED | Noted: 2020-08-22 | Resolved: 2021-04-12

## 2021-04-12 LAB
ALBUMIN SERPL-MCNC: 4.1 G/DL (ref 3.5–5.2)
ALP BLD-CCNC: 86 U/L (ref 35–104)
ALT SERPL-CCNC: 5 U/L (ref 0–32)
ANION GAP SERPL CALCULATED.3IONS-SCNC: 10 MMOL/L (ref 7–16)
AST SERPL-CCNC: 13 U/L (ref 0–31)
BILIRUB SERPL-MCNC: 0.3 MG/DL (ref 0–1.2)
BUN BLDV-MCNC: 28 MG/DL (ref 8–23)
CALCIUM SERPL-MCNC: 10 MG/DL (ref 8.6–10.2)
CHLORIDE BLD-SCNC: 104 MMOL/L (ref 98–107)
CO2: 25 MMOL/L (ref 22–29)
CREAT SERPL-MCNC: 0.9 MG/DL (ref 0.5–1)
GFR AFRICAN AMERICAN: >60
GFR NON-AFRICAN AMERICAN: >60 ML/MIN/1.73
GLUCOSE BLD-MCNC: 93 MG/DL (ref 74–99)
HBA1C MFR BLD: 5.2 %
POTASSIUM SERPL-SCNC: 4.3 MMOL/L (ref 3.5–5)
SODIUM BLD-SCNC: 139 MMOL/L (ref 132–146)
TOTAL PROTEIN: 8.8 G/DL (ref 6.4–8.3)

## 2021-04-12 PROCEDURE — 3017F COLORECTAL CA SCREEN DOC REV: CPT | Performed by: FAMILY MEDICINE

## 2021-04-12 PROCEDURE — 3044F HG A1C LEVEL LT 7.0%: CPT | Performed by: FAMILY MEDICINE

## 2021-04-12 PROCEDURE — 2022F DILAT RTA XM EVC RTNOPTHY: CPT | Performed by: FAMILY MEDICINE

## 2021-04-12 PROCEDURE — 83036 HEMOGLOBIN GLYCOSYLATED A1C: CPT | Performed by: FAMILY MEDICINE

## 2021-04-12 PROCEDURE — 99214 OFFICE O/P EST MOD 30 MIN: CPT | Performed by: FAMILY MEDICINE

## 2021-04-12 PROCEDURE — G8420 CALC BMI NORM PARAMETERS: HCPCS | Performed by: FAMILY MEDICINE

## 2021-04-12 PROCEDURE — 1036F TOBACCO NON-USER: CPT | Performed by: FAMILY MEDICINE

## 2021-04-12 PROCEDURE — G8427 DOCREV CUR MEDS BY ELIG CLIN: HCPCS | Performed by: FAMILY MEDICINE

## 2021-04-12 RX ORDER — BENAZEPRIL HYDROCHLORIDE 10 MG/1
10 TABLET ORAL DAILY
Qty: 90 TABLET | Refills: 1 | Status: SHIPPED
Start: 2021-04-12 | End: 2021-10-14 | Stop reason: SDUPTHER

## 2021-04-12 RX ORDER — HYDRALAZINE HYDROCHLORIDE 50 MG/1
50 TABLET, FILM COATED ORAL 4 TIMES DAILY
Qty: 360 TABLET | Refills: 1 | Status: SHIPPED
Start: 2021-04-12 | End: 2021-10-14 | Stop reason: SDUPTHER

## 2021-04-12 RX ORDER — DOCUSATE SODIUM 100 MG/1
100 CAPSULE, LIQUID FILLED ORAL 2 TIMES DAILY PRN
Qty: 60 CAPSULE | Refills: 0 | COMMUNITY
Start: 2021-04-12 | End: 2022-04-11 | Stop reason: SDUPTHER

## 2021-04-12 RX ORDER — FERROUS SULFATE 325(65) MG
325 TABLET ORAL
Qty: 90 TABLET | Refills: 1 | Status: SHIPPED
Start: 2021-04-12 | End: 2021-07-13

## 2021-04-12 ASSESSMENT — ENCOUNTER SYMPTOMS
DIARRHEA: 0
VOMITING: 0
SHORTNESS OF BREATH: 0
CONSTIPATION: 1
NAUSEA: 0

## 2021-04-12 NOTE — PROGRESS NOTES
diazePAM (VALIUM) 2 MG tablet Take 2 mg by mouth every 6 hours as needed for Anxiety.    Yes Historical Provider, MD   thiamine 100 MG tablet Take 1 tablet by mouth daily 10/20/20  Yes Laura Mederos MD   spironolactone (ALDACTONE) 25 MG tablet Take 1 tablet by mouth daily 10/20/20  Yes Laura Mederos MD   pyridoxine (B-6) 50 MG tablet Take 1 tablet by mouth daily 10/20/20  Yes Laura Mederos MD   clopidogrel (PLAVIX) 75 MG tablet Take 1 tablet by mouth daily 20  Yes Laura Mederos MD   folic acid (FOLVITE) 1 MG tablet Take 1 tablet by mouth daily 20  Yes Marylene Aden, DO   metoprolol tartrate (LOPRESSOR) 25 MG tablet Take 1 tablet by mouth 2 times daily 20  Yes Marylene Aden, DO   atorvastatin (LIPITOR) 80 MG tablet Take 1 tablet by mouth nightly 3/3/20  Yes Marylene Aden, DO   cloNIDine (CATAPRES) 0.3 MG/24HR PTWK Place 1 patch onto the skin once a week  Patient taking differently: Place 1 patch onto the skin once a week Applies 20  Laura Mederos MD     Social History     Socioeconomic History    Marital status:      Spouse name: None    Number of children: 3    Years of education: None    Highest education level: None   Occupational History    None   Social Needs    Financial resource strain: None    Food insecurity     Worry: None     Inability: None    Transportation needs     Medical: None     Non-medical: None   Tobacco Use    Smoking status: Former Smoker     Packs/day: 0.00     Years: 2.00     Pack years: 0.00     Types: Cigarettes     Quit date: 3/23/2015     Years since quittin.0    Smokeless tobacco: Never Used    Tobacco comment: quit smoking    Substance and Sexual Activity    Alcohol use: No     Comment: coffee daily    Drug use: No    Sexual activity: Not Currently     Partners: Male   Lifestyle    Physical activity     Days per week: None     Minutes per session: None    Stress: tablet by mouth daily 90 tablet 1    folic acid (FOLVITE) 1 MG tablet Take 1 tablet by mouth daily 30 tablet 3    metoprolol tartrate (LOPRESSOR) 25 MG tablet Take 1 tablet by mouth 2 times daily 60 tablet 3    atorvastatin (LIPITOR) 80 MG tablet Take 1 tablet by mouth nightly 30 tablet 3    cloNIDine (CATAPRES) 0.3 MG/24HR PTWK Place 1 patch onto the skin once a week (Patient taking differently: Place 1 patch onto the skin once a week Applies Sunday) 12 patch 1     No current facility-administered medications for this visit. Review Of Systems:    Review of Systems   Eyes: Negative for visual disturbance. Respiratory: Negative for shortness of breath. Cardiovascular: Negative for chest pain, palpitations and leg swelling. Gastrointestinal: Positive for constipation. Negative for diarrhea, nausea and vomiting. Genitourinary: Negative for difficulty urinating, dysuria and frequency. Skin: Negative for rash. Psychiatric/Behavioral: Negative for dysphoric mood. OBJECTIVE:     VS:  Wt Readings from Last 3 Encounters:   04/12/21 120 lb (54.4 kg)   01/18/21 108 lb (49 kg)   01/11/21 108 lb (49 kg)     Vitals:    04/12/21 1038   BP: 132/84   Pulse: 98   Resp: 20   SpO2: 98%       Physical Exam  Vitals signs reviewed. Constitutional:       General: She is not in acute distress. Appearance: She is well-developed. Neck:      Musculoskeletal: Neck supple. Vascular: No carotid bruit. Cardiovascular:      Rate and Rhythm: Normal rate and regular rhythm. Heart sounds: Normal heart sounds. No murmur. No gallop. Pulmonary:      Effort: Pulmonary effort is normal.      Breath sounds: Normal breath sounds. No wheezing or rales. Abdominal:      General: Bowel sounds are normal. There is no distension. Palpations: Abdomen is soft. Tenderness: There is no abdominal tenderness. Musculoskeletal:      Right lower leg: No edema. Left lower leg: No edema. Comments: +left BKA prosthesis   Skin:     General: Skin is warm and dry. Neurological:      Mental Status: She is alert and oriented to person, place, and time. Results for orders placed or performed in visit on 04/12/21   POCT glycosylated hemoglobin (Hb A1C)   Result Value Ref Range    Hemoglobin A1C 5.2 %         Crys Marrero was seen today for diabetes. Diagnoses and all orders for this visit:      Type 2 Diabetes mellitus with diabetic peripheral angiopathy without gangrene, without long-term current use of insulin (HCC)        -     Diet control for management of condition        -     POCT glycosylated hemoglobin (Hb A1C)    Essential hypertension    -     benazepril (LOTENSIN) 10 MG tablet; Take 1 tablet by mouth daily  -     hydrALAZINE (APRESOLINE) 50 MG tablet; Take 1 tablet by mouth 4 times daily  -     Comprehensive Metabolic Panel; Future    Slow transit constipation  -     docusate sodium (COLACE) 100 MG capsule; Take 1 capsule by mouth 2 times daily as needed for Constipation    Hx of BKA, left (HCC)         -     Stable; will assess yearly    Chronic diastolic heart failure ([address]        -      Stable; will assess yearly    PVD (peripheral vascular disease) ([address]        -     Stable; will assess yearly        Phone/MyChart follow up if tests abnormal.    Return in about 3 months (around 7/12/2021) for hypertension. I have reviewed my findings and recommendations with Erica Petit.     Sussy Cotton M.D

## 2021-04-14 PROBLEM — I50.32 CHRONIC DIASTOLIC HEART FAILURE (HCC): Status: ACTIVE | Noted: 2020-08-22

## 2021-05-11 DIAGNOSIS — Z12.31 ENCOUNTER FOR SCREENING MAMMOGRAM FOR MALIGNANT NEOPLASM OF BREAST: Primary | ICD-10-CM

## 2021-06-17 ENCOUNTER — CARE COORDINATION (OUTPATIENT)
Dept: CARE COORDINATION | Age: 61
End: 2021-06-17

## 2021-07-13 ENCOUNTER — OFFICE VISIT (OUTPATIENT)
Dept: FAMILY MEDICINE CLINIC | Age: 61
End: 2021-07-13
Payer: MEDICARE

## 2021-07-13 VITALS
OXYGEN SATURATION: 98 % | HEART RATE: 100 BPM | BODY MASS INDEX: 22.15 KG/M2 | DIASTOLIC BLOOD PRESSURE: 62 MMHG | HEIGHT: 63 IN | WEIGHT: 125 LBS | RESPIRATION RATE: 20 BRPM | SYSTOLIC BLOOD PRESSURE: 122 MMHG

## 2021-07-13 DIAGNOSIS — N30.00 ACUTE CYSTITIS WITHOUT HEMATURIA: ICD-10-CM

## 2021-07-13 DIAGNOSIS — E11.51 TYPE 2 DIABETES MELLITUS WITH DIABETIC PERIPHERAL ANGIOPATHY WITHOUT GANGRENE, WITHOUT LONG-TERM CURRENT USE OF INSULIN (HCC): ICD-10-CM

## 2021-07-13 DIAGNOSIS — I10 ESSENTIAL HYPERTENSION: Primary | ICD-10-CM

## 2021-07-13 DIAGNOSIS — R63.0 ANOREXIA: ICD-10-CM

## 2021-07-13 LAB
BILIRUBIN, POC: NORMAL
BLOOD URINE, POC: NORMAL
CLARITY, POC: NORMAL
COLOR, POC: YELLOW
CREATININE URINE POCT: NORMAL
GLUCOSE URINE, POC: NORMAL
HBA1C MFR BLD: 5.3 %
KETONES, POC: NORMAL
LEUKOCYTE EST, POC: NORMAL
MICROALBUMIN/CREAT 24H UR: NORMAL MG/G{CREAT}
MICROALBUMIN/CREAT UR-RTO: NORMAL
NITRITE, POC: POSITIVE
PH, POC: 5.5
PROTEIN, POC: NORMAL
SPECIFIC GRAVITY, POC: 1.02
UROBILINOGEN, POC: 1

## 2021-07-13 PROCEDURE — 82044 UR ALBUMIN SEMIQUANTITATIVE: CPT | Performed by: FAMILY MEDICINE

## 2021-07-13 PROCEDURE — 3044F HG A1C LEVEL LT 7.0%: CPT | Performed by: FAMILY MEDICINE

## 2021-07-13 PROCEDURE — G8427 DOCREV CUR MEDS BY ELIG CLIN: HCPCS | Performed by: FAMILY MEDICINE

## 2021-07-13 PROCEDURE — 1036F TOBACCO NON-USER: CPT | Performed by: FAMILY MEDICINE

## 2021-07-13 PROCEDURE — 3017F COLORECTAL CA SCREEN DOC REV: CPT | Performed by: FAMILY MEDICINE

## 2021-07-13 PROCEDURE — 81002 URINALYSIS NONAUTO W/O SCOPE: CPT | Performed by: FAMILY MEDICINE

## 2021-07-13 PROCEDURE — G8420 CALC BMI NORM PARAMETERS: HCPCS | Performed by: FAMILY MEDICINE

## 2021-07-13 PROCEDURE — 2022F DILAT RTA XM EVC RTNOPTHY: CPT | Performed by: FAMILY MEDICINE

## 2021-07-13 PROCEDURE — 83036 HEMOGLOBIN GLYCOSYLATED A1C: CPT | Performed by: FAMILY MEDICINE

## 2021-07-13 PROCEDURE — 99214 OFFICE O/P EST MOD 30 MIN: CPT | Performed by: FAMILY MEDICINE

## 2021-07-13 RX ORDER — SULFAMETHOXAZOLE AND TRIMETHOPRIM 800; 160 MG/1; MG/1
1 TABLET ORAL EVERY 12 HOURS SCHEDULED
Qty: 10 TABLET | Refills: 0 | Status: SHIPPED
Start: 2021-07-13 | End: 2021-11-08 | Stop reason: SDUPTHER

## 2021-07-13 RX ORDER — SPIRONOLACTONE 25 MG/1
25 TABLET ORAL DAILY
Qty: 90 TABLET | Refills: 1 | Status: SHIPPED
Start: 2021-07-13 | End: 2022-03-08 | Stop reason: SDUPTHER

## 2021-07-13 RX ORDER — MEGESTROL ACETATE 20 MG/1
20 TABLET ORAL 2 TIMES DAILY
Qty: 180 TABLET | Refills: 1 | Status: SHIPPED
Start: 2021-07-13 | End: 2022-04-11

## 2021-07-13 RX ORDER — IBUPROFEN 800 MG/1
800 TABLET ORAL DAILY PRN
Qty: 90 TABLET | Refills: 1 | Status: SHIPPED
Start: 2021-07-13 | End: 2021-10-14 | Stop reason: SDUPTHER

## 2021-07-13 RX ORDER — CLOPIDOGREL BISULFATE 75 MG/1
75 TABLET ORAL DAILY
Qty: 90 TABLET | Refills: 1 | Status: SHIPPED
Start: 2021-07-13 | End: 2021-10-14

## 2021-07-13 RX ORDER — CLONIDINE 0.3 MG/24H
1 PATCH, EXTENDED RELEASE TRANSDERMAL WEEKLY
Qty: 12 PATCH | Refills: 1 | Status: SHIPPED
Start: 2021-07-13 | End: 2022-04-26

## 2021-07-13 SDOH — ECONOMIC STABILITY: FOOD INSECURITY: WITHIN THE PAST 12 MONTHS, THE FOOD YOU BOUGHT JUST DIDN'T LAST AND YOU DIDN'T HAVE MONEY TO GET MORE.: NEVER TRUE

## 2021-07-13 SDOH — ECONOMIC STABILITY: FOOD INSECURITY: WITHIN THE PAST 12 MONTHS, YOU WORRIED THAT YOUR FOOD WOULD RUN OUT BEFORE YOU GOT MONEY TO BUY MORE.: NEVER TRUE

## 2021-07-13 ASSESSMENT — ENCOUNTER SYMPTOMS
VOMITING: 0
DIARRHEA: 0
SHORTNESS OF BREATH: 0
NAUSEA: 0

## 2021-07-13 ASSESSMENT — LIFESTYLE VARIABLES: HOW OFTEN DO YOU HAVE A DRINK CONTAINING ALCOHOL: NEVER

## 2021-07-13 NOTE — PROGRESS NOTES
Chief Complaint   Patient presents with    Hypertension     would like motrin 800     Back Pain     uti took old bactrim        Patient is here for follow up for hypertension    Hypertension:  Patient is here for follow up chronic hypertension. This is  generally controlled on current medication regimen. BP today is 122/62. Takes meds as directed and tolerates them well. No symptoms from htn standpoint per ROS. Patientis  compliant with lifestyle modifications. Patient does not smoke. Comorbid conditions include Diabetes. Patient has lower abdominal pain that radiates to back. Feels better after urinating. Started 2 days ago. Denies hematuria. Patient took 2 days' worth Bactrim she had leftover from previous UTI    Patient doing well on current regimen for Diabetes. Morning glucose readings . Patient's past medical, surgical, social and/or family history reviewed, updated inchart, and are non-contributory (unless otherwise stated). Medications and allergies also reviewed and updated in chart.       Current Outpatient Medications   Medication Sig Dispense Refill    sulfamethoxazole-trimethoprim (BACTRIM DS) 800-160 MG per tablet Take 1 tablet by mouth every 12 hours for 5 days 10 tablet 0    spironolactone (ALDACTONE) 25 MG tablet Take 1 tablet by mouth daily 90 tablet 1    clopidogrel (PLAVIX) 75 MG tablet Take 1 tablet by mouth daily 90 tablet 1    megestrol (MEGACE) 20 MG tablet Take 1 tablet by mouth 2 times daily 180 tablet 1    cloNIDine (CATAPRES) 0.3 MG/24HR PTWK Place 1 patch onto the skin once a week 12 patch 1    ibuprofen (ADVIL;MOTRIN) 800 MG tablet Take 1 tablet by mouth daily as needed for Pain 90 tablet 1    benazepril (LOTENSIN) 10 MG tablet Take 1 tablet by mouth daily 90 tablet 1    hydrALAZINE (APRESOLINE) 50 MG tablet Take 1 tablet by mouth 4 times daily 360 tablet 1    docusate sodium (COLACE) 100 MG capsule Take 1 capsule by mouth 2 times daily as needed for Constipation 60 capsule 0    oxybutynin (DITROPAN) 5 MG tablet TAKE 1 TABLET BY MOUTH THREE TIMES DAILY 90 tablet 3    diazePAM (VALIUM) 2 MG tablet Take 2 mg by mouth every 6 hours as needed for Anxiety.  thiamine 100 MG tablet Take 1 tablet by mouth daily 30 tablet 5    metoprolol tartrate (LOPRESSOR) 25 MG tablet Take 1 tablet by mouth 2 times daily 60 tablet 3    pyridoxine (B-6) 50 MG tablet Take 1 tablet by mouth daily (Patient not taking: Reported on 7/13/2021) 30 tablet 5     No current facility-administered medications for this visit. Wt Readings from Last 3 Encounters:   07/13/21 125 lb (56.7 kg)   04/12/21 120 lb (54.4 kg)   01/18/21 108 lb (49 kg)     /62   Pulse 100   Resp 20   Ht 5' 3\" (1.6 m)   Wt 125 lb (56.7 kg)   SpO2 98%   BMI 22.14 kg/m²     Review of Systems   Eyes: Negative for visual disturbance. Respiratory: Negative for shortness of breath. Cardiovascular: Negative for chest pain, palpitations and leg swelling. Gastrointestinal: Negative for diarrhea, nausea and vomiting. Genitourinary: Negative for difficulty urinating, dysuria, frequency and hematuria. Skin: Negative for rash. Neurological: Positive for headaches (right frontal, comes and goes). Psychiatric/Behavioral: Negative for dysphoric mood. Physical Exam  Vitals reviewed. Constitutional:       General: She is not in acute distress. Appearance: She is well-developed. Neck:      Vascular: No carotid bruit. Cardiovascular:      Rate and Rhythm: Normal rate and regular rhythm. Heart sounds: Normal heart sounds. No murmur heard. No gallop. Pulmonary:      Effort: Pulmonary effort is normal.      Breath sounds: Normal breath sounds. No wheezing or rales. Abdominal:      General: Bowel sounds are normal. There is no distension. Palpations: Abdomen is soft. Tenderness: There is no abdominal tenderness. Musculoskeletal:      Cervical back: Neck supple. without hematuria  -     sulfamethoxazole-trimethoprim (BACTRIM DS) 800-160 MG per tablet; Take 1 tablet by mouth every 12 hours for 5 days    Other orders  -     clopidogrel (PLAVIX) 75 MG tablet; Take 1 tablet by mouth daily  -     ibuprofen (ADVIL;MOTRIN) 800 MG tablet; Take 1 tablet by mouth daily as needed for Pain            Phone/MyChart follow up iftests abnormal.    Return in about 3 months (around 10/13/2021) for diabetes. , or sooner if necessary. Educational materials and/or home exercises printed for patient's review and wereincluded in patient instructions on his/her After Visit Summary and given to patient at the end of visit. Counseled regarding above diagnosis, including possible risks andcomplications,  especially if left uncontrolled. Counseled regarding the possible side effects, risks, benefits and alternatives to treatment; patient and/or guardian verbalizes understanding, agrees, feelscomfortable with and wishes to proceed with above treatment plan. Advised patient to call with any new medication issues, and read all Rx info from pharmacy to assure aware of all possible risks and side effects ofmedication before taking. Reviewed age and gender appropriate health screening exams and vaccinations. Advised patient regarding importance of keeping up with recommended health maintenance and to schedule as soonas possible if overdue, as this is important in assessing for undiagnosed pathology, especially cancer, as well as protecting against potentially harmful/life threatening disease. Patient and/or guardianverbalizes understanding and agrees with above counseling, assessment and plan. All questions answered.

## 2021-07-13 NOTE — PATIENT INSTRUCTIONS

## 2021-07-14 PROBLEM — R63.0 ANOREXIA: Status: ACTIVE | Noted: 2021-07-14

## 2021-07-19 ENCOUNTER — HOSPITAL ENCOUNTER (OUTPATIENT)
Age: 61
Discharge: HOME | End: 2021-07-19
Payer: MEDICARE

## 2021-07-19 DIAGNOSIS — I10 ESSENTIAL HYPERTENSION: ICD-10-CM

## 2021-07-19 LAB
ALBUMIN SERPL-MCNC: 4.3 G/DL (ref 3.5–5.2)
ALP BLD-CCNC: 93 U/L (ref 35–104)
ALT SERPL-CCNC: 6 U/L (ref 0–32)
ANION GAP SERPL CALCULATED.3IONS-SCNC: 12 MMOL/L (ref 7–16)
AST SERPL-CCNC: 14 U/L (ref 0–31)
BILIRUB SERPL-MCNC: 0.3 MG/DL (ref 0–1.2)
BUN BLDV-MCNC: 42 MG/DL (ref 6–23)
CALCIUM SERPL-MCNC: 10.2 MG/DL (ref 8.6–10.2)
CHLORIDE BLD-SCNC: 103 MMOL/L (ref 98–107)
CHOLESTEROL, TOTAL: 170 MG/DL (ref 0–199)
CO2: 19 MMOL/L (ref 22–29)
CREAT SERPL-MCNC: 1.7 MG/DL (ref 0.5–1)
GFR AFRICAN AMERICAN: 37
GFR NON-AFRICAN AMERICAN: 37 ML/MIN/1.73
GLUCOSE BLD-MCNC: 98 MG/DL (ref 74–99)
HCT VFR BLD CALC: 32.1 % (ref 34–48)
HDLC SERPL-MCNC: 41 MG/DL
HEMOGLOBIN: 10.1 G/DL (ref 11.5–15.5)
LDL CHOLESTEROL CALCULATED: 109 MG/DL (ref 0–99)
MCH RBC QN AUTO: 27.4 PG (ref 26–35)
MCHC RBC AUTO-ENTMCNC: 31.5 % (ref 32–34.5)
MCV RBC AUTO: 87.2 FL (ref 80–99.9)
PDW BLD-RTO: 14.1 FL (ref 11.5–15)
PLATELET # BLD: 353 E9/L (ref 130–450)
PMV BLD AUTO: 10.1 FL (ref 7–12)
POTASSIUM SERPL-SCNC: 5.4 MMOL/L (ref 3.5–5)
RBC # BLD: 3.68 E12/L (ref 3.5–5.5)
SODIUM BLD-SCNC: 134 MMOL/L (ref 132–146)
TOTAL PROTEIN: 9.1 G/DL (ref 6.4–8.3)
TRIGL SERPL-MCNC: 101 MG/DL (ref 0–149)
TSH SERPL DL<=0.05 MIU/L-ACNC: 0.04 UIU/ML (ref 0.27–4.2)
VLDLC SERPL CALC-MCNC: 20 MG/DL
WBC # BLD: 3.6 E9/L (ref 4.5–11.5)

## 2021-07-19 PROCEDURE — 80053 COMPREHEN METABOLIC PANEL: CPT

## 2021-07-19 PROCEDURE — 84443 ASSAY THYROID STIM HORMONE: CPT

## 2021-07-19 PROCEDURE — 85027 COMPLETE CBC AUTOMATED: CPT

## 2021-07-19 PROCEDURE — 80061 LIPID PANEL: CPT

## 2021-07-19 PROCEDURE — 36415 COLL VENOUS BLD VENIPUNCTURE: CPT

## 2021-07-19 RX ORDER — OXYBUTYNIN CHLORIDE 5 MG/1
TABLET ORAL
Qty: 90 TABLET | Refills: 3 | Status: SHIPPED
Start: 2021-07-19 | End: 2022-01-06 | Stop reason: DRUGHIGH

## 2021-07-28 DIAGNOSIS — N18.32 STAGE 3B CHRONIC KIDNEY DISEASE (HCC): ICD-10-CM

## 2021-07-28 DIAGNOSIS — R94.6 ABNORMAL THYROID FUNCTION TEST: Primary | ICD-10-CM

## 2021-07-28 NOTE — RESULT ENCOUNTER NOTE
Please let patient know that thyroid labs were abnormal. She will need to have additional thyroid blood work done to further evaluate. Orders in [employer].

## 2021-09-02 RX ORDER — ACETAMINOPHEN 500 MG
500 TABLET ORAL EVERY 6 HOURS PRN
COMMUNITY
End: 2022-06-24

## 2021-09-02 NOTE — PROGRESS NOTES
[facility] PRE-ADMISSION TESTING GENERAL INSTRUCTIONS- [facility]-phone number:[phone]    GENERAL INSTRUCTIONS  [x] Antibacterial Soap shower Night before and/or AM of Surgery  [] Jose wipe instruction sheet and wipes given. [x] Nothing by mouth after midnight, including gum, candy, mints, or water. [x] You may brush your teeth, gargle, but do NOT swallow water. []Hibiclens shower  the night before and the morning of surgery. Do not use             Hibiclens on your face or head. [x]No smoking, chewing tobacco, illegal drugs, or alcohol within 24 hours of your surgery. [x] Jewelry, valuables or body piercing's should not be brought to the hospital. All body and/or tongue piercing's must be removed prior to arriving to hospital.  ALL hair pins must be removed. [x] Do not wear makeup, lotions, powders, deodorant. Nail polish as directed by the nurse. [x] Arrange transportation with a responsible adult  to and from the hospital. If you do not have a responsible adult  to transport you, you will need to make arrangements with a medical transportation company (i.e. [employer]. A Uber/taxi/bus is not appropriate unless you are accompanied by a responsible adult ). Arrange for someone to be with you for the remainder of the day and for 24 hours after your procedure due to having had anesthesia. Who will be your  for transportation?________DAWN__________   Who will be staying with you for 24 hrs after your procedure?______DAWN____________  [x] Bring insurance card and photo ID.  [] Transfusion Bracelet: Please bring with you to hospital, day of surgery  [] Bring urine specimen day of surgery. Any small container is acceptable. [] Use inhalers the morning of surgery and bring with you to hospital.  [] Bring copy of living will or healthcare power of  papers to be placed in your electronic record.   [] CPAP/BI-PAP: Please bring your machine if you are to spend the night in the hospital.     PARKING INSTRUCTIONS:   [x] Arrival Time:__0900___________  · [x] Parking lot '\"\"  is located on [facility] (the corner of [facility] and [facility]). To enter, press the button and the gate will lift. A free token will be provided to exit the lot. One car per patient is allowed to park in this lot. All other cars are to park on [address] [place] [address] either in the parking garage or the handicap lot. [] To reach the [facility] lobby from [address] [place] [address], upon entering the hospital, take elevator B to the 3rd floor. EDUCATION INSTRUCTIONS:      [] Knee or hip replacement booklet & exercise pamphlets given. [] Julio Cesar [address] placed in chart. [] Pre-admission Testing educational folder given  [] Incentive Spirometry,coughing & deep breathing exercises reviewed. []Medication information sheet(s)   [x]Fluoroscopy-Xray used in surgery reviewed with patient. Educational pamphlet placed in chart. [x]Pain: Post-op pain is normal and to be expected. You will be asked to rate your pain from 0-10(a zero is not acceptable-education is needed). Your post-op pain goal is:  [] Ask your nurse for your pain medication. [] Joint camp offered. [] Joint replacement booklets given. [] Other:___________________________    MEDICATION INSTRUCTIONS:   [x]Bring a complete list of your medications, please write the last time you took the medicine, give this list to the nurse. [x] Take the following medications the morning of surgery with 1-2 ounces of water: SEE MED LIST  [] Stop herbal supplements and vitamins 5 days before your surgery. [] DO NOT take any diabetic medicine the morning of surgery. Follow instructions for insulin the day before surgery. [] If you are diabetic and your blood sugar is low or you feel symptomatic, you may drink 1-2 ounces of apple juice or take a glucose tablet.   The morning of your procedure, you may call the pre-op area if you have concerns about your blood sugar [phone]. [] Use your inhalers the morning of surgery. Bring your emergency inhaler with you day of surgery. [] Follow physician instructions regarding any blood thinners you may be taking. WHAT TO EXPECT:  [x] The day of surgery you will be greeted and checked in by the [employer].  In addition, you will be registered in the [facility] by a Patient Access Representative. Please bring your photo ID and insurance card. A nurse will greet you in accordance to the time you are needed in the pre-op area to prepare you for surgery. Please do not be discouraged if you are not greeted in the order you arrive as there are many variables that are involved in patient preparation. Your patience is greatly appreciated as you wait for your nurse. Please bring in items such as: books, magazines, newspapers, electronics, or any other items  to occupy your time in the waiting area. [x]  Delays may occur with surgery and staff will make a sincere effort to keep you informed of delays. If any delays occur with your procedure, we apologize ahead of time for your inconvenience as we recognize the value of your time.

## 2021-09-07 ENCOUNTER — APPOINTMENT (OUTPATIENT)
Dept: GENERAL RADIOLOGY | Age: 61
End: 2021-09-07
Attending: UROLOGY
Payer: MEDICARE

## 2021-09-07 ENCOUNTER — ANESTHESIA (OUTPATIENT)
Dept: OPERATING ROOM | Age: 61
End: 2021-09-07
Payer: MEDICARE

## 2021-09-07 ENCOUNTER — ANESTHESIA EVENT (OUTPATIENT)
Dept: OPERATING ROOM | Age: 61
End: 2021-09-07
Payer: MEDICARE

## 2021-09-07 ENCOUNTER — HOSPITAL ENCOUNTER (OUTPATIENT)
Age: 61
Setting detail: OUTPATIENT SURGERY
Discharge: HOME | End: 2021-09-07
Attending: UROLOGY | Admitting: UROLOGY
Payer: MEDICARE

## 2021-09-07 VITALS
OXYGEN SATURATION: 96 % | HEIGHT: 62 IN | HEART RATE: 81 BPM | SYSTOLIC BLOOD PRESSURE: 157 MMHG | BODY MASS INDEX: 22.82 KG/M2 | WEIGHT: 124 LBS | TEMPERATURE: 97.4 F | RESPIRATION RATE: 16 BRPM | DIASTOLIC BLOOD PRESSURE: 66 MMHG

## 2021-09-07 VITALS — DIASTOLIC BLOOD PRESSURE: 66 MMHG | SYSTOLIC BLOOD PRESSURE: 111 MMHG | OXYGEN SATURATION: 98 %

## 2021-09-07 DIAGNOSIS — Z01.818 PRE-OP EXAM: Primary | ICD-10-CM

## 2021-09-07 LAB — METER GLUCOSE: 101 MG/DL (ref 74–99)

## 2021-09-07 PROCEDURE — C1758 CATHETER, URETERAL: HCPCS | Performed by: UROLOGY

## 2021-09-07 PROCEDURE — 3209999900 FLUORO FOR SURGICAL PROCEDURES

## 2021-09-07 PROCEDURE — 82962 GLUCOSE BLOOD TEST: CPT

## 2021-09-07 PROCEDURE — 3700000000 HC ANESTHESIA ATTENDED CARE: Performed by: UROLOGY

## 2021-09-07 PROCEDURE — 6360000002 HC RX W HCPCS: Performed by: NURSE PRACTITIONER

## 2021-09-07 PROCEDURE — C2617 STENT, NON-COR, TEM W/O DEL: HCPCS | Performed by: UROLOGY

## 2021-09-07 PROCEDURE — 3600000013 HC SURGERY LEVEL 3 ADDTL 15MIN: Performed by: UROLOGY

## 2021-09-07 PROCEDURE — 7100000010 HC PHASE II RECOVERY - FIRST 15 MIN: Performed by: UROLOGY

## 2021-09-07 PROCEDURE — 6360000002 HC RX W HCPCS

## 2021-09-07 PROCEDURE — 2580000003 HC RX 258: Performed by: NURSE PRACTITIONER

## 2021-09-07 PROCEDURE — 3600000003 HC SURGERY LEVEL 3 BASE: Performed by: UROLOGY

## 2021-09-07 PROCEDURE — 2709999900 HC NON-CHARGEABLE SUPPLY: Performed by: UROLOGY

## 2021-09-07 PROCEDURE — 3700000001 HC ADD 15 MINUTES (ANESTHESIA): Performed by: UROLOGY

## 2021-09-07 PROCEDURE — 2500000003 HC RX 250 WO HCPCS

## 2021-09-07 PROCEDURE — C1769 GUIDE WIRE: HCPCS | Performed by: UROLOGY

## 2021-09-07 PROCEDURE — 7100000011 HC PHASE II RECOVERY - ADDTL 15 MIN: Performed by: UROLOGY

## 2021-09-07 DEVICE — URETERAL STENT
Type: IMPLANTABLE DEVICE | Site: URETER | Status: FUNCTIONAL
Brand: PERCUFLEX™

## 2021-09-07 RX ORDER — PROPOFOL 10 MG/ML
INJECTION, EMULSION INTRAVENOUS CONTINUOUS PRN
Status: DISCONTINUED | OUTPATIENT
Start: 2021-09-07 | End: 2021-09-07 | Stop reason: SDUPTHER

## 2021-09-07 RX ORDER — MIDAZOLAM HYDROCHLORIDE 1 MG/ML
INJECTION INTRAMUSCULAR; INTRAVENOUS PRN
Status: DISCONTINUED | OUTPATIENT
Start: 2021-09-07 | End: 2021-09-07 | Stop reason: SDUPTHER

## 2021-09-07 RX ORDER — SODIUM CHLORIDE 0.9 % (FLUSH) 0.9 %
5-40 SYRINGE (ML) INJECTION EVERY 12 HOURS SCHEDULED
Status: DISCONTINUED | OUTPATIENT
Start: 2021-09-07 | End: 2021-09-07 | Stop reason: HOSPADM

## 2021-09-07 RX ORDER — FENTANYL CITRATE 50 UG/ML
INJECTION, SOLUTION INTRAMUSCULAR; INTRAVENOUS PRN
Status: DISCONTINUED | OUTPATIENT
Start: 2021-09-07 | End: 2021-09-07 | Stop reason: SDUPTHER

## 2021-09-07 RX ORDER — SODIUM CHLORIDE 9 MG/ML
INJECTION, SOLUTION INTRAVENOUS CONTINUOUS
Status: DISCONTINUED | OUTPATIENT
Start: 2021-09-07 | End: 2021-09-07 | Stop reason: HOSPADM

## 2021-09-07 RX ORDER — CEPHALEXIN 250 MG/1
250 CAPSULE ORAL 3 TIMES DAILY
Qty: 9 CAPSULE | Refills: 0 | Status: SHIPPED | OUTPATIENT
Start: 2021-09-07 | End: 2022-04-26 | Stop reason: ALTCHOICE

## 2021-09-07 RX ORDER — SODIUM CHLORIDE 0.9 % (FLUSH) 0.9 %
5-40 SYRINGE (ML) INJECTION PRN
Status: DISCONTINUED | OUTPATIENT
Start: 2021-09-07 | End: 2021-09-07 | Stop reason: HOSPADM

## 2021-09-07 RX ORDER — METOPROLOL TARTRATE 5 MG/5ML
INJECTION INTRAVENOUS PRN
Status: DISCONTINUED | OUTPATIENT
Start: 2021-09-07 | End: 2021-09-07 | Stop reason: SDUPTHER

## 2021-09-07 RX ORDER — OXYBUTYNIN CHLORIDE 10 MG/1
10 TABLET, EXTENDED RELEASE ORAL DAILY
Qty: 30 TABLET | Refills: 3 | Status: SHIPPED | OUTPATIENT
Start: 2021-09-07 | End: 2022-01-06 | Stop reason: SDUPTHER

## 2021-09-07 RX ORDER — SODIUM CHLORIDE 9 MG/ML
25 INJECTION, SOLUTION INTRAVENOUS PRN
Status: DISCONTINUED | OUTPATIENT
Start: 2021-09-07 | End: 2021-09-07 | Stop reason: HOSPADM

## 2021-09-07 RX ORDER — CIPROFLOXACIN 2 MG/ML
400 INJECTION, SOLUTION INTRAVENOUS
Status: COMPLETED | OUTPATIENT
Start: 2021-09-07 | End: 2021-09-07

## 2021-09-07 RX ADMIN — FENTANYL CITRATE 50 MCG: 50 INJECTION, SOLUTION INTRAMUSCULAR; INTRAVENOUS at 10:18

## 2021-09-07 RX ADMIN — SODIUM CHLORIDE: 9 INJECTION, SOLUTION INTRAVENOUS at 10:13

## 2021-09-07 RX ADMIN — PROPOFOL 100 MCG/KG/MIN: 10 INJECTION, EMULSION INTRAVENOUS at 10:18

## 2021-09-07 RX ADMIN — FENTANYL CITRATE 50 MCG: 50 INJECTION, SOLUTION INTRAMUSCULAR; INTRAVENOUS at 10:29

## 2021-09-07 RX ADMIN — METOPROLOL TARTRATE 2.5 MG: 5 INJECTION INTRAVENOUS at 10:23

## 2021-09-07 RX ADMIN — CIPROFLOXACIN 400 MG: 2 INJECTION INTRAVENOUS at 10:20

## 2021-09-07 RX ADMIN — MIDAZOLAM 2 MG: 1 INJECTION INTRAMUSCULAR; INTRAVENOUS at 10:13

## 2021-09-07 ASSESSMENT — PULMONARY FUNCTION TESTS
PIF_VALUE: 1
PIF_VALUE: 1
PIF_VALUE: 0
PIF_VALUE: 0
PIF_VALUE: 1
PIF_VALUE: 0
PIF_VALUE: 0
PIF_VALUE: 1
PIF_VALUE: 0
PIF_VALUE: 1
PIF_VALUE: 0
PIF_VALUE: 1
PIF_VALUE: 0
PIF_VALUE: 1
PIF_VALUE: 1

## 2021-09-07 ASSESSMENT — PAIN - FUNCTIONAL ASSESSMENT: PAIN_FUNCTIONAL_ASSESSMENT: 0-10

## 2021-09-07 ASSESSMENT — LIFESTYLE VARIABLES: SMOKING_STATUS: 0

## 2021-09-07 ASSESSMENT — PAIN SCALES - GENERAL
PAINLEVEL_OUTOF10: 0

## 2021-09-07 NOTE — H&P
Eliud Pena is a 57 y.o. female with bilateral hydronephrosis and indwelling stents. Past Medical History:   Diagnosis Date    Diabetic peripheral neuropathy associated with type 2 diabetes mellitus ([facility] 8/24/2018    Epigastric hernia     History of blood transfusion     Hyperlipidemia     Hypertension     Kidney stone     Schizophrenia ([facility]     Type 2 diabetes mellitus with diabetic polyneuropathy, with long-term current use of insulin ([facility] [address] [facility] 9/19/2016    no longer requiring medication. 1/13/21.  Umbilical hernia        Past Surgical History:   Procedure Laterality Date    C[facility]  09/30/2016     [facility]    COLONOSCOPY  08/2016    Dr. Shanique Dejesus / [address] / STONE Bilateral 10/28/2019    CYSTOSCOPY. RETROGRADE. PYELOGRAM. BILATERAL STENT INSERTION performed by Nichol Tadeo MD at [address] / [address] / STONE Bilateral 5/19/2020    CYSTOSCOPY RETROGRADE PYELOGRAM BILATERAL STENT EXCHANGE performed by Nihcol Tadeo MD at [address] / [address] / Johana Rojas Bilateral 1/18/2021    CYSTOSCOPY RETROGRADE PYELOGRAM BILATERAL  STENT CHANGE performed by Yue Mcmullen MD at [facility] [address] Left 2/29/2020    LEG AMPUTATION BELOW KNEE performed by Shirley Hook DO at [place] [address]  11/04/2016    epigastric hernia umbilical hernia with mesh    OVARIAN CYST REMOVAL      OVARY REMOVAL Right     OVARY REMOVAL  2014    UPPER GASTROINTESTINAL ENDOSCOPY N/A 10/29/2019    EGD ESOPHAGOGASTRODUODENOSCOPY performed by Mina Atkinson MD at [facility] ENDOSCOPY       Family History   Problem Relation Age of Onset    Cancer Brother        Prior to Admission medications    Medication Sig Start Date End Date Taking?  Authorizing Provider   acetaminophen (TYLENOL) 500 MG tablet Take 500 mg by mouth every 6 hours as needed for Pain   Yes coffee daily        Review of Systems:  Respiratory: negative for cough and hemoptysis  Cardiovascular: negative for chest pain and dyspnea  Gastrointestinal: negative for abdominal pain, diarrhea, nausea and vomiting  Genitourinary: as per HPI, otherwise negative. Derm: negative for rash and skin lesion(s)  Neurological: negative for seizures and tremors  Endocrine: negative for diabetic symptoms including polydipsia and polyuria  All other systems negative    Physical Exam:  Vitals:    09/07/21 0932   BP: (!) 194/83   Pulse: 92   Resp: 18   Temp: 97.6 °F (36.4 °C)   SpO2: 98%      Skin:  Warm and dry. No rash or bruises  HEENT:  PERRLA, EOMI  Neck:  No JVD, No thyromegaly  Cardiac:  RRR  Lungs:  No audible wheezes, symmetric respirations, non-labored  Abdomen: Soft, non-tender, non-distended  Extremities:  No clubbing, edema or cyanosis  Neurological:  Moves all extremities, normal DTR    Lab Results   Component Value Date    WBC 3.6 (L) 07/19/2021    HGB 10.1 (L) 07/19/2021    HCT 32.1 (L) 07/19/2021    MCV 87.2 07/19/2021     07/19/2021       Lab Results   Component Value Date    CREATININE 1.7 (H) 07/19/2021       No results found for: PSA    No results for input(s): LABURIN in the last 72 hours. No results for input(s): BC in the last 72 hours. No results for input(s): Isabel Taylor in the last 72 hours. Assessment and Plan:  1.  To operating room for cystoscopy, retrograde pyelograms, bilateral stent exchange

## 2021-09-07 NOTE — ANESTHESIA PRE PROCEDURE
Department of Anesthesiology  Preprocedure Note       Name:  Marizol Isaac   Age:  57 y.o.  :  1960                                          MRN:  [id number]         Date:  2021      Surgeon: Ca Hayden    Procedure: ЕКАТЕРИНА    Medications prior to admission:   Prior to Admission medications    Medication Sig Start Date End Date Taking? Authorizing Provider   acetaminophen (TYLENOL) 500 MG tablet Take 500 mg by mouth every 6 hours as needed for Pain    Historical Provider, MD   oxybutynin (DITROPAN) 5 MG tablet TAKE 1 TABLET BY MOUTH THREE TIMES DAILY 21   Tsering Lynn MD   spironolactone (ALDACTONE) 25 MG tablet Take 1 tablet by mouth daily 21   Makayla Hernandez MD   clopidogrel (PLAVIX) 75 MG tablet Take 1 tablet by mouth daily 21   Makayla Hernandez MD   megestrol (MEGACE) 20 MG tablet Take 1 tablet by mouth 2 times daily 21   Makayla Hernandez MD   cloNIDine (CATAPRES) 0.3 MG/24HR PTWK Place 1 patch onto the skin once a week 21  Makayla Hernandez MD   ibuprofen (ADVIL;MOTRIN) 800 MG tablet Take 1 tablet by mouth daily as needed for Pain 21   Makayla Hernandez MD   benazepril (LOTENSIN) 10 MG tablet Take 1 tablet by mouth daily 21   Makayla Hernandez MD   hydrALAZINE (APRESOLINE) 50 MG tablet Take 1 tablet by mouth 4 times daily 21   Makayla Hernandez MD   docusate sodium (COLACE) 100 MG capsule Take 1 capsule by mouth 2 times daily as needed for Constipation 21   Makayla Hernandez MD   diazePAM (VALIUM) 2 MG tablet Take 2 mg by mouth every 6 hours as needed for Anxiety.     Historical Provider, MD   thiamine 100 MG tablet Take 1 tablet by mouth daily  Patient not taking: Reported on 2021 10/20/20   Makayla Hernandez MD   pyridoxine (B-6) 50 MG tablet Take 1 tablet by mouth daily  Patient not taking: Reported on 2021 10/20/20   Makayla Hernandez MD metoprolol tartrate (LOPRESSOR) 25 MG tablet Take 1 tablet by mouth 2 times daily 6/4/20   Vivi Stanley DO       Current medications:    No current facility-administered medications for this visit. No current outpatient medications on file.      Facility-Administered Medications Ordered in Other Visits   Medication Dose Route Frequency Provider Last Rate Last Admin    0.9 % sodium chloride infusion   IntraVENous Continuous BENJI Law CNP        0.9 % sodium chloride infusion  25 mL IntraVENous PRN BENJI Law CNP        ciprofloxacin (CIPRO) IVPB 400 mg  400 mg IntraVENous [facility] [address] [facility]        sodium chloride flush 0.9 % injection 5-40 mL  5-40 mL IntraVENous 2 times per day BENJI Law CNP        sodium chloride flush 0.9 % injection 5-40 mL  5-40 mL IntraVENous PRN BENJI Law CNP           Allergies:  No Known Allergies    Problem List:    Patient Active Problem List   Diagnosis Code    Epigastric hernia D46.0    Umbilical hernia N48.5    Essential hypertension I10    Type 2 diabetes mellitus with diabetic peripheral angiopathy without gangrene, without long-term current use of insulin ([facility]) E11.51    Dyslipidemia E78.5    S/P cardiac catheterization Z98.890    Abnormal stress ECG R94.39    Hyperthyroidism E05.90    Diabetic peripheral neuropathy ([facility]) E11.42    Hydroureter N13.4    Microcytic hypochromic anemia D50.9    Arterial occlusion I70.90    Hypoglycemia E16.2    Hypertensive emergency I16.1    Paresthesia of hand, bilateral R20.2    Paresthesia of left foot R20.2    Unintended weight loss R63.4    History of arterial ischemic stroke Z86.73    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    Hx of diabetic foot ulcer Z86.31    Hx of BKA, left ([facility]) Z89.512    Chronic diastolic heart failure ([facility]) I50.32    PVD (peripheral vascular disease) ([facility]) I73.9    Anorexia R63.0       Past Medical History: Diagnosis Date    Diabetic peripheral neuropathy associated with type 2 diabetes mellitus ([facility] [address] [facility] 2018    Epigastric hernia     History of blood transfusion     Hyperlipidemia     Hypertension     Kidney stone     Schizophrenia ([facility] [address]     Type 2 diabetes mellitus with diabetic polyneuropathy, with long-term current use of insulin ([facility] [address] 2016    no longer requiring medication. 21.  Umbilical hernia        Past Surgical History:        Procedure Laterality Date    CARDIAC [facility]  2016     [facility]    COLONOSCOPY  2016    Dr. Chastity Lau / [address] / STONE Bilateral 10/28/2019    CYSTOSCOPY. RETROGRADE.  PYELOGRAM. BILATERAL STENT INSERTION performed by Chantal Cruz MD at [address] / [address] / STONE Bilateral 2020    CYSTOSCOPY RETROGRADE PYELOGRAM BILATERAL STENT EXCHANGE performed by Chantal Cruz MD at [address] / [address] / Marcelo Cross Bilateral 2021    CYSTOSCOPY RETROGRADE PYELOGRAM BILATERAL  STENT CHANGE performed by Erick Hathaway MD at [address] Left 2020    LEG AMPUTATION BELOW KNEE performed by Kristan Hernandez DO at [address]  2016    epigastric hernia umbilical hernia with mesh    OVARIAN CYST REMOVAL      OVARY REMOVAL Right     OVARY REMOVAL      UPPER GASTROINTESTINAL ENDOSCOPY N/A 10/29/2019    EGD ESOPHAGOGASTRODUODENOSCOPY performed by Milagro Francis MD at [address] History:    Social History     Tobacco Use    Smoking status: Former Smoker     Packs/day: 0.00     Years: 2.00     Pack years: 0.00     Types: Cigarettes     Quit date: 3/23/2015     Years since quittin.4    Smokeless tobacco: Never Used    Tobacco comment: quit smoking    Substance Use Topics    Alcohol use: No     Comment: coffee daily 8/22/20  Normal sinus rhythm  Possible Anterior infarct , age undetermined  Abnormal ECG  No previous ECGs available  Confirmed by Liya Vazquez ([id number]) on 8/22/2020 4:47:36 PM    ECHO 8/26/20  Bubble study is negative for right to left shunt. No intracardiac thrombus or vegetation. .   Moderate atherosclerosis of the descending aorta and aortic arch. Normal left ventricular chamber size. Normal left ventricular systolic function. Visually estimated LVEF is 60-65 %. No wall motion abnormalities. Normal right ventricle structure and function. No significant valvular abnormalities    CTA neck 8/22/20  Right middle cerebral artery M 3 occlusion    MRI Brain 8/24/20  Small subacute infarct in the right frontal operculum and nearby white   matter. Subacute to chronic infarct in the left middle cerebellar peduncle. Possible tiny acute infarct in the left cerebellar hemisphere. Chronic lacunar infarcts in the left basal ganglia. Empty sella. Anesthesia Evaluation  Patient summary reviewed and Nursing notes reviewed no history of anesthetic complications:   Airway: Mallampati: III  TM distance: >3 FB   Neck ROM: full  Mouth opening: > = 3 FB Dental:    (+) lower dentures and upper dentures      Pulmonary: breath sounds clear to auscultation      (-) not a current smoker (FORMER )                           Cardiovascular:  Exercise tolerance: poor (<4 METS),   (+) hypertension:, hyperlipidemia      ECG reviewed  Rhythm: regular  Rate: normal  Echocardiogram reviewed         Beta Blocker:  Not on Beta Blocker      ROS comment: EF 60%     Neuro/Psych:   (+) CVA (March 2019):, neuromuscular disease:, psychiatric history: stable with treatment             ROS comment: Schizophrenia, 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 GI/Hepatic/Renal:   (+) renal disease: kidney stones,          ROS comment: Umbilical hernia.    Endo/Other:    (+) DiabetesType II DM, well controlled, , hyperthyroidism, blood dyscrasia (MICROCYTIC HYPOCHROMIC ANEMIA): anemia:., .                  ROS comment:  Abdominal:             Vascular:   + PVD, aortic or cerebral, DVT, .        ROS comment: PVD,   BKA, left. Other Findings:             Anesthesia Plan      MAC     ASA 3       Induction: intravenous. Anesthetic plan and risks discussed with patient and child/children. Use of blood products discussed with patient whom consented to blood products. Plan discussed with CRNA and attending. Jerica Michel RN   9/7/2021    . Jerica Michel RN   9/7/2021  9:23 AM    DOS STAFF ADDENDUM:    Patient seen and examined, physical exam updated as needed, chart reviewed. NPO status confirmed. Anesthetic options and risks discussed with patient/legal guardian. Patient/legal guardian verbalized understanding and agrees to proceed.      Paolo Pineda MD  Staff Anesthesiologist  September 7, 2021  9:50 AM

## 2021-09-07 NOTE — PROGRESS NOTES
1120-Patient and family verbaize understanding of given discharge instructions. 1145. Discharge instructions altered with new medication and reviewed with patient.

## 2021-09-07 NOTE — ANESTHESIA POSTPROCEDURE EVALUATION
Department of Anesthesiology  Postprocedure Note    Patient: Duarte Wayne  MRN: [id number]  YOB: 1960  Date of evaluation: 9/7/2021  Time:  1:40 PM     Procedure Summary     Date: 09/07/21 [facility]  / [facility]    Anesthesia Start: 1013 Anesthesia Stop: 5623    Procedure: CYSTOSCOPY RETROGRADE PYELOGRAM, BILATERAL STENT EXCHANGE (N/A Bladder) Diagnosis: (HYDRONEPHROSIS)    Surgeons: Skylar Seymour MD Responsible Provider: Vaishnavi Millan MD    Anesthesia Type: MAC ASA Status: 3          Anesthesia Type: MAC    Jake Phase I: Jake Score: 10    Jake Phase II: Jake Score: 10    Last vitals: Reviewed and per EMR flowsheets.        Anesthesia Post Evaluation    Patient location during evaluation: PACU  Patient participation: complete - patient participated  Level of consciousness: awake and alert  Airway patency: patent  Nausea & Vomiting: no nausea and no vomiting  Complications: no  Cardiovascular status: hemodynamically stable  Respiratory status: acceptable  Hydration status: euvolemic

## 2021-10-14 ENCOUNTER — OFFICE VISIT (OUTPATIENT)
Dept: FAMILY MEDICINE CLINIC | Age: 61
End: 2021-10-14
Payer: MEDICARE

## 2021-10-14 VITALS
SYSTOLIC BLOOD PRESSURE: 124 MMHG | RESPIRATION RATE: 20 BRPM | WEIGHT: 126 LBS | DIASTOLIC BLOOD PRESSURE: 70 MMHG | HEART RATE: 98 BPM | BODY MASS INDEX: 23.19 KG/M2 | OXYGEN SATURATION: 98 % | HEIGHT: 62 IN

## 2021-10-14 DIAGNOSIS — I10 ESSENTIAL HYPERTENSION: ICD-10-CM

## 2021-10-14 DIAGNOSIS — R79.89 ABNORMAL TSH: ICD-10-CM

## 2021-10-14 DIAGNOSIS — E11.51 TYPE 2 DIABETES MELLITUS WITH DIABETIC PERIPHERAL ANGIOPATHY WITHOUT GANGRENE, WITHOUT LONG-TERM CURRENT USE OF INSULIN (HCC): Primary | ICD-10-CM

## 2021-10-14 DIAGNOSIS — Z74.09 NEED FOR ASSISTANCE DUE TO REDUCED MOBILITY: ICD-10-CM

## 2021-10-14 LAB — HBA1C MFR BLD: 4.9 %

## 2021-10-14 PROCEDURE — G8427 DOCREV CUR MEDS BY ELIG CLIN: HCPCS | Performed by: FAMILY MEDICINE

## 2021-10-14 PROCEDURE — 83036 HEMOGLOBIN GLYCOSYLATED A1C: CPT | Performed by: FAMILY MEDICINE

## 2021-10-14 PROCEDURE — 3044F HG A1C LEVEL LT 7.0%: CPT | Performed by: FAMILY MEDICINE

## 2021-10-14 PROCEDURE — 1036F TOBACCO NON-USER: CPT | Performed by: FAMILY MEDICINE

## 2021-10-14 PROCEDURE — 2022F DILAT RTA XM EVC RTNOPTHY: CPT | Performed by: FAMILY MEDICINE

## 2021-10-14 PROCEDURE — G8484 FLU IMMUNIZE NO ADMIN: HCPCS | Performed by: FAMILY MEDICINE

## 2021-10-14 PROCEDURE — 99214 OFFICE O/P EST MOD 30 MIN: CPT | Performed by: FAMILY MEDICINE

## 2021-10-14 PROCEDURE — 3017F COLORECTAL CA SCREEN DOC REV: CPT | Performed by: FAMILY MEDICINE

## 2021-10-14 PROCEDURE — G8420 CALC BMI NORM PARAMETERS: HCPCS | Performed by: FAMILY MEDICINE

## 2021-10-14 RX ORDER — IBUPROFEN 800 MG/1
800 TABLET ORAL DAILY PRN
Qty: 90 TABLET | Refills: 1 | Status: CANCELLED | OUTPATIENT
Start: 2021-10-14

## 2021-10-14 RX ORDER — IBUPROFEN 800 MG/1
800 TABLET ORAL DAILY PRN
Qty: 90 TABLET | Refills: 1 | Status: SHIPPED
Start: 2021-10-14 | End: 2022-01-18 | Stop reason: SDUPTHER

## 2021-10-14 RX ORDER — BENAZEPRIL HYDROCHLORIDE 10 MG/1
10 TABLET ORAL DAILY
Qty: 90 TABLET | Refills: 1 | Status: SHIPPED
Start: 2021-10-14 | End: 2022-04-11 | Stop reason: SDUPTHER

## 2021-10-14 RX ORDER — HYDRALAZINE HYDROCHLORIDE 50 MG/1
50 TABLET, FILM COATED ORAL 4 TIMES DAILY
Qty: 360 TABLET | Refills: 1 | Status: SHIPPED
Start: 2021-10-14 | End: 2022-04-11 | Stop reason: SDUPTHER

## 2021-10-14 ASSESSMENT — PATIENT HEALTH QUESTIONNAIRE - PHQ9
SUM OF ALL RESPONSES TO PHQ QUESTIONS 1-9: 0
SUM OF ALL RESPONSES TO PHQ QUESTIONS 1-9: 0
2. FEELING DOWN, DEPRESSED OR HOPELESS: 0
SUM OF ALL RESPONSES TO PHQ9 QUESTIONS 1 & 2: 0
SUM OF ALL RESPONSES TO PHQ QUESTIONS 1-9: 0
1. LITTLE INTEREST OR PLEASURE IN DOING THINGS: 0

## 2021-10-14 ASSESSMENT — ENCOUNTER SYMPTOMS
NAUSEA: 0
SHORTNESS OF BREATH: 0
DIARRHEA: 0
VOMITING: 0

## 2021-10-14 NOTE — PROGRESS NOTES
OFFICE PROGRESS NOTE      SUBJECTIVE:        Patient ID:   Vance Grace is a 57 y.o. female whopresents for   Chief Complaint   Patient presents with    Diabetes     would like a scooter rx to take to [place]            HPI:   Patient is here to follow up on diabetes. Fasting blood sugars: Midday blood sugars: not checking. Patient checks blood glucose 1 times per day. Patient is following diabetic diet. Patient is a smoker/nonsmoker. Last ophthalmology visit: 2020. Patient is not taking daily statin--refills ran out. Recent lab results reviewed including CMP, CBC, TSH, and lipid panel which are remarkable for low Te[place] TSH, mild anemia. Last urine microalbumin: 7/2021    Patient requesting to get prescription for a scooter chair. Patient states her legs ache with excess walking due to prosthetic leg. Patient is able to walk with a quad cane. Prior to Admission medications    Medication Sig Start Date End Date Taking?  Authorizing Provider   atorvastatin (LIPITOR) 80 MG tablet Take 1 tablet by mouth nightly 10/17/21  Yes Beth Rodriguez MD   hydrALAZINE (APRESOLINE) 50 MG tablet Take 1 tablet by mouth 4 times daily 10/14/21  Yes Beth Rodriguez MD   benazepril (LOTENSIN) 10 MG tablet Take 1 tablet by mouth daily 10/14/21  Yes Beth Rodriguez MD   ibuprofen (ADVIL;MOTRIN) 800 MG tablet Take 1 tablet by mouth daily as needed for Pain 10/14/21  Yes Beth Rodriguez MD   cephALEXin (KEFLEX) 250 MG capsule Take 1 capsule by mouth 3 times daily 9/7/21  Yes Jacob Garcia MD   oxybutynin (DITROPAN XL) 10 MG extended release tablet Take 1 tablet by mouth daily 9/7/21  Yes Jacob Garcia MD   acetaminophen (TYLENOL) 500 MG tablet Take 500 mg by mouth every 6 hours as needed for Pain   Yes Historical Provider, MD   oxybutynin (DITROPAN) 5 MG tablet TAKE 1 TABLET BY MOUTH THREE TIMES DAILY 7/19/21  Yes William Avalos MD   spironolactone (ALDACTONE) 25 MG tablet Take 1 tablet by mouth daily 21  Yes Reyna Cotton MD   megestrol (MEGACE) 20 MG tablet Take 1 tablet by mouth 2 times daily 21  Yes Reyna Cotton MD   docusate sodium (COLACE) 100 MG capsule Take 1 capsule by mouth 2 times daily as needed for Constipation 21  Yes Reyna Cotton MD   diazePAM (VALIUM) 2 MG tablet Take 2 mg by mouth every 6 hours as needed for Anxiety.    Yes Historical Provider, MD   thiamine 100 MG tablet Take 1 tablet by mouth daily 10/20/20  Yes Reyna Cotton MD   pyridoxine (B-6) 50 MG tablet Take 1 tablet by mouth daily 10/20/20  Yes Reyna Cotton MD   metoprolol tartrate (LOPRESSOR) 25 MG tablet Take 1 tablet by mouth 2 times daily 20  Yes Lamont Castorena DO   cloNIDine (CATAPRES) 0.3 MG/24HR PTWK Place 1 patch onto the skin once a week 21  Reyna Cotton MD     Social History     Socioeconomic History    Marital status:      Spouse name: None    Number of children: 3    Years of education: None    Highest education level: None   Occupational History    None   Tobacco Use    Smoking status: Former Smoker     Packs/day: 0.00     Years: 2.00     Pack years: 0.00     Types: Cigarettes     Quit date: 3/23/2015     Years since quittin.5    Smokeless tobacco: Never Used    Tobacco comment: quit smoking    Vaping Use    Vaping Use: Never used   Substance and Sexual Activity    Alcohol use: No     Comment: coffee daily    Drug use: No    Sexual activity: Not Currently     Partners: Male   Other Topics Concern    None   Social History Narrative    None     Social Determinants of Health     Financial Resource Strain:     Difficulty of Paying Living Expenses:    Food Insecurity: No Food Insecurity    Worried About Running Out of Food in the Last Year: Never true    Va of Food in the Last Year: Never true   Transportation Needs:     Lack of Transportation (Medical):      Lack of Transportation (Non-Medical):    Physical Activity:     Days of Exercise per Week:     Minutes of Exercise per Session:    Stress: No Stress Concern Present    Feeling of Stress : Not at all   Social Connections:     Frequency of Communication with Friends and Family:     Frequency of Social Gatherings with Friends and Family:     Attends Confucianism Services:     Active Member of Clubs or Organizations:     Attends Club or Organization Meetings:     Marital Status:    Intimate Partner Violence:     Fear of Current or Ex-Partner:     Emotionally Abused:     Physically Abused:     Sexually Abused:        I have reviewed Sofia's allergies, medications, problem list, medical, social and family history and have updated as needed in the electronic medical record    Current Outpatient Medications   Medication Sig Dispense Refill    atorvastatin (LIPITOR) 80 MG tablet Take 1 tablet by mouth nightly 90 tablet 1    hydrALAZINE (APRESOLINE) 50 MG tablet Take 1 tablet by mouth 4 times daily 360 tablet 1    benazepril (LOTENSIN) 10 MG tablet Take 1 tablet by mouth daily 90 tablet 1    ibuprofen (ADVIL;MOTRIN) 800 MG tablet Take 1 tablet by mouth daily as needed for Pain 90 tablet 1    cephALEXin (KEFLEX) 250 MG capsule Take 1 capsule by mouth 3 times daily 9 capsule 0    oxybutynin (DITROPAN XL) 10 MG extended release tablet Take 1 tablet by mouth daily 30 tablet 3    acetaminophen (TYLENOL) 500 MG tablet Take 500 mg by mouth every 6 hours as needed for Pain      oxybutynin (DITROPAN) 5 MG tablet TAKE 1 TABLET BY MOUTH THREE TIMES DAILY 90 tablet 3    spironolactone (ALDACTONE) 25 MG tablet Take 1 tablet by mouth daily 90 tablet 1    megestrol (MEGACE) 20 MG tablet Take 1 tablet by mouth 2 times daily 180 tablet 1    docusate sodium (COLACE) 100 MG capsule Take 1 capsule by mouth 2 times daily as needed for Constipation 60 capsule 0    diazePAM (VALIUM) 2 MG tablet Take 2 mg by mouth every 6 hours as needed for Anxiety.  thiamine 100 MG tablet Take 1 tablet by mouth daily 30 tablet 5    pyridoxine (B-6) 50 MG tablet Take 1 tablet by mouth daily 30 tablet 5    metoprolol tartrate (LOPRESSOR) 25 MG tablet Take 1 tablet by mouth 2 times daily 60 tablet 3    cloNIDine (CATAPRES) 0.3 MG/24HR PTWK Place 1 patch onto the skin once a week 12 patch 1     No current facility-administered medications for this visit. Review Of Systems:    Review of Systems   Eyes: Negative for visual disturbance. Respiratory: Negative for shortness of breath. Cardiovascular: Negative for chest pain, palpitations and leg swelling. Gastrointestinal: Negative for diarrhea, nausea and vomiting. Genitourinary: Negative for difficulty urinating, dysuria and frequency. Skin: Negative for rash. Psychiatric/Behavioral: Negative for dysphoric mood. OBJECTIVE:     VS:  Wt Readings from Last 3 Encounters:   10/14/21 126 lb (57.2 kg)   09/07/21 124 lb (56.2 kg)   07/13/21 125 lb (56.7 kg)     Vitals:    10/14/21 1023   BP: 124/70   Pulse: 98   Resp: 20   SpO2: 98%       Physical Exam  Vitals reviewed. Constitutional:       General: She is not in acute distress. Appearance: She is well-developed. Neck:      Vascular: No carotid bruit. Cardiovascular:      Rate and Rhythm: Normal rate and regular rhythm. Heart sounds: Normal heart sounds. No murmur heard. No gallop. Pulmonary:      Effort: Pulmonary effort is normal.      Breath sounds: Normal breath sounds. No wheezing or rales. Abdominal:      General: Bowel sounds are normal. There is no distension. Palpations: Abdomen is soft. Tenderness: There is no abdominal tenderness. Musculoskeletal:      Cervical back: Neck supple. Right lower leg: No edema. Left lower leg: No edema. Skin:     General: Skin is warm and dry.    Neurological:      Mental Status: She is alert and oriented to person, place, and time. Lab Results   Component Value Date    LABA1C 4.9 10/14/2021     No results found for: EAG   Lab Results   Component Value Date    WBC 3.6 (L) 07/19/2021    HGB 10.1 (L) 07/19/2021    HCT 32.1 (L) 07/19/2021    MCV 87.2 07/19/2021     07/19/2021      Lab Results   Component Value Date     07/19/2021    K 5.4 (H) 07/19/2021     07/19/2021    CO2 19 (L) 07/19/2021    BUN 42 (H) 07/19/2021    CREATININE 1.7 (H) 07/19/2021    GLUCOSE 98 07/19/2021    CALCIUM 10.2 07/19/2021    PROT 9.1 (H) 07/19/2021    LABALBU 4.3 07/19/2021    BILITOT 0.3 07/19/2021    ALKPHOS 93 07/19/2021    AST 14 07/19/2021    ALT 6 07/19/2021    LABGLOM 37 07/19/2021    GFRAA 37 07/19/2021        Lab Results   Component Value Date    TSH 0.044 (L) 07/19/2021      Lab Results   Component Value Date    CHOL 170 07/19/2021     Lab Results   Component Value Date    TRIG 101 07/19/2021     Lab Results   Component Value Date    HDL 41 07/19/2021     Lab Results   Component Value Date    LDLCALC 109 (H) 07/19/2021     Lab Results   Component Value Date    LABVLDL 20 07/19/2021     No results found for: Dionisio Rivero was seen today for diabetes. Diagnoses and all orders for this visit:    Type 2 diabetes mellitus with diabetic peripheral angiopathy without gangrene, without long-term current use of insulin (HCC)  -     POCT glycosylated hemoglobin (Hb A1C)  -     Resume atorvastatin (LIPITOR) 80 MG tablet; Take 1 tablet by mouth nightly  -     T4, Free; Future  -     TSH without Reflex; Future    Essential hypertension  -     hydrALAZINE (APRESOLINE) 50 MG tablet; Take 1 tablet by mouth 4 times daily  -     benazepril (LOTENSIN) 10 MG tablet; Take 1 tablet by mouth daily    Need for assistance due to reduced mobility  -     [facility] for mobility assessment    Abnormal TSH  -     T4, Free; Future  -     TSH without Reflex;  Future    Other orders  - ibuprofen (ADVIL;MOTRIN) 800 MG tablet; Take 1 tablet by mouth daily as needed for Pain          Phone/MyChart follow up if tests abnormal.    Return in about 6 months (around 4/14/2022) for Annual Medicare Wellness Visit--30 minutes. I have reviewed my findings and recommendations with Marvin Boxer.     Bharath Shelton MD, M.D

## 2021-10-17 RX ORDER — ATORVASTATIN CALCIUM 80 MG/1
80 TABLET, FILM COATED ORAL NIGHTLY
Qty: 90 TABLET | Refills: 1 | Status: SHIPPED
Start: 2021-10-17 | End: 2022-04-26

## 2021-11-05 ENCOUNTER — TELEPHONE (OUTPATIENT)
Dept: FAMILY MEDICINE CLINIC | Age: 61
End: 2021-11-05

## 2021-11-05 DIAGNOSIS — N30.00 ACUTE CYSTITIS WITHOUT HEMATURIA: ICD-10-CM

## 2021-11-08 RX ORDER — SULFAMETHOXAZOLE AND TRIMETHOPRIM 800; 160 MG/1; MG/1
1 TABLET ORAL 2 TIMES DAILY
Qty: 14 TABLET | Refills: 0 | Status: SHIPPED | OUTPATIENT
Start: 2021-11-08 | End: 2021-11-15

## 2022-01-06 ENCOUNTER — TELEPHONE (OUTPATIENT)
Dept: FAMILY MEDICINE CLINIC | Age: 62
End: 2022-01-06

## 2022-01-06 RX ORDER — OXYBUTYNIN CHLORIDE 10 MG/1
10 TABLET, EXTENDED RELEASE ORAL DAILY
Qty: 30 TABLET | Refills: 3 | Status: SHIPPED
Start: 2022-01-06 | End: 2022-04-11 | Stop reason: SDUPTHER

## 2022-01-06 RX ORDER — OXYBUTYNIN CHLORIDE 5 MG/1
TABLET ORAL
Qty: 90 TABLET | Refills: 3 | Status: CANCELLED | OUTPATIENT
Start: 2022-01-06

## 2022-01-06 NOTE — TELEPHONE ENCOUNTER
Pt calling to find out about her refill.      Last seen 10/14/2021  Next appt 4/18/2022    [address] [facility]

## 2022-01-18 NOTE — TELEPHONE ENCOUNTER
Patient called stating she is taking the ibuprofen 800 mg 2 times daily and she is now out.   Please advise was ordered 1014.21    Last seen 10/14/2021  Next appt 4/18/2022

## 2022-01-19 RX ORDER — IBUPROFEN 800 MG/1
800 TABLET ORAL EVERY 12 HOURS PRN
Qty: 180 TABLET | Refills: 1 | Status: SHIPPED
Start: 2022-01-19 | End: 2022-04-11 | Stop reason: SDUPTHER

## 2022-03-07 DIAGNOSIS — I10 ESSENTIAL HYPERTENSION: ICD-10-CM

## 2022-03-08 RX ORDER — SPIRONOLACTONE 25 MG/1
25 TABLET ORAL DAILY
Qty: 90 TABLET | Refills: 1 | Status: SHIPPED
Start: 2022-03-08 | End: 2022-09-10

## 2022-04-11 ENCOUNTER — OFFICE VISIT (OUTPATIENT)
Dept: FAMILY MEDICINE CLINIC | Age: 62
End: 2022-04-11
Payer: MEDICARE

## 2022-04-11 VITALS
RESPIRATION RATE: 20 BRPM | HEIGHT: 62 IN | DIASTOLIC BLOOD PRESSURE: 72 MMHG | WEIGHT: 150 LBS | OXYGEN SATURATION: 98 % | BODY MASS INDEX: 27.6 KG/M2 | SYSTOLIC BLOOD PRESSURE: 138 MMHG | HEART RATE: 80 BPM

## 2022-04-11 DIAGNOSIS — I10 ESSENTIAL HYPERTENSION: ICD-10-CM

## 2022-04-11 DIAGNOSIS — I50.32 CHRONIC DIASTOLIC HEART FAILURE (HCC): ICD-10-CM

## 2022-04-11 DIAGNOSIS — K59.01 SLOW TRANSIT CONSTIPATION: ICD-10-CM

## 2022-04-11 DIAGNOSIS — Z12.31 ENCOUNTER FOR SCREENING MAMMOGRAM FOR MALIGNANT NEOPLASM OF BREAST: ICD-10-CM

## 2022-04-11 DIAGNOSIS — E11.51 TYPE 2 DIABETES MELLITUS WITH DIABETIC PERIPHERAL ANGIOPATHY WITHOUT GANGRENE, WITHOUT LONG-TERM CURRENT USE OF INSULIN (HCC): ICD-10-CM

## 2022-04-11 DIAGNOSIS — Z00.00 MEDICARE ANNUAL WELLNESS VISIT, SUBSEQUENT: Primary | ICD-10-CM

## 2022-04-11 DIAGNOSIS — Z89.512 HX OF BKA, LEFT (HCC): ICD-10-CM

## 2022-04-11 LAB — HBA1C MFR BLD: 5.4 %

## 2022-04-11 PROCEDURE — 3017F COLORECTAL CA SCREEN DOC REV: CPT | Performed by: FAMILY MEDICINE

## 2022-04-11 PROCEDURE — G0439 PPPS, SUBSEQ VISIT: HCPCS | Performed by: FAMILY MEDICINE

## 2022-04-11 PROCEDURE — 3044F HG A1C LEVEL LT 7.0%: CPT | Performed by: FAMILY MEDICINE

## 2022-04-11 PROCEDURE — 83036 HEMOGLOBIN GLYCOSYLATED A1C: CPT | Performed by: FAMILY MEDICINE

## 2022-04-11 RX ORDER — IBUPROFEN 800 MG/1
800 TABLET ORAL EVERY 12 HOURS PRN
Qty: 180 TABLET | Refills: 1 | Status: SHIPPED
Start: 2022-04-11 | End: 2022-10-12 | Stop reason: SDUPTHER

## 2022-04-11 RX ORDER — OXYBUTYNIN CHLORIDE 10 MG/1
10 TABLET, EXTENDED RELEASE ORAL DAILY
Qty: 90 TABLET | Refills: 1 | Status: SHIPPED
Start: 2022-04-11 | End: 2022-10-12 | Stop reason: SDUPTHER

## 2022-04-11 RX ORDER — DOCUSATE SODIUM 100 MG/1
100 CAPSULE, LIQUID FILLED ORAL 2 TIMES DAILY PRN
Qty: 180 CAPSULE | Refills: 1 | Status: SHIPPED
Start: 2022-04-11 | End: 2022-10-12 | Stop reason: SDUPTHER

## 2022-04-11 RX ORDER — HYDRALAZINE HYDROCHLORIDE 50 MG/1
50 TABLET, FILM COATED ORAL 4 TIMES DAILY
Qty: 360 TABLET | Refills: 1 | Status: SHIPPED
Start: 2022-04-11 | End: 2022-10-12 | Stop reason: SDUPTHER

## 2022-04-11 RX ORDER — BENAZEPRIL HYDROCHLORIDE 10 MG/1
10 TABLET ORAL DAILY
Qty: 90 TABLET | Refills: 1 | Status: SHIPPED
Start: 2022-04-11 | End: 2022-10-12 | Stop reason: SDUPTHER

## 2022-04-11 ASSESSMENT — PATIENT HEALTH QUESTIONNAIRE - PHQ9
SUM OF ALL RESPONSES TO PHQ QUESTIONS 1-9: 0
SUM OF ALL RESPONSES TO PHQ QUESTIONS 1-9: 0
1. LITTLE INTEREST OR PLEASURE IN DOING THINGS: 0
2. FEELING DOWN, DEPRESSED OR HOPELESS: 0
SUM OF ALL RESPONSES TO PHQ QUESTIONS 1-9: 0
SUM OF ALL RESPONSES TO PHQ9 QUESTIONS 1 & 2: 0
SUM OF ALL RESPONSES TO PHQ QUESTIONS 1-9: 0

## 2022-04-11 ASSESSMENT — LIFESTYLE VARIABLES: HOW OFTEN DO YOU HAVE A DRINK CONTAINING ALCOHOL: NEVER

## 2022-04-11 NOTE — PROGRESS NOTES
Medicare Annual Wellness Visit    Cuate Guido is here for Medicare AWV (needs rx for wheelchair , needs handicap rx )    Assessment & Plan   Medicare annual wellness visit, subsequent  Type 2 diabetes mellitus with diabetic peripheral angiopathy without gangrene, without long-term current use of insulin (HCC)  -     POCT glycosylated hemoglobin (Hb A1C)  -     Microalbumin / Creatinine Urine Ratio; Future  -     Stable; will assess in 6 months    Slow transit constipation  -     docusate sodium (COLACE) 100 MG capsule; Take 1 capsule by mouth 2 times daily as needed for Constipation, Disp-180 capsule, R-1Normal  Essential hypertension  -     benazepril (LOTENSIN) 10 MG tablet; Take 1 tablet by mouth daily, Disp-90 tablet, R-1Normal  -     hydrALAZINE (APRESOLINE) 50 MG tablet; Take 1 tablet by mouth 4 times daily, Disp-360 tablet, R-1Normal  -     CBC; Future  -     Comprehensive Metabolic Panel; Future  -     Lipid Panel; Future  Hx of BKA, left ([facility]  -     DME Order for Wheel Chair as OP  -     Stable; will assess yearly    Chronic diastolic heart failure ([facility]  Encounter for screening mammogram for malignant neoplasm of breast  -     [facility] TOM DIGITAL SCREEN BILATERAL; Future      Recommendations for Preventive Services Due: see orders and patient instructions/AVS.  Recommended screening schedule for the next 5-10 years is provided to the patient in written form: see Patient Instructions/AVS.     Return in 6 months (on 10/11/2022) for diabetes. Subjective   Patient is here to follow up on diabetes. Fasting blood sugars:101-114 Midday blood sugars: not checking. Patient checks blood glucose 1 times per day. Patient is following diabetic diet. Patient is a nonsmoker. Last ophthalmology visit: last year. Patient is taking a daily statin. Patient doing well on current regimen for chronic heart failure. Patient doing well with left BKA prosthetic.   Needs prescription for wheelchair and handicap placard. Patient having issues with constipation. Patient's complete Health Risk Assessment and screening values have been reviewed and are found in Flowsheets. The following problems were reviewed today and where indicated follow up appointments were made and/or referrals ordered. Positive Risk Factor Screenings with Interventions:              Health Habits/Nutrition:     Physical Activity: Insufficiently Active    Days of Exercise per Week: 2 days    Minutes of Exercise per Session: 20 min     Have you lost any weight without trying in the past 3 months?: No  Body mass index: (!) 27.43  Have you seen the dentist within the past year?: (!) No    Health Habits/Nutrition Interventions:  · Inadequate physical activity:  patient is not ready to increase his/her physical activity level at this time    Hearing/Vision:  Do you or your family notice any trouble with your hearing that hasn't been managed with hearing aids?: No  Do you have difficulty driving, watching TV, or doing any of your daily activities because of your eyesight?: No  Have you had an eye exam within the past year?: (!) No  No exam data present    Hearing/Vision Interventions:  · Vision concerns:  patient encouraged to make appointment with his/her eye specialist            Objective   Vitals:    04/11/22 1041   BP: 138/72   Pulse: 80   Resp: 20   SpO2: 98%   Weight: 150 lb (68 kg)   Height: 5' 2\" (1.575 m)      Body mass index is 27.44 kg/m². Wt Readings from Last 3 Encounters:   04/11/22 150 lb (68 kg)   10/14/21 126 lb (57.2 kg)   09/07/21 124 lb (56.2 kg)        Physical Exam  Vitals reviewed. Constitutional:       General: She is not in acute distress. Appearance: She is well-developed. Neck:      Vascular: No carotid bruit. Cardiovascular:      Rate and Rhythm: Normal rate and regular rhythm. Heart sounds: Normal heart sounds. No murmur heard. No gallop.     Pulmonary:      Effort: Pulmonary effort is normal. Breath sounds: Normal breath sounds. No wheezing or rales. Abdominal:      General: Bowel sounds are normal. There is no distension. Palpations: Abdomen is soft. Tenderness: There is no abdominal tenderness. Musculoskeletal:      Cervical back: Neck supple. Right lower leg: No edema. Left lower leg: No edema. Skin:     General: Skin is warm and dry. Neurological:      Mental Status: She is alert and oriented to person, place, and time. No Known Allergies  Prior to Visit Medications    Medication Sig Taking? Authorizing Provider   docusate sodium (COLACE) 100 MG capsule Take 1 capsule by mouth 2 times daily as needed for Constipation Yes Niles Faust MD   oxybutynin (DITROPAN XL) 10 MG extended release tablet Take 1 tablet by mouth daily Yes Niles Faust MD   ibuprofen (ADVIL;MOTRIN) 800 MG tablet Take 1 tablet by mouth every 12 hours as needed for Pain Yes Niles Faust MD   benazepril (LOTENSIN) 10 MG tablet Take 1 tablet by mouth daily Yes Niles Faust MD   hydrALAZINE (APRESOLINE) 50 MG tablet Take 1 tablet by mouth 4 times daily Yes Niles Faust MD   Handicap Placard MISC by Does not apply route Unable to ambulate more than 20 feet without difficulty  Expires 4/30/2027 Yes Niles Faust MD   spironolactone (ALDACTONE) 25 MG tablet Take 1 tablet by mouth daily Yes Niles Faust MD   atorvastatin (LIPITOR) 80 MG tablet Take 1 tablet by mouth nightly Yes Niles Fasut MD   cephALEXin (KEFLEX) 250 MG capsule Take 1 capsule by mouth 3 times daily Yes Chris Esparza MD   acetaminophen (TYLENOL) 500 MG tablet Take 500 mg by mouth every 6 hours as needed for Pain Yes Historical Provider, MD   diazePAM (VALIUM) 2 MG tablet Take 2 mg by mouth every 6 hours as needed for Anxiety.  Yes Historical Provider, MD   thiamine 100 MG tablet Take 1 tablet by mouth daily Yes Zulay Butler MD   pyridoxine (B-6) 50 MG tablet Take 1 tablet by mouth daily Yes Zulay Butler MD   metoprolol tartrate (LOPRESSOR) 25 MG tablet Take 1 tablet by mouth 2 times daily Yes Lucie Kohler DO   cloNIDine (CATAPRES) 0.3 MG/24HR PTWK Place 1 patch onto the skin once a week  Zulay Butler MD       [facility] (Including outside providers/suppliers regularly involved in providing care):   Patient Care Team:  Zulay Butler MD as PCP - General (Family Medicine)  Zulay Butler [facility]  Lissy Marroquin MD as Consulting Physician (Cardiology)    Reviewed and updated this visit:  Tobacco  Allergies  Meds  Problems  Med Hx  Surg Hx  Soc Hx  Fam Hx

## 2022-04-11 NOTE — PATIENT INSTRUCTIONS
Personalized Preventive Plan for Emperatriz Mcdaniel - 4/11/2022  Medicare offers a range of preventive health benefits. Some of the tests and screenings are paid in full while other may be subject to a deductible, co-insurance, and/or copay. Some of these benefits include a comprehensive review of your medical history including lifestyle, illnesses that may run in your family, and various assessments and screenings as appropriate. After reviewing your medical record and screening and assessments performed today your provider may have ordered immunizations, labs, imaging, and/or referrals for you. A list of these orders (if applicable) as well as your Preventive Care list are included within your After Visit Summary for your review. Other Preventive Recommendations:    · A preventive eye exam performed by an eye specialist is recommended every 1-2 years to screen for glaucoma; cataracts, macular degeneration, and other eye disorders. · A preventive dental visit is recommended every 6 months. · Try to get at least 150 minutes of exercise per week or 10,000 steps per day on a pedometer . · Order or download the FREE \"Exercise & Physical Activity: Your Everyday Guide\" from [employer]. Call 8-518.757.6845 or search [employer] online. · You need 7479-4094 mg of calcium and 0845-1157 IU of vitamin D per day. It is possible to meet your calcium requirement with diet alone, but a vitamin D supplement is usually necessary to meet this goal.  · When exposed to the sun, use a sunscreen that protects against both UVA and UVB radiation with an SPF of 30 or greater. Reapply every 2 to 3 hours or after sweating, drying off with a towel, or swimming. · Always wear a seat belt when traveling in a car. Always wear a helmet when riding a bicycle or motorcycle. Heart-Healthy Diet   Sodium, Fat, and Cholesterol Controlled Diet       What Is a Heart Healthy Diet?    A heart-healthy diet is one that limits sodium , certain types of fat , and cholesterol . This type of diet is recommended for:   People with any form of cardiovascular disease (eg, coronary heart disease , peripheral vascular disease , previous heart attack , previous stroke )   People with risk factors for cardiovascular disease, such as high blood pressure , high cholesterol , or diabetes   Anyone who wants to lower their risk of developing cardiovascular disease   Sodium    Sodium is a mineral found in many foods. In general, most people consume much more sodium than they need. Diets high in sodium can increase blood pressure and lead to edema (water retention). On a heart-healthy diet, you should consume no more than 2,300 mg (milligrams) of sodium per dayabout the amount in one teaspoon of table salt. The foods highest in sodium include table salt (about 50% sodium), processed foods, convenience foods, and preserved foods. Cholesterol    Cholesterol is a fat-like, waxy substance in your blood. Our bodies make some cholesterol. It is also found in animal products, with the highest amounts in fatty meat, egg yolks, whole milk, cheese, shellfish, and organ meats. On a heart-healthy diet, you should limit your cholesterol intake to less than 200 mg per day. It is normal and important to have some cholesterol in your bloodstream. But too much cholesterol can cause plaque to build up within your arteries, which can eventually lead to a heart attack or stroke. The two types of cholesterol that are most commonly referred to are:   Low-density lipoprotein (LDL) cholesterol  Also known as bad cholesterol, this is the cholesterol that tends to build up along your arteries. Bad cholesterol levels are increased by eating fats that are saturated or hydrogenated. Optimal level of this cholesterol is less than 100. Over 130 starts to get risky for heart disease.    High-density lipoprotein (HDL) cholesterol  Also known as good cholesterol, this type of cholesterol actually carries cholesterol away from your arteries and may, therefore, help lower your risk of having a heart attack. You want this level to be high (ideally greater than 60). It is a risk to have a level less than 40. You can raise this good cholesterol by eating olive oil, canola oil, avocados, or nuts. Exercise raises this level, too. Fat    Fat is calorie dense and packs a lot of calories into a small amount of food. Even though fats should be limited due to their high calorie content, not all fats are bad. In fact, some fats are quite healthful. Fat can be broken down into four main types. The good-for-you fats are:   Monounsaturated fat  found in oils such as olive and canola, avocados, and nuts and natural nut butters; can decrease cholesterol levels, while keeping levels of HDL cholesterol high   Polyunsaturated fat  found in oils such as safflower, sunflower, soybean, corn, and sesame; can decrease total cholesterol and LDL cholesterol   Omega-3 fatty acids  particularly those found in fatty fish (such as salmon, trout, tuna, mackerel, herring, and sardines); can decrease risk of arrhythmias, decrease triglyceride levels, and slightly lower blood pressure   The fats that you want to limit are:   Saturated fat  found in animal products, many fast foods, and a few vegetables; increases total blood cholesterol, including LDL levels   Animal fats that are saturated include: butter, lard, whole-milk dairy products, meat fat, and poultry skin   Vegetable fats that are saturated include: hydrogenated shortening, palm oil, coconut oil, cocoa butter   Hydrogenated or trans fat  found in margarine and vegetable shortening, most shelf stable snack foods, and fried foods; increases LDL and decreases HDL     It is generally recommended that you limit your total fat for the day to less than 30% of your total calories.  If you follow an 1800-calorie heart healthy diet, for example, this would mean 60 grams of fat or less per day. Saturated fat and trans fat in your diet raises your blood cholesterol the most, much more than dietary cholesterol does. For this reason, on a heart-healthy diet, less than 7% of your calories should come from saturated fat and ideally 0% from trans fat. On an 1800-calorie diet, this translates into less than 14 grams of saturated fat per day, leaving 46 grams of fat to come from mono- and polyunsaturated fats.    Food Choices on a Heart Healthy Diet   Food Category   Foods Recommended   Foods to Avoid   Grains   Breads and rolls without salted tops Most dry and cooked cereals Unsalted crackers and breadsticks Low-sodium or homemade breadcrumbs or stuffing All rice and pastas   Breads, rolls, and crackers with salted tops High-fat baked goods (eg, muffins, donuts, pastries) Quick breads, self-rising flour, and biscuit mixes Regular bread crumbs Instant hot cereals Commercially prepared rice, pasta, or stuffing mixes   Vegetables   Most fresh, frozen, and low-sodium canned vegetables Low-sodium and salt-free vegetable juices Canned vegetables if unsalted or rinsed   Regular canned vegetables and juices, including sauerkraut and pickled vegetables Frozen vegetables with sauces Commercially prepared potato and vegetable mixes   Fruits   Most fresh, frozen, and canned fruits All fruit juices   Fruits processed with salt or sodium   Milk   Nonfat or low-fat (1%) milk Nonfat or low-fat yogurt Cottage cheese, low-fat ricotta, cheeses labeled as low-fat and low-sodium   Whole milk Reduced-fat (2%) milk Malted and chocolate milk Full fat yogurt Most cheeses (unless low-fat and low salt) Buttermilk (no more than 1 cup per week)   Meats and Beans   Lean cuts of fresh or frozen beef, veal, lamb, or pork (look for the word loin) Fresh or frozen poultry without the skin Fresh or frozen fish and some shellfish Egg whites and egg substitutes (Limit whole eggs to three per week) Tofu Nuts or seeds (unsalted, dry-roasted), low-sodium peanut butter Dried peas, beans, and lentils   Any smoked, cured, salted, or canned meat, fish, or poultry (including ferris, chipped beef, cold cuts, hot dogs, sausages, sardines, and anchovies) Poultry skins Breaded and/or fried fish or meats Canned peas, beans, and lentils Salted nuts   Fats and Oils   Olive oil and canola oil Low-sodium, low-fat salad dressings and mayonnaise   Butter, margarine, coconut and palm oils, ferris fat   Snacks, Sweets, and Condiments   Low-sodium or unsalted versions of broths, soups, soy sauce, and condiments Pepper, herbs, and spices; vinegar, lemon, or lime juice Low-fat frozen desserts (yogurt, sherbet, fruit bars) Sugar, cocoa powder, honey, syrup, jam, and preserves Low-fat, trans-fat free cookies, cakes, and pies Aldo and animal crackers, fig bars, christopher snaps   High-fat desserts Broth, soups, gravies, and sauces, made from instant mixes or other high-sodium ingredients Salted snack foods Canned olives Meat tenderizers, seasoning salt, and most flavored vinegars   Beverages   Low-sodium carbonated beverages Tea and coffee in moderation Soy milk   Commercially softened water   Suggestions   Make whole grains, fruits, and vegetables the base of your diet. Choose heart-healthy fats such as canola, olive, and flaxseed oil, and foods high in heart-healthy fats, such as nuts, seeds, soybeans, tofu, and fish. Eat fish at least twice per week; the fish highest in omega-3 fatty acids and lowest in mercury include salmon, herring, mackerel, sardines, and canned chunk light tuna. If you eat fish less than twice per week or have high triglycerides, talk to your doctor about taking fish oil supplements. Read food labels.    For products low in fat and cholesterol, look for fat free, low-fat, cholesterol free, saturated fat free, and trans fat freeAlso scan the Nutrition Facts Label, which lists saturated fat, trans fat, and cholesterol amounts. For products low in sodium, look for sodium free, very low sodium, low sodium, no added salt, and unsalted   Skip the salt when cooking or at the table; if food needs more flavor, get creative and try out different herbs and spices. Garlic and onion also add substantial flavor to foods. Trim any visible fat off meat and poultry before cooking, and drain the fat off after naylor. Use cooking methods that require little or no added fat, such as grilling, boiling, baking, poaching, broiling, roasting, steaming, stir-frying, and sauting. Avoid fast food and convenience food. They tend to be high in saturated and trans fat and have a lot of added salt. Talk to a registered dietitian for individualized diet advice. Last Reviewed: March 2011 Katelyn Son MS, MPH, RD   Updated: 3/29/2011   ·     Keeping Home a [facility]       As we get older, changes in balance, gait, strength, vision, hearing, and cognition make even the most youthful senior more prone to accidents. Falls are one of the leading health risks for older people. This increased risk of falling is related to:   Aging process (eg, decreased muscle strength, slowed reflexes)   Higher incidence of chronic health problems (eg, arthritis, diabetes) that may limit mobility, agility or sensory awareness   Side effects of medicine (eg, dizziness, blurred vision)especially medicines like prescription pain medicines and drugs used to treat mental health conditions   Depending on the brittleness of your bones, the consequences of a fall can be serious and long lasting. Home Life   Research by the Association of Aging [facility]) shows that some home accidents among older adults can be prevented by making simple lifestyle changes and basic modifications and repairs to the home environment. Here are some lifestyle changes that experts recommend:   Have your hearing and vision checked regularly.  Be sure to wear prescription glasses that are right for you. Speak to your doctor or pharmacist about the possible side effects of your medicines. A number of medicines can cause dizziness. If you have problems with sleep, talk to your doctor. Limit your intake of alcohol. If necessary, use a cane or walker to help maintain your balance. Wear supportive, rubber-soled shoes, even at home. If you live in a region that gets wintry weather, you may want to put special cleats on your shoes to prevent you from slipping on the snow and ice. Exercise regularly to help maintain muscle tone, agility, and balance. Always hold the banister when going up or down stairs. Also, use  bars when getting in or out of the bath or shower, or using the toilet. To avoid dizziness, get up slowly from a lying down position. Sit up first, dangling your legs for a minute or two before rising to a standing position. Overall Home Safety Check   According to the Consumer Product Safety Commision's \"Older Consumer Home Safety Checklist,\" it is important to check for potential hazards in each room. And remember, proper lighting is an essential factor in home safety. If you cannot see clearly, you are more likely to fall. Important questions to ask yourself include:   Are lamp, electric, extension, and telephone cords placed out of the flow of traffic and maintained in good condition? Have frayed cords been replaced? Are all small rugs and runners slip resistant? If not, you can secure them to the floor with a special double-sided carpet tape. Are smoke detectors properly locatedone on every floor of your home and one outside of every sleeping area? Are they in good working order? Are batteries replaced at least once a year? Do you have a well-maintained carbon monoxide detector outside every sleeping are in your home? Does your furniture layout leave plenty of space to maneuver between and around chairs, tables, beds, and sofas?    Are hallways, stairs and passages between rooms well lit? Can you reach a lamp without getting out of bed? Are floor surfaces well maintained? Shag rugs, high-pile carpeting, tile floors, and polished wood floors can be particularly slippery. Stairs should always have handrails and be carpeted or fitted with a non-skid tread. Is your telephone easily reachable. Is the cord safely tucked away? Room by Room   According to the Association of Aging, bathrooms and michelle are the two most potentially hazardous rooms in your home. In the Kitchen    Be sure your stove is in proper working order and always make sure burners and the oven are off before you go out or go to sleep. Keep pots on the back burners, turn handles away from the front of the stove, and keep stove clean and free of grease build-up. Kitchen ventilation systems and range exhausts should be working properly. Keep flammable objects such as towels and pot holders away from the cooking area except when in use. Make sure kitchen curtains are tied back. Move cords and appliances away from the sink and hot surfaces. If extension cords are needed, install wiring guides so they do not hang over the sink, range, or working areas. Look for coffee pots, kettles and toaster ovens with automatic shut-offs. Keep a mop handy in the kitchen so you can wipe up spills instantly. You should also have a small fire extinguisher. Arrange your kitchen with frequently used items on lower shelves to avoid the need to stand on a stepstool to reach them. Make sure countertops are well-lit to avoid injuries while cutting and preparing food. In the Bathroom    Use a non-slip mat or decals in the tub and shower, since wet, soapy tile or porcelain surfaces are extremely slippery. Make sure bathroom rugs are non-skid or tape them firmly to the floor. Bathtubs should have at least one, preferably two, grab bars, firmly attached to structural supports in the wall.  (Do not use built-in soap holders or glass shower doors as grab bars.)    Tub seats fitted with non-slip material on the legs allow you to wash sitting down. For people with limited mobility, bathtub transfer benches allow you to slide safely into the tub. Raised toilet seats and toilet safety rails are helpful for those with knee or hip problems. In the [facility]    Make sure you use a nightlight and that the area around your bed is clear of potential obstacles. Be careful with electric blankets and never go to sleep with a heating pad, which can cause serious burns even if on a low setting. Use fire-resistant mattress covers and pillows, and NEVER smoke in bed. Keep a phone next to the bed that is programmed to dial 911 at the push of a button. If you have a chronic condition, you may want to sign on with an automatic call-in service. Typically the system includes a small pendant that connects directly to an emergency medical voice-response system. You should also make arrangements to stay in contact with someonefriend, neighbor, family memberon a regular schedule. Fire Prevention   According to the [employer]. (Smoke Alarms for Every) [address] [place] [address], senior citizens are one of the two highest risk groups for death and serious injuries due to residential fires. When cooking, wear short-sleeved items, never a bulky long-sleeved robe. The [facility]'s Safety Checklist for Older Consumers emphasizes the importance of checking basements, garages, workshops and storage areas for fire hazards, such as volatile liquids, piles of old rags or clothing and overloaded circuits. Never smoke in bed or when lying down on a couch or recliner chair. Small portable electric or kerosene heaters are responsible for many home fires and should be used cautiously if at all. If you do use one, be sure to keep them away from flammable materials.     In case of fire, make sure you have a pre-established emergency exit plan. Have a professional check your fireplace and other fuel-burning appliances yearly. [employer]   Baby boomers entering the lopez years will continue to see the development of new products to help older adults live safely and independently in spite of age-related changes. Making Life More Livable  , by Jasmyne Coates, lists over 1,000 products for \"living well in the mature years,\" such as bathing and mobility aids, household security devices, ergonomically designed knives and peelers, and faucet valves and knobs for temperature control. Medical supply stores and organizations are good sources of information about products that improve your quality of life and insure your safety. Last Reviewed: November 2009 Joe Dimas MD   Updated: 3/7/2011     ·        Learning About Living [place]  What is a living will? A living will, also called a declaration, is a legal form. It tells your family and your doctor your wishes when you can't speak for yourself. It's used by the health professionals who will treat you as you near the end of your life or ifyou get seriously hurt or ill. If you put your wishes in writing, your loved ones and others will know what kind of care you want. They won't need to guess. This can ease your mind and behelpful to others. And you can change or cancel your living will at any time. A living will is not the same as an estate or property will. An estate willexplains what you want to happen with your money and property after you die. How do you use it? Keep these facts in mind about how a living will is used. Your living will is used only if you can't speak or make decisions for yourself. Most often, one or more doctors must certify that you can't speak or decide for yourself before your living will takes effect. If you get better and can speak for yourself again, you can accept or refuse any treatment.  It doesn't matter what you said in your living will.  Some states may limit your right to refuse treatment in certain cases. For example, you may need to clearly state in your living will that you don't want artificial hydration and nutrition, such as being fed through a tube. Is a living will a legal document? A living will is a legal document. Each state has its own laws about livingwills. And a living will may be called something else in your state. Here are some things to know about living [place]. You don't need an  to complete a living will. But legal advice can be helpful if your state's laws are unclear. It can also help if your health history is complicated or your family can't agree on what should be in your living will. You can change your living will at any time. Some people find that their wishes about end-of-life care change as their health changes. If you make big changes to your living will, complete a new form. If you move to another state, make sure that your living will is legal in the state where you now live. In most cases, doctors will respect your wishes even if you have a form from a different state. You might use a universal form that has been approved by many states. This kind of form can sometimes be filled out and stored online. Your digital copy will then be available wherever you have a connection to the internet. The doctors and nurses who need to treat you can find it right away. Your state may offer an online registry. This is another place where you can store your living will online. It's a good idea to get your living will notarized. This means using a person called a  to watch two people sign, or witness, your living will. What should you know when you create a living will? Here are some questions to ask yourself as you make your living will. Do you know enough about life support methods that might be used?  If not, talk to your doctor so you know what might be done if you can't breathe on your own, your heart stops, or you can't swallow. What things would you still want to be able to do after you receive life-support methods? Would you want to be able to walk? To speak? To eat on your own? To live without the help of machines? Do you want certain Jewish practices performed if you become very ill? If you have a choice, where do you want to be cared for? In your home? At a hospital or nursing home? If you have a choice at the end of your life, where would you prefer to die? At home? In a hospital or nursing home? Somewhere else? Would you prefer to be buried or cremated? Do you want your organs to be donated after you die? What should you do with your living will? Make sure that your family members and your health care agent have copies of your living will (also called a declaration). Give your doctor a copy of your living will. Ask to have it kept as part of your medical record. If you have more than one doctor, make sure that each one has a copy. Put a copy of your living will where it can be easily found. For example, some people may put a copy on their refrigerator door. If you are using a digital copy, be sure your doctor, family members, and health care agent know how to find and access it. Where can you learn more? Go to https://GogopeL8 [email or web address] [email or web address] org and sign in to your [employer] account. Enter N887 in the [facility] box to learn more about \"Learning About Living Franco Mathew. \"     If you do not have an account, please click on the \"Sign Up Now\" link. Current as of: October 18, 2021               Content Version: 13.2  © 1924-9921 Healthwise, Incorporated. Care instructions adapted under license by [facility] ([facility]). If you have questions about a medical condition or this instruction, always ask your healthcare professional. [address]  any warranty or liability for your use of this information.     ·

## 2022-04-12 ENCOUNTER — TELEPHONE (OUTPATIENT)
Dept: FAMILY MEDICINE CLINIC | Age: 62
End: 2022-04-12

## 2022-04-12 NOTE — TELEPHONE ENCOUNTER
Brittany Youssef from [facility] called stating that wheelchair order needs to go thru [employer]. Order and notes faxed to [employer]

## 2022-04-20 ENCOUNTER — HOSPITAL ENCOUNTER (OUTPATIENT)
Age: 62
Discharge: HOME | End: 2022-04-20
Payer: MEDICARE

## 2022-04-20 DIAGNOSIS — I10 ESSENTIAL HYPERTENSION: ICD-10-CM

## 2022-04-20 DIAGNOSIS — E11.51 TYPE 2 DIABETES MELLITUS WITH DIABETIC PERIPHERAL ANGIOPATHY WITHOUT GANGRENE, WITHOUT LONG-TERM CURRENT USE OF INSULIN (HCC): ICD-10-CM

## 2022-04-20 LAB
ALBUMIN SERPL-MCNC: 4.2 G/DL (ref 3.5–5.2)
ALP BLD-CCNC: 95 U/L (ref 35–104)
ALT SERPL-CCNC: 9 U/L (ref 0–32)
ANION GAP SERPL CALCULATED.3IONS-SCNC: 11 MMOL/L (ref 7–16)
AST SERPL-CCNC: 12 U/L (ref 0–31)
BILIRUB SERPL-MCNC: 0.2 MG/DL (ref 0–1.2)
BUN BLDV-MCNC: 33 MG/DL (ref 6–23)
CALCIUM SERPL-MCNC: 9.8 MG/DL (ref 8.6–10.2)
CHLORIDE BLD-SCNC: 107 MMOL/L (ref 98–107)
CHOLESTEROL, TOTAL: 195 MG/DL (ref 0–199)
CO2: 23 MMOL/L (ref 22–29)
CREAT SERPL-MCNC: 1.2 MG/DL (ref 0.5–1)
CREATININE URINE: 107 MG/DL (ref 29–226)
GFR AFRICAN AMERICAN: 55
GFR NON-AFRICAN AMERICAN: 55 ML/MIN/1.73
GLUCOSE BLD-MCNC: 118 MG/DL (ref 74–99)
HCT VFR BLD CALC: 33.5 % (ref 34–48)
HDLC SERPL-MCNC: 38 MG/DL
HEMOGLOBIN: 10.4 G/DL (ref 11.5–15.5)
LDL CHOLESTEROL CALCULATED: 128 MG/DL (ref 0–99)
MCH RBC QN AUTO: 27.2 PG (ref 26–35)
MCHC RBC AUTO-ENTMCNC: 31 % (ref 32–34.5)
MCV RBC AUTO: 87.7 FL (ref 80–99.9)
MICROALBUMIN UR-MCNC: 288 MG/L
MICROALBUMIN/CREAT UR-RTO: 269.2 (ref 0–30)
PDW BLD-RTO: 13.2 FL (ref 11.5–15)
PLATELET # BLD: 272 E9/L (ref 130–450)
PMV BLD AUTO: 9.7 FL (ref 7–12)
POTASSIUM SERPL-SCNC: 4.5 MMOL/L (ref 3.5–5)
RBC # BLD: 3.82 E12/L (ref 3.5–5.5)
SODIUM BLD-SCNC: 141 MMOL/L (ref 132–146)
TOTAL PROTEIN: 8.2 G/DL (ref 6.4–8.3)
TRIGL SERPL-MCNC: 144 MG/DL (ref 0–149)
VLDLC SERPL CALC-MCNC: 29 MG/DL
WBC # BLD: 3.1 E9/L (ref 4.5–11.5)

## 2022-04-20 PROCEDURE — 85027 COMPLETE CBC AUTOMATED: CPT

## 2022-04-20 PROCEDURE — 82044 UR ALBUMIN SEMIQUANTITATIVE: CPT

## 2022-04-20 PROCEDURE — 80053 COMPREHEN METABOLIC PANEL: CPT

## 2022-04-20 PROCEDURE — 36415 COLL VENOUS BLD VENIPUNCTURE: CPT

## 2022-04-20 PROCEDURE — 80061 LIPID PANEL: CPT

## 2022-04-20 PROCEDURE — 82570 ASSAY OF URINE CREATININE: CPT

## 2022-04-26 NOTE — PROGRESS NOTES

## 2022-04-26 NOTE — PROGRESS NOTES
[facility] PRE-ADMISSION TESTING INSTRUCTIONS    The Preadmission Testing patient is instructed accordingly using the following criteria (check applicable):    ARRIVAL INSTRUCTIONS:  [x] Parking the day of Surgery is located in the Main Entrance lot. Upon entering the door, make an immediate right to the surgery reception desk    [x] Bring photo ID and insurance card    [x] Bring in a copy of Living will or Durable Power of  papers. [x] Please be sure to arrange for responsible adult to provide transportation to and from the hospital    [x] Please arrange for responsible adult to be with you for the 24 hour period post procedure due to having anesthesia      GENERAL INSTRUCTIONS:    [x] Nothing by mouth after midnight, including gum, candy, mints or water    [x] You may brush your teeth, but do not swallow any water    [x] Take medications as instructed with 1-2 oz of water    [x] Stop herbal supplements and vitamins 5 days prior to procedure    [] Follow preop dosing of blood thinners per physician instructions    [] Take 1/2 dose of evening insulin, but no insulin after midnight    [] No oral diabetic medications after midnight    [x] If diabetic and have low blood sugar or feel symptomatic, take 1-2oz apple juice only    [] Bring inhalers day of surgery    [] Bring C-PAP/ Bi-Pap day of surgery    [] Bring urine specimen day of surgery    [x] Shower or bath with soap, lather and rinse well, AM of Surgery, no lotion, powders or creams to surgical site    [] Follow bowel prep as instructed per surgeon    [x] No tobacco products within 24 hours of surgery     [x] No alcohol or illegal drug use within 24 hours of surgery.     [x] Jewelry, body piercing's, eyeglasses, contact lenses and dentures are not permitted into surgery (bring cases)      [x] Please do not wear any nail polish, make up or hair products on the day of surgery    [x] You can expect a call the business day prior to procedure to notify you if your arrival time changes    [x] If you receive a survey after surgery we would greatly appreciate your comments    [] Parent/guardian of a minor must accompany their child and remain on the premises  the entire time they are under our care     [] Pediatric patients may bring favorite toy, blanket or comfort item with them    [] A caregiver or family member must remain with the patient during their stay if they are mentally handicapped, have dementia, disoriented or unable to use a call light or would be a safety concern if left unattended    [x] Please notify surgeon if you develop any illness between now and time of surgery (cold, cough, sore throat, fever, nausea, vomiting) or any signs of infections  including skin, wounds, and dental.    [x]  The Outpatient Pharmacy is available to fill your prescription here on your day of surgery, ask your preop nurse for details    [] Other instructions    EDUCATIONAL MATERIALS PROVIDED:    [] PAT Preoperative Education Packet/Booklet     [] Medication List    [] Transfusion bracelet applied with instructions    [] Shower with soap, lather and rinse well, and use CHG wipes provided the evening before surgery as instructed    [] Incentive spirometer with instructions

## 2022-04-29 NOTE — PROGRESS NOTES
Patient has a history of CAD, and followed with Dr Vanessa Lora, and now  with PCP. She denies any cardiac complaints or symptoms. ID notified regarding tag on chart of  VRE in urine. Per ID nurse, patient needs to be in contact  isolation for surgery.

## 2022-05-01 ENCOUNTER — ANESTHESIA EVENT (OUTPATIENT)
Dept: OPERATING ROOM | Age: 62
End: 2022-05-01
Payer: MEDICARE

## 2022-05-01 ENCOUNTER — PREP FOR PROCEDURE (OUTPATIENT)
Dept: UROLOGY | Age: 62
End: 2022-05-01

## 2022-05-01 RX ORDER — SODIUM CHLORIDE 9 MG/ML
INJECTION, SOLUTION INTRAVENOUS PRN
Status: CANCELLED | OUTPATIENT
Start: 2022-05-01

## 2022-05-01 RX ORDER — SODIUM CHLORIDE 9 MG/ML
INJECTION, SOLUTION INTRAVENOUS CONTINUOUS
Status: CANCELLED | OUTPATIENT
Start: 2022-05-01

## 2022-05-01 RX ORDER — SODIUM CHLORIDE 0.9 % (FLUSH) 0.9 %
5-40 SYRINGE (ML) INJECTION PRN
Status: CANCELLED | OUTPATIENT
Start: 2022-05-01

## 2022-05-01 RX ORDER — SODIUM CHLORIDE 0.9 % (FLUSH) 0.9 %
5-40 SYRINGE (ML) INJECTION EVERY 12 HOURS SCHEDULED
Status: CANCELLED | OUTPATIENT
Start: 2022-05-01

## 2022-05-02 ENCOUNTER — ANESTHESIA (OUTPATIENT)
Dept: OPERATING ROOM | Age: 62
End: 2022-05-02
Payer: MEDICARE

## 2022-05-02 ENCOUNTER — HOSPITAL ENCOUNTER (OUTPATIENT)
Dept: GENERAL RADIOLOGY | Age: 62
Discharge: HOME | End: 2022-05-04
Attending: UROLOGY
Payer: MEDICARE

## 2022-05-02 ENCOUNTER — HOSPITAL ENCOUNTER (OUTPATIENT)
Age: 62
Setting detail: OUTPATIENT SURGERY
Discharge: HOME | End: 2022-05-02
Attending: UROLOGY | Admitting: UROLOGY
Payer: MEDICARE

## 2022-05-02 VITALS
OXYGEN SATURATION: 100 % | RESPIRATION RATE: 14 BRPM | DIASTOLIC BLOOD PRESSURE: 76 MMHG | SYSTOLIC BLOOD PRESSURE: 126 MMHG | TEMPERATURE: 95.4 F

## 2022-05-02 VITALS — OXYGEN SATURATION: 96 %

## 2022-05-02 VITALS
HEART RATE: 69 BPM | TEMPERATURE: 97.6 F | RESPIRATION RATE: 16 BRPM | WEIGHT: 140 LBS | BODY MASS INDEX: 24.8 KG/M2 | OXYGEN SATURATION: 99 % | HEIGHT: 63 IN | SYSTOLIC BLOOD PRESSURE: 169 MMHG | DIASTOLIC BLOOD PRESSURE: 79 MMHG

## 2022-05-02 DIAGNOSIS — R52 PAIN: ICD-10-CM

## 2022-05-02 LAB — METER GLUCOSE: 106 MG/DL (ref 74–99)

## 2022-05-02 PROCEDURE — 2709999900 HC NON-CHARGEABLE SUPPLY: Performed by: UROLOGY

## 2022-05-02 PROCEDURE — 82962 GLUCOSE BLOOD TEST: CPT

## 2022-05-02 PROCEDURE — 3600000003 HC SURGERY LEVEL 3 BASE: Performed by: UROLOGY

## 2022-05-02 PROCEDURE — C1769 GUIDE WIRE: HCPCS | Performed by: UROLOGY

## 2022-05-02 PROCEDURE — 3600000013 HC SURGERY LEVEL 3 ADDTL 15MIN: Performed by: UROLOGY

## 2022-05-02 PROCEDURE — 7100000011 HC PHASE II RECOVERY - ADDTL 15 MIN: Performed by: UROLOGY

## 2022-05-02 PROCEDURE — 3700000000 HC ANESTHESIA ATTENDED CARE: Performed by: UROLOGY

## 2022-05-02 PROCEDURE — C2617 STENT, NON-COR, TEM W/O DEL: HCPCS | Performed by: UROLOGY

## 2022-05-02 PROCEDURE — 3700000001 HC ADD 15 MINUTES (ANESTHESIA): Performed by: UROLOGY

## 2022-05-02 PROCEDURE — 6360000004 HC RX CONTRAST MEDICATION: Performed by: UROLOGY

## 2022-05-02 PROCEDURE — C1758 CATHETER, URETERAL: HCPCS | Performed by: UROLOGY

## 2022-05-02 PROCEDURE — 6360000002 HC RX W HCPCS: Performed by: NURSE PRACTITIONER

## 2022-05-02 PROCEDURE — 74420 UROGRAPHY RTRGR +-KUB: CPT

## 2022-05-02 PROCEDURE — 7100000010 HC PHASE II RECOVERY - FIRST 15 MIN: Performed by: UROLOGY

## 2022-05-02 PROCEDURE — 2500000003 HC RX 250 WO HCPCS: Performed by: NURSE ANESTHETIST, CERTIFIED REGISTERED

## 2022-05-02 PROCEDURE — 6360000002 HC RX W HCPCS: Performed by: NURSE ANESTHETIST, CERTIFIED REGISTERED

## 2022-05-02 PROCEDURE — 2580000003 HC RX 258: Performed by: NURSE ANESTHETIST, CERTIFIED REGISTERED

## 2022-05-02 DEVICE — URETERAL STENT
Type: IMPLANTABLE DEVICE | Site: URETER | Status: FUNCTIONAL
Brand: POLARIS™ ULTRA

## 2022-05-02 RX ORDER — PROPOFOL 10 MG/ML
INJECTION, EMULSION INTRAVENOUS CONTINUOUS PRN
Status: DISCONTINUED | OUTPATIENT
Start: 2022-05-02 | End: 2022-05-02 | Stop reason: SDUPTHER

## 2022-05-02 RX ORDER — SODIUM CHLORIDE 0.9 % (FLUSH) 0.9 %
5-40 SYRINGE (ML) INJECTION PRN
Status: DISCONTINUED | OUTPATIENT
Start: 2022-05-02 | End: 2022-05-02 | Stop reason: HOSPADM

## 2022-05-02 RX ORDER — SODIUM CHLORIDE 9 MG/ML
INJECTION, SOLUTION INTRAVENOUS CONTINUOUS
Status: DISCONTINUED | OUTPATIENT
Start: 2022-05-02 | End: 2022-05-02 | Stop reason: HOSPADM

## 2022-05-02 RX ORDER — SODIUM CHLORIDE 9 MG/ML
INJECTION, SOLUTION INTRAVENOUS PRN
Status: DISCONTINUED | OUTPATIENT
Start: 2022-05-02 | End: 2022-05-02 | Stop reason: HOSPADM

## 2022-05-02 RX ORDER — SODIUM CHLORIDE 0.9 % (FLUSH) 0.9 %
5-40 SYRINGE (ML) INJECTION EVERY 12 HOURS SCHEDULED
Status: DISCONTINUED | OUTPATIENT
Start: 2022-05-02 | End: 2022-05-02 | Stop reason: HOSPADM

## 2022-05-02 RX ORDER — SODIUM CHLORIDE 9 MG/ML
INJECTION, SOLUTION INTRAVENOUS CONTINUOUS PRN
Status: DISCONTINUED | OUTPATIENT
Start: 2022-05-02 | End: 2022-05-02 | Stop reason: SDUPTHER

## 2022-05-02 RX ORDER — FENTANYL CITRATE 50 UG/ML
25 INJECTION, SOLUTION INTRAMUSCULAR; INTRAVENOUS EVERY 5 MIN PRN
Status: DISCONTINUED | OUTPATIENT
Start: 2022-05-02 | End: 2022-05-02 | Stop reason: HOSPADM

## 2022-05-02 RX ORDER — LIDOCAINE HYDROCHLORIDE 20 MG/ML
INJECTION, SOLUTION INFILTRATION; PERINEURAL PRN
Status: DISCONTINUED | OUTPATIENT
Start: 2022-05-02 | End: 2022-05-02 | Stop reason: SDUPTHER

## 2022-05-02 RX ORDER — PROPOFOL 10 MG/ML
INJECTION, EMULSION INTRAVENOUS PRN
Status: DISCONTINUED | OUTPATIENT
Start: 2022-05-02 | End: 2022-05-02 | Stop reason: SDUPTHER

## 2022-05-02 RX ADMIN — LIDOCAINE HYDROCHLORIDE 50 MG: 20 INJECTION, SOLUTION INFILTRATION; PERINEURAL at 09:48

## 2022-05-02 RX ADMIN — PROPOFOL 50 MG: 10 INJECTION, EMULSION INTRAVENOUS at 09:51

## 2022-05-02 RX ADMIN — Medication 2000 MG: at 09:52

## 2022-05-02 RX ADMIN — PROPOFOL 50 MCG/KG/MIN: 10 INJECTION, EMULSION INTRAVENOUS at 09:49

## 2022-05-02 RX ADMIN — PROPOFOL 50 MG: 10 INJECTION, EMULSION INTRAVENOUS at 09:48

## 2022-05-02 RX ADMIN — SODIUM CHLORIDE: 9 INJECTION, SOLUTION INTRAVENOUS at 09:37

## 2022-05-02 ASSESSMENT — PULMONARY FUNCTION TESTS
PIF_VALUE: 1
PIF_VALUE: 0
PIF_VALUE: 1
PIF_VALUE: 1
PIF_VALUE: 0
PIF_VALUE: 1
PIF_VALUE: 0
PIF_VALUE: 1
PIF_VALUE: 0

## 2022-05-02 ASSESSMENT — PAIN SCALES - GENERAL
PAINLEVEL_OUTOF10: 0

## 2022-05-02 ASSESSMENT — LIFESTYLE VARIABLES: SMOKING_STATUS: 0

## 2022-05-02 ASSESSMENT — PAIN - FUNCTIONAL ASSESSMENT: PAIN_FUNCTIONAL_ASSESSMENT: 0-10

## 2022-05-02 NOTE — ANESTHESIA POSTPROCEDURE EVALUATION
Department of Anesthesiology  Postprocedure Note    Patient: Armando Butler  MRN: [id number]  [place]: 1960  Date of evaluation: 5/2/2022  Time:  10:21 AM     Procedure Summary     Date: 05/02/22 Room / Location: [place] [address] / [address] [place] [address]    Anesthesia Start: 3334 Anesthesia Stop:     Procedure: [address] [facility] (Bilateral ) Diagnosis: (HYDRONEPHROSIS)    Surgeons: Soraida Hernandez MD Responsible Provider: Ellie Lopez DO    Anesthesia Type: MAC ASA Status: 3          Anesthesia Type: No value filed. Jake Phase I: Jake Score: 10    Jake Phase II:      Last vitals: Reviewed and per EMR flowsheets.        Anesthesia Post Evaluation    Patient location during evaluation: bedside  Patient participation: complete - patient participated  Level of consciousness: awake and awake and alert  Pain score: 0  Airway patency: patent  Nausea & Vomiting: no vomiting and no nausea  Complications: no  Cardiovascular status: blood pressure returned to baseline  Respiratory status: spontaneous ventilation and room air  Hydration status: stable  Multimodal analgesia pain management approach    BENJI Jacobson - CRNA

## 2022-05-02 NOTE — ANESTHESIA PRE PROCEDURE
Department of Anesthesiology  Preprocedure Note       Name:  Armando Butler   Age:  60 y.o.  :  1960                                          MRN:  [id number]         Date:  2022      Surgeon: Ayanna Villegas    Procedure: CYSTO RETROGRADE BILATERAL STENT CHANGE    Medications prior to admission:   Prior to Admission medications    Medication Sig Start Date End Date Taking? Authorizing Provider   docusate sodium (COLACE) 100 MG capsule Take 1 capsule by mouth 2 times daily as needed for Constipation 22   Concepcion Jessica MD   oxybutynin (DITROPAN XL) 10 MG extended release tablet Take 1 tablet by mouth daily 22   Concepcion Jessica MD   ibuprofen (ADVIL;MOTRIN) 800 MG tablet Take 1 tablet by mouth every 12 hours as needed for Pain 22   Concepcion Jessica MD   benazepril (LOTENSIN) 10 MG tablet Take 1 tablet by mouth daily 22   Concepcion Jessica MD   hydrALAZINE (APRESOLINE) 50 MG tablet Take 1 tablet by mouth 4 times daily  Patient taking differently: Take 50 mg by mouth 4 times daily Patient 3 X a day, checks her BP 22   Concepcion Jessica MD   Handicap Placard MISC by Does not apply route Unable to ambulate more than 20 feet without difficulty  Expires 2027   Concepcion Jessica MD   spironolactone (ALDACTONE) 25 MG tablet Take 1 tablet by mouth daily 3/8/22   Concepcion Jessica MD   acetaminophen (TYLENOL) 500 MG tablet Take 500 mg by mouth every 6 hours as needed for Pain    Historical Provider, MD   diazePAM (VALIUM) 2 MG tablet Take 2 mg by mouth every 6 hours as needed for Anxiety.     Historical Provider, MD       Current medications:    Current Facility-Administered Medications   Medication Dose Route Frequency Provider Last Rate Last Admin    0.9 % sodium chloride infusion   IntraVENous Continuous BENJI Law - CNP        0.9 % sodium chloride infusion   IntraVENous PRN BENJI Law - CNP        ceFAZolin (ANCEF) 2000 mg in sterile water 20 mL IV syringe  2,000 mg IntraVENous On Call to [address], APRN - CNP        sodium chloride flush 0.9 % injection 5-40 mL  5-40 mL IntraVENous 2 times per day Precious Ridley APRN - CNP        sodium chloride flush 0.9 % injection 5-40 mL  5-40 mL IntraVENous PRN BENJI Law - CNP           Allergies:  No Known Allergies    Problem List:    Patient Active Problem List   Diagnosis Code    Epigastric hernia D64.6    Umbilical hernia Y57.7    Essential hypertension I10    Type 2 diabetes mellitus with diabetic peripheral angiopathy without gangrene, without long-term current use of insulin ([facility]) E11.51    Dyslipidemia E78.5    S/P cardiac catheterization Z98.890    Abnormal stress ECG R94.39    Hyperthyroidism E05.90    Diabetic peripheral neuropathy (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) Z89.512    Chronic diastolic heart failure ([facility]) I50.32    PVD (peripheral vascular disease) ([facility]) I73.9    Anorexia R63.0       Past Medical History:        Diagnosis Date    CAD (coronary artery disease)     cath 2016    [facility]estive [facility] failure) [facility] [address]     Diabetic peripheral neuropathy associated with type 2 diabetes mellitus ([facility] [address] [facility] 8/24/2018    Epigastric hernia     Hyperlipidemia     Hypertension     Kidney stone     Schizophrenia ([facility] [address] [facility]     Type 2 diabetes mellitus with diabetic polyneuropathy, with long-term current use of insulin ([facility] [address] 9/19/2016    no longer requiring medication. 1/13/21.     Umbilical hernia        Past Surgical History: Procedure Laterality Date    [facility]ATION  2016     [facility]    COLONOSCOPY  2016    Dr. Abby Schmitt 2021    CYSTOSCOPY RETROGRADE PYELOGRAM, BILATERAL STENT EXCHANGE performed by Imer Jennings MD at [place] / [place] / [place] Bilateral 10/28/2019    CYSTOSCOPY. RETROGRADE.  PYELOGRAM. BILATERAL STENT INSERTION performed by Holly Montgomery MD at [place] / [place] / [place] Bilateral 2020    CYSTOSCOPY RETROGRADE PYELOGRAM BILATERAL STENT EXCHANGE performed by Holly Montgomery MD at [place] / [place] / [place] Bilateral 2021    CYSTOSCOPY RETROGRADE PYELOGRAM BILATERAL  STENT CHANGE performed by Imer Jennings MD at [facility] [address] Left 2020    LEG AMPUTATION BELOW KNEE performed by Dhruv Vega DO at [place] [address]  2016    epigastric hernia umbilical hernia with mesh    OVARIAN CYST REMOVAL      OVARY REMOVAL Right     OVARY REMOVAL  2014    UPPER GASTROINTESTINAL ENDOSCOPY N/A 10/29/2019    EGD ESOPHAGOGASTRODUODENOSCOPY performed by Daiana Ricardo MD at [address] History:    Social History     Tobacco Use    Smoking status: Former Smoker     Packs/day: 0.00     Years: 2.00     Pack years: 0.00     Types: Cigarettes     Quit date: 3/23/2015     Years since quittin.1    Smokeless tobacco: Never Used    Tobacco comment: quit smoking    Substance Use Topics    Alcohol use: No     Comment: coffee daily                                Counseling given: Not Answered  Comment: quit smoking       Vital Signs (Current):   Vitals:    22 0917 22 0808 22 0815   BP:   (!) 187/74   Pulse:   97   Resp:   16   Temp:   97.6 °F (36.4 °C)   TempSrc:   Temporal   SpO2:   98%   Weight: 140 lb (63.5 kg) 140 lb (63.5 kg)    Height: 5' 3\" (1.6 m) 5' 3\" (1.6 m)                                               BP Readings from Last 3 Encounters:   05/02/22 (!) 187/74   04/11/22 138/72   10/14/21 124/70       NPO Status: Time of last liquid consumption: 1830                        Time of last solid consumption: 1830                        Date of last liquid consumption: 05/01/22                        Date of last solid food consumption: 05/01/22    BMI:   Wt Readings from Last 3 Encounters:   05/02/22 140 lb (63.5 kg)   04/11/22 150 lb (68 kg)   10/14/21 126 lb (57.2 kg)     Body mass index is 24.8 kg/m². CBC:   Lab Results   Component Value Date    WBC 3.1 04/20/2022    RBC 3.82 04/20/2022    HGB 10.4 04/20/2022    HCT 33.5 04/20/2022    MCV 87.7 04/20/2022    RDW 13.2 04/20/2022     04/20/2022       CMP:   Lab Results   Component Value Date     04/20/2022    K 4.5 04/20/2022    K 3.6 08/22/2020     04/20/2022    CO2 23 04/20/2022    BUN 33 04/20/2022    CREATININE 1.2 04/20/2022    GFRAA 55 04/20/2022    LABGLOM 55 04/20/2022    GLUCOSE 118 04/20/2022    GLUCOSE 171 04/26/2012    PROT 8.2 04/20/2022    CALCIUM 9.8 04/20/2022    BILITOT 0.2 04/20/2022    ALKPHOS 95 04/20/2022    AST 12 04/20/2022    ALT 9 04/20/2022       POC Tests: No results for input(s): POCGLU, POCNA, POCK, POCCL, POCBUN, POCHEMO, POCHCT in the last 72 hours.     Coags:   Lab Results   Component Value Date    PROTIME 13.0 08/22/2020    INR 1.2 08/22/2020    APTT 32.4 08/22/2020       HCG (If Applicable): No results found for: PREGTESTUR, PREGSERUM, HCG, HCGQUANT     ABGs:   Lab Results   Component Value Date    PO2ART 57.6 02/25/2020    UEU4KLG 24.8 02/25/2020    ZGX7TWK 20.3 02/25/2020        Type & Screen (If Applicable):  No results found for: LABABO, LABRH    Drug/Infectious Status (If Applicable):  No results found for: HIV, HEPCAB    COVID-19 Screening (If Applicable):   Lab Results   Component Value Date    COVID19 Not Detected 01/12/2021     EKG 8/22/20  Normal sinus rhythm  Possible Anterior infarct , age undetermined  Abnormal ECG  No previous ECGs available  Confirmed by Hardy Montoya ([id number]) on 8/22/2020 4:47:36 PM    ECHO 8/26/20  Bubble study is negative for right to left shunt. No intracardiac thrombus or vegetation. .   Moderate atherosclerosis of the descending aorta and aortic arch. Normal left ventricular chamber size. Normal left ventricular systolic function. Visually estimated LVEF is 60-65 %. No wall motion abnormalities. Normal right ventricle structure and function. No significant valvular abnormalities    CTA neck 8/22/20  Right middle cerebral artery M 3 occlusion    MRI Brain 8/24/20  Small subacute infarct in the right frontal operculum and nearby white   matter. Subacute to chronic infarct in the left middle cerebellar peduncle. Possible tiny acute infarct in the left cerebellar hemisphere. Chronic lacunar infarcts in the left basal ganglia. Empty sella. Anesthesia Evaluation  Patient summary reviewed and Nursing notes reviewed no history of anesthetic complications:   Airway: Mallampati: III  TM distance: >3 FB   Neck ROM: full  Mouth opening: > = 3 FB Dental:    (+) lower dentures and upper dentures      Pulmonary:Negative Pulmonary ROS breath sounds clear to auscultation      (-) not a current smoker (FORMER )                           Cardiovascular:  Exercise tolerance: poor (<4 METS),   (+) hypertension:, CAD:, hyperlipidemia      ECG reviewed  Rhythm: regular  Rate: normal  Echocardiogram reviewed         Beta Blocker:  Not on Beta Blocker      ROS comment: EF 60%     Neuro/Psych:   (+) CVA (March 2019):, neuromuscular disease:, psychiatric history: stable with treatment             ROS comment: Schizophrenia, 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 GI/Hepatic/Renal:   (+) renal disease: kidney stones,          ROS comment: Umbilical hernia.    Endo/Other:    (+) DiabetesType II DM, well controlled, , hyperthyroidism, blood dyscrasia (MICROCYTIC HYPOCHROMIC ANEMIA): anemia:., .                  ROS comment:  Abdominal:             Vascular:   + PVD, aortic or cerebral, DVT, .        ROS comment: PVD,   BKA, left. Other Findings:             Anesthesia Plan      MAC     ASA 3       Induction: intravenous. MIPS: Postoperative opioids intended and Prophylactic antiemetics administered. Anesthetic plan and risks discussed with patient and child/children. Use of blood products discussed with patient whom consented to blood products. Plan discussed with CRNA.               Juice Simmons DO  Staff Anesthesiologist  May 2, 2022  8:50 AM

## 2022-05-02 NOTE — H&P
Alec Aguirre is a 60 y.o. female with multiple medical problems and bilateral ureteral stents. Past Medical History:   Diagnosis Date    CAD (coronary artery disease)     cath 2016    [facility]     Diabetic peripheral neuropathy associated with type 2 diabetes mellitus ([facility] 8/24/2018    Epigastric hernia     Hyperlipidemia     Hypertension     Kidney stone     Schizophrenia [facility]     Type 2 diabetes mellitus with diabetic polyneuropathy, with long-term current use of insulin ([facility] 9/19/2016    no longer requiring medication. 1/13/21.  Umbilical hernia        Past Surgical History:   Procedure Laterality Date    C[facility]  [facility]30/2016     [facility]    COLONOSCOPY  08/2016    Dr. Tangela Islas N/A 9/7/2021    CYSTOSCOPY RETROGRADE PYELOGRAM, BILATERAL STENT EXCHANGE performed by Stone Alvarez MD at [address] / [address] [facility] / [place] Bilateral 10/28/2019    CYSTOSCOPY. RETROGRADE.  PYELOGRAM. BILATERAL STENT INSERTION performed by Mer Hector MD at [address] / [address] [facility] / [place] Bilateral 5/19/2020    CYSTOSCOPY RETROGRADE PYELOGRAM BILATERAL STENT EXCHANGE performed by Mer Hector MD at [address] / [address] [place] [address] / [place] Bilateral 1/18/2021    CYSTOSCOPY RETROGRADE PYELOGRAM BILATERAL  STENT CHANGE performed by Stone Alvarez MD at [facility] [address] Left 2/29/2020    LEG AMPUTATION BELOW KNEE performed by Tresa Culver DO at [address] [place] [address]  11/04/2016    epigastric hernia umbilical hernia with mesh    OVARIAN CYST REMOVAL      OVARY REMOVAL Right     OVARY REMOVAL  2014    UPPER GASTROINTESTINAL ENDOSCOPY N/A 10/29/2019    EGD ESOPHAGOGASTRODUODENOSCOPY performed by Ary Walker MD at [facility] ENDOSCOPY       Family History   Problem Relation Age of Onset    Cancer Brother Prior to Admission medications    Medication Sig Start Date End Date Taking? Authorizing Provider   docusate sodium (COLACE) 100 MG capsule Take 1 capsule by mouth 2 times daily as needed for Constipation 22   Lisa Kc MD   oxybutynin (DITROPAN XL) 10 MG extended release tablet Take 1 tablet by mouth daily 22   Lisa Kc MD   ibuprofen (ADVIL;MOTRIN) 800 MG tablet Take 1 tablet by mouth every 12 hours as needed for Pain 22   Lisa Kc MD   benazepril (LOTENSIN) 10 MG tablet Take 1 tablet by mouth daily 22   Lisa Kc MD   hydrALAZINE (APRESOLINE) 50 MG tablet Take 1 tablet by mouth 4 times daily  Patient taking differently: Take 50 mg by mouth 4 times daily Patient 3 X a day, checks her BP 22   Lisa Kc MD   Handicap Placard MISC by Does not apply route Unable to ambulate more than 20 feet without difficulty  Expires 2027   Lisa Kc MD   spironolactone (ALDACTONE) 25 MG tablet Take 1 tablet by mouth daily 3/8/22   Lisa Kc MD   acetaminophen (TYLENOL) 500 MG tablet Take 500 mg by mouth every 6 hours as needed for Pain    Historical Provider, MD   diazePAM (VALIUM) 2 MG tablet Take 2 mg by mouth every 6 hours as needed for Anxiety. Historical Provider, MD        Allergies: Patient has no known allergies.     Social History     Tobacco Use    Smoking status: Former Smoker     Packs/day: 0.00     Years: 2.00     Pack years: 0.00     Types: Cigarettes     Quit date: 3/23/2015     Years since quittin.1    Smokeless tobacco: Never Used    Tobacco comment: quit smoking    Substance Use Topics    Alcohol use: No     Comment: coffee daily        Review of Systems:  Respiratory: negative for cough and hemoptysis  Cardiovascular: negative for chest pain and dyspnea  Gastrointestinal: negative for abdominal pain, diarrhea, nausea and vomiting  Genitourinary: as per HPI, otherwise negative. Derm: negative for rash and skin lesion(s)  Neurological: negative for seizures and tremors  Endocrine: negative for diabetic symptoms including polydipsia and polyuria  All other systems negative    Physical Exam:  There were no vitals filed for this visit. Skin:  Warm and dry. No rash or bruises  HEENT:  PERRLA, EOMI  Neck:  No JVD, No thyromegaly  Cardiac:  RRR  Lungs:  No audible wheezes, symmetric respirations, non-labored  Abdomen: Soft, non-tender, non-distended  Extremities:  No clubbing, edema or cyanosis  Neurological:  Moves all extremities, normal DTR    Lab Results   Component Value Date    WBC 3.1 (L) 04/20/2022    HGB 10.4 (L) 04/20/2022    HCT 33.5 (L) 04/20/2022    MCV 87.7 04/20/2022     04/20/2022       Lab Results   Component Value Date    CREATININE 1.2 (H) 04/20/2022       No results found for: PSA    No results for input(s): LABURIN in the last 72 hours. No results for input(s): BC in the last 72 hours. No results for input(s): Guillermina Steven in the last 72 hours. Assessment and Plan:  1. To operating room for cystoscopy, bilateral ureteral stent change. This document is being submitted long after the face to face encounter with the patient and for either coding or billing compliance. This is made with the most accuracy possible but details may be missing or potentially inaccurate due to limitations in timing, patient availability at the time of the note creation.   When applicable see paper chart for office note / H& P.

## 2022-05-02 NOTE — OP NOTE
Operative Note      Patient: Ania Zapata  YOB: 1960  MRN: [id number]    Date of Procedure: 5/2/2022    Pre-Op Diagnosis: HYDRONEPHROSIS, bilateral    Post-Op Diagnosis: Same, possible stone right UPJ       Procedure(s):  CYSTOSCOPY RETROGRADE PYELOGRAM BILATERAL STENT CHANGE    Surgeon(s):  Tg Bloom MD    Assistant:   * No surgical staff found *    Anesthesia: Monitor Anesthesia Care    Estimated Blood Loss (mL): Minimal    Complications: None    Specimens:   * No specimens in log *    Implants:  Implant Name Type Inv. Item Serial No.  Lot No. LRB No. Used Action   STENT URET 6FR L24CM PERCFLX HYDR+ DBL PGTL THRD 2 - [id number]  STENT URET 6FR L24CM PERCFLX HYDR+ DBL PGTL THRD 2  [employer] [id number] Left 1 Implanted   STENT URET 6FR L24CM PERCFLX HYDR+ DBL PGTL THRD 2 - [id number]  STENT URET 6FR L24CM PERCFLX HYDR+ DBL PGTL THRD 2  [facility] [id number] Right 1 Implanted         Drains:   Ureteral Drain/Stent 05/02/22 Left Ureter (Active)       Ureteral Drain/Stent 05/02/22 Right Ureter (Active)           INDICATION FOR PROCEDURE: Ania Zapata is a 60 y.o. female who presents for cystoscopy, retrograde pyelograms, stent exchange bilaterally and understands the risks, benefits, and alternatives of the procedure, signed informed consent, and agreed to proceed. PROCEDURE:   The patient was brought into the operating room and placed under anesthesia in the dorsal lithotomy position. She was prepped and draped in a sterile fashion. A 21-Belarusian cystoscope with a 30-degree lens was passed through the urethra and into the bladder. The entire length of the urethra was examined and found to be without strictures or other abnormalities. The entire bladder mucosal surface was examined under 30- degree endoscopy and found to be without calculi, tumors, diverticula, or other abnormalities. The ureteral orifices were normal in size, number, and location.   Bilateral stents were identified. Each stent showed mild to moderate encrustation. The right stent was grasped and delivered to the urethral meatus. A wire was passed through the stent and into the renal pelvis under fluoroscopic guidance. A 5 Colombian open-ended catheter was passed over the wire and into the proximal ureter. 10 cc of contrast material injected and there appeared to be hydronephrosis and possibly a filling defect in the proximal ureter possibly consistent with a stone. A new 6 x 24 double-J ureteral stent was left indwelling with good curl seen in the kidney as well as in the bladder. The left ureteral stent was grasped and delivered to the urethral meatus. A wire was passed through the stent into the renal pelvis under fluoroscopic guidance. A 5 Colombian open-ended catheter was passed over the wire and into the proximal ureter. 10 cc of contrast material were injected into the ureter and there was dilation of the left renal pelvis and calyces. The ureter appeared to be dilated without filling defects. A new 6 x 24 double-J ureteral stent was left indwelling with a good curl seen the kidney as well as in the bladder. The bladder was drained. The patient was awakened from anesthesia and taken to recovery in stable condition. PLAN:  Stewart Ponce may have a stone in her right proximal ureter. A CT scan will be performed to rule this out. Her health is dramatically improved since last stent change and at this time I think it is warranted to work towards trying to remove 1 or both of the stents in the future if possible. She will follow-up in 3 months with a CT scan and to review these images and a plan based on her health at that time to possibly remove 1 or both stents in the future.     Maxwell Aguilar MD, M.D.  5/2/2022  10:18 AM          Electronically signed by Maxwell Aguilar MD on 5/2/2022 at 10:17 AM

## 2022-05-11 ENCOUNTER — HOSPITAL ENCOUNTER (OUTPATIENT)
Dept: MAMMOGRAPHY | Age: 62
Discharge: HOME | End: 2022-05-13
Payer: MEDICARE

## 2022-05-11 VITALS — HEIGHT: 63 IN | BODY MASS INDEX: 24.8 KG/M2 | WEIGHT: 140 LBS

## 2022-05-11 DIAGNOSIS — Z12.31 ENCOUNTER FOR SCREENING MAMMOGRAM FOR MALIGNANT NEOPLASM OF BREAST: ICD-10-CM

## 2022-05-11 PROCEDURE — 77067 SCR MAMMO BI INCL CAD: CPT

## 2022-05-17 LAB — DIABETIC RETINOPATHY: POSITIVE

## 2022-06-24 ENCOUNTER — APPOINTMENT (OUTPATIENT)
Dept: CT IMAGING | Age: 62
End: 2022-06-24
Payer: MEDICARE

## 2022-06-24 ENCOUNTER — HOSPITAL ENCOUNTER (EMERGENCY)
Age: 62
Discharge: HOME | End: 2022-06-24
Attending: EMERGENCY MEDICINE
Payer: MEDICARE

## 2022-06-24 ENCOUNTER — HOSPITAL ENCOUNTER (EMERGENCY)
Age: 62
Discharge: HOME | End: 2022-06-24
Attending: STUDENT IN AN ORGANIZED HEALTH CARE EDUCATION/TRAINING PROGRAM
Payer: MEDICARE

## 2022-06-24 VITALS
TEMPERATURE: 99.1 F | DIASTOLIC BLOOD PRESSURE: 95 MMHG | BODY MASS INDEX: 25.69 KG/M2 | WEIGHT: 145 LBS | HEIGHT: 63 IN | OXYGEN SATURATION: 98 % | HEART RATE: 88 BPM | SYSTOLIC BLOOD PRESSURE: 179 MMHG | RESPIRATION RATE: 18 BRPM

## 2022-06-24 VITALS
OXYGEN SATURATION: 97 % | HEIGHT: 63 IN | BODY MASS INDEX: 24.8 KG/M2 | HEART RATE: 94 BPM | RESPIRATION RATE: 20 BRPM | WEIGHT: 140 LBS | TEMPERATURE: 100 F

## 2022-06-24 DIAGNOSIS — M79.10 MYALGIA: ICD-10-CM

## 2022-06-24 DIAGNOSIS — M54.50 ACUTE LOW BACK PAIN WITHOUT SCIATICA, UNSPECIFIED BACK PAIN LATERALITY: ICD-10-CM

## 2022-06-24 DIAGNOSIS — R31.9 HEMATURIA, UNSPECIFIED TYPE: ICD-10-CM

## 2022-06-24 DIAGNOSIS — N30.01 ACUTE CYSTITIS WITH HEMATURIA: Primary | ICD-10-CM

## 2022-06-24 DIAGNOSIS — R10.9 ABDOMINAL PAIN, UNSPECIFIED ABDOMINAL LOCATION: Primary | ICD-10-CM

## 2022-06-24 LAB
ALBUMIN SERPL-MCNC: 4.4 G/DL (ref 3.5–5.2)
ALP BLD-CCNC: 115 U/L (ref 35–104)
ALT SERPL-CCNC: 12 U/L (ref 0–32)
ANION GAP SERPL CALCULATED.3IONS-SCNC: 10 MMOL/L (ref 7–16)
ANISOCYTOSIS: ABNORMAL
AST SERPL-CCNC: 16 U/L (ref 0–31)
BACTERIA: ABNORMAL /HPF
BACTERIA: ABNORMAL /HPF
BASOPHILS ABSOLUTE: 0 E9/L (ref 0–0.2)
BASOPHILS RELATIVE PERCENT: 1.2 % (ref 0–2)
BILIRUB SERPL-MCNC: 0.3 MG/DL (ref 0–1.2)
BILIRUBIN URINE: NEGATIVE
BILIRUBIN URINE: NEGATIVE
BLOOD, URINE: ABNORMAL
BLOOD, URINE: ABNORMAL
BUN BLDV-MCNC: 22 MG/DL (ref 6–23)
CALCIUM SERPL-MCNC: 9.7 MG/DL (ref 8.6–10.2)
CHLORIDE BLD-SCNC: 102 MMOL/L (ref 98–107)
CLARITY: ABNORMAL
CLARITY: CLEAR
CO2: 23 MMOL/L (ref 22–29)
COLOR: YELLOW
COLOR: YELLOW
CREAT SERPL-MCNC: 1.1 MG/DL (ref 0.5–1)
EOSINOPHILS ABSOLUTE: 0.02 E9/L (ref 0.05–0.5)
EOSINOPHILS RELATIVE PERCENT: 0.9 % (ref 0–6)
EPITHELIAL CELLS, UA: ABNORMAL /HPF
EPITHELIAL CELLS, UA: ABNORMAL /HPF
GFR AFRICAN AMERICAN: >60
GFR NON-AFRICAN AMERICAN: >60 ML/MIN/1.73
GLUCOSE BLD-MCNC: 106 MG/DL (ref 74–99)
GLUCOSE URINE: NEGATIVE MG/DL
GLUCOSE URINE: NEGATIVE MG/DL
HCT VFR BLD CALC: 31.4 % (ref 34–48)
HEMOGLOBIN: 10 G/DL (ref 11.5–15.5)
KETONES, URINE: NEGATIVE MG/DL
KETONES, URINE: NEGATIVE MG/DL
LEUKOCYTE ESTERASE, URINE: ABNORMAL
LEUKOCYTE ESTERASE, URINE: ABNORMAL
LYMPHOCYTES ABSOLUTE: 1.25 E9/L (ref 1.5–4)
LYMPHOCYTES RELATIVE PERCENT: 47.8 % (ref 20–42)
MCH RBC QN AUTO: 26.6 PG (ref 26–35)
MCHC RBC AUTO-ENTMCNC: 31.8 % (ref 32–34.5)
MCV RBC AUTO: 83.5 FL (ref 80–99.9)
MONOCYTES ABSOLUTE: 0.44 E9/L (ref 0.1–0.95)
MONOCYTES RELATIVE PERCENT: 17.4 % (ref 2–12)
MYELOCYTE PERCENT: 0.9 % (ref 0–0)
NEUTROPHILS ABSOLUTE: 0.88 E9/L (ref 1.8–7.3)
NEUTROPHILS RELATIVE PERCENT: 33 % (ref 43–80)
NITRITE, URINE: NEGATIVE
NITRITE, URINE: NEGATIVE
OVALOCYTES: ABNORMAL
PDW BLD-RTO: 13.8 FL (ref 11.5–15)
PH UA: 5.5 (ref 5–9)
PH UA: 6 (ref 5–9)
PLATELET # BLD: 288 E9/L (ref 130–450)
PMV BLD AUTO: 10.9 FL (ref 7–12)
POIKILOCYTES: ABNORMAL
POLYCHROMASIA: ABNORMAL
POTASSIUM REFLEX MAGNESIUM: 4.4 MMOL/L (ref 3.5–5)
PROTEIN UA: 100 MG/DL
PROTEIN UA: 30 MG/DL
RBC # BLD: 3.76 E12/L (ref 3.5–5.5)
RBC UA: >20 /HPF (ref 0–2)
RBC UA: ABNORMAL /HPF (ref 0–2)
SARS-COV-2, NAAT: NOT DETECTED
SODIUM BLD-SCNC: 135 MMOL/L (ref 132–146)
SPECIFIC GRAVITY UA: 1.02 (ref 1–1.03)
SPECIFIC GRAVITY UA: 1.02 (ref 1–1.03)
TOTAL PROTEIN: 8.4 G/DL (ref 6.4–8.3)
UROBILINOGEN, URINE: 0.2 E.U./DL
UROBILINOGEN, URINE: 0.2 E.U./DL
WBC # BLD: 2.6 E9/L (ref 4.5–11.5)
WBC UA: ABNORMAL /HPF (ref 0–5)
WBC UA: ABNORMAL /HPF (ref 0–5)

## 2022-06-24 PROCEDURE — 99285 EMERGENCY DEPT VISIT HI MDM: CPT

## 2022-06-24 PROCEDURE — 74177 CT ABD & PELVIS W/CONTRAST: CPT

## 2022-06-24 PROCEDURE — 81001 URINALYSIS AUTO W/SCOPE: CPT

## 2022-06-24 PROCEDURE — 99283 EMERGENCY DEPT VISIT LOW MDM: CPT

## 2022-06-24 PROCEDURE — 80053 COMPREHEN METABOLIC PANEL: CPT

## 2022-06-24 PROCEDURE — 85025 COMPLETE CBC W/AUTO DIFF WBC: CPT

## 2022-06-24 PROCEDURE — 6360000004 HC RX CONTRAST MEDICATION: Performed by: RADIOLOGY

## 2022-06-24 PROCEDURE — 87635 SARS-COV-2 COVID-19 AMP PRB: CPT

## 2022-06-24 PROCEDURE — 87088 URINE BACTERIA CULTURE: CPT

## 2022-06-24 RX ORDER — SULFAMETHOXAZOLE AND TRIMETHOPRIM 800; 160 MG/1; MG/1
1 TABLET ORAL 2 TIMES DAILY
Qty: 14 TABLET | Refills: 0 | Status: SHIPPED | OUTPATIENT
Start: 2022-06-24 | End: 2022-07-01

## 2022-06-24 RX ADMIN — IOPAMIDOL 50 ML: 755 INJECTION, SOLUTION INTRAVENOUS at 16:06

## 2022-06-24 ASSESSMENT — ENCOUNTER SYMPTOMS
EYES NEGATIVE: 1
ABDOMINAL DISTENTION: 0
DIARRHEA: 0
COLOR CHANGE: 0
VOMITING: 0
SHORTNESS OF BREATH: 0
NAUSEA: 0
VOMITING: 0
NAUSEA: 0
SHORTNESS OF BREATH: 0
DIARRHEA: 0
ABDOMINAL PAIN: 1
COUGH: 0
FLATUS: 0
CONSTIPATION: 0
ABDOMINAL PAIN: 1
BACK PAIN: 1

## 2022-06-24 ASSESSMENT — PAIN DESCRIPTION - DESCRIPTORS: DESCRIPTORS: ACHING

## 2022-06-24 ASSESSMENT — PAIN DESCRIPTION - ORIENTATION: ORIENTATION: RIGHT;LEFT;LOWER

## 2022-06-24 ASSESSMENT — PAIN - FUNCTIONAL ASSESSMENT: PAIN_FUNCTIONAL_ASSESSMENT: 0-10

## 2022-06-24 ASSESSMENT — PAIN DESCRIPTION - ONSET: ONSET: ON-GOING

## 2022-06-24 ASSESSMENT — PAIN DESCRIPTION - PAIN TYPE: TYPE: ACUTE PAIN

## 2022-06-24 ASSESSMENT — PAIN DESCRIPTION - LOCATION: LOCATION: BACK;LEG;ARM

## 2022-06-24 ASSESSMENT — PAIN DESCRIPTION - FREQUENCY: FREQUENCY: CONTINUOUS

## 2022-06-24 ASSESSMENT — PAIN SCALES - GENERAL: PAINLEVEL_OUTOF10: 10

## 2022-06-24 NOTE — ED PROVIDER NOTES
HPI   76-year-old female patient presenting to emergency department for evaluation of abdominal pain, body aches and back pain. Patient reporting symptoms of abdominal pain and back pain started yesterday she has been having body aches for the last 2 days. She noticed a low-grade temperature of 99.8 at home yesterday. She is denying any urinary complaints any nausea vomiting chest pain or shortness of breath. She does note past history of cystoscopy with bilateral stent change on 5/2 by her urologist Jayla Soto. When asked about her body aches she says both her thighs are feeling achy as well as both of her forearms. Review of Systems   Constitutional: Negative for chills and fever. HENT: Negative for congestion. Respiratory: Negative for cough and shortness of breath. Cardiovascular: Negative for chest pain. Gastrointestinal: Positive for abdominal pain. Negative for diarrhea, nausea and vomiting. Genitourinary: Negative for difficulty urinating, dysuria and hematuria. Musculoskeletal: Positive for back pain and myalgias. Skin: Negative for color change. All other systems reviewed and are negative. Physical Exam  Vitals and nursing note reviewed. Constitutional:       Appearance: Normal appearance. Comments: Patient ambulating with cane. Appears in no acute distress. HENT:      Head: Normocephalic and atraumatic. Nose: Nose normal. No congestion. Mouth/Throat:      Mouth: Mucous membranes are moist.      Pharynx: Oropharynx is clear. Eyes:      Conjunctiva/sclera: Conjunctivae normal.      Pupils: Pupils are equal, round, and reactive to light. Cardiovascular:      Rate and Rhythm: Normal rate and regular rhythm. Pulses: Normal pulses. Heart sounds: Normal heart sounds. Pulmonary:      Effort: Pulmonary effort is normal. No respiratory distress. Breath sounds: Normal breath sounds.    Abdominal:      General: Bowel sounds are normal. There is no distension. Tenderness: There is no abdominal tenderness. Comments: No specific CVA tenderness to palpation there is generalized abdominal tenderness to palpation no guarding or rebound. Musculoskeletal:         General: Normal range of motion. Cervical back: Normal range of motion and neck supple. Comments: To her midline lumbar area as source of pain no tenderness to palpation on my examination. Skin:     General: Skin is warm and dry. Capillary Refill: Capillary refill takes less than 2 seconds. Neurological:      General: No focal deficit present. Mental Status: She is alert. Procedures     MDM   This is a 56-year-old female patient who presented to the emergency department for evaluation of abdominal pain and back pain. Patient had recent cystoscopy and ureteral stent change with possible ureteral stone in early May. Today urinalysis reveals small leukocyte esterase with large amount of blood present. With patient's abdominal pain and back pain as well as body aches I believe she needs to go to emergency department for further evaluation and possible imaging. Concern for stent obstruction versus pyelonephritis or other intra-abdominal pathology. Her COVID swab was negative here. I discussed this with patient. Patient understanding of plan her daughter is here to take her by private vehicle to [address] [place] [address] emergency department.   ED Course as of 06/24/22 1147   Fri Jun 24, 2022   1142 Spoke with Dr. Daphne Lombardo, aware of patient [FG]      ED Course User Index  [FG] Brie Loaiza DO          ED Course as of 06/24/22 1147   Fri Jun 24, 2022   1142 Spoke with Dr. Daphne Lombardo, aware of patient [FG]      ED Course User Index  [FG] Brie Loaiza DO       --------------------------------------------- PAST HISTORY ---------------------------------------------  Past Medical History:  has a past medical history of CAD (coronary artery disease), CHF (congestive heart failure) ([facility] [facility] Diabetic peripheral neuropathy associated with type 2 diabetes mellitus ([facility] [address] Epigastric hernia, Hyperlipidemia, Hypertension, Kidney stone, [facility] Type 2 diabetes mellitus with diabetic polyneuropathy, with long-term current use of insulin ([facility] and Umbilical hernia. 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 Social History:  reports that she quit smoking about 7 years ago. Her smoking use included cigarettes. She smoked 0.00 packs per day for 2.00 years. She has never used smokeless tobacco. She reports that she does not drink alcohol and does not use drugs. Family History: family history includes Cancer in her brother. The patients home medications have been reviewed. Allergies: Patient has no known allergies.     -------------------------------------------------- RESULTS -------------------------------------------------  Labs:  Results for orders placed or performed during the hospital encounter of 06/24/22   COVID-19, Rapid    Specimen: Nasopharyngeal Swab   Result Value Ref Range    SARS-CoV-2, NAAT Not Detected Not Detected   Urinalysis   Result Value Ref Range    Color, UA Yellow Straw/Yellow    Clarity, UA CLOUDY (A) Clear    Glucose, Ur Negative Negative mg/dL    Bilirubin Urine Negative Negative    Ketones, Urine Negative Negative mg/dL    Specific [place], UA 1.025 1.005 - 1.030    Blood, Urine LARGE (A) Negative    pH, UA 6.0 5.0 - 9.0    Protein,  (A) Negative mg/dL    Urobilinogen, Urine 0.2 <2.0 E.U./dL    Nitrite, Urine Negative Negative    Leukocyte Esterase, Urine SMALL (A) Negative   Microscopic Urinalysis   Result Value Ref Range    WBC, UA 1-3 0 - 5 /HPF    RBC, UA >20 0 - 2 /HPF    Epithelial Cells, UA FEW /HPF    Bacteria, UA FEW (A) None Seen /HPF       Radiology:  No orders to display       ------------------------- NURSING NOTES AND VITALS REVIEWED ---------------------------  Date / Time Roomed:  6/24/2022 10:38 AM  ED Bed Assignment:  01/01    The nursing notes within the ED encounter and vital signs as below have been reviewed. BP (!) 179/95   Pulse 88   Temp 99.1 °F (37.3 °C) (Oral)   Resp 18   Ht 5' 3\" (1.6 m)   Wt 145 lb (65.8 kg)   SpO2 98%   BMI 25.69 kg/m²   Oxygen Saturation Interpretation: Normal        --------------------------------- ADDITIONAL PROVIDER NOTES ---------------------------------  At this time the patient is without objective evidence of an acute process requiring hospitalization or inpatient management. They have remained hemodynamically stable throughout their entire ED visit and are stable for discharge with outpatient follow-up. The plan has been discussed in detail and they are aware of the specific conditions for emergent return, as well as the importance of follow-up. New Prescriptions    No medications on file       Diagnosis:  1. Abdominal pain, unspecified abdominal location    2. Acute low back pain without sciatica, unspecified back pain laterality    3. Myalgia    4. Hematuria, unspecified type        Disposition:  [place]'s [facility] [facility]  Patient's condition is stable.        Minda Funes DO  Resident  06/24/22 2909

## 2022-06-24 NOTE — ED PROVIDER NOTES
Emergency department from the urgent care. She had to have bilateral urostomy tubes placed due to dysfunction of her kidneys. The last time she saw her urologist he said perhaps it can come out when he sees her next. She saw him in April. She states she has been experiencing lower abdominal pain especially when she urinates. There is been blood in her urine. She admits to low-grade fever that started yesterday. She has mild right flank tenderness. No nausea vomiting or diarrhea. The history is provided by the patient. Abdominal Pain  Pain location:  R flank  Pain quality: aching    Pain radiation: Right flank. Pain severity:  Moderate  Onset quality:  Gradual  Timing:  Constant  Progression:  Unchanged  Chronicity:  New  Context: previous surgery    Relieved by:  None tried  Worsened by:  Nothing  Ineffective treatments:  None tried  Associated symptoms: dysuria and hematuria    Associated symptoms: no anorexia, no chest pain, no chills, no constipation, no diarrhea, no fatigue, no fever, no flatus, no melena, no nausea, no shortness of breath, no vaginal bleeding, no vaginal discharge and no vomiting    Risk factors: multiple surgeries    Risk factors comment:  Chronic renal disease, diabetes. Review of Systems   Constitutional: Negative for activity change, appetite change, chills, fatigue and fever. HENT: Negative. Eyes: Negative. Respiratory: Negative for shortness of breath. Cardiovascular: Negative for chest pain. Gastrointestinal: Positive for abdominal pain. Negative for abdominal distention, anorexia, constipation, diarrhea, flatus, melena, nausea and vomiting. Genitourinary: Positive for dysuria, flank pain and hematuria. Negative for vaginal bleeding and vaginal discharge. Skin: Negative. Neurological: Negative. Hematological: Negative. Physical Exam  Vitals and nursing note reviewed. Constitutional:       General: She is not in acute distress. Appearance: She is well-developed and normal weight. She is not ill-appearing or toxic-appearing. HENT:      Head: Normocephalic and atraumatic. Eyes:      Extraocular Movements: Extraocular movements intact. Pupils: Pupils are equal, round, and reactive to light. Cardiovascular:      Rate and Rhythm: Normal rate and regular rhythm. Heart sounds: Normal heart sounds. No murmur heard. Pulmonary:      Effort: Pulmonary effort is normal. No respiratory distress. Breath sounds: Normal breath sounds. No wheezing or rales. Abdominal:      General: Abdomen is flat. Bowel sounds are normal. There is no distension. Palpations: Abdomen is soft. Tenderness: There is abdominal tenderness in the suprapubic area. There is right CVA tenderness. There is no guarding or rebound. Musculoskeletal:      Cervical back: Normal range of motion and neck supple. Skin:     General: Skin is warm and dry. Capillary Refill: Capillary refill takes less than 2 seconds. Findings: No rash. Neurological:      General: No focal deficit present. Mental Status: She is alert and oriented to person, place, and time. Cranial Nerves: No cranial nerve deficit. Coordination: Coordination normal.   Psychiatric:         Mood and Affect: Mood normal.         Behavior: Behavior normal.         Procedures    MDM        --------------------------------------------- PAST HISTORY ---------------------------------------------  Past Medical History:  has a past medical history of CAD (coronary artery disease), [facility] Diabetic peripheral neuropathy associated with type 2 diabetes mellitus ([facility] Epigastric hernia, Hyperlipidemia, Hypertension, Kidney stone, Schizophrenia [facility] Type 2 diabetes mellitus with diabetic polyneuropathy, with long-term current use of insulin ([facility] and Umbilical hernia.     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 Social History:  reports that she quit smoking about 7 years ago. Her smoking use included cigarettes. She smoked 0.00 packs per day for 2.00 years. She has never used smokeless tobacco. She reports that she does not drink alcohol and does not use drugs. Family History: family history includes Cancer in her brother. The patients home medications have been reviewed. Allergies: Patient has no known allergies.     -------------------------------------------------- RESULTS -------------------------------------------------  Labs:  Results for orders placed or performed during the hospital encounter of 06/24/22   CBC with Auto Differential   Result Value Ref Range    WBC 2.6 (L) 4.5 - 11.5 E9/L    RBC 3.76 3.50 - 5.50 E12/L    Hemoglobin 10.0 (L) 11.5 - 15.5 g/dL    Hematocrit 31.4 (L) 34.0 - 48.0 %    MCV 83.5 80.0 - 99.9 fL    MCH 26.6 26.0 - 35.0 pg    MCHC 31.8 (L) 32.0 - 34.5 %    RDW 13.8 11.5 - 15.0 fL    Platelets 397 919 - 123 E9/L    MPV 10.9 7.0 - 12.0 fL    Neutrophils % 33.0 (L) 43.0 - 80.0 %    Lymphocytes % 47.8 (H) 20.0 - 42.0 %    Monocytes % 17.4 (H) 2.0 - 12.0 %    Eosinophils % 0.9 0.0 - 6.0 %    Basophils % 1.2 0.0 - 2.0 %    Neutrophils Absolute 0.88 (L) 1.80 - 7.30 E9/L    Lymphocytes Absolute 1.25 (L) 1.50 - 4.00 E9/L    Monocytes Absolute 0.44 0.10 - 0.95 E9/L    Eosinophils Absolute 0.02 (L) 0.05 - 0.50 E9/L    Basophils Absolute 0.00 0.00 - 0.20 E9/L    Myelocyte Percent 0.9 0 - 0 %    Anisocytosis 1+     Polychromasia 1+     Poikilocytes 1+     Ovalocytes 1+    Comprehensive Metabolic Panel w/ Reflex to MG   Result Value Ref Range    Sodium 135 132 - 146 mmol/L    Potassium reflex Magnesium 4.4 3.5 - 5.0 mmol/L    Chloride 102 98 - 107 mmol/L    CO2 23 22 - 29 mmol/L    Anion Gap 10 7 - 16 mmol/L    Glucose 106 (H) 74 - 99 mg/dL    BUN 22 6 - 23 mg/dL    CREATININE 1.1 (H) 0.5 - 1.0 mg/dL    GFR Non-African American >60 >=60 mL/min/1.73    GFR African American >60     Calcium 9.7 8.6 - 10.2 mg/dL    Total Protein 8.4 (H) 6.4 - 8.3 g/dL    Albumin 4.4 3.5 - 5.2 g/dL    Total Bilirubin 0.3 0.0 - 1.2 mg/dL    Alkaline Phosphatase 115 (H) 35 - 104 U/L    ALT 12 0 - 32 U/L    AST 16 0 - 31 U/L   Urinalysis with Microscopic   Result Value Ref Range    Color, UA Yellow Straw/Yellow    Clarity, UA Clear Clear    Glucose, Ur Negative Negative mg/dL    Bilirubin Urine Negative Negative    Ketones, Urine Negative Negative mg/dL    Specific Gravity, UA 1.020 1.005 - 1.030    Blood, Urine LARGE (A) Negative    pH, UA 5.5 5.0 - 9.0    Protein, UA 30 (A) Negative mg/dL    Urobilinogen, Urine 0.2 <2.0 E.U./dL    Nitrite, Urine Negative Negative    Leukocyte Esterase, Urine SMALL (A) Negative    WBC, UA 1-3 0 - 5 /HPF    RBC, UA 10-20 (A) 0 - 2 /HPF    Epithelial Cells, UA NONE SEEN /HPF    Bacteria, UA NONE SEEN None Seen /HPF       Radiology:  CT ABDOMEN PELVIS W IV CONTRAST Additional Contrast? None   Final Result   1. Right ureter and left ureter catheters are in proper position. 2.  Decompressed intrarenal collecting system both kidneys. 3.  No signs for pyelonephritis in the parenchyma of the right or left   kidneys. 4.  Some stranding of fat planes surround the renal pelvis and ureters of   both kidneys could be manifestation of urinary tract infection, please   correlate clinically.       5.  See above other comments and recommendations. RECOMMENDATIONS:   Unavailable             ------------------------- NURSING NOTES AND VITALS REVIEWED ---------------------------  Date / Time Roomed:  6/24/2022  1:44 PM  ED Bed Assignment:  09/09    The nursing notes within the ED encounter and vital signs as below have been reviewed. Pulse 94   Temp 100 °F (37.8 °C) (Oral)   Resp 20   Ht 5' 3\" (1.6 m)   Wt 140 lb (63.5 kg)   SpO2 97%   BMI 24.80 kg/m²   Oxygen Saturation Interpretation: Normal      ------------------------------------------ PROGRESS NOTES ------------------------------------------  I have spoken with the patient and discussed todays results, in addition to providing specific details for the plan of care and counseling regarding the diagnosis and prognosis. Their questions are answered at this time and they are agreeable with the plan. I discussed at length with them reasons for immediate return here for re evaluation. They will followup with primary care by calling their office tomorrow. Will treat the urine as patient is symptomatic. Previous cultures reviewed with PharmD and Bactrim DS for 7 days recommended. I encouraged her to return if her symptoms worsen. She will follow up for repeat UA as an outpatient. She understands reasons to return to the ED.  --------------------------------- ADDITIONAL PROVIDER NOTES ---------------------------------  At this time the patient is without objective evidence of an acute process requiring hospitalization or inpatient management. They have remained hemodynamically stable throughout their entire ED visit and are stable for discharge with outpatient follow-up. The plan has been discussed in detail and they are aware of the specific conditions for emergent return, as well as the importance of follow-up.       New Prescriptions    SULFAMETHOXAZOLE-TRIMETHOPRIM (BACTRIM DS) 800-160 MG PER TABLET    Take 1 tablet by mouth 2 times daily for 7 days Diagnosis:  1. Acute cystitis with hematuria        Disposition:  Patient's disposition: Discharge to home  Patient's condition is stable.                 Lazarus Nail, DO  06/24/22 1704

## 2022-06-24 NOTE — ED TRIAGE NOTES
FIRST PROVIDER CONTACT ASSESSMENT NOTE      Department of Emergency Medicine   Admit Date: No admission date for patient encounter. Chief Complaint: No chief complaint on file. History of Present Illness:    Cuate Guido is a 60 y.o. female who presents to the ED for complaint of lower pelvic pain and back pain. Patient has recurrent kidney stones. Does see Dr. Merlinda Pollen. Currently has ureteral stents. At urgent care she had and positive leukocytosis and blood on UA. Sent to ED for further evaluation. Note from Dr. Pratima Schmitd  This is a 68-year-old female patient who presented to the emergency department for evaluation of abdominal pain and back pain. Patient had recent cystoscopy and ureteral stent change with possible ureteral stone in early May. Today urinalysis reveals small leukocyte esterase with large amount of blood present. With patient's abdominal pain and back pain as well as body aches I believe she needs to go to emergency department for further evaluation and possible imaging. Concern for stent obstruction versus pyelonephritis or other intra-abdominal pathology. Her COVID swab was negative here. I discussed this with patient.  Patient understanding of plan her daughter is here to take her by private vehicle to [address] [place] [address] emergency department.      -----------------END OF FIRST PROVIDER CONTACT ASSESSMENT NOTE--------------  Electronically signed by JESSICA Medrano   DD: 6/24/22

## 2022-06-27 ENCOUNTER — TELEPHONE (OUTPATIENT)
Dept: FAMILY MEDICINE CLINIC | Age: 62
End: 2022-06-27

## 2022-06-27 LAB — URINE CULTURE, ROUTINE: NORMAL

## 2022-06-27 NOTE — TELEPHONE ENCOUNTER
----- Message from [facility] sent at 6/27/2022  9:30 AM EDT -----  Subject: Message to Provider    QUESTIONS  Information for Provider? Pt. went to [facility], she had a UTI   they gave her antibiotic's. She will complete those in 4 days, They wanted   her to follow up with the PCP once the medication is complete. System is   ask us to send a message to office. ---------------------------------------------------------------------------  --------------  Sridevi Slot INFO  What is the best way for the office to contact you? OK to leave message on   voicemail  Preferred Call Back Phone Number? [phone]  ---------------------------------------------------------------------------  --------------  SCRIPT ANSWERS  Relationship to Patient? Self  Have you recently (14 days) seen a provider for this problem?  Yes

## 2022-07-05 ENCOUNTER — OFFICE VISIT (OUTPATIENT)
Dept: FAMILY MEDICINE CLINIC | Age: 62
End: 2022-07-05
Payer: MEDICARE

## 2022-07-05 VITALS
TEMPERATURE: 97 F | SYSTOLIC BLOOD PRESSURE: 120 MMHG | DIASTOLIC BLOOD PRESSURE: 60 MMHG | WEIGHT: 152 LBS | HEART RATE: 110 BPM | BODY MASS INDEX: 26.93 KG/M2 | OXYGEN SATURATION: 98 %

## 2022-07-05 DIAGNOSIS — N30.01 ACUTE CYSTITIS WITH HEMATURIA: Primary | ICD-10-CM

## 2022-07-05 PROCEDURE — 3017F COLORECTAL CA SCREEN DOC REV: CPT | Performed by: FAMILY MEDICINE

## 2022-07-05 PROCEDURE — 81002 URINALYSIS NONAUTO W/O SCOPE: CPT | Performed by: FAMILY MEDICINE

## 2022-07-05 PROCEDURE — G8427 DOCREV CUR MEDS BY ELIG CLIN: HCPCS | Performed by: FAMILY MEDICINE

## 2022-07-05 PROCEDURE — 99213 OFFICE O/P EST LOW 20 MIN: CPT | Performed by: FAMILY MEDICINE

## 2022-07-05 PROCEDURE — G8419 CALC BMI OUT NRM PARAM NOF/U: HCPCS | Performed by: FAMILY MEDICINE

## 2022-07-05 PROCEDURE — 1036F TOBACCO NON-USER: CPT | Performed by: FAMILY MEDICINE

## 2022-07-05 ASSESSMENT — ENCOUNTER SYMPTOMS
BLOOD IN STOOL: 0
BACK PAIN: 0
VOMITING: 0
CONSTIPATION: 0
COUGH: 0
ABDOMINAL PAIN: 0
SHORTNESS OF BREATH: 0
SORE THROAT: 0
WHEEZING: 0
DIARRHEA: 0
NAUSEA: 0

## 2022-07-05 NOTE — PROGRESS NOTES
Whitney Jj is a 60 y.o. female who presents today for     Chief Complaint   Patient presents with    Follow-up     ER UTI - feels better       PCP: Dr. Tye Keller, 6/24/22:    Emergency department from the urgent care. She had to have bilateral urostomy tubes placed due to dysfunction of her kidneys. The last time she saw her urologist he said perhaps it can come out when he sees her next. She saw him in April. She states she has been experiencing lower abdominal pain especially when she urinates. There is been blood in her urine. She admits to low-grade fever that started yesterday. She has mild right flank tenderness. No nausea vomiting or diarrhea.     The history is provided by the patient. Abdominal Pain  Pain location:  R flank  Pain quality: aching    Pain radiation: Right flank. Pain severity:  Moderate  Onset quality:  Gradual  Timing:  Constant  Progression:  Unchanged  Chronicity:  New  Context: previous surgery    Relieved by:  None tried  Worsened by:  Nothing  Ineffective treatments:  None tried  Associated symptoms: dysuria and hematuria    Associated symptoms: no anorexia, no chest pain, no chills, no constipation, no diarrhea, no fatigue, no fever, no flatus, no melena, no nausea, no shortness of breath, no vaginal bleeding, no vaginal discharge and no vomiting    Risk factors: multiple surgeries    Risk factors comment:  Chronic renal disease, diabetes.        Review of Systems   Constitutional: Negative for activity change, appetite change, chills, fatigue and fever. HENT: Negative. Eyes: Negative. Respiratory: Negative for shortness of breath. Cardiovascular: Negative for chest pain. Gastrointestinal: Positive for abdominal pain. Negative for abdominal distention, anorexia, constipation, diarrhea, flatus, melena, nausea and vomiting. Genitourinary: Positive for dysuria, flank pain and hematuria.  Negative for vaginal bleeding and vaginal discharge. Skin: Negative. Neurological: Negative. Hematological: Negative.          Physical Exam  Vitals and nursing note reviewed. Constitutional:       General: She is not in acute distress. Appearance: She is well-developed and normal weight. She is not ill-appearing or toxic-appearing. HENT:      Head: Normocephalic and atraumatic. Eyes:      Extraocular Movements: Extraocular movements intact. Pupils: Pupils are equal, round, and reactive to light. Cardiovascular:      Rate and Rhythm: Normal rate and regular rhythm. Heart sounds: Normal heart sounds. No murmur heard.       Pulmonary:      Effort: Pulmonary effort is normal. No respiratory distress. Breath sounds: Normal breath sounds. No wheezing or rales. Abdominal:      General: Abdomen is flat. Bowel sounds are normal. There is no distension. Palpations: Abdomen is soft. Tenderness: There is abdominal tenderness in the suprapubic area. There is right CVA tenderness. There is no guarding or rebound. Musculoskeletal:      Cervical back: Normal range of motion and neck supple. Skin:     General: Skin is warm and dry. Capillary Refill: Capillary refill takes less than 2 seconds. Findings: No rash. Neurological:      General: No focal deficit present. Mental Status: She is alert and oriented to person, place, and time. Cranial Nerves: No cranial nerve deficit.       Coordination: Coordination normal.   Psychiatric:         Mood and Affect: Mood normal.         Behavior: Behavior normal.            Procedures     [facility]      --------------------------------------------- PAST HISTORY ---------------------------------------------  Past Medical History:  has a past medical history of CAD (coronary artery disease), CHF (congestive heart failure) ([facility] [address] Diabetic peripheral neuropathy associated with type 2 diabetes mellitus ([facility] Epigastric hernia, Hyperlipidemia, Hypertension, Kidney stone, Schizophrenia ([facility] [address] Type 2 diabetes mellitus with diabetic polyneuropathy, with long-term current use of insulin ([facility] [address] and Umbilical hernia.     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     Social History:  reports that she quit smoking about 7 years ago. Her smoking use included cigarettes. She smoked 0.00 packs per day for 2.00 years. She has never used smokeless tobacco. She reports that she does not drink alcohol and does not use drugs.     Family History: family history includes Cancer in her brother.      The patients home medications have been reviewed.     Allergies: Patient has no known allergies.      -------------------------------------------------- RESULTS -------------------------------------------------  Labs:        Results for orders placed or performed during the hospital encounter of 06/24/22   CBC with Auto Differential   Result Value Ref Range     WBC 2.6 (L) 4.5 - 11.5 E9/L     RBC 3.76 3.50 - 5.50 E12/L     Hemoglobin 10.0 (L) 11.5 - 15.5 g/dL     Hematocrit 31.4 (L) 34.0 - 48.0 %     MCV 83.5 80.0 - 99.9 fL     MCH 26.6 26.0 - 35.0 pg     MCHC 31.8 (L) 32.0 - 34.5 %     RDW 13.8 11.5 - 15.0 fL     Platelets 226 325 - 265 E9/L     MPV 10.9 7.0 - 12.0 fL     Neutrophils % 33.0 (L) 43.0 - 80.0 %     Lymphocytes % 47.8 (H) 20.0 - 42.0 %     Monocytes % 17.4 (H) 2.0 - 12.0 %     Eosinophils % 0.9 0.0 - 6.0 %     Basophils % 1.2 0.0 - 2.0 %     Neutrophils Absolute 0.88 (L) 1.80 - 7.30 E9/L     Lymphocytes Absolute 1.25 (L) 1.50 - 4.00 E9/L     Monocytes Absolute 0.44 0.10 - 0.95 E9/L     Eosinophils Absolute 0.02 (L) 0.05 - 0.50 E9/L     Basophils Absolute 0.00 0.00 - 0.20 E9/L     Myelocyte Percent 0.9 0 - 0 %     Anisocytosis 1+       Polychromasia 1+       Poikilocytes 1+       Ovalocytes 1+     Comprehensive Metabolic Panel w/ Reflex to MG   Result Value Ref Range     Sodium 135 132 - 146 mmol/L     Potassium reflex Magnesium 4.4 3.5 - 5.0 mmol/L     Chloride 102 98 - 107 mmol/L     CO2 23 22 - 29 mmol/L     Anion Gap 10 7 - 16 mmol/L     Glucose 106 (H) 74 - 99 mg/dL     BUN 22 6 - 23 mg/dL     CREATININE 1.1 (H) 0.5 - 1.0 mg/dL     GFR Non-African American >60 >=60 mL/min/1.73     GFR African American >60       Calcium 9.7 8.6 - 10.2 mg/dL     Total Protein 8.4 (H) 6.4 - 8.3 g/dL     Albumin 4.4 3.5 - 5.2 g/dL     Total Bilirubin 0.3 0.0 - 1.2 mg/dL     Alkaline Phosphatase 115 (H) 35 - 104 U/L     ALT 12 0 - 32 U/L     AST 16 0 - 31 U/L   Urinalysis with Microscopic   Result Value Ref Range     Color, UA Yellow Straw/Yellow     Clarity, UA Clear Clear     Glucose, Ur Negative Negative mg/dL     Bilirubin Urine Negative Negative     Ketones, Urine Negative Negative mg/dL     Specific Gravity, UA 1.020 1.005 - 1.030     Blood, Urine LARGE (A) Negative     pH, UA 5.5 5.0 - 9.0     Protein, UA 30 (A) Negative mg/dL     Urobilinogen, Urine 0.2 <2.0 E.U./dL     Nitrite, Urine Negative Negative     Leukocyte Esterase, Urine SMALL (A) Negative     WBC, UA 1-3 0 - 5 /HPF     RBC, UA 10-20 (A) 0 - 2 /HPF     Epithelial Cells, UA NONE SEEN /HPF     Bacteria, UA NONE SEEN None Seen /HPF         Radiology:  CT ABDOMEN PELVIS W IV CONTRAST Additional Contrast? None   Final Result   1. Right ureter and left ureter catheters are in proper position.       2.  Decompressed intrarenal collecting system both kidneys.     3.  No signs for pyelonephritis in the parenchyma of the right or left   kidneys.       4. Some stranding of fat planes surround the renal pelvis and ureters of   both kidneys could be manifestation of urinary tract infection, please   correlate clinically.       5. See above other comments and recommendations.       RECOMMENDATIONS:   Unavailable                 ------------------------- NURSING NOTES AND VITALS REVIEWED ---------------------------  Date / Time Roomed:  6/24/2022  1:44 PM  ED Bed Assignment:  09/09     The nursing notes within the ED encounter and vital signs as below have been reviewed. Pulse 94   Temp 100 °F (37.8 °C) (Oral)   Resp 20   Ht 5' 3\" (1.6 m)   Wt 140 lb (63.5 kg)   SpO2 97%   BMI 24.80 kg/m²   Oxygen Saturation Interpretation: Normal        ------------------------------------------ PROGRESS NOTES ------------------------------------------  I have spoken with the patient and discussed todays results, in addition to providing specific details for the plan of care and counseling regarding the diagnosis and prognosis. Their questions are answered at this time and they are agreeable with the plan. I discussed at length with them reasons for immediate return here for re evaluation. They will followup with primary care by calling their office tomorrow.     Will treat the urine as patient is symptomatic. Previous cultures reviewed with PharmD and Bactrim DS for 7 days recommended. I encouraged her to return if her symptoms worsen. She will follow up for repeat UA as an outpatient. She understands reasons to return to the ED.  --------------------------------- ADDITIONAL PROVIDER NOTES ---------------------------------  At this time the patient is without objective evidence of an acute process requiring hospitalization or inpatient management.   They have remained hemodynamically stable throughout their entire ED visit and are stable for discharge with outpatient follow-up.      The plan has been discussed in detail and they are aware of the specific conditions for emergent return, as well as the importance of follow-up.            New Prescriptions     SULFAMETHOXAZOLE-TRIMETHOPRIM (BACTRIM DS) 800-160 MG PER TABLET    Take 1 tablet by mouth 2 times daily for 7 days         Diagnosis:  1. Acute cystitis with hematuria        CURRENT STATUS (07/05/22): Patient's symptoms have resolved following course of Bactrim DS. She requests a re-test of her urine following treatment; will run POCT UA today. [address]:  Patient's past medical, surgical, social and/or family history reviewed, updated in chart, and are non-contributory (unless otherwise stated). Medications and allergies also reviewed and updated in chart. Review of Systems  Review of Systems   HENT: Negative for congestion, ear pain and sore throat. Respiratory: Negative for cough, shortness of breath and wheezing. Cardiovascular: Negative for chest pain, palpitations and leg swelling. Gastrointestinal: Negative for abdominal pain, blood in stool, constipation, diarrhea, nausea and vomiting. Genitourinary: Negative for dysuria, frequency, hematuria and urgency. Musculoskeletal: Negative for back pain, myalgias and neck pain. Skin: Negative for rash. Neurological: Negative for dizziness, weakness and headaches. Psychiatric/Behavioral: The patient is not nervous/anxious. Physical Exam:    VS:  /60   Pulse (!) 110   Temp 97 °F (36.1 °C)   Wt 152 lb (68.9 kg)   SpO2 98%   BMI 26.93 kg/m²     LAST WEIGHT:  Wt Readings from Last 3 Encounters:   07/05/22 152 lb (68.9 kg)   06/24/22 140 lb (63.5 kg)   06/24/22 145 lb (65.8 kg)       BMI Readings from Last 3 Encounters:   07/05/22 26.93 kg/m²   06/24/22 24.80 kg/m²   06/24/22 25.69 kg/m²       Physical Exam  Constitutional:       General: She is not in acute distress. Appearance: She is well-developed. She is not diaphoretic.    HENT: Head: Normocephalic and atraumatic. Right Ear: External ear normal.      Left Ear: External ear normal.      Mouth/Throat:      Pharynx: No oropharyngeal exudate. Eyes:      General: No scleral icterus. Right eye: No discharge. Conjunctiva/sclera: Conjunctivae normal.      Pupils: Pupils are equal, round, and reactive to light. Neck:      Thyroid: No thyromegaly. Cardiovascular:      Rate and Rhythm: Normal rate and regular rhythm. Heart sounds: Normal heart sounds. No murmur heard. Pulmonary:      Effort: Pulmonary effort is normal. No respiratory distress. Breath sounds: No stridor. No wheezing or rales. Chest:      Chest wall: No tenderness. Abdominal:      General: Bowel sounds are normal. There is no distension. Palpations: Abdomen is soft. There is no mass. Tenderness: There is no abdominal tenderness. There is no guarding. Musculoskeletal:         General: No tenderness. Normal range of motion. Cervical back: Normal range of motion and neck supple. Lymphadenopathy:      Cervical: No cervical adenopathy. Skin:     General: Skin is warm and dry. Coloration: Skin is not pale. Findings: No erythema or rash. Neurological:      Mental Status: She is alert and oriented to person, place, and time. Psychiatric:         Behavior: Behavior normal.         Thought Content:  Thought content normal.         Labs:  Lab Results   Component Value Date/Time     06/24/2022 01:34 PM    K 4.4 06/24/2022 01:34 PM     06/24/2022 01:34 PM    CO2 23 06/24/2022 01:34 PM    BUN 22 06/24/2022 01:34 PM    CREATININE 1.1 06/24/2022 01:34 PM    PROT 8.4 06/24/2022 01:34 PM    LABALBU 4.4 06/24/2022 01:34 PM    LABALBU 4.3 04/26/2012 09:30 AM    CALCIUM 9.7 06/24/2022 01:34 PM    GFRAA >60 06/24/2022 01:34 PM    LABGLOM >60 06/24/2022 01:34 PM    GLUCOSE 106 06/24/2022 01:34 PM    GLUCOSE 171 04/26/2012 09:30 AM    AST 16 06/24/2022 01:34 PM    ALT 12 06/24/2022 01:34 PM    ALKPHOS 115 06/24/2022 01:34 PM    BILITOT 0.3 06/24/2022 01:34 PM    TSH 0.044 07/19/2021 09:53 AM    CHOL 195 04/20/2022 09:44 AM    TRIG 144 04/20/2022 09:44 AM    HDL 38 04/20/2022 09:44 AM    LDLCALC 128 04/20/2022 09:44 AM    LABA1C 5.4 04/11/2022 10:59 AM        Lab Results   Component Value Date    CHOL 195 04/20/2022    CHOL 170 07/19/2021    CHOL 122 08/23/2020     Lab Results   Component Value Date    TRIG 144 04/20/2022    TRIG 101 07/19/2021    TRIG 111 08/23/2020     Lab Results   Component Value Date    HDL 38 04/20/2022    HDL 41 07/19/2021    HDL 32 08/23/2020     Lab Results   Component Value Date    LDLCALC 128 (H) 04/20/2022    LDLCALC 109 (H) 07/19/2021    LDLCALC 68 08/23/2020       Lab Results   Component Value Date    LABA1C 5.4 04/11/2022    LABA1C 4.9 10/14/2021    LABA1C 5.3 07/13/2021     Lab Results   Component Value Date    LABMICR 288.0 (H) 04/20/2022    LDLCALC 128 (H) 04/20/2022    CREATININE 1.1 (H) 06/24/2022           Assessment / Plan:      Yehuda Parker was seen today for follow-up. Diagnoses and all orders for this visit:    Acute cystitis with hematuria: symptoms resolved, but patient requested to have urine rechecked  -     POCT Urinalysis no Micro          Follow Up:  Return for Keep scheduled appointment(s). or sooner if necessary. Call or go to ED immediately if symptoms worsen or persist.    Educational materials and/or home exercises printed for patient's review and were included in patient instructions on his/her AfterVisit Summary and given to patient at the end of visit. Counseled regarding above diagnosis,including possible risks and complications,  especially if left uncontrolled. Counseled regarding the possible side effects, risks, benefits and alternatives to treatment; patient and/or guardian verbalizes understanding, agrees, feels comfortable with and wishes to proceed with above treatment plan.     Advised patient tocall with

## 2022-07-14 ENCOUNTER — TELEPHONE (OUTPATIENT)
Dept: FAMILY MEDICINE CLINIC | Age: 62
End: 2022-07-14

## 2022-07-14 NOTE — TELEPHONE ENCOUNTER
Patient called asking if Dr. Gonzalo Pugh would send RX in for sinus congestion, fever, aches. Patient stated she tested negative at home for COVID. I advised patient to go to [facility] to be evaluated. Patient stated she can go to the [facility] in the morning.

## 2022-07-20 ENCOUNTER — HOSPITAL ENCOUNTER (EMERGENCY)
Age: 62
Discharge: HOME | End: 2022-07-20
Attending: EMERGENCY MEDICINE
Payer: MEDICARE

## 2022-07-20 VITALS
WEIGHT: 135 LBS | HEART RATE: 104 BPM | TEMPERATURE: 96.9 F | DIASTOLIC BLOOD PRESSURE: 71 MMHG | OXYGEN SATURATION: 97 % | BODY MASS INDEX: 23.91 KG/M2 | SYSTOLIC BLOOD PRESSURE: 147 MMHG | RESPIRATION RATE: 18 BRPM

## 2022-07-20 DIAGNOSIS — J01.90 ACUTE SINUSITIS, RECURRENCE NOT SPECIFIED, UNSPECIFIED LOCATION: Primary | ICD-10-CM

## 2022-07-20 PROCEDURE — 99283 EMERGENCY DEPT VISIT LOW MDM: CPT

## 2022-07-20 RX ORDER — AMOXICILLIN AND CLAVULANATE POTASSIUM 875; 125 MG/1; MG/1
1 TABLET, FILM COATED ORAL 2 TIMES DAILY
Qty: 20 TABLET | Refills: 0 | Status: SHIPPED | OUTPATIENT
Start: 2022-07-20 | End: 2022-07-30

## 2022-07-20 RX ORDER — BROMPHENIRAMINE MALEATE, PSEUDOEPHEDRINE HYDROCHLORIDE, AND DEXTROMETHORPHAN HYDROBROMIDE 2; 30; 10 MG/5ML; MG/5ML; MG/5ML
5 SYRUP ORAL 4 TIMES DAILY PRN
Qty: 120 ML | Refills: 0 | Status: SHIPPED | OUTPATIENT
Start: 2022-07-20 | End: 2022-08-03 | Stop reason: SDUPTHER

## 2022-07-20 ASSESSMENT — ENCOUNTER SYMPTOMS
ABDOMINAL PAIN: 0
SHORTNESS OF BREATH: 0
BLOOD IN STOOL: 0
COUGH: 1
BACK PAIN: 0
VOMITING: 0
RHINORRHEA: 1
NAUSEA: 0

## 2022-07-20 ASSESSMENT — PAIN - FUNCTIONAL ASSESSMENT
PAIN_FUNCTIONAL_ASSESSMENT: NONE - DENIES PAIN
PAIN_FUNCTIONAL_ASSESSMENT: NONE - DENIES PAIN

## 2022-07-20 NOTE — ED PROVIDER NOTES
PATIENT HAD A NEGATIVE COVID TEST ON SATURDAY    The history is provided by the patient. URI  Presenting symptoms: congestion, cough, facial pain, fever and rhinorrhea    Presenting symptoms: no ear pain and no fatigue    Severity:  Moderate  Onset quality:  Gradual  Duration:  5 days  Chronicity:  New  Associated symptoms: no headaches       Review of Systems   Constitutional:  Positive for fever. Negative for chills and fatigue. HENT:  Positive for congestion and rhinorrhea. Negative for ear pain. Respiratory:  Positive for cough. Negative for shortness of breath. Cardiovascular:  Negative for chest pain. Gastrointestinal:  Negative for abdominal pain, blood in stool, nausea and vomiting. Genitourinary:  Negative for dysuria and frequency. Musculoskeletal:  Negative for back pain. Skin:  Negative for rash. Neurological:  Negative for weakness and headaches. All other systems reviewed and are negative. Physical Exam  Vitals and nursing note reviewed. Constitutional:       Appearance: She is well-developed. HENT:      Head: Normocephalic and atraumatic. Right Ear: Hearing and external ear normal. Tympanic membrane is retracted. Left Ear: Hearing and external ear normal. Tympanic membrane is retracted. Nose: Mucosal edema, congestion and rhinorrhea present. Mouth/Throat:      Pharynx: Uvula midline. Eyes:      General: Lids are normal.      Conjunctiva/sclera: Conjunctivae normal.      Pupils: Pupils are equal, round, and reactive to light. Cardiovascular:      Rate and Rhythm: Normal rate and regular rhythm. Heart sounds: Normal heart sounds. No murmur heard. Pulmonary:      Effort: Pulmonary effort is normal. No respiratory distress. Breath sounds: Normal breath sounds. No wheezing or rales. Abdominal:      General: Bowel sounds are normal.      Palpations: Abdomen is soft. Abdomen is not rigid. Tenderness: There is no abdominal tenderness. There is no guarding or rebound. Musculoskeletal:      Cervical back: Normal range of motion and neck supple. Skin:     General: Skin is warm and dry. Findings: No abrasion or rash. Neurological:      Mental Status: She is alert and oriented to person, place, and time. GCS: GCS eye subscore is 4. GCS verbal subscore is 5. GCS motor subscore is 6. Cranial Nerves: No cranial nerve deficit. Sensory: No sensory deficit. Coordination: Coordination normal.      Gait: Gait normal.        Procedures     MDM        --------------------------------------------- PAST HISTORY ---------------------------------------------  Past Medical History:  has a past medical history of CAD (coronary artery disease), CHF (congestive heart failure) ([facility] [address] Diabetic peripheral neuropathy associated with type 2 diabetes mellitus ([facility] [address] Epigastric hernia, Hyperlipidemia, Hypertension, Kidney stone, Schizophrenia ([facility] [address] Type 2 diabetes mellitus with diabetic polyneuropathy, with long-term current use of insulin ([facility] [address] and Umbilical hernia. 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 Social History:  reports that she quit smoking about 7 years ago. Her smoking use included cigarettes. She has never used smokeless tobacco. She reports that she does not drink alcohol and does not use drugs.     Family History: family history includes Cancer in her brother. The patients home medications have been reviewed. Allergies: Patient has no known allergies. -------------------------------------------------- RESULTS -------------------------------------------------  Labs:  No results found for this visit on 07/20/22. Radiology:  No orders to display       ------------------------- NURSING NOTES AND VITALS REVIEWED ---------------------------  Date / Time Roomed:  7/20/2022  8:15 AM  ED Bed Assignment:  02/02    The nursing notes within the ED encounter and vital signs as below have been reviewed. BP (!) 147/71   Pulse (!) 104   Temp 96.9 °F (36.1 °C) (Temporal)   Resp 18   Wt 135 lb (61.2 kg)   SpO2 97%   BMI 23.91 kg/m²   Oxygen Saturation Interpretation: Normal      ------------------------------------------ PROGRESS NOTES ------------------------------------------  I have spoken with the patient and discussed todays results, in addition to providing specific details for the plan of care and counseling regarding the diagnosis and prognosis. Their questions are answered at this time and they are agreeable with the plan. I discussed at length with them reasons for immediate return here for re evaluation. They will followup with primary care by calling their office tomorrow. Medications - No data to display      --------------------------------- ADDITIONAL PROVIDER NOTES ---------------------------------  At this time the patient is without objective evidence of an acute process requiring hospitalization or inpatient management. They have remained hemodynamically stable throughout their entire ED visit and are stable for discharge with outpatient follow-up. The plan has been discussed in detail and they are aware of the specific conditions for emergent return, as well as the importance of follow-up.       New Prescriptions    AMOXICILLIN-CLAVULANATE (AUGMENTIN) 875-125 MG PER TABLET    Take 1 tablet by mouth in the morning and 1 tablet before bedtime. Do all this for 10 days. BROMPHENIRAMINE-PSEUDOEPHEDRINE-DM 2-30-10 MG/5ML SYRUP    Take 5 mLs by mouth 4 times daily as needed for Congestion or Cough       Diagnosis:  1. Acute sinusitis, recurrence not specified, unspecified location        Disposition:  Patient's disposition: Discharge to home  Patient's condition is stable.                     Johanna Jacobs MD  07/20/22 [id number]

## 2022-07-29 ENCOUNTER — CARE COORDINATION (OUTPATIENT)
Dept: CARE COORDINATION | Age: 62
End: 2022-07-29

## 2022-08-03 RX ORDER — BROMPHENIRAMINE MALEATE, PSEUDOEPHEDRINE HYDROCHLORIDE, AND DEXTROMETHORPHAN HYDROBROMIDE 2; 30; 10 MG/5ML; MG/5ML; MG/5ML
5 SYRUP ORAL 4 TIMES DAILY PRN
Qty: 120 ML | Refills: 0 | Status: SHIPPED
Start: 2022-08-03 | End: 2022-08-19

## 2022-08-19 ENCOUNTER — HOSPITAL ENCOUNTER (EMERGENCY)
Age: 62
Discharge: HOME | End: 2022-08-19
Attending: EMERGENCY MEDICINE
Payer: MEDICARE

## 2022-08-19 ENCOUNTER — APPOINTMENT (OUTPATIENT)
Dept: GENERAL RADIOLOGY | Age: 62
End: 2022-08-19
Payer: MEDICARE

## 2022-08-19 VITALS
TEMPERATURE: 98.7 F | SYSTOLIC BLOOD PRESSURE: 165 MMHG | RESPIRATION RATE: 16 BRPM | BODY MASS INDEX: 22.68 KG/M2 | OXYGEN SATURATION: 100 % | HEIGHT: 63 IN | DIASTOLIC BLOOD PRESSURE: 98 MMHG | WEIGHT: 128 LBS | HEART RATE: 100 BPM

## 2022-08-19 DIAGNOSIS — S63.91XA SPRAIN OF RIGHT HAND, INITIAL ENCOUNTER: Primary | ICD-10-CM

## 2022-08-19 PROCEDURE — 99283 EMERGENCY DEPT VISIT LOW MDM: CPT

## 2022-08-19 PROCEDURE — 73130 X-RAY EXAM OF HAND: CPT

## 2022-08-19 PROCEDURE — 73110 X-RAY EXAM OF WRIST: CPT

## 2022-08-19 RX ORDER — NAPROXEN 500 MG/1
500 TABLET ORAL 2 TIMES DAILY
Qty: 14 TABLET | Refills: 0 | Status: SHIPPED | OUTPATIENT
Start: 2022-08-19 | End: 2022-11-02

## 2022-08-19 ASSESSMENT — ENCOUNTER SYMPTOMS
EYE PAIN: 0
BACK PAIN: 0
SORE THROAT: 0
SINUS PRESSURE: 0
ABDOMINAL PAIN: 0
COUGH: 0
VOMITING: 0
NAUSEA: 0
DIARRHEA: 0
ABDOMINAL DISTENTION: 0
EYE DISCHARGE: 0
SHORTNESS OF BREATH: 0
WHEEZING: 0
EYE REDNESS: 0
BLOOD IN STOOL: 0

## 2022-08-19 ASSESSMENT — PAIN SCALES - GENERAL: PAINLEVEL_OUTOF10: 5

## 2022-08-19 ASSESSMENT — PAIN DESCRIPTION - LOCATION: LOCATION: WRIST

## 2022-08-19 ASSESSMENT — PAIN DESCRIPTION - ONSET: ONSET: SUDDEN

## 2022-08-19 ASSESSMENT — PAIN - FUNCTIONAL ASSESSMENT: PAIN_FUNCTIONAL_ASSESSMENT: 0-10

## 2022-08-19 ASSESSMENT — PAIN DESCRIPTION - ORIENTATION: ORIENTATION: RIGHT

## 2022-08-19 ASSESSMENT — PAIN DESCRIPTION - PAIN TYPE: TYPE: ACUTE PAIN

## 2022-08-19 ASSESSMENT — PAIN DESCRIPTION - DESCRIPTORS: DESCRIPTORS: ACHING;DULL

## 2022-08-19 NOTE — ED PROVIDER NOTES
The history is provided by the patient. Hand Problem  Location:  Wrist and hand  Wrist location:  R wrist  Hand location:  R hand  Injury: yes    Time since incident:  5 days  Mechanism of injury comment:  May have twisted wrist/hand while going to bathroom at night  Pain details:     Quality:  Aching    Severity:  Moderate  Associated symptoms: no back pain and no fever       Review of Systems   Constitutional:  Negative for chills and fever. HENT:  Negative for ear pain, sinus pressure and sore throat. Eyes:  Negative for pain, discharge and redness. Respiratory:  Negative for cough, shortness of breath and wheezing. Cardiovascular:  Negative for chest pain. Gastrointestinal:  Negative for abdominal distention, abdominal pain, blood in stool, diarrhea, nausea and vomiting. Genitourinary:  Negative for dysuria and frequency. Musculoskeletal:  Negative for arthralgias and back pain. Skin:  Negative for rash and wound. Neurological:  Negative for weakness and headaches. Hematological:  Negative for adenopathy. All other systems reviewed and are negative. Physical Exam  Vitals and nursing note reviewed. Constitutional:       Appearance: She is well-developed. HENT:      Head: Normocephalic and atraumatic. Right Ear: Hearing and external ear normal.      Left Ear: Hearing and external ear normal.      Nose: Nose normal.      Mouth/Throat:      Pharynx: Uvula midline. Eyes:      General: Lids are normal.      Conjunctiva/sclera: Conjunctivae normal.      Pupils: Pupils are equal, round, and reactive to light. Cardiovascular:      Rate and Rhythm: Normal rate and regular rhythm. Heart sounds: Normal heart sounds. No murmur heard. Pulmonary:      Effort: Pulmonary effort is normal. No respiratory distress. Breath sounds: Normal breath sounds. No wheezing or rales. Abdominal:      General: Bowel sounds are normal.      Palpations: Abdomen is soft.  Abdomen is not rigid. Tenderness: There is no abdominal tenderness. There is no guarding or rebound. Musculoskeletal:      Right wrist: Swelling and tenderness present. Decreased range of motion. Right hand: Swelling and tenderness present. Decreased range of motion. Cervical back: Normal range of motion and neck supple. Skin:     General: Skin is warm and dry. Findings: No abrasion or rash. Neurological:      Mental Status: She is alert and oriented to person, place, and time. GCS: GCS eye subscore is 4. GCS verbal subscore is 5. GCS motor subscore is 6. Cranial Nerves: No cranial nerve deficit. Sensory: No sensory deficit. Coordination: Coordination normal.      Gait: Gait normal.        Procedures     MDM          --------------------------------------------- PAST HISTORY ---------------------------------------------  Past Medical History:  has a past medical history of CAD (coronary artery disease), CHF (congestive heart failure) ([facility] [address] Diabetic peripheral neuropathy associated with type 2 diabetes mellitus ([facility] [address] Epigastric hernia, Hyperlipidemia, Hypertension, Kidney stone, Schizophrenia ([facility] [address] Type 2 diabetes mellitus with diabetic polyneuropathy, with long-term current use of insulin ([facility] [address] and Umbilical hernia. 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     Social History:  reports that she quit smoking about 7 years ago. Her smoking use included cigarettes. She has never used smokeless tobacco. She reports that she does not drink alcohol and does not use drugs. Family History: family history includes Cancer in her brother. The patients home medications have been reviewed. Allergies: Patient has no known allergies. -------------------------------------------------- RESULTS -------------------------------------------------  Labs:  No results found for this visit on 08/19/22. Radiology:  XR WRIST RIGHT (MIN 3 VIEWS)   Final Result   1. There is no fracture or dislocation of the right wrist   2. Small degenerative cyst seen within the lunate bone. XR HAND RIGHT (MIN 3 VIEWS)   Final Result   Unremarkable right hand             ------------------------- NURSING NOTES AND VITALS REVIEWED ---------------------------  Date / Time Roomed:  8/19/2022  8:21 AM  ED Bed Assignment:  01/01    The nursing notes within the ED encounter and vital signs as below have been reviewed. Resp 16   Ht 5' 3\" (1.6 m)   Wt 128 lb (58.1 kg)   BMI 22.67 kg/m²   Oxygen Saturation Interpretation: Normal      ------------------------------------------ PROGRESS NOTES ------------------------------------------  I have spoken with the patient and discussed todays results, in addition to providing specific details for the plan of care and counseling regarding the diagnosis and prognosis. Their questions are answered at this time and they are agreeable with the plan. I discussed at length with them reasons for immediate return here for re evaluation. They will followup with primary care by calling their office tomorrow. Medications - No data to display      --------------------------------- ADDITIONAL PROVIDER NOTES ---------------------------------  At this time the patient is without objective evidence of an acute process requiring hospitalization or inpatient management.   They have remained hemodynamically stable throughout their entire ED visit and are stable for discharge with outpatient follow-up. The plan has been discussed in detail and they are aware of the specific conditions for emergent return, as well as the importance of follow-up. New Prescriptions    NAPROXEN (NAPROSYN) 500 MG TABLET    Take 1 tablet by mouth 2 times daily for 7 days       Diagnosis:  1. Sprain of right hand, initial encounter        Disposition:  Patient's disposition: Discharge to home  Patient's condition is stable.                    Hi Newsome MD  08/19/22 [id number]

## 2022-09-09 ENCOUNTER — TELEPHONE (OUTPATIENT)
Dept: FAMILY MEDICINE CLINIC | Age: 62
End: 2022-09-09

## 2022-09-09 DIAGNOSIS — I10 ESSENTIAL HYPERTENSION: ICD-10-CM

## 2022-09-09 NOTE — TELEPHONE ENCOUNTER
Patient states \"Ritesh\" at [facility]. [address] told her she has nerve damage/pain and her PCP should consider prescribing gabapentin. Patient states she is calling \"Ritesh\"  to send you progress notes detailing her visit.    Last seen 7/5/2022  Next appt 10/12/2022  [facility] [place]

## 2022-09-10 RX ORDER — SPIRONOLACTONE 25 MG/1
TABLET ORAL
Qty: 90 TABLET | Refills: 0 | Status: SHIPPED
Start: 2022-09-10 | End: 2022-10-12 | Stop reason: SDUPTHER

## 2022-09-12 NOTE — TELEPHONE ENCOUNTER
Called patient, she states she called [facility] Friday 09/09/2022. States she will call today to confirm  they sent evaluation.

## 2022-09-12 NOTE — TELEPHONE ENCOUNTER
Patient called to follow up. Informed that msg was sent, awaiting response. Patient stated whatever Dr. Liset Dubois feels is appropriate for her pain.       Last seen 7/5/2022  Next appt 10/12/2022  [facility]

## 2022-09-12 NOTE — TELEPHONE ENCOUNTER
Please inform patient that I have not received anything from [facility]. I thought she was going to make sure they sent us a copy of their evaluation.

## 2022-09-14 ENCOUNTER — TELEPHONE (OUTPATIENT)
Dept: FAMILY MEDICINE CLINIC | Age: 62
End: 2022-09-14

## 2022-09-15 RX ORDER — GABAPENTIN 100 MG/1
100 CAPSULE ORAL 2 TIMES DAILY
Qty: 60 CAPSULE | Refills: 1 | Status: SHIPPED
Start: 2022-09-15 | End: 2022-10-12 | Stop reason: SDUPTHER

## 2022-09-15 NOTE — TELEPHONE ENCOUNTER
Please inform patient that the gabapentin only helps with nerve pain. It does not reverse nerve damage. I just want to make sure she is aware of that. I still have not received anything from  orthotics, but I can send a prescription for the gabapentin for her to try out. I just want her to understand how the medication works.

## 2022-09-20 NOTE — TELEPHONE ENCOUNTER
Please inform patient that I sent script for gabapentin to her pharmacy to start. Will assess the effectiveness of the medication at her upcoming appointment.

## 2022-10-12 ENCOUNTER — OFFICE VISIT (OUTPATIENT)
Dept: FAMILY MEDICINE CLINIC | Age: 62
End: 2022-10-12
Payer: MEDICARE

## 2022-10-12 VITALS
HEART RATE: 100 BPM | OXYGEN SATURATION: 97 % | WEIGHT: 149 LBS | DIASTOLIC BLOOD PRESSURE: 72 MMHG | BODY MASS INDEX: 26.4 KG/M2 | SYSTOLIC BLOOD PRESSURE: 138 MMHG | RESPIRATION RATE: 20 BRPM | HEIGHT: 63 IN

## 2022-10-12 DIAGNOSIS — M85.80 OSTEOPENIA, UNSPECIFIED LOCATION: ICD-10-CM

## 2022-10-12 DIAGNOSIS — I10 ESSENTIAL HYPERTENSION: ICD-10-CM

## 2022-10-12 DIAGNOSIS — E11.51 TYPE 2 DIABETES MELLITUS WITH DIABETIC PERIPHERAL ANGIOPATHY WITHOUT GANGRENE, WITHOUT LONG-TERM CURRENT USE OF INSULIN (HCC): Primary | ICD-10-CM

## 2022-10-12 DIAGNOSIS — M85.821 OTHER SPECIFIED DISORDERS OF BONE DENSITY AND STRUCTURE, RIGHT UPPER ARM: ICD-10-CM

## 2022-10-12 DIAGNOSIS — Z23 NEED FOR PROPHYLACTIC VACCINATION AND INOCULATION AGAINST INFLUENZA: ICD-10-CM

## 2022-10-12 DIAGNOSIS — R31.9 HEMATURIA, UNSPECIFIED TYPE: ICD-10-CM

## 2022-10-12 DIAGNOSIS — K59.01 SLOW TRANSIT CONSTIPATION: ICD-10-CM

## 2022-10-12 LAB
BILIRUBIN, POC: NORMAL
BLOOD URINE, POC: NORMAL
CLARITY, POC: NORMAL
COLOR, POC: NORMAL
GLUCOSE URINE, POC: NORMAL
HBA1C MFR BLD: 5.6 %
KETONES, POC: NORMAL
LEUKOCYTE EST, POC: NORMAL
NITRITE, POC: NORMAL
PH, POC: 5.5
PROTEIN, POC: NORMAL
SPECIFIC GRAVITY, POC: 1.02
UROBILINOGEN, POC: 0.2

## 2022-10-12 PROCEDURE — 99214 OFFICE O/P EST MOD 30 MIN: CPT | Performed by: FAMILY MEDICINE

## 2022-10-12 PROCEDURE — 2022F DILAT RTA XM EVC RTNOPTHY: CPT | Performed by: FAMILY MEDICINE

## 2022-10-12 PROCEDURE — 81002 URINALYSIS NONAUTO W/O SCOPE: CPT | Performed by: FAMILY MEDICINE

## 2022-10-12 PROCEDURE — G8419 CALC BMI OUT NRM PARAM NOF/U: HCPCS | Performed by: FAMILY MEDICINE

## 2022-10-12 PROCEDURE — 1036F TOBACCO NON-USER: CPT | Performed by: FAMILY MEDICINE

## 2022-10-12 PROCEDURE — 3044F HG A1C LEVEL LT 7.0%: CPT | Performed by: FAMILY MEDICINE

## 2022-10-12 PROCEDURE — G8427 DOCREV CUR MEDS BY ELIG CLIN: HCPCS | Performed by: FAMILY MEDICINE

## 2022-10-12 PROCEDURE — 90674 CCIIV4 VAC NO PRSV 0.5 ML IM: CPT | Performed by: FAMILY MEDICINE

## 2022-10-12 PROCEDURE — G8482 FLU IMMUNIZE ORDER/ADMIN: HCPCS | Performed by: FAMILY MEDICINE

## 2022-10-12 PROCEDURE — 83036 HEMOGLOBIN GLYCOSYLATED A1C: CPT | Performed by: FAMILY MEDICINE

## 2022-10-12 PROCEDURE — G0008 ADMIN INFLUENZA VIRUS VAC: HCPCS | Performed by: FAMILY MEDICINE

## 2022-10-12 PROCEDURE — 3017F COLORECTAL CA SCREEN DOC REV: CPT | Performed by: FAMILY MEDICINE

## 2022-10-12 RX ORDER — BENAZEPRIL HYDROCHLORIDE 10 MG/1
10 TABLET ORAL DAILY
Qty: 90 TABLET | Refills: 1 | Status: SHIPPED | OUTPATIENT
Start: 2022-10-12

## 2022-10-12 RX ORDER — SPIRONOLACTONE 25 MG/1
TABLET ORAL
Qty: 90 TABLET | Refills: 1 | Status: SHIPPED | OUTPATIENT
Start: 2022-10-12

## 2022-10-12 RX ORDER — IBUPROFEN 800 MG/1
800 TABLET ORAL EVERY 12 HOURS PRN
Qty: 180 TABLET | Refills: 1 | Status: SHIPPED | OUTPATIENT
Start: 2022-10-12

## 2022-10-12 RX ORDER — HYDRALAZINE HYDROCHLORIDE 50 MG/1
50 TABLET, FILM COATED ORAL 4 TIMES DAILY
Qty: 360 TABLET | Refills: 1 | Status: SHIPPED | OUTPATIENT
Start: 2022-10-12

## 2022-10-12 RX ORDER — GABAPENTIN 100 MG/1
100 CAPSULE ORAL 2 TIMES DAILY
Qty: 180 CAPSULE | Refills: 1 | Status: SHIPPED
Start: 2022-10-12 | End: 2022-11-02

## 2022-10-12 RX ORDER — OXYBUTYNIN CHLORIDE 10 MG/1
10 TABLET, EXTENDED RELEASE ORAL DAILY
Qty: 90 TABLET | Refills: 1 | Status: SHIPPED | OUTPATIENT
Start: 2022-10-12

## 2022-10-12 RX ORDER — ATORVASTATIN CALCIUM 20 MG/1
20 TABLET, FILM COATED ORAL DAILY
Qty: 90 TABLET | Refills: 1 | Status: SHIPPED
Start: 2022-10-12 | End: 2022-11-02

## 2022-10-12 RX ORDER — DOCUSATE SODIUM 100 MG/1
100 CAPSULE, LIQUID FILLED ORAL 2 TIMES DAILY PRN
Qty: 180 CAPSULE | Refills: 1 | Status: SHIPPED | OUTPATIENT
Start: 2022-10-12

## 2022-10-12 SDOH — ECONOMIC STABILITY: FOOD INSECURITY: WITHIN THE PAST 12 MONTHS, THE FOOD YOU BOUGHT JUST DIDN'T LAST AND YOU DIDN'T HAVE MONEY TO GET MORE.: NEVER TRUE

## 2022-10-12 SDOH — ECONOMIC STABILITY: FOOD INSECURITY: WITHIN THE PAST 12 MONTHS, YOU WORRIED THAT YOUR FOOD WOULD RUN OUT BEFORE YOU GOT MONEY TO BUY MORE.: NEVER TRUE

## 2022-10-12 ASSESSMENT — ENCOUNTER SYMPTOMS
NAUSEA: 0
DIARRHEA: 0
SHORTNESS OF BREATH: 0
VOMITING: 0

## 2022-10-12 ASSESSMENT — PATIENT HEALTH QUESTIONNAIRE - PHQ9
SUM OF ALL RESPONSES TO PHQ QUESTIONS 1-9: 0
SUM OF ALL RESPONSES TO PHQ QUESTIONS 1-9: 0
1. LITTLE INTEREST OR PLEASURE IN DOING THINGS: 0
2. FEELING DOWN, DEPRESSED OR HOPELESS: 0
SUM OF ALL RESPONSES TO PHQ QUESTIONS 1-9: 0
SUM OF ALL RESPONSES TO PHQ QUESTIONS 1-9: 0
SUM OF ALL RESPONSES TO PHQ9 QUESTIONS 1 & 2: 0

## 2022-10-12 ASSESSMENT — LIFESTYLE VARIABLES: HOW OFTEN DO YOU HAVE A DRINK CONTAINING ALCOHOL: NEVER

## 2022-10-12 NOTE — PROGRESS NOTES
OFFICE PROGRESS NOTE      SUBJECTIVE:        Patient ID:   Galen Hernandez is a 60 y.o. female whopresents for   Chief Complaint   Patient presents with    Diabetes           HPI:   Patient is here to follow up on diabetes. Fasting blood sugars:104-155 Midday blood sugars: not checking. Patient checks blood glucose 1 times per day. Patient is following diabetic diet. Patient is a nonsmoker. Last ophthalmology visit: 5/2022. Patient is taking a daily statin. Recent lab results reviewed including CMP, CBC, TSH, and lipid panel which is remarkable for hyperlipidemia and anemia. Last urine microalbumin: 4/2022    Patient doing well on current regimen for hypertension. Patient was recently treated for UTI. Wants urine rechecked. Has appointment with urology. Prior to Admission medications    Medication Sig Start Date End Date Taking? Authorizing Provider   gabapentin (NEURONTIN) 100 MG capsule Take 1 capsule by mouth 2 times daily for 364 days.  10/12/22 10/11/23 Yes Jose Napier MD   spironolactone (ALDACTONE) 25 MG tablet Take 1 tablet by mouth once daily 10/12/22  Yes Jose Napier MD   docusate sodium (COLACE) 100 MG capsule Take 1 capsule by mouth 2 times daily as needed for Constipation 10/12/22  Yes Jose Napier MD   oxybutynin (DITROPAN XL) 10 MG extended release tablet Take 1 tablet by mouth daily 10/12/22  Yes Jose Napier MD   ibuprofen (ADVIL;MOTRIN) 800 MG tablet Take 1 tablet by mouth every 12 hours as needed for Pain 10/12/22  Yes Jose Napier MD   benazepril (LOTENSIN) 10 MG tablet Take 1 tablet by mouth daily 10/12/22  Yes Jose Napier MD   hydrALAZINE (APRESOLINE) 50 MG tablet Take 1 tablet by mouth 4 times daily 10/12/22  Yes Jose Napier MD   atorvastatin (LIPITOR) 20 MG tablet Take 1 tablet by mouth daily 10/12/22  Yes Jose Napier MD   Handicap Placard MISC by Does not apply route Unable to ambulate more than 20 feet without difficulty  Expires 2027  Yes Renee Acosta MD   diazePAM (VALIUM) 2 MG tablet Take 2 mg by mouth every 6 hours as needed for Anxiety. Yes Historical Provider, MD   naproxen (NAPROSYN) 500 MG tablet Take 1 tablet by mouth 2 times daily for 7 days 22  Kamlesh Chandler MD     Social History     Socioeconomic History    Marital status:      Spouse name: None    Number of children: 3    Years of education: None    Highest education level: None   Tobacco Use    Smoking status: Former     Packs/day: 0.00     Years: 2.00     Pack years: 0.00     Types: Cigarettes     Quit date: 3/23/2015     Years since quittin.5    Smokeless tobacco: Never    Tobacco comments:     quit smoking    Vaping Use    Vaping Use: Never used   Substance and Sexual Activity    Alcohol use: No     Comment: coffee daily    Drug use: No    Sexual activity: Not Currently     Partners: Male     Social Determinants of Health     Food Insecurity: No Food Insecurity    Worried About Running Out of Food in the Last Year: Never true    Ran Out of Food in the Last Year: Never true   Physical Activity: Insufficiently Active    Days of Exercise per Week: 2 days    Minutes of Exercise per Session: 20 min   Stress: No Stress Concern Present    Feeling of Stress : Only a little       I have reviewed Sofia's allergies, medications, problem list, medical, social and family history and have updated as needed in the electronic medical record    Current Outpatient Medications   Medication Sig Dispense Refill    gabapentin (NEURONTIN) 100 MG capsule Take 1 capsule by mouth 2 times daily for 364 days.  180 capsule 1    spironolactone (ALDACTONE) 25 MG tablet Take 1 tablet by mouth once daily 90 tablet 1    docusate sodium (COLACE) 100 MG capsule Take 1 capsule by mouth 2 times daily as needed for Constipation 180 capsule 1    oxybutynin (DITROPAN XL) 10 MG extended release tablet Take 1 tablet by mouth daily 90 tablet 1    ibuprofen (ADVIL;MOTRIN) 800 MG tablet Take 1 tablet by mouth every 12 hours as needed for Pain 180 tablet 1    benazepril (LOTENSIN) 10 MG tablet Take 1 tablet by mouth daily 90 tablet 1    hydrALAZINE (APRESOLINE) 50 MG tablet Take 1 tablet by mouth 4 times daily 360 tablet 1    atorvastatin (LIPITOR) 20 MG tablet Take 1 tablet by mouth daily 90 tablet 1    Handicap Placard MISC by Does not apply route Unable to ambulate more than 20 feet without difficulty  Expires 4/30/2027 1 each 0    diazePAM (VALIUM) 2 MG tablet Take 2 mg by mouth every 6 hours as needed for Anxiety. naproxen (NAPROSYN) 500 MG tablet Take 1 tablet by mouth 2 times daily for 7 days 14 tablet 0     No current facility-administered medications for this visit. Review Of Systems:    Review of Systems   Eyes:  Negative for visual disturbance. Respiratory:  Negative for shortness of breath. Cardiovascular:  Negative for chest pain, palpitations and leg swelling. Gastrointestinal:  Negative for diarrhea, nausea and vomiting. Genitourinary:  Negative for difficulty urinating, dysuria and frequency. Skin:  Negative for rash. Psychiatric/Behavioral:  Negative for dysphoric mood. OBJECTIVE:     VS:  Wt Readings from Last 3 Encounters:   10/12/22 149 lb (67.6 kg)   08/19/22 128 lb (58.1 kg)   07/20/22 135 lb (61.2 kg)     Vitals:    10/12/22 1003   BP: 138/72   Pulse: 100   Resp: 20   SpO2: 97%       Physical Exam  Vitals reviewed. Constitutional:       General: She is not in acute distress. Appearance: She is well-developed. Neck:      Vascular: No carotid bruit. Cardiovascular:      Rate and Rhythm: Normal rate and regular rhythm. Heart sounds: Normal heart sounds. No murmur heard. No gallop. Pulmonary:      Effort: Pulmonary effort is normal.      Breath sounds: Normal breath sounds. No wheezing or rales.    Abdominal:      General: DENSITY AXIAL SKELETON; Future    Other specified disorders of bone density and structure, right upper arm   -     DEXA BONE DENSITY AXIAL SKELETON; Future    Need for prophylactic vaccination and inoculation against influenza  -     Influenza, FLUCELVAX, (age 10 mo+), IM, Preservative Free, 0.5 mL            Phone/MyChart follow up if tests abnormal.    Return in about 6 months (around 4/12/2023) for Annual Medicare Wellness Visit--30 minutes. I have reviewed my findings and recommendations with Yassine Barillas.     Zenaida Butt MD, M.D

## 2022-10-19 ENCOUNTER — TELEPHONE (OUTPATIENT)
Dept: FAMILY MEDICINE CLINIC | Age: 62
End: 2022-10-19

## 2022-10-19 RX ORDER — DEXTROMETHORPHAN HYDROBROMIDE AND PROMETHAZINE HYDROCHLORIDE 15; 6.25 MG/5ML; MG/5ML
5 SYRUP ORAL 4 TIMES DAILY PRN
Qty: 240 ML | Refills: 0 | Status: SHIPPED
Start: 2022-10-19 | End: 2022-11-02

## 2022-10-19 RX ORDER — FLUTICASONE PROPIONATE 50 MCG
2 SPRAY, SUSPENSION (ML) NASAL DAILY
Qty: 16 G | Refills: 1 | Status: SHIPPED | OUTPATIENT
Start: 2022-10-19

## 2022-10-19 NOTE — TELEPHONE ENCOUNTER
Patient called C/O nasal drainage, little coughing and low grade temp 99. 9.  pt is asking for Rx to help with the symptoms. This has been going on for 2 days. Please advise.     Last seen 10/12/2022  Next appt 4/19/2023    [facility]

## 2022-10-31 ENCOUNTER — PREP FOR PROCEDURE (OUTPATIENT)
Dept: UROLOGY | Age: 62
End: 2022-10-31

## 2022-10-31 RX ORDER — SODIUM CHLORIDE 9 MG/ML
INJECTION, SOLUTION INTRAVENOUS CONTINUOUS
Status: CANCELLED | OUTPATIENT
Start: 2022-10-31

## 2022-10-31 RX ORDER — SODIUM CHLORIDE 0.9 % (FLUSH) 0.9 %
5-40 SYRINGE (ML) INJECTION PRN
Status: CANCELLED | OUTPATIENT
Start: 2022-10-31

## 2022-10-31 RX ORDER — SODIUM CHLORIDE 0.9 % (FLUSH) 0.9 %
5-40 SYRINGE (ML) INJECTION EVERY 12 HOURS SCHEDULED
Status: CANCELLED | OUTPATIENT
Start: 2022-10-31

## 2022-10-31 RX ORDER — SODIUM CHLORIDE 9 MG/ML
INJECTION, SOLUTION INTRAVENOUS PRN
Status: CANCELLED | OUTPATIENT
Start: 2022-10-31

## 2022-11-02 NOTE — PROGRESS NOTES
[facility] PRE-ADMISSION TESTING INSTRUCTIONS    The Preadmission Testing patient is instructed accordingly using the following criteria (check applicable):    ARRIVAL INSTRUCTIONS:  [x] Parking the day of Surgery is located in the Main Entrance lot. Upon entering the door, make an immediate right to the surgery reception desk    [x] Bring photo ID and insurance card    [] Bring in a copy of Living will or Durable Power of  papers. [x] Please be sure to arrange for responsible adult to provide transportation to and from the hospital    [x] Please arrange for responsible adult to be with you for the 24 hour period post procedure due to having anesthesia    [x] If you awake am of surgery not feeling well or have temperature >100 please call [phone]    GENERAL INSTRUCTIONS:    [x] Nothing by mouth after midnight, including gum, candy, mints or water    [x] You may brush your teeth, but do not swallow any water    [x] Take medications as instructed with 1-2 oz of water    [x] Stop herbal supplements and vitamins 5 days prior to procedure    [] Follow preop dosing of blood thinners per physician instructions    [] Take 1/2 dose of evening insulin, but no insulin after midnight    [x] No oral diabetic medications after midnight    [x] If diabetic and have low blood sugar or feel symptomatic, take 1-2oz apple juice only    [] Bring inhalers day of surgery    [] Bring C-PAP/ Bi-Pap day of surgery    [] Bring urine specimen day of surgery    [x] Shower or bath with soap, lather and rinse well, AM of Surgery, no lotion, powders or creams to surgical site    [] Follow bowel prep as instructed per surgeon    [x] No tobacco products within 24 hours of surgery     [x] No alcohol or illegal drug use within 24 hours of surgery.     [x] Jewelry, body piercing's, eyeglasses, contact lenses and dentures are not permitted into surgery (bring cases)      [x] Please do not wear any nail polish, make up or hair products on the day of surgery    [x] You can expect a call the business day prior to procedure to notify you if your arrival time changes    [x] If you receive a survey after surgery we would greatly appreciate your comments    [] Parent/guardian of a minor must accompany their child and remain on the premises  the entire time they are under our care     [] Pediatric patients may bring favorite toy, blanket or comfort item with them    [] A caregiver or family member must remain with the patient during their stay if they are mentally handicapped, have dementia, disoriented or unable to use a call light or would be a safety concern if left unattended    [x] Please notify surgeon if you develop any illness between now and time of surgery (cold, cough, sore throat, fever, nausea, vomiting) or any signs of infections  including skin, wounds, and dental.    [x]  The Outpatient Pharmacy is available to fill your prescription here on your day of surgery, ask your preop nurse for details    [] Other instructions    EDUCATIONAL MATERIALS PROVIDED:    [] PAT Preoperative Education Packet/Booklet     [] Medication List    [] Transfusion bracelet applied with instructions    [] Shower with soap, lather and rinse well, and use CHG wipes provided the evening before surgery as instructed    [] Incentive spirometer with instructions

## 2022-11-07 ENCOUNTER — HOSPITAL ENCOUNTER (OUTPATIENT)
Dept: GENERAL RADIOLOGY | Age: 62
Discharge: HOME | End: 2022-11-09
Attending: UROLOGY
Payer: MEDICARE

## 2022-11-07 ENCOUNTER — HOSPITAL ENCOUNTER (OUTPATIENT)
Age: 62
Setting detail: OUTPATIENT SURGERY
Discharge: HOME | End: 2022-11-07
Attending: UROLOGY | Admitting: UROLOGY
Payer: MEDICARE

## 2022-11-07 ENCOUNTER — ANESTHESIA EVENT (OUTPATIENT)
Dept: OPERATING ROOM | Age: 62
End: 2022-11-07
Payer: MEDICARE

## 2022-11-07 ENCOUNTER — ANESTHESIA (OUTPATIENT)
Dept: OPERATING ROOM | Age: 62
End: 2022-11-07
Payer: MEDICARE

## 2022-11-07 VITALS
HEART RATE: 108 BPM | WEIGHT: 139 LBS | HEIGHT: 63 IN | BODY MASS INDEX: 24.63 KG/M2 | TEMPERATURE: 98.5 F | SYSTOLIC BLOOD PRESSURE: 186 MMHG | OXYGEN SATURATION: 95 % | RESPIRATION RATE: 18 BRPM | DIASTOLIC BLOOD PRESSURE: 77 MMHG

## 2022-11-07 DIAGNOSIS — R52 PAIN: ICD-10-CM

## 2022-11-07 LAB — METER GLUCOSE: 115 MG/DL (ref 74–99)

## 2022-11-07 PROCEDURE — C1769 GUIDE WIRE: HCPCS | Performed by: UROLOGY

## 2022-11-07 PROCEDURE — 3600000013 HC SURGERY LEVEL 3 ADDTL 15MIN: Performed by: UROLOGY

## 2022-11-07 PROCEDURE — 6360000004 HC RX CONTRAST MEDICATION: Performed by: UROLOGY

## 2022-11-07 PROCEDURE — 7100000010 HC PHASE II RECOVERY - FIRST 15 MIN: Performed by: UROLOGY

## 2022-11-07 PROCEDURE — 7100000011 HC PHASE II RECOVERY - ADDTL 15 MIN: Performed by: UROLOGY

## 2022-11-07 PROCEDURE — C1758 CATHETER, URETERAL: HCPCS | Performed by: UROLOGY

## 2022-11-07 PROCEDURE — 3600000003 HC SURGERY LEVEL 3 BASE: Performed by: UROLOGY

## 2022-11-07 PROCEDURE — 74420 UROGRAPHY RTRGR +-KUB: CPT

## 2022-11-07 PROCEDURE — 2580000003 HC RX 258: Performed by: NURSE PRACTITIONER

## 2022-11-07 PROCEDURE — 2500000003 HC RX 250 WO HCPCS: Performed by: NURSE ANESTHETIST, CERTIFIED REGISTERED

## 2022-11-07 PROCEDURE — 3700000000 HC ANESTHESIA ATTENDED CARE: Performed by: UROLOGY

## 2022-11-07 PROCEDURE — 82962 GLUCOSE BLOOD TEST: CPT

## 2022-11-07 PROCEDURE — C2617 STENT, NON-COR, TEM W/O DEL: HCPCS | Performed by: UROLOGY

## 2022-11-07 PROCEDURE — 3700000001 HC ADD 15 MINUTES (ANESTHESIA): Performed by: UROLOGY

## 2022-11-07 PROCEDURE — 2709999900 HC NON-CHARGEABLE SUPPLY: Performed by: UROLOGY

## 2022-11-07 PROCEDURE — 6360000002 HC RX W HCPCS: Performed by: NURSE PRACTITIONER

## 2022-11-07 PROCEDURE — 6360000002 HC RX W HCPCS: Performed by: NURSE ANESTHETIST, CERTIFIED REGISTERED

## 2022-11-07 DEVICE — URETERAL STENT
Type: IMPLANTABLE DEVICE | Site: URETER | Status: FUNCTIONAL
Brand: PERCUFLEX™

## 2022-11-07 RX ORDER — SODIUM CHLORIDE 9 MG/ML
INJECTION, SOLUTION INTRAVENOUS CONTINUOUS
Status: DISCONTINUED | OUTPATIENT
Start: 2022-11-07 | End: 2022-11-07 | Stop reason: HOSPADM

## 2022-11-07 RX ORDER — SODIUM CHLORIDE 9 MG/ML
INJECTION, SOLUTION INTRAVENOUS PRN
Status: DISCONTINUED | OUTPATIENT
Start: 2022-11-07 | End: 2022-11-07 | Stop reason: HOSPADM

## 2022-11-07 RX ORDER — SODIUM CHLORIDE 0.9 % (FLUSH) 0.9 %
5-40 SYRINGE (ML) INJECTION EVERY 12 HOURS SCHEDULED
Status: CANCELLED | OUTPATIENT
Start: 2022-11-07

## 2022-11-07 RX ORDER — SULFAMETHOXAZOLE AND TRIMETHOPRIM 800; 160 MG/1; MG/1
1 TABLET ORAL 2 TIMES DAILY
Qty: 6 TABLET | Refills: 0 | Status: SHIPPED | OUTPATIENT
Start: 2022-11-07 | End: 2022-11-10

## 2022-11-07 RX ORDER — PROPOFOL 10 MG/ML
INJECTION, EMULSION INTRAVENOUS CONTINUOUS PRN
Status: DISCONTINUED | OUTPATIENT
Start: 2022-11-07 | End: 2022-11-07 | Stop reason: SDUPTHER

## 2022-11-07 RX ORDER — MIDAZOLAM HYDROCHLORIDE 1 MG/ML
INJECTION INTRAMUSCULAR; INTRAVENOUS PRN
Status: DISCONTINUED | OUTPATIENT
Start: 2022-11-07 | End: 2022-11-07 | Stop reason: SDUPTHER

## 2022-11-07 RX ORDER — PROPOFOL 10 MG/ML
INJECTION, EMULSION INTRAVENOUS PRN
Status: DISCONTINUED | OUTPATIENT
Start: 2022-11-07 | End: 2022-11-07 | Stop reason: SDUPTHER

## 2022-11-07 RX ORDER — SODIUM CHLORIDE 0.9 % (FLUSH) 0.9 %
5-40 SYRINGE (ML) INJECTION EVERY 12 HOURS SCHEDULED
Status: DISCONTINUED | OUTPATIENT
Start: 2022-11-07 | End: 2022-11-07 | Stop reason: HOSPADM

## 2022-11-07 RX ORDER — SODIUM CHLORIDE 0.9 % (FLUSH) 0.9 %
5-40 SYRINGE (ML) INJECTION PRN
Status: DISCONTINUED | OUTPATIENT
Start: 2022-11-07 | End: 2022-11-07 | Stop reason: HOSPADM

## 2022-11-07 RX ORDER — SODIUM CHLORIDE 9 MG/ML
INJECTION, SOLUTION INTRAVENOUS PRN
Status: CANCELLED | OUTPATIENT
Start: 2022-11-07

## 2022-11-07 RX ORDER — PROCHLORPERAZINE EDISYLATE 5 MG/ML
5 INJECTION INTRAMUSCULAR; INTRAVENOUS
Status: CANCELLED | OUTPATIENT
Start: 2022-11-07 | End: 2022-11-08

## 2022-11-07 RX ORDER — FENTANYL CITRATE 50 UG/ML
INJECTION, SOLUTION INTRAMUSCULAR; INTRAVENOUS PRN
Status: DISCONTINUED | OUTPATIENT
Start: 2022-11-07 | End: 2022-11-07 | Stop reason: SDUPTHER

## 2022-11-07 RX ORDER — FENTANYL CITRATE 50 UG/ML
50 INJECTION, SOLUTION INTRAMUSCULAR; INTRAVENOUS EVERY 5 MIN PRN
Status: CANCELLED | OUTPATIENT
Start: 2022-11-07

## 2022-11-07 RX ORDER — LIDOCAINE HYDROCHLORIDE 20 MG/ML
INJECTION, SOLUTION EPIDURAL; INFILTRATION; INTRACAUDAL; PERINEURAL PRN
Status: DISCONTINUED | OUTPATIENT
Start: 2022-11-07 | End: 2022-11-07 | Stop reason: SDUPTHER

## 2022-11-07 RX ORDER — SODIUM CHLORIDE 0.9 % (FLUSH) 0.9 %
5-40 SYRINGE (ML) INJECTION PRN
Status: CANCELLED | OUTPATIENT
Start: 2022-11-07

## 2022-11-07 RX ORDER — DIPHENHYDRAMINE HYDROCHLORIDE 50 MG/ML
12.5 INJECTION INTRAMUSCULAR; INTRAVENOUS
Status: CANCELLED | OUTPATIENT
Start: 2022-11-07 | End: 2022-11-08

## 2022-11-07 RX ADMIN — LIDOCAINE HYDROCHLORIDE 60 MG: 20 INJECTION, SOLUTION EPIDURAL; INFILTRATION; INTRACAUDAL; PERINEURAL at 11:44

## 2022-11-07 RX ADMIN — CEFEPIME 2000 MG: 2 INJECTION, POWDER, FOR SOLUTION INTRAVENOUS at 10:02

## 2022-11-07 RX ADMIN — PROPOFOL 150 MCG/KG/MIN: 10 INJECTION, EMULSION INTRAVENOUS at 11:44

## 2022-11-07 RX ADMIN — MIDAZOLAM 1 MG: 1 INJECTION INTRAMUSCULAR; INTRAVENOUS at 11:41

## 2022-11-07 RX ADMIN — SODIUM CHLORIDE: 9 INJECTION, SOLUTION INTRAVENOUS at 09:54

## 2022-11-07 RX ADMIN — PROPOFOL 50 MG: 10 INJECTION, EMULSION INTRAVENOUS at 11:44

## 2022-11-07 RX ADMIN — FENTANYL CITRATE 50 MCG: 50 INJECTION, SOLUTION INTRAMUSCULAR; INTRAVENOUS at 11:44

## 2022-11-07 ASSESSMENT — PAIN SCALES - GENERAL: PAINLEVEL_OUTOF10: 0

## 2022-11-07 NOTE — ANESTHESIA PRE PROCEDURE
IntraVENous Continuous Precious Ridley APRN - CNP        sodium chloride flush 0.9 % injection 5-40 mL  5-40 mL IntraVENous 2 times per day Precious Ridley, APRN - CNP        sodium chloride flush 0.9 % injection 5-40 mL  5-40 mL IntraVENous PRN Precious Ridley APRN - CNP        0.9 % sodium chloride infusion   IntraVENous PRN Precious Ridley APRN - CNP        cefepime (MAXIPIME) 2000 mg IVPB minibag  2,000 mg IntraVENous On Call to [address], APRN - CNP           Allergies:  No Known Allergies    Problem List:    Patient Active Problem List   Diagnosis Code    Epigastric hernia B49.3    Umbilical hernia P98.9    Essential hypertension I10    Type 2 diabetes mellitus with diabetic peripheral angiopathy without gangrene, without long-term current use of insulin ([facility] E11.51    Dyslipidemia E78.5    S/P cardiac catheterization Z98.890    Abnormal stress ECG R94.39    Hyperthyroidism E05.90    Diabetic peripheral neuropathy ([facility]) E11.42    Hydroureter N13.4    Microcytic hypochromic anemia D50.9    Arterial occlusion I70.90    Hypoglycemia E16.2    Hypertensive emergency I16.1    Paresthesia of hand, bilateral R20.2    Paresthesia of left foot R20.2    Unintended weight loss R63.4    History of arterial ischemic stroke Z86.73    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    Hx of diabetic foot ulcer Z86.31    Hx of BKA, left ([facility]) Z89.512    Chronic diastolic heart failure ([facility]) I50.32    PVD (peripheral vascular disease) ([facility]) I73.9    Anorexia R63.0       Past Medical History:        Diagnosis Date    CAD (coronary artery disease)     cath 2016    [facility]     Diabetic peripheral neuropathy associated with type 2 diabetes mellitus ([facility] 08/24/2018    Epigastric hernia     Hyperlipidemia     Hypertension     Kidney stone     Schizophrenia ([facility]     Type 2 diabetes mellitus with diabetic polyneuropathy, with long-term current use of insulin ([address] 2016    no longer requiring medication. 20 - continues w/o medications as of 2022       Past Surgical History:        Procedure Laterality Date    BLADDER SURGERY Bilateral 2022    CYSTOSCOPY RETROGRADE PYELOGRAM BILATERAL STENT CHANGE performed by Pierre Mercedes MD at [address]  2016     [facility]    COLONOSCOPY  2016    Dr. Alexsander Munson 2021    CYSTOSCOPY RETROGRADE PYELOGRAM, BILATERAL STENT EXCHANGE performed by Pierre Mercedes MD at [address] / [address] / [place] Bilateral 10/28/2019    CYSTOSCOPY. RETROGRADE. PYELOGRAM. BILATERAL STENT INSERTION performed by Hailee Maurice MD at [address] / [address] / [place] Bilateral 2020    CYSTOSCOPY RETROGRADE PYELOGRAM BILATERAL STENT EXCHANGE performed by Hailee Maurice MD at [address] / [address] / [place] Bilateral 2021    CYSTOSCOPY RETROGRADE PYELOGRAM BILATERAL  STENT CHANGE performed by Pierre Mercedes MD at  [address] KNEE Left 2020    LEG AMPUTATION BELOW KNEE performed by Vince Figueredo DO at [facility]  2016    epigastric hernia umbilical hernia with mesh    OVARIAN CYST REMOVAL      OVARY REMOVAL Right     OVARY REMOVAL      UPPER GASTROINTESTINAL ENDOSCOPY N/A 10/29/2019    EGD ESOPHAGOGASTRODUODENOSCOPY performed by Nawaf Ruvalcaba MD at [address] History:    Social History     Tobacco Use    Smoking status: Former     Packs/day: 0.00     Years: 2.00     Pack years: 0.00     Types: Cigarettes     Quit date: 3/23/2015     Years since quittin.6    Smokeless tobacco: Never    Tobacco comments:     quit smoking 2015   Substance Use Topics    Alcohol use:  No                                Counseling given: Not Answered  Tobacco comments: quit smoking 2015      Vital Signs (Current):   Vitals:    11/02/22 1314 11/07/22 0900   BP:  (!) 185/78   Pulse:  (!) 103   Resp:  16   Temp:  36.7 °C (98 °F)   TempSrc:  Tympanic   SpO2:  95%   Weight: 139 lb (63 kg)    Height: 5' 3\" (1.6 m)                                               BP Readings from Last 3 Encounters:   11/07/22 (!) 185/78   10/12/22 138/72   08/19/22 (!) 165/98       NPO Status: Time of last liquid consumption: 0000                        Time of last solid consumption: 0000                        Date of last liquid consumption: 11/06/22                        Date of last solid food consumption: 11/06/22    BMI:   Wt Readings from Last 3 Encounters:   11/02/22 139 lb (63 kg)   10/12/22 149 lb (67.6 kg)   08/19/22 128 lb (58.1 kg)     Body mass index is 24.62 kg/m². CBC:   Lab Results   Component Value Date/Time    WBC 2.6 06/24/2022 01:34 PM    RBC 3.76 06/24/2022 01:34 PM    HGB 10.0 06/24/2022 01:34 PM    HCT 31.4 06/24/2022 01:34 PM    MCV 83.5 06/24/2022 01:34 PM    RDW 13.8 06/24/2022 01:34 PM     06/24/2022 01:34 PM       CMP:   Lab Results   Component Value Date/Time     06/24/2022 01:34 PM    K 4.4 06/24/2022 01:34 PM     06/24/2022 01:34 PM    CO2 23 06/24/2022 01:34 PM    BUN 22 06/24/2022 01:34 PM    CREATININE 1.1 06/24/2022 01:34 PM    GFRAA >60 06/24/2022 01:34 PM    LABGLOM >60 06/24/2022 01:34 PM    GLUCOSE 106 06/24/2022 01:34 PM    GLUCOSE 171 04/26/2012 09:30 AM    PROT 8.4 06/24/2022 01:34 PM    CALCIUM 9.7 06/24/2022 01:34 PM    BILITOT 0.3 06/24/2022 01:34 PM    ALKPHOS 115 06/24/2022 01:34 PM    AST 16 06/24/2022 01:34 PM    ALT 12 06/24/2022 01:34 PM       POC Tests: No results for input(s): POCGLU, POCNA, POCK, POCCL, POCBUN, POCHEMO, POCHCT in the last 72 hours.     Coags:   Lab Results   Component Value Date/Time    PROTIME 13.0 08/22/2020 07:29 AM    INR 1.2 08/22/2020 07:29 AM    APTT 32.4 08/22/2020 07:29 AM HCG (If Applicable): No results found for: PREGTESTUR, PREGSERUM, HCG, HCGQUANT     ABGs:   Lab Results   Component Value Date/Time    PO2ART 57.6 02/25/2020 10:02 AM    KPP8GBS 24.8 02/25/2020 10:02 AM    EHM6LVB 20.3 02/25/2020 10:02 AM        Type & Screen (If Applicable):  No results found for: LABABO, LABRH    Drug/Infectious Status (If Applicable):  No results found for: HIV, HEPCAB    COVID-19 Screening (If Applicable):   Lab Results   Component Value Date/Time    COVID19 Not Detected 06/24/2022 11:11 AM    COVID19 Not Detected 01/12/2021 12:23 PM           Anesthesia Evaluation  Patient summary reviewed and Nursing notes reviewed no history of anesthetic complications:   Airway: Mallampati: III  TM distance: >3 FB   Neck ROM: full  Mouth opening: > = 3 FB   Dental:    (+) upper dentures and lower dentures      Pulmonary:   (+) decreased breath sounds: bilateral           Patient did not smoke on day of surgery. ROS comment: FLU TWO WEEKS AGO   Cardiovascular:  Exercise tolerance: poor (<4 METS), sometime  (+) hypertension: mild,     (-) CAD and  CHF    ECG reviewed  Rhythm: regular  Rate: abnormal  Echocardiogram reviewed         Beta Blocker:  Not on Beta Blocker         Neuro/Psych:   (+) neuromuscular disease (Muscle weakness on upper extremities bl and right foot; left BKA):,    (-) psychiatric history           GI/Hepatic/Renal:   (+) renal disease (stent places a couple years ago): kidney stones,           Endo/Other:    (+) DiabetesType II DM, well controlled, , .    (-) hyperthyroidism, blood dyscrasia               Abdominal:             Vascular: negative vascular ROS. - PVD (PVD). Other Findings:           Anesthesia Plan      MAC     ASA 3       Induction: intravenous. Anesthetic plan and risks discussed with patient and child/children. Use of blood products discussed with patient whom consented to blood products.                  Patient seen and evaluated BENJI Venegas - RUDY Fowler RN   11/7/2022

## 2022-11-07 NOTE — DISCHARGE INSTRUCTIONS
Discharge instructions for Dr. Sandra Fajardo:   -Lianna Witt will be groggy throughout the day due to the effects of anesthesia. Have a responsible adult monitor you for the next 24 hours.  -Call if fever or chills  -It is very normal to have burning and pain. If the pain is severe and out of your control contact Dr. Chas Kendall. Diet: You may eat or drink whatever you like today. You may experience some nausea. Call if you have vomiting or inability to tolerate food or drink. Urine: Expect blood in your urine. This is normal.  This may be very traumatic in appearance but is not concerning unless there are large amount of clots that prevent you from being able to urinate. If you have any questions or concerns contact Dr. Ronnell Hermosillo office. Driving: You may not drive for 24 hours. You also may not drive if you are taking narcotic pain medications. Activity: There are no restrictions on your activity.

## 2022-11-07 NOTE — ANESTHESIA POSTPROCEDURE EVALUATION
Department of Anesthesiology  Postprocedure Note    Patient: Ashia Olea  MRN: [id number]  YOB: 1960  Date of evaluation: 11/7/2022      Procedure Summary     Date: 11/07/22 Room / Location: [facility] OR 06 / [facility]    Anesthesia Start: 8301 Anesthesia Stop: 4108    Procedure: CYSTOSCOPY BILATERAL RETROGRADE PYELOGRAM LEFT STENT CHANGE POSSIBLE RIGHT STENT CHANGE VS REMOVAL  ++CONTACT ISOLATION++ (Left) Diagnosis:       Disorder resulting from impaired renal function      Other hydronephrosis      (Disorder resulting from impaired renal function [N25.9])      (Other hydronephrosis [N13.39])    Surgeons: Abdulaziz Leroy MD Responsible Provider: Dhruv Wihttaker MD    Anesthesia Type: MAC ASA Status: 3          Anesthesia Type: MAC    Jake Phase I: Jake Score: 10    Jake Phase II: Jake Score: 10      Anesthesia Post Evaluation    Patient location during evaluation: PACU  Patient participation: complete - patient participated  Level of consciousness: awake  Airway patency: patent  Nausea & Vomiting: no nausea and no vomiting  Complications: no  Cardiovascular status: hemodynamically stable  Respiratory status: acceptable  Hydration status: euvolemic

## 2022-11-07 NOTE — OP NOTE
Operative Note      Patient: Alina Randall  YOB: 1960  MRN: [id number]    Date of Procedure: 11/7/2022    Pre-Op Diagnosis: Bilateral hydronephrosis, indwelling stents    Post-Op Diagnosis: Same       Procedure: Cystoscopy, bilateral retrograde pyelograms, bilateral stent change, right ureteroscopy    Surgeon(s):  Marco A Mauricio MD    Assistant:   * No surgical staff found *    Anesthesia: Monitor Anesthesia Care    Estimated Blood Loss (mL): Minimal    Complications: None    Specimens:   * No specimens in log *    Implants:  Implant Name Type Inv. Item Serial No.  Lot No. LRB No. Used Action   STENT URET 6FR L24CM PERCFLX FIRM DUROMETER DBL PGTL TAPR - [id number]  Everlyn Bijou 6FR L24CM PERCFLX FIRM DUROMETER DBL PGTL TAPR  [employer]- [id number] Left 1 Implanted   STENT URET 6FR L24CM PERCFLX FIRM DUROMETER DBL PGTL TAPR - [id number]  STENT URET 6FR L24CM PERCFLX FIRM DUROMETER DBL PGTL TAPR  [employer]-WD [id number] Right 1 Implanted         Drains: * No LDAs found *        INDICATION FOR PROCEDURE: Alina Randall is a 60 y.o. female who presents for cystoscopy, retrograde pyelograms and understands the risks, benefits, and alternatives of the procedure, signed informed consent, and agreed to proceed. PROCEDURE:   The patient was brought into the operating room and placed under anesthesia in the dorsal lithotomy position. She was prepped and draped in a sterile fashion. A 21-Greenlandic cystoscope with a 30-degree lens was passed through the urethra and into the bladder. The entire length of the urethra was examined and found to be without strictures or other abnormalities. The entire bladder mucosal surface was examined under 30- degree endoscopy and found to be without calculi, tumors, diverticula, or other abnormalities. The ureteral orifices were normal in size, number, and location. Bilateral stents were identified and showed significant encrustation.   Both stents were removed. A 5 Lebanese open-ended catheter was passed in the left ureteral orifice and 10 cc of contrast were injected under fluoroscopic guidance. There was significant narrowing of the left ureter with hydronephrosis. A wire was passed through this area and into the renal pelvis. A 6 x 24 double-J ureteral stent was left indwelling with good curl seen the kidney as well as in the bladder. 5 Isaias Bamberger open-ended catheter was passed in the right ureteral orifice and 20 cc of contrast were injected with difficulty. There were several areas of stricture including the proximal ureter. A wire was unable to be passed beyond this area. For this reason ureteroscope was then passed into the ureter. The distal and mid ureters were examined and found to have stricture. There were no tumors identified. There was a tiny pinpoint area in the proximal ureter that was identified and a wire was able to be passed beyond this and into the renal pelvis under fluoroscopic guidance. A 6 x 24 double-J ureteral stent was passed over wire with a good curl seen the kidney as well as in the bladder. The bladder was drained. The patient was awakened from anesthesia and taken to recovery in stable condition. PLAN:    While it was our goal to remove Ana Child is stent today, this does not appear to be feasible. She has significant narrowing of her ureters bilaterally. Ureteroscopy was needed today to place a wire through a stricture in the proximal ureter on the right. Follow-up for stent change in 5 months.       Lb Oneill MD, M.D.  11/7/2022  12:26 PM        Lb Oneill MD, M.D.  11/7/2022  12:25 PM      Electronically signed by Lb Oneill MD on 11/7/2022 at 12:24 PM

## 2022-11-07 NOTE — PROGRESS NOTES
Patient's daughter is at the bedside. Tolerating PO fluids and food well. Dwayne Miller RN. Obtain Level of Care/Service Not Available at this Facility

## 2022-11-11 ENCOUNTER — TELEPHONE (OUTPATIENT)
Dept: FAMILY MEDICINE CLINIC | Age: 62
End: 2022-11-11

## 2022-11-11 RX ORDER — MEGESTROL ACETATE 40 MG/1
40 TABLET ORAL DAILY
Qty: 30 TABLET | Refills: 3 | Status: ON HOLD
Start: 2022-11-11 | End: 2022-11-29 | Stop reason: HOSPADM

## 2022-11-11 NOTE — TELEPHONE ENCOUNTER
Patient called to inform that she is having difficulty eating and is asking if Dr. Carmona Andi would send RX to her pharmacy that would help stimulate her appetite. Patient informed she does not feel hungry and when she tries to eat, nothing tastes good. I asked patient if she is staying hydrated and she informed she is trying. Patient stated she noticed this problem more prior to and after having her SX for her Stent. Patient stated she is concerned about losing more weight, informing that she is down to 135 lbs. I informed patient that Dr. Mathew Escamilla is not in the ofc today.       Last seen 10/12/2022  Next appt 4/19/2023  [facility]

## 2022-11-11 NOTE — TELEPHONE ENCOUNTER
Please inform patient that script for Megace has been sent to her pharmacy to stimulate her appetite.

## 2022-11-20 ENCOUNTER — TELEPHONE (OUTPATIENT)
Dept: FAMILY MEDICINE CLINIC | Age: 62
End: 2022-11-20

## 2022-11-20 RX ORDER — ALBUTEROL SULFATE 90 UG/1
2 AEROSOL, METERED RESPIRATORY (INHALATION) EVERY 6 HOURS PRN
Qty: 18 G | Refills: 3 | Status: SHIPPED | OUTPATIENT
Start: 2022-11-20

## 2022-11-20 NOTE — TELEPHONE ENCOUNTER
Patient called through [facility] stating she has shortness of breath with exertion. Had influenza a month ago, and this is when that started.

## 2022-11-25 ENCOUNTER — APPOINTMENT (OUTPATIENT)
Dept: ULTRASOUND IMAGING | Age: 62
DRG: 175 | End: 2022-11-25
Payer: MEDICARE

## 2022-11-25 ENCOUNTER — HOSPITAL ENCOUNTER (INPATIENT)
Age: 62
LOS: 4 days | Discharge: HOME | DRG: 175 | End: 2022-11-29
Attending: EMERGENCY MEDICINE | Admitting: INTERNAL MEDICINE
Payer: MEDICARE

## 2022-11-25 ENCOUNTER — APPOINTMENT (OUTPATIENT)
Dept: GENERAL RADIOLOGY | Age: 62
DRG: 175 | End: 2022-11-25
Payer: MEDICARE

## 2022-11-25 ENCOUNTER — APPOINTMENT (OUTPATIENT)
Dept: CT IMAGING | Age: 62
DRG: 175 | End: 2022-11-25
Payer: MEDICARE

## 2022-11-25 DIAGNOSIS — J18.9 COMMUNITY ACQUIRED PNEUMONIA, UNSPECIFIED LATERALITY: ICD-10-CM

## 2022-11-25 DIAGNOSIS — J90 PLEURAL EFFUSION: ICD-10-CM

## 2022-11-25 DIAGNOSIS — D64.9 CHRONIC ANEMIA: ICD-10-CM

## 2022-11-25 DIAGNOSIS — I26.99 ACUTE PULMONARY EMBOLISM WITHOUT ACUTE COR PULMONALE, UNSPECIFIED PULMONARY EMBOLISM TYPE (HCC): Primary | ICD-10-CM

## 2022-11-25 DIAGNOSIS — I31.39 PERICARDIAL EFFUSION: ICD-10-CM

## 2022-11-25 LAB
ADENOVIRUS BY PCR: NOT DETECTED
ALBUMIN SERPL-MCNC: 3.7 G/DL (ref 3.5–5.2)
ALP BLD-CCNC: 147 U/L (ref 35–104)
ALT SERPL-CCNC: 37 U/L (ref 0–32)
ANION GAP SERPL CALCULATED.3IONS-SCNC: 13 MMOL/L (ref 7–16)
APTT: 20.9 SEC (ref 24.5–35.1)
APTT: 50 SEC (ref 24.5–35.1)
AST SERPL-CCNC: 27 U/L (ref 0–31)
BACTERIA: ABNORMAL /HPF
BASOPHILS ABSOLUTE: 0.04 E9/L (ref 0–0.2)
BASOPHILS RELATIVE PERCENT: 0.8 % (ref 0–2)
BILIRUB SERPL-MCNC: 0.4 MG/DL (ref 0–1.2)
BILIRUBIN URINE: NEGATIVE
BLOOD, URINE: ABNORMAL
BORDETELLA PARAPERTUSSIS BY PCR: NOT DETECTED
BORDETELLA PERTUSSIS BY PCR: NOT DETECTED
BUN BLDV-MCNC: 55 MG/DL (ref 6–23)
C-REACTIVE PROTEIN: 13.9 MG/DL (ref 0–0.4)
CALCIUM SERPL-MCNC: 9.5 MG/DL (ref 8.6–10.2)
CHLAMYDOPHILIA PNEUMONIAE BY PCR: NOT DETECTED
CHLORIDE BLD-SCNC: 110 MMOL/L (ref 98–107)
CLARITY: CLEAR
CO2: 17 MMOL/L (ref 22–29)
COLOR: YELLOW
CORONAVIRUS 229E BY PCR: NOT DETECTED
CORONAVIRUS HKU1 BY PCR: NOT DETECTED
CORONAVIRUS NL63 BY PCR: NOT DETECTED
CORONAVIRUS OC43 BY PCR: NOT DETECTED
CREAT SERPL-MCNC: 1.4 MG/DL (ref 0.5–1)
D DIMER: >5250 NG/ML DDU
EKG ATRIAL RATE: 113 BPM
EKG P AXIS: 38 DEGREES
EKG P-R INTERVAL: 146 MS
EKG Q-T INTERVAL: 316 MS
EKG QRS DURATION: 80 MS
EKG QTC CALCULATION (BAZETT): 433 MS
EKG R AXIS: 39 DEGREES
EKG T AXIS: 144 DEGREES
EKG VENTRICULAR RATE: 113 BPM
EOSINOPHILS ABSOLUTE: 0.15 E9/L (ref 0.05–0.5)
EOSINOPHILS RELATIVE PERCENT: 3.1 % (ref 0–6)
FOLATE: 11.8 NG/ML (ref 4.8–24.2)
GFR SERPL CREATININE-BSD FRML MDRD: 42 ML/MIN/1.73
GLUCOSE BLD-MCNC: 123 MG/DL (ref 74–99)
GLUCOSE URINE: NEGATIVE MG/DL
HCT VFR BLD CALC: 23.3 % (ref 34–48)
HCT VFR BLD CALC: 25.9 % (ref 34–48)
HEMOGLOBIN: 7 G/DL (ref 11.5–15.5)
HEMOGLOBIN: 7.6 G/DL (ref 11.5–15.5)
HUMAN METAPNEUMOVIRUS BY PCR: NOT DETECTED
HUMAN RHINOVIRUS/ENTEROVIRUS BY PCR: NOT DETECTED
IMMATURE GRANULOCYTES #: 0.1 E9/L
IMMATURE GRANULOCYTES %: 2 % (ref 0–5)
INFLUENZA A BY PCR: NOT DETECTED
INFLUENZA B BY PCR: NOT DETECTED
IRON SATURATION: 18 % (ref 15–50)
IRON: 34 MCG/DL (ref 37–145)
KETONES, URINE: NEGATIVE MG/DL
LEUKOCYTE ESTERASE, URINE: ABNORMAL
LYMPHOCYTES ABSOLUTE: 1.66 E9/L (ref 1.5–4)
LYMPHOCYTES RELATIVE PERCENT: 33.9 % (ref 20–42)
MAGNESIUM: 2.2 MG/DL (ref 1.6–2.6)
MCH RBC QN AUTO: 23.5 PG (ref 26–35)
MCH RBC QN AUTO: 23.9 PG (ref 26–35)
MCHC RBC AUTO-ENTMCNC: 29.3 % (ref 32–34.5)
MCHC RBC AUTO-ENTMCNC: 30 % (ref 32–34.5)
MCV RBC AUTO: 79.5 FL (ref 80–99.9)
MCV RBC AUTO: 79.9 FL (ref 80–99.9)
MONOCYTES ABSOLUTE: 0.58 E9/L (ref 0.1–0.95)
MONOCYTES RELATIVE PERCENT: 11.8 % (ref 2–12)
MYCOPLASMA PNEUMONIAE BY PCR: NOT DETECTED
NEUTROPHILS ABSOLUTE: 2.37 E9/L (ref 1.8–7.3)
NEUTROPHILS RELATIVE PERCENT: 48.4 % (ref 43–80)
NITRITE, URINE: NEGATIVE
PARAINFLUENZA VIRUS 1 BY PCR: NOT DETECTED
PARAINFLUENZA VIRUS 2 BY PCR: NOT DETECTED
PARAINFLUENZA VIRUS 3 BY PCR: NOT DETECTED
PARAINFLUENZA VIRUS 4 BY PCR: NOT DETECTED
PDW BLD-RTO: 16.3 FL (ref 11.5–15)
PDW BLD-RTO: 16.5 FL (ref 11.5–15)
PH UA: 6 (ref 5–9)
PHOSPHORUS: 3.5 MG/DL (ref 2.5–4.5)
PLATELET # BLD: 415 E9/L (ref 130–450)
PLATELET # BLD: 461 E9/L (ref 130–450)
PMV BLD AUTO: 10.5 FL (ref 7–12)
PMV BLD AUTO: 10.6 FL (ref 7–12)
POTASSIUM REFLEX MAGNESIUM: 3.8 MMOL/L (ref 3.5–5)
PRO-BNP: 676 PG/ML (ref 0–125)
PROTEIN UA: 100 MG/DL
RBC # BLD: 2.93 E12/L (ref 3.5–5.5)
RBC # BLD: 3.24 E12/L (ref 3.5–5.5)
RBC UA: >20 /HPF (ref 0–2)
RESPIRATORY SYNCYTIAL VIRUS BY PCR: NOT DETECTED
SARS-COV-2 ANTIBODY, TOTAL: REACTIVE
SARS-COV-2, PCR: NOT DETECTED
SEDIMENTATION RATE, ERYTHROCYTE: 143 MM/HR (ref 0–20)
SODIUM BLD-SCNC: 140 MMOL/L (ref 132–146)
SPECIFIC GRAVITY UA: 1.02 (ref 1–1.03)
T4 FREE: 0.96 NG/DL (ref 0.93–1.7)
T4 FREE: 0.98 NG/DL (ref 0.93–1.7)
TOTAL IRON BINDING CAPACITY: 190 MCG/DL (ref 250–450)
TOTAL PROTEIN: 8.4 G/DL (ref 6.4–8.3)
TROPONIN, HIGH SENSITIVITY: 28 NG/L (ref 0–9)
TROPONIN, HIGH SENSITIVITY: 39 NG/L (ref 0–9)
TSH SERPL DL<=0.05 MIU/L-ACNC: <0.01 UIU/ML (ref 0.27–4.2)
UROBILINOGEN, URINE: 1 E.U./DL
VITAMIN B-12: 537 PG/ML (ref 211–946)
WBC # BLD: 4.7 E9/L (ref 4.5–11.5)
WBC # BLD: 4.9 E9/L (ref 4.5–11.5)
WBC UA: ABNORMAL /HPF (ref 0–5)

## 2022-11-25 PROCEDURE — 82607 VITAMIN B-12: CPT

## 2022-11-25 PROCEDURE — 85027 COMPLETE CBC AUTOMATED: CPT

## 2022-11-25 PROCEDURE — 71275 CT ANGIOGRAPHY CHEST: CPT

## 2022-11-25 PROCEDURE — 99285 EMERGENCY DEPT VISIT HI MDM: CPT

## 2022-11-25 PROCEDURE — 86769 SARS-COV-2 COVID-19 ANTIBODY: CPT

## 2022-11-25 PROCEDURE — 84439 ASSAY OF FREE THYROXINE: CPT

## 2022-11-25 PROCEDURE — 84443 ASSAY THYROID STIM HORMONE: CPT

## 2022-11-25 PROCEDURE — 83540 ASSAY OF IRON: CPT

## 2022-11-25 PROCEDURE — 85378 FIBRIN DEGRADE SEMIQUANT: CPT

## 2022-11-25 PROCEDURE — 83735 ASSAY OF MAGNESIUM: CPT

## 2022-11-25 PROCEDURE — 2060000000 HC ICU INTERMEDIATE R&B

## 2022-11-25 PROCEDURE — 6370000000 HC RX 637 (ALT 250 FOR IP): Performed by: INTERNAL MEDICINE

## 2022-11-25 PROCEDURE — 85651 RBC SED RATE NONAUTOMATED: CPT

## 2022-11-25 PROCEDURE — 93970 EXTREMITY STUDY: CPT

## 2022-11-25 PROCEDURE — 84484 ASSAY OF TROPONIN QUANT: CPT

## 2022-11-25 PROCEDURE — 93306 TTE W/DOPPLER COMPLETE: CPT

## 2022-11-25 PROCEDURE — 0202U NFCT DS 22 TRGT SARS-COV-2: CPT

## 2022-11-25 PROCEDURE — 81001 URINALYSIS AUTO W/SCOPE: CPT

## 2022-11-25 PROCEDURE — 93010 ELECTROCARDIOGRAM REPORT: CPT | Performed by: INTERNAL MEDICINE

## 2022-11-25 PROCEDURE — 99223 1ST HOSP IP/OBS HIGH 75: CPT | Performed by: INTERNAL MEDICINE

## 2022-11-25 PROCEDURE — 6360000004 HC RX CONTRAST MEDICATION: Performed by: RADIOLOGY

## 2022-11-25 PROCEDURE — 83880 ASSAY OF NATRIURETIC PEPTIDE: CPT

## 2022-11-25 PROCEDURE — 6360000002 HC RX W HCPCS: Performed by: EMERGENCY MEDICINE

## 2022-11-25 PROCEDURE — 85025 COMPLETE CBC W/AUTO DIFF WBC: CPT

## 2022-11-25 PROCEDURE — 93005 ELECTROCARDIOGRAM TRACING: CPT

## 2022-11-25 PROCEDURE — 96374 THER/PROPH/DIAG INJ IV PUSH: CPT

## 2022-11-25 PROCEDURE — 86140 C-REACTIVE PROTEIN: CPT

## 2022-11-25 PROCEDURE — 83550 IRON BINDING TEST: CPT

## 2022-11-25 PROCEDURE — 82746 ASSAY OF FOLIC ACID SERUM: CPT

## 2022-11-25 PROCEDURE — 80053 COMPREHEN METABOLIC PANEL: CPT

## 2022-11-25 PROCEDURE — 36415 COLL VENOUS BLD VENIPUNCTURE: CPT

## 2022-11-25 PROCEDURE — 2580000003 HC RX 258

## 2022-11-25 PROCEDURE — 84100 ASSAY OF PHOSPHORUS: CPT

## 2022-11-25 PROCEDURE — 85730 THROMBOPLASTIN TIME PARTIAL: CPT

## 2022-11-25 PROCEDURE — 71045 X-RAY EXAM CHEST 1 VIEW: CPT

## 2022-11-25 RX ORDER — HEPARIN SODIUM 1000 [USP'U]/ML
80 INJECTION, SOLUTION INTRAVENOUS; SUBCUTANEOUS PRN
Status: DISCONTINUED | OUTPATIENT
Start: 2022-11-25 | End: 2022-11-26

## 2022-11-25 RX ORDER — MEGESTROL ACETATE 40 MG/1
40 TABLET ORAL DAILY
Status: DISCONTINUED | OUTPATIENT
Start: 2022-11-25 | End: 2022-11-25

## 2022-11-25 RX ORDER — ACETAMINOPHEN 325 MG/1
650 TABLET ORAL EVERY 4 HOURS PRN
Status: DISCONTINUED | OUTPATIENT
Start: 2022-11-25 | End: 2022-11-29 | Stop reason: HOSPADM

## 2022-11-25 RX ORDER — FLUTICASONE PROPIONATE 50 MCG
2 SPRAY, SUSPENSION (ML) NASAL DAILY
Status: DISCONTINUED | OUTPATIENT
Start: 2022-11-25 | End: 2022-11-29 | Stop reason: HOSPADM

## 2022-11-25 RX ORDER — HEPARIN SODIUM 10000 [USP'U]/100ML
5-30 INJECTION, SOLUTION INTRAVENOUS CONTINUOUS
Status: DISCONTINUED | OUTPATIENT
Start: 2022-11-25 | End: 2022-11-26

## 2022-11-25 RX ORDER — COLCHICINE 0.6 MG/1
0.6 TABLET ORAL DAILY
Status: DISCONTINUED | OUTPATIENT
Start: 2022-11-25 | End: 2022-11-28 | Stop reason: DRUGHIGH

## 2022-11-25 RX ORDER — LANOLIN ALCOHOL/MO/W.PET/CERES
50 CREAM (GRAM) TOPICAL DAILY
Status: DISCONTINUED | OUTPATIENT
Start: 2022-11-25 | End: 2022-11-29 | Stop reason: HOSPADM

## 2022-11-25 RX ORDER — 0.9 % SODIUM CHLORIDE 0.9 %
500 INTRAVENOUS SOLUTION INTRAVENOUS ONCE
Status: COMPLETED | OUTPATIENT
Start: 2022-11-25 | End: 2022-11-25

## 2022-11-25 RX ORDER — HEPARIN SODIUM 1000 [USP'U]/ML
80 INJECTION, SOLUTION INTRAVENOUS; SUBCUTANEOUS ONCE
Status: COMPLETED | OUTPATIENT
Start: 2022-11-25 | End: 2022-11-25

## 2022-11-25 RX ORDER — HEPARIN SODIUM 1000 [USP'U]/ML
40 INJECTION, SOLUTION INTRAVENOUS; SUBCUTANEOUS PRN
Status: DISCONTINUED | OUTPATIENT
Start: 2022-11-25 | End: 2022-11-26

## 2022-11-25 RX ORDER — DIAZEPAM 2 MG/1
2 TABLET ORAL EVERY 6 HOURS PRN
Status: DISCONTINUED | OUTPATIENT
Start: 2022-11-25 | End: 2022-11-29 | Stop reason: HOSPADM

## 2022-11-25 RX ORDER — ALBUTEROL SULFATE 90 UG/1
2 AEROSOL, METERED RESPIRATORY (INHALATION) EVERY 6 HOURS PRN
Status: DISCONTINUED | OUTPATIENT
Start: 2022-11-25 | End: 2022-11-29 | Stop reason: HOSPADM

## 2022-11-25 RX ORDER — HYDRALAZINE HYDROCHLORIDE 25 MG/1
50 TABLET, FILM COATED ORAL 4 TIMES DAILY
Status: DISCONTINUED | OUTPATIENT
Start: 2022-11-25 | End: 2022-11-28

## 2022-11-25 RX ADMIN — FLUTICASONE PROPIONATE 2 SPRAY: 50 SPRAY, METERED NASAL at 21:00

## 2022-11-25 RX ADMIN — HEPARIN SODIUM AND DEXTROSE 18 UNITS/KG/HR: 10000; 5 INJECTION INTRAVENOUS at 13:37

## 2022-11-25 RX ADMIN — HEPARIN SODIUM 5050 UNITS: 1000 INJECTION INTRAVENOUS; SUBCUTANEOUS at 13:27

## 2022-11-25 RX ADMIN — SODIUM CHLORIDE 500 ML: 9 INJECTION, SOLUTION INTRAVENOUS at 09:18

## 2022-11-25 RX ADMIN — SODIUM CHLORIDE 500 ML: 9 INJECTION, SOLUTION INTRAVENOUS at 09:53

## 2022-11-25 RX ADMIN — HYDRALAZINE HYDROCHLORIDE 50 MG: 25 TABLET, FILM COATED ORAL at 20:20

## 2022-11-25 RX ADMIN — COLCHICINE 0.6 MG: 0.6 TABLET, FILM COATED ORAL at 17:42

## 2022-11-25 RX ADMIN — PYRIDOXINE HCL TAB 50 MG 50 MG: 50 TAB at 21:03

## 2022-11-25 RX ADMIN — ACETAMINOPHEN 650 MG: 325 TABLET ORAL at 20:20

## 2022-11-25 RX ADMIN — DIAZEPAM 2 MG: 2 TABLET ORAL at 20:30

## 2022-11-25 RX ADMIN — IOPAMIDOL 75 ML: 755 INJECTION, SOLUTION INTRAVENOUS at 10:46

## 2022-11-25 ASSESSMENT — PAIN SCALES - GENERAL
PAINLEVEL_OUTOF10: 0
PAINLEVEL_OUTOF10: 5
PAINLEVEL_OUTOF10: 2

## 2022-11-25 ASSESSMENT — ENCOUNTER SYMPTOMS
EYE DISCHARGE: 0
SINUS PAIN: 0
DIARRHEA: 0
VOMITING: 1
SHORTNESS OF BREATH: 1
COLOR CHANGE: 0
NAUSEA: 1
CONSTIPATION: 0
ABDOMINAL PAIN: 0
COUGH: 1
SORE THROAT: 0

## 2022-11-25 ASSESSMENT — PAIN DESCRIPTION - LOCATION: LOCATION: ABDOMEN

## 2022-11-25 NOTE — ED NOTES
Notified Dr Yvonne Reyes of Creatine of 1.4.  Will discuss with family prior to proceeding with CTA     Timothy Kapoor RN  11/25/22 1783

## 2022-11-25 NOTE — ED NOTES
Radiology Procedure Waiver   Name: Ashia Olea  : 1960  MRN: [id number]    Date:  22    Time: 10:30 AM EST    Benefits of immediately proceeding with Radiology exam(s) without pre-testing outweigh the risks or are not indicated as specified below and therefore the following is/are being waived:    [] Pregnancy test   [] Patients LMP on-time and regular.   [] Patient had Tubal Ligation or has other Contraception Device. [] Patient  is Menopausal or Premenarcheal.    [] Patient had Full or Partial Hysterectomy. [] Protocol for Iodine allergy    [] MRI Questionnaire     [x] BUN/Creatinine   [] Patient age w/no hx of renal dysfunction. [] Patient on Dialysis. [] Recent Normal Labs.   Electronically signed by Kim Watkins DO on 22 at 10:30 AM EST               Kim Watkins DO  22 [id number]

## 2022-11-25 NOTE — ACP (ADVANCE CARE PLANNING)
Advance Care Planning     Advance Care Planning Activator (Inpatient)  Conversation Note      Date of ACP Conversation: 11/25/2022     Conversation Conducted with: Patient with Decision Making Capacity    ACP Activator: [facility] Decision Maker:     Current Designated Health Care Decision Maker:     Primary Decision Maker: Mamie Bo - Child - [phone]    Secondary Decision Maker: Bakari Rousseau - Ex-Spouse - [phone]  Click here to complete Healthcare Decision Makers including section of the Healthcare Decision Maker Relationship (ie \"Primary\")  Today we documented Decision Maker(s) consistent with ACP documents on file. Care Preferences    Ventilation: \"If you were in your present state of health and suddenly became very ill and were unable to breathe on your own, what would your preference be about the use of a ventilator (breathing machine) if it were available to you? \"      Would the patient desire the use of ventilator (breathing machine)?: yes    \"If your health worsens and it becomes clear that your chance of recovery is unlikely, what would your preference be about the use of a ventilator (breathing machine) if it were available to you? \"     Would the patient desire the use of ventilator (breathing machine)?: Yes      Resuscitation  \"CPR works best to restart the heart when there is a sudden event, like a heart attack, in someone who is otherwise healthy. Unfortunately, CPR does not typically restart the heart for people who have serious health conditions or who are very sick. \"    \"In the event your heart stopped as a result of an underlying serious health condition, would you want attempts to be made to restart your heart (answer \"yes\" for attempt to resuscitate) or would you prefer a natural death (answer \"no\" for do not attempt to resuscitate)? \" yes       [] Yes   [x] No   Educated Patient / Charlotte Doe regarding differences between Advance Directives and portable DNR orders.     Length of ACP Conversation in minutes:  10    Conversation Outcomes:  [x] ACP discussion completed  [] Existing advance directive reviewed with patient; no changes to patient's previously recorded wishes  [] New Advance Directive completed  [] Portable Do Not Rescitate prepared for Provider review and signature  [] POLST/POST/MOLST/MOST prepared for Provider review and signature      Follow-up plan:    [] Schedule follow-up conversation to continue planning  [] Referred individual to Provider for additional questions/concerns   [] Advised patient/agent/surrogate to review completed ACP document and update if needed with changes in condition, patient preferences or care setting    [x] This note routed to one or more involved healthcare providers

## 2022-11-25 NOTE — ED PROVIDER NOTES
Kiera Hobbs is a 60 y.o. female with hx of DM, HTN, CHF, and CAD. Patient is a 60 y.o. female presents with a chief complaint of CP + SOB  This has been occurring for the past three weeks, worsening over the past 1 week. Patient states that it gets better with nothing. Patient states that it gets worse with exertion and conversation. Patient states that it is severe in severity. Patient states it was acute in onset. She notes the sx have been ongoing for 3 weeks and worsening over the past 1 week; pt also complains of nausea and vomiting but denies any abd pain. She has also complained of general malaise. She denies any fevers, chills, sinus pain or pressure, sore throat, congestion, blurry or double vision, abdominal pain, constipation or diarrhea, lightheaded or dizziness, numbness or tingling, or headaches. Patient reports that she is been increasingly short of breath, noting that even just having this conversation she is getting short of breath. Daughter reports that she would not be able to across the room without getting winded. She has never experienced anything like this in the past.  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 Review of Systems   Constitutional:  Positive for appetite change. Negative for chills and fever. HENT:  Negative for congestion, sinus pain and sore throat. Eyes:  Negative for discharge and visual disturbance. Respiratory:  Positive for cough and shortness of breath. Cardiovascular:  Positive for chest pain. Negative for leg swelling. Gastrointestinal:  Positive for nausea and vomiting. Negative for abdominal pain, constipation and diarrhea. Endocrine: Negative for polyuria.    Genitourinary:  Negative for difficulty urinating, dysuria, frequency and hematuria. Musculoskeletal:  Negative for arthralgias and joint swelling. Skin:  Negative for color change and rash. Neurological:  Negative for dizziness, weakness, light-headedness, numbness and headaches. All other systems reviewed and are negative. Physical Exam  Constitutional:       General: She is not in acute distress. Appearance: Normal appearance. HENT:      Head: Normocephalic and atraumatic. Mouth/Throat:      Mouth: Mucous membranes are moist.      Pharynx: Oropharynx is clear. Eyes:      Extraocular Movements: Extraocular movements intact. Conjunctiva/sclera: Conjunctivae normal.      Pupils: Pupils are equal, round, and reactive to light. Cardiovascular:      Rate and Rhythm: Normal rate and regular rhythm. Pulses: Normal pulses. Heart sounds: Normal heart sounds. Pulmonary:      Effort: Pulmonary effort is normal.      Breath sounds: Normal breath sounds. Abdominal:      General: Abdomen is flat. Palpations: Abdomen is soft. Musculoskeletal:         General: No swelling. Normal range of motion. Cervical back: Normal range of motion and neck supple. Skin:     General: Skin is warm and dry. Neurological:      General: No focal deficit present. Mental Status: She is alert and oriented to person, place, and time. Psychiatric:         Mood and Affect: Mood normal.         Behavior: Behavior normal.        Procedures     MDM  Number of Diagnoses or Management Options  Acute pulmonary embolism without acute cor pulmonale, unspecified pulmonary embolism type (HCC)  Chronic anemia  Pericardial effusion  Pleural effusion  Diagnosis management comments: Marlen Valenzuela is a 57-year-old presenting to the ED with complaints of cough, shortness of breath, and chest pain. Patient tachycardic on presentation. CBC reveals anemia of 7.6, this appears to be chronic for patient.   CMP revealed creatinine of 1.4, CO2 of 17, and BUN of 55.  Initial troponin of 39, and repeat 28. Chest x-ray revealing questionable pneumonia in the left lower lobe. As documented in ED course, had long discussion with patient and family regarding risks and benefits of contrast for CTA, they are agreeable. CTA did reveal bilateral pulmonary emboli, small bilateral pleural effusions, and moderate pericardial effusion. Spoke with patient regarding findings and plan to admit to medicine, they are agreeable. Spoke with Dr. Ari Clark who recommended stat echo and consult with cardiology, which came back showing normal left ventricle and systolic function and small to moderate pericardial effusion with no hemodynamic compromise. Spoke with Dr. Marissa Aldridge who stated patient stable to admit at [facility]. Dr. Roselyn Bryant did agree to admit. Pt started on heparin infusion for PE. Amount and/or Complexity of Data Reviewed  Clinical lab tests: reviewed  Tests in the radiology section of CPT®: reviewed  Tests in the medicine section of CPT®: reviewed  Decide to obtain previous medical records or to obtain history from someone other than the patient: yes         ED Course as of 11/25/22 1658   Fri Nov 25, 2022   [id number] ATTENDING PROVIDER ATTESTATION:     I have personally performed and/or participated in the history, exam, medical decision making, and procedures and agree with all pertinent clinical information unless otherwise noted. I have also reviewed and agree with the past medical, family and social history unless otherwise noted. I have discussed this patient in detail with the resident, and provided the instruction and education regarding Patient here for 2 to 3 weeks of worsening shortness of breath worsened with movement and exertion with some diffuse bilateral anterior chest pain. No fevers at home. Mild coughing. No lightheadedness or syncope. No palpitations. No abdominal pain otherwise and no vomiting. .  My findings/plan: Patient laying the bed resting comfortably no distress. Heart rate mildly tachycardic. Lungs are clear and equal.  Abdomen soft and nontender. Left leg amputation noted. Right leg with no pretibial edema or calf pain. She is awake and alert and conversant. No jaundice or icterus. No adenopathy or meningeal signs. [NC]   0900 EKG, sinus tachycardia rate 113. Normal axis, normal conduction, no acute ischemic ST-T wave changes. Otherwise agree with resident. [NC]   6966 TUOFZ with patient and daughter regarding patient's renal function today. Discussed concern for pulmonary embolism and risks and benefits of CTA, especially in the setting of elevated creatinine and decreased GFR today. Thoroughly discussed risks and benefits and patient and daughter are both understanding. Daughter wish to speak with father regarding decision. On repeat examination, daughter notes that they would like to proceed with CTA despite risks associated with contrast. [CP]   1125 Case discussed with Dr. Michael Hernandez, detailed overview given, he will admit the patient however would like [facility] cardiology consulted from the ER with a stat echo based on the CTA read. [NC]   1998 Case discussed with Dr. Suellen Herring for cardiology, detailed overview given, he will consult on the patient and read the echo [NC]   [phone] with Dr. Michael Hernandez regarding echo results; agreed to admit to telemetry. [CP]      ED Course User Index  [CP] Tamara Roca DO  [NC] Jn Bolanos DO        ED Course as of 11/25/22 1658   Fri Nov 25, 2022   8179 ATTENDING PROVIDER ATTESTATION:     I have personally performed and/or participated in the history, exam, medical decision making, and procedures and agree with all pertinent clinical information unless otherwise noted. I have also reviewed and agree with the past medical, family and social history unless otherwise noted.     I have discussed this patient in detail with the resident, and provided the instruction and education regarding Patient here for 2 to 3 weeks of worsening shortness of breath worsened with movement and exertion with some diffuse bilateral anterior chest pain. No fevers at home. Mild coughing. No lightheadedness or syncope. No palpitations. No abdominal pain otherwise and no vomiting. .  My findings/plan: Patient laying the bed resting comfortably no distress. Heart rate mildly tachycardic. Lungs are clear and equal.  Abdomen soft and nontender. Left leg amputation noted. Right leg with no pretibial edema or calf pain. She is awake and alert and conversant. No jaundice or icterus. No adenopathy or meningeal signs. [NC]   0900 EKG, sinus tachycardia rate 113. Normal axis, normal conduction, no acute ischemic ST-T wave changes. Otherwise agree with resident. [NC]   9508 JNCZY with patient and daughter regarding patient's renal function today. Discussed concern for pulmonary embolism and risks and benefits of CTA, especially in the setting of elevated creatinine and decreased GFR today. Thoroughly discussed risks and benefits and patient and daughter are both understanding. Daughter wish to speak with father regarding decision. On repeat examination, daughter notes that they would like to proceed with CTA despite risks associated with contrast. [CP]   1125 Case discussed with Dr. Tanner Paget, detailed overview given, he will admit the patient however would like [facility] cardiology consulted from the ER with a stat echo based on the CTA read. [NC]   4686 Case discussed with Dr. Celso Bowers for cardiology, detailed overview given, he will consult on the patient and read the echo [NC]   [phone] with Dr. Tanner Paget regarding echo results; agreed to admit to telemetry.  [CP]      ED Course User Index  [CP] Ronn Mathew DO  [NC] Valdemar Romero DO       --------------------------------------------- PAST HISTORY ---------------------------------------------  Past Medical History:  has a past medical history of CAD (coronary artery disease), CHF (congestive heart failure) ([address] Diabetic peripheral neuropathy associated with type 2 diabetes mellitus ([facility] Epigastric hernia, Hyperlipidemia, Hypertension, Kidney stone, Schizophrenia ([facility] and Type 2 diabetes mellitus with diabetic polyneuropathy, with long-term current use of insulin ([facility] Past Surgical History:  has a past surgical history that includes Ovary removal (Right); Ovary removal (2014); ovarian cyst removal; Colonoscopy (08/2016); other surgical history (11/04/2016); hernia repair; CYSTOSCOPY INSERTION / REMOVAL STENT / STONE (Bilateral, 10/28/2019); Upper gastrointestinal endoscopy (N/A, 10/29/2019); Leg amputation below knee (Left, 2/29/2020); CYSTOSCOPY INSERTION / REMOVAL STENT / STONE (Bilateral, 5/19/2020); CYSTOSCOPY INSERTION / REMOVAL STENT / STONE (Bilateral, 1/18/2021); Cystoscopy (N/A, 9/7/2021); Cardiac catheterization (09/30/2016); Bladder surgery (Bilateral, 5/2/2022); Femoral artery stent; and Bladder surgery (Left, 11/7/2022). Social History:  reports that she quit smoking about 7 years ago. Her smoking use included cigarettes. She has never used smokeless tobacco. She reports that she does not drink alcohol and does not use drugs. Family History: family history includes Cancer in her brother. The patients home medications have been reviewed. Allergies: Patient has no known allergies.     -------------------------------------------------- RESULTS -------------------------------------------------    LABS:  Results for orders placed or performed during the hospital encounter of 11/25/22   CBC with Auto Differential   Result Value Ref Range    WBC 4.9 4.5 - 11.5 E9/L    RBC 3.24 (L) 3.50 - 5.50 E12/L    Hemoglobin 7.6 (L) 11.5 - 15.5 g/dL    Hematocrit 25.9 (L) 34.0 - 48.0 %    MCV 79.9 (L) 80.0 - 99.9 fL    MCH 23.5 (L) 26.0 - 35.0 pg    MCHC 29.3 (L) 32.0 - 34.5 %    RDW 16.3 (H) 11.5 - 15.0 fL    Platelets 707 (H) 515 - 450 E9/L    MPV 10.6 7.0 - 12.0 fL    Neutrophils % 48.4 43.0 - 80.0 %    Immature Granulocytes % 2.0 0.0 - 5.0 %    Lymphocytes % 33.9 20.0 - 42.0 %    Monocytes % 11.8 2.0 - 12.0 %    Eosinophils % 3.1 0.0 - 6.0 %    Basophils % 0.8 0.0 - 2.0 %    Neutrophils Absolute 2.37 1.80 - 7.30 E9/L    Immature Granulocytes # 0.10 E9/L    Lymphocytes Absolute 1.66 1.50 - 4.00 E9/L    Monocytes Absolute 0.58 0.10 - 0.95 E9/L    Eosinophils Absolute 0.15 0.05 - 0.50 E9/L    Basophils Absolute 0.04 0.00 - 0.20 E9/L   Comprehensive Metabolic Panel w/ Reflex to MG   Result Value Ref Range    Sodium 140 132 - 146 mmol/L    Potassium reflex Magnesium 3.8 3.5 - 5.0 mmol/L    Chloride 110 (H) 98 - 107 mmol/L    CO2 17 (L) 22 - 29 mmol/L    Anion Gap 13 7 - 16 mmol/L    Glucose 123 (H) 74 - 99 mg/dL    BUN 55 (H) 6 - 23 mg/dL    Creatinine 1.4 (H) 0.5 - 1.0 mg/dL    Est, Glom Filt Rate 42 >=60 mL/min/1.73    Calcium 9.5 8.6 - 10.2 mg/dL    Total Protein 8.4 (H) 6.4 - 8.3 g/dL    Albumin 3.7 3.5 - 5.2 g/dL    Total Bilirubin 0.4 0.0 - 1.2 mg/dL    Alkaline Phosphatase 147 (H) 35 - 104 U/L    ALT 37 (H) 0 - 32 U/L    AST 27 0 - 31 U/L   Troponin   Result Value Ref Range    Troponin, High Sensitivity 39 (H) 0 - 9 ng/L   Urinalysis   Result Value Ref Range    Color, UA Yellow Straw/Yellow    Clarity, UA Clear Clear    Glucose, Ur Negative Negative mg/dL    Bilirubin Urine Negative Negative    Ketones, Urine Negative Negative mg/dL    Specific Gravity, UA 1.020 1.005 - 1.030    Blood, Urine LARGE (A) Negative    pH, UA 6.0 5.0 - 9.0    Protein,  (A) Negative mg/dL    Urobilinogen, Urine 1.0 <2.0 E.U./dL    Nitrite, Urine Negative Negative    Leukocyte Esterase, Urine MODERATE (A) Negative   Microscopic Urinalysis   Result Value Ref Range    WBC, UA 1-3 0 - 5 /HPF    RBC, UA >20 0 - 2 /HPF    Bacteria, UA FEW (A) None Seen /HPF   Troponin   Result Value Ref Range    Troponin, High Sensitivity 28 (H) 0 - 9 ng/L   CBC   Result Value Ref Range    WBC 4.7 4.5 - 11.5 E9/L    RBC 2.93 (L) 3.50 - 5.50 E12/L    Hemoglobin 7.0 (L) 11.5 - 15.5 g/dL    Hematocrit 23.3 (L) 34.0 - 48.0 %    MCV 79.5 (L) 80.0 - 99.9 fL    MCH 23.9 (L) 26.0 - 35.0 pg    MCHC 30.0 (L) 32.0 - 34.5 %    RDW 16.5 (H) 11.5 - 15.0 fL    Platelets 182 804 - 122 E9/L    MPV 10.5 7.0 - 12.0 fL   APTT   Result Value Ref Range    aPTT 20.9 (L) 24.5 - 35.1 sec   Brain Natriuretic Peptide   Result Value Ref Range    Pro- (H) 0 - 125 pg/mL   Sedimentation Rate   Result Value Ref Range    Sed Rate 143 (H) 0 - 20 mm/Hr   C-Reactive Protein   Result Value Ref Range    CRP 13.9 (H) 0.0 - 0.4 mg/dL   TSH   Result Value Ref Range    TSH <0.010 (L) 0.270 - 4.200 uIU/mL   T4, Free   Result Value Ref Range    T4 Free 0.98 0.93 - 1.70 ng/dL   EKG 12 Lead   Result Value Ref Range    Ventricular Rate 113 BPM    Atrial Rate 113 BPM    P-R Interval 146 ms    QRS Duration 80 ms    Q-T Interval 316 ms    QTc Calculation (Bazett) 433 ms    P Axis 38 degrees    R Axis 39 degrees    T Axis 144 degrees       RADIOLOGY:  CTA PULMONARY W CONTRAST   Final Result   Bilateral pulmonary emboli nonocclusive in appearance involving the right   upper middle and lower lobes in left lower lobe. Small bilateral pleural   effusions with some fluid seen along the right fissure. There is a moderate   size pericardial he effusion with right heart strain within RV/ LV ratio of   1.8. Findings are discussed with Dr. Randy Le 11/25/2022 at 11:13 a.m.         XR CHEST PORTABLE   Final Result   1. Left lower lobe airspace disease, questionable pneumonia versus   atelectasis. .             EKG:  This EKG is signed and interpreted by me.     Rate: 113  Rhythm: Sinus  Interpretation: sinus tachycardia  Comparison: stable as compared to patient's most recent EKG      ------------------------- NURSING NOTES AND VITALS REVIEWED ---------------------------  Date / Time Roomed:  11/25/2022  8:15 AM  ED Bed Assignment:  [id number]    The nursing notes within the ED encounter and vital signs as below have been reviewed. Patient Vitals for the past 24 hrs:   BP Temp Temp src Pulse Resp SpO2 Height Weight   11/25/22 1503 (!) 123/94 -- -- (!) 114 -- 95 % 5' 3\" (1.6 m) 131 lb (59.4 kg)   11/25/22 1400 -- -- -- -- 25 -- -- --   11/25/22 1043 (!) 160/52 98.4 °F (36.9 °C) Oral (!) 108 24 94 % -- --   11/25/22 0806 (!) 164/55 98.6 °F (37 °C) -- (!) 102 18 97 % -- 139 lb (63 kg)       Oxygen Saturation Interpretation: Normal    ------------------------------------------ PROGRESS NOTES ------------------------------------------  Re-evaluation(s):  Time: 1020  Patients symptoms show no change  Repeat physical examination is not changed    Counseling:  I have spoken with the patient and family  and discussed todays results, in addition to providing specific details for the plan of care and counseling regarding the diagnosis and prognosis. Their questions are answered at this time and they are agreeable with the plan of admission.    --------------------------------- ADDITIONAL PROVIDER NOTES ---------------------------------  Consultations:  Time: 1351. Spoke with Dr. Siri Foley. Discussed case. They will admit the patient. This patient's ED course included: a personal history and physicial examination, multiple bedside re-evaluations, IV medications, and cardiac monitoring    This patient has remained hemodynamically stable during their ED course. Diagnosis:  1. Acute pulmonary embolism without acute cor pulmonale, unspecified pulmonary embolism type ([address]    2. Pericardial effusion    3. Pleural effusion    4. Chronic anemia        Disposition:  Patient's disposition: Admit to telemetry  Patient's condition is stable. Patient was seen and evaluated by myself and my attending Isauro Nguyen DO. Assessment and Plan discussed with attending provider, please see attestation for final plan of care.   This note was done using dictation software and

## 2022-11-25 NOTE — CARE COORDINATION
SS Note: No Covid test. Pt presented for CP & SOB and found to have Pericardial effusion & bilateral nonocclusive PE.    SW met w/pt in ED 17 for transition of care planning. SW explained role. Pt's Dtr & M-POA Zach James @ bedside and pt gives permission to speak openly. Pt is  & lives w/Mamie who is her primary caregiver. Pt lives in 2nd floor. 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 She typically uses quad cane which is in room. She also has w/c and glucometer. PCP is Antoine [address] is [address] [employer] Arelis Scott does help pt at home as needed w/IADL's and SW [facility] [employer] [facility] well as [address] [place] [address]. Denies hx of [facility] & likely to NEVER go to one. Has past hx of HHC w/[facility] & would use them again if needed. Pt also has hx of IRF- [address]. No hx of 02. SW completed ACP w/pt. There is M-POA scanned to chart, however, pages 3,4,5 of the 7-page document missing & Mamie noted she will bring them in; to be scanned to chart. Pt plans on returning home, Arelis Tyler will transport. May need PT/OT via [employer].   Electronically signed by MAGNUS Oneill on 11/25/2022 at 2:48 PM

## 2022-11-25 NOTE — FLOWSHEET NOTE
Pt #20 in left upper arm elbow are removed infiltrated. Cath intact pt tolerated well. Heparin increased per protocol at this time due to Aptt at 50. Will continue to monitor.

## 2022-11-25 NOTE — ED NOTES
Pt connected to cardiac monitor.  Radiology Called Ready for portable XR      Princess Daigle RN  11/25/22 0338

## 2022-11-25 NOTE — ED NOTES
Name: Clare Thacker  : 1960  MRN: [id number]    Date: 2022    Benefits of immediately proceeding with Radiology exam outweigh the risks and therefore the following is being waived:      [] Pregnancy test    [] Protocol for Iodine allergy    [] MRI questionnaire    [x] BUN/Creatinine        DO Steffany White DO  Resident  22 [id number]

## 2022-11-25 NOTE — CONSULTS
INPATIENT CARDIOLOGY CONSULT    Name: Mikaela Allen    Age: 60 y.o. Date of Admission: 11/25/2022  8:15 AM    Date of Service: 11/25/2022    Reason for Consultation: Pericardial effusion    Referring Physician: No admitting provider for patient encounter. Primary Cardiologist: Dr Marie Case    History of Present Illness:   Mikaela Allen is a 60 y.o. female who presented on 11/25/2022 for further evaluation of shortness of breath and chest pain. States has been going on for the past several weeks but worse yesterday, at Thanksgiving dinner was told by family member she did not look well and was encouraged to come to the ER for evaluation. Difficulty characterizing quality of the chest pain, but states intermittent, worse with deep breathing and exertion. Underwent CT which demonstrated bilateral nonocclusive PE as well as a moderate pericardial effusion. Had a cath in 2016 that showed normal coronary arteries. Stat echo today which I reviewed showed small to moderate circumferential pericardial effusion with no evidence of tamponade. RV and LV are normal.    Review of Systems:   Complete review of systems negative except as described above. Past Medical History:  Past Medical History:   Diagnosis Date    CAD (coronary artery disease)     cath 2016    CHF (congestive heart failure) ([address]     Diabetic peripheral neuropathy associated with type 2 diabetes mellitus ([address] 08/24/2018    Epigastric hernia     Hyperlipidemia     Hypertension     Kidney stone     Schizophrenia ([address]     Type 2 diabetes mellitus with diabetic polyneuropathy, with long-term current use of insulin ([address] 09/19/2016    no longer requiring medication. 1/13/21 - continues w/o medications as of 11/2/2022       Past Surgical History:  Past Surgical History:   Procedure Laterality Date    BLADDER SURGERY Bilateral 5/2/2022    CYSTOSCOPY RETROGRADE PYELOGRAM BILATERAL STENT CHANGE performed by Ana Montaño MD at [facility]    BLADDER SURGERY Left 2022    CYSTOSCOPY BILATERAL RETROGRADE PYELOGRAM LEFT STENT CHANGE POSSIBLE RIGHT STENT CHANGE VS REMOVAL performed by Abdulzaiz Leroy MD at [address] [place] [id number]  2016     [facility]    COLONOSCOPY  2016    Dr. Forest Guzman 2021    CYSTOSCOPY RETROGRADE PYELOGRAM, BILATERAL STENT EXCHANGE performed by Abdulaziz Leroy MD at [address] [facility] [address] / [place] Bilateral 10/28/2019    CYSTOSCOPY. RETROGRADE.  PYELOGRAM. BILATERAL STENT INSERTION performed by Richard Granda MD at [address] [facility] [address] / [place] Bilateral 2020    CYSTOSCOPY RETROGRADE PYELOGRAM BILATERAL STENT EXCHANGE performed by Richard Granda MD at [address] [facility] / [address] [facility] / [place] Bilateral 2021    CYSTOSCOPY RETROGRADE PYELOGRAM BILATERAL  STENT CHANGE performed by Abdulaziz Leroy MD at [address] [facility] 2020    LEG AMPUTATION BELOW KNEE performed by Rios Calle DO at [address] [facility]  2016    epigastric hernia umbilical hernia with mesh    OVARIAN CYST REMOVAL      OVARY REMOVAL Right     OVARY REMOVAL      UPPER GASTROINTESTINAL ENDOSCOPY N/A 10/29/2019    EGD ESOPHAGOGASTRODUODENOSCOPY performed by Ashleigh Schreiber MD at [facility] ENDOSCOPY       Family History:  Family History   Problem Relation Age of Onset    Cancer Brother        Social History:  Social History     Tobacco Use    Smoking status: Former     Packs/day: 0.00     Years: 2.00     Pack years: 0.00     Types: Cigarettes     Quit date: 3/23/2015     Years since quittin.6    Smokeless tobacco: Never    Tobacco comments:     quit smoking    Vaping Use    Vaping Use: Never used   Substance Use Topics    Alcohol use: No    Drug use: No       Allergies:  No Known Allergies    Home Medications:  Prior to Admission medications    Medication Sig Start Date End Date Taking? Authorizing Provider   albuterol sulfate HFA (VENTOLIN HFA) 108 (90 Base) MCG/ACT inhaler Inhale 2 puffs into the lungs every 6 hours as needed for Wheezing 11/20/22   Manjeet Malin MD   megestrol (MEGACE) 40 MG tablet Take 1 tablet by mouth daily 11/11/22   Manjeet Malin MD   fluticasone [place]) 50 MCG/ACT nasal spray 2 sprays by Each Nostril route daily 10/19/22   Manjeet Malin MD   spironolactone (ALDACTONE) 25 MG tablet Take 1 tablet by mouth once daily  Patient not taking: Reported on 11/7/2022 10/12/22   Manjeet Malin MD   docusate sodium (COLACE) 100 MG capsule Take 1 capsule by mouth 2 times daily as needed for Constipation 10/12/22   Manjeet Malin MD   oxybutynin (DITROPAN XL) 10 MG extended release tablet Take 1 tablet by mouth daily  Patient not taking: Reported on 11/2/2022 10/12/22   Manjeet Malin MD   ibuprofen (ADVIL;MOTRIN) 800 MG tablet Take 1 tablet by mouth every 12 hours as needed for Pain 10/12/22   Manjeet Malin MD   benazepril (LOTENSIN) 10 MG tablet Take 1 tablet by mouth daily 10/12/22   Manjeet Malin MD   hydrALAZINE (APRESOLINE) 50 MG tablet Take 1 tablet by mouth 4 times daily 10/12/22   Manjeet Malin MD   Handicap Placard MISC by Does not apply route Unable to ambulate more than 20 feet without difficulty  Expires 4/30/2027 4/11/22   Manjeet Malin MD   diazePAM (VALIUM) 2 MG tablet Take 2 mg by mouth every 6 hours as needed for Anxiety.     Historical Provider, MD       Current Medications:    Current Facility-Administered Medications:     heparin (porcine) injection 5,050 Units, 80 Units/kg, IntraVENous, Once, [employer], DO    heparin (porcine) injection 5,050 Units, 80 Units/kg, IntraVENous, PRN, Darrel Solorzano, DO    heparin (porcine) injection 2,520 Units, 40 Units/kg, IntraVENous, PRN, Juan Diego Solorzano DO    heparin 25,000 units in dextrose 5% 250 mL (premix) infusion, 5-30 Units/kg/hr, IntraVENous, Continuous, Maximiliano Arredondo DO    Current Outpatient Medications:     albuterol sulfate HFA (VENTOLIN HFA) 108 (90 Base) MCG/ACT inhaler, Inhale 2 puffs into the lungs every 6 hours as needed for Wheezing, Disp: 18 g, Rfl: 3    megestrol (MEGACE) 40 MG tablet, Take 1 tablet by mouth daily, Disp: 30 tablet, Rfl: 3    fluticasone (FLONASE) 50 MCG/ACT nasal spray, 2 sprays by Each Nostril route daily, Disp: 16 g, Rfl: 1    spironolactone (ALDACTONE) 25 MG tablet, Take 1 tablet by mouth once daily (Patient not taking: Reported on 11/7/2022), Disp: 90 tablet, Rfl: 1    docusate sodium (COLACE) 100 MG capsule, Take 1 capsule by mouth 2 times daily as needed for Constipation, Disp: 180 capsule, Rfl: 1    oxybutynin (DITROPAN XL) 10 MG extended release tablet, Take 1 tablet by mouth daily (Patient not taking: Reported on 11/2/2022), Disp: 90 tablet, Rfl: 1    ibuprofen (ADVIL;MOTRIN) 800 MG tablet, Take 1 tablet by mouth every 12 hours as needed for Pain, Disp: 180 tablet, Rfl: 1    benazepril (LOTENSIN) 10 MG tablet, Take 1 tablet by mouth daily, Disp: 90 tablet, Rfl: 1    hydrALAZINE (APRESOLINE) 50 MG tablet, Take 1 tablet by mouth 4 times daily, Disp: 360 tablet, Rfl: 1    Handicap Placard MISC, by Does not apply route Unable to ambulate more than 20 feet without difficulty Expires 4/30/2027, Disp: 1 each, Rfl: 0    diazePAM (VALIUM) 2 MG tablet, Take 2 mg by mouth every 6 hours as needed for Anxiety. , Disp: , Rfl:     Physical Exam:  BP (!) 160/52   Pulse (!) 108   Temp 98.4 °F (36.9 °C) (Oral)   Resp 24   Wt 139 lb (63 kg)   SpO2 94%   BMI 24.62 kg/m²   Wt Readings from Last 3 Encounters:   11/25/22 139 lb (63 kg)   11/02/22 139 lb (63 kg)   10/12/22 149 lb (67.6 kg)     Appearance: Awake, alert, appears mildly tachypneic  Skin: Intact, no rash  Head: Normocephalic, atraumatic  Eyes: EOMI, no conjunctival erythema  ENMT: No pharyngeal erythema, MMM, no rhinorrhea  Neck: Supple, no elevated JVP, no carotid bruits  Lungs: Diminished bases, few basilar rales  Cardiac: PMI nondisplaced, regular rhythm with a tachycardic rate, normal S1 & S2, no murmurs  Abdomen: Soft, nontender, +bowel sounds  Extremities: Moves all extremities x 4, no lower extremity edema. Left BKA  Neurologic: No focal motor deficits apparent, normal mood and affect  Peripheral Pulses: Intact posterior tibial pulses bilaterally    Intake/Output:  No intake or output data in the 24 hours ending 11/25/22 1319  No intake/output data recorded.     Laboratory Tests:  Recent Labs     11/25/22  0846      K 3.8   *   CO2 17*   BUN 55*   CREATININE 1.4*   GLUCOSE 123*   CALCIUM 9.5     Lab Results   Component Value Date/Time    MG 1.7 08/27/2020 09:48 AM     Recent Labs     11/25/22  0846   ALKPHOS 147*   ALT 37*   AST 27   PROT 8.4*   BILITOT 0.4   LABALBU 3.7     Recent Labs     11/25/22  0846 11/25/22  1145   WBC 4.9 4.7   RBC 3.24* 2.93*   HGB 7.6* 7.0*   HCT 25.9* 23.3*   MCV 79.9* 79.5*   MCH 23.5* 23.9*   MCHC 29.3* 30.0*   RDW 16.3* 16.5*   * 415   MPV 10.6 10.5     Lab Results   Component Value Date    CKTOTAL 56 05/29/2020    TROPONINI 0.04 (H) 08/22/2020    TROPONINI 0.03 08/22/2020    TROPONINI 0.05 (H) 08/22/2020     Lab Results   Component Value Date    INR 1.2 08/22/2020    INR 1.1 08/22/2020    INR 1.9 03/01/2020    PROTIME 13.0 (H) 08/22/2020    PROTIME 12.9 (H) 08/22/2020    PROTIME 21.9 (H) 03/01/2020     Lab Results   Component Value Date    TSH 0.044 (L) 07/19/2021     Lab Results   Component Value Date    LABA1C 5.6 10/12/2022     No results found for: EAG  Lab Results   Component Value Date    CHOL 195 04/20/2022    CHOL 170 07/19/2021    CHOL 122 08/23/2020     Lab Results   Component Value Date    TRIG 144 04/20/2022    TRIG 101 07/19/2021    TRIG 111 08/23/2020 Lab Results   Component Value Date    HDL 38 2022    HDL 41 2021    HDL 32 2020     Lab Results   Component Value Date    LDLCALC 128 (H) 2022    LDLCALC 109 (H) 2021    LDLCALC 68 2020     Lab Results   Component Value Date    LABVLDL 29 2022    LABVLDL 20 2021    LABVLDL 22 2020     No results found for: CHOLHDLRATIO  No results for input(s): PROBNP in the last 72 hours. Cardiac Tests:  EK2022: Sinus tachycardia 113 beats minute. Normal axis neck. Nonspecific T wave changes    Telemetry: Sinus tach low 100s    Chest X-ray:   22    Impression   1. Left lower lobe airspace disease, questionable pneumonia versus   atelectasis. .     CTA chest 22  Impression   Bilateral pulmonary emboli nonocclusive in appearance involving the right   upper middle and lower lobes in left lower lobe. Small bilateral pleural   effusions with some fluid seen along the right fissure. There is a moderate   size pericardial he effusion with right heart strain within RV/ LV ratio of   1.8.     Echocardiogram:   TTE 22   Summary   Normal left ventricular size and systolic function. Ejection fraction is visually estimated at 70-75%. Normal diastolic function. No regional wall motion abnormalities seen. Mild left ventricular concentric hypertrophy noted with basal septal   hypertrophy. Normal right ventricular size and function. There is a small-moderate circumferential pericardial effusion noted with   no evidence of hemodynamic compromise. Compared to study from 2020 the pericardial effusion is a new finding. ЕКАТ[place]РИНА 2020 [place]   Bubble study is negative for right to left shunt. No intracardiac thrombus or vegetation. .   Moderate atherosclerosis of the descending aorta and aortic arch. Normal left ventricular chamber size. Normal left ventricular systolic function. Visually estimated LVEF is 60-65 %.    No wall motion abnormalities. Normal right ventricle structure and function. No significant valvular abnormalities. TTE 8/2020 [place]   Summary   Negative bubble study. Normal left ventricular chamber size. Normal left ventricular systolic function. Visually estimated LVEF is 60-65 %. No wall motion abnormalities. Normal diastolic function. Normal right ventricle structure and function. No significant valvular abnormalities. No comparison study available. Stress test:      Cardiac catheterization:   [facility] 9/2016 [place]  Findings:  Left main: 0% stenosis  LAD: large and wraps around the apex to distal 1/2 of inferior septum. No stenosis  Circumflex: CD. No stenosis  RI: mild ostial disease. Otherwise normal.   RCA: CD. Small. Small RPDA. no stenosis  LV angio: not performed. -------------------------------------------------------------------------------------------------------------------------------------------------------------  IMPRESSION:  Small-moderate pericardial effusion, unclear etiology. Mildly elevated hs-cTnT: 39-28 ng/L. Likely acute myocardial injury in setting of PE  Chest pain likely related to PE  Normal coronary arteries by cath 2016  Acute bilateral PE  Recurrent nephrolithiasis, multiple  procedures, recent cystoscopy with stent exchange  LUAN creatinine 1.4  Acute on chronic anemia Hgb 7.6 -> 7.0 recent baseline 10  PAD, left SFA occlusion with angioplasty and stent 2/2020; left BKA 2/2020    RECOMMENDATIONS:  Hemodynamically stable. No clinical or echocardiographic evidence of tamponade.     Check inflammatory markers ESR and CRP  Check thyroid function  Consider rheumatologic evaluation  Empiric colchicine  Repeat limited echo next week to monitor effusion  Recommend checking viral panel  Anticoagulation per primary service  Recommend ruling out GI bleed given drop in hemoglobin  Further care per primary service    Thank you for allowing me to participate in your patient's care. Please feel free to contact me if you have any questions or concerns. Les Austin MD, [address] [facility]    NOTE: This report was transcribed using voice recognition software. Every effort was made to ensure accuracy; however, inadvertent computerized transcription errors may be present.

## 2022-11-25 NOTE — H&P
Internal Medicine Progress Note    PARDEEP=[facility]    Denis García. Jer Gray., SACHIOGISSELL. Rusty Escobedo D.O., RASHIDA Wells D.O. Edilma Otero D.O. Xiao Arias, MSN, APRN, NP-C  Bonnie Palmer. Valerie Tanner, MSN, APRN-CNP         Department of Internal Medicine  History and Physical    Primary Care Physician: Aroldo Kulkarni MD   Admitting Physician:  Bob Chang DO  Admission date and time: 11/25/2022  8:15 AM    Room:  [address] [place] [address]  Admitting diagnosis: Pericardial effusion [I31.39]  Pleural effusion [J90]  Chronic anemia [D64.9]  Acute pulmonary embolism without acute cor pulmonale, unspecified pulmonary embolism type [facility]) [I26.99]    Patient Name: Kurt Palomo  MRN: [id number]    Date of Service: 11/25/2022     CHIEF COMPLAINT:  Shortness of breath    HISTORY OF PRESENT ILLNESS:    The patient is a very pleasant 76-year-old female who presented to the emergency department with a complaint of worsening shortness of breath. Patient states that approximately 3 weeks ago that she developed shortness of breath. She states over the past week it has worsened. She admits to conversational dyspnea as well as dyspnea at rest and with exertion. States she gets winded very easily. She denies any fever or chills. She does admit to chest pain that worsens with a deep breath or cough. States nothing is really made her breathing better. But talking and exertion make it worse. Denies fever or chills. Admits she has had bouts of nausea and vomiting. Currently feels fine and would like to eat. Denies any swelling in the lower extremities. Patient states she has history of low the knee amputation on the right. Patient states she underwent amputation February 29, 2020. Review of chart revealed the patient had gangrene and severe peripheral vascular disease of the left lower extremity necessitating below the knee amputation. The emergency department patient was found to have bilateral nonocclusive pulmonary emboli as well as moderate pericardial effusion on CT. Patient also underwent echocardiogram today which revealed LVEF of 70 to 75%. Normal diastolic function. Small-moderate circumferential pericardial effusion noted with no evidence of hemodynamic compromise. Patient was needed further evaluation treatment and admitted to the services of Dr. Paige Zelaya and Dr. Abimbola Russell. Consultation was made with cardiology. PAST MEDICAL Hx:  Past Medical History:   Diagnosis Date    CAD (coronary artery disease)     cath 2016    CHF (congestive heart failure) ([facility]     Diabetic peripheral neuropathy associated with type 2 diabetes mellitus ([facility] 08/24/2018    Epigastric hernia     Hyperlipidemia     Hypertension     Kidney stone     Schizophrenia ([facility]     Type 2 diabetes mellitus with diabetic polyneuropathy, with long-term current use of insulin ([facility] [facility] 09/19/2016    no longer requiring medication. 1/13/21 - continues w/o medications as of 11/2/2022       PAST SURGICAL Hx:   Past Surgical History:   Procedure Laterality Date    BLADDER SURGERY Bilateral 5/2/2022    CYSTOSCOPY RETROGRADE PYELOGRAM BILATERAL STENT CHANGE performed by Catalina Penn MD at [facility] Left 11/7/2022    CYSTOSCOPY BILATERAL RETROGRADE PYELOGRAM LEFT STENT CHANGE POSSIBLE RIGHT STENT CHANGE VS REMOVAL performed by Catalina Penn MD at [facility]  09/30/2016     [facility]    COLONOSCOPY  08/2016    Dr. Michael Alberto 9/7/2021    CYSTOSCOPY RETROGRADE PYELOGRAM, BILATERAL STENT EXCHANGE performed by [facility] Penn MD at [facility] Bilateral 10/28/2019    CYSTOSCOPY. RETROGRADE.  PYELOGRAM. BILATERAL STENT INSERTION performed by Rama Bella MD at [facility] / STONE Bilateral 5/19/2020    CYSTOSCOPY RETROGRADE PYELOGRAM BILATERAL STENT EXCHANGE performed by Jaqueline Deshpande MD at [facility] / [address] / STONE Bilateral 1/18/2021    CYSTOSCOPY RETROGRADE PYELOGRAM BILATERAL  STENT CHANGE performed by Tim Schuster MD at [address] Left 2/29/2020    LEG AMPUTATION BELOW KNEE performed by Maximiliano Bautista DO at [address] [facility]  11/04/2016    epigastric hernia umbilical hernia with mesh    OVARIAN CYST REMOVAL      OVARY REMOVAL Right     OVARY REMOVAL  2014    UPPER GASTROINTESTINAL ENDOSCOPY N/A 10/29/2019    EGD ESOPHAGOGASTRODUODENOSCOPY performed by Elvin Wing MD at [facility] Hx:  Family History   Problem Relation Age of Onset    Cancer Brother        HOME MEDICATIONS:  Prior to Admission medications    Medication Sig Start Date End Date Taking?  Authorizing Provider   albuterol sulfate HFA (VENTOLIN HFA) 108 (90 Base) MCG/ACT inhaler Inhale 2 puffs into the lungs every 6 hours as needed for Wheezing 11/20/22   Rhys Meyer MD   megestrol (MEGACE) 40 MG tablet Take 1 tablet by mouth daily 11/11/22   Rhys Meyer MD   fluticasone [facility]) 50 MCG/ACT nasal spray 2 sprays by Each Nostril route daily 10/19/22   Rhys Meyer MD   spironolactone (ALDACTONE) 25 MG tablet Take 1 tablet by mouth once daily  Patient not taking: No sig reported 10/12/22   Rhys Meyer MD   docusate sodium (COLACE) 100 MG capsule Take 1 capsule by mouth 2 times daily as needed for Constipation  Patient not taking: Reported on 11/25/2022 10/12/22   Rhys Meyer MD   oxybutynin (DITROPAN XL) 10 MG extended release tablet Take 1 tablet by mouth daily  Patient not taking: No sig reported 10/12/22   Rhys Meyer MD   ibuprofen (ADVIL;MOTRIN) 800 MG tablet Take 1 tablet by mouth every 12 hours as needed for Pain 10/12/22 Claudia Guadalupe MD   benazepril (LOTENSIN) 10 MG tablet Take 1 tablet by mouth daily  Patient not taking: Reported on 2022 10/12/22   Claudia Guadalupe MD   hydrALAZINE (APRESOLINE) 50 MG tablet Take 1 tablet by mouth 4 times daily 10/12/22   Claudia Guadalupe MD   Handicap Placard MISC by Does not apply route Unable to ambulate more than 20 feet without difficulty  Expires 2027   Claudia Guadalupe MD   diazePAM (VALIUM) 2 MG tablet Take 2 mg by mouth every 6 hours as needed for Anxiety. Historical Provider, MD       ALLERGIES:  Patient has no known allergies. SOCIAL Hx:  Social History     Socioeconomic History    Marital status:      Spouse name: Not on file    Number of children: 3    Years of education: Not on file    Highest education level: Not on file   Occupational History    Not on file   Tobacco Use    Smoking status: Former     Packs/day: 0.00     Years: 2.00     Pack years: 0.00     Types: Cigarettes     Quit date: 3/23/2015     Years since quittin.6    Smokeless tobacco: Never    Tobacco comments:     quit smoking 2015   Vaping Use    Vaping Use: Never used   Substance and Sexual Activity    Alcohol use: No    Drug use: No    Sexual activity: Not on file   Other Topics Concern    Not on file   Social History Narrative    Not on file     Social Determinants of Health     Financial Resource Strain: Not on file   Food Insecurity: No Food Insecurity    Worried About Running Out of Food in the Last Year: Never true    Ran Out of Food in the Last Year: Never true   Transportation Needs: Not on file   Physical Activity: Insufficiently Active    Days of Exercise per Week: 2 days    Minutes of Exercise per Session: 20 min   Stress: No Stress Concern Present    Feeling of Stress :  Only a little   Social Connections: Not on file   Intimate Partner Violence: Not on file   Housing Stability: Not on file       ROS:  General:   Denies chills, positive for fatigue, denies fever, malaise, night sweats or weight loss    Psychological:   Denies anxiety, disorientation or hallucinations    ENT:    Denies epistaxis, headaches, vertigo or visual changes    Cardiovascular:   Positive for chest pain with coughing and deep inspiration-found to have bilateral nonocclusive PE on CT, denies irregular heartbeats, or palpitations. No paroxysmal nocturnal dyspnea. Respiratory:   Positive for shortness of breath-HPI, occasional coughing, denies sputum production, hemoptysis, or wheezing. Gastrointestinal:   Was having nausea, vomiting-currently denies no diarrhea, or constipation. Denies any abdominal pain. Denies change in bowel habits or stools. Genito-Urinary:    Denies any urgency, frequency, hematuria. Voiding without difficulty. Musculoskeletal:   Denies joint pain, joint stiffness, joint swelling or muscle pain    Neurology:    Denies any headache or focal neurological deficits. No weakness or paresthesia. Derm:    Denies any rashes, ulcers, or excoriations. Denies bruising. Extremities:   Denies any lower extremity swelling or edema. Positive for left below the knee amputation      PHYSICAL EXAM:  VITALS:  Blood pressure (!) 123/94, pulse (!) 114, temperature 98.4 °F (36.9 °C), temperature source Oral, resp. rate 25, height 5' 3\" (1.6 m), weight 131 lb (59.4 kg), SpO2 95 %, not currently breastfeeding. CONSTITUTIONAL:    Awake, alert, cooperative, no apparent distress, and appears stated age    EYES:    PERRL, EOMI, sclera clear, conjunctiva normal    ENT:    Normocephalic, atraumatic, sinuses nontender on palpation. External ears without lesions. Oral pharynx with moist mucus membranes.       NECK:    Supple, symmetrical, trachea midline, no adenopathy, thyroid symmetric, not enlarged and no tenderness, skin normal, no bruits, no JVD    HEMATOLOGIC/LYMPHATICS:    No cervical lymphadenopathy and no supraclavicular lymphadenopathy    LUNGS:    Symmetric. No increased work of breathing, diminished throughout. , bibasilar rales. CARDIOVASCULAR:    Normal apical impulse, regular rate and rhythm, normal S1 and S2, no S3 or S4, and no murmur noted    ABDOMEN:    Normal bowel sounds, soft, non-distended, non-tender, no masses palpated, no hepatosplenomegaly, no rebound or guarding elicited on palpation     MUSCULOSKELETAL:    There is no redness, warmth, or swelling of the joints. Full range of motion noted. Left below the knee amputation. Tone is normal.    NEUROLOGIC:    Awake, alert, oriented to name, place and time. Cranial nerves II-XII are grossly intact. Moves all extremities. SKIN:    No bruising or bleeding. No redness, warmth, or swelling    EXTREMITIES:    Peripheral pulses present. No edema, cyanosis, or swelling. OSTEOPATHIC:    Examined in seated and supine positions. Normal thoracic kyphosis and lumbar lordosis. No acute somatic dysfunction.     LABORATORY DATA:  CBC with Differential:    Lab Results   Component Value Date/Time    WBC 4.7 11/25/2022 11:45 AM    RBC 2.93 11/25/2022 11:45 AM    HGB 7.0 11/25/2022 11:45 AM    HCT 23.3 11/25/2022 11:45 AM     11/25/2022 11:45 AM    MCV 79.5 11/25/2022 11:45 AM    MCH 23.9 11/25/2022 11:45 AM    MCHC 30.0 11/25/2022 11:45 AM    RDW 16.5 11/25/2022 11:45 AM    NRBC 1.7 03/03/2020 03:51 AM    SEGSPCT 51 04/26/2012 09:30 AM    METASPCT 0.9 03/02/2020 05:55 AM    LYMPHOPCT 33.9 11/25/2022 08:46 AM    MONOPCT 11.8 11/25/2022 08:46 AM    MYELOPCT 0.9 06/24/2022 01:34 PM    BASOPCT 0.8 11/25/2022 08:46 AM    MONOSABS 0.58 11/25/2022 08:46 AM    LYMPHSABS 1.66 11/25/2022 08:46 AM    EOSABS 0.15 11/25/2022 08:46 AM    BASOSABS 0.04 11/25/2022 08:46 AM     CMP:    Lab Results   Component Value Date/Time     11/25/2022 08:46 AM    K 3.8 11/25/2022 08:46 AM     11/25/2022 08:46 AM    CO2 17 11/25/2022 08:46 AM    BUN 55 11/25/2022 08:46 AM CREATININE 1.4 11/25/2022 08:46 AM    GFRAA >60 06/24/2022 01:34 PM    LABGLOM 42 11/25/2022 08:46 AM    GLUCOSE 123 11/25/2022 08:46 AM    GLUCOSE 171 04/26/2012 09:30 AM    PROT 8.4 11/25/2022 08:46 AM    LABALBU 3.7 11/25/2022 08:46 AM    LABALBU 4.3 04/26/2012 09:30 AM    CALCIUM 9.5 11/25/2022 08:46 AM    BILITOT 0.4 11/25/2022 08:46 AM    ALKPHOS 147 11/25/2022 08:46 AM    AST 27 11/25/2022 08:46 AM    ALT 37 11/25/2022 08:46 AM       ASSESSMENT:  Shortness of breath with findings of acute bilateral pulmonary emboli secondary to Megace? Small-moderate pericardial effusion-unclear etiology  Likely myocardial injury vs stress ischemia in the setting of PE  Chronic kidney disease stage III with baseline creatinine of approximately 1.2  History of recurrent nephrolithiasis with multiple  procedures including stent exchange  Acute on chronic anemia with previous work-up by hematology  Peripheral artery disease with left SFA occlusion with angioplasty and stenting 2/2020 and left below the knee amputation 2/2020  Hyperlipidemia  Hypothyroidism  Schizophrenia  Hypertension  Type 2 diabetes mellitus with diabetic polyneuropathy      PLAN:  Patient was admitted to the fifth floor. Consultation was made to cardiology secondary to findings of pericardial effusion on CT imaging. Echocardiogram has been performed which revealed small-moderate circumferential pericardial effusion with no evidence of hemodynamic compromise. Cardiology recommendations been reviewed. Patient found to have bilateral pulmonary emboli. Continue heparin drip as ordered. Will obtain ultrasound of the bilateral lower extremities for further evaluation of PE as patient has extensive history of PAD. We will do further work-up in regard to patient's anemia. Been on B12/folate and iron and TIBC have been ordered. We will consult hematology for further evaluation of anemia/PE as well. Monitor blood glucose levels and treat accordingly. PT/OT to evaluate and treat. Activity as tolerated with assistance. Diet as ordered.  for discharge planning. BENJI Cannon CNP,  3:39 PM  11/25/2022    Electronically signed by BENJI Cannon CNP on 11/25/22 at 3:39 PM EST      I have personally participated in the history and medical decision making with the nurse practitioner on the date of service and I agree with all the pertinent clinical information unless otherwise noted. I have also reviewed and agree with the past medical, family, and social history unless otherwise noted.       Shayy Akins DO, D.O., [place]  4:04 PM  11/25/2022

## 2022-11-26 ENCOUNTER — APPOINTMENT (OUTPATIENT)
Dept: CT IMAGING | Age: 62
DRG: 175 | End: 2022-11-26
Payer: MEDICARE

## 2022-11-26 LAB
ABO/RH: NORMAL
ALBUMIN SERPL-MCNC: 3.1 G/DL (ref 3.5–5.2)
ALP BLD-CCNC: 134 U/L (ref 35–104)
ALT SERPL-CCNC: 33 U/L (ref 0–32)
ANION GAP SERPL CALCULATED.3IONS-SCNC: 13 MMOL/L (ref 7–16)
ANTIBODY SCREEN: NORMAL
APTT: 100.6 SEC (ref 24.5–35.1)
APTT: 73.4 SEC (ref 24.5–35.1)
APTT: 85.5 SEC (ref 24.5–35.1)
APTT: 88.9 SEC (ref 24.5–35.1)
AST SERPL-CCNC: 26 U/L (ref 0–31)
BASOPHILS ABSOLUTE: 0.03 E9/L (ref 0–0.2)
BASOPHILS RELATIVE PERCENT: 0.5 % (ref 0–2)
BILIRUB SERPL-MCNC: 0.4 MG/DL (ref 0–1.2)
BLOOD BANK DISPENSE STATUS: NORMAL
BLOOD BANK PRODUCT CODE: NORMAL
BPU ID: NORMAL
BUN BLDV-MCNC: 40 MG/DL (ref 6–23)
CALCIUM SERPL-MCNC: 9.2 MG/DL (ref 8.6–10.2)
CEA: 1 NG/ML (ref 0–5.2)
CHLORIDE BLD-SCNC: 109 MMOL/L (ref 98–107)
CHOLESTEROL, TOTAL: 124 MG/DL (ref 0–199)
CO2: 17 MMOL/L (ref 22–29)
CREAT SERPL-MCNC: 1.2 MG/DL (ref 0.5–1)
DESCRIPTION BLOOD BANK: NORMAL
EOSINOPHILS ABSOLUTE: 0.23 E9/L (ref 0.05–0.5)
EOSINOPHILS RELATIVE PERCENT: 4.1 % (ref 0–6)
FERRITIN: 185 NG/ML
GFR SERPL CREATININE-BSD FRML MDRD: 51 ML/MIN/1.73
GLUCOSE BLD-MCNC: 99 MG/DL (ref 74–99)
HBA1C MFR BLD: 5.9 % (ref 4–5.6)
HCT VFR BLD CALC: 22.5 % (ref 34–48)
HCT VFR BLD CALC: 22.7 % (ref 34–48)
HCT VFR BLD CALC: 27.9 % (ref 34–48)
HDLC SERPL-MCNC: 26 MG/DL
HEMOGLOBIN: 6.7 G/DL (ref 11.5–15.5)
HEMOGLOBIN: 8.5 G/DL (ref 11.5–15.5)
IMMATURE GRANULOCYTES #: 0.14 E9/L
IMMATURE GRANULOCYTES %: 2.5 % (ref 0–5)
IMMATURE RETIC FRACT: 29.3 % (ref 3–15.9)
LDL CHOLESTEROL CALCULATED: 73 MG/DL (ref 0–99)
LYMPHOCYTES ABSOLUTE: 1.76 E9/L (ref 1.5–4)
LYMPHOCYTES RELATIVE PERCENT: 31.1 % (ref 20–42)
MCH RBC QN AUTO: 23.4 PG (ref 26–35)
MCHC RBC AUTO-ENTMCNC: 29.8 % (ref 32–34.5)
MCV RBC AUTO: 78.7 FL (ref 80–99.9)
MONOCYTES ABSOLUTE: 0.68 E9/L (ref 0.1–0.95)
MONOCYTES RELATIVE PERCENT: 12 % (ref 2–12)
NEUTROPHILS ABSOLUTE: 2.82 E9/L (ref 1.8–7.3)
NEUTROPHILS RELATIVE PERCENT: 49.8 % (ref 43–80)
PDW BLD-RTO: 16.5 FL (ref 11.5–15)
PLATELET # BLD: 385 E9/L (ref 130–450)
PMV BLD AUTO: 11.1 FL (ref 7–12)
POTASSIUM SERPL-SCNC: 3.9 MMOL/L (ref 3.5–5)
RBC # BLD: 2.86 E12/L (ref 3.5–5.5)
RETIC HGB EQUIVALENT: 22.4 PG (ref 28.2–36.6)
RETICULOCYTE ABSOLUTE COUNT: 0.11 E12/L
RETICULOCYTE COUNT PCT: 3.7 % (ref 0.4–1.9)
RHEUMATOID FACTOR: 11 IU/ML (ref 0–13)
SEDIMENTATION RATE, ERYTHROCYTE: >150 MM/HR (ref 0–20)
SODIUM BLD-SCNC: 139 MMOL/L (ref 132–146)
T3 FREE: 2.8 PG/ML (ref 2–4.4)
TOTAL PROTEIN: 7.5 G/DL (ref 6.4–8.3)
TRIGL SERPL-MCNC: 123 MG/DL (ref 0–149)
TSH SERPL DL<=0.05 MIU/L-ACNC: <0.01 UIU/ML (ref 0.27–4.2)
VLDLC SERPL CALC-MCNC: 25 MG/DL
WBC # BLD: 5.7 E9/L (ref 4.5–11.5)

## 2022-11-26 PROCEDURE — 2060000000 HC ICU INTERMEDIATE R&B

## 2022-11-26 PROCEDURE — 86431 RHEUMATOID FACTOR QUANT: CPT

## 2022-11-26 PROCEDURE — 6360000002 HC RX W HCPCS: Performed by: INTERNAL MEDICINE

## 2022-11-26 PROCEDURE — 80061 LIPID PANEL: CPT

## 2022-11-26 PROCEDURE — 86923 COMPATIBILITY TEST ELECTRIC: CPT

## 2022-11-26 PROCEDURE — P9016 RBC LEUKOCYTES REDUCED: HCPCS

## 2022-11-26 PROCEDURE — 6370000000 HC RX 637 (ALT 250 FOR IP): Performed by: INTERNAL MEDICINE

## 2022-11-26 PROCEDURE — 6360000004 HC RX CONTRAST MEDICATION: Performed by: RADIOLOGY

## 2022-11-26 PROCEDURE — 87088 URINE BACTERIA CULTURE: CPT

## 2022-11-26 PROCEDURE — 82784 ASSAY IGA/IGD/IGG/IGM EACH: CPT

## 2022-11-26 PROCEDURE — 99233 SBSQ HOSP IP/OBS HIGH 50: CPT | Performed by: INTERNAL MEDICINE

## 2022-11-26 PROCEDURE — 86038 ANTINUCLEAR ANTIBODIES: CPT

## 2022-11-26 PROCEDURE — 85045 AUTOMATED RETICULOCYTE COUNT: CPT

## 2022-11-26 PROCEDURE — 84165 PROTEIN E-PHORESIS SERUM: CPT

## 2022-11-26 PROCEDURE — 86901 BLOOD TYPING SEROLOGIC RH(D): CPT

## 2022-11-26 PROCEDURE — 85014 HEMATOCRIT: CPT

## 2022-11-26 PROCEDURE — 2580000003 HC RX 258: Performed by: INTERNAL MEDICINE

## 2022-11-26 PROCEDURE — 36430 TRANSFUSION BLD/BLD COMPNT: CPT

## 2022-11-26 PROCEDURE — 74176 CT ABD & PELVIS W/O CONTRAST: CPT

## 2022-11-26 PROCEDURE — 80053 COMPREHEN METABOLIC PANEL: CPT

## 2022-11-26 PROCEDURE — 85730 THROMBOPLASTIN TIME PARTIAL: CPT

## 2022-11-26 PROCEDURE — 84443 ASSAY THYROID STIM HORMONE: CPT

## 2022-11-26 PROCEDURE — 36415 COLL VENOUS BLD VENIPUNCTURE: CPT

## 2022-11-26 PROCEDURE — 84481 FREE ASSAY (FT-3): CPT

## 2022-11-26 PROCEDURE — 83036 HEMOGLOBIN GLYCOSYLATED A1C: CPT

## 2022-11-26 PROCEDURE — 82378 CARCINOEMBRYONIC ANTIGEN: CPT

## 2022-11-26 PROCEDURE — 82728 ASSAY OF FERRITIN: CPT

## 2022-11-26 PROCEDURE — 85651 RBC SED RATE NONAUTOMATED: CPT

## 2022-11-26 PROCEDURE — 86850 RBC ANTIBODY SCREEN: CPT

## 2022-11-26 PROCEDURE — 85025 COMPLETE CBC W/AUTO DIFF WBC: CPT

## 2022-11-26 PROCEDURE — 86900 BLOOD TYPING SEROLOGIC ABO: CPT

## 2022-11-26 PROCEDURE — 85018 HEMOGLOBIN: CPT

## 2022-11-26 RX ORDER — MIRTAZAPINE 15 MG/1
7.5 TABLET, FILM COATED ORAL NIGHTLY
Status: DISCONTINUED | OUTPATIENT
Start: 2022-11-26 | End: 2022-11-29 | Stop reason: HOSPADM

## 2022-11-26 RX ORDER — ONDANSETRON 2 MG/ML
4 INJECTION INTRAMUSCULAR; INTRAVENOUS EVERY 4 HOURS PRN
Status: DISCONTINUED | OUTPATIENT
Start: 2022-11-26 | End: 2022-11-29 | Stop reason: HOSPADM

## 2022-11-26 RX ORDER — ACETAMINOPHEN 500 MG
1000 TABLET ORAL ONCE
Status: COMPLETED | OUTPATIENT
Start: 2022-11-26 | End: 2022-11-26

## 2022-11-26 RX ORDER — FOLIC ACID 1 MG/1
1 TABLET ORAL DAILY
Status: DISCONTINUED | OUTPATIENT
Start: 2022-11-26 | End: 2022-11-29 | Stop reason: HOSPADM

## 2022-11-26 RX ORDER — BENZONATATE 100 MG/1
100 CAPSULE ORAL 3 TIMES DAILY PRN
Status: DISCONTINUED | OUTPATIENT
Start: 2022-11-26 | End: 2022-11-29 | Stop reason: HOSPADM

## 2022-11-26 RX ORDER — SODIUM CHLORIDE 9 MG/ML
INJECTION, SOLUTION INTRAVENOUS PRN
Status: DISCONTINUED | OUTPATIENT
Start: 2022-11-26 | End: 2022-11-29 | Stop reason: HOSPADM

## 2022-11-26 RX ADMIN — FLUTICASONE PROPIONATE 2 SPRAY: 50 SPRAY, METERED NASAL at 08:16

## 2022-11-26 RX ADMIN — SODIUM CHLORIDE 125 MG: 9 INJECTION, SOLUTION INTRAVENOUS at 15:53

## 2022-11-26 RX ADMIN — PYRIDOXINE HCL TAB 50 MG 50 MG: 50 TAB at 08:14

## 2022-11-26 RX ADMIN — ACETAMINOPHEN 650 MG: 325 TABLET ORAL at 18:26

## 2022-11-26 RX ADMIN — HYDRALAZINE HYDROCHLORIDE 50 MG: 25 TABLET, FILM COATED ORAL at 14:16

## 2022-11-26 RX ADMIN — DIAZEPAM 2 MG: 2 TABLET ORAL at 20:38

## 2022-11-26 RX ADMIN — COLCHICINE 0.6 MG: 0.6 TABLET, FILM COATED ORAL at 18:26

## 2022-11-26 RX ADMIN — ACETAMINOPHEN 650 MG: 325 TABLET ORAL at 08:14

## 2022-11-26 RX ADMIN — ACETAMINOPHEN 1000 MG: 500 TABLET ORAL at 22:03

## 2022-11-26 RX ADMIN — ACETAMINOPHEN 650 MG: 325 TABLET ORAL at 14:16

## 2022-11-26 RX ADMIN — HYDRALAZINE HYDROCHLORIDE 50 MG: 25 TABLET, FILM COATED ORAL at 18:26

## 2022-11-26 RX ADMIN — BENZONATATE 100 MG: 100 CAPSULE ORAL at 22:03

## 2022-11-26 RX ADMIN — HYDRALAZINE HYDROCHLORIDE 50 MG: 25 TABLET, FILM COATED ORAL at 08:14

## 2022-11-26 RX ADMIN — HYDRALAZINE HYDROCHLORIDE 50 MG: 25 TABLET, FILM COATED ORAL at 20:13

## 2022-11-26 RX ADMIN — ONDANSETRON 4 MG: 2 INJECTION INTRAMUSCULAR; INTRAVENOUS at 10:49

## 2022-11-26 RX ADMIN — APIXABAN 10 MG: 5 TABLET, FILM COATED ORAL at 20:13

## 2022-11-26 RX ADMIN — MIRTAZAPINE 7.5 MG: 15 TABLET, FILM COATED ORAL at 20:13

## 2022-11-26 RX ADMIN — IOPAMIDOL 50 ML: 612 INJECTION, SOLUTION INTRAVENOUS at 13:17

## 2022-11-26 RX ADMIN — FOLIC ACID 1 MG: 1 TABLET ORAL at 08:14

## 2022-11-26 ASSESSMENT — PAIN DESCRIPTION - LOCATION: LOCATION: HEAD

## 2022-11-26 ASSESSMENT — PAIN DESCRIPTION - ORIENTATION
ORIENTATION: ANTERIOR
ORIENTATION: ANTERIOR

## 2022-11-26 ASSESSMENT — PAIN - FUNCTIONAL ASSESSMENT
PAIN_FUNCTIONAL_ASSESSMENT: PREVENTS OR INTERFERES WITH MANY ACTIVE NOT PASSIVE ACTIVITIES
PAIN_FUNCTIONAL_ASSESSMENT: PREVENTS OR INTERFERES WITH ALL ACTIVE AND SOME PASSIVE ACTIVITIES

## 2022-11-26 ASSESSMENT — PAIN SCALES - GENERAL
PAINLEVEL_OUTOF10: 3
PAINLEVEL_OUTOF10: 0
PAINLEVEL_OUTOF10: 3
PAINLEVEL_OUTOF10: 3
PAINLEVEL_OUTOF10: 0

## 2022-11-26 ASSESSMENT — PAIN DESCRIPTION - DESCRIPTORS
DESCRIPTORS: ACHING;CRAMPING;DISCOMFORT
DESCRIPTORS: ACHING;CRAMPING;DISCOMFORT

## 2022-11-26 NOTE — CONSULTS
requiring medication. 1/13/21 - continues w/o medications as of 11/2/2022       Patient Active Problem List    Diagnosis Date Noted    Pericardial effusion 11/25/2022    Anorexia 07/14/2021    PVD (peripheral vascular disease) ([facility] 04/12/2021    History of arterial ischemic stroke 08/22/2020    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 08/22/2020    Chronic diastolic heart failure ([facility] 08/22/2020    Paresthesia of hand, bilateral 08/17/2020    Paresthesia of left foot 08/17/2020    Unintended weight loss 08/17/2020    Hypertensive emergency 05/29/2020    Hypoglycemia 05/17/2020    Arterial occlusion 02/23/2020    Microcytic hypochromic anemia 12/12/2019    Hydroureter 11/08/2019    Hx of diabetic foot ulcer 01/22/2019    Hx of BKA, left ([facility] 01/22/2019    Diabetic peripheral neuropathy ([facility] 08/24/2018    Hyperthyroidism 04/19/2018    Dyslipidemia 10/17/2016    S/P cardiac catheterization 10/17/2016    Abnormal stress ECG 10/17/2016    Essential hypertension 09/19/2016    Type 2 diabetes mellitus with diabetic peripheral angiopathy without gangrene, without long-term current use of insulin ([facility] 09/19/2016    Epigastric hernia     Umbilical hernia         Past Surgical History:   Procedure Laterality Date    BLADDER SURGERY Bilateral 5/2/2022    CYSTOSCOPY RETROGRADE PYELOGRAM BILATERAL STENT CHANGE performed by Kenneth Farley MD at [place] [address] Left 11/7/2022    CYSTOSCOPY BILATERAL RETROGRADE PYELOGRAM LEFT STENT CHANGE POSSIBLE RIGHT STENT CHANGE VS REMOVAL performed by Kenneth Farley MD at [address] [place] [address]  09/30/2016     [facility]    COLONOSCOPY  08/2016    Dr. Ismael Becker 9/7/2021    CYSTOSCOPY RETROGRADE PYELOGRAM, BILATERAL STENT EXCHANGE performed by Kenneth Farley MD at [facility] [address] / STONE Bilateral 10/28/2019    CYSTOSCOPY. RETROGRADE.  PYELOGRAM. BILATERAL STENT INSERTION performed by Chantal Guzman MD at [facility]    CYSTOSCOPY INSERTION / REMOVAL STENT / STONE Bilateral 5/19/2020    CYSTOSCOPY RETROGRADE PYELOGRAM BILATERAL STENT EXCHANGE performed by Mickey Harry MD at [address] / Yeni Barrett / Daniel Zhang Bilateral 1/18/2021    CYSTOSCOPY RETROGRADE PYELOGRAM BILATERAL  STENT CHANGE performed by Migdalia Mari MD at [address] Left 2/29/2020    LEG AMPUTATION BELOW KNEE performed by Gladys Khan DO at [facility]  11/04/2016    epigastric hernia umbilical hernia with mesh    OVARIAN CYST REMOVAL      OVARY REMOVAL Right     OVARY REMOVAL  2014    UPPER GASTROINTESTINAL ENDOSCOPY N/A 10/29/2019    EGD ESOPHAGOGASTRODUODENOSCOPY performed by Mohsen Lauren MD at [facility] ENDOSCOPY       Family History  Family History   Problem Relation Age of Onset    Cancer Brother        Social History    TOBACCO:   reports that she quit smoking about 7 years ago. Her smoking use included cigarettes. She has never used smokeless tobacco.  ETOH:   reports no history of alcohol use. Home Medications  Prior to Admission medications    Medication Sig Start Date End Date Taking?  Authorizing Provider   albuterol sulfate HFA (VENTOLIN HFA) 108 (90 Base) MCG/ACT inhaler Inhale 2 puffs into the lungs every 6 hours as needed for Wheezing 11/20/22   Austin Gao MD   megestrol (MEGACE) 40 MG tablet Take 1 tablet by mouth daily 11/11/22   Austin Gao MD   fluticasone Gretchen Duck) 50 MCG/ACT nasal spray 2 sprays by Each Nostril route daily 10/19/22   Austin Gao MD   spironolactone (ALDACTONE) 25 MG tablet Take 1 tablet by mouth once daily  Patient not taking: No sig reported 10/12/22   Austin Gao MD   oxybutynin (DITROPAN XL) 10 MG extended release tablet Take 1 tablet by mouth daily  Patient not taking: No sig reported 10/12/22   Austin Gao MD ibuprofen (ADVIL;MOTRIN) 800 MG tablet Take 1 tablet by mouth every 12 hours as needed for Pain 10/12/22   Austin Gao MD   hydrALAZINE (APRESOLINE) 50 MG tablet Take 1 tablet by mouth 4 times daily 10/12/22   Austin Gao MD   Handicap Placard MISC by Does not apply route Unable to ambulate more than 20 feet without difficulty  Expires 4/30/2027 4/11/22   Austin Gao MD   diazePAM (VALIUM) 2 MG tablet Take 2 mg by mouth every 6 hours as needed for Anxiety. Historical Provider, MD       Allergies  No Known Allergies    Review of Systems:    As in HPI, otherwise negative      Objective  /74   Pulse (!) 101   Temp 98.2 °F (36.8 °C) (Tympanic)   Resp 18   Ht 5' 3\" (1.6 m)   Wt 131 lb (59.4 kg)   SpO2 98%   BMI 23.21 kg/m²     Physical Exam:   Performance Status:  General: AAO to person, place, time, in no acute distress,   Head and neck : PERRLA, EOMI . Sclera non icteric. Oropharynx : Clear  Neck: no JVD,  no adenopathy  Heart: Regular rate and regular rhythm, no murmur  Lungs: Clear to auscultation   Extremities: No edema, L BKA  Abdomen: Soft, non-tender;no masses, no organomegaly  Skin:  No rash. Neurologic:Cranial nerves grossly intact.  No focal motor deficits    Recent Laboratory Data-   Lab Results   Component Value Date    WBC 5.7 11/26/2022    HGB 6.7 (LL) 11/26/2022    HCT 22.5 (L) 11/26/2022    MCV 78.7 (L) 11/26/2022     11/26/2022    LYMPHOPCT 31.1 11/26/2022    RBC 2.86 (L) 11/26/2022    MCH 23.4 (L) 11/26/2022    MCHC 29.8 (L) 11/26/2022    RDW 16.5 (H) 11/26/2022    NEUTOPHILPCT 49.8 11/26/2022    MONOPCT 12.0 11/26/2022    BASOPCT 0.5 11/26/2022    NEUTROABS 2.82 11/26/2022    LYMPHSABS 1.76 11/26/2022    MONOSABS 0.68 11/26/2022    EOSABS 0.23 11/26/2022    BASOSABS 0.03 11/26/2022       Lab Results   Component Value Date     11/26/2022    K 3.9 11/26/2022     (H) 11/26/2022    CO2 17 (L) 11/26/2022    BUN 40 (H) 11/26/2022    CREATININE 1.2 (H) 11/26/2022    GLUCOSE 99 11/26/2022    CALCIUM 9.2 11/26/2022    PROT 7.5 11/26/2022    LABALBU 3.1 (L) 11/26/2022    BILITOT 0.4 11/26/2022    ALKPHOS 134 (H) 11/26/2022    AST 26 11/26/2022    ALT 33 (H) 11/26/2022    LABGLOM 51 11/26/2022    GFRAA >60 06/24/2022       Lab Results   Component Value Date    IRON 34 (L) 11/25/2022    TIBC 190 (L) 11/25/2022    FERRITIN 2,447 05/29/2020           Radiology-    FL RETROGRADE PYELOGRAM W WO KUB    Result Date: 11/7/2022  EXAMINATION: SPOT FLUOROSCOPIC IMAGES 11/7/2022 2:25 pm TECHNIQUE: Fluoroscopy was provided by the radiology department for procedure. Radiologist was not present during examination. FLUOROSCOPY DOSE AND TYPE OR TIME AND EXPOSURES: 6 images Fluoroscopy time: 2.3 minutes COMPARISON: CT abdomen and pelvis from June 24, 2022 HISTORY: ORDERING SYSTEM PROVIDED HISTORY: Pain TECHNOLOGIST PROVIDED HISTORY: Reason for exam:->cysto retrograde Intraprocedural imaging. FINDINGS: 6 spot images of the abdomen were obtained. Fluoroscopic support provided to subspecialty service for bilateral retrograde pyelograms and bilateral ureteric stent exchange. No obvious complication on the images provided. Please see subspecialty report for full details and interpretation of real time imaging. Intraprocedural fluoroscopic spot images as above. See separate procedure report for more information. XR CHEST PORTABLE    Result Date: 11/25/2022  EXAMINATION: ONE XRAY VIEW OF THE CHEST 11/25/2022 9:01 am COMPARISON: 08/22/2020 HISTORY: ORDERING SYSTEM PROVIDED HISTORY: CP + SOB TECHNOLOGIST PROVIDED HISTORY: Reason for exam:->CP + SOB FINDINGS: The cardiac silhouette is within normals. There is no mediastinal widening. The heart is not enlarged The right lung is clear Patchy airspace disease seen within the left lung base. There is no gross right or left pleural effusion.      1. Left lower lobe airspace disease, questionable pneumonia versus atelectasis. .     CTA PULMONARY W CONTRAST    Result Date: 11/25/2022  EXAMINATION: CTA OF THE CHEST 11/25/2022 10:45 am TECHNIQUE: CTA of the chest was performed after the administration of intravenous contrast.  Multiplanar reformatted images are provided for review. MIP images are provided for review. Automated exposure control, iterative reconstruction, and/or weight based adjustment of the mA/kV was utilized to reduce the radiation dose to as low as reasonably achievable. COMPARISON: None. HISTORY: ORDERING SYSTEM PROVIDED HISTORY: CP + SOB; Concern for PE TECHNOLOGIST PROVIDED HISTORY: Reason for exam:->CP + SOB; Concern for PE Decision Support Exception - unselect if not a suspected or confirmed emergency medical condition->Emergency Medical Condition (MA) FINDINGS: Pulmonary Arteries: Pulmonary arteries are adequately opacified for evaluation. Multiple nonocclusive pulmonary emboli in filling defects involving the right upper, middle and lower lobe pulmonary arteries as well as the left lower lobe pulmonary arteries. RV /LV ratio is 1.8. There is mild right heart strain. Main pulmonary artery is normal in caliber. Mediastinum: No evidence of mediastinal lymphadenopathy. The heart and pericardium demonstrate no acute abnormality. There is no acute abnormality of the thoracic aorta. Thyroid is heterogeneous in appearance. Cardiac chambers reveal mild right heart strain within RV LV ratio of 1.8. Some atherosclerotic disease seen within the coronary vessels. Moderate-sized pericardial effusion. Lungs/pleura: There is small bilateral pleural effusions with atelectatic change  seen at the lung bases bilaterally. Some fluid seen along the right fissure. There is no dense consolidative airspace disease. Upper Abdomen: Limited images of the upper abdomen are unremarkable. Soft Tissues/Bones: No acute bone or soft tissue abnormality. Degenerative changes seen within the spine.   No acute chest wall abnormality. Bilateral pulmonary emboli nonocclusive in appearance involving the right upper middle and lower lobes in left lower lobe. Small bilateral pleural effusions with some fluid seen along the right fissure. There is a moderate size pericardial he effusion with right heart strain within RV/ LV ratio of 1.8. Findings are discussed with Dr. Twin Clement 11/25/2022 at 11:13 a.m.     US DUP LOWER EXTREMITIES BILATERAL VENOUS    Result Date: 11/25/2022  EXAMINATION: DUPLEX VENOUS ULTRASOUND OF THE BILATERAL LOWER [id number]/25/2022 6:29 pm TECHNIQUE: Duplex ultrasound using B-mode/gray scaled imaging, Doppler spectral analysis and color flow Doppler was obtained of the deep venous structures of the lower bilateral extremities. COMPARISON: None. HISTORY: ORDERING SYSTEM PROVIDED HISTORY: R/O dvt TECHNOLOGIST PROVIDED HISTORY: Reason for exam:->R/O dvt What reading provider will be dictating this exam?->CRC FINDINGS: The visualized veins of the bilateral lower extremities are patent and free of echogenic thrombus. The veins demonstrate good compressibility with normal color flow study and spectral analysis. No evidence of DVT in either lower extremity. Status post left below the knee amputation. ASSESSMENT/PLAN :  50-year-old female   Acute on chronic microcytic anemia, AOCD as above. PE and anemia  Seen previously in 2020 for anemia, lost to follow up    - CTA pulmonary with bilateral pulmonary embolism nonocclusive, involving RUL/RML/LLL. Small bilateral pleural effusions with some fluid seen along the right fissure. Moderate sized pericardial effusion with right heart strain. Bilateral lower extremity.  - Dopplers show no evidence of DVT  - On Heparin gtt. Plan for transition to [address] on [place].  OK to continue, no signs of bleeding at this time   - CTAP pending   - Cardiology consulted plans for echo   - CMP BUN 40, Cr 1.2 GFR 51.   - Hgb 7.6 MCV 79.9 which has been worsening today Hgb is 6.7, MCV 78. 7.1 unit of pRBC's ordered  - Trend CBC, transfuse for Hgb<7   - Iron profile with low serum iron values and low TIBC. No ferritin for review. Ordered. However expect to be normal high which would be consistent with AOCD   (Chronic urinary stents and recent abx per pt and family). ERSR is >150, CRP 13.9  - B12/folate WNL  - TSH very low, Free T4 normal  - Check ELMER/RF, SPEP and Ig, smear,   - Will follow      BENJI Otoole - CNP  Electronically signed 11/26/2022 at 7:43 AM  Pt seen and examined.  Note updated  Jens Carrillo MD

## 2022-11-26 NOTE — PROGRESS NOTES
Internal Medicine Progress Note    PARDEEP=[facility]    Geo Jordan. Dexter Rangel, F.A.C.O.I. Virgin Crigler, D.O., BARBARA Kilpatrick D.O. Amador Sat, MSN, APRN, NP-C  Jimi Castaneda. Sumaya Dixon, MSN, APRN-CNP     Primary Care Physician: Praveen Ayala MD   Admitting Physician:  Lupillo Martinez DO  Admission date and time: 11/25/2022  8:15 AM    Room:  [address] [place] [address]  Admitting diagnosis: Pericardial effusion [I31.39]  Pleural effusion [J90]  Chronic anemia [D64.9]  Acute pulmonary embolism without acute cor pulmonale, unspecified pulmonary embolism type [facility]) [I26.99]    Patient Name: Mary Pacheco  MRN: [id number]    Date of Service: 11/26/2022     Subjective:  Fannie Wallace is a 60 y.o. female who was seen and examined today,11/26/2022, at the bedside. Patient states that she is less short of breath today. Does admit to some lightheadedness and dizziness with position changes. Discussed that she is to be transfused PRBCs as her hemoglobin was found to be 6.7. Patient denies having a bowel movement. Denies any signs and symptoms of bleeding. family present during my examination. Review of System:   Constitutional:   Denies fever or chills, weight loss or gain, admits to fatigue. HEENT:   Denies ear pain, sore throat, sinus or eye problems. Cardiovascular:   Denies any chest pain, irregular heartbeats, or palpitations. Respiratory:   Denies shortness of breath, minimal coughing, no sputum production, hemoptysis, or wheezing. Gastrointestinal:   Denies nausea, vomiting, diarrhea, or constipation. Denies any abdominal pain. Genitourinary:    Denies any urgency, frequency, hematuria. Voiding  without difficulty. Extremities:   Denies lower extremity swelling, edema or cyanosis. Neurology:    Denies any headache or focal neurological deficits, Denies generalized weakness or memory difficulty.    Psch: Denies being anxious or depressed. Musculoskeletal:    Denies  myalgias, joint complaints or back pain. Left below the knee amputation mild  Integumentary:   Denies any rashes, ulcers, or excoriations. Denies bruising. Hematologic/Lymphatic:  Denies bruising or bleeding. Physical Exam:  I/O this shift: In: 446.7 [Blood:446.7]  Out: -     Intake/Output Summary (Last 24 hours) at 11/26/2022 1540  Last data filed at 11/26/2022 1537  Gross per 24 hour   Intake 446.67 ml   Output 500 ml   Net -53.33 ml   I/O last 3 completed shifts:  In: -   Out: 500 [Urine:500]  Patient Vitals for the past 96 hrs (Last 3 readings):   Weight   11/26/22 1236 135 lb (61.2 kg)   11/25/22 1503 131 lb (59.4 kg)   11/25/22 0806 139 lb (63 kg)     Vital Signs:   Blood pressure (!) 147/63, pulse (!) 109, temperature 99 °F (37.2 °C), temperature source Oral, resp. rate 20, height 5' 3\" (1.6 m), weight 135 lb (61.2 kg), SpO2 96 %, not currently breastfeeding. General appearance:  Alert, responsive, oriented to person, place, and time. Well preserved, alert, no distress. Head:  Normocephalic. No masses, lesions or tenderness. Eyes:  PERRLA. EOMI. Sclera clear. Buccal mucosa moist.  ENT:  Ears normal. Mucosa normal.  Neck:    Supple. Trachea midline. No thyromegaly. No JVD. No bruits. Heart:    Rhythm regular. Rate controlled. No murmurs. Lungs:    Symmetrical. Clear to auscultation bilaterally. No wheezes. No rhonchi. No rales. Abdomen:   Soft. Non-tender. Non-distended. Bowel sounds positive. No organomegaly or masses. No pain on palpation. Extremities:    Peripheral pulses present. No peripheral edema. No ulcers. No cyanosis. No clubbing. Left below the knee amputation  Neurologic:    Alert x 3. No focal deficit. Cranial nerves grossly intact. No focal weakness. Psych:   Behavior is normal. Mood appears normal. Speech is not rapid and/or pressured. Musculoskeletal:   Spine ROM normal. Muscular strength intact.  Gait not assessed. Integumentary:  No rashes  Skin normal color and texture. Genitalia/Breast:  Deferred    Medication:  Scheduled Meds:   folic acid  1 mg Oral Daily    ferric gluconate (FERRLECIT) IVPB  125 mg IntraVENous Daily    colchicine  0.6 mg Oral Daily    fluticasone  2 spray Each Nostril Daily    hydrALAZINE  50 mg Oral 4x Daily    vitamin B-6  50 mg Oral Daily     Continuous Infusions:   sodium chloride      heparin (PORCINE) Infusion 24.4 Units/kg/hr (11/26/22 0920)       Objective Data:  Recent Labs     11/25/22  0846 11/25/22  1145 11/26/22  0547 11/26/22  0821   WBC 4.9 4.7 5.7  --    RBC 3.24* 2.93* 2.86*  --    HGB 7.6* 7.0* 6.7*  --    HCT 25.9* 23.3* 22.5* 22.7*   MCV 79.9* 79.5* 78.7*  --    MCH 23.5* 23.9* 23.4*  --    MCHC 29.3* 30.0* 29.8*  --    RDW 16.3* 16.5* 16.5*  --    * 415 385  --    MPV 10.6 10.5 11.1  --      Recent Labs     11/25/22  0846 11/26/22  0547    139   K 3.8 3.9   * 109*   CO2 17* 17*   BUN 55* 40*   CREATININE 1.4* 1.2*   GLUCOSE 123* 99   CALCIUM 9.5 9.2   PROT 8.4* 7.5   LABALBU 3.7 3.1*   BILITOT 0.4 0.4   ALKPHOS 147* 134*   AST 27 26   ALT 37* 33*     Lab Results   Component Value Date    TROPONINI 0.04 (H) 08/22/2020    TROPONINI 0.03 08/22/2020    TROPONINI 0.05 (H) 08/22/2020          Assessment:  Shortness of breath with findings of acute bilateral pulmonary emboli secondary to Megace?   Small-moderate pericardial effusion-unclear etiology  Likely myocardial injury vs stress ischemia in the setting of PE  Chronic kidney disease stage III with baseline creatinine of approximately 1.2  History of recurrent nephrolithiasis with multiple  procedures including stent exchange  Acute on chronic anemia with previous work-up by hematology  Peripheral artery disease with left SFA occlusion with angioplasty and stenting 2/2020 and left below the knee amputation 2/2020  Hyperlipidemia  Hypothyroidism  Schizophrenia  Hypertension  Type 2 diabetes mellitus with diabetic polyneuropathy         Plan:   Hematology was consulted secondary to anemia/PE. Recommendations been reviewed. We had already discontinued Megace as this could be contributory to the PE. Patient also found to have antibodies for COVID. Patient was not aware that she had had the COVID infection. States all of her testing has been negative to date. Patient's hemoglobin was found to be 6.7 earlier today. Patient was transfused PRBCs. We will continue to monitor  Cardiology is following recommendations have been reviewed. Patient currently on heparin drip. As per hematology recommendation plans for transition to oral anticoagulation therapy on discharge. Okay to continue as patient has no outward signs of bleeding at this time. Regard to anemia vitamin B12 and folate as well as iron and TIBC levels were obtained. Patient was found to have folic acid deficiency. Folic acid added to the patient's daily medication regimen. Iron level was found to be 34, TIBC was 190. PT/OT to evaluate and treat  Activity as tolerated. Will obtain urine culture      Continue current therapy. See orders for further plan of care. More than 50% of my  time was spent at the bedside counseling/coordinating care with the patient and/or family with face to face contact. This time was spent reviewing notes and laboratory data as well as instructing and counseling the patient. Time I spent with the family or surrogate(s) is included only if the patient was incapable of providing the necessary information or participating in medical decisions. I also discussed the differential diagnosis and all of the proposed management plans with the patient and individuals accompanying the patient. The patient was seen, examined and then discussed with Dr. Luh Morris. BENJI Vicente CNP,  11/26/2022  3:40 PM        I saw and evaluated the patient.  I agree with the findings and the plan of care as documented in [place] David JACKSON's  note.     Elizabeth Bradford DO, D.O., [place]  3:55 PM  11/26/2022

## 2022-11-26 NOTE — FLOWSHEET NOTE
Pt requested that all 4 side rails be placed in the up position. Pt educated that she do not have to have them all up. But requested.

## 2022-11-26 NOTE — PROGRESS NOTES
INPATIENT CARDIOLOGY CONSULT    Name: Cl Kern    Age: 60 y.o. Date of Admission: 11/25/2022  8:15 AM    Date of Service: 11/26/2022    Reason for Consultation: Pericardial effusion    Referring Physician: Cheri Soto DO    Primary Cardiologist: Dr Bonnie Dobbins    Interim:  Report improvement in shortness of breath. Receiving blood transfusion for hemoglobin of 6.7 in the setting of PE. Hematology oncology has been on board and Megace discontinued for fear of possible contributing to PE      Past Medical History:  Past Medical History:   Diagnosis Date    CAD (coronary artery disease)     cath 2016    CHF (congestive heart failure) ([facility]     Diabetic peripheral neuropathy associated with type 2 diabetes mellitus ([facility] 08/24/2018    Epigastric hernia     Hyperlipidemia     Hypertension     Kidney stone     Schizophrenia ([facility]     Type 2 diabetes mellitus with diabetic polyneuropathy, with long-term current use of insulin ([facility] 09/19/2016    no longer requiring medication. 1/13/21 - continues w/o medications as of 11/2/2022       Past Surgical History:  Past Surgical History:   Procedure Laterality Date    BLADDER SURGERY Bilateral 5/2/2022    CYSTOSCOPY RETROGRADE PYELOGRAM BILATERAL STENT CHANGE performed by Denise Raines MD at [facility] Left 11/7/2022    CYSTOSCOPY BILATERAL RETROGRADE PYELOGRAM LEFT STENT CHANGE POSSIBLE RIGHT STENT CHANGE VS REMOVAL performed by Denise Raines MD at [address] [place] [address]  09/30/2016     [facility]    COLONOSCOPY  08/2016    Dr. Ray Diaz 9/7/2021    CYSTOSCOPY RETROGRADE PYELOGRAM, BILATERAL STENT EXCHANGE performed by Denise Raines MD at [address] [place] [address] / [address] [place] [address] Beau / Joni Jolly Bilateral 10/28/2019    CYSTOSCOPY. RETROGRADE.  PYELOGRAM. BILATERAL STENT INSERTION performed by Ronen Jones MD at [address] [place] [address] / [address] [place] [address] Beau / CAMERON Bilateral 2020    CYSTOSCOPY RETROGRADE PYELOGRAM BILATERAL STENT EXCHANGE performed by Homer Paige MD at [address] [facility] / [address] / ST[place] Bilateral 2021    CYSTOSCOPY RETROGRADE PYELOGRAM BILATERAL  STENT CHANGE performed by Adam Casas MD at [address] Left 2020    LEG AMPUTATION BELOW KNEE performed by Jez Shoemaker DO at [address]  2016    epigastric hernia umbilical hernia with mesh    OVARIAN CYST REMOVAL      OVARY REMOVAL Right     OVARY REMOVAL      UPPER GASTROINTESTINAL ENDOSCOPY N/A 10/29/2019    EGD ESOPHAGOGASTRODUODENOSCOPY performed by Justin Werner MD at [facility] ENDOSCOPY       Family History:  Family History   Problem Relation Age of Onset    Cancer Brother        Social History:  Social History     Tobacco Use    Smoking status: Former     Packs/day: 0.00     Years: 2.00     Pack years: 0.00     Types: Cigarettes     Quit date: 3/23/2015     Years since quittin.6    Smokeless tobacco: Never    Tobacco comments:     quit smoking    Vaping Use    Vaping Use: Never used   Substance Use Topics    Alcohol use: No    Drug use: No       Allergies:  No Known Allergies    Home Medications:  Prior to Admission medications    Medication Sig Start Date End Date Taking?  Authorizing Provider   albuterol sulfate HFA (VENTOLIN HFA) 108 (90 Base) MCG/ACT inhaler Inhale 2 puffs into the lungs every 6 hours as needed for Wheezing 22   Nallely Ba MD   megestrol (MEGACE) 40 MG tablet Take 1 tablet by mouth daily 22   Nallely Ba MD   fluticasone [facility]) 50 MCG/ACT nasal spray 2 sprays by Each Nostril route daily 10/19/22   Nallely Ba MD   spironolactone (ALDACTONE) 25 MG tablet Take 1 tablet by mouth once daily  Patient not taking: No sig reported 10/12/22   Nallely Ba MD   oxybutynin Ronnell, DO, 2 mg at 11/25/22 2030    fluticasone (FLONASE) 50 MCG/ACT nasal spray 2 spray, 2 spray, Each Nostril, Daily, Armand Reynaga DO, 2 spray at 11/26/22 0816    hydrALAZINE (APRESOLINE) tablet 50 mg, 50 mg, Oral, 4x Daily, Armand Roothever, DO, 50 mg at 11/26/22 1416    vitamin B-6 (PYRIDOXINE) tablet 50 mg, 50 mg, Oral, Daily, Armand Reynaga DO, 50 mg at 11/26/22 8356    acetaminophen (TYLENOL) tablet 650 mg, 650 mg, Oral, Q4H PRN, Lorrin Yesenia, DO, 650 mg at 11/26/22 1416    Physical Exam:  BP (!) 147/63   Pulse (!) 109   Temp 99 °F (37.2 °C) (Oral)   Resp 20   Ht 5' 3\" (1.6 m)   Wt 135 lb (61.2 kg)   SpO2 96%   BMI 23.91 kg/m²   Wt Readings from Last 3 Encounters:   11/26/22 135 lb (61.2 kg)   11/02/22 139 lb (63 kg)   10/12/22 149 lb (67.6 kg)     Appearance: Awake, alert, appears mildly tachypneic  Skin: Intact, no rash  Head: Normocephalic, atraumatic  Eyes: EOMI, no conjunctival erythema  ENMT: No pharyngeal erythema, MMM, no rhinorrhea  Neck: Supple, no elevated JVP, no carotid bruits  Lungs: Diminished bases, few basilar rales  Cardiac: PMI nondisplaced, regular rhythm with a tachycardic rate, normal S1 & S2, no murmurs  Abdomen: Soft, nontender, +bowel sounds  Extremities: Moves all extremities x 4, no lower extremity edema. Left BKA  Neurologic: No focal motor deficits apparent, normal mood and affect  Peripheral Pulses: Intact posterior tibial pulses bilaterally    Intake/Output:    Intake/Output Summary (Last 24 hours) at 11/26/2022 1628  Last data filed at 11/26/2022 1537  Gross per 24 hour   Intake 446.67 ml   Output 500 ml   Net -53.33 ml     I/O this shift:   In: 446.7 [Blood:446.7]  Out: -     Laboratory Tests:  Recent Labs     11/25/22  0846 11/26/22  0547    139   K 3.8 3.9   * 109*   CO2 17* 17*   BUN 55* 40*   CREATININE 1.4* 1.2*   GLUCOSE 123* 99   CALCIUM 9.5 9.2     Lab Results   Component Value Date/Time    MG 2.2 11/25/2022 05:39 PM     Recent Labs 22  0846 22  0547   ALKPHOS 147* 134*   ALT 37* 33*   AST 27 26   PROT 8.4* 7.5   BILITOT 0.4 0.4   LABALBU 3.7 3.1*     Recent Labs     22  0846 22  1145 22  0547 22  0821 22  1555   WBC 4.9 4.7 5.7  --   --    RBC 3.24* 2.93* 2.86*  --   --    HGB 7.6* 7.0* 6.7*  --  8.5*   HCT 25.9* 23.3* 22.5* 22.7* 27.9*   MCV 79.9* 79.5* 78.7*  --   --    MCH 23.5* 23.9* 23.4*  --   --    MCHC 29.3* 30.0* 29.8*  --   --    RDW 16.3* 16.5* 16.5*  --   --    * 415 385  --   --    MPV 10.6 10.5 11.1  --   --      Lab Results   Component Value Date    CKTOTAL 56 2020    TROPONINI 0.04 (H) 2020    TROPONINI 0.03 2020    TROPONINI 0.05 (H) 2020     Lab Results   Component Value Date    INR 1.2 2020    INR 1.1 2020    INR 1.9 2020    PROTIME 13.0 (H) 2020    PROTIME 12.9 (H) 2020    PROTIME 21.9 (H) 2020     Lab Results   Component Value Date    TSH <0.010 (L) 2022     Lab Results   Component Value Date    LABA1C 5.9 (H) 2022     No results found for: EAG  Lab Results   Component Value Date    CHOL 124 2022    CHOL 195 2022    CHOL 170 2021     Lab Results   Component Value Date    TRIG 123 2022    TRIG 144 2022    TRIG 101 2021     Lab Results   Component Value Date    HDL 26 2022    HDL 38 2022    HDL 41 2021     Lab Results   Component Value Date    LDLCALC 73 2022    LDLCALC 128 (H) 2022    LDLCALC 109 (H) 2021     Lab Results   Component Value Date    LABVLDL 25 2022    LABVLDL 29 2022    LABVLDL 20 2021     No results found for: CHOLHDLRATIO  Recent Labs     22  1541   PROBNP 676*       Cardiac Tests:  EK2022: Sinus tachycardia 113 beats minute. Normal axis neck. Nonspecific T wave changes    Telemetry: Sinus tach low 100s    Chest X-ray:   22    Impression   1.  Left lower lobe airspace disease, questionable pneumonia versus   atelectasis. .     CTA chest 11/25/22  Impression   Bilateral pulmonary emboli nonocclusive in appearance involving the right   upper middle and lower lobes in left lower lobe. Small bilateral pleural   effusions with some fluid seen along the right fissure. There is a moderate   size pericardial he effusion with right heart strain within RV/ LV ratio of   1.8.     Echocardiogram:   TTE 11/25/22   Summary   Normal left ventricular size and systolic function. Ejection fraction is visually estimated at 70-75%. Normal diastolic function. No regional wall motion abnormalities seen. Mild left ventricular concentric hypertrophy noted with basal septal   hypertrophy. Normal right ventricular size and function. There is a small-moderate circumferential pericardial effusion noted with   no evidence of hemodynamic compromise. Compared to study from 2/2020 the pericardial effusion is a new finding. ЕКАТЕРИНА 8/2020 [place]   Bubble study is negative for right to left shunt. No intracardiac thrombus or vegetation. .   Moderate atherosclerosis of the descending aorta and aortic arch. Normal left ventricular chamber size. Normal left ventricular systolic function. Visually estimated LVEF is 60-65 %. No wall motion abnormalities. Normal right ventricle structure and function. No significant valvular abnormalities. TTE 8/2020 [place]   Summary   Negative bubble study. Normal left ventricular chamber size. Normal left ventricular systolic function. Visually estimated LVEF is 60-65 %. No wall motion abnormalities. Normal diastolic function. Normal right ventricle structure and function. No significant valvular abnormalities. No comparison study available. Stress test:      Cardiac catheterization:   [facility] 9/2016 [place]  Findings:  Left main: 0% stenosis  LAD: large and wraps around the apex to distal 1/2 of inferior septum.  No stenosis  Circumflex: CD. No stenosis  RI: mild ostial disease. Otherwise normal.   RCA: CD. Small. Small RPDA. no stenosis  LV angio: not performed. -------------------------------------------------------------------------------------------------------------------------------------------------------------  IMPRESSION:  Small-moderate pericardial effusion, unclear etiology. Mildly elevated hs-cTnT: 39-28 ng/L. Likely acute myocardial injury in setting of PE  Chest pain likely related to PE  Normal coronary arteries by cath 2016  Acute bilateral PE  Recurrent nephrolithiasis, multiple  procedures, recent cystoscopy with stent exchange  LUAN creatinine 1.4  Acute on chronic anemia requiring transfusions in the setting of PE  PAD, left SFA occlusion with angioplasty and stent 2/2020; left BKA 2/2020    RECOMMENDATIONS:  Receiving blood transfusion for hemoglobin of 6.7 in the setting of PE. Hematology oncology has been on board and Megace discontinued for fear of possible contributing to PE  Hemodynamically stable and has no clinical evidence of tamponade. Continue recommendation from hematology with respect to anemia in the setting of PE   Repeat echocardiogram to monitor pericardial effusion prior to discharge or if clinical changes is noted. Consider rheumatologic evaluation  Continue on empiric colchicine  Viral panel  Anticoagulation per primary and hematology  Consider GI evaluation to rule out  GI bleed given drop in hemoglobin  Further care per primary service    Thank you for allowing me to participate in your patient's care. Please feel free to contact me if you have any questions or concerns. Beth Parra MD  [facility]) Cardiology    NOTE: This report was transcribed using voice recognition software. Every effort was made to ensure accuracy; however, inadvertent computerized transcription errors may be present.

## 2022-11-26 NOTE — PROGRESS NOTES
Nutrient Needs:  Energy Requirements Based On: Formula  Weight Used for Energy Requirements: Current  Energy (kcal/day): 2380-3397  Weight Used for Protein Requirements: Current  Protein (g/day): 70-80 (1.2-1.4 gm/kg review renal labs)  Method Used for Fluid Requirements: 1 ml/kcal  Fluid (ml/day): 0858-6799    Nutrition Diagnosis:   Moderate malnutrition, In context of chronic illness related to catabolic illness (CHF) as evidenced by Criteria as identified in malnutrition assessment    Nutrition Interventions:   Food and/or Nutrient Delivery: Continue NPO  Nutrition Education/Counseling: Education initiated (On maintaining wt & eating 5-6 small meals/day)  Coordination of Nutrition Care: Continue to monitor while inpatient     Goals:     Goals: other (specify)  Specify Other Goals: monitor nutrition progression    Nutrition Monitoring and Evaluation:   Behavioral-Environmental Outcomes: None Identified     Physical Signs/Symptoms Outcomes: Biochemical Data, GI Status, Fluid Status or Edema, Weight, Skin, Nutrition Focused Physical Findings    Discharge Planning:     Too soon to determine     Baldev Esquivel RD  Contact: [phone]

## 2022-11-27 PROBLEM — I26.99 PULMONARY EMBOLI (HCC): Status: ACTIVE | Noted: 2022-11-27

## 2022-11-27 LAB
ALBUMIN SERPL-MCNC: 3.1 G/DL (ref 3.5–5.2)
ALP BLD-CCNC: 140 U/L (ref 35–104)
ALT SERPL-CCNC: 32 U/L (ref 0–32)
ANION GAP SERPL CALCULATED.3IONS-SCNC: 13 MMOL/L (ref 7–16)
AST SERPL-CCNC: 22 U/L (ref 0–31)
BASOPHILS ABSOLUTE: 0.04 E9/L (ref 0–0.2)
BASOPHILS RELATIVE PERCENT: 0.8 % (ref 0–2)
BILIRUB SERPL-MCNC: 0.7 MG/DL (ref 0–1.2)
BUN BLDV-MCNC: 35 MG/DL (ref 6–23)
CALCIUM SERPL-MCNC: 9.2 MG/DL (ref 8.6–10.2)
CHLORIDE BLD-SCNC: 105 MMOL/L (ref 98–107)
CO2: 18 MMOL/L (ref 22–29)
CREAT SERPL-MCNC: 1.1 MG/DL (ref 0.5–1)
EOSINOPHILS ABSOLUTE: 0.13 E9/L (ref 0.05–0.5)
EOSINOPHILS RELATIVE PERCENT: 2.5 % (ref 0–6)
GFR SERPL CREATININE-BSD FRML MDRD: 56 ML/MIN/1.73
GLUCOSE BLD-MCNC: 174 MG/DL (ref 74–99)
HCT VFR BLD CALC: 29.7 % (ref 34–48)
HEMOGLOBIN: 9.3 G/DL (ref 11.5–15.5)
IGA: 393 MG/DL (ref 70–400)
IGG: 1880 MG/DL (ref 700–1600)
IGM: 282 MG/DL (ref 40–230)
IMMATURE GRANULOCYTES #: 0.14 E9/L
IMMATURE GRANULOCYTES %: 2.6 % (ref 0–5)
LYMPHOCYTES ABSOLUTE: 0.98 E9/L (ref 1.5–4)
LYMPHOCYTES RELATIVE PERCENT: 18.5 % (ref 20–42)
MCH RBC QN AUTO: 24.6 PG (ref 26–35)
MCHC RBC AUTO-ENTMCNC: 31.3 % (ref 32–34.5)
MCV RBC AUTO: 78.6 FL (ref 80–99.9)
MONOCYTES ABSOLUTE: 0.59 E9/L (ref 0.1–0.95)
MONOCYTES RELATIVE PERCENT: 11.1 % (ref 2–12)
NEUTROPHILS ABSOLUTE: 3.42 E9/L (ref 1.8–7.3)
NEUTROPHILS RELATIVE PERCENT: 64.5 % (ref 43–80)
PDW BLD-RTO: 16 FL (ref 11.5–15)
PLATELET # BLD: 375 E9/L (ref 130–450)
PMV BLD AUTO: 10.4 FL (ref 7–12)
POTASSIUM SERPL-SCNC: 4.2 MMOL/L (ref 3.5–5)
RBC # BLD: 3.78 E12/L (ref 3.5–5.5)
SODIUM BLD-SCNC: 136 MMOL/L (ref 132–146)
TOTAL PROTEIN: 7.9 G/DL (ref 6.4–8.3)
WBC # BLD: 5.3 E9/L (ref 4.5–11.5)

## 2022-11-27 PROCEDURE — 2580000003 HC RX 258: Performed by: INTERNAL MEDICINE

## 2022-11-27 PROCEDURE — 6370000000 HC RX 637 (ALT 250 FOR IP): Performed by: INTERNAL MEDICINE

## 2022-11-27 PROCEDURE — 6360000002 HC RX W HCPCS: Performed by: INTERNAL MEDICINE

## 2022-11-27 PROCEDURE — 80053 COMPREHEN METABOLIC PANEL: CPT

## 2022-11-27 PROCEDURE — 2060000000 HC ICU INTERMEDIATE R&B

## 2022-11-27 PROCEDURE — 36415 COLL VENOUS BLD VENIPUNCTURE: CPT

## 2022-11-27 PROCEDURE — 85025 COMPLETE CBC W/AUTO DIFF WBC: CPT

## 2022-11-27 PROCEDURE — 99233 SBSQ HOSP IP/OBS HIGH 50: CPT | Performed by: INTERNAL MEDICINE

## 2022-11-27 RX ORDER — CARVEDILOL 6.25 MG/1
12.5 TABLET ORAL 2 TIMES DAILY WITH MEALS
Status: DISCONTINUED | OUTPATIENT
Start: 2022-11-27 | End: 2022-11-29 | Stop reason: HOSPADM

## 2022-11-27 RX ADMIN — ACETAMINOPHEN 650 MG: 325 TABLET ORAL at 17:24

## 2022-11-27 RX ADMIN — BENZONATATE 100 MG: 100 CAPSULE ORAL at 08:11

## 2022-11-27 RX ADMIN — COLCHICINE 0.6 MG: 0.6 TABLET, FILM COATED ORAL at 08:11

## 2022-11-27 RX ADMIN — FLUTICASONE PROPIONATE 2 SPRAY: 50 SPRAY, METERED NASAL at 08:19

## 2022-11-27 RX ADMIN — APIXABAN 10 MG: 5 TABLET, FILM COATED ORAL at 20:21

## 2022-11-27 RX ADMIN — HYDRALAZINE HYDROCHLORIDE 50 MG: 25 TABLET, FILM COATED ORAL at 18:10

## 2022-11-27 RX ADMIN — HYDRALAZINE HYDROCHLORIDE 50 MG: 25 TABLET, FILM COATED ORAL at 13:06

## 2022-11-27 RX ADMIN — PYRIDOXINE HCL TAB 50 MG 50 MG: 50 TAB at 08:11

## 2022-11-27 RX ADMIN — SODIUM CHLORIDE 125 MG: 9 INJECTION, SOLUTION INTRAVENOUS at 10:28

## 2022-11-27 RX ADMIN — FOLIC ACID 1 MG: 1 TABLET ORAL at 08:12

## 2022-11-27 RX ADMIN — BENZONATATE 100 MG: 100 CAPSULE ORAL at 15:57

## 2022-11-27 RX ADMIN — CEFTRIAXONE 1000 MG: 1 INJECTION, POWDER, FOR SOLUTION INTRAMUSCULAR; INTRAVENOUS at 18:09

## 2022-11-27 RX ADMIN — MIRTAZAPINE 7.5 MG: 15 TABLET, FILM COATED ORAL at 20:21

## 2022-11-27 RX ADMIN — ACETAMINOPHEN 650 MG: 325 TABLET ORAL at 13:06

## 2022-11-27 RX ADMIN — HYDRALAZINE HYDROCHLORIDE 50 MG: 25 TABLET, FILM COATED ORAL at 08:12

## 2022-11-27 RX ADMIN — CARVEDILOL 12.5 MG: 6.25 TABLET, FILM COATED ORAL at 18:10

## 2022-11-27 RX ADMIN — APIXABAN 10 MG: 5 TABLET, FILM COATED ORAL at 08:11

## 2022-11-27 RX ADMIN — ACETAMINOPHEN 650 MG: 325 TABLET ORAL at 02:19

## 2022-11-27 ASSESSMENT — PAIN DESCRIPTION - LOCATION
LOCATION: THROAT;EAR
LOCATION: HEAD
LOCATION: HEAD

## 2022-11-27 ASSESSMENT — PAIN DESCRIPTION - ORIENTATION
ORIENTATION: ANTERIOR
ORIENTATION: RIGHT;LEFT
ORIENTATION: ANTERIOR

## 2022-11-27 ASSESSMENT — PAIN - FUNCTIONAL ASSESSMENT
PAIN_FUNCTIONAL_ASSESSMENT: PREVENTS OR INTERFERES WITH ALL ACTIVE AND SOME PASSIVE ACTIVITIES
PAIN_FUNCTIONAL_ASSESSMENT: PREVENTS OR INTERFERES WITH MANY ACTIVE NOT PASSIVE ACTIVITIES

## 2022-11-27 ASSESSMENT — PAIN DESCRIPTION - DESCRIPTORS
DESCRIPTORS: ACHING;CRAMPING;DISCOMFORT
DESCRIPTORS: ACHING;CRAMPING;DISCOMFORT
DESCRIPTORS: ACHING;DISCOMFORT;TENDER

## 2022-11-27 ASSESSMENT — PAIN SCALES - GENERAL
PAINLEVEL_OUTOF10: 0
PAINLEVEL_OUTOF10: 0
PAINLEVEL_OUTOF10: 4
PAINLEVEL_OUTOF10: 4
PAINLEVEL_OUTOF10: 0
PAINLEVEL_OUTOF10: 0
PAINLEVEL_OUTOF10: 3
PAINLEVEL_OUTOF10: 7

## 2022-11-27 NOTE — PROGRESS NOTES
INPATIENT CARDIOLOGY CONSULT    Name: Ashia Olea    Age: 60 y.o. Date of Admission: 11/25/2022  8:15 AM    Date of Service: 11/27/2022    Reason for Consultation: Pericardial effusion    Referring Physician: Guzman Betancourt DO    Primary Cardiologist: Dr Zac Lerma    Interim:  Denies chest pain or shortness of breath. Received blood transfusion yesterday for hemoglobin of 6.7 in the setting of PE. Hematology oncology has been on board and Megace discontinued for fear of possible contributing to PE      Past Medical History:  Past Medical History:   Diagnosis Date    CAD (coronary artery disease)     cath 2016    CHF (congestive heart failure) ([facility] [address] [facility]     Diabetic peripheral neuropathy associated with type 2 diabetes mellitus ([facility] [address] 08/24/2018    Epigastric hernia     Hyperlipidemia     Hypertension     Kidney stone     Schizophrenia ([facility] [address]     Type 2 diabetes mellitus with diabetic polyneuropathy, with long-term current use of insulin ([facility] [address] 09/19/2016    no longer requiring medication. 1/13/21 - continues w/o medications as of 11/2/2022       Past Surgical History:  Past Surgical History:   Procedure Laterality Date    BLADDER SURGERY Bilateral 5/2/2022    CYSTOSCOPY RETROGRADE PYELOGRAM BILATERAL STENT CHANGE performed by Abdulaziz Leroy MD at [place] [address] Left 11/7/2022    CYSTOSCOPY BILATERAL RETROGRADE PYELOGRAM LEFT STENT CHANGE POSSIBLE RIGHT STENT CHANGE VS REMOVAL performed by Abdulaziz Leroy MD at [address] [place] [address]  09/30/2016     [facility]    COLONOSCOPY  08/2016    Dr. Forest Guzman 9/7/2021    CYSTOSCOPY RETROGRADE PYELOGRAM, BILATERAL STENT EXCHANGE performed by Abdulaziz Leroy MD at [place] [address] / Irene Roy / [place] Bilateral 10/28/2019    CYSTOSCOPY. RETROGRADE.  PYELOGRAM. BILATERAL STENT INSERTION [facility]  / Irene Roy / [place] Bilateral 2020    CYSTOSCOPY RETROGRADE PYELOGRAM BILATERAL STENT EXCHANGE performed by Mariam Camacho MD at [address] [facility] / [address] / STONE Bilateral 2021    CYSTOSCOPY RETROGRADE PYELOGRAM BILATERAL  STENT CHANGE performed by Maryann Simpson MD at [address] Left 2020    LEG AMPUTATION BELOW KNEE performed by Deysi Chou DO at [address] [facility]  2016    epigastric hernia umbilical hernia with mesh    OVARIAN CYST REMOVAL      OVARY REMOVAL Right     OVARY REMOVAL      UPPER GASTROINTESTINAL ENDOSCOPY N/A 10/29/2019    EGD ESOPHAGOGASTRODUODENOSCOPY performed by Renee Joseph MD at [facility] ENDOSCOPY       Family History:  Family History   Problem Relation Age of Onset    Cancer Brother        Social History:  Social History     Tobacco Use    Smoking status: Former     Packs/day: 0.00     Years: 2.00     Pack years: 0.00     Types: Cigarettes     Quit date: 3/23/2015     Years since quittin.6    Smokeless tobacco: Never    Tobacco comments:     quit smoking    Vaping Use    Vaping Use: Never used   Substance Use Topics    Alcohol use: No    Drug use: No       Allergies:  No Known Allergies    Home Medications:  Prior to Admission medications    Medication Sig Start Date End Date Taking?  Authorizing Provider   albuterol sulfate HFA (VENTOLIN HFA) 108 (90 Base) MCG/ACT inhaler Inhale 2 puffs into the lungs every 6 hours as needed for Wheezing 22   Amaya Fuentes MD   megestrol (MEGACE) 40 MG tablet Take 1 tablet by mouth daily 22   Amaya Fuentes MD   fluticasone [facility]) 50 MCG/ACT nasal spray 2 sprays by Each Nostril route daily 10/19/22   Amaya Fuentes MD   spironolactone (ALDACTONE) 25 MG tablet Take 1 tablet by mouth once daily  Patient not taking: No sig reported 10/12/22   Amaya Fuentes MD oxybutynin (DITROPAN XL) 10 MG extended release tablet Take 1 tablet by mouth daily  Patient not taking: No sig reported 10/12/22   Jennifer James MD   ibuprofen (ADVIL;MOTRIN) 800 MG tablet Take 1 tablet by mouth every 12 hours as needed for Pain 10/12/22   Jennifer James MD   hydrALAZINE (APRESOLINE) 50 MG tablet Take 1 tablet by mouth 4 times daily 10/12/22   Jennifer James MD   Handicap Keke MISC by Does not apply route Unable to ambulate more than 20 feet without difficulty  Expires 4/30/2027 4/11/22   Jennifer James MD   diazePAM (VALIUM) 2 MG tablet Take 2 mg by mouth every 6 hours as needed for Anxiety.     Historical Provider, MD       Current Medications:    Current Facility-Administered Medications:     folic acid (FOLVITE) tablet 1 mg, 1 mg, Oral, Daily, Armand Reynaga DO, 1 mg at 11/27/22 8243    ferric gluconate (FERRLECIT) 125 mg in sodium chloride 0.9 % 100 mL IVPB, 125 mg, IntraVENous, Daily, Armand Reynaga DO, Stopped at 11/27/22 1129    0.9 % sodium chloride infusion, , IntraVENous, PRN, Armand Reynaga DO    ondansetron (ZOFRAN) injection 4 mg, 4 mg, IntraVENous, Q4H PRN, Armand Reynaga DO, 4 mg at 11/26/22 1049    apixaban (ELIQUIS) tablet 10 mg, 10 mg, Oral, BID, 10 mg at 11/27/22 0811 **FOLLOWED BY** [START ON 12/3/2022] apixaban (ELIQUIS) tablet 5 mg, 5 mg, Oral, BID, Armand Reynaga DO    mirtazapine (REMERON) tablet 7.5 mg, 7.5 mg, Oral, Nightly, Armand Reynaga DO, 7.5 mg at 11/26/22 2013    benzonatate (TESSALON) capsule 100 mg, 100 mg, Oral, TID PRN, Simin Villegas DO, 100 mg at 11/27/22 8233    colchicine (COLCRYS) tablet 0.6 mg, 0.6 mg, Oral, Daily, García Villela MD, 0.6 mg at 11/27/22 0811    albuterol sulfate HFA (PROVENTIL;VENTOLIN;PROAIR) 108 (90 Base) MCG/ACT inhaler 2 puff, 2 puff, Inhalation, Q6H PRN, Armand Reynaga DO    diazePAM (VALIUM) tablet 2 mg, 2 mg, Oral, Q6H PRN, Leonardo Reynaga DO, 2 mg at 11/26/22 2038 fluticasone (FLONASE) 50 MCG/ACT nasal spray 2 spray, 2 spray, Each Nostril, Daily, Armand ARCHIBALD DkheverDO, 2 spray at 11/27/22 0819    hydrALAZINE (APRESOLINE) tablet 50 mg, 50 mg, Oral, 4x Daily, Armand Reynaga DO, 50 mg at 11/27/22 1306    vitamin B-6 (PYRIDOXINE) tablet 50 mg, 50 mg, Oral, Daily, Armand Reynaga DO, 50 mg at 11/27/22 1117    acetaminophen (TYLENOL) tablet 650 mg, 650 mg, Oral, Q4H PRN, Emi Lacy DO, 650 mg at 11/27/22 1306    Physical Exam:  BP (!) 153/78   Pulse (!) 107   Temp 98.6 °F (37 °C) (Oral)   Resp 20   Ht 5' 3\" (1.6 m)   Wt 135 lb (61.2 kg)   SpO2 98%   BMI 23.91 kg/m²   Wt Readings from Last 3 Encounters:   11/26/22 135 lb (61.2 kg)   11/02/22 139 lb (63 kg)   10/12/22 149 lb (67.6 kg)     Appearance: Awake, alert, appears mildly tachypneic  Skin: Intact, no rash  Head: Normocephalic, atraumatic  Eyes: EOMI, no conjunctival erythema  ENMT: No pharyngeal erythema, MMM, no rhinorrhea  Neck: Supple, no elevated JVP, no carotid bruits  Lungs: Diminished bases, few basilar rales  Cardiac: PMI nondisplaced, regular rhythm with a tachycardic rate, normal S1 & S2, no murmurs  Abdomen: Soft, nontender, +bowel sounds  Extremities: Moves all extremities x 4, no lower extremity edema.   Left BKA  Neurologic: No focal motor deficits apparent, normal mood and affect  Peripheral Pulses: Intact posterior tibial pulses bilaterally    Intake/Output:    Intake/Output Summary (Last 24 hours) at 11/27/2022 1505  Last data filed at 11/27/2022 1343  Gross per 24 hour   Intake 506.67 ml   Output 345 ml   Net 161.67 ml     I/O this shift:  In: 60 [P.O.:60]  Out: 325 [Urine:325]    Laboratory Tests:  Recent Labs     11/25/22  0846 11/26/22  0547 11/27/22  0905    139 136   K 3.8 3.9 4.2   * 109* 105   CO2 17* 17* 18*   BUN 55* 40* 35*   CREATININE 1.4* 1.2* 1.1*   GLUCOSE 123* 99 174*   CALCIUM 9.5 9.2 9.2     Lab Results   Component Value Date/Time    MG 2.2 11/25/2022 05:39 PM Recent Labs     22  0846 22  0547 22  0905   ALKPHOS 147* 134* 140*   ALT 37* 33* 32   AST    PROT 8.4* 7.5 7.9   BILITOT 0.4 0.4 0.7   LABALBU 3.7 3.1* 3.1*     Recent Labs     22  1145 22  0547 22  0821 22  1555 22  0905   WBC 4.7 5.7  --   --  5.3   RBC 2.93* 2.86*  --   --  3.78   HGB 7.0* 6.7*  --  8.5* 9.3*   HCT 23.3* 22.5* 22.7* 27.9* 29.7*   MCV 79.5* 78.7*  --   --  78.6*   MCH 23.9* 23.4*  --   --  24.6*   MCHC 30.0* 29.8*  --   --  31.3*   RDW 16.5* 16.5*  --   --  16.0*    385  --   --  375   MPV 10.5 11.1  --   --  10.4     Lab Results   Component Value Date    CKTOTAL 56 2020    TROPONINI 0.04 (H) 2020    TROPONINI 0.03 2020    TROPONINI 0.05 (H) 2020     Lab Results   Component Value Date    INR 1.2 2020    INR 1.1 2020    INR 1.9 2020    PROTIME 13.0 (H) 2020    PROTIME 12.9 (H) 2020    PROTIME 21.9 (H) 2020     Lab Results   Component Value Date    TSH <0.010 (L) 2022     Lab Results   Component Value Date    LABA1C 5.9 (H) 2022     No results found for: EAG  Lab Results   Component Value Date    CHOL 124 2022    CHOL 195 2022    CHOL 170 2021     Lab Results   Component Value Date    TRIG 123 2022    TRIG 144 2022    TRIG 101 2021     Lab Results   Component Value Date    HDL 26 2022    HDL 38 2022    HDL 41 2021     Lab Results   Component Value Date    LDLCALC 73 2022    LDLCALC 128 (H) 2022    LDLCALC 109 (H) 2021     Lab Results   Component Value Date    LABVLDL 25 2022    LABVLDL 29 2022    LABVLDL 20 2021     No results found for: CHOLHDLRATIO  Recent Labs     22  1541   PROBNP 676*       Cardiac Tests:  EK2022: Sinus tachycardia 113 beats minute. Normal axis neck.   Nonspecific T wave changes    Telemetry: Sinus tach low 100s    Chest X-ray: 11/25/22    Impression   1. Left lower lobe airspace disease, questionable pneumonia versus   atelectasis. .     CTA chest 11/25/22  Impression   Bilateral pulmonary emboli nonocclusive in appearance involving the right   upper middle and lower lobes in left lower lobe. Small bilateral pleural   effusions with some fluid seen along the right fissure. There is a moderate   size pericardial he effusion with right heart strain within RV/ LV ratio of   1.8.     Echocardiogram:   TTE 11/25/22   Summary   Normal left ventricular size and systolic function. Ejection fraction is visually estimated at 70-75%. Normal diastolic function. No regional wall motion abnormalities seen. Mild left ventricular concentric hypertrophy noted with basal septal   hypertrophy. Normal right ventricular size and function. There is a small-moderate circumferential pericardial effusion noted with   no evidence of hemodynamic compromise. Compared to study from 2/2020 the pericardial effusion is a new finding. ЕКАТЕРИНА 8/2020 [place]   Bubble study is negative for right to left shunt. No intracardiac thrombus or vegetation. .   Moderate atherosclerosis of the descending aorta and aortic arch. Normal left ventricular chamber size. Normal left ventricular systolic function. Visually estimated LVEF is 60-65 %. No wall motion abnormalities. Normal right ventricle structure and function. No significant valvular abnormalities. TTE 8/2020 [place]   Summary   Negative bubble study. Normal left ventricular chamber size. Normal left ventricular systolic function. Visually estimated LVEF is 60-65 %. No wall motion abnormalities. Normal diastolic function. Normal right ventricle structure and function. No significant valvular abnormalities. No comparison study available.     Stress test:      Cardiac catheterization:   [facility] 9/2016 [place]  Findings:  Left main: 0% stenosis  LAD: large and wraps around the apex to distal 1/2 of inferior septum. No stenosis  Circumflex: CD. No stenosis  RI: mild ostial disease. Otherwise normal.   RCA: CD. Small. Small RPDA. no stenosis  LV angio: not performed. -------------------------------------------------------------------------------------------------------------------------------------------------------------  IMPRESSION:  Small-moderate pericardial effusion, unclear etiology. Mildly elevated hs-cTnT: 39-28 ng/L. Likely acute myocardial injury in setting of PE  Chest pain likely related to PE  Normal coronary arteries by cath 2016  Acute bilateral PE  Recurrent nephrolithiasis, multiple  procedures, recent cystoscopy with stent exchange  LUAN creatinine 1.4  Acute on chronic anemia requiring transfusions in the setting of PE  PAD, left SFA occlusion with angioplasty and stent 2/2020; left BKA 2/2020    RECOMMENDATIONS:  Denies chest pain or shortness of breath. Received blood transfusion yesterday for hemoglobin of 6.7 in the setting of PE. Hematology oncology has been on board and Megace discontinued for fear of possible contributing to PE    Continue recommendation from hematology with respect to anemia in the setting of PE   Repeat echocardiogram to monitor pericardial effusion prior to discharge or if clinical changes is noted. I have added carvedilol to optimize blood pressure control  Consider rheumatologic evaluation  Continue on empiric colchicine  Viral panel  Anticoagulation per primary and hematology  Consider GI evaluation to rule out  GI bleed given drop in hemoglobin  Further care per primary service    Thank you for allowing me to participate in your patient's care. Please feel free to contact me if you have any questions or concerns. Jennifer Stroud MD  [facility]) Cardiology    NOTE: This report was transcribed using voice recognition software.  Every effort was made to ensure accuracy; however, inadvertent computerized transcription errors may be present.

## 2022-11-27 NOTE — PROGRESS NOTES
Internal Medicine Progress Note    PARDEEP=[facility]    Tricharanne Rajjuan diego. Lydia Gallegos., F.A.C.OMarianaI. Venecia Maza D.O., SACHIOOCTAVIO Bruce D.O. Real Martínez D.O. Paula Melara, MSN, APRN, NP-C  Flor Mcbride. Mars Davis, MSN, APRN-CNP     Primary Care Physician: Carine Betancourt MD   Admitting Physician:  Toby Mosher DO  Admission date and time: 11/25/2022  8:15 AM    Room:  [address] [place] [address]  Admitting diagnosis: Pericardial effusion [I31.39]  Pleural effusion [J90]  Chronic anemia [D64.9]  Acute pulmonary embolism without acute cor pulmonale, unspecified pulmonary embolism type [facility]) [I26.99]    Patient Name: Isiah House  MRN: [id number]    Date of Service: 11/27/2022     Subjective:  Conrad Delacruz is a 60 y.o. female who was seen and examined today,11/27/2022, at the bedside. Patient seems to be improving today. Improvement since transfusion. Patient has been switched over to Eliquis. Cardiology is following. Will obtain CT of the abdomen and pelvis. Continue to check stools for occult blood. Receiving IV iron     present the bedside    Review of System:   Constitutional:   Denies fever or chills, weight loss or gain, admits to fatigue. HEENT:   Denies ear pain, sore throat, sinus or eye problems. Cardiovascular:   Denies any chest pain, irregular heartbeats, or palpitations. Respiratory:   Denies shortness of breath, minimal coughing, no sputum production, hemoptysis, or wheezing. Gastrointestinal:   Denies nausea, vomiting, diarrhea, or constipation. Denies any abdominal pain. Genitourinary:    Denies any urgency, frequency, hematuria. Voiding  without difficulty. Extremities:   Denies lower extremity swelling, edema or cyanosis. Neurology:    Denies any headache or focal neurological deficits, Denies generalized weakness or memory difficulty. Psch:   Denies being anxious or depressed.   Musculoskeletal:    Denies myalgias, joint complaints or back pain. Left below the knee amputation mild  Integumentary:   Denies any rashes, ulcers, or excoriations. Denies bruising. Hematologic/Lymphatic:  Denies bruising or bleeding. Physical Exam:  I/O this shift:  In: 60 [P.O.:60]  Out: 325 [Urine:325]    Intake/Output Summary (Last 24 hours) at 11/27/2022 1710  Last data filed at 11/27/2022 1343  Gross per 24 hour   Intake 60 ml   Output 345 ml   Net -285 ml   I/O last 3 completed shifts: In: 446.7 [Blood:446.7]  Out: 520 [Urine:520]  Patient Vitals for the past 96 hrs (Last 3 readings):   Weight   11/26/22 1236 135 lb (61.2 kg)   11/25/22 1503 131 lb (59.4 kg)   11/25/22 0806 139 lb (63 kg)     Vital Signs:   Blood pressure (!) 153/78, pulse (!) 107, temperature 98.6 °F (37 °C), temperature source Oral, resp. rate 20, height 5' 3\" (1.6 m), weight 135 lb (61.2 kg), SpO2 98 %, not currently breastfeeding. General appearance:  Alert, responsive, oriented to person, place, and time. Well preserved, alert, no distress. Head:  Normocephalic. No masses, lesions or tenderness. Eyes:  PERRLA. EOMI. Sclera clear. Buccal mucosa moist.  ENT:  Ears normal. Mucosa normal.  Neck:    Supple. Trachea midline. No thyromegaly. No JVD. No bruits. Heart:    Rhythm regular. Rate controlled. No murmurs. Lungs:    Symmetrical. Clear to auscultation bilaterally. No wheezes. No rhonchi. No rales. Abdomen:   Soft. Non-tender. Non-distended. Bowel sounds positive. No organomegaly or masses. No pain on palpation. Extremities:    Peripheral pulses present. No peripheral edema. No ulcers. No cyanosis. No clubbing. Left below the knee amputation  Neurologic:    Alert x 3. No focal deficit. Cranial nerves grossly intact. No focal weakness. Psych:   Behavior is normal. Mood appears normal. Speech is not rapid and/or pressured. Musculoskeletal:   Spine ROM normal. Muscular strength intact. Gait not assessed.   Integumentary:  No rashes  Skin normal color and texture. Genitalia/Breast:  Deferred    Medication:  Scheduled Meds:   carvedilol  12.5 mg Oral BID WC    folic acid  1 mg Oral Daily    ferric gluconate (FERRLECIT) IVPB  125 mg IntraVENous Daily    apixaban  10 mg Oral BID    Followed by    Saintclair Muskrat ON 12/3/2022] apixaban  5 mg Oral BID    mirtazapine  7.5 mg Oral Nightly    colchicine  0.6 mg Oral Daily    fluticasone  2 spray Each Nostril Daily    hydrALAZINE  50 mg Oral 4x Daily    vitamin B-6  50 mg Oral Daily     Continuous Infusions:   sodium chloride         Objective Data:  Recent Labs     11/25/22  1145 11/26/22  0547 11/26/22  0821 11/26/22  1555 11/27/22  0905   WBC 4.7 5.7  --   --  5.3   RBC 2.93* 2.86*  --   --  3.78   HGB 7.0* 6.7*  --  8.5* 9.3*   HCT 23.3* 22.5* 22.7* 27.9* 29.7*   MCV 79.5* 78.7*  --   --  78.6*   MCH 23.9* 23.4*  --   --  24.6*   MCHC 30.0* 29.8*  --   --  31.3*   RDW 16.5* 16.5*  --   --  16.0*    385  --   --  375   MPV 10.5 11.1  --   --  10.4     Recent Labs     11/25/22  0846 11/26/22  0547 11/27/22  0905    139 136   K 3.8 3.9 4.2   * 109* 105   CO2 17* 17* 18*   BUN 55* 40* 35*   CREATININE 1.4* 1.2* 1.1*   GLUCOSE 123* 99 174*   CALCIUM 9.5 9.2 9.2   PROT 8.4* 7.5 7.9   LABALBU 3.7 3.1* 3.1*   BILITOT 0.4 0.4 0.7   ALKPHOS 147* 134* 140*   AST 27 26 22   ALT 37* 33* 32     Lab Results   Component Value Date    TROPONINI 0.04 (H) 08/22/2020    TROPONINI 0.03 08/22/2020    TROPONINI 0.05 (H) 08/22/2020          Assessment:  Shortness of breath with findings of acute bilateral pulmonary emboli secondary to Megace?   Small-moderate pericardial effusion-unclear etiology  Likely myocardial injury vs stress ischemia in the setting of PE  Chronic kidney disease stage III with baseline creatinine of approximately 1.2  History of recurrent nephrolithiasis with multiple  procedures including stent exchange  Acute on chronic anemia with previous work-up by hematology  Peripheral artery disease with left SFA occlusion with angioplasty and stenting 2/2020 and left below the knee amputation 2/2020  Hyperlipidemia  Hypothyroidism  Schizophrenia  Hypertension  Type 2 diabetes mellitus with diabetic polyneuropathy         Plan:   Improvement noted since transfusion. Follow hemoglobin  Receiving IV iron  Started on Eliquis  CT of the abdomen and pelvis  Urological consult  Check stools for occult blood  Physical therapy to see evaluate and treat  Continue to follow with other consultants      Continue current therapy. See orders for further plan of care. More than 50% of my  time was spent at the bedside counseling/coordinating care with the patient and/or family with face to face contact. This time was spent reviewing notes and laboratory data as well as instructing and counseling the patient. Time I spent with the family or surrogate(s) is included only if the patient was incapable of providing the necessary information or participating in medical decisions. I also discussed the differential diagnosis and all of the proposed management plans with the patient and individuals accompanying the patient.         Isaiah Piper DO, D.O., FACOI  5:10 PM  11/27/2022

## 2022-11-27 NOTE — PROGRESS NOTES
Physician Progress Note      Blake Booker  CSN #:                  [id number]  :                       1960  ADMIT DATE:       2022 8:15 AM  DISCH DATE:  RESPONDING  PROVIDER #:        Aparna Walker MD          QUERY TEXT:    Noted documentation of LUAN in  cardiology consult. H&P documents   \"Chronic kidney disease stage III with baseline creatinine of approximately   1.2\". In order to support the diagnosis of LUAN, please include additional   clinical indicators in your documentation. ? Or please document if the   diagnosis of LUAN has been ruled out after further study. The medical record reflects the following:  Risk Factors: CKD, DM, IV contrast  Clinical Indicators: H&P \"Chronic kidney disease stage III with baseline   creatinine of approximately 1.2\".  Cardiology \"LUAN creatinine 1.4\". Admission Scr 1.4/1.2. Treatment:  ml, I&O's, labs    Defined by Kidney Disease Improving Global Outcomes (KDIGO) clinical practice   guideline for acute kidney injury:  -Increase in SCr by greater than or equal to 0.3 mg/dl within 48 hours; or  -Increase or decrease in SCr to greater than or equal to 1.5 times baseline,   which is known or presumed to have occurred within the prior 7 days; or  -Urine volume < 0.5ml/kg/h for 6 hours. Thank you, Art Saha RN BSN [facility]  [phone]  Options provided:  -- Acute kidney injury ruled out after study  -- Acute kidney injury evidenced by, Please document evidence as well as a   numerical baseline creatinine, if known. -- Other - I will add my own diagnosis  -- Disagree - Not applicable / Not valid  -- Disagree - Clinically unable to determine / Unknown  -- Refer to Clinical Documentation Reviewer    PROVIDER RESPONSE TEXT:    Provider is clinically unable to determine a response to this query.     Query created by: Stefano Samaniego on 2022 3:09 PM      Electronically signed by:  Aparna Walker MD 2022 9:26 AM

## 2022-11-28 ENCOUNTER — APPOINTMENT (OUTPATIENT)
Dept: GENERAL RADIOLOGY | Age: 62
DRG: 175 | End: 2022-11-28
Payer: MEDICARE

## 2022-11-28 LAB
ALBUMIN SERPL-MCNC: 2.3 G/DL (ref 3.5–4.7)
ALBUMIN SERPL-MCNC: 2.9 G/DL (ref 3.5–5.2)
ALP BLD-CCNC: 123 U/L (ref 35–104)
ALPHA-1-GLOBULIN: 0.5 G/DL (ref 0.2–0.4)
ALPHA-2-GLOBULIN: 1.5 G/DL (ref 0.5–1)
ALT SERPL-CCNC: 23 U/L (ref 0–32)
ANION GAP SERPL CALCULATED.3IONS-SCNC: 11 MMOL/L (ref 7–16)
ANTI-NUCLEAR ANTIBODY (ANA): NEGATIVE
AST SERPL-CCNC: 16 U/L (ref 0–31)
BASOPHILS ABSOLUTE: 0.03 E9/L (ref 0–0.2)
BASOPHILS RELATIVE PERCENT: 0.6 % (ref 0–2)
BETA GLOBULIN: 1.1 G/DL (ref 0.8–1.3)
BILIRUB SERPL-MCNC: 0.4 MG/DL (ref 0–1.2)
BUN BLDV-MCNC: 37 MG/DL (ref 6–23)
CALCIUM SERPL-MCNC: 9.2 MG/DL (ref 8.6–10.2)
CHLORIDE BLD-SCNC: 110 MMOL/L (ref 98–107)
CO2: 18 MMOL/L (ref 22–29)
CREAT SERPL-MCNC: 1.2 MG/DL (ref 0.5–1)
ELECTROPHORESIS: ABNORMAL
EOSINOPHILS ABSOLUTE: 0.11 E9/L (ref 0.05–0.5)
EOSINOPHILS RELATIVE PERCENT: 2.2 % (ref 0–6)
GAMMA GLOBULIN: 1.9 G/DL (ref 0.7–1.6)
GFR SERPL CREATININE-BSD FRML MDRD: 51 ML/MIN/1.73
GLUCOSE BLD-MCNC: 105 MG/DL (ref 74–99)
HCT VFR BLD CALC: 29.2 % (ref 34–48)
HEMOGLOBIN: 8.6 G/DL (ref 11.5–15.5)
IMMATURE GRANULOCYTES #: 0.1 E9/L
IMMATURE GRANULOCYTES %: 2 % (ref 0–5)
LYMPHOCYTES ABSOLUTE: 1.17 E9/L (ref 1.5–4)
LYMPHOCYTES RELATIVE PERCENT: 23.1 % (ref 20–42)
MCH RBC QN AUTO: 23.7 PG (ref 26–35)
MCHC RBC AUTO-ENTMCNC: 29.5 % (ref 32–34.5)
MCV RBC AUTO: 80.4 FL (ref 80–99.9)
MONOCYTES ABSOLUTE: 0.65 E9/L (ref 0.1–0.95)
MONOCYTES RELATIVE PERCENT: 12.8 % (ref 2–12)
NEUTROPHILS ABSOLUTE: 3 E9/L (ref 1.8–7.3)
NEUTROPHILS RELATIVE PERCENT: 59.3 % (ref 43–80)
PATHOLOGIST REVIEW: NORMAL
PDW BLD-RTO: 15.9 FL (ref 11.5–15)
PLATELET # BLD: 348 E9/L (ref 130–450)
PMV BLD AUTO: 10.4 FL (ref 7–12)
POTASSIUM SERPL-SCNC: 4.5 MMOL/L (ref 3.5–5)
PRO-BNP: 659 PG/ML (ref 0–125)
PROCALCITONIN: 0.49 NG/ML (ref 0–0.08)
RBC # BLD: 3.63 E12/L (ref 3.5–5.5)
SODIUM BLD-SCNC: 139 MMOL/L (ref 132–146)
TOTAL PROTEIN: 7.3 G/DL (ref 6.4–8.3)
TOTAL PROTEIN: 7.4 G/DL (ref 6.4–8.3)
WBC # BLD: 5.1 E9/L (ref 4.5–11.5)

## 2022-11-28 PROCEDURE — 99233 SBSQ HOSP IP/OBS HIGH 50: CPT | Performed by: INTERNAL MEDICINE

## 2022-11-28 PROCEDURE — 6360000002 HC RX W HCPCS: Performed by: INTERNAL MEDICINE

## 2022-11-28 PROCEDURE — 2580000003 HC RX 258: Performed by: INTERNAL MEDICINE

## 2022-11-28 PROCEDURE — 85025 COMPLETE CBC W/AUTO DIFF WBC: CPT

## 2022-11-28 PROCEDURE — 6370000000 HC RX 637 (ALT 250 FOR IP): Performed by: INTERNAL MEDICINE

## 2022-11-28 PROCEDURE — 84145 PROCALCITONIN (PCT): CPT

## 2022-11-28 PROCEDURE — 80053 COMPREHEN METABOLIC PANEL: CPT

## 2022-11-28 PROCEDURE — 2060000000 HC ICU INTERMEDIATE R&B

## 2022-11-28 PROCEDURE — 94640 AIRWAY INHALATION TREATMENT: CPT

## 2022-11-28 PROCEDURE — 71046 X-RAY EXAM CHEST 2 VIEWS: CPT

## 2022-11-28 PROCEDURE — 36415 COLL VENOUS BLD VENIPUNCTURE: CPT

## 2022-11-28 PROCEDURE — 83880 ASSAY OF NATRIURETIC PEPTIDE: CPT

## 2022-11-28 RX ORDER — ALBUTEROL SULFATE 2.5 MG/3ML
2.5 SOLUTION RESPIRATORY (INHALATION) EVERY 6 HOURS PRN
Status: DISCONTINUED | OUTPATIENT
Start: 2022-11-28 | End: 2022-11-29 | Stop reason: HOSPADM

## 2022-11-28 RX ORDER — AZITHROMYCIN 250 MG/1
500 TABLET, FILM COATED ORAL DAILY
Status: DISCONTINUED | OUTPATIENT
Start: 2022-11-28 | End: 2022-11-29 | Stop reason: HOSPADM

## 2022-11-28 RX ORDER — COLCHICINE 0.6 MG/1
0.3 TABLET ORAL EVERY OTHER DAY
Status: DISCONTINUED | OUTPATIENT
Start: 2022-11-30 | End: 2022-11-29 | Stop reason: HOSPADM

## 2022-11-28 RX ORDER — HYDRALAZINE HYDROCHLORIDE 25 MG/1
25 TABLET, FILM COATED ORAL EVERY 8 HOURS SCHEDULED
Status: DISCONTINUED | OUTPATIENT
Start: 2022-11-28 | End: 2022-11-29 | Stop reason: HOSPADM

## 2022-11-28 RX ORDER — IPRATROPIUM BROMIDE AND ALBUTEROL SULFATE 2.5; .5 MG/3ML; MG/3ML
1 SOLUTION RESPIRATORY (INHALATION)
Status: DISCONTINUED | OUTPATIENT
Start: 2022-11-28 | End: 2022-11-29 | Stop reason: HOSPADM

## 2022-11-28 RX ORDER — AZITHROMYCIN 250 MG/1
500 TABLET, FILM COATED ORAL DAILY
Status: DISCONTINUED | OUTPATIENT
Start: 2022-11-28 | End: 2022-11-28

## 2022-11-28 RX ADMIN — FLUTICASONE PROPIONATE 2 SPRAY: 50 SPRAY, METERED NASAL at 08:53

## 2022-11-28 RX ADMIN — CARVEDILOL 12.5 MG: 6.25 TABLET, FILM COATED ORAL at 08:30

## 2022-11-28 RX ADMIN — AZITHROMYCIN MONOHYDRATE 500 MG: 250 TABLET ORAL at 12:14

## 2022-11-28 RX ADMIN — IPRATROPIUM BROMIDE AND ALBUTEROL SULFATE 1 AMPULE: .5; 2.5 SOLUTION RESPIRATORY (INHALATION) at 14:23

## 2022-11-28 RX ADMIN — HYDRALAZINE HYDROCHLORIDE 25 MG: 25 TABLET, FILM COATED ORAL at 15:01

## 2022-11-28 RX ADMIN — IPRATROPIUM BROMIDE AND ALBUTEROL SULFATE 1 AMPULE: .5; 2.5 SOLUTION RESPIRATORY (INHALATION) at 10:59

## 2022-11-28 RX ADMIN — SODIUM CHLORIDE 125 MG: 9 INJECTION, SOLUTION INTRAVENOUS at 09:24

## 2022-11-28 RX ADMIN — APIXABAN 10 MG: 5 TABLET, FILM COATED ORAL at 08:53

## 2022-11-28 RX ADMIN — FOLIC ACID 1 MG: 1 TABLET ORAL at 08:53

## 2022-11-28 RX ADMIN — APIXABAN 10 MG: 5 TABLET, FILM COATED ORAL at 21:46

## 2022-11-28 RX ADMIN — ACETAMINOPHEN 650 MG: 325 TABLET ORAL at 17:58

## 2022-11-28 RX ADMIN — CARVEDILOL 12.5 MG: 6.25 TABLET, FILM COATED ORAL at 17:55

## 2022-11-28 RX ADMIN — ACETAMINOPHEN 650 MG: 325 TABLET ORAL at 06:34

## 2022-11-28 RX ADMIN — CEFTRIAXONE 1000 MG: 1 INJECTION, POWDER, FOR SOLUTION INTRAMUSCULAR; INTRAVENOUS at 17:55

## 2022-11-28 RX ADMIN — ACETAMINOPHEN 650 MG: 325 TABLET ORAL at 14:54

## 2022-11-28 RX ADMIN — ACETAMINOPHEN 650 MG: 325 TABLET ORAL at 21:46

## 2022-11-28 RX ADMIN — PYRIDOXINE HCL TAB 50 MG 50 MG: 50 TAB at 08:53

## 2022-11-28 RX ADMIN — COLCHICINE 0.6 MG: 0.6 TABLET, FILM COATED ORAL at 09:20

## 2022-11-28 RX ADMIN — IPRATROPIUM BROMIDE AND ALBUTEROL SULFATE 1 AMPULE: .5; 2.5 SOLUTION RESPIRATORY (INHALATION) at 18:30

## 2022-11-28 RX ADMIN — MIRTAZAPINE 7.5 MG: 15 TABLET, FILM COATED ORAL at 21:46

## 2022-11-28 RX ADMIN — DIAZEPAM 2 MG: 2 TABLET ORAL at 09:20

## 2022-11-28 RX ADMIN — HYDRALAZINE HYDROCHLORIDE 25 MG: 25 TABLET, FILM COATED ORAL at 21:46

## 2022-11-28 ASSESSMENT — PAIN SCALES - GENERAL
PAINLEVEL_OUTOF10: 5
PAINLEVEL_OUTOF10: 8
PAINLEVEL_OUTOF10: 9
PAINLEVEL_OUTOF10: 7
PAINLEVEL_OUTOF10: 8

## 2022-11-28 ASSESSMENT — PAIN DESCRIPTION - LOCATION
LOCATION: ARM
LOCATION: FOOT;HAND
LOCATION: ARM
LOCATION: BACK;LEG;ARM

## 2022-11-28 ASSESSMENT — PAIN DESCRIPTION - DESCRIPTORS: DESCRIPTORS: ACHING;DULL;DISCOMFORT

## 2022-11-28 ASSESSMENT — PAIN DESCRIPTION - ORIENTATION: ORIENTATION: RIGHT;LEFT

## 2022-11-28 ASSESSMENT — PAIN - FUNCTIONAL ASSESSMENT: PAIN_FUNCTIONAL_ASSESSMENT: PREVENTS OR INTERFERES SOME ACTIVE ACTIVITIES AND ADLS

## 2022-11-28 NOTE — PROGRESS NOTES
[facility]  Hematology/Oncology  Consult      Patient Name: Ollie Romero  YOB: 1960  PCP: Lolly Jacques MD   Referring Provider:      Reason for Consultation:   Chief Complaint   Patient presents with    URI     Body wide pain, ongoing for 2 weeks told by pcp to come to er      Subjective: Feeling better since transfusion. Denies any significant dyspnea. Has been transitioned to Eliquis. History of Present Illness: This is a 23-year-old female patient who was last seen by our service for acute on chronic microcytic anemia in 2020 while hospitalized. At that time patient required blood transfusions. She also received IV iron that with no significant improvement on her hemoglobin. Anemia was thought to be due to AOCD and future iron was to be held. Patient was to follow-up outpatient however she was lost to follow-up. Patient presented to the ED for evaluation of chest pain and shortness of breath as well as nausea and vomiting. CTA pulmonary with bilateral pulmonary embolism nononclusive in appearance involving the right upper middle and lower lobes in the left lower lobe. Small bilateral pleural effusions with some fluid seen along the right fissure. There is moderate sized pericardial effusion with right heart strain. Bilateral lower extremity Dopplers show no evidence of DVT. CTAP pending. Cardiology consulted plans for echo. Patient was also found to be anemic on admission Hgb 7.6 MCV 79.9 which has been worsening today Hgb is 6.7, MCV 78.7.  GI may be consulted to rule out GI bleed. Consultation for PE and anemia.       Diagnostic Data:     Past Medical History:   Diagnosis Date    CAD (coronary artery disease)     cath 2016    CHF (congestive heart failure) ([facility]     Diabetic peripheral neuropathy associated with type 2 diabetes mellitus ([facility] 08/24/2018    Epigastric hernia     Hyperlipidemia     Hypertension     Kidney stone     Schizophrenia ([facility]     Type 2 diabetes mellitus with diabetic polyneuropathy, with long-term current use of insulin ([address] 09/19/2016    no longer requiring medication. 1/13/21 - continues w/o medications as of 11/2/2022       Patient Active Problem List    Diagnosis Date Noted    Pulmonary emboli ([address] 11/27/2022    Pericardial effusion 11/25/2022    Anorexia 07/14/2021    PVD (peripheral vascular disease) ([address] 04/12/2021    History of arterial ischemic stroke 08/22/2020    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 08/22/2020    Chronic diastolic heart failure ([address] 08/22/2020    Paresthesia of hand, bilateral 08/17/2020    Paresthesia of left foot 08/17/2020    Unintended weight loss 08/17/2020    Hypertensive emergency 05/29/2020    Hypoglycemia 05/17/2020    Moderate protein-calorie malnutrition ([address] 02/25/2020    Arterial occlusion 02/23/2020    Microcytic hypochromic anemia 12/12/2019    Hydroureter 11/08/2019    Hx of diabetic foot ulcer 01/22/2019    Hx of BKA, left ([address] 01/22/2019    Diabetic peripheral neuropathy ([address] 08/24/2018    Hyperthyroidism 04/19/2018    Dyslipidemia 10/17/2016    S/P cardiac catheterization 10/17/2016    Abnormal stress ECG 10/17/2016    Essential hypertension 09/19/2016    Type 2 diabetes mellitus with diabetic peripheral angiopathy without gangrene, without long-term current use of insulin ([address] 09/19/2016    Epigastric hernia     Umbilical hernia         Past Surgical History:   Procedure Laterality Date    BLADDER SURGERY Bilateral 5/2/2022    CYSTOSCOPY RETROGRADE PYELOGRAM BILATERAL STENT CHANGE performed by Denise Raines MD at [facility] Left 11/7/2022    CYSTOSCOPY BILATERAL RETROGRADE PYELOGRAM LEFT STENT CHANGE POSSIBLE RIGHT STENT CHANGE VS REMOVAL performed by Denise Raines MD at [address] [place] [address]  09/30/2016     [facility]    COLONOSCOPY  08/2016    Dr. Ray Diaz 9/7/2021    Rox Sen, BILATERAL [address]  performed by Adi Saavedra MD at [address] / [address] / STONE Bilateral 10/28/2019    CYSTOSCOPY. RETROGRADE. PYELOGRAM. BILATERAL STENT INSERTION performed by iGovanna Wilson MD at [address] / [address] / STONE Bilateral 5/19/2020    CYSTOSCOPY RETROGRADE PYELOGRAM BILATERAL STENT EXCHANGE performed by Giovanna Wilson MD at [address] / [address] / Beatriz Tolliver Bilateral 1/18/2021    CYSTOSCOPY RETROGRADE PYELOGRAM BILATERAL  STENT CHANGE performed by Adi Saavedra MD at [address] Left 2/29/2020    LEG AMPUTATION BELOW KNEE performed by Bakari Hagan DO at [address]  11/04/2016    epigastric hernia umbilical hernia with mesh    OVARIAN CYST REMOVAL      OVARY REMOVAL Right     OVARY REMOVAL  2014    UPPER GASTROINTESTINAL ENDOSCOPY N/A 10/29/2019    EGD ESOPHAGOGASTRODUODENOSCOPY performed by Angela Willis MD at [facility] ENDOSCOPY       Family History  Family History   Problem Relation Age of Onset    Cancer Brother        Social History    TOBACCO:   reports that she quit smoking about 7 years ago. Her smoking use included cigarettes. She has never used smokeless tobacco.  ETOH:   reports no history of alcohol use. Home Medications  Prior to Admission medications    Medication Sig Start Date End Date Taking?  Authorizing Provider   albuterol sulfate HFA (VENTOLIN HFA) 108 (90 Base) MCG/ACT inhaler Inhale 2 puffs into the lungs every 6 hours as needed for Wheezing 11/20/22   Praveen Ayala MD   megestrol (MEGACE) 40 MG tablet Take 1 tablet by mouth daily 11/11/22   Praveen Ayala MD   fluticasone [facility]) 50 MCG/ACT nasal spray 2 sprays by Each Nostril route daily 10/19/22   Praveen Ayala MD   spironolactone (ALDACTONE) 25 MG tablet Take 1 tablet by mouth once daily  Patient not taking: No sig reported 10/12/22   Rhys Meyer MD   oxybutynin (DITROPAN XL) 10 MG extended release tablet Take 1 tablet by mouth daily  Patient not taking: No sig reported 10/12/22   Rhys Meyer MD   ibuprofen (ADVIL;MOTRIN) 800 MG tablet Take 1 tablet by mouth every 12 hours as needed for Pain 10/12/22   Rhys Meyer MD   hydrALAZINE (APRESOLINE) 50 MG tablet Take 1 tablet by mouth 4 times daily 10/12/22   Rhys Meyer MD   Handicap Placard MISC by Does not apply route Unable to ambulate more than 20 feet without difficulty  Expires 4/30/2027 4/11/22   Rhys Meyer MD   diazePAM (VALIUM) 2 MG tablet Take 2 mg by mouth every 6 hours as needed for Anxiety. Historical Provider, MD       Allergies  No Known Allergies    Review of Systems:    As in HPI, otherwise negative      Objective  BP (!) 127/55   Pulse (!) 106   Temp 99 °F (37.2 °C) (Oral)   Resp 19   Ht 5' 3\" (1.6 m)   Wt 135 lb (61.2 kg)   SpO2 99%   BMI 23.91 kg/m²     Physical Exam:   Performance Status:  General: AAO to person, place, time, in no acute distress,   Head and neck : PERRLA, EOMI . Sclera non icteric. Oropharynx : Clear  Neck: no JVD,  no adenopathy  Heart: Regular rate and regular rhythm, no murmur  Lungs: Clear to auscultation   Extremities: No edema, L BKA  Abdomen: Soft, non-tender;no masses, no organomegaly  Skin:  No rash. Neurologic:Cranial nerves grossly intact.  No focal motor deficits    Recent Laboratory Data-   Lab Results   Component Value Date    WBC 5.1 11/28/2022    HGB 8.6 (L) 11/28/2022    HCT 29.2 (L) 11/28/2022    MCV 80.4 11/28/2022     11/28/2022    LYMPHOPCT 23.1 11/28/2022    RBC 3.63 11/28/2022    MCH 23.7 (L) 11/28/2022    MCHC 29.5 (L) 11/28/2022    RDW 15.9 (H) 11/28/2022    NEUTOPHILPCT 59.3 11/28/2022    MONOPCT 12.8 (H) 11/28/2022    BASOPCT 0.6 11/28/2022    NEUTROABS 3.00 11/28/2022    LYMPHSABS 1.17 (L) 11/28/2022    MONOSABS 0.65 11/28/2022    EOSABS 0.11 11/28/2022    BASOSABS 0.03 11/28/2022       Lab Results   Component Value Date     11/28/2022    K 4.5 11/28/2022     (H) 11/28/2022    CO2 18 (L) 11/28/2022    BUN 37 (H) 11/28/2022    CREATININE 1.2 (H) 11/28/2022    GLUCOSE 105 (H) 11/28/2022    CALCIUM 9.2 11/28/2022    PROT 7.4 11/28/2022    LABALBU 2.9 (L) 11/28/2022    BILITOT 0.4 11/28/2022    ALKPHOS 123 (H) 11/28/2022    AST 16 11/28/2022    ALT 23 11/28/2022    LABGLOM 51 11/28/2022    GFRAA >60 06/24/2022       Lab Results   Component Value Date    IRON 34 (L) 11/25/2022    TIBC 190 (L) 11/25/2022    FERRITIN 185 11/26/2022           Radiology-    FL RETROGRADE PYELOGRAM W WO KUB    Result Date: 11/7/2022  EXAMINATION: SPOT FLUOROSCOPIC IMAGES 11/7/2022 2:25 pm TECHNIQUE: Fluoroscopy was provided by the radiology department for procedure. Radiologist was not present during examination. FLUOROSCOPY DOSE AND TYPE OR TIME AND EXPOSURES: 6 images Fluoroscopy time: 2.3 minutes COMPARISON: CT abdomen and pelvis from June 24, 2022 HISTORY: ORDERING SYSTEM PROVIDED HISTORY: Pain TECHNOLOGIST PROVIDED HISTORY: Reason for exam:->cysto retrograde Intraprocedural imaging. FINDINGS: 6 spot images of the abdomen were obtained. Fluoroscopic support provided to subspecialty service for bilateral retrograde pyelograms and bilateral ureteric stent exchange. No obvious complication on the images provided. Please see subspecialty report for full details and interpretation of real time imaging. Intraprocedural fluoroscopic spot images as above. See separate procedure report for more information. XR CHEST PORTABLE    Result Date: 11/25/2022  EXAMINATION: ONE XRAY VIEW OF THE CHEST 11/25/2022 9:01 am COMPARISON: 08/22/2020 HISTORY: ORDERING SYSTEM PROVIDED HISTORY: CP + SOB TECHNOLOGIST PROVIDED HISTORY: Reason for exam:->CP + SOB FINDINGS: The cardiac silhouette is within normals. There is no mediastinal widening.   The heart is not enlarged The right lung is clear Patchy airspace disease seen within the left lung base. There is no gross right or left pleural effusion. 1. Left lower lobe airspace disease, questionable pneumonia versus atelectasis. .     CTA PULMONARY W CONTRAST    Result Date: 11/25/2022  EXAMINATION: CTA OF THE CHEST 11/25/2022 10:45 am TECHNIQUE: CTA of the chest was performed after the administration of intravenous contrast.  Multiplanar reformatted images are provided for review. MIP images are provided for review. Automated exposure control, iterative reconstruction, and/or weight based adjustment of the mA/kV was utilized to reduce the radiation dose to as low as reasonably achievable. COMPARISON: None. HISTORY: ORDERING SYSTEM PROVIDED HISTORY: CP + SOB; Concern for PE TECHNOLOGIST PROVIDED HISTORY: Reason for exam:->CP + SOB; Concern for PE Decision Support Exception - unselect if not a suspected or confirmed emergency medical condition->Emergency Medical Condition (MA) FINDINGS: Pulmonary Arteries: Pulmonary arteries are adequately opacified for evaluation. Multiple nonocclusive pulmonary emboli in filling defects involving the right upper, middle and lower lobe pulmonary arteries as well as the left lower lobe pulmonary arteries. RV /LV ratio is 1.8. There is mild right heart strain. Main pulmonary artery is normal in caliber. Mediastinum: No evidence of mediastinal lymphadenopathy. The heart and pericardium demonstrate no acute abnormality. There is no acute abnormality of the thoracic aorta. Thyroid is heterogeneous in appearance. Cardiac chambers reveal mild right heart strain within RV LV ratio of 1.8. Some atherosclerotic disease seen within the coronary vessels. Moderate-sized pericardial effusion. Lungs/pleura: There is small bilateral pleural effusions with atelectatic change  seen at the lung bases bilaterally. Some fluid seen along the right fissure.   There is no dense consolidative airspace disease. Upper Abdomen: Limited images of the upper abdomen are unremarkable. Soft Tissues/Bones: No acute bone or soft tissue abnormality. Degenerative changes seen within the spine. No acute chest wall abnormality. Bilateral pulmonary emboli nonocclusive in appearance involving the right upper middle and lower lobes in left lower lobe. Small bilateral pleural effusions with some fluid seen along the right fissure. There is a moderate size pericardial he effusion with right heart strain within RV/ LV ratio of 1.8. Findings are discussed with Dr. Jose Alfredo Love 11/25/2022 at 11:13 a.m.     US DUP LOWER EXTREMITIES BILATERAL VENOUS    Result Date: 11/25/2022  EXAMINATION: DUPLEX VENOUS ULTRASOUND OF THE BILATERAL LOWER [id number]/25/2022 6:29 pm TECHNIQUE: Duplex ultrasound using B-mode/gray scaled imaging, Doppler spectral analysis and color flow Doppler was obtained of the deep venous structures of the lower bilateral extremities. COMPARISON: None. HISTORY: ORDERING SYSTEM PROVIDED HISTORY: R/O dvt TECHNOLOGIST PROVIDED HISTORY: Reason for exam:->R/O dvt What reading provider will be dictating this exam?->CRC FINDINGS: The visualized veins of the bilateral lower extremities are patent and free of echogenic thrombus. The veins demonstrate good compressibility with normal color flow study and spectral analysis. No evidence of DVT in either lower extremity. Status post left below the knee amputation. ASSESSMENT/PLAN :  65-year-old female   Acute on chronic microcytic anemia, AOCD as above. PE and anemia  Seen previously in 2020 for anemia, lost to follow up    - CTA pulmonary with bilateral pulmonary embolism nonocclusive, involving RUL/RML/LLL. Small bilateral pleural effusions with some fluid seen along the right fissure. Moderate sized pericardial effusion with right heart strain. Bilateral lower extremity.  - Dopplers show no evidence of DVT  - On Heparin gtt.  Plan for transition to DOAC on DC. OK to continue, no signs of bleeding at this time   - CTAP pending   - Cardiology consulted plans for echo   - CMP BUN 40, Cr 1.2 GFR 51.   - Hgb 7.6 MCV 79.9 which has been worsening today Hgb is 6.7, MCV 78. 7.1 unit of pRBC's ordered  - Trend CBC, transfuse for Hgb<7   - Iron profile with low serum iron values and low TIBC. No ferritin for review. Ordered. However expect to be normal high which would be consistent with AOCD   (Chronic urinary stents and recent abx per pt and family). ERSR is >150, CRP 13.9  - B12/folate WNL  - TSH very low, Free T4 normal  - Check ELMER/RF, SPEP and Ig, smear,   - Will follow    11/28/22  Feeling better since transfusion. Denies any significant dyspnea. Has been transitioned to Eliquis. Serum creatinine is 1.2 with an estimated GFR of 51 mL/min  CBC with a hemoglobin of 8.6 with an MCV of 80  Most recent iron profile is suggestive of anemia of chronic disease with low serum iron low TIBC and elevated ferritin. B12 and folate are normal  ELMER negative. RF normal.  SPEP is pending  Quantitative immunoglobulins with mild nonspecific elevation of IgG and IgM with normal IgA  Patient with anemia of chronic disease and also contributed to by moderate CKD    She will be discharged on Eliquis, we will follow as outpatient.       Alayna Camacho MD  Electronically signed 11/28/2022 at 12:52 PM

## 2022-11-28 NOTE — FLOWSHEET NOTE
On call Cardiologist notified via perfect serve of pt blood pressure and heart rate asked if he wanted hydralazine 50 mg and coreg 12.5 mg both given at this time. He stated to hold hydralazine and decrease it to 25 mg. But to give the coreg 12.5 mg. Pt educated of this change and stated understanding. Will continue to monitor.

## 2022-11-28 NOTE — CARE COORDINATION
11/28/22 1609 CM note: COVID (-) 11/28/22. Room air. NEW ELIQUIS. Hx L BKA. Discharge plan remains home. Pt does not think she'll need HHC at [place]; awaiting therapy evals. Pt has Hx MVI and would use them again if needed. Pt lives with her daughter [address] and granddaughter. \Her DME includes a cane and glucometer/supplies. Prior SW provided resources for [facility] as well as [address] [place] [address]. Pts daughter will provide transportation home.  Electronically signed by Gisela Laboy RN on 11/28/2022 at 4:14 PM

## 2022-11-28 NOTE — PROGRESS NOTES
Internal Medicine Progress Note    PARDEEP=[facility]    Mary Franciscomeghana. Sid Messer, SACHIOMarianaIMariana Barron D.O., SACHIOBARBARA Day D.O. Darden Roup, MSN, APRN, NP-C  Froilan Byers. Dusty Hawley, MSN, APRN-CNP     Primary Care Physician: Farzaneh Ledezma MD   Admitting Physician:  Princess Hubbard DO  Admission date and time: 11/25/2022  8:15 AM    Room:  [address] [place] [address]  Admitting diagnosis: Pericardial effusion [I31.39]  Pleural effusion [J90]  Chronic anemia [D64.9]  Acute pulmonary embolism without acute cor pulmonale, unspecified pulmonary embolism type [facility]) [I26.99]    Patient Name: Kirt Herring  MRN: [id number]    Date of Service: 11/28/2022     Subjective:  Lacey Snellen is a 60 y.o. female who was seen and examined today,11/28/2022, at the bedside. Lacey Snellen seems to be resting comfortably today. Her shortness of breath continues to improve. Hemoglobin remained stable and oral anticoagulation therapy has been implemented. Renal function has returned to baseline and she appears to be approaching her baseline. Review of System:   Constitutional:   Admits to generalized malaise and fatigue. HEENT:   Denies ear pain, sore throat, sinus or eye problems. Cardiovascular:   Denies any chest pain, irregular heartbeats, or palpitations. Respiratory:   Improving shortness of breath. No coughing or hemoptysis  Gastrointestinal:   Denies nausea, vomiting, diarrhea, or constipation. Denies any abdominal pain. Genitourinary:    Denies any urgency, frequency, hematuria. Voiding  without difficulty. Extremities:   Denies lower extremity swelling, edema or cyanosis. Neurology:    Denies any headache or focal neurological deficits, admits to generalized malaise and fatigue. Psch:   Denies being anxious or depressed. Musculoskeletal:    Denies  myalgias, joint complaints or back pain.   Left below the knee amputation  Integumentary:   Denies any rashes, ulcers, or excoriations. Denies bruising. Hematologic/Lymphatic:  Denies bruising or bleeding. Physical Exam:  No intake/output data recorded. Intake/Output Summary (Last 24 hours) at 11/28/2022 1045  Last data filed at 11/28/2022 0613  Gross per 24 hour   Intake 120 ml   Output 600 ml   Net -480 ml   I/O last 3 completed shifts: In: 180 [P.O.:180]  Out: 745 [Urine:745]  Patient Vitals for the past 96 hrs (Last 3 readings):   Weight   11/26/22 1236 135 lb (61.2 kg)   11/25/22 1503 131 lb (59.4 kg)   11/25/22 0806 139 lb (63 kg)     Vital Signs:   Blood pressure (!) 127/55, pulse (!) 106, temperature 99 °F (37.2 °C), temperature source Oral, resp. rate 19, height 5' 3\" (1.6 m), weight 135 lb (61.2 kg), SpO2 99 %, not currently breastfeeding. General appearance:  Alert, responsive, oriented to person, place, and time. Well preserved, alert, no distress. Head:  Normocephalic. No masses, lesions or tenderness. Eyes:  PERRLA. EOMI. Sclera clear. Buccal mucosa moist.  ENT:  Ears normal. Mucosa normal.  Neck:    Supple. Trachea midline. No thyromegaly. No JVD. No bruits. Heart:    Rhythm regular. Rate controlled. Systolic murmur. Lungs:    Symmetrical. Clear to auscultation bilaterally. No wheezes. No rhonchi. No rales. Abdomen:   Soft. Non-tender. Non-distended. Bowel sounds positive. No organomegaly or masses. No pain on palpation. Extremities:    Peripheral pulses present. No peripheral edema. No ulcers. No cyanosis. No clubbing. Left below the knee amputation  Neurologic:    Alert x 3. No focal deficit. Cranial nerves grossly intact. No focal weakness. Psych:   Behavior is normal. Mood appears normal. Speech is not rapid and/or pressured. Musculoskeletal:   Spine ROM normal. Muscular strength intact. Gait not assessed. Integumentary:  No rashes  Skin normal color and texture.   Genitalia/Breast:  Deferred    Medication:  Scheduled Meds: hydrALAZINE  25 mg Oral 3 times per day    carvedilol  12.5 mg Oral BID WC    cefTRIAXone (ROCEPHIN) IV  1,000 mg IntraVENous J59M    folic acid  1 mg Oral Daily    apixaban  10 mg Oral BID    Followed by    Saintclair Muskrat ON 12/3/2022] apixaban  5 mg Oral BID    mirtazapine  7.5 mg Oral Nightly    colchicine  0.6 mg Oral Daily    fluticasone  2 spray Each Nostril Daily    vitamin B-6  50 mg Oral Daily     Continuous Infusions:   sodium chloride         Objective Data:  Recent Labs     11/26/22  0547 11/26/22  0821 11/26/22  1555 11/27/22  0905 11/28/22  0513   WBC 5.7  --   --  5.3 5.1   RBC 2.86*  --   --  3.78 3.63   HGB 6.7*  --  8.5* 9.3* 8.6*   HCT 22.5*   < > 27.9* 29.7* 29.2*   MCV 78.7*  --   --  78.6* 80.4   MCH 23.4*  --   --  24.6* 23.7*   MCHC 29.8*  --   --  31.3* 29.5*   RDW 16.5*  --   --  16.0* 15.9*     --   --  375 348   MPV 11.1  --   --  10.4 10.4    < > = values in this interval not displayed.      Recent Labs     11/26/22  0547 11/27/22  0905 11/28/22  0513    136 139   K 3.9 4.2 4.5   * 105 110*   CO2 17* 18* 18*   BUN 40* 35* 37*   CREATININE 1.2* 1.1* 1.2*   GLUCOSE 99 174* 105*   CALCIUM 9.2 9.2 9.2   PROT 7.5 7.9 7.4   LABALBU 3.1* 3.1* 2.9*   BILITOT 0.4 0.7 0.4   ALKPHOS 134* 140* 123*   AST 26 22 16   ALT 33* 32 23     Lab Results   Component Value Date    TROPONINI 0.04 (H) 08/22/2020    TROPONINI 0.03 08/22/2020    TROPONINI 0.05 (H) 08/22/2020          Assessment:  Multifactorial bilateral pulmonary emboli including Megace therapy  Community-acquired pneumonia  Asymptomatic small pericardial effusion  Myocardial demand ischemia in the setting of the pulmonary emboli  Chronic kidney disease stage III  History of recurrent nephrolithiasis with multiple  procedures including stent exchange  Acute on chronic anemia  Peripheral artery disease with left SFA occlusion with angioplasty and stenting 2/2020 and left below the knee amputation 2/2020  Hyperlipidemia  Hypothyroidism  Schizophrenia  Essential hypertension      Plan:   Oral anticoagulation therapy has been employed and the patient's hemoglobin is being monitored accordingly. Chest x-ray is indicating worsening airspace disease and this is being monitored closely. Her respiratory status has stabilized at this point. We have encouraged use of the incentive spirometer and aggressive pulmonary toiletry is being provided. Plans are for discharge home tomorrow. More than 50% of my  time was spent at the bedside counseling/coordinating care with the patient and/or family with face to face contact. This time was spent reviewing notes and laboratory data as well as instructing and counseling the patient. Time I spent with the family or surrogate(s) is included only if the patient was incapable of providing the necessary information or participating in medical decisions. I also discussed the differential diagnosis and all of the proposed management plans with the patient and individuals accompanying the patient.         Leyla Schmitt DO, D.O., [facility]  10:45 AM  11/28/2022

## 2022-11-28 NOTE — CONSULTS
11/28/2022 8:57 AM  Talib Wheeler  [id number]     Chief Complaint:    Bilateral ureteral stents      History of Present Illness: The patient is a 60 y.o. female patient who presented to the hospital with complaints of shortness of breath. She was found to have bilateral pulmonary emboli and admitted for further evaluation. We were asked to evaluate her due to indwelling ureteral stents. She is known to our practice and has a history of recurrent UTIs and bilateral ureteral obstruction with indwelling ureteral stents. Her stents were recently just exchanged on 11/7/22 and are not due to be changed for 5 more months. She denies flank pain. Past Medical History:   Diagnosis Date    CAD (coronary artery disease)     cath 2016    CHF (congestive heart failure) ([address]     Diabetic peripheral neuropathy associated with type 2 diabetes mellitus ([address] 08/24/2018    Epigastric hernia     Hyperlipidemia     Hypertension     Kidney stone     Schizophrenia ([address]     Type 2 diabetes mellitus with diabetic polyneuropathy, with long-term current use of insulin ([facility] 09/19/2016    no longer requiring medication. 1/13/21 - continues w/o medications as of 11/2/2022         Past Surgical History:   Procedure Laterality Date    BLADDER SURGERY Bilateral 5/2/2022    CYSTOSCOPY RETROGRADE PYELOGRAM BILATERAL STENT CHANGE performed by Jerry Stout MD at [place] [address] Left 11/7/2022    CYSTOSCOPY BILATERAL RETROGRADE PYELOGRAM LEFT STENT CHANGE POSSIBLE RIGHT STENT CHANGE VS REMOVAL performed by Jerry Stout MD at [facility]  09/30/2016     [facility]    COLONOSCOPY  08/2016    Dr. Montes [facility] 9/7/2021    CYSTOSCOPY RETROGRADE PYELOGRAM, BILATERAL STENT EXCHANGE performed by Jerry Stout MD at [address] [facility] [address] / [address] [place] [address] Rd / [place] Bilateral 10/28/2019    CYSTOSCOPY. RETROGRADE.  PYELOGRAM. BILATERAL STENT INSERTION performed by Chantal Guzman MD at [address] [facility] / [address] / [place] Bilateral 5/19/2020    CYSTOSCOPY RETROGRADE PYELOGRAM BILATERAL STENT EXCHANGE performed by Chantal Guzman MD at [address] [facility] / [address] / [place] Bilateral 1/18/2021    CYSTOSCOPY RETROGRADE PYELOGRAM BILATERAL  STENT CHANGE performed by Kenneth Farley MD at [address] Left 2/29/2020    LEG AMPUTATION BELOW KNEE performed by Huang Keller DO at [address] [facility]  11/04/2016    epigastric hernia umbilical hernia with mesh    OVARIAN CYST REMOVAL      OVARY REMOVAL Right     OVARY REMOVAL  2014    UPPER GASTROINTESTINAL ENDOSCOPY N/A 10/29/2019    EGD ESOPHAGOGASTRODUODENOSCOPY performed by Shania Dueñas MD at [place] [address]       Medications Prior to Admission:    Medications Prior to Admission: albuterol sulfate HFA (VENTOLIN HFA) 108 (90 Base) MCG/ACT inhaler, Inhale 2 puffs into the lungs every 6 hours as needed for Wheezing  megestrol (MEGACE) 40 MG tablet, Take 1 tablet by mouth daily  fluticasone (FLONASE) 50 MCG/ACT nasal spray, 2 sprays by Each Nostril route daily  spironolactone (ALDACTONE) 25 MG tablet, Take 1 tablet by mouth once daily (Patient not taking: No sig reported)  oxybutynin (DITROPAN XL) 10 MG extended release tablet, Take 1 tablet by mouth daily (Patient not taking: No sig reported)  ibuprofen (ADVIL;MOTRIN) 800 MG tablet, Take 1 tablet by mouth every 12 hours as needed for Pain  hydrALAZINE (APRESOLINE) 50 MG tablet, Take 1 tablet by mouth 4 times daily  [DISCONTINUED] docusate sodium (COLACE) 100 MG capsule, Take 1 capsule by mouth 2 times daily as needed for Constipation (Patient not taking: Reported on 11/25/2022)  [DISCONTINUED] benazepril (LOTENSIN) 10 MG tablet, Take 1 tablet by mouth daily (Patient not taking: Reported on 11/25/2022)  Handicap Placard MISC, by Does not apply route Unable to ambulate more than 20 feet without difficulty Expires 4/30/2027  diazePAM (VALIUM) 2 MG tablet, Take 2 mg by mouth every 6 hours as needed for Anxiety. Allergies:    Patient has no known allergies. Social History:    reports that she quit smoking about 7 years ago. Her smoking use included cigarettes. She has never used smokeless tobacco. She reports that she does not drink alcohol and does not use drugs. Family History:   Non-contributory to this Urological problem  family history includes Cancer in her brother. Review of Systems:  Constitutional: No fever or chills   Respiratory: +shortness of breath   Cardiovascular: negative for chest pain and dyspnea  Gastrointestinal: negative for abdominal pain, diarrhea, nausea and vomiting   Derm: negative for rash and skin lesion(s)  Neurological: negative for seizures and tremors  Musculoskeletal: Negative    Psychiatric: Negative   : As above in the HPI, otherwise negative  All other reviews are negative    Physical Exam:     Vitals:  BP (!) 127/55   Pulse (!) 106   Temp 99 °F (37.2 °C) (Oral)   Resp 19   Ht 5' 3\" (1.6 m)   Wt 135 lb (61.2 kg)   SpO2 99%   BMI 23.91 kg/m²     General:  Awake, alert, oriented X 3. No apparent distress. HEENT:  Normocephalic, atraumatic. Lungs:  Respirations symmetric and non-labored. Abdomen:  soft, nontender, no masses  Extremities:  No clubbing, cyanosis, or edema  Skin:  Warm and dry, no open lesions or rashes  Neuro:  There are no motor or sensory deficits in the 4 quadrant extremities   Rectal: deferred  Genitourinary:  no rowe     Labs:     Recent Labs     11/26/22  0547 11/26/22  0821 11/26/22  1555 11/27/22  0905 11/28/22  0513   WBC 5.7  --   --  5.3 5.1   RBC 2.86*  --   --  3.78 3.63   HGB 6.7*  --  8.5* 9.3* 8.6*   HCT 22.5*   < > 27.9* 29.7* 29.2*   MCV 78.7*  --   --  78.6* 80.4   MCH 23.4*  --   --  24.6* 23.7*   MCHC 29.8*  --   --  31.3* 29.5*   RDW 16.5*  --   --  16.0* 15.9*    --   --  375 348   MPV 11.1  --   --  10.4 10.4    < > = values in this interval not displayed. Recent Labs     11/26/22  0547 11/27/22  0905 11/28/22  0513   CREATININE 1.2* 1.1* 1.2*       No results found for: PSA    Imaging:   Narrative   EXAMINATION:   CT OF THE ABDOMEN AND PELVIS WITHOUT CONTRAST 11/26/2022 1:11 pm       TECHNIQUE:   CT of the abdomen and pelvis was performed without the administration of   intravenous contrast. Multiplanar reformatted images are provided for review. Automated exposure control, iterative reconstruction, and/or weight based   adjustment of the mA/kV was utilized to reduce the radiation dose to as low   as reasonably achievable. COMPARISON:   06/24/2022       HISTORY:   ORDERING SYSTEM PROVIDED HISTORY: acute blood loss   TECHNOLOGIST PROVIDED HISTORY:   Reason for exam:->acute blood loss   Additional Contrast?->Oral       FINDINGS:   Lower Chest: Small bilateral pleural effusions are again noted, similar to   11/25/2022. Small pericardial effusion, unchanged. Organs: Liver and spleen appear normal in size without focal lesion. There   is evidence of cholelithiasis without cholecystitis. Pancreas has a normal   CT appearance. Common bile duct is normal.  The adrenal glands are normal.       Dilatation of both renal collecting systems. There is IV contrast material   within the right left renal collecting system and the urinary bladder. Ureters contain double-J ureteral catheters which appear to be appropriately   positioned. GI/Bowel: Large and small bowel shows no evidence of obstruction or   inflammation. Normal appendix. Oral contrast material is noted within the   colon. Pelvis: No free pelvic fluid. Uterus is normal in size. Possible left   exophytic uterine leiomyoma versus left ovary. No pelvic mass. Subcentimeter lymph nodes are noted along the external iliac chain   bilaterally.        Peritoneum/Retroperitoneum: No retroperitoneal mass or adenopathy. Aorta is   nonaneurysmal.       Bones/Soft Tissues: Subcutaneous structures appear normal.  No evidence of   abdominal wall hematoma. Mild degenerative changes in the lumbar spine facet   joints. No lytic or blastic lesions. Hips appear intact. Impression   1. No evidence of intraperitoneal or extraperitoneal abdominal hemorrhage. 2.  Pericardial effusion and small bilateral pleural effusions in the lower   chest       3. New dilatation of the renal collecting system on the right and left   despite the presence of double-J ureteral catheters. There is residual   contrast material in the renal collecting systems and urinary bladder. Assessment/plan:  Bilateral hydronephrosis managed with bilateral ureteral stents (last changed 11/7/22)      Creatinine stable   CTAP reviewed  Bilateral ureteral stents in good position. These were just exchanged on 11/7/22 and are not due to be changed for 5 more months   No acute  interventions needed at this time   Please call with additional questions or concerns   Thank you for allowing us to participate in her care       Electronically signed by BENJI Alicea CNP on 11/28/2022 at 8:57 AM  [facility]     I agree with the assessment and plan of GM Alicea. I personally evaluated the patient and made any changes to reflect my impression and plan.

## 2022-11-28 NOTE — PROGRESS NOTES
INPATIENT CARDIOLOGY CONSULT    Name: Karen Petty    Age: 60 y.o. Date of Admission: 11/25/2022  8:15 AM    Date of Service: 11/28/2022    Reason for Consultation: Pericardial effusion    Referring Physician: Yemi Vallecillo DO    Primary Cardiologist: Dr Frankey Lennert    Interim:  Denies chest pain or shortness of breath. Tolerating anticoagulation with stable hemoglobin    Past Medical History:  Past Medical History:   Diagnosis Date    CAD (coronary artery disease)     cath 2016    CHF (congestive heart failure) ([facility]     Diabetic peripheral neuropathy associated with type 2 diabetes mellitus ([facility] 08/24/2018    Epigastric hernia     Hyperlipidemia     Hypertension     Kidney stone     Schizophrenia ([facility]     Type 2 diabetes mellitus with diabetic polyneuropathy, with long-term current use of insulin ([facility] 09/19/2016    no longer requiring medication. 1/13/21 - continues w/o medications as of 11/2/2022       Past Surgical History:  Past Surgical History:   Procedure Laterality Date    BLADDER SURGERY Bilateral 5/2/2022    CYSTOSCOPY RETROGRADE PYELOGRAM BILATERAL STENT CHANGE performed by Jermaine Melton MD at [place] [address] Left 11/7/2022    CYSTOSCOPY BILATERAL RETROGRADE PYELOGRAM LEFT STENT CHANGE POSSIBLE RIGHT STENT CHANGE VS REMOVAL performed by Jermaine Melton MD at [address] [place] [address]  09/30/2016     [facility]    COLONOSCOPY  08/2016    Dr. Myah Montano 9/7/2021    CYSTOSCOPY RETROGRADE PYELOGRAM, BILATERAL STENT EXCHANGE performed by Jermaine Melton MD at [address] [place] [address] / [place] Bilateral 10/28/2019    CYSTOSCOPY. RETROGRADE.  PYELOGRAM. BILATERAL STENT INSERTION performed by Wes Caputo MD at [address] [place] [address] / [address] [place] [address] Rd / [place] Bilateral 5/19/2020    CYSTOSCOPY RETROGRADE PYELOGRAM BILATERAL STENT EXCHANGE performed by Wes Caputo MD at [facility] INSERTION / REMOVAL STENT / STONE Bilateral 2021    CYSTOSCOPY RETROGRADE PYELOGRAM BILATERAL  STENT CHANGE performed by Denise Raines MD at [address] Left 2020    LEG AMPUTATION BELOW KNEE performed by Yahir Dietrich DO at [address]  2016    epigastric hernia umbilical hernia with mesh    OVARIAN CYST REMOVAL      OVARY REMOVAL Right     OVARY REMOVAL  2014    UPPER GASTROINTESTINAL ENDOSCOPY N/A 10/29/2019    EGD ESOPHAGOGASTRODUODENOSCOPY performed by Lebron Rojo MD at [facility] ENDOSCOPY       Family History:  Family History   Problem Relation Age of Onset    Cancer Brother        Social History:  Social History     Tobacco Use    Smoking status: Former     Packs/day: 0.00     Years: 2.00     Pack years: 0.00     Types: Cigarettes     Quit date: 3/23/2015     Years since quittin.6    Smokeless tobacco: Never    Tobacco comments:     quit smoking    Vaping Use    Vaping Use: Never used   Substance Use Topics    Alcohol use: No    Drug use: No       Allergies:  No Known Allergies    Home Medications:  Prior to Admission medications    Medication Sig Start Date End Date Taking?  Authorizing Provider   albuterol sulfate HFA (VENTOLIN HFA) 108 (90 Base) MCG/ACT inhaler Inhale 2 puffs into the lungs every 6 hours as needed for Wheezing 22   Richard Peña MD   megestrol (MEGACE) 40 MG tablet Take 1 tablet by mouth daily 22   Richard Peña MD   fluticasone [facility]) 50 MCG/ACT nasal spray 2 sprays by Each Nostril route daily 10/19/22   Richard Peña MD   spironolactone (ALDACTONE) 25 MG tablet Take 1 tablet by mouth once daily  Patient not taking: No sig reported 10/12/22   Richard Peña MD   oxybutynin (DITROPAN XL) 10 MG extended release tablet Take 1 tablet by mouth daily  Patient not taking: No sig reported 10/12/22   Jesse Wilson MARIELLA Harrington MD   ibuprofen (ADVIL;MOTRIN) 800 MG tablet Take 1 tablet by mouth every 12 hours as needed for Pain 10/12/22   Ernestina Lutz MD   hydrALAZINE (APRESOLINE) 50 MG tablet Take 1 tablet by mouth 4 times daily 10/12/22   Ernestina Lutz MD   Handicap Placard MISC by Does not apply route Unable to ambulate more than 20 feet without difficulty  Expires 4/30/2027 4/11/22   Ernestina Lutz MD   diazePAM (VALIUM) 2 MG tablet Take 2 mg by mouth every 6 hours as needed for Anxiety.     Historical Provider, MD       Current Medications:    Current Facility-Administered Medications:     hydrALAZINE (APRESOLINE) tablet 25 mg, 25 mg, Oral, 3 times per day, Joseph Lamas MD, 25 mg at 11/28/22 1501    ipratropium-albuterol (DUONEB) nebulizer solution 1 ampule, 1 ampule, Inhalation, Q4H WA, Isaias Patel DO, 1 ampule at 11/28/22 1423    albuterol (PROVENTIL) nebulizer solution 2.5 mg, 2.5 mg, Nebulization, Q6H PRN, Isaias Patel DO    azithromycin [facility]) tablet 500 mg, 500 mg, Oral, Daily, Isaias Patel DO, 500 mg at 11/28/22 1214    [START ON 11/30/2022] colchicine (COLCRYS) tablet 0.3 mg, 0.3 mg, Oral, Every Other Day, BENJI Alegria - CNP    carvedilol (COREG) tablet 12.5 mg, 12.5 mg, Oral, BID , Maxime Doe MD, 12.5 mg at 11/28/22 0830    cefTRIAXone (ROCEPHIN) 1,000 mg in sterile water 10 mL IV syringe, 1,000 mg, IntraVENous, Q24H, Armand Reynaga DO, 1,000 mg at 04/99/74 7742    folic acid (FOLVITE) tablet 1 mg, 1 mg, Oral, Daily, Armand Reynaga DO, 1 mg at 11/28/22 0853    0.9 % sodium chloride infusion, , IntraVENous, PRN, Jacquie Reynaga DO    ondansetron (ZOFRAN) injection 4 mg, 4 mg, IntraVENous, Q4H PRN, Armand Reynaga DO, 4 mg at 11/26/22 1049    apixaban (ELIQUIS) tablet 10 mg, 10 mg, Oral, BID, 10 mg at 11/28/22 0853 **FOLLOWED BY** [START ON 12/3/2022] apixaban (ELIQUIS) tablet 5 mg, 5 mg, Oral, BID, Armand Reynaga DO    mirtazapine (Dima Fagan) tablet 7.5 mg, 7.5 mg, Oral, Nightly, Rutha Salaam Bisel, DO, 7.5 mg at 11/27/22 2021    benzonatate (TESSALON) capsule 100 mg, 100 mg, Oral, TID PRN, Maldonado Render, DO, 100 mg at 11/27/22 1557    albuterol sulfate HFA (PROVENTIL;VENTOLIN;PROAIR) 108 (90 Base) MCG/ACT inhaler 2 puff, 2 puff, Inhalation, Q6H PRN, Armand E Bisel, DO    diazePAM (VALIUM) tablet 2 mg, 2 mg, Oral, Q6H PRN, Rutha Salaam Bisel, DO, 2 mg at 11/28/22 0920    fluticasone (FLONASE) 50 MCG/ACT nasal spray 2 spray, 2 spray, Each Nostril, Daily, Armand E Bisel, DO, 2 spray at 11/28/22 0853    vitamin B-6 (PYRIDOXINE) tablet 50 mg, 50 mg, Oral, Daily, Armand E Bisel, DO, 50 mg at 11/28/22 0853    acetaminophen (TYLENOL) tablet 650 mg, 650 mg, Oral, Q4H PRN, Maldonado Render, DO, 650 mg at 11/28/22 1454    Physical Exam:  /69   Pulse (!) 106   Temp 99 °F (37.2 °C) (Oral)   Resp 19   Ht 5' 3\" (1.6 m)   Wt 135 lb (61.2 kg)   SpO2 99%   BMI 23.91 kg/m²   Wt Readings from Last 3 Encounters:   11/26/22 135 lb (61.2 kg)   11/02/22 139 lb (63 kg)   10/12/22 149 lb (67.6 kg)     Appearance: Awake, alert, appears mildly tachypneic  Skin: Intact, no rash  Head: Normocephalic, atraumatic  Eyes: EOMI, no conjunctival erythema  ENMT: No pharyngeal erythema, MMM, no rhinorrhea  Neck: Supple, no elevated JVP, no carotid bruits  Lungs: Diminished bases, few basilar rales  Cardiac: PMI nondisplaced, regular rhythm with a tachycardic rate, normal S1 & S2, no murmurs  Abdomen: Soft, nontender, +bowel sounds  Extremities: Moves all extremities x 4, no lower extremity edema. Left BKA  Neurologic: No focal motor deficits apparent, normal mood and affect  Peripheral Pulses: Intact posterior tibial pulses bilaterally    Intake/Output:    Intake/Output Summary (Last 24 hours) at 11/28/2022 1701  Last data filed at 11/28/2022 2883  Gross per 24 hour   Intake 120 ml   Output 400 ml   Net -280 ml     No intake/output data recorded.     Laboratory Tests:  Recent Labs Results   Component Value Date    LABVLDL 25 2022    LABVLDL 29 2022    LABVLDL 20 2021     No results found for: CHOLHDLRATIO  Recent Labs     22  1116   PROBNP 659*       Cardiac Tests:  EK2022: Sinus tachycardia 113 beats minute. Normal axis neck. Nonspecific T wave changes    Telemetry: Sinus tach low 100s    Chest X-ray:   22    Impression   1. Left lower lobe airspace disease, questionable pneumonia versus   atelectasis. .     CTA chest 22  Impression   Bilateral pulmonary emboli nonocclusive in appearance involving the right   upper middle and lower lobes in left lower lobe. Small bilateral pleural   effusions with some fluid seen along the right fissure. There is a moderate   size pericardial he effusion with right heart strain within RV/ LV ratio of   1.8.     Echocardiogram:   TTE 22   Summary   Normal left ventricular size and systolic function. Ejection fraction is visually estimated at 70-75%. Normal diastolic function. No regional wall motion abnormalities seen. Mild left ventricular concentric hypertrophy noted with basal septal   hypertrophy. Normal right ventricular size and function. There is a small-moderate circumferential pericardial effusion noted with   no evidence of hemodynamic compromise. Compared to study from 2020 the pericardial effusion is a new finding. ЕКАТЕРИНА 2020 [place]   Bubble study is negative for right to left shunt. No intracardiac thrombus or vegetation. .   Moderate atherosclerosis of the descending aorta and aortic arch. Normal left ventricular chamber size. Normal left ventricular systolic function. Visually estimated LVEF is 60-65 %. No wall motion abnormalities. Normal right ventricle structure and function. No significant valvular abnormalities. TTE 2020 [place]   Summary   Negative bubble study. Normal left ventricular chamber size.    Normal left ventricular systolic function. Visually estimated LVEF is 60-65 %. No wall motion abnormalities. Normal diastolic function. Normal right ventricle structure and function. No significant valvular abnormalities. No comparison study available. Stress test:      Cardiac catheterization:   [facility] 9/2016 [place]  Findings:  Left main: 0% stenosis  LAD: large and wraps around the apex to distal 1/2 of inferior septum. No stenosis  Circumflex: CD. No stenosis  RI: mild ostial disease. Otherwise normal.   RCA: CD. Small. Small RPDA. no stenosis  LV angio: not performed. -------------------------------------------------------------------------------------------------------------------------------------------------------------  IMPRESSION:  Small-moderate pericardial effusion, unclear etiology. Mildly elevated hs-cTnT: 39-28 ng/L. Likely acute myocardial injury in setting of PE  Chest pain likely related to PE  Normal coronary arteries by cath 2016  Acute bilateral PE  Recurrent nephrolithiasis, multiple  procedures, recent cystoscopy with stent exchange  LUAN creatinine 1.4  Acute on chronic anemia requiring transfusions in the setting of PE  PAD, left SFA occlusion with angioplasty and stent 2/2020; left BKA 2/2020    RECOMMENDATIONS:  Tolerating anticoagulation with stable hemoglobin     Follow recommendation from hematology with respect to anemia in the setting of PE   Repeat echocardiogram to monitor pericardial effusion  if clinical changes is noted. Blood pressure appears stable with addition of carvedilol   Continue on empiric colchicine  May need eventual outpatient cardiac MRI for further evaluation   Anticoagulation per primary and hematology  Further care per primary service    Thank you for allowing me to participate in your patient's care. Please feel free to contact me if you have any questions or concerns.     Rip Beltran MD  [address] Cardiology    NOTE: This report was transcribed using voice recognition software. Every effort was made to ensure accuracy; however, inadvertent computerized transcription errors may be present.

## 2022-11-29 VITALS
OXYGEN SATURATION: 97 % | TEMPERATURE: 99 F | HEIGHT: 63 IN | BODY MASS INDEX: 23.92 KG/M2 | HEART RATE: 94 BPM | DIASTOLIC BLOOD PRESSURE: 98 MMHG | WEIGHT: 135 LBS | RESPIRATION RATE: 19 BRPM | SYSTOLIC BLOOD PRESSURE: 164 MMHG

## 2022-11-29 LAB
ALBUMIN SERPL-MCNC: 3 G/DL (ref 3.5–5.2)
ALP BLD-CCNC: 143 U/L (ref 35–104)
ALT SERPL-CCNC: 47 U/L (ref 0–32)
ANION GAP SERPL CALCULATED.3IONS-SCNC: 13 MMOL/L (ref 7–16)
AST SERPL-CCNC: 51 U/L (ref 0–31)
BASOPHILS ABSOLUTE: 0.03 E9/L (ref 0–0.2)
BASOPHILS RELATIVE PERCENT: 0.7 % (ref 0–2)
BILIRUB SERPL-MCNC: 0.3 MG/DL (ref 0–1.2)
BUN BLDV-MCNC: 45 MG/DL (ref 6–23)
CALCIUM SERPL-MCNC: 9.2 MG/DL (ref 8.6–10.2)
CHLORIDE BLD-SCNC: 110 MMOL/L (ref 98–107)
CO2: 19 MMOL/L (ref 22–29)
CREAT SERPL-MCNC: 1.1 MG/DL (ref 0.5–1)
EOSINOPHILS ABSOLUTE: 0.12 E9/L (ref 0.05–0.5)
EOSINOPHILS RELATIVE PERCENT: 2.6 % (ref 0–6)
GFR SERPL CREATININE-BSD FRML MDRD: 56 ML/MIN/1.73
GLUCOSE BLD-MCNC: 119 MG/DL (ref 74–99)
HCT VFR BLD CALC: 28.5 % (ref 34–48)
HEMOGLOBIN: 8.6 G/DL (ref 11.5–15.5)
IMMATURE GRANULOCYTES #: 0.14 E9/L
IMMATURE GRANULOCYTES %: 3 % (ref 0–5)
LYMPHOCYTES ABSOLUTE: 1.28 E9/L (ref 1.5–4)
LYMPHOCYTES RELATIVE PERCENT: 27.8 % (ref 20–42)
MCH RBC QN AUTO: 24.2 PG (ref 26–35)
MCHC RBC AUTO-ENTMCNC: 30.2 % (ref 32–34.5)
MCV RBC AUTO: 80.1 FL (ref 80–99.9)
MONOCYTES ABSOLUTE: 0.56 E9/L (ref 0.1–0.95)
MONOCYTES RELATIVE PERCENT: 12.1 % (ref 2–12)
NEUTROPHILS ABSOLUTE: 2.48 E9/L (ref 1.8–7.3)
NEUTROPHILS RELATIVE PERCENT: 53.8 % (ref 43–80)
PDW BLD-RTO: 16.1 FL (ref 11.5–15)
PLATELET # BLD: 362 E9/L (ref 130–450)
PMV BLD AUTO: 10.4 FL (ref 7–12)
POTASSIUM SERPL-SCNC: 4.5 MMOL/L (ref 3.5–5)
RBC # BLD: 3.56 E12/L (ref 3.5–5.5)
SODIUM BLD-SCNC: 142 MMOL/L (ref 132–146)
TOTAL PROTEIN: 7.7 G/DL (ref 6.4–8.3)
URINE CULTURE, ROUTINE: NORMAL
WBC # BLD: 4.6 E9/L (ref 4.5–11.5)

## 2022-11-29 PROCEDURE — 6370000000 HC RX 637 (ALT 250 FOR IP): Performed by: INTERNAL MEDICINE

## 2022-11-29 PROCEDURE — 94640 AIRWAY INHALATION TREATMENT: CPT

## 2022-11-29 PROCEDURE — 80053 COMPREHEN METABOLIC PANEL: CPT

## 2022-11-29 PROCEDURE — 97165 OT EVAL LOW COMPLEX 30 MIN: CPT

## 2022-11-29 PROCEDURE — 36415 COLL VENOUS BLD VENIPUNCTURE: CPT

## 2022-11-29 PROCEDURE — 87081 CULTURE SCREEN ONLY: CPT

## 2022-11-29 PROCEDURE — 85025 COMPLETE CBC W/AUTO DIFF WBC: CPT

## 2022-11-29 RX ORDER — CARVEDILOL 12.5 MG/1
12.5 TABLET ORAL 2 TIMES DAILY WITH MEALS
Qty: 60 TABLET | Refills: 0 | Status: SHIPPED | OUTPATIENT
Start: 2022-11-29

## 2022-11-29 RX ORDER — HYDRALAZINE HYDROCHLORIDE 25 MG/1
25 TABLET, FILM COATED ORAL EVERY 8 HOURS SCHEDULED
Qty: 90 TABLET | Refills: 0 | Status: SHIPPED | OUTPATIENT
Start: 2022-11-29

## 2022-11-29 RX ORDER — AZITHROMYCIN 500 MG/1
500 TABLET, FILM COATED ORAL DAILY
Qty: 4 TABLET | Refills: 0 | Status: SHIPPED | OUTPATIENT
Start: 2022-11-29 | End: 2022-12-03

## 2022-11-29 RX ORDER — FOLIC ACID 1 MG/1
1 TABLET ORAL DAILY
Qty: 30 TABLET | Refills: 3 | Status: SHIPPED | OUTPATIENT
Start: 2022-11-29

## 2022-11-29 RX ORDER — ALBUTEROL SULFATE 90 UG/1
2 AEROSOL, METERED RESPIRATORY (INHALATION) 4 TIMES DAILY PRN
Qty: 18 G | Refills: 0 | Status: SHIPPED | OUTPATIENT
Start: 2022-11-29

## 2022-11-29 RX ORDER — POLYETHYLENE GLYCOL 3350 17 G/17G
17 POWDER, FOR SOLUTION ORAL DAILY
Qty: 510 G | Refills: 0 | Status: SHIPPED | OUTPATIENT
Start: 2022-11-29 | End: 2022-12-29

## 2022-11-29 RX ORDER — PYRIDOXINE HCL (VITAMIN B6) 50 MG
50 TABLET ORAL DAILY
Qty: 30 TABLET | Refills: 3 | Status: SHIPPED | OUTPATIENT
Start: 2022-11-29

## 2022-11-29 RX ORDER — MIRTAZAPINE 7.5 MG/1
7.5 TABLET, FILM COATED ORAL NIGHTLY
Qty: 30 TABLET | Refills: 0 | Status: SHIPPED | OUTPATIENT
Start: 2022-11-29

## 2022-11-29 RX ADMIN — APIXABAN 10 MG: 5 TABLET, FILM COATED ORAL at 09:59

## 2022-11-29 RX ADMIN — FOLIC ACID 1 MG: 1 TABLET ORAL at 09:59

## 2022-11-29 RX ADMIN — IPRATROPIUM BROMIDE AND ALBUTEROL SULFATE 1 AMPULE: .5; 2.5 SOLUTION RESPIRATORY (INHALATION) at 09:27

## 2022-11-29 RX ADMIN — IPRATROPIUM BROMIDE AND ALBUTEROL SULFATE 1 AMPULE: .5; 2.5 SOLUTION RESPIRATORY (INHALATION) at 05:57

## 2022-11-29 RX ADMIN — CARVEDILOL 12.5 MG: 6.25 TABLET, FILM COATED ORAL at 09:58

## 2022-11-29 RX ADMIN — PYRIDOXINE HCL TAB 50 MG 50 MG: 50 TAB at 10:00

## 2022-11-29 RX ADMIN — AZITHROMYCIN MONOHYDRATE 500 MG: 250 TABLET ORAL at 09:58

## 2022-11-29 RX ADMIN — HYDRALAZINE HYDROCHLORIDE 25 MG: 25 TABLET, FILM COATED ORAL at 06:17

## 2022-11-29 RX ADMIN — FLUTICASONE PROPIONATE 2 SPRAY: 50 SPRAY, METERED NASAL at 10:00

## 2022-11-29 ASSESSMENT — PAIN SCALES - GENERAL
PAINLEVEL_OUTOF10: 5
PAINLEVEL_OUTOF10: 0

## 2022-11-29 ASSESSMENT — PAIN DESCRIPTION - LOCATION: LOCATION: ARM

## 2022-11-29 ASSESSMENT — PAIN DESCRIPTION - DESCRIPTORS: DESCRIPTORS: TINGLING

## 2022-11-29 ASSESSMENT — PAIN DESCRIPTION - ORIENTATION: ORIENTATION: RIGHT;LEFT

## 2022-11-29 NOTE — PROGRESS NOTES
for self-care routine  * Functional transfer/mobility training/DME recommendations for increased independence, safety, and fall prevention  * Patient/Family education to increase follow through with safety techniques and functional independence  * Recommendation of environmental modifications for increased safety with functional transfers/mobility and ADLs  * Therapeutic exercise to improve motor endurance, ROM, and functional strength for ADLs/functional transfers  * Therapeutic activities to facilitate/challenge dynamic balance, stand tolerance for increased safety and independence with ADLs  * Therapeutic activities to facilitate gross/fine motor skills for increased independence with ADLs    Recommended Adaptive Equipment: w/w (pt owns), TBD for additional AE    Home Living: Pt lives with daughter and granddaughter in 2 floor home.     Bathroom setup: walk in shower with shower seat and grab bars    Equipment owned: cane, AE for LB self-care tasks, w/w    Prior Level of Function: assist PRN with ADLs , assist with IADLs; ambulated w/ cane   Driving: no    Pain Level: Pt c/o no pain this session    Cognition: A&O: 3/4; Follows 1 step directions   Memory:  fair    Sequencing:  fair    Problem solving:  fair    Judgement/safety:  fair      Functional Assessment:  AM-PAC Daily Activity Raw Score: 16/24   Initial Eval Status  Date: 11/29/22 Treatment Status  Date: STGs = LTGs  Time frame: 10-14 days   Feeding Modified [facility]   -   Grooming Stand by Assist   Seated position  Modified [facility]    UB Dressing Stand by Assist   Modified [place]    LB Dressing Maximal Assist   Stand by Assist    Bathing NT  Stand by Assist    Toileting Moderate Assist   Simulated  Stand by Assist    Bed Mobility  NT   Supine to sit: Supervision   Sit to supine: Supervision    Functional Transfers Moderate Assist   Stand by Assist    Functional Mobility Moderate Assist w/ cane  Min A w/ w/w  ~2-3 steps forward/backward  Stand by Assist    Balance Sitting:     Static:  SBA    Dynamic:Min A  Standing: Mod A w/ cane  Min A w/ w/w     Activity Tolerance Fair  Good   Visual/  Perceptual Glasses: no                  Hand Dominance R   AROM (PROM) Strength Additional Info:    RUE  WFL 3+/5 good  and wfl FMC/dexterity noted during ADL tasks       LUE WFL 3+/5 good  and wfl FMC/dexterity noted during ADL tasks       Hearing: WFL  Sensation:  No c/o numbness or tingling   Tone: WFL   Edema: none noted    Comments: Upon arrival patient seated in chair. Therapist educated pt on role of OT. At end of session, patient seated in chair with call light and phone within reach, all lines and tubes intact. Overall patient demonstrated decreased independence and safety during completion of ADL/functional transfer/mobility tasks. Pt would benefit from continued skilled OT to increase safety and independence with completion of ADL/IADL tasks for functional independence and quality of life. Treatment: OT treatment provided this date includes: Facilitation of unsupported sitting balance (addressing posture, weight shifting, dynamic reaching to prep for ADL's), functional transfers (w/ education/cues for safety/hand placement), standing tolerance tasks (addressing posture, balance and activity tolerance impacting ADLs and functional activity) and steps for/backward w/ w/w (w/ education/cuing on posture, w/w management and safety). Therapist facilitated self-care retraining: UB/LB self-care tasks, simulated toileting task and grooming tasks while educating pt on modified techniques, posture, safety and energy conservation techniques. Skilled monitoring of HR, O2 sats and pts response to treatment. Rehab Potential: Good for established goals     Patient / Family Goal: not stated      Patient and/or family were instructed on functional diagnosis, prognosis/goals and OT plan of care. Demonstrated fair understanding.      Eval Complexity: Low    Time In: 13:05  Time Out: 13:20    Min Units   OT Eval Low 97165  X  1   OT Eval Medium 88557      OT Eval High 99983      OT Re-Eval [id number]       Therapeutic Ex [id number]       Therapeutic Activities 47515       ADL/Self Care 54078       Orthotic Management 79723       Manual [id number]     Neuro Re-Ed 48097       Non-Billable Time          Evaluation Time additionally includes thorough review of current medical information, gathering information on past medical history/social history and prior level of function, interpretation of standardized testing/informal observation of tasks, assessment of data and development of plan of care and goals.         [facility] #[id number]

## 2022-11-29 NOTE — CARE COORDINATION
11/29/22 1221 CM note: COVID (-) 11/25/22. Room air. Discharge order noted. Awaiting therapy evals for DME/HHC recommendations. New eliquis. Pts script went to our outpt pharmacy so they will provide pt with free 30 day coupon. Provide pt with eliquis 1-800 number in the event she has any issues with copay. Pt lives with her daughter [facility] and granddaughter. Her DME includes a cane and glucometer/supplies. Prior SW provided resources for [employer]  as well as [address] [place] [address]. Pt has Hx MVI and would use them again if needed. Pts daughter will provide transportation home.  Electronically signed by Hanna Verma RN on 11/29/2022 at 12:24 PM

## 2022-11-29 NOTE — DISCHARGE SUMMARY
Internal Medicine Progress Note     PARDEEP=[facility]     Matti Sousa. Jackie Collins., F.A.C.OMarianaI. Des Rasmussen D.O., AMANDACMarianaOMariaanIBARBARA Lorenzo, MSN, APRN, NP-C  Yoanna Judd. Wenceslao Hill, MSN, APRN-CNP       Internal Medicine  Discharge Summary    NAME: Renetta Hill  :  1960  MRN:  [id number]  PCP:Cortez Harrington MD  ADMITTED: 2022      DISCHARGED: 22    ADMITTING PHYSICIAN: Matti Reynaga DO    CONSULTANT(S):   IP CONSULT TO CARDIOLOGY  IP CONSULT TO ONCOLOGY  IP CONSULT TO UROLOGY     ADMITTING DIAGNOSIS:   Pericardial effusion [I31.39]  Pleural effusion [J90]  Chronic anemia [D64.9]  Acute pulmonary embolism without acute cor pulmonale, unspecified pulmonary embolism type (HCC) [I26.99]     DISCHARGE DIAGNOSES:   Multifactorial bilateral pulmonary emboli including Megace therapy  Community-acquired pneumonia  Asymptomatic small pericardial effusion  Myocardial demand ischemia in the setting of the pulmonary emboli  Chronic kidney disease stage III  History of recurrent nephrolithiasis with multiple  procedures including stent exchange  Acute on chronic anemia  Peripheral artery disease with left SFA occlusion with angioplasty and stenting 2020 and left below the knee amputation 2020  Hyperlipidemia  Hypothyroidism  Schizophrenia  Essential hypertension    BRIEF HISTORY OF PRESENT ILLNESS:   The patient is a very pleasant 59-year-old female who presented to the emergency department with a complaint of worsening shortness of breath. Patient states that approximately 3 weeks ago that she developed shortness of breath. She states over the past week it has worsened. She admits to conversational dyspnea as well as dyspnea at rest and with exertion. States she gets winded very easily. She denies any fever or chills. She does admit to chest pain that worsens with a deep breath or cough.   States nothing is really made her breathing better. But talking and exertion make it worse. Denies fever or chills. Admits she has had bouts of nausea and vomiting. Currently feels fine and would like to eat. Denies any swelling in the lower extremities. Patient states she has history of low the knee amputation on the right. Patient states she underwent amputation February 29, 2020. Review of chart revealed the patient had gangrene and severe peripheral vascular disease of the left lower extremity necessitating below the knee amputation. The emergency department patient was found to have bilateral nonocclusive pulmonary emboli as well as moderate pericardial effusion on CT. Patient also underwent echocardiogram today which revealed LVEF of 70 to 75%. Normal diastolic function. Small-moderate circumferential pericardial effusion noted with no evidence of hemodynamic compromise. Patient was needed further evaluation treatment and admitted to the services of Dr. Berta Hart and Dr. Tami Jaramillo. Consultation was made with cardiology.     LABS[de-identified]  Lab Results   Component Value Date    WBC 5.1 11/28/2022    HGB 8.6 (L) 11/28/2022    HCT 29.2 (L) 11/28/2022     11/28/2022     11/28/2022    K 4.5 11/28/2022     (H) 11/28/2022    CREATININE 1.2 (H) 11/28/2022    BUN 37 (H) 11/28/2022    CO2 18 (L) 11/28/2022    GLUCOSE 105 (H) 11/28/2022    ALT 23 11/28/2022    AST 16 11/28/2022    INR 1.2 08/22/2020     Lab Results   Component Value Date    INR 1.2 08/22/2020    INR 1.1 08/22/2020    INR 1.9 03/01/2020    PROTIME 13.0 (H) 08/22/2020    PROTIME 12.9 (H) 08/22/2020    PROTIME 21.9 (H) 03/01/2020      Lab Results   Component Value Date    TSH <0.010 (L) 11/26/2022     Lab Results   Component Value Date    TRIG 123 11/26/2022    TRIG 144 04/20/2022    TRIG 101 07/19/2021     Lab Results   Component Value Date    HDL 26 11/26/2022    HDL 38 04/20/2022    HDL 41 07/19/2021     Lab Results   Component Value Date    LDLCALC 73 11/26/2022 LDLCALC 128 (H) 04/20/2022    LDLCALC 109 (H) 07/19/2021     Lab Results   Component Value Date    LABA1C 5.9 (H) 11/26/2022       IMAGING:  CT ABDOMEN PELVIS WO CONTRAST Additional Contrast? Oral    Result Date: 11/26/2022  EXAMINATION: CT OF THE ABDOMEN AND PELVIS WITHOUT CONTRAST 11/26/2022 1:11 pm TECHNIQUE: CT of the abdomen and pelvis was performed without the administration of intravenous contrast. Multiplanar reformatted images are provided for review. Automated exposure control, iterative reconstruction, and/or weight based adjustment of the mA/kV was utilized to reduce the radiation dose to as low as reasonably achievable. COMPARISON: 06/24/2022 HISTORY: ORDERING SYSTEM PROVIDED HISTORY: acute blood loss TECHNOLOGIST PROVIDED HISTORY: Reason for exam:->acute blood loss Additional Contrast?->Oral FINDINGS: Lower Chest: Small bilateral pleural effusions are again noted, similar to 11/25/2022. Small pericardial effusion, unchanged. Organs: Liver and spleen appear normal in size without focal lesion. There is evidence of cholelithiasis without cholecystitis. Pancreas has a normal CT appearance. Common bile duct is normal.  The adrenal glands are normal. Dilatation of both renal collecting systems. There is IV contrast material within the right left renal collecting system and the urinary bladder. Ureters contain double-J ureteral catheters which appear to be appropriately positioned. GI/Bowel: Large and small bowel shows no evidence of obstruction or inflammation. Normal appendix. Oral contrast material is noted within the colon. Pelvis: No free pelvic fluid. Uterus is normal in size. Possible left exophytic uterine leiomyoma versus left ovary. No pelvic mass. Subcentimeter lymph nodes are noted along the external iliac chain bilaterally. Peritoneum/Retroperitoneum: No retroperitoneal mass or adenopathy.   Aorta is nonaneurysmal. Bones/Soft Tissues: Subcutaneous structures appear normal.  No evidence of abdominal wall hematoma. Mild degenerative changes in the lumbar spine facet joints. No lytic or blastic lesions. Hips appear intact. 1.  No evidence of intraperitoneal or extraperitoneal abdominal hemorrhage. 2.  Pericardial effusion and small bilateral pleural effusions in the lower chest 3. New dilatation of the renal collecting system on the right and left despite the presence of double-J ureteral catheters. There is residual contrast material in the renal collecting systems and urinary bladder. XR CHEST (2 VW)    Result Date: 11/28/2022  EXAMINATION: TWO XRAY VIEWS OF THE CHEST 11/28/2022 9:44 am COMPARISON: 11/25/2022 HISTORY: ORDERING SYSTEM PROVIDED HISTORY: Dyspnea TECHNOLOGIST PROVIDED HISTORY: Reason for exam:->Dyspnea FINDINGS: Left lower lobe infiltrate and effusion are slightly worse. There are no right-sided infiltrates. There is a small right effusion. Krissy Balling Heart size is unremarkable. Mild worsening of left lower lobe infiltrate     FL RETROGRADE PYELOGRAM W WO KUB    Result Date: 11/7/2022  EXAMINATION: SPOT FLUOROSCOPIC IMAGES 11/7/2022 2:25 pm TECHNIQUE: Fluoroscopy was provided by the radiology department for procedure. Radiologist was not present during examination. FLUOROSCOPY DOSE AND TYPE OR TIME AND EXPOSURES: 6 images Fluoroscopy time: 2.3 minutes COMPARISON: CT abdomen and pelvis from June 24, 2022 HISTORY: ORDERING SYSTEM PROVIDED HISTORY: Pain TECHNOLOGIST PROVIDED HISTORY: Reason for exam:->cysto retrograde Intraprocedural imaging. FINDINGS: 6 spot images of the abdomen were obtained. Fluoroscopic support provided to subspecialty service for bilateral retrograde pyelograms and bilateral ureteric stent exchange. No obvious complication on the images provided. Please see subspecialty report for full details and interpretation of real time imaging. Intraprocedural fluoroscopic spot images as above. See separate procedure report for more information.      XR CHEST PORTABLE    Result Date: 11/25/2022  EXAMINATION: ONE XRAY VIEW OF THE CHEST 11/25/2022 9:01 am COMPARISON: 08/22/2020 HISTORY: ORDERING SYSTEM PROVIDED HISTORY: CP + SOB TECHNOLOGIST PROVIDED HISTORY: Reason for exam:->CP + SOB FINDINGS: The cardiac silhouette is within normals. There is no mediastinal widening. The heart is not enlarged The right lung is clear Patchy airspace disease seen within the left lung base. There is no gross right or left pleural effusion. 1. Left lower lobe airspace disease, questionable pneumonia versus atelectasis. .     CTA PULMONARY W CONTRAST    Result Date: 11/25/2022  EXAMINATION: CTA OF THE CHEST 11/25/2022 10:45 am TECHNIQUE: CTA of the chest was performed after the administration of intravenous contrast.  Multiplanar reformatted images are provided for review. MIP images are provided for review. Automated exposure control, iterative reconstruction, and/or weight based adjustment of the mA/kV was utilized to reduce the radiation dose to as low as reasonably achievable. COMPARISON: None. HISTORY: ORDERING SYSTEM PROVIDED HISTORY: CP + SOB; Concern for PE TECHNOLOGIST PROVIDED HISTORY: Reason for exam:->CP + SOB; Concern for PE Decision Support Exception - unselect if not a suspected or confirmed emergency medical condition->Emergency Medical Condition (MA) FINDINGS: Pulmonary Arteries: Pulmonary arteries are adequately opacified for evaluation. Multiple nonocclusive pulmonary emboli in filling defects involving the right upper, middle and lower lobe pulmonary arteries as well as the left lower lobe pulmonary arteries. RV /LV ratio is 1.8. There is mild right heart strain. Main pulmonary artery is normal in caliber. Mediastinum: No evidence of mediastinal lymphadenopathy. The heart and pericardium demonstrate no acute abnormality. There is no acute abnormality of the thoracic aorta. Thyroid is heterogeneous in appearance.   Cardiac chambers reveal mild right heart strain within RV LV ratio of 1.8. Some atherosclerotic disease seen within the coronary vessels. Moderate-sized pericardial effusion. Lungs/pleura: There is small bilateral pleural effusions with atelectatic change  seen at the lung bases bilaterally. Some fluid seen along the right fissure. There is no dense consolidative airspace disease. Upper Abdomen: Limited images of the upper abdomen are unremarkable. Soft Tissues/Bones: No acute bone or soft tissue abnormality. Degenerative changes seen within the spine. No acute chest wall abnormality. Bilateral pulmonary emboli nonocclusive in appearance involving the right upper middle and lower lobes in left lower lobe. Small bilateral pleural effusions with some fluid seen along the right fissure. There is a moderate size pericardial he effusion with right heart strain within RV/ LV ratio of 1.8. Findings are discussed with Dr. Ga Prince 11/25/2022 at 11:13 a.m.     US DUP LOWER EXTREMITIES BILATERAL VENOUS    Result Date: 11/25/2022  EXAMINATION: DUPLEX VENOUS ULTRASOUND OF THE BILATERAL LOWER [id number]/25/2022 6:29 pm TECHNIQUE: Duplex ultrasound using B-mode/gray scaled imaging, Doppler spectral analysis and color flow Doppler was obtained of the deep venous structures of the lower bilateral extremities. COMPARISON: None. HISTORY: ORDERING SYSTEM PROVIDED HISTORY: R/O dvt TECHNOLOGIST PROVIDED HISTORY: Reason for exam:->R/O dvt What reading provider will be dictating this exam?->CRC FINDINGS: The visualized veins of the bilateral lower extremities are patent and free of echogenic thrombus. The veins demonstrate good compressibility with normal color flow study and spectral analysis. No evidence of DVT in either lower extremity. Status post left below the knee amputation. HOSPITAL COURSE:   Voncille Pulse very well throughout the hospitalization.   She was found to be suffering pulmonary emboli partly in the setting of Megace therapy and chronic with the patient and discussed the results of the current hospitalization, in addition to providing specific details for the plan of care and counseling regarding the diagnosis and prognosis. The plan has been discussed in detail and they are aware of the specific conditions for emergent return, as well as the importance of follow-up. Their questions are answered at this time and they are agreeable with the plan for discharge to home    DISCHARGE MEDICATIONS:   Current Discharge Medication List             Details   !! albuterol sulfate HFA (VENTOLIN HFA) 108 (90 Base) MCG/ACT inhaler Inhale 2 puffs into the lungs 4 times daily as needed for Wheezing  Qty: 18 g, Refills: 0      !! apixaban (ELIQUIS) 5 MG TABS tablet Take 2 tablets by mouth 2 times daily for 6 days, THEN 1 tablet 2 times daily for 25 days. Qty: 74 tablet, Refills: 0      mirtazapine (REMERON) 7.5 MG tablet Take 1 tablet by mouth nightly  Qty: 30 tablet, Refills: 0      carvedilol (COREG) 12.5 MG tablet Take 1 tablet by mouth 2 times daily (with meals)  Qty: 60 tablet, Refills: 0      azithromycin (ZITHROMAX) 500 MG tablet Take 1 tablet by mouth daily for 4 doses  Qty: 4 tablet, Refills: 0    Associated Diagnoses: Community acquired pneumonia, unspecified laterality      !! apixaban (ELIQUIS) 5 MG TABS tablet Take 1 tablet by mouth 2 times daily  Qty: 60 tablet, Refills: 0      folic acid (FOLVITE) 1 MG tablet Take 1 tablet by mouth daily  Qty: 30 tablet, Refills: 3      vitamin B-6 (B-6) 50 MG tablet Take 1 tablet by mouth daily  Qty: 30 tablet, Refills: 3       !! - Potential duplicate medications found. Please discuss with provider.              Details   hydrALAZINE (APRESOLINE) 25 MG tablet Take 1 tablet by mouth every 8 hours  Qty: 90 tablet, Refills: 0                Details   !! albuterol sulfate HFA (VENTOLIN HFA) 108 (90 Base) MCG/ACT inhaler Inhale 2 puffs into the lungs every 6 hours as needed for Wheezing  Qty: 18 g, Refills: 3 fluticasone (FLONASE) 50 MCG/ACT nasal spray 2 sprays by Each Nostril route daily  Qty: 16 g, Refills: 1      Handicap Placard MISC by Does not apply route Unable to ambulate more than 20 feet without difficulty  Expires 4/30/2027  Qty: 1 each, Refills: 0      diazePAM (VALIUM) 2 MG tablet Take 2 mg by mouth every 6 hours as needed for Anxiety. !! - Potential duplicate medications found. Please discuss with provider. FOLLOW UP/INSTRUCTIONS:  This patient is instructed to follow-up with her primary care physician. Patient is instructed to follow-up with the consults listed above as directed by them. she is instructed to resume home medications and take new medications as indicated in the list above. If the patient has a recurrence of symptoms, she is instructed to go to the ED. Preparing for this patient's discharge, including paperwork, orders, instructions, and meeting with patient did require > 40 minutes.     Thuan Catalan DO   11/29/2022  9:05 AM

## 2022-11-29 NOTE — DISCHARGE INSTRUCTIONS
Your information:  Name: Jillian Keller  : 1960    Your instructions:    YOU ARE BEING DISCHARGED HOME. PLEASE MAKE AND KEEP ALL FOLLOW-UP APPOINTMENTS. IF YOU EXPERIENCE CHEST PAIN, SHORTNESS OF BREATH, SWEATING, LIGHTHEADEDNESS, OR PALPITATIONS: CALL 911 OR GO TO THE EMERGENCY DEPARTMENT IMMEDIATELY. If you begin to feel unwell or symptoms persist, contact your physician. What to do after you leave the hospital:    Recommended diet:  Low sodium, easy to chew    Recommended activity: activity as tolerated        The following personal items were collected during your admission and were returned to you:    Belongings  Dental Appliances: Lowers, Uppers  Vision - Corrective Lenses: Eyeglasses  Hearing Aid: None  Clothing: Footwear, Dress, Pants, Shirt, Socks, Undergarments  Jewelry: None  Electronic Devices: Cell Phone,   Weapons (Notify Protective Services/Security): None  Home Medications: None  Valuables Given To: Patient  Provide Name(s) of Who Valuable(s) Were Given To: Danita Thurman    Information obtained by:  By signing below, I understand that if any problems occur once I leave the hospital I am to contact . I understand and acknowledge receipt of the instructions indicated above.

## 2022-11-29 NOTE — PROGRESS NOTES
CLINICAL PHARMACY NOTE: MEDS TO BEDS    Total # of Prescriptions Filled: 4   The following medications were delivered to the patient:  Eliquis 5 mg  Carvedilol 12.5 mg  Azithromycin 500 mg  Hydralazine 25 mg    Additional Documentation:  Mirtazapine was out of stock. Patient requested this script transferred to [facility] for . Vitamin B6, PEG, and Folic acid were not covered under her insurance; she was advised to pick them up OTC.

## 2022-11-29 NOTE — PROGRESS NOTES
Discharge instructions given with daughter at bedside, pt and daughter verbalize understanding. Pt opted not to stay for PT eval as she needed to  her granddaughter. We did run into OT on our way out the door. OT spoke with the pt who again opted out of waiting. OT did tell her to continue utilizing her walker at home. Pt verbalized understanding. Pt transported to [facility], assisted to vehicle and discharged home with her daughter.
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
## 2022-11-30 ENCOUNTER — CARE COORDINATION (OUTPATIENT)
Dept: CASE MANAGEMENT | Age: 62
End: 2022-11-30

## 2022-11-30 DIAGNOSIS — I26.99 ACUTE PULMONARY EMBOLISM, UNSPECIFIED PULMONARY EMBOLISM TYPE, UNSPECIFIED WHETHER ACUTE COR PULMONALE PRESENT (HCC): Primary | ICD-10-CM

## 2022-11-30 PROCEDURE — 1111F DSCHRG MED/CURRENT MED MERGE: CPT | Performed by: FAMILY MEDICINE

## 2022-11-30 NOTE — CARE COORDINATION
[facility] Care Transitions Initial Follow Up Call    Call within 2 business days of discharge: Yes    Care Transition Nurse contacted the patient by telephone to perform post hospital discharge assessment. Verified name and  with patient as identifiers. Provided introduction to self, and explanation of the Care Transition Nurse role. Patient: Ashia Olea Patient : 1960   MRN: [id number]  Reason for Admission: Acute pulmonary embolism without acute cor pulmonale, unspecified pulmonary embolism type  Discharge Date: 22 RARS: Readmission Risk Score: 15.1      Last Discharge  [facility]       Date Complaint Diagnosis Description Type Department Provider    22 URI Acute pulmonary embolism without acute cor pulmonale, unspecified pulmonary embolism type ([facility] [address] . .. ED to Hosp-Admission (Discharged) (ADMITTED) [facility]; [address] .. Spoke with Rajiv Vora for initial low readmission risk score care transition call post hospital discharge. She reports that she feels \"good\" today. She denies chest pain or tightness, shortness of breath, or cough today. She is getting around in her wheelchair without issue today (history of right BKA). She stated she did feel weak yesterday but today she is able to transfer herself from her wheelchair to the bed, toilet, or other chair without issue. She confirmed having plenty of help at home from her , daughter, and granddaughter if needed. She stated she still drives when she feels up to it and when she doesn't her family drives her to appointments. Rajiv Vora denies any flank pain, recent history of UTI and stent insertion. She stated she is urinating clear yellow urine without any clots. She has not moved her bowels since discharge, however, she is passing gas. Rajiv Vora stated she did forget to check her BS this morning and will do so later today. She denies any needs, questions, or concerns at this time.      Care Transition Nurse reviewed discharge instructions, medical action plan, and red flags with patient who verbalized understanding. The patient was given an opportunity to ask questions and does not have any further questions or concerns at this time. Were discharge instructions available to patient? Yes. Reviewed appropriate site of care based on symptoms and resources available to patient including: PCP  Specialist  Urgent care clinics  [facility] 24/7  When to call [address] The patient agrees to contact the PCP office for questions related to their healthcare. Medication reconciliation was performed with patient, who verbalizes understanding of administration of home medications. Medications reviewed, 1111F entered: yes    Was patient discharged with a pulse oximeter? no    Non-face-to-face services provided:  Scheduled appointment with PCP-12/6/22 at 56  Obtained and reviewed discharge summary and/or continuity of care documents    Offered patient enrollment in the Remote Patient Monitoring (RPM) program for in-home monitoring: NA at this time; BS readings were well controlled while IP and does not have diabetic medications ordered at this time.      Care Transitions 24 Hour Call    Schedule Follow Up Appointment with PCP: Completed  Do you have a copy of your discharge instructions?: Yes  Do you have all of your prescriptions and are they filled?: No  Have you been contacted by a [employer] [address]?: No  Have you scheduled your follow up appointment?: No  Patient DME: Shanda Murphy you have support at home?: Child, Partner/Spouse/SO, Grandchild  Do you feel like you have everything you need to keep you well at home?: Yes  Are you an active caregiver in your home?: No  Care Transitions Interventions         Follow Up  Future Appointments   Date Time Provider Naomi Alvarado   12/6/2022 10:30 AM [facility]   4/19/2023 10:00 AM Ashley Silvestre MD [facility] Care Transition Nurse provided contact information. Plan for follow-up call in 5-7 days based on severity of symptoms and risk factors. Plan for next call: follow-up appointment-any changes at HFU appt 12/6/22?   medication management-Did she decrease Eliquis on 12/3/22 to 1 tab BID?      Alejandro Burt, RN

## 2022-12-01 LAB — VRE CULTURE: NORMAL

## 2022-12-01 NOTE — PROGRESS NOTES
Physician Progress Note      Chrsitian Castellon  CSN #:                  [id number]  :                       1960  ADMIT DATE:       2022 8:15 AM  Carol Rider DATE:        2022 1:22 PM  RESPONDING  PROVIDER #:        Zeke Reynaga DO          QUERY TEXT:    Moderate malnutrition noted by dietician on . If possible, please   document in progress notes and discharge summary if you are evaluating and /or   treating any of the following: The medical record reflects the following:  Risk Factors: CHF, DM, anemia, HTN, CAD  Clinical Indicators:  Dietician -? Moderate malnutrition, In context of   chronic illness related to catabolic illness (CHF) as evidenced by Criteria   identified in malnutrition assessment  ? Findings of the 6 clinical characteristics of malnutrition:  Energy Intake:  Mild decrease in energy intake (Comment)  Weight Loss:  Unable to assess (d/t lack f accurate wt hx in EMR)  Body Fat Loss:  Mild body fat loss Orbital  Muscle Mass Loss:   (moderate) Clavicles (pectoralis & deltoids), Temples   (temporalis)  Fluid Accumulation:  No significant fluid accumulation   Strength:  Not Performed  Treatment: regular diet, ONS tid, dietician consult, monitoring    ASPEN Criteria:    https://aspenjournals. onlinelibrary. kincaid. com/doi/full/10.1177/68645071959  5    Thank you, Salud Romero RN BSN [facility]  [phone]  Options provided:  -- Protein calorie malnutrition moderate  -- Other - I will add my own diagnosis  -- Disagree - Not applicable / Not valid  -- Disagree - Clinically unable to determine / Unknown  -- Refer to Clinical Documentation Reviewer    PROVIDER RESPONSE TEXT:    This patient has moderate protein calorie malnutrition. Query created by: Kris Ortiz on 2022 2:59 PM      QUERY TEXT:    Noted documentation of LUAN by cardiology .   Attending documents   \"Chronic kidney disease stage III with baseline creatinine of approximately 1.2\". In order to support the diagnosis of LUAN, please include additional   clinical indicators in your documentation. ? Or please document if the   diagnosis of LUAN has been ruled out after further study. The medical record reflects the following:  Risk Factors: CKD, DM, IV contrast  Clinical Indicators: H&P \"Chronic kidney disease stage III with baseline   creatinine of approximately 1.2\". 11/25-28 Cardiology \"LUAN creatinine 1.4\". Admission Scr 1.4, subsequent Scr 1.2-1.1. GFR 42-56  Treatment:  ml, I&O's, labs    Defined by Kidney Disease Improving Global Outcomes (KDIGO) clinical practice   guideline for acute kidney injury:  -Increase in SCr by greater than or equal to 0.3 mg/dl within 48 hours; or  -Increase or decrease in SCr to greater than or equal to 1.5 times baseline,   which is known or presumed to have occurred within the prior 7 days; or  -Urine volume < 0.5ml/kg/h for 6 hours. Thank you, Sofia Piper RN BSN [facility]  [phone]  Options provided:  -- CKD 3 only, Acute kidney injury ruled out after study  -- Acute kidney injury evidenced by, Please document evidence as well as a   numerical baseline creatinine, if known. -- Other - I will add my own diagnosis  -- Disagree - Not applicable / Not valid  -- Disagree - Clinically unable to determine / Unknown  -- Refer to Clinical Documentation Reviewer    PROVIDER RESPONSE TEXT:    CKD 3 only, Acute kidney injury was ruled out after study.     Query created by: Obi Robles on 11/28/2022 7:17 AM      Electronically signed by:  Gorge Pires DO 11/30/2022 7:07 PM

## 2022-12-06 ENCOUNTER — OFFICE VISIT (OUTPATIENT)
Dept: FAMILY MEDICINE CLINIC | Age: 62
End: 2022-12-06

## 2022-12-06 VITALS
SYSTOLIC BLOOD PRESSURE: 124 MMHG | BODY MASS INDEX: 23.92 KG/M2 | HEART RATE: 100 BPM | HEIGHT: 63 IN | RESPIRATION RATE: 20 BRPM | WEIGHT: 135 LBS | OXYGEN SATURATION: 98 % | DIASTOLIC BLOOD PRESSURE: 64 MMHG

## 2022-12-06 DIAGNOSIS — Z09 HOSPITAL DISCHARGE FOLLOW-UP: Primary | ICD-10-CM

## 2022-12-06 DIAGNOSIS — I26.09 ACUTE PULMONARY EMBOLISM WITH ACUTE COR PULMONALE, UNSPECIFIED PULMONARY EMBOLISM TYPE (HCC): ICD-10-CM

## 2022-12-06 RX ORDER — MIRTAZAPINE 7.5 MG/1
7.5 TABLET, FILM COATED ORAL NIGHTLY
Qty: 30 TABLET | Refills: 5 | Status: SHIPPED | OUTPATIENT
Start: 2022-12-06

## 2022-12-06 RX ORDER — HYDRALAZINE HYDROCHLORIDE 25 MG/1
25 TABLET, FILM COATED ORAL EVERY 8 HOURS SCHEDULED
Qty: 90 TABLET | Refills: 5 | Status: SHIPPED | OUTPATIENT
Start: 2022-12-06

## 2022-12-06 RX ORDER — CARVEDILOL 12.5 MG/1
12.5 TABLET ORAL 2 TIMES DAILY WITH MEALS
Qty: 60 TABLET | Refills: 5 | Status: SHIPPED | OUTPATIENT
Start: 2022-12-06

## 2022-12-06 ASSESSMENT — ENCOUNTER SYMPTOMS
VOMITING: 0
NAUSEA: 0
DIARRHEA: 0
SHORTNESS OF BREATH: 0

## 2022-12-06 NOTE — PROGRESS NOTES
Post-Discharge Transitional Care Follow Up      Ashia Olea   YOB: 1960    Date of Office Visit:  12/6/2022  Date of Hospital Admission: 11/25/22  Date of Hospital Discharge: 11/29/22  Readmission Risk Score (high >=14%. Medium >=10%):Readmission Risk Score: 15.1      Care management risk score Rising risk (score 2-5) and Complex Care (Scores >=6): No Risk Score On File     Non face to face  following discharge, date last encounter closed (first attempt may have been earlier): 11/30/2022     Call initiated 2 business days of discharge: Yes     Hospital discharge follow-up  -     ID DISCHARGE MEDS RECONCILED W/ CURRENT OUTPATIENT MED LIST  Acute pulmonary embolism with acute cor pulmonale, unspecified pulmonary embolism type ([facility] [address]  -     apixaban (ELIQUIS) 5 MG TABS tablet; Take 1 tablet by mouth 2 times daily, Disp-60 tablet, R-5Normal  -     carvedilol (COREG) 12.5 MG tablet; Take 1 tablet by mouth 2 times daily (with meals), Disp-60 tablet, R-5Normal  -     hydrALAZINE (APRESOLINE) 25 MG tablet; Take 1 tablet by mouth every 8 hours, Disp-90 tablet, R-5Normal    Medical Decision Making: high complexity  Return for scheduled appointment. On this date 12/6/2022 I have spent 25 minutes reviewing previous notes, test results and face to face with the patient discussing the diagnosis and importance of compliance with the treatment plan as well as documenting on the day of the visit. Subjective:   Patient was recently admitted to [address] [place] [address],[address] for bilateral pulmonary embolism. Had progressively gotten more shortness of breath for several days prior to admission. Patient states she had tightness in lower chest and back prior to admission. Was diagnosed with pulmonary embolism, left pneumonia and pericardial effusion. Inpatient course: Discharge summary reviewed- see chart. Interval history/Current status: Patient states breathing is gradually improving.  Patient denies any further chest discomfort. Patient tolerating Eliquis. Patient Active Problem List   Diagnosis    Epigastric hernia    Umbilical hernia    Essential hypertension    Type 2 diabetes mellitus with diabetic peripheral angiopathy without gangrene, without long-term current use of insulin (HCC)    Dyslipidemia    S/P cardiac catheterization    Abnormal stress ECG    Hyperthyroidism    Diabetic peripheral neuropathy ([facility])    Hydroureter    Microcytic hypochromic anemia    Arterial occlusion    Moderate protein-calorie malnutrition (HCC)    Hypoglycemia    Hypertensive emergency    Paresthesia of hand, bilateral    Paresthesia of left foot    Unintended weight loss    History of arterial ischemic stroke    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 If you have any questions, ask your nurse or doctor. CHANGE how you take these medications      apixaban 5 MG Tabs tablet  Commonly known as: ELIQUIS  Take 1 tablet by mouth 2 times daily  What changed:   additional instructions  Another medication with the same name was removed. Continue taking this medication, and follow the directions you see here. Changed by:  Yvrose Cornelius MD     folic acid 1 MG tablet  Commonly known as: FOLVITE  Take 1 tablet by mouth daily  What changed: additional instructions     polyethylene glycol 17 GM/SCOOP powder  Commonly known as: GLYCOLAX  Take 17 g by mouth daily  What changed: additional instructions            CONTINUE taking these medications      albuterol sulfate  (90 Base) MCG/ACT inhaler  Commonly known as: Ventolin HFA  Inhale 2 puffs into the lungs 4 times daily as needed for Wheezing     carvedilol 12.5 MG tablet  Commonly known as: COREG  Take 1 tablet by mouth 2 times daily (with meals)     diazePAM 2 MG tablet  Commonly known as: VALIUM     fluticasone 50 MCG/ACT nasal spray  Commonly known as: FLONASE  2 sprays by Each Nostril route daily     Handicap Placard Misc  by Does not apply route Unable to ambulate more than 20 feet without difficulty  Expires 4/30/2027     hydrALAZINE 25 MG tablet  Commonly known as: APRESOLINE  Take 1 tablet by mouth every 8 hours     mirtazapine 7.5 MG tablet  Commonly known as: REMERON  Take 1 tablet by mouth nightly     pyridoxine 50 MG tablet  Commonly known as: B-6  Take 1 tablet by mouth daily               Where to Get Your Medications        These medications were sent to [address] [phone]  [address], [address] [place] [id number]      Phone: [phone]   apixaban 5 MG Tabs tablet  carvedilol 12.5 MG tablet  hydrALAZINE 25 MG tablet  mirtazapine 7.5 MG tablet          Medications marked \"taking\" at this time  Outpatient Medications Marked as Taking for the 12/6/22 encounter (Office Visit) with Richard Peña MD   Medication Sig Dispense Refill    mirtazapine (REMERON) 7.5 MG tablet Take 1 tablet by mouth nightly 30 tablet 5    apixaban (ELIQUIS) 5 MG TABS tablet Take 1 tablet by mouth 2 times daily 60 tablet 5    carvedilol (COREG) 12.5 MG tablet Take 1 tablet by mouth 2 times daily (with meals) 60 tablet 5    hydrALAZINE (APRESOLINE) 25 MG tablet Take 1 tablet by mouth every 8 hours 90 tablet 5    albuterol sulfate HFA (VENTOLIN HFA) 108 (90 Base) MCG/ACT inhaler Inhale 2 puffs into the lungs 4 times daily as needed for Wheezing 18 g 0    folic acid (FOLVITE) 1 MG tablet Take 1 tablet by mouth daily (Patient taking differently: Take 1 mg by mouth daily daughter is getting from [facility] today 11/30/22) 30 tablet 3    vitamin B-6 (B-6) 50 MG tablet Take 1 tablet by mouth daily 30 tablet 3    polyethylene glycol (GLYCOLAX) 17 GM/SCOOP powder Take 17 g by mouth daily (Patient taking differently: Take 17 g by mouth daily Daughter is getting from [employer] today, 11/30/22) 510 g 0    fluticasone (FLONASE) 50 MCG/ACT nasal spray 2 sprays by Each Nostril route daily 16 g 1    Handicap Placard MISC by Does not apply route Unable to ambulate more than 20 feet without difficulty  Expires 4/30/2027 1 each 0    diazePAM (VALIUM) 2 MG tablet Take 2 mg by mouth every 6 hours as needed for Anxiety. Medications patient taking as of now reconciled against medications ordered at time of hospital discharge: Yes    Review of Systems   Eyes:  Negative for visual disturbance. Respiratory:  Negative for shortness of breath. Cardiovascular:  Negative for chest pain, palpitations and leg swelling. Gastrointestinal:  Negative for diarrhea, nausea and vomiting. Genitourinary:  Positive for hematuria (due to stents). Negative for difficulty urinating, dysuria and frequency. Skin:  Negative for rash. Rough skin texture on abdominal   Psychiatric/Behavioral:  Negative for dysphoric mood. Objective:    /64   Pulse 100   Resp 20   Ht 5' 3\" (1.6 m)   Wt 135 lb (61.2 kg)   SpO2 98%   BMI 23.91 kg/m²   Physical Exam  Vitals reviewed. Constitutional:       General: She is not in acute distress. Appearance: She is well-developed. Neck:      Vascular: No carotid bruit. Cardiovascular:      Rate and Rhythm: Normal rate and regular rhythm. Heart sounds: Normal heart sounds. No murmur heard. No gallop. Pulmonary:      Effort: Pulmonary effort is normal.      Breath sounds: Normal breath sounds. No wheezing or rales. Abdominal:      General: Bowel sounds are normal. There is no distension. Palpations: Abdomen is soft. Tenderness: There is no abdominal tenderness. Musculoskeletal:      Cervical back: Neck supple.       Right lower leg: No edema. Left lower leg: No edema. Skin:     General: Skin is warm and dry. Neurological:      Mental Status: She is alert and oriented to person, place, and time. An electronic signature was used to authenticate this note.   --Farzaneh Ledezma MD

## 2022-12-07 ENCOUNTER — CARE COORDINATION (OUTPATIENT)
Dept: CASE MANAGEMENT | Age: 62
End: 2022-12-07

## 2022-12-07 NOTE — CARE COORDINATION
[facility] Care Transitions Follow Up Call    Care Transition Nurse contacted the patient by telephone to follow up after admission on 22. Verified name and  with patient as identifiers. Patient: Alina Randall  Patient : 1960   MRN: [id number]  Reason for Admission: Acute pulmonary embolism without acute cor pulmonale, unspecified pulmonary embolism type  Discharge Date: 22 RARS: Readmission Risk Score: 15.1    Spoke with Yumiko Carpenter for follow up care transition call. She reports feeling \"good\" today. She stated she continues to have hematuria today, however, she stated it is not as dark red and stated it is sometimes \"dark pinkish\" when she urinates. Her PCP is aware as noted in her OV note from yesterday, 22. Yumiko Carpenter confirmed she is now taking the decreased dose of Eliquis 5mg BID as ordered. She denies SOB today and stated she is getting around in her wheelchair today because she did not feel like putting her prosthetic leg on her right BKA. Yumiko Carpenter denies any needs, questions, or concerns at this time. Addressed changes since last contact:  medications-decreased Eliquis  Discussed follow-up appointments. Follow Up  Future Appointments   Date Time Provider Naomi Alvarado   2022  9:30 AM Leola Heredia MD Blood Cancer [facility]   2022 10:20 AM [facility] LABS ROOM MEDICAL ONCOLOGY [facility]   2023 10:00 AM [facility]     Care Transition Nurse reviewed discharge instructions, medical action plan, and red flags with patient and discussed any barriers to care and/or understanding of plan of care after discharge. Discussed appropriate site of care based on symptoms and resources available to patient including: PCP  Specialist  Urgent care clinics  [facility]   When to call 911. The patient agrees to contact the PCP office for questions related to their healthcare.       Patients top risk factors for readmission: medical condition-  and medication management     Care Transitions Subsequent and Final Call    Subsequent and Final Calls  Do you have any ongoing symptoms?: Yes  Onset of Patient-reported symptoms: In the past 7 days  Patient-reported symptoms: Other  Have your medications changed?: Yes  Patient Reports: Eliquis is now 5mg BID  Do you have any questions related to your medications?: No  Do you currently have any active services?: No  Are you currently active with any services?: Outpatient/Community Services  Do you have any needs or concerns that I can assist you with?: No  Identified Barriers: None  Care Transitions Interventions  No Identified Needs  Other Interventions:             Care Transition Nurse provided contact information for future needs. Plan for follow-up call in 7-10 days based on severity of symptoms and risk factors.   Plan for next call: symptom management-hematuria, SOB    Courtney Madrid RN

## 2022-12-14 ENCOUNTER — CARE COORDINATION (OUTPATIENT)
Dept: CASE MANAGEMENT | Age: 62
End: 2022-12-14

## 2022-12-14 NOTE — CARE COORDINATION
[facility] Care Transitions Follow Up Call    Care Transition Nurse contacted the patient by telephone to follow up after admission on 22. Verified name and  with patient as identifiers. Patient: Marcela Peterson  Patient : 1960   MRN: [id number]  Reason for Admission: Acute pulmonary embolism without acute cor pulmonale, unspecified pulmonary embolism type  Discharge Date: 22 RARS: Readmission Risk Score: 15.1    Spoke with Marianne Early for follow up care transition call. She reports that she continues to feel \"really good\" today. She denies any SOB today, she is getting around in her wheelchair today. She stated she did put on her prosthetic leg the past couple of days and denies feeling short of breath with exerting herself more to get around. Marianne Early reports that she continues to have dark pink urine and denies any clots in her urine at this time. She does have a call out to Dr. Rosales Lipoma office to let him know that this is still occurring and she is not due to see him for 5 more months. She denies needing CTN's assistance at this time, however, she stated she does have CTN's number if she should need assistance. Marianne Early denies any needs, questions, or concerns at this time. Follow Up  Future Appointments   Date Time Provider Naomi Alvarado   2022  9:30 AM Leyla Paniagua MD Blood Cancer [facility]   2022 10:20 AM [facility] LABS ROOM MEDICAL ONCOLOGY [facility] MED ONC Evelia Yefriblanca   2023 10:00 AM Hardeep Gonzalez MD [facility]     Care Transition Nurse reviewed discharge instructions, medical action plan, and red flags with patient and discussed any barriers to care and/or understanding of plan of care after discharge. Discussed appropriate site of care based on symptoms and resources available to patient including: PCP  Specialist  Urgent care clinics  [facility]   When to call 911. The patient agrees to contact the PCP office for questions related to their healthcare. Care Transitions Subsequent and Final Call    Subsequent and Final Calls  Do you have any ongoing symptoms?: Yes  Onset of Patient-reported symptoms: In the past 7 days  Patient-reported symptoms: Other  Have your medications changed?: No  Do you have any questions related to your medications?: No  Do you currently have any active services?: No  Are you currently active with any services?: Outpatient/Community Services  Do you have any needs or concerns that I can assist you with?: No  Identified Barriers: None  Care Transitions Interventions  No Identified Needs  Other Interventions:           Care Transition Nurse provided contact information for future needs. Plan for follow-up call in 5-7 days based on severity of symptoms and risk factors. Plan for next call: symptom management-hematuria, Dr. Hansel Montano recommendations?      Courtney Madrid RN

## 2022-12-20 ENCOUNTER — HOSPITAL ENCOUNTER (OUTPATIENT)
Dept: INFUSION THERAPY | Age: 62
Discharge: HOME | End: 2022-12-20

## 2022-12-20 ENCOUNTER — CARE COORDINATION (OUTPATIENT)
Dept: CASE MANAGEMENT | Age: 62
End: 2022-12-20

## 2022-12-20 DIAGNOSIS — D50.9 MICROCYTIC HYPOCHROMIC ANEMIA: Primary | ICD-10-CM

## 2022-12-20 DIAGNOSIS — D64.9 ANEMIA, UNSPECIFIED TYPE: ICD-10-CM

## 2022-12-20 NOTE — CARE COORDINATION
[facility] Care Transitions Follow Up Call    Care Transition Nurse contacted the patient by telephone to follow up after admission on 22. Verified name and  with patient as identifiers. Patient: Cl Kern  Patient : 1960   MRN: [id number]  Reason for Admission: Acute pulmonary embolism without acute cor pulmonale, unspecified pulmonary embolism type  Discharge Date: 22 RARS: Readmission Risk Score: 15.1    Spoke with Yanira Dunham for follow up care transition call. She reports that she is doing \"good\" today. She then went on to explain she has had a mild SOB the last couple of days, CTN inquired if there have been any other changes. She stated she has been trying to wear her prosthetic leg daily and for a little bit longer the last few days, about 3-4 hours per day, and has been doing a little bit more around the house. CTN encouraged her to continue to rest when needed and to use her Albuterol up to 4x times daily as needed. She verbalized understanding and stated she has been using her Albuterol daily in the mornings but didn't think to use it again during the day for more relief. She denies chest pain or chest tightness. Yanira Dunham reports that she continues to have red urine, which she did discuss with Dr. Faustina Sauer last week. He told her this is to be expected as long as she is on the Eliquis. She denies passing any clots. Yanira Dunham stated he advised her to drink lots and lots of fluids, which she feels she is doing adequately now. She is drinking Boost BID, green tea, iced tea, and water. Yanira Dunham denies any needs, questions, or concerns at this time. Follow Up  Future Appointments   Date Time Provider Naomi Alvarado   2023 10:00 AM Richard Peña MD [facility]     Care Transition Nurse reviewed discharge instructions, medical action plan, and red flags with patient and discussed any barriers to care and/or understanding of plan of care after discharge.  Discussed appropriate site of care based on symptoms and resources available to patient including: PCP  Specialist  Urgent care clinics  [facility] 24/7  When to call 911. The patient agrees to contact the PCP office for questions related to their healthcare. Care Transitions Subsequent and Final Call    Subsequent and Final Calls  Do you have any ongoing symptoms?: Yes  Onset of Patient-reported symptoms: In the past 7 days  Patient-reported symptoms: Shortness of Breath  Have your medications changed?: No  Do you have any questions related to your medications?: No  Do you currently have any active services?: Yes  Do you have any needs or concerns that I can assist you with?: No  Identified Barriers: None  Care Transitions Interventions  No Identified Needs  Other Interventions:           Care Transition Nurse provided contact information for future needs. Plan for follow-up call in 7-10 days based on severity of symptoms and risk factors. Plan for next call: FINAL OUTREACH-shortness of breath?      Negra Nielsen RN

## 2023-01-01 ENCOUNTER — APPOINTMENT (OUTPATIENT)
Dept: CT IMAGING | Age: 63
DRG: 308 | End: 2023-01-01
Payer: MEDICARE

## 2023-01-01 ENCOUNTER — APPOINTMENT (OUTPATIENT)
Dept: GENERAL RADIOLOGY | Age: 63
DRG: 308 | End: 2023-01-01
Payer: MEDICARE

## 2023-01-01 ENCOUNTER — HOSPITAL ENCOUNTER (INPATIENT)
Age: 63
LOS: 1 days | DRG: 308 | End: 2024-01-01
Attending: EMERGENCY MEDICINE | Admitting: INTERNAL MEDICINE
Payer: MEDICARE

## 2023-01-01 ENCOUNTER — CLINICAL DOCUMENTATION ONLY (OUTPATIENT)
Facility: CLINIC | Age: 63
End: 2023-01-01

## 2023-01-01 DIAGNOSIS — I70.221 CRITICAL LIMB ISCHEMIA OF RIGHT LOWER EXTREMITY (HCC): ICD-10-CM

## 2023-01-01 DIAGNOSIS — E11.42 DIABETIC PERIPHERAL NEUROPATHY (HCC): ICD-10-CM

## 2023-01-01 DIAGNOSIS — R73.9 HYPERGLYCEMIA: ICD-10-CM

## 2023-01-01 DIAGNOSIS — N13.39 OTHER HYDRONEPHROSIS: ICD-10-CM

## 2023-01-01 DIAGNOSIS — I46.9 CARDIAC ARREST (HCC): Primary | ICD-10-CM

## 2023-01-01 DIAGNOSIS — R53.83 OTHER FATIGUE: ICD-10-CM

## 2023-01-01 DIAGNOSIS — K43.9 EPIGASTRIC HERNIA: ICD-10-CM

## 2023-01-01 DIAGNOSIS — E11.51 TYPE 2 DIABETES MELLITUS WITH DIABETIC PERIPHERAL ANGIOPATHY WITHOUT GANGRENE, WITHOUT LONG-TERM CURRENT USE OF INSULIN (HCC): ICD-10-CM

## 2023-01-01 DIAGNOSIS — E79.0 HYPERURICEMIA: ICD-10-CM

## 2023-01-01 DIAGNOSIS — I16.0 HYPERTENSIVE URGENCY: ICD-10-CM

## 2023-01-01 DIAGNOSIS — D62 ACUTE ON CHRONIC BLOOD LOSS ANEMIA: ICD-10-CM

## 2023-01-01 DIAGNOSIS — I49.01 VENTRICULAR FIBRILLATION (HCC): ICD-10-CM

## 2023-01-01 DIAGNOSIS — N10 ACUTE PYELONEPHRITIS: ICD-10-CM

## 2023-01-01 DIAGNOSIS — Z86.31 HX OF DIABETIC FOOT ULCER: ICD-10-CM

## 2023-01-01 DIAGNOSIS — N12 PYELONEPHRITIS: ICD-10-CM

## 2023-01-01 DIAGNOSIS — N39.0 SEPSIS SECONDARY TO UTI (HCC): ICD-10-CM

## 2023-01-01 DIAGNOSIS — N18.30 STAGE 3 CHRONIC KIDNEY DISEASE, UNSPECIFIED WHETHER STAGE 3A OR 3B CKD (HCC): ICD-10-CM

## 2023-01-01 DIAGNOSIS — N17.9 ACUTE RENAL FAILURE, UNSPECIFIED ACUTE RENAL FAILURE TYPE (HCC): ICD-10-CM

## 2023-01-01 DIAGNOSIS — E87.5 HYPERKALEMIA: ICD-10-CM

## 2023-01-01 DIAGNOSIS — I50.33 ACUTE ON CHRONIC DIASTOLIC CHF (CONGESTIVE HEART FAILURE) (HCC): ICD-10-CM

## 2023-01-01 DIAGNOSIS — E78.5 DYSLIPIDEMIA: ICD-10-CM

## 2023-01-01 DIAGNOSIS — I50.32 CHRONIC DIASTOLIC HEART FAILURE (HCC): ICD-10-CM

## 2023-01-01 DIAGNOSIS — I10 ESSENTIAL HYPERTENSION: ICD-10-CM

## 2023-01-01 DIAGNOSIS — Z89.512 HX OF BKA, LEFT (HCC): ICD-10-CM

## 2023-01-01 DIAGNOSIS — I73.9 PVD (PERIPHERAL VASCULAR DISEASE) (HCC): ICD-10-CM

## 2023-01-01 DIAGNOSIS — Z86.73 HISTORY OF ARTERIAL ISCHEMIC STROKE: ICD-10-CM

## 2023-01-01 DIAGNOSIS — A41.9 SEPSIS SECONDARY TO UTI (HCC): ICD-10-CM

## 2023-01-01 LAB
ALBUMIN SERPL-MCNC: 3.9 G/DL (ref 3.5–5.2)
ALP SERPL-CCNC: 105 U/L (ref 35–104)
ALT SERPL-CCNC: 7 U/L (ref 0–32)
AMMONIA PLAS-SCNC: 16 UMOL/L (ref 11–51)
ANION GAP SERPL CALCULATED.3IONS-SCNC: 21 MMOL/L (ref 7–16)
ANION GAP SERPL CALCULATED.3IONS-SCNC: 24 MMOL/L (ref 7–16)
AST SERPL-CCNC: 11 U/L (ref 0–31)
B-OH-BUTYR SERPL-MCNC: 0.78 MMOL/L (ref 0.02–0.27)
BACTERIA URNS QL MICRO: ABNORMAL
BASOPHILS # BLD: 0.1 K/UL (ref 0–0.2)
BASOPHILS NFR BLD: 1 % (ref 0–2)
BILIRUB SERPL-MCNC: 0.5 MG/DL (ref 0–1.2)
BILIRUB UR QL STRIP: ABNORMAL
BUN SERPL-MCNC: 206 MG/DL (ref 6–23)
BUN SERPL-MCNC: 240 MG/DL (ref 6–23)
CALCIUM SERPL-MCNC: 11 MG/DL (ref 8.6–10.2)
CALCIUM SERPL-MCNC: 12.5 MG/DL (ref 8.6–10.2)
CHLORIDE SERPL-SCNC: 108 MMOL/L (ref 98–107)
CHLORIDE SERPL-SCNC: 110 MMOL/L (ref 98–107)
CHP ED QC CHECK: NORMAL
CLARITY UR: ABNORMAL
CO2 SERPL-SCNC: 11 MMOL/L (ref 22–29)
CO2 SERPL-SCNC: 14 MMOL/L (ref 22–29)
COLOR UR: ABNORMAL
CREAT SERPL-MCNC: 5.4 MG/DL (ref 0.5–1)
CREAT SERPL-MCNC: 6.5 MG/DL (ref 0.5–1)
CREAT UR-MCNC: 103.2 MG/DL (ref 29–226)
CRITICAL ACTION: NORMAL
CRITICAL NOTIFICATION DATE/TIME: NORMAL
CRITICAL NOTIFICATION: NORMAL
CRITICAL VALUE READ BACK: YES
EOSINOPHIL # BLD: 0.05 K/UL (ref 0.05–0.5)
EOSINOPHILS RELATIVE PERCENT: 1 % (ref 0–6)
EPI CELLS #/AREA URNS HPF: ABNORMAL /HPF
ERYTHROCYTE [DISTWIDTH] IN BLOOD BY AUTOMATED COUNT: 18.2 % (ref 11.5–15)
ERYTHROCYTE [DISTWIDTH] IN BLOOD BY AUTOMATED COUNT: 19.1 % (ref 11.5–15)
FIO2: 100
GFR SERPL CREATININE-BSD FRML MDRD: 7 ML/MIN/1.73M2
GFR SERPL CREATININE-BSD FRML MDRD: 8 ML/MIN/1.73M2
GLUCOSE BLD-MCNC: 225 MG/DL
GLUCOSE BLD-MCNC: 225 MG/DL (ref 74–99)
GLUCOSE BLD-MCNC: >500 MG/DL (ref 74–99)
GLUCOSE SERPL-MCNC: 220 MG/DL (ref 74–99)
GLUCOSE SERPL-MCNC: 435 MG/DL (ref 74–99)
GLUCOSE UR STRIP-MCNC: ABNORMAL MG/DL
HCT VFR BLD AUTO: 24 % (ref 34–48)
HCT VFR BLD AUTO: 42.1 % (ref 34–48)
HGB BLD-MCNC: 12.3 G/DL (ref 11.5–15.5)
HGB BLD-MCNC: 7.2 G/DL (ref 11.5–15.5)
HGB UR QL STRIP.AUTO: ABNORMAL
IMM GRANULOCYTES # BLD AUTO: 0.3 K/UL (ref 0–0.58)
IMM GRANULOCYTES NFR BLD: 3 % (ref 0–5)
INFLUENZA A BY PCR: NOT DETECTED
INFLUENZA B BY PCR: NOT DETECTED
INR PPP: 2
INR PPP: 2
KETONES UR STRIP-MCNC: ABNORMAL MG/DL
LACTATE BLDV-SCNC: 2 MMOL/L (ref 0.5–1.9)
LEUKOCYTE ESTERASE UR QL STRIP: ABNORMAL
LYMPHOCYTES NFR BLD: 1.78 K/UL (ref 1.5–4)
LYMPHOCYTES RELATIVE PERCENT: 18 % (ref 20–42)
MAGNESIUM SERPL-MCNC: 4.1 MG/DL (ref 1.6–2.6)
MAGNESIUM SERPL-MCNC: 6 MG/DL (ref 1.6–2.6)
MCH RBC QN AUTO: 24.6 PG (ref 26–35)
MCH RBC QN AUTO: 25.1 PG (ref 26–35)
MCHC RBC AUTO-ENTMCNC: 29.2 G/DL (ref 32–34.5)
MCHC RBC AUTO-ENTMCNC: 30 G/DL (ref 32–34.5)
MCV RBC AUTO: 83.6 FL (ref 80–99.9)
MCV RBC AUTO: 84 FL (ref 80–99.9)
MODE: AC
MONOCYTES NFR BLD: 0.76 K/UL (ref 0.1–0.95)
MONOCYTES NFR BLD: 8 % (ref 2–12)
NEGATIVE BASE EXCESS, ART: 13.2 MMOL/L
NEUTROPHILS NFR BLD: 70 % (ref 43–80)
NEUTS SEG NFR BLD: 6.97 K/UL (ref 1.8–7.3)
NITRITE UR QL STRIP: ABNORMAL
O2 DELIVERY DEVICE: ABNORMAL
OSMOLALITY SERPL: 417 MOSM/KG (ref 285–310)
OSMOLALITY UR: 414 MOSM/KG (ref 300–900)
PARTIAL THROMBOPLASTIN TIME: 23 SEC (ref 24.5–35.1)
PARTIAL THROMBOPLASTIN TIME: 32.2 SEC (ref 24.5–35.1)
PARTIAL THROMBOPLASTIN TIME: 36.3 SEC (ref 24.5–35.1)
PEEP: 8
PH UR STRIP: ABNORMAL [PH] (ref 5–9)
PLATELET # BLD AUTO: 266 K/UL (ref 130–450)
PLATELET # BLD AUTO: 493 K/UL (ref 130–450)
PMV BLD AUTO: 11.7 FL (ref 7–12)
PMV BLD AUTO: 11.7 FL (ref 7–12)
POC HCO3: 14.3 MMOL/L (ref 22–26)
POC O2 SATURATION: 96.6 % (ref 92–98.5)
POC PCO2: 39.5 MM HG (ref 35–45)
POC PH: 7.17 (ref 7.35–7.45)
POC PO2: 108.1 MM HG (ref 60–80)
POTASSIUM SERPL-SCNC: 5.2 MMOL/L (ref 3.5–5)
POTASSIUM SERPL-SCNC: 5.9 MMOL/L (ref 3.5–5)
POTASSIUM SERPL-SCNC: 7.7 MMOL/L (ref 3.5–5)
PROT SERPL-MCNC: 11 G/DL (ref 6.4–8.3)
PROT UR STRIP-MCNC: ABNORMAL MG/DL
PROTHROMBIN TIME: 22.6 SEC (ref 9.3–12.4)
PROTHROMBIN TIME: 22.8 SEC (ref 9.3–12.4)
RBC # BLD AUTO: 2.87 M/UL (ref 3.5–5.5)
RBC # BLD AUTO: 5.01 M/UL (ref 3.5–5.5)
RBC #/AREA URNS HPF: ABNORMAL /HPF
SAMPLE SITE: ABNORMAL
SARS-COV-2 RDRP RESP QL NAA+PROBE: NOT DETECTED
SET RATE, POC: 20
SODIUM SERPL-SCNC: 140 MMOL/L (ref 132–146)
SODIUM SERPL-SCNC: 148 MMOL/L (ref 132–146)
SODIUM UR-SCNC: 80 MMOL/L
SP GR UR STRIP: ABNORMAL (ref 1–1.03)
SPECIMEN DESCRIPTION: NORMAL
TIDAL VOLUME: 350
TROPONIN I SERPL HS-MCNC: 114 NG/L (ref 0–9)
TROPONIN I SERPL HS-MCNC: 77 NG/L (ref 0–9)
TROPONIN I SERPL HS-MCNC: 81 NG/L (ref 0–9)
UROBILINOGEN UR STRIP-ACNC: ABNORMAL EU/DL (ref 0–1)
WBC #/AREA URNS HPF: ABNORMAL /HPF
WBC OTHER # BLD: 10 K/UL (ref 4.5–11.5)
WBC OTHER # BLD: 4.7 K/UL (ref 4.5–11.5)

## 2023-01-01 PROCEDURE — 85025 COMPLETE CBC W/AUTO DIFF WBC: CPT

## 2023-01-01 PROCEDURE — 96368 THER/DIAG CONCURRENT INF: CPT

## 2023-01-01 PROCEDURE — 96361 HYDRATE IV INFUSION ADD-ON: CPT

## 2023-01-01 PROCEDURE — 82803 BLOOD GASES ANY COMBINATION: CPT

## 2023-01-01 PROCEDURE — 6370000000 HC RX 637 (ALT 250 FOR IP): Performed by: STUDENT IN AN ORGANIZED HEALTH CARE EDUCATION/TRAINING PROGRAM

## 2023-01-01 PROCEDURE — 84484 ASSAY OF TROPONIN QUANT: CPT

## 2023-01-01 PROCEDURE — 36556 INSERT NON-TUNNEL CV CATH: CPT

## 2023-01-01 PROCEDURE — C9113 INJ PANTOPRAZOLE SODIUM, VIA: HCPCS

## 2023-01-01 PROCEDURE — 31500 INSERT EMERGENCY AIRWAY: CPT

## 2023-01-01 PROCEDURE — 84132 ASSAY OF SERUM POTASSIUM: CPT

## 2023-01-01 PROCEDURE — 2580000003 HC RX 258

## 2023-01-01 PROCEDURE — 84300 ASSAY OF URINE SODIUM: CPT

## 2023-01-01 PROCEDURE — 6360000002 HC RX W HCPCS

## 2023-01-01 PROCEDURE — 87077 CULTURE AEROBIC IDENTIFY: CPT

## 2023-01-01 PROCEDURE — 85027 COMPLETE CBC AUTOMATED: CPT

## 2023-01-01 PROCEDURE — 6360000002 HC RX W HCPCS: Performed by: STUDENT IN AN ORGANIZED HEALTH CARE EDUCATION/TRAINING PROGRAM

## 2023-01-01 PROCEDURE — 99285 EMERGENCY DEPT VISIT HI MDM: CPT

## 2023-01-01 PROCEDURE — 96365 THER/PROPH/DIAG IV INF INIT: CPT

## 2023-01-01 PROCEDURE — G1010 CDSM STANSON: HCPCS

## 2023-01-01 PROCEDURE — 71045 X-RAY EXAM CHEST 1 VIEW: CPT

## 2023-01-01 PROCEDURE — 73630 X-RAY EXAM OF FOOT: CPT

## 2023-01-01 PROCEDURE — 0BH17EZ INSERTION OF ENDOTRACHEAL AIRWAY INTO TRACHEA, VIA NATURAL OR ARTIFICIAL OPENING: ICD-10-PCS | Performed by: INTERNAL MEDICINE

## 2023-01-01 PROCEDURE — 83935 ASSAY OF URINE OSMOLALITY: CPT

## 2023-01-01 PROCEDURE — 82962 GLUCOSE BLOOD TEST: CPT

## 2023-01-01 PROCEDURE — 87086 URINE CULTURE/COLONY COUNT: CPT

## 2023-01-01 PROCEDURE — 6360000002 HC RX W HCPCS: Performed by: EMERGENCY MEDICINE

## 2023-01-01 PROCEDURE — 5A12012 PERFORMANCE OF CARDIAC OUTPUT, SINGLE, MANUAL: ICD-10-PCS | Performed by: INTERNAL MEDICINE

## 2023-01-01 PROCEDURE — 96374 THER/PROPH/DIAG INJ IV PUSH: CPT

## 2023-01-01 PROCEDURE — 83930 ASSAY OF BLOOD OSMOLALITY: CPT

## 2023-01-01 PROCEDURE — 2500000003 HC RX 250 WO HCPCS: Performed by: EMERGENCY MEDICINE

## 2023-01-01 PROCEDURE — 93005 ELECTROCARDIOGRAM TRACING: CPT | Performed by: EMERGENCY MEDICINE

## 2023-01-01 PROCEDURE — 80053 COMPREHEN METABOLIC PANEL: CPT

## 2023-01-01 PROCEDURE — 82570 ASSAY OF URINE CREATININE: CPT

## 2023-01-01 PROCEDURE — 99292 CRITICAL CARE ADDL 30 MIN: CPT

## 2023-01-01 PROCEDURE — 51702 INSERT TEMP BLADDER CATH: CPT

## 2023-01-01 PROCEDURE — 83735 ASSAY OF MAGNESIUM: CPT

## 2023-01-01 PROCEDURE — 85610 PROTHROMBIN TIME: CPT

## 2023-01-01 PROCEDURE — 96375 TX/PRO/DX INJ NEW DRUG ADDON: CPT

## 2023-01-01 PROCEDURE — 5A1945Z RESPIRATORY VENTILATION, 24-96 CONSECUTIVE HOURS: ICD-10-PCS | Performed by: INTERNAL MEDICINE

## 2023-01-01 PROCEDURE — 87502 INFLUENZA DNA AMP PROBE: CPT

## 2023-01-01 PROCEDURE — 1200000000 HC SEMI PRIVATE

## 2023-01-01 PROCEDURE — 82140 ASSAY OF AMMONIA: CPT

## 2023-01-01 PROCEDURE — 83605 ASSAY OF LACTIC ACID: CPT

## 2023-01-01 PROCEDURE — 87040 BLOOD CULTURE FOR BACTERIA: CPT

## 2023-01-01 PROCEDURE — 81001 URINALYSIS AUTO W/SCOPE: CPT

## 2023-01-01 PROCEDURE — 85730 THROMBOPLASTIN TIME PARTIAL: CPT

## 2023-01-01 PROCEDURE — 70450 CT HEAD/BRAIN W/O DYE: CPT

## 2023-01-01 PROCEDURE — 80048 BASIC METABOLIC PNL TOTAL CA: CPT

## 2023-01-01 PROCEDURE — 2580000003 HC RX 258: Performed by: STUDENT IN AN ORGANIZED HEALTH CARE EDUCATION/TRAINING PROGRAM

## 2023-01-01 PROCEDURE — 87635 SARS-COV-2 COVID-19 AMP PRB: CPT

## 2023-01-01 PROCEDURE — 2500000003 HC RX 250 WO HCPCS

## 2023-01-01 PROCEDURE — 6360000004 HC RX CONTRAST MEDICATION: Performed by: RADIOLOGY

## 2023-01-01 PROCEDURE — 94640 AIRWAY INHALATION TREATMENT: CPT

## 2023-01-01 PROCEDURE — 2500000003 HC RX 250 WO HCPCS: Performed by: STUDENT IN AN ORGANIZED HEALTH CARE EDUCATION/TRAINING PROGRAM

## 2023-01-01 PROCEDURE — 82330 ASSAY OF CALCIUM: CPT

## 2023-01-01 PROCEDURE — 82010 KETONE BODYS QUAN: CPT

## 2023-01-01 PROCEDURE — 99291 CRITICAL CARE FIRST HOUR: CPT

## 2023-01-01 PROCEDURE — A4216 STERILE WATER/SALINE, 10 ML: HCPCS

## 2023-01-01 RX ORDER — HEPARIN SODIUM 1000 [USP'U]/ML
40 INJECTION, SOLUTION INTRAVENOUS; SUBCUTANEOUS PRN
Status: DISCONTINUED | OUTPATIENT
Start: 2023-01-01 | End: 2024-01-01 | Stop reason: HOSPADM

## 2023-01-01 RX ORDER — LIDOCAINE HCL/PF 100 MG/5ML
SYRINGE (ML) INJECTION DAILY PRN
Status: COMPLETED | OUTPATIENT
Start: 2023-01-01 | End: 2023-01-01

## 2023-01-01 RX ORDER — ONDANSETRON 2 MG/ML
4 INJECTION INTRAMUSCULAR; INTRAVENOUS EVERY 6 HOURS PRN
Status: DISCONTINUED | OUTPATIENT
Start: 2023-01-01 | End: 2024-01-01 | Stop reason: HOSPADM

## 2023-01-01 RX ORDER — NOREPINEPHRINE BITARTRATE 0.06 MG/ML
1-100 INJECTION, SOLUTION INTRAVENOUS CONTINUOUS
Status: DISCONTINUED | OUTPATIENT
Start: 2023-01-01 | End: 2024-01-01 | Stop reason: HOSPADM

## 2023-01-01 RX ORDER — FUROSEMIDE 10 MG/ML
40 INJECTION INTRAMUSCULAR; INTRAVENOUS ONCE
Status: COMPLETED | OUTPATIENT
Start: 2023-01-01 | End: 2023-01-01

## 2023-01-01 RX ORDER — MAGNESIUM SULFATE HEPTAHYDRATE 500 MG/ML
INJECTION, SOLUTION INTRAMUSCULAR; INTRAVENOUS DAILY PRN
Status: COMPLETED | OUTPATIENT
Start: 2023-01-01 | End: 2023-01-01

## 2023-01-01 RX ORDER — GLUCAGON 1 MG/ML
1 KIT INJECTION PRN
Status: DISCONTINUED | OUTPATIENT
Start: 2023-01-01 | End: 2024-01-01 | Stop reason: HOSPADM

## 2023-01-01 RX ORDER — HEPARIN SODIUM 10000 [USP'U]/100ML
5-30 INJECTION, SOLUTION INTRAVENOUS CONTINUOUS
Status: DISCONTINUED | OUTPATIENT
Start: 2023-01-01 | End: 2024-01-01 | Stop reason: HOSPADM

## 2023-01-01 RX ORDER — NOREPINEPHRINE BITARTRATE 0.06 MG/ML
INJECTION, SOLUTION INTRAVENOUS CONTINUOUS PRN
Status: COMPLETED | OUTPATIENT
Start: 2023-01-01 | End: 2023-01-01

## 2023-01-01 RX ORDER — DEXTROSE MONOHYDRATE 100 MG/ML
INJECTION, SOLUTION INTRAVENOUS CONTINUOUS PRN
Status: DISCONTINUED | OUTPATIENT
Start: 2023-01-01 | End: 2024-01-01 | Stop reason: HOSPADM

## 2023-01-01 RX ORDER — 0.9 % SODIUM CHLORIDE 0.9 %
1000 INTRAVENOUS SOLUTION INTRAVENOUS ONCE
Status: COMPLETED | OUTPATIENT
Start: 2023-01-01 | End: 2024-01-01

## 2023-01-01 RX ORDER — CALCIUM CHLORIDE 100 MG/ML
INJECTION INTRAVENOUS; INTRAVENTRICULAR
Status: DISCONTINUED
Start: 2023-01-01 | End: 2024-01-01 | Stop reason: HOSPADM

## 2023-01-01 RX ORDER — INSULIN LISPRO 100 [IU]/ML
0-4 INJECTION, SOLUTION INTRAVENOUS; SUBCUTANEOUS
Status: DISCONTINUED | OUTPATIENT
Start: 2024-01-01 | End: 2024-01-01 | Stop reason: HOSPADM

## 2023-01-01 RX ORDER — FENTANYL CITRATE-0.9 % NACL/PF 20 MCG/2ML
50 SYRINGE (ML) INTRAVENOUS EVERY 30 MIN PRN
Status: DISCONTINUED | OUTPATIENT
Start: 2023-01-01 | End: 2024-01-01 | Stop reason: HOSPADM

## 2023-01-01 RX ORDER — CALCIUM GLUCONATE 94 MG/ML
1000 INJECTION, SOLUTION INTRAVENOUS ONCE
Status: COMPLETED | OUTPATIENT
Start: 2023-01-01 | End: 2023-01-01

## 2023-01-01 RX ORDER — IPRATROPIUM BROMIDE AND ALBUTEROL SULFATE 2.5; .5 MG/3ML; MG/3ML
1 SOLUTION RESPIRATORY (INHALATION)
Status: DISCONTINUED | OUTPATIENT
Start: 2023-01-01 | End: 2024-01-01 | Stop reason: HOSPADM

## 2023-01-01 RX ORDER — CHLORHEXIDINE GLUCONATE ORAL RINSE 1.2 MG/ML
15 SOLUTION DENTAL 2 TIMES DAILY
Status: DISCONTINUED | OUTPATIENT
Start: 2023-01-01 | End: 2024-01-01 | Stop reason: HOSPADM

## 2023-01-01 RX ORDER — CALCIUM CHLORIDE 100 MG/ML
INJECTION INTRAVENOUS; INTRAVENTRICULAR DAILY PRN
Status: COMPLETED | OUTPATIENT
Start: 2023-01-01 | End: 2023-01-01

## 2023-01-01 RX ORDER — PROPOFOL 10 MG/ML
5-50 INJECTION, EMULSION INTRAVENOUS CONTINUOUS
Status: DISCONTINUED | OUTPATIENT
Start: 2023-01-01 | End: 2024-01-01 | Stop reason: HOSPADM

## 2023-01-01 RX ORDER — INSULIN LISPRO 100 [IU]/ML
0-4 INJECTION, SOLUTION INTRAVENOUS; SUBCUTANEOUS NIGHTLY
Status: DISCONTINUED | OUTPATIENT
Start: 2023-01-01 | End: 2024-01-01 | Stop reason: HOSPADM

## 2023-01-01 RX ORDER — DEXTROSE MONOHYDRATE 25 G/50ML
INJECTION, SOLUTION INTRAVENOUS DAILY PRN
Status: COMPLETED | OUTPATIENT
Start: 2023-01-01 | End: 2023-01-01

## 2023-01-01 RX ORDER — EPINEPHRINE IN SOD CHLOR,ISO 1 MG/10 ML
SYRINGE (ML) INTRAVENOUS DAILY PRN
Status: COMPLETED | OUTPATIENT
Start: 2023-01-01 | End: 2024-01-01

## 2023-01-01 RX ORDER — MAGNESIUM SULFATE IN WATER 40 MG/ML
2000 INJECTION, SOLUTION INTRAVENOUS ONCE
Status: COMPLETED | OUTPATIENT
Start: 2023-01-01 | End: 2024-01-01

## 2023-01-01 RX ORDER — HEPARIN SODIUM 1000 [USP'U]/ML
80 INJECTION, SOLUTION INTRAVENOUS; SUBCUTANEOUS ONCE
Status: DISCONTINUED | OUTPATIENT
Start: 2023-01-01 | End: 2024-01-01 | Stop reason: HOSPADM

## 2023-01-01 RX ORDER — 0.9 % SODIUM CHLORIDE 0.9 %
1000 INTRAVENOUS SOLUTION INTRAVENOUS ONCE
Status: COMPLETED | OUTPATIENT
Start: 2023-01-01 | End: 2023-01-01

## 2023-01-01 RX ORDER — ONDANSETRON 4 MG/1
4 TABLET, ORALLY DISINTEGRATING ORAL EVERY 8 HOURS PRN
Status: DISCONTINUED | OUTPATIENT
Start: 2023-01-01 | End: 2024-01-01 | Stop reason: HOSPADM

## 2023-01-01 RX ORDER — HEPARIN SODIUM 1000 [USP'U]/ML
80 INJECTION, SOLUTION INTRAVENOUS; SUBCUTANEOUS PRN
Status: DISCONTINUED | OUTPATIENT
Start: 2023-01-01 | End: 2024-01-01 | Stop reason: HOSPADM

## 2023-01-01 RX ORDER — EPINEPHRINE IN SOD CHLOR,ISO 1 MG/10 ML
SYRINGE (ML) INTRAVENOUS
Status: DISCONTINUED
Start: 2023-01-01 | End: 2024-01-01 | Stop reason: HOSPADM

## 2023-01-01 RX ORDER — LIDOCAINE HYDROCHLORIDE ANHYDROUS AND DEXTROSE MONOHYDRATE 5; 400 G/100ML; MG/100ML
2 INJECTION, SOLUTION INTRAVENOUS CONTINUOUS
Status: DISCONTINUED | OUTPATIENT
Start: 2023-01-01 | End: 2024-01-01 | Stop reason: HOSPADM

## 2023-01-01 RX ADMIN — EPINEPHRINE 1 MG: 0.1 INJECTION INTRACARDIAC; INTRAVENOUS at 21:22

## 2023-01-01 RX ADMIN — CALCIUM GLUCONATE 1000 MG: 98 INJECTION, SOLUTION INTRAVENOUS at 19:50

## 2023-01-01 RX ADMIN — CALCIUM CHLORIDE 1000 MG: 100 INJECTION, SOLUTION INTRAVENOUS at 20:57

## 2023-01-01 RX ADMIN — CALCIUM CHLORIDE 1000 MG: 100 INJECTION, SOLUTION INTRAVENOUS at 21:07

## 2023-01-01 RX ADMIN — IOPAMIDOL 100 ML: 755 INJECTION, SOLUTION INTRAVENOUS at 17:46

## 2023-01-01 RX ADMIN — Medication 50 MCG/HR: at 21:50

## 2023-01-01 RX ADMIN — LIDOCAINE HYDROCHLORIDE ANHYDROUS AND DEXTROSE MONOHYDRATE 2 MG/MIN: .4; 5 INJECTION, SOLUTION INTRAVENOUS at 21:31

## 2023-01-01 RX ADMIN — MAGNESIUM SULFATE HEPTAHYDRATE 2000 MG: 500 INJECTION, SOLUTION INTRAMUSCULAR; INTRAVENOUS at 21:09

## 2023-01-01 RX ADMIN — EPINEPHRINE 1 MG: 0.1 INJECTION INTRACARDIAC; INTRAVENOUS at 21:03

## 2023-01-01 RX ADMIN — ALBUTEROL SULFATE 10 MG: 2.5 SOLUTION RESPIRATORY (INHALATION) at 19:39

## 2023-01-01 RX ADMIN — SODIUM BICARBONATE: 84 INJECTION, SOLUTION INTRAVENOUS at 21:52

## 2023-01-01 RX ADMIN — CALCIUM CHLORIDE 1000 MG: 100 INJECTION, SOLUTION INTRAVENOUS at 21:01

## 2023-01-01 RX ADMIN — DEXTROSE MONOHYDRATE 250 ML: 100 INJECTION, SOLUTION INTRAVENOUS at 19:54

## 2023-01-01 RX ADMIN — EPINEPHRINE 1 MG: 0.1 INJECTION INTRACARDIAC; INTRAVENOUS at 21:14

## 2023-01-01 RX ADMIN — SODIUM BICARBONATE 50 MEQ: 84 INJECTION INTRAVENOUS at 21:10

## 2023-01-01 RX ADMIN — EPINEPHRINE 1 MG: 0.1 INJECTION INTRACARDIAC; INTRAVENOUS at 20:59

## 2023-01-01 RX ADMIN — SODIUM BICARBONATE 50 MEQ: 84 INJECTION INTRAVENOUS at 21:05

## 2023-01-01 RX ADMIN — DEXTROSE MONOHYDRATE 25 G: 25 INJECTION, SOLUTION INTRAVENOUS at 21:12

## 2023-01-01 RX ADMIN — SODIUM BICARBONATE 50 MEQ: 84 INJECTION, SOLUTION INTRAVENOUS at 20:02

## 2023-01-01 RX ADMIN — LIDOCAINE HYDROCHLORIDE 100 MG: 20 INJECTION, SOLUTION INTRAVENOUS at 21:11

## 2023-01-01 RX ADMIN — LIDOCAINE HYDROCHLORIDE 100 MG: 20 INJECTION, SOLUTION INTRAVENOUS at 20:59

## 2023-01-01 RX ADMIN — Medication 10 MCG/MIN: at 21:25

## 2023-01-01 RX ADMIN — MAGNESIUM SULFATE HEPTAHYDRATE 2000 MG: 40 INJECTION, SOLUTION INTRAVENOUS at 22:41

## 2023-01-01 RX ADMIN — EPINEPHRINE 1 MG: 0.1 INJECTION INTRACARDIAC; INTRAVENOUS at 21:18

## 2023-01-01 RX ADMIN — FUROSEMIDE 40 MG: 10 INJECTION, SOLUTION INTRAMUSCULAR; INTRAVENOUS at 20:28

## 2023-01-01 RX ADMIN — EPINEPHRINE 1 MG: 0.1 INJECTION INTRACARDIAC; INTRAVENOUS at 21:07

## 2023-01-01 RX ADMIN — SODIUM CHLORIDE 1000 ML: 9 INJECTION, SOLUTION INTRAVENOUS at 20:22

## 2023-01-01 RX ADMIN — SODIUM BICARBONATE 50 MEQ: 84 INJECTION INTRAVENOUS at 20:59

## 2023-01-01 RX ADMIN — SODIUM CHLORIDE 1000 ML: 9 INJECTION, SOLUTION INTRAVENOUS at 17:41

## 2023-01-01 RX ADMIN — INSULIN HUMAN 10 UNITS: 100 INJECTION, SOLUTION PARENTERAL at 19:59

## 2023-01-01 RX ADMIN — PANTOPRAZOLE SODIUM 40 MG: 40 INJECTION, POWDER, FOR SOLUTION INTRAVENOUS at 23:55

## 2023-01-01 RX ADMIN — SODIUM BICARBONATE 50 MEQ: 84 INJECTION, SOLUTION INTRAVENOUS at 20:05

## 2023-01-01 RX ADMIN — MAGNESIUM SULFATE HEPTAHYDRATE 2000 MG: 500 INJECTION, SOLUTION INTRAMUSCULAR; INTRAVENOUS at 21:19

## 2023-01-01 ASSESSMENT — PULMONARY FUNCTION TESTS
PIF_VALUE: 20
PIF_VALUE: 30
PIF_VALUE: 34
PIF_VALUE: 38
PIF_VALUE: 37
PIF_VALUE: 32
PIF_VALUE: 34
PIF_VALUE: 38
PIF_VALUE: 35
PIF_VALUE: 37
PIF_VALUE: 30

## 2023-01-01 ASSESSMENT — PAIN - FUNCTIONAL ASSESSMENT: PAIN_FUNCTIONAL_ASSESSMENT: NONE - DENIES PAIN

## 2023-01-11 ENCOUNTER — OFFICE VISIT (OUTPATIENT)
Dept: FAMILY MEDICINE CLINIC | Age: 63
End: 2023-01-11

## 2023-01-11 VITALS
SYSTOLIC BLOOD PRESSURE: 181 MMHG | WEIGHT: 135 LBS | TEMPERATURE: 97.6 F | BODY MASS INDEX: 23.92 KG/M2 | HEART RATE: 94 BPM | HEIGHT: 63 IN | DIASTOLIC BLOOD PRESSURE: 85 MMHG | OXYGEN SATURATION: 98 % | RESPIRATION RATE: 17 BRPM

## 2023-01-11 DIAGNOSIS — R39.9 SYMPTOMS OF URINARY TRACT INFECTION: Primary | ICD-10-CM

## 2023-01-11 LAB
BILIRUBIN, POC: NORMAL
BLOOD URINE, POC: NORMAL
CLARITY, POC: NORMAL
COLOR, POC: NORMAL
GLUCOSE URINE, POC: NORMAL
KETONES, POC: NORMAL
LEUKOCYTE EST, POC: NORMAL
NITRITE, POC: POSITIVE
PH, POC: 6
PROTEIN, POC: NORMAL
SPECIFIC GRAVITY, POC: 1.02
UROBILINOGEN, POC: 0.2

## 2023-01-11 RX ORDER — CEFDINIR 300 MG/1
300 CAPSULE ORAL 2 TIMES DAILY
Qty: 20 CAPSULE | Refills: 0 | Status: SHIPPED
Start: 2023-01-11 | End: 2023-01-11

## 2023-01-11 RX ORDER — CEFDINIR 300 MG/1
300 CAPSULE ORAL 2 TIMES DAILY
Qty: 20 CAPSULE | Refills: 0 | Status: SHIPPED | OUTPATIENT
Start: 2023-01-11 | End: 2023-01-21

## 2023-01-11 SDOH — ECONOMIC STABILITY: FOOD INSECURITY: WITHIN THE PAST 12 MONTHS, THE FOOD YOU BOUGHT JUST DIDN'T LAST AND YOU DIDN'T HAVE MONEY TO GET MORE.: NEVER TRUE

## 2023-01-11 SDOH — ECONOMIC STABILITY: FOOD INSECURITY: WITHIN THE PAST 12 MONTHS, YOU WORRIED THAT YOUR FOOD WOULD RUN OUT BEFORE YOU GOT MONEY TO BUY MORE.: NEVER TRUE

## 2023-01-11 ASSESSMENT — PATIENT HEALTH QUESTIONNAIRE - PHQ9
SUM OF ALL RESPONSES TO PHQ9 QUESTIONS 1 & 2: 0
DEPRESSION UNABLE TO ASSESS: FUNCTIONAL CAPACITY MOTIVATION LIMITS ACCURACY
SUM OF ALL RESPONSES TO PHQ QUESTIONS 1-9: 0
2. FEELING DOWN, DEPRESSED OR HOPELESS: 0
SUM OF ALL RESPONSES TO PHQ QUESTIONS 1-9: 0
1. LITTLE INTEREST OR PLEASURE IN DOING THINGS: 0
SUM OF ALL RESPONSES TO PHQ QUESTIONS 1-9: 0
SUM OF ALL RESPONSES TO PHQ QUESTIONS 1-9: 0

## 2023-01-11 ASSESSMENT — SOCIAL DETERMINANTS OF HEALTH (SDOH): HOW HARD IS IT FOR YOU TO PAY FOR THE VERY BASICS LIKE FOOD, HOUSING, MEDICAL CARE, AND HEATING?: NOT HARD AT ALL

## 2023-01-11 NOTE — PROGRESS NOTES
23  Isiah House : 1960 Sex: female  Age 60 y.o. Subjective:  Chief Complaint   Patient presents with    Cystitis     Blood in urine started a feew days ago       HPI:   Isiah House , 60 y.o. female presents to the clinic for evaluation of UTI symptoms x 3 days. Reports associated frequency, urgency. The patient has not taken OTC medications for symptoms. The patient reports worsening symptoms over time. Denies hematuria, flank pain, vaginal discharge, vaginal bleeding, possibility of pregnancy, or lethargy. The patient also denies headache, fever, chest pain, abdominal pain, shortness of breath, and nausea / vomiting / diarrhea. No LMP recorded. Patient is postmenopausal. Pt is seeing Zoraida Zambrano, she currently has bilateral stents in kidneys that will be replaced soon. ROS:   Unless otherwise stated in this report the patient's positive and negative responses for review of systems for constitutional, eyes, ENT, cardiovascular, respiratory, gastrointestinal, neurological, , musculoskeletal, and integument systems and related systems to the presenting problem are either stated in the history of present illness or were not pertinent or were negative for the symptoms and/or complaints related to the presenting medical problem. Positives and pertinent negatives as per HPI. All others reviewed and are negative. PMH:     Past Medical History:   Diagnosis Date    CAD (coronary artery disease)     cath     CHF (congestive heart failure) ([address]     Diabetic peripheral neuropathy associated with type 2 diabetes mellitus ([address] 2018    Epigastric hernia     Hyperlipidemia     Hypertension     Kidney stone     Schizophrenia ([address]     Type 2 diabetes mellitus with diabetic polyneuropathy, with long-term current use of insulin ([address] 2016    no longer requiring medication. 20 - continues w/o medications as of 2022       Past Surgical History:   Procedure Laterality Date BLADDER SURGERY Bilateral 5/2/2022    CYSTOSCOPY RETROGRADE PYELOGRAM BILATERAL STENT CHANGE performed by Taim Bowser MD at [place] [id number] Left 11/7/2022    CYSTOSCOPY BILATERAL RETROGRADE PYELOGRAM LEFT STENT CHANGE POSSIBLE RIGHT STENT CHANGE VS REMOVAL performed by Tami Bowser MD at [address] [facility]  09/30/2016     [facility]    COLONOSCOPY  08/2016    Dr. Velasquez Pulido 9/7/2021    CYSTOSCOPY RETROGRADE PYELOGRAM, BILATERAL STENT EXCHANGE performed by Tami Bowser MD at [address] / [address] / [place] Bilateral 10/28/2019    CYSTOSCOPY. RETROGRADE.  PYELOGRAM. BILATERAL STENT INSERTION performed by Angelika Gaston MD at [address] / [address] / STONE Bilateral 5/19/2020    CYSTOSCOPY RETROGRADE PYELOGRAM BILATERAL STENT EXCHANGE performed by Angelika Gaston MD at [address] / [address] [facility] [address] / [place] Bilateral 1/18/2021    CYSTOSCOPY RETROGRADE PYELOGRAM BILATERAL  STENT CHANGE performed by Tami Bowser MD at [address] [facility] Left 2/29/2020    LEG AMPUTATION BELOW KNEE performed by Domingo Melendez DO at [address] [facility]  11/04/2016    epigastric hernia umbilical hernia with mesh    OVARIAN CYST REMOVAL      OVARY REMOVAL Right     OVARY REMOVAL  2014    UPPER GASTROINTESTINAL ENDOSCOPY N/A 10/29/2019    EGD ESOPHAGOGASTRODUODENOSCOPY performed by Chilango Chicas MD at [facility] ENDOSCOPY       Family History   Problem Relation Age of Onset    Cancer Brother        Medications:     Current Outpatient Medications:     cefdinir (OMNICEF) 300 MG capsule, Take 1 capsule by mouth 2 times daily for 10 days, Disp: 20 capsule, Rfl: 0    mirtazapine (REMERON) 7.5 MG tablet, Take 1 tablet by mouth nightly, Disp: 30 tablet, Rfl: 5    apixaban (ELIQUIS) 5 MG TABS tablet, Take 1 tablet by mouth 2 times daily, Disp: 60 tablet, Rfl: 5    carvedilol (COREG) 12.5 MG tablet, Take 1 tablet by mouth 2 times daily (with meals), Disp: 60 tablet, Rfl: 5    hydrALAZINE (APRESOLINE) 25 MG tablet, Take 1 tablet by mouth every 8 hours, Disp: 90 tablet, Rfl: 5    albuterol sulfate HFA (VENTOLIN HFA) 108 (90 Base) MCG/ACT inhaler, Inhale 2 puffs into the lungs 4 times daily as needed for Wheezing, Disp: 18 g, Rfl: 0    folic acid (FOLVITE) 1 MG tablet, Take 1 tablet by mouth daily (Patient taking differently: Take 1 mg by mouth daily daughter is getting from [address] [place] [address] today 22), Disp: 30 tablet, Rfl: 3    vitamin B-6 (B-6) 50 MG tablet, Take 1 tablet by mouth daily, Disp: 30 tablet, Rfl: 3    fluticasone (FLONASE) 50 MCG/ACT nasal spray, 2 sprays by Each Nostril route daily, Disp: 16 g, Rfl: 1    Handicap Placard MISC, by Does not apply route Unable to ambulate more than 20 feet without difficulty Expires 2027, Disp: 1 each, Rfl: 0    diazePAM (VALIUM) 2 MG tablet, Take 2 mg by mouth every 6 hours as needed for Anxiety. , Disp: , Rfl:     Allergies:   No Known Allergies    Social History:     Social History     Tobacco Use    Smoking status: Former     Packs/day: 0.00     Years: 2.00     Pack years: 0.00     Types: Cigarettes     Quit date: 3/23/2015     Years since quittin.8    Smokeless tobacco: Never    Tobacco comments:     quit smoking    Vaping Use    Vaping Use: Never used   Substance Use Topics    Alcohol use: No    Drug use: No       Physical Exam:     Vitals:    23 0904 23 0906   BP: (!) 194/92 (!) 181/85   Pulse: 94    Resp: 17    Temp: 97.6 °F (36.4 °C)    TempSrc: Temporal    SpO2: 98%    Weight: 135 lb (61.2 kg)    Height: 5' 3\" (1.6 m)        Physical Exam (PE)   Constitutional: Alert, development consistent with age. HENT:      Head: Normocephalic.       Right Ear: External ear normal.      Left Ear: External ear normal.      Nose: Normal.      Mouth/Throat:     Mouth: Mucous membranes are moist.      Pharynx: Oropharynx is clear. Eyes: Pupils: Pupils are equal, round, and reactive to light. Neck: Normal ROM. Supple. Cardiovascular: Heart RRR without pathologic murmurs or gallops. Pulmonary: Respiratory effort normal.  Normal breath sounds. Abdomen: Soft, nondistended, with mild suprapubic tenderness. No rebound, rigidity, or guarding. BS+ X4. No organomegaly. Back: No CVA tenderness. Skin:  Normal turgor. Warm, dry, without visible rash, unless noted elsewhere. Musculoskeletal: General: Normal strength / ROM. Neurological:  Alert and oriented. Motor functions intact. Responds to verbal commands. Psychiatric: Mood and Affect: Mood normal. Behavior: Behavior normal    Testing:   (All laboratory and radiology results have been personally reviewed by myself)  Labs:  Results for orders placed or performed in visit on 01/11/23   POCT Urinalysis no Micro   Result Value Ref Range    Color, UA red     Clarity, UA cloudy     Glucose, UA POC neg     Bilirubin, UA moderate     Ketones, UA trace     Spec Grav, UA 1.020     Blood, UA POC large     pH, UA 6.0     Protein, UA POC 30mg     Urobilinogen, UA 0.2     Leukocytes, UA large     Nitrite, UA positive        Imaging: All Radiology results interpreted by Radiologist unless otherwise noted. No orders to display       Assessment / Plan:   The patient's vitals, allergies, medications, and past medical history have been reviewed. Yusef Borges was seen today for cystitis. Diagnoses and all orders for this visit:    Symptoms of urinary tract infection  -     POCT Urinalysis no Micro  -     Discontinue: cefdinir (OMNICEF) 300 MG capsule; Take 1 capsule by mouth 2 times daily for 10 days    Other orders  -     cefdinir (OMNICEF) 300 MG capsule; Take 1 capsule by mouth 2 times daily for 10 days      - Disposition: home    - Educational material printed for patient's review and were included in patient instructions.  After Visit Summary was given to patient at the end of visit. - Patient is advised to rest and increase oral fluids. Discussed other symptomatic treatments with the patient today. The patient is to schedule a follow-up with PCP in the next 2-3 days for reevaluation. Red flag symptoms were also discussed with the patient today. If symptoms worsen the patient is to go directly to the emergency department for reevaluation and treatment. Pt verbalizes understanding and is in agreement with plan of care. All questions answered. SIGNATURE: BENJI Regan - CNP      *NOTE: This report was transcribed using voice recognition software. Every effort was made to ensure accuracy; however, inadvertent computerized transcription errors may be present.

## 2023-02-21 ENCOUNTER — APPOINTMENT (OUTPATIENT)
Dept: GENERAL RADIOLOGY | Age: 63
DRG: 698 | End: 2023-02-21
Payer: MEDICARE

## 2023-02-21 ENCOUNTER — APPOINTMENT (OUTPATIENT)
Dept: CT IMAGING | Age: 63
DRG: 698 | End: 2023-02-21
Payer: MEDICARE

## 2023-02-21 ENCOUNTER — HOSPITAL ENCOUNTER (INPATIENT)
Age: 63
LOS: 3 days | Discharge: HOME | DRG: 698 | End: 2023-02-24
Attending: EMERGENCY MEDICINE | Admitting: INTERNAL MEDICINE
Payer: MEDICARE

## 2023-02-21 DIAGNOSIS — J90 BILATERAL PLEURAL EFFUSION: ICD-10-CM

## 2023-02-21 DIAGNOSIS — I26.09 ACUTE PULMONARY EMBOLISM WITH ACUTE COR PULMONALE, UNSPECIFIED PULMONARY EMBOLISM TYPE (HCC): ICD-10-CM

## 2023-02-21 DIAGNOSIS — J95.811 POSTPROCEDURAL PNEUMOTHORAX: ICD-10-CM

## 2023-02-21 DIAGNOSIS — N39.0 ACUTE UTI: Primary | ICD-10-CM

## 2023-02-21 LAB
ALBUMIN SERPL-MCNC: 3.5 G/DL (ref 3.5–5.2)
ALP BLD-CCNC: 82 U/L (ref 35–104)
ALT SERPL-CCNC: 8 U/L (ref 0–32)
ANION GAP SERPL CALCULATED.3IONS-SCNC: 9 MMOL/L (ref 7–16)
ANISOCYTOSIS: ABNORMAL
AST SERPL-CCNC: 14 U/L (ref 0–31)
BACTERIA: ABNORMAL /HPF
BASOPHILS ABSOLUTE: 0 E9/L (ref 0–0.2)
BASOPHILS RELATIVE PERCENT: 1 % (ref 0–2)
BILIRUB SERPL-MCNC: 0.3 MG/DL (ref 0–1.2)
BILIRUBIN URINE: NEGATIVE
BLOOD, URINE: ABNORMAL
BUN BLDV-MCNC: 32 MG/DL (ref 6–23)
CALCIUM SERPL-MCNC: 9.4 MG/DL (ref 8.6–10.2)
CHLORIDE BLD-SCNC: 103 MMOL/L (ref 98–107)
CLARITY: ABNORMAL
CO2: 22 MMOL/L (ref 22–29)
COLOR: ABNORMAL
CREAT SERPL-MCNC: 1.2 MG/DL (ref 0.5–1)
EKG ATRIAL RATE: 91 BPM
EKG P AXIS: 52 DEGREES
EKG P-R INTERVAL: 186 MS
EKG Q-T INTERVAL: 366 MS
EKG QRS DURATION: 84 MS
EKG QTC CALCULATION (BAZETT): 450 MS
EKG R AXIS: 45 DEGREES
EKG T AXIS: 73 DEGREES
EKG VENTRICULAR RATE: 91 BPM
EOSINOPHILS ABSOLUTE: 0 E9/L (ref 0.05–0.5)
EOSINOPHILS RELATIVE PERCENT: 1 % (ref 0–6)
EPITHELIAL CELLS, UA: ABNORMAL /HPF
GFR SERPL CREATININE-BSD FRML MDRD: 51 ML/MIN/1.73
GLUCOSE BLD-MCNC: 138 MG/DL (ref 74–99)
GLUCOSE URINE: NEGATIVE MG/DL
HCT VFR BLD CALC: 27 % (ref 34–48)
HEMOGLOBIN: 8.1 G/DL (ref 11.5–15.5)
HYPOCHROMIA: ABNORMAL
INFLUENZA A BY PCR: NOT DETECTED
INFLUENZA B BY PCR: NOT DETECTED
KETONES, URINE: NEGATIVE MG/DL
LACTIC ACID: 0.8 MMOL/L (ref 0.5–2.2)
LEUKOCYTE ESTERASE, URINE: ABNORMAL
LIPASE: 19 U/L (ref 13–60)
LYMPHOCYTES ABSOLUTE: 1.77 E9/L (ref 1.5–4)
LYMPHOCYTES RELATIVE PERCENT: 60.9 % (ref 20–42)
MCH RBC QN AUTO: 24.3 PG (ref 26–35)
MCHC RBC AUTO-ENTMCNC: 30 % (ref 32–34.5)
MCV RBC AUTO: 81.1 FL (ref 80–99.9)
MONOCYTES ABSOLUTE: 0.38 E9/L (ref 0.1–0.95)
MONOCYTES RELATIVE PERCENT: 13 % (ref 2–12)
MYELOCYTE PERCENT: 0.9 % (ref 0–0)
NEUTROPHILS ABSOLUTE: 0.75 E9/L (ref 1.8–7.3)
NEUTROPHILS RELATIVE PERCENT: 25.2 % (ref 43–80)
NITRITE, URINE: POSITIVE
PDW BLD-RTO: 15.9 FL (ref 11.5–15)
PH UA: 5.5 (ref 5–9)
PLATELET # BLD: 326 E9/L (ref 130–450)
PMV BLD AUTO: 11.8 FL (ref 7–12)
POLYCHROMASIA: ABNORMAL
POTASSIUM REFLEX MAGNESIUM: 3.9 MMOL/L (ref 3.5–5)
PRO-BNP: 560 PG/ML (ref 0–125)
PROTEIN UA: 100 MG/DL
RBC # BLD: 3.33 E12/L (ref 3.5–5.5)
RBC UA: >20 /HPF (ref 0–2)
SARS-COV-2, NAAT: NOT DETECTED
SODIUM BLD-SCNC: 134 MMOL/L (ref 132–146)
SPECIFIC GRAVITY UA: 1.02 (ref 1–1.03)
TOTAL CK: 37 U/L (ref 20–180)
TOTAL PROTEIN: 7.6 G/DL (ref 6.4–8.3)
TROPONIN, HIGH SENSITIVITY: 44 NG/L (ref 0–9)
TROPONIN, HIGH SENSITIVITY: 49 NG/L (ref 0–9)
TROPONIN, HIGH SENSITIVITY: 49 NG/L (ref 0–9)
UROBILINOGEN, URINE: 1 E.U./DL
WBC # BLD: 2.9 E9/L (ref 4.5–11.5)
WBC UA: ABNORMAL /HPF (ref 0–5)

## 2023-02-21 PROCEDURE — 82550 ASSAY OF CK (CPK): CPT

## 2023-02-21 PROCEDURE — 6370000000 HC RX 637 (ALT 250 FOR IP): Performed by: INTERNAL MEDICINE

## 2023-02-21 PROCEDURE — 87040 BLOOD CULTURE FOR BACTERIA: CPT

## 2023-02-21 PROCEDURE — 2580000003 HC RX 258: Performed by: STUDENT IN AN ORGANIZED HEALTH CARE EDUCATION/TRAINING PROGRAM

## 2023-02-21 PROCEDURE — 0202U NFCT DS 22 TRGT SARS-COV-2: CPT

## 2023-02-21 PROCEDURE — 83605 ASSAY OF LACTIC ACID: CPT

## 2023-02-21 PROCEDURE — 94640 AIRWAY INHALATION TREATMENT: CPT

## 2023-02-21 PROCEDURE — 6360000002 HC RX W HCPCS: Performed by: STUDENT IN AN ORGANIZED HEALTH CARE EDUCATION/TRAINING PROGRAM

## 2023-02-21 PROCEDURE — 85025 COMPLETE CBC W/AUTO DIFF WBC: CPT

## 2023-02-21 PROCEDURE — 74177 CT ABD & PELVIS W/CONTRAST: CPT

## 2023-02-21 PROCEDURE — 71045 X-RAY EXAM CHEST 1 VIEW: CPT

## 2023-02-21 PROCEDURE — 6360000002 HC RX W HCPCS: Performed by: INTERNAL MEDICINE

## 2023-02-21 PROCEDURE — 71275 CT ANGIOGRAPHY CHEST: CPT

## 2023-02-21 PROCEDURE — 83690 ASSAY OF LIPASE: CPT

## 2023-02-21 PROCEDURE — 87502 INFLUENZA DNA AMP PROBE: CPT

## 2023-02-21 PROCEDURE — 87635 SARS-COV-2 COVID-19 AMP PRB: CPT

## 2023-02-21 PROCEDURE — 83880 ASSAY OF NATRIURETIC PEPTIDE: CPT

## 2023-02-21 PROCEDURE — 2580000003 HC RX 258: Performed by: INTERNAL MEDICINE

## 2023-02-21 PROCEDURE — 81001 URINALYSIS AUTO W/SCOPE: CPT

## 2023-02-21 PROCEDURE — 2060000000 HC ICU INTERMEDIATE R&B

## 2023-02-21 PROCEDURE — 89051 BODY FLUID CELL COUNT: CPT

## 2023-02-21 PROCEDURE — 93005 ELECTROCARDIOGRAM TRACING: CPT | Performed by: STUDENT IN AN ORGANIZED HEALTH CARE EDUCATION/TRAINING PROGRAM

## 2023-02-21 PROCEDURE — 93010 ELECTROCARDIOGRAM REPORT: CPT | Performed by: INTERNAL MEDICINE

## 2023-02-21 PROCEDURE — 6360000004 HC RX CONTRAST MEDICATION: Performed by: RADIOLOGY

## 2023-02-21 PROCEDURE — 94664 DEMO&/EVAL PT USE INHALER: CPT

## 2023-02-21 PROCEDURE — 84484 ASSAY OF TROPONIN QUANT: CPT

## 2023-02-21 PROCEDURE — 36415 COLL VENOUS BLD VENIPUNCTURE: CPT

## 2023-02-21 PROCEDURE — 84145 PROCALCITONIN (PCT): CPT

## 2023-02-21 PROCEDURE — 99285 EMERGENCY DEPT VISIT HI MDM: CPT

## 2023-02-21 PROCEDURE — 96374 THER/PROPH/DIAG INJ IV PUSH: CPT

## 2023-02-21 PROCEDURE — 80053 COMPREHEN METABOLIC PANEL: CPT

## 2023-02-21 RX ORDER — MORPHINE SULFATE 4 MG/ML
4 INJECTION, SOLUTION INTRAMUSCULAR; INTRAVENOUS ONCE
Status: COMPLETED | OUTPATIENT
Start: 2023-02-21 | End: 2023-02-21

## 2023-02-21 RX ORDER — GABAPENTIN 100 MG/1
100 CAPSULE ORAL 2 TIMES DAILY
Status: DISCONTINUED | OUTPATIENT
Start: 2023-02-21 | End: 2023-02-24 | Stop reason: HOSPADM

## 2023-02-21 RX ORDER — MIRTAZAPINE 15 MG/1
7.5 TABLET, FILM COATED ORAL NIGHTLY
Status: DISCONTINUED | OUTPATIENT
Start: 2023-02-21 | End: 2023-02-24 | Stop reason: HOSPADM

## 2023-02-21 RX ORDER — BUDESONIDE 0.5 MG/2ML
500 INHALANT ORAL 2 TIMES DAILY
Status: DISCONTINUED | OUTPATIENT
Start: 2023-02-21 | End: 2023-02-24 | Stop reason: HOSPADM

## 2023-02-21 RX ORDER — IPRATROPIUM BROMIDE AND ALBUTEROL SULFATE 2.5; .5 MG/3ML; MG/3ML
1 SOLUTION RESPIRATORY (INHALATION)
Status: DISCONTINUED | OUTPATIENT
Start: 2023-02-21 | End: 2023-02-21

## 2023-02-21 RX ORDER — ENOXAPARIN SODIUM 100 MG/ML
1 INJECTION SUBCUTANEOUS 2 TIMES DAILY
Status: DISCONTINUED | OUTPATIENT
Start: 2023-02-21 | End: 2023-02-24 | Stop reason: HOSPADM

## 2023-02-21 RX ORDER — POTASSIUM CHLORIDE 7.45 MG/ML
10 INJECTION INTRAVENOUS PRN
Status: DISCONTINUED | OUTPATIENT
Start: 2023-02-21 | End: 2023-02-24 | Stop reason: HOSPADM

## 2023-02-21 RX ORDER — ALBUTEROL SULFATE 2.5 MG/3ML
2.5 SOLUTION RESPIRATORY (INHALATION) EVERY 6 HOURS PRN
Status: DISCONTINUED | OUTPATIENT
Start: 2023-02-21 | End: 2023-02-21

## 2023-02-21 RX ORDER — MAGNESIUM SULFATE IN WATER 40 MG/ML
2000 INJECTION, SOLUTION INTRAVENOUS PRN
Status: DISCONTINUED | OUTPATIENT
Start: 2023-02-21 | End: 2023-02-24 | Stop reason: HOSPADM

## 2023-02-21 RX ORDER — ALBUTEROL SULFATE 90 UG/1
2 AEROSOL, METERED RESPIRATORY (INHALATION) 4 TIMES DAILY PRN
Status: DISCONTINUED | OUTPATIENT
Start: 2023-02-21 | End: 2023-02-21

## 2023-02-21 RX ORDER — DIAZEPAM 2 MG/1
2 TABLET ORAL EVERY 6 HOURS PRN
Status: DISCONTINUED | OUTPATIENT
Start: 2023-02-21 | End: 2023-02-21

## 2023-02-21 RX ORDER — FOLIC ACID 1 MG/1
1 TABLET ORAL DAILY
Status: DISCONTINUED | OUTPATIENT
Start: 2023-02-21 | End: 2023-02-21

## 2023-02-21 RX ORDER — HYDRALAZINE HYDROCHLORIDE 25 MG/1
25 TABLET, FILM COATED ORAL EVERY 8 HOURS SCHEDULED
Status: DISCONTINUED | OUTPATIENT
Start: 2023-02-21 | End: 2023-02-24 | Stop reason: HOSPADM

## 2023-02-21 RX ORDER — ALBUTEROL SULFATE 2.5 MG/3ML
2.5 SOLUTION RESPIRATORY (INHALATION) 4 TIMES DAILY
Status: DISCONTINUED | OUTPATIENT
Start: 2023-02-21 | End: 2023-02-24 | Stop reason: HOSPADM

## 2023-02-21 RX ORDER — LACTOSE-REDUCED FOOD 0.06 G-1.5
1 LIQUID (ML) ORAL DAILY
COMMUNITY

## 2023-02-21 RX ORDER — FLUTICASONE PROPIONATE 50 MCG
2 SPRAY, SUSPENSION (ML) NASAL DAILY PRN
COMMUNITY

## 2023-02-21 RX ORDER — GABAPENTIN 100 MG/1
100 CAPSULE ORAL 2 TIMES DAILY
COMMUNITY

## 2023-02-21 RX ORDER — MEGESTROL ACETATE 40 MG/1
40 TABLET ORAL DAILY PRN
Status: ON HOLD | COMMUNITY
End: 2023-02-24 | Stop reason: HOSPADM

## 2023-02-21 RX ORDER — LANOLIN ALCOHOL/MO/W.PET/CERES
50 CREAM (GRAM) TOPICAL DAILY
Status: DISCONTINUED | OUTPATIENT
Start: 2023-02-22 | End: 2023-02-24 | Stop reason: HOSPADM

## 2023-02-21 RX ORDER — ONDANSETRON 2 MG/ML
4 INJECTION INTRAMUSCULAR; INTRAVENOUS EVERY 6 HOURS PRN
Status: DISCONTINUED | OUTPATIENT
Start: 2023-02-21 | End: 2023-02-24 | Stop reason: HOSPADM

## 2023-02-21 RX ORDER — ACETAMINOPHEN 500 MG
500 TABLET ORAL EVERY 4 HOURS PRN
Status: DISCONTINUED | OUTPATIENT
Start: 2023-02-21 | End: 2023-02-24 | Stop reason: HOSPADM

## 2023-02-21 RX ORDER — FLUTICASONE PROPIONATE 50 MCG
2 SPRAY, SUSPENSION (ML) NASAL DAILY
Status: DISCONTINUED | OUTPATIENT
Start: 2023-02-21 | End: 2023-02-24 | Stop reason: HOSPADM

## 2023-02-21 RX ORDER — CARVEDILOL 6.25 MG/1
12.5 TABLET ORAL 2 TIMES DAILY WITH MEALS
Status: DISCONTINUED | OUTPATIENT
Start: 2023-02-21 | End: 2023-02-24 | Stop reason: HOSPADM

## 2023-02-21 RX ORDER — POTASSIUM CHLORIDE 20 MEQ/1
40 TABLET, EXTENDED RELEASE ORAL PRN
Status: DISCONTINUED | OUTPATIENT
Start: 2023-02-21 | End: 2023-02-24 | Stop reason: HOSPADM

## 2023-02-21 RX ORDER — DIAZEPAM 2 MG/1
1 TABLET ORAL DAILY PRN
Status: DISCONTINUED | OUTPATIENT
Start: 2023-02-21 | End: 2023-02-24 | Stop reason: HOSPADM

## 2023-02-21 RX ADMIN — IPRATROPIUM BROMIDE 0.5 MG: 0.5 SOLUTION RESPIRATORY (INHALATION) at 18:58

## 2023-02-21 RX ADMIN — HYDRALAZINE HYDROCHLORIDE 25 MG: 25 TABLET, FILM COATED ORAL at 22:04

## 2023-02-21 RX ADMIN — ALBUTEROL SULFATE 2.5 MG: 2.5 SOLUTION RESPIRATORY (INHALATION) at 18:58

## 2023-02-21 RX ADMIN — IOPAMIDOL 75 ML: 755 INJECTION, SOLUTION INTRAVENOUS at 12:34

## 2023-02-21 RX ADMIN — ACETAMINOPHEN 500 MG: 500 TABLET, FILM COATED ORAL at 20:04

## 2023-02-21 RX ADMIN — VANCOMYCIN HYDROCHLORIDE 1250 MG: 10 INJECTION, POWDER, LYOPHILIZED, FOR SOLUTION INTRAVENOUS at 16:26

## 2023-02-21 RX ADMIN — ENOXAPARIN SODIUM 60 MG: 100 INJECTION SUBCUTANEOUS at 22:04

## 2023-02-21 RX ADMIN — FLUTICASONE PROPIONATE 2 SPRAY: 50 SPRAY, METERED NASAL at 20:06

## 2023-02-21 RX ADMIN — CEFEPIME 2000 MG: 2 INJECTION, POWDER, FOR SOLUTION INTRAVENOUS at 15:32

## 2023-02-21 RX ADMIN — GABAPENTIN 100 MG: 100 CAPSULE ORAL at 22:04

## 2023-02-21 RX ADMIN — MIRTAZAPINE 7.5 MG: 15 TABLET, FILM COATED ORAL at 22:04

## 2023-02-21 RX ADMIN — FOLIC ACID 1 MG: 1 TABLET ORAL at 20:04

## 2023-02-21 RX ADMIN — BUDESONIDE 500 MCG: 0.5 SUSPENSION RESPIRATORY (INHALATION) at 18:57

## 2023-02-21 RX ADMIN — MORPHINE SULFATE 4 MG: 4 INJECTION, SOLUTION INTRAMUSCULAR; INTRAVENOUS at 13:08

## 2023-02-21 RX ADMIN — CEFTRIAXONE 1000 MG: 1 INJECTION, POWDER, FOR SOLUTION INTRAMUSCULAR; INTRAVENOUS at 20:00

## 2023-02-21 ASSESSMENT — PAIN DESCRIPTION - DESCRIPTORS: DESCRIPTORS: DISCOMFORT

## 2023-02-21 ASSESSMENT — ENCOUNTER SYMPTOMS
COUGH: 0
NAUSEA: 0
SHORTNESS OF BREATH: 1
COLOR CHANGE: 0
VOMITING: 0
DIARRHEA: 0
ABDOMINAL PAIN: 1
BACK PAIN: 0

## 2023-02-21 ASSESSMENT — PAIN SCALES - GENERAL
PAINLEVEL_OUTOF10: 10
PAINLEVEL_OUTOF10: 10
PAINLEVEL_OUTOF10: 4

## 2023-02-21 ASSESSMENT — PAIN DESCRIPTION - LOCATION
LOCATION: GENERALIZED
LOCATION: ABDOMEN

## 2023-02-21 ASSESSMENT — PAIN DESCRIPTION - ORIENTATION: ORIENTATION: LEFT;RIGHT;LOWER

## 2023-02-21 NOTE — ED PROVIDER NOTES
HPI   69-year-old female patient present to emergency department for evaluation of generalized weakness, body aches, shortness of breath and abdominal pain. She notes history of ureteral stents, history of BKA and an emboli. Patient is on Eliquis. She has been compliant with medication. She denies any fevers or chills. Shortness of breath started yesterday worsening with exertion. She is not on oxygen at baseline. Review of Systems   Constitutional:  Negative for chills and fever. HENT:  Negative for congestion. Respiratory:  Positive for shortness of breath. Negative for cough. Cardiovascular:  Positive for chest pain. Gastrointestinal:  Positive for abdominal pain. Negative for diarrhea, nausea and vomiting. Genitourinary:  Negative for difficulty urinating, dysuria and hematuria. Musculoskeletal:  Negative for back pain. Skin:  Negative for color change. All other systems reviewed and are negative. Physical Exam  Vitals and nursing note reviewed. Constitutional:       General: She is not in acute distress. HENT:      Head: Normocephalic and atraumatic. Nose: Nose normal. No congestion. Mouth/Throat:      Mouth: Mucous membranes are moist.      Pharynx: Oropharynx is clear. Eyes:      Conjunctiva/sclera: Conjunctivae normal.      Pupils: Pupils are equal, round, and reactive to light. Cardiovascular:      Rate and Rhythm: Tachycardia present. Rhythm irregular. Pulses: Normal pulses. Pulmonary:      Comments: Mild crackles bibasilar  Abdominal:      General: Bowel sounds are normal. There is no distension. Tenderness: There is no abdominal tenderness. Musculoskeletal:         General: Normal range of motion. Cervical back: Neck supple. Comments: BKA left extremity   Skin:     General: Skin is warm. Capillary Refill: Capillary refill takes less than 2 seconds. Neurological:      General: No focal deficit present.       Mental Status: She is alert. Procedures     MDM  Differential diagnosis includes anemia, PE, pericardial effusion, Eliquis failure, pneumonia, CHF exacerbation    Labs here showing stable creatinine of 1.2 most recently in November is 1.1. Electrolytes within normal limits as interpreted by me. Sodium 134 and potassium is 3.9. Her troponins are stable at 49 and repeat is 49 with no acute ST elevations or depressions on EKG. There was some nonspecific T wave flattening. Hemoglobin is low at 8.1 however once again around her baseline previously November was 8.6. Due to patient's past significant medical history I had obtained imaging. Of her abdomen and pelvis. Urinalysis was positive for nitrites and white cells. X-ray showed no obvious pneumonia as interpreted by me. Patient on Eliquis so I do not believe she would have PE as she is compliant with medications however she was found to have bilateral pleural effusions larger on the right. CT of the abdomen pelvis showed bilateral ureteral stents in appropriate position. Due to patient having acute urinary tract infection, recent hospital admission and stents in place I had covered her with cefepime and vancomycin, broad-spectrum antibiotics. Plan for admission. Of note patient did have moderate pericardial effusion in 2022 on echocardiogram, on CT angiography today of her chest no pericardial effusion noted. ED Course as of 02/21/23 1850   Tue Feb 21, 2023   8044 External chart review performed by me in November 2022 patient had small to moderate pericardial effusion on echocardiogram. EF 70% [FG]   1440 EKG:  Interpreted by me  Sinus rhythm, rate 91, normal axis, no ST elevation or depressions, nonspecific T wave flattening in the inferior and lateral leads [SO]   1511 Spoke with Dr. Alvarenga Bigger was accepted for admission [FG]      ED Course User Index  [FG] Armani Salcedo DO  [SO] Jarrett Santana DO       --------------------------------------------- PAST HISTORY ---------------------------------------------  Past Medical History:  has a past medical history of CAD (coronary artery disease), CHF (congestive heart failure) ([facility] Diabetic peripheral neuropathy associated with type 2 diabetes mellitus ([facility] Epigastric hernia, Hyperlipidemia, Hypertension, Kidney stone, Schizophrenia ([facility] and Type 2 diabetes mellitus with diabetic polyneuropathy, with long-term current use of insulin ([facility] Past Surgical History:  has a past surgical history that includes Ovary removal (Right); Ovary removal (2014); ovarian cyst removal; Colonoscopy (08/2016); other surgical history (11/04/2016); hernia repair; CYSTOSCOPY INSERTION / REMOVAL STENT / STONE (Bilateral, 10/28/2019); Upper gastrointestinal endoscopy (N/A, 10/29/2019); Leg amputation below knee (Left, 2/29/2020); CYSTOSCOPY INSERTION / REMOVAL STENT / STONE (Bilateral, 5/19/2020); CYSTOSCOPY INSERTION / REMOVAL STENT / STONE (Bilateral, 1/18/2021); Cystoscopy (N/A, 9/7/2021); Cardiac catheterization (09/30/2016); Bladder surgery (Bilateral, 5/2/2022); Femoral artery stent; and Bladder surgery (Left, 11/7/2022). Social History:  reports that she quit smoking about 7 years ago. Her smoking use included cigarettes. She has never used smokeless tobacco. She reports that she does not drink alcohol and does not use drugs. Family History: family history includes Cancer in her brother. The patients home medications have been reviewed. Allergies: Patient has no known allergies.     -------------------------------------------------- RESULTS -------------------------------------------------    LABS:  Results for orders placed or performed during the hospital encounter of 02/21/23   COVID-19, Rapid    Specimen: Nasopharyngeal Swab   Result Value Ref Range    SARS-CoV-2, NAAT Not Detected Not Detected   Rapid influenza A/B antigens    Specimen: Nasopharyngeal   Result Value Ref Range    Influenza A by PCR Not Detected Not Detected    Influenza B by PCR Not Detected Not Detected   CBC with Auto Differential   Result Value Ref Range    WBC 2.9 (L) 4.5 - 11.5 E9/L    RBC 3.33 (L) 3.50 - 5.50 E12/L    Hemoglobin 8.1 (L) 11.5 - 15.5 g/dL    Hematocrit 27.0 (L) 34.0 - 48.0 %    MCV 81.1 80.0 - 99.9 fL    MCH 24.3 (L) 26.0 - 35.0 pg    MCHC 30.0 (L) 32.0 - 34.5 %    RDW 15.9 (H) 11.5 - 15.0 fL    Platelets 979 276 - 181 E9/L    MPV 11.8 7.0 - 12.0 fL    Neutrophils % 25.2 (L) 43.0 - 80.0 %    Lymphocytes % 60.9 (H) 20.0 - 42.0 %    Monocytes % 13.0 (H) 2.0 - 12.0 %    Eosinophils % 1.0 0.0 - 6.0 %    Basophils % 1.0 0.0 - 2.0 %    Neutrophils Absolute 0.75 (L) 1.80 - 7.30 E9/L    Lymphocytes Absolute 1.77 1.50 - 4.00 E9/L    Monocytes Absolute 0.38 0.10 - 0.95 E9/L    Eosinophils Absolute 0.00 (L) 0.05 - 0.50 E9/L    Basophils Absolute 0.00 0.00 - 0.20 E9/L    Myelocyte Percent 0.9 0 - 0 %    Anisocytosis 1+     Polychromasia 1+     Hypochromia 1+    Comprehensive Metabolic Panel w/ Reflex to MG   Result Value Ref Range    Sodium 134 132 - 146 mmol/L    Potassium reflex Magnesium 3.9 3.5 - 5.0 mmol/L    Chloride 103 98 - 107 mmol/L    CO2 22 22 - 29 mmol/L    Anion Gap 9 7 - 16 mmol/L    Glucose 138 (H) 74 - 99 mg/dL    BUN 32 (H) 6 - 23 mg/dL    Creatinine 1.2 (H) 0.5 - 1.0 mg/dL    Est, Glom Filt Rate 51 >=60 mL/min/1.73    Calcium 9.4 8.6 - 10.2 mg/dL    Total Protein 7.6 6.4 - 8.3 g/dL    Albumin 3.5 3.5 - 5.2 g/dL    Total Bilirubin 0.3 0.0 - 1.2 mg/dL    Alkaline Phosphatase 82 35 - 104 U/L    ALT 8 0 - 32 U/L    AST 14 0 - 31 U/L   Troponin   Result Value Ref Range    Troponin, High Sensitivity 49 (H) 0 - 9 ng/L   Lipase   Result Value Ref Range    Lipase 19 13 - 60 U/L   Lactic Acid   Result Value Ref Range    Lactic Acid 0.8 0.5 - 2.2 mmol/L   Urinalysis with Microscopic   Result Value Ref Range    Color, UA DKYELLOW Straw/Yellow    Clarity, UA CLOUDY (A) Clear    Glucose, Ur Negative Negative mg/dL    Bilirubin Urine Negative Negative    Ketones, Urine Negative Negative mg/dL    Specific Gravity, UA 1.025 1.005 - 1.030    Blood, Urine LARGE (A) Negative    pH, UA 5.5 5.0 - 9.0    Protein,  (A) Negative mg/dL    Urobilinogen, Urine 1.0 <2.0 E.U./dL    Nitrite, Urine POSITIVE (A) Negative    Leukocyte Esterase, Urine TRACE (A) Negative    WBC, UA 5-10 (A) 0 - 5 /HPF    RBC, UA >20 0 - 2 /HPF    Epithelial Cells, UA FEW /HPF    Bacteria, UA MODERATE (A) None Seen /HPF   Brain Natriuretic Peptide   Result Value Ref Range    Pro- (H) 0 - 125 pg/mL   CK   Result Value Ref Range    Total CK 37 20 - 180 U/L   Troponin   Result Value Ref Range    Troponin, High Sensitivity 49 (H) 0 - 9 ng/L   EKG 12 Lead   Result Value Ref Range    Ventricular Rate 91 BPM    Atrial Rate 91 BPM    P-R Interval 186 ms    QRS Duration 84 ms    Q-T Interval 366 ms    QTc Calculation (Bazett) 450 ms    P Axis 52 degrees    R Axis 45 degrees    T Axis 73 degrees       RADIOLOGY:  CT ABDOMEN PELVIS W IV CONTRAST Additional Contrast? None   Final Result   1. Redemonstration of bilateral ureteral stents appearing in similar position. 2. Stable bilateral hydronephrosis. 3. Redemonstration of fat stranding surrounding right renal pelvis possibly   related to pyonephrosis with appropriate clinical history. 4. New minimal nonspecific free fluid located in the pelvis. 5. Bilateral pleural effusions larger on the right. CTA PULMONARY W CONTRAST   Final Result   1. No pulmonary embolism. 2. Bilateral pleural effusions larger on the right. 3. Redemonstration of heterogeneous nodule associated with right lobe of the   thyroid gland measuring up to 2 cm. RECOMMENDATIONS:   Managing Incidental Thyroid Nodule Detected at CT or MRI or US      1.   Further evaluation by thyroid Ultrasound recommended for these incidental   nodules:      Patient Age 25 years or less - Nodule of any size      Patient Age 21-27 years old - Nodule 1 cm in size or greater      Patient Age 28 years or more - Nodule 1.5 cm in size or greater      2. Follow up thyroid ultrasound also recommend in these scenarios      - Solitary nodule with high risk imaging features (locally invasive nodule or   suspicious lymph nodes)      - Heterogeneous, enlarged thyroid gland.      - Increased uptake on PET      3.  NO further imaging is recommended in the following scenarios      - Any nodule not meeting above criteria. - Those patients with limited life expectancy or significant co-morbidities. Note: These recommendations do not apply to pts. w/ increased risk for   thyroid cancer or pts. with symptomatic thyroid disease. Reference: Recommendations for f/u of Incidental Thyroid Nodules (ITN) found   on CT, MR, NM and Extrathyroidal US are based upon the ACR white paper and   [facility] 3-tiered system for managing ITNs:J Am Lion Radiol. 2015 Feb;12(2):   143-50      Unavailable         XR CHEST PORTABLE   Final Result   No acute process. ------------------------- NURSING NOTES AND VITALS REVIEWED ---------------------------  Date / Time Roomed:  2/21/2023  9:27 AM  ED Bed Assignment:  04/04    The nursing notes within the ED encounter and vital signs as below have been reviewed. Patient Vitals for the past 24 hrs:   BP Temp Temp src Pulse Resp SpO2 Height Weight   02/21/23 1845 -- (!) 101.2 °F (38.4 °C) Oral (!) 102 19 -- -- --   02/21/23 1801 (!) 146/68 -- -- (!) 108 22 95 % -- --   02/21/23 0936 (!) 162/81 -- -- -- -- -- 5' 3\" (1.6 m) 135 lb (61.2 kg)   02/21/23 0926 -- 97.1 °F (36.2 °C) Infrared 94 18 100 % -- --       Oxygen Saturation Interpretation: Normal  Counseling:  I have spoken with the patient and discussed todays results, in addition to providing specific details for the plan of care and counseling regarding the diagnosis and prognosis.   Their questions are answered at this time and they are agreeable with the plan of admission.    --------------------------------- ADDITIONAL PROVIDER NOTES ---------------------------------  Consultations:   Spoke with Dr. Lisa Horner. Discussed case. They will admit the patient. This patient's ED course included: a personal history and physicial examination, re-evaluation prior to disposition, multiple bedside re-evaluations, IV medications, cardiac monitoring, continuous pulse oximetry, and complex medical decision making and emergency management    This patient has remained hemodynamically stable during their ED course. Diagnosis:  1. Acute UTI    2. Bilateral pleural effusion        Disposition:  Patient's disposition: Admit to telemetry  Patient's condition is stable.          Damion Schmitt DO  Resident  02/21/23 5711

## 2023-02-21 NOTE — H&P
Department of Internal Medicine  History and Physical    PCP: Dr. Valentina Vaca   Admitting Physician: Dr. Cathy Glynn:  SOB    HISTORY OF PRESENT ILLNESS:    Jaqueline Oneal is a chronically ill 18-year-old female who presented to [address] [place] [address] emergency department for a variety of complaints including failure to thrive with generalized weakness. This was accompanied by shortness of breath. As stated previously, she has an extensive past medical history and was last hospitalized in November where she was treated for multifocal bilateral pulmonary emboli. She has been taking her medications accordingly. Work-up in the emergency department revealed a urinary tract infection as well as bilateral pleural effusions. Fortunately, she was not hypoxic. We discussed admission to the hospital for antibiotics and increased therapy. PAST MEDICAL Hx:  Past Medical History:   Diagnosis Date    CAD (coronary artery disease)     cath 2016    CHF (congestive heart failure) ([facility]     Diabetic peripheral neuropathy associated with type 2 diabetes mellitus ([facility] 08/24/2018    Epigastric hernia     Hyperlipidemia     Hypertension     Kidney stone     Schizophrenia ([facility]     Type 2 diabetes mellitus with diabetic polyneuropathy, with long-term current use of insulin ([facility] 09/19/2016    no longer requiring medication. 1/13/21 - continues w/o medications as of 11/2/2022       PAST SURGICAL Hx:   Past Surgical History:   Procedure Laterality Date    BLADDER SURGERY Bilateral 5/2/2022    CYSTOSCOPY RETROGRADE PYELOGRAM BILATERAL STENT CHANGE performed by Sathya Miller MD at [facility] 83 Left 11/7/2022    CYSTOSCOPY BILATERAL RETROGRADE PYELOGRAM LEFT STENT CHANGE POSSIBLE RIGHT STENT CHANGE VS REMOVAL performed by Sathya Miller MD at [address] [place] [address]  09/30/2016     [facility]    COLONOSCOPY  08/2016    Dr. Vela Harvinder 9/7/2021    CYSTOSCOPY RETROGRADE PYELOGRAM, BILATERAL STENT EXCHANGE performed by Kenneth Farley MD at [address] [facility] [address] / REMOVAL STENT / STONE Bilateral 10/28/2019    CYSTOSCOPY. RETROGRADE. PYELOGRAM. BILATERAL STENT INSERTION performed by Chantal Guzman MD at [address] [facility] [address] / [address] / STONE Bilateral 5/19/2020    CYSTOSCOPY RETROGRADE PYELOGRAM BILATERAL STENT EXCHANGE performed by Chantal Guzman MD at [address] [facility] [address] / [address] / [place] Bilateral 1/18/2021    CYSTOSCOPY RETROGRADE PYELOGRAM BILATERAL  STENT CHANGE performed by Kenneth Farley MD at [address] [facility] Left 2/29/2020    LEG AMPUTATION BELOW KNEE performed by Huang Keller DO at [address]  [facility]  11/04/2016    epigastric hernia umbilical hernia with mesh    OVARIAN CYST REMOVAL      OVARY REMOVAL Right     OVARY REMOVAL  2014    UPPER GASTROINTESTINAL ENDOSCOPY N/A 10/29/2019    EGD ESOPHAGOGASTRODUODENOSCOPY performed by Shania Dueñas MD at [address] [facility] Hx:  Family History   Problem Relation Age of Onset    Cancer Brother        HOME MEDICATIONS:  Prior to Admission medications    Medication Sig Start Date End Date Taking?  Authorizing Provider   mirtazapine (REMERON) 7.5 MG tablet Take 1 tablet by mouth nightly 12/6/22   Jose Napier MD   apixaban (ELIQUIS) 5 MG TABS tablet Take 1 tablet by mouth 2 times daily 12/6/22   Jose Napeir MD   carvedilol (COREG) 12.5 MG tablet Take 1 tablet by mouth 2 times daily (with meals) 12/6/22   Jose Napier MD   hydrALAZINE (APRESOLINE) 25 MG tablet Take 1 tablet by mouth every 8 hours 12/6/22   Jose Napier MD   albuterol sulfate HFA (VENTOLIN HFA) 108 (90 Base) MCG/ACT inhaler Inhale 2 puffs into the lungs 4 times daily as needed for Wheezing 11/29/22   Thuan Catalan DO   folic acid (FOLVITE) 1 MG tablet Take 1 tablet by mouth daily  Patient taking differently: Take 1 mg by mouth daily daughter is getting from [address] [place] [address] today 22   Susan Fischer DO   vitamin B-6 (B-6) 50 MG tablet Take 1 tablet by mouth daily 22   Susan Fischer DO   fluticasone [facility]) 50 MCG/ACT nasal spray 2 sprays by Each Nostril route daily 10/19/22   Rosales Back MD   Handicap Placard MISC by Does not apply route Unable to ambulate more than 20 feet without difficulty  Expires 2027   Rosales Back MD   diazePAM (VALIUM) 2 MG tablet Take 2 mg by mouth every 6 hours as needed for Anxiety. Historical Provider, MD       ALLERGIES:  Patient has no known allergies. SOCIAL Hx:  Social History     Socioeconomic History    Marital status:      Spouse name: Not on file    Number of children: 3    Years of education: Not on file    Highest education level: Not on file   Occupational History    Not on file   Tobacco Use    Smoking status: Former     Packs/day: 0.00     Years: 2.00     Pack years: 0.00     Types: Cigarettes     Quit date: 3/23/2015     Years since quittin.9    Smokeless tobacco: Never    Tobacco comments:     quit smoking    Vaping Use    Vaping Use: Never used   Substance and Sexual Activity    Alcohol use: No    Drug use: No    Sexual activity: Not on file   Other Topics Concern    Not on file   Social History Narrative    Not on file     Social Determinants of Health     Financial Resource Strain: Low Risk     Difficulty of Paying Living Expenses: Not hard at all   Food Insecurity: No Food Insecurity    Worried About Running Out of Food in the Last Year: Never true    Ran Out of Food in the Last Year: Never true   Transportation Needs: Not on file   Physical Activity: Insufficiently Active    Days of Exercise per Week: 2 days    Minutes of Exercise per Session: 20 min   Stress: No Stress Concern Present    Feeling of Stress :  Only a little   Social Connections: Not on file   Intimate Partner Violence: Not on file   Housing Stability: Not on file       ROS:  General:   Profound malaise and fatigue. Psychological:   Denies anxiety, disorientation or hallucinations    ENT:    Denies epistaxis, headaches, vertigo or visual changes    Cardiovascular:   Denies any chest pain, irregular heartbeats, or palpitations. No paroxysmal nocturnal dyspnea. Respiratory:   Admits to shortness of breath both at rest and with exertion. Gastrointestinal:   Admits to a decreased appetite and therefore decreased oral intake. Genito-Urinary:    Denies any urgency, frequency, hematuria. Voiding without difficulty. Musculoskeletal:   Admits to diffuse muscle aches and pains. Neurology:    Denies any headache or focal neurological deficits. No weakness or paresthesia. Derm:    Denies any rashes, ulcers, or excoriations. Denies bruising. Extremities:   Lower extremity amputation. PHYSICAL EXAM:  VITALS:  Vitals:    02/21/23 0936   BP: (!) 162/81   Pulse:    Resp:    Temp:    SpO2:          CONSTITUTIONAL:    Awake and alert. Ill-appearing in general.    EYES:    PERRL, EOMI, sclera clear, conjunctiva normal    ENT:    Normocephalic, atraumatic, sinuses nontender on palpation. External ears without lesions. Oral pharynx with moist mucus membranes. Tonsils without erythema or exudates. NECK:    Supple, symmetrical, trachea midline, no adenopathy, thyroid symmetric, not enlarged and no tenderness, skin normal, no bruits, no JVD    HEMATOLOGIC/LYMPHATICS:    No cervical lymphadenopathy and no supraclavicular lymphadenopathy    LUNGS:    Diminished bilaterally particularly at the bases posteriorly. CARDIOVASCULAR:    Normal apical impulse, regular rate and rhythm, normal S1 and S2, no S3 or S4, and systolic murmur.     ABDOMEN:    No scars, normal bowel sounds, soft, non-distended, non-tender, no masses palpated, no hepatosplenomegaly, no rebound or guarding elicited on palpation     MUSCULOSKELETAL:    There is no redness, warmth, or swelling of the joints.  Full range of motion noted.  Motor strength is 5 out of 5 all extremities bilaterally.  Tone is normal.    NEUROLOGIC:    Awake, alert, oriented to name, place and time.  Cranial nerves II-XII are grossly intact.  Motor is 5 out of 5 bilaterally.      SKIN:    No bruising or bleeding.  No redness, warmth, or swelling    EXTREMITIES:    Lower extremity amputation is identified.    OSTEOPATHIC:    Examined in seated and supine positions.  Normal thoracic kyphosis and lumbar lordosis.  No acute somatic dysfunction.    LABORATORY DATA:  CBC with Differential:    Lab Results   Component Value Date/Time    WBC 2.9 02/21/2023 10:32 AM    RBC 3.33 02/21/2023 10:32 AM    HGB 8.1 02/21/2023 10:32 AM    HCT 27.0 02/21/2023 10:32 AM     02/21/2023 10:32 AM    MCV 81.1 02/21/2023 10:32 AM    MCH 24.3 02/21/2023 10:32 AM    MCHC 30.0 02/21/2023 10:32 AM    RDW 15.9 02/21/2023 10:32 AM    NRBC 1.7 03/03/2020 03:51 AM    SEGSPCT 51 04/26/2012 09:30 AM    METASPCT 0.9 03/02/2020 05:55 AM    LYMPHOPCT 60.9 02/21/2023 10:32 AM    MONOPCT 13.0 02/21/2023 10:32 AM    MYELOPCT 0.9 02/21/2023 10:32 AM    BASOPCT 1.0 02/21/2023 10:32 AM    MONOSABS 0.38 02/21/2023 10:32 AM    LYMPHSABS 1.77 02/21/2023 10:32 AM    EOSABS 0.00 02/21/2023 10:32 AM    BASOSABS 0.00 02/21/2023 10:32 AM     BMP:    Lab Results   Component Value Date/Time     02/21/2023 10:32 AM    K 3.9 02/21/2023 10:32 AM     02/21/2023 10:32 AM    CO2 22 02/21/2023 10:32 AM    BUN 32 02/21/2023 10:32 AM    LABALBU 3.5 02/21/2023 10:32 AM    LABALBU 4.3 04/26/2012 09:30 AM    CREATININE 1.2 02/21/2023 10:32 AM    CALCIUM 9.4 02/21/2023 10:32 AM    GFRAA >60 06/24/2022 01:34 PM    LABGLOM 51 02/21/2023 10:32 AM    GLUCOSE 138 02/21/2023 10:32 AM    GLUCOSE 171 04/26/2012 09:30 AM       ASSESSMENT:  Bilateral pleural effusions that are multifactorial in nature  Urinary  tract infection  Hospitalization in November for multifactorial bilateral pulmonary emboli  Chronic kidney disease stage III  Acute on chronic anemia  Peripheral arterial disease with left SFA occlusion with angioplasty and stenting 2/2020 and left below the knee amputation 2/2020  Hyperlipidemia  Hypothyroidism  Schizophrenia  Essential hypertension    PLAN:  We will temporarily place Eliquis on hold and utilize therapeutic Lovenox in the setting of recent pulmonary emboli. We will attempt to move forward with thoracentesis on Friday after appropriate holding of Eliquis therapy. Antibiotic therapy will be employed for the underlying urinary tract infection and the patient requires significant work with the therapy teams as she has become profoundly weak and deconditioned.     Majo Borja DO, JAKE.O., FACOI  3:21 PM  2/21/2023    Electronically signed by Majo Borja DO on 2/21/23 at 3:21 PM EST

## 2023-02-21 NOTE — ED NOTES
Pt given something to eat and drink. Tolerating PO intake well.       Liza Arias, KASANDRA  02/21/23 3501 There is no recurrence of intraretinal or subretinal fluid on spectral domain OCT today.

## 2023-02-21 NOTE — PROGRESS NOTES
Abel Horner,    Your patient's home medications were reconciled AFTER admission orders were completed. The following changes were made:    Dose change:    1.) Flonase daily changed to daily PRN  2.) Diazepam 2 mg Q6H PRN changed to 1 mg daily PRN  3.) Hydralazine 25 mg three times daily changed to 25 mg twice daily    No longer takin.) Folic acid 1 mg daily    Added to home medication list:    1.) Gabapentin 100 mg twice daily     Please review these changes and make the necessary adjustments.     Thank you,    Veronika Little, [facility] PharmD, BCPS 2023 6:19 PM

## 2023-02-22 LAB
ADENOVIRUS BY PCR: NOT DETECTED
ALBUMIN SERPL-MCNC: 3.1 G/DL (ref 3.5–5.2)
ALP BLD-CCNC: 77 U/L (ref 35–104)
ALT SERPL-CCNC: 7 U/L (ref 0–32)
ANION GAP SERPL CALCULATED.3IONS-SCNC: 10 MMOL/L (ref 7–16)
AST SERPL-CCNC: 12 U/L (ref 0–31)
BASOPHILS ABSOLUTE: 0.02 E9/L (ref 0–0.2)
BASOPHILS RELATIVE PERCENT: 0.9 % (ref 0–2)
BILIRUB SERPL-MCNC: 0.2 MG/DL (ref 0–1.2)
BORDETELLA PARAPERTUSSIS BY PCR: NOT DETECTED
BORDETELLA PERTUSSIS BY PCR: NOT DETECTED
BUN BLDV-MCNC: 32 MG/DL (ref 6–23)
CALCIUM SERPL-MCNC: 8.9 MG/DL (ref 8.6–10.2)
CHLAMYDOPHILIA PNEUMONIAE BY PCR: NOT DETECTED
CHLORIDE BLD-SCNC: 109 MMOL/L (ref 98–107)
CO2: 20 MMOL/L (ref 22–29)
CORONAVIRUS 229E BY PCR: NOT DETECTED
CORONAVIRUS HKU1 BY PCR: NOT DETECTED
CORONAVIRUS NL63 BY PCR: NOT DETECTED
CORONAVIRUS OC43 BY PCR: NOT DETECTED
CREAT SERPL-MCNC: 1.3 MG/DL (ref 0.5–1)
EOSINOPHILS ABSOLUTE: 0.04 E9/L (ref 0.05–0.5)
EOSINOPHILS RELATIVE PERCENT: 1.7 % (ref 0–6)
GFR SERPL CREATININE-BSD FRML MDRD: 46 ML/MIN/1.73
GLUCOSE BLD-MCNC: 94 MG/DL (ref 74–99)
HCT VFR BLD CALC: 26.9 % (ref 34–48)
HEMOGLOBIN: 7.8 G/DL (ref 11.5–15.5)
HUMAN METAPNEUMOVIRUS BY PCR: NOT DETECTED
HUMAN RHINOVIRUS/ENTEROVIRUS BY PCR: NOT DETECTED
HYPOCHROMIA: ABNORMAL
INFLUENZA A BY PCR: NOT DETECTED
INFLUENZA B BY PCR: NOT DETECTED
LACTATE DEHYDROGENASE: 165 U/L (ref 135–214)
LYMPHOCYTES ABSOLUTE: 1.1 E9/L (ref 1.5–4)
LYMPHOCYTES RELATIVE PERCENT: 44 % (ref 20–42)
MAGNESIUM: 2 MG/DL (ref 1.6–2.6)
MCH RBC QN AUTO: 24 PG (ref 26–35)
MCHC RBC AUTO-ENTMCNC: 29 % (ref 32–34.5)
MCV RBC AUTO: 82.8 FL (ref 80–99.9)
MONOCYTES ABSOLUTE: 0.22 E9/L (ref 0.1–0.95)
MONOCYTES RELATIVE PERCENT: 8.6 % (ref 2–12)
MYCOPLASMA PNEUMONIAE BY PCR: NOT DETECTED
MYELOCYTE PERCENT: 0.9 % (ref 0–0)
NEUTROPHILS ABSOLUTE: 1.13 E9/L (ref 1.8–7.3)
NEUTROPHILS RELATIVE PERCENT: 44 % (ref 43–80)
OVALOCYTES: ABNORMAL
PARAINFLUENZA VIRUS 1 BY PCR: NOT DETECTED
PARAINFLUENZA VIRUS 2 BY PCR: NOT DETECTED
PARAINFLUENZA VIRUS 3 BY PCR: NOT DETECTED
PARAINFLUENZA VIRUS 4 BY PCR: NOT DETECTED
PDW BLD-RTO: 15.9 FL (ref 11.5–15)
PHOSPHORUS: 4.1 MG/DL (ref 2.5–4.5)
PLATELET # BLD: 329 E9/L (ref 130–450)
PMV BLD AUTO: 11.9 FL (ref 7–12)
POIKILOCYTES: ABNORMAL
POLYCHROMASIA: ABNORMAL
POTASSIUM SERPL-SCNC: 4.3 MMOL/L (ref 3.5–5)
PROCALCITONIN: 0.09 NG/ML (ref 0–0.08)
RBC # BLD: 3.25 E12/L (ref 3.5–5.5)
RESPIRATORY SYNCYTIAL VIRUS BY PCR: NOT DETECTED
SARS-COV-2, PCR: NOT DETECTED
SODIUM BLD-SCNC: 139 MMOL/L (ref 132–146)
TOTAL PROTEIN: 6.9 G/DL (ref 6.4–8.3)
TROPONIN, HIGH SENSITIVITY: 47 NG/L (ref 0–9)
WBC # BLD: 2.5 E9/L (ref 4.5–11.5)

## 2023-02-22 PROCEDURE — 84100 ASSAY OF PHOSPHORUS: CPT

## 2023-02-22 PROCEDURE — 97161 PT EVAL LOW COMPLEX 20 MIN: CPT | Performed by: PHYSICAL THERAPIST

## 2023-02-22 PROCEDURE — 6370000000 HC RX 637 (ALT 250 FOR IP): Performed by: INTERNAL MEDICINE

## 2023-02-22 PROCEDURE — 83615 LACTATE (LD) (LDH) ENZYME: CPT

## 2023-02-22 PROCEDURE — 97110 THERAPEUTIC EXERCISES: CPT | Performed by: PHYSICAL THERAPIST

## 2023-02-22 PROCEDURE — 97165 OT EVAL LOW COMPLEX 30 MIN: CPT

## 2023-02-22 PROCEDURE — 2060000000 HC ICU INTERMEDIATE R&B

## 2023-02-22 PROCEDURE — 83735 ASSAY OF MAGNESIUM: CPT

## 2023-02-22 PROCEDURE — 80053 COMPREHEN METABOLIC PANEL: CPT

## 2023-02-22 PROCEDURE — 97530 THERAPEUTIC ACTIVITIES: CPT

## 2023-02-22 PROCEDURE — 94640 AIRWAY INHALATION TREATMENT: CPT

## 2023-02-22 PROCEDURE — 36415 COLL VENOUS BLD VENIPUNCTURE: CPT

## 2023-02-22 PROCEDURE — 6360000002 HC RX W HCPCS: Performed by: INTERNAL MEDICINE

## 2023-02-22 PROCEDURE — 85025 COMPLETE CBC W/AUTO DIFF WBC: CPT

## 2023-02-22 PROCEDURE — 2580000003 HC RX 258: Performed by: INTERNAL MEDICINE

## 2023-02-22 RX ORDER — AMMONIUM LACTATE 12 G/100G
LOTION TOPICAL 2 TIMES DAILY
Status: DISCONTINUED | OUTPATIENT
Start: 2023-02-22 | End: 2023-02-24 | Stop reason: HOSPADM

## 2023-02-22 RX ADMIN — MIRTAZAPINE 7.5 MG: 15 TABLET, FILM COATED ORAL at 20:10

## 2023-02-22 RX ADMIN — BUDESONIDE 500 MCG: 0.5 SUSPENSION RESPIRATORY (INHALATION) at 06:03

## 2023-02-22 RX ADMIN — BISACODYL 10 MG: 5 TABLET, COATED ORAL at 20:10

## 2023-02-22 RX ADMIN — ACETAMINOPHEN 500 MG: 500 TABLET, FILM COATED ORAL at 14:05

## 2023-02-22 RX ADMIN — ACETAMINOPHEN 500 MG: 500 TABLET, FILM COATED ORAL at 18:05

## 2023-02-22 RX ADMIN — FLUTICASONE PROPIONATE 2 SPRAY: 50 SPRAY, METERED NASAL at 10:45

## 2023-02-22 RX ADMIN — IPRATROPIUM BROMIDE 0.5 MG: 0.5 SOLUTION RESPIRATORY (INHALATION) at 08:53

## 2023-02-22 RX ADMIN — CEFTRIAXONE 1000 MG: 1 INJECTION, POWDER, FOR SOLUTION INTRAMUSCULAR; INTRAVENOUS at 20:10

## 2023-02-22 RX ADMIN — PYRIDOXINE HCL TAB 50 MG 50 MG: 50 TAB at 09:42

## 2023-02-22 RX ADMIN — DIAZEPAM 1 MG: 2 TABLET ORAL at 20:09

## 2023-02-22 RX ADMIN — HYDRALAZINE HYDROCHLORIDE 25 MG: 25 TABLET, FILM COATED ORAL at 20:09

## 2023-02-22 RX ADMIN — ALBUTEROL SULFATE 2.5 MG: 2.5 SOLUTION RESPIRATORY (INHALATION) at 08:52

## 2023-02-22 RX ADMIN — GABAPENTIN 100 MG: 100 CAPSULE ORAL at 09:42

## 2023-02-22 RX ADMIN — IPRATROPIUM BROMIDE 0.5 MG: 0.5 SOLUTION RESPIRATORY (INHALATION) at 06:02

## 2023-02-22 RX ADMIN — CARVEDILOL 12.5 MG: 6.25 TABLET, FILM COATED ORAL at 09:42

## 2023-02-22 RX ADMIN — ALBUTEROL SULFATE 2.5 MG: 2.5 SOLUTION RESPIRATORY (INHALATION) at 06:03

## 2023-02-22 RX ADMIN — GABAPENTIN 100 MG: 100 CAPSULE ORAL at 20:09

## 2023-02-22 RX ADMIN — ACETAMINOPHEN 500 MG: 500 TABLET, FILM COATED ORAL at 06:02

## 2023-02-22 RX ADMIN — ENOXAPARIN SODIUM 60 MG: 100 INJECTION SUBCUTANEOUS at 09:45

## 2023-02-22 RX ADMIN — IPRATROPIUM BROMIDE 0.5 MG: 0.5 SOLUTION RESPIRATORY (INHALATION) at 14:00

## 2023-02-22 RX ADMIN — IPRATROPIUM BROMIDE 0.5 MG: 0.5 SOLUTION RESPIRATORY (INHALATION) at 19:08

## 2023-02-22 RX ADMIN — ALBUTEROL SULFATE 2.5 MG: 2.5 SOLUTION RESPIRATORY (INHALATION) at 14:00

## 2023-02-22 RX ADMIN — CARVEDILOL 12.5 MG: 6.25 TABLET, FILM COATED ORAL at 17:20

## 2023-02-22 RX ADMIN — ENOXAPARIN SODIUM 60 MG: 100 INJECTION SUBCUTANEOUS at 20:10

## 2023-02-22 RX ADMIN — HYDRALAZINE HYDROCHLORIDE 25 MG: 25 TABLET, FILM COATED ORAL at 14:06

## 2023-02-22 RX ADMIN — ALBUTEROL SULFATE 2.5 MG: 2.5 SOLUTION RESPIRATORY (INHALATION) at 19:08

## 2023-02-22 RX ADMIN — ACETAMINOPHEN 500 MG: 500 TABLET, FILM COATED ORAL at 09:42

## 2023-02-22 RX ADMIN — HYDRALAZINE HYDROCHLORIDE 25 MG: 25 TABLET, FILM COATED ORAL at 06:02

## 2023-02-22 RX ADMIN — BUDESONIDE 500 MCG: 0.5 SUSPENSION RESPIRATORY (INHALATION) at 19:08

## 2023-02-22 ASSESSMENT — PAIN - FUNCTIONAL ASSESSMENT: PAIN_FUNCTIONAL_ASSESSMENT: PREVENTS OR INTERFERES SOME ACTIVE ACTIVITIES AND ADLS

## 2023-02-22 ASSESSMENT — PAIN DESCRIPTION - ORIENTATION
ORIENTATION: RIGHT;LEFT
ORIENTATION: MID
ORIENTATION: RIGHT;LEFT
ORIENTATION: MID

## 2023-02-22 ASSESSMENT — PAIN DESCRIPTION - DESCRIPTORS
DESCRIPTORS: ACHING

## 2023-02-22 ASSESSMENT — PAIN DESCRIPTION - LOCATION
LOCATION: ARM;HAND
LOCATION: ABDOMEN;ARM
LOCATION: ABDOMEN;GROIN
LOCATION: ABDOMEN;GROIN

## 2023-02-22 ASSESSMENT — PAIN SCALES - GENERAL
PAINLEVEL_OUTOF10: 10
PAINLEVEL_OUTOF10: 7
PAINLEVEL_OUTOF10: 4
PAINLEVEL_OUTOF10: 3
PAINLEVEL_OUTOF10: 10

## 2023-02-22 ASSESSMENT — PAIN SCALES - WONG BAKER: WONGBAKER_NUMERICALRESPONSE: 2

## 2023-02-22 NOTE — ED NOTES
Dr. Lisa Horner paged at 18:50 regarding pt c/o pain and new onset fever. No standing orders for pain medication or tylenol for fever. Not called back at this time. Will re-page in 15 minutes.      Marvin Sandoval RN  02/21/23 4615

## 2023-02-22 NOTE — PROGRESS NOTES
Physical Therapy  Physical Therapy Initial Evaluation/Plan of Care    Room #:  7240/3614-10  Patient Name: Daniel Beard  YOB: 1960  MRN: [id number]    Date of Service: 2/22/2023     Tentative placement recommendation: Home with Home Health Physical Therapy  Equipment recommendation: Patient has needed equipment       Evaluating Physical Therapist: Skyler Lopez, PT, DPT #[id number]      Specific Provider Orders/Date/Referring Provider :     02/21/23 1845    PT eval and treat  Start:  02/21/23 1845,   End:  02/21/23 1845,   ONE TIME,   Standing Count:  1 Occurrences,   R         Mumtaz , DO Acknowledge New     Admitting Diagnosis:   Pleural effusion [J90]  Acute UTI [N39.0]  Bilateral pleural effusion [J90]      Surgery: none  Visit Diagnoses         Codes    Acute UTI    -  Primary N39.0    Bilateral pleural effusion     J90            Patient Active Problem List   Diagnosis    Epigastric hernia    Umbilical hernia    Essential hypertension    Type 2 diabetes mellitus with diabetic peripheral angiopathy without gangrene, without long-term current use of insulin (HCC)    Dyslipidemia    S/P cardiac catheterization    Abnormal stress ECG    Hyperthyroidism    Diabetic peripheral neuropathy (HCC)    Hydroureter    Microcytic hypochromic anemia    Arterial occlusion    Moderate protein-calorie malnutrition (HCC)    Hypoglycemia    Hypertensive emergency    Paresthesia of hand, bilateral    Paresthesia of left foot    Unintended weight loss    History of arterial ischemic stroke    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    Hx of diabetic foot ulcer    Hx of BKA, left (HCC)    Chronic diastolic heart failure (HCC)    PVD (peripheral vascular disease) (HCC)    Anorexia    Pericardial effusion    Pulmonary emboli (HCC)    Anemia    Pleural effusion        ASSESSMENT of Current Deficits Patient exhibits decreased strength, balance, endurance, range of motion, and coordination impairing functional mobility, transfers, gait , gait distance, and tolerance to activity Pt mildly unsteady with hopping and side stepping along the bed. Pt is a BKA on her LLE and did not have her prosthetic during session but stated that her  would bring it in soon. Pt agreeable to seated exercises following function. PHYSICAL THERAPY  PLAN OF CARE       Physical therapy plan of care is established based on physician order,  patient diagnosis and clinical assessment    Current Treatment Recommendations:    -Bed Mobility: Lower extremity exercises  and Trunk control activities   -Sitting Balance: Incorporate reaching activities to activate trunk muscles , Hands on support to maintain midline , Facilitate active trunk muscle engagement , Facilitate postural control in all planes , and Engage in core activities to allow for movement within base of support   -Standing Balance: Perform strengthening exercises in standing to promote motor control with or without upper extremity support , Instruct patient on adequate base of support to maintain balance, and Challenge balance utilizing reaching  activities beyond center of gravity    -Transfers: Provide instruction on proper hand and foot position for adequate transfer of weight onto lower extremities and use of gait device if needed, Cues for hand placement, technique and safety.  Provide stabilization to prevent fall , Facilitate weight shift forward on to lower extremities and provide necessary stabilization of bilateral lower extremities , Support transfer of weight on to lower extremities, and Assist with extension of knees trunk and hip to accept weight transfer   -Gait: Gait training, Standing activities to improve: base of support, weight shift, weight bearing , Exercises to improve trunk control, Exercises to improve hip and knee control, Performance of protected weight bearing activities, and Activities to increase weight bearing   -Endurance: Utilize Supervised activities to increase level of endurance to allow for safe functional mobility including transfers and gait  and Use graduated activities to promote good breathing techniques and provide support and education to maximize respiratory function  -Stairs: Stair training with instruction on proper technique and hand placement on rail    PT long term treatment goals are located in below grid    Patient and or family understand(s) diagnosis, prognosis, and plan of care. Frequency of treatments: Patient will be seen  daily. Prior Level of Function: Patient ambulated with rollator with prosthetic  Rehab Potential: good - for baseline    Past medical history:   Past Medical History:   Diagnosis Date    CAD (coronary artery disease)     cath 2016    CHF (congestive heart failure) ([facility]     Diabetic peripheral neuropathy associated with type 2 diabetes mellitus ([facility] 08/24/2018    Epigastric hernia     Hyperlipidemia     Hypertension     Kidney stone     [facility]     Type 2 diabetes mellitus with diabetic polyneuropathy, with long-term current use of insulin ([facility] 09/19/2016    no longer requiring medication. 1/13/21 - continues w/o medications as of 11/2/2022     Past Surgical History:   Procedure Laterality Date    BLADDER SURGERY Bilateral 5/2/2022    CYSTOSCOPY RETROGRADE PYELOGRAM BILATERAL STENT CHANGE performed by Valeriy Bermudez MD at [facility] 83 Left 11/7/2022    CYSTOSCOPY BILATERAL RETROGRADE PYELOGRAM LEFT STENT CHANGE POSSIBLE RIGHT STENT CHANGE VS REMOVAL performed by Valeriy Bermudez MD at [address]  09/30/2016     [facility]    COLONOSCOPY  08/2016    Dr. Suzanne Kapoor 9/7/2021    CYSTOSCOPY RETROGRADE PYELOGRAM, BILATERAL STENT EXCHANGE performed by Valeriy Bermudez MD at [address] [facility] [address] / [address] [facility] [address] / [place] Bilateral 10/28/2019    CYSTOSCOPY. RETROGRADE.  PYELOGRAM. BILATERAL STENT INSERTION performed by Marily Talbert MD at [facility]    CYSTOSCOPY INSERTION / REMOVAL STENT / STONE Bilateral 5/19/2020    CYSTOSCOPY RETROGRADE PYELOGRAM BILATERAL STENT EXCHANGE performed by Hailee Maurice MD at [address] [facility] / [address] / [place] Bilateral 1/18/2021    CYSTOSCOPY RETROGRADE PYELOGRAM BILATERAL  STENT CHANGE performed by Pierre Mercedes MD at [address] Left 2/29/2020    LEG AMPUTATION BELOW KNEE performed by Vince Figueredo DO at [address]  [facility]  11/04/2016    epigastric hernia umbilical hernia with mesh    OVARIAN CYST REMOVAL      OVARY REMOVAL Right     OVARY REMOVAL  2014    UPPER GASTROINTESTINAL ENDOSCOPY N/A 10/29/2019    EGD ESOPHAGOGASTRODUODENOSCOPY performed by Nawaf Ruvalcaba MD at [address] [facility]:    Precautions: Up with assistance, falls and UTI, L BKA with prosthetic      Social history: Patient lives with , daughter, granddaughter  in a two story home bedroom and bathroom 2nd floor full flight stairs with Rail  with no steps  to enter home.   Tub shower , grab bars      Equipment owned: Wheelchair, Rollator, [employer]in chair, and Quad cane    AM-PAC Basic Mobility       AM-PAC Basic Mobility - Inpatient   How much help is needed turning from your back to your side while in a flat bed without using bedrails?: A Little  How much help is needed moving from lying on your back to sitting on the side of a flat bed without using bedrails?: A Little  How much help is needed moving to and from a bed to a chair?: A Little  How much help is needed standing up from a chair using your arms?: A Little  How much help is needed walking in hospital room?: A Lot  How much help is needed climbing 3-5 steps with a railing?: Total  AM-PAC Inpatient Mobility Raw Score : 15  AM-PAC Inpatient T-Scale Score : 39.45  Mobility Inpatient CMS 0-100% Score: 57.7  Mobility Inpatient CMS G-Code Modifier : CK    Nursing cleared patient for PT evaluation. The admitting diagnosis and active problem list as listed above have been reviewed prior to the initiation of this evaluation. OBJECTIVE;   Initial Evaluation  Date: 2/22/2023 Treatment Date:     Short Term/ Long Term   Goals   Was pt agreeable to Eval/treatment? Yes  To be met in 3 days   Pain level   4/10  stomach     Bed Mobility    Rolling: Not assessed patient in chair    Supine to sit: Not assessed patient in chair    Sit to supine: Not assessed patient in chair    Scooting: Supervision     Rolling: Independent    Supine to sit:  Independent    Sit to supine: Independent    Scooting: Independent     Transfers Sit to stand: Minimal assist of 1   Sit to stand: Independent    Ambulation     5 feet using  wheeled walker with Minimal assist of 1   for balance, upright, weight shift, and safety    > 50 feet using  wheeled walker with Modified Independent with L BKA prosthetic   Stair negotiation: ascended and descended   Not assessed     10 steps with 1 HR with Supervision   ROM Within functional limits    Increase range of motion 10% of affected joints    Strength BUE:  refer to OT eval  RLE:  4/5  LLE:  4/5  Increase strength in affected mm groups by 1/3 grade   Balance Sitting EOB:  good -  Dynamic Standing:  fair with [employer] without prosthetic  Sitting EOB:  good   Dynamic Standing: fair + with [employer] with prosthetic      Patient is Alert & Oriented x person, place, time, and situation and follows directions    Sensation:  Patient  denies numbness/tingling   Edema:  no   Endurance: fair  -    Vitals: room air   Blood Pressure at rest  Blood Pressure during session    Heart Rate at rest  Heart Rate during session    SPO2 at rest %  SPO2 during session %     Patient education  Patient educated on role of Physical Therapy, risks of immobility, safety and plan of care, energy conservation,  importance of mobility while in hospital , ankle pumps, quad set and glut set for edema control, blood clot prevention, importance and purpose of adaptive device and adjusted to proper height for the patient. , safety , and stair training      Patient response to education:   Pt verbalized understanding Pt demonstrated skill Pt requires further education in this area   Yes Partial Yes      Treatment:  Patient practiced and was instructed/facilitated in the following treatment: Patient Sat edge of bed 15 minutes with Supervision  to increase dynamic sitting balance and activity tolerance. Pt performed bed mobility, transfers, short ambulation, seated exercises. Therapeutic Exercises:  ankle pumps, heel raises, hip abduction/adduction, long arc quad, and seated marching  x 20 reps. At end of session, patient in chair with     call light and phone within reach,  all lines and tubes intact, nursing notified. Patient would benefit from continued skilled Physical Therapy to improve functional independence and quality of life. Patient's/ family goals   get stronger    Time in  80  Time out  954    Total Treatment Time  12 minutes    Evaluation time includes thorough review of current medical information, gathering information on past medical history/social history and prior level of function, completion of standardized testing/informal observation of tasks, assessment of data, and development of Plan of care and goals.      CPT codes:  Low Complexity PT evaluation (54828)  Therapeutic exercises (64796)   12 minutes  1 unit(s)    Townsend Blunt, PT

## 2023-02-22 NOTE — ED NOTES
Pt cleaned and changed at this time. New bedding applied. No further needs at this time.      Patti Morgan, KASANDRA  02/21/23 2040

## 2023-02-22 NOTE — CARE COORDINATION
Case Management Assessment  Initial Evaluation    Date/Time of Evaluation: 2/22/2023 4:51 PM  Assessment Completed by: Beth Angel RN    If patient is discharged prior to next notation, then this note serves as note for discharge by case management. Patient Name: Lissette Galloway                   YOB: 1960  Diagnosis: Pleural effusion [J90]  Acute UTI [N39.0]  Bilateral pleural effusion [J90]                   Date / Time: 2/21/2023  9:27 AM    Patient Admission Status: Inpatient   Readmission Risk (Low < 19, Mod (19-27), High > 27): Readmission Risk Score: 21.8    Current PCP: Claudia Guadalupe MD  PCP verified by CM? Yes (Dr Claudia Guadalupe)    Chart Reviewed: Yes      History Provided by: Patient  Patient Orientation: Alert and Oriented    Patient Cognition: Alert    Hospitalization in the last 30 days (Readmission):  No    If yes, Readmission Assessment in CM Navigator will be completed. Advance Directives:      Code Status: Full Code   Patient's Primary Decision Maker is: Legal Next of Kin    Primary Decision Maker: Mamie Bo - Child - 1[birth year]    Secondary Decision Maker: Salma Bo - Ex-Spouse - [phone]    Supplemental (Other) Decision Maker: Corry Nguyen - Child - [phone]    Discharge Planning:    Patient lives with: Children Type of Home: House  Primary Care Giver: Self  Patient Support Systems include: Spouse/Significant Other, Children, Family Members   Current Financial resources:    Current community resources:    Current services prior to admission: Other (Comment)            Current DME:              Type of Home Care services:  None    ADLS  Prior functional level: Independent in ADLs/IADLs  Current functional level: Independent in ADLs/IADLs    PT AM-PAC: 15 /24  OT AM-PAC: 16 /24    Family can provide assistance at DC:  Yes  Would you like Case Management to discuss the discharge plan with any other family members/significant others, and if so, who? Yes (daughter, granddaughter, ex-)  Plans to Return to Present Housing: Yes  Other Identified Issues/Barriers to RETURNING to current housing: none  Potential Assistance needed at discharge: Other (Comment)            Potential DME:    Patient expects to discharge to: [address] [place] [address] for transportation at discharge:      Financial    Payor: Antonia Oquendo / Plan: MEDICARE PART A AND B / Product Type: *No Product type* /       Potential assistance Purchasing Medications:    Meds-to-Beds request: Yes      [facility] [address], [address] [phone]  [facility] [address] [place] [address]  Phone: [phone] Fax: [phone]      Notes:    Factors facilitating achievement of predicted outcomes: Family support, Motivated, Cooperative, and Pleasant    Barriers to discharge: none    Additional Case Management Notes: 2/22/23 1652 CM note: COVID (-) 2/21/23. IV rocephin. Room air. Hx L BKA. Plan for thoracentesis on Friday after eliquis held. Pt resides with her , daughter, and granddaughter in a 2 floor home. Her daughter & granddaughter reside on the 1st floor, and pt and her  reside on the 2nd floor, 10 steps to the 2nd floor. Pt is independent with ADLs, drives, and DME includes rollator and glucometer/supplies. Confirmed pts PCP is Padmaja Mack and her pharmacy is [facility] on [address] in [place]. Pt has a hx I [facility] and [facility]. Discharge plan is home and pt declines [address] or any other needs stating she is very motivated and has plenty of family assistance. Pts family will provide transportation home. CM will follow.  Electronically signed by Alek Huynh RN on 2/22/2023 at 4:58 PM          Alek Huynh RN  Case Management Department

## 2023-02-22 NOTE — PROGRESS NOTES
Internal Medicine Progress Note    PARDEEP=[facility]    Maddi Aggarwal. Tono Mathew., F.A.C.O.I. Vladimir Garcia D.O., SACHIOBARBARA Watkins D.O. Erleen Reach, MSN, APRN, NP-C  Lilliam Denton. Alex Price, MSN, APRN-CNP     Primary Care Physician: Janet Garcia MD   Admitting Physician:  Vic Boston DO  Admission date and time: 2/21/2023  9:27 AM    Room:  [address] [place], [place] [id number]  Admitting diagnosis: Pleural effusion [J90]  Acute UTI [N39.0]  Bilateral pleural effusion [J90]    Patient Name: Karin Bartholomew  MRN: [id number]    Date of Service: 2/22/2023     Subjective:  Jaqueline Oneal is a 60 y.o. female who was seen and examined today,2/22/2023, at the bedside. Patient seem to be resting comfortably at the present time. CT discussed with patient. Possibly perform thoracentesis. Breathing seems to be easier    No family present during my examination. Review of System:   Constitutional:   Denies fever or chills, weight loss or gain, fatigue or malaise. HEENT:   Denies ear pain, sore throat, sinus or eye problems. Cardiovascular:   Denies any chest pain, irregular heartbeats, or palpitations. Respiratory:   Diminished at the bases  Gastrointestinal:   Denies nausea, vomiting, diarrhea, or constipation. Denies any abdominal pain. Genitourinary:    Denies any urgency, frequency, hematuria. Voiding  without difficulty. Extremities:   Denies lower extremity swelling, edema or cyanosis. Neurology:    Denies any headache or focal neurological deficits, Denies generalized weakness or memory difficulty. Psch:   Denies being anxious or depressed. Musculoskeletal:    Denies  myalgias, joint complaints or back pain. Integumentary:   Denies any rashes, ulcers, or excoriations. Denies bruising. Hematologic/Lymphatic:  Denies bruising or bleeding.     Physical Exam:  I/O this shift:  In: 240 [P.O.:240]  Out: -     Intake/Output Summary (Last 24 hours) at 2/22/2023 1812  Last data filed at 2/22/2023 1450  Gross per 24 hour   Intake 600 ml   Output --   Net 600 ml   I/O last 3 completed shifts: In: 360 [P.O.:360]  Out: -   Patient Vitals for the past 96 hrs (Last 3 readings):   Weight   02/22/23 0603 135 lb (61.2 kg)   02/21/23 0936 135 lb (61.2 kg)     Vital Signs:   Blood pressure (!) 179/84, pulse 97, temperature 97.9 °F (36.6 °C), temperature source Infrared, resp. rate 18, height 5' 3\" (1.6 m), weight 135 lb (61.2 kg), SpO2 97 %, not currently breastfeeding. General appearance:  Alert, responsive, oriented to person, place, and time. Well preserved, alert, no distress. Head:  Normocephalic. No masses, lesions or tenderness. Eyes:  PERRLA. EOMI. Sclera clear. Buccal mucosa moist.  ENT:  Ears normal. Mucosa normal.  Neck:    Supple. Trachea midline. No thyromegaly. No JVD. No bruits. Heart:    Rhythm regular. Rate controlled. No murmurs. Lungs:    Diminished at the bases  Abdomen:   Soft. Non-tender. Non-distended. Bowel sounds positive. No organomegaly or masses. No pain on palpation. Extremities:    Peripheral pulses present. No peripheral edema. No ulcers. No cyanosis. No clubbing. Left below the knee amputation  Neurologic:    Alert x 3. No focal deficit. Cranial nerves grossly intact. No focal weakness. Psych:   Behavior is normal. Mood appears normal. Speech is not rapid and/or pressured. Musculoskeletal:   Spine ROM normal. Muscular strength intact. Gait not assessed. Integumentary:  No rashes  Skin normal color and texture.   Genitalia/Breast:  Deferred    Medication:  Scheduled Meds:   carvedilol  12.5 mg Oral BID WC    fluticasone  2 spray Each Nostril Daily    hydrALAZINE  25 mg Oral 3 times per day    mirtazapine  7.5 mg Oral Nightly    pyridoxine  50 mg Oral Daily    enoxaparin  1 mg/kg SubCUTAneous BID    cefTRIAXone (ROCEPHIN) IV  1,000 mg IntraVENous Q24H    budesonide  500 mcg Nebulization BID albuterol  2.5 mg Nebulization 4x daily    ipratropium  0.5 mg Nebulization 4x daily    gabapentin  100 mg Oral BID     Continuous Infusions:    Objective Data:  Recent Labs     02/21/23  1032 02/22/23  0730   WBC 2.9* 2.5*   RBC 3.33* 3.25*   HGB 8.1* 7.8*   HCT 27.0* 26.9*   MCV 81.1 82.8   MCH 24.3* 24.0*   MCHC 30.0* 29.0*   RDW 15.9* 15.9*    329   MPV 11.8 11.9     Recent Labs     02/21/23  1032 02/22/23  0730    139   K 3.9 4.3    109*   CO2 22 20*   BUN 32* 32*   CREATININE 1.2* 1.3*   GLUCOSE 138* 94   CALCIUM 9.4 8.9   PROT 7.6 6.9   LABALBU 3.5 3.1*   BILITOT 0.3 0.2   ALKPHOS 82 77   AST 14 12   ALT 8 7     Lab Results   Component Value Date    TROPONINI 0.04 (H) 08/22/2020    TROPONINI 0.03 08/22/2020    TROPONINI 0.05 (H) 08/22/2020                 Assessment:    Bilateral pleural effusions that are multifactorial in nature  Urinary tract infection  Hospitalization in November for multifactorial bilateral pulmonary emboli  Chronic kidney disease stage III  Acute on chronic anemia  Peripheral arterial disease with left SFA occlusion with angioplasty and stenting 2/2020 and left below the knee amputation 2/2020  Hyperlipidemia  Hypothyroidism  Schizophrenia  Essential hypertension      Plan:     Continue hold anticoagulant therapy  Possible thoracentesis  Treat underlying urinary tract infection  Treat underlying chronic comorbidity    More than 50% of my  time was spent at the bedside counseling/coordinating care with the patient and/or family with face to face contact. This time was spent reviewing notes and laboratory data as well as instructing and counseling the patient. Time I spent with the family or surrogate(s) is included only if the patient was incapable of providing the necessary information or participating in medical decisions.  I also discussed the differential diagnosis and all of the proposed management plans with the patient and individuals accompanying the patient.     Elroy Browning DO, F.A.C.O.I.  2/22/2023  6:12 PM

## 2023-02-22 NOTE — PROGRESS NOTES
[address] [facility]  [facility] [place]. [place]        Date:2023                                                  Patient Name: Kirt Herring    MRN: [id number]    : 1960    Room: [address] [place], [place] [id number]      Evaluating OT: Kinza Julian OTR/L #[id number]     Referring Provider and Specific Provider Orders/Date:      23  OT eval and treat  Start:  23,   End:  23,   ONE TIME,   Standing Count:  1 Occurrences,   Jillian Licona, DO      Placement Recommendation: Home with Home Health OT if patient is able to meet goals        Diagnosis:   1. Acute UTI    2. Bilateral pleural effusion         Surgery: None       Pertinent Medical History:       Past Medical History:   Diagnosis Date    CAD (coronary artery disease)     cath     CHF (congestive heart failure) (HCC)     Diabetic peripheral neuropathy associated with type 2 diabetes mellitus ([facility] 2018    Epigastric hernia     Hyperlipidemia     Hypertension     Kidney stone     Schizophrenia ([facility]     Type 2 diabetes mellitus with diabetic polyneuropathy, with long-term current use of insulin ([facility] 2016    no longer requiring medication. 21 - continues w/o medications as of 2022         Past Surgical History:   Procedure Laterality Date    BLADDER SURGERY Bilateral 2022    CYSTOSCOPY RETROGRADE PYELOGRAM BILATERAL STENT CHANGE performed by Roney Gaines MD at [place] [address] Left 2022    CYSTOSCOPY BILATERAL RETROGRADE PYELOGRAM LEFT STENT CHANGE POSSIBLE RIGHT STENT CHANGE VS REMOVAL performed by Roney Gaines MD at [address] [place] [address]  2016     [facility]    COLONOSCOPY  2016    Dr. Zach Butt 2021    CYSTOSCOPY RETROGRADE PYELOGRAM, BILATERAL STENT EXCHANGE performed by Roney Gaines MD at [facility] / REMOVAL STENT / STONE Bilateral 10/28/2019    CYSTOSCOPY. RETROGRADE.  PYELOGRAM. BILATERAL STENT INSERTION performed by Hansel Nielsen MD at [address] / [address] / STONE Bilateral 5/19/2020    CYSTOSCOPY RETROGRADE PYELOGRAM BILATERAL STENT EXCHANGE performed by Hansel Nielsen MD at [address] [facility] / [address] / Erskin Grant Bilateral 1/18/2021    CYSTOSCOPY RETROGRADE PYELOGRAM BILATERAL  STENT CHANGE performed by Edwige Nguyen MD at [facility] Left 2/29/2020    LEG AMPUTATION BELOW KNEE performed by Jono Meza DO at [facility]  11/04/2016    epigastric hernia umbilical hernia with mesh    OVARIAN CYST REMOVAL      OVARY REMOVAL Right     OVARY REMOVAL  2014    UPPER GASTROINTESTINAL ENDOSCOPY N/A 10/29/2019    EGD ESOPHAGOGASTRODUODENOSCOPY performed by Gifty Regan MD at [facility] ENDOSCOPY        Precautions:  Fall Risk, activity as tolerated, left below knee amputation, wears prosthetic     Assessment of current deficits    [x] Functional mobility  [x]ADLs  [x] Strength               []Cognition    [x] Functional transfers   [x] IADLs         [x] Safety Awareness   [x]Endurance    [] Fine Coordination              [x] Balance      [] Vision/perception   []Sensation     []Gross Motor Coordination  [] ROM  [] Delirium                   [] Motor Control     OT PLAN OF CARE   OT POC based on physician orders, patient diagnosis and results of clinical assessment    Frequency/Duration 1-3 days/wk for 2 weeks PRN     Specific OT Treatment Interventions to include:   * Instruction/training on adapted ADL techniques and AE recommendations to increase functional independence within precautions       * Training on energy conservation strategies, correct breathing pattern and techniques to improve independence/tolerance for self-care routine  * Functional transfer/mobility training/DME recommendations for increased independence, safety, and fall prevention  * Patient/Family education to increase follow through with safety techniques and functional independence  * Recommendation of environmental modifications for increased safety with functional transfers/mobility and ADLs  * Therapeutic exercise to improve motor endurance, ROM, and functional strength for ADLs/functional transfers  * Therapeutic activities to facilitate/challenge dynamic balance, stand tolerance for increased safety and independence with ADLs  * Therapeutic activities to facilitate gross/fine motor skills for increased independence with ADLs  * Positioning to improve skin integrity, interaction with environment and functional independence    Recommended Adaptive Equipment: TBD     Home Living: Lives with daughter, granddaughter, and spouse, single family home, 2 story, No steps to enter. 10 steps with rail to 2nd floor where bedroom and full bathroom. Bathroom set-up: [place] [address]         Equipment owned: wheelchair, quad cane, wheeled walker, rollator, shower chair, left lower extremity prosthetic     Prior Level of Function: Moderate [place] with ADLs , family assists with IADLs; ambulated with rollator. Pain Level: Pt c/o stomach ache; Nursing notified.      Cognition: A&O: 4/4; Follows 3 step directions   Memory: intact   Sequencing: intact   Problem solving: fair    Judgement/safety: fair     [facility]   AM-PAC Daily Activity - Inpatient   How much help is needed for putting on and taking off regular lower body clothing?: A Little  How much help is needed for bathing (which includes washing, rinsing, drying)?: A Lot  How much help is needed for toileting (which includes using toilet, bedpan, or urinal)?: A Lot  How much help is needed for putting on and taking off regular upper body clothing?: A Little  How much help is needed for taking care of personal grooming?: A Little  How much help for eating meals?: A Little  AM-[facility] Inpatient Daily Activity Raw Score: 16  AM-PAC Inpatient ADL T-Scale Score : 35.96  ADL Inpatient CMS 0-100% Score: 53.32  ADL Inpatient CMS G-Code Modifier : CK     Functional Assessment:    Initial Eval Status  Date: 2/22/23   Treatment Status  Date: STGs = LTGs  Time frame: 10-14 days   Feeding Supervision     Independent    Grooming Stand by Assist     Moderate [place]    UB Dressing Minimal Assist    Moderate [place]    LB Dressing Stand by Assist   To don slip-on shoe seated at EOB  Increased assist suspected to don pants 2/2 balance deficit standing with walker. Moderate [place]    Bathing Moderate Assist     Moderate [place]    Toileting Moderate Assist     Moderate [place]    Bed Mobility  Supine to sit: Minimal Assist   Sit to supine:  Not Assessed     Supine to sit: Independent   Sit to supine: Independent    Functional Transfers Sit to stand: Minimal Assist   Stand to sit: Minimal Assist     Transfer training with verbal cues for hand placement throughout session to improve safety. Independent    Functional Mobility Minimal Assist with wheeled walker to improve balance 4-5 heel-toe shuffle along side of bed and x 1 forward/backward hop at bedside, verbal cues for walker sequence and safety. Prosthetic not present at time of eval - spouse to bring it in for next treatment session. Moderate [place] with use of wheeled walker and prosthetic donned.    Balance Sitting:     Static: fair plus     Dynamic: fair   Standing: fair/fair minus with walker   Sitting:     Static: good    Dynamic: good  Standing: good with walker and prosthetic      Activity Tolerance fair  plus   Increase standing tolerance >3 minutes for improved engagement with functional transfers and indep in ADLs     Visual/  Perceptual Glasses: Yes    Reports changes in vision since admission: No      NA      Hand Dominance: Left      AROM (PROM) Strength Additional Info:  Goal:   RUE  WFL 4-/5 good  and wfl FMC/dexterity noted during ADL tasks   Improve overall RUE strength  for participation in functional tasks   LUE WFL 4-/5 good  and wfl FMC/dexterity noted during ADL tasks   Improve overall LUE strength  for participation in functional tasks     Hearing:  [facility]   Sensation:   No c/o numbness or tingling  Tone:  WFL   Edema:      Vitals:  HR at rest: 86 bpm HR with activity: 120 bpm HR at end of session:  bpm   SpO2 at rest: 97% SpO2 with activity: 96% SpO2 at end of session: 96%   BP at rest:  BP with activity:  BP at end of session:      Comments: RN cleared patient for OT. Upon arrival patient in supine. Therapist facilitated and instructed pt on adapted  techniques & compensatory strategies to improve safety and independence with basic ADLs, bed mobility, functional transfers and mobility to allow pt to achieve highest level of independence and safely. Pt demonstrated fair plus understanding of education & follow through. At end of session, patient was at EOB with PT. Overall, patient demonstrated  decreased independence and safety during completion of ADL tasks. Pt would benefit from continued skilled OT to increase safety and independence with completion of ADL tasks and functional mobility for improved quality of life. Treatment: OT treatment provided this date includes:   Facilitated bed mobility with cues for proper body mechanics and sequencing to prepare for ADL completion. Instruction/training on safe functional mobility/transfer techniques including hand and feet placement   Instruction/training on energy conservation/work simplification for completion of ADLs  Sitting EOB x 10 minutes to improve dynamic sitting balance and activity tolerance during ADLs  Skilled monitoring of O2 sats - see section above     Rehab Potential: Good for established goals.       Patient / Family Goal: Return home       Patient and/or family were instructed on functional diagnosis, prognosis/goals and OT plan of care. Demonstrated fair plus understanding. Eval Complexity: Low    Time In: 9:03 AM   Time Out: 9:28 AM    Total Treatment Time: 10       Min Units   OT Eval Low 97165  X  1    OT Eval Medium 07000      OT Eval High 61205      OT Re-Eval [id number]            ADL/Self Care 92926     Therapeutic Activities 26856  10 1   Therapeutic Ex 18871       Orthotic Management 53229       Manual 43278     Neuro Re-Ed 09991       Non-Billable Time        Evaluation Time additionally includes thorough review of current medical information, gathering information on past medical history/social history and prior level of function, interpretation of standardized testing/informal observation of tasks, assessment of data and development of plan of care and goals.         Evaluating OT: Zita Oliveira OTR/L #[id number]

## 2023-02-22 NOTE — PLAN OF CARE
Problem: Discharge Planning  Goal: Discharge to home or other facility with appropriate resources  Outcome: Progressing  Flowsheets (Taken 2/22/2023 0945)  Discharge to home or other facility with appropriate resources: Identify barriers to discharge with patient and caregiver     Problem: Pain  Goal: Verbalizes/displays adequate comfort level or baseline comfort level  Outcome: Progressing  Flowsheets (Taken 2/22/2023 0942)  Verbalizes/displays adequate comfort level or baseline comfort level: Encourage patient to monitor pain and request assistance     Problem: Skin/Tissue Integrity  Goal: Absence of new skin breakdown  Description: 1. Monitor for areas of redness and/or skin breakdown  2. Assess vascular access sites hourly  3. Every 4-6 hours minimum:  Change oxygen saturation probe site  4. Every 4-6 hours:  If on nasal continuous positive airway pressure, respiratory therapy assess nares and determine need for appliance change or resting period.   Outcome: Progressing     Problem: Safety - Adult  Goal: Free from fall injury  Outcome: Progressing     Problem: ABCDS Injury Assessment  Goal: Absence of physical injury  Outcome: Progressing     Problem: Chronic Conditions and Co-morbidities  Goal: Patient's chronic conditions and co-morbidity symptoms are monitored and maintained or improved  Outcome: Progressing  Flowsheets (Taken 2/22/2023 0945)  Care Plan - Patient's Chronic Conditions and Co-Morbidity Symptoms are Monitored and Maintained or Improved: Monitor and assess patient's chronic conditions and comorbid symptoms for stability, deterioration, or improvement

## 2023-02-22 NOTE — ACP (ADVANCE CARE PLANNING)
Advance Care Planning   Healthcare Decision Maker:    Primary Decision Maker: Mamie Bo - Child - [phone]    Secondary Decision Maker: Macie Enriquez - Ex-Spouse - [phone]    Supplemental (Other) Decision Maker: Cem Whitley - Child - [phone]
19-Apr-2019 14:54

## 2023-02-23 ENCOUNTER — APPOINTMENT (OUTPATIENT)
Dept: GENERAL RADIOLOGY | Age: 63
DRG: 698 | End: 2023-02-23
Payer: MEDICARE

## 2023-02-23 ENCOUNTER — APPOINTMENT (OUTPATIENT)
Dept: INTERVENTIONAL RADIOLOGY/VASCULAR | Age: 63
DRG: 698 | End: 2023-02-23
Payer: MEDICARE

## 2023-02-23 LAB
ALBUMIN SERPL-MCNC: 3.4 G/DL (ref 3.5–5.2)
ALP BLD-CCNC: 81 U/L (ref 35–104)
ALT SERPL-CCNC: 11 U/L (ref 0–32)
ANION GAP SERPL CALCULATED.3IONS-SCNC: 12 MMOL/L (ref 7–16)
AST SERPL-CCNC: 15 U/L (ref 0–31)
BASOPHILS ABSOLUTE: 0 E9/L (ref 0–0.2)
BASOPHILS RELATIVE PERCENT: 0.6 % (ref 0–2)
BILIRUB SERPL-MCNC: 0.3 MG/DL (ref 0–1.2)
BLOOD CULTURE, ROUTINE: NORMAL
BUN BLDV-MCNC: 32 MG/DL (ref 6–23)
CALCIUM SERPL-MCNC: 8.9 MG/DL (ref 8.6–10.2)
CHLORIDE BLD-SCNC: 109 MMOL/L (ref 98–107)
CO2: 20 MMOL/L (ref 22–29)
CREAT SERPL-MCNC: 1.3 MG/DL (ref 0.5–1)
CRITICAL: NORMAL
CULTURE, BLOOD 2: NORMAL
DATE ANALYZED: NORMAL
DATE OF COLLECTION: NORMAL
EOSINOPHILS ABSOLUTE: 0.03 E9/L (ref 0.05–0.5)
EOSINOPHILS RELATIVE PERCENT: 0.9 % (ref 0–6)
GFR SERPL CREATININE-BSD FRML MDRD: 46 ML/MIN/1.73
GLUCOSE BLD-MCNC: 127 MG/DL (ref 74–99)
GLUCOSE, FLUID: 180 MG/DL
HCT VFR BLD CALC: 28.5 % (ref 34–48)
HEMOGLOBIN: 8.2 G/DL (ref 11.5–15.5)
HYPOCHROMIA: ABNORMAL
INR BLD: 1.4
LAB: NORMAL
LACTATE DEHYDROGENASE: 174 U/L (ref 135–214)
LD, FLUID: 323 U/L
LYMPHOCYTES ABSOLUTE: 1.54 E9/L (ref 1.5–4)
LYMPHOCYTES RELATIVE PERCENT: 47.8 % (ref 20–42)
Lab: NORMAL
Lab: NORMAL
MCH RBC QN AUTO: 24 PG (ref 26–35)
MCHC RBC AUTO-ENTMCNC: 28.8 % (ref 32–34.5)
MCV RBC AUTO: 83.3 FL (ref 80–99.9)
MONOCYTES ABSOLUTE: 0.32 E9/L (ref 0.1–0.95)
MONOCYTES RELATIVE PERCENT: 10.4 % (ref 2–12)
NEUTROPHILS ABSOLUTE: 1.31 E9/L (ref 1.8–7.3)
NEUTROPHILS RELATIVE PERCENT: 40.9 % (ref 43–80)
NUCLEATED RED BLOOD CELLS: 0.9 /100 WBC
OPERATOR ID: 1119
PDW BLD-RTO: 15.9 FL (ref 11.5–15)
PH FLUID: 7.39
PLATELET # BLD: 399 E9/L (ref 130–450)
PMV BLD AUTO: 11.8 FL (ref 7–12)
POLYCHROMASIA: ABNORMAL
POTASSIUM SERPL-SCNC: 4.1 MMOL/L (ref 3.5–5)
PROTEIN FLUID: 5.4 G/DL
PROTHROMBIN TIME: 15.7 SEC (ref 9.3–12.4)
RBC # BLD: 3.42 E12/L (ref 3.5–5.5)
SODIUM BLD-SCNC: 141 MMOL/L (ref 132–146)
SOURCE, BLOOD GAS: NORMAL
TIME ANALYZED: 1358
TOTAL PROTEIN: 7.6 G/DL (ref 6.4–8.3)
WBC # BLD: 3.2 E9/L (ref 4.5–11.5)

## 2023-02-23 PROCEDURE — 88112 CYTOPATH CELL ENHANCE TECH: CPT

## 2023-02-23 PROCEDURE — 94640 AIRWAY INHALATION TREATMENT: CPT

## 2023-02-23 PROCEDURE — 80053 COMPREHEN METABOLIC PANEL: CPT

## 2023-02-23 PROCEDURE — 97530 THERAPEUTIC ACTIVITIES: CPT | Performed by: PHYSICAL THERAPIST

## 2023-02-23 PROCEDURE — 88305 TISSUE EXAM BY PATHOLOGIST: CPT

## 2023-02-23 PROCEDURE — C1729 CATH, DRAINAGE: HCPCS

## 2023-02-23 PROCEDURE — 85610 PROTHROMBIN TIME: CPT

## 2023-02-23 PROCEDURE — 2580000003 HC RX 258: Performed by: INTERNAL MEDICINE

## 2023-02-23 PROCEDURE — 83615 LACTATE (LD) (LDH) ENZYME: CPT

## 2023-02-23 PROCEDURE — 6360000002 HC RX W HCPCS: Performed by: INTERNAL MEDICINE

## 2023-02-23 PROCEDURE — 6370000000 HC RX 637 (ALT 250 FOR IP): Performed by: INTERNAL MEDICINE

## 2023-02-23 PROCEDURE — 87070 CULTURE OTHR SPECIMN AEROBIC: CPT

## 2023-02-23 PROCEDURE — 36415 COLL VENOUS BLD VENIPUNCTURE: CPT

## 2023-02-23 PROCEDURE — 0W993ZZ DRAINAGE OF RIGHT PLEURAL CAVITY, PERCUTANEOUS APPROACH: ICD-10-PCS | Performed by: RADIOLOGY

## 2023-02-23 PROCEDURE — 83986 ASSAY PH BODY FLUID NOS: CPT

## 2023-02-23 PROCEDURE — 82947 ASSAY GLUCOSE BLOOD QUANT: CPT

## 2023-02-23 PROCEDURE — 87205 SMEAR GRAM STAIN: CPT

## 2023-02-23 PROCEDURE — 71045 X-RAY EXAM CHEST 1 VIEW: CPT

## 2023-02-23 PROCEDURE — 2060000000 HC ICU INTERMEDIATE R&B

## 2023-02-23 PROCEDURE — 84157 ASSAY OF PROTEIN OTHER: CPT

## 2023-02-23 PROCEDURE — 87116 MYCOBACTERIA CULTURE: CPT

## 2023-02-23 PROCEDURE — 87206 SMEAR FLUORESCENT/ACID STAI: CPT

## 2023-02-23 PROCEDURE — 32555 ASPIRATE PLEURA W/ IMAGING: CPT

## 2023-02-23 PROCEDURE — 85025 COMPLETE CBC W/AUTO DIFF WBC: CPT

## 2023-02-23 RX ADMIN — ALBUTEROL SULFATE 2.5 MG: 2.5 SOLUTION RESPIRATORY (INHALATION) at 17:59

## 2023-02-23 RX ADMIN — CARVEDILOL 12.5 MG: 6.25 TABLET, FILM COATED ORAL at 09:02

## 2023-02-23 RX ADMIN — BUDESONIDE 500 MCG: 0.5 SUSPENSION RESPIRATORY (INHALATION) at 06:24

## 2023-02-23 RX ADMIN — IPRATROPIUM BROMIDE 0.5 MG: 0.5 SOLUTION RESPIRATORY (INHALATION) at 10:05

## 2023-02-23 RX ADMIN — ACETAMINOPHEN 500 MG: 500 TABLET, FILM COATED ORAL at 21:44

## 2023-02-23 RX ADMIN — FLUTICASONE PROPIONATE 2 SPRAY: 50 SPRAY, METERED NASAL at 09:07

## 2023-02-23 RX ADMIN — ALBUTEROL SULFATE 2.5 MG: 2.5 SOLUTION RESPIRATORY (INHALATION) at 10:05

## 2023-02-23 RX ADMIN — HYDRALAZINE HYDROCHLORIDE 25 MG: 25 TABLET, FILM COATED ORAL at 21:44

## 2023-02-23 RX ADMIN — GABAPENTIN 100 MG: 100 CAPSULE ORAL at 09:02

## 2023-02-23 RX ADMIN — Medication: at 21:46

## 2023-02-23 RX ADMIN — ENOXAPARIN SODIUM 60 MG: 100 INJECTION SUBCUTANEOUS at 21:47

## 2023-02-23 RX ADMIN — BUDESONIDE 500 MCG: 0.5 SUSPENSION RESPIRATORY (INHALATION) at 17:59

## 2023-02-23 RX ADMIN — Medication: at 09:07

## 2023-02-23 RX ADMIN — HYDRALAZINE HYDROCHLORIDE 25 MG: 25 TABLET, FILM COATED ORAL at 06:17

## 2023-02-23 RX ADMIN — PYRIDOXINE HCL TAB 50 MG 50 MG: 50 TAB at 09:02

## 2023-02-23 RX ADMIN — ACETAMINOPHEN 500 MG: 500 TABLET, FILM COATED ORAL at 09:04

## 2023-02-23 RX ADMIN — IPRATROPIUM BROMIDE 0.5 MG: 0.5 SOLUTION RESPIRATORY (INHALATION) at 06:24

## 2023-02-23 RX ADMIN — ENOXAPARIN SODIUM 60 MG: 100 INJECTION SUBCUTANEOUS at 14:50

## 2023-02-23 RX ADMIN — CEFTRIAXONE 1000 MG: 1 INJECTION, POWDER, FOR SOLUTION INTRAMUSCULAR; INTRAVENOUS at 18:28

## 2023-02-23 RX ADMIN — ACETAMINOPHEN 500 MG: 500 TABLET, FILM COATED ORAL at 17:18

## 2023-02-23 RX ADMIN — CARVEDILOL 12.5 MG: 6.25 TABLET, FILM COATED ORAL at 18:28

## 2023-02-23 RX ADMIN — GABAPENTIN 100 MG: 100 CAPSULE ORAL at 21:44

## 2023-02-23 RX ADMIN — HYDRALAZINE HYDROCHLORIDE 25 MG: 25 TABLET, FILM COATED ORAL at 14:53

## 2023-02-23 RX ADMIN — IPRATROPIUM BROMIDE 0.5 MG: 0.5 SOLUTION RESPIRATORY (INHALATION) at 17:59

## 2023-02-23 RX ADMIN — ALBUTEROL SULFATE 2.5 MG: 2.5 SOLUTION RESPIRATORY (INHALATION) at 06:24

## 2023-02-23 RX ADMIN — MIRTAZAPINE 7.5 MG: 15 TABLET, FILM COATED ORAL at 21:44

## 2023-02-23 ASSESSMENT — PAIN SCALES - WONG BAKER
WONGBAKER_NUMERICALRESPONSE: 2
WONGBAKER_NUMERICALRESPONSE: 2

## 2023-02-23 ASSESSMENT — PAIN SCALES - GENERAL
PAINLEVEL_OUTOF10: 2
PAINLEVEL_OUTOF10: 6
PAINLEVEL_OUTOF10: 0

## 2023-02-23 ASSESSMENT — PAIN DESCRIPTION - ORIENTATION: ORIENTATION: RIGHT;LEFT

## 2023-02-23 ASSESSMENT — PAIN DESCRIPTION - LOCATION: LOCATION: ABDOMEN;ARM

## 2023-02-23 NOTE — PROGRESS NOTES
Internal Medicine Progress Note    PARDE[facility]    Geo Jordan. Dexter Rangel, F.A.C.O.I. Virgin Crigler, D.O., BARBARA Kilpatrick D.O. Amador Sat, MSN, APRN, NP-C  Jimi Castaneda. Sumaya Dixon, MSN, APRN-CNP     Primary Care Physician: Praveen Ayala MD   Admitting Physician:  Patricio Metcalf DO  Admission date and time: 2/21/2023  9:27 AM    Room:  [address] [place] [address]  Admitting diagnosis: Pleural effusion [J90]  Acute UTI [N39.0]  Bilateral pleural effusion [J90]    Patient Name: Mary Pacheco  MRN: [id number]    Date of Service: 2/23/2023     Subjective:  Fannie Wallace is a 60 y.o. female who was seen and examined today,2/23/2023, at the bedside. Patient seems to be breathing easier today. Appeared to be less short of breath. Thoracentesis planned      No family present during my examination. Review of System:   Constitutional:   Denies fever or chills, weight loss or gain, fatigue or malaise. HEENT:   Denies ear pain, sore throat, sinus or eye problems. Cardiovascular:   Denies any chest pain, irregular heartbeats, or palpitations. Respiratory:   Diminished at the bases  Gastrointestinal:   Denies nausea, vomiting, diarrhea, or constipation. Denies any abdominal pain. Genitourinary:    Denies any urgency, frequency, hematuria. Voiding  without difficulty. Extremities:   Denies lower extremity swelling, edema or cyanosis. Neurology:    Denies any headache or focal neurological deficits, Denies generalized weakness or memory difficulty. Psch:   Denies being anxious or depressed. Musculoskeletal:    Denies  myalgias, joint complaints or back pain. Integumentary:   Denies any rashes, ulcers, or excoriations. Denies bruising. Hematologic/Lymphatic:  Denies bruising or bleeding.     Physical Exam:  I/O this shift:  In: 360 [P.O.:360]  Out: -     Intake/Output Summary (Last 24 hours) at 2/23/2023 1736  Last data filed at 2/23/2023 1415  Gross per 24 hour   Intake 360 ml   Output --   Net 360 ml   I/O last 3 completed shifts: In: 600 [P.O.:600]  Out: -   Patient Vitals for the past 96 hrs (Last 3 readings):   Weight   02/22/23 0603 135 lb (61.2 kg)   02/21/23 0936 135 lb (61.2 kg)     Vital Signs:   Blood pressure 136/61, pulse 80, temperature 97.8 °F (36.6 °C), temperature source Oral, resp. rate 18, height 5' 3\" (1.6 m), weight 135 lb (61.2 kg), SpO2 97 %, not currently breastfeeding. General appearance:  Alert, responsive, oriented to person, place, and time. Well preserved, alert, no distress. Head:  Normocephalic. No masses, lesions or tenderness. Eyes:  PERRLA. EOMI. Sclera clear. Buccal mucosa moist.  ENT:  Ears normal. Mucosa normal.  Neck:    Supple. Trachea midline. No thyromegaly. No JVD. No bruits. Heart:    Rhythm regular. Rate controlled. No murmurs. Lungs:    Diminished at the bases  Abdomen:   Soft. Non-tender. Non-distended. Bowel sounds positive. No organomegaly or masses. No pain on palpation. Extremities:    Peripheral pulses present. No peripheral edema. No ulcers. No cyanosis. No clubbing. Left below the knee amputation  Neurologic:    Alert x 3. No focal deficit. Cranial nerves grossly intact. No focal weakness. Psych:   Behavior is normal. Mood appears normal. Speech is not rapid and/or pressured. Musculoskeletal:   Spine ROM normal. Muscular strength intact. Gait not assessed. Integumentary:  No rashes  Skin normal color and texture.   Genitalia/Breast:  Deferred    Medication:  Scheduled Meds:   ammonium lactate   Topical BID    carvedilol  12.5 mg Oral BID WC    fluticasone  2 spray Each Nostril Daily    hydrALAZINE  25 mg Oral 3 times per day    mirtazapine  7.5 mg Oral Nightly    pyridoxine  50 mg Oral Daily    enoxaparin  1 mg/kg SubCUTAneous BID    cefTRIAXone (ROCEPHIN) IV  1,000 mg IntraVENous Q24H    budesonide  500 mcg Nebulization BID    albuterol  2.5 mg Nebulization 4x daily    ipratropium  0.5 mg Nebulization 4x daily    gabapentin  100 mg Oral BID     Continuous Infusions:    Objective Data:  Recent Labs     02/21/23  1032 02/22/23  0730 02/23/23  0809   WBC 2.9* 2.5* 3.2*   RBC 3.33* 3.25* 3.42*   HGB 8.1* 7.8* 8.2*   HCT 27.0* 26.9* 28.5*   MCV 81.1 82.8 83.3   MCH 24.3* 24.0* 24.0*   MCHC 30.0* 29.0* 28.8*   RDW 15.9* 15.9* 15.9*    329 399   MPV 11.8 11.9 11.8     Recent Labs     02/21/23  1032 02/22/23  0730 02/23/23  0809    139 141   K 3.9 4.3 4.1    109* 109*   CO2 22 20* 20*   BUN 32* 32* 32*   CREATININE 1.2* 1.3* 1.3*   GLUCOSE 138* 94 127*   CALCIUM 9.4 8.9 8.9   PROT 7.6 6.9 7.6   LABALBU 3.5 3.1* 3.4*   BILITOT 0.3 0.2 0.3   ALKPHOS 82 77 81   AST 14 12 15   ALT 8 7 11     Lab Results   Component Value Date    TROPONINI 0.04 (H) 08/22/2020    TROPONINI 0.03 08/22/2020    TROPONINI 0.05 (H) 08/22/2020                 Assessment:    Bilateral pleural effusions that are multifactorial in nature  Urinary tract infection  Hospitalization in November for multifactorial bilateral pulmonary emboli  Chronic kidney disease stage III  Acute on chronic anemia  Peripheral arterial disease with left SFA occlusion with angioplasty and stenting 2/2020 and left below the knee amputation 2/2020  Hyperlipidemia  Hypothyroidism  Schizophrenia  Essential hypertension      Plan:     Proceed with thoracentesis  Continue with diuretic therapy  Monitor renal function closely  Repeat chest x-ray  Continue antibiotic therapy    More than 50% of my  time was spent at the bedside counseling/coordinating care with the patient and/or family with face to face contact. This time was spent reviewing notes and laboratory data as well as instructing and counseling the patient. Time I spent with the family or surrogate(s) is included only if the patient was incapable of providing the necessary information or participating in medical decisions.  I also discussed the differential diagnosis and all of the proposed management plans with the patient and individuals accompanying the patient.     Georeg Saravia DO, JUWANA.C.O.I.  2/23/2023  5:36 PM

## 2023-02-23 NOTE — PROGRESS NOTES
Patient came down to Special Procedures for ultrasound guided right thoracentesis. Procedure was explained, questions were answered. 1346 Starting procedure /55 105  18 99% on room air    1351 Ending procedure VS 99/76 100 18 98% on room air    140cc of shanelle color pleural fluid drained from patient, petrolatum dressing folded 4 x 4 and tegaderm applied to right back. Patient tolerated procedure    Post procedure chest xray taken    Respiratory came took specimen for [facility]    Specimen sent to lab. Yecenia Marquez RN notified of need for floor to send labels to lab. Nurse to nurse called spoke with nurse Racheal notified of above information    Patient transported back to floor.

## 2023-02-23 NOTE — PLAN OF CARE
Problem: Discharge Planning  Goal: Discharge to home or other facility with appropriate resources  Outcome: Progressing  Flowsheets (Taken 2/23/2023 0845)  Discharge to home or other facility with appropriate resources: Identify barriers to discharge with patient and caregiver     Problem: Pain  Goal: Verbalizes/displays adequate comfort level or baseline comfort level  Outcome: Progressing  Flowsheets  Taken 2/23/2023 1415  Verbalizes/displays adequate comfort level or baseline comfort level: Encourage patient to monitor pain and request assistance  Taken 2/23/2023 0845  Verbalizes/displays adequate comfort level or baseline comfort level: Encourage patient to monitor pain and request assistance     Problem: Skin/Tissue Integrity  Goal: Absence of new skin breakdown  Description: 1. Monitor for areas of redness and/or skin breakdown  2. Assess vascular access sites hourly  3. Every 4-6 hours minimum:  Change oxygen saturation probe site  4. Every 4-6 hours:  If on nasal continuous positive airway pressure, respiratory therapy assess nares and determine need for appliance change or resting period.   Outcome: Progressing     Problem: Safety - Adult  Goal: Free from fall injury  Outcome: Progressing     Problem: ABCDS Injury Assessment  Goal: Absence of physical injury  Outcome: Progressing     Problem: Chronic Conditions and Co-morbidities  Goal: Patient's chronic conditions and co-morbidity symptoms are monitored and maintained or improved  Outcome: Progressing  Flowsheets (Taken 2/23/2023 0845)  Care Plan - Patient's Chronic Conditions and Co-Morbidity Symptoms are Monitored and Maintained or Improved: Monitor and assess patient's chronic conditions and comorbid symptoms for stability, deterioration, or improvement

## 2023-02-23 NOTE — PROGRESS NOTES
Physical Therapy  Physical Therapy Treatment Note/Plan of Care    Room #:  2069/0184-61  Patient Name: Karen Petty  YOB: 1960  MRN: [id number]    Date of Service: 2/23/2023     Tentative placement recommendation: Home with Home Health Physical Therapy  Equipment recommendation: Patient has needed equipment       Evaluating Physical Therapist: Gabrielle Yeung PT, DPT #[id number]      Specific Provider Orders/Date/Referring Provider :     02/21/23 1845    PT eval and treat  Start:  02/21/23 1845,   End:  02/21/23 1845,   ONE TIME,   Standing Count:  1 Occurrences,   R         Trista Lawton, DO Acknowledge New     Admitting Diagnosis:   Pleural effusion [J90]  Acute UTI [N39.0]  Bilateral pleural effusion [J90]      Surgery: none  Visit Diagnoses         Codes    Acute UTI    -  Primary N39.0    Bilateral pleural effusion     J90    Postprocedural pneumothorax     J95.811            Patient Active Problem List   Diagnosis    Epigastric hernia    Umbilical hernia    Essential hypertension    Type 2 diabetes mellitus with diabetic peripheral angiopathy without gangrene, without long-term current use of insulin (HCC)    Dyslipidemia    S/P cardiac catheterization    Abnormal stress ECG    Hyperthyroidism    Diabetic peripheral neuropathy (HCC)    Hydroureter    Microcytic hypochromic anemia    Arterial occlusion    Moderate protein-calorie malnutrition (HCC)    Hypoglycemia    Hypertensive emergency    Paresthesia of hand, bilateral    Paresthesia of left foot    Unintended weight loss    History of arterial ischemic stroke    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    Hx of diabetic foot ulcer    Hx of BKA, left (HCC)    Chronic diastolic heart failure (HCC)    PVD (peripheral vascular disease) (HCC)    Anorexia    Pericardial effusion    Pulmonary emboli (HCC)    Anemia    Pleural effusion        ASSESSMENT of Current Deficits Patient exhibits decreased strength, balance, endurance, range of motion, and coordination impairing functional mobility, transfers, gait , gait distance, and tolerance to activity Pt received L leg prosthetic and was able to perform function with SBA/Tylor with rest breaks needed between function. Pt performed stairs with Tylor with instruction for sequencing and safety. PHYSICAL THERAPY  PLAN OF CARE       Physical therapy plan of care is established based on physician order,  patient diagnosis and clinical assessment    Current Treatment Recommendations:    -Bed Mobility: Lower extremity exercises  and Trunk control activities   -Sitting Balance: Incorporate reaching activities to activate trunk muscles , Hands on support to maintain midline , Facilitate active trunk muscle engagement , Facilitate postural control in all planes , and Engage in core activities to allow for movement within base of support   -Standing Balance: Perform strengthening exercises in standing to promote motor control with or without upper extremity support , Instruct patient on adequate base of support to maintain balance, and Challenge balance utilizing reaching  activities beyond center of gravity    -Transfers: Provide instruction on proper hand and foot position for adequate transfer of weight onto lower extremities and use of gait device if needed, Cues for hand placement, technique and safety.  Provide stabilization to prevent fall , Facilitate weight shift forward on to lower extremities and provide necessary stabilization of bilateral lower extremities , Support transfer of weight on to lower extremities, and Assist with extension of knees trunk and hip to accept weight transfer   -Gait: Gait training, Standing activities to improve: base of support, weight shift, weight bearing , Exercises to improve trunk control, Exercises to improve hip and knee control, Performance of protected weight bearing activities, and Activities to increase weight bearing   -Endurance: Utilize Supervised activities to increase level of endurance to allow for safe functional mobility including transfers and gait  and Use graduated activities to promote good breathing techniques and provide support and education to maximize respiratory function  -Stairs: Stair training with instruction on proper technique and hand placement on rail    PT long term treatment goals are located in below grid    Patient and or family understand(s) diagnosis, prognosis, and plan of care. Frequency of treatments: Patient will be seen  daily. Prior Level of Function: Patient ambulated with rollator with prosthetic  Rehab Potential: good - for baseline    Past medical history:   Past Medical History:   Diagnosis Date    CAD (coronary artery disease)     cath 2016    CHF (congestive heart failure) ([facility]     Diabetic peripheral neuropathy associated with type 2 diabetes mellitus ([facility] 08/24/2018    Epigastric hernia     Hyperlipidemia     Hypertension     Kidney stone     Schizophrenia ([facility]     Type 2 diabetes mellitus with diabetic polyneuropathy, with long-term current use of insulin [facility] 09/19/2016    no longer requiring medication. 1/13/21 - continues w/o medications as of 11/2/2022     Past Surgical History:   Procedure Laterality Date    BLADDER SURGERY Bilateral 5/2/2022    CYSTOSCOPY RETROGRADE PYELOGRAM BILATERAL STENT CHANGE performed by Tim Schuster MD at [place] [address] Left 11/7/2022    CYSTOSCOPY BILATERAL RETROGRADE PYELOGRAM LEFT STENT CHANGE POSSIBLE RIGHT STENT CHANGE VS REMOVAL performed by Tim Schuster MD at [address] [place] [address]  09/30/2016     [facility]    COLONOSCOPY  08/2016    Dr. Glory Morel 9/7/2021    CYSTOSCOPY RETROGRADE PYELOGRAM, BILATERAL STENT EXCHANGE performed by Tim Schuster MD at [address] [facility] [address] [place] [address] Rd / Nathaly Franco Bilateral 10/28/2019    CYSTOSCOPY. RETROGRADE.  PYELOGRAM. BILATERAL STENT INSERTION performed by Jaqueline Deshpande MD at [facility] OR    CYSTOSCOPY INSERTION / REMOVAL STENT / STONE Bilateral 5/19/2020    CYSTOSCOPY RETROGRADE PYELOGRAM BILATERAL STENT EXCHANGE performed by Mariah Jose MD at [facility] / [address] [facility] [address] / Marialuisa Colmenaresradha Bilateral 1/18/2021    CYSTOSCOPY RETROGRADE PYELOGRAM BILATERAL  STENT CHANGE performed by Ankush Hoang MD at [address] Left 2/29/2020    LEG AMPUTATION BELOW KNEE performed by Wayne Araya DO at [address]  11/04/2016    epigastric hernia umbilical hernia with mesh    OVARIAN CYST REMOVAL      OVARY REMOVAL Right     OVARY REMOVAL  2014    UPPER GASTROINTESTINAL ENDOSCOPY N/A 10/29/2019    EGD ESOPHAGOGASTRODUODENOSCOPY performed by Tylor Jackson MD at [address] [facility]:    Precautions: Up with assistance, falls and UTI, L BKA with prosthetic      Social history: Patient lives with , daughter, granddaughter  in a two story home bedroom and bathroom 2nd floor full flight stairs with Rail  with no steps  to enter home.   Tub shower , grab bars      Equipment owned: Wheelchair, Rollator, Cocodrilo Dog0 Immunovaccine chair, and Quad cane    AM-PAC Basic Mobility       AM-PAC Basic Mobility - Inpatient   How much help is needed turning from your back to your side while in a flat bed without using bedrails?: A Little  How much help is needed moving from lying on your back to sitting on the side of a flat bed without using bedrails?: A Little  How much help is needed moving to and from a bed to a chair?: A Little  How much help is needed standing up from a chair using your arms?: A Little  How much help is needed walking in hospital room?: A Little  How much help is needed climbing 3-5 steps with a railing?: A Lot  AM-PAC Inpatient Mobility Raw Score : 17  AM-PAC Inpatient T-Scale Score : 42.13  Mobility Inpatient CMS 0-100% Score: 50.57  Mobility Inpatient CMS G-Code Modifier : CK    Nursing cleared patient for PT treatment. OBJECTIVE;   Initial Evaluation  Date: 2/22/2023 Treatment Date:  2/23/2023     Short Term/ Long Term   Goals   Was pt agreeable to Eval/treatment? Yes Y To be met in 3 days   Pain level   4/10  stomach No c/o pain    Bed Mobility    Rolling: Not assessed patient in chair    Supine to sit: Not assessed patient in chair    Sit to supine: Not assessed patient in chair    Scooting: Supervision    Rolling: Supervision    Supine to sit: Supervision    Sit to supine: Supervision    Scooting: Supervision     Rolling: Independent    Supine to sit:  Independent    Sit to supine: Independent    Scooting: Independent     Transfers Sit to stand: Minimal assist of 1  Sit to stand:  SBA/Tylor     Sit to stand: Independent    Ambulation     5 feet using  wheeled walker with Minimal assist of 1   for balance, upright, weight shift, and safety 2 x 125 feet using  wheeled walker with SBA/Tylor   for balance, upright, weight shift, and safety     > 50 feet using  wheeled walker with Modified Independent with L BKA prosthetic   Stair negotiation: ascended and descended   Not assessed  10 stairs with 2 HR with Tylor   10 steps with 1 HR with Supervision   ROM Within functional limits    Increase range of motion 10% of affected joints    Strength BUE:  refer to OT eval  RLE:  4/5  LLE:  4/5  Increase strength in affected mm groups by 1/3 grade   Balance Sitting EOB:  good -  Dynamic Standing:  fair with [employer] without prosthetic Sitting EOB: good -  Dynamic Standing: fair + with [employer] with prosthetic   Sitting EOB:  good   Dynamic Standing: fair + with [employer] with prosthetic      Patient is Alert & Oriented x person, place, time, and situation and follows directions    Sensation:  Patient  denies numbness/tingling   Edema:  no   Endurance: fair      Vitals: room air   Blood Pressure at rest  Blood Pressure during session    Heart Rate at rest  Heart Rate during session    SPO2 at rest %  SPO2 during session %     Patient education  Patient educated on role of Physical Therapy, risks of immobility, safety and plan of care, energy conservation,  importance of mobility while in hospital , ankle pumps, quad set and glut set for edema control, blood clot prevention, importance and purpose of adaptive device and adjusted to proper height for the patient. , safety , and stair training      Patient response to education:   Pt verbalized understanding Pt demonstrated skill Pt requires further education in this area   Yes Partial Yes      Treatment:  Patient practiced and was instructed/facilitated in the following treatment: Patient Sat edge of bed 15 minutes with Supervision  to increase dynamic sitting balance and activity tolerance. Pt performed bed mobility, transfers, ambulation in hallway, stair training, seated exercises. Therapeutic Exercises:  not performed      At end of session, patient in chair with     call light and phone within reach,  all lines and tubes intact, nursing notified. Patient would benefit from continued skilled Physical Therapy to improve functional independence and quality of life.          Patient's/ family goals   get stronger    Time in  1547  Time out  1615    Total Treatment Time  28 minutes    CPT codes:  Therapeutic activities (45497)   28 minutes  2 unit(s)    Dave Shankar, PT

## 2023-02-24 ENCOUNTER — APPOINTMENT (OUTPATIENT)
Dept: GENERAL RADIOLOGY | Age: 63
DRG: 698 | End: 2023-02-24
Payer: MEDICARE

## 2023-02-24 VITALS
DIASTOLIC BLOOD PRESSURE: 63 MMHG | OXYGEN SATURATION: 97 % | HEART RATE: 98 BPM | WEIGHT: 135 LBS | SYSTOLIC BLOOD PRESSURE: 140 MMHG | BODY MASS INDEX: 23.92 KG/M2 | TEMPERATURE: 97.9 F | HEIGHT: 63 IN | RESPIRATION RATE: 18 BRPM

## 2023-02-24 LAB
ALBUMIN SERPL-MCNC: 3.2 G/DL (ref 3.5–5.2)
ALP BLD-CCNC: 72 U/L (ref 35–104)
ALT SERPL-CCNC: 9 U/L (ref 0–32)
ANION GAP SERPL CALCULATED.3IONS-SCNC: 11 MMOL/L (ref 7–16)
AST SERPL-CCNC: 14 U/L (ref 0–31)
BASOPHILS ABSOLUTE: 0.02 E9/L (ref 0–0.2)
BASOPHILS RELATIVE PERCENT: 0.7 % (ref 0–2)
BILIRUB SERPL-MCNC: <0.2 MG/DL (ref 0–1.2)
BUN BLDV-MCNC: 33 MG/DL (ref 6–23)
CALCIUM SERPL-MCNC: 8.8 MG/DL (ref 8.6–10.2)
CHLORIDE BLD-SCNC: 109 MMOL/L (ref 98–107)
CO2: 22 MMOL/L (ref 22–29)
CREAT SERPL-MCNC: 1.3 MG/DL (ref 0.5–1)
EOSINOPHILS ABSOLUTE: 0.05 E9/L (ref 0.05–0.5)
EOSINOPHILS RELATIVE PERCENT: 1.9 % (ref 0–6)
GFR SERPL CREATININE-BSD FRML MDRD: 46 ML/MIN/1.73
GLUCOSE BLD-MCNC: 98 MG/DL (ref 74–99)
GRAM STAIN ORDERABLE: NORMAL
HCT VFR BLD CALC: 26 % (ref 34–48)
HEMOGLOBIN: 7.6 G/DL (ref 11.5–15.5)
HYPOCHROMIA: ABNORMAL
IMMATURE GRANULOCYTES #: 0.02 E9/L
IMMATURE GRANULOCYTES %: 0.7 % (ref 0–5)
LACTATE DEHYDROGENASE: 170 U/L (ref 135–214)
LYMPHOCYTES ABSOLUTE: 1.4 E9/L (ref 1.5–4)
LYMPHOCYTES RELATIVE PERCENT: 52.4 % (ref 20–42)
MCH RBC QN AUTO: 25 PG (ref 26–35)
MCHC RBC AUTO-ENTMCNC: 29.2 % (ref 32–34.5)
MCV RBC AUTO: 85.5 FL (ref 80–99.9)
MONOCYTE, FLUID: 89 %
MONOCYTES ABSOLUTE: 0.38 E9/L (ref 0.1–0.95)
MONOCYTES RELATIVE PERCENT: 14.2 % (ref 2–12)
NEUTROPHIL, FLUID: 11 %
NEUTROPHILS ABSOLUTE: 0.8 E9/L (ref 1.8–7.3)
NEUTROPHILS RELATIVE PERCENT: 30.1 % (ref 43–80)
NUCLEATED CELLS FLUID: 2283 /UL
PDW BLD-RTO: 16.4 FL (ref 11.5–15)
PLATELET # BLD: 314 E9/L (ref 130–450)
PMV BLD AUTO: 11.9 FL (ref 7–12)
POLYCHROMASIA: ABNORMAL
POTASSIUM SERPL-SCNC: 4.4 MMOL/L (ref 3.5–5)
RBC # BLD: 3.04 E12/L (ref 3.5–5.5)
RBC FLUID: NORMAL /UL
SODIUM BLD-SCNC: 142 MMOL/L (ref 132–146)
TOTAL PROTEIN: 7.1 G/DL (ref 6.4–8.3)
WBC # BLD: 2.7 E9/L (ref 4.5–11.5)

## 2023-02-24 PROCEDURE — 6370000000 HC RX 637 (ALT 250 FOR IP): Performed by: INTERNAL MEDICINE

## 2023-02-24 PROCEDURE — 6360000002 HC RX W HCPCS: Performed by: INTERNAL MEDICINE

## 2023-02-24 PROCEDURE — 94640 AIRWAY INHALATION TREATMENT: CPT

## 2023-02-24 PROCEDURE — 83615 LACTATE (LD) (LDH) ENZYME: CPT

## 2023-02-24 PROCEDURE — 71046 X-RAY EXAM CHEST 2 VIEWS: CPT

## 2023-02-24 PROCEDURE — 97530 THERAPEUTIC ACTIVITIES: CPT | Performed by: PHYSICAL THERAPIST

## 2023-02-24 PROCEDURE — 80053 COMPREHEN METABOLIC PANEL: CPT

## 2023-02-24 PROCEDURE — 85025 COMPLETE CBC W/AUTO DIFF WBC: CPT

## 2023-02-24 PROCEDURE — 36415 COLL VENOUS BLD VENIPUNCTURE: CPT

## 2023-02-24 RX ORDER — CEFDINIR 300 MG/1
300 CAPSULE ORAL 2 TIMES DAILY
Qty: 10 CAPSULE | Refills: 0 | Status: SHIPPED | OUTPATIENT
Start: 2023-02-24 | End: 2023-03-01

## 2023-02-24 RX ADMIN — ACETAMINOPHEN 500 MG: 500 TABLET, FILM COATED ORAL at 06:19

## 2023-02-24 RX ADMIN — PYRIDOXINE HCL TAB 50 MG 50 MG: 50 TAB at 09:13

## 2023-02-24 RX ADMIN — BUDESONIDE 500 MCG: 0.5 SUSPENSION RESPIRATORY (INHALATION) at 06:40

## 2023-02-24 RX ADMIN — ALBUTEROL SULFATE 2.5 MG: 2.5 SOLUTION RESPIRATORY (INHALATION) at 14:08

## 2023-02-24 RX ADMIN — ALBUTEROL SULFATE 2.5 MG: 2.5 SOLUTION RESPIRATORY (INHALATION) at 06:40

## 2023-02-24 RX ADMIN — Medication: at 10:24

## 2023-02-24 RX ADMIN — HYDRALAZINE HYDROCHLORIDE 25 MG: 25 TABLET, FILM COATED ORAL at 06:19

## 2023-02-24 RX ADMIN — CARVEDILOL 12.5 MG: 6.25 TABLET, FILM COATED ORAL at 09:13

## 2023-02-24 RX ADMIN — HYDRALAZINE HYDROCHLORIDE 25 MG: 25 TABLET, FILM COATED ORAL at 14:02

## 2023-02-24 RX ADMIN — IPRATROPIUM BROMIDE 0.5 MG: 0.5 SOLUTION RESPIRATORY (INHALATION) at 14:08

## 2023-02-24 RX ADMIN — ALBUTEROL SULFATE 2.5 MG: 2.5 SOLUTION RESPIRATORY (INHALATION) at 10:00

## 2023-02-24 RX ADMIN — FLUTICASONE PROPIONATE 2 SPRAY: 50 SPRAY, METERED NASAL at 10:25

## 2023-02-24 RX ADMIN — IPRATROPIUM BROMIDE 0.5 MG: 0.5 SOLUTION RESPIRATORY (INHALATION) at 10:00

## 2023-02-24 RX ADMIN — IPRATROPIUM BROMIDE 0.5 MG: 0.5 SOLUTION RESPIRATORY (INHALATION) at 06:40

## 2023-02-24 RX ADMIN — GABAPENTIN 100 MG: 100 CAPSULE ORAL at 09:13

## 2023-02-24 RX ADMIN — ACETAMINOPHEN 500 MG: 500 TABLET, FILM COATED ORAL at 10:25

## 2023-02-24 ASSESSMENT — PAIN DESCRIPTION - LOCATION
LOCATION: BACK
LOCATION: ARM
LOCATION: ARM

## 2023-02-24 ASSESSMENT — PAIN DESCRIPTION - DESCRIPTORS
DESCRIPTORS: ACHING

## 2023-02-24 ASSESSMENT — PAIN DESCRIPTION - ORIENTATION
ORIENTATION: RIGHT;LEFT
ORIENTATION: RIGHT;LEFT
ORIENTATION: MID

## 2023-02-24 ASSESSMENT — PAIN SCALES - GENERAL
PAINLEVEL_OUTOF10: 0
PAINLEVEL_OUTOF10: 3
PAINLEVEL_OUTOF10: 10
PAINLEVEL_OUTOF10: 3

## 2023-02-24 NOTE — CARE COORDINATION
2/24/23 1511 CM note: COVID (-) 2/21/23. Room air. Hx L BKA. Discharge order noted. ATB changed to po omnicef. Discharge plan is home and pt declines [facility] or any other needs stating she is very motivated and has plenty of family assistance. Pts family will provide transportation home.  Electronically signed by Patricia Kramer RN on 2/24/2023 at 3:13 PM

## 2023-02-24 NOTE — PROGRESS NOTES
Physical Therapy  Physical Therapy Treatment Note/Plan of Care    Room #:  6196/5809-80  Patient Name: Karen Petty  YOB: 1960  MRN: [id number]    Date of Service: 2/24/2023     Tentative placement recommendation: Home with Home Health Physical Therapy  Equipment recommendation: Patient has needed equipment       Evaluating Physical Therapist: Gabrielle Yeung PT, DPT #[id number]      Specific Provider Orders/Date/Referring Provider :     02/21/23 1845    PT eval and treat  Start:  02/21/23 1845,   End:  02/21/23 1845,   ONE TIME,   Standing Count:  1 Occurrences,   R         Trista Lawton, DO Acknowledge New     Admitting Diagnosis:   Pleural effusion [J90]  Acute UTI [N39.0]  Bilateral pleural effusion [J90]      Surgery: none  Visit Diagnoses         Codes    Acute UTI    -  Primary N39.0    Bilateral pleural effusion     J90    Postprocedural pneumothorax     J95.811            Patient Active Problem List   Diagnosis    Epigastric hernia    Umbilical hernia    Essential hypertension    Type 2 diabetes mellitus with diabetic peripheral angiopathy without gangrene, without long-term current use of insulin (HCC)    Dyslipidemia    S/P cardiac catheterization    Abnormal stress ECG    Hyperthyroidism    Diabetic peripheral neuropathy (HCC)    Hydroureter    Microcytic hypochromic anemia    Arterial occlusion    Moderate protein-calorie malnutrition (HCC)    Hypoglycemia    Hypertensive emergency    Paresthesia of hand, bilateral    Paresthesia of left foot    Unintended weight loss    History of arterial ischemic stroke    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    Hx of diabetic foot ulcer    Hx of BKA, left (HCC)    Chronic diastolic heart failure (HCC)    PVD (peripheral vascular disease) (HCC)    Anorexia    Pericardial effusion    Pulmonary emboli (HCC)    Anemia    Pleural effusion        ASSESSMENT of Current Deficits Patient exhibits decreased strength, balance, endurance, range of motion, and coordination impairing functional mobility, transfers, gait , gait distance, and tolerance to activity Pt received L leg prosthetic and was able to perform function with SBA/Tylor with rest breaks needed between function. Pt performed stairs with Tylor with instruction for sequencing and safety. PHYSICAL THERAPY  PLAN OF CARE       Physical therapy plan of care is established based on physician order,  patient diagnosis and clinical assessment    Current Treatment Recommendations:    -Bed Mobility: Lower extremity exercises  and Trunk control activities   -Sitting Balance: Incorporate reaching activities to activate trunk muscles , Hands on support to maintain midline , Facilitate active trunk muscle engagement , Facilitate postural control in all planes , and Engage in core activities to allow for movement within base of support   -Standing Balance: Perform strengthening exercises in standing to promote motor control with or without upper extremity support , Instruct patient on adequate base of support to maintain balance, and Challenge balance utilizing reaching  activities beyond center of gravity    -Transfers: Provide instruction on proper hand and foot position for adequate transfer of weight onto lower extremities and use of gait device if needed, Cues for hand placement, technique and safety.  Provide stabilization to prevent fall , Facilitate weight shift forward on to lower extremities and provide necessary stabilization of bilateral lower extremities , Support transfer of weight on to lower extremities, and Assist with extension of knees trunk and hip to accept weight transfer   -Gait: Gait training, Standing activities to improve: base of support, weight shift, weight bearing , Exercises to improve trunk control, Exercises to improve hip and knee control, Performance of protected weight bearing activities, and Activities to increase weight bearing   -Endurance: Utilize Supervised activities to increase level of endurance to allow for safe functional mobility including transfers and gait  and Use graduated activities to promote good breathing techniques and provide support and education to maximize respiratory function  -Stairs: Stair training with instruction on proper technique and hand placement on rail    PT long term treatment goals are located in below grid    Patient and or family understand(s) diagnosis, prognosis, and plan of care. Frequency of treatments: Patient will be seen  daily. Prior Level of Function: Patient ambulated with rollator with prosthetic  Rehab Potential: good - for baseline    Past medical history:   Past Medical History:   Diagnosis Date    CAD (coronary artery disease)     cath 2016    CHF (congestive heart failure) ([facility]     Diabetic peripheral neuropathy associated with type 2 diabetes mellitus ([facility] 08/24/2018    Epigastric hernia     Hyperlipidemia     Hypertension     Kidney stone     Schizophrenia ([facility]     Type 2 diabetes mellitus with diabetic polyneuropathy, with long-term current use of insulin ([facility] 09/19/2016    no longer requiring medication. 1/13/21 - continues w/o medications as of 11/2/2022     Past Surgical History:   Procedure Laterality Date    BLADDER SURGERY Bilateral 5/2/2022    CYSTOSCOPY RETROGRADE PYELOGRAM BILATERAL STENT CHANGE performed by Jermaine Melton MD at [place] [address] Left 11/7/2022    CYSTOSCOPY BILATERAL RETROGRADE PYELOGRAM LEFT STENT CHANGE POSSIBLE RIGHT STENT CHANGE VS REMOVAL performed by Jermaine Melton MD at [address] [place] [address]  09/30/2016     [facility]    COLONOSCOPY  08/2016    Dr. Myah Montano 9/7/2021    CYSTOSCOPY RETROGRADE PYELOGRAM, BILATERAL STENT EXCHANGE performed by Jermaine Melton MD at [address] [facility] [address] [place] [address] / [place] Pallas Bilateral 10/28/2019    CYSTOSCOPY. RETROGRADE.  PYELOGRAM. BILATERAL STENT INSERTION performed by Wes Caputo MD at [facility]    CYSTOSCOPY INSERTION / REMOVAL STENT / STONE Bilateral 5/19/2020    CYSTOSCOPY RETROGRADE PYELOGRAM BILATERAL STENT EXCHANGE performed by Beth Porter MD at [address] [facility] / [place] [address] / [address] Bilateral 1/18/2021    CYSTOSCOPY RETROGRADE PYELOGRAM BILATERAL  STENT CHANGE performed by Jerad Ivey MD at [address] Left 2/29/2020    LEG AMPUTATION BELOW KNEE performed by Tommie Shirley DO at [address]  11/04/2016    epigastric hernia umbilical hernia with mesh    OVARIAN CYST REMOVAL      OVARY REMOVAL Right     OVARY REMOVAL  2014    UPPER GASTROINTESTINAL ENDOSCOPY N/A 10/29/2019    EGD ESOPHAGOGASTRODUODENOSCOPY performed by Elda Rhodes MD at [address]:    Precautions: Up with assistance, falls and UTI, L BKA with prosthetic      Social history: Patient lives with , daughter, granddaughter  in a two story home bedroom and bathroom 2nd floor full flight stairs with Rail  with no steps  to enter home.   Tub shower , grab bars      Equipment owned: Wheelchair, Rollator, [employer] chair, and Quad cane    AM-PAC Basic Mobility       AM-PAC Basic Mobility - Inpatient   How much help is needed turning from your back to your side while in a flat bed without using bedrails?: A Little  How much help is needed moving from lying on your back to sitting on the side of a flat bed without using bedrails?: A Little  How much help is needed moving to and from a bed to a chair?: A Little  How much help is needed standing up from a chair using your arms?: A Little  How much help is needed walking in hospital room?: A Little  How much help is needed climbing 3-5 steps with a railing?: A Little  AM-[facility] Inpatient Mobility Raw Score : 18  AM-PAC Inpatient T-Scale Score : 43.63  Mobility Inpatient CMS 0-100% Score: 46.58  Mobility Inpatient CMS G-Code Modifier : CK    Nursing cleared patient for PT treatment. OBJECTIVE;   Initial Evaluation  Date: 2/22/2023 Treatment Date:  2/24/2023     Short Term/ Long Term   Goals   Was pt agreeable to Eval/treatment? Yes Y To be met in 3 days   Pain level   4/10  stomach No c/o pain    Bed Mobility    Rolling: Not assessed patient in chair    Supine to sit: Not assessed patient in chair    Sit to supine: Not assessed patient in chair    Scooting: Supervision    Rolling: Not assessed patient in chair   Supine to sit: Not assessed patient in chair   Sit to supine: Not assessed patient in chair   Scooting: Supervision     Rolling: Independent    Supine to sit:  Independent    Sit to supine: Independent    Scooting: Independent     Transfers Sit to stand: Minimal assist of 1  Sit to stand: Supervision     Sit to stand: Independent    Ambulation     5 feet using  wheeled walker with Minimal assist of 1   for balance, upright, weight shift, and safety 2 x 125 feet using  wheeled walker with Supervision   for balance, upright, weight shift, and safety     > 50 feet using  wheeled walker with Modified Independent with L BKA prosthetic   Stair negotiation: ascended and descended   Not assessed  10 stairs with 2 HR with SBA/Tylor   10 steps with 1 HR with Supervision   ROM Within functional limits    Increase range of motion 10% of affected joints    Strength BUE:  refer to OT eval  RLE:  4/5  LLE:  4/5  Increase strength in affected mm groups by 1/3 grade   Balance Sitting EOB:  good -  Dynamic Standing:  fair with [employer] without prosthetic Sitting EOB: good -  Dynamic Standing: fair + with [employer] with prosthetic   Sitting EOB:  good   Dynamic Standing: fair + with [employer] with prosthetic      Patient is Alert & Oriented x person, place, time, and situation and follows directions    Sensation:  Patient  denies numbness/tingling   Edema:  no   Endurance: fair     Vitals: room air   Blood Pressure at rest  Blood Pressure during session    Heart Rate at rest Heart Rate during session    SPO2 at rest %  SPO2 during session %     Patient education  Patient educated on role of Physical Therapy, risks of immobility, safety and plan of care, energy conservation,  importance of mobility while in hospital , ankle pumps, quad set and glut set for edema control, blood clot prevention, importance and purpose of adaptive device and adjusted to proper height for the patient. , safety , and stair training      Patient response to education:   Pt verbalized understanding Pt demonstrated skill Pt requires further education in this area   Yes Partial Yes      Treatment:  Patient practiced and was instructed/facilitated in the following treatment: Patient Sat edge of bed 15 minutes with Supervision  to increase dynamic sitting balance and activity tolerance. Pt performed bed mobility, transfers, ambulation in hallway, stair training, seated exercises. Therapeutic Exercises:  not performed      At end of session, patient in chair with     call light and phone within reach,  all lines and tubes intact, nursing notified. Patient would benefit from continued skilled Physical Therapy to improve functional independence and quality of life.          Patient's/ family goals   get stronger    Time in  928  Time out  1000    Total Treatment Time  32 minutes    CPT codes:  Therapeutic activities (28270)   32 minutes  2 unit(s)    Gabrielle Yeung PT

## 2023-02-24 NOTE — DISCHARGE SUMMARY
Internal Medicine Progress Note     PARDEEP=[facility]     Valerie Aliyah. Isha Mckeon., KALIN.A.CMarianaOMarianaI. Forrest Keller D.O., RASHIDA Tellez D.O. Levon Escamilla, MSN, APRN, NP-C  Gonzalo Parker. Pricila Andujar, MSN, APRN-CNP       Internal Medicine  Discharge Summary    NAME: Cl Kern  :  1960  MRN:  [id number]  PCP:Cortez Harrington MD  ADMITTED: 2023      DISCHARGED: 23    ADMITTING PHYSICIAN: Nestor Hamlin DO    CONSULTANT(S):   None     ADMITTING DIAGNOSIS:   Pleural effusion [J90]  Acute UTI [N39.0]  Bilateral pleural effusion [J90]     DISCHARGE DIAGNOSES:   Bilateral pleural effusions that are multifactorial in nature status postthoracentesis with stabilization her respiratory status  Urinary tract infection  Hospitalization in November for multifactorial bilateral pulmonary emboli  Chronic kidney disease stage III  Acute on chronic anemia  Peripheral arterial disease with left SFA occlusion with angioplasty and stenting 2020 and left below the knee amputation 2020  Hyperlipidemia  Hypothyroidism  Schizophrenia  Essential hypertension    BRIEF HISTORY OF PRESENT ILLNESS:   Yanira Dunham is a chronically ill 79-year-old female who presented to [address] [place] [address] emergency department for a variety of complaints including failure to thrive with generalized weakness. This was accompanied by shortness of breath. As stated previously, she has an extensive past medical history and was last hospitalized in November where she was treated for multifocal bilateral pulmonary emboli. She has been taking her medications accordingly. Work-up in the emergency department revealed a urinary tract infection as well as bilateral pleural effusions. Fortunately, she was not hypoxic. We discussed admission to the hospital for antibiotics and increased therapy.     LABS[de-identified]  Lab Results   Component Value Date    WBC 2.7 (L) 2023    HGB 7.6 (L) 2023    HCT 26.0 (L) 02/24/2023     02/24/2023     02/24/2023    K 4.4 02/24/2023     (H) 02/24/2023    CREATININE 1.3 (H) 02/24/2023    BUN 33 (H) 02/24/2023    CO2 22 02/24/2023    GLUCOSE 98 02/24/2023    ALT 9 02/24/2023    AST 14 02/24/2023    INR 1.4 02/23/2023     Lab Results   Component Value Date    INR 1.4 02/23/2023    INR 1.2 08/22/2020    INR 1.1 08/22/2020    PROTIME 15.7 (H) 02/23/2023    PROTIME 13.0 (H) 08/22/2020    PROTIME 12.9 (H) 08/22/2020      Lab Results   Component Value Date    TSH <0.010 (L) 11/26/2022     Lab Results   Component Value Date    TRIG 123 11/26/2022    TRIG 144 04/20/2022    TRIG 101 07/19/2021     Lab Results   Component Value Date    HDL 26 11/26/2022    HDL 38 04/20/2022    HDL 41 07/19/2021     Lab Results   Component Value Date    LDLCALC 73 11/26/2022    LDLCALC 128 (H) 04/20/2022    LDLCALC 109 (H) 07/19/2021     Lab Results   Component Value Date    LABA1C 5.9 (H) 11/26/2022       IMAGING:  CT ABDOMEN PELVIS W IV CONTRAST Additional Contrast? None    Result Date: 2/21/2023  EXAMINATION: CT OF THE ABDOMEN AND PELVIS WITH CONTRAST 2/21/2023 12:29 pm TECHNIQUE: CT of the abdomen and pelvis was performed with the administration of intravenous contrast. Multiplanar reformatted images are provided for review. Automated exposure control, iterative reconstruction, and/or weight based adjustment of the mA/kV was utilized to reduce the radiation dose to as low as reasonably achievable. COMPARISON: November 26, 2022 HISTORY: ORDERING SYSTEM PROVIDED HISTORY: Ureteral stents, generalized abdominal pain TECHNOLOGIST PROVIDED HISTORY: Reason for exam:->Ureteral stents, generalized abdominal pain Additional Contrast?->None Decision Support Exception - unselect if not a suspected or confirmed emergency medical condition->Emergency Medical Condition (MA) FINDINGS: Redemonstration of bilateral ureteral stents appearing in stable position.  There is bilateral hydronephrosis also appearing similar compared to prior. Stable fat stranding surrounding right renal pelvis. Urinary bladder is unremarkable. There is minimal nonspecific free fluid in the pelvis at level of the cul-de-sac which appears new. No bowel obstruction, free air, or free fluid. Moderate amount of stool throughout the colon. The appendix is visualized and appears unremarkable. Liver is unremarkable. Calcified gallstones are present. Pancreas is unremarkable. Spleen is normal in size. Adrenal glands are normal.  No retroperitoneal lymphadenopathy. View lung bases shows bilateral pleural effusions larger on the right. 1. Redemonstration of bilateral ureteral stents appearing in similar position. 2. Stable bilateral hydronephrosis. 3. Redemonstration of fat stranding surrounding right renal pelvis possibly related to pyonephrosis with appropriate clinical history. 4. New minimal nonspecific free fluid located in the pelvis. 5. Bilateral pleural effusions larger on the right. XR CHEST PORTABLE    Result Date: 2/21/2023  EXAMINATION: ONE XRAY VIEW OF THE CHEST 2/21/2023 10:05 am COMPARISON: 11/28/2022 HISTORY: ORDERING SYSTEM PROVIDED HISTORY: SOB TECHNOLOGIST PROVIDED HISTORY: Reason for exam:->SOB FINDINGS: The lungs are without acute focal process. There is no effusion or pneumothorax. The cardiomediastinal silhouette is without acute process. The osseous structures are without acute process. No acute process. XR CHEST 1 VIEW    Result Date: 2/23/2023  EXAMINATION: ONE XRAY VIEW OF THE CHEST 2/23/2023 1:54 pm COMPARISON: 02/21/2023 HISTORY: ORDERING SYSTEM PROVIDED HISTORY: Postprocedural pneumothorax TECHNOLOGIST PROVIDED HISTORY: Reason for exam:->Post Thoracentesis; Right FINDINGS: Heart size is normal.  There are no infiltrates or effusions. There is no pneumothorax.      No acute process     CTA PULMONARY W CONTRAST    Result Date: 2/21/2023  EXAMINATION: CTA OF THE CHEST 2/21/2023 12:29 pm TECHNIQUE: CTA of the chest was performed after the administration of intravenous contrast.  Multiplanar reformatted images are provided for review. MIP images are provided for review. Automated exposure control, iterative reconstruction, and/or weight based adjustment of the mA/kV was utilized to reduce the radiation dose to as low as reasonably achievable. COMPARISON: November 25, 2022 HISTORY: ORDERING SYSTEM PROVIDED HISTORY: reval pe, hx pericardial effusion TECHNOLOGIST PROVIDED HISTORY: Reason for exam:->reval pe, hx pericardial effusion Decision Support Exception - unselect if not a suspected or confirmed emergency medical condition->Emergency Medical Condition (MA) FINDINGS: No filling defect in the pulmonary arteries to suggest pulmonary arterial embolism. The heart is normal in size. No pericardial effusion. No mediastinal lymphadenopathy. There is a moderate size right pleural effusion. There is a small left pleural effusion. Areas of subsegmental atelectasis are present in the lung bases. No airspace opacity to suggest pneumonia. No pneumothorax. Stable heterogeneous nodule seen along posterior margin of right lobe of the thyroid gland measures approximately 2 x 1.9 cm. View of the upper abdomen is grossly unremarkable. 1. No pulmonary embolism. 2. Bilateral pleural effusions larger on the right. 3. Redemonstration of heterogeneous nodule associated with right lobe of the thyroid gland measuring up to 2 cm. RECOMMENDATIONS: Managing Incidental Thyroid Nodule Detected at CT or MRI or US 1. Further evaluation by thyroid Ultrasound recommended for these incidental nodules: Patient Age 25 years or less - Nodule of any size Patient Age 21-27 years old - Nodule 1 cm in size or greater Patient Age 28 years or more - Nodule 1.5 cm in size or greater 2.  Follow up thyroid ultrasound also recommend in these scenarios - Solitary nodule with high risk imaging features (locally invasive nodule or suspicious lymph nodes) - Heterogeneous, enlarged thyroid gland. - Increased uptake on PET 3.  NO further imaging is recommended in the following scenarios - Any nodule not meeting above criteria. - Those patients with limited life expectancy or significant co-morbidities. Note: These recommendations do not apply to pts. w/ increased risk for thyroid cancer or pts. with symptomatic thyroid disease. Reference: Recommendations for f/u of Incidental Thyroid Nodules (ITN) found on CT, MR, NM and Extrathyroidal US are based upon the ACR white paper and [facility] 3-tiered system for managing ITNs:J Am Lion Radiol. 2015 Feb;12(2): 143-50 Unavailable     IR GUIDED THORACENTESIS PLEURAL    Result Date: 2/23/2023  PROCEDURE: Rand Denson RIGHT THORACENTESIS 2/23/2023 HISTORY: ORDERING SYSTEM PROVIDED HISTORY: Thoracentesis -can be performed Friday after appropriate holding of Eliquis therapy TECHNOLOGIST PROVIDED HISTORY: Reason for exam:->Thoracentesis -can be performed Friday after appropriate holding of Eliquis therapy Which side should the procedure be performed?->Right TECHNIQUE: Informed consent was obtained after a detailed explanation of the procedure including risks, benefits, and alternatives. Universal protocol was performed. The right chest was prepped and draped in sterile fashion and local anesthesia was achieved with lidocaine. An 8 Lithuanian needle sheath was advanced under ultrasound guidance into pleural effusion and thoracentesis was performed. The patient tolerated the procedure well. FINDINGS: A total of 140 cc was removed. Successful ultrasound guided thoracentesis. HOSPITAL COURSE:   Loly Marrero did well throughout the hospitalization she was initially found to be suffering from bilateral pleural effusions and after appropriate holding of Eliquis therapy, she underwent thoracentesis on the right with removal of 140 cc of bloody fluid. Her respiratory status stabilized.   She was ambulatory and tolerating a diet. Oral anticoagulation therapy has been resumed with no need for additional thoracentesis. Pleural fluid analysis is rather skewed in the setting of the bloody effusion. She is maintained on Eliquis therapy chronically. She is to maintain off Megace therapy indefinitely as this was a contributor to the pulmonary emboli. Otherwise, she will return home where she has significant family support and she defers any need for home health care. .  Patient is medically stable and acceptable for discharge today. BRIEF PHYSICAL EXAMINATION AND LABORATORIES ON DAY OF DISCHARGE:  VITALS:  /70   Pulse 99   Temp 97.9 °F (36.6 °C) (Oral)   Resp 18   Ht 5' 3\" (1.6 m)   Wt 135 lb (61.2 kg)   SpO2 99%   BMI 23.91 kg/m²     HEENT:  PERRLA. EOMI. Sclera clear. Buccal mucosa moist.    Neck:  Supple. Trachea midline. No thyromegaly. No JVD. No bruits. Heart:  Rhythm regular, rate controlled. No murmurs. Lungs:  Symmetrical. Clear to auscultation bilaterally. No wheezes. No rhonchi. No rales. Abdomen: Soft. Non-tender. Non-distended. Bowel sounds positive. No organomegaly or masses. No pain on palpation    Extremities:  Peripheral pulses present. No peripheral edema. No ulcers. Neurologic:  Alert x 3. No focal deficit. Cranial nerves grossly intact. Skin:  No petechia. No hemorrhage. No wounds. DISPOSITION:  The patient's condition is stable. At this time the patient is without objective evidence of an acute process requiring continuing hospitalization or inpatient management. They are stable for discharge with outpatient follow-up. I have spoken with the patient and discussed the results of the current hospitalization, in addition to providing specific details for the plan of care and counseling regarding the diagnosis and prognosis.   The plan has been discussed in detail and they are aware of the specific conditions for emergent return, as well as the importance of follow-up. Their questions are answered at this time and they are agreeable with the plan for discharge to home    DISCHARGE MEDICATIONS:   Current Discharge Medication List             Details   cefdinir (OMNICEF) 300 MG capsule Take 1 capsule by mouth 2 times daily for 5 days  Qty: 10 capsule, Refills: 0                Details   fluticasone (FLONASE) 50 MCG/ACT nasal spray 2 sprays by Each Nostril route daily as needed for Rhinitis or Allergies      gabapentin (NEURONTIN) 100 MG capsule Take 100 mg by mouth 2 times daily. Nutritional Supplements (BOOST PLUS) LIQD Take 1 each by mouth daily Strawberry/Vanilla      mirtazapine (REMERON) 7.5 MG tablet Take 1 tablet by mouth nightly  Qty: 30 tablet, Refills: 5      apixaban (ELIQUIS) 5 MG TABS tablet Take 1 tablet by mouth 2 times daily  Qty: 60 tablet, Refills: 5    Associated Diagnoses: Acute pulmonary embolism with acute cor pulmonale, unspecified pulmonary embolism type (HCC)      carvedilol (COREG) 12.5 MG tablet Take 1 tablet by mouth 2 times daily (with meals)  Qty: 60 tablet, Refills: 5    Associated Diagnoses: Acute pulmonary embolism with acute cor pulmonale, unspecified pulmonary embolism type (HCC)      hydrALAZINE (APRESOLINE) 25 MG tablet Take 1 tablet by mouth every 8 hours  Qty: 90 tablet, Refills: 5    Associated Diagnoses: Acute pulmonary embolism with acute cor pulmonale, unspecified pulmonary embolism type (HCC)      albuterol sulfate HFA (VENTOLIN HFA) 108 (90 Base) MCG/ACT inhaler Inhale 2 puffs into the lungs 4 times daily as needed for Wheezing  Qty: 18 g, Refills: 0      vitamin B-6 (B-6) 50 MG tablet Take 1 tablet by mouth daily  Qty: 30 tablet, Refills: 3      diazePAM (VALIUM) 2 MG tablet Take 1 mg by mouth daily as needed for Anxiety. FOLLOW UP/INSTRUCTIONS:  This patient is instructed to follow-up with her primary care physician. Patient is instructed to follow-up with the consults listed above as directed by them.   she is instructed to resume home medications and take new medications as indicated in the list above. If the patient has a recurrence of symptoms, she is instructed to go to the ED. Preparing for this patient's discharge, including paperwork, orders, instructions, and meeting with patient did require > 40 minutes.     Susan Fischer DO   2/24/2023  11:41 AM

## 2023-02-24 NOTE — PROGRESS NOTES
CLINICAL PHARMACY NOTE: MEDS TO BEDS    Total # of Prescriptions Filled: 1   The following medications were delivered to the patient:  Cefdinir 300 mg    Additional Documentation:

## 2023-02-25 LAB
REPORT: NORMAL
THIS TEST SENT TO: NORMAL

## 2023-02-25 NOTE — PROGRESS NOTES
Physician Progress Note      PATIENTAlexandro Babinski  [facility]:                  [id number]  :                       1960  ADMIT DATE:       2023 9:27 AM  DISCH DATE:        2023 3:00 PM  RESPONDING  PROVIDER #:        Beatrice Whitaker DO          QUERY TEXT:    Dear Attending Provider,    Pt admitted with UTI. Pt noted to have bilateral ureteral stents in place. If   possible, please document in the progress notes and discharge summary if you   are evaluating and/or treating any of the following: The medical record reflects the following:  Risk Factors: CAD, HTN, HLD, CHF, CKD,  Schizophrenia, DM, hx BKA, PE on   Eliquis, urinary stents, PAD, anemia  Clinical Indicators: CT scan:  Redemonstration of bilateral ureteral stents   appearing in similar position. 2. Stable bilateral hydronephrosis. 3. Redemonstration of fat stranding   surrounding right renal pelvis possibly  related to pyonephrosis with appropriate clinical history; per H&P: UTI  Treatment: PCU monitoring, CT scan, IV Cefepime and Vanco in ED, IV Rocephin,   urine and cx    Thank You,  Enid Resendiz RN, BSN, CDS  Clinical Documentation Improvement Specialist  [phone]  Options provided:  -- UTI due to ureteral stent  -- UTI not due to ureteral stent  -- Other - I will add my own diagnosis  -- Disagree - Not applicable / Not valid  -- Disagree - Clinically unable to determine / Unknown  -- Refer to Clinical Documentation Reviewer    PROVIDER RESPONSE TEXT:    UTI is due to ureteral stent. Query created by: Darline Morgan on 2023 12:25 PM      QUERY TEXT:    Dear Attending Provider,    Pt admitted with UTI and bilateral pleural effusions. Pt noted to have   elevated temp, HR. If possible, please document in the progress notes and   discharge summary if you are evaluating and /or treating any of the following:     The medical record reflects the following:  Risk Factors: CAD, HTN, HLD, CHF, CKD,  Schizophrenia, DM, hx BKA, PE on   Eliquis, urinary stents, PAD, anemia  Clinical Indicators: WBC 2.9-2.5; lactic 0.8; Ua: cloudy, lg blood, +nitrite,   trace le, mod bacteria; TPR:  101.2-97. 1/108-94/22-18  Treatment: PCU monitoring, CT scan, IV Cefepime and Vanco in ED, IV Rocephin,   urine and cx, blood cx's    Thank You,  David Arguello RN, BSN, CDS  Clinical Documentation Improvement Specialist  [phone]  Options provided:  -- Sepsis, present on admission  -- Sepsis, developed following admission  -- UTI without Sepsis  -- Sepsis was ruled out  -- Other - I will add my own diagnosis  -- Disagree - Not applicable / Not valid  -- Disagree - Clinically unable to determine / Unknown  -- Refer to Clinical Documentation Reviewer    PROVIDER RESPONSE TEXT:    This patient has sepsis which was present on admission.     Query created by: America Ponce on 2/22/2023 12:33 PM      Electronically signed by:  Jarod Hatch DO 2/25/2023 8:30 AM

## 2023-02-26 LAB
AFB SMEAR: NORMAL
BODY FLUID CULTURE, STERILE: NORMAL
GRAM STAIN RESULT: NORMAL

## 2023-02-27 ENCOUNTER — CARE COORDINATION (OUTPATIENT)
Dept: CASE MANAGEMENT | Age: 63
End: 2023-02-27

## 2023-02-27 DIAGNOSIS — J90 PLEURAL EFFUSION: Primary | ICD-10-CM

## 2023-02-27 LAB
BLOOD CULTURE, ROUTINE: NORMAL
CULTURE, BLOOD 2: NORMAL

## 2023-02-27 PROCEDURE — 1111F DSCHRG MED/CURRENT MED MERGE: CPT | Performed by: FAMILY MEDICINE

## 2023-02-27 NOTE — CARE COORDINATION
[facility] Care Transitions Initial Follow Up Call    Call within 2 business days of discharge: Yes    Patient Current Location:  Home: [address] [place] [address]    Care Transition Nurse contacted the patient by telephone to perform post hospital discharge assessment. Provided introduction to self, and explanation of the Care Transition Nurse role. Patient: Joseph Oglesby Patient : 1960   MRN: [id number]  Reason for Admission: Acute UTI  Discharge Date: 23 RARS: Readmission Risk Score: 21.6      Last Discharge  Street       Date Complaint Diagnosis Description Type Department Provider    23 Shortness of Breath; Abdominal Pain Acute UTI . .. ED to Hosp-Admission (Discharged) (ADMITTED) [id number] Jer Hart, DO; Nhung Renteria,             Was this an external facility discharge? No     Challenges to be reviewed by the provider   Additional needs identified to be addressed with provider: No  none               Method of communication with provider: none. CTN called and spoke with the pt for an initial care transition call post hospital discharge. Pt admitted on 23 with dx UTI and bilateral pl effusions. Pt presented to ED with c/o sob and weakness. Pt is s/p R US guided thoracentesis on 23 with 140cc fluid removed. Pt reports that she is doing \"good\" today. Pt reports to sob with increased exertion. Pt denies any cough, congestion, chest tightness, or fever/chills. Pt denies any dizziness/lightheadedness. Pt reports to some lower abd \"cramping\" in the suprapubic area. She states that her urine has a slight blood tinge to it. She denies any dysuria, frequency, or urgency. She feels as if she is emptying her bladder completely. She is taking her antibiotic as prescribed. Pt states that she has a hx of a kidney stent that was placed in  by Dr. Shaka Sarabia. She states she was to f/u in April to have it exchanged.  She states that she plans on calling his office today to update him that she was admitted for a UTI and to see if she can f/u sooner. She declines needing assistance with scheduling this appt. CTN reviewed the pt's medications in full. 1111F entered, as pt is established with a  pcp. Confirmed pt has new rx for antibiotic (Cefdinir). CTN reviewed HFU appt information. CTN offered to schedule pt for a HFU with her pcp and she was receptive. Soonest CTN was able to schedule the pt for a HFU appt with her pcp was 3/8/23 @ 11:15 am. Pt did not want to schedule for any afternoon appts, so this is the soonest pt would be able to be scheduled. Pt states that either her  or daughter will be able to transport her to her appt. Pt did not voice any other needs/concerns. She is receptive to ongoing outreaches from this CTN. Care Transition Nurse reviewed discharge instructions, medical action plan, and red flags with patient who verbalized understanding. The patient was given an opportunity to ask questions and does not have any further questions or concerns at this time. Were discharge instructions available to patient? Yes. Reviewed appropriate site of care based on symptoms and resources available to patient including: PCP  Specialist  Urgent care clinics  When to call 911. The patient agrees to contact the PCP office for questions related to their healthcare. Advance Care Planning:     Healthcare Decision Maker:    Primary Decision Maker: Mamie Bo - Child - [birth year]    Secondary Decision Maker: Alicia Fermin - Ex-Spouse - [phone]    Supplemental (Other) Decision Maker: Nelida Meigs - Child - [phone]    Medication reconciliation was performed with patient, who verbalizes understanding of administration of home medications. Medications reviewed, 1111F entered: yes    Was patient discharged with a pulse oximeter?   N/a    Non-face-to-face services provided:  Scheduled appointment with PCP-3/8/23 @ 11:15 am  Scheduled appointment with Specialist-Dr. Bernie Rasmussen ~pt prefers to call to schedule  Obtained and reviewed discharge summary and/or continuity of care documents  Assessment and support for treatment adherence and medication management-1111F entered. Offered patient enrollment in the Remote Patient Monitoring (RPM) program for in-home monitoring: Patient declined. Care Transitions 24 Hour Call    Schedule Follow Up Appointment with PCP: Completed  Do you have a copy of your discharge instructions?: Yes  Do you have all of your prescriptions and are they filled?: No  Have you been contacted by a [employer] [address]?: No  Have you scheduled your follow up appointment?: Yes  How are you going to get to your appointment?: Car - family or friend to transport  Post Acute Services: Outpatient/Community Services (Comment: Going to [facility] for outpatient therapy)  Patient DME: [employer] you have support at home?: Child, Partner/Spouse/SO, Grandchild  Do you feel like you have everything you need to keep you well at home?: Yes  Are you an active caregiver in your home?: No  Care Transitions Interventions     Other Services: Declined (Comment: Declined RPM 2/27/23)            Discussed follow-up appointments. If no appointment was previously scheduled, appointment scheduling offered: Yes. Is follow up appointment scheduled within 7 days of discharge? Unable to locate a sooner appt that pt was agreeable to. Follow Up  Future Appointments   Date Time Provider Naomi Alvarado   3/8/2023 11:15 AM [facility]   4/19/2023 10:00 AM Danna Nichols MD [facility]       Care Transition Nurse provided contact information. Plan for follow-up call in 7-10 days based on severity of symptoms and risk factors. Plan for next call: symptom management-UTI symptoms resolving? Blood-tinged urine? Pain? Any worsening symptoms? Pulm status stable? Did pt get schedule with urology?     Jhonny Spencer RN

## 2023-03-07 ENCOUNTER — CARE COORDINATION (OUTPATIENT)
Dept: CASE MANAGEMENT | Age: 63
End: 2023-03-07

## 2023-03-07 NOTE — CARE COORDINATION
[facility] Care Transitions Follow Up Call    Patient Current Location:  Home: [address] [place] [address]    Care Transition Nurse contacted the patient by telephone to follow up after admission on 23. Patient: Sylvia Richards  Patient : 1960   MRN: [id number]  Reason for Admission: Acute UTI; Viraj pleural effusions  Discharge Date: 23 RARS: Readmission Risk Score: 21.6      Needs to be reviewed by the provider   Additional needs identified to be addressed with provider: No  none             Method of communication with provider: none. CTN called and spoke with the pt for a sub care transition call post hospital discharge. Pt admitted on 23 with dx UTI and bilateral pl effusions. Pt presented to ED with c/o sob and weakness. Pt is s/p R US guided thoracentesis on 23 with 140cc fluid removed. Pt reports that she is doing \"good\" today. Pt reports to sob with increased exertion. Pt denies any cough, congestion, chest tightness, or dizziness/lightheadedness. Pt states that u.o. is \"good\" and denies any hematuria, dysuria, abd/flank pains, urinary urgency or frequency, or fever/chills. She feels as if she is emptying her bladder completely. Pt reports to good PO intake. She is using her rollator for ambulation with no reports of falls. Pt states that she did contact Dr. Nathalia Early (Shawn Love) office to update that she was just discharged from the hospital to check on a sooner f/u. Pt states she was previously scheduled to f/u in April. She states that she has not heard back yet from the office if they want to have her move up her appt or keep the one that she has scheduled. Pt does have a HFU appt with her pcp tomorrow and this was reviewed with the pt today. She is aware and states that either her  or daughter will be bringing her to her appt tomorrow. Pt did not voice any needs/concerns at this time. She was agreeable to continued outreaches.      Addressed changes since last contact:   Pt has completed her antibiotics. Follow Up  Future Appointments   Date Time Provider Naomi Jenny   3/8/2023 11:15 AM [facility]   4/19/2023 10:00 AM Susan Ruiz MD [facility]     Non-[facility] follow up appointment(s): Dr. Praveen Kelly f/u in April    Care Transition Nurse reviewed medical action plan and red flags with patient and discussed any barriers to care and/or understanding of plan of care after discharge. Discussed appropriate site of care based on symptoms and resources available to patient including: PCP  Specialist  Urgent care clinics  When to call 911. The patient agrees to contact the PCP office for questions related to their healthcare.      Advance Care Planning:     Healthcare Decision Maker:    Primary Decision Maker: Mamie Bo - Child - 1[birth year]    Secondary Decision Maker: Moise Nyhan - Ex-Spouse - [phone]    Supplemental (Other) Decision Maker: Pop Richards - Child - [phone]    Patients top risk factors for readmission: functional physical ability, lack of knowledge about disease, medical condition-PE, anemia, DM, PVD, neuropathy, htn, CHF, CKD, urinary stents, multiple health system providers, and polypharmacy  Interventions to address risk factors: Scheduled appointment with PCP-3/8/23 for a HFU appt and Scheduled appointment with Specialist-Dr. Jefferson~April appt       Care Transitions Subsequent and Final Call    Schedule Follow Up Appointment with PCP: Completed  Subsequent and Final Calls  Do you have any ongoing symptoms?: No  Have your medications changed?: No  Do you have any questions related to your medications?: No  Do you currently have any active services?: No  Are you currently active with any services?: Outpatient/Community Services  Do you have any needs or concerns that I can assist you with?: No  Identified Barriers: None  Care Transitions Interventions  No Identified Needs     Other Services: Declined (Comment: Declined RPM 2/27/23)   Other Interventions:             Care Transition Nurse provided contact information for future needs. Plan for follow-up call in 10-14 days based on severity of symptoms and risk factors.  Plan for next call: symptom management-Any recurrent UTI symptoms? Any increased sob?  follow-up appointment-Review OV with pcp for any new orders/changes, etc.    Ila Alberto RN

## 2023-03-08 ENCOUNTER — OFFICE VISIT (OUTPATIENT)
Dept: FAMILY MEDICINE CLINIC | Age: 63
End: 2023-03-08

## 2023-03-08 VITALS
DIASTOLIC BLOOD PRESSURE: 84 MMHG | WEIGHT: 141 LBS | OXYGEN SATURATION: 98 % | HEART RATE: 90 BPM | SYSTOLIC BLOOD PRESSURE: 136 MMHG | RESPIRATION RATE: 20 BRPM | BODY MASS INDEX: 24.98 KG/M2 | HEIGHT: 63 IN

## 2023-03-08 DIAGNOSIS — N30.01 ACUTE CYSTITIS WITH HEMATURIA: ICD-10-CM

## 2023-03-08 DIAGNOSIS — Z89.512 HX OF BKA, LEFT (HCC): ICD-10-CM

## 2023-03-08 DIAGNOSIS — E11.51 TYPE 2 DIABETES MELLITUS WITH DIABETIC PERIPHERAL ANGIOPATHY WITHOUT GANGRENE, WITHOUT LONG-TERM CURRENT USE OF INSULIN (HCC): ICD-10-CM

## 2023-03-08 DIAGNOSIS — Z09 HOSPITAL DISCHARGE FOLLOW-UP: Primary | ICD-10-CM

## 2023-03-08 DIAGNOSIS — J90 PLEURAL EFFUSION: ICD-10-CM

## 2023-03-08 PROBLEM — R63.0 ANOREXIA: Status: RESOLVED | Noted: 2021-07-14 | Resolved: 2023-03-08

## 2023-03-08 PROBLEM — E05.90 HYPERTHYROIDISM: Status: RESOLVED | Noted: 2018-04-19 | Resolved: 2023-03-08

## 2023-03-08 PROBLEM — R20.2 PARESTHESIA OF HAND, BILATERAL: Status: RESOLVED | Noted: 2020-08-17 | Resolved: 2023-03-08

## 2023-03-08 PROBLEM — I26.99 PULMONARY EMBOLI (HCC): Status: RESOLVED | Noted: 2022-11-27 | Resolved: 2023-03-08

## 2023-03-08 PROBLEM — I31.39 PERICARDIAL EFFUSION: Status: RESOLVED | Noted: 2022-11-25 | Resolved: 2023-03-08

## 2023-03-08 PROBLEM — I16.1 HYPERTENSIVE EMERGENCY: Status: RESOLVED | Noted: 2020-05-29 | Resolved: 2023-03-08

## 2023-03-08 PROBLEM — E16.2 HYPOGLYCEMIA: Status: RESOLVED | Noted: 2020-05-17 | Resolved: 2023-03-08

## 2023-03-08 PROBLEM — I70.90 ARTERIAL OCCLUSION: Status: RESOLVED | Noted: 2020-02-23 | Resolved: 2023-03-08

## 2023-03-08 PROBLEM — R63.4 UNINTENDED WEIGHT LOSS: Status: RESOLVED | Noted: 2020-08-17 | Resolved: 2023-03-08

## 2023-03-08 PROBLEM — G51.0 FACIAL PARALYSIS ON LEFT SIDE: Status: RESOLVED | Noted: 2020-08-22 | Resolved: 2023-03-08

## 2023-03-08 SDOH — ECONOMIC STABILITY: FOOD INSECURITY: WITHIN THE PAST 12 MONTHS, YOU WORRIED THAT YOUR FOOD WOULD RUN OUT BEFORE YOU GOT MONEY TO BUY MORE.: PATIENT DECLINED

## 2023-03-08 SDOH — ECONOMIC STABILITY: INCOME INSECURITY: HOW HARD IS IT FOR YOU TO PAY FOR THE VERY BASICS LIKE FOOD, HOUSING, MEDICAL CARE, AND HEATING?: PATIENT DECLINED

## 2023-03-08 SDOH — ECONOMIC STABILITY: HOUSING INSECURITY
IN THE LAST 12 MONTHS, WAS THERE A TIME WHEN YOU DID NOT HAVE A STEADY PLACE TO SLEEP OR SLEPT IN A SHELTER (INCLUDING NOW)?: PATIENT REFUSED

## 2023-03-08 SDOH — ECONOMIC STABILITY: FOOD INSECURITY: WITHIN THE PAST 12 MONTHS, THE FOOD YOU BOUGHT JUST DIDN'T LAST AND YOU DIDN'T HAVE MONEY TO GET MORE.: PATIENT DECLINED

## 2023-03-08 ASSESSMENT — ENCOUNTER SYMPTOMS
DIARRHEA: 0
VOMITING: 0
SHORTNESS OF BREATH: 0
NAUSEA: 0

## 2023-03-08 NOTE — PROGRESS NOTES
Post-Discharge Transitional Care Follow Up      Gino Fermin   YOB: 1960    Date of Office Visit:  3/8/2023  Date of Hospital Admission: 2/21/23  Date of Hospital Discharge: 2/24/23  Readmission Risk Score (high >=14%. Medium >=10%):Readmission Risk Score: 21.6      Care management risk score Rising risk (score 2-5) and Complex Care (Scores >=6): No Risk Score On File     Non face to face  following discharge, date last encounter closed (first attempt may have been earlier): 02/27/2023     Call initiated 2 business days of discharge: Yes     Hospital discharge follow-up  -     [place] DISCHARGE MEDS RECONCILED W/ CURRENT OUTPATIENT MED LIST  Acute cystitis with hematuria        -     Completed antibiotic therapy   Pleural effusion        -     Status postthoracentesis consistent with transudate  Hx of BKA, left (HCC)        -     Stable; will assess yearly    Type 2 diabetes mellitus with diabetic peripheral angiopathy without gangrene, without long-term current use of insulin ([facility] [address]        -     Stable; will assess yearly    Medical Decision Making: high complexity  Return for scheduled appointment. On this date 3/8/2023 I have spent 20 minutes reviewing previous notes, test results and face to face with the patient discussing the diagnosis and importance of compliance with the treatment plan as well as documenting on the day of the visit. Subjective:   Patient was recently admitted to [address] [place] [address],[address] for bilateral pleural effusion and UTI. Patient had gradual onset of shortness of breath prior to admission. Inpatient course: Discharge summary reviewed- see chart. Interval history/Current status: patient still has mild lower abdominal pain since hospital discharge. Finished antibiotics. Patient has noticed intermittent hematuria--still has ureteral stents. Has been drinking plenty fluids to stay hydrated. No longer has shortness of breath. Denies fever/chills.     Patient doing well on current regimen for diabetes. Patient has history of left BKA. Has been stable. Uses prosthetic. Patient Active Problem List   Diagnosis    Epigastric hernia    Umbilical hernia    Essential hypertension    Type 2 diabetes mellitus with diabetic peripheral angiopathy without gangrene, without long-term current use of insulin (HCC)    Dyslipidemia    Diabetic peripheral neuropathy (HCC)    Hydroureter    Microcytic hypochromic anemia    Paresthesia of left foot    History of arterial ischemic stroke    Hx of diabetic foot ulcer    Hx of BKA, left (HCC)    Chronic diastolic heart failure (HCC)    PVD (peripheral vascular disease) (HCC)    Anemia    Pleural effusion       Medications listed as ordered at the time of discharge from hospital     Medication List            Accurate as of March 8, 2023 11:59 PM. If you have any questions, ask your nurse or doctor. START taking these medications      Handicap Placard Misc  by Does not apply route Unable to ambulate more than 20 feet without difficulty  Expires 3/31/2028  Started by:  Martina Mishra MD            CONTINUE taking these medications      albuterol sulfate  (90 Base) MCG/ACT inhaler  Commonly known as: Ventolin HFA  Inhale 2 puffs into the lungs 4 times daily as needed for Wheezing     apixaban 5 MG Tabs tablet  Commonly known as: ELIQUIS  Take 1 tablet by mouth 2 times daily     Boost Plus Liqd     carvedilol 12.5 MG tablet  Commonly known as: COREG  Take 1 tablet by mouth 2 times daily (with meals)     diazePAM 2 MG tablet  Commonly known as: VALIUM     fluticasone 50 MCG/ACT nasal spray  Commonly known as: FLONASE     gabapentin 100 MG capsule  Commonly known as: NEURONTIN     hydrALAZINE 25 MG tablet  Commonly known as: APRESOLINE  Take 1 tablet by mouth every 8 hours     mirtazapine 7.5 MG tablet  Commonly known as: REMERON  Take 1 tablet by mouth nightly     pyridoxine 50 MG tablet  Commonly known as: B-6  Take 1 tablet by mouth daily               Where to Get Your Medications        You can get these medications from any pharmacy    Bring a paper prescription for each of these medications  Handicap Placard [facility]          Medications marked \"taking\" at this time  Outpatient Medications Marked as Taking for the 3/8/23 encounter (Office Visit) with Fred Cox MD   Medication Sig Dispense Refill    Samanta Davies [address] [facility] by Does not apply route Unable to ambulate more than 20 feet without difficulty  Expires 3/31/2028 1 each 0    fluticasone (FLONASE) 50 MCG/ACT nasal spray 2 sprays by Each Nostril route daily as needed for Rhinitis or Allergies      gabapentin (NEURONTIN) 100 MG capsule Take 100 mg by mouth 2 times daily. Nutritional Supplements (BOOST PLUS) LIQD Take 1 each by mouth daily Strawberry/Vanilla      mirtazapine (REMERON) 7.5 MG tablet Take 1 tablet by mouth nightly 30 tablet 5    apixaban (ELIQUIS) 5 MG TABS tablet Take 1 tablet by mouth 2 times daily 60 tablet 5    carvedilol (COREG) 12.5 MG tablet Take 1 tablet by mouth 2 times daily (with meals) 60 tablet 5    hydrALAZINE (APRESOLINE) 25 MG tablet Take 1 tablet by mouth every 8 hours 90 tablet 5    albuterol sulfate HFA (VENTOLIN HFA) 108 (90 Base) MCG/ACT inhaler Inhale 2 puffs into the lungs 4 times daily as needed for Wheezing 18 g 0    vitamin B-6 (B-6) 50 MG tablet Take 1 tablet by mouth daily 30 tablet 3    diazePAM (VALIUM) 2 MG tablet Take 1 mg by mouth daily as needed for Anxiety. Medications patient taking as of now reconciled against medications ordered at time of hospital discharge: Yes    Review of Systems   Eyes:  Negative for visual disturbance. Respiratory:  Negative for shortness of breath. Cardiovascular:  Negative for chest pain, palpitations and leg swelling. Gastrointestinal:  Negative for diarrhea, nausea and vomiting.    Genitourinary:  Positive for hematuria (comes and goes due to stents, not cloudy). Negative for difficulty urinating, dysuria and frequency. Skin:  Positive for rash (on chest but clearing up). Psychiatric/Behavioral:  Negative for dysphoric mood. Objective:    /84   Pulse 90   Resp 20   Ht 5' 3\" (1.6 m)   Wt 141 lb (64 kg)   SpO2 98%   BMI 24.98 kg/m²   Physical Exam  Vitals reviewed. Constitutional:       General: She is not in acute distress. Appearance: She is well-developed. Neck:      Vascular: No carotid bruit. Cardiovascular:      Rate and Rhythm: Normal rate and regular rhythm. Heart sounds: Normal heart sounds. No murmur heard. No gallop. Pulmonary:      Effort: Pulmonary effort is normal.      Breath sounds: Normal breath sounds. No wheezing or rales. Abdominal:      General: Bowel sounds are normal. There is no distension. Palpations: Abdomen is soft. Tenderness: There is no abdominal tenderness. Musculoskeletal:      Cervical back: Neck supple. Right lower leg: No edema. Left lower leg: No edema. Skin:     General: Skin is warm and dry. Neurological:      Mental Status: She is alert and oriented to person, place, and time. An electronic signature was used to authenticate this note.   --Judy Salinas MD

## 2023-03-10 PROBLEM — E44.0 MODERATE PROTEIN-CALORIE MALNUTRITION (HCC): Chronic | Status: RESOLVED | Noted: 2020-02-25 | Resolved: 2023-03-10

## 2023-03-16 ENCOUNTER — CARE COORDINATION (OUTPATIENT)
Dept: CASE MANAGEMENT | Age: 63
End: 2023-03-16

## 2023-03-16 NOTE — CARE COORDINATION
[facility] Care Transitions Follow Up Call    Patient Current Location:  Home: [address] [place] [address]    Care Transition Nurse contacted the patient by telephone to follow up after admission on 23. Patient: Linnea Walker  Patient : 1960   MRN: [id number]  Reason for Admission: Acute UTI  Discharge Date: 23 RARS: Readmission Risk Score: 21.6      Needs to be reviewed by the provider   Additional needs identified to be addressed with provider: No  none             Method of communication with provider: none. CTN called and spoke with the pt for a sub care transition call post hospital discharge. Pt admitted on 23 with dx UTI and bilateral pl effusions. Pt presented to ED with c/o sob and weakness. Pt is s/p R-sided US guided thoracentesis on 23 with 140cc fluid removed. Pt reports that she is doing \"really good\" today. Pt reports to sob with increased exertion or if she is talking too much. Pt denies any cough, congestion, chest tightness, or dizziness/lightheadedness. Pt states that u.o. is \"good\" and denies any burning on urination, dysuria, abd/flank pains, urinary urgency or frequency, or fever/chills. She does admit to still having some blood-tinged urine and states that her urologist told her to just make sure she is drinking a lot of fluids which she states she is doing. Pt states that she is scheduled to have her urinary stents changed next week on 3/21/23 at [facility]. Pt denies any needs/concerns at this time. She is agreeable to ongoing outreaches from this CTN.      Addressed changes since last contact:   Scheduled to have stents removed on 3/21/23 at [facility]      Follow Up  Future Appointments   Date Time Provider Naomi Alvarado   2023 10:00 AM Michelle Irene MD [facility] Transition Nurse reviewed medical action plan and red flags with patient and discussed any barriers to care and/or understanding of plan of care after discharge. Discussed appropriate site of care based on symptoms and resources available to patient including: PCP  Specialist  Urgent care clinics  When to call 911. The patient agrees to contact the PCP office for questions related to their healthcare.     Patients top risk factors for readmission: medical condition-CHF, PE, PVD, CKD, Hx L BKA, UTI, urinary stents, DM, anemia, Htn, multiple health system providers, and polypharmacy  Interventions to address risk factors: Scheduled appointment with PCP-Keep f/u next month, Scheduled appointment with Specialist-F/u with urology as advised, and urinary stent removal at [facility] on 3/21/23 per pt     Care Transitions Subsequent and Final Call    Schedule Follow Up Appointment with PCP: Completed  Subsequent and Final Calls  Do you have any ongoing symptoms?: Yes  Onset of Patient-reported symptoms: In the past 7 days  Patient-reported symptoms: Other  Interventions for patient-reported symptoms: Notified PCP/Physician  Have your medications changed?: No  Do you have any questions related to your medications?: No  Do you currently have any active services?: No  Are you currently active with any services?: Outpatient/Community Services  Do you have any needs or concerns that I can assist you with?: No  Identified Barriers: None  Care Transitions Interventions     Other Services: Declined (Comment: Declined RPM 2/27/23)   Other Interventions:             Care Transition Nurse provided contact information for future needs. Plan for follow-up call in 7-10 days based on severity of symptoms and risk factors.  Plan for next call: symptom management-Any recurrent UTI sx? How is pt doing pot stent removal?  Any increased sob?     Ila Alberto RN

## 2023-03-21 LAB
ACID FAST STN SPEC QL: NORMAL
MYCOBACTERIUM SPEC CULT: NORMAL

## 2023-03-22 ENCOUNTER — CARE COORDINATION (OUTPATIENT)
Dept: CASE MANAGEMENT | Age: 63
End: 2023-03-22

## 2023-03-22 NOTE — CARE COORDINATION
[facility] Care Transitions Follow Up Call    Patient Current Location:  Home: [address] [place] [address]    Care Transition Nurse contacted the patient by telephone to follow up after admission on 23. Patient: Javon Wick  Patient : 1960   MRN: [id number]  Reason for Admission: Acute UTI  Discharge Date: 23 RARS: Readmission Risk Score: 21.6      Needs to be reviewed by the provider   Additional needs identified to be addressed with provider: No  none             Method of communication with provider: none. CTN called and spoke with the pt for a sub care transition call post hospital discharge. Pt admitted on 23 with dx UTI and bilateral pl effusions. Pt presented to ED with c/o sob and weakness. Pt is s/p R-sided US guided thoracentesis on 23 with 140cc fluid removed. Pt reports that she is doing \"really good\" today. Pt had a urinary stent removal and replacement procedure yesterday, 3/21/23, at [facility]. Pt states that everything went well with the procedure. She reports to slightly blood-tinged urine today which is what she was told by urology is to be expected. She denies any pain or fever/chills. She was started on Cephalexin 250 mg TID x 3d post procedure. Was started yesterday. She is voiding without any difficulty. Pt denies any sob at rest. Pt states that from a pulmonary standpoint she is doing \"better\" and only becomes sob with increased exertion. Pt voices no current needs/concerns at this time. Pt is agreeable to continued outreaches from this CTN. Addressed changes since last contact:  medications-Cephalexin 250 mg TID x 3d post stent procedure.  Started on 3/21/23  S/p urinary stent exchange at [facility] 3/21/23    Follow Up  Future Appointments   Date Time Provider Naomi Alvarado   2023 10:00 AM MD Keny Garcia [facility]     Non-[facility] follow up appointment(s): Urology~f/u as advised    Care Transition Nurse reviewed medical

## 2023-03-28 LAB
ACID FAST STN SPEC QL: NORMAL
MYCOBACTERIUM SPEC CULT: NORMAL

## 2023-03-29 ENCOUNTER — CARE COORDINATION (OUTPATIENT)
Dept: CASE MANAGEMENT | Age: 63
End: 2023-03-29

## 2023-03-29 NOTE — CARE COORDINATION
[facility] Final Care Transitions Follow Up Call      Patient: Chanda Green  Patient : 1960   MRN: [id number]  Reason for Admission: Acute UTI  Discharge Date: 23 RARS: Readmission Risk Score: 21.6      Attempted to reach the patient for final Care Transition call. Message left with CTN's contact information requesting return phone call. No further outreaches will be attempted. If no return call by the end of today, CTN will  sign off and resolve CT episode, as episode is ending.       Follow Up  Future Appointments   Date Time Provider Naomi Alvarado   2023 10:00 AM Reyna Cotton MD [facility] Merly Mcrae RN

## 2023-04-04 LAB
ACID FAST STN SPEC QL: NORMAL
MYCOBACTERIUM SPEC CULT: NORMAL

## 2023-04-05 ENCOUNTER — CARE COORDINATION (OUTPATIENT)
Dept: CARE COORDINATION | Age: 63
End: 2023-04-05

## 2023-04-05 NOTE — LETTER
4/5/2023    Drake Mina  [address] [place] [id number]      Dear Drake Mina,    My name is Alexandr Oneal RN and I am a registered nurse who partners with Ashley Silvestre MD to improve patients' health. Ashley Silvestre MD believes you would benefit from working with me. As a member of your health care team, I would work with other providers involved in your care, offer education for your specific health conditions, and connect you with additional resources as needed. I will collaborate with Ashley Silvestre MD to support you in following your treatment plan. The additional support I provide is no additional cost to you. My primary focus is to help you achieve specific goals and improve your health. We are committed to walk with you on this journey and look forward to working with you. Please call me to further discuss your healthcare needs.   I am available by phone    In good health,     Alexandr Oneal MSN, RN, [address] [place] [address] Manager  Cell: [phone]

## 2023-04-05 NOTE — CARE COORDINATION
-Attempted to reach pt to offer enrollment into care coordination program, however no answer.  -Detailed HIPAA compliant VM left introducing self, reason for call, and brief explanation of program.  -Left ACM's contact information, requesting call back.   -Mailed Intro Letter CCSS

## 2023-04-18 ENCOUNTER — CARE COORDINATION (OUTPATIENT)
Dept: CARE COORDINATION | Age: 63
End: 2023-04-18

## 2023-04-19 ENCOUNTER — OFFICE VISIT (OUTPATIENT)
Dept: FAMILY MEDICINE CLINIC | Age: 63
End: 2023-04-19

## 2023-04-19 VITALS
WEIGHT: 140 LBS | HEART RATE: 84 BPM | DIASTOLIC BLOOD PRESSURE: 70 MMHG | HEIGHT: 63 IN | SYSTOLIC BLOOD PRESSURE: 124 MMHG | BODY MASS INDEX: 24.8 KG/M2 | RESPIRATION RATE: 20 BRPM | OXYGEN SATURATION: 98 %

## 2023-04-19 DIAGNOSIS — I10 PRIMARY HYPERTENSION: ICD-10-CM

## 2023-04-19 DIAGNOSIS — Z00.00 MEDICARE ANNUAL WELLNESS VISIT, SUBSEQUENT: Primary | ICD-10-CM

## 2023-04-19 DIAGNOSIS — E11.51 TYPE 2 DIABETES MELLITUS WITH DIABETIC PERIPHERAL ANGIOPATHY WITHOUT GANGRENE, WITHOUT LONG-TERM CURRENT USE OF INSULIN (HCC): ICD-10-CM

## 2023-04-19 DIAGNOSIS — I26.99 PULMONARY EMBOLISM WITHOUT ACUTE COR PULMONALE, UNSPECIFIED CHRONICITY, UNSPECIFIED PULMONARY EMBOLISM TYPE (HCC): ICD-10-CM

## 2023-04-19 DIAGNOSIS — R79.89 ABNORMAL TSH: ICD-10-CM

## 2023-04-19 LAB — HBA1C MFR BLD: 5.5 %

## 2023-04-19 RX ORDER — MIRTAZAPINE 7.5 MG/1
7.5 TABLET, FILM COATED ORAL NIGHTLY
Qty: 30 TABLET | Refills: 5 | Status: SHIPPED | OUTPATIENT
Start: 2023-04-19

## 2023-04-19 RX ORDER — ALBUTEROL SULFATE 90 UG/1
2 AEROSOL, METERED RESPIRATORY (INHALATION) 4 TIMES DAILY PRN
Qty: 18 G | Refills: 3 | Status: SHIPPED | OUTPATIENT
Start: 2023-04-19

## 2023-04-19 RX ORDER — CARVEDILOL 12.5 MG/1
12.5 TABLET ORAL 2 TIMES DAILY WITH MEALS
Qty: 60 TABLET | Refills: 5 | Status: SHIPPED | OUTPATIENT
Start: 2023-04-19

## 2023-04-19 RX ORDER — IBUPROFEN 800 MG/1
800 TABLET ORAL EVERY 12 HOURS PRN
Qty: 180 TABLET | Refills: 1 | Status: SHIPPED | OUTPATIENT
Start: 2023-04-19

## 2023-04-19 RX ORDER — HYDRALAZINE HYDROCHLORIDE 25 MG/1
25 TABLET, FILM COATED ORAL EVERY 8 HOURS SCHEDULED
Qty: 90 TABLET | Refills: 5 | Status: SHIPPED | OUTPATIENT
Start: 2023-04-19

## 2023-04-19 RX ORDER — GABAPENTIN 100 MG/1
100 CAPSULE ORAL 2 TIMES DAILY
Qty: 180 CAPSULE | Refills: 1 | Status: SHIPPED | OUTPATIENT
Start: 2023-04-19 | End: 2024-04-19

## 2023-04-19 ASSESSMENT — PATIENT HEALTH QUESTIONNAIRE - PHQ9
SUM OF ALL RESPONSES TO PHQ QUESTIONS 1-9: 0
2. FEELING DOWN, DEPRESSED OR HOPELESS: 0
SUM OF ALL RESPONSES TO PHQ QUESTIONS 1-9: 0
SUM OF ALL RESPONSES TO PHQ9 QUESTIONS 1 & 2: 0
1. LITTLE INTEREST OR PLEASURE IN DOING THINGS: 0

## 2023-04-19 ASSESSMENT — LIFESTYLE VARIABLES: HOW OFTEN DO YOU HAVE A DRINK CONTAINING ALCOHOL: NEVER

## 2023-04-19 NOTE — PATIENT INSTRUCTIONS
as high blood pressure , high cholesterol , or diabetes   Anyone who wants to lower their risk of developing cardiovascular disease   Sodium    Sodium is a mineral found in many foods. In general, most people consume much more sodium than they need. Diets high in sodium can increase blood pressure and lead to edema (water retention). On a heart-healthy diet, you should consume no more than 2,300 mg (milligrams) of sodium per dayabout the amount in one teaspoon of table salt. The foods highest in sodium include table salt (about 50% sodium), processed foods, convenience foods, and preserved foods. Cholesterol    Cholesterol is a fat-like, waxy substance in your blood. Our bodies make some cholesterol. It is also found in animal products, with the highest amounts in fatty meat, egg yolks, whole milk, cheese, shellfish, and organ meats. On a heart-healthy diet, you should limit your cholesterol intake to less than 200 mg per day. It is normal and important to have some cholesterol in your bloodstream. But too much cholesterol can cause plaque to build up within your arteries, which can eventually lead to a heart attack or stroke. The two types of cholesterol that are most commonly referred to are:   Low-density lipoprotein (LDL) cholesterol  Also known as bad cholesterol, this is the cholesterol that tends to build up along your arteries. Bad cholesterol levels are increased by eating fats that are saturated or hydrogenated. Optimal level of this cholesterol is less than 100. Over 130 starts to get risky for heart disease. High-density lipoprotein (HDL) cholesterol  Also known as good cholesterol, this type of cholesterol actually carries cholesterol away from your arteries and may, therefore, help lower your risk of having a heart attack. You want this level to be high (ideally greater than 60). It is a risk to have a level less than 40.  You can raise this good cholesterol by eating olive oil, canola oil,

## 2023-04-19 NOTE — PROGRESS NOTES
(REMERON) 7.5 MG tablet Take 1 tablet by mouth nightly Yes Sully Key MD   apixaban (ELIQUIS) 5 MG TABS tablet Take 1 tablet by mouth 2 times daily Yes Sully Key MD   carvedilol (COREG) 12.5 MG tablet Take 1 tablet by mouth 2 times daily (with meals) Yes Sully Key MD   hydrALAZINE (APRESOLINE) 25 MG tablet Take 1 tablet by mouth every 8 hours Yes Sully Key MD   albuterol sulfate HFA (VENTOLIN HFA) 108 (90 Base) MCG/ACT inhaler Inhale 2 puffs into the lungs 4 times daily as needed for Wheezing Yes Raymundo Cunningham DO   vitamin B-6 (B-6) 50 MG tablet Take 1 tablet by mouth daily Yes Raymundo Cunningham DO   diazePAM (VALIUM) 2 MG tablet Take 0.5 tablets by mouth daily as needed for Anxiety.  Yes Historical Provider, [place] (Including outside providers/suppliers regularly involved in providing care):   Patient Care Team:  Sully Key MD as PCP - General (Family Medicine)  Sully Key MD as PCP - Empaneled Provider  Elizabeth Morrison MD as Consulting Physician (Cardiology)     Reviewed and updated this visit:  Tobacco  Allergies  Meds  Problems  Med Hx  Surg Hx  Soc Hx  Fam Hx               Sully Key MD
Supplements (BOOST PLUS) LIQD Take 1 each by mouth daily Strawberry/Vanilla Yes Historical Provider, MD   vitamin B-6 (B-6) 50 MG tablet Take 1 tablet by mouth daily Yes Richard Avila DO   diazePAM (VALIUM) 2 MG tablet Take 0.5 tablets by mouth daily as needed for Anxiety.  Yes Historical Provider, MD       CareTeam (Including outside providers/suppliers regularly involved in providing care):   Patient Care Team:  Majo Pierson MD as PCP - General (Family Medicine)  Majo Pierson MD as PCP - Empaneled Provider  Buren Kawasaki, MD as Consulting Physician (Cardiology)     Reviewed and updated this visit:  Tobacco  Allergies  Meds  Problems  Med Hx  Surg Hx  Soc Hx  Fam Hx               Majo Pierson MD

## 2023-05-09 ENCOUNTER — APPOINTMENT (OUTPATIENT)
Dept: CT IMAGING | Age: 63
DRG: 695 | End: 2023-05-09
Payer: MEDICARE

## 2023-05-09 ENCOUNTER — HOSPITAL ENCOUNTER (INPATIENT)
Age: 63
LOS: 4 days | Discharge: HOME HEALTH | DRG: 695 | End: 2023-05-15
Attending: STUDENT IN AN ORGANIZED HEALTH CARE EDUCATION/TRAINING PROGRAM | Admitting: INTERNAL MEDICINE
Payer: MEDICARE

## 2023-05-09 ENCOUNTER — APPOINTMENT (OUTPATIENT)
Dept: GENERAL RADIOLOGY | Age: 63
DRG: 695 | End: 2023-05-09
Payer: MEDICARE

## 2023-05-09 DIAGNOSIS — I50.9 CONGESTIVE HEART FAILURE, UNSPECIFIED HF CHRONICITY, UNSPECIFIED HEART FAILURE TYPE (HCC): ICD-10-CM

## 2023-05-09 DIAGNOSIS — R06.89 DYSPNEA AND RESPIRATORY ABNORMALITIES: ICD-10-CM

## 2023-05-09 DIAGNOSIS — R06.00 DYSPNEA AND RESPIRATORY ABNORMALITIES: ICD-10-CM

## 2023-05-09 DIAGNOSIS — I50.33 ACUTE ON CHRONIC DIASTOLIC CHF (CONGESTIVE HEART FAILURE) (HCC): ICD-10-CM

## 2023-05-09 DIAGNOSIS — R51.9 NONINTRACTABLE HEADACHE, UNSPECIFIED CHRONICITY PATTERN, UNSPECIFIED HEADACHE TYPE: ICD-10-CM

## 2023-05-09 DIAGNOSIS — I16.1 HYPERTENSIVE EMERGENCY: Primary | ICD-10-CM

## 2023-05-09 LAB
ALBUMIN SERPL-MCNC: 3.6 G/DL (ref 3.5–5.2)
ALP SERPL-CCNC: 102 U/L (ref 35–104)
ALT SERPL-CCNC: 9 U/L (ref 0–32)
ANION GAP SERPL CALCULATED.3IONS-SCNC: 9 MMOL/L (ref 7–16)
ANISOCYTOSIS: ABNORMAL
AST SERPL-CCNC: 31 U/L (ref 0–31)
BACTERIA URNS QL MICRO: ABNORMAL /HPF
BASOPHILS # BLD: 0.03 E9/L (ref 0–0.2)
BASOPHILS NFR BLD: 0.9 % (ref 0–2)
BILIRUB SERPL-MCNC: 0.3 MG/DL (ref 0–1.2)
BILIRUB UR QL STRIP: NEGATIVE
BNP BLD-MCNC: 692 PG/ML (ref 0–125)
BUN SERPL-MCNC: 40 MG/DL (ref 6–23)
BURR CELLS: ABNORMAL
CALCIUM SERPL-MCNC: 9.6 MG/DL (ref 8.6–10.2)
CHLORIDE SERPL-SCNC: 107 MMOL/L (ref 98–107)
CLARITY UR: ABNORMAL
CO2 SERPL-SCNC: 24 MMOL/L (ref 22–29)
COLOR UR: ABNORMAL
CREAT SERPL-MCNC: 1.2 MG/DL (ref 0.5–1)
EOSINOPHIL # BLD: 0 E9/L (ref 0.05–0.5)
EOSINOPHIL NFR BLD: 1.7 % (ref 0–6)
EPI CELLS #/AREA URNS HPF: ABNORMAL /HPF
ERYTHROCYTE [DISTWIDTH] IN BLOOD BY AUTOMATED COUNT: 17.5 FL (ref 11.5–15)
GLUCOSE SERPL-MCNC: 134 MG/DL (ref 74–99)
GLUCOSE UR STRIP-MCNC: 100 MG/DL
HCT VFR BLD AUTO: 29.3 % (ref 34–48)
HGB BLD-MCNC: 8.5 G/DL (ref 11.5–15.5)
HGB UR QL STRIP: ABNORMAL
HYPOCHROMIA: ABNORMAL
INFLUENZA A BY PCR: NOT DETECTED
INFLUENZA B BY PCR: NOT DETECTED
KETONES UR STRIP-MCNC: ABNORMAL MG/DL
LACTATE BLDV-SCNC: 0.9 MMOL/L (ref 0.5–2.2)
LEUKOCYTE ESTERASE UR QL STRIP: ABNORMAL
LIPASE: 23 U/L (ref 13–60)
LYMPHOCYTES # BLD: 1.6 E9/L (ref 1.5–4)
LYMPHOCYTES NFR BLD: 55.3 % (ref 20–42)
MCH RBC QN AUTO: 24.4 PG (ref 26–35)
MCHC RBC AUTO-ENTMCNC: 29 % (ref 32–34.5)
MCV RBC AUTO: 84.2 FL (ref 80–99.9)
MONOCYTES # BLD: 0.38 E9/L (ref 0.1–0.95)
MONOCYTES NFR BLD: 13.1 % (ref 2–12)
MYELOCYTES NFR BLD MANUAL: 0.9 % (ref 0–0)
NEUTROPHILS # BLD: 0.9 E9/L (ref 1.8–7.3)
NEUTS SEG NFR BLD: 29.8 % (ref 43–80)
NITRITE UR QL STRIP: POSITIVE
OVALOCYTES: ABNORMAL
PH UR STRIP: 6.5 [PH] (ref 5–9)
PLATELET # BLD AUTO: 525 E9/L (ref 130–450)
PMV BLD AUTO: 11.1 FL (ref 7–12)
POIKILOCYTES: ABNORMAL
POLYCHROMASIA: ABNORMAL
POTASSIUM SERPL-SCNC: 5.1 MMOL/L (ref 3.5–5)
PROT SERPL-MCNC: 8.4 G/DL (ref 6.4–8.3)
PROT UR STRIP-MCNC: >=300 MG/DL
RBC # BLD AUTO: 3.48 E12/L (ref 3.5–5.5)
RBC #/AREA URNS HPF: >20 /HPF (ref 0–2)
REASON FOR REJECTION: NORMAL
REASON FOR REJECTION: NORMAL
REJECTED TEST: NORMAL
REJECTED TEST: NORMAL
SARS-COV-2 RDRP RESP QL NAA+PROBE: NOT DETECTED
SODIUM SERPL-SCNC: 140 MMOL/L (ref 132–146)
SP GR UR STRIP: 1.01 (ref 1–1.03)
TROPONIN, HIGH SENSITIVITY: 40 NG/L (ref 0–9)
UROBILINOGEN UR STRIP-ACNC: 1 E.U./DL
WBC # BLD: 2.9 E9/L (ref 4.5–11.5)
WBC #/AREA URNS HPF: ABNORMAL /HPF (ref 0–5)

## 2023-05-09 PROCEDURE — 93005 ELECTROCARDIOGRAM TRACING: CPT | Performed by: STUDENT IN AN ORGANIZED HEALTH CARE EDUCATION/TRAINING PROGRAM

## 2023-05-09 PROCEDURE — 36415 COLL VENOUS BLD VENIPUNCTURE: CPT

## 2023-05-09 PROCEDURE — G0378 HOSPITAL OBSERVATION PER HR: HCPCS

## 2023-05-09 PROCEDURE — 80053 COMPREHEN METABOLIC PANEL: CPT

## 2023-05-09 PROCEDURE — 74176 CT ABD & PELVIS W/O CONTRAST: CPT

## 2023-05-09 PROCEDURE — 0202U NFCT DS 22 TRGT SARS-COV-2: CPT

## 2023-05-09 PROCEDURE — 84484 ASSAY OF TROPONIN QUANT: CPT

## 2023-05-09 PROCEDURE — 87635 SARS-COV-2 COVID-19 AMP PRB: CPT

## 2023-05-09 PROCEDURE — 96375 TX/PRO/DX INJ NEW DRUG ADDON: CPT

## 2023-05-09 PROCEDURE — 83880 ASSAY OF NATRIURETIC PEPTIDE: CPT

## 2023-05-09 PROCEDURE — 85025 COMPLETE CBC W/AUTO DIFF WBC: CPT

## 2023-05-09 PROCEDURE — 2500000003 HC RX 250 WO HCPCS: Performed by: STUDENT IN AN ORGANIZED HEALTH CARE EDUCATION/TRAINING PROGRAM

## 2023-05-09 PROCEDURE — 83605 ASSAY OF LACTIC ACID: CPT

## 2023-05-09 PROCEDURE — 83690 ASSAY OF LIPASE: CPT

## 2023-05-09 PROCEDURE — 87088 URINE BACTERIA CULTURE: CPT

## 2023-05-09 PROCEDURE — 87502 INFLUENZA DNA AMP PROBE: CPT

## 2023-05-09 PROCEDURE — 6370000000 HC RX 637 (ALT 250 FOR IP): Performed by: STUDENT IN AN ORGANIZED HEALTH CARE EDUCATION/TRAINING PROGRAM

## 2023-05-09 PROCEDURE — 81001 URINALYSIS AUTO W/SCOPE: CPT

## 2023-05-09 PROCEDURE — 6360000002 HC RX W HCPCS: Performed by: STUDENT IN AN ORGANIZED HEALTH CARE EDUCATION/TRAINING PROGRAM

## 2023-05-09 PROCEDURE — 96374 THER/PROPH/DIAG INJ IV PUSH: CPT

## 2023-05-09 PROCEDURE — 70450 CT HEAD/BRAIN W/O DYE: CPT

## 2023-05-09 PROCEDURE — 71046 X-RAY EXAM CHEST 2 VIEWS: CPT

## 2023-05-09 PROCEDURE — 99285 EMERGENCY DEPT VISIT HI MDM: CPT

## 2023-05-09 RX ORDER — POLYETHYLENE GLYCOL 3350 17 G/17G
17 POWDER, FOR SOLUTION ORAL DAILY
Status: DISCONTINUED | OUTPATIENT
Start: 2023-05-09 | End: 2023-05-10

## 2023-05-09 RX ORDER — LABETALOL HYDROCHLORIDE 5 MG/ML
10 INJECTION, SOLUTION INTRAVENOUS ONCE
Status: COMPLETED | OUTPATIENT
Start: 2023-05-09 | End: 2023-05-09

## 2023-05-09 RX ORDER — HYDRALAZINE HYDROCHLORIDE 20 MG/ML
10 INJECTION INTRAMUSCULAR; INTRAVENOUS ONCE
Status: COMPLETED | OUTPATIENT
Start: 2023-05-09 | End: 2023-05-09

## 2023-05-09 RX ORDER — FUROSEMIDE 10 MG/ML
20 INJECTION INTRAMUSCULAR; INTRAVENOUS ONCE
Status: COMPLETED | OUTPATIENT
Start: 2023-05-09 | End: 2023-05-09

## 2023-05-09 RX ORDER — HYDROCODONE BITARTRATE AND ACETAMINOPHEN 5; 325 MG/1; MG/1
1 TABLET ORAL ONCE
Status: COMPLETED | OUTPATIENT
Start: 2023-05-09 | End: 2023-05-09

## 2023-05-09 RX ORDER — SENNA PLUS 8.6 MG/1
2 TABLET ORAL 2 TIMES DAILY
Status: DISCONTINUED | OUTPATIENT
Start: 2023-05-09 | End: 2023-05-10

## 2023-05-09 RX ADMIN — HYDROCODONE BITARTRATE AND ACETAMINOPHEN 1 TABLET: 5; 325 TABLET ORAL at 16:55

## 2023-05-09 RX ADMIN — LABETALOL HYDROCHLORIDE 10 MG: 5 INJECTION INTRAVENOUS at 19:36

## 2023-05-09 RX ADMIN — HYDRALAZINE HYDROCHLORIDE 10 MG: 20 INJECTION INTRAMUSCULAR; INTRAVENOUS at 18:31

## 2023-05-09 RX ADMIN — FUROSEMIDE 20 MG: 10 INJECTION, SOLUTION INTRAMUSCULAR; INTRAVENOUS at 19:36

## 2023-05-09 ASSESSMENT — LIFESTYLE VARIABLES
HOW MANY STANDARD DRINKS CONTAINING ALCOHOL DO YOU HAVE ON A TYPICAL DAY: PATIENT DOES NOT DRINK
HOW OFTEN DO YOU HAVE A DRINK CONTAINING ALCOHOL: NEVER

## 2023-05-09 ASSESSMENT — PAIN DESCRIPTION - LOCATION: LOCATION: CHEST

## 2023-05-09 ASSESSMENT — PAIN DESCRIPTION - DESCRIPTORS: DESCRIPTORS: CRAMPING

## 2023-05-09 ASSESSMENT — PAIN SCALES - GENERAL: PAINLEVEL_OUTOF10: 10

## 2023-05-09 ASSESSMENT — PAIN DESCRIPTION - ORIENTATION: ORIENTATION: LEFT

## 2023-05-10 ENCOUNTER — APPOINTMENT (OUTPATIENT)
Dept: CT IMAGING | Age: 63
DRG: 695 | End: 2023-05-10
Payer: MEDICARE

## 2023-05-10 LAB
ALBUMIN SERPL-MCNC: 3.8 G/DL (ref 3.5–5.2)
ALP SERPL-CCNC: 108 U/L (ref 35–104)
ALT SERPL-CCNC: 7 U/L (ref 0–32)
ANION GAP SERPL CALCULATED.3IONS-SCNC: 13 MMOL/L (ref 7–16)
AST SERPL-CCNC: 13 U/L (ref 0–31)
BASOPHILS # BLD: 0.02 E9/L (ref 0–0.2)
BASOPHILS NFR BLD: 0.7 % (ref 0–2)
BILIRUB SERPL-MCNC: 0.3 MG/DL (ref 0–1.2)
BUN SERPL-MCNC: 40 MG/DL (ref 6–23)
CALCIUM SERPL-MCNC: 9.6 MG/DL (ref 8.6–10.2)
CHLORIDE SERPL-SCNC: 104 MMOL/L (ref 98–107)
CO2 SERPL-SCNC: 23 MMOL/L (ref 22–29)
CREAT SERPL-MCNC: 1.1 MG/DL (ref 0.5–1)
EOSINOPHIL # BLD: 0.02 E9/L (ref 0.05–0.5)
EOSINOPHIL NFR BLD: 0.7 % (ref 0–6)
ERYTHROCYTE [DISTWIDTH] IN BLOOD BY AUTOMATED COUNT: 17.3 FL (ref 11.5–15)
GLUCOSE SERPL-MCNC: 114 MG/DL (ref 74–99)
HCT VFR BLD AUTO: 27.4 % (ref 34–48)
HGB BLD-MCNC: 8.2 G/DL (ref 11.5–15.5)
IMM GRANULOCYTES # BLD: 0.04 E9/L
IMM GRANULOCYTES NFR BLD: 1.5 % (ref 0–5)
LYMPHOCYTES # BLD: 0.96 E9/L (ref 1.5–4)
LYMPHOCYTES NFR BLD: 35.8 % (ref 20–42)
MAGNESIUM SERPL-MCNC: 2 MG/DL (ref 1.6–2.6)
MCH RBC QN AUTO: 24.6 PG (ref 26–35)
MCHC RBC AUTO-ENTMCNC: 29.9 % (ref 32–34.5)
MCV RBC AUTO: 82 FL (ref 80–99.9)
METER GLUCOSE: 134 MG/DL (ref 74–99)
METER GLUCOSE: 162 MG/DL (ref 74–99)
METER GLUCOSE: 188 MG/DL (ref 74–99)
MONOCYTES # BLD: 0.5 E9/L (ref 0.1–0.95)
MONOCYTES NFR BLD: 18.7 % (ref 2–12)
NEUTROPHILS # BLD: 1.14 E9/L (ref 1.8–7.3)
NEUTS SEG NFR BLD: 42.6 % (ref 43–80)
PHOSPHATE SERPL-MCNC: 4.3 MG/DL (ref 2.5–4.5)
PLATELET # BLD AUTO: 370 E9/L (ref 130–450)
PMV BLD AUTO: 10.2 FL (ref 7–12)
POTASSIUM SERPL-SCNC: 4.2 MMOL/L (ref 3.5–5)
PROCALCITONIN: 0.15 NG/ML (ref 0–0.08)
PROT SERPL-MCNC: 8.3 G/DL (ref 6.4–8.3)
RBC # BLD AUTO: 3.34 E12/L (ref 3.5–5.5)
SODIUM SERPL-SCNC: 140 MMOL/L (ref 132–146)
T4 FREE SERPL-MCNC: 0.96 NG/DL (ref 0.93–1.7)
TROPONIN, HIGH SENSITIVITY: 40 NG/L (ref 0–9)
TSH SERPL-MCNC: 0.04 UIU/ML (ref 0.27–4.2)
WBC # BLD: 2.7 E9/L (ref 4.5–11.5)

## 2023-05-10 PROCEDURE — 80053 COMPREHEN METABOLIC PANEL: CPT

## 2023-05-10 PROCEDURE — 96367 TX/PROPH/DG ADDL SEQ IV INF: CPT

## 2023-05-10 PROCEDURE — 84100 ASSAY OF PHOSPHORUS: CPT

## 2023-05-10 PROCEDURE — 82962 GLUCOSE BLOOD TEST: CPT

## 2023-05-10 PROCEDURE — 84439 ASSAY OF FREE THYROXINE: CPT

## 2023-05-10 PROCEDURE — 96365 THER/PROPH/DIAG IV INF INIT: CPT

## 2023-05-10 PROCEDURE — 6360000002 HC RX W HCPCS: Performed by: NURSE PRACTITIONER

## 2023-05-10 PROCEDURE — 83735 ASSAY OF MAGNESIUM: CPT

## 2023-05-10 PROCEDURE — 85025 COMPLETE CBC W/AUTO DIFF WBC: CPT

## 2023-05-10 PROCEDURE — 96376 TX/PRO/DX INJ SAME DRUG ADON: CPT

## 2023-05-10 PROCEDURE — 6370000000 HC RX 637 (ALT 250 FOR IP): Performed by: INTERNAL MEDICINE

## 2023-05-10 PROCEDURE — 36415 COLL VENOUS BLD VENIPUNCTURE: CPT

## 2023-05-10 PROCEDURE — 84145 PROCALCITONIN (PCT): CPT

## 2023-05-10 PROCEDURE — 84443 ASSAY THYROID STIM HORMONE: CPT

## 2023-05-10 PROCEDURE — 2580000003 HC RX 258: Performed by: INTERNAL MEDICINE

## 2023-05-10 PROCEDURE — 96375 TX/PRO/DX INJ NEW DRUG ADDON: CPT

## 2023-05-10 PROCEDURE — G0378 HOSPITAL OBSERVATION PER HR: HCPCS

## 2023-05-10 PROCEDURE — 94640 AIRWAY INHALATION TREATMENT: CPT

## 2023-05-10 PROCEDURE — 6360000002 HC RX W HCPCS: Performed by: INTERNAL MEDICINE

## 2023-05-10 PROCEDURE — 2500000003 HC RX 250 WO HCPCS: Performed by: INTERNAL MEDICINE

## 2023-05-10 PROCEDURE — 6370000000 HC RX 637 (ALT 250 FOR IP): Performed by: NURSE PRACTITIONER

## 2023-05-10 PROCEDURE — 96366 THER/PROPH/DIAG IV INF ADDON: CPT

## 2023-05-10 PROCEDURE — 84484 ASSAY OF TROPONIN QUANT: CPT

## 2023-05-10 PROCEDURE — 71250 CT THORAX DX C-: CPT

## 2023-05-10 PROCEDURE — P9047 ALBUMIN (HUMAN), 25%, 50ML: HCPCS | Performed by: NURSE PRACTITIONER

## 2023-05-10 RX ORDER — FUROSEMIDE 10 MG/ML
20 INJECTION INTRAMUSCULAR; INTRAVENOUS 2 TIMES DAILY
Status: DISCONTINUED | OUTPATIENT
Start: 2023-05-10 | End: 2023-05-10

## 2023-05-10 RX ORDER — MIRTAZAPINE 15 MG/1
7.5 TABLET, FILM COATED ORAL NIGHTLY
Status: DISCONTINUED | OUTPATIENT
Start: 2023-05-10 | End: 2023-05-15 | Stop reason: HOSPADM

## 2023-05-10 RX ORDER — OXYCODONE HYDROCHLORIDE AND ACETAMINOPHEN 5; 325 MG/1; MG/1
1 TABLET ORAL EVERY 8 HOURS PRN
Status: DISCONTINUED | OUTPATIENT
Start: 2023-05-10 | End: 2023-05-15 | Stop reason: HOSPADM

## 2023-05-10 RX ORDER — DEXTROSE MONOHYDRATE 100 MG/ML
INJECTION, SOLUTION INTRAVENOUS CONTINUOUS PRN
Status: DISCONTINUED | OUTPATIENT
Start: 2023-05-10 | End: 2023-05-15 | Stop reason: HOSPADM

## 2023-05-10 RX ORDER — POLYETHYLENE GLYCOL 3350 17 G/17G
17 POWDER, FOR SOLUTION ORAL 2 TIMES DAILY
Status: DISCONTINUED | OUTPATIENT
Start: 2023-05-10 | End: 2023-05-15 | Stop reason: HOSPADM

## 2023-05-10 RX ORDER — POLYETHYLENE GLYCOL 3350 17 G/17G
17 POWDER, FOR SOLUTION ORAL DAILY PRN
Status: DISCONTINUED | OUTPATIENT
Start: 2023-05-10 | End: 2023-05-15 | Stop reason: HOSPADM

## 2023-05-10 RX ORDER — DIAZEPAM 2 MG/1
1 TABLET ORAL DAILY PRN
Status: DISCONTINUED | OUTPATIENT
Start: 2023-05-10 | End: 2023-05-15 | Stop reason: HOSPADM

## 2023-05-10 RX ORDER — SENNA AND DOCUSATE SODIUM 50; 8.6 MG/1; MG/1
2 TABLET, FILM COATED ORAL 2 TIMES DAILY
Status: DISCONTINUED | OUTPATIENT
Start: 2023-05-10 | End: 2023-05-15 | Stop reason: HOSPADM

## 2023-05-10 RX ORDER — IPRATROPIUM BROMIDE AND ALBUTEROL SULFATE 2.5; .5 MG/3ML; MG/3ML
1 SOLUTION RESPIRATORY (INHALATION) 4 TIMES DAILY
Status: DISCONTINUED | OUTPATIENT
Start: 2023-05-10 | End: 2023-05-14

## 2023-05-10 RX ORDER — INSULIN LISPRO 100 [IU]/ML
0-4 INJECTION, SOLUTION INTRAVENOUS; SUBCUTANEOUS NIGHTLY
Status: DISCONTINUED | OUTPATIENT
Start: 2023-05-10 | End: 2023-05-15 | Stop reason: HOSPADM

## 2023-05-10 RX ORDER — HYDRALAZINE HYDROCHLORIDE 50 MG/1
50 TABLET, FILM COATED ORAL EVERY 8 HOURS SCHEDULED
Status: DISCONTINUED | OUTPATIENT
Start: 2023-05-10 | End: 2023-05-15 | Stop reason: HOSPADM

## 2023-05-10 RX ORDER — SODIUM CHLORIDE 0.9 % (FLUSH) 0.9 %
5-40 SYRINGE (ML) INJECTION PRN
Status: DISCONTINUED | OUTPATIENT
Start: 2023-05-10 | End: 2023-05-15 | Stop reason: HOSPADM

## 2023-05-10 RX ORDER — SODIUM CHLORIDE 0.9 % (FLUSH) 0.9 %
5-40 SYRINGE (ML) INJECTION EVERY 12 HOURS SCHEDULED
Status: DISCONTINUED | OUTPATIENT
Start: 2023-05-10 | End: 2023-05-15 | Stop reason: HOSPADM

## 2023-05-10 RX ORDER — MAGNESIUM SULFATE 1 G/100ML
1000 INJECTION INTRAVENOUS PRN
Status: DISCONTINUED | OUTPATIENT
Start: 2023-05-10 | End: 2023-05-15 | Stop reason: HOSPADM

## 2023-05-10 RX ORDER — ALBUMIN (HUMAN) 12.5 G/50ML
25 SOLUTION INTRAVENOUS 2 TIMES DAILY
Status: COMPLETED | OUTPATIENT
Start: 2023-05-10 | End: 2023-05-10

## 2023-05-10 RX ORDER — HYDRALAZINE HYDROCHLORIDE 20 MG/ML
5 INJECTION INTRAMUSCULAR; INTRAVENOUS EVERY 4 HOURS PRN
Status: DISCONTINUED | OUTPATIENT
Start: 2023-05-10 | End: 2023-05-15 | Stop reason: HOSPADM

## 2023-05-10 RX ORDER — ACETAMINOPHEN 325 MG/1
650 TABLET ORAL EVERY 6 HOURS PRN
Status: DISCONTINUED | OUTPATIENT
Start: 2023-05-10 | End: 2023-05-15 | Stop reason: HOSPADM

## 2023-05-10 RX ORDER — ENOXAPARIN SODIUM 100 MG/ML
40 INJECTION SUBCUTANEOUS DAILY
Status: DISCONTINUED | OUTPATIENT
Start: 2023-05-10 | End: 2023-05-10

## 2023-05-10 RX ORDER — INSULIN LISPRO 100 [IU]/ML
0-4 INJECTION, SOLUTION INTRAVENOUS; SUBCUTANEOUS
Status: DISCONTINUED | OUTPATIENT
Start: 2023-05-10 | End: 2023-05-15 | Stop reason: HOSPADM

## 2023-05-10 RX ORDER — HYDRALAZINE HYDROCHLORIDE 25 MG/1
25 TABLET, FILM COATED ORAL EVERY 8 HOURS SCHEDULED
Status: DISCONTINUED | OUTPATIENT
Start: 2023-05-10 | End: 2023-05-10

## 2023-05-10 RX ORDER — CARVEDILOL 6.25 MG/1
12.5 TABLET ORAL 2 TIMES DAILY WITH MEALS
Status: DISCONTINUED | OUTPATIENT
Start: 2023-05-10 | End: 2023-05-15 | Stop reason: HOSPADM

## 2023-05-10 RX ORDER — FUROSEMIDE 10 MG/ML
20 INJECTION INTRAMUSCULAR; INTRAVENOUS 2 TIMES DAILY
Status: COMPLETED | OUTPATIENT
Start: 2023-05-10 | End: 2023-05-10

## 2023-05-10 RX ORDER — ACETAMINOPHEN 650 MG/1
650 SUPPOSITORY RECTAL EVERY 6 HOURS PRN
Status: DISCONTINUED | OUTPATIENT
Start: 2023-05-10 | End: 2023-05-15 | Stop reason: HOSPADM

## 2023-05-10 RX ORDER — SODIUM CHLORIDE 9 MG/ML
INJECTION, SOLUTION INTRAVENOUS PRN
Status: DISCONTINUED | OUTPATIENT
Start: 2023-05-10 | End: 2023-05-15 | Stop reason: HOSPADM

## 2023-05-10 RX ORDER — GABAPENTIN 100 MG/1
100 CAPSULE ORAL 2 TIMES DAILY
Status: DISCONTINUED | OUTPATIENT
Start: 2023-05-10 | End: 2023-05-15 | Stop reason: HOSPADM

## 2023-05-10 RX ORDER — LACTULOSE 10 G/15ML
20 SOLUTION ORAL 3 TIMES DAILY PRN
Status: DISCONTINUED | OUTPATIENT
Start: 2023-05-10 | End: 2023-05-15 | Stop reason: HOSPADM

## 2023-05-10 RX ORDER — POTASSIUM CHLORIDE 20 MEQ/1
40 TABLET, EXTENDED RELEASE ORAL PRN
Status: DISCONTINUED | OUTPATIENT
Start: 2023-05-10 | End: 2023-05-15 | Stop reason: HOSPADM

## 2023-05-10 RX ORDER — POTASSIUM CHLORIDE 7.45 MG/ML
10 INJECTION INTRAVENOUS PRN
Status: DISCONTINUED | OUTPATIENT
Start: 2023-05-10 | End: 2023-05-15 | Stop reason: HOSPADM

## 2023-05-10 RX ADMIN — SENNOSIDES AND DOCUSATE SODIUM 2 TABLET: 50; 8.6 TABLET ORAL at 21:20

## 2023-05-10 RX ADMIN — SENNOSIDES AND DOCUSATE SODIUM 2 TABLET: 50; 8.6 TABLET ORAL at 09:45

## 2023-05-10 RX ADMIN — Medication 10 ML: at 09:48

## 2023-05-10 RX ADMIN — POLYETHYLENE GLYCOL 3350 17 G: 17 POWDER, FOR SOLUTION ORAL at 21:18

## 2023-05-10 RX ADMIN — APIXABAN 5 MG: 5 TABLET, FILM COATED ORAL at 09:44

## 2023-05-10 RX ADMIN — APIXABAN 5 MG: 5 TABLET, FILM COATED ORAL at 21:15

## 2023-05-10 RX ADMIN — MIRTAZAPINE 7.5 MG: 15 TABLET, FILM COATED ORAL at 21:16

## 2023-05-10 RX ADMIN — HYDRALAZINE HYDROCHLORIDE 50 MG: 50 TABLET, FILM COATED ORAL at 21:14

## 2023-05-10 RX ADMIN — CARVEDILOL 12.5 MG: 6.25 TABLET, FILM COATED ORAL at 09:44

## 2023-05-10 RX ADMIN — SENNOSIDES 17.2 MG: 8.6 TABLET, FILM COATED ORAL at 01:56

## 2023-05-10 RX ADMIN — IPRATROPIUM BROMIDE AND ALBUTEROL SULFATE 1 AMPULE: .5; 2.5 SOLUTION RESPIRATORY (INHALATION) at 18:18

## 2023-05-10 RX ADMIN — FUROSEMIDE 20 MG: 10 INJECTION, SOLUTION INTRAMUSCULAR; INTRAVENOUS at 21:06

## 2023-05-10 RX ADMIN — GABAPENTIN 100 MG: 100 CAPSULE ORAL at 09:45

## 2023-05-10 RX ADMIN — GABAPENTIN 100 MG: 100 CAPSULE ORAL at 21:16

## 2023-05-10 RX ADMIN — OXYCODONE AND ACETAMINOPHEN 1 TABLET: 5; 325 TABLET ORAL at 17:13

## 2023-05-10 RX ADMIN — IPRATROPIUM BROMIDE AND ALBUTEROL SULFATE 1 AMPULE: .5; 2.5 SOLUTION RESPIRATORY (INHALATION) at 13:11

## 2023-05-10 RX ADMIN — HYDRALAZINE HYDROCHLORIDE 50 MG: 50 TABLET, FILM COATED ORAL at 14:04

## 2023-05-10 RX ADMIN — POLYETHYLENE GLYCOL 3350 17 G: 17 POWDER, FOR SOLUTION ORAL at 01:54

## 2023-05-10 RX ADMIN — DOXYCYCLINE 100 MG: 100 INJECTION, POWDER, LYOPHILIZED, FOR SOLUTION INTRAVENOUS at 17:21

## 2023-05-10 RX ADMIN — ALBUMIN (HUMAN) 25 G: 0.25 INJECTION, SOLUTION INTRAVENOUS at 21:11

## 2023-05-10 RX ADMIN — FUROSEMIDE 20 MG: 10 INJECTION, SOLUTION INTRAMUSCULAR; INTRAVENOUS at 09:45

## 2023-05-10 RX ADMIN — DOXYCYCLINE 100 MG: 100 INJECTION, POWDER, LYOPHILIZED, FOR SOLUTION INTRAVENOUS at 05:00

## 2023-05-10 RX ADMIN — CARVEDILOL 12.5 MG: 6.25 TABLET, FILM COATED ORAL at 17:12

## 2023-05-10 RX ADMIN — HYDRALAZINE HYDROCHLORIDE 25 MG: 25 TABLET, FILM COATED ORAL at 07:49

## 2023-05-10 RX ADMIN — ALBUMIN (HUMAN) 25 G: 0.25 INJECTION, SOLUTION INTRAVENOUS at 09:52

## 2023-05-10 RX ADMIN — WATER 1000 MG: 1 INJECTION INTRAMUSCULAR; INTRAVENOUS; SUBCUTANEOUS at 05:00

## 2023-05-10 RX ADMIN — IPRATROPIUM BROMIDE AND ALBUTEROL SULFATE 1 AMPULE: .5; 2.5 SOLUTION RESPIRATORY (INHALATION) at 09:55

## 2023-05-10 RX ADMIN — IPRATROPIUM BROMIDE AND ALBUTEROL SULFATE 1 AMPULE: .5; 2.5 SOLUTION RESPIRATORY (INHALATION) at 06:57

## 2023-05-10 RX ADMIN — OXYCODONE AND ACETAMINOPHEN 1 TABLET: 5; 325 TABLET ORAL at 02:48

## 2023-05-10 ASSESSMENT — PAIN DESCRIPTION - DESCRIPTORS
DESCRIPTORS: ACHING

## 2023-05-10 ASSESSMENT — ENCOUNTER SYMPTOMS
VOMITING: 0
PHOTOPHOBIA: 0
DIARRHEA: 0
CHEST TIGHTNESS: 1
NAUSEA: 0
COUGH: 0
ABDOMINAL PAIN: 0
SHORTNESS OF BREATH: 1
ABDOMINAL DISTENTION: 0

## 2023-05-10 ASSESSMENT — PAIN DESCRIPTION - LOCATION
LOCATION: ABDOMEN
LOCATION: ABDOMEN;CHEST
LOCATION: ABDOMEN

## 2023-05-10 ASSESSMENT — PAIN DESCRIPTION - ORIENTATION
ORIENTATION: MID;LOWER
ORIENTATION: MID;LOWER
ORIENTATION: LEFT

## 2023-05-10 ASSESSMENT — PAIN - FUNCTIONAL ASSESSMENT: PAIN_FUNCTIONAL_ASSESSMENT: ACTIVITIES ARE NOT PREVENTED

## 2023-05-10 ASSESSMENT — PAIN SCALES - GENERAL
PAINLEVEL_OUTOF10: 7
PAINLEVEL_OUTOF10: 10
PAINLEVEL_OUTOF10: 7

## 2023-05-11 PROBLEM — I50.33 ACUTE ON CHRONIC DIASTOLIC CHF (CONGESTIVE HEART FAILURE) (HCC): Status: ACTIVE | Noted: 2023-05-11

## 2023-05-11 LAB
ALBUMIN SERPL-MCNC: 4 G/DL (ref 3.5–5.2)
ALP SERPL-CCNC: 87 U/L (ref 35–104)
ALT SERPL-CCNC: 5 U/L (ref 0–32)
ANION GAP SERPL CALCULATED.3IONS-SCNC: 15 MMOL/L (ref 7–16)
ANISOCYTOSIS: ABNORMAL
AST SERPL-CCNC: 11 U/L (ref 0–31)
BACTERIA UR CULT: NORMAL
BACTERIA URNS QL MICRO: ABNORMAL /HPF
BASOPHILS # BLD: 0 E9/L (ref 0–0.2)
BASOPHILS NFR BLD: 0.8 % (ref 0–2)
BILIRUB SERPL-MCNC: 0.3 MG/DL (ref 0–1.2)
BILIRUB UR QL STRIP: NEGATIVE
BUN SERPL-MCNC: 45 MG/DL (ref 6–23)
CALCIUM SERPL-MCNC: 9.6 MG/DL (ref 8.6–10.2)
CHLORIDE SERPL-SCNC: 102 MMOL/L (ref 98–107)
CLARITY UR: ABNORMAL
CO2 SERPL-SCNC: 23 MMOL/L (ref 22–29)
COLOR UR: ABNORMAL
CREAT SERPL-MCNC: 1.5 MG/DL (ref 0.5–1)
EKG ATRIAL RATE: 92 BPM
EKG ATRIAL RATE: 96 BPM
EKG P AXIS: 47 DEGREES
EKG P AXIS: 64 DEGREES
EKG P-R INTERVAL: 206 MS
EKG P-R INTERVAL: 208 MS
EKG Q-T INTERVAL: 350 MS
EKG Q-T INTERVAL: 376 MS
EKG QRS DURATION: 72 MS
EKG QRS DURATION: 80 MS
EKG QTC CALCULATION (BAZETT): 442 MS
EKG QTC CALCULATION (BAZETT): 464 MS
EKG R AXIS: 22 DEGREES
EKG R AXIS: 55 DEGREES
EKG T AXIS: 74 DEGREES
EKG T AXIS: 75 DEGREES
EKG VENTRICULAR RATE: 92 BPM
EKG VENTRICULAR RATE: 96 BPM
EOSINOPHIL # BLD: 0.07 E9/L (ref 0.05–0.5)
EOSINOPHIL NFR BLD: 2.6 % (ref 0–6)
EPI CELLS #/AREA URNS HPF: ABNORMAL /HPF
ERYTHROCYTE [DISTWIDTH] IN BLOOD BY AUTOMATED COUNT: 17.6 FL (ref 11.5–15)
GLUCOSE SERPL-MCNC: 90 MG/DL (ref 74–99)
GLUCOSE UR STRIP-MCNC: NEGATIVE MG/DL
HCT VFR BLD AUTO: 27.3 % (ref 34–48)
HGB BLD-MCNC: 8 G/DL (ref 11.5–15.5)
HGB UR QL STRIP: ABNORMAL
KETONES UR STRIP-MCNC: NEGATIVE MG/DL
LEUKOCYTE ESTERASE UR QL STRIP: ABNORMAL
LYMPHOCYTES # BLD: 1.28 E9/L (ref 1.5–4)
LYMPHOCYTES NFR BLD: 50.9 % (ref 20–42)
MAGNESIUM SERPL-MCNC: 1.8 MG/DL (ref 1.6–2.6)
MCH RBC QN AUTO: 24.7 PG (ref 26–35)
MCHC RBC AUTO-ENTMCNC: 29.3 % (ref 32–34.5)
MCV RBC AUTO: 84.3 FL (ref 80–99.9)
METER GLUCOSE: 115 MG/DL (ref 74–99)
METER GLUCOSE: 135 MG/DL (ref 74–99)
METER GLUCOSE: 178 MG/DL (ref 74–99)
METER GLUCOSE: 219 MG/DL (ref 74–99)
MONOCYTES # BLD: 0.35 E9/L (ref 0.1–0.95)
MONOCYTES NFR BLD: 13.8 % (ref 2–12)
NEUTROPHILS # BLD: 0.83 E9/L (ref 1.8–7.3)
NEUTS SEG NFR BLD: 32.7 % (ref 43–80)
NITRITE UR QL STRIP: POSITIVE
OVALOCYTES: ABNORMAL
PH UR STRIP: 5 [PH] (ref 5–9)
PHOSPHATE SERPL-MCNC: 4.8 MG/DL (ref 2.5–4.5)
PLATELET # BLD AUTO: 322 E9/L (ref 130–450)
PMV BLD AUTO: 10.9 FL (ref 7–12)
POIKILOCYTES: ABNORMAL
POLYCHROMASIA: ABNORMAL
POTASSIUM SERPL-SCNC: 4.4 MMOL/L (ref 3.5–5)
PROT SERPL-MCNC: 8.1 G/DL (ref 6.4–8.3)
PROT UR STRIP-MCNC: >=300 MG/DL
RBC # BLD AUTO: 3.24 E12/L (ref 3.5–5.5)
RBC #/AREA URNS HPF: ABNORMAL /HPF (ref 0–2)
SODIUM SERPL-SCNC: 140 MMOL/L (ref 132–146)
SP GR UR STRIP: 1.02 (ref 1–1.03)
TEAR DROP CELLS: ABNORMAL
UROBILINOGEN UR STRIP-ACNC: 1 E.U./DL
WBC # BLD: 2.5 E9/L (ref 4.5–11.5)
WBC #/AREA URNS HPF: ABNORMAL /HPF (ref 0–5)

## 2023-05-11 PROCEDURE — 1200000000 HC SEMI PRIVATE

## 2023-05-11 PROCEDURE — 36415 COLL VENOUS BLD VENIPUNCTURE: CPT

## 2023-05-11 PROCEDURE — 6360000002 HC RX W HCPCS: Performed by: INTERNAL MEDICINE

## 2023-05-11 PROCEDURE — 2500000003 HC RX 250 WO HCPCS: Performed by: INTERNAL MEDICINE

## 2023-05-11 PROCEDURE — 93010 ELECTROCARDIOGRAM REPORT: CPT | Performed by: INTERNAL MEDICINE

## 2023-05-11 PROCEDURE — 81001 URINALYSIS AUTO W/SCOPE: CPT

## 2023-05-11 PROCEDURE — 83735 ASSAY OF MAGNESIUM: CPT

## 2023-05-11 PROCEDURE — 6370000000 HC RX 637 (ALT 250 FOR IP): Performed by: INTERNAL MEDICINE

## 2023-05-11 PROCEDURE — 6370000000 HC RX 637 (ALT 250 FOR IP): Performed by: NURSE PRACTITIONER

## 2023-05-11 PROCEDURE — 80053 COMPREHEN METABOLIC PANEL: CPT

## 2023-05-11 PROCEDURE — 96376 TX/PRO/DX INJ SAME DRUG ADON: CPT

## 2023-05-11 PROCEDURE — 51798 US URINE CAPACITY MEASURE: CPT

## 2023-05-11 PROCEDURE — 94640 AIRWAY INHALATION TREATMENT: CPT

## 2023-05-11 PROCEDURE — 96366 THER/PROPH/DIAG IV INF ADDON: CPT

## 2023-05-11 PROCEDURE — 84100 ASSAY OF PHOSPHORUS: CPT

## 2023-05-11 PROCEDURE — 82962 GLUCOSE BLOOD TEST: CPT

## 2023-05-11 PROCEDURE — 2580000003 HC RX 258: Performed by: INTERNAL MEDICINE

## 2023-05-11 PROCEDURE — 85025 COMPLETE CBC W/AUTO DIFF WBC: CPT

## 2023-05-11 RX ORDER — DOXYCYCLINE HYCLATE 100 MG/1
100 CAPSULE ORAL EVERY 12 HOURS SCHEDULED
Status: DISCONTINUED | OUTPATIENT
Start: 2023-05-12 | End: 2023-05-14

## 2023-05-11 RX ORDER — ACETAMINOPHEN 325 MG/1
325 TABLET ORAL 2 TIMES DAILY PRN
COMMUNITY

## 2023-05-11 RX ADMIN — HYDRALAZINE HYDROCHLORIDE 50 MG: 50 TABLET, FILM COATED ORAL at 05:33

## 2023-05-11 RX ADMIN — GABAPENTIN 100 MG: 100 CAPSULE ORAL at 08:27

## 2023-05-11 RX ADMIN — Medication 10 ML: at 08:29

## 2023-05-11 RX ADMIN — Medication 10 ML: at 20:59

## 2023-05-11 RX ADMIN — DOXYCYCLINE 100 MG: 100 INJECTION, POWDER, LYOPHILIZED, FOR SOLUTION INTRAVENOUS at 04:23

## 2023-05-11 RX ADMIN — CARVEDILOL 12.5 MG: 6.25 TABLET, FILM COATED ORAL at 16:47

## 2023-05-11 RX ADMIN — IPRATROPIUM BROMIDE AND ALBUTEROL SULFATE 1 AMPULE: .5; 2.5 SOLUTION RESPIRATORY (INHALATION) at 18:55

## 2023-05-11 RX ADMIN — CARVEDILOL 12.5 MG: 6.25 TABLET, FILM COATED ORAL at 08:27

## 2023-05-11 RX ADMIN — GABAPENTIN 100 MG: 100 CAPSULE ORAL at 20:52

## 2023-05-11 RX ADMIN — IPRATROPIUM BROMIDE AND ALBUTEROL SULFATE 1 AMPULE: .5; 2.5 SOLUTION RESPIRATORY (INHALATION) at 06:00

## 2023-05-11 RX ADMIN — HYDRALAZINE HYDROCHLORIDE 50 MG: 50 TABLET, FILM COATED ORAL at 14:08

## 2023-05-11 RX ADMIN — IPRATROPIUM BROMIDE AND ALBUTEROL SULFATE 1 AMPULE: .5; 2.5 SOLUTION RESPIRATORY (INHALATION) at 13:07

## 2023-05-11 RX ADMIN — SENNOSIDES AND DOCUSATE SODIUM 2 TABLET: 50; 8.6 TABLET ORAL at 20:51

## 2023-05-11 RX ADMIN — OXYCODONE AND ACETAMINOPHEN 1 TABLET: 5; 325 TABLET ORAL at 05:34

## 2023-05-11 RX ADMIN — WATER 1000 MG: 1 INJECTION INTRAMUSCULAR; INTRAVENOUS; SUBCUTANEOUS at 04:14

## 2023-05-11 RX ADMIN — IPRATROPIUM BROMIDE AND ALBUTEROL SULFATE 1 AMPULE: .5; 2.5 SOLUTION RESPIRATORY (INHALATION) at 09:57

## 2023-05-11 RX ADMIN — POLYETHYLENE GLYCOL 3350 17 G: 17 POWDER, FOR SOLUTION ORAL at 20:52

## 2023-05-11 RX ADMIN — MIRTAZAPINE 7.5 MG: 15 TABLET, FILM COATED ORAL at 20:52

## 2023-05-11 RX ADMIN — OXYCODONE AND ACETAMINOPHEN 1 TABLET: 5; 325 TABLET ORAL at 14:02

## 2023-05-11 RX ADMIN — APIXABAN 5 MG: 5 TABLET, FILM COATED ORAL at 08:27

## 2023-05-11 RX ADMIN — SENNOSIDES AND DOCUSATE SODIUM 2 TABLET: 50; 8.6 TABLET ORAL at 08:27

## 2023-05-11 RX ADMIN — DOXYCYCLINE 100 MG: 100 INJECTION, POWDER, LYOPHILIZED, FOR SOLUTION INTRAVENOUS at 16:49

## 2023-05-11 RX ADMIN — APIXABAN 5 MG: 5 TABLET, FILM COATED ORAL at 20:51

## 2023-05-11 ASSESSMENT — PAIN SCALES - GENERAL
PAINLEVEL_OUTOF10: 7
PAINLEVEL_OUTOF10: 0
PAINLEVEL_OUTOF10: 10

## 2023-05-11 ASSESSMENT — PAIN DESCRIPTION - LOCATION: LOCATION: ABDOMEN

## 2023-05-12 ENCOUNTER — APPOINTMENT (OUTPATIENT)
Dept: ULTRASOUND IMAGING | Age: 63
DRG: 695 | End: 2023-05-12
Payer: MEDICARE

## 2023-05-12 LAB
ALBUMIN SERPL-MCNC: 3.8 G/DL (ref 3.5–5.2)
ALP SERPL-CCNC: 87 U/L (ref 35–104)
ALT SERPL-CCNC: <5 U/L (ref 0–32)
ANION GAP SERPL CALCULATED.3IONS-SCNC: 11 MMOL/L (ref 7–16)
AST SERPL-CCNC: 10 U/L (ref 0–31)
BASOPHILS # BLD: 0 E9/L (ref 0–0.2)
BASOPHILS NFR BLD: 0.6 % (ref 0–2)
BILIRUB SERPL-MCNC: 0.3 MG/DL (ref 0–1.2)
BUN SERPL-MCNC: 53 MG/DL (ref 6–23)
CALCIUM SERPL-MCNC: 9.3 MG/DL (ref 8.6–10.2)
CHLORIDE SERPL-SCNC: 103 MMOL/L (ref 98–107)
CO2 SERPL-SCNC: 25 MMOL/L (ref 22–29)
CREAT SERPL-MCNC: 1.7 MG/DL (ref 0.5–1)
EOSINOPHIL # BLD: 0.08 E9/L (ref 0.05–0.5)
EOSINOPHIL NFR BLD: 2.6 % (ref 0–6)
ERYTHROCYTE [DISTWIDTH] IN BLOOD BY AUTOMATED COUNT: 17.5 FL (ref 11.5–15)
GLUCOSE SERPL-MCNC: 103 MG/DL (ref 74–99)
HCT VFR BLD AUTO: 25.8 % (ref 34–48)
HGB BLD-MCNC: 7.8 G/DL (ref 11.5–15.5)
LYMPHOCYTES # BLD: 1.6 E9/L (ref 1.5–4)
LYMPHOCYTES NFR BLD: 50 % (ref 20–42)
MAGNESIUM SERPL-MCNC: 1.9 MG/DL (ref 1.6–2.6)
MCH RBC QN AUTO: 25 PG (ref 26–35)
MCHC RBC AUTO-ENTMCNC: 30.2 % (ref 32–34.5)
MCV RBC AUTO: 82.7 FL (ref 80–99.9)
METAMYELOCYTES NFR BLD MANUAL: 0.9 % (ref 0–1)
METER GLUCOSE: 125 MG/DL (ref 74–99)
METER GLUCOSE: 130 MG/DL (ref 74–99)
METER GLUCOSE: 146 MG/DL (ref 74–99)
METER GLUCOSE: 193 MG/DL (ref 74–99)
MONOCYTES # BLD: 0.51 E9/L (ref 0.1–0.95)
MONOCYTES NFR BLD: 15.8 % (ref 2–12)
MYELOCYTES NFR BLD MANUAL: 0.9 % (ref 0–0)
NEUTROPHILS # BLD: 1.02 E9/L (ref 1.8–7.3)
NEUTS SEG NFR BLD: 29.8 % (ref 43–80)
PHOSPHATE SERPL-MCNC: 4.8 MG/DL (ref 2.5–4.5)
PLATELET # BLD AUTO: 319 E9/L (ref 130–450)
PMV BLD AUTO: 10.4 FL (ref 7–12)
POTASSIUM SERPL-SCNC: 5 MMOL/L (ref 3.5–5)
PROT SERPL-MCNC: 7.6 G/DL (ref 6.4–8.3)
RBC # BLD AUTO: 3.12 E12/L (ref 3.5–5.5)
SODIUM SERPL-SCNC: 139 MMOL/L (ref 132–146)
WBC # BLD: 3.2 E9/L (ref 4.5–11.5)

## 2023-05-12 PROCEDURE — 1200000000 HC SEMI PRIVATE

## 2023-05-12 PROCEDURE — 2580000003 HC RX 258: Performed by: INTERNAL MEDICINE

## 2023-05-12 PROCEDURE — 6360000002 HC RX W HCPCS: Performed by: INTERNAL MEDICINE

## 2023-05-12 PROCEDURE — 6370000000 HC RX 637 (ALT 250 FOR IP): Performed by: INTERNAL MEDICINE

## 2023-05-12 PROCEDURE — 6370000000 HC RX 637 (ALT 250 FOR IP): Performed by: NURSE PRACTITIONER

## 2023-05-12 PROCEDURE — 93970 EXTREMITY STUDY: CPT

## 2023-05-12 PROCEDURE — 82962 GLUCOSE BLOOD TEST: CPT

## 2023-05-12 PROCEDURE — 84100 ASSAY OF PHOSPHORUS: CPT

## 2023-05-12 PROCEDURE — 83735 ASSAY OF MAGNESIUM: CPT

## 2023-05-12 PROCEDURE — 85025 COMPLETE CBC W/AUTO DIFF WBC: CPT

## 2023-05-12 PROCEDURE — 36415 COLL VENOUS BLD VENIPUNCTURE: CPT

## 2023-05-12 PROCEDURE — 80053 COMPREHEN METABOLIC PANEL: CPT

## 2023-05-12 PROCEDURE — 94640 AIRWAY INHALATION TREATMENT: CPT

## 2023-05-12 RX ORDER — SODIUM CHLORIDE 9 MG/ML
INJECTION, SOLUTION INTRAVENOUS CONTINUOUS
Status: DISCONTINUED | OUTPATIENT
Start: 2023-05-12 | End: 2023-05-14

## 2023-05-12 RX ADMIN — GABAPENTIN 100 MG: 100 CAPSULE ORAL at 20:29

## 2023-05-12 RX ADMIN — MIRTAZAPINE 7.5 MG: 15 TABLET, FILM COATED ORAL at 20:30

## 2023-05-12 RX ADMIN — SODIUM CHLORIDE: 9 INJECTION, SOLUTION INTRAVENOUS at 11:38

## 2023-05-12 RX ADMIN — IPRATROPIUM BROMIDE AND ALBUTEROL SULFATE 1 AMPULE: .5; 2.5 SOLUTION RESPIRATORY (INHALATION) at 14:11

## 2023-05-12 RX ADMIN — CARVEDILOL 12.5 MG: 6.25 TABLET, FILM COATED ORAL at 17:19

## 2023-05-12 RX ADMIN — SENNOSIDES AND DOCUSATE SODIUM 2 TABLET: 50; 8.6 TABLET ORAL at 20:29

## 2023-05-12 RX ADMIN — DOXYCYCLINE HYCLATE 100 MG: 100 CAPSULE ORAL at 11:40

## 2023-05-12 RX ADMIN — GABAPENTIN 100 MG: 100 CAPSULE ORAL at 11:40

## 2023-05-12 RX ADMIN — CARVEDILOL 12.5 MG: 6.25 TABLET, FILM COATED ORAL at 11:40

## 2023-05-12 RX ADMIN — OXYCODONE AND ACETAMINOPHEN 1 TABLET: 5; 325 TABLET ORAL at 15:16

## 2023-05-12 RX ADMIN — DOXYCYCLINE HYCLATE 100 MG: 100 CAPSULE ORAL at 20:30

## 2023-05-12 RX ADMIN — POLYETHYLENE GLYCOL 3350 17 G: 17 POWDER, FOR SOLUTION ORAL at 20:28

## 2023-05-12 RX ADMIN — IPRATROPIUM BROMIDE AND ALBUTEROL SULFATE 1 AMPULE: .5; 2.5 SOLUTION RESPIRATORY (INHALATION) at 10:21

## 2023-05-12 RX ADMIN — POLYETHYLENE GLYCOL 3350 17 G: 17 POWDER, FOR SOLUTION ORAL at 11:39

## 2023-05-12 RX ADMIN — WATER 1000 MG: 1 INJECTION INTRAMUSCULAR; INTRAVENOUS; SUBCUTANEOUS at 06:24

## 2023-05-12 RX ADMIN — HYDRALAZINE HYDROCHLORIDE 50 MG: 50 TABLET, FILM COATED ORAL at 17:19

## 2023-05-12 RX ADMIN — IPRATROPIUM BROMIDE AND ALBUTEROL SULFATE 1 AMPULE: .5; 2.5 SOLUTION RESPIRATORY (INHALATION) at 06:28

## 2023-05-12 RX ADMIN — OXYCODONE AND ACETAMINOPHEN 1 TABLET: 5; 325 TABLET ORAL at 06:59

## 2023-05-12 RX ADMIN — SENNOSIDES AND DOCUSATE SODIUM 2 TABLET: 50; 8.6 TABLET ORAL at 11:39

## 2023-05-12 RX ADMIN — IPRATROPIUM BROMIDE AND ALBUTEROL SULFATE 1 AMPULE: .5; 2.5 SOLUTION RESPIRATORY (INHALATION) at 18:23

## 2023-05-12 RX ADMIN — Medication 10 ML: at 20:31

## 2023-05-12 RX ADMIN — Medication 10 ML: at 11:38

## 2023-05-12 ASSESSMENT — PAIN DESCRIPTION - DESCRIPTORS
DESCRIPTORS: ACHING;DISCOMFORT;JABBING
DESCRIPTORS: CRAMPING
DESCRIPTORS: ACHING;CRAMPING

## 2023-05-12 ASSESSMENT — PAIN DESCRIPTION - ORIENTATION
ORIENTATION: MID
ORIENTATION: LOWER
ORIENTATION: MID

## 2023-05-12 ASSESSMENT — PAIN DESCRIPTION - LOCATION
LOCATION: ABDOMEN

## 2023-05-12 ASSESSMENT — PAIN SCALES - GENERAL
PAINLEVEL_OUTOF10: 10
PAINLEVEL_OUTOF10: 9
PAINLEVEL_OUTOF10: 8

## 2023-05-12 ASSESSMENT — PAIN DESCRIPTION - PAIN TYPE: TYPE: ACUTE PAIN

## 2023-05-12 ASSESSMENT — PAIN - FUNCTIONAL ASSESSMENT: PAIN_FUNCTIONAL_ASSESSMENT: PREVENTS OR INTERFERES SOME ACTIVE ACTIVITIES AND ADLS

## 2023-05-12 ASSESSMENT — PAIN DESCRIPTION - FREQUENCY: FREQUENCY: CONTINUOUS

## 2023-05-12 ASSESSMENT — PAIN DESCRIPTION - ONSET: ONSET: ON-GOING

## 2023-05-13 LAB
ALBUMIN SERPL-MCNC: 3.6 G/DL (ref 3.5–5.2)
ALP SERPL-CCNC: 82 U/L (ref 35–104)
ALT SERPL-CCNC: 5 U/L (ref 0–32)
ANION GAP SERPL CALCULATED.3IONS-SCNC: 10 MMOL/L (ref 7–16)
AST SERPL-CCNC: 9 U/L (ref 0–31)
BASOPHILS # BLD: 0.02 E9/L (ref 0–0.2)
BASOPHILS NFR BLD: 0.7 % (ref 0–2)
BILIRUB SERPL-MCNC: 0.3 MG/DL (ref 0–1.2)
BUN SERPL-MCNC: 45 MG/DL (ref 6–23)
CALCIUM SERPL-MCNC: 9.2 MG/DL (ref 8.6–10.2)
CHLORIDE SERPL-SCNC: 104 MMOL/L (ref 98–107)
CO2 SERPL-SCNC: 24 MMOL/L (ref 22–29)
CREAT SERPL-MCNC: 1.3 MG/DL (ref 0.5–1)
EOSINOPHIL # BLD: 0.05 E9/L (ref 0.05–0.5)
EOSINOPHIL NFR BLD: 1.8 % (ref 0–6)
ERYTHROCYTE [DISTWIDTH] IN BLOOD BY AUTOMATED COUNT: 17.1 FL (ref 11.5–15)
FOLATE SERPL-MCNC: >20 NG/ML (ref 4.8–24.2)
GLUCOSE SERPL-MCNC: 120 MG/DL (ref 74–99)
HCT VFR BLD AUTO: 24.7 % (ref 34–48)
HGB BLD-MCNC: 7.5 G/DL (ref 11.5–15.5)
IMM GRANULOCYTES # BLD: 0.02 E9/L
IMM GRANULOCYTES NFR BLD: 0.7 % (ref 0–5)
IRON SATN MFR SERPL: 16 % (ref 15–50)
IRON SERPL-MCNC: 31 MCG/DL (ref 37–145)
LYMPHOCYTES # BLD: 1.21 E9/L (ref 1.5–4)
LYMPHOCYTES NFR BLD: 42.6 % (ref 20–42)
MAGNESIUM SERPL-MCNC: 1.9 MG/DL (ref 1.6–2.6)
MCH RBC QN AUTO: 25.1 PG (ref 26–35)
MCHC RBC AUTO-ENTMCNC: 30.4 % (ref 32–34.5)
MCV RBC AUTO: 82.6 FL (ref 80–99.9)
METER GLUCOSE: 125 MG/DL (ref 74–99)
METER GLUCOSE: 128 MG/DL (ref 74–99)
METER GLUCOSE: 158 MG/DL (ref 74–99)
METER GLUCOSE: 180 MG/DL (ref 74–99)
MONOCYTES # BLD: 0.54 E9/L (ref 0.1–0.95)
MONOCYTES NFR BLD: 19 % (ref 2–12)
NEUTROPHILS # BLD: 1 E9/L (ref 1.8–7.3)
NEUTS SEG NFR BLD: 35.2 % (ref 43–80)
PHOSPHATE SERPL-MCNC: 3.9 MG/DL (ref 2.5–4.5)
PLATELET # BLD AUTO: 294 E9/L (ref 130–450)
PMV BLD AUTO: 10.3 FL (ref 7–12)
POTASSIUM SERPL-SCNC: 5.1 MMOL/L (ref 3.5–5)
PROT SERPL-MCNC: 7.4 G/DL (ref 6.4–8.3)
RBC # BLD AUTO: 2.99 E12/L (ref 3.5–5.5)
SODIUM SERPL-SCNC: 138 MMOL/L (ref 132–146)
TIBC SERPL-MCNC: 189 MCG/DL (ref 250–450)
VIT B12 SERPL-MCNC: 519 PG/ML (ref 211–946)
WBC # BLD: 2.8 E9/L (ref 4.5–11.5)

## 2023-05-13 PROCEDURE — 6360000002 HC RX W HCPCS: Performed by: INTERNAL MEDICINE

## 2023-05-13 PROCEDURE — 84100 ASSAY OF PHOSPHORUS: CPT

## 2023-05-13 PROCEDURE — 6370000000 HC RX 637 (ALT 250 FOR IP): Performed by: INTERNAL MEDICINE

## 2023-05-13 PROCEDURE — 83550 IRON BINDING TEST: CPT

## 2023-05-13 PROCEDURE — 1200000000 HC SEMI PRIVATE

## 2023-05-13 PROCEDURE — 80053 COMPREHEN METABOLIC PANEL: CPT

## 2023-05-13 PROCEDURE — 83735 ASSAY OF MAGNESIUM: CPT

## 2023-05-13 PROCEDURE — 82962 GLUCOSE BLOOD TEST: CPT

## 2023-05-13 PROCEDURE — 2580000003 HC RX 258: Performed by: INTERNAL MEDICINE

## 2023-05-13 PROCEDURE — 85025 COMPLETE CBC W/AUTO DIFF WBC: CPT

## 2023-05-13 PROCEDURE — 82607 VITAMIN B-12: CPT

## 2023-05-13 PROCEDURE — 94640 AIRWAY INHALATION TREATMENT: CPT

## 2023-05-13 PROCEDURE — 82746 ASSAY OF FOLIC ACID SERUM: CPT

## 2023-05-13 PROCEDURE — 6370000000 HC RX 637 (ALT 250 FOR IP): Performed by: NURSE PRACTITIONER

## 2023-05-13 PROCEDURE — 83540 ASSAY OF IRON: CPT

## 2023-05-13 PROCEDURE — 36415 COLL VENOUS BLD VENIPUNCTURE: CPT

## 2023-05-13 RX ADMIN — POLYETHYLENE GLYCOL 3350 17 G: 17 POWDER, FOR SOLUTION ORAL at 20:11

## 2023-05-13 RX ADMIN — GABAPENTIN 100 MG: 100 CAPSULE ORAL at 10:08

## 2023-05-13 RX ADMIN — WATER 1000 MG: 1 INJECTION INTRAMUSCULAR; INTRAVENOUS; SUBCUTANEOUS at 11:43

## 2023-05-13 RX ADMIN — POLYETHYLENE GLYCOL 3350 17 G: 17 POWDER, FOR SOLUTION ORAL at 10:09

## 2023-05-13 RX ADMIN — HYDRALAZINE HYDROCHLORIDE 50 MG: 50 TABLET, FILM COATED ORAL at 15:03

## 2023-05-13 RX ADMIN — IPRATROPIUM BROMIDE AND ALBUTEROL SULFATE 1 AMPULE: .5; 2.5 SOLUTION RESPIRATORY (INHALATION) at 06:30

## 2023-05-13 RX ADMIN — OXYCODONE AND ACETAMINOPHEN 1 TABLET: 5; 325 TABLET ORAL at 05:26

## 2023-05-13 RX ADMIN — HYDRALAZINE HYDROCHLORIDE 50 MG: 50 TABLET, FILM COATED ORAL at 20:10

## 2023-05-13 RX ADMIN — CARVEDILOL 12.5 MG: 6.25 TABLET, FILM COATED ORAL at 17:30

## 2023-05-13 RX ADMIN — OXYCODONE AND ACETAMINOPHEN 1 TABLET: 5; 325 TABLET ORAL at 15:16

## 2023-05-13 RX ADMIN — DOXYCYCLINE HYCLATE 100 MG: 100 CAPSULE ORAL at 20:12

## 2023-05-13 RX ADMIN — GABAPENTIN 100 MG: 100 CAPSULE ORAL at 20:10

## 2023-05-13 RX ADMIN — Medication 10 ML: at 20:12

## 2023-05-13 RX ADMIN — DOXYCYCLINE HYCLATE 100 MG: 100 CAPSULE ORAL at 10:08

## 2023-05-13 RX ADMIN — HYDRALAZINE HYDROCHLORIDE 50 MG: 50 TABLET, FILM COATED ORAL at 05:26

## 2023-05-13 RX ADMIN — IPRATROPIUM BROMIDE AND ALBUTEROL SULFATE 1 AMPULE: .5; 2.5 SOLUTION RESPIRATORY (INHALATION) at 18:13

## 2023-05-13 RX ADMIN — IPRATROPIUM BROMIDE AND ALBUTEROL SULFATE 1 AMPULE: .5; 2.5 SOLUTION RESPIRATORY (INHALATION) at 14:19

## 2023-05-13 RX ADMIN — IPRATROPIUM BROMIDE AND ALBUTEROL SULFATE 1 AMPULE: .5; 2.5 SOLUTION RESPIRATORY (INHALATION) at 10:00

## 2023-05-13 RX ADMIN — SENNOSIDES AND DOCUSATE SODIUM 2 TABLET: 50; 8.6 TABLET ORAL at 20:09

## 2023-05-13 RX ADMIN — SENNOSIDES AND DOCUSATE SODIUM 2 TABLET: 50; 8.6 TABLET ORAL at 10:08

## 2023-05-13 RX ADMIN — CARVEDILOL 12.5 MG: 6.25 TABLET, FILM COATED ORAL at 10:08

## 2023-05-13 RX ADMIN — MIRTAZAPINE 7.5 MG: 15 TABLET, FILM COATED ORAL at 20:11

## 2023-05-13 RX ADMIN — ACETAMINOPHEN 650 MG: 325 TABLET ORAL at 20:10

## 2023-05-13 ASSESSMENT — PAIN SCALES - GENERAL
PAINLEVEL_OUTOF10: 7
PAINLEVEL_OUTOF10: 5
PAINLEVEL_OUTOF10: 5

## 2023-05-13 ASSESSMENT — PAIN DESCRIPTION - LOCATION
LOCATION: ABDOMEN

## 2023-05-13 ASSESSMENT — PAIN DESCRIPTION - ONSET
ONSET: ON-GOING
ONSET: ON-GOING

## 2023-05-13 ASSESSMENT — PAIN - FUNCTIONAL ASSESSMENT: PAIN_FUNCTIONAL_ASSESSMENT: PREVENTS OR INTERFERES SOME ACTIVE ACTIVITIES AND ADLS

## 2023-05-13 ASSESSMENT — PAIN DESCRIPTION - ORIENTATION
ORIENTATION: RIGHT
ORIENTATION: MID
ORIENTATION: MID;LOWER
ORIENTATION: MID;LOWER

## 2023-05-13 ASSESSMENT — PAIN DESCRIPTION - DESCRIPTORS
DESCRIPTORS: ACHING;DISCOMFORT
DESCRIPTORS: DISCOMFORT
DESCRIPTORS: ACHING
DESCRIPTORS: ACHING;DISCOMFORT;CRAMPING

## 2023-05-13 ASSESSMENT — PAIN DESCRIPTION - FREQUENCY
FREQUENCY: CONTINUOUS
FREQUENCY: INTERMITTENT

## 2023-05-13 ASSESSMENT — PAIN SCALES - WONG BAKER: WONGBAKER_NUMERICALRESPONSE: 0

## 2023-05-13 ASSESSMENT — PAIN DESCRIPTION - PAIN TYPE
TYPE: ACUTE PAIN
TYPE: ACUTE PAIN

## 2023-05-14 LAB
ALBUMIN SERPL-MCNC: 3.6 G/DL (ref 3.5–5.2)
ALP SERPL-CCNC: 87 U/L (ref 35–104)
ALT SERPL-CCNC: 6 U/L (ref 0–32)
ANION GAP SERPL CALCULATED.3IONS-SCNC: 11 MMOL/L (ref 7–16)
AST SERPL-CCNC: 12 U/L (ref 0–31)
BASOPHILS # BLD: 0.02 E9/L (ref 0–0.2)
BASOPHILS NFR BLD: 0.9 % (ref 0–2)
BILIRUB SERPL-MCNC: 0.3 MG/DL (ref 0–1.2)
BUN SERPL-MCNC: 40 MG/DL (ref 6–23)
CALCIUM SERPL-MCNC: 9.6 MG/DL (ref 8.6–10.2)
CHLORIDE SERPL-SCNC: 102 MMOL/L (ref 98–107)
CO2 SERPL-SCNC: 25 MMOL/L (ref 22–29)
CREAT SERPL-MCNC: 1.3 MG/DL (ref 0.5–1)
EOSINOPHIL # BLD: 0.02 E9/L (ref 0.05–0.5)
EOSINOPHIL NFR BLD: 0.9 % (ref 0–6)
ERYTHROCYTE [DISTWIDTH] IN BLOOD BY AUTOMATED COUNT: 16.9 FL (ref 11.5–15)
GLUCOSE SERPL-MCNC: 125 MG/DL (ref 74–99)
HCT VFR BLD AUTO: 25.4 % (ref 34–48)
HGB BLD-MCNC: 7.5 G/DL (ref 11.5–15.5)
LYMPHOCYTES # BLD: 1.27 E9/L (ref 1.5–4)
LYMPHOCYTES NFR BLD: 49.1 % (ref 20–42)
MAGNESIUM SERPL-MCNC: 2 MG/DL (ref 1.6–2.6)
MCH RBC QN AUTO: 24.8 PG (ref 26–35)
MCHC RBC AUTO-ENTMCNC: 29.5 % (ref 32–34.5)
MCV RBC AUTO: 84.1 FL (ref 80–99.9)
METER GLUCOSE: 124 MG/DL (ref 74–99)
METER GLUCOSE: 152 MG/DL (ref 74–99)
METER GLUCOSE: 199 MG/DL (ref 74–99)
METER GLUCOSE: 207 MG/DL (ref 74–99)
MONOCYTES # BLD: 0.36 E9/L (ref 0.1–0.95)
MONOCYTES NFR BLD: 14 % (ref 2–12)
NEUTROPHILS # BLD: 0.91 E9/L (ref 1.8–7.3)
NEUTS SEG NFR BLD: 35.1 % (ref 43–80)
PHOSPHATE SERPL-MCNC: 3.9 MG/DL (ref 2.5–4.5)
PLATELET # BLD AUTO: 315 E9/L (ref 130–450)
PMV BLD AUTO: 11 FL (ref 7–12)
POTASSIUM SERPL-SCNC: 4.9 MMOL/L (ref 3.5–5)
PROT SERPL-MCNC: 7.7 G/DL (ref 6.4–8.3)
RBC # BLD AUTO: 3.02 E12/L (ref 3.5–5.5)
SODIUM SERPL-SCNC: 138 MMOL/L (ref 132–146)
WBC # BLD: 2.6 E9/L (ref 4.5–11.5)

## 2023-05-14 PROCEDURE — 97161 PT EVAL LOW COMPLEX 20 MIN: CPT

## 2023-05-14 PROCEDURE — 82962 GLUCOSE BLOOD TEST: CPT

## 2023-05-14 PROCEDURE — 83735 ASSAY OF MAGNESIUM: CPT

## 2023-05-14 PROCEDURE — 1200000000 HC SEMI PRIVATE

## 2023-05-14 PROCEDURE — 6370000000 HC RX 637 (ALT 250 FOR IP): Performed by: INTERNAL MEDICINE

## 2023-05-14 PROCEDURE — 85025 COMPLETE CBC W/AUTO DIFF WBC: CPT

## 2023-05-14 PROCEDURE — 6360000002 HC RX W HCPCS: Performed by: INTERNAL MEDICINE

## 2023-05-14 PROCEDURE — 2580000003 HC RX 258: Performed by: INTERNAL MEDICINE

## 2023-05-14 PROCEDURE — 94640 AIRWAY INHALATION TREATMENT: CPT

## 2023-05-14 PROCEDURE — 80053 COMPREHEN METABOLIC PANEL: CPT

## 2023-05-14 PROCEDURE — 6370000000 HC RX 637 (ALT 250 FOR IP): Performed by: NURSE PRACTITIONER

## 2023-05-14 PROCEDURE — 84100 ASSAY OF PHOSPHORUS: CPT

## 2023-05-14 PROCEDURE — 6370000000 HC RX 637 (ALT 250 FOR IP)

## 2023-05-14 PROCEDURE — 36415 COLL VENOUS BLD VENIPUNCTURE: CPT

## 2023-05-14 PROCEDURE — 97530 THERAPEUTIC ACTIVITIES: CPT

## 2023-05-14 RX ORDER — BISACODYL 5 MG/1
5 TABLET, DELAYED RELEASE ORAL DAILY PRN
Status: DISCONTINUED | OUTPATIENT
Start: 2023-05-14 | End: 2023-05-15 | Stop reason: HOSPADM

## 2023-05-14 RX ORDER — BISACODYL 10 MG
10 SUPPOSITORY, RECTAL RECTAL DAILY PRN
Status: DISCONTINUED | OUTPATIENT
Start: 2023-05-14 | End: 2023-05-15 | Stop reason: HOSPADM

## 2023-05-14 RX ORDER — IPRATROPIUM BROMIDE AND ALBUTEROL SULFATE 2.5; .5 MG/3ML; MG/3ML
1 SOLUTION RESPIRATORY (INHALATION) EVERY 4 HOURS PRN
Status: DISCONTINUED | OUTPATIENT
Start: 2023-05-14 | End: 2023-05-15 | Stop reason: HOSPADM

## 2023-05-14 RX ADMIN — BISACODYL 10 MG: 10 SUPPOSITORY RECTAL at 08:42

## 2023-05-14 RX ADMIN — IPRATROPIUM BROMIDE AND ALBUTEROL SULFATE 1 AMPULE: .5; 2.5 SOLUTION RESPIRATORY (INHALATION) at 09:49

## 2023-05-14 RX ADMIN — OXYCODONE AND ACETAMINOPHEN 1 TABLET: 5; 325 TABLET ORAL at 21:35

## 2023-05-14 RX ADMIN — SENNOSIDES AND DOCUSATE SODIUM 2 TABLET: 50; 8.6 TABLET ORAL at 21:32

## 2023-05-14 RX ADMIN — HYDRALAZINE HYDROCHLORIDE 50 MG: 50 TABLET, FILM COATED ORAL at 16:19

## 2023-05-14 RX ADMIN — IPRATROPIUM BROMIDE AND ALBUTEROL SULFATE 1 AMPULE: .5; 2.5 SOLUTION RESPIRATORY (INHALATION) at 05:53

## 2023-05-14 RX ADMIN — GABAPENTIN 100 MG: 100 CAPSULE ORAL at 08:41

## 2023-05-14 RX ADMIN — HYDRALAZINE HYDROCHLORIDE 50 MG: 50 TABLET, FILM COATED ORAL at 05:39

## 2023-05-14 RX ADMIN — HYDRALAZINE HYDROCHLORIDE 50 MG: 50 TABLET, FILM COATED ORAL at 21:32

## 2023-05-14 RX ADMIN — WATER 1000 MG: 1 INJECTION INTRAMUSCULAR; INTRAVENOUS; SUBCUTANEOUS at 05:39

## 2023-05-14 RX ADMIN — SENNOSIDES AND DOCUSATE SODIUM 2 TABLET: 50; 8.6 TABLET ORAL at 08:41

## 2023-05-14 RX ADMIN — GABAPENTIN 100 MG: 100 CAPSULE ORAL at 21:32

## 2023-05-14 RX ADMIN — Medication 10 ML: at 21:32

## 2023-05-14 RX ADMIN — OXYCODONE AND ACETAMINOPHEN 1 TABLET: 5; 325 TABLET ORAL at 05:40

## 2023-05-14 RX ADMIN — MIRTAZAPINE 7.5 MG: 15 TABLET, FILM COATED ORAL at 21:32

## 2023-05-14 RX ADMIN — CARVEDILOL 12.5 MG: 6.25 TABLET, FILM COATED ORAL at 08:42

## 2023-05-14 RX ADMIN — LACTULOSE 20 G: 20 SOLUTION ORAL at 05:39

## 2023-05-14 RX ADMIN — OXYCODONE AND ACETAMINOPHEN 1 TABLET: 5; 325 TABLET ORAL at 13:37

## 2023-05-14 RX ADMIN — CARVEDILOL 12.5 MG: 6.25 TABLET, FILM COATED ORAL at 18:35

## 2023-05-14 RX ADMIN — POLYETHYLENE GLYCOL 3350 17 G: 17 POWDER, FOR SOLUTION ORAL at 08:42

## 2023-05-14 RX ADMIN — BISACODYL 5 MG: 5 TABLET, COATED ORAL at 08:42

## 2023-05-14 ASSESSMENT — PAIN SCALES - WONG BAKER
WONGBAKER_NUMERICALRESPONSE: 0
WONGBAKER_NUMERICALRESPONSE: 0

## 2023-05-14 ASSESSMENT — PAIN DESCRIPTION - LOCATION
LOCATION: ABDOMEN
LOCATION: ABDOMEN
LOCATION: CHEST;OTHER (COMMENT)

## 2023-05-14 ASSESSMENT — PAIN DESCRIPTION - PAIN TYPE: TYPE: ACUTE PAIN

## 2023-05-14 ASSESSMENT — PAIN SCALES - GENERAL
PAINLEVEL_OUTOF10: 7

## 2023-05-14 ASSESSMENT — PAIN DESCRIPTION - ORIENTATION
ORIENTATION: MID;LOWER
ORIENTATION: MID;LOWER

## 2023-05-14 ASSESSMENT — PAIN DESCRIPTION - ONSET: ONSET: ON-GOING

## 2023-05-14 ASSESSMENT — PAIN DESCRIPTION - DESCRIPTORS
DESCRIPTORS: ACHING;DISCOMFORT;NAGGING
DESCRIPTORS: ACHING
DESCRIPTORS: ACHING;DISCOMFORT

## 2023-05-14 ASSESSMENT — PAIN DESCRIPTION - FREQUENCY: FREQUENCY: INTERMITTENT

## 2023-05-15 ENCOUNTER — TELEPHONE (OUTPATIENT)
Dept: FAMILY MEDICINE CLINIC | Age: 63
End: 2023-05-15

## 2023-05-15 VITALS
OXYGEN SATURATION: 96 % | WEIGHT: 155.87 LBS | SYSTOLIC BLOOD PRESSURE: 145 MMHG | BODY MASS INDEX: 27.61 KG/M2 | RESPIRATION RATE: 18 BRPM | HEART RATE: 91 BPM | TEMPERATURE: 98.6 F | DIASTOLIC BLOOD PRESSURE: 64 MMHG

## 2023-05-15 LAB
ALBUMIN SERPL-MCNC: 3.5 G/DL (ref 3.5–5.2)
ALP SERPL-CCNC: 92 U/L (ref 35–104)
ALT SERPL-CCNC: 5 U/L (ref 0–32)
ANION GAP SERPL CALCULATED.3IONS-SCNC: 11 MMOL/L (ref 7–16)
ANISOCYTOSIS: ABNORMAL
AST SERPL-CCNC: 11 U/L (ref 0–31)
B PARAP IS1001 DNA NPH QL NAA+NON-PROBE: NOT DETECTED
B PERT.PT PRMT NPH QL NAA+NON-PROBE: NOT DETECTED
BASOPHILS # BLD: 0.03 E9/L (ref 0–0.2)
BASOPHILS NFR BLD: 0.9 % (ref 0–2)
BILIRUB SERPL-MCNC: 0.3 MG/DL (ref 0–1.2)
BUN SERPL-MCNC: 38 MG/DL (ref 6–23)
C PNEUM DNA NPH QL NAA+NON-PROBE: NOT DETECTED
CALCIUM SERPL-MCNC: 10.2 MG/DL (ref 8.6–10.2)
CHLORIDE SERPL-SCNC: 101 MMOL/L (ref 98–107)
CO2 SERPL-SCNC: 24 MMOL/L (ref 22–29)
CREAT SERPL-MCNC: 1.3 MG/DL (ref 0.5–1)
EOSINOPHIL # BLD: 0 E9/L (ref 0.05–0.5)
EOSINOPHIL NFR BLD: 1.7 % (ref 0–6)
ERYTHROCYTE [DISTWIDTH] IN BLOOD BY AUTOMATED COUNT: 16.8 FL (ref 11.5–15)
FLUAV RNA NPH QL NAA+NON-PROBE: NOT DETECTED
FLUBV RNA NPH QL NAA+NON-PROBE: NOT DETECTED
GLUCOSE SERPL-MCNC: 101 MG/DL (ref 74–99)
HADV DNA NPH QL NAA+NON-PROBE: NOT DETECTED
HCOV 229E RNA NPH QL NAA+NON-PROBE: NOT DETECTED
HCOV HKU1 RNA NPH QL NAA+NON-PROBE: NOT DETECTED
HCOV NL63 RNA NPH QL NAA+NON-PROBE: NOT DETECTED
HCOV OC43 RNA NPH QL NAA+NON-PROBE: NOT DETECTED
HCT VFR BLD AUTO: 28 % (ref 34–48)
HGB BLD-MCNC: 8 G/DL (ref 11.5–15.5)
HMPV RNA NPH QL NAA+NON-PROBE: NOT DETECTED
HPIV1 RNA NPH QL NAA+NON-PROBE: NOT DETECTED
HPIV2 RNA NPH QL NAA+NON-PROBE: NOT DETECTED
HPIV3 RNA NPH QL NAA+NON-PROBE: NOT DETECTED
HPIV4 RNA NPH QL NAA+NON-PROBE: NOT DETECTED
LYMPHOCYTES # BLD: 1.71 E9/L (ref 1.5–4)
LYMPHOCYTES NFR BLD: 56.5 % (ref 20–42)
M PNEUMO DNA NPH QL NAA+NON-PROBE: NOT DETECTED
MAGNESIUM SERPL-MCNC: 2.1 MG/DL (ref 1.6–2.6)
MCH RBC QN AUTO: 24.6 PG (ref 26–35)
MCHC RBC AUTO-ENTMCNC: 28.6 % (ref 32–34.5)
MCV RBC AUTO: 86.2 FL (ref 80–99.9)
METER GLUCOSE: 124 MG/DL (ref 74–99)
METER GLUCOSE: 127 MG/DL (ref 74–99)
MONOCYTES # BLD: 0.33 E9/L (ref 0.1–0.95)
MONOCYTES NFR BLD: 11.3 % (ref 2–12)
NEUTROPHILS # BLD: 0.93 E9/L (ref 1.8–7.3)
NEUTS SEG NFR BLD: 31.3 % (ref 43–80)
OVALOCYTES: ABNORMAL
PHOSPHATE SERPL-MCNC: 4.2 MG/DL (ref 2.5–4.5)
PLATELET # BLD AUTO: 310 E9/L (ref 130–450)
PMV BLD AUTO: 11.8 FL (ref 7–12)
POIKILOCYTES: ABNORMAL
POLYCHROMASIA: ABNORMAL
POTASSIUM SERPL-SCNC: 5.1 MMOL/L (ref 3.5–5)
PROT SERPL-MCNC: 7.8 G/DL (ref 6.4–8.3)
RBC # BLD AUTO: 3.25 E12/L (ref 3.5–5.5)
RSV RNA NPH QL NAA+NON-PROBE: NOT DETECTED
RV+EV RNA NPH QL NAA+NON-PROBE: NOT DETECTED
SARS-COV-2 RNA NPH QL NAA+NON-PROBE: NOT DETECTED
SODIUM SERPL-SCNC: 136 MMOL/L (ref 132–146)
WBC # BLD: 3 E9/L (ref 4.5–11.5)

## 2023-05-15 PROCEDURE — 83735 ASSAY OF MAGNESIUM: CPT

## 2023-05-15 PROCEDURE — 6370000000 HC RX 637 (ALT 250 FOR IP): Performed by: INTERNAL MEDICINE

## 2023-05-15 PROCEDURE — 97535 SELF CARE MNGMENT TRAINING: CPT

## 2023-05-15 PROCEDURE — 97165 OT EVAL LOW COMPLEX 30 MIN: CPT

## 2023-05-15 PROCEDURE — 6370000000 HC RX 637 (ALT 250 FOR IP): Performed by: NURSE PRACTITIONER

## 2023-05-15 PROCEDURE — 36415 COLL VENOUS BLD VENIPUNCTURE: CPT

## 2023-05-15 PROCEDURE — 82962 GLUCOSE BLOOD TEST: CPT

## 2023-05-15 PROCEDURE — 85025 COMPLETE CBC W/AUTO DIFF WBC: CPT

## 2023-05-15 PROCEDURE — 84100 ASSAY OF PHOSPHORUS: CPT

## 2023-05-15 PROCEDURE — 80053 COMPREHEN METABOLIC PANEL: CPT

## 2023-05-15 PROCEDURE — 97530 THERAPEUTIC ACTIVITIES: CPT

## 2023-05-15 PROCEDURE — 97110 THERAPEUTIC EXERCISES: CPT

## 2023-05-15 RX ORDER — SENNA AND DOCUSATE SODIUM 50; 8.6 MG/1; MG/1
2 TABLET, FILM COATED ORAL 2 TIMES DAILY
Qty: 120 TABLET | Refills: 0 | COMMUNITY
Start: 2023-05-15 | End: 2023-06-14

## 2023-05-15 RX ORDER — BISACODYL 10 MG
10 SUPPOSITORY, RECTAL RECTAL DAILY PRN
Qty: 30 SUPPOSITORY | Refills: 0 | Status: SHIPPED | OUTPATIENT
Start: 2023-05-15 | End: 2023-06-14

## 2023-05-15 RX ORDER — POLYETHYLENE GLYCOL 3350 17 G/17G
17 POWDER, FOR SOLUTION ORAL 2 TIMES DAILY PRN
Qty: 527 G | Refills: 1 | COMMUNITY
Start: 2023-05-15 | End: 2023-06-14

## 2023-05-15 RX ORDER — BISACODYL 5 MG/1
5 TABLET, DELAYED RELEASE ORAL DAILY PRN
Qty: 30 TABLET | Refills: 0 | Status: SHIPPED | OUTPATIENT
Start: 2023-05-15

## 2023-05-15 RX ORDER — HYDRALAZINE HYDROCHLORIDE 50 MG/1
50 TABLET, FILM COATED ORAL EVERY 8 HOURS SCHEDULED
Qty: 90 TABLET | Refills: 0 | Status: SHIPPED | OUTPATIENT
Start: 2023-05-15

## 2023-05-15 RX ADMIN — POLYETHYLENE GLYCOL 3350 17 G: 17 POWDER, FOR SOLUTION ORAL at 09:25

## 2023-05-15 RX ADMIN — SENNOSIDES AND DOCUSATE SODIUM 2 TABLET: 50; 8.6 TABLET ORAL at 09:25

## 2023-05-15 RX ADMIN — CARVEDILOL 12.5 MG: 6.25 TABLET, FILM COATED ORAL at 09:25

## 2023-05-15 RX ADMIN — OXYCODONE AND ACETAMINOPHEN 1 TABLET: 5; 325 TABLET ORAL at 09:26

## 2023-05-15 RX ADMIN — GABAPENTIN 100 MG: 100 CAPSULE ORAL at 09:26

## 2023-05-15 ASSESSMENT — PAIN SCALES - GENERAL: PAINLEVEL_OUTOF10: 8

## 2023-05-15 ASSESSMENT — PAIN DESCRIPTION - DESCRIPTORS: DESCRIPTORS: ACHING;DISCOMFORT

## 2023-05-15 ASSESSMENT — PAIN DESCRIPTION - ORIENTATION: ORIENTATION: RIGHT;LEFT;UPPER;MID;LOWER

## 2023-05-15 ASSESSMENT — PAIN DESCRIPTION - LOCATION: LOCATION: ABDOMEN

## 2023-05-15 NOTE — CARE COORDINATION
Ss note: 5/15/28277:17 AM Per IDR pt to discharge home today. Met with pt regarding HHC, list provided, prefers [facility], referral made to liaison Michele Sandoval, awaiting [facility] orders. Plan is home with family, ex spouse to transport.  MAGNUS Cowart

## 2023-05-15 NOTE — PLAN OF CARE
Problem: Safety - Adult  Goal: Free from fall injury  5/14/2023 2222 by Maribell Nichols RN  Outcome: Progressing     Problem: Pain  Goal: Verbalizes/displays adequate comfort level or baseline comfort level  5/14/2023 2222 by Maribell Nichols RN  Outcome: Progressing     Problem: Chronic Conditions and Co-morbidities  Goal: Patient's chronic conditions and co-morbidity symptoms are monitored and maintained or improved  5/14/2023 2222 by Maribell Nichols RN  Outcome: Progressing     Problem: Skin/Tissue Integrity  Goal: Absence of new skin breakdown  Description: 1. Monitor for areas of redness and/or skin breakdown  2. Assess vascular access sites hourly  3. Every 4-6 hours minimum:  Change oxygen saturation probe site  4. Every 4-6 hours:  If on nasal continuous positive airway pressure, respiratory therapy assess nares and determine need for appliance change or resting period.   5/14/2023 2222 by Maribell Nichols RN  Outcome: Progressing

## 2023-05-15 NOTE — PROGRESS NOTES
[address] [facility]  [facility] [place] [place]        Date:5/15/2023                                                  Patient Name: Lissette Galloway    MRN: [id number]    : 1960    Room: [facility]      Evaluating OT: Mansoor German OTR/L #[id number]     Referring Provider and Specific Provider Orders/Date: OT Eval and Treat, Rachael Serrano DO, 23      Placement Recommendation: Home with Home Health OT        Diagnosis:   1. Hypertensive emergency    2. Congestive heart failure, unspecified HF chronicity, unspecified heart failure type ([address]    3. Nonintractable headache, unspecified chronicity pattern, unspecified headache type    4. Dyspnea and respiratory abnormalities    5. Acute on chronic diastolic CHF (congestive heart failure) ([address]         Surgery: None       Pertinent Medical History:       Past Medical History:   Diagnosis Date    CAD (coronary artery disease)     cath     CHF (congestive heart failure) ([address]     Diabetic peripheral neuropathy associated with type 2 diabetes mellitus ([address] 2018    Epigastric hernia     Hyperlipidemia     Hypertension     Kidney stone     Schizophrenia ([address]     Type 2 diabetes mellitus with diabetic polyneuropathy, with long-term current use of insulin ([address] 2016    no longer requiring medication. 21 - continues w/o medications as of 2022         Past Surgical History:   Procedure Laterality Date    BLADDER SURGERY Bilateral 2022    CYSTOSCOPY RETROGRADE PYELOGRAM BILATERAL STENT CHANGE performed by Ankush Hoang MD at [facility] Left 2022    CYSTOSCOPY BILATERAL RETROGRADE PYELOGRAM LEFT STENT CHANGE POSSIBLE RIGHT STENT CHANGE VS REMOVAL performed by Ankush Hoang MD at [address] [place] [address]  2016     [facility]    COLONOSCOPY  2016    Dr. Alfredo Hays N/A 2021
CLINICAL PHARMACY NOTE: MEDS TO BEDS    Total # of Prescriptions Filled: 1   The following medications were delivered to the patient:  Hydralazine 50 mg    Additional Documentation:   Dulcolax tablets and suppositories not covered, cheaper to buy over the counter
assessed patient in chair   Supine to sit: Not assessed patient in chair   Sit to supine: Not assessed patient in chair   Scooting: Not assessed patient in chair    Rolling: Independent    Supine to sit: Independent    Sit to supine: Independent    Scooting: Independent     Transfers Sit to stand: Minimal assist of 1 cues for hand placement    Stand pivot: min A Sit to stand: Minimal assist of 1 Cues for hand placement and safety     Sit to stand: Independent     Stand pivot: Independent    Ambulation     2x50 feet using  wheeled walker with Minimal assist of 1   for walker control, balance, and safety 2 x 60 feet using  wheeled walker with Minimal assist of 1   cues for upright posture, safety, pacing, and increased left hip/knee flexion for swing phase of gait.      150 feet using  wheeled walker with Modified Independent    Stair negotiation: ascended and descended   Not assessed     10 steps, 1 rail, Minimal assist of 1      ROM Within functional limits    Increase range of motion 10% of affected joints    Strength BUE:  refer to OT eval  RLE:  4-/5  LLE:  3+/5  Increase strength in affected mm groups by 1/3 grade   Balance Sitting EOB:  good -  Dynamic Standing:  fair + wheeled walker   Sitting EOB/chair: good   Dynamic Standing: fair + wheeled walker   Sitting EOB:  good   Dynamic Standing: good      Patient is Alert & Oriented x person, place, time, and situation and follows directions    Sensation:  Patient  reports numbness/tingling bilateral feet and right foot     Edema:  no   Endurance: fair      Vitals: room air   Blood Pressure at rest  Blood Pressure during session    Heart Rate at rest  Heart Rate during session    SPO2 at rest %  SPO2 during session %     Patient education  Patient educated on role of Physical Therapy, risks of immobility, safety and plan of care,  importance of mobility while in hospital , ankle pumps, quad set and glut set for edema control, blood clot prevention, importance and

## 2023-05-15 NOTE — TELEPHONE ENCOUNTER
[facility] called to ask if Dr. Fabian Le will follow for home care orders - skilled, PT and OT.       Last seen 4/19/2023  Next appt 5/25/2023

## 2023-05-15 NOTE — CARE COORDINATION
Ss note: 5/15/086545:08 AM [facility] orders noted, Expand [facility] accepts, will open pt tomorrow.  MAGNUS Gage

## 2023-05-15 NOTE — DISCHARGE SUMMARY
Internal Medicine Progress Note     PARDEEP=[facility]     Cari Spain. Casie Barajas., SACHIOOCTAVIO Jacinto D.O., RASHIDA Ibarra D.O. Aroldo Fong, MSN, APRN, NP-C  Mihir Nogueira. Anselm Merlin, MSN, APRN-CNP       Internal Medicine  Discharge Summary    NAME: Jeanette Mandujano  :  1960  MRN:  [id number]  Augustine Bob MD  ADMITTED: 2023      DISCHARGED: 5/15/23    ADMITTING PHYSICIAN: Cari Reynaga DO    CONSULTANT(S):   IP CONSULT TO UROLOGY  IP CONSULT TO HEART FAILURE NURSE/COORDINATOR     ADMITTING DIAGNOSIS:   Dyspnea [R06.00]  Dyspnea and respiratory abnormalities [R06.00, R06.89]  Hypertensive emergency [I16.1]  Nonintractable headache, unspecified chronicity pattern, unspecified headache type [R51.9]  Acute on chronic diastolic (congestive) heart failure (HCC) [I50.33]  Congestive heart failure, unspecified HF chronicity, unspecified heart failure type (HCC) [I50.9]  Acute on chronic diastolic CHF (congestive heart failure) ([facility] [I50.33]     DISCHARGE DIAGNOSES:   Accelerated hypertension with resultant decompensation of diastolic congestive heart failure,resolved   Constipation without obstruction, resolved  Chronic kidney disease stage IIIa-b  Anemia of chronic disease  Microscopic hematuria with bilateral ureteral stents in place and residual hydronephrosis   Peripheral arterial disease with prior vascular complication and left BKA 2020  Prior pulmonary embolism on chronic Eliquis therapy  Non-insulin-dependent diabetes mellitus type 2  Hyperlipidemia  Poorly controlled essential hypertension  Hypothyroidism  Schizophrenia    BRIEF HISTORY OF PRESENT ILLNESS:   Patient is a 78-year-old female presented to the ED with shortness of breath. Shortness of breath has been present for the last several days. She denies any subjective fever/chills. She denies any nausea or emesis. She denies any chest pain.   She does

## 2023-05-16 ENCOUNTER — TELEPHONE (OUTPATIENT)
Dept: FAMILY MEDICINE CLINIC | Age: 63
End: 2023-05-16

## 2023-05-16 ENCOUNTER — CARE COORDINATION (OUTPATIENT)
Dept: CASE MANAGEMENT | Age: 63
End: 2023-05-16

## 2023-05-16 NOTE — CARE COORDINATION
Care Transitions Outreach Attempt    Call within 2 business days of discharge: Yes     Attempted to reach the patient for initial Care Transition call post hospital discharge. Message left with CTN's contact information requesting return phone call. Will attempt outreach again. Noted HFU appt scheduled with pcp on 23    CTN called and spoke with Olga Isabel at [facility] and confirmed [facility] is today, 23. Patient: Renetta Hill Patient : 1960 MRN: [id number]    Last Discharge  Street       Date Complaint Diagnosis Description Type Department Provider    23 Chest Pain; Hematuria Hypertensive emergency . .. ED to Hosp-Admission (Discharged) (ADMITTED) [facility] 4 TELE Matti Reynaga DO; Mindi Jameson. .. Was this an external facility discharge?  No   Noted following upcoming appointments from discharge chart review:   [facility] follow up appointment(s):   Future Appointments   Date Time Provider Naomi Alvarado   2023  9:15 AM BENJI Wray - CNP [facility]   2023  2:30 PM Zonia Up MD [facility]   2023 11:30 AM [facility] CHF ROOM 1 [facility]   10/17/2023 10:00 AM Zonia Up MD [facility]   2024 10:00 AM Zonia Up MD [facility]

## 2023-05-16 NOTE — TELEPHONE ENCOUNTER
Pt called LM states  she just got out of hospital  yesterday and nobody told her when to take her eliquis , would like to hear from you [phone]

## 2023-05-17 ENCOUNTER — CARE COORDINATION (OUTPATIENT)
Dept: CASE MANAGEMENT | Age: 63
End: 2023-05-17

## 2023-05-17 DIAGNOSIS — I16.1 HYPERTENSIVE EMERGENCY: Primary | ICD-10-CM

## 2023-05-17 PROCEDURE — 1111F DSCHRG MED/CURRENT MED MERGE: CPT | Performed by: FAMILY MEDICINE

## 2023-05-17 NOTE — CARE COORDINATION
[facility] Care Transitions Initial Follow Up Call    Call within 2 business days of discharge: Yes    Patient Current Location:  Home: [address] [place] [address]    Care Transition Nurse contacted the patient by telephone to perform post hospital discharge assessment. Provided introduction to self, and explanation of the Care Transition Nurse role. Patient: Isiah House Patient : 1960   MRN: [id number]  Reason for Admission: Hypertensive emergency  Discharge Date: 5/15/23 RARS: Readmission Risk Score: 21.6      Last Discharge  Street       Date Complaint Diagnosis Description Type Department Provider    23 Chest Pain; Hematuria Hypertensive emergency . .. ED to Hosp-Admission (Discharged) (ADMITTED) [facility] 4 TELE Mikel Reynaga DO; Mindi Jameson. .. Was this an external facility discharge? No     Challenges to be reviewed by the provider   Additional needs identified to be addressed with provider: No  none               Method of communication with provider: none. CTN called and spoke with the pt for an initial care transition call post hospital discharge. Pt admitted on 23 with dx htn emergency with decompensated CHF and gross hematuria. Pt discharged home with order for [facility]. SOC was 23. Pt presented to ED with c/o sob for several days and CP with deep inspiration. Pt also had c/o blood in her urine. Spoke to pt who states that she is doing \"good\" today and she offered no complaints. Pt denies any CP/chest discomfort, sob, palpitations, racing heart, dizziness/lightheadedness, or n/v. Pt is engaging in self-monitoring of her BPs and states her BP today was 133/66. Pt has a hx CHF. She does engage in daily wts and states her wt was 137lbs this morning. She states that this is her average and that the wt at the hospital of 155 lbs is inaccurate. She states she has never weighed that much. She denies any worrisome s/sx of worsening of her CHF.  She states

## 2023-05-25 ENCOUNTER — OFFICE VISIT (OUTPATIENT)
Dept: FAMILY MEDICINE CLINIC | Age: 63
End: 2023-05-25

## 2023-05-25 VITALS
HEIGHT: 63 IN | SYSTOLIC BLOOD PRESSURE: 138 MMHG | DIASTOLIC BLOOD PRESSURE: 72 MMHG | BODY MASS INDEX: 24.45 KG/M2 | OXYGEN SATURATION: 99 % | HEART RATE: 87 BPM | RESPIRATION RATE: 20 BRPM | WEIGHT: 138 LBS

## 2023-05-25 DIAGNOSIS — M25.542 PAIN IN METACARPUS, LEFT: ICD-10-CM

## 2023-05-25 DIAGNOSIS — N18.31 STAGE 3A CHRONIC KIDNEY DISEASE (HCC): ICD-10-CM

## 2023-05-25 DIAGNOSIS — D64.9 ANEMIA, UNSPECIFIED TYPE: ICD-10-CM

## 2023-05-25 DIAGNOSIS — Z09 HOSPITAL DISCHARGE FOLLOW-UP: Primary | ICD-10-CM

## 2023-05-25 DIAGNOSIS — I50.33 ACUTE ON CHRONIC DIASTOLIC CHF (CONGESTIVE HEART FAILURE) (HCC): ICD-10-CM

## 2023-05-25 PROBLEM — N18.30 CHRONIC RENAL DISEASE, STAGE III (HCC): Status: ACTIVE | Noted: 2023-05-25

## 2023-05-25 RX ORDER — PYRIDOXINE HCL (VITAMIN B6) 50 MG
50 TABLET ORAL DAILY
Qty: 30 TABLET | Refills: 3 | Status: SHIPPED | OUTPATIENT
Start: 2023-05-25

## 2023-05-25 ASSESSMENT — ENCOUNTER SYMPTOMS
NAUSEA: 0
VOMITING: 0
DIARRHEA: 0
SHORTNESS OF BREATH: 0

## 2023-05-26 ENCOUNTER — CARE COORDINATION (OUTPATIENT)
Dept: CASE MANAGEMENT | Age: 63
End: 2023-05-26

## 2023-05-26 NOTE — CARE COORDINATION
awaits results. Pt was advised to call her pcp if she notices any changes in appearance, such as increased swelling, redness, increased warmth, or fever/chills. She voices understanding. Pt did not voice any needs or concerns at this time. She was agreeable to subsequent f/u calls. Addressed changes since last contact:   Completed HFU appt with pcp yesterday. Completed L hand X-ray for c/o swelling/pain to dorsal aspect. CHF clinic appt rescheduled to 5/31/23 and reviewed with pt. She is aware. [address] visits started last week. Follow Up  Future Appointments   Date Time Provider Naomi Alvarado   5/31/2023 11:30 AM [facility] ROOM 1 [facility] [address] Christiano Rileywig   10/17/2023 10:00 AM Catherine Hodgson MD [facility]   4/23/2024 10:00 AM Catherine Hodgson MD [facility]       Care Transition Nurse reviewed medical action plan and red flags with patient and discussed any barriers to care and/or understanding of plan of care after discharge. Discussed appropriate site of care based on symptoms and resources available to patient including: PCP  Specialist  [facility]  When to call 911. The patient agrees to contact the PCP office for questions related to their healthcare.      Patients top risk factors for readmission: lack of knowledge about disease, medical condition-CHF, DM, Htn, CKD, hx CVA, hematuria/urinary stents, multiple health system providers, polypharmacy, and utilization of services  Interventions to address risk factors: Scheduled appointment with PCP-Keep f/u in Oct, Scheduled appointment with Specialist-F/u with urology in August. F/u with CHF clinic 5/31/23, and Reviewed and followed up on pending diagnostic tests and treatments-F/u with pcp for L hand X-ray results     Care Transitions Subsequent and Final Call    Schedule Follow Up Appointment with PCP: Completed  Subsequent and Final Calls  Do you have any ongoing symptoms?: No  Have your medications changed?: No  Do you have

## 2023-05-29 PROBLEM — I50.33 ACUTE ON CHRONIC DIASTOLIC (CONGESTIVE) HEART FAILURE (HCC): Status: RESOLVED | Noted: 2023-05-09 | Resolved: 2023-05-29

## 2023-05-31 ENCOUNTER — HOSPITAL ENCOUNTER (OUTPATIENT)
Dept: OTHER | Age: 63
Setting detail: THERAPIES SERIES
Discharge: HOME | End: 2023-05-31

## 2023-06-01 ENCOUNTER — OFFICE VISIT (OUTPATIENT)
Dept: FAMILY MEDICINE CLINIC | Age: 63
End: 2023-06-01
Payer: MEDICARE

## 2023-06-01 VITALS
BODY MASS INDEX: 21.61 KG/M2 | HEIGHT: 67 IN | SYSTOLIC BLOOD PRESSURE: 136 MMHG | OXYGEN SATURATION: 97 % | DIASTOLIC BLOOD PRESSURE: 60 MMHG | RESPIRATION RATE: 20 BRPM | HEART RATE: 90 BPM

## 2023-06-01 DIAGNOSIS — B02.9 HERPES ZOSTER WITHOUT COMPLICATION: Primary | ICD-10-CM

## 2023-06-01 PROCEDURE — G8420 CALC BMI NORM PARAMETERS: HCPCS | Performed by: FAMILY MEDICINE

## 2023-06-01 PROCEDURE — 1036F TOBACCO NON-USER: CPT | Performed by: FAMILY MEDICINE

## 2023-06-01 PROCEDURE — 99214 OFFICE O/P EST MOD 30 MIN: CPT | Performed by: FAMILY MEDICINE

## 2023-06-01 PROCEDURE — G8427 DOCREV CUR MEDS BY ELIG CLIN: HCPCS | Performed by: FAMILY MEDICINE

## 2023-06-01 PROCEDURE — 1111F DSCHRG MED/CURRENT MED MERGE: CPT | Performed by: FAMILY MEDICINE

## 2023-06-01 PROCEDURE — 3074F SYST BP LT 130 MM HG: CPT | Performed by: FAMILY MEDICINE

## 2023-06-01 PROCEDURE — 3078F DIAST BP <80 MM HG: CPT | Performed by: FAMILY MEDICINE

## 2023-06-01 PROCEDURE — 3017F COLORECTAL CA SCREEN DOC REV: CPT | Performed by: FAMILY MEDICINE

## 2023-06-01 RX ORDER — VALACYCLOVIR HYDROCHLORIDE 1 G/1
1000 TABLET, FILM COATED ORAL 3 TIMES DAILY
Qty: 21 TABLET | Refills: 0 | Status: SHIPPED | OUTPATIENT
Start: 2023-06-01 | End: 2023-06-08

## 2023-06-01 RX ORDER — LIDOCAINE 50 MG/G
1 PATCH TOPICAL DAILY
Qty: 30 PATCH | Refills: 0 | Status: SHIPPED | OUTPATIENT
Start: 2023-06-01

## 2023-06-07 ENCOUNTER — CARE COORDINATION (OUTPATIENT)
Dept: CASE MANAGEMENT | Age: 63
End: 2023-06-07

## 2023-06-07 NOTE — CARE COORDINATION
[facility] Care Transitions Follow Up Call    Patient: Linnea Walker  Patient : 1960   MRN: [id number]  Reason for Admission: Hypertensive emergency  Discharge Date: 5/15/23 RARS: Readmission Risk Score: 21.6      Attempted to reach the patient for subsequent Care Transition call. Message left with CTN's contact information requesting return phone call. Will attempt for a final outreach next week.     Follow Up  Future Appointments   Date Time Provider Naomi Alvarado   10/17/2023 10:00 AM Michelle Irene MD [facility]   2024 10:00 AM Michelle Irene MD [facility]         Issac Newton RN

## 2023-08-02 ENCOUNTER — HOSPITAL ENCOUNTER (INPATIENT)
Age: 63
LOS: 4 days | Discharge: HOME | DRG: 812 | End: 2023-08-06
Attending: EMERGENCY MEDICINE | Admitting: INTERNAL MEDICINE
Payer: MEDICARE

## 2023-08-02 ENCOUNTER — APPOINTMENT (OUTPATIENT)
Dept: CT IMAGING | Age: 63
DRG: 812 | End: 2023-08-02
Payer: MEDICARE

## 2023-08-02 ENCOUNTER — APPOINTMENT (OUTPATIENT)
Dept: GENERAL RADIOLOGY | Age: 63
DRG: 812 | End: 2023-08-02
Payer: MEDICARE

## 2023-08-02 DIAGNOSIS — Z89.512 HX OF BKA, LEFT (HCC): ICD-10-CM

## 2023-08-02 DIAGNOSIS — D64.9 ANEMIA, UNSPECIFIED TYPE: Primary | ICD-10-CM

## 2023-08-02 DIAGNOSIS — I26.99 PULMONARY EMBOLISM WITHOUT ACUTE COR PULMONALE, UNSPECIFIED CHRONICITY, UNSPECIFIED PULMONARY EMBOLISM TYPE (HCC): ICD-10-CM

## 2023-08-02 DIAGNOSIS — I10 ESSENTIAL HYPERTENSION: ICD-10-CM

## 2023-08-02 PROBLEM — D62 ACUTE ON CHRONIC BLOOD LOSS ANEMIA: Status: ACTIVE | Noted: 2023-08-02

## 2023-08-02 LAB
ANION GAP SERPL CALCULATED.3IONS-SCNC: 12 MMOL/L (ref 7–16)
BNP SERPL-MCNC: 777 PG/ML (ref 0–450)
BUN SERPL-MCNC: 22 MG/DL (ref 6–23)
CALCIUM SERPL-MCNC: 9.5 MG/DL (ref 8.6–10.2)
CHLORIDE SERPL-SCNC: 109 MMOL/L (ref 98–107)
CO2 SERPL-SCNC: 20 MMOL/L (ref 22–29)
CREAT SERPL-MCNC: 1.5 MG/DL (ref 0.5–1)
EKG ATRIAL RATE: 88 BPM
EKG P AXIS: 41 DEGREES
EKG P-R INTERVAL: 208 MS
EKG Q-T INTERVAL: 334 MS
EKG QRS DURATION: 78 MS
EKG QTC CALCULATION (BAZETT): 404 MS
EKG R AXIS: 22 DEGREES
EKG T AXIS: 108 DEGREES
EKG VENTRICULAR RATE: 88 BPM
ERYTHROCYTE [DISTWIDTH] IN BLOOD BY AUTOMATED COUNT: 17.6 % (ref 11.5–15)
GFR SERPL CREATININE-BSD FRML MDRD: 37 ML/MIN/1.73M2
GLUCOSE SERPL-MCNC: 158 MG/DL (ref 74–99)
HCT VFR BLD AUTO: 22.6 % (ref 34–48)
HCT VFR BLD AUTO: 26.8 % (ref 34–48)
HGB BLD-MCNC: 6.7 G/DL (ref 11.5–15.5)
HGB BLD-MCNC: 8.3 G/DL (ref 11.5–15.5)
LV EF: 65 %
LVEF MODALITY: NORMAL
MCH RBC QN AUTO: 23.3 PG (ref 26–35)
MCHC RBC AUTO-ENTMCNC: 29.6 G/DL (ref 32–34.5)
MCV RBC AUTO: 78.5 FL (ref 80–99.9)
PLATELET # BLD AUTO: 448 K/UL (ref 130–450)
PMV BLD AUTO: 10.3 FL (ref 7–12)
POTASSIUM SERPL-SCNC: 4.2 MMOL/L (ref 3.5–5)
RBC # BLD AUTO: 2.88 M/UL (ref 3.5–5.5)
SODIUM SERPL-SCNC: 141 MMOL/L (ref 132–146)
TROPONIN I SERPL HS-MCNC: 56 NG/L (ref 0–9)
WBC OTHER # BLD: 3.8 K/UL (ref 4.5–11.5)

## 2023-08-02 PROCEDURE — 36415 COLL VENOUS BLD VENIPUNCTURE: CPT

## 2023-08-02 PROCEDURE — 6360000002 HC RX W HCPCS: Performed by: NURSE PRACTITIONER

## 2023-08-02 PROCEDURE — 30233P1 TRANSFUSION OF NONAUTOLOGOUS FROZEN RED CELLS INTO PERIPHERAL VEIN, PERCUTANEOUS APPROACH: ICD-10-PCS | Performed by: INTERNAL MEDICINE

## 2023-08-02 PROCEDURE — 93005 ELECTROCARDIOGRAM TRACING: CPT | Performed by: EMERGENCY MEDICINE

## 2023-08-02 PROCEDURE — 6360000002 HC RX W HCPCS: Performed by: EMERGENCY MEDICINE

## 2023-08-02 PROCEDURE — 86850 RBC ANTIBODY SCREEN: CPT

## 2023-08-02 PROCEDURE — 0202U NFCT DS 22 TRGT SARS-COV-2: CPT

## 2023-08-02 PROCEDURE — 99223 1ST HOSP IP/OBS HIGH 75: CPT | Performed by: INTERNAL MEDICINE

## 2023-08-02 PROCEDURE — 6370000000 HC RX 637 (ALT 250 FOR IP): Performed by: INTERNAL MEDICINE

## 2023-08-02 PROCEDURE — 87040 BLOOD CULTURE FOR BACTERIA: CPT

## 2023-08-02 PROCEDURE — 36430 TRANSFUSION BLD/BLD COMPNT: CPT

## 2023-08-02 PROCEDURE — 85014 HEMATOCRIT: CPT

## 2023-08-02 PROCEDURE — 6360000004 HC RX CONTRAST MEDICATION: Performed by: RADIOLOGY

## 2023-08-02 PROCEDURE — C9113 INJ PANTOPRAZOLE SODIUM, VIA: HCPCS | Performed by: NURSE PRACTITIONER

## 2023-08-02 PROCEDURE — 71275 CT ANGIOGRAPHY CHEST: CPT

## 2023-08-02 PROCEDURE — 94664 DEMO&/EVAL PT USE INHALER: CPT

## 2023-08-02 PROCEDURE — 85027 COMPLETE CBC AUTOMATED: CPT

## 2023-08-02 PROCEDURE — 93010 ELECTROCARDIOGRAM REPORT: CPT | Performed by: INTERNAL MEDICINE

## 2023-08-02 PROCEDURE — 86923 COMPATIBILITY TEST ELECTRIC: CPT

## 2023-08-02 PROCEDURE — 93306 TTE W/DOPPLER COMPLETE: CPT

## 2023-08-02 PROCEDURE — 84484 ASSAY OF TROPONIN QUANT: CPT

## 2023-08-02 PROCEDURE — 83880 ASSAY OF NATRIURETIC PEPTIDE: CPT

## 2023-08-02 PROCEDURE — 86900 BLOOD TYPING SEROLOGIC ABO: CPT

## 2023-08-02 PROCEDURE — 86901 BLOOD TYPING SEROLOGIC RH(D): CPT

## 2023-08-02 PROCEDURE — 85018 HEMOGLOBIN: CPT

## 2023-08-02 PROCEDURE — 6370000000 HC RX 637 (ALT 250 FOR IP): Performed by: NURSE PRACTITIONER

## 2023-08-02 PROCEDURE — 99285 EMERGENCY DEPT VISIT HI MDM: CPT

## 2023-08-02 PROCEDURE — 1200000000 HC SEMI PRIVATE

## 2023-08-02 PROCEDURE — 74018 RADEX ABDOMEN 1 VIEW: CPT

## 2023-08-02 PROCEDURE — P9016 RBC LEUKOCYTES REDUCED: HCPCS

## 2023-08-02 PROCEDURE — 80048 BASIC METABOLIC PNL TOTAL CA: CPT

## 2023-08-02 PROCEDURE — 94640 AIRWAY INHALATION TREATMENT: CPT

## 2023-08-02 PROCEDURE — 96374 THER/PROPH/DIAG INJ IV PUSH: CPT

## 2023-08-02 RX ORDER — SODIUM CHLORIDE AND POTASSIUM CHLORIDE 300; 900 MG/100ML; MG/100ML
INJECTION, SOLUTION INTRAVENOUS CONTINUOUS
Status: DISCONTINUED | OUTPATIENT
Start: 2023-08-02 | End: 2023-08-04

## 2023-08-02 RX ORDER — ATORVASTATIN CALCIUM 40 MG/1
40 TABLET, FILM COATED ORAL NIGHTLY
Status: DISCONTINUED | OUTPATIENT
Start: 2023-08-02 | End: 2023-08-06 | Stop reason: HOSPADM

## 2023-08-02 RX ORDER — LIDOCAINE 4 G/G
1 PATCH TOPICAL DAILY
Status: DISCONTINUED | OUTPATIENT
Start: 2023-08-02 | End: 2023-08-06 | Stop reason: HOSPADM

## 2023-08-02 RX ORDER — POTASSIUM CHLORIDE 7.45 MG/ML
10 INJECTION INTRAVENOUS PRN
Status: DISCONTINUED | OUTPATIENT
Start: 2023-08-02 | End: 2023-08-06 | Stop reason: HOSPADM

## 2023-08-02 RX ORDER — MIRTAZAPINE 15 MG/1
7.5 TABLET, FILM COATED ORAL NIGHTLY
Status: DISCONTINUED | OUTPATIENT
Start: 2023-08-02 | End: 2023-08-06 | Stop reason: HOSPADM

## 2023-08-02 RX ORDER — MAGNESIUM SULFATE 1 G/100ML
1000 INJECTION INTRAVENOUS PRN
Status: DISCONTINUED | OUTPATIENT
Start: 2023-08-02 | End: 2023-08-06 | Stop reason: HOSPADM

## 2023-08-02 RX ORDER — HYDRALAZINE HYDROCHLORIDE 20 MG/ML
5 INJECTION INTRAMUSCULAR; INTRAVENOUS EVERY 4 HOURS PRN
Status: DISCONTINUED | OUTPATIENT
Start: 2023-08-02 | End: 2023-08-06 | Stop reason: HOSPADM

## 2023-08-02 RX ORDER — OXYCODONE HYDROCHLORIDE 5 MG/1
5 TABLET ORAL ONCE
Status: COMPLETED | OUTPATIENT
Start: 2023-08-02 | End: 2023-08-02

## 2023-08-02 RX ORDER — ENOXAPARIN SODIUM 100 MG/ML
40 INJECTION SUBCUTANEOUS DAILY
Status: DISCONTINUED | OUTPATIENT
Start: 2023-08-02 | End: 2023-08-06 | Stop reason: HOSPADM

## 2023-08-02 RX ORDER — GABAPENTIN 100 MG/1
100 CAPSULE ORAL 2 TIMES DAILY
Status: DISCONTINUED | OUTPATIENT
Start: 2023-08-02 | End: 2023-08-06 | Stop reason: HOSPADM

## 2023-08-02 RX ORDER — HYDRALAZINE HYDROCHLORIDE 50 MG/1
50 TABLET, FILM COATED ORAL EVERY 8 HOURS SCHEDULED
Status: DISCONTINUED | OUTPATIENT
Start: 2023-08-02 | End: 2023-08-06 | Stop reason: HOSPADM

## 2023-08-02 RX ORDER — ACETAMINOPHEN 325 MG/1
650 TABLET ORAL EVERY 6 HOURS PRN
Status: DISCONTINUED | OUTPATIENT
Start: 2023-08-02 | End: 2023-08-06 | Stop reason: HOSPADM

## 2023-08-02 RX ORDER — GUAIFENESIN 600 MG/1
1200 TABLET, EXTENDED RELEASE ORAL 2 TIMES DAILY
Status: DISCONTINUED | OUTPATIENT
Start: 2023-08-02 | End: 2023-08-06 | Stop reason: HOSPADM

## 2023-08-02 RX ORDER — PANTOPRAZOLE SODIUM 40 MG/10ML
40 INJECTION, POWDER, LYOPHILIZED, FOR SOLUTION INTRAVENOUS DAILY
Status: DISCONTINUED | OUTPATIENT
Start: 2023-08-02 | End: 2023-08-06 | Stop reason: HOSPADM

## 2023-08-02 RX ORDER — ARFORMOTEROL TARTRATE 15 UG/2ML
15 SOLUTION RESPIRATORY (INHALATION)
Status: DISCONTINUED | OUTPATIENT
Start: 2023-08-02 | End: 2023-08-06 | Stop reason: HOSPADM

## 2023-08-02 RX ORDER — BISACODYL 5 MG/1
5 TABLET, DELAYED RELEASE ORAL DAILY PRN
Status: DISCONTINUED | OUTPATIENT
Start: 2023-08-02 | End: 2023-08-06 | Stop reason: HOSPADM

## 2023-08-02 RX ORDER — SODIUM CHLORIDE 9 MG/ML
INJECTION, SOLUTION INTRAVENOUS
Status: DISPENSED
Start: 2023-08-02 | End: 2023-08-03

## 2023-08-02 RX ORDER — SODIUM CHLORIDE 0.9 % (FLUSH) 0.9 %
10 SYRINGE (ML) INJECTION PRN
Status: DISCONTINUED | OUTPATIENT
Start: 2023-08-02 | End: 2023-08-06 | Stop reason: HOSPADM

## 2023-08-02 RX ORDER — ACETAMINOPHEN 650 MG/1
650 SUPPOSITORY RECTAL EVERY 6 HOURS PRN
Status: DISCONTINUED | OUTPATIENT
Start: 2023-08-02 | End: 2023-08-06 | Stop reason: HOSPADM

## 2023-08-02 RX ORDER — ONDANSETRON 2 MG/ML
4 INJECTION INTRAMUSCULAR; INTRAVENOUS EVERY 6 HOURS PRN
Status: DISCONTINUED | OUTPATIENT
Start: 2023-08-02 | End: 2023-08-06 | Stop reason: HOSPADM

## 2023-08-02 RX ORDER — HYDRALAZINE HYDROCHLORIDE 20 MG/ML
10 INJECTION INTRAMUSCULAR; INTRAVENOUS ONCE
Status: COMPLETED | OUTPATIENT
Start: 2023-08-02 | End: 2023-08-02

## 2023-08-02 RX ORDER — SODIUM CHLORIDE 9 MG/ML
INJECTION, SOLUTION INTRAVENOUS PRN
Status: DISCONTINUED | OUTPATIENT
Start: 2023-08-02 | End: 2023-08-06 | Stop reason: HOSPADM

## 2023-08-02 RX ORDER — ALBUTEROL SULFATE 2.5 MG/3ML
2.5 SOLUTION RESPIRATORY (INHALATION) EVERY 4 HOURS PRN
Status: DISCONTINUED | OUTPATIENT
Start: 2023-08-02 | End: 2023-08-06 | Stop reason: HOSPADM

## 2023-08-02 RX ORDER — BUDESONIDE 0.5 MG/2ML
0.5 INHALANT ORAL
Status: DISCONTINUED | OUTPATIENT
Start: 2023-08-02 | End: 2023-08-06 | Stop reason: HOSPADM

## 2023-08-02 RX ORDER — ISOSORBIDE MONONITRATE 30 MG/1
30 TABLET, EXTENDED RELEASE ORAL DAILY
Status: DISCONTINUED | OUTPATIENT
Start: 2023-08-02 | End: 2023-08-06 | Stop reason: HOSPADM

## 2023-08-02 RX ORDER — SODIUM CHLORIDE 0.9 % (FLUSH) 0.9 %
10 SYRINGE (ML) INJECTION EVERY 12 HOURS SCHEDULED
Status: DISCONTINUED | OUTPATIENT
Start: 2023-08-02 | End: 2023-08-06 | Stop reason: HOSPADM

## 2023-08-02 RX ORDER — LANOLIN ALCOHOL/MO/W.PET/CERES
50 CREAM (GRAM) TOPICAL DAILY
Status: DISCONTINUED | OUTPATIENT
Start: 2023-08-02 | End: 2023-08-06 | Stop reason: HOSPADM

## 2023-08-02 RX ORDER — ONDANSETRON 4 MG/1
4 TABLET, ORALLY DISINTEGRATING ORAL EVERY 8 HOURS PRN
Status: DISCONTINUED | OUTPATIENT
Start: 2023-08-02 | End: 2023-08-06 | Stop reason: HOSPADM

## 2023-08-02 RX ORDER — CARVEDILOL 6.25 MG/1
12.5 TABLET ORAL 2 TIMES DAILY WITH MEALS
Status: DISCONTINUED | OUTPATIENT
Start: 2023-08-02 | End: 2023-08-06 | Stop reason: HOSPADM

## 2023-08-02 RX ORDER — POTASSIUM CHLORIDE 20 MEQ/1
40 TABLET, EXTENDED RELEASE ORAL PRN
Status: DISCONTINUED | OUTPATIENT
Start: 2023-08-02 | End: 2023-08-06 | Stop reason: HOSPADM

## 2023-08-02 RX ADMIN — GABAPENTIN 100 MG: 100 CAPSULE ORAL at 20:58

## 2023-08-02 RX ADMIN — PYRIDOXINE HCL TAB 50 MG 50 MG: 50 TAB at 14:05

## 2023-08-02 RX ADMIN — GUAIFENESIN 1200 MG: 600 TABLET, EXTENDED RELEASE ORAL at 20:57

## 2023-08-02 RX ADMIN — HYDRALAZINE HYDROCHLORIDE 10 MG: 20 INJECTION INTRAMUSCULAR; INTRAVENOUS at 11:17

## 2023-08-02 RX ADMIN — ENOXAPARIN SODIUM 40 MG: 100 INJECTION SUBCUTANEOUS at 14:06

## 2023-08-02 RX ADMIN — IOPAMIDOL 75 ML: 755 INJECTION, SOLUTION INTRAVENOUS at 10:40

## 2023-08-02 RX ADMIN — POTASSIUM CHLORIDE AND SODIUM CHLORIDE: 900; 300 INJECTION, SOLUTION INTRAVENOUS at 15:05

## 2023-08-02 RX ADMIN — ATORVASTATIN CALCIUM 40 MG: 40 TABLET, FILM COATED ORAL at 20:58

## 2023-08-02 RX ADMIN — OXYCODONE HYDROCHLORIDE 5 MG: 5 TABLET ORAL at 16:43

## 2023-08-02 RX ADMIN — ACETAMINOPHEN 650 MG: 325 TABLET ORAL at 14:02

## 2023-08-02 RX ADMIN — MIRTAZAPINE 7.5 MG: 15 TABLET, FILM COATED ORAL at 20:58

## 2023-08-02 RX ADMIN — ISOSORBIDE MONONITRATE 30 MG: 30 TABLET, EXTENDED RELEASE ORAL at 14:06

## 2023-08-02 RX ADMIN — ARFORMOTEROL TARTRATE 15 MCG: 15 SOLUTION RESPIRATORY (INHALATION) at 11:40

## 2023-08-02 RX ADMIN — ACETAMINOPHEN 650 MG: 325 TABLET ORAL at 20:58

## 2023-08-02 RX ADMIN — CARVEDILOL 12.5 MG: 6.25 TABLET, FILM COATED ORAL at 16:43

## 2023-08-02 RX ADMIN — BUDESONIDE INHALATION 500 MCG: 0.5 SUSPENSION RESPIRATORY (INHALATION) at 11:40

## 2023-08-02 RX ADMIN — GABAPENTIN 100 MG: 100 CAPSULE ORAL at 14:05

## 2023-08-02 RX ADMIN — CARVEDILOL 12.5 MG: 6.25 TABLET, FILM COATED ORAL at 14:04

## 2023-08-02 RX ADMIN — PANTOPRAZOLE SODIUM 40 MG: 40 INJECTION, POWDER, FOR SOLUTION INTRAVENOUS at 14:03

## 2023-08-02 RX ADMIN — HYDRALAZINE HYDROCHLORIDE 50 MG: 50 TABLET, FILM COATED ORAL at 21:00

## 2023-08-02 RX ADMIN — GUAIFENESIN 1200 MG: 600 TABLET, EXTENDED RELEASE ORAL at 14:02

## 2023-08-02 ASSESSMENT — PAIN SCALES - GENERAL
PAINLEVEL_OUTOF10: 6
PAINLEVEL_OUTOF10: 10
PAINLEVEL_OUTOF10: 7
PAINLEVEL_OUTOF10: 10

## 2023-08-02 ASSESSMENT — ENCOUNTER SYMPTOMS
SHORTNESS OF BREATH: 1
NAUSEA: 0
ABDOMINAL PAIN: 0

## 2023-08-02 ASSESSMENT — PAIN DESCRIPTION - DESCRIPTORS
DESCRIPTORS: SHARP
DESCRIPTORS: ACHING

## 2023-08-02 ASSESSMENT — PAIN DESCRIPTION - LOCATION
LOCATION: BACK;CHEST;SHOULDER
LOCATION: CHEST;SHOULDER
LOCATION: CHEST;ARM

## 2023-08-02 ASSESSMENT — PAIN DESCRIPTION - ORIENTATION
ORIENTATION: MID;LEFT
ORIENTATION: LEFT
ORIENTATION: LEFT

## 2023-08-02 ASSESSMENT — PAIN - FUNCTIONAL ASSESSMENT: PAIN_FUNCTIONAL_ASSESSMENT: PREVENTS OR INTERFERES SOME ACTIVE ACTIVITIES AND ADLS

## 2023-08-02 NOTE — H&P
Types: Cigarettes     Quit date: 3/23/2015     Years since quittin.3    Smokeless tobacco: Never    Tobacco comments:     quit smoking    Vaping Use    Vaping Use: Never used   Substance and Sexual Activity    Alcohol use: No    Drug use: No    Sexual activity: Not on file   Other Topics Concern    Not on file   Social History Narrative    Not on file     Social Determinants of Health     Financial Resource Strain: Unknown    Difficulty of Paying Living Expenses: Patient refused   Food Insecurity: Unknown    Worried About Running Out of Food in the Last Year: Patient refused    [address] in the Last Year: Patient refused   Transportation Needs: Unknown    Lack of Transportation (Medical): Not on file    Lack of Transportation (Non-Medical): Patient refused   Physical Activity: Sufficiently Active    Days of Exercise per Week: 6 days    Minutes of Exercise per Session: 60 min   Stress: No Stress Concern Present    Feeling of Stress : Only a little   Social Connections: Not on file   Intimate Partner Violence: Not on file   Housing Stability: Unknown    Unable to Pay for Housing in the Last Year: Not on file    Number of [address] in the Last Year: Not on file    Unstable Housing in the Last Year: Patient refused     Review of systems:  Constitutional:   Positive for significant fatigue and malaise , - fever/chills  HEENT:   Denies ear pain, sore throat, sinus or eye problems. Cardiovascular:   Denies any chest pain, irregular heartbeats, or palpitations. Respiratory:   + dyspnea at rest, + dyspnea on exertion, - coughing, - sputum, - hemoptysis  Gastrointestinal:   - nausea, -vomiting. - diarrhea, + chronic constipation. -Hematochezia or melena, - poor appetite and poor intake. - abdominal pain. Genitourinary:    + Ongoing hematuria that is now gross hematuria with the occasional passage of clots. Denies any significant suprapubic discomfort.   Extremities:   Prior left BKA, denies any

## 2023-08-02 NOTE — ED NOTES
EKG complete, copy to Dr Kenneth Reed. IV established, labs drawn and sent. BBID applied to pt. Pt placed on cont tele and pulse ox.       Clint NEAR KASANDRA EDEN  08/02/23 0724

## 2023-08-02 NOTE — CONSULTS
8/2/2023 2:41 PM  Service: Urology  Group: [facility] ([facility])    Dayanna Police  [id number]     Chief Complaint:    Shortness of breath. History of Present Illness: The patient is a 58 y.o. female patient who has been seen by our service since 2019. She had bilateral ureteral obstruction and had stents placed in October 28th 2019 by Dr. Shree Lafleur stents were exchanged by him again on 5/19/2020. Dr. Marianna Leal changes the stents on January 18, 2021 her last stent exchange was done on 5/2/2022 her hydronephrosis is thought to be due to possible calculus disease. [place] [facility] Her neck stent exchange is already prescheduled for August 20. Patient is noted that she has had some gross hematuria x1 day she had significant clots she has some urgency may be some urgency incontinence at times but she seems to tolerate this well no sensation of residual.  At home she does get around with a walker. She presented with shortness of breath she has history of chronic anemia which was thought to possibly contribute to her problem she had 1 unit of blood given. Known chronic renal insufficiency. Her CT of her chest showed cardiomegaly with a small pericardial effusion. Past Medical History:   Diagnosis Date    CAD (coronary artery disease)     cath 2016    CHF (congestive heart failure) ([address])     Diabetic peripheral neuropathy associated with type 2 diabetes mellitus ([address]) 08/24/2018    Epigastric hernia     Hyperlipidemia     Hypertension     Kidney stone     Schizophrenia ([address])     Type 2 diabetes mellitus with diabetic polyneuropathy, with long-term current use of insulin ([address]) 09/19/2016    no longer requiring medication. 1/13/21 - continues w/o medications as of 11/2/2022         Past Surgical History:   Procedure Laterality Date    BLADDER SURGERY Bilateral 5/2/2022    CYSTOSCOPY RETROGRADE PYELOGRAM BILATERAL STENT CHANGE performed by Esvin Candelario MD at [address] Left 11/7/2022 (TYLENOL) 325 MG tablet, Take 1 tablet by mouth 2 times daily as needed for Pain  mirtazapine (REMERON) 7.5 MG tablet, Take 1 tablet by mouth nightly  apixaban (ELIQUIS) 5 MG TABS tablet, Take 1 tablet by mouth 2 times daily  carvedilol (COREG) 12.5 MG tablet, Take 1 tablet by mouth 2 times daily (with meals)  albuterol sulfate HFA (VENTOLIN HFA) 108 (90 Base) MCG/ACT inhaler, Inhale 2 puffs into the lungs 4 times daily as needed for Wheezing  gabapentin (NEURONTIN) 100 MG capsule, Take 1 capsule by mouth 2 times daily. Handicap Placard MISC, by Does not apply route Unable to ambulate more than 20 feet without difficulty Expires 3/31/2028  fluticasone (FLONASE) 50 MCG/ACT nasal spray, 2 sprays by Each Nostril route daily as needed for Rhinitis or Allergies  Nutritional Supplements (BOOST PLUS) LIQD, Take 1 each by mouth daily Strawberry/Vanilla    Allergies:    Patient has no known allergies. Social History:    reports that she quit smoking about 8 years ago. Her smoking use included cigarettes. She has a 5.00 pack-year smoking history. She has never used smokeless tobacco. She reports that she does not drink alcohol and does not use drugs. Family History:   Non-contributory to this Urological problem  family history includes Cancer in her brother. I believe she is living at home with her daughter    Review of Systems:  Respiratory: negative for cough and hemoptysis  Cardiovascular: negative for chest pain and dyspnea cardiac catheterization 2016 patient. History of congestive heart failure. Gastrointestinal: negative for abdominal pain, diarrhea, nausea and vomiting epigastric hernia. Derm: negative for rash and skin lesion(s)  Neurological: negative for seizures and tremors schizophrenia. Endocrine: Diabetes mellitus with peripheral neuropathy. Hypertension hyperlipidemia. : As above in the HPI, otherwise negative  Musculoskeletal.  Previous femoral artery stent.   Left amputation below the knee on

## 2023-08-02 NOTE — PROGRESS NOTES
4 Eyes Skin Assessment     NAME:  Eugenia Alicea  YOB: 1960  MEDICAL RECORD NUMBER:  [id number]    The patient is being assessed for  Admission    I agree that at least one RN has performed a thorough Head to Toe Skin Assessment on the patient. ALL assessment sites listed below have been assessed. Areas assessed by both nurses:    Head, Face, Ears, Shoulders, Back, Chest, Arms, Elbows, Hands, Sacrum. Buttock, Coccyx, Ischium, Legs. Feet and Heels, and Under Medical Devices         Does the Patient have a Wound?  No noted wound(s)       Naif Prevention initiated by RN: No  Wound Care Orders initiated by RN: No    Pressure Injury (Stage 3,4, Unstageable, DTI, NWPT, and Complex wounds) if present, place Wound referral order by RN under : No    New Ostomies, if present place, Ostomy referral order under : No     Nurse 1 eSignature: Electronically signed by Kenzie Romero RN on 8/2/23 at 4:25 PM EDT    **SHARE this note so that the co-signing nurse can place an eSignature**    Nurse 2 eSignature: Electronically signed by Warden Laureano RN on 8/2/23 at 4:37 PM EDT

## 2023-08-02 NOTE — PROGRESS NOTES
Physician Progress Note      Norma Madrid  [facility] #:                  [id number]  :                       1960  ADMIT DATE:       2023 9:05 AM  DISCH DATE:  David Gonzalez  PROVIDER #:        Renea Blanco DO          QUERY TEXT:    Patient admitted with ABLA secondary to gross hematuria. Noted documentation   of NSTEMI, likely demand ischemia in H&P dated 23. In order to support the   diagnosis of NSTEMI, please refer to 4th universal definition of NSTEMI below   and include additional clinical indicators in your documentation. Or please   document if the diagnosis of NSTEMI has been ruled out after study. The medical record reflects the following:  Risk Factors: anemia, CAD  Clinical Indicators: Pt has elevated troponin x 1 of 56. EKG showed no   ischemia, pt denies chest pain. Treatment: EKG, trop x 1    Thank you,  Noemi Perez RN      Fourth Universal Definition of Myocardial Infarction:  Clearly separates MI   from myocardial injury. Patients with elevated blood troponin levels but   without clinical evidence of ischemia are said to have had a myocardial   injury. ? To have a myocardial infarction requires both an elevated troponin   blood test along with at least one of the following:  - Symptoms of acute myocardial ischemia (Types 1 - 5 MI)  - Clinical evidence of ischemia, as evidenced in an electrocardiogram (EKG)   showing new ischemic changes (Type 1, Type 2, Type 3, or Type 4a MI)  - Development of pathological Q waves (Types 1 - 5 MI)  - Imaging evidence of new loss of viable myocardium or new regional wall   motion abnormality in a pattern consistent with an ischemic etiology (Types 1   - 5 MI)  Options provided:  -- NSTEMI ruled out after study and demand ischemia due to Acute blood loss   anemia confirmed  -- NSTEMI confirmed after study, (please list evidence)  -- Other - I will add my own diagnosis  -- Disagree - Not applicable / Not valid  -- Disagree - Clinically unable to determine / Unknown  -- Refer to Clinical Documentation Reviewer    PROVIDER RESPONSE TEXT:    NSTEMI was ruled out after study and demand ischemia confirmed.     Query created by: Tracie Nuñez on 8/2/2023 1:39 PM      Electronically signed by:  Lora Multani DO 8/2/2023 7:09 PM

## 2023-08-02 NOTE — ED PROVIDER NOTES
Patient presents emergency department with complaint of shortness of breath. Patient has prior history of PE and states that she is currently on Eliquis. She states that she believes she is taking it as prescribed. She denies any chest discomfort but feels pressure on her chest when she exerts herself. She denies any rectal bleeding. She states that she does occasionally notice clots coming from her vagina. The history is provided by the patient and a relative. Review of Systems   Constitutional:  Positive for activity change and fatigue. HENT: Negative. Respiratory:  Positive for shortness of breath. Cardiovascular:  Positive for chest pain. Negative for palpitations and leg swelling. Gastrointestinal:  Negative for abdominal pain and nausea. Genitourinary:  Positive for vaginal bleeding. Negative for flank pain and hematuria. Musculoskeletal: Negative. Skin:  Negative for pallor. Neurological:  Negative for light-headedness. Physical Exam  Vitals and nursing note reviewed. Constitutional:       General: She is not in acute distress. Appearance: She is well-developed and normal weight. She is not ill-appearing or toxic-appearing. HENT:      Head: Normocephalic and atraumatic. Mouth/Throat:      Mouth: Mucous membranes are moist.      Pharynx: Oropharynx is clear. Eyes:      Pupils: Pupils are equal, round, and reactive to light. Comments: Pale palpebral conjunctiva   Cardiovascular:      Rate and Rhythm: Regular rhythm. Tachycardia present. Heart sounds: Normal heart sounds. No murmur heard. Pulmonary:      Effort: Pulmonary effort is normal. No respiratory distress. Breath sounds: Normal breath sounds. No decreased breath sounds, wheezing or rales. Chest:      Chest wall: No edema. Abdominal:      General: Bowel sounds are normal.      Palpations: Abdomen is soft. Tenderness: There is no abdominal tenderness.  There is no guarding or rebound. Musculoskeletal:      Cervical back: Normal range of motion and neck supple. Right lower leg: No tenderness. No edema. Left lower leg: No tenderness. No edema. Skin:     General: Skin is warm and dry. Capillary Refill: Capillary refill takes less than 2 seconds. Coloration: Skin is not pale. Neurological:      General: No focal deficit present. Mental Status: She is alert and oriented to person, place, and time. Cranial Nerves: No cranial nerve deficit. Coordination: Coordination normal.   Psychiatric:         Mood and Affect: Mood normal.         Behavior: Behavior normal.       Procedures    Medical Decision Making  Amount and/or Complexity of Data Reviewed  Labs: ordered. Radiology: ordered. ECG/medicine tests: ordered. Risk  Prescription drug management. Decision regarding hospitalization. Patient presents emergency department with complaint of shortness of breath and exertional dyspnea. Patient states that she has history of PEs. On physical exam she is alert with slight pallor of the palpebral conjunctive a. Her heart rate is slightly elevated but regular. Her lungs are clear to auscultation. No peripheral edema or tenderness appreciated. Patient sent over for CT to evaluate for PE which was negative. Patient found to be very anemic requiring blood transfusion. She will be admitted to the hospital for further evaluation and treatment.          EKG Interpretation    Interpreted by emergency department physician    Rhythm: normal sinus   Rate: normal  Axis: normal  Ectopy: none  Conduction: normal  ST Segments: normal  T Waves: no acute change  Q Waves: III    Clinical Impression: no acute changes    Cristel Andre DO          --------------------------------------------- PAST HISTORY ---------------------------------------------  Past Medical History:  has a past medical history of CAD (coronary artery disease), CHF (congestive heart

## 2023-08-02 NOTE — ED NOTES
Radiology Procedure Waiver   Name: Raymond Singh  : 1960  MRN: [id number]    Date:  23    Time: 10:32 AM EDT    Benefits of immediately proceeding with Radiology exam(s) without pre-testing outweigh the risks or are not indicated as specified below and therefore the following is/are being waived:    [] Pregnancy test   [] Patients LMP on-time and regular.   [] Patient had Tubal Ligation or has other Contraception Device. [] Patient  is Menopausal or Premenarcheal.    [] Patient had Full or Partial Hysterectomy. [] Protocol for Iodine allergy    [] MRI Questionnaire     [] BUN/Creatinine   [] Patient age w/no hx of renal dysfunction. [] Patient on Dialysis. [] Recent Normal Labs.   Electronically signed by Griselda Ku DO on 23 at 10:32 AM EDT               Griselda Ku DO  23 1998

## 2023-08-02 NOTE — CONSULTS
Normal range of motion. Neck supple. No hepatojugular reflux and no JVD present. Carotid bruit is not present. No tracheal deviation present. No thyromegaly present. Cardiovascular: Normal rate, regular rhythm, normal heart sounds and intact distal pulses. Exam reveals no gallop and no friction rub. No murmur heard. Pulmonary/Chest: Effort normal and breath sounds normal. No respiratory distress. No wheezes. No rales. No tenderness. Abdominal: Soft. Bowel sounds are normal. No distension and no mass. No tenderness. No rebound and no guarding. Musculoskeletal: Normal range of motion. No edema and no tenderness. Lymphadenopathy:   No cervical adenopathy. Neurological: Alert and oriented to person, place, and time. Skin: Skin is warm and dry. No rash noted. Not diaphoretic. No erythema. Psychiatric: Normal mood and affect. Behavior is normal.     Cath Summary 9/2016   Left main: 0% stenosis  LAD: large and wraps around the apex to distal 1/2 of inferior septum. No stenosis  Circumflex: CD. No stenosis  RI: mild ostial disease. Otherwise normal.   RCA: CD. Small. Small RPDA. no stenosis  LV angio: not performed. Echo Summary 11/25/2022:   Normal left ventricular size and systolic function. Ejection fraction is visually estimated at 70-75%. Normal diastolic function. No regional wall motion abnormalities seen. Mild left ventricular concentric hypertrophy noted with basal septal hypertrophy. Normal right ventricular size and function. There is a small-moderate circumferential pericardial effusion noted with no evidence of hemodynamic compromise. Compared to study from 2/2020 the pericardial effusion is a new finding. See accompanying documentation for full consult.     ASSESSMENT AND PLAN:  Patient Active Problem List   Diagnosis    Epigastric hernia    Umbilical hernia    Essential hypertension    Type 2 diabetes mellitus with diabetic peripheral angiopathy without gangrene, without long-term current use of insulin (HCC)    Dyslipidemia    Diabetic peripheral neuropathy (HCC)    Hydroureter    Microcytic hypochromic anemia    Paresthesia of left foot    History of arterial ischemic stroke    Hx of diabetic foot ulcer    Hx of BKA, left (HCC)    Chronic diastolic heart failure (HCC)    PVD (peripheral vascular disease) (HCC)    Anemia    Pleural effusion    Dyspnea    Acute on chronic diastolic CHF (congestive heart failure) (HCC)    Chronic renal disease, stage III ([address]) [439860]    Acute on chronic blood loss anemia     1. Elevated troponin/CAD:     Chart/labs/EKG reviewed. Echo ordered. No significant CAD by 2016 cath. Ischemia evaluation when anemia issues stable. Medically manage for now. Statin/BB/nitrate. No ASA due to anemia issues. 2. Chronic diastolic CHF:    Echo ordered. Follow volume. BB/hydralazine/nitrate. 3. Hx of pericardial effusion:     Echo. 4. Anemia: Per primary service. 5. Hematuria:  consulted. 6. CKD: Follow labs. 7. Hx of PE: Typically on eliquis, held due to anemia issues. 8. HTN: Observe. 9. Lipids: Statin. 10. DM: Per primary service. 11. PVD: Statin. Typically on eliquis. 12. Hx of Left BKA    Ben Celestin D.O.   Cardiologist  Cardiology, [id number] [facility]

## 2023-08-03 LAB
ABO/RH: NORMAL
ABSOLUTE BANDS #: NORMAL K/UL
ABSOLUTE PLASMA CELLS: NORMAL K/UL
ANION GAP SERPL CALCULATED.3IONS-SCNC: 10 MMOL/L (ref 7–16)
ANTIBODY SCREEN: NEGATIVE
ARM BAND NUMBER: NORMAL
ATYPICAL LYMPHOCYTE ABSOLUTE COUNT: NORMAL K/UL
ATYPICAL LYMPHOCYTES: NORMAL %
B PARAP IS1001 DNA NPH QL NAA+NON-PROBE: NOT DETECTED
B PERT DNA SPEC QL NAA+PROBE: NOT DETECTED
BANDS: NORMAL %
BASOPHILS # BLD: NORMAL K/UL (ref 0–0.2)
BASOPHILS NFR BLD: NORMAL %
BLASTS ABSOLUTE COUNT: NORMAL K/UL
BLASTS: NORMAL %
BLOOD BANK BLOOD PRODUCT EXPIRATION DATE: NORMAL
BLOOD BANK DISPENSE STATUS: NORMAL
BLOOD BANK ISBT PRODUCT BLOOD TYPE: 7300
BLOOD BANK PRODUCT CODE: NORMAL
BLOOD BANK SAMPLE EXPIRATION: NORMAL
BLOOD BANK UNIT TYPE AND RH: NORMAL
BPU ID: NORMAL
BUN SERPL-MCNC: 18 MG/DL (ref 6–23)
C PNEUM DNA NPH QL NAA+NON-PROBE: NOT DETECTED
CALCIUM SERPL-MCNC: 8.9 MG/DL (ref 8.6–10.2)
CHLORIDE SERPL-SCNC: 110 MMOL/L (ref 98–107)
CO2 SERPL-SCNC: 18 MMOL/L (ref 22–29)
COMPONENT: NORMAL
CREAT SERPL-MCNC: 1.4 MG/DL (ref 0.5–1)
CROSSMATCH RESULT: NORMAL
EOSINOPHIL # BLD: NORMAL K/UL
EOSINOPHILS RELATIVE PERCENT: NORMAL %
ERYTHROCYTE [DISTWIDTH] IN BLOOD BY AUTOMATED COUNT: NORMAL %
FERRITIN SERPL-MCNC: 161 NG/ML
FLUAV RNA NPH QL NAA+NON-PROBE: NOT DETECTED
FLUBV RNA NPH QL NAA+NON-PROBE: NOT DETECTED
FOLATE SERPL-MCNC: 14.7 NG/ML (ref 4.8–24.2)
GFR SERPL CREATININE-BSD FRML MDRD: 44 ML/MIN/1.73M2
GLUCOSE SERPL-MCNC: 100 MG/DL (ref 74–107)
HADV DNA NPH QL NAA+NON-PROBE: NOT DETECTED
HCOV 229E RNA NPH QL NAA+NON-PROBE: NOT DETECTED
HCOV HKU1 RNA NPH QL NAA+NON-PROBE: NOT DETECTED
HCOV NL63 RNA NPH QL NAA+NON-PROBE: NOT DETECTED
HCOV OC43 RNA NPH QL NAA+NON-PROBE: NOT DETECTED
HCT VFR BLD AUTO: 24.8 % (ref 34–48)
HCT VFR BLD AUTO: 25.3 % (ref 34–48)
HCT VFR BLD AUTO: 26.1 % (ref 34–48)
HCT VFR BLD AUTO: NORMAL %
HGB BLD-MCNC: 7.7 G/DL (ref 11.5–15.5)
HGB BLD-MCNC: 7.7 G/DL (ref 11.5–15.5)
HGB BLD-MCNC: 7.9 G/DL (ref 11.5–15.5)
HGB BLD-MCNC: NORMAL G/DL
HMPV RNA NPH QL NAA+NON-PROBE: NOT DETECTED
HPIV1 RNA NPH QL NAA+NON-PROBE: NOT DETECTED
HPIV2 RNA NPH QL NAA+NON-PROBE: NOT DETECTED
HPIV3 RNA NPH QL NAA+NON-PROBE: NOT DETECTED
HPIV4 RNA NPH QL NAA+NON-PROBE: NOT DETECTED
IMM GRANULOCYTES # BLD AUTO: NORMAL K/UL (ref 0–0.3)
IMM GRANULOCYTES NFR BLD: NORMAL %
IMM RETICS NFR: 23.4 % (ref 3–15.9)
IRON SATN MFR SERPL: 9 % (ref 15–50)
IRON SERPL-MCNC: 16 UG/DL (ref 37–145)
LYMPHOCYTES NFR BLD: NORMAL K/UL
LYMPHOCYTES RELATIVE PERCENT: NORMAL %
M PNEUMO DNA NPH QL NAA+NON-PROBE: NOT DETECTED
MAGNESIUM SERPL-MCNC: 1.7 MG/DL (ref 1.6–2.6)
MCH RBC QN AUTO: NORMAL PG
MCHC RBC AUTO-ENTMCNC: NORMAL G/DL
MCV RBC AUTO: NORMAL FL
METAMYELOCYTES ABSOLUTE COUNT: NORMAL K/UL
METAMYELOCYTES: NORMAL %
MONOCYTES NFR BLD: NORMAL %
MONOCYTES NFR BLD: NORMAL K/UL
MYELOCYTES ABSOLUTE COUNT: NORMAL K/UL
MYELOCYTES: NORMAL %
NEUTROPHILS NFR BLD: NORMAL %
NEUTS SEG NFR BLD: NORMAL K/UL
NRBC BLD-RTO: NORMAL PER 100 WBC
NUCLEATED RED BLOOD CELLS: NORMAL PER 100 WBC
PHOSPHATE SERPL-MCNC: 3.7 MG/DL (ref 2.5–4.5)
PLASMA CELLS: NORMAL %
PLATELET # BLD AUTO: NORMAL K/UL
PLATELET ESTIMATE: NORMAL
PLATELET, FLUORESCENCE: NORMAL K/UL (ref 138–453)
PLATELETS.RETICULATED NFR BLD AUTO: NORMAL % (ref 1.1–10.3)
PMV BLD AUTO: NORMAL FL
POTASSIUM SERPL-SCNC: 4.5 MMOL/L (ref 3.5–5)
PROMYELOCYTES ABSOLUTE COUNT: NORMAL K/UL
PROMYELOCYTES: NORMAL %
RBC # BLD AUTO: NORMAL M/UL
RBC # BLD: NORMAL 10*6/UL
RETIC HEMOGLOBIN: 23.6 PG (ref 28.2–36.6)
RETICS # AUTO: 0.09 M/UL
RETICS/RBC NFR AUTO: 2.7 % (ref 0.4–1.9)
RSV RNA NPH QL NAA+NON-PROBE: NOT DETECTED
RV+EV RNA NPH QL NAA+NON-PROBE: NOT DETECTED
SARS-COV-2 RNA NPH QL NAA+NON-PROBE: NOT DETECTED
SODIUM SERPL-SCNC: 138 MMOL/L (ref 132–146)
SPECIMEN DESCRIPTION: NORMAL
T4 FREE SERPL-MCNC: 1 NG/DL (ref 0.9–1.7)
TIBC SERPL-MCNC: 177 UG/DL (ref 250–450)
TRANSFUSION STATUS: NORMAL
TROPONIN I SERPL HS-MCNC: 46 NG/L (ref 0–9)
TROPONIN I SERPL HS-MCNC: 52 NG/L (ref 0–9)
TSH SERPL DL<=0.05 MIU/L-ACNC: 0.01 UIU/ML (ref 0.76–16.11)
UNIT DIVISION: 0
UNIT ISSUE DATE/TIME: NORMAL
VIT B12 SERPL-MCNC: 390 PG/ML (ref 211–946)
WBC # BLD: NORMAL 10*3/UL
WBC OTHER # BLD: NORMAL K/UL

## 2023-08-03 PROCEDURE — 85018 HEMOGLOBIN: CPT

## 2023-08-03 PROCEDURE — 80048 BASIC METABOLIC PNL TOTAL CA: CPT

## 2023-08-03 PROCEDURE — 84439 ASSAY OF FREE THYROXINE: CPT

## 2023-08-03 PROCEDURE — 36415 COLL VENOUS BLD VENIPUNCTURE: CPT

## 2023-08-03 PROCEDURE — 84484 ASSAY OF TROPONIN QUANT: CPT

## 2023-08-03 PROCEDURE — 97161 PT EVAL LOW COMPLEX 20 MIN: CPT | Performed by: PHYSICAL THERAPIST

## 2023-08-03 PROCEDURE — 82607 VITAMIN B-12: CPT

## 2023-08-03 PROCEDURE — 83550 IRON BINDING TEST: CPT

## 2023-08-03 PROCEDURE — 51798 US URINE CAPACITY MEASURE: CPT

## 2023-08-03 PROCEDURE — 85014 HEMATOCRIT: CPT

## 2023-08-03 PROCEDURE — 82746 ASSAY OF FOLIC ACID SERUM: CPT

## 2023-08-03 PROCEDURE — 93005 ELECTROCARDIOGRAM TRACING: CPT | Performed by: INTERNAL MEDICINE

## 2023-08-03 PROCEDURE — 6360000002 HC RX W HCPCS: Performed by: NURSE PRACTITIONER

## 2023-08-03 PROCEDURE — 94669 MECHANICAL CHEST WALL OSCILL: CPT

## 2023-08-03 PROCEDURE — 83540 ASSAY OF IRON: CPT

## 2023-08-03 PROCEDURE — 84443 ASSAY THYROID STIM HORMONE: CPT

## 2023-08-03 PROCEDURE — 94667 MNPJ CHEST WALL 1ST: CPT

## 2023-08-03 PROCEDURE — C9113 INJ PANTOPRAZOLE SODIUM, VIA: HCPCS | Performed by: NURSE PRACTITIONER

## 2023-08-03 PROCEDURE — 83735 ASSAY OF MAGNESIUM: CPT

## 2023-08-03 PROCEDURE — 1200000000 HC SEMI PRIVATE

## 2023-08-03 PROCEDURE — 6370000000 HC RX 637 (ALT 250 FOR IP): Performed by: INTERNAL MEDICINE

## 2023-08-03 PROCEDURE — 2580000003 HC RX 258: Performed by: NURSE PRACTITIONER

## 2023-08-03 PROCEDURE — 94640 AIRWAY INHALATION TREATMENT: CPT

## 2023-08-03 PROCEDURE — 84100 ASSAY OF PHOSPHORUS: CPT

## 2023-08-03 PROCEDURE — 85025 COMPLETE CBC W/AUTO DIFF WBC: CPT

## 2023-08-03 PROCEDURE — 84145 PROCALCITONIN (PCT): CPT

## 2023-08-03 PROCEDURE — 97530 THERAPEUTIC ACTIVITIES: CPT | Performed by: PHYSICAL THERAPIST

## 2023-08-03 PROCEDURE — 85045 AUTOMATED RETICULOCYTE COUNT: CPT

## 2023-08-03 PROCEDURE — 6370000000 HC RX 637 (ALT 250 FOR IP): Performed by: NURSE PRACTITIONER

## 2023-08-03 PROCEDURE — 82728 ASSAY OF FERRITIN: CPT

## 2023-08-03 RX ADMIN — ARFORMOTEROL TARTRATE 15 MCG: 15 SOLUTION RESPIRATORY (INHALATION) at 06:10

## 2023-08-03 RX ADMIN — ACETAMINOPHEN 650 MG: 325 TABLET ORAL at 04:03

## 2023-08-03 RX ADMIN — ATORVASTATIN CALCIUM 40 MG: 40 TABLET, FILM COATED ORAL at 21:08

## 2023-08-03 RX ADMIN — ARFORMOTEROL TARTRATE 15 MCG: 15 SOLUTION RESPIRATORY (INHALATION) at 17:44

## 2023-08-03 RX ADMIN — GABAPENTIN 100 MG: 100 CAPSULE ORAL at 10:24

## 2023-08-03 RX ADMIN — NITROGLYCERIN 1 INCH: 20 OINTMENT TOPICAL at 13:50

## 2023-08-03 RX ADMIN — CARVEDILOL 12.5 MG: 6.25 TABLET, FILM COATED ORAL at 10:25

## 2023-08-03 RX ADMIN — BUDESONIDE INHALATION 500 MCG: 0.5 SUSPENSION RESPIRATORY (INHALATION) at 06:10

## 2023-08-03 RX ADMIN — BUDESONIDE INHALATION 500 MCG: 0.5 SUSPENSION RESPIRATORY (INHALATION) at 17:44

## 2023-08-03 RX ADMIN — HYDRALAZINE HYDROCHLORIDE 50 MG: 50 TABLET, FILM COATED ORAL at 04:04

## 2023-08-03 RX ADMIN — PYRIDOXINE HCL TAB 50 MG 50 MG: 50 TAB at 10:26

## 2023-08-03 RX ADMIN — HYDRALAZINE HYDROCHLORIDE 50 MG: 50 TABLET, FILM COATED ORAL at 13:52

## 2023-08-03 RX ADMIN — ACETAMINOPHEN 650 MG: 325 TABLET ORAL at 21:08

## 2023-08-03 RX ADMIN — HYDRALAZINE HYDROCHLORIDE 50 MG: 50 TABLET, FILM COATED ORAL at 21:09

## 2023-08-03 RX ADMIN — GABAPENTIN 100 MG: 100 CAPSULE ORAL at 21:08

## 2023-08-03 RX ADMIN — POTASSIUM CHLORIDE AND SODIUM CHLORIDE: 900; 300 INJECTION, SOLUTION INTRAVENOUS at 15:53

## 2023-08-03 RX ADMIN — ISOSORBIDE MONONITRATE 30 MG: 30 TABLET, EXTENDED RELEASE ORAL at 10:25

## 2023-08-03 RX ADMIN — GUAIFENESIN 1200 MG: 600 TABLET, EXTENDED RELEASE ORAL at 10:26

## 2023-08-03 RX ADMIN — ACETAMINOPHEN 650 MG: 325 TABLET ORAL at 16:00

## 2023-08-03 RX ADMIN — GUAIFENESIN 1200 MG: 600 TABLET, EXTENDED RELEASE ORAL at 21:09

## 2023-08-03 RX ADMIN — NITROGLYCERIN 1 INCH: 20 OINTMENT TOPICAL at 17:49

## 2023-08-03 RX ADMIN — CARVEDILOL 12.5 MG: 6.25 TABLET, FILM COATED ORAL at 17:49

## 2023-08-03 RX ADMIN — PANTOPRAZOLE SODIUM 40 MG: 40 INJECTION, POWDER, FOR SOLUTION INTRAVENOUS at 10:24

## 2023-08-03 RX ADMIN — MIRTAZAPINE 7.5 MG: 15 TABLET, FILM COATED ORAL at 21:08

## 2023-08-03 RX ADMIN — SODIUM CHLORIDE, PRESERVATIVE FREE 10 ML: 5 INJECTION INTRAVENOUS at 10:30

## 2023-08-03 RX ADMIN — ACETAMINOPHEN 650 MG: 325 TABLET ORAL at 10:34

## 2023-08-03 RX ADMIN — ENOXAPARIN SODIUM 40 MG: 100 INJECTION SUBCUTANEOUS at 10:25

## 2023-08-03 ASSESSMENT — PAIN DESCRIPTION - ORIENTATION
ORIENTATION: LEFT
ORIENTATION: MID;UPPER

## 2023-08-03 ASSESSMENT — PAIN DESCRIPTION - LOCATION
LOCATION: BACK
LOCATION: ARM
LOCATION: ABDOMEN

## 2023-08-03 ASSESSMENT — PAIN SCALES - GENERAL
PAINLEVEL_OUTOF10: 10

## 2023-08-03 ASSESSMENT — PAIN DESCRIPTION - DESCRIPTORS: DESCRIPTORS: ACHING;DISCOMFORT

## 2023-08-03 NOTE — PROGRESS NOTES
Physical Therapy Initial Evaluation/Plan of Care    Room #:  1569/2251-00  Patient Name: Caitlin Peña  YOB: 1960  MRN: [id number]    Date of Service: 8/3/2023     Tentative placement recommendation: Home with Home Health Physical Therapy versus subacute if goals not met  Equipment recommendation: To be determined      Evaluating Physical Therapist: Jamal Morris, PT #[id number]      Specific Provider Orders/Date/Referring Provider :  08/02/23 1530    PT eval and treat  Start:  08/02/23 1530,   End:  08/02/23 1530,   ONE TIME,   Standing Count:  1 Occurrences,   R         Steven Santos DO     Admitting Diagnosis:   Essential hypertension [I10]  Anemia, unspecified type [D64.9]  Acute on chronic blood loss anemia [D62]      shortness of breath  Surgery: none      Patient Active Problem List   Diagnosis    Epigastric hernia    Umbilical hernia    Essential hypertension    Type 2 diabetes mellitus with diabetic peripheral angiopathy without gangrene, without long-term current use of insulin (HCC)    Dyslipidemia    Diabetic peripheral neuropathy (HCC)    Hydroureter    Microcytic hypochromic anemia    Paresthesia of left foot    History of arterial ischemic stroke    Hx of diabetic foot ulcer    Hx of BKA, left (HCC)    Chronic diastolic heart failure (HCC)    PVD (peripheral vascular disease) (HCC)    Anemia    Pleural effusion    Dyspnea    Acute on chronic diastolic CHF (congestive heart failure) (HCC)    Chronic renal disease, stage III ([address]) [796135]    Acute on chronic blood loss anemia        ASSESSMENT of Current Deficits Patient exhibits decreased strength, balance, and endurance impairing functional mobility, transfers, gait , gait distance, and tolerance to activity declined donning prosthesis due to fatigue, moderate assist to stand with patient able to take heel toe steps to Head of bed.  Patient requires continued skilled physical therapy to address concerns listed above for increased safety Moderate assist of 1 Cues for hand placement and safety   Sit to stand: Independent     Ambulation     4 heel toe steps using  wheeled walker with Moderate assist of 1   for walker control and cues for sequencing and safety    50 feet using  wheeled walker with Modified Independent   prosthesis left   Stair negotiation: ascended and descended   Not assessed     10 steps, 1 rail, Supervision        ROM Within functional limits    Increase range of motion 10% of affected joints    Strength BUE:  refer to OT eval  RLE:  3+/5  LLE:  hip and knee 3 3+/5  Increase strength in affected mm groups by 1/3 grade   Balance Sitting EOB:  good -  Dynamic Standing:  fair wheeled walker   Sitting EOB:  good    Dynamic Standing: good -wheeled walker prosthesis left     Patient is Alert & Oriented x person, place, time, and situation and follows directions    Sensation:  Patient  reports numbness/tingling bilateral hands     Edema:  no   Endurance: fair   +    Vitals: room air   Blood Pressure at rest  Blood Pressure during session    Heart Rate at rest 98 Heart Rate during session 99   SPO2 at rest 96%  SPO2 during session 95%     Patient education  Patient educated on role of Physical Therapy, risks of immobility, safety and plan of care,  importance of mobility while in hospital , ankle pumps, quad set and glut set for edema control, blood clot prevention, and positioning for skin integrity and comfort     Patient response to education:   Pt verbalized understanding Pt demonstrated skill Pt requires further education in this area   Yes Partial Yes      Treatment:  Patient practiced and was instructed/facilitated in the following treatment: Patient assisted to edge of bed,   Sat edge of bed 15 minutes with Supervision  to increase dynamic sitting balance and activity tolerance. Declined donning prosthesis today, too fatigue. Stood with heel toe steps to Head of bed.  Returned to bed, Dependent assist of 1 to scoot to Head of bed in

## 2023-08-03 NOTE — PROGRESS NOTES
Pt. complained of chest pain on lower sternum w/ 10/10 in her arms during shift change. /62 temp 99.2  resp 22 96RA. Pt was given tylenol and hasnt complained since then. Is alert and oriented. Pt had 6beats vtach around midnight. Alert and oriented with no complaints of chest pain. Barnhart Dose charge nurse aware.

## 2023-08-03 NOTE — PROGRESS NOTES
change this admission  I would still like a PVR, ordered early yesterday    Amalia JACKSON  [facility]           T[place], APRN - CNP   [facility]       The patient was seen and examined. I have reviewed the medical record in detail. I agree with the plan as outlined by LINCOLN Walker. Bladder scan PVR is low. She does not need a Gerber. Hematuria is clearing off Eliquis.     Leopoldo Foss MD  4:40 PM  8/3/2023

## 2023-08-03 NOTE — PROGRESS NOTES
Internal Medicine Progress Note    PARDEEP=[facility]    Courtney Ku. Werner Amaya., SACHIOOCTAVIO Salinas D.O., BARBARA Ocampo D.O. Bernette Roys, MSN, APRN, NPMEET Stein, MSN, APRN-CNP  Jeanette Hamlin, MSN, APRN-CNP     Primary Care Physician: Uriel Lea MD   Admitting Physician:  Kristen Hugo DO  Admission date and time: 8/2/2023  9:05 AM    Room:  [address] [place] [address]  Admitting diagnosis: Essential hypertension [I10]  Anemia, unspecified type [D64.9]  Acute on chronic blood loss anemia [D62]    Patient Name: Eugenia Alicea  MRN: [id number]    Date of Service: 8/3/2023     Subjective:  Eliana Saldivar is a 58 y.o. female who was seen and examined today,8/3/2023, at the bedside. Still having some episode of chest pain. Have episode of ventricular dysrhythmia last evening. Still having some hematuria. Bladder scan for 217 seen postvoiding    No family present during my examination. Review of System:   Constitutional:   Denies fever or chills, weight loss or gain, fatigue or malaise. HEENT:   Denies ear pain, sore throat, sinus or eye problems. Cardiovascular:   Complains of chest pain  Respiratory:   Denies shortness of breath, coughing, sputum production, hemoptysis, or wheezing. Gastrointestinal:   Denies nausea, vomiting, diarrhea, or constipation. Denies any abdominal pain. Genitourinary:    Denies any urgency, frequency. Hematuria bladder scan 217 postvoiding voiding  without difficulty. Extremities:   Denies lower extremity swelling, edema or cyanosis. Neurology:    Denies any headache or focal neurological deficits, Denies generalized weakness or memory difficulty. Psch:   Denies being anxious or depressed. Musculoskeletal:    Denies  myalgias, joint complaints or back pain. Integumentary:   Denies any rashes, ulcers, or excoriations.   Denies

## 2023-08-03 NOTE — PROGRESS NOTES
At approximately [phone] this nurse notified by unit nurse that patient complained of \"10/10 low sternum pain which resolved with tylenol at earlier shift change\" during time nurse originally assumed care of patient. This nurse also notified that patient later on had \"6 beats of vtach but was asymptomatic. \" Attempted notify [facility] cardiology as soon as information was relayed by unit nurse however no covering physician available per perfect serve and/ or via connecting . Dr. Gil Morel notified of earlier complaint made by patient regarding chest pain, 6 beats of asymptomatic vtach and unavailability of cardiology coverage at this time. See orders. Nursing supervisor also notified of lack of cardiology coverage at this time. After discussion with monitor tech and ekg strip review by central monitoring unit, determined patient in fact DID NOT have 6 beats of vtach, error made in reporting by nursing, nursing to notify Dr. Gil Morel.

## 2023-08-04 LAB
ANION GAP SERPL CALCULATED.3IONS-SCNC: 7 MMOL/L (ref 7–16)
BASOPHILS # BLD: 0 K/UL (ref 0–0.2)
BASOPHILS # BLD: 0.02 K/UL (ref 0–0.2)
BASOPHILS NFR BLD: 0 % (ref 0–2)
BASOPHILS NFR BLD: 1 % (ref 0–2)
BUN SERPL-MCNC: 18 MG/DL (ref 6–23)
CALCIUM SERPL-MCNC: 9 MG/DL (ref 8.6–10.2)
CHLORIDE SERPL-SCNC: 115 MMOL/L (ref 98–107)
CO2 SERPL-SCNC: 18 MMOL/L (ref 22–29)
CREAT SERPL-MCNC: 1.3 MG/DL (ref 0.5–1)
EOSINOPHIL # BLD: 0.04 K/UL (ref 0.05–0.5)
EOSINOPHIL # BLD: 0.09 K/UL (ref 0.05–0.5)
EOSINOPHILS RELATIVE PERCENT: 2 % (ref 0–6)
EOSINOPHILS RELATIVE PERCENT: 4 % (ref 0–6)
ERYTHROCYTE [DISTWIDTH] IN BLOOD BY AUTOMATED COUNT: 17.6 % (ref 11.5–15)
ERYTHROCYTE [DISTWIDTH] IN BLOOD BY AUTOMATED COUNT: 18 % (ref 11.5–15)
GFR SERPL CREATININE-BSD FRML MDRD: 45 ML/MIN/1.73M2
GLUCOSE SERPL-MCNC: 93 MG/DL (ref 74–99)
HCT VFR BLD AUTO: 24.2 % (ref 34–48)
HCT VFR BLD AUTO: 25.2 % (ref 34–48)
HCT VFR BLD AUTO: 26.1 % (ref 34–48)
HCT VFR BLD AUTO: 26.3 % (ref 34–48)
HGB BLD-MCNC: 7.5 G/DL (ref 11.5–15.5)
HGB BLD-MCNC: 7.7 G/DL (ref 11.5–15.5)
HGB BLD-MCNC: 7.8 G/DL (ref 11.5–15.5)
HGB BLD-MCNC: 7.8 G/DL (ref 11.5–15.5)
LYMPHOCYTES NFR BLD: 1.23 K/UL (ref 1.5–4)
LYMPHOCYTES NFR BLD: 1.26 K/UL (ref 1.5–4)
LYMPHOCYTES RELATIVE PERCENT: 49 % (ref 20–42)
LYMPHOCYTES RELATIVE PERCENT: 50 % (ref 20–42)
MAGNESIUM SERPL-MCNC: 1.7 MG/DL (ref 1.6–2.6)
MCH RBC QN AUTO: 25.2 PG (ref 26–35)
MCH RBC QN AUTO: 25.3 PG (ref 26–35)
MCHC RBC AUTO-ENTMCNC: 30.6 G/DL (ref 32–34.5)
MCHC RBC AUTO-ENTMCNC: 31 G/DL (ref 32–34.5)
MCV RBC AUTO: 81.8 FL (ref 80–99.9)
MCV RBC AUTO: 82.4 FL (ref 80–99.9)
METAMYELOCYTES ABSOLUTE COUNT: 1 K/UL (ref 0–0.12)
METAMYELOCYTES: 1 % (ref 0–1)
MONOCYTES NFR BLD: 0.25 K/UL (ref 0.1–0.95)
MONOCYTES NFR BLD: 0.35 K/UL (ref 0.1–0.95)
MONOCYTES NFR BLD: 10 % (ref 2–12)
MONOCYTES NFR BLD: 14 % (ref 2–12)
MYELOCYTES ABSOLUTE COUNT: 0.02 K/UL
MYELOCYTES: 1 %
NEUTROPHILS NFR BLD: 32 % (ref 43–80)
NEUTROPHILS NFR BLD: 37 % (ref 43–80)
NEUTS SEG NFR BLD: 0.8 K/UL (ref 1.8–7.3)
NEUTS SEG NFR BLD: 0.95 K/UL (ref 1.8–7.3)
PHOSPHATE SERPL-MCNC: 2.9 MG/DL (ref 2.5–4.5)
PLATELET # BLD AUTO: 395 K/UL (ref 130–450)
PLATELET # BLD AUTO: 404 K/UL (ref 130–450)
PMV BLD AUTO: 10.9 FL (ref 7–12)
PMV BLD AUTO: 9.8 FL (ref 7–12)
POTASSIUM SERPL-SCNC: 5.1 MMOL/L (ref 3.5–5)
PROCALCITONIN SERPL-MCNC: 0.12 NG/ML (ref 0–0.08)
PROCALCITONIN SERPL-MCNC: 0.13 NG/ML (ref 0–0.08)
RBC # BLD AUTO: 2.96 M/UL (ref 3.5–5.5)
RBC # BLD AUTO: 3.06 M/UL (ref 3.5–5.5)
RBC # BLD: ABNORMAL 10*6/UL
REASON FOR REJECTION: NORMAL
SODIUM SERPL-SCNC: 140 MMOL/L (ref 132–146)
SPECIMEN SOURCE: NORMAL
WBC OTHER # BLD: 2.5 K/UL (ref 4.5–11.5)
WBC OTHER # BLD: 2.5 K/UL (ref 4.5–11.5)
ZZ NTE CLEAN UP: ORDERED TEST: NORMAL

## 2023-08-04 PROCEDURE — 85014 HEMATOCRIT: CPT

## 2023-08-04 PROCEDURE — 80048 BASIC METABOLIC PNL TOTAL CA: CPT

## 2023-08-04 PROCEDURE — 94669 MECHANICAL CHEST WALL OSCILL: CPT

## 2023-08-04 PROCEDURE — 83735 ASSAY OF MAGNESIUM: CPT

## 2023-08-04 PROCEDURE — 6370000000 HC RX 637 (ALT 250 FOR IP): Performed by: INTERNAL MEDICINE

## 2023-08-04 PROCEDURE — 6360000002 HC RX W HCPCS: Performed by: NURSE PRACTITIONER

## 2023-08-04 PROCEDURE — 6370000000 HC RX 637 (ALT 250 FOR IP): Performed by: NURSE PRACTITIONER

## 2023-08-04 PROCEDURE — 2580000003 HC RX 258: Performed by: NURSE PRACTITIONER

## 2023-08-04 PROCEDURE — 84100 ASSAY OF PHOSPHORUS: CPT

## 2023-08-04 PROCEDURE — 2580000003 HC RX 258: Performed by: INTERNAL MEDICINE

## 2023-08-04 PROCEDURE — 1200000000 HC SEMI PRIVATE

## 2023-08-04 PROCEDURE — 97530 THERAPEUTIC ACTIVITIES: CPT

## 2023-08-04 PROCEDURE — 84145 PROCALCITONIN (PCT): CPT

## 2023-08-04 PROCEDURE — C9113 INJ PANTOPRAZOLE SODIUM, VIA: HCPCS | Performed by: NURSE PRACTITIONER

## 2023-08-04 PROCEDURE — 87040 BLOOD CULTURE FOR BACTERIA: CPT

## 2023-08-04 PROCEDURE — 85018 HEMOGLOBIN: CPT

## 2023-08-04 PROCEDURE — 85025 COMPLETE CBC W/AUTO DIFF WBC: CPT

## 2023-08-04 PROCEDURE — 94640 AIRWAY INHALATION TREATMENT: CPT

## 2023-08-04 PROCEDURE — 97110 THERAPEUTIC EXERCISES: CPT

## 2023-08-04 PROCEDURE — 36415 COLL VENOUS BLD VENIPUNCTURE: CPT

## 2023-08-04 RX ORDER — SODIUM CHLORIDE 9 MG/ML
INJECTION, SOLUTION INTRAVENOUS CONTINUOUS
Status: DISCONTINUED | OUTPATIENT
Start: 2023-08-04 | End: 2023-08-05

## 2023-08-04 RX ADMIN — GUAIFENESIN 1200 MG: 600 TABLET, EXTENDED RELEASE ORAL at 20:01

## 2023-08-04 RX ADMIN — HYDRALAZINE HYDROCHLORIDE 50 MG: 50 TABLET, FILM COATED ORAL at 20:01

## 2023-08-04 RX ADMIN — ATORVASTATIN CALCIUM 40 MG: 40 TABLET, FILM COATED ORAL at 20:01

## 2023-08-04 RX ADMIN — CARVEDILOL 12.5 MG: 6.25 TABLET, FILM COATED ORAL at 08:52

## 2023-08-04 RX ADMIN — PANTOPRAZOLE SODIUM 40 MG: 40 INJECTION, POWDER, FOR SOLUTION INTRAVENOUS at 08:52

## 2023-08-04 RX ADMIN — PYRIDOXINE HCL TAB 50 MG 50 MG: 50 TAB at 08:52

## 2023-08-04 RX ADMIN — HYDRALAZINE HYDROCHLORIDE 50 MG: 50 TABLET, FILM COATED ORAL at 14:59

## 2023-08-04 RX ADMIN — GUAIFENESIN 1200 MG: 600 TABLET, EXTENDED RELEASE ORAL at 08:52

## 2023-08-04 RX ADMIN — BUDESONIDE INHALATION 500 MCG: 0.5 SUSPENSION RESPIRATORY (INHALATION) at 17:12

## 2023-08-04 RX ADMIN — ACETAMINOPHEN 650 MG: 325 TABLET ORAL at 12:57

## 2023-08-04 RX ADMIN — CARVEDILOL 12.5 MG: 6.25 TABLET, FILM COATED ORAL at 17:28

## 2023-08-04 RX ADMIN — NITROGLYCERIN 1 INCH: 20 OINTMENT TOPICAL at 00:13

## 2023-08-04 RX ADMIN — NITROGLYCERIN 1 INCH: 20 OINTMENT TOPICAL at 06:10

## 2023-08-04 RX ADMIN — ARFORMOTEROL TARTRATE 15 MCG: 15 SOLUTION RESPIRATORY (INHALATION) at 17:12

## 2023-08-04 RX ADMIN — ACETAMINOPHEN 650 MG: 325 TABLET ORAL at 20:01

## 2023-08-04 RX ADMIN — SODIUM CHLORIDE: 9 INJECTION, SOLUTION INTRAVENOUS at 07:00

## 2023-08-04 RX ADMIN — NITROGLYCERIN 1 INCH: 20 OINTMENT TOPICAL at 12:58

## 2023-08-04 RX ADMIN — GABAPENTIN 100 MG: 100 CAPSULE ORAL at 08:52

## 2023-08-04 RX ADMIN — SODIUM CHLORIDE: 9 INJECTION, SOLUTION INTRAVENOUS at 20:00

## 2023-08-04 RX ADMIN — NITROGLYCERIN 1 INCH: 20 OINTMENT TOPICAL at 17:28

## 2023-08-04 RX ADMIN — SODIUM CHLORIDE, PRESERVATIVE FREE 10 ML: 5 INJECTION INTRAVENOUS at 08:53

## 2023-08-04 RX ADMIN — GABAPENTIN 100 MG: 100 CAPSULE ORAL at 20:01

## 2023-08-04 RX ADMIN — MIRTAZAPINE 7.5 MG: 15 TABLET, FILM COATED ORAL at 20:00

## 2023-08-04 RX ADMIN — ACETAMINOPHEN 650 MG: 325 TABLET ORAL at 06:55

## 2023-08-04 RX ADMIN — ENOXAPARIN SODIUM 40 MG: 100 INJECTION SUBCUTANEOUS at 08:51

## 2023-08-04 RX ADMIN — HYDRALAZINE HYDROCHLORIDE 50 MG: 50 TABLET, FILM COATED ORAL at 06:11

## 2023-08-04 RX ADMIN — ISOSORBIDE MONONITRATE 30 MG: 30 TABLET, EXTENDED RELEASE ORAL at 08:52

## 2023-08-04 ASSESSMENT — PAIN SCALES - GENERAL
PAINLEVEL_OUTOF10: 4
PAINLEVEL_OUTOF10: 10

## 2023-08-04 ASSESSMENT — PAIN DESCRIPTION - LOCATION
LOCATION: HAND;SHOULDER
LOCATION: SHOULDER

## 2023-08-04 ASSESSMENT — PAIN DESCRIPTION - ORIENTATION
ORIENTATION: LEFT
ORIENTATION: LEFT

## 2023-08-04 ASSESSMENT — PAIN DESCRIPTION - DESCRIPTORS: DESCRIPTORS: ACHING;DISCOMFORT;SORE

## 2023-08-04 NOTE — PROGRESS NOTES
CTA CHEST W CONTRAST    Result Date: 8/2/2023  EXAMINATION: CTA OF THE CHEST 8/2/2023 10:36 am      Cardiomegaly and small pericardial effusion. No definite pulmonary embolism but limited exam due to motion. Subsegmental atelectasis in both lungs. Small pleural effusions. Social history: Patient lives with daughter  in a two story home bedroom and bathroom 2nd floor 10 steps, with rail   with No steps  to enter home. Tub shower , grab bars     Equipment owned: Wheelchair, Wheeled Walker, [address] chair, Quad cane, and prosthesis ,       AM-PAC Basic Mobility        AM-PAC Basic Mobility - Inpatient   How much help is needed turning from your back to your side while in a flat bed without using bedrails?: A Little  How much help is needed moving from lying on your back to sitting on the side of a flat bed without using bedrails?: A Lot  How much help is needed moving to and from a bed to a chair?: A Lot  How much help is needed standing up from a chair using your arms?: A Lot  How much help is needed walking in hospital room?: Total  How much help is needed climbing 3-5 steps with a railing?: Total  AM-PAC Inpatient Mobility Raw Score : 11  AM-PAC Inpatient T-Scale Score : 33.86  Mobility Inpatient CMS 0-100% Score: 72.57  Mobility Inpatient CMS G-Code Modifier : CL    Nursing cleared patient for PT treatment. OBJECTIVE:   Initial Evaluation  Date: 8/3/2023 Treatment Date:   8/4/2023  Short Term/ Long Term   Goals   Was pt agreeable to Eval/treatment? Yes yes To be met in 4 days   Pain level   Was 10 medicated now 2/10  Left side chest 0/10    Bed Mobility  Using rails and head of bed elevated:       Rolling: Minimal assist of 1    Supine to sit: Moderate assist of 1    Sit to supine: Moderate assist of 1    Scooting:  Moderate assist of 1   Using rails and head of bed elevated:     Rolling: Supervision    Supine to sit: Minimal assist of 1   Sit to supine: Not assessed patient in chair   Scooting: Minimal skill Pt requires further education in this area   Yes Partial Yes      Treatment:  Patient practiced and was instructed/facilitated in the following treatment: Patient assisted to edge of bed and assisted with donning prosthesis. Patient assisted to standing and took steps to bedside chair. Patient performed seated exercises. Therapeutic Exercises:  ankle pumps, long arc quad, and seated marching  x 10-15 reps. At end of session, patient in chair with  lunch tray  call light and phone within reach,  all lines and tubes intact, nursing notified. Patient would benefit from continued skilled Physical Therapy to improve functional independence and quality of life.          Patient's/ family goals   home    Time in  56  Time out  1122    Total Treatment Time  23 minutes      CPT codes:  Therapeutic activities (30425)   15 minutes  1 unit(s)  Therapeutic exercises (54268)   8 minutes  1 unit(s)    Andrzej Calderon, PTA   #[id number]

## 2023-08-04 NOTE — PROGRESS NOTES
provider] apixaban  5 mg Oral BID    carvedilol  12.5 mg Oral BID WC    gabapentin  100 mg Oral BID    hydrALAZINE  50 mg Oral 3 times per day    lidocaine  1 patch TransDERmal Daily    mirtazapine  7.5 mg Oral Nightly    pyridoxine  50 mg Oral Daily    budesonide  0.5 mg Nebulization BID RT    guaiFENesin  1,200 mg Oral BID    pantoprazole  40 mg IntraVENous Daily    arformoterol tartrate  15 mcg Nebulization BID RT    sodium chloride flush  10 mL IntraVENous 2 times per day    enoxaparin  40 mg SubCUTAneous Daily    atorvastatin  40 mg Oral Nightly    isosorbide mononitrate  30 mg Oral Daily     Continuous Infusions:   sodium chloride 75 mL/hr at 08/04/23 0900    sodium chloride      sodium chloride         Objective Data:  Recent Labs     08/02/23  0920 08/02/23  1728 08/03/23  0321 08/03/23  0330 08/03/23  1903 08/04/23  0342 08/04/23  1323   WBC 3.8*  --  2.5*  DUPL  --   --  2.5*  --    RBC 2.88*  --  3.06*  DUPL  --   --  2.96*  --    HGB 6.7*   < > 7.7*  DUPL   < > 7.9* 7.5* 7.8*   HCT 22.6*   < > 25.2*  DUPL   < > 26.1* 24.2* 26.3*   MCV 78.5*  --  82.4  DUPL  --   --  81.8  --    MCH 23.3*  --  25.2*  DUPL  --   --  25.3*  --    MCHC 29.6*  --  30.6*  DUPL  --   --  31.0*  --    RDW 17.6*  --  17.6*  DUPL  --   --  18.0*  --      --  395  DUPL  --   --  404  --    MPV 10.3  --  10.9  DUPL  --   --  9.8  --     < > = values in this interval not displayed.      Recent Labs     08/02/23  0920 08/03/23 0321 08/04/23  0342    138 140   K 4.2 4.5 5.1*   * 110* 115*   CO2 20* 18* 18*   BUN 22 18 18   CREATININE 1.5* 1.4* 1.3*   GLUCOSE 158* 100 93   CALCIUM 9.5 8.9 9.0     Lab Results   Component Value Date    TROPONINI 0.04 (H) 08/22/2020    TROPONINI 0.03 08/22/2020    TROPONINI 0.05 (H) 08/22/2020                 Assessment:    Acute on chronic blood loss anemia in the setting of recurrent gross hematuria   Recurrent hematuria with bilateral ureteral stents in place and residual hydronephrosis  Hypertensive urgency with underlying essential hypertension  Non-STEMI with underlying coronary artery disease, likely demand ischemia   Chronic kidney disease stage IIIa-b  Chronic compensated diastolic congestive heart failure  Chronic constipation   Peripheral arterial disease with prior vascular complication and left BKA February 2020  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 This time was spent reviewing notes and laboratory data as well as instructing and counseling the patient. Time I spent with the family or surrogate(s) is included only if the patient was incapable of providing the necessary information or participating in medical decisions. I also discussed the differential diagnosis and all of the proposed management plans with the patient and individuals accompanying the patient.     Sharee Reynaga DO, F.A.C.O.I.  8/4/2023  5:10 PM

## 2023-08-04 NOTE — PROGRESS NOTES
[facility] Cardiology Inpatient Progress Note    Patient is a 58 y.o. female of [place] MD seen in hospital follow up. Chief complaint: Elevated troponin    HPI: No CP or SOB.      Patient Active Problem List   Diagnosis    Epigastric hernia    Umbilical hernia    Essential hypertension    Type 2 diabetes mellitus with diabetic peripheral angiopathy without gangrene, without long-term current use of insulin (HCC)    Dyslipidemia    Diabetic peripheral neuropathy (HCC)    Hydroureter    Microcytic hypochromic anemia    Paresthesia of left foot    History of arterial ischemic stroke    Hx of diabetic foot ulcer    Hx of BKA, left (HCC)    Chronic diastolic heart failure (HCC)    PVD (peripheral vascular disease) (HCC)    Anemia    Pleural effusion    Dyspnea    Acute on chronic diastolic CHF (congestive heart failure) (HCC)    Chronic renal disease, stage III ([address]) [903573]    Acute on chronic blood loss anemia       No Known Allergies    Current Facility-Administered Medications   Medication Dose Route Frequency Provider Last Rate Last Admin    0.9 % sodium chloride infusion   IntraVENous Continuous Armand Reynaga DO 75 mL/hr at 08/04/23 0700 New Bag at 08/04/23 0700    nitroglycerin (NITRO-BID) 2 % ointment 1 inch  1 inch Topical 4 times per day Shweta Solo DO   1 inch at 08/04/23 0610    0.9 % sodium chloride infusion   IntraVENous PRN Ariella Short DO        [Held by provider] apixaban (ELIQUIS) tablet 5 mg  5 mg Oral BID Rock Pott, APRN - CNP        bisacodyl (DULCOLAX) EC tablet 5 mg  5 mg Oral Daily PRN Rock Pott, APRN - CNP        carvedilol (COREG) tablet 12.5 mg  12.5 mg Oral BID  Rock Pott, APRN - CNP   12.5 mg at 08/03/23 1749    gabapentin (NEURONTIN) capsule 100 mg  100 mg Oral BID Rock Pott, APRN - CNP   100 mg at 08/03/23 2108    hydrALAZINE (APRESOLINE) tablet 50 mg  50 mg Oral 3 times per day Rock Pott, APRN - CNP   50 mg at 08/04/23 2634

## 2023-08-04 NOTE — PROGRESS NOTES
8/4/2023 10:57 AM  Service: Urology  Group: [facility] ([facility]/Ronny/Juventino)    Kalie Juan  [id number]    Subjective:  no flank pain or back pain  She is voiding but I&O was not kept  Urine was not saved, urine is reported as shanelle  She is comfortable   She reports she has a stent change planned later this month     Review of Systems  Constitutional: No complaints of chills or sweats   Respiratory: negative for cough  Cardiovascular: positive for chest pain  Gastrointestinal: negative for abdominal pain, nausea, and vomiting  Dermatologic: negative   Hematologic/lymphatic: negative, intermittent hematuria   Musculoskeletal:negative  Neurological: negative  Endocrine: negative  Psychiatric:       Scheduled Meds:   nitroglycerin  1 inch Topical 4 times per day    [Held by provider] apixaban  5 mg Oral BID    carvedilol  12.5 mg Oral BID WC    gabapentin  100 mg Oral BID    hydrALAZINE  50 mg Oral 3 times per day    lidocaine  1 patch TransDERmal Daily    mirtazapine  7.5 mg Oral Nightly    pyridoxine  50 mg Oral Daily    budesonide  0.5 mg Nebulization BID RT    guaiFENesin  1,200 mg Oral BID    pantoprazole  40 mg IntraVENous Daily    arformoterol tartrate  15 mcg Nebulization BID RT    sodium chloride flush  10 mL IntraVENous 2 times per day    enoxaparin  40 mg SubCUTAneous Daily    atorvastatin  40 mg Oral Nightly    isosorbide mononitrate  30 mg Oral Daily       Objective:  Vitals:    08/04/23 0852   BP: (!) 185/67   Pulse: (!) 103   Resp:    Temp:    SpO2:          Allergies: Patient has no known allergies.     General Appearance: alert and oriented to person, place and time and in no acute distress  Skin: no rash or erythema  Head: normocephalic and atraumatic  Pulmonary/Chest: normal air movement, no respiratory distress and no chest wall tenderness  Abdomen: soft, non-tender, non-distended, normal bowel sounds, no masses or organomegaly and no inguinal adenopathy  Genitourinary: genitals normal

## 2023-08-05 LAB
ANION GAP SERPL CALCULATED.3IONS-SCNC: 8 MMOL/L (ref 7–16)
BASOPHILS # BLD: 0 K/UL (ref 0–0.2)
BASOPHILS NFR BLD: 0 % (ref 0–2)
BUN SERPL-MCNC: 19 MG/DL (ref 6–23)
CALCIUM SERPL-MCNC: 9 MG/DL (ref 8.6–10.2)
CHLORIDE SERPL-SCNC: 115 MMOL/L (ref 98–107)
CO2 SERPL-SCNC: 18 MMOL/L (ref 22–29)
CREAT SERPL-MCNC: 1.3 MG/DL (ref 0.5–1)
EKG ATRIAL RATE: 98 BPM
EKG P AXIS: 58 DEGREES
EKG P-R INTERVAL: 204 MS
EKG Q-T INTERVAL: 346 MS
EKG QRS DURATION: 82 MS
EKG QTC CALCULATION (BAZETT): 441 MS
EKG R AXIS: 51 DEGREES
EKG T AXIS: 82 DEGREES
EKG VENTRICULAR RATE: 98 BPM
EOSINOPHIL # BLD: 0.08 K/UL (ref 0.05–0.5)
EOSINOPHILS RELATIVE PERCENT: 4 % (ref 0–6)
ERYTHROCYTE [DISTWIDTH] IN BLOOD BY AUTOMATED COUNT: 18.6 % (ref 11.5–15)
GFR SERPL CREATININE-BSD FRML MDRD: 45 ML/MIN/1.73M2
GLUCOSE SERPL-MCNC: 86 MG/DL (ref 74–99)
HCT VFR BLD AUTO: 24.3 % (ref 34–48)
HCT VFR BLD AUTO: 26.7 % (ref 34–48)
HGB BLD-MCNC: 7.3 G/DL (ref 11.5–15.5)
HGB BLD-MCNC: 8 G/DL (ref 11.5–15.5)
LYMPHOCYTES NFR BLD: 1.06 K/UL (ref 1.5–4)
LYMPHOCYTES RELATIVE PERCENT: 44 % (ref 20–42)
MAGNESIUM SERPL-MCNC: 1.7 MG/DL (ref 1.6–2.6)
MCH RBC QN AUTO: 24.5 PG (ref 26–35)
MCHC RBC AUTO-ENTMCNC: 30 G/DL (ref 32–34.5)
MCV RBC AUTO: 81.5 FL (ref 80–99.9)
METAMYELOCYTES ABSOLUTE COUNT: 0.02 K/UL (ref 0–0.12)
METAMYELOCYTES: 1 % (ref 0–1)
MONOCYTES NFR BLD: 0.23 K/UL (ref 0.1–0.95)
MONOCYTES NFR BLD: 10 % (ref 2–12)
NEUTROPHILS NFR BLD: 42 % (ref 43–80)
NEUTS SEG NFR BLD: 1 K/UL (ref 1.8–7.3)
PHOSPHATE SERPL-MCNC: 3.4 MG/DL (ref 2.5–4.5)
PLATELET # BLD AUTO: 390 K/UL (ref 130–450)
PMV BLD AUTO: 9.7 FL (ref 7–12)
POTASSIUM SERPL-SCNC: 4.7 MMOL/L (ref 3.5–5)
RBC # BLD AUTO: 2.98 M/UL (ref 3.5–5.5)
RBC # BLD: ABNORMAL 10*6/UL
SODIUM SERPL-SCNC: 141 MMOL/L (ref 132–146)
WBC OTHER # BLD: 2.4 K/UL (ref 4.5–11.5)

## 2023-08-05 PROCEDURE — 83735 ASSAY OF MAGNESIUM: CPT

## 2023-08-05 PROCEDURE — 85014 HEMATOCRIT: CPT

## 2023-08-05 PROCEDURE — 80048 BASIC METABOLIC PNL TOTAL CA: CPT

## 2023-08-05 PROCEDURE — 6360000002 HC RX W HCPCS: Performed by: NURSE PRACTITIONER

## 2023-08-05 PROCEDURE — C9113 INJ PANTOPRAZOLE SODIUM, VIA: HCPCS | Performed by: NURSE PRACTITIONER

## 2023-08-05 PROCEDURE — 6370000000 HC RX 637 (ALT 250 FOR IP): Performed by: INTERNAL MEDICINE

## 2023-08-05 PROCEDURE — 1200000000 HC SEMI PRIVATE

## 2023-08-05 PROCEDURE — 36415 COLL VENOUS BLD VENIPUNCTURE: CPT

## 2023-08-05 PROCEDURE — 85025 COMPLETE CBC W/AUTO DIFF WBC: CPT

## 2023-08-05 PROCEDURE — 2580000003 HC RX 258: Performed by: NURSE PRACTITIONER

## 2023-08-05 PROCEDURE — 84100 ASSAY OF PHOSPHORUS: CPT

## 2023-08-05 PROCEDURE — 94640 AIRWAY INHALATION TREATMENT: CPT

## 2023-08-05 PROCEDURE — 94669 MECHANICAL CHEST WALL OSCILL: CPT

## 2023-08-05 PROCEDURE — 6370000000 HC RX 637 (ALT 250 FOR IP): Performed by: NURSE PRACTITIONER

## 2023-08-05 PROCEDURE — 85018 HEMOGLOBIN: CPT

## 2023-08-05 RX ADMIN — PANTOPRAZOLE SODIUM 40 MG: 40 INJECTION, POWDER, FOR SOLUTION INTRAVENOUS at 09:19

## 2023-08-05 RX ADMIN — NITROGLYCERIN 1 INCH: 20 OINTMENT TOPICAL at 06:09

## 2023-08-05 RX ADMIN — ATORVASTATIN CALCIUM 40 MG: 40 TABLET, FILM COATED ORAL at 19:38

## 2023-08-05 RX ADMIN — GUAIFENESIN 1200 MG: 600 TABLET, EXTENDED RELEASE ORAL at 19:38

## 2023-08-05 RX ADMIN — MIRTAZAPINE 7.5 MG: 15 TABLET, FILM COATED ORAL at 19:39

## 2023-08-05 RX ADMIN — HYDRALAZINE HYDROCHLORIDE 50 MG: 50 TABLET, FILM COATED ORAL at 14:26

## 2023-08-05 RX ADMIN — ARFORMOTEROL TARTRATE 15 MCG: 15 SOLUTION RESPIRATORY (INHALATION) at 17:03

## 2023-08-05 RX ADMIN — BISACODYL 5 MG: 5 TABLET, COATED ORAL at 19:57

## 2023-08-05 RX ADMIN — HYDRALAZINE HYDROCHLORIDE 5 MG: 20 INJECTION INTRAMUSCULAR; INTRAVENOUS at 19:38

## 2023-08-05 RX ADMIN — GABAPENTIN 100 MG: 100 CAPSULE ORAL at 09:15

## 2023-08-05 RX ADMIN — SODIUM CHLORIDE, PRESERVATIVE FREE 10 ML: 5 INJECTION INTRAVENOUS at 09:16

## 2023-08-05 RX ADMIN — NITROGLYCERIN 1 INCH: 20 OINTMENT TOPICAL at 11:21

## 2023-08-05 RX ADMIN — CARVEDILOL 12.5 MG: 6.25 TABLET, FILM COATED ORAL at 17:50

## 2023-08-05 RX ADMIN — ACETAMINOPHEN 650 MG: 325 TABLET ORAL at 19:38

## 2023-08-05 RX ADMIN — GABAPENTIN 100 MG: 100 CAPSULE ORAL at 19:38

## 2023-08-05 RX ADMIN — ISOSORBIDE MONONITRATE 30 MG: 30 TABLET, EXTENDED RELEASE ORAL at 09:15

## 2023-08-05 RX ADMIN — HYDRALAZINE HYDROCHLORIDE 5 MG: 20 INJECTION INTRAMUSCULAR; INTRAVENOUS at 06:07

## 2023-08-05 RX ADMIN — NITROGLYCERIN 1 INCH: 20 OINTMENT TOPICAL at 01:04

## 2023-08-05 RX ADMIN — ACETAMINOPHEN 650 MG: 325 TABLET ORAL at 06:07

## 2023-08-05 RX ADMIN — CARVEDILOL 12.5 MG: 6.25 TABLET, FILM COATED ORAL at 09:16

## 2023-08-05 RX ADMIN — GUAIFENESIN 1200 MG: 600 TABLET, EXTENDED RELEASE ORAL at 09:15

## 2023-08-05 RX ADMIN — NITROGLYCERIN 1 INCH: 20 OINTMENT TOPICAL at 23:38

## 2023-08-05 RX ADMIN — SODIUM CHLORIDE, PRESERVATIVE FREE 10 ML: 5 INJECTION INTRAVENOUS at 19:40

## 2023-08-05 RX ADMIN — HYDRALAZINE HYDROCHLORIDE 50 MG: 50 TABLET, FILM COATED ORAL at 23:38

## 2023-08-05 RX ADMIN — BUDESONIDE INHALATION 500 MCG: 0.5 SUSPENSION RESPIRATORY (INHALATION) at 17:03

## 2023-08-05 RX ADMIN — PYRIDOXINE HCL TAB 50 MG 50 MG: 50 TAB at 09:16

## 2023-08-05 RX ADMIN — ENOXAPARIN SODIUM 40 MG: 100 INJECTION SUBCUTANEOUS at 09:15

## 2023-08-05 ASSESSMENT — PAIN SCALES - GENERAL
PAINLEVEL_OUTOF10: 4
PAINLEVEL_OUTOF10: 8

## 2023-08-05 ASSESSMENT — PAIN DESCRIPTION - ORIENTATION
ORIENTATION: RIGHT;LEFT;DISTAL
ORIENTATION: RIGHT;LEFT

## 2023-08-05 ASSESSMENT — PAIN - FUNCTIONAL ASSESSMENT: PAIN_FUNCTIONAL_ASSESSMENT: ACTIVITIES ARE NOT PREVENTED

## 2023-08-05 ASSESSMENT — PAIN DESCRIPTION - DESCRIPTORS
DESCRIPTORS: ACHING;DISCOMFORT;SORE
DESCRIPTORS: ACHING;DISCOMFORT;SORE

## 2023-08-05 ASSESSMENT — PAIN DESCRIPTION - LOCATION: LOCATION: HEAD;HAND

## 2023-08-05 NOTE — PROGRESS NOTES
Internal Medicine Progress Note    [facility]    Jazmyn Ross. Dk Downeychristine., F.A.C.OMarianaI. Lora Multani D.O., BARBARA Esparza D.O. Elyn Ok, MSN, APRN, NP-C  Kristal Mckayla Iwona Brandon, MSN, APRN-CNP  Arnulfo Munoz, MSN, APRN-CNP     Primary Care Physician: Marychuy Sanchez MD   Admitting Physician:  Carlos Gorman DO  Admission date and time: 2023  9:05 AM    Room:  [address] [place] [address]  Admitting diagnosis: Essential hypertension [I10]  Anemia, unspecified type [D64.9]  Acute on chronic blood loss anemia [D62]    Patient Name: Geraldene Apgar  MRN: [id number]    Date of Service: 2023     Subjective:  Tonia Holder is a 58 y.o. female who was seen and examined today,2023, at the bedside. Patient denies any fever or chills. White count remain normal.  Overall improved. Probable discharge tomorrow      No family present during my examination. Review of System:   Constitutional:   Denies fever or chills, weight loss or gain, fatigue or malaise. HEENT:   Denies ear pain, sore throat, sinus or eye problems. Cardiovascular:   Complains of chest pain  Respiratory:   Denies shortness of breath, coughing, sputum production, hemoptysis, or wheezing. Gastrointestinal:   Denies nausea, vomiting, diarrhea, or constipation. Denies any abdominal pain. Genitourinary:    Denies any urgency, frequency. Hematuria bladder scan 217 postvoiding voiding  without difficulty. Extremities:   Denies lower extremity swelling, edema or cyanosis. Neurology:    Denies any headache or focal neurological deficits, Denies generalized weakness or memory difficulty. Psch:   Denies being anxious or depressed. Musculoskeletal:    Denies  myalgias, joint complaints or back pain. Integumentary:   Denies any rashes, ulcers, or excoriations. Denies bruising. Hematologic/Lymphatic:  Denies bruising or bleeding.     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 This time was spent reviewing notes and laboratory data as well as instructing and counseling the patient. Time I spent with the family or surrogate(s) is included only if the patient was incapable of providing the necessary information or participating in medical decisions. I also discussed the differential diagnosis and all of the proposed management plans with the patient and individuals accompanying the patient.     Sharee Reynaga DO, F.A.C.O.I.  8/5/2023  3:31 PM

## 2023-08-06 VITALS
SYSTOLIC BLOOD PRESSURE: 156 MMHG | HEART RATE: 91 BPM | RESPIRATION RATE: 17 BRPM | BODY MASS INDEX: 26.22 KG/M2 | DIASTOLIC BLOOD PRESSURE: 65 MMHG | WEIGHT: 148 LBS | TEMPERATURE: 98.8 F | HEIGHT: 63 IN | OXYGEN SATURATION: 99 %

## 2023-08-06 LAB
ANION GAP SERPL CALCULATED.3IONS-SCNC: 10 MMOL/L (ref 7–16)
BASOPHILS # BLD: 0.05 K/UL (ref 0–0.2)
BASOPHILS NFR BLD: 2 % (ref 0–2)
BUN SERPL-MCNC: 21 MG/DL (ref 6–23)
CALCIUM SERPL-MCNC: 8.9 MG/DL (ref 8.6–10.2)
CHLORIDE SERPL-SCNC: 111 MMOL/L (ref 98–107)
CO2 SERPL-SCNC: 18 MMOL/L (ref 22–29)
CREAT SERPL-MCNC: 1.4 MG/DL (ref 0.5–1)
EOSINOPHIL # BLD: 0.14 K/UL (ref 0.05–0.5)
EOSINOPHILS RELATIVE PERCENT: 5 % (ref 0–6)
ERYTHROCYTE [DISTWIDTH] IN BLOOD BY AUTOMATED COUNT: 18.5 % (ref 11.5–15)
GFR SERPL CREATININE-BSD FRML MDRD: 42 ML/MIN/1.73M2
GLUCOSE SERPL-MCNC: 95 MG/DL (ref 74–107)
HCT VFR BLD AUTO: 25.3 % (ref 34–48)
HGB BLD-MCNC: 7.6 G/DL (ref 11.5–15.5)
LYMPHOCYTES NFR BLD: 1.15 K/UL (ref 1.5–4)
LYMPHOCYTES RELATIVE PERCENT: 44 % (ref 20–42)
MAGNESIUM SERPL-MCNC: 1.7 MG/DL (ref 1.6–2.6)
MCH RBC QN AUTO: 24.2 PG (ref 26–35)
MCHC RBC AUTO-ENTMCNC: 30 G/DL (ref 32–34.5)
MCV RBC AUTO: 80.6 FL (ref 80–99.9)
MONOCYTES NFR BLD: 0.36 K/UL (ref 0.1–0.95)
MONOCYTES NFR BLD: 14 % (ref 2–12)
NEUTROPHILS NFR BLD: 35 % (ref 43–80)
NEUTS SEG NFR BLD: 0.9 K/UL (ref 1.8–7.3)
PHOSPHATE SERPL-MCNC: 3.5 MG/DL (ref 2.5–4.5)
PLATELET # BLD AUTO: 384 K/UL (ref 130–450)
PMV BLD AUTO: 9.9 FL (ref 7–12)
POTASSIUM SERPL-SCNC: 4 MMOL/L (ref 3.5–5)
RBC # BLD AUTO: 3.14 M/UL (ref 3.5–5.5)
RBC # BLD: ABNORMAL 10*6/UL
SODIUM SERPL-SCNC: 139 MMOL/L (ref 132–146)
WBC OTHER # BLD: 2.6 K/UL (ref 4.5–11.5)

## 2023-08-06 PROCEDURE — 6370000000 HC RX 637 (ALT 250 FOR IP): Performed by: INTERNAL MEDICINE

## 2023-08-06 PROCEDURE — 83735 ASSAY OF MAGNESIUM: CPT

## 2023-08-06 PROCEDURE — 6360000002 HC RX W HCPCS: Performed by: NURSE PRACTITIONER

## 2023-08-06 PROCEDURE — 2580000003 HC RX 258: Performed by: NURSE PRACTITIONER

## 2023-08-06 PROCEDURE — 84100 ASSAY OF PHOSPHORUS: CPT

## 2023-08-06 PROCEDURE — 94669 MECHANICAL CHEST WALL OSCILL: CPT

## 2023-08-06 PROCEDURE — 36415 COLL VENOUS BLD VENIPUNCTURE: CPT

## 2023-08-06 PROCEDURE — 80048 BASIC METABOLIC PNL TOTAL CA: CPT

## 2023-08-06 PROCEDURE — 85025 COMPLETE CBC W/AUTO DIFF WBC: CPT

## 2023-08-06 PROCEDURE — 6370000000 HC RX 637 (ALT 250 FOR IP): Performed by: NURSE PRACTITIONER

## 2023-08-06 PROCEDURE — C9113 INJ PANTOPRAZOLE SODIUM, VIA: HCPCS | Performed by: NURSE PRACTITIONER

## 2023-08-06 PROCEDURE — 94640 AIRWAY INHALATION TREATMENT: CPT

## 2023-08-06 RX ORDER — TRAMADOL HYDROCHLORIDE 50 MG/1
50 TABLET ORAL EVERY 4 HOURS PRN
Qty: 18 TABLET | Refills: 0 | Status: SHIPPED | OUTPATIENT
Start: 2023-08-06 | End: 2023-08-09

## 2023-08-06 RX ORDER — ISOSORBIDE MONONITRATE 30 MG/1
30 TABLET, EXTENDED RELEASE ORAL DAILY
Qty: 30 TABLET | Refills: 3 | Status: SHIPPED | OUTPATIENT
Start: 2023-08-07

## 2023-08-06 RX ADMIN — PYRIDOXINE HCL TAB 50 MG 50 MG: 50 TAB at 08:38

## 2023-08-06 RX ADMIN — ENOXAPARIN SODIUM 40 MG: 100 INJECTION SUBCUTANEOUS at 08:39

## 2023-08-06 RX ADMIN — GUAIFENESIN 1200 MG: 600 TABLET, EXTENDED RELEASE ORAL at 08:38

## 2023-08-06 RX ADMIN — CARVEDILOL 12.5 MG: 6.25 TABLET, FILM COATED ORAL at 08:38

## 2023-08-06 RX ADMIN — BUDESONIDE INHALATION 500 MCG: 0.5 SUSPENSION RESPIRATORY (INHALATION) at 06:05

## 2023-08-06 RX ADMIN — ISOSORBIDE MONONITRATE 30 MG: 30 TABLET, EXTENDED RELEASE ORAL at 08:38

## 2023-08-06 RX ADMIN — HYDRALAZINE HYDROCHLORIDE 50 MG: 50 TABLET, FILM COATED ORAL at 06:38

## 2023-08-06 RX ADMIN — SODIUM CHLORIDE, PRESERVATIVE FREE 10 ML: 5 INJECTION INTRAVENOUS at 08:39

## 2023-08-06 RX ADMIN — GABAPENTIN 100 MG: 100 CAPSULE ORAL at 08:38

## 2023-08-06 RX ADMIN — PANTOPRAZOLE SODIUM 40 MG: 40 INJECTION, POWDER, FOR SOLUTION INTRAVENOUS at 08:38

## 2023-08-06 RX ADMIN — ARFORMOTEROL TARTRATE 15 MCG: 15 SOLUTION RESPIRATORY (INHALATION) at 06:05

## 2023-08-06 RX ADMIN — NITROGLYCERIN 1 INCH: 20 OINTMENT TOPICAL at 06:38

## 2023-08-06 NOTE — PLAN OF CARE
Problem: Safety - Adult  Goal: Free from fall injury  Outcome: Progressing     Problem: ABCDS Injury Assessment  Goal: Absence of physical injury  Outcome: Progressing     Problem: Pain  Goal: Verbalizes/displays adequate comfort level or baseline comfort level  Outcome: Progressing     Problem: Skin/Tissue Integrity  Goal: Absence of new skin breakdown  Description: 1. Monitor for areas of redness and/or skin breakdown  2. Assess vascular access sites hourly  3. Every 4-6 hours minimum:  Change oxygen saturation probe site  4. Every 4-6 hours:  If on nasal continuous positive airway pressure, respiratory therapy assess nares and determine need for appliance change or resting period.   Outcome: Progressing

## 2023-08-06 NOTE — PLAN OF CARE
Problem: Safety - Adult  Goal: Free from fall injury  8/6/2023 1025 by Kelley Montgomery RN  Outcome: Progressing  8/5/2023 2137 by Dorina Lopes RN  Outcome: Progressing     Problem: ABCDS Injury Assessment  Goal: Absence of physical injury  8/6/2023 1025 by Kelley Montgomery RN  Outcome: Progressing  8/5/2023 2137 by Dorina Lopes RN  Outcome: Progressing     Problem: Pain  Goal: Verbalizes/displays adequate comfort level or baseline comfort level  8/6/2023 1025 by Kelley Montgomery RN  Outcome: Progressing  8/5/2023 2137 by Dorina Lopes RN  Outcome: Progressing     Problem: Skin/Tissue Integrity  Goal: Absence of new skin breakdown  Description: 1. Monitor for areas of redness and/or skin breakdown  2. Assess vascular access sites hourly  3. Every 4-6 hours minimum:  Change oxygen saturation probe site  4. Every 4-6 hours:  If on nasal continuous positive airway pressure, respiratory therapy assess nares and determine need for appliance change or resting period.   8/6/2023 1025 by Kelley Montgomery RN  Outcome: Progressing  8/5/2023 2137 by Dorina Lopes RN  Outcome: Progressing

## 2023-08-06 NOTE — CARE COORDINATION
8/6/23 1137 CM note: covid (-) 8/2/23, bl cx 8/4/23 show no growth to date. Room air. Hx BKA. Discharge order noted. Discharge plan remains home with resumption of [facility]. HHC order noted. Notified Nupur Fallon, [facility] liaison, of pts discharge today. Pts ex- is at the bedside and is going to transport pt home today. Dose change noted for Eliquis; pt verifies she was on eliquis pta.  Electronically signed by Adeel Miguel RN on 8/6/2023 at 11:50 AM
Ss note:8/4/2023 11:08 AM Follow up visit to room pt relay plan remains for her to discharge home with destini Hatch and continue with [facility], ARSLAN noted. Pt denies skilled rehab. Hx of BKA, has good support at home. PCP is Dr. Angel Dee, family to transport.  Penny Number, LSW
ADLS  Prior functional level: Mobility USES CANE OR ROLLATOR. Current functional level: Other (see comment) (therapy ordered. )    PT AM-PAC:   /24  OT AM-PAC:   /24    Family can provide assistance at DC: Yes  Would you like Case Management to discuss the discharge plan with any other family members/significant others, and if so, who? Yes (ex spouse present, dtr Mamie is POA pt alert/oriented times 3.)    Plans to Return to Present Housing: Yes  Other Identified Issues/Barriers to RETURNING to current housing: yes WANTS TO [id number]   Potential Assistance needed at discharge: N/A            Potential DME:    Patient expects to discharge to: [id number] [place] [address] [place] for transportation at discharge:      Financial    Payor: [address] / Plan: MEDICARE PART A AND B / Product Type: *No Product type* /         Potential assistance Purchasing Medications: No  Meds-to-Beds request: Yes      [facility] [id number] Kaushal Rendon, [facility],[address] [place] [address] [phone] Abimbola Jacobs [phone]  [address] [place] [id number]  Phone: [phone] Fax: [phone]      Notes:    Ss note: 8/3/397701:05 AM consult noted for discharge planning. Pt is rule out covid. Met with pt and ex spouse Harlo Dinning in room. Pt is alert/oriented times 3 very pleasant. Pt reside with her dtr [address] whom is now POA however pt is alert/oriented times 3 and makes own decision. Pt has good family support. Is on room air. Therapy has been ordered. Pt is ACTIVE with [facility] and will need resume order. Pt relays her plan is to return home at discharge. , not receptive to SNF stating she has good support at home. PTA pt is independent, hx of BKA uses cane or rollator, does own homemaking chores. Follows with Dr. Amari Barajas.  Domenic Anand, [facility]    The Plan for Transition of Care is related to the following treatment goals of Essential hypertension [I10]  Anemia, unspecified type [D64.9]  Acute on chronic blood loss anemia [D62]        The Patient

## 2023-08-06 NOTE — DISCHARGE INSTRUCTIONS
Your information:  Name: Josh Olivas  : 1960    Your instructions:    YOU ARE BEING DISCHARGED HOME. PLEASE MAKE AND KEEP YOUR FOLLOW UP APPOINTMENTS. IF YOU EXPERIENCE ANY OF THE FOLLOWING SYMPTOMS, CHEST PAIN, SHORTNESS OF BREATH, COUGHING UP BLOOD OR BLOODY SPUTUM, STOMACH PAIN OR CRAMPING, DARK, TARRY STOOLS, LOSS OF APPETITE, GENERAL NOT FEELING WELL, SIGNS AND SYMPTOMS OF INFECTION LIKE FEVER AND OR CHILLS, PLEASE CALL PCP OR RETURN TO THE EMERGENCY ROOM. What to do after you leave the hospital:    Recommended diet: regular diet    Recommended activity: activity as tolerated        The following personal items were collected during your admission and were returned to you:    Belongings  Dental Appliances: Uppers, Lowers  Vision - Corrective Lenses: Eyeglasses  Hearing Aid: None  Clothing: Jacket/Coat, Pants, Footwear  Jewelry: Earrings  Body Piercings Removed: No  Electronic Devices: Cell Phone  Weapons (Notify Protective Services/Security): None  Other Valuables: At home  Home Medications: None  Valuables Given To: Patient  Provide Name(s) of Who Valuable(s) Were Given To: RIGOBERTO  Responsible person(s) in the waiting room: NA  Patient approves for provider to speak to responsible person post operatively: Yes    Information obtained by:  By signing below, I understand that if any problems occur once I leave the hospital I am to contact PCP. I understand and acknowledge receipt of the instructions indicated above.

## 2023-08-07 LAB
MICROORGANISM SPEC CULT: NORMAL
MICROORGANISM SPEC CULT: NORMAL
SERVICE CMNT-IMP: NORMAL
SERVICE CMNT-IMP: NORMAL
SPECIMEN DESCRIPTION: NORMAL
SPECIMEN DESCRIPTION: NORMAL

## 2023-08-07 NOTE — DISCHARGE SUMMARY
[address]                    [address] [facility]                               DISCHARGE SUMMARY    PATIENT NAME: Leobardo Archer                     :        1960  MED REC NO:   [id number]                            ROOM:       0606  ACCOUNT NO:   [de-identified]                           ADMIT DATE: 2023  PROVIDER:     Reny Dangelo DO                  DISCHARGE DATE:  2023      ADMITTING DIAGNOSIS:  Acute on chronic blood loss anemia in the setting  of recurrent gross hematuria, current immature bilateral ureteral stents  in place for residual hydronephrosis. SECONDARY DISCHARGE DIAGNOSES:  Intensive urgency, underlying essential  hypertension, non-ST-elevated myocardial infarction, underlying coronary  artery disease/demand ischemia, chronic kidney disease stage IIIA to B,  chronic compensated diastolic congestive heart failure, chronic  constipation, peripheral arterial disease with prior revascularization,  complications with left BKA in 2020, recurrent venous thrombosis for  prior pulmonary embolism, on chronic Eliquis therapy,  non-insulin-dependent diabetes mellitus type 2, hyperlipidemia,  hypothyroidism and schizophrenia. COMPLICATIONS:  There were no complications. OPERATION:  No operation. CONSULTATION OBTAINED:  With Dr. Alice Andujar, [facility]. No OMT was  given. ADMITTING PHYSICIANS:  Dr. Jose Ohara and Dr. Yin Iyer. CHIEF COMPLAINT AND HISTORY OF CHIEF COMPLAINT:  This is a 62-year-old  Afro American female admitted to [address] [place] [address]. The patient  presented to the hospital on 2023. The patient presented to the  hospital with what appeared to be increased amount of shortness of  breath. The patient states these symptoms have been going on for the  past several days. ER workup at this time was undertaken. The patient  was found to be chronically anemic.   The patient was type and  crossmatched and stents on an  outpatient basis. At the time of discharge on 08/06/2023 revealed the  following. Hemoglobin and hematocrit were 7.6 and 25.3 respectively,  white count 2000, platelet count adequate. BUN and creatinine were 21  and 1.4 respectively. Electrolytes were satisfactory. Mag and phos  were normal.  Blood culture showed no growth. The patient's vital signs  at this time showed the following; the blood pressure 156/65,  temperature 98, pulse 91, respirations 17, O2 sat 99%, height 5 feet 3  inches and weight 148. The patient was discharged on following  medications of Imdur 30 mg daily, tramadol 50 q. 6 p.r.n. for pain,  apixaban will be resumed on 08/11 at 5 mg b.i.d., Tylenol p.r.n.,  albuterol 2 puffs q. 4 p.r.n., Coreg 12.5 b.i.d., Flonase one spray in  each nostril daily, Neurontin 100 t.i.d., hydralazine 50 q. 8. The  patient will continue Remeron 7.5 mg at bedtime along with paroxetine  50 mg daily. The patient was discharged home by private car. She  will follow up with Dr. Rowan Moya in 1 week along with Urology and  Cardiology. More than 40 minutes was spent on the day of discharge. More than 50%  of the time spent face-to-face with the patient discussing plan of care  and treatment.         Dayanna Burnett DO    D: 08/06/2023 13:24:13       T: 08/06/2023 13:27:19     [email or web address]  Job#: [id number]     Doc#: [id number]    CC:

## 2023-08-09 ENCOUNTER — TELEPHONE (OUTPATIENT)
Dept: CARDIOLOGY CLINIC | Age: 63
End: 2023-08-09

## 2023-08-14 ENCOUNTER — TELEPHONE (OUTPATIENT)
Dept: FAMILY MEDICINE CLINIC | Age: 63
End: 2023-08-14

## 2023-08-14 RX ORDER — BISACODYL 5 MG/1
5 TABLET, DELAYED RELEASE ORAL DAILY PRN
Qty: 30 TABLET | Refills: 3 | Status: SHIPPED | OUTPATIENT
Start: 2023-08-14

## 2023-08-18 ENCOUNTER — HOSPITAL ENCOUNTER (OUTPATIENT)
Age: 63
Discharge: HOME | End: 2023-08-18
Payer: MEDICARE

## 2023-08-18 PROCEDURE — 36415 COLL VENOUS BLD VENIPUNCTURE: CPT

## 2023-08-18 PROCEDURE — 87086 URINE CULTURE/COLONY COUNT: CPT

## 2023-08-20 LAB
MICROORGANISM SPEC CULT: ABNORMAL
SPECIMEN DESCRIPTION: ABNORMAL

## 2023-08-29 ENCOUNTER — OFFICE VISIT (OUTPATIENT)
Dept: FAMILY MEDICINE CLINIC | Age: 63
End: 2023-08-29
Payer: MEDICARE

## 2023-08-29 VITALS
HEART RATE: 82 BPM | BODY MASS INDEX: 23.92 KG/M2 | DIASTOLIC BLOOD PRESSURE: 84 MMHG | OXYGEN SATURATION: 97 % | WEIGHT: 135 LBS | HEIGHT: 63 IN | RESPIRATION RATE: 20 BRPM | SYSTOLIC BLOOD PRESSURE: 132 MMHG

## 2023-08-29 DIAGNOSIS — D50.0 BLOOD LOSS ANEMIA: ICD-10-CM

## 2023-08-29 DIAGNOSIS — Z09 HOSPITAL DISCHARGE FOLLOW-UP: Primary | ICD-10-CM

## 2023-08-29 PROCEDURE — G8427 DOCREV CUR MEDS BY ELIG CLIN: HCPCS | Performed by: FAMILY MEDICINE

## 2023-08-29 PROCEDURE — 3074F SYST BP LT 130 MM HG: CPT | Performed by: FAMILY MEDICINE

## 2023-08-29 PROCEDURE — G8420 CALC BMI NORM PARAMETERS: HCPCS | Performed by: FAMILY MEDICINE

## 2023-08-29 PROCEDURE — 99214 OFFICE O/P EST MOD 30 MIN: CPT | Performed by: FAMILY MEDICINE

## 2023-08-29 PROCEDURE — 1036F TOBACCO NON-USER: CPT | Performed by: FAMILY MEDICINE

## 2023-08-29 PROCEDURE — 1111F DSCHRG MED/CURRENT MED MERGE: CPT | Performed by: FAMILY MEDICINE

## 2023-08-29 PROCEDURE — 3078F DIAST BP <80 MM HG: CPT | Performed by: FAMILY MEDICINE

## 2023-08-29 PROCEDURE — 3017F COLORECTAL CA SCREEN DOC REV: CPT | Performed by: FAMILY MEDICINE

## 2023-08-29 NOTE — PROGRESS NOTES
pyridoxine 50 MG tablet  Commonly known as: B-6  Take 1 tablet by mouth daily               Medications marked \"taking\" at this time  Outpatient Medications Marked as Taking for the 8/29/23 encounter (Office Visit) with Dusty Reed MD   Medication Sig Dispense Refill    bisacodyl (DULCOLAX) 5 MG EC tablet Take 1 tablet by mouth daily as needed for Constipation 30 tablet 3    isosorbide mononitrate (IMDUR) 30 MG extended release tablet Take 1 tablet by mouth daily 30 tablet 3    apixaban (ELIQUIS) 5 MG TABS tablet Take 1 tablet by mouth 2 times daily 60 tablet 5    lidocaine (LIDODERM) 5 % Place 1 patch onto the skin daily 12 hours on, 12 hours off. 30 patch 0    pyridoxine (B-6) 50 MG tablet Take 1 tablet by mouth daily 30 tablet 3    hydrALAZINE (APRESOLINE) 50 MG tablet Take 1 tablet by mouth every 8 hours 90 tablet 0    acetaminophen (TYLENOL) 325 MG tablet Take 1 tablet by mouth 2 times daily as needed for Pain      mirtazapine (REMERON) 7.5 MG tablet Take 1 tablet by mouth nightly 30 tablet 5    carvedilol (COREG) 12.5 MG tablet Take 1 tablet by mouth 2 times daily (with meals) 60 tablet 5    albuterol sulfate HFA (VENTOLIN HFA) 108 (90 Base) MCG/ACT inhaler Inhale 2 puffs into the lungs 4 times daily as needed for Wheezing 18 g 3    gabapentin (NEURONTIN) 100 MG capsule Take 1 capsule by mouth 2 times daily.  180 capsule 1    Handicap Placard MISC by Does not apply route Unable to ambulate more than 20 feet without difficulty  Expires 3/31/2028 1 each 0    fluticasone (FLONASE) 50 MCG/ACT nasal spray 2 sprays by Each Nostril route daily as needed for Rhinitis or Allergies      Nutritional Supplements (BOOST PLUS) LIQD Take 1 each by mouth daily Strawberry/Vanilla          Medications patient taking as of now reconciled against medications ordered at time of hospital discharge: Yes    Review of Systems    Objective:    /84   Pulse 82   Resp 20   Ht 5' 3\" (1.6 m)   Wt 135 lb (61.2 kg)

## 2023-09-08 ENCOUNTER — TELEPHONE (OUTPATIENT)
Dept: FAMILY MEDICINE CLINIC | Age: 63
End: 2023-09-08

## 2023-09-08 NOTE — TELEPHONE ENCOUNTER
Genesis Herman at [facility] called to inform they want to order a hospital bed for the patient. States CHF, also, patient unable to go from supine to sit without assistance, and is unable to lie flat. Genesis Herman said if you could amend the hosp follow up note(if appropriate) to provide medical necessity they will order the bed from DME for the patient.    Last seen 8/29/2023  Next appt 10/17/2023  [phone] Phone      [phone] fax

## 2023-09-21 ENCOUNTER — TELEPHONE (OUTPATIENT)
Dept: ONCOLOGY | Age: 63
End: 2023-09-21

## 2023-09-21 ENCOUNTER — OFFICE VISIT (OUTPATIENT)
Dept: ONCOLOGY | Age: 63
End: 2023-09-21
Payer: MEDICARE

## 2023-09-21 ENCOUNTER — HOSPITAL ENCOUNTER (OUTPATIENT)
Dept: INFUSION THERAPY | Age: 63
Discharge: HOME | End: 2023-09-21
Payer: MEDICARE

## 2023-09-21 VITALS
HEART RATE: 96 BPM | WEIGHT: 136 LBS | SYSTOLIC BLOOD PRESSURE: 140 MMHG | OXYGEN SATURATION: 99 % | TEMPERATURE: 97.3 F | HEIGHT: 63 IN | DIASTOLIC BLOOD PRESSURE: 60 MMHG | BODY MASS INDEX: 24.1 KG/M2

## 2023-09-21 DIAGNOSIS — D64.9 ANEMIA, UNSPECIFIED TYPE: Primary | ICD-10-CM

## 2023-09-21 DIAGNOSIS — D64.9 ANEMIA, UNSPECIFIED TYPE: ICD-10-CM

## 2023-09-21 LAB
BASOPHILS # BLD: 0.03 K/UL (ref 0–0.2)
BASOPHILS NFR BLD: 1 % (ref 0–2)
EOSINOPHIL # BLD: 0.06 K/UL (ref 0.05–0.5)
EOSINOPHILS RELATIVE PERCENT: 1 % (ref 0–6)
ERYTHROCYTE [DISTWIDTH] IN BLOOD BY AUTOMATED COUNT: 18.7 % (ref 11.5–15)
FERRITIN SERPL-MCNC: 147 NG/ML
HCT VFR BLD AUTO: 24.9 % (ref 34–48)
HGB BLD-MCNC: 7.5 G/DL (ref 11.5–15.5)
IMM GRANULOCYTES # BLD AUTO: 0.04 K/UL (ref 0–0.58)
IMM GRANULOCYTES NFR BLD: 1 % (ref 0–5)
IMM RETICS NFR: 22.6 % (ref 3–15.9)
IRON SATN MFR SERPL: 17 % (ref 15–50)
IRON SERPL-MCNC: 35 UG/DL (ref 37–145)
LYMPHOCYTES NFR BLD: 1.82 K/UL (ref 1.5–4)
LYMPHOCYTES RELATIVE PERCENT: 41 % (ref 20–42)
MCH RBC QN AUTO: 24 PG (ref 26–35)
MCHC RBC AUTO-ENTMCNC: 30.1 G/DL (ref 32–34.5)
MCV RBC AUTO: 79.6 FL (ref 80–99.9)
MONOCYTES NFR BLD: 0.58 K/UL (ref 0.1–0.95)
MONOCYTES NFR BLD: 13 % (ref 2–12)
NEUTROPHILS NFR BLD: 43 % (ref 43–80)
NEUTS SEG NFR BLD: 1.88 K/UL (ref 1.8–7.3)
PLATELET # BLD AUTO: 414 K/UL (ref 130–450)
PMV BLD AUTO: 10.3 FL (ref 7–12)
RBC # BLD AUTO: 3.13 M/UL (ref 3.5–5.5)
RETIC HEMOGLOBIN: 25.5 PG (ref 28.2–36.6)
RETICS # AUTO: 0.07 M/UL
RETICS/RBC NFR AUTO: 2.3 % (ref 0.4–1.9)
TIBC SERPL-MCNC: 212 UG/DL (ref 250–450)
WBC OTHER # BLD: 4.4 K/UL (ref 4.5–11.5)

## 2023-09-21 PROCEDURE — 83540 ASSAY OF IRON: CPT

## 2023-09-21 PROCEDURE — 84155 ASSAY OF PROTEIN SERUM: CPT

## 2023-09-21 PROCEDURE — G8427 DOCREV CUR MEDS BY ELIG CLIN: HCPCS | Performed by: INTERNAL MEDICINE

## 2023-09-21 PROCEDURE — 82274 ASSAY TEST FOR BLOOD FECAL: CPT | Performed by: INTERNAL MEDICINE

## 2023-09-21 PROCEDURE — 3017F COLORECTAL CA SCREEN DOC REV: CPT | Performed by: INTERNAL MEDICINE

## 2023-09-21 PROCEDURE — 85045 AUTOMATED RETICULOCYTE COUNT: CPT

## 2023-09-21 PROCEDURE — 3077F SYST BP >= 140 MM HG: CPT | Performed by: INTERNAL MEDICINE

## 2023-09-21 PROCEDURE — 82668 ASSAY OF ERYTHROPOIETIN: CPT

## 2023-09-21 PROCEDURE — 82728 ASSAY OF FERRITIN: CPT

## 2023-09-21 PROCEDURE — 3078F DIAST BP <80 MM HG: CPT | Performed by: INTERNAL MEDICINE

## 2023-09-21 PROCEDURE — 1036F TOBACCO NON-USER: CPT | Performed by: INTERNAL MEDICINE

## 2023-09-21 PROCEDURE — 84630 ASSAY OF ZINC: CPT

## 2023-09-21 PROCEDURE — 82525 ASSAY OF COPPER: CPT

## 2023-09-21 PROCEDURE — 84165 PROTEIN E-PHORESIS SERUM: CPT

## 2023-09-21 PROCEDURE — 85025 COMPLETE CBC W/AUTO DIFF WBC: CPT

## 2023-09-21 PROCEDURE — 99205 OFFICE O/P NEW HI 60 MIN: CPT | Performed by: INTERNAL MEDICINE

## 2023-09-21 PROCEDURE — 36415 COLL VENOUS BLD VENIPUNCTURE: CPT

## 2023-09-21 PROCEDURE — 99214 OFFICE O/P EST MOD 30 MIN: CPT

## 2023-09-21 PROCEDURE — 83550 IRON BINDING TEST: CPT

## 2023-09-21 PROCEDURE — G8420 CALC BMI NORM PARAMETERS: HCPCS | Performed by: INTERNAL MEDICINE

## 2023-09-21 RX ORDER — HEPARIN 100 UNIT/ML
500 SYRINGE INTRAVENOUS PRN
OUTPATIENT
Start: 2023-09-21

## 2023-09-21 RX ORDER — EPINEPHRINE 1 MG/ML
0.3 INJECTION, SOLUTION, CONCENTRATE INTRAVENOUS PRN
OUTPATIENT
Start: 2023-09-21

## 2023-09-21 RX ORDER — ONDANSETRON 2 MG/ML
8 INJECTION INTRAMUSCULAR; INTRAVENOUS
OUTPATIENT
Start: 2023-09-21

## 2023-09-21 RX ORDER — SODIUM CHLORIDE 9 MG/ML
INJECTION, SOLUTION INTRAVENOUS CONTINUOUS
OUTPATIENT
Start: 2023-09-21

## 2023-09-21 RX ORDER — HYDRALAZINE HYDROCHLORIDE 25 MG/1
TABLET, FILM COATED ORAL
COMMUNITY
Start: 2023-09-20

## 2023-09-21 RX ORDER — SODIUM CHLORIDE 9 MG/ML
5-250 INJECTION, SOLUTION INTRAVENOUS PRN
OUTPATIENT
Start: 2023-09-21

## 2023-09-21 RX ORDER — FAMOTIDINE 10 MG/ML
20 INJECTION, SOLUTION INTRAVENOUS
OUTPATIENT
Start: 2023-09-21

## 2023-09-21 RX ORDER — ACETAMINOPHEN 325 MG/1
650 TABLET ORAL
OUTPATIENT
Start: 2023-09-21

## 2023-09-21 RX ORDER — SODIUM CHLORIDE 0.9 % (FLUSH) 0.9 %
5-40 SYRINGE (ML) INJECTION PRN
OUTPATIENT
Start: 2023-09-21

## 2023-09-21 RX ORDER — DIPHENHYDRAMINE HYDROCHLORIDE 50 MG/ML
50 INJECTION INTRAMUSCULAR; INTRAVENOUS
OUTPATIENT
Start: 2023-09-21

## 2023-09-21 NOTE — TELEPHONE ENCOUNTER
Pt is a 58 [facility]. [facility] female attending clinic for an initial visit with Dr. Maritza Gunter. Pt denied any needs at this time. Pt was encouraged to reach out to this provider should any needs arise. Pt appeared A&Ox4, mood appeared euthymic, affect congruent.        Luisito Kirk MSW, APRILW-S  Oncology Social Worker

## 2023-09-21 NOTE — PROGRESS NOTES
[address] [address]  Dept: [address]: [phone]  Attending Consult Note      Reason for Visit:   Anemia and leukopenia. Referring Physician:  Tamra Howard MD    PCP:  Tamra Howard MD    History of Present Illness:     Mrs. Danny Bagley is a 79-year-old lady, with a past medical history significant for CAD, CHF, hyperlipidemia, hypertension and schizophrenia, who was referred to the hematology office for evaluation of anemia. The patient CBCD from 8/6/2023 was remarkable for a white count of 2.6, , ALC 1150, Hemoglobin 7.6, hematocrit 25.3, MCV 80.6, in May 2023 hemoglobin was 8.5G/DL, marked 20.3, MCV 84.2, in February 2023 hemoglobin was 7.6, hemoglobin 26, MCV 85.5, in November 2022 hemoglobin was 9.3, hematocrit 20.7, MCV 78.6, on 8/3/2023 ferritin was 161, iron 16, TIBC 177, TSAT 9%, vitamin B12 390, folate 14.7. The patient is feeling tired, and cold, she has constipation. She does have a history of gross hematuria, she was admitted to the hospital in August, she has ureteral stents, she is being followed by urology. She denies any melena or hematochezia, she does not remember a GI work-up, the records indicate that she had an EGD done with Dr. Tommie España. Review of Systems;  CONSTITUTIONAL: No fever, chills. Good appetite, positive for fatigue. ENMT: Eyes: No diplopia; Nose: No epistaxis. Mouth: No sore throat. RESPIRATORY: No hemoptysis, shortness of breath, cough. CARDIOVASCULAR: No chest pain, palpitations. GASTROINTESTINAL: No nausea/vomiting, abdominal pain, positive for constipation. GENITOURINARY: No dysuria, urinary frequency, positive for history of hematuria. NEURO: No syncope, presyncope, headache.   Remainder:  ROS NEGATIVE    Past Medical History:      Diagnosis Date    CAD (coronary artery disease)     cath 2016    CHF (congestive heart failure) ([address])
REMOVAL STENT / STONE Bilateral 2020    CYSTOSCOPY RETROGRADE PYELOGRAM BILATERAL STENT EXCHANGE performed by Gerri Rahman MD at [address] / [address] / STONE Bilateral 2021    CYSTOSCOPY RETROGRADE PYELOGRAM BILATERAL  STENT CHANGE performed by Raghavendra Caldwell MD at [address] Left 2020    LEG AMPUTATION BELOW KNEE performed by Jose Knott DO at [address] [place] [address]  2016    epigastric hernia umbilical hernia with mesh    OVARIAN CYST REMOVAL      OVARY REMOVAL Right     OVARY REMOVAL  2014    UPPER GASTROINTESTINAL ENDOSCOPY N/A 10/29/2019    EGD ESOPHAGOGASTRODUODENOSCOPY performed by Patti Tiwari MD at [facility] ENDOSCOPY       Family History   Problem Relation Age of Onset    Cancer Brother        Social History     Socioeconomic History    Marital status:      Spouse name: Not on file    Number of children: 3    Years of education: Not on file    Highest education level: Not on file   Occupational History    Not on file   Tobacco Use    Smoking status: Former     Packs/day: 0.50     Years: 10.00     Additional pack years: 0.00     Total pack years: 5.00     Types: Cigarettes     Quit date: 3/23/2015     Years since quittin.5    Smokeless tobacco: Never    Tobacco comments:     quit smoking    Vaping Use    Vaping Use: Never used   Substance and Sexual Activity    Alcohol use: No    Drug use: No    Sexual activity: Not on file   Other Topics Concern    Not on file   Social History Narrative    Not on file     Social Determinants of Health     Financial Resource Strain: Unknown (3/8/2023)    Overall Financial Resource Strain (CARDIA)     Difficulty of Paying Living Expenses: Patient refused   Food Insecurity: Unknown (3/8/2023)    Hunger Vital Sign     Worried About Running Out of Food in the Last Year: Patient refused     [address] in the Last

## 2023-09-22 LAB — PATH REV BLD -IMP: NORMAL

## 2023-09-23 LAB
ALBUMIN SERPL-MCNC: 2.9 G/DL (ref 3.5–4.7)
ALPHA1 GLOB SERPL ELPH-MCNC: 0.4 G/DL (ref 0.2–0.4)
ALPHA2 GLOB SERPL ELPH-MCNC: 1 G/DL (ref 0.5–1)
B-GLOBULIN SERPL ELPH-MCNC: 1 G/DL (ref 0.8–1.3)
EPO SERPL-ACNC: 25 MU/ML (ref 4–27)
GAMMA GLOB SERPL ELPH-MCNC: 2.4 G/DL (ref 0.7–1.6)
PATHOLOGIST: ABNORMAL
PROT PATTERN SERPL ELPH-IMP: ABNORMAL
PROT SERPL-MCNC: 7.8 G/DL (ref 6.4–8.3)

## 2023-09-24 LAB
COPPER SERPL-MCNC: 197.6 UG/DL (ref 80–155)
ZINC SERPL-MCNC: 67.6 UG/DL (ref 60–120)

## 2023-09-30 ENCOUNTER — TELEPHONE (OUTPATIENT)
Dept: FAMILY MEDICINE CLINIC | Age: 63
End: 2023-09-30

## 2023-09-30 RX ORDER — SULFAMETHOXAZOLE AND TRIMETHOPRIM 800; 160 MG/1; MG/1
1 TABLET ORAL 2 TIMES DAILY
Qty: 20 TABLET | Refills: 0 | Status: ON HOLD
Start: 2023-09-30 | End: 2023-10-13 | Stop reason: HOSPADM

## 2023-10-03 ENCOUNTER — HOSPITAL ENCOUNTER (OUTPATIENT)
Dept: INFUSION THERAPY | Age: 63
Discharge: HOME | End: 2023-10-03
Payer: MEDICARE

## 2023-10-03 VITALS
SYSTOLIC BLOOD PRESSURE: 119 MMHG | RESPIRATION RATE: 16 BRPM | DIASTOLIC BLOOD PRESSURE: 58 MMHG | HEART RATE: 89 BPM | TEMPERATURE: 98.1 F

## 2023-10-03 DIAGNOSIS — D50.9 MICROCYTIC HYPOCHROMIC ANEMIA: ICD-10-CM

## 2023-10-03 DIAGNOSIS — D62 ACUTE ON CHRONIC BLOOD LOSS ANEMIA: Primary | ICD-10-CM

## 2023-10-03 PROCEDURE — 6360000002 HC RX W HCPCS: Performed by: INTERNAL MEDICINE

## 2023-10-03 PROCEDURE — 96374 THER/PROPH/DIAG INJ IV PUSH: CPT

## 2023-10-03 PROCEDURE — 2580000003 HC RX 258: Performed by: INTERNAL MEDICINE

## 2023-10-03 RX ORDER — SODIUM CHLORIDE 9 MG/ML
5-250 INJECTION, SOLUTION INTRAVENOUS PRN
Status: DISCONTINUED | OUTPATIENT
Start: 2023-10-03 | End: 2023-10-04 | Stop reason: HOSPADM

## 2023-10-03 RX ORDER — SODIUM CHLORIDE 9 MG/ML
5-250 INJECTION, SOLUTION INTRAVENOUS PRN
Status: CANCELLED | OUTPATIENT
Start: 2023-10-07

## 2023-10-03 RX ORDER — DIPHENHYDRAMINE HYDROCHLORIDE 50 MG/ML
50 INJECTION INTRAMUSCULAR; INTRAVENOUS
Status: CANCELLED | OUTPATIENT
Start: 2023-10-07

## 2023-10-03 RX ORDER — SODIUM CHLORIDE 0.9 % (FLUSH) 0.9 %
5-40 SYRINGE (ML) INJECTION PRN
Status: CANCELLED | OUTPATIENT
Start: 2023-10-07

## 2023-10-03 RX ORDER — ACETAMINOPHEN 325 MG/1
650 TABLET ORAL
Status: CANCELLED | OUTPATIENT
Start: 2023-10-07

## 2023-10-03 RX ORDER — SODIUM CHLORIDE 9 MG/ML
INJECTION, SOLUTION INTRAVENOUS CONTINUOUS
Status: CANCELLED | OUTPATIENT
Start: 2023-10-07

## 2023-10-03 RX ORDER — HEPARIN 100 UNIT/ML
500 SYRINGE INTRAVENOUS PRN
Status: CANCELLED | OUTPATIENT
Start: 2023-10-07

## 2023-10-03 RX ORDER — FAMOTIDINE 10 MG/ML
20 INJECTION, SOLUTION INTRAVENOUS
Status: CANCELLED | OUTPATIENT
Start: 2023-10-07

## 2023-10-03 RX ORDER — EPINEPHRINE 1 MG/ML
0.3 INJECTION, SOLUTION, CONCENTRATE INTRAVENOUS PRN
Status: CANCELLED | OUTPATIENT
Start: 2023-10-07

## 2023-10-03 RX ORDER — ONDANSETRON 2 MG/ML
8 INJECTION INTRAMUSCULAR; INTRAVENOUS
Status: CANCELLED | OUTPATIENT
Start: 2023-10-07

## 2023-10-03 RX ADMIN — IRON SUCROSE 200 MG: 20 INJECTION, SOLUTION INTRAVENOUS at 13:27

## 2023-10-03 RX ADMIN — SODIUM CHLORIDE 20 ML/HR: 9 INJECTION, SOLUTION INTRAVENOUS at 13:20

## 2023-10-06 ENCOUNTER — HOSPITAL ENCOUNTER (OUTPATIENT)
Dept: INFUSION THERAPY | Age: 63
Discharge: HOME | End: 2023-10-06
Payer: MEDICARE

## 2023-10-06 VITALS
RESPIRATION RATE: 18 BRPM | OXYGEN SATURATION: 98 % | DIASTOLIC BLOOD PRESSURE: 70 MMHG | TEMPERATURE: 97.9 F | SYSTOLIC BLOOD PRESSURE: 162 MMHG | HEART RATE: 90 BPM

## 2023-10-06 DIAGNOSIS — D62 ACUTE ON CHRONIC BLOOD LOSS ANEMIA: Primary | ICD-10-CM

## 2023-10-06 DIAGNOSIS — D50.9 MICROCYTIC HYPOCHROMIC ANEMIA: ICD-10-CM

## 2023-10-06 PROCEDURE — 2580000003 HC RX 258: Performed by: INTERNAL MEDICINE

## 2023-10-06 PROCEDURE — 6360000002 HC RX W HCPCS: Performed by: INTERNAL MEDICINE

## 2023-10-06 PROCEDURE — 96365 THER/PROPH/DIAG IV INF INIT: CPT

## 2023-10-06 PROCEDURE — 96374 THER/PROPH/DIAG INJ IV PUSH: CPT

## 2023-10-06 RX ORDER — SODIUM CHLORIDE 0.9 % (FLUSH) 0.9 %
5-40 SYRINGE (ML) INJECTION PRN
OUTPATIENT
Start: 2023-10-07

## 2023-10-06 RX ORDER — SODIUM CHLORIDE 9 MG/ML
INJECTION, SOLUTION INTRAVENOUS CONTINUOUS
OUTPATIENT
Start: 2023-10-07

## 2023-10-06 RX ORDER — ACETAMINOPHEN 325 MG/1
650 TABLET ORAL
OUTPATIENT
Start: 2023-10-07

## 2023-10-06 RX ORDER — SODIUM CHLORIDE 9 MG/ML
5-250 INJECTION, SOLUTION INTRAVENOUS PRN
OUTPATIENT
Start: 2023-10-07

## 2023-10-06 RX ORDER — HEPARIN 100 UNIT/ML
500 SYRINGE INTRAVENOUS PRN
OUTPATIENT
Start: 2023-10-07

## 2023-10-06 RX ORDER — FAMOTIDINE 10 MG/ML
20 INJECTION, SOLUTION INTRAVENOUS
OUTPATIENT
Start: 2023-10-07

## 2023-10-06 RX ORDER — DIPHENHYDRAMINE HYDROCHLORIDE 50 MG/ML
50 INJECTION INTRAMUSCULAR; INTRAVENOUS
OUTPATIENT
Start: 2023-10-07

## 2023-10-06 RX ORDER — SODIUM CHLORIDE 9 MG/ML
5-250 INJECTION, SOLUTION INTRAVENOUS PRN
Status: DISCONTINUED | OUTPATIENT
Start: 2023-10-06 | End: 2023-10-07 | Stop reason: HOSPADM

## 2023-10-06 RX ORDER — EPINEPHRINE 1 MG/ML
0.3 INJECTION, SOLUTION, CONCENTRATE INTRAVENOUS PRN
OUTPATIENT
Start: 2023-10-07

## 2023-10-06 RX ORDER — ONDANSETRON 2 MG/ML
8 INJECTION INTRAMUSCULAR; INTRAVENOUS
OUTPATIENT
Start: 2023-10-07

## 2023-10-06 RX ORDER — SODIUM CHLORIDE 0.9 % (FLUSH) 0.9 %
5-40 SYRINGE (ML) INJECTION PRN
Status: DISCONTINUED | OUTPATIENT
Start: 2023-10-06 | End: 2023-10-07 | Stop reason: HOSPADM

## 2023-10-06 RX ADMIN — IRON SUCROSE 200 MG: 20 INJECTION, SOLUTION INTRAVENOUS at 14:01

## 2023-10-06 RX ADMIN — SODIUM CHLORIDE, PRESERVATIVE FREE 10 ML: 5 INJECTION INTRAVENOUS at 13:35

## 2023-10-06 RX ADMIN — SODIUM CHLORIDE 20 ML/HR: 9 INJECTION, SOLUTION INTRAVENOUS at 13:35

## 2023-10-08 ENCOUNTER — APPOINTMENT (OUTPATIENT)
Dept: CT IMAGING | Age: 63
DRG: 690 | End: 2023-10-08
Payer: MEDICARE

## 2023-10-08 ENCOUNTER — HOSPITAL ENCOUNTER (INPATIENT)
Age: 63
LOS: 5 days | Discharge: HOME | DRG: 690 | End: 2023-10-13
Attending: EMERGENCY MEDICINE | Admitting: INTERNAL MEDICINE
Payer: MEDICARE

## 2023-10-08 DIAGNOSIS — N12 PYELONEPHRITIS: Primary | ICD-10-CM

## 2023-10-08 DIAGNOSIS — I16.0 HYPERTENSIVE URGENCY: ICD-10-CM

## 2023-10-08 DIAGNOSIS — K59.00 CONSTIPATION, UNSPECIFIED CONSTIPATION TYPE: ICD-10-CM

## 2023-10-08 LAB
ALBUMIN SERPL-MCNC: 4 G/DL (ref 3.5–5.2)
ALP SERPL-CCNC: 109 U/L (ref 35–104)
ALT SERPL-CCNC: 11 U/L (ref 0–32)
ANION GAP SERPL CALCULATED.3IONS-SCNC: 11 MMOL/L (ref 7–16)
AST SERPL-CCNC: 21 U/L (ref 0–31)
B-OH-BUTYR SERPL-MCNC: 0.11 MMOL/L (ref 0.02–0.27)
BACTERIA URNS QL MICRO: ABNORMAL
BASOPHILS # BLD: 0 K/UL (ref 0–0.2)
BASOPHILS NFR BLD: 0 % (ref 0–2)
BILIRUB SERPL-MCNC: 0.3 MG/DL (ref 0–1.2)
BILIRUB UR QL STRIP: NEGATIVE
BNP SERPL-MCNC: 576 PG/ML (ref 0–450)
BUN SERPL-MCNC: 25 MG/DL (ref 6–23)
CALCIUM SERPL-MCNC: 9.6 MG/DL (ref 8.6–10.2)
CHLORIDE SERPL-SCNC: 106 MMOL/L (ref 98–107)
CLARITY UR: ABNORMAL
CO2 SERPL-SCNC: 20 MMOL/L (ref 22–29)
COLOR UR: YELLOW
CREAT SERPL-MCNC: 1.6 MG/DL (ref 0.5–1)
EOSINOPHIL # BLD: 0 K/UL (ref 0.05–0.5)
EOSINOPHILS RELATIVE PERCENT: 0 % (ref 0–6)
EPI CELLS #/AREA URNS HPF: ABNORMAL /HPF
ERYTHROCYTE [DISTWIDTH] IN BLOOD BY AUTOMATED COUNT: 19.9 % (ref 11.5–15)
GFR SERPL CREATININE-BSD FRML MDRD: 37 ML/MIN/1.73M2
GLUCOSE BLD-MCNC: 123 MG/DL (ref 74–99)
GLUCOSE BLD-MCNC: 165 MG/DL (ref 74–99)
GLUCOSE SERPL-MCNC: 101 MG/DL (ref 74–99)
GLUCOSE UR STRIP-MCNC: NEGATIVE MG/DL
HCT VFR BLD AUTO: 29.1 % (ref 34–48)
HGB BLD-MCNC: 8.6 G/DL (ref 11.5–15.5)
HGB UR QL STRIP.AUTO: ABNORMAL
KETONES UR STRIP-MCNC: NEGATIVE MG/DL
LACTATE BLDV-SCNC: 0.9 MMOL/L (ref 0.5–2.2)
LEUKOCYTE ESTERASE UR QL STRIP: ABNORMAL
LYMPHOCYTES NFR BLD: 1.3 K/UL (ref 1.5–4)
LYMPHOCYTES RELATIVE PERCENT: 30 % (ref 20–42)
MCH RBC QN AUTO: 23.8 PG (ref 26–35)
MCHC RBC AUTO-ENTMCNC: 29.6 G/DL (ref 32–34.5)
MCV RBC AUTO: 80.6 FL (ref 80–99.9)
MONOCYTES NFR BLD: 0.57 K/UL (ref 0.1–0.95)
MONOCYTES NFR BLD: 13 % (ref 2–12)
MYELOCYTES ABSOLUTE COUNT: 0.11 K/UL
MYELOCYTES: 3 %
NEUTROPHILS NFR BLD: 55 % (ref 43–80)
NEUTS SEG NFR BLD: 2.41 K/UL (ref 1.8–7.3)
NITRITE UR QL STRIP: NEGATIVE
PH UR STRIP: 6.5 [PH] (ref 5–9)
PH VENOUS: 7.35 (ref 7.35–7.45)
PLATELET # BLD AUTO: 421 K/UL (ref 130–450)
PMV BLD AUTO: 10.3 FL (ref 7–12)
POTASSIUM SERPL-SCNC: 5 MMOL/L (ref 3.5–5)
PROT SERPL-MCNC: 9.1 G/DL (ref 6.4–8.3)
PROT UR STRIP-MCNC: 100 MG/DL
RBC # BLD AUTO: 3.61 M/UL (ref 3.5–5.5)
RBC # BLD: ABNORMAL 10*6/UL
RBC #/AREA URNS HPF: ABNORMAL /HPF
SODIUM SERPL-SCNC: 137 MMOL/L (ref 132–146)
SP GR UR STRIP: 1.01 (ref 1–1.03)
TROPONIN I SERPL HS-MCNC: 46 NG/L (ref 0–9)
TROPONIN I SERPL HS-MCNC: 55 NG/L (ref 0–9)
TROPONIN I SERPL HS-MCNC: 55 NG/L (ref 0–9)
UROBILINOGEN UR STRIP-ACNC: 0.2 EU/DL (ref 0–1)
WBC #/AREA URNS HPF: ABNORMAL /HPF
WBC OTHER # BLD: 4.4 K/UL (ref 4.5–11.5)

## 2023-10-08 PROCEDURE — 6360000002 HC RX W HCPCS: Performed by: STUDENT IN AN ORGANIZED HEALTH CARE EDUCATION/TRAINING PROGRAM

## 2023-10-08 PROCEDURE — 83605 ASSAY OF LACTIC ACID: CPT

## 2023-10-08 PROCEDURE — 93005 ELECTROCARDIOGRAM TRACING: CPT | Performed by: EMERGENCY MEDICINE

## 2023-10-08 PROCEDURE — 87040 BLOOD CULTURE FOR BACTERIA: CPT

## 2023-10-08 PROCEDURE — 6370000000 HC RX 637 (ALT 250 FOR IP)

## 2023-10-08 PROCEDURE — 82010 KETONE BODYS QUAN: CPT

## 2023-10-08 PROCEDURE — 2500000003 HC RX 250 WO HCPCS: Performed by: EMERGENCY MEDICINE

## 2023-10-08 PROCEDURE — 2580000003 HC RX 258: Performed by: STUDENT IN AN ORGANIZED HEALTH CARE EDUCATION/TRAINING PROGRAM

## 2023-10-08 PROCEDURE — 2580000003 HC RX 258

## 2023-10-08 PROCEDURE — 96374 THER/PROPH/DIAG INJ IV PUSH: CPT

## 2023-10-08 PROCEDURE — 74176 CT ABD & PELVIS W/O CONTRAST: CPT

## 2023-10-08 PROCEDURE — 82800 BLOOD PH: CPT

## 2023-10-08 PROCEDURE — P9612 CATHETERIZE FOR URINE SPEC: HCPCS

## 2023-10-08 PROCEDURE — 2580000003 HC RX 258: Performed by: EMERGENCY MEDICINE

## 2023-10-08 PROCEDURE — 81001 URINALYSIS AUTO W/SCOPE: CPT

## 2023-10-08 PROCEDURE — 83880 ASSAY OF NATRIURETIC PEPTIDE: CPT

## 2023-10-08 PROCEDURE — 82962 GLUCOSE BLOOD TEST: CPT

## 2023-10-08 PROCEDURE — 99285 EMERGENCY DEPT VISIT HI MDM: CPT

## 2023-10-08 PROCEDURE — 85025 COMPLETE CBC W/AUTO DIFF WBC: CPT

## 2023-10-08 PROCEDURE — 96361 HYDRATE IV INFUSION ADD-ON: CPT

## 2023-10-08 PROCEDURE — 87077 CULTURE AEROBIC IDENTIFY: CPT

## 2023-10-08 PROCEDURE — 87086 URINE CULTURE/COLONY COUNT: CPT

## 2023-10-08 PROCEDURE — 84484 ASSAY OF TROPONIN QUANT: CPT

## 2023-10-08 PROCEDURE — 2060000000 HC ICU INTERMEDIATE R&B

## 2023-10-08 PROCEDURE — 36415 COLL VENOUS BLD VENIPUNCTURE: CPT

## 2023-10-08 PROCEDURE — 80053 COMPREHEN METABOLIC PANEL: CPT

## 2023-10-08 PROCEDURE — 96375 TX/PRO/DX INJ NEW DRUG ADDON: CPT

## 2023-10-08 RX ORDER — SORBITOL SOLUTION 70 %
60 SOLUTION, ORAL MISCELLANEOUS ONCE
Status: DISCONTINUED | OUTPATIENT
Start: 2023-10-08 | End: 2023-10-13 | Stop reason: HOSPADM

## 2023-10-08 RX ORDER — BISACODYL 5 MG/1
10 TABLET, DELAYED RELEASE ORAL ONCE
Status: COMPLETED | OUTPATIENT
Start: 2023-10-08 | End: 2023-10-08

## 2023-10-08 RX ORDER — MIRTAZAPINE 15 MG/1
7.5 TABLET, FILM COATED ORAL NIGHTLY
Status: DISCONTINUED | OUTPATIENT
Start: 2023-10-08 | End: 2023-10-13 | Stop reason: HOSPADM

## 2023-10-08 RX ORDER — HYDRALAZINE HYDROCHLORIDE 50 MG/1
50 TABLET, FILM COATED ORAL EVERY 8 HOURS SCHEDULED
Status: DISCONTINUED | OUTPATIENT
Start: 2023-10-08 | End: 2023-10-13 | Stop reason: HOSPADM

## 2023-10-08 RX ORDER — ACETAMINOPHEN 325 MG/1
650 TABLET ORAL EVERY 4 HOURS PRN
Status: DISCONTINUED | OUTPATIENT
Start: 2023-10-08 | End: 2023-10-13 | Stop reason: HOSPADM

## 2023-10-08 RX ORDER — PANTOPRAZOLE SODIUM 40 MG/1
40 TABLET, DELAYED RELEASE ORAL
Status: DISCONTINUED | OUTPATIENT
Start: 2023-10-09 | End: 2023-10-13 | Stop reason: HOSPADM

## 2023-10-08 RX ORDER — 0.9 % SODIUM CHLORIDE 0.9 %
1000 INTRAVENOUS SOLUTION INTRAVENOUS ONCE
Status: COMPLETED | OUTPATIENT
Start: 2023-10-08 | End: 2023-10-08

## 2023-10-08 RX ORDER — METOPROLOL TARTRATE 5 MG/5ML
10 INJECTION INTRAVENOUS ONCE
Status: COMPLETED | OUTPATIENT
Start: 2023-10-08 | End: 2023-10-08

## 2023-10-08 RX ORDER — HYDROCODONE BITARTRATE AND ACETAMINOPHEN 5; 325 MG/1; MG/1
1 TABLET ORAL EVERY 6 HOURS PRN
Status: DISCONTINUED | OUTPATIENT
Start: 2023-10-08 | End: 2023-10-13 | Stop reason: HOSPADM

## 2023-10-08 RX ORDER — INSULIN LISPRO 100 [IU]/ML
0-4 INJECTION, SOLUTION INTRAVENOUS; SUBCUTANEOUS NIGHTLY
Status: DISCONTINUED | OUTPATIENT
Start: 2023-10-08 | End: 2023-10-13 | Stop reason: HOSPADM

## 2023-10-08 RX ORDER — ALBUTEROL SULFATE 2.5 MG/3ML
2.5 SOLUTION RESPIRATORY (INHALATION) EVERY 6 HOURS PRN
Status: DISCONTINUED | OUTPATIENT
Start: 2023-10-08 | End: 2023-10-13 | Stop reason: HOSPADM

## 2023-10-08 RX ORDER — LANOLIN ALCOHOL/MO/W.PET/CERES
50 CREAM (GRAM) TOPICAL DAILY
Status: DISCONTINUED | OUTPATIENT
Start: 2023-10-08 | End: 2023-10-13 | Stop reason: HOSPADM

## 2023-10-08 RX ORDER — ISOSORBIDE MONONITRATE 30 MG/1
30 TABLET, EXTENDED RELEASE ORAL DAILY
Status: DISCONTINUED | OUTPATIENT
Start: 2023-10-09 | End: 2023-10-13 | Stop reason: HOSPADM

## 2023-10-08 RX ORDER — CARVEDILOL 6.25 MG/1
12.5 TABLET ORAL 2 TIMES DAILY WITH MEALS
Status: DISCONTINUED | OUTPATIENT
Start: 2023-10-08 | End: 2023-10-13 | Stop reason: HOSPADM

## 2023-10-08 RX ORDER — INSULIN LISPRO 100 [IU]/ML
0-8 INJECTION, SOLUTION INTRAVENOUS; SUBCUTANEOUS
Status: DISCONTINUED | OUTPATIENT
Start: 2023-10-08 | End: 2023-10-13 | Stop reason: HOSPADM

## 2023-10-08 RX ORDER — FLUTICASONE PROPIONATE 50 MCG
2 SPRAY, SUSPENSION (ML) NASAL DAILY PRN
Status: DISCONTINUED | OUTPATIENT
Start: 2023-10-08 | End: 2023-10-13 | Stop reason: HOSPADM

## 2023-10-08 RX ORDER — LABETALOL HYDROCHLORIDE 5 MG/ML
10 INJECTION, SOLUTION INTRAVENOUS ONCE
Status: COMPLETED | OUTPATIENT
Start: 2023-10-08 | End: 2023-10-08

## 2023-10-08 RX ORDER — GABAPENTIN 100 MG/1
100 CAPSULE ORAL 2 TIMES DAILY
Status: DISCONTINUED | OUTPATIENT
Start: 2023-10-08 | End: 2023-10-13 | Stop reason: HOSPADM

## 2023-10-08 RX ORDER — HYDRALAZINE HYDROCHLORIDE 20 MG/ML
10 INJECTION INTRAMUSCULAR; INTRAVENOUS EVERY 4 HOURS PRN
Status: DISCONTINUED | OUTPATIENT
Start: 2023-10-08 | End: 2023-10-13 | Stop reason: HOSPADM

## 2023-10-08 RX ORDER — BISACODYL 5 MG/1
5 TABLET, DELAYED RELEASE ORAL DAILY PRN
Status: DISCONTINUED | OUTPATIENT
Start: 2023-10-08 | End: 2023-10-13 | Stop reason: HOSPADM

## 2023-10-08 RX ORDER — SODIUM CHLORIDE 9 MG/ML
INJECTION, SOLUTION INTRAVENOUS CONTINUOUS
Status: DISCONTINUED | OUTPATIENT
Start: 2023-10-08 | End: 2023-10-09

## 2023-10-08 RX ADMIN — METOPROLOL TARTRATE 10 MG: 1 INJECTION, SOLUTION INTRAVENOUS at 15:05

## 2023-10-08 RX ADMIN — SODIUM CHLORIDE: 9 INJECTION, SOLUTION INTRAVENOUS at 21:53

## 2023-10-08 RX ADMIN — LABETALOL HYDROCHLORIDE 10 MG: 5 INJECTION INTRAVENOUS at 17:18

## 2023-10-08 RX ADMIN — SODIUM CHLORIDE 1000 ML: 9 INJECTION, SOLUTION INTRAVENOUS at 15:03

## 2023-10-08 RX ADMIN — CARVEDILOL 12.5 MG: 6.25 TABLET, FILM COATED ORAL at 21:48

## 2023-10-08 RX ADMIN — HYDRALAZINE HYDROCHLORIDE 50 MG: 50 TABLET, FILM COATED ORAL at 21:43

## 2023-10-08 RX ADMIN — PYRIDOXINE HCL TAB 50 MG 50 MG: 50 TAB at 19:46

## 2023-10-08 RX ADMIN — SODIUM CHLORIDE: 9 INJECTION, SOLUTION INTRAVENOUS at 19:52

## 2023-10-08 RX ADMIN — APIXABAN 5 MG: 5 TABLET, FILM COATED ORAL at 21:41

## 2023-10-08 RX ADMIN — MIRTAZAPINE 7.5 MG: 15 TABLET, FILM COATED ORAL at 21:41

## 2023-10-08 RX ADMIN — BISACODYL 10 MG: 5 TABLET, COATED ORAL at 19:46

## 2023-10-08 RX ADMIN — SODIUM CHLORIDE 1000 ML: 9 INJECTION, SOLUTION INTRAVENOUS at 17:16

## 2023-10-08 RX ADMIN — WATER 1000 MG: 1 INJECTION INTRAMUSCULAR; INTRAVENOUS; SUBCUTANEOUS at 18:08

## 2023-10-08 RX ADMIN — HYDROCODONE BITARTRATE AND ACETAMINOPHEN 1 TABLET: 5; 325 TABLET ORAL at 19:46

## 2023-10-08 RX ADMIN — GABAPENTIN 100 MG: 100 CAPSULE ORAL at 19:46

## 2023-10-08 ASSESSMENT — PATIENT HEALTH QUESTIONNAIRE - PHQ9
SUM OF ALL RESPONSES TO PHQ QUESTIONS 1-9: 0
SUM OF ALL RESPONSES TO PHQ9 QUESTIONS 1 & 2: 0
SUM OF ALL RESPONSES TO PHQ QUESTIONS 1-9: 0
1. LITTLE INTEREST OR PLEASURE IN DOING THINGS: 0
SUM OF ALL RESPONSES TO PHQ QUESTIONS 1-9: 0
SUM OF ALL RESPONSES TO PHQ QUESTIONS 1-9: 0
2. FEELING DOWN, DEPRESSED OR HOPELESS: 0

## 2023-10-08 ASSESSMENT — PAIN SCALES - GENERAL
PAINLEVEL_OUTOF10: 0
PAINLEVEL_OUTOF10: 0

## 2023-10-08 ASSESSMENT — ENCOUNTER SYMPTOMS
DIARRHEA: 0
SHORTNESS OF BREATH: 0
NAUSEA: 0
VOMITING: 0
ABDOMINAL PAIN: 1

## 2023-10-08 ASSESSMENT — LIFESTYLE VARIABLES: HOW OFTEN DO YOU HAVE A DRINK CONTAINING ALCOHOL: NEVER

## 2023-10-08 NOTE — ED PROVIDER NOTES
Patient presents emergency department with complaint of right flank pain. Patient with history of recurrent urinary tract infections and issues with her kidneys. She currently has stents placed bilaterally by Dr. Suki Iraheta. She states that she she started an antibiotic for urinary tract infection provided to her by her PCP. She states today she is in increased discomfort. She does have history of diabetes. The history is provided by the patient. Review of Systems   Constitutional:  Positive for chills. Negative for activity change, appetite change, fatigue and fever. Respiratory:  Negative for shortness of breath. Cardiovascular:  Negative for chest pain, palpitations and leg swelling. Gastrointestinal:  Positive for abdominal pain. Negative for diarrhea, nausea and vomiting. Genitourinary:  Positive for dysuria and flank pain. Negative for decreased urine volume and hematuria. Skin: Negative. Neurological:  Negative for light-headedness. Physical Exam  Vitals and nursing note reviewed. Constitutional:       General: She is not in acute distress. Appearance: Normal appearance. She is well-developed and normal weight. She is not ill-appearing or toxic-appearing. HENT:      Head: Normocephalic and atraumatic. Nose: Nose normal.      Mouth/Throat:      Mouth: Mucous membranes are moist.      Pharynx: Oropharynx is clear. Eyes:      Extraocular Movements: Extraocular movements intact. Conjunctiva/sclera: Conjunctivae normal.      Pupils: Pupils are equal, round, and reactive to light. Cardiovascular:      Rate and Rhythm: Regular rhythm. Tachycardia present. Pulses: Normal pulses. Heart sounds: Normal heart sounds. No murmur heard. Pulmonary:      Effort: Pulmonary effort is normal. No respiratory distress. Breath sounds: Normal breath sounds. No wheezing or rales. Abdominal:      General: Bowel sounds are normal.      Palpations: Abdomen is soft.

## 2023-10-08 NOTE — H&P
bruits, no JVD    HEMATOLOGIC/LYMPHATICS:    No cervical lymphadenopathy and no supraclavicular lymphadenopathy    LUNGS:    Symmetric. No increased work of breathing, good air exchange, clear to auscultation bilaterally, no wheezes, rhonchi, or rales,     CARDIOVASCULAR:    Normal apical impulse, regular rate and rhythm, normal S1 and S2, no S3 or S4, and no murmur noted    ABDOMEN:     normal bowel sounds, soft, non-distended, non-tender, no masses palpated, no hepatosplenomegaly, no rebound or guarding elicited on palpation     MUSCULOSKELETAL:    There is no redness, warmth, or swelling of the joints. Full range of motion noted. Motor strength is 5 out of 5 all extremities bilaterally. Tone is normal.  Left BKA    NEUROLOGIC:    Awake, alert, oriented to name, place and time. Cranial nerves II-XII are grossly intact. Motor is 5 out of 5 bilaterally. SKIN:    No bruising or bleeding. No redness, warmth, or swelling    EXTREMITIES:    Peripheral pulses present x 3. No edema, cyanosis, or swelling. OSTEOPATHIC:    Examined in supine positions. Normal thoracic kyphosis and lumbar lordosis. No acute somatic dysfunction.     LABORATORY DATA:  CBC with Differential:    Lab Results   Component Value Date/Time    WBC 4.4 10/08/2023 01:45 PM    RBC 3.61 10/08/2023 01:45 PM    HGB 8.6 10/08/2023 01:45 PM    HCT 29.1 10/08/2023 01:45 PM     10/08/2023 01:45 PM    MCV 80.6 10/08/2023 01:45 PM    MCH 23.8 10/08/2023 01:45 PM    MCHC 29.6 10/08/2023 01:45 PM    RDW 19.9 10/08/2023 01:45 PM    NRBC PENDING 08/03/2023 03:21 AM    SEGSPCT 51 04/26/2012 09:30 AM    METASPCT 1 08/05/2023 03:17 AM    METASPCT 0.9 05/12/2023 06:44 AM    LYMPHOPCT 30 10/08/2023 01:45 PM    PROMYELOPCT PENDING 08/03/2023 03:21 AM    MONOPCT 13 10/08/2023 01:45 PM    MYELOPCT 3 10/08/2023 01:45 PM    MYELOPCT 0.9 05/12/2023 06:44 AM    BASOPCT 0 10/08/2023 01:45 PM    MONOSABS 0.57 10/08/2023 01:45 PM    LYMPHSABS 1.30 10/08/2023 01:45 PM    EOSABS 0 10/08/2023 01:45 PM    BASOSABS 0 10/08/2023 01:45 PM     CMP:    Lab Results   Component Value Date/Time     10/08/2023 01:45 PM    K 5.0 10/08/2023 01:45 PM    K 5.1 05/09/2023 02:59 PM     10/08/2023 01:45 PM    CO2 20 10/08/2023 01:45 PM    BUN 25 10/08/2023 01:45 PM    CREATININE 1.6 10/08/2023 01:45 PM    GFRAA >60 06/24/2022 01:34 PM    LABGLOM 37 10/08/2023 01:45 PM    GLUCOSE 101 10/08/2023 01:45 PM    GLUCOSE 171 04/26/2012 09:30 AM    PROT 9.1 10/08/2023 01:45 PM    LABALBU 4.0 10/08/2023 01:45 PM    LABALBU 4.3 04/26/2012 09:30 AM    CALCIUM 9.6 10/08/2023 01:45 PM    BILITOT 0.3 10/08/2023 01:45 PM    ALKPHOS 109 10/08/2023 01:45 PM    AST 21 10/08/2023 01:45 PM    ALT 11 10/08/2023 01:45 PM     BMP:    Lab Results   Component Value Date/Time     10/08/2023 01:45 PM    K 5.0 10/08/2023 01:45 PM    K 5.1 05/09/2023 02:59 PM     10/08/2023 01:45 PM    CO2 20 10/08/2023 01:45 PM    BUN 25 10/08/2023 01:45 PM    LABALBU 4.0 10/08/2023 01:45 PM    LABALBU 4.3 04/26/2012 09:30 AM    CREATININE 1.6 10/08/2023 01:45 PM    CALCIUM 9.6 10/08/2023 01:45 PM    GFRAA >60 06/24/2022 01:34 PM    LABGLOM 37 10/08/2023 01:45 PM    GLUCOSE 101 10/08/2023 01:45 PM    GLUCOSE 171 04/26/2012 09:30 AM       ASSESSMENT:  Recurrent urinary tract infection  Hypertensive urgency  Chronic kidney disease stage IIIa  Non insulin-dependent diabetes mellitus  Microcytic anemia  Constipation  Coronary artery disease  CHF  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

## 2023-10-08 NOTE — ED NOTES
Report to Haritha Lopez RN. All questions answered at this time.       Chaim Anand RN  10/08/23 2526

## 2023-10-08 NOTE — ED NOTES
Pt IV infiltrated. Dr Brown Ro aware and at the bedside to attempt new access. Medication will be administered after new access obtained.       Celia Crews RN  10/08/23 5747

## 2023-10-09 LAB
25(OH)D3 SERPL-MCNC: 8.4 NG/ML (ref 30–100)
ALBUMIN SERPL-MCNC: 2.9 G/DL (ref 3.5–5.2)
ALP SERPL-CCNC: 78 U/L (ref 35–104)
ALT SERPL-CCNC: 9 U/L (ref 0–32)
ANION GAP SERPL CALCULATED.3IONS-SCNC: 8 MMOL/L (ref 7–16)
AST SERPL-CCNC: 16 U/L (ref 0–31)
BASOPHILS # BLD: 0 K/UL (ref 0–0.2)
BASOPHILS NFR BLD: 0 % (ref 0–2)
BILIRUB SERPL-MCNC: 0.2 MG/DL (ref 0–1.2)
BUN SERPL-MCNC: 22 MG/DL (ref 6–23)
CALCIUM SERPL-MCNC: 8.2 MG/DL (ref 8.6–10.2)
CHLORIDE SERPL-SCNC: 106 MMOL/L (ref 98–107)
CHOLEST SERPL-MCNC: 105 MG/DL
CO2 SERPL-SCNC: 19 MMOL/L (ref 22–29)
CREAT SERPL-MCNC: 1.4 MG/DL (ref 0.5–1)
EKG ATRIAL RATE: 126 BPM
EKG P AXIS: 52 DEGREES
EKG P-R INTERVAL: 184 MS
EKG Q-T INTERVAL: 270 MS
EKG QRS DURATION: 76 MS
EKG QTC CALCULATION (BAZETT): 391 MS
EKG R AXIS: 25 DEGREES
EKG T AXIS: 115 DEGREES
EKG VENTRICULAR RATE: 126 BPM
EOSINOPHIL # BLD: 0.03 K/UL (ref 0.05–0.5)
EOSINOPHILS RELATIVE PERCENT: 1 % (ref 0–6)
ERYTHROCYTE [DISTWIDTH] IN BLOOD BY AUTOMATED COUNT: 20.1 % (ref 11.5–15)
FOLATE SERPL-MCNC: 4.3 NG/ML (ref 4.8–24.2)
GFR SERPL CREATININE-BSD FRML MDRD: 42 ML/MIN/1.73M2
GLUCOSE BLD-MCNC: 108 MG/DL (ref 74–99)
GLUCOSE BLD-MCNC: 122 MG/DL (ref 74–99)
GLUCOSE BLD-MCNC: 157 MG/DL (ref 74–99)
GLUCOSE BLD-MCNC: 182 MG/DL (ref 74–99)
GLUCOSE SERPL-MCNC: 98 MG/DL (ref 74–99)
HBA1C MFR BLD: 5.3 % (ref 4–5.6)
HCT VFR BLD AUTO: 22 % (ref 34–48)
HCT VFR BLD AUTO: 24.3 % (ref 34–48)
HDLC SERPL-MCNC: 30 MG/DL
HGB BLD-MCNC: 6.6 G/DL (ref 11.5–15.5)
HGB BLD-MCNC: 7.7 G/DL (ref 11.5–15.5)
IRON SATN MFR SERPL: 12 % (ref 15–50)
IRON SERPL-MCNC: 21 UG/DL (ref 37–145)
LDLC SERPL CALC-MCNC: 53 MG/DL
LYMPHOCYTES NFR BLD: 1.36 K/UL (ref 1.5–4)
LYMPHOCYTES RELATIVE PERCENT: 40 % (ref 20–42)
MCH RBC QN AUTO: 24.5 PG (ref 26–35)
MCHC RBC AUTO-ENTMCNC: 30 G/DL (ref 32–34.5)
MCV RBC AUTO: 81.8 FL (ref 80–99.9)
MONOCYTES NFR BLD: 0.47 K/UL (ref 0.1–0.95)
MONOCYTES NFR BLD: 14 % (ref 2–12)
MYELOCYTES ABSOLUTE COUNT: 0.03 K/UL
MYELOCYTES: 1 %
NEUTROPHILS NFR BLD: 44 % (ref 43–80)
NEUTS SEG NFR BLD: 1.51 K/UL (ref 1.8–7.3)
PLATELET # BLD AUTO: 291 K/UL (ref 130–450)
PMV BLD AUTO: 10.4 FL (ref 7–12)
POTASSIUM SERPL-SCNC: 4.7 MMOL/L (ref 3.5–5)
PROT SERPL-MCNC: 7 G/DL (ref 6.4–8.3)
RBC # BLD AUTO: 2.69 M/UL (ref 3.5–5.5)
RBC # BLD: ABNORMAL 10*6/UL
SODIUM SERPL-SCNC: 133 MMOL/L (ref 132–146)
T4 FREE SERPL-MCNC: 0.8 NG/DL (ref 0.9–1.7)
TIBC SERPL-MCNC: 171 UG/DL (ref 250–450)
TRIGL SERPL-MCNC: 110 MG/DL
TROPONIN I SERPL HS-MCNC: 63 NG/L (ref 0–9)
TSH SERPL DL<=0.05 MIU/L-ACNC: 0.06 UIU/ML (ref 0.27–4.2)
VIT B12 SERPL-MCNC: 354 PG/ML (ref 211–946)
VLDLC SERPL CALC-MCNC: 22 MG/DL
WBC OTHER # BLD: 3.4 K/UL (ref 4.5–11.5)

## 2023-10-09 PROCEDURE — 93005 ELECTROCARDIOGRAM TRACING: CPT

## 2023-10-09 PROCEDURE — P9016 RBC LEUKOCYTES REDUCED: HCPCS

## 2023-10-09 PROCEDURE — 82306 VITAMIN D 25 HYDROXY: CPT

## 2023-10-09 PROCEDURE — 80053 COMPREHEN METABOLIC PANEL: CPT

## 2023-10-09 PROCEDURE — 85018 HEMOGLOBIN: CPT

## 2023-10-09 PROCEDURE — 82962 GLUCOSE BLOOD TEST: CPT

## 2023-10-09 PROCEDURE — 83036 HEMOGLOBIN GLYCOSYLATED A1C: CPT

## 2023-10-09 PROCEDURE — 86923 COMPATIBILITY TEST ELECTRIC: CPT

## 2023-10-09 PROCEDURE — 85014 HEMATOCRIT: CPT

## 2023-10-09 PROCEDURE — 6360000002 HC RX W HCPCS

## 2023-10-09 PROCEDURE — 6370000000 HC RX 637 (ALT 250 FOR IP)

## 2023-10-09 PROCEDURE — 83540 ASSAY OF IRON: CPT

## 2023-10-09 PROCEDURE — 82270 OCCULT BLOOD FECES: CPT

## 2023-10-09 PROCEDURE — 85025 COMPLETE CBC W/AUTO DIFF WBC: CPT

## 2023-10-09 PROCEDURE — 86901 BLOOD TYPING SEROLOGIC RH(D): CPT

## 2023-10-09 PROCEDURE — 80061 LIPID PANEL: CPT

## 2023-10-09 PROCEDURE — 93010 ELECTROCARDIOGRAM REPORT: CPT | Performed by: INTERNAL MEDICINE

## 2023-10-09 PROCEDURE — 82607 VITAMIN B-12: CPT

## 2023-10-09 PROCEDURE — 2580000003 HC RX 258: Performed by: NURSE PRACTITIONER

## 2023-10-09 PROCEDURE — 83550 IRON BINDING TEST: CPT

## 2023-10-09 PROCEDURE — 2060000000 HC ICU INTERMEDIATE R&B

## 2023-10-09 PROCEDURE — 86900 BLOOD TYPING SEROLOGIC ABO: CPT

## 2023-10-09 PROCEDURE — 86850 RBC ANTIBODY SCREEN: CPT

## 2023-10-09 PROCEDURE — 84484 ASSAY OF TROPONIN QUANT: CPT

## 2023-10-09 PROCEDURE — 84443 ASSAY THYROID STIM HORMONE: CPT

## 2023-10-09 PROCEDURE — 30233N1 TRANSFUSION OF NONAUTOLOGOUS RED BLOOD CELLS INTO PERIPHERAL VEIN, PERCUTANEOUS APPROACH: ICD-10-PCS | Performed by: UROLOGY

## 2023-10-09 PROCEDURE — 6360000002 HC RX W HCPCS: Performed by: NURSE PRACTITIONER

## 2023-10-09 PROCEDURE — 84439 ASSAY OF FREE THYROXINE: CPT

## 2023-10-09 PROCEDURE — 36430 TRANSFUSION BLD/BLD COMPNT: CPT

## 2023-10-09 PROCEDURE — 2580000003 HC RX 258

## 2023-10-09 PROCEDURE — 6370000000 HC RX 637 (ALT 250 FOR IP): Performed by: NURSE PRACTITIONER

## 2023-10-09 PROCEDURE — 82746 ASSAY OF FOLIC ACID SERUM: CPT

## 2023-10-09 PROCEDURE — 6360000002 HC RX W HCPCS: Performed by: INTERNAL MEDICINE

## 2023-10-09 RX ORDER — SODIUM CHLORIDE 9 MG/ML
INJECTION, SOLUTION INTRAVENOUS PRN
Status: DISCONTINUED | OUTPATIENT
Start: 2023-10-09 | End: 2023-10-13 | Stop reason: HOSPADM

## 2023-10-09 RX ORDER — FOLIC ACID 1 MG/1
1 TABLET ORAL DAILY
Status: DISCONTINUED | OUTPATIENT
Start: 2023-10-09 | End: 2023-10-13 | Stop reason: HOSPADM

## 2023-10-09 RX ORDER — POTASSIUM CHLORIDE 7.45 MG/ML
10 INJECTION INTRAVENOUS PRN
Status: DISCONTINUED | OUTPATIENT
Start: 2023-10-09 | End: 2023-10-13 | Stop reason: HOSPADM

## 2023-10-09 RX ORDER — HEPARIN SODIUM 200 [USP'U]/100ML
3 INJECTION, SOLUTION INTRAVENOUS CONTINUOUS
Status: DISCONTINUED | OUTPATIENT
Start: 2023-10-09 | End: 2023-10-09

## 2023-10-09 RX ORDER — MAGNESIUM SULFATE 1 G/100ML
1000 INJECTION INTRAVENOUS PRN
Status: DISCONTINUED | OUTPATIENT
Start: 2023-10-09 | End: 2023-10-13 | Stop reason: HOSPADM

## 2023-10-09 RX ORDER — HEPARIN 100 UNIT/ML
300 SYRINGE INTRAVENOUS 2 TIMES DAILY
Status: DISCONTINUED | OUTPATIENT
Start: 2023-10-09 | End: 2023-10-10

## 2023-10-09 RX ORDER — POTASSIUM CHLORIDE 20 MEQ/1
40 TABLET, EXTENDED RELEASE ORAL PRN
Status: DISCONTINUED | OUTPATIENT
Start: 2023-10-09 | End: 2023-10-13 | Stop reason: HOSPADM

## 2023-10-09 RX ORDER — VITAMIN B COMPLEX
2000 TABLET ORAL DAILY
Status: DISCONTINUED | OUTPATIENT
Start: 2023-10-09 | End: 2023-10-13 | Stop reason: HOSPADM

## 2023-10-09 RX ADMIN — HYDROCODONE BITARTRATE AND ACETAMINOPHEN 1 TABLET: 5; 325 TABLET ORAL at 09:25

## 2023-10-09 RX ADMIN — SODIUM CHLORIDE 100 MG: 9 INJECTION, SOLUTION INTRAVENOUS at 17:38

## 2023-10-09 RX ADMIN — WATER 1000 MG: 1 INJECTION INTRAMUSCULAR; INTRAVENOUS; SUBCUTANEOUS at 17:39

## 2023-10-09 RX ADMIN — SODIUM CHLORIDE 25 MG: 9 INJECTION, SOLUTION INTRAVENOUS at 14:23

## 2023-10-09 RX ADMIN — HEPARIN 300 UNITS: 100 SYRINGE at 20:47

## 2023-10-09 RX ADMIN — HYDROCODONE BITARTRATE AND ACETAMINOPHEN 1 TABLET: 5; 325 TABLET ORAL at 01:37

## 2023-10-09 RX ADMIN — ACETAMINOPHEN 650 MG: 325 TABLET ORAL at 11:37

## 2023-10-09 RX ADMIN — ISOSORBIDE MONONITRATE 30 MG: 30 TABLET, EXTENDED RELEASE ORAL at 09:25

## 2023-10-09 RX ADMIN — HYDRALAZINE HYDROCHLORIDE 50 MG: 50 TABLET, FILM COATED ORAL at 05:07

## 2023-10-09 RX ADMIN — GABAPENTIN 100 MG: 100 CAPSULE ORAL at 20:47

## 2023-10-09 RX ADMIN — CARVEDILOL 12.5 MG: 6.25 TABLET, FILM COATED ORAL at 09:25

## 2023-10-09 RX ADMIN — FOLIC ACID 1 MG: 1 TABLET ORAL at 16:22

## 2023-10-09 RX ADMIN — PYRIDOXINE HCL TAB 50 MG 50 MG: 50 TAB at 09:27

## 2023-10-09 RX ADMIN — MIRTAZAPINE 7.5 MG: 15 TABLET, FILM COATED ORAL at 20:47

## 2023-10-09 RX ADMIN — HYDROCODONE BITARTRATE AND ACETAMINOPHEN 1 TABLET: 5; 325 TABLET ORAL at 16:22

## 2023-10-09 RX ADMIN — CARVEDILOL 12.5 MG: 6.25 TABLET, FILM COATED ORAL at 16:26

## 2023-10-09 RX ADMIN — Medication 2000 UNITS: at 16:22

## 2023-10-09 RX ADMIN — HYDRALAZINE HYDROCHLORIDE 50 MG: 50 TABLET, FILM COATED ORAL at 20:46

## 2023-10-09 RX ADMIN — HYDRALAZINE HYDROCHLORIDE 50 MG: 50 TABLET, FILM COATED ORAL at 13:39

## 2023-10-09 RX ADMIN — PANTOPRAZOLE SODIUM 40 MG: 40 TABLET, DELAYED RELEASE ORAL at 05:09

## 2023-10-09 RX ADMIN — SODIUM CHLORIDE: 9 INJECTION, SOLUTION INTRAVENOUS at 11:39

## 2023-10-09 RX ADMIN — GABAPENTIN 100 MG: 100 CAPSULE ORAL at 09:25

## 2023-10-09 ASSESSMENT — PAIN DESCRIPTION - DESCRIPTORS
DESCRIPTORS: ACHING

## 2023-10-09 ASSESSMENT — PAIN DESCRIPTION - ORIENTATION
ORIENTATION: RIGHT;LOWER

## 2023-10-09 ASSESSMENT — PAIN SCALES - GENERAL
PAINLEVEL_OUTOF10: 7
PAINLEVEL_OUTOF10: 10
PAINLEVEL_OUTOF10: 7
PAINLEVEL_OUTOF10: 10
PAINLEVEL_OUTOF10: 7
PAINLEVEL_OUTOF10: 0
PAINLEVEL_OUTOF10: 2

## 2023-10-09 ASSESSMENT — PAIN DESCRIPTION - LOCATION
LOCATION: ABDOMEN

## 2023-10-09 NOTE — CONSULTS
10/9/2023 1:14 PM  Candy Gutierrez  [id number]     Chief Complaint:    Bilateral ureteral stents      History of Present Illness: The patient is a 58 y.o. female patient who presented to the hospital with complaints of right flank pain. She had recently finished Bactrim for a UTI. She is known to our practice and has a history of recurring urinary tract infections and bilateral ureteral obstruction that is managed with bilateral ureteral stents. These were just changed at [facility] on 8/22/23 and are not due to be changed for another 3 months. Past Medical History:   Diagnosis Date    CAD (coronary artery disease)     cath 2016    CHF (congestive heart failure) ([address])     Diabetic peripheral neuropathy associated with type 2 diabetes mellitus ([address]) 08/24/2018    Epigastric hernia     Hyperlipidemia     Hypertension     Kidney stone     Schizophrenia ([address])     Type 2 diabetes mellitus with diabetic polyneuropathy, with long-term current use of insulin ([address]) 09/19/2016    no longer requiring medication. 1/13/21 - continues w/o medications as of 11/2/2022         Past Surgical History:   Procedure Laterality Date    BLADDER SURGERY Bilateral 5/2/2022    CYSTOSCOPY RETROGRADE PYELOGRAM BILATERAL STENT CHANGE performed by Best Rice MD at [address] Left 11/7/2022    CYSTOSCOPY BILATERAL RETROGRADE PYELOGRAM LEFT STENT CHANGE POSSIBLE RIGHT STENT CHANGE VS REMOVAL performed by Best Rice MD at [address]  09/30/2016     [facility]    COLONOSCOPY  08/2016    Dr. Radha Lopez 9/7/2021    CYSTOSCOPY RETROGRADE PYELOGRAM, BILATERAL STENT EXCHANGE performed by Best Rice MD at [address] / [address] [facility] / Milind Meléndez Bilateral 10/28/2019    CYSTOSCOPY. RETROGRADE.  PYELOGRAM. BILATERAL STENT INSERTION performed by Tiffany Vasquez MD at [address] / [address] [facility] / [place] Bilateral 5/19/2020 \"PSA\"    Imaging:   EXAMINATION:  CT OF THE ABDOMEN AND PELVIS WITHOUT CONTRAST 10/8/2023 4:08 pm     TECHNIQUE:  CT of the abdomen and pelvis was performed without the administration of  intravenous contrast. Multiplanar reformatted images are provided for review. Automated exposure control, iterative reconstruction, and/or weight based  adjustment of the mA/kV was utilized to reduce the radiation dose to as low  as reasonably achievable. COMPARISON:  None. HISTORY:  ORDERING SYSTEM PROVIDED HISTORY: right sided CVA tenderness  TECHNOLOGIST PROVIDED HISTORY:  Reason for exam:->right sided CVA tenderness  Additional Contrast?->None  Decision Support Exception - unselect if not a suspected or confirmed  emergency medical condition->Emergency Medical Condition (MA)     FINDINGS:  Lower Chest: There are basilar linear opacity suggest atelectasis periods  cysts scattered areas of centrilobular emphysema noted. No consolidation or  pleural effusions were observed. The heart does not appear enlarged. There  are no signs of pericardial effusion. The ascending thoracic aorta appears  within the normal range. No significant coronary artery calcifications are  observed     Organs: The liver reveals a homogeneous density. There are no signs of a  discrete mass or duct dilation. The gallbladder reveals several calcified  stones but no evidence of gallbladder wall thickening or pericholecystic  fluid. The pancreas, spleen and adrenal glands appear within the normal  range. Each kidney appears normal in size shape and position. There are double-J  stents within each renal collecting system. There is minimal perinephric  stranding. No radiopaque stones are seen. GI/Bowel: No acute abnormalities of the stomach or proximal small bowel were  identified. The terminal ileum cecum and appendix demonstrate no acute  abnormalities.   There is a large volume of retained fecal material within the  colon, suggests

## 2023-10-09 NOTE — CONSULTS
Department of [facility] Oncology  Consult Note     Holli Harden   1960    DATE OF ADMISSION: 10/8/2023  DATE OF CONSULTATION: 10/9/2023  REASON FOR CONSULTATION: known to us   REFERRING PHYSICIAN: No referring provider defined for this encounter. PCP: Angel WOLFF    HISTORY OF PRESENT ILLNESS:   Mrs. Allie Arnold is a 57-year-old lady, with a past medical history significant for CAD, CHF, hyperlipidemia, hypertension and schizophrenia, who was referred to the hematology office for evaluation of anemia. The patient CBCD from 8/6/2023 was remarkable for a white count of 2.6, , ALC 1150, Hemoglobin 7.6, hematocrit 25.3, MCV 80.6, in May 2023 hemoglobin was 8.5G/DL, marked 20.3, MCV 84.2, in February 2023 hemoglobin was 7.6, hemoglobin 26, MCV 85.5, in November 2022 hemoglobin was 9.3, hematocrit 20.7, MCV 78.6, on 8/3/2023 ferritin was 161, iron 16, TIBC 177, TSAT 9%, vitamin B12 390, folate 14.7. The patient is feeling tired, and cold, she has constipation. She does have a history of gross hematuria, she was admitted to the hospital in August, she has ureteral stents, she is being followed by urology. She denies any melena or hematochezia, she does not remember a GI work-up, the records indicate that she had an EGD done with Dr. Cameron Wallace. Our service has been consulted for anemia patient is known to our service last seen by Dr. Bibiana Davis on 9/21/2023, microcytic anemia secondary to iron deficiency likely gross blood loss for hematuria component of chronic inflammation. 9/21 ferritin 147 iron 35 TIBC 212 iron saturation 17 for last given on 10/6/2023. Presented on 10/8 with right flank pain.   History of bilateral ureteral stents on just second CT of abdomen and pelvis no signs of ureterolithiasis or obstructive uropathy large volume of retained fecal matter constipation, she is on Eliquis for recurrent DVTs and pulmonary emboli    Patient presented to the ED on 10/8 admitted for UTI, , elevated BP , No signs of urolithiasis or obstructive uropathy  2. Large volume of retained fecal material within the colon suggests signs of  constipation. 3. Prominent ventral hernia but no signs of bowel incarceration. 4. Coli lithiasis but no signs of cholecystitis              Specimen Collected: 10/08/23 16:16 EDT Last Resulted: 10/08/23 16:26 EDT                IMPRESSION/PLAN  -microcytic anemia secondary to iron deficiency  , keep hemoglobin >7, pt has received 1 UPRB today ,10/9 Fe 21, TIBC 171 Fe % 12  , at the time of her last visit 9/21/2023 she had not  completed FIT test will order , received Venofer on 10/3, 10/6 and Ferrlecit 10/9  -recurrent venous thromboembolic events and PE  on long term Eliquis therapy -on hold , conitnue to hold till counts stable   - acute /recurrent UTI urology consulted -Chronic bilateral hydronephrosis with indwelling ureteral stents (last changed August 22, 2023) hx of gross hematuria   -constipation 5-7 days at a time - pt reports she can not recall ever having coloscopy .       Louann LEBLANC, ACNS-BC,AGACNP-BC   HEMATOLOGY/MEDICAL ONCOLOGY  [facility] MEDICAL ONCOLOGY  10/9/2023

## 2023-10-09 NOTE — PROGRESS NOTES
Internal Medicine Progress Note    PARDEEP=[facility]    Lucy Godinez. Ric Love., F.A.C.O.I. Renea Blanco D.O., SACHIOMarianaIMariana Randhawa D.O. Emma Kyle, MSN, APRN, NP-C  Angie Mcarthur. Kalen Escobedo, MSN, APRN-CNP     Primary Care Physician: Jeraldine Cushing, MD   Admitting Physician:  Cindy Uriarte DO  Admission date and time: 10/8/2023  1:29 PM    Room:  [address] [place] [address]  Admitting diagnosis: Pyelonephritis [N12]  Hypertensive urgency [I16.0]  Constipation, unspecified constipation type [K59.00]    Patient Name: Kathy Harry  MRN: [id number]    Date of Service: 10/9/2023     Subjective:  Ar Powell is a 58 y.o. female who was seen and examined today,10/9/2023, at the bedside. She is feeling much better overall today. She has been resumed on a diet. She denies any overt blood loss in the stools. Had been having some hematuria. No new symptoms or concerns. No family present during my examination. Review of systems:  Constitutional:   Positive for fatigue and malaise , - fever/chills  HEENT:   Denies ear pain, sore throat, sinus or eye problems. Cardiovascular:   Denies any chest pain, irregular heartbeats, or palpitations. Respiratory:   - dyspnea at rest, - dyspnea on exertion, - coughing, - sputum, - hemoptysis  Gastrointestinal:   - nausea, -vomiting. - diarrhea, - constipation. + poor appetite and poor intake. - abdominal pain. -Melena or hematochezia  Genitourinary:    + Hematuria which is now resolved, now voiding  without difficulty using external catheter. Extremities:   No significant lower extremity swelling reported. Neurology:    Denies any headache or focal neurological deficits, positive for generalized weakness and fatigue without focal component  Psch:   Denies being anxious or depressed. Musculoskeletal:    Denies  myalgias, joint complaints or back pain. Integumentary:   Denies any rashes, ulcers, or excoriations.   Denies

## 2023-10-09 NOTE — PROGRESS NOTES
Paged Dr. Estephanie Burnett regarding pt hg of 6.6, awaiting call back.    Electronically signed by Cheryl Rodriguez RN on 10/9/2023 at 6:02 AM

## 2023-10-09 NOTE — CARE COORDINATION
10/9/2023 1630 CM Met with pt at the bedside for transition of care needs at d/c. Pt resides with her daughter [facility] in a 2 story house with 8 steps with a hand rail. Pt is a left BKA and wears a prosthesis, she uses a cane or a rollator. She also has a shower bench and and electric wheelchair. Pt is active with [facility] [address] for PT only per [facility] and pt would like to have PT only again, will need ARSLAN orders at d/c. Pt has a history of going to [facility] after her amputation. Pt states she cares for herself and cooks and cleans. No home going needs identified. Pt's family will provide transportation home. PCP is Dr Gaston Shah and uses [facility] on [address] [place] [address]. CM will follow.  Electronically signed by Jana Medina RN on 10/9/2023 at 4:47 PM

## 2023-10-09 NOTE — PROGRESS NOTES
4 Eyes Skin Assessment     NAME:  Anabell Davis  YOB: 1960  MEDICAL RECORD NUMBER:  [id number]    The patient is being assessed for  Admission    I agree that at least one RN has performed a thorough Head to Toe Skin Assessment on the patient. ALL assessment sites listed below have been assessed. Areas assessed by both nurses:    Head, Face, Ears, Shoulders, Back, Chest, Arms, Elbows, Hands, Sacrum. Buttock, Coccyx, Ischium, Legs. Feet and Heels, and Under Medical Devices         Does the Patient have a Wound?  No noted wound(s) old healing sacral wound;scar tissue       Naif Prevention initiated by RN: Yes  Wound Care Orders initiated by RN: No    Pressure Injury (Stage 3,4, Unstageable, DTI, NWPT, and Complex wounds) if present, place Wound referral order by RN under : No    New Ostomies, if present place, Ostomy referral order under : No     Nurse 1 eSignature: Electronically signed by Dashawn Vela RN on 10/9/23 at 3:03 AM EDT    **SHARE this note so that the co-signing nurse can place an eSignature**    Nurse 2 eSignature: Electronically signed by Taj Morrison RN on 10/9/23 at 3:05 AM EDT

## 2023-10-09 NOTE — ACP (ADVANCE CARE PLANNING)
Advance Care Planning   Healthcare Decision Maker:    Primary Decision Maker: Mamie Bo - [facility] [phone]

## 2023-10-10 ENCOUNTER — HOSPITAL ENCOUNTER (OUTPATIENT)
Dept: INFUSION THERAPY | Age: 63
Discharge: HOME | End: 2023-10-10

## 2023-10-10 PROBLEM — N12 PYELONEPHRITIS: Status: ACTIVE | Noted: 2023-10-10

## 2023-10-10 LAB
ABO/RH: NORMAL
ANION GAP SERPL CALCULATED.3IONS-SCNC: 8 MMOL/L (ref 7–16)
ANTIBODY SCREEN: NEGATIVE
ARM BAND NUMBER: NORMAL
BASOPHILS # BLD: 0 K/UL (ref 0–0.2)
BASOPHILS NFR BLD: 0 % (ref 0–2)
BLOOD BANK BLOOD PRODUCT EXPIRATION DATE: NORMAL
BLOOD BANK DISPENSE STATUS: NORMAL
BLOOD BANK ISBT PRODUCT BLOOD TYPE: 7300
BLOOD BANK PRODUCT CODE: NORMAL
BLOOD BANK SAMPLE EXPIRATION: NORMAL
BLOOD BANK UNIT TYPE AND RH: NORMAL
BPU ID: NORMAL
BUN SERPL-MCNC: 21 MG/DL (ref 6–23)
CALCIUM SERPL-MCNC: 8.6 MG/DL (ref 8.6–10.2)
CHLORIDE SERPL-SCNC: 109 MMOL/L (ref 98–107)
CO2 SERPL-SCNC: 18 MMOL/L (ref 22–29)
COMPONENT: NORMAL
CREAT SERPL-MCNC: 1.4 MG/DL (ref 0.5–1)
CROSSMATCH RESULT: NORMAL
EOSINOPHIL # BLD: 0.1 K/UL (ref 0.05–0.5)
EOSINOPHILS RELATIVE PERCENT: 3 % (ref 0–6)
ERYTHROCYTE [DISTWIDTH] IN BLOOD BY AUTOMATED COUNT: 19.2 % (ref 11.5–15)
GFR SERPL CREATININE-BSD FRML MDRD: 42 ML/MIN/1.73M2
GLUCOSE BLD-MCNC: 100 MG/DL (ref 74–99)
GLUCOSE BLD-MCNC: 144 MG/DL (ref 74–99)
GLUCOSE BLD-MCNC: 144 MG/DL (ref 74–99)
GLUCOSE BLD-MCNC: 195 MG/DL (ref 74–99)
GLUCOSE SERPL-MCNC: 93 MG/DL (ref 74–99)
HCT VFR BLD AUTO: 25.5 % (ref 34–48)
HGB BLD-MCNC: 7.9 G/DL (ref 11.5–15.5)
LYMPHOCYTES NFR BLD: 0.99 K/UL (ref 1.5–4)
LYMPHOCYTES RELATIVE PERCENT: 31 % (ref 20–42)
MAGNESIUM SERPL-MCNC: 1.7 MG/DL (ref 1.6–2.6)
MCH RBC QN AUTO: 24.8 PG (ref 26–35)
MCHC RBC AUTO-ENTMCNC: 31 G/DL (ref 32–34.5)
MCV RBC AUTO: 79.9 FL (ref 80–99.9)
METAMYELOCYTES ABSOLUTE COUNT: 0.03 K/UL (ref 0–0.12)
METAMYELOCYTES: 1 % (ref 0–1)
MONOCYTES NFR BLD: 0.54 K/UL (ref 0.1–0.95)
MONOCYTES NFR BLD: 17 % (ref 2–12)
MYELOCYTES ABSOLUTE COUNT: 0.06 K/UL
MYELOCYTES: 2 %
NEUTROPHILS NFR BLD: 46 % (ref 43–80)
NEUTS SEG NFR BLD: 1.47 K/UL (ref 1.8–7.3)
PHOSPHATE SERPL-MCNC: 2.9 MG/DL (ref 2.5–4.5)
PLATELET # BLD AUTO: 272 K/UL (ref 130–450)
PMV BLD AUTO: 10.4 FL (ref 7–12)
POTASSIUM SERPL-SCNC: 4.9 MMOL/L (ref 3.5–5)
RBC # BLD AUTO: 3.19 M/UL (ref 3.5–5.5)
RBC # BLD: ABNORMAL 10*6/UL
SODIUM SERPL-SCNC: 135 MMOL/L (ref 132–146)
TRANSFUSION STATUS: NORMAL
UNIT DIVISION: 0
UNIT ISSUE DATE/TIME: NORMAL
WBC OTHER # BLD: 3.2 K/UL (ref 4.5–11.5)

## 2023-10-10 PROCEDURE — 97165 OT EVAL LOW COMPLEX 30 MIN: CPT

## 2023-10-10 PROCEDURE — 97535 SELF CARE MNGMENT TRAINING: CPT

## 2023-10-10 PROCEDURE — 6370000000 HC RX 637 (ALT 250 FOR IP): Performed by: INTERNAL MEDICINE

## 2023-10-10 PROCEDURE — 2580000003 HC RX 258: Performed by: NURSE PRACTITIONER

## 2023-10-10 PROCEDURE — 99222 1ST HOSP IP/OBS MODERATE 55: CPT | Performed by: INTERNAL MEDICINE

## 2023-10-10 PROCEDURE — 2580000003 HC RX 258

## 2023-10-10 PROCEDURE — 97161 PT EVAL LOW COMPLEX 20 MIN: CPT | Performed by: PHYSICAL THERAPIST

## 2023-10-10 PROCEDURE — 2060000000 HC ICU INTERMEDIATE R&B

## 2023-10-10 PROCEDURE — 6370000000 HC RX 637 (ALT 250 FOR IP)

## 2023-10-10 PROCEDURE — 6360000002 HC RX W HCPCS

## 2023-10-10 PROCEDURE — 6360000002 HC RX W HCPCS: Performed by: INTERNAL MEDICINE

## 2023-10-10 PROCEDURE — 97530 THERAPEUTIC ACTIVITIES: CPT | Performed by: PHYSICAL THERAPIST

## 2023-10-10 PROCEDURE — 84100 ASSAY OF PHOSPHORUS: CPT

## 2023-10-10 PROCEDURE — 36415 COLL VENOUS BLD VENIPUNCTURE: CPT

## 2023-10-10 PROCEDURE — 6370000000 HC RX 637 (ALT 250 FOR IP): Performed by: NURSE PRACTITIONER

## 2023-10-10 PROCEDURE — 80048 BASIC METABOLIC PNL TOTAL CA: CPT

## 2023-10-10 PROCEDURE — 83735 ASSAY OF MAGNESIUM: CPT

## 2023-10-10 PROCEDURE — 85025 COMPLETE CBC W/AUTO DIFF WBC: CPT

## 2023-10-10 PROCEDURE — 87086 URINE CULTURE/COLONY COUNT: CPT

## 2023-10-10 PROCEDURE — 87077 CULTURE AEROBIC IDENTIFY: CPT

## 2023-10-10 PROCEDURE — 6360000002 HC RX W HCPCS: Performed by: NURSE PRACTITIONER

## 2023-10-10 PROCEDURE — 82962 GLUCOSE BLOOD TEST: CPT

## 2023-10-10 RX ORDER — POLYETHYLENE GLYCOL 3350 17 G/17G
17 POWDER, FOR SOLUTION ORAL 2 TIMES DAILY
Status: DISCONTINUED | OUTPATIENT
Start: 2023-10-10 | End: 2023-10-13 | Stop reason: HOSPADM

## 2023-10-10 RX ORDER — SENNA AND DOCUSATE SODIUM 50; 8.6 MG/1; MG/1
2 TABLET, FILM COATED ORAL 2 TIMES DAILY
Status: DISCONTINUED | OUTPATIENT
Start: 2023-10-10 | End: 2023-10-13 | Stop reason: HOSPADM

## 2023-10-10 RX ADMIN — CARVEDILOL 12.5 MG: 6.25 TABLET, FILM COATED ORAL at 17:47

## 2023-10-10 RX ADMIN — FOLIC ACID 1 MG: 1 TABLET ORAL at 08:28

## 2023-10-10 RX ADMIN — SENNOSIDES AND DOCUSATE SODIUM 2 TABLET: 50; 8.6 TABLET ORAL at 21:00

## 2023-10-10 RX ADMIN — Medication 2000 UNITS: at 08:29

## 2023-10-10 RX ADMIN — POLYETHYLENE GLYCOL 3350 17 G: 17 POWDER, FOR SOLUTION ORAL at 20:59

## 2023-10-10 RX ADMIN — APIXABAN 5 MG: 5 TABLET, FILM COATED ORAL at 21:00

## 2023-10-10 RX ADMIN — BISACODYL 5 MG: 5 TABLET, COATED ORAL at 08:29

## 2023-10-10 RX ADMIN — WATER 1000 MG: 1 INJECTION INTRAMUSCULAR; INTRAVENOUS; SUBCUTANEOUS at 17:47

## 2023-10-10 RX ADMIN — HYDROCODONE BITARTRATE AND ACETAMINOPHEN 1 TABLET: 5; 325 TABLET ORAL at 14:34

## 2023-10-10 RX ADMIN — PANTOPRAZOLE SODIUM 40 MG: 40 TABLET, DELAYED RELEASE ORAL at 05:56

## 2023-10-10 RX ADMIN — ISOSORBIDE MONONITRATE 30 MG: 30 TABLET, EXTENDED RELEASE ORAL at 08:29

## 2023-10-10 RX ADMIN — HYDROCODONE BITARTRATE AND ACETAMINOPHEN 1 TABLET: 5; 325 TABLET ORAL at 21:00

## 2023-10-10 RX ADMIN — HEPARIN 300 UNITS: 100 SYRINGE at 08:31

## 2023-10-10 RX ADMIN — GABAPENTIN 100 MG: 100 CAPSULE ORAL at 08:31

## 2023-10-10 RX ADMIN — NALOXEGOL OXALATE 12.5 MG: 12.5 TABLET, FILM COATED ORAL at 13:31

## 2023-10-10 RX ADMIN — HYDRALAZINE HYDROCHLORIDE 50 MG: 50 TABLET, FILM COATED ORAL at 21:00

## 2023-10-10 RX ADMIN — HYDRALAZINE HYDROCHLORIDE 50 MG: 50 TABLET, FILM COATED ORAL at 05:56

## 2023-10-10 RX ADMIN — SODIUM CHLORIDE 125 MG: 9 INJECTION, SOLUTION INTRAVENOUS at 15:22

## 2023-10-10 RX ADMIN — HYDROCODONE BITARTRATE AND ACETAMINOPHEN 1 TABLET: 5; 325 TABLET ORAL at 08:17

## 2023-10-10 RX ADMIN — SENNOSIDES AND DOCUSATE SODIUM 2 TABLET: 50; 8.6 TABLET ORAL at 12:28

## 2023-10-10 RX ADMIN — PYRIDOXINE HCL TAB 50 MG 50 MG: 50 TAB at 08:28

## 2023-10-10 RX ADMIN — HYDRALAZINE HYDROCHLORIDE 50 MG: 50 TABLET, FILM COATED ORAL at 13:52

## 2023-10-10 RX ADMIN — MIRTAZAPINE 7.5 MG: 15 TABLET, FILM COATED ORAL at 21:00

## 2023-10-10 RX ADMIN — POLYETHYLENE GLYCOL 3350 17 G: 17 POWDER, FOR SOLUTION ORAL at 12:28

## 2023-10-10 RX ADMIN — GABAPENTIN 100 MG: 100 CAPSULE ORAL at 21:01

## 2023-10-10 RX ADMIN — CARVEDILOL 12.5 MG: 6.25 TABLET, FILM COATED ORAL at 08:28

## 2023-10-10 ASSESSMENT — PAIN DESCRIPTION - LOCATION
LOCATION: ABDOMEN;BACK
LOCATION: ABDOMEN
LOCATION: ABDOMEN;BACK

## 2023-10-10 ASSESSMENT — PAIN SCALES - GENERAL
PAINLEVEL_OUTOF10: 10
PAINLEVEL_OUTOF10: 7
PAINLEVEL_OUTOF10: 6
PAINLEVEL_OUTOF10: 0
PAINLEVEL_OUTOF10: 2

## 2023-10-10 ASSESSMENT — PAIN DESCRIPTION - DESCRIPTORS
DESCRIPTORS: SHARP
DESCRIPTORS: ACHING
DESCRIPTORS: ACHING

## 2023-10-10 ASSESSMENT — PAIN DESCRIPTION - ORIENTATION: ORIENTATION: LOWER

## 2023-10-10 NOTE — CARE COORDINATION
10/10/2023 0940 CM for transition of care needs at d/c.  Pt's plan is to return home with her daughter [facility]. Pt is a left BKA and wears a prosthesis, she uses a cane or a rollator. Pt is active with [facility] [address] for PT only per [facility] and pt would like to have PT only again, ARSLAN orders are in [address] [place] [address] 15. No home going needs identified. Pt's family will provide transportation home. CM will follow.  Electronically signed by Marcus Oden RN on 10/10/2023 at 9:52 AM

## 2023-10-10 NOTE — PROGRESS NOTES
Physical Therapy  Physical Therapy Initial Evaluation/Plan of Care    Room #:  3418/3018-39  Patient Name: Mena Neville  YOB: 1960  MRN: [id number]    Date of Service: 10/10/2023     Tentative placement recommendation: Home with Home Health Physical Therapy  Equipment recommendation: Patient has needed equipment       Evaluating Physical Therapist: Yobany Beach, PT, DPT #[id number]      Specific Provider Orders/Date/Referring Provider :     10/09/23 1400    PT eval and treat  Start:  10/09/23 1400,   End:  10/09/23 1400,   ONE TIME,   Standing Count:  1 Occurrences,   R         Angelina Lucero, APRN - CNP Acknowledge New     Admitting Diagnosis:   Pyelonephritis [N12]  Hypertensive urgency [I16.0]  Constipation, unspecified constipation type [K59.00]      Surgery: none  Visit Diagnoses         Codes    Pyelonephritis    -  Primary N12    Constipation, unspecified constipation type     K59.00            Patient Active Problem List   Diagnosis    Epigastric hernia    Umbilical hernia    Essential hypertension    Type 2 diabetes mellitus with diabetic peripheral angiopathy without gangrene, without long-term current use of insulin (HCC)    Dyslipidemia    Diabetic peripheral neuropathy ([address])    Hydroureter    Microcytic hypochromic anemia    Paresthesia of left foot    History of arterial ischemic stroke    Hx of diabetic foot ulcer    Hx of BKA, left (HCC)    Chronic diastolic heart failure (HCC)    PVD (peripheral vascular disease) ([address])    Anemia    Pleural effusion    Dyspnea    Acute on chronic diastolic CHF (congestive heart failure) ([address])    Chronic renal disease, stage III ([address]) [333704]    Acute on chronic blood loss anemia    Hypertensive urgency        ASSESSMENT of Current Deficits Patient exhibits decreased strength, balance, and endurance impairing functional mobility, transfers, gait , gait distance, and tolerance to activity.  Pt mildly unsteady with ambulation with WW requiring seated rest

## 2023-10-10 NOTE — PROGRESS NOTES
Assistance opening packages and containers. Modified [place]    Grooming Minimal Assist     Unsteadiness standing completing unilateral tasks due to increase fatigue/generalized weakness; Educated on EC techniques. Modified [place]    UB Dressing Minimal Assist     Gown Management  Modified [place]    LB Dressing Moderate Assist     Assistance threading brief through leg and assistance with Left leg prosthesis. Modified [place]    Bathing Minimal Assist    Assistance with posterior hygiene while standing with wheeled walker. Modified [place]    Toileting Minimal Assist   Modified [place]    Bed Mobility  Supine to sit: Supervision   Sit to supine: N/T   Rolling:Supervision    Supine to sit: Modified [place]   Sit to supine: Modified [place]   Rolling:Modified [place]     Functional Transfers Minimal Assist from EOB sit to stand. Transfer training with verbal cues for hand placement throughout session to improve safety. Modified [place]    Functional Mobility Minimal Assist with wheeled walker to improve balance, verbal cues for walker sequence and safety. From EOB < Bedside chair. Modified [place]    Balance Sitting:     Static: good     Dynamic: fair   Standing: fair  with wheeled walker  Sitting:     Static: good     Dynamic: good   Standing: good  with wheeled walker. Activity Tolerance fair   good    Visual/  Perceptual Glasses:  Yes                Hand Dominance: Left     AROM (PROM) Strength Additional Info:  Goal:   RUE  WFL 4-/5 good  and wfl FMC/dexterity noted during ADL tasks   Improve overall RUE strength  for participation in functional tasks   LUE WFL 4-/5 good  and wfl FMC/dexterity noted during ADL tasks   Improve overall LUE strength  for participation in functional tasks      Vitals:  HR at rest: 112 bpm     SpO2 at rest: 96%       Hearing: WFL   Sensation:  No c/o numbness or tingling  Tone: WFL   Edema: none    Comments: Nursing approved therapy session. Upon arrival the patient was supine with HOB elevated. At end of session, patient was seated in bedside chair with call light and phone within reach, all lines and tubes intact. Overall patient demonstrated decreased independence and safety during completion of ADL/functional transfer/mobility tasks. Pt would benefit from continued skilled OT to increase safety and independence with completion of ADL/IADL tasks for functional independence and quality of life. Treatment: OT treatment provided this date includes:   Instruction/training on safety and adapted techniques for completion of ADLs   Instruction/training on safe functional mobility/transfer techniques   Instruction/training on energy conservation/work simplification for completion of ADLs    Treatment:      Functional transfers: Facilitated transfers to/from chair with cues for body alignment, safety and hand placement. ADL completion: Self-care retraining for the above-mentioned ADLs; training on proper hand placement, safety technique, sequencing, and energy conservation techniques. Postural Balance: Sitting/standing balance retraining to improve righting reactions with postural changes during ADLs. Rehab Potential: Good for established goals. Patient / Family Goal: Patient would like to return home and return to [facility] re: ADLs and IADLs        Patient and/or family were instructed on functional diagnosis, prognosis/goals and OT plan of care. Demonstrated good understanding. Eval Complexity: Low  History: Brief review of medical records and additional review of physical, cognitive, or psychosocial history related to current functional performance  Exam: 3+ performance deficits  Assistance/Modification: Mod assistance or modifications required to perform tasks. May have comorbidities that affect occupational performance.     Time In: 9:02 AM   Time Out: 9:30 AM    Total Treatment Time:

## 2023-10-10 NOTE — PROGRESS NOTES
Dr. Yadi Engle, DO,    Your patient is on a medication that requires a renal dose adjustment. Renal Function Assessment:    Date Body Weight IBW  Adjusted BW SCr  CrCl Dialysis status   10/10/2023 134 lb 11.2 oz (61.1 kg)  Ideal body weight: 52.4 kg (115 lb 8.3 oz)  Adjusted ideal body weight: 55.9 kg (123 lb 3.1 oz) Serum creatinine: 1.4 mg/dL (H) 10/10/23 0644  Estimated creatinine clearance: 34 mL/min (A) N/a       Pharmacy has renally dose-adjusted the following medication(s):    Date Original Order Renally Adjusted Order   10/10/2023 Naloxegol 25mg daily Naloxegol 12.5mg daily       These changes were made per protocol according to the [facility] Renal Function-Based Dose Adjustments Policy    *Please note this dose may need readjusted if your patient's renal function significantly improves. Please contact pharmacy with any questions regarding these changes.     [address] [place] [address]  10/10/2023  12:26 PM

## 2023-10-10 NOTE — PROGRESS NOTES
Internal Medicine Progress Note    [facility]    Diego Knapp Sender., F.A.RACIEL.OMarianaI. Eric Cruz D.O., RASHIDA Aponte D.O. Aurora Meeks, MSN, APRN, NP-C  Sole Gagnon. Sera Avila, MSN, APRN-CNP     Primary Care Physician: Mannie Sanchez MD   Admitting Physician:  Ricki Diaz DO  Admission date and time: 10/8/2023  1:29 PM    Room:  [address] [place] [address]  Admitting diagnosis: Pyelonephritis [N12]  Hypertensive urgency [I16.0]  Constipation, unspecified constipation type [K59.00]    Patient Name: Josh Olivas  MRN: [id number]    Date of Service: 10/10/2023     Subjective:  Alicia William is a 58 y.o. female who was seen and examined today,10/10/2023, at the bedside. Alicia William is seated at the bedside chair resting comfortably. She continues to describe intermittent abdominal spasms. Urine is lightening as we await final cultures. She describes ongoing constipation. Review of systems:  Constitutional:   Improving malaise and fatigue. HEENT:   Denies ear pain, sore throat, sinus or eye problems. Cardiovascular:   Denies any chest pain, irregular heartbeats, or palpitations. Respiratory:   - dyspnea at rest, - dyspnea on exertion, - coughing, - sputum, - hemoptysis  Gastrointestinal:   Several bouts of abdominal spasm this morning. This has improved when compared to initial presentation. Genitourinary:    Resolved hematuria. Extremities:   No significant lower extremity swelling reported. Neurology:    Denies any headache or focal neurological deficits, positive for generalized weakness and fatigue without focal component  Psch:   Denies being anxious or depressed. Musculoskeletal:    Denies  myalgias, joint complaints or back pain. Integumentary:   Denies any rashes, ulcers, or excoriations. Denies bruising. Hematologic/Lymphatic:  Denies bruising or bleeding.       Physical Exam:  I/O this shift:  In: -   Out: 1000 [Urine:1000]    Intake/Output GFRAA >60 06/24/2022 01:34 PM    LABGLOM 42 10/10/2023 06:44 AM    GLUCOSE 93 10/10/2023 06:44 AM    GLUCOSE 171 04/26/2012 09:30 AM     Assessment:  Acute/recurrent urinary tract infection complicated by bilateral ureteral stents  Hypertensive urgency with underlying essential hypertension, controlled  Acute on chronic anemia with hematuria intermittently requiring withholding of Eliquis  Chronic kidney disease stage IIIa-b  Non-insulin-dependent diabetes mellitus type 2  Coronary artery disease  Chronic compensated diastolic congestive heart failure with preserved LVEF  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 We will maintain IV antibiotic therapy for an additional 24 hours as we await urine culture results. Urology has provided consultation with adequate placement of ureteral stents and no plans for urologic intervention. Blood pressure is better controlled. We will implement a more aggressive bowel regimen. I have encouraged her to become increasingly ambulatory. I anticipate discharge home tomorrow. More than 50% of my  time was spent at the bedside counseling/coordinating care with the patient and/or family with face to face contact. This time was spent reviewing notes and laboratory data as well as instructing and counseling the patient. Time I spent with the family or surrogate(s) is included only if the patient was incapable of providing the necessary information or participating in medical decisions. I also discussed the differential diagnosis and all of the proposed management plans with the patient and individuals accompanying the patient. Vergil Pelt requires this high level of physician care and nursing on the IMC/Telemetry unit due the complexity of decision management and chance of rapid decline or death. Continued cardiac monitoring and higher level of nursing are required.  I am readily

## 2023-10-11 LAB
EKG ATRIAL RATE: 97 BPM
EKG P AXIS: 56 DEGREES
EKG P-R INTERVAL: 220 MS
EKG Q-T INTERVAL: 358 MS
EKG QRS DURATION: 78 MS
EKG QTC CALCULATION (BAZETT): 454 MS
EKG R AXIS: 37 DEGREES
EKG T AXIS: 69 DEGREES
EKG VENTRICULAR RATE: 97 BPM
GLUCOSE BLD-MCNC: 107 MG/DL (ref 74–99)
GLUCOSE BLD-MCNC: 119 MG/DL (ref 74–99)
GLUCOSE BLD-MCNC: 142 MG/DL (ref 74–99)
GLUCOSE BLD-MCNC: 206 MG/DL (ref 74–99)
MICROORGANISM SPEC CULT: ABNORMAL
PHOSPHATE SERPL-MCNC: 3 MG/DL (ref 2.5–4.5)
SPECIMEN DESCRIPTION: ABNORMAL

## 2023-10-11 PROCEDURE — 84100 ASSAY OF PHOSPHORUS: CPT

## 2023-10-11 PROCEDURE — 2580000003 HC RX 258: Performed by: INTERNAL MEDICINE

## 2023-10-11 PROCEDURE — 36415 COLL VENOUS BLD VENIPUNCTURE: CPT

## 2023-10-11 PROCEDURE — 2060000000 HC ICU INTERMEDIATE R&B

## 2023-10-11 PROCEDURE — 99232 SBSQ HOSP IP/OBS MODERATE 35: CPT | Performed by: STUDENT IN AN ORGANIZED HEALTH CARE EDUCATION/TRAINING PROGRAM

## 2023-10-11 PROCEDURE — 6360000002 HC RX W HCPCS: Performed by: INTERNAL MEDICINE

## 2023-10-11 PROCEDURE — 82962 GLUCOSE BLOOD TEST: CPT

## 2023-10-11 PROCEDURE — 6370000000 HC RX 637 (ALT 250 FOR IP): Performed by: NURSE PRACTITIONER

## 2023-10-11 PROCEDURE — 6360000002 HC RX W HCPCS

## 2023-10-11 PROCEDURE — 6370000000 HC RX 637 (ALT 250 FOR IP): Performed by: INTERNAL MEDICINE

## 2023-10-11 PROCEDURE — 6370000000 HC RX 637 (ALT 250 FOR IP)

## 2023-10-11 PROCEDURE — 2580000003 HC RX 258

## 2023-10-11 RX ADMIN — FOLIC ACID 1 MG: 1 TABLET ORAL at 08:39

## 2023-10-11 RX ADMIN — POLYETHYLENE GLYCOL 3350 17 G: 17 POWDER, FOR SOLUTION ORAL at 08:38

## 2023-10-11 RX ADMIN — FLUTICASONE PROPIONATE 2 SPRAY: 50 SPRAY, METERED NASAL at 08:38

## 2023-10-11 RX ADMIN — HYDRALAZINE HYDROCHLORIDE 50 MG: 50 TABLET, FILM COATED ORAL at 14:21

## 2023-10-11 RX ADMIN — HYDROCODONE BITARTRATE AND ACETAMINOPHEN 1 TABLET: 5; 325 TABLET ORAL at 08:39

## 2023-10-11 RX ADMIN — SODIUM CHLORIDE 100 MG: 9 INJECTION, SOLUTION INTRAVENOUS at 14:21

## 2023-10-11 RX ADMIN — WATER 1000 MG: 1 INJECTION INTRAMUSCULAR; INTRAVENOUS; SUBCUTANEOUS at 16:25

## 2023-10-11 RX ADMIN — GABAPENTIN 100 MG: 100 CAPSULE ORAL at 08:39

## 2023-10-11 RX ADMIN — SODIUM CHLORIDE 25 MG: 9 INJECTION, SOLUTION INTRAVENOUS at 11:51

## 2023-10-11 RX ADMIN — POLYETHYLENE GLYCOL 3350 17 G: 17 POWDER, FOR SOLUTION ORAL at 21:18

## 2023-10-11 RX ADMIN — Medication 2000 UNITS: at 08:39

## 2023-10-11 RX ADMIN — HYDROCODONE BITARTRATE AND ACETAMINOPHEN 1 TABLET: 5; 325 TABLET ORAL at 21:17

## 2023-10-11 RX ADMIN — PANTOPRAZOLE SODIUM 40 MG: 40 TABLET, DELAYED RELEASE ORAL at 05:23

## 2023-10-11 RX ADMIN — HYDRALAZINE HYDROCHLORIDE 50 MG: 50 TABLET, FILM COATED ORAL at 21:17

## 2023-10-11 RX ADMIN — APIXABAN 5 MG: 5 TABLET, FILM COATED ORAL at 21:17

## 2023-10-11 RX ADMIN — CARVEDILOL 12.5 MG: 6.25 TABLET, FILM COATED ORAL at 16:24

## 2023-10-11 RX ADMIN — HYDRALAZINE HYDROCHLORIDE 50 MG: 50 TABLET, FILM COATED ORAL at 05:24

## 2023-10-11 RX ADMIN — SENNOSIDES AND DOCUSATE SODIUM 2 TABLET: 50; 8.6 TABLET ORAL at 08:40

## 2023-10-11 RX ADMIN — CARVEDILOL 12.5 MG: 6.25 TABLET, FILM COATED ORAL at 08:39

## 2023-10-11 RX ADMIN — NALOXEGOL OXALATE 12.5 MG: 12.5 TABLET, FILM COATED ORAL at 08:38

## 2023-10-11 RX ADMIN — SENNOSIDES AND DOCUSATE SODIUM 2 TABLET: 50; 8.6 TABLET ORAL at 21:17

## 2023-10-11 RX ADMIN — PYRIDOXINE HCL TAB 50 MG 50 MG: 50 TAB at 08:40

## 2023-10-11 RX ADMIN — MIRTAZAPINE 7.5 MG: 15 TABLET, FILM COATED ORAL at 21:17

## 2023-10-11 RX ADMIN — GABAPENTIN 100 MG: 100 CAPSULE ORAL at 21:17

## 2023-10-11 RX ADMIN — ISOSORBIDE MONONITRATE 30 MG: 30 TABLET, EXTENDED RELEASE ORAL at 08:39

## 2023-10-11 RX ADMIN — HYDROCODONE BITARTRATE AND ACETAMINOPHEN 1 TABLET: 5; 325 TABLET ORAL at 15:09

## 2023-10-11 RX ADMIN — APIXABAN 5 MG: 5 TABLET, FILM COATED ORAL at 08:40

## 2023-10-11 ASSESSMENT — PAIN SCALES - GENERAL
PAINLEVEL_OUTOF10: 9
PAINLEVEL_OUTOF10: 7
PAINLEVEL_OUTOF10: 7
PAINLEVEL_OUTOF10: 3
PAINLEVEL_OUTOF10: 0
PAINLEVEL_OUTOF10: 3
PAINLEVEL_OUTOF10: 3
PAINLEVEL_OUTOF10: 0

## 2023-10-11 ASSESSMENT — PAIN DESCRIPTION - ORIENTATION
ORIENTATION: MID;LOWER
ORIENTATION: MID;LOWER

## 2023-10-11 ASSESSMENT — PAIN DESCRIPTION - DESCRIPTORS
DESCRIPTORS: ACHING
DESCRIPTORS: ACHING

## 2023-10-11 ASSESSMENT — PAIN SCALES - WONG BAKER
WONGBAKER_NUMERICALRESPONSE: 0
WONGBAKER_NUMERICALRESPONSE: 0

## 2023-10-11 ASSESSMENT — PAIN DESCRIPTION - LOCATION
LOCATION: ABDOMEN;BACK
LOCATION: BACK;ABDOMEN

## 2023-10-11 NOTE — PROGRESS NOTES
[facility]  [address]  [place] [address]  Dept: [phone]  Loc: [phone]    Inpatient Progress Note     Christina Manuel   1960    DATE OF ADMISSION: 10/8/2023  DATE OF CONSULTATION: 10/9/2023  REASON FOR CONSULTATION: Shade Arnold, established patient\"  REFERRING PHYSICIAN: vAa Stahl, APRN - CNP  PCP: Lucy WOLFF    Subjective:  Overall patient feeling better. Denies of active bleed. Continues to have suprapubic tenderness. HPI from Initial  Inpatient Consultation (10/9/39886):   Mrs. Jennifer Mark is a 70-year-old lady, with a past medical history significant for CAD, CHF, hyperlipidemia, hypertension and schizophrenia, who was referred to the hematology office for evaluation of anemia. The patient CBCD from 8/6/2023 was remarkable for a white count of 2.6, , ALC 1150, Hemoglobin 7.6, hematocrit 25.3, MCV 80.6, in May 2023 hemoglobin was 8.5G/DL, marked 20.3, MCV 84.2, in February 2023 hemoglobin was 7.6, hemoglobin 26, MCV 85.5, in November 2022 hemoglobin was 9.3, hematocrit 20.7, MCV 78.6, on 8/3/2023 ferritin was 161, iron 16, TIBC 177, TSAT 9%, vitamin B12 390, folate 14.7. The patient is feeling tired, and cold, she has constipation. She does have a history of gross hematuria, she was admitted to the hospital in August, she has ureteral stents, she is being followed by urology. She denies any melena or hematochezia, she does not remember a GI work-up, the records indicate that she had an EGD done with Dr. Jurgen Coley. Our service has been consulted for anemia patient is known to our service last seen by Dr. Rocio López on 9/21/2023, microcytic anemia secondary to iron deficiency likely gross blood loss for hematuria component of chronic inflammation. 9/21 ferritin 147 iron 35 TIBC 212 iron saturation 17 for last given on 10/6/2023. Presented on 10/8 with right flank pain.   History of bilateral ureteral stents on just second CT of abdomen and pelvis no signs BENJI Marquez CNP        cefTRIAXone (ROCEPHIN) 1,000 mg in sterile water 10 mL IV syringe  1,000 mg IntraVENous Q24H BENJI Jordan CNP   1,000 mg at 10/10/23 1747    HYDROcodone-acetaminophen (NORCO) 5-325 MG per tablet 1 tablet  1 tablet Oral Q6H PRN BENJI Jordan - CNP   1 tablet at 10/10/23 2100    acetaminophen (TYLENOL) tablet 650 mg  650 mg Oral Q4H PRN BENJI Jordan - CNP   650 mg at 10/09/23 1137    insulin lispro (HUMALOG) injection vial 0-8 Units  0-8 Units SubCUTAneous TID  Diana Boone APRN - CNP        insulin lispro (HUMALOG) injection vial 0-4 Units  0-4 Units SubCUTAneous Nightly BENJI Jordan CNP        hydrALAZINE (APRESOLINE) injection 10 mg  10 mg IntraVENous Q4H PRN Sterling Boone APRN - CNP        sorbitol 70 % liquid 60 mL  60 mL Oral Once BENJI Jordan CNP        pantoprazole (PROTONIX) tablet 40 mg  40 mg Oral QAM AC Diana Boone APRN - CNP   40 mg at 10/11/23 0523       ALLERGIES: No Known Allergies     SOCIAL HISTORY:   Social History     Socioeconomic History    Marital status:      Spouse name: None    Number of children: 3    Years of education: None    Highest education level: None   Tobacco Use    Smoking status: Former     Packs/day: 0.50     Years: 10.00     Additional pack years: 0.00     Total pack years: 5.00     Types: Cigarettes     Quit date: 3/23/2015     Years since quittin.5    Smokeless tobacco: Never    Tobacco comments:     quit smoking    Vaping Use    Vaping Use: Never used   Substance and Sexual Activity    Alcohol use: No    Drug use: No    Sexual activity: Not Currently     Social Determinants of Health     Financial Resource Strain: Unknown (3/8/2023)    Overall Financial Resource Strain (CARDIA)     Difficulty of Paying Living Expenses: Patient refused   Food Insecurity: Unknown (3/8/2023)    Hunger Vital Sign     Worried About Running Out of Food in the Last Year: Patient refused     [address] in the Last Year:

## 2023-10-11 NOTE — CARE COORDINATION
10/11/2023 1000 CM for transition of care needs at d/c.  Pt's plan is to return home with her daughter Aleks Santana. Pt is a left BKA and wears a prosthesis, she uses a cane or a rollator. Pt is active with [facility] for PT, pt decided she wants SN again also. [address] orders are in Epic. No home going needs identified. Pt's family will provide transportation home. CM will follow.   Electronically signed by Jana Medina RN on 10/11/2023 at 10:12 AM

## 2023-10-11 NOTE — PROGRESS NOTES
Reviewed high falls risk and need for bed alarm- reviewed risk factors- injuries, falls   Pt signed alarm refusal

## 2023-10-11 NOTE — PROGRESS NOTES
Internal Medicine Progress Note    [facility]    Shayankira Drew. Santosh Madrid, RASHIDA Valero D.O., RASHIDA Escobedo D.O. Peggy Membreno, MSN, APRN, NP-C  Cameron Lomas. Vania Castro, MSN, APRN-CNP     Primary Care Physician: Junie Stock MD   Admitting Physician:  Ramos Shine DO  Admission date and time: 10/8/2023  1:29 PM    Room:  [address] [place] [address]  Admitting diagnosis: Pyelonephritis [N12]  Hypertensive urgency [I16.0]  Constipation, unspecified constipation type [K59.00]    Patient Name: Otto Bazan  MRN: [id number]    Date of Service: 10/11/2023     Subjective:  Ruby Diaz is a 58 y.o. female who was seen and examined today,10/11/2023, at the bedside. Sofia sitting up in bed eating her lunch. She is complaining of abdominal cramping she states that she took medicine to help her move her bowels earlier. Blood pressure within normal range. Review of systems:  Constitutional:   Improving malaise and fatigue. HEENT:   Denies ear pain, sore throat, sinus or eye problems. Cardiovascular:   Denies any chest pain, irregular heartbeats, or palpitations. Respiratory:   - dyspnea at rest, - dyspnea on exertion, - coughing, - sputum, - hemoptysis  Gastrointestinal:   Several bouts of abdominal spasm this morning. This has improved when compared to initial presentation. Genitourinary:    Resolved hematuria. Extremities:   No significant lower extremity swelling reported. Neurology:    Denies any headache or focal neurological deficits, positive for generalized weakness and fatigue without focal component  Psch:   Denies being anxious or depressed. Musculoskeletal:    Denies  myalgias, joint complaints or back pain. Integumentary:   Denies any rashes, ulcers, or excoriations. Denies bruising. Hematologic/Lymphatic:  Denies bruising or bleeding. 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 This time was spent reviewing notes and laboratory data as well as instructing and counseling the patient. Time I spent with the family or surrogate(s) is included only if the patient was incapable of providing the necessary information or participating in medical decisions. I also discussed the differential diagnosis and all of the proposed management plans with the patient and individuals accompanying the patient. Lexis Connelly requires this high level of physician care and nursing on the IMC/Telemetry unit due the complexity of decision management and chance of rapid decline or death. Continued cardiac monitoring and higher level of nursing are required. I am readily available for any further decision-making and intervention. The patient was seen, examined and then discussed with Dr. Elisabeth Russell. BENJI Baker CNP  10/11/2023  4:08 PM        I saw and evaluated the patient. I agree with the findings and the plan of care as documented in Moises STARK note.     Golden Hernandez DO., FACOI  4:52 PM  10/11/2023

## 2023-10-11 NOTE — PROGRESS NOTES
Physical Therapy  Physical Therapy    Room #:   2624/4265-43    Date: 10/11/2023       Patient Name: Mayra Greene  : 1960      MRN: [id number]     Patient unavailable for physical therapy treatment due to  patient c/o stomach pain . Will attempt PT treatment tomorrow. Thank you. Tatiana Brittle.  Jaye  [facility]  LIC # [id number]

## 2023-10-12 ENCOUNTER — APPOINTMENT (OUTPATIENT)
Dept: GENERAL RADIOLOGY | Age: 63
DRG: 690 | End: 2023-10-12
Payer: MEDICARE

## 2023-10-12 LAB
ANION GAP SERPL CALCULATED.3IONS-SCNC: 8 MMOL/L (ref 7–16)
BASOPHILS # BLD: 0.02 K/UL (ref 0–0.2)
BASOPHILS NFR BLD: 1 % (ref 0–2)
BUN SERPL-MCNC: 22 MG/DL (ref 6–23)
CALCIUM SERPL-MCNC: 8.8 MG/DL (ref 8.6–10.2)
CHLORIDE SERPL-SCNC: 106 MMOL/L (ref 98–107)
CO2 SERPL-SCNC: 23 MMOL/L (ref 22–29)
CREAT SERPL-MCNC: 1.6 MG/DL (ref 0.5–1)
EOSINOPHIL # BLD: 0.05 K/UL (ref 0.05–0.5)
EOSINOPHILS RELATIVE PERCENT: 2 % (ref 0–6)
ERYTHROCYTE [DISTWIDTH] IN BLOOD BY AUTOMATED COUNT: 19.8 % (ref 11.5–15)
GFR SERPL CREATININE-BSD FRML MDRD: 37 ML/MIN/1.73M2
GLUCOSE BLD-MCNC: 107 MG/DL (ref 74–99)
GLUCOSE BLD-MCNC: 118 MG/DL (ref 74–99)
GLUCOSE BLD-MCNC: 133 MG/DL (ref 74–99)
GLUCOSE BLD-MCNC: 159 MG/DL (ref 74–99)
GLUCOSE SERPL-MCNC: 100 MG/DL (ref 74–99)
HCT VFR BLD AUTO: 26.3 % (ref 34–48)
HGB BLD-MCNC: 8 G/DL (ref 11.5–15.5)
IMM GRANULOCYTES # BLD AUTO: 0.05 K/UL (ref 0–0.58)
IMM GRANULOCYTES NFR BLD: 2 % (ref 0–5)
LYMPHOCYTES NFR BLD: 0.96 K/UL (ref 1.5–4)
LYMPHOCYTES RELATIVE PERCENT: 32 % (ref 20–42)
MAGNESIUM SERPL-MCNC: 2 MG/DL (ref 1.6–2.6)
MCH RBC QN AUTO: 24.8 PG (ref 26–35)
MCHC RBC AUTO-ENTMCNC: 30.4 G/DL (ref 32–34.5)
MCV RBC AUTO: 81.4 FL (ref 80–99.9)
MICROORGANISM SPEC CULT: ABNORMAL
MONOCYTES NFR BLD: 0.63 K/UL (ref 0.1–0.95)
MONOCYTES NFR BLD: 21 % (ref 2–12)
NEUTROPHILS NFR BLD: 42 % (ref 43–80)
NEUTS SEG NFR BLD: 1.25 K/UL (ref 1.8–7.3)
PHOSPHATE SERPL-MCNC: 3.6 MG/DL (ref 2.5–4.5)
PLATELET # BLD AUTO: 261 K/UL (ref 130–450)
PMV BLD AUTO: 10.7 FL (ref 7–12)
POTASSIUM SERPL-SCNC: 5.1 MMOL/L (ref 3.5–5)
RBC # BLD AUTO: 3.23 M/UL (ref 3.5–5.5)
SODIUM SERPL-SCNC: 137 MMOL/L (ref 132–146)
SPECIMEN DESCRIPTION: ABNORMAL
WBC OTHER # BLD: 3 K/UL (ref 4.5–11.5)

## 2023-10-12 PROCEDURE — 80048 BASIC METABOLIC PNL TOTAL CA: CPT

## 2023-10-12 PROCEDURE — 2060000000 HC ICU INTERMEDIATE R&B

## 2023-10-12 PROCEDURE — 74018 RADEX ABDOMEN 1 VIEW: CPT

## 2023-10-12 PROCEDURE — 97110 THERAPEUTIC EXERCISES: CPT

## 2023-10-12 PROCEDURE — 36415 COLL VENOUS BLD VENIPUNCTURE: CPT

## 2023-10-12 PROCEDURE — 6360000002 HC RX W HCPCS: Performed by: INTERNAL MEDICINE

## 2023-10-12 PROCEDURE — 2580000003 HC RX 258: Performed by: INTERNAL MEDICINE

## 2023-10-12 PROCEDURE — 6370000000 HC RX 637 (ALT 250 FOR IP)

## 2023-10-12 PROCEDURE — 83735 ASSAY OF MAGNESIUM: CPT

## 2023-10-12 PROCEDURE — 85025 COMPLETE CBC W/AUTO DIFF WBC: CPT

## 2023-10-12 PROCEDURE — 84100 ASSAY OF PHOSPHORUS: CPT

## 2023-10-12 PROCEDURE — 99232 SBSQ HOSP IP/OBS MODERATE 35: CPT | Performed by: INTERNAL MEDICINE

## 2023-10-12 PROCEDURE — 6370000000 HC RX 637 (ALT 250 FOR IP): Performed by: NURSE PRACTITIONER

## 2023-10-12 PROCEDURE — 6370000000 HC RX 637 (ALT 250 FOR IP): Performed by: INTERNAL MEDICINE

## 2023-10-12 PROCEDURE — 97530 THERAPEUTIC ACTIVITIES: CPT

## 2023-10-12 PROCEDURE — 6360000002 HC RX W HCPCS

## 2023-10-12 PROCEDURE — 2580000003 HC RX 258

## 2023-10-12 PROCEDURE — 82962 GLUCOSE BLOOD TEST: CPT

## 2023-10-12 RX ORDER — SORBITOL SOLUTION 70 %
60 SOLUTION, ORAL MISCELLANEOUS ONCE
Status: COMPLETED | OUTPATIENT
Start: 2023-10-12 | End: 2023-10-12

## 2023-10-12 RX ORDER — BISACODYL 5 MG/1
10 TABLET, DELAYED RELEASE ORAL ONCE
Status: COMPLETED | OUTPATIENT
Start: 2023-10-12 | End: 2023-10-12

## 2023-10-12 RX ADMIN — MIRTAZAPINE 7.5 MG: 15 TABLET, FILM COATED ORAL at 20:39

## 2023-10-12 RX ADMIN — HYDRALAZINE HYDROCHLORIDE 50 MG: 50 TABLET, FILM COATED ORAL at 20:39

## 2023-10-12 RX ADMIN — FLUTICASONE PROPIONATE 2 SPRAY: 50 SPRAY, METERED NASAL at 07:42

## 2023-10-12 RX ADMIN — SENNOSIDES AND DOCUSATE SODIUM 2 TABLET: 50; 8.6 TABLET ORAL at 07:41

## 2023-10-12 RX ADMIN — ISOSORBIDE MONONITRATE 30 MG: 30 TABLET, EXTENDED RELEASE ORAL at 07:41

## 2023-10-12 RX ADMIN — HYDRALAZINE HYDROCHLORIDE 50 MG: 50 TABLET, FILM COATED ORAL at 05:56

## 2023-10-12 RX ADMIN — BISACODYL 10 MG: 5 TABLET, COATED ORAL at 13:06

## 2023-10-12 RX ADMIN — GABAPENTIN 100 MG: 100 CAPSULE ORAL at 09:00

## 2023-10-12 RX ADMIN — FOLIC ACID 1 MG: 1 TABLET ORAL at 07:43

## 2023-10-12 RX ADMIN — SORBITOL SOLUTION (BULK) 60 ML: 70 SOLUTION at 13:06

## 2023-10-12 RX ADMIN — PANTOPRAZOLE SODIUM 40 MG: 40 TABLET, DELAYED RELEASE ORAL at 05:56

## 2023-10-12 RX ADMIN — WATER 1000 MG: 1 INJECTION INTRAMUSCULAR; INTRAVENOUS; SUBCUTANEOUS at 17:42

## 2023-10-12 RX ADMIN — POLYETHYLENE GLYCOL 3350 17 G: 17 POWDER, FOR SOLUTION ORAL at 07:43

## 2023-10-12 RX ADMIN — CARVEDILOL 12.5 MG: 6.25 TABLET, FILM COATED ORAL at 07:41

## 2023-10-12 RX ADMIN — HYDROCODONE BITARTRATE AND ACETAMINOPHEN 1 TABLET: 5; 325 TABLET ORAL at 05:56

## 2023-10-12 RX ADMIN — SODIUM CHLORIDE 25 MG: 9 INJECTION, SOLUTION INTRAVENOUS at 17:33

## 2023-10-12 RX ADMIN — CARVEDILOL 12.5 MG: 6.25 TABLET, FILM COATED ORAL at 16:50

## 2023-10-12 RX ADMIN — NALOXEGOL OXALATE 12.5 MG: 12.5 TABLET, FILM COATED ORAL at 07:41

## 2023-10-12 RX ADMIN — MAGNESIUM SULFATE: 1 CRYSTAL ORAL; TOPICAL at 13:07

## 2023-10-12 RX ADMIN — GABAPENTIN 100 MG: 100 CAPSULE ORAL at 20:39

## 2023-10-12 RX ADMIN — HYDRALAZINE HYDROCHLORIDE 50 MG: 50 TABLET, FILM COATED ORAL at 13:06

## 2023-10-12 RX ADMIN — PYRIDOXINE HCL TAB 50 MG 50 MG: 50 TAB at 07:42

## 2023-10-12 RX ADMIN — APIXABAN 5 MG: 5 TABLET, FILM COATED ORAL at 07:42

## 2023-10-12 RX ADMIN — APIXABAN 5 MG: 5 TABLET, FILM COATED ORAL at 20:39

## 2023-10-12 RX ADMIN — Medication 2000 UNITS: at 07:41

## 2023-10-12 RX ADMIN — SODIUM CHLORIDE 100 MG: 9 INJECTION, SOLUTION INTRAVENOUS at 16:10

## 2023-10-12 ASSESSMENT — PAIN SCALES - GENERAL
PAINLEVEL_OUTOF10: 7
PAINLEVEL_OUTOF10: 7
PAINLEVEL_OUTOF10: 0
PAINLEVEL_OUTOF10: 3
PAINLEVEL_OUTOF10: 6

## 2023-10-12 ASSESSMENT — PAIN SCALES - WONG BAKER: WONGBAKER_NUMERICALRESPONSE: 0

## 2023-10-12 ASSESSMENT — PAIN DESCRIPTION - LOCATION: LOCATION: ABDOMEN

## 2023-10-12 NOTE — PROGRESS NOTES
Physical Therapy  Physical Therapy Treatment Note/Plan of Care    Room #:  8639/5839-83  Patient Name: Lachelle Coffey  YOB: 1960  MRN: [id number]    Date of Service: 10/12/2023     Tentative placement recommendation: Home with Home Health Physical Therapy  Equipment recommendation: Patient has needed equipment       Evaluating Physical Therapist: Randy Mahmood, PT, DPT #[id number]      Specific Provider Orders/Date/Referring Provider :     10/09/23 1400    PT eval and treat  Start:  10/09/23 1400,   End:  10/09/23 1400,   ONE TIME,   Standing Count:  1 Occurrences,   R         Briana Vegas, APRN - CNP Acknowledge New     Admitting Diagnosis:   Pyelonephritis [N12]  Hypertensive urgency [I16.0]  Constipation, unspecified constipation type [K59.00]      Surgery: none  Visit Diagnoses         Codes    Constipation, unspecified constipation type     K59.00            Patient Active Problem List   Diagnosis    Epigastric hernia    Umbilical hernia    Essential hypertension    Type 2 diabetes mellitus with diabetic peripheral angiopathy without gangrene, without long-term current use of insulin ([address])    Dyslipidemia    Diabetic peripheral neuropathy ([address])    Hydroureter    Microcytic hypochromic anemia    Paresthesia of left foot    History of arterial ischemic stroke    Hx of diabetic foot ulcer    Hx of BKA, left (HCC)    Chronic diastolic heart failure (HCC)    PVD (peripheral vascular disease) ([address])    Anemia    Pleural effusion    Dyspnea    Acute on chronic diastolic CHF (congestive heart failure) ([address])    Chronic renal disease, stage III ([address]) [771613]    Acute on chronic blood loss anemia    Hypertensive urgency    Pyelonephritis        ASSESSMENT of Current Deficits Patient exhibits decreased strength, balance, and endurance impairing functional mobility, transfers, gait , gait distance, and tolerance to activity. Pt needing minimal assist for sit to stand and gait training.  Pt  requiring seated rest

## 2023-10-12 NOTE — PROGRESS NOTES
Physician Progress Note      Thais Neri  CSN #:                  [id number]  :                       1960  ADMIT DATE:       10/8/2023 1:29 PM  [facility] DATE:   [facility]  PROVIDER #:        Dorian Pel DO          QUERY TEXT:    Dear Dr.,    Pt admitted with acute UTI. Pt noted to have low WBC, elevated temp, HR. If   possible, please document in the progress notes and discharge summary if you   are evaluating and /or treating any of the following: The medical record reflects the following:  Risk Factors: recurrent UTI with chronic hydronephrosis with bilateral stents   in place, DM, HTN, HLD, CAD  Clinical Indicators: WBC 4.4-3.2; lactic 0.9; Ua mod le, 1+ bacteria; TPR:   97.5-99.9/140-85/20-18  Treatment: IMCU monitoring, Hematology and Urology consults, IVF's, IV   Rocephin, CT Abdomen, blood and urine cx's    Thank You,  Luci Lay RN, BSN, CDS  Clinical Documentation Improvement Specialist  Options provided:  -- Sepsis, present on admission  -- UTI without Sepsis  -- Sepsis was ruled out  -- Other - I will add my own diagnosis  -- Disagree - Not applicable / Not valid  -- Disagree - Clinically unable to determine / Unknown  -- Refer to Clinical Documentation Reviewer    PROVIDER RESPONSE TEXT:    This patient has UTI without Sepsis. Query created by: Luci Lay on 10/10/2023 10:41 AM      QUERY TEXT:    Dear Dr.,    Pt admitted with UTI. Pt noted to have bilateral ureteral stents. If   possible, please document in the progress notes and discharge summary if you   are evaluating and/or treating any of the following: The medical record reflects the following:  Risk Factors: recurrent UTI with chronic hydronephrosis with bilateral stents   in place, DM, HTN, HLD, CAD  Clinical Indicators: per Urology consult: Iftikhar Esquivel a history of recurring urinary   tract infections and bilateral ureteral obstruction that is managed with   bilateral ureteral stents.  These were

## 2023-10-12 NOTE — PLAN OF CARE
Problem: Discharge Planning  Goal: Discharge to home or other facility with appropriate resources  10/12/2023 1831 by Renee Petty RN  Outcome: Progressing  10/12/2023 0658 by Óscar Vaughn RN  Outcome: Progressing     Problem: ABCDS Injury Assessment  Goal: Absence of physical injury  10/12/2023 1831 by Renee Petty RN  Outcome: Progressing  10/12/2023 0658 by Óscar Vaughn RN  Outcome: Progressing     Problem: Safety - Adult  Goal: Free from fall injury  10/12/2023 1831 by Renee Petty RN  Outcome: Progressing  10/12/2023 0658 by Óscar Vaughn RN  Outcome: Progressing     Problem: Pain  Goal: Verbalizes/displays adequate comfort level or baseline comfort level  10/12/2023 0658 by Óscar Vaughn RN  Outcome: Progressing

## 2023-10-12 NOTE — PROGRESS NOTES
ureterolithiasis or obstructive uropathy large volume of retained fecal matter constipation, she is on Eliquis for recurrent DVTs and pulmonary emboli    Patient presented to the ED on 10/8 admitted for UTI, , elevated BP ,  anemia  ,right flank pain . We have been consulted for anemia  . Seen today sitting up in bed, she reports she did not complete FIT test ordered from Dr. Quoc Méndez last visit . She denies seeing blood in stool or urine . Often she reports being significantly constipated 5-7 days at a time , requiring OTC stool agents and prunes She denies any melena or hematochezia, she does not remember a GI work-up, the records indicate that she had an EGD done with Dr. Yaya Roberts. . Pain  improved since admission . REVIEW OF SYSTEMS:  Negative except as noted above. PAST MEDICAL HISTORY:  Past Medical History:   Diagnosis Date    CAD (coronary artery disease)     cath 2016    CHF (congestive heart failure) ([address])     Diabetic peripheral neuropathy associated with type 2 diabetes mellitus ([address]) 08/24/2018    Epigastric hernia     Hyperlipidemia     Hypertension     Kidney stone     Schizophrenia ([address])     Type 2 diabetes mellitus with diabetic polyneuropathy, with long-term current use of insulin ([address]) 09/19/2016    no longer requiring medication. 1/13/21 - continues w/o medications as of 11/2/2022       PAST SURGICAL HISTORY:  Past Surgical History:   Procedure Laterality Date    BLADDER SURGERY Bilateral 5/2/2022    CYSTOSCOPY RETROGRADE PYELOGRAM BILATERAL STENT CHANGE performed by Gordon Metzger MD at [address] [place] Left 11/7/2022    CYSTOSCOPY BILATERAL RETROGRADE PYELOGRAM LEFT STENT CHANGE POSSIBLE RIGHT STENT CHANGE VS REMOVAL performed by Gordon Metzger MD at [address]  09/30/2016     [facility]    COLONOSCOPY  08/2016    Dr. Cristy Daniels 9/7/2021    CYSTOSCOPY RETROGRADE PYELOGRAM, BILATERAL STENT EXCHANGE performed by Clara Randle acid  Constipation, KUB reviewed, diffuse colonic fecal retention with significant stool burden, bowel regimen. Monitor for bleed  On Rocephin for recurrent UIT  Urology following; no immediate interventions planned at this time  Peripheral blood smear and flow cytometry as CBCD noted intermittent leukopenia, myelocytes and metamyelocytes, not collected, will notify Nursing.       Dougie Fry MD  Medical Oncology  [facility]  10/12/23 4:40 PM

## 2023-10-12 NOTE — CARE COORDINATION
10/12/2023 1230 CM for transition of care needs at d/c.  Pt's plan is to return home with her daughter Kell Lama. Pt is a left BKA and wears a prosthesis, she uses a cane or a rollator. Pt is active with [facility] for PT, pt decided she wants SN again also. [address] orders are in Epic. No home going needs identified. Pt's family will provide transportation home. CM will follow.  Electronically signed by Aleks Benton RN on 10/12/2023 at 12:37 PM

## 2023-10-12 NOTE — PROGRESS NOTES
Internal Medicine Progress Note    [facility]    Radha Desai. Julienne Fulton., SACHIOMarianaI. Ambar Mejia D.O., SACHIOMarianaIMariana Olmedo D.O. Jaja Matthews, MSN, APRN, NP-C  Josef Tripathi. Celestina Harden, MSN, APRN-CNP     Primary Care Physician: Kym Rossi MD   Admitting Physician:  Rod Dan DO  Admission date and time: 10/8/2023  1:29 PM    Room:  [address] [place] [address]  Admitting diagnosis: Pyelonephritis [N12]  Hypertensive urgency [I16.0]  Constipation, unspecified constipation type [K59.00]    Patient Name: Brittney Rivas  MRN: [id number]    Date of Service: 10/12/2023     Subjective:  Lizeth Burgos is a 58 y.o. female who was seen and examined today,10/12/2023, at the bedside. Lizeth Burgos continues to complain about constipation. Will order sorbitol and Dulcolax and obtain KUB. Her blood pressure is well controlled. She should be ready for discharge soon. Review of systems:  Constitutional:   Improving malaise and fatigue. HEENT:   Denies ear pain, sore throat, sinus or eye problems. Cardiovascular:   Denies any chest pain, irregular heartbeats, or palpitations. Respiratory:   - dyspnea at rest, - dyspnea on exertion, - coughing, - sputum, - hemoptysis  Gastrointestinal:   Several bouts of abdominal spasm this morning. This has improved when compared to initial presentation. Genitourinary:    Resolved hematuria. Extremities:   No significant lower extremity swelling reported. Neurology:    Denies any headache or focal neurological deficits, positive for generalized weakness and fatigue  Psch:   Denies being anxious or depressed. Musculoskeletal:    Denies  myalgias, joint complaints or back pain. Integumentary:   Denies any rashes, ulcers, or excoriations. Denies bruising. Hematologic/Lymphatic:  Denies bruising or bleeding.       Physical Exam:  I/O this shift:  In: 180 [P.O.:180]  Out: 300 [Urine:300]    Intake/Output Summary (Last 24 hours) at 10/12/2023

## 2023-10-13 VITALS
HEIGHT: 63 IN | RESPIRATION RATE: 18 BRPM | DIASTOLIC BLOOD PRESSURE: 61 MMHG | HEART RATE: 102 BPM | SYSTOLIC BLOOD PRESSURE: 132 MMHG | BODY MASS INDEX: 23.87 KG/M2 | TEMPERATURE: 99.2 F | WEIGHT: 134.7 LBS | OXYGEN SATURATION: 99 %

## 2023-10-13 LAB
ANION GAP SERPL CALCULATED.3IONS-SCNC: 9 MMOL/L (ref 7–16)
BASOPHILS # BLD: 0.03 K/UL (ref 0–0.2)
BASOPHILS NFR BLD: 1 % (ref 0–2)
BUN SERPL-MCNC: 24 MG/DL (ref 6–23)
CALCIUM SERPL-MCNC: 8.8 MG/DL (ref 8.6–10.2)
CHLORIDE SERPL-SCNC: 108 MMOL/L (ref 98–107)
CO2 SERPL-SCNC: 20 MMOL/L (ref 22–29)
CREAT SERPL-MCNC: 1.6 MG/DL (ref 0.5–1)
DATE, STOOL #1: NORMAL
EOSINOPHIL # BLD: 0.05 K/UL (ref 0.05–0.5)
EOSINOPHILS RELATIVE PERCENT: 1 % (ref 0–6)
ERYTHROCYTE [DISTWIDTH] IN BLOOD BY AUTOMATED COUNT: 19.7 % (ref 11.5–15)
GFR SERPL CREATININE-BSD FRML MDRD: 37 ML/MIN/1.73M2
GLUCOSE BLD-MCNC: 100 MG/DL (ref 74–99)
GLUCOSE BLD-MCNC: 178 MG/DL (ref 74–99)
GLUCOSE SERPL-MCNC: 105 MG/DL (ref 74–99)
HCT VFR BLD AUTO: 26.5 % (ref 34–48)
HEMOCCULT SP1 STL QL: NEGATIVE
HGB BLD-MCNC: 7.9 G/DL (ref 11.5–15.5)
IMM GRANULOCYTES # BLD AUTO: 0.05 K/UL (ref 0–0.58)
IMM GRANULOCYTES NFR BLD: 1 % (ref 0–5)
LYMPHOCYTES NFR BLD: 1.14 K/UL (ref 1.5–4)
LYMPHOCYTES RELATIVE PERCENT: 33 % (ref 20–42)
MAGNESIUM SERPL-MCNC: 2.1 MG/DL (ref 1.6–2.6)
MCH RBC QN AUTO: 24.5 PG (ref 26–35)
MCHC RBC AUTO-ENTMCNC: 29.8 G/DL (ref 32–34.5)
MCV RBC AUTO: 82 FL (ref 80–99.9)
MICROORGANISM SPEC CULT: NORMAL
MICROORGANISM SPEC CULT: NORMAL
MONOCYTES NFR BLD: 0.77 K/UL (ref 0.1–0.95)
MONOCYTES NFR BLD: 22 % (ref 2–12)
NEUTROPHILS NFR BLD: 41 % (ref 43–80)
NEUTS SEG NFR BLD: 1.44 K/UL (ref 1.8–7.3)
PHOSPHATE SERPL-MCNC: 3.7 MG/DL (ref 2.5–4.5)
PLATELET # BLD AUTO: 275 K/UL (ref 130–450)
PMV BLD AUTO: 10.4 FL (ref 7–12)
POTASSIUM SERPL-SCNC: 4.8 MMOL/L (ref 3.5–5)
RBC # BLD AUTO: 3.23 M/UL (ref 3.5–5.5)
SERVICE CMNT-IMP: NORMAL
SERVICE CMNT-IMP: NORMAL
SODIUM SERPL-SCNC: 137 MMOL/L (ref 132–146)
SPECIMEN DESCRIPTION: NORMAL
SPECIMEN DESCRIPTION: NORMAL
TIME, STOOL #1: 1700
WBC OTHER # BLD: 3.5 K/UL (ref 4.5–11.5)

## 2023-10-13 PROCEDURE — 82962 GLUCOSE BLOOD TEST: CPT

## 2023-10-13 PROCEDURE — 80048 BASIC METABOLIC PNL TOTAL CA: CPT

## 2023-10-13 PROCEDURE — 6370000000 HC RX 637 (ALT 250 FOR IP): Performed by: NURSE PRACTITIONER

## 2023-10-13 PROCEDURE — 6370000000 HC RX 637 (ALT 250 FOR IP): Performed by: INTERNAL MEDICINE

## 2023-10-13 PROCEDURE — 84100 ASSAY OF PHOSPHORUS: CPT

## 2023-10-13 PROCEDURE — 36415 COLL VENOUS BLD VENIPUNCTURE: CPT

## 2023-10-13 PROCEDURE — 83735 ASSAY OF MAGNESIUM: CPT

## 2023-10-13 PROCEDURE — 6370000000 HC RX 637 (ALT 250 FOR IP)

## 2023-10-13 PROCEDURE — 85025 COMPLETE CBC W/AUTO DIFF WBC: CPT

## 2023-10-13 RX ORDER — POLYETHYLENE GLYCOL 3350 17 G/17G
17 POWDER, FOR SOLUTION ORAL DAILY PRN
Qty: 527 G | Refills: 1 | Status: SHIPPED | OUTPATIENT
Start: 2023-10-13 | End: 2023-12-14

## 2023-10-13 RX ORDER — GRANULES FOR ORAL 3 G/1
3 POWDER ORAL ONCE
Qty: 1 EACH | Refills: 0 | Status: SHIPPED | OUTPATIENT
Start: 2023-10-16 | End: 2023-10-16

## 2023-10-13 RX ORDER — CHOLECALCIFEROL (VITAMIN D3) 50 MCG
2000 TABLET ORAL DAILY
Qty: 60 TABLET | Refills: 0 | Status: SHIPPED | OUTPATIENT
Start: 2023-10-14

## 2023-10-13 RX ORDER — GRANULES FOR ORAL 3 G/1
3 POWDER ORAL ONCE
Status: DISCONTINUED | OUTPATIENT
Start: 2023-10-13 | End: 2023-10-13 | Stop reason: HOSPADM

## 2023-10-13 RX ORDER — FOLIC ACID 1 MG/1
1 TABLET ORAL DAILY
Qty: 30 TABLET | Refills: 3 | Status: SHIPPED | OUTPATIENT
Start: 2023-10-14

## 2023-10-13 RX ADMIN — ISOSORBIDE MONONITRATE 30 MG: 30 TABLET, EXTENDED RELEASE ORAL at 10:27

## 2023-10-13 RX ADMIN — HYDROCODONE BITARTRATE AND ACETAMINOPHEN 1 TABLET: 5; 325 TABLET ORAL at 05:48

## 2023-10-13 RX ADMIN — NALOXEGOL OXALATE 12.5 MG: 12.5 TABLET, FILM COATED ORAL at 10:26

## 2023-10-13 RX ADMIN — CARVEDILOL 12.5 MG: 6.25 TABLET, FILM COATED ORAL at 09:30

## 2023-10-13 RX ADMIN — APIXABAN 5 MG: 5 TABLET, FILM COATED ORAL at 10:27

## 2023-10-13 RX ADMIN — Medication 2000 UNITS: at 10:26

## 2023-10-13 RX ADMIN — PANTOPRAZOLE SODIUM 40 MG: 40 TABLET, DELAYED RELEASE ORAL at 05:44

## 2023-10-13 RX ADMIN — GABAPENTIN 100 MG: 100 CAPSULE ORAL at 10:27

## 2023-10-13 RX ADMIN — PYRIDOXINE HCL TAB 50 MG 50 MG: 50 TAB at 10:27

## 2023-10-13 RX ADMIN — HYDRALAZINE HYDROCHLORIDE 50 MG: 50 TABLET, FILM COATED ORAL at 05:44

## 2023-10-13 RX ADMIN — FOLIC ACID 1 MG: 1 TABLET ORAL at 10:27

## 2023-10-13 RX ADMIN — HYDROCODONE BITARTRATE AND ACETAMINOPHEN 1 TABLET: 5; 325 TABLET ORAL at 12:08

## 2023-10-13 ASSESSMENT — PAIN SCALES - GENERAL
PAINLEVEL_OUTOF10: 1
PAINLEVEL_OUTOF10: 7
PAINLEVEL_OUTOF10: 0
PAINLEVEL_OUTOF10: 7

## 2023-10-13 ASSESSMENT — PAIN DESCRIPTION - LOCATION
LOCATION: ABDOMEN
LOCATION: ABDOMEN

## 2023-10-13 ASSESSMENT — PAIN DESCRIPTION - DESCRIPTORS: DESCRIPTORS: ACHING;DISCOMFORT

## 2023-10-13 ASSESSMENT — PAIN DESCRIPTION - ORIENTATION: ORIENTATION: MID;LOWER

## 2023-10-13 NOTE — PLAN OF CARE
Problem: Discharge Planning  Goal: Discharge to home or other facility with appropriate resources  10/13/2023 0155 by Sung Mcknight RN  Outcome: Progressing  10/12/2023 1831 by Mery Cristina RN  Outcome: Progressing     Problem: ABCDS Injury Assessment  Goal: Absence of physical injury  10/13/2023 0155 by Sung Mcknight RN  Outcome: Progressing  10/12/2023 1831 by Mery Cristina RN  Outcome: Progressing     Problem: Safety - Adult  Goal: Free from fall injury  10/13/2023 0155 by Sung Mcknight RN  Outcome: Progressing  10/12/2023 1831 by Mery Cristina RN  Outcome: Progressing     Problem: Pain  Goal: Verbalizes/displays adequate comfort level or baseline comfort level  Outcome: Progressing     Problem: Chronic Conditions and Co-morbidities  Goal: Patient's chronic conditions and co-morbidity symptoms are monitored and maintained or improved  Outcome: Progressing     Problem: Skin/Tissue Integrity  Goal: Absence of new skin breakdown  Description: 1. Monitor for areas of redness and/or skin breakdown  2. Assess vascular access sites hourly  3. Every 4-6 hours minimum:  Change oxygen saturation probe site  4. Every 4-6 hours:  If on nasal continuous positive airway pressure, respiratory therapy assess nares and determine need for appliance change or resting period.   Outcome: Progressing

## 2023-10-13 NOTE — DISCHARGE SUMMARY
pericholecystic fluid. The pancreas, spleen and adrenal glands appear within the normal range. Each kidney appears normal in size shape and position. There are double-J stents within each renal collecting system. There is minimal perinephric stranding. No radiopaque stones are seen. GI/Bowel: No acute abnormalities of the stomach or proximal small bowel were identified. The terminal ileum cecum and appendix demonstrate no acute abnormalities. There is a large volume of retained fecal material within the colon, suggests signs of constipation. Pelvis: The urinary bladder demonstrates a rounded contour. Double-J stents of loop of the renal pelvis. The uterus and adnexa appeared within normal range there is diastasis of the rectus sheath Peritoneum/Retroperitoneum: There are no signs of retroperitoneal lymphadenopathy aneurysm or signs of ascites. Bones/Soft Tissues: No acute fracture or spondylolisthesis of the lumbosacral spine were identified. 1. No signs of urolithiasis or obstructive uropathy 2. Large volume of retained fecal material within the colon suggests signs of constipation. 3. Prominent ventral hernia but no signs of bowel incarceration. 4. Coli lithiasis but no signs of cholecystitis         HOSPITAL COURSE:   Tamica Griffin did well throughout the hospitalization. She was found to be suffering from an acute/recurrent urinary tract infection complicated by bilateral ureteral stents. Her hospitalization was complicated by hypertensive urgency and adjustments have been made in her blood pressure medications. She also dealt with constipation during the hospitalization and a more aggressive bowel regimen was implemented with success. Home medications have been resumed. She will complete a course of oral antibiotics in the form of fosfomycin with Enterococcus in the urine. She has returned to her baseline and is acceptable for discharge home. .  Patient is medically stable and acceptable for type (HCC)      albuterol sulfate HFA (VENTOLIN HFA) 108 (90 Base) MCG/ACT inhaler Inhale 2 puffs into the lungs 4 times daily as needed for Wheezing  Qty: 18 g, Refills: 3      gabapentin (NEURONTIN) 100 MG capsule Take 1 capsule by mouth 2 times daily. Qty: 180 capsule, Refills: 1      Handicap Placard MISC by Does not apply route Unable to ambulate more than 20 feet without difficulty  Expires 3/31/2028  Qty: 1 each, Refills: 0      fluticasone (FLONASE) 50 MCG/ACT nasal spray 2 sprays by Each Nostril route daily as needed for Rhinitis or Allergies      Nutritional Supplements (BOOST PLUS) LIQD Take 1 each by mouth daily Strawberry/Vanilla             FOLLOW UP/INSTRUCTIONS:  This patient is instructed to follow-up with her primary care physician. Patient is instructed to follow-up with the consults listed above as directed by them. she is instructed to resume home medications and take new medications as indicated in the list above. If the patient has a recurrence of symptoms, she is instructed to go to the ED. Preparing for this patient's discharge, including paperwork, orders, instructions, and meeting with patient did require > 40 minutes.     Cindy Uriarte DO   10/13/2023  2:01 PM

## 2023-10-13 NOTE — CARE COORDINATION
10/13/2023 0945   CM for transition of care needs at d/c.  Pt's plan is to return home with her daughter Josiah Faria. Pt is a left BKA and wears a prosthesis, she uses a cane or a rollator. Pt is active with [facility] for [facility], pt decided she wants SN again also. [address] orders are in Epic. No home going needs identified. Pt's family will provide transportation home. CM will follow.  Electronically signed by Barbara Daniels RN on 10/13/2023 at 10:03 AM

## 2023-10-13 NOTE — PROGRESS NOTES
Met with the patient for a length of stay visit. The patient stated that she could possibly be discharged today or tomorrow. We talked about her spiritual walk and her husbands ministry. This  had prayer and reaffirmed that [address] [place] [address] was available as needed.

## 2023-10-16 RX ORDER — LANOLIN ALCOHOL/MO/W.PET/CERES
50 CREAM (GRAM) TOPICAL DAILY
Qty: 30 TABLET | Refills: 3 | Status: SHIPPED | OUTPATIENT
Start: 2023-10-16

## 2023-10-17 ENCOUNTER — HOSPITAL ENCOUNTER (OUTPATIENT)
Dept: INFUSION THERAPY | Age: 63
Discharge: HOME | End: 2023-10-17

## 2023-10-17 NOTE — TELEPHONE ENCOUNTER
Patient called after hours complaining of UTI symptoms. Due to her history of ureteral stents, I sent in a prescription for oral antibiotics. See orders.

## 2023-10-20 ENCOUNTER — HOSPITAL ENCOUNTER (OUTPATIENT)
Dept: INFUSION THERAPY | Age: 63
Discharge: HOME | End: 2023-10-20

## 2023-11-02 ENCOUNTER — HOSPITAL ENCOUNTER (OUTPATIENT)
Dept: INFUSION THERAPY | Age: 63
Discharge: HOME | End: 2023-11-02
Payer: MEDICARE

## 2023-11-02 ENCOUNTER — OFFICE VISIT (OUTPATIENT)
Dept: ONCOLOGY | Age: 63
End: 2023-11-02
Payer: MEDICARE

## 2023-11-02 VITALS
OXYGEN SATURATION: 100 % | SYSTOLIC BLOOD PRESSURE: 156 MMHG | DIASTOLIC BLOOD PRESSURE: 76 MMHG | WEIGHT: 138.7 LBS | BODY MASS INDEX: 24.57 KG/M2 | HEART RATE: 110 BPM | TEMPERATURE: 97.4 F | HEIGHT: 63 IN

## 2023-11-02 DIAGNOSIS — D64.9 ANEMIA, UNSPECIFIED TYPE: Primary | ICD-10-CM

## 2023-11-02 DIAGNOSIS — D64.9 ANEMIA, UNSPECIFIED TYPE: ICD-10-CM

## 2023-11-02 DIAGNOSIS — I26.99 PULMONARY EMBOLISM WITHOUT ACUTE COR PULMONALE, UNSPECIFIED CHRONICITY, UNSPECIFIED PULMONARY EMBOLISM TYPE (HCC): ICD-10-CM

## 2023-11-02 LAB
BASOPHILS # BLD: 0.03 K/UL (ref 0–0.2)
BASOPHILS NFR BLD: 1 % (ref 0–2)
EOSINOPHIL # BLD: 0.06 K/UL (ref 0.05–0.5)
EOSINOPHILS RELATIVE PERCENT: 2 % (ref 0–6)
ERYTHROCYTE [DISTWIDTH] IN BLOOD BY AUTOMATED COUNT: 17.7 % (ref 11.5–15)
FERRITIN SERPL-MCNC: 171 NG/ML
HCT VFR BLD AUTO: 28.3 % (ref 34–48)
HGB BLD-MCNC: 8.6 G/DL (ref 11.5–15.5)
IMM GRANULOCYTES # BLD AUTO: <0.03 K/UL (ref 0–0.58)
IMM GRANULOCYTES NFR BLD: 1 % (ref 0–5)
IRON SATN MFR SERPL: 12 % (ref 15–50)
IRON SERPL-MCNC: 22 UG/DL (ref 37–145)
LYMPHOCYTES NFR BLD: 1.16 K/UL (ref 1.5–4)
LYMPHOCYTES RELATIVE PERCENT: 34 % (ref 20–42)
MCH RBC QN AUTO: 24.9 PG (ref 26–35)
MCHC RBC AUTO-ENTMCNC: 30.4 G/DL (ref 32–34.5)
MCV RBC AUTO: 81.8 FL (ref 80–99.9)
MONOCYTES NFR BLD: 0.47 K/UL (ref 0.1–0.95)
MONOCYTES NFR BLD: 14 % (ref 2–12)
NEUTROPHILS NFR BLD: 49 % (ref 43–80)
NEUTS SEG NFR BLD: 1.64 K/UL (ref 1.8–7.3)
PLATELET # BLD AUTO: 361 K/UL (ref 130–450)
PMV BLD AUTO: 10.4 FL (ref 7–12)
RBC # BLD AUTO: 3.46 M/UL (ref 3.5–5.5)
TIBC SERPL-MCNC: 182 UG/DL (ref 250–450)
WBC OTHER # BLD: 3.4 K/UL (ref 4.5–11.5)

## 2023-11-02 PROCEDURE — 3078F DIAST BP <80 MM HG: CPT | Performed by: INTERNAL MEDICINE

## 2023-11-02 PROCEDURE — G8427 DOCREV CUR MEDS BY ELIG CLIN: HCPCS | Performed by: INTERNAL MEDICINE

## 2023-11-02 PROCEDURE — 99214 OFFICE O/P EST MOD 30 MIN: CPT | Performed by: INTERNAL MEDICINE

## 2023-11-02 PROCEDURE — 3074F SYST BP LT 130 MM HG: CPT | Performed by: INTERNAL MEDICINE

## 2023-11-02 PROCEDURE — 82728 ASSAY OF FERRITIN: CPT

## 2023-11-02 PROCEDURE — 1036F TOBACCO NON-USER: CPT | Performed by: INTERNAL MEDICINE

## 2023-11-02 PROCEDURE — 1111F DSCHRG MED/CURRENT MED MERGE: CPT | Performed by: INTERNAL MEDICINE

## 2023-11-02 PROCEDURE — 36415 COLL VENOUS BLD VENIPUNCTURE: CPT

## 2023-11-02 PROCEDURE — 85025 COMPLETE CBC W/AUTO DIFF WBC: CPT

## 2023-11-02 PROCEDURE — G8420 CALC BMI NORM PARAMETERS: HCPCS | Performed by: INTERNAL MEDICINE

## 2023-11-02 PROCEDURE — 3017F COLORECTAL CA SCREEN DOC REV: CPT | Performed by: INTERNAL MEDICINE

## 2023-11-02 PROCEDURE — 83540 ASSAY OF IRON: CPT

## 2023-11-02 PROCEDURE — G8484 FLU IMMUNIZE NO ADMIN: HCPCS | Performed by: INTERNAL MEDICINE

## 2023-11-02 PROCEDURE — 83550 IRON BINDING TEST: CPT

## 2023-11-02 NOTE — PROGRESS NOTES
evaluation of anemia. The patient CBCD from 8/6/2023 was remarkable for a white count of 2.6, , ALC 1150, Hemoglobin 7.6, hematocrit 25.3, MCV 80.6, in May 2023 hemoglobin was 8.5G/DL, marked 20.3, MCV 84.2, in February 2023 hemoglobin was 7.6, hemoglobin 26, MCV 85.5, in November 2022 hemoglobin was 9.3, hematocrit 20.7, MCV 78.6, on 8/3/2023 ferritin was 161, iron 16, TIBC 177, TSAT 9%, vitamin B12 390, folate 14.7. The patient is feeling tired, and cold, she has constipation. She does have a history of gross hematuria, she was admitted to the hospital in August, she has ureteral stents, she is being followed by urology. She denies any melena or hematochezia, she does not remember a GI work-up, the records indicate that she had an EGD done with Dr. Joby Escamilla. The patient has microcytic anemia, secondary to iron deficiency, likely due to gross hematuria, she might have a component of chronic inflammation, the patient is symptomatic, and has constipation, recommended parenteral iron infusion, Venofer, the side effects and the schedule reviewed with the patient, further work-up was ordered to exclude other etiologies, the results were reviewed the patient, reticulocytes 2.3%, erythropoietin 25, SPEP is negative for monoclonal proteins, no zinc or copper deficiency, fit test is negative. The patient has leukopenia, which has been intermittent, could be reactive or drug-induced, the patient was admitted to the hospital with recurrent UTI, she received first as outpatient, labs reviewed, the patient had persistent anemia, hemoglobin 8.6G/DL, had improved from 7.9 upon discharge from the hospital, still with iron deficiency, will receive 2 additional doses of Venofer, will monitor her counts. If the patient has recurrent hematuria, will decrease the dose of the Eliquis 2.5 mg p.o. twice daily. RTC in 1 month. Thank you for allowing us to participate in the care of Mrs. Arianna Del Rio.     Art Stevenson MD

## 2023-11-06 ENCOUNTER — OFFICE VISIT (OUTPATIENT)
Dept: FAMILY MEDICINE CLINIC | Age: 63
End: 2023-11-06
Payer: MEDICARE

## 2023-11-06 VITALS
BODY MASS INDEX: 21.85 KG/M2 | WEIGHT: 128 LBS | DIASTOLIC BLOOD PRESSURE: 74 MMHG | SYSTOLIC BLOOD PRESSURE: 134 MMHG | HEIGHT: 64 IN | HEART RATE: 112 BPM | OXYGEN SATURATION: 99 % | RESPIRATION RATE: 20 BRPM

## 2023-11-06 DIAGNOSIS — R63.0 ANOREXIA: ICD-10-CM

## 2023-11-06 DIAGNOSIS — E11.51 TYPE 2 DIABETES MELLITUS WITH DIABETIC PERIPHERAL ANGIOPATHY WITHOUT GANGRENE, WITHOUT LONG-TERM CURRENT USE OF INSULIN (HCC): Primary | ICD-10-CM

## 2023-11-06 PROCEDURE — G8420 CALC BMI NORM PARAMETERS: HCPCS | Performed by: FAMILY MEDICINE

## 2023-11-06 PROCEDURE — 1036F TOBACCO NON-USER: CPT | Performed by: FAMILY MEDICINE

## 2023-11-06 PROCEDURE — 3044F HG A1C LEVEL LT 7.0%: CPT | Performed by: FAMILY MEDICINE

## 2023-11-06 PROCEDURE — G8427 DOCREV CUR MEDS BY ELIG CLIN: HCPCS | Performed by: FAMILY MEDICINE

## 2023-11-06 PROCEDURE — 1111F DSCHRG MED/CURRENT MED MERGE: CPT | Performed by: FAMILY MEDICINE

## 2023-11-06 PROCEDURE — 3078F DIAST BP <80 MM HG: CPT | Performed by: FAMILY MEDICINE

## 2023-11-06 PROCEDURE — G8484 FLU IMMUNIZE NO ADMIN: HCPCS | Performed by: FAMILY MEDICINE

## 2023-11-06 PROCEDURE — 3075F SYST BP GE 130 - 139MM HG: CPT | Performed by: FAMILY MEDICINE

## 2023-11-06 PROCEDURE — 99214 OFFICE O/P EST MOD 30 MIN: CPT | Performed by: FAMILY MEDICINE

## 2023-11-06 PROCEDURE — 3017F COLORECTAL CA SCREEN DOC REV: CPT | Performed by: FAMILY MEDICINE

## 2023-11-06 PROCEDURE — 2022F DILAT RTA XM EVC RTNOPTHY: CPT | Performed by: FAMILY MEDICINE

## 2023-11-06 RX ORDER — TRIAMCINOLONE ACETONIDE 1 MG/G
CREAM TOPICAL
Qty: 80 G | Refills: 0 | Status: ON HOLD | OUTPATIENT
Start: 2023-11-06

## 2023-11-06 RX ORDER — MIRTAZAPINE 7.5 MG/1
7.5 TABLET, FILM COATED ORAL NIGHTLY
Qty: 30 TABLET | Refills: 5 | Status: ON HOLD | OUTPATIENT
Start: 2023-11-06

## 2023-11-06 RX ORDER — CARVEDILOL 12.5 MG/1
12.5 TABLET ORAL 2 TIMES DAILY WITH MEALS
Qty: 60 TABLET | Refills: 5 | Status: ON HOLD | OUTPATIENT
Start: 2023-11-06

## 2023-11-06 RX ORDER — ISOSORBIDE MONONITRATE 30 MG/1
30 TABLET, EXTENDED RELEASE ORAL DAILY
Qty: 30 TABLET | Refills: 3 | Status: ON HOLD | OUTPATIENT
Start: 2023-11-06

## 2023-11-06 RX ORDER — GABAPENTIN 100 MG/1
100 CAPSULE ORAL 2 TIMES DAILY
Qty: 180 CAPSULE | Refills: 1 | Status: ON HOLD | OUTPATIENT
Start: 2023-11-06 | End: 2024-11-06

## 2023-11-06 RX ORDER — CHOLECALCIFEROL (VITAMIN D3) 50 MCG
2000 TABLET ORAL DAILY
Qty: 60 TABLET | Refills: 0 | Status: SHIPPED
Start: 2023-11-06 | End: 2023-11-06

## 2023-11-06 RX ORDER — CHOLECALCIFEROL (VITAMIN D3) 50 MCG
2000 TABLET ORAL DAILY
Qty: 90 TABLET | Refills: 1 | Status: ON HOLD | OUTPATIENT
Start: 2023-11-06

## 2023-11-06 RX ORDER — ATORVASTATIN CALCIUM 20 MG/1
20 TABLET, FILM COATED ORAL DAILY
Qty: 90 TABLET | Refills: 1 | Status: ON HOLD | OUTPATIENT
Start: 2023-11-06

## 2023-11-06 RX ORDER — MEGESTROL ACETATE 40 MG/1
40 TABLET ORAL DAILY PRN
Qty: 30 TABLET | Refills: 3 | Status: ON HOLD | OUTPATIENT
Start: 2023-11-06

## 2023-11-06 ASSESSMENT — ENCOUNTER SYMPTOMS
DIARRHEA: 0
NAUSEA: 0
SHORTNESS OF BREATH: 0
VOMITING: 0
ROS SKIN COMMENTS: DRY AND ITCHY

## 2023-11-06 NOTE — PROGRESS NOTES
OFFICE PROGRESS NOTE      SUBJECTIVE:        Patient ID:   Aramis German is a 58 y.o. female whopresents for   Chief Complaint   Patient presents with    Diabetes     No appetite  x 3 days  , needs refill on pill make her eat I couldn't find it , had a1c  in hospital 5.3           HPI:   Patient is here to follow up on diabetes. Fasting blood sugars:120-130's Midday blood sugars: not checking. Patient checks blood glucose 1 times per day. Patient is following diabetic diet. Patient is a nonsmoker. Last ophthalmology visit: 5/2022. Recent lab results reviewed including CMP, CBC, and hemoglobin A1C which are remarkable for iron deficiency anemia. Patient has had decreased appetite the past few days. Concerned about weight loss. Prior to Admission medications    Medication Sig Start Date End Date Taking? Authorizing Provider   isosorbide mononitrate (IMDUR) 30 MG extended release tablet Take 1 tablet by mouth daily 11/6/23  Yes Ashley Harrington MD   mirtazapine (REMERON) 7.5 MG tablet Take 1 tablet by mouth nightly 11/6/23  Yes Ashley Harrington MD   carvedilol (COREG) 12.5 MG tablet Take 1 tablet by mouth 2 times daily (with meals) 11/6/23  Yes Ashley Harrington MD   gabapentin (NEURONTIN) 100 MG capsule Take 1 capsule by mouth 2 times daily. 11/6/23 11/6/24 Yes Ashley Harrington MD   triamcinolone (KENALOG) 0.1 % cream Apply topically 2 times daily for up to 2 weeks then as needed for flareups.  11/6/23  Yes Ashley Harrington MD   megestrol (MEGACE) 40 MG tablet Take 1 tablet by mouth daily as needed (Poor Appetite) 11/6/23  Yes Ashley Harrington MD   atorvastatin (LIPITOR) 20 MG tablet Take 1 tablet by mouth daily 11/6/23  Yes Ashley Harrington MD   vitamin D (CHOLECALCIFEROL) 50 MCG (2000 UT) TABS tablet Take 1 tablet by mouth daily 11/6/23  Yes Ashley Harrington MD   vitamin B-6 (PYRIDOXINE) 50 MG tablet take 1 tablet

## 2023-11-07 ENCOUNTER — HOSPITAL ENCOUNTER (OUTPATIENT)
Dept: INFUSION THERAPY | Age: 63
Discharge: HOME | End: 2023-11-07
Payer: MEDICARE

## 2023-11-07 VITALS
OXYGEN SATURATION: 97 % | SYSTOLIC BLOOD PRESSURE: 152 MMHG | DIASTOLIC BLOOD PRESSURE: 84 MMHG | RESPIRATION RATE: 16 BRPM | TEMPERATURE: 97.3 F | HEART RATE: 101 BPM

## 2023-11-07 DIAGNOSIS — D50.9 MICROCYTIC HYPOCHROMIC ANEMIA: ICD-10-CM

## 2023-11-07 DIAGNOSIS — D62 ACUTE ON CHRONIC BLOOD LOSS ANEMIA: Primary | ICD-10-CM

## 2023-11-07 PROCEDURE — 2580000003 HC RX 258: Performed by: INTERNAL MEDICINE

## 2023-11-07 PROCEDURE — 96365 THER/PROPH/DIAG IV INF INIT: CPT

## 2023-11-07 PROCEDURE — 96361 HYDRATE IV INFUSION ADD-ON: CPT

## 2023-11-07 PROCEDURE — 6360000002 HC RX W HCPCS: Performed by: INTERNAL MEDICINE

## 2023-11-07 PROCEDURE — 96374 THER/PROPH/DIAG INJ IV PUSH: CPT

## 2023-11-07 RX ORDER — SODIUM CHLORIDE 9 MG/ML
5-250 INJECTION, SOLUTION INTRAVENOUS PRN
OUTPATIENT
Start: 2023-11-07

## 2023-11-07 RX ORDER — HEPARIN 100 UNIT/ML
500 SYRINGE INTRAVENOUS PRN
OUTPATIENT
Start: 2023-11-07

## 2023-11-07 RX ORDER — EPINEPHRINE 1 MG/ML
0.3 INJECTION, SOLUTION, CONCENTRATE INTRAVENOUS PRN
OUTPATIENT
Start: 2023-11-07

## 2023-11-07 RX ORDER — ONDANSETRON 2 MG/ML
8 INJECTION INTRAMUSCULAR; INTRAVENOUS
OUTPATIENT
Start: 2023-11-07

## 2023-11-07 RX ORDER — ACETAMINOPHEN 325 MG/1
650 TABLET ORAL
OUTPATIENT
Start: 2023-11-07

## 2023-11-07 RX ORDER — SODIUM CHLORIDE 0.9 % (FLUSH) 0.9 %
5-40 SYRINGE (ML) INJECTION PRN
OUTPATIENT
Start: 2023-11-07

## 2023-11-07 RX ORDER — DIPHENHYDRAMINE HYDROCHLORIDE 50 MG/ML
50 INJECTION INTRAMUSCULAR; INTRAVENOUS
OUTPATIENT
Start: 2023-11-07

## 2023-11-07 RX ORDER — SODIUM CHLORIDE 9 MG/ML
INJECTION, SOLUTION INTRAVENOUS CONTINUOUS
OUTPATIENT
Start: 2023-11-07

## 2023-11-07 RX ORDER — FAMOTIDINE 10 MG/ML
20 INJECTION, SOLUTION INTRAVENOUS
OUTPATIENT
Start: 2023-11-07

## 2023-11-07 RX ORDER — SODIUM CHLORIDE 9 MG/ML
5-250 INJECTION, SOLUTION INTRAVENOUS PRN
Status: DISCONTINUED | OUTPATIENT
Start: 2023-11-07 | End: 2023-11-08 | Stop reason: HOSPADM

## 2023-11-07 RX ADMIN — SODIUM CHLORIDE 20 ML/HR: 9 INJECTION, SOLUTION INTRAVENOUS at 14:22

## 2023-11-07 RX ADMIN — IRON SUCROSE 200 MG: 20 INJECTION, SOLUTION INTRAVENOUS at 15:12

## 2023-11-07 ASSESSMENT — PAIN DESCRIPTION - ORIENTATION: ORIENTATION: RIGHT;LEFT

## 2023-11-07 ASSESSMENT — PAIN DESCRIPTION - FREQUENCY: FREQUENCY: CONTINUOUS

## 2023-11-07 ASSESSMENT — PAIN DESCRIPTION - PAIN TYPE: TYPE: CHRONIC PAIN

## 2023-11-07 ASSESSMENT — PAIN DESCRIPTION - DESCRIPTORS: DESCRIPTORS: ACHING

## 2023-11-07 ASSESSMENT — PAIN SCALES - GENERAL: PAINLEVEL_OUTOF10: 7

## 2023-11-07 ASSESSMENT — PAIN DESCRIPTION - LOCATION: LOCATION: LEG

## 2023-11-10 ENCOUNTER — TELEPHONE (OUTPATIENT)
Dept: FAMILY MEDICINE CLINIC | Age: 63
End: 2023-11-10

## 2023-11-10 ENCOUNTER — HOSPITAL ENCOUNTER (INPATIENT)
Age: 63
LOS: 10 days | Discharge: HOME | DRG: 659 | End: 2023-11-20
Attending: EMERGENCY MEDICINE | Admitting: INTERNAL MEDICINE
Payer: MEDICARE

## 2023-11-10 ENCOUNTER — APPOINTMENT (OUTPATIENT)
Dept: CT IMAGING | Age: 63
DRG: 659 | End: 2023-11-10
Payer: MEDICARE

## 2023-11-10 DIAGNOSIS — N12 PYELONEPHRITIS: ICD-10-CM

## 2023-11-10 DIAGNOSIS — N17.9 AKI (ACUTE KIDNEY INJURY) (HCC): ICD-10-CM

## 2023-11-10 DIAGNOSIS — N39.0 SEPSIS SECONDARY TO UTI (HCC): ICD-10-CM

## 2023-11-10 DIAGNOSIS — N13.30 BILATERAL HYDRONEPHROSIS: ICD-10-CM

## 2023-11-10 DIAGNOSIS — N10 ACUTE PYELONEPHRITIS: Primary | ICD-10-CM

## 2023-11-10 DIAGNOSIS — E86.0 DEHYDRATION: ICD-10-CM

## 2023-11-10 DIAGNOSIS — A41.9 SEPSIS WITH ACUTE RENAL FAILURE WITHOUT SEPTIC SHOCK, DUE TO UNSPECIFIED ORGANISM, UNSPECIFIED ACUTE RENAL FAILURE TYPE (HCC): ICD-10-CM

## 2023-11-10 DIAGNOSIS — N17.9 SEPSIS WITH ACUTE RENAL FAILURE WITHOUT SEPTIC SHOCK, DUE TO UNSPECIFIED ORGANISM, UNSPECIFIED ACUTE RENAL FAILURE TYPE (HCC): ICD-10-CM

## 2023-11-10 DIAGNOSIS — A41.9 SEPSIS SECONDARY TO UTI (HCC): ICD-10-CM

## 2023-11-10 DIAGNOSIS — N13.30 HYDRONEPHROSIS, UNSPECIFIED HYDRONEPHROSIS TYPE: ICD-10-CM

## 2023-11-10 DIAGNOSIS — R65.20 SEPSIS WITH ACUTE RENAL FAILURE WITHOUT SEPTIC SHOCK, DUE TO UNSPECIFIED ORGANISM, UNSPECIFIED ACUTE RENAL FAILURE TYPE (HCC): ICD-10-CM

## 2023-11-10 LAB
ALBUMIN SERPL-MCNC: 3.3 G/DL (ref 3.5–5.2)
ALP SERPL-CCNC: 71 U/L (ref 35–104)
ALT SERPL-CCNC: 10 U/L (ref 0–32)
ANION GAP SERPL CALCULATED.3IONS-SCNC: 12 MMOL/L (ref 7–16)
AST SERPL-CCNC: 23 U/L (ref 0–31)
BACTERIA URNS QL MICRO: ABNORMAL
BASOPHILS # BLD: 0.03 K/UL (ref 0–0.2)
BASOPHILS NFR BLD: 1 % (ref 0–2)
BILIRUB SERPL-MCNC: 0.5 MG/DL (ref 0–1.2)
BILIRUB UR QL STRIP: NEGATIVE
BUN SERPL-MCNC: 61 MG/DL (ref 6–23)
CALCIUM SERPL-MCNC: 9.1 MG/DL (ref 8.6–10.2)
CHLORIDE SERPL-SCNC: 108 MMOL/L (ref 98–107)
CLARITY UR: ABNORMAL
CO2 SERPL-SCNC: 19 MMOL/L (ref 22–29)
COLOR UR: YELLOW
CREAT SERPL-MCNC: 2.7 MG/DL (ref 0.5–1)
EOSINOPHIL # BLD: 0.06 K/UL (ref 0.05–0.5)
EOSINOPHILS RELATIVE PERCENT: 1 % (ref 0–6)
EPI CELLS #/AREA URNS HPF: ABNORMAL /HPF
ERYTHROCYTE [DISTWIDTH] IN BLOOD BY AUTOMATED COUNT: 18.6 % (ref 12–16)
GFR SERPL CREATININE-BSD FRML MDRD: 19 ML/MIN/1.73M2
GLUCOSE SERPL-MCNC: 120 MG/DL (ref 74–99)
GLUCOSE UR STRIP-MCNC: NEGATIVE MG/DL
HCT VFR BLD AUTO: 26.2 % (ref 34–48)
HGB BLD-MCNC: 7.7 G/DL (ref 11.5–15.5)
HGB UR QL STRIP.AUTO: ABNORMAL
IMM GRANULOCYTES # BLD AUTO: 0.28 K/UL (ref 0–0.58)
IMM GRANULOCYTES NFR BLD: 4 % (ref 0–5)
KETONES UR STRIP-MCNC: NEGATIVE MG/DL
LACTATE BLDV-SCNC: 0.8 MMOL/L (ref 0.5–1.9)
LEUKOCYTE ESTERASE UR QL STRIP: ABNORMAL
LYMPHOCYTES NFR BLD: 1.41 K/UL (ref 1.5–4)
LYMPHOCYTES RELATIVE PERCENT: 22 % (ref 20–42)
MAGNESIUM SERPL-MCNC: 2.1 MG/DL (ref 1.6–2.6)
MCH RBC QN AUTO: 24.6 PG (ref 26–35)
MCHC RBC AUTO-ENTMCNC: 29.4 G/DL (ref 32–34.5)
MCV RBC AUTO: 83.7 FL (ref 80–99.9)
MONOCYTES NFR BLD: 0.68 K/UL (ref 0.1–0.95)
MONOCYTES NFR BLD: 10 % (ref 2–12)
NEUTROPHILS NFR BLD: 62 % (ref 43–80)
NEUTS SEG NFR BLD: 4.05 K/UL (ref 1.8–7.3)
NITRITE UR QL STRIP: NEGATIVE
PH UR STRIP: 5.5 [PH] (ref 5–9)
PLATELET # BLD AUTO: 433 K/UL (ref 130–450)
PMV BLD AUTO: 10.2 FL (ref 7–12)
POTASSIUM SERPL-SCNC: 4.3 MMOL/L (ref 3.5–5)
PROT SERPL-MCNC: 8.6 G/DL (ref 6.4–8.3)
PROT UR STRIP-MCNC: >=300 MG/DL
RBC # BLD AUTO: 3.13 M/UL (ref 3.5–5.5)
RBC #/AREA URNS HPF: ABNORMAL /HPF
SODIUM SERPL-SCNC: 139 MMOL/L (ref 132–146)
SP GR UR STRIP: 1.01 (ref 1–1.03)
UROBILINOGEN UR STRIP-ACNC: 0.2 EU/DL (ref 0–1)
WBC #/AREA URNS HPF: ABNORMAL /HPF
WBC OTHER # BLD: 6.5 K/UL (ref 4.5–11.5)

## 2023-11-10 PROCEDURE — 87154 CUL TYP ID BLD PTHGN 6+ TRGT: CPT

## 2023-11-10 PROCEDURE — 6360000002 HC RX W HCPCS: Performed by: EMERGENCY MEDICINE

## 2023-11-10 PROCEDURE — 6370000000 HC RX 637 (ALT 250 FOR IP): Performed by: INTERNAL MEDICINE

## 2023-11-10 PROCEDURE — 6370000000 HC RX 637 (ALT 250 FOR IP): Performed by: EMERGENCY MEDICINE

## 2023-11-10 PROCEDURE — 2580000003 HC RX 258: Performed by: EMERGENCY MEDICINE

## 2023-11-10 PROCEDURE — 87086 URINE CULTURE/COLONY COUNT: CPT

## 2023-11-10 PROCEDURE — 51701 INSERT BLADDER CATHETER: CPT

## 2023-11-10 PROCEDURE — 87040 BLOOD CULTURE FOR BACTERIA: CPT

## 2023-11-10 PROCEDURE — 80053 COMPREHEN METABOLIC PANEL: CPT

## 2023-11-10 PROCEDURE — 83735 ASSAY OF MAGNESIUM: CPT

## 2023-11-10 PROCEDURE — 96374 THER/PROPH/DIAG INJ IV PUSH: CPT

## 2023-11-10 PROCEDURE — 83605 ASSAY OF LACTIC ACID: CPT

## 2023-11-10 PROCEDURE — 96361 HYDRATE IV INFUSION ADD-ON: CPT

## 2023-11-10 PROCEDURE — 76937 US GUIDE VASCULAR ACCESS: CPT

## 2023-11-10 PROCEDURE — 99285 EMERGENCY DEPT VISIT HI MDM: CPT

## 2023-11-10 PROCEDURE — 74176 CT ABD & PELVIS W/O CONTRAST: CPT

## 2023-11-10 PROCEDURE — 96375 TX/PRO/DX INJ NEW DRUG ADDON: CPT

## 2023-11-10 PROCEDURE — 81001 URINALYSIS AUTO W/SCOPE: CPT

## 2023-11-10 PROCEDURE — 86403 PARTICLE AGGLUT ANTBDY SCRN: CPT

## 2023-11-10 PROCEDURE — 85025 COMPLETE CBC W/AUTO DIFF WBC: CPT

## 2023-11-10 PROCEDURE — 36415 COLL VENOUS BLD VENIPUNCTURE: CPT

## 2023-11-10 PROCEDURE — 2060000000 HC ICU INTERMEDIATE R&B

## 2023-11-10 RX ORDER — ACETAMINOPHEN 325 MG/1
650 TABLET ORAL EVERY 6 HOURS PRN
Status: DISCONTINUED | OUTPATIENT
Start: 2023-11-10 | End: 2023-11-20 | Stop reason: HOSPADM

## 2023-11-10 RX ORDER — GLUCAGON 1 MG/ML
1 KIT INJECTION PRN
Status: DISCONTINUED | OUTPATIENT
Start: 2023-11-10 | End: 2023-11-20 | Stop reason: HOSPADM

## 2023-11-10 RX ORDER — FENTANYL CITRATE 0.05 MG/ML
25 INJECTION, SOLUTION INTRAMUSCULAR; INTRAVENOUS EVERY 6 HOURS PRN
Status: DISCONTINUED | OUTPATIENT
Start: 2023-11-10 | End: 2023-11-20 | Stop reason: HOSPADM

## 2023-11-10 RX ORDER — SODIUM CHLORIDE 0.9 % (FLUSH) 0.9 %
5-40 SYRINGE (ML) INJECTION PRN
Status: DISCONTINUED | OUTPATIENT
Start: 2023-11-10 | End: 2023-11-20 | Stop reason: HOSPADM

## 2023-11-10 RX ORDER — DEXTROSE MONOHYDRATE 100 MG/ML
INJECTION, SOLUTION INTRAVENOUS CONTINUOUS PRN
Status: DISCONTINUED | OUTPATIENT
Start: 2023-11-10 | End: 2023-11-20 | Stop reason: HOSPADM

## 2023-11-10 RX ORDER — POLYETHYLENE GLYCOL 3350 17 G/17G
17 POWDER, FOR SOLUTION ORAL DAILY PRN
Status: DISCONTINUED | OUTPATIENT
Start: 2023-11-10 | End: 2023-11-14

## 2023-11-10 RX ORDER — 0.9 % SODIUM CHLORIDE 0.9 %
1000 INTRAVENOUS SOLUTION INTRAVENOUS ONCE
Status: DISCONTINUED | OUTPATIENT
Start: 2023-11-10 | End: 2023-11-20 | Stop reason: HOSPADM

## 2023-11-10 RX ORDER — MIRTAZAPINE 15 MG/1
7.5 TABLET, FILM COATED ORAL NIGHTLY
Status: DISCONTINUED | OUTPATIENT
Start: 2023-11-10 | End: 2023-11-20 | Stop reason: HOSPADM

## 2023-11-10 RX ORDER — SODIUM CHLORIDE 0.9 % (FLUSH) 0.9 %
5-40 SYRINGE (ML) INJECTION EVERY 12 HOURS SCHEDULED
Status: DISCONTINUED | OUTPATIENT
Start: 2023-11-10 | End: 2023-11-20 | Stop reason: HOSPADM

## 2023-11-10 RX ORDER — GABAPENTIN 100 MG/1
100 CAPSULE ORAL 2 TIMES DAILY
Status: DISCONTINUED | OUTPATIENT
Start: 2023-11-10 | End: 2023-11-20 | Stop reason: HOSPADM

## 2023-11-10 RX ORDER — SODIUM CHLORIDE 9 MG/ML
INJECTION, SOLUTION INTRAVENOUS PRN
Status: DISCONTINUED | OUTPATIENT
Start: 2023-11-10 | End: 2023-11-20 | Stop reason: HOSPADM

## 2023-11-10 RX ORDER — ACETAMINOPHEN 500 MG
1000 TABLET ORAL ONCE
Status: COMPLETED | OUTPATIENT
Start: 2023-11-10 | End: 2023-11-10

## 2023-11-10 RX ORDER — 0.9 % SODIUM CHLORIDE 0.9 %
1000 INTRAVENOUS SOLUTION INTRAVENOUS ONCE
Status: COMPLETED | OUTPATIENT
Start: 2023-11-10 | End: 2023-11-10

## 2023-11-10 RX ORDER — ACETAMINOPHEN 650 MG/1
650 SUPPOSITORY RECTAL EVERY 6 HOURS PRN
Status: DISCONTINUED | OUTPATIENT
Start: 2023-11-10 | End: 2023-11-20 | Stop reason: HOSPADM

## 2023-11-10 RX ADMIN — ACETAMINOPHEN 650 MG: 325 TABLET ORAL at 20:35

## 2023-11-10 RX ADMIN — SODIUM CHLORIDE 1000 ML: 9 INJECTION, SOLUTION INTRAVENOUS at 12:23

## 2023-11-10 RX ADMIN — GABAPENTIN 100 MG: 100 CAPSULE ORAL at 22:08

## 2023-11-10 RX ADMIN — ACETAMINOPHEN 1000 MG: 500 TABLET ORAL at 15:07

## 2023-11-10 RX ADMIN — VANCOMYCIN HYDROCHLORIDE 1250 MG: 10 INJECTION, POWDER, LYOPHILIZED, FOR SOLUTION INTRAVENOUS at 18:50

## 2023-11-10 RX ADMIN — WATER 1000 MG: 1 INJECTION INTRAMUSCULAR; INTRAVENOUS; SUBCUTANEOUS at 18:47

## 2023-11-10 NOTE — ED PROVIDER NOTES
HPI:  11/10/23,   Time: 11:37 AM ZAK Diane is a 58 y.o. female presenting to the ED for chief complaint: Patient brought in by her ex-, with a history of having a nurse check on her today, suspecting that her urine was foul smelling, was advised by PCP office to go to urgent care to be evaluated. Patient recently hospitalized last month for similar issues. Patient complaining of worsening fatigue  Patient denies fever chest pain shortness of breath palpitations nausea vomiting or diarrhea  ROS:   Pertinent positives and negatives are stated within HPI, all other systems reviewed and are negative.  --------------------------------------------- PAST HISTORY ---------------------------------------------  Past Medical History:  has a past medical history of CAD (coronary artery disease), CHF (congestive heart failure) ([address]), Diabetic peripheral neuropathy associated with type 2 diabetes mellitus ([address]), Epigastric hernia, Hyperlipidemia, Hypertension, Kidney stone, Schizophrenia ([address]), and Type 2 diabetes mellitus with diabetic polyneuropathy, with long-term current use of insulin ([address]). Past Surgical History:  has a past surgical history that includes Ovary removal (Right); Ovary removal (2014); ovarian cyst removal; Colonoscopy (08/2016); other surgical history (11/04/2016); hernia repair; CYSTOSCOPY INSERTION / REMOVAL STENT / STONE (Bilateral, 10/28/2019); Upper gastrointestinal endoscopy (N/A, 10/29/2019); Leg amputation below knee (Left, 2/29/2020); CYSTOSCOPY INSERTION / REMOVAL STENT / STONE (Bilateral, 5/19/2020); CYSTOSCOPY INSERTION / REMOVAL STENT / STONE (Bilateral, 1/18/2021); Cystoscopy (N/A, 9/7/2021); Cardiac catheterization (09/30/2016); Bladder surgery (Bilateral, 5/2/2022); Femoral artery stent; and Bladder surgery (Left, 11/7/2022). Social History:  reports that she quit smoking about 8 years ago. Her smoking use included cigarettes.  She has a 5.00 pack-year smoking

## 2023-11-10 NOTE — ED NOTES
Pt picked up by EMT to be taken to [facility].  Daughter took wheelchair and coat, pt took prosthetic leg, shoes, and pants       Viviana Simms RN  11/10/23 7600

## 2023-11-10 NOTE — DISCHARGE INSTRUCTIONS
Call upon discharge to schedule a follow-up visit with Vineet Celeste/Ronny/Juventino ([facility]) at [phone]

## 2023-11-10 NOTE — TELEPHONE ENCOUNTER
[facility] nurse called to inform that patient appears to have another UTI with symptoms of foul smelling urine, patient appears to be \"off\" and her heart rate is in the 120s. I informed that Dr. Krystal Rubio is not in the ofc today and due to symptoms, patient should be taken to at the least, Urgent Care. René Nam was agreeable. She did make the statement that patient has been getting frequent UTIs. I informed her that I will let Dr. Krystal Rubio know.     Last seen 11/6/2023  Next appt 4/23/2024

## 2023-11-11 LAB
ALBUMIN SERPL-MCNC: 3 G/DL (ref 3.5–5.2)
ALP SERPL-CCNC: 62 U/L (ref 35–104)
ALT SERPL-CCNC: 8 U/L (ref 0–32)
ANION GAP SERPL CALCULATED.3IONS-SCNC: 12 MMOL/L (ref 7–16)
AST SERPL-CCNC: 15 U/L (ref 0–31)
BASOPHILS # BLD: 0.03 K/UL (ref 0–0.2)
BASOPHILS NFR BLD: 1 % (ref 0–2)
BILIRUB SERPL-MCNC: 0.3 MG/DL (ref 0–1.2)
BUN SERPL-MCNC: 61 MG/DL (ref 6–23)
CALCIUM SERPL-MCNC: 8.9 MG/DL (ref 8.6–10.2)
CHLORIDE SERPL-SCNC: 109 MMOL/L (ref 98–107)
CO2 SERPL-SCNC: 19 MMOL/L (ref 22–29)
CREAT SERPL-MCNC: 2.7 MG/DL (ref 0.5–1)
DATE LAST DOSE: NORMAL
EOSINOPHIL # BLD: 0.11 K/UL (ref 0.05–0.5)
EOSINOPHILS RELATIVE PERCENT: 2 % (ref 0–6)
ERYTHROCYTE [DISTWIDTH] IN BLOOD BY AUTOMATED COUNT: 18.5 % (ref 11.5–15)
GFR SERPL CREATININE-BSD FRML MDRD: 19 ML/MIN/1.73M2
GLUCOSE BLD-MCNC: 127 MG/DL (ref 74–99)
GLUCOSE BLD-MCNC: 155 MG/DL (ref 74–99)
GLUCOSE BLD-MCNC: 178 MG/DL (ref 74–99)
GLUCOSE BLD-MCNC: 188 MG/DL (ref 74–99)
GLUCOSE SERPL-MCNC: 117 MG/DL (ref 74–99)
HCT VFR BLD AUTO: 23.6 % (ref 34–48)
HGB BLD-MCNC: 7.1 G/DL (ref 11.5–15.5)
IMM GRANULOCYTES # BLD AUTO: 0.14 K/UL (ref 0–0.58)
IMM GRANULOCYTES NFR BLD: 3 % (ref 0–5)
LYMPHOCYTES NFR BLD: 1.02 K/UL (ref 1.5–4)
LYMPHOCYTES RELATIVE PERCENT: 20 % (ref 20–42)
MAGNESIUM SERPL-MCNC: 2.2 MG/DL (ref 1.6–2.6)
MCH RBC QN AUTO: 24.4 PG (ref 26–35)
MCHC RBC AUTO-ENTMCNC: 30.1 G/DL (ref 32–34.5)
MCV RBC AUTO: 81.1 FL (ref 80–99.9)
MONOCYTES NFR BLD: 0.6 K/UL (ref 0.1–0.95)
MONOCYTES NFR BLD: 12 % (ref 2–12)
NEUTROPHILS NFR BLD: 64 % (ref 43–80)
NEUTS SEG NFR BLD: 3.33 K/UL (ref 1.8–7.3)
PHOSPHATE SERPL-MCNC: 3.2 MG/DL (ref 2.5–4.5)
PLATELET # BLD AUTO: 448 K/UL (ref 130–450)
PMV BLD AUTO: 10.8 FL (ref 7–12)
POTASSIUM SERPL-SCNC: 4.2 MMOL/L (ref 3.5–5)
PROCALCITONIN SERPL-MCNC: 2.25 NG/ML (ref 0–0.08)
PROT SERPL-MCNC: 8.1 G/DL (ref 6.4–8.3)
RBC # BLD AUTO: 2.91 M/UL (ref 3.5–5.5)
SODIUM SERPL-SCNC: 140 MMOL/L (ref 132–146)
T4 FREE SERPL-MCNC: 1.1 NG/DL (ref 0.9–1.7)
TME LAST DOSE: NORMAL H
TSH SERPL DL<=0.05 MIU/L-ACNC: 0.01 UIU/ML (ref 0.27–4.2)
VANCOMYCIN DOSE: NORMAL MG
VANCOMYCIN SERPL-MCNC: 19.2 UG/ML (ref 5–40)
WBC OTHER # BLD: 5.2 K/UL (ref 4.5–11.5)

## 2023-11-11 PROCEDURE — 2060000000 HC ICU INTERMEDIATE R&B

## 2023-11-11 PROCEDURE — 80053 COMPREHEN METABOLIC PANEL: CPT

## 2023-11-11 PROCEDURE — 6370000000 HC RX 637 (ALT 250 FOR IP): Performed by: SPECIALIST

## 2023-11-11 PROCEDURE — 84145 PROCALCITONIN (PCT): CPT

## 2023-11-11 PROCEDURE — 6370000000 HC RX 637 (ALT 250 FOR IP): Performed by: INTERNAL MEDICINE

## 2023-11-11 PROCEDURE — 6370000000 HC RX 637 (ALT 250 FOR IP)

## 2023-11-11 PROCEDURE — 84100 ASSAY OF PHOSPHORUS: CPT

## 2023-11-11 PROCEDURE — 82962 GLUCOSE BLOOD TEST: CPT

## 2023-11-11 PROCEDURE — 36415 COLL VENOUS BLD VENIPUNCTURE: CPT

## 2023-11-11 PROCEDURE — 83735 ASSAY OF MAGNESIUM: CPT

## 2023-11-11 PROCEDURE — 84439 ASSAY OF FREE THYROXINE: CPT

## 2023-11-11 PROCEDURE — 6360000002 HC RX W HCPCS

## 2023-11-11 PROCEDURE — 84443 ASSAY THYROID STIM HORMONE: CPT

## 2023-11-11 PROCEDURE — 2580000003 HC RX 258

## 2023-11-11 PROCEDURE — 85025 COMPLETE CBC W/AUTO DIFF WBC: CPT

## 2023-11-11 PROCEDURE — 2580000003 HC RX 258: Performed by: INTERNAL MEDICINE

## 2023-11-11 PROCEDURE — 80202 ASSAY OF VANCOMYCIN: CPT

## 2023-11-11 PROCEDURE — 6360000002 HC RX W HCPCS: Performed by: INTERNAL MEDICINE

## 2023-11-11 RX ORDER — ENOXAPARIN SODIUM 100 MG/ML
1 INJECTION SUBCUTANEOUS DAILY
Status: DISCONTINUED | OUTPATIENT
Start: 2023-11-11 | End: 2023-11-19

## 2023-11-11 RX ORDER — ATORVASTATIN CALCIUM 20 MG/1
20 TABLET, FILM COATED ORAL NIGHTLY
Status: DISCONTINUED | OUTPATIENT
Start: 2023-11-11 | End: 2023-11-20 | Stop reason: HOSPADM

## 2023-11-11 RX ORDER — CARVEDILOL 6.25 MG/1
12.5 TABLET ORAL 2 TIMES DAILY WITH MEALS
Status: DISCONTINUED | OUTPATIENT
Start: 2023-11-11 | End: 2023-11-20 | Stop reason: HOSPADM

## 2023-11-11 RX ORDER — ISOSORBIDE MONONITRATE 30 MG/1
30 TABLET, EXTENDED RELEASE ORAL DAILY
Status: DISCONTINUED | OUTPATIENT
Start: 2023-11-11 | End: 2023-11-20 | Stop reason: HOSPADM

## 2023-11-11 RX ORDER — BISACODYL 5 MG/1
10 TABLET, DELAYED RELEASE ORAL ONCE
Status: COMPLETED | OUTPATIENT
Start: 2023-11-11 | End: 2023-11-11

## 2023-11-11 RX ORDER — INSULIN LISPRO 100 [IU]/ML
0-4 INJECTION, SOLUTION INTRAVENOUS; SUBCUTANEOUS NIGHTLY
Status: DISCONTINUED | OUTPATIENT
Start: 2023-11-11 | End: 2023-11-20 | Stop reason: HOSPADM

## 2023-11-11 RX ORDER — SORBITOL SOLUTION 70 %
60 SOLUTION, ORAL MISCELLANEOUS ONCE
Status: COMPLETED | OUTPATIENT
Start: 2023-11-11 | End: 2023-11-11

## 2023-11-11 RX ORDER — DOCUSATE SODIUM 100 MG/1
100 CAPSULE, LIQUID FILLED ORAL DAILY
Status: DISCONTINUED | OUTPATIENT
Start: 2023-11-11 | End: 2023-11-14

## 2023-11-11 RX ORDER — SODIUM CHLORIDE 9 MG/ML
INJECTION, SOLUTION INTRAVENOUS CONTINUOUS
Status: DISCONTINUED | OUTPATIENT
Start: 2023-11-11 | End: 2023-11-16

## 2023-11-11 RX ORDER — INSULIN LISPRO 100 [IU]/ML
0-8 INJECTION, SOLUTION INTRAVENOUS; SUBCUTANEOUS
Status: DISCONTINUED | OUTPATIENT
Start: 2023-11-11 | End: 2023-11-20 | Stop reason: HOSPADM

## 2023-11-11 RX ORDER — ENOXAPARIN SODIUM 100 MG/ML
1 INJECTION SUBCUTANEOUS 2 TIMES DAILY
Status: DISCONTINUED | OUTPATIENT
Start: 2023-11-12 | End: 2023-11-11 | Stop reason: DRUGHIGH

## 2023-11-11 RX ADMIN — ACETAMINOPHEN 650 MG: 325 TABLET ORAL at 08:49

## 2023-11-11 RX ADMIN — ISOSORBIDE MONONITRATE 30 MG: 30 TABLET, EXTENDED RELEASE ORAL at 08:18

## 2023-11-11 RX ADMIN — FENTANYL CITRATE 25 MCG: 0.05 INJECTION, SOLUTION INTRAMUSCULAR; INTRAVENOUS at 18:02

## 2023-11-11 RX ADMIN — SORBITOL SOLUTION (BULK) 60 ML: 70 SOLUTION at 11:59

## 2023-11-11 RX ADMIN — BISACODYL 10 MG: 5 TABLET, COATED ORAL at 11:59

## 2023-11-11 RX ADMIN — SODIUM CHLORIDE, PRESERVATIVE FREE 10 ML: 5 INJECTION INTRAVENOUS at 21:08

## 2023-11-11 RX ADMIN — DOCUSATE SODIUM 100 MG: 100 CAPSULE, LIQUID FILLED ORAL at 16:55

## 2023-11-11 RX ADMIN — VANCOMYCIN HYDROCHLORIDE 750 MG: 5 INJECTION, POWDER, LYOPHILIZED, FOR SOLUTION INTRAVENOUS at 16:51

## 2023-11-11 RX ADMIN — APIXABAN 5 MG: 5 TABLET, FILM COATED ORAL at 08:27

## 2023-11-11 RX ADMIN — SODIUM CHLORIDE: 9 INJECTION, SOLUTION INTRAVENOUS at 12:11

## 2023-11-11 RX ADMIN — GABAPENTIN 100 MG: 100 CAPSULE ORAL at 21:08

## 2023-11-11 RX ADMIN — FENTANYL CITRATE 25 MCG: 0.05 INJECTION, SOLUTION INTRAMUSCULAR; INTRAVENOUS at 11:59

## 2023-11-11 RX ADMIN — ENOXAPARIN SODIUM 60 MG: 100 INJECTION SUBCUTANEOUS at 18:02

## 2023-11-11 RX ADMIN — ATORVASTATIN CALCIUM 20 MG: 20 TABLET, FILM COATED ORAL at 21:08

## 2023-11-11 RX ADMIN — GABAPENTIN 100 MG: 100 CAPSULE ORAL at 08:18

## 2023-11-11 RX ADMIN — CARVEDILOL 12.5 MG: 6.25 TABLET, FILM COATED ORAL at 16:55

## 2023-11-11 RX ADMIN — CARVEDILOL 12.5 MG: 6.25 TABLET, FILM COATED ORAL at 08:19

## 2023-11-11 RX ADMIN — ACETAMINOPHEN 650 MG: 325 TABLET ORAL at 14:54

## 2023-11-11 RX ADMIN — SODIUM CHLORIDE, PRESERVATIVE FREE 10 ML: 5 INJECTION INTRAVENOUS at 08:19

## 2023-11-11 RX ADMIN — FENTANYL CITRATE 25 MCG: 0.05 INJECTION, SOLUTION INTRAMUSCULAR; INTRAVENOUS at 05:30

## 2023-11-11 RX ADMIN — MIRTAZAPINE 7.5 MG: 15 TABLET, FILM COATED ORAL at 21:08

## 2023-11-11 ASSESSMENT — PAIN DESCRIPTION - ORIENTATION
ORIENTATION: MID
ORIENTATION: RIGHT
ORIENTATION: MID
ORIENTATION: RIGHT;LEFT

## 2023-11-11 ASSESSMENT — PAIN SCALES - GENERAL
PAINLEVEL_OUTOF10: 8
PAINLEVEL_OUTOF10: 4
PAINLEVEL_OUTOF10: 2
PAINLEVEL_OUTOF10: 5
PAINLEVEL_OUTOF10: 0
PAINLEVEL_OUTOF10: 8
PAINLEVEL_OUTOF10: 6
PAINLEVEL_OUTOF10: 8

## 2023-11-11 ASSESSMENT — PAIN DESCRIPTION - LOCATION
LOCATION: ABDOMEN
LOCATION: ABDOMEN;HAND

## 2023-11-11 ASSESSMENT — PAIN DESCRIPTION - DESCRIPTORS
DESCRIPTORS: ACHING
DESCRIPTORS: ACHING;DISCOMFORT

## 2023-11-11 ASSESSMENT — PAIN SCALES - WONG BAKER
WONGBAKER_NUMERICALRESPONSE: 2
WONGBAKER_NUMERICALRESPONSE: 2

## 2023-11-11 ASSESSMENT — PAIN DESCRIPTION - ONSET: ONSET: ON-GOING

## 2023-11-11 ASSESSMENT — PAIN DESCRIPTION - PAIN TYPE
TYPE: CHRONIC PAIN
TYPE: CHRONIC PAIN

## 2023-11-11 ASSESSMENT — PAIN DESCRIPTION - FREQUENCY
FREQUENCY: CONTINUOUS
FREQUENCY: CONTINUOUS

## 2023-11-11 ASSESSMENT — PAIN - FUNCTIONAL ASSESSMENT: PAIN_FUNCTIONAL_ASSESSMENT: PREVENTS OR INTERFERES SOME ACTIVE ACTIVITIES AND ADLS

## 2023-11-11 NOTE — PROGRESS NOTES
Pharmacy Consultation Note  (Antibiotic Dosing and Monitoring)    Initial consult date: 11/11/23  Consulting physician/provider: Dr. Matthew Mukherjee  Drug: Vancomycin  Indication: UTI    Age/  Gender Height Weight IBW  Allergy Information   63 y.o./female @FLOW(11:first:1)@ @FLOW[14:FIRST:1@     Ideal body weight: 54.7 kg (120 lb 9.5 oz)  Adjusted ideal body weight: 56 kg (123 lb 8.9 oz)   Patient has no known allergies. Renal Function:  Recent Labs     11/10/23  1430 11/11/23  0526   BUN 61* 61*   CREATININE 2.7* 2.7*         Intake/Output Summary (Last 24 hours) at 11/11/2023 1335  Last data filed at 11/11/2023 0530  Gross per 24 hour   Intake --   Output 400 ml   Net -400 ml       Vancomycin Monitoring:  Trough:  No results for input(s): \"VANCOTROUGH\" in the last 72 hours. Random:    Recent Labs     11/11/23  0526   VANCORANDOM 19.2       No results for input(s): \"BLOODCULT2\" in the last 72 hours. Historical Cultures:  Organism   Date Value Ref Range Status   12/03/2020 Citrobacter freundii (A)  Final     No results for input(s): \"BC\" in the last 72 hours. Vancomycin Administration Times:  Recent vancomycin administrations                     vancomycin (VANCOCIN) 1,250 mg in sodium chloride 0.9 % 250 mL IVPB (mg) 1,250 mg New Bag 11/10/23 1850               Assessment:  Patient is a 58 y.o. female who has been initiated on vancomycin  Estimated Creatinine Clearance: 18 mL/min (A) (based on SCr of 2.7 mg/dL (H)).   To dose vancomycin, pharmacy will be utilizing dosing based off of levels because of patient's renal impairment/insufficiency  11/11: Random morning level = 19.2 mcg/mL    Plan:  Vancomycin 750 mg IV x 1  Repeat random level Monday morning  Will continue to monitor renal function   Pharmacy to follow      Марина Dan PharmD 11/11/2023 1:40 PM   [phone]

## 2023-11-11 NOTE — PROGRESS NOTES
Internal Medicine Progress Note    PARDEEP=[facility]    Otto Islas. Linn Wong., F.A.C.OMarianaI. Janes Page D.O., SACHIOBARBARA Herndon, MSN, APRN, NP-C  Quynh Gordillo. Grazyna Richey, MSN, APRN-CNP  Zane Benitez, MSN, APRN-CNP     Primary Care Physician: Diana Granados MD   Admitting Physician:  Vinicio Grimaldo DO  Admission date and time: 11/10/2023 11:00 AM    Room:  [address] [place] [address]  Admitting diagnosis: Dehydration [E86.0]  Acute pyelonephritis [N10]  LUAN (acute kidney injury) ([address]) [N17.9]  Sepsis secondary to UTI ([address]) [A41.9, N39.0]  Sepsis with acute renal failure without septic shock, due to unspecified organism, unspecified acute renal failure type ([address]) [A41.9, R65.20, N17.9]    Patient Name: Dana Blanton  MRN: [id number]    Date of Service: 11/11/2023     Subjective:  Livier Lara is a 58 y.o. female who was seen and examined today,11/11/2023, at the bedside. Patient is resting comfortably in bed with her daughter at the bedside. The patient has urinary stents and will need exchange in the future per urology. family present during my examination. Review of System:   Constitutional:   Denies fever or chills, weight loss or gain, positive malaise  HEENT:   Denies ear pain, sore throat, sinus or eye problems. Cardiovascular:   Denies any chest pain, irregular heartbeats, or palpitations. Respiratory:   Denies shortness of breath, coughing, sputum production, hemoptysis, or wheezing. Gastrointestinal:   Denies nausea, vomiting, diarrhea, or constipation. Denies any abdominal pain. Genitourinary:    Denies any urgency,  hematuria. Positive for increased frequency and dysuria  Extremities:   Denies lower extremity swelling, edema or cyanosis. Neurology:    Denies any headache or focal neurological deficits, Denies generalized weakness or memory difficulty.    Psch:   Denies being anxious or

## 2023-11-11 NOTE — H&P
Marital status:      Spouse name: Not on file    Number of children: 3    Years of education: Not on file    Highest education level: Not on file   Occupational History    Not on file   Tobacco Use    Smoking status: Former     Packs/day: 0.50     Years: 10.00     Additional pack years: 0.00     Total pack years: 5.00     Types: Cigarettes     Quit date: 3/23/2015     Years since quittin.6    Smokeless tobacco: Never    Tobacco comments:     quit smoking    Vaping Use    Vaping Use: Never used   Substance and Sexual Activity    Alcohol use: No    Drug use: No    Sexual activity: Not Currently   Other Topics Concern    Not on file   Social History Narrative    Not on file     Social Determinants of Health     Financial Resource Strain: Unknown (3/8/2023)    Overall Financial Resource Strain (CARDIA)     Difficulty of Paying Living Expenses: Patient refused   Food Insecurity: Unknown (2023)    Hunger Vital Sign     Worried About Running Out of Food in the Last Year: Patient refused     [address] in the Last Year: Patient refused   Transportation Needs: Unknown (2023)    [facility] Transportation     Lack of Transportation (Medical): Patient refused     Lack of Transportation (Non-Medical): Patient refused   Physical Activity: Sufficiently Active (2023)    Exercise Vital Sign     Days of Exercise per Week: 6 days     Minutes of Exercise per Session: 60 min   Stress: No Stress Concern Present (10/12/2022)    [address] [place] [address]     Feeling of Stress :  Only a little   Social Connections: Not on file   Intimate Partner Violence: Not on file   Housing Stability: Unknown (2023)    Housing Stability Vital Sign     Unable to Pay for Housing in the Last Year: Patient refused     Number of Places Lived in the Last Year: 1     Unstable Housing in the Last Year: Patient refused       ROS: Positive in bold  General:   Denies

## 2023-11-11 NOTE — PROGRESS NOTES
4 Eyes Skin Assessment     NAME:  Brittney Rivas  YOB: 1960  MEDICAL RECORD NUMBER:  [id number]    The patient is being assessed for  Admission    I agree that at least one RN has performed a thorough Head to Toe Skin Assessment on the patient. ALL assessment sites listed below have been assessed. Areas assessed by both nurses:    Head, Face, Ears, Shoulders, Back, Chest, Arms, Elbows, Hands, Sacrum. Buttock, Coccyx, Ischium, and Legs. Feet and Heels        Does the Patient have a Wound?  No noted wound(s)       Naif Prevention initiated by RN: Yes  Wound Care Orders initiated by RN: No    Pressure Injury (Stage 3,4, Unstageable, DTI, NWPT, and Complex wounds) if present, place Wound referral order by RN under : No    New Ostomies, if present place, Ostomy referral order under : No     Nurse 1 eSignature: Electronically signed by Mustapha Salcido RN on 11/11/23 at 6:57 AM EST    **SHARE this note so that the co-signing nurse can place an eSignature**    Nurse 2 eSignature: Electronically signed by Marycruz Betancourt RN on 11/11/23 at 6:58 AM EST

## 2023-11-11 NOTE — CONSULTS
Consult Note            Date:11/11/2023        Patient Dayton Hanson     YOB: 1960     Age:63 y.o. Inpatient consult to Infectious Diseases  Consult performed by: Wesley Huizar MD  Consult ordered by: BENJI Lyn - CNP          Chief Complaint     Chief Complaint   Patient presents with    Urinary Frequency     Urine has strong odor NP came to house today attempted to call Dr. Nj Comer for antibiotic but she is out of the office           History Obtained From   patient, electronic medical record    History of Present Illness   Belinda Diane is a 58 y.o. female who  has a past medical history of CAD (coronary artery disease), CHF (congestive heart failure) ([address]), Diabetic peripheral neuropathy associated with type 2 diabetes mellitus ([address]), Epigastric hernia, Hyperlipidemia, Hypertension, Kidney stone, Schizophrenia ([address]), and Type 2 diabetes mellitus with diabetic polyneuropathy, with long-term current use of insulin ([address]). Pt is not known to ID  Pt is followed by ONC for anemia  Pt has been seen by Urology -  -recurrent uti/bactrim   -bilateral ureteral obstruction that is managed with bilateral ureteral stents  -changed at [facility] on 8/22/23  Pt came in due to abd pain and uti sx with odor   Pt deneis f/c/n/v/d/    CT ABDOMEN PELVIS WO CONTRAST Additional Contrast? None    Result Date: 11/10/2023  1. Bilateral double-J ureteral stents in place. However there is increased bilateral pelvocaliectasis, now with moderate bilateral hydronephrosis and some mild left perinephric edema. This could suggest clogging or malfunction of the stents, though pyelonephritis, especially on the left cannot be excluded. 2.  Diffuse constipation excluding the distal sigmoid and rectum. Otherwise nonspecific bowel gas pattern. 3.  Cholelithiasis within an otherwise unremarkable appearing gallbladder. 4.  Otherwise unchanged.        Past Medical History     Past Medical History:   Diagnosis normal    Labs       Recent Labs     11/10/23  1205 11/11/23  0526   WBC 6.5 5.2   RBC 3.13* 2.91*   HGB 7.7* 7.1*   HCT 26.2* 23.6*   MCV 83.7 81.1   RDW 18.6* 18.5*    448        Recent Labs     11/10/23  1430 11/11/23  0526    140   K 4.3 4.2   * 109*   CO2 19* 19*   BUN 61* 61*   CREATININE 2.7* 2.7*   GLUCOSE 120* 117*   PHOS  --  3.2   MG 2.1 2.2       Recent Labs     11/10/23  1430 11/11/23  0526   PROCAL  --  2.25*   AST 23 15   ALT 10 8     Lab Results   Component Value Date/Time    CHOL 105 10/09/2023 05:19 AM    CHOL 124 11/26/2022 05:47 AM    TRIG 110 10/09/2023 05:19 AM    HDL 30 10/09/2023 05:19 AM    LDLCALC 73 11/26/2022 05:47 AM    LABVLDL 25 11/26/2022 05:47 AM     Lab Results   Component Value Date/Time    VITD25 8.4 (L) 10/09/2023 05:19 AM          Recent Labs     11/10/23  1430 11/11/23  0526   AST 23 15   ALT 10 8   BILITOT 0.5 0.3   ALKPHOS 71 62     Results       Procedure Component Value Units Date/Time    Culture, Blood 1 [[id number]] Collected: 11/10/23 1815    Order Status: Completed Specimen: Blood Updated: 11/11/23 1206     Specimen Description . BLOOD     Special Requests          Culture NO GROWTH <24 HRS    Culture, Blood 2 [[id number]] Collected: 11/10/23 1546    Order Status: Completed Specimen: Blood Updated: 11/10/23 2024     Specimen Description . BLOOD     Special Requests          Culture NO GROWTH <24 HRS    Culture, Urine [[id number]]  (Abnormal) Collected: 11/10/23 1106    Order Status: Completed Specimen: Urine, clean catch Updated: 11/11/23 1435     Specimen Description . CLEAN CATCH URINE     Culture Evaluating for: GRAM NEGATIVE RODS      Evaluating for: Gram Positive Organism            Imaging/Diagnostics   CT ABDOMEN PELVIS WO CONTRAST Additional Contrast? None    Result Date: 11/10/2023  1. Bilateral double-J ureteral stents in place.   However there is increased bilateral pelvocaliectasis, now with moderate bilateral hydronephrosis and some mild

## 2023-11-11 NOTE — CONSULTS
11/11/2023 9:45 AM  Service: Urology  Group: [facility] (Booker/Ronny/Juventino)    Aramis German  [id number]     Chief Complaint: bilateral hydronephrosis     History of Present Illness: The patient is a 58 y.o. female patient who presents with UTI  The patient is well know to our practice  She has seen Dr Sung Rosado for years  She was found to have bilateral hydronephrosis in 2021  She did have stents placed then  She has remained with the stents  These have been changed on q 4 month basis  Her last sent exchange was in August  She represents with a UTI  She did have another CT done and the stents remains with good position  She was admitted  She is voiding well at present     Past Medical History:   Diagnosis Date    CAD (coronary artery disease)     cath 2016    CHF (congestive heart failure) ([address])     Diabetic peripheral neuropathy associated with type 2 diabetes mellitus ([address]) 08/24/2018    Epigastric hernia     Hyperlipidemia     Hypertension     Kidney stone     Schizophrenia ([address])     Type 2 diabetes mellitus with diabetic polyneuropathy, with long-term current use of insulin ([address]) 09/19/2016    no longer requiring medication. 1/13/21 - continues w/o medications as of 11/2/2022       Past Surgical History:   Procedure Laterality Date    BLADDER SURGERY Bilateral 5/2/2022    CYSTOSCOPY RETROGRADE PYELOGRAM BILATERAL STENT CHANGE performed by Allyson Mendiola MD at [facility] Left 11/7/2022    CYSTOSCOPY BILATERAL RETROGRADE PYELOGRAM LEFT STENT CHANGE POSSIBLE RIGHT STENT CHANGE VS REMOVAL performed by Allyson Mendiola MD at [address]  09/30/2016     [facility]    COLONOSCOPY  08/2016    Dr. Brady Kuo 9/7/2021    CYSTOSCOPY RETROGRADE PYELOGRAM, BILATERAL STENT EXCHANGE performed by Allyson Mendiola MD at [address] [facility] [address] / [facility] / Kerry Duffy Bilateral 10/28/2019    CYSTOSCOPY. RETROGRADE.  PYELOGRAM. BILATERAL STENT

## 2023-11-11 NOTE — PROGRESS NOTES
Pharmacy Consultation Note  (Antibiotic Dosing and Monitoring)    Initial consult date: 11/11/23  Consulting physician/provider: Dr. Nidia Kyle  Drug: Vancomycin  Indication: UTI    Age/  Gender Height Weight IBW  Allergy Information   63 y.o./female @FLOW(11:first:1)@ @FLOW[14:FIRST:1@     Ideal body weight: 54.7 kg (120 lb 9.5 oz)  Adjusted ideal body weight: 56 kg (123 lb 8.9 oz)   Patient has no known allergies. Renal Function:  Recent Labs     11/10/23  1430   BUN 61*   CREATININE 2.7*     No intake or output data in the 24 hours ending 11/11/23 0449    Vancomycin Monitoring:  Trough:  No results for input(s): \"VANCOTROUGH\" in the last 72 hours. Random:  No results for input(s): \"VANCORANDOM\" in the last 72 hours. No results for input(s): \"BLOODCULT2\" in the last 72 hours. Historical Cultures:  Organism   Date Value Ref Range Status   12/03/2020 Citrobacter freundii (A)  Final     No results for input(s): \"BC\" in the last 72 hours. Vancomycin Administration Times:  Recent vancomycin administrations                     vancomycin (VANCOCIN) 1,250 mg in sodium chloride 0.9 % 250 mL IVPB (mg) 1,250 mg New Bag 11/10/23 1850                    Assessment:  Patient is a 58 y.o. female who has been initiated on vancomycin  Estimated Creatinine Clearance: 18 mL/min (A) (based on SCr of 2.7 mg/dL (H)).   To dose vancomycin, pharmacy will be utilizing dosing based off of levels because of patient's renal impairment/insufficiency    Plan:  One time dose of Vanco 1250mg IV was given at 1850  Will check vancomycin levels when appropriate  Will continue to monitor renal function   Pharmacy to follow      TY Clinton [facility] 11/11/2023 4:49 AM

## 2023-11-12 LAB
ALBUMIN SERPL-MCNC: 2.7 G/DL (ref 3.5–5.2)
ALP SERPL-CCNC: 58 U/L (ref 35–104)
ALT SERPL-CCNC: 9 U/L (ref 0–32)
ANION GAP SERPL CALCULATED.3IONS-SCNC: 8 MMOL/L (ref 7–16)
AST SERPL-CCNC: 13 U/L (ref 0–31)
BASOPHILS # BLD: 0.02 K/UL (ref 0–0.2)
BASOPHILS NFR BLD: 1 % (ref 0–2)
BILIRUB SERPL-MCNC: 0.3 MG/DL (ref 0–1.2)
BUN SERPL-MCNC: 55 MG/DL (ref 6–23)
CALCIUM SERPL-MCNC: 8.7 MG/DL (ref 8.6–10.2)
CHLORIDE SERPL-SCNC: 110 MMOL/L (ref 98–107)
CO2 SERPL-SCNC: 19 MMOL/L (ref 22–29)
CREAT SERPL-MCNC: 2.6 MG/DL (ref 0.5–1)
EOSINOPHIL # BLD: 0.13 K/UL (ref 0.05–0.5)
EOSINOPHILS RELATIVE PERCENT: 3 % (ref 0–6)
ERYTHROCYTE [DISTWIDTH] IN BLOOD BY AUTOMATED COUNT: 18.2 % (ref 11.5–15)
GFR SERPL CREATININE-BSD FRML MDRD: 20 ML/MIN/1.73M2
GLUCOSE BLD-MCNC: 111 MG/DL (ref 74–99)
GLUCOSE BLD-MCNC: 120 MG/DL (ref 74–99)
GLUCOSE BLD-MCNC: 128 MG/DL (ref 74–99)
GLUCOSE BLD-MCNC: 137 MG/DL (ref 74–99)
GLUCOSE BLD-MCNC: 189 MG/DL (ref 74–99)
GLUCOSE SERPL-MCNC: 97 MG/DL (ref 74–99)
HCT VFR BLD AUTO: 20.1 % (ref 34–48)
HCT VFR BLD AUTO: 26.4 % (ref 34–48)
HGB BLD-MCNC: 6 G/DL (ref 11.5–15.5)
HGB BLD-MCNC: 8.2 G/DL (ref 11.5–15.5)
IMM GRANULOCYTES # BLD AUTO: 0.1 K/UL (ref 0–0.58)
IMM GRANULOCYTES NFR BLD: 2 % (ref 0–5)
LYMPHOCYTES NFR BLD: 1.21 K/UL (ref 1.5–4)
LYMPHOCYTES RELATIVE PERCENT: 29 % (ref 20–42)
MCH RBC QN AUTO: 24.7 PG (ref 26–35)
MCHC RBC AUTO-ENTMCNC: 29.9 G/DL (ref 32–34.5)
MCV RBC AUTO: 82.7 FL (ref 80–99.9)
MICROORGANISM SPEC CULT: ABNORMAL
MONOCYTES NFR BLD: 0.57 K/UL (ref 0.1–0.95)
MONOCYTES NFR BLD: 14 % (ref 2–12)
NEUTROPHILS NFR BLD: 52 % (ref 43–80)
NEUTS SEG NFR BLD: 2.15 K/UL (ref 1.8–7.3)
PLATELET # BLD AUTO: 388 K/UL (ref 130–450)
PMV BLD AUTO: 10.6 FL (ref 7–12)
POTASSIUM SERPL-SCNC: 4.2 MMOL/L (ref 3.5–5)
PROT SERPL-MCNC: 7.1 G/DL (ref 6.4–8.3)
RBC # BLD AUTO: 2.43 M/UL (ref 3.5–5.5)
RBC # BLD: ABNORMAL 10*6/UL
SODIUM SERPL-SCNC: 137 MMOL/L (ref 132–146)
SPECIMEN DESCRIPTION: ABNORMAL
WBC OTHER # BLD: 4.2 K/UL (ref 4.5–11.5)

## 2023-11-12 PROCEDURE — 86901 BLOOD TYPING SEROLOGIC RH(D): CPT

## 2023-11-12 PROCEDURE — 6360000002 HC RX W HCPCS

## 2023-11-12 PROCEDURE — 2580000003 HC RX 258: Performed by: INTERNAL MEDICINE

## 2023-11-12 PROCEDURE — 80053 COMPREHEN METABOLIC PANEL: CPT

## 2023-11-12 PROCEDURE — 85014 HEMATOCRIT: CPT

## 2023-11-12 PROCEDURE — 86923 COMPATIBILITY TEST ELECTRIC: CPT

## 2023-11-12 PROCEDURE — 82272 OCCULT BLD FECES 1-3 TESTS: CPT

## 2023-11-12 PROCEDURE — 36430 TRANSFUSION BLD/BLD COMPNT: CPT

## 2023-11-12 PROCEDURE — 6370000000 HC RX 637 (ALT 250 FOR IP)

## 2023-11-12 PROCEDURE — 86900 BLOOD TYPING SEROLOGIC ABO: CPT

## 2023-11-12 PROCEDURE — P9016 RBC LEUKOCYTES REDUCED: HCPCS

## 2023-11-12 PROCEDURE — 85025 COMPLETE CBC W/AUTO DIFF WBC: CPT

## 2023-11-12 PROCEDURE — 85018 HEMOGLOBIN: CPT

## 2023-11-12 PROCEDURE — 6370000000 HC RX 637 (ALT 250 FOR IP): Performed by: INTERNAL MEDICINE

## 2023-11-12 PROCEDURE — 82962 GLUCOSE BLOOD TEST: CPT

## 2023-11-12 PROCEDURE — 6360000002 HC RX W HCPCS: Performed by: INTERNAL MEDICINE

## 2023-11-12 PROCEDURE — 6370000000 HC RX 637 (ALT 250 FOR IP): Performed by: SPECIALIST

## 2023-11-12 PROCEDURE — 36415 COLL VENOUS BLD VENIPUNCTURE: CPT

## 2023-11-12 PROCEDURE — 86850 RBC ANTIBODY SCREEN: CPT

## 2023-11-12 PROCEDURE — 2060000000 HC ICU INTERMEDIATE R&B

## 2023-11-12 PROCEDURE — 2580000003 HC RX 258

## 2023-11-12 RX ORDER — BISACODYL 5 MG/1
10 TABLET, DELAYED RELEASE ORAL ONCE
Status: COMPLETED | OUTPATIENT
Start: 2023-11-12 | End: 2023-11-12

## 2023-11-12 RX ORDER — SODIUM CHLORIDE 9 MG/ML
INJECTION, SOLUTION INTRAVENOUS PRN
Status: DISCONTINUED | OUTPATIENT
Start: 2023-11-12 | End: 2023-11-19

## 2023-11-12 RX ORDER — SORBITOL SOLUTION 70 %
60 SOLUTION, ORAL MISCELLANEOUS ONCE
Status: COMPLETED | OUTPATIENT
Start: 2023-11-12 | End: 2023-11-12

## 2023-11-12 RX ADMIN — CARVEDILOL 12.5 MG: 6.25 TABLET, FILM COATED ORAL at 08:12

## 2023-11-12 RX ADMIN — DOCUSATE SODIUM 100 MG: 100 CAPSULE, LIQUID FILLED ORAL at 08:12

## 2023-11-12 RX ADMIN — ENOXAPARIN SODIUM 60 MG: 100 INJECTION SUBCUTANEOUS at 16:51

## 2023-11-12 RX ADMIN — FENTANYL CITRATE 25 MCG: 0.05 INJECTION, SOLUTION INTRAMUSCULAR; INTRAVENOUS at 20:05

## 2023-11-12 RX ADMIN — SORBITOL SOLUTION (BULK) 60 ML: 70 SOLUTION at 11:52

## 2023-11-12 RX ADMIN — SODIUM CHLORIDE, PRESERVATIVE FREE 10 ML: 5 INJECTION INTRAVENOUS at 08:13

## 2023-11-12 RX ADMIN — ATORVASTATIN CALCIUM 20 MG: 20 TABLET, FILM COATED ORAL at 20:07

## 2023-11-12 RX ADMIN — ACETAMINOPHEN 650 MG: 325 TABLET ORAL at 18:03

## 2023-11-12 RX ADMIN — FENTANYL CITRATE 25 MCG: 0.05 INJECTION, SOLUTION INTRAMUSCULAR; INTRAVENOUS at 06:44

## 2023-11-12 RX ADMIN — SODIUM CHLORIDE: 9 INJECTION, SOLUTION INTRAVENOUS at 08:11

## 2023-11-12 RX ADMIN — CARVEDILOL 12.5 MG: 6.25 TABLET, FILM COATED ORAL at 16:51

## 2023-11-12 RX ADMIN — SODIUM CHLORIDE, PRESERVATIVE FREE 10 ML: 5 INJECTION INTRAVENOUS at 20:08

## 2023-11-12 RX ADMIN — ACETAMINOPHEN 650 MG: 325 TABLET ORAL at 01:38

## 2023-11-12 RX ADMIN — ACETAMINOPHEN 650 MG: 325 TABLET ORAL at 10:28

## 2023-11-12 RX ADMIN — FENTANYL CITRATE 25 MCG: 0.05 INJECTION, SOLUTION INTRAMUSCULAR; INTRAVENOUS at 13:20

## 2023-11-12 RX ADMIN — GABAPENTIN 100 MG: 100 CAPSULE ORAL at 20:07

## 2023-11-12 RX ADMIN — ISOSORBIDE MONONITRATE 30 MG: 30 TABLET, EXTENDED RELEASE ORAL at 08:12

## 2023-11-12 RX ADMIN — BISACODYL 10 MG: 5 TABLET, COATED ORAL at 11:52

## 2023-11-12 RX ADMIN — GABAPENTIN 100 MG: 100 CAPSULE ORAL at 08:12

## 2023-11-12 RX ADMIN — MIRTAZAPINE 7.5 MG: 15 TABLET, FILM COATED ORAL at 20:07

## 2023-11-12 ASSESSMENT — PAIN SCALES - WONG BAKER: WONGBAKER_NUMERICALRESPONSE: 2

## 2023-11-12 ASSESSMENT — PAIN DESCRIPTION - DESCRIPTORS
DESCRIPTORS: ACHING

## 2023-11-12 ASSESSMENT — PAIN DESCRIPTION - ONSET
ONSET: ON-GOING
ONSET: ON-GOING

## 2023-11-12 ASSESSMENT — PAIN DESCRIPTION - PAIN TYPE
TYPE: CHRONIC PAIN
TYPE: CHRONIC PAIN

## 2023-11-12 ASSESSMENT — PAIN SCALES - GENERAL
PAINLEVEL_OUTOF10: 6
PAINLEVEL_OUTOF10: 7
PAINLEVEL_OUTOF10: 8
PAINLEVEL_OUTOF10: 0
PAINLEVEL_OUTOF10: 3
PAINLEVEL_OUTOF10: 3
PAINLEVEL_OUTOF10: 6
PAINLEVEL_OUTOF10: 0
PAINLEVEL_OUTOF10: 10

## 2023-11-12 ASSESSMENT — PAIN DESCRIPTION - ORIENTATION
ORIENTATION: RIGHT;MID
ORIENTATION: RIGHT;LEFT
ORIENTATION: MID
ORIENTATION: MID;LOWER
ORIENTATION: MID;UPPER

## 2023-11-12 ASSESSMENT — PAIN DESCRIPTION - LOCATION
LOCATION: HAND;ABDOMEN
LOCATION: HAND
LOCATION: ABDOMEN
LOCATION: ABDOMEN
LOCATION: HEAD
LOCATION: ABDOMEN

## 2023-11-12 ASSESSMENT — PAIN DESCRIPTION - FREQUENCY
FREQUENCY: CONTINUOUS
FREQUENCY: CONTINUOUS

## 2023-11-12 NOTE — PLAN OF CARE
Problem: Discharge Planning  Goal: Discharge to home or other facility with appropriate resources  Outcome: Progressing  Flowsheets (Taken 11/11/2023 2000)  Discharge to home or other facility with appropriate resources: Identify barriers to discharge with patient and caregiver     Problem: Pain  Goal: Verbalizes/displays adequate comfort level or baseline comfort level  Outcome: Progressing  Flowsheets (Taken 11/11/2023 2347)  Verbalizes/displays adequate comfort level or baseline comfort level:   Encourage patient to monitor pain and request assistance   Assess pain using appropriate pain scale   Administer analgesics based on type and severity of pain and evaluate response     Problem: Safety - Adult  Goal: Free from fall injury  Outcome: Progressing  Flowsheets (Taken 11/11/2023 2347)  Free From Fall Injury: Instruct family/caregiver on patient safety     Problem: ABCDS Injury Assessment  Goal: Absence of physical injury  Outcome: Progressing  Flowsheets (Taken 11/11/2023 2347)  Absence of Physical Injury: Implement safety measures based on patient assessment   Care plan reviewed with patient. Patient verbalizes understanding of the care plan and contributed to goal setting.

## 2023-11-12 NOTE — PROGRESS NOTES
Pharmacy Consultation Note  (Antibiotic Dosing and Monitoring)    Initial consult date: 11/11/23  Consulting physician/provider: Dr. Yong Unger  Drug: Vancomycin  Indication: UTI    Age/  Gender Height Weight IBW  Allergy Information   63 y.o./female 5' 4\" 62 kg   Ideal body weight: 54.7 kg (120 lb 9.5 oz)  Adjusted ideal body weight: 57.6 kg (127 lb 0.5 oz)   Patient has no known allergies. Renal Function:  Recent Labs     11/10/23  1430 11/11/23  0526 11/12/23  0509   BUN 61* 61* 55*   CREATININE 2.7* 2.7* 2.6*         Intake/Output Summary (Last 24 hours) at 11/12/2023 1210  Last data filed at 11/12/2023 0408  Gross per 24 hour   Intake --   Output 850 ml   Net -850 ml         Vancomycin Monitoring:  Trough:  No results for input(s): \"VANCOTROUGH\" in the last 72 hours. Random:    Recent Labs     11/11/23  0526   VANCORANDOM 19.2         No results for input(s): \"BLOODCULT2\" in the last 72 hours. Historical Cultures:  Organism   Date Value Ref Range Status   12/03/2020 Citrobacter freundii (A)  Final     No results for input(s): \"BC\" in the last 72 hours. Vancomycin Administration Times:  Recent vancomycin administrations                     vancomycin (VANCOCIN) 750 mg in sodium chloride 0.9 % 250 mL IVPB (mg) 750 mg New Bag 11/11/23 1651    vancomycin (VANCOCIN) 1,250 mg in sodium chloride 0.9 % 250 mL IVPB (mg) 1,250 mg New Bag 11/10/23 1850               Assessment:  Patient is a 58 y.o. female who has been initiated on vancomycin  Estimated Creatinine Clearance: 19 mL/min (A) (based on SCr of 2.6 mg/dL (H)).   To dose vancomycin, pharmacy will be utilizing dosing based off of levels because of patient's renal impairment/insufficiency  11/11: Random morning level = 19.2 mcg/mL    Plan:  No dose vancomycin today  Repeat random level tomorrow morning  Will continue to monitor renal function   Pharmacy to follow      Florin Lazo PharmD 11/12/2023 12:10 PM   [phone]

## 2023-11-12 NOTE — CONSENT
Informed Consent for Blood Component Transfusion Note    I have discussed with the patient the rationale for blood component transfusion; its benefits in treating or preventing fatigue, organ damage, or death; and its risk which includes mild transfusion reactions, rare risk of blood borne infection, or more serious but rare reactions. I have discussed the alternatives to transfusion, including the risk and consequences of not receiving transfusion. The patient had an opportunity to ask questions and had agreed to proceed with transfusion of blood components.     Electronically signed by Sohail De Leon DO on 11/12/23 at 4:36 PM EST

## 2023-11-13 LAB
ACB COMPLEX DNA BLD POS QL NAA+NON-PROBE: NOT DETECTED
ALBUMIN SERPL-MCNC: 2.7 G/DL (ref 3.5–5.2)
ALP SERPL-CCNC: 65 U/L (ref 35–104)
ALT SERPL-CCNC: 11 U/L (ref 0–32)
ANION GAP SERPL CALCULATED.3IONS-SCNC: 9 MMOL/L (ref 7–16)
AST SERPL-CCNC: 19 U/L (ref 0–31)
B FRAGILIS DNA BLD POS QL NAA+NON-PROBE: NOT DETECTED
BASOPHILS # BLD: 0.04 K/UL (ref 0–0.2)
BASOPHILS NFR BLD: 1 % (ref 0–2)
BILIRUB SERPL-MCNC: 0.3 MG/DL (ref 0–1.2)
BIOFIRE TEST COMMENT: ABNORMAL
BUN SERPL-MCNC: 48 MG/DL (ref 6–23)
C ALBICANS DNA BLD POS QL NAA+NON-PROBE: NOT DETECTED
C AURIS DNA BLD POS QL NAA+NON-PROBE: NOT DETECTED
C GATTII+NEOFOR DNA BLD POS QL NAA+N-PRB: NOT DETECTED
C GLABRATA DNA BLD POS QL NAA+NON-PROBE: NOT DETECTED
C KRUSEI DNA BLD POS QL NAA+NON-PROBE: NOT DETECTED
C PARAP DNA BLD POS QL NAA+NON-PROBE: NOT DETECTED
C TROPICLS DNA BLD POS QL NAA+NON-PROBE: NOT DETECTED
CALCIUM SERPL-MCNC: 9 MG/DL (ref 8.6–10.2)
CHLORIDE SERPL-SCNC: 111 MMOL/L (ref 98–107)
CO2 SERPL-SCNC: 17 MMOL/L (ref 22–29)
CREAT SERPL-MCNC: 2.4 MG/DL (ref 0.5–1)
DATE LAST DOSE: NORMAL
E CLOAC COMP DNA BLD POS NAA+NON-PROBE: NOT DETECTED
E COLI DNA BLD POS QL NAA+NON-PROBE: NOT DETECTED
E FAECALIS DNA BLD POS QL NAA+NON-PROBE: NOT DETECTED
E FAECIUM DNA BLD POS QL NAA+NON-PROBE: NOT DETECTED
ENTEROBACTERALES DNA BLD POS NAA+N-PRB: NOT DETECTED
EOSINOPHIL # BLD: 0.12 K/UL (ref 0.05–0.5)
EOSINOPHILS RELATIVE PERCENT: 3 % (ref 0–6)
ERYTHROCYTE [DISTWIDTH] IN BLOOD BY AUTOMATED COUNT: 17.5 % (ref 11.5–15)
GFR SERPL CREATININE-BSD FRML MDRD: 22 ML/MIN/1.73M2
GLUCOSE BLD-MCNC: 100 MG/DL (ref 74–99)
GLUCOSE BLD-MCNC: 119 MG/DL (ref 74–99)
GLUCOSE BLD-MCNC: 130 MG/DL (ref 74–99)
GLUCOSE BLD-MCNC: 150 MG/DL (ref 74–99)
GLUCOSE SERPL-MCNC: 99 MG/DL (ref 74–99)
GP B STREP DNA BLD POS QL NAA+NON-PROBE: NOT DETECTED
HAEM INFLU DNA BLD POS QL NAA+NON-PROBE: NOT DETECTED
HCT VFR BLD AUTO: 27.1 % (ref 34–48)
HGB BLD-MCNC: 8.5 G/DL (ref 11.5–15.5)
IMM GRANULOCYTES # BLD AUTO: 0.11 K/UL (ref 0–0.58)
IMM GRANULOCYTES NFR BLD: 3 % (ref 0–5)
K OXYTOCA DNA BLD POS QL NAA+NON-PROBE: NOT DETECTED
KLEBSIELLA SP DNA BLD POS QL NAA+NON-PRB: NOT DETECTED
KLEBSIELLA SP DNA BLD POS QL NAA+NON-PRB: NOT DETECTED
L MONOCYTOG DNA BLD POS QL NAA+NON-PROBE: NOT DETECTED
LYMPHOCYTES NFR BLD: 1.16 K/UL (ref 1.5–4)
LYMPHOCYTES RELATIVE PERCENT: 27 % (ref 20–42)
MCH RBC QN AUTO: 25.7 PG (ref 26–35)
MCHC RBC AUTO-ENTMCNC: 31.4 G/DL (ref 32–34.5)
MCV RBC AUTO: 81.9 FL (ref 80–99.9)
MECA+MECC ISLT/SPM QL: DETECTED
MICROORGANISM SPEC CULT: ABNORMAL
MICROORGANISM/AGENT SPEC: ABNORMAL
MONOCYTES NFR BLD: 0.43 K/UL (ref 0.1–0.95)
MONOCYTES NFR BLD: 10 % (ref 2–12)
N MEN DNA BLD POS QL NAA+NON-PROBE: NOT DETECTED
NEUTROPHILS NFR BLD: 58 % (ref 43–80)
NEUTS SEG NFR BLD: 2.52 K/UL (ref 1.8–7.3)
P AERUGINOSA DNA BLD POS NAA+NON-PROBE: NOT DETECTED
PLATELET # BLD AUTO: 394 K/UL (ref 130–450)
PMV BLD AUTO: 10.9 FL (ref 7–12)
POTASSIUM SERPL-SCNC: 4.6 MMOL/L (ref 3.5–5)
PROT SERPL-MCNC: 7.8 G/DL (ref 6.4–8.3)
PROTEUS SP DNA BLD POS QL NAA+NON-PROBE: NOT DETECTED
RBC # BLD AUTO: 3.31 M/UL (ref 3.5–5.5)
S AUREUS DNA BLD POS QL NAA+NON-PROBE: NOT DETECTED
S AUREUS+CONS DNA BLD POS NAA+NON-PROBE: DETECTED
S EPIDERMIDIS DNA BLD POS QL NAA+NON-PRB: DETECTED
S LUGDUNENSIS DNA BLD POS QL NAA+NON-PRB: NOT DETECTED
S MALTOPHILIA DNA BLD POS QL NAA+NON-PRB: NOT DETECTED
S MARCESCENS DNA BLD POS NAA+NON-PROBE: NOT DETECTED
S PNEUM DNA BLD POS QL NAA+NON-PROBE: NOT DETECTED
S PYO DNA BLD POS QL NAA+NON-PROBE: NOT DETECTED
SALMONELLA DNA BLD POS QL NAA+NON-PROBE: NOT DETECTED
SERVICE CMNT-IMP: ABNORMAL
SODIUM SERPL-SCNC: 137 MMOL/L (ref 132–146)
SPECIMEN DESCRIPTION: ABNORMAL
STREPTOCOCCUS DNA BLD POS NAA+NON-PROBE: NOT DETECTED
TME LAST DOSE: NORMAL H
VANCOMYCIN DOSE: NORMAL MG
VANCOMYCIN SERPL-MCNC: 16.2 UG/ML (ref 5–40)
WBC OTHER # BLD: 4.4 K/UL (ref 4.5–11.5)

## 2023-11-13 PROCEDURE — 2060000000 HC ICU INTERMEDIATE R&B

## 2023-11-13 PROCEDURE — 82962 GLUCOSE BLOOD TEST: CPT

## 2023-11-13 PROCEDURE — 2580000003 HC RX 258: Performed by: INTERNAL MEDICINE

## 2023-11-13 PROCEDURE — 80053 COMPREHEN METABOLIC PANEL: CPT

## 2023-11-13 PROCEDURE — 87040 BLOOD CULTURE FOR BACTERIA: CPT

## 2023-11-13 PROCEDURE — 36415 COLL VENOUS BLD VENIPUNCTURE: CPT

## 2023-11-13 PROCEDURE — 6370000000 HC RX 637 (ALT 250 FOR IP): Performed by: SPECIALIST

## 2023-11-13 PROCEDURE — 6370000000 HC RX 637 (ALT 250 FOR IP): Performed by: INTERNAL MEDICINE

## 2023-11-13 PROCEDURE — 85025 COMPLETE CBC W/AUTO DIFF WBC: CPT

## 2023-11-13 PROCEDURE — 6360000002 HC RX W HCPCS

## 2023-11-13 PROCEDURE — 6360000002 HC RX W HCPCS: Performed by: INTERNAL MEDICINE

## 2023-11-13 PROCEDURE — 80202 ASSAY OF VANCOMYCIN: CPT

## 2023-11-13 RX ORDER — HYDRALAZINE HYDROCHLORIDE 20 MG/ML
5 INJECTION INTRAMUSCULAR; INTRAVENOUS EVERY 6 HOURS PRN
Status: DISCONTINUED | OUTPATIENT
Start: 2023-11-13 | End: 2023-11-20 | Stop reason: HOSPADM

## 2023-11-13 RX ORDER — LIDOCAINE 4 G/G
1 PATCH TOPICAL DAILY
Status: DISCONTINUED | OUTPATIENT
Start: 2023-11-13 | End: 2023-11-20 | Stop reason: HOSPADM

## 2023-11-13 RX ORDER — PROCHLORPERAZINE EDISYLATE 5 MG/ML
5 INJECTION INTRAMUSCULAR; INTRAVENOUS EVERY 6 HOURS PRN
Status: DISCONTINUED | OUTPATIENT
Start: 2023-11-13 | End: 2023-11-20 | Stop reason: HOSPADM

## 2023-11-13 RX ORDER — HYDRALAZINE HYDROCHLORIDE 25 MG/1
25 TABLET, FILM COATED ORAL EVERY 8 HOURS SCHEDULED
Status: DISCONTINUED | OUTPATIENT
Start: 2023-11-13 | End: 2023-11-20 | Stop reason: HOSPADM

## 2023-11-13 RX ADMIN — CARVEDILOL 12.5 MG: 6.25 TABLET, FILM COATED ORAL at 08:47

## 2023-11-13 RX ADMIN — FENTANYL CITRATE 25 MCG: 0.05 INJECTION, SOLUTION INTRAMUSCULAR; INTRAVENOUS at 18:19

## 2023-11-13 RX ADMIN — GABAPENTIN 100 MG: 100 CAPSULE ORAL at 21:37

## 2023-11-13 RX ADMIN — ACETAMINOPHEN 650 MG: 325 TABLET ORAL at 10:01

## 2023-11-13 RX ADMIN — ENOXAPARIN SODIUM 60 MG: 100 INJECTION SUBCUTANEOUS at 16:19

## 2023-11-13 RX ADMIN — MIRTAZAPINE 7.5 MG: 15 TABLET, FILM COATED ORAL at 21:37

## 2023-11-13 RX ADMIN — FENTANYL CITRATE 25 MCG: 0.05 INJECTION, SOLUTION INTRAMUSCULAR; INTRAVENOUS at 06:45

## 2023-11-13 RX ADMIN — SODIUM CHLORIDE, PRESERVATIVE FREE 10 ML: 5 INJECTION INTRAVENOUS at 21:36

## 2023-11-13 RX ADMIN — ACETAMINOPHEN 650 MG: 325 TABLET ORAL at 21:37

## 2023-11-13 RX ADMIN — HYDRALAZINE HYDROCHLORIDE 25 MG: 25 TABLET, FILM COATED ORAL at 17:13

## 2023-11-13 RX ADMIN — ISOSORBIDE MONONITRATE 30 MG: 30 TABLET, EXTENDED RELEASE ORAL at 08:47

## 2023-11-13 RX ADMIN — GABAPENTIN 100 MG: 100 CAPSULE ORAL at 08:47

## 2023-11-13 RX ADMIN — VANCOMYCIN HYDROCHLORIDE 750 MG: 5 INJECTION, POWDER, LYOPHILIZED, FOR SOLUTION INTRAVENOUS at 12:52

## 2023-11-13 RX ADMIN — DOCUSATE SODIUM 100 MG: 100 CAPSULE, LIQUID FILLED ORAL at 08:47

## 2023-11-13 RX ADMIN — PROCHLORPERAZINE EDISYLATE 5 MG: 5 INJECTION INTRAMUSCULAR; INTRAVENOUS at 18:20

## 2023-11-13 RX ADMIN — CARVEDILOL 12.5 MG: 6.25 TABLET, FILM COATED ORAL at 16:19

## 2023-11-13 RX ADMIN — FENTANYL CITRATE 25 MCG: 0.05 INJECTION, SOLUTION INTRAMUSCULAR; INTRAVENOUS at 12:40

## 2023-11-13 RX ADMIN — ATORVASTATIN CALCIUM 20 MG: 20 TABLET, FILM COATED ORAL at 21:37

## 2023-11-13 RX ADMIN — HYDRALAZINE HYDROCHLORIDE 5 MG: 20 INJECTION, SOLUTION INTRAMUSCULAR; INTRAVENOUS at 17:13

## 2023-11-13 ASSESSMENT — PAIN SCALES - GENERAL
PAINLEVEL_OUTOF10: 7
PAINLEVEL_OUTOF10: 7
PAINLEVEL_OUTOF10: 4

## 2023-11-13 ASSESSMENT — PAIN DESCRIPTION - LOCATION
LOCATION: HAND

## 2023-11-13 ASSESSMENT — PAIN DESCRIPTION - DESCRIPTORS
DESCRIPTORS: ACHING
DESCRIPTORS: ACHING

## 2023-11-13 ASSESSMENT — PAIN DESCRIPTION - ONSET: ONSET: ON-GOING

## 2023-11-13 ASSESSMENT — PAIN DESCRIPTION - ORIENTATION
ORIENTATION: RIGHT;LEFT
ORIENTATION: RIGHT
ORIENTATION: RIGHT

## 2023-11-13 ASSESSMENT — PAIN SCALES - WONG BAKER: WONGBAKER_NUMERICALRESPONSE: 2

## 2023-11-13 ASSESSMENT — PAIN DESCRIPTION - PAIN TYPE: TYPE: CHRONIC PAIN

## 2023-11-13 ASSESSMENT — PAIN DESCRIPTION - FREQUENCY: FREQUENCY: CONTINUOUS

## 2023-11-13 NOTE — PROGRESS NOTES
Internal Medicine Progress Note    [facility]    Efe Smooth. Marli Pena., F.A.C.O.I. Lizbet Ochoa D.O., KALIN.A.C.OMarianaIMariana Castellanos D.O. Tye Lancaster, MSN, APRN, NP-C  Sissy Leyden. Naomi Wells, MSN, APRN-CNP  Nadja Tanner, MSN, APRN-CNP     Primary Care Physician: Eulalia Castellano MD   Admitting Physician:  Mandeep Tucker DO  Admission date and time: 11/10/2023 11:00 AM    Room:  [address] [place] [address]  Admitting diagnosis: Dehydration [E86.0]  Acute pyelonephritis [N10]  LUAN (acute kidney injury) ([address]) [N17.9]  Sepsis secondary to UTI ([address]) [A41.9, N39.0]  Sepsis with acute renal failure without septic shock, due to unspecified organism, unspecified acute renal failure type ([address]) [A41.9, R65.20, N17.9]    Patient Name: Dayanna Schulz  MRN: [id number]    Date of Service: 11/13/2023     Subjective:  Carmelo Galan is a 58 y.o. female who was seen and examined today,11/13/2023, at the bedside. Carmelo Galan continues to describe abdominal pain but to a lesser extent than yesterday. She now describes diarrhea. She will speak with the urology team today. Cultures remain pending at this point. No family members were present during my examination. Review of System:   Constitutional:   Denies fever or chills, weight loss or gain, positive malaise  HEENT:   Denies ear pain, sore throat, sinus or eye problems. Cardiovascular:   Denies any chest pain, irregular heartbeats, or palpitations. Respiratory:   Denies shortness of breath, coughing, sputum production, hemoptysis, or wheezing. Gastrointestinal:   Abdominal pain as to be expected. This appears less than yesterday. She now describes some diarrhea. Genitourinary:    No current frequency or dysuria. No hematuria. No painful urination. Extremities:   Denies lower extremity swelling, edema or cyanosis.    Neurology:    Denies any headache or focal neurological deficits, Denies generalized weakness or intact. Gait not assessed. Integumentary:  No rashes  Skin normal color and texture.   Genitalia/Breast:  Deferred    Medication:  Scheduled Meds:   vancomycin (VANCOCIN) intermittent dosing (placeholder)   Other RX Placeholder    [Held by provider] apixaban  5 mg Oral BID    atorvastatin  20 mg Oral Nightly    carvedilol  12.5 mg Oral BID WC    isosorbide mononitrate  30 mg Oral Daily    insulin lispro  0-8 Units SubCUTAneous TID WC    insulin lispro  0-4 Units SubCUTAneous Nightly    docusate sodium  100 mg Oral Daily    enoxaparin  1 mg/kg SubCUTAneous Daily    sodium chloride flush  5-40 mL IntraVENous 2 times per day    gabapentin  100 mg Oral BID    mirtazapine  7.5 mg Oral Nightly    sodium chloride  1,000 mL IntraVENous Once     Continuous Infusions:   sodium chloride      sodium chloride 50 mL/hr at 11/12/23 0811    dextrose      sodium chloride         Objective Data:  Recent Labs     11/11/23  0526 11/12/23  0509 11/12/23  1702 11/13/23  0554   WBC 5.2 4.2*  --  4.4*   RBC 2.91* 2.43*  --  3.31*   HGB 7.1* 6.0* 8.2* 8.5*   HCT 23.6* 20.1* 26.4* 27.1*   MCV 81.1 82.7  --  81.9   MCH 24.4* 24.7*  --  25.7*   MCHC 30.1* 29.9*  --  31.4*   RDW 18.5* 18.2*  --  17.5*    388  --  394   MPV 10.8 10.6  --  10.9     Recent Labs     11/11/23  0526 11/12/23  0509 11/13/23  0554    137 137   K 4.2 4.2 4.6   * 110* 111*   CO2 19* 19* 17*   BUN 61* 55* 48*   CREATININE 2.7* 2.6* 2.4*   GLUCOSE 117* 97 99   CALCIUM 8.9 8.7 9.0   PROT 8.1 7.1 7.8   LABALBU 3.0* 2.7* 2.7*   BILITOT 0.3 0.3 0.3   ALKPHOS 62 58 65   AST 15 13 19   ALT 8 9 11     Lab Results   Component Value Date    TROPONINI 0.04 (H) 08/22/2020    TROPONINI 0.03 08/22/2020    TROPONINI 0.05 (H) 08/22/2020      Assessment:  Recurrent urinary tract infection with indwelling ureteral stents with current gram-positive bacteremia  Acute on chronic kidney disease stage III  Asymptomatic cholelithiasis  Coronary artery disease  Chronic,

## 2023-11-13 NOTE — TELEPHONE ENCOUNTER
I am aware of this. Patient has a urologist due to her problems with ureteral stents which puts her at risk for frequent UTI's but with her vital signs being abnormal, the recommendation for her to be seen at urgent care was appropriate.

## 2023-11-13 NOTE — PLAN OF CARE
Problem: Discharge Planning  Goal: Discharge to home or other facility with appropriate resources  11/13/2023 1143 by Cole Nix RN  Outcome: Progressing  11/13/2023 0006 by Bill Goodson RN  Outcome: Progressing  Flowsheets (Taken 11/12/2023 2000)  Discharge to home or other facility with appropriate resources: Identify barriers to discharge with patient and caregiver     Problem: Pain  Goal: Verbalizes/displays adequate comfort level or baseline comfort level  11/13/2023 1143 by Cole Nix RN  Outcome: Progressing  11/13/2023 0006 by Bill Goodson RN  Outcome: Progressing  Flowsheets  Taken 11/13/2023 0006  Verbalizes/displays adequate comfort level or baseline comfort level:   Encourage patient to monitor pain and request assistance   Assess pain using appropriate pain scale   Administer analgesics based on type and severity of pain and evaluate response  Taken 11/13/2023 0004  Verbalizes/displays adequate comfort level or baseline comfort level: Encourage patient to monitor pain and request assistance  Taken 11/12/2023 2005  Verbalizes/displays adequate comfort level or baseline comfort level:   Encourage patient to monitor pain and request assistance   Assess pain using appropriate pain scale   Administer analgesics based on type and severity of pain and evaluate response     Problem: Safety - Adult  Goal: Free from fall injury  11/13/2023 1143 by Cole Nix RN  Outcome: Progressing  11/13/2023 0006 by Bill Goodson RN  Outcome: Progressing  Flowsheets (Taken 11/13/2023 0006)  Free From Fall Injury: Instruct family/caregiver on patient safety     Problem: ABCDS Injury Assessment  Goal: Absence of physical injury  11/13/2023 1143 by Cole Nix RN  Outcome: Progressing  11/13/2023 0006 by Bill Goodson RN  Outcome: Progressing  Flowsheets (Taken 11/11/2023 2347)  Absence of Physical Injury: Implement safety measures based on patient assessment     Problem: Chronic Conditions and dehydration

## 2023-11-13 NOTE — PLAN OF CARE
"Patient Name: Lata Justice  : 1946    MRN: [id number]                              Today's Date: 2023       Admit Date: 2023    Visit Dx:     ICD-10-CM ICD-9-CM   1. Vertigo  R42 780.4   2. Intractable vomiting  R11.10 536.2   3. Essential hypertension  I10 401.9   4. Recurrent major depressive disorder, in partial remission  F33.41 296.35   5. Impaired glucose tolerance  R73.02 790.22   6. Mixed hyperlipidemia  E78.2 272.2     Patient Active Problem List   Diagnosis   • Adkins's esophagus   • Essential hypertension   • Chronic low back pain   • Chronic obstructive pulmonary disease   • Recurrent major depressive disorder, in partial remission   • Impaired glucose tolerance   • Mixed hyperlipidemia   • Seasonal allergic rhinitis   • Bilateral chronic knee pain   • Bilateral edema of lower extremity   • Chronic pain of both ankles   • Primary osteoarthritis of both knees   • Status post right knee replacement   • Cyanocobalamin deficiency   • Primary osteoarthritis involving multiple joints   • Intermittent claudication   • PVD (peripheral vascular disease)   • BPV (benign positional vertigo)     Past Medical History:   Diagnosis Date   • Abnormal finding     STATED SHE \"WOKE\" UP DURING AN EGD, BREAST BIOPSY, BACK SURGERY   • Acute colitis    • Allergic    • Anxiety    • Arthritis    • Bladder leak 2021   • Body piercing     EARS   • Cataracts, bilateral    • Constipation    • COPD (chronic obstructive pulmonary disease)    • Depression    • Depression    • Diabetes mellitus     Patient reported \"borderline\" and that she has been on medication in the past   • Elevated cholesterol    • Full dentures     Advised no adhesives DOS   • GERD (gastroesophageal reflux disease)    • Glaucoma    • High cholesterol    • History of bronchitis    • History of nuclear stress test     Patient reported \"a long time ago - more than 5 years\" and reported wnl's at that time   • History of pneumonia    • Hx of " Problem: Discharge Planning  Goal: Discharge to home or other facility with appropriate resources  Outcome: Progressing  Flowsheets (Taken 11/12/2023 2000)  Discharge to home or other facility with appropriate resources: Identify barriers to discharge with patient and caregiver     Problem: Pain  Goal: Verbalizes/displays adequate comfort level or baseline comfort level  Outcome: Progressing  Flowsheets  Taken 11/13/2023 0006  Verbalizes/displays adequate comfort level or baseline comfort level:   Encourage patient to monitor pain and request assistance   Assess pain using appropriate pain scale   Administer analgesics based on type and severity of pain and evaluate response  Taken 11/12/2023 2005  Verbalizes/displays adequate comfort level or baseline comfort level:   Encourage patient to monitor pain and request assistance   Assess pain using appropriate pain scale   Administer analgesics based on type and severity of pain and evaluate response     Problem: Safety - Adult  Goal: Free from fall injury  Outcome: Progressing  Flowsheets (Taken 11/13/2023 0006)  Free From Fall Injury: Instruct family/caregiver on patient safety     Problem: ABCDS Injury Assessment  Goal: Absence of physical injury  Outcome: Progressing  Flowsheets (Taken 11/11/2023 0407)  Absence of Physical Injury: Implement safety measures based on patient assessment   Care plan reviewed with patient. Patient verbalizes understanding of the care plan and contributed to goal setting. colonic polyps    • Hypertension    • Injury of back    • Low back pain    • Obesity    • Osteoarthritis    • PONV (postoperative nausea and vomiting) 11/03/2021   • Poor historian    • Wears glasses    • Wears glasses      Past Surgical History:   Procedure Laterality Date   • BACK SURGERY      Lower back X2   • CARPAL TUNNEL RELEASE Right 11/4/2021    Procedure: CARPAL TUNNEL RELEASE ENDOSCOPIC RIGHT;  Surgeon: Jefry Ceron MD;  Location: [facility];  Service: Orthopedics;  Laterality: Right;   • CATARACT EXTRACTION W/ INTRAOCULAR LENS IMPLANT Left 3/2/2020    Procedure: CATARACT PHACO EXTRACTION WITH INTRAOCULAR LENS IMPLANT LEFT;  Surgeon: Alfredo Patiño MD;  Location: [facility];  Service: Ophthalmology;  Laterality: Left;   • CATARACT EXTRACTION W/ INTRAOCULAR LENS IMPLANT Right 3/16/2020    Procedure: CATARACT PHACO EXTRACTION WITH INTRAOCULAR LENS IMPLANT;  Surgeon: Alfredo Patiño MD;  Location: [facility];  Service: Ophthalmology;  Laterality: Right;   • CHOLECYSTECTOMY     • COLONOSCOPY     • DILATION AND CURETTAGE, DIAGNOSTIC / THERAPEUTIC     • ENDOSCOPY      With dilation   • HAND SURGERY  11/04/2021   • HIATAL HERNIA REPAIR     • HYSTERECTOMY      Total Hysterectomy   • MOUTH SURGERY      Full mouth extraction   • WA ARTHRP KNE CONDYLE&PLATU MEDIAL&LAT COMPARTMENTS Right 10/18/2017    Procedure:  TOTAL KNEE ARTHROPLASTY RIGHT ELECTIVE ;  Surgeon: Doc Mixon MD;  Location: [facility];  Service: Orthopedics   • TUBAL ABDOMINAL LIGATION        General Information     Row Name 05/28/23 1241          OT Time and Intention    Document Type evaluation  -JR     Mode of Treatment occupational therapy  -JR     Row Name 05/28/23 1241          General Information    Patient Profile Reviewed yes  -JR     Prior Level of Function independent:;gait;transfer;bed mobility;ADL's  Pt's daughter reported pt was able to complete simple home management tasks, pt does not drive. Pt used rollator or sc for  mobility  -     Existing Precautions/Restrictions fall;other (see comments)  BPPV  -     Barriers to Rehab medically complex;previous functional deficit;hearing deficit  -[facility] Name 05/28/23 1241          Living Environment    People in Home alone  -[facility] Name 05/28/23 1241          Home Main Entrance    Number of Stairs, Main Entrance --  has entry ramp. Plans to move to 1 level apt near son  -[facility] Name 05/28/23 1241          Cognition    Orientation Status (Cognition) oriented x 4  -[facility] Name 05/28/23 1241          Safety Issues, Functional Mobility    Safety Issues Affecting Function (Mobility) awareness of need for assistance;insight into deficits/self-awareness;problem-solving  -     Impairments Affecting Function (Mobility) balance;endurance/activity tolerance;grasp;strength;shortness of breath;postural/trunk control;pain;other (see comments)  BPPV  -           User Key  (r) = Recorded By, (t) = Taken By, (c) = Cosigned By    Initials Name Provider Type     Pat Presley, OT Occupational Therapist                 Mobility/ADL's     [facility] 05/28/23 1244          Bed Mobility    Comment, (Bed Mobility) Up in chair  -JR     Row Name 05/28/23 1244          Transfers    Transfers sit-stand transfer;toilet transfer  -JR     Row Name 05/28/23 1244          Sit-Stand Transfer    Sit-Stand [place] (Transfers) contact guard;verbal cues  -     Assistive Device (Sit-Stand Transfers) walker, front-wheeled  -     Comment, (Sit-Stand Transfer) Pt required verbal cues for hand placement for safety  -JR     Row Name 05/28/23 1244          Toilet Transfer    Type (Toilet Transfer) sit-stand;stand-sit  -     [place] Level (Toilet Transfer) contact guard;verbal cues  -     Assistive Device (Toilet Transfer) commode, bedside without drop arms;walker, front-wheeled  -     Comment, (Toilet Transfer) Pt chose to use BSC as it was higher than commode. Verbal cues for hand  placement and safety  -JR     Row Name 05/28/23 1244          Functional Mobility    Functional Mobility- Ind. Level contact guard assist;verbal cues required  -     Functional Mobility- Device walker, front-wheeled  -JR     Row Name 05/28/23 1244          Activities of Daily Living    BADL Assessment/Intervention toileting;lower body dressing;bathing  -JR     Row Name 05/28/23 1244          Toileting Assessment/Training    [place] Level (Toileting) perform perineal hygiene;set up  -     Assistive Devices (Toileting) commode, bedside without drop arms  -     Position (Toileting) unsupported sitting  -JR     Row Name 05/28/23 1244          Lower Body Dressing Assessment/Training    Assistive Devices (Lower Body Dressing) long-handled shoe horn;reacher  -     Comment, (Lower Body Dressing) Issued reacher and LH shoe horn. Educated pt and dtr in use, verbalized understanding. Dtr previously purchased sock aid, educated pt and daughter in use. They verbalized understanding.  -JR     Row Name 05/28/23 1244          Bathing Assessment/Intervention    Assistive Devices (Bathing) long-handled sponge  -     Comment, (Bathing) Issued long handled bath sponge and educated pt and daughter in use. they verbalized understanding.  -           User Key  (r) = Recorded By, (t) = Taken By, (c) = Cosigned By    Initials Name Provider Type    Pat Cross, OT Occupational Therapist               Obj/Interventions     Row Name 05/28/23 1246          Sensory Assessment (Somatosensory)    Sensory Assessment (Somatosensory) sensation intact  -JR     Row Name 05/28/23 1246          Range of Motion Comprehensive    General Range of Motion no range of motion deficits identified  -JR     Row Name 05/28/23 1246          Strength Comprehensive (MMT)    General Manual Muscle Testing (MMT) Assessment no strength deficits identified  -JR     Row Name 05/28/23 1246          Balance    Balance Assessment sitting static  balance;standing dynamic balance  -JR     Static Sitting Balance contact guard  -JR     Dynamic Standing Balance contact guard  -JR     Position/Device Used, Standing Balance supported  -JR           User Key  (r) = Recorded By, (t) = Taken By, (c) = Cosigned By    Initials Name Provider Type    Pat Cross, OT Occupational Therapist               Goals/Plan     Row Name 05/28/23 1250          Bed Mobility Goal 1 (OT)    Activity/Assistive Device (Bed Mobility Goal 1, OT) bed mobility activities, all  -JR     [place] Level/Cues Needed (Bed Mobility Goal 1, OT) modified independence;verbal cues required  -JR     Time Frame (Bed Mobility Goal 1, OT) long term goal (LTG);by discharge  -JR     Progress/Outcomes (Bed Mobility Goal 1, OT) goal ongoing  -     Row Name 05/28/23 1250          Transfer Goal 1 (OT)    Activity/Assistive Device (Transfer Goal 1, OT) transfers, all;walker, rolling  -JR     [place] Level/Cues Needed (Transfer Goal 1, OT) modified independence;verbal cues required  -JR     Time Frame (Transfer Goal 1, OT) long term goal (LTG);by discharge  -JR     Progress/Outcome (Transfer Goal 1, OT) goal ongoing  -     Row Name 05/28/23 1250          Dressing Goal 1 (OT)    Activity/Device (Dressing Goal 1, OT) lower body dressing;reacher  -JR     [place]/Cues Needed (Dressing Goal 1, OT) set-up required;verbal cues required  -JR     Time Frame (Dressing Goal 1, OT) long term goal (LTG);by discharge  -JR     Progress/Outcome (Dressing Goal 1, OT) goal ongoing  -     Row Name 05/28/23 1250          Problem Specific Goal 1 (OT)    Problem Specific Goal 1 (OT) Pt to stand sinkside for ADL task > 3 minutes to support ADL independence.  -JR     Time Frame (Problem Specific Goal 1, OT) long term goal (LTG);by discharge  -JR     Progress/Outcome (Problem Specific Goal 1, OT) goal ongoing  -     Row Name 05/28/23 1250          Therapy Assessment/Plan (OT)    Planned Therapy Interventions  (OT) activity tolerance training;adaptive equipment training;BADL retraining;functional balance retraining;occupation/activity based interventions;ROM/therapeutic exercise;strengthening exercise;transfer/mobility retraining;patient/caregiver education/training  -           User Key  (r) = Recorded By, (t) = Taken By, (c) = Cosigned By    Initials Name Provider Type    JR Pat Presley, OT Occupational Therapist               Clinical Impression     Row Name 05/28/23 1246          Pain Assessment    Pretreatment Pain Rating 0/10 - no pain  -JR     Posttreatment Pain Rating 0/10 - no pain  -     Row Name 05/28/23 1246          Plan of Care Review    Plan of Care Reviewed With patient;daughter  -     Outcome Evaluation OT initial eval and expanded chart review completed. Pt presents with multiple comorbidities and decreased independence with ADL's and mobility from baseline. Recommend continued skilled OT services and transfer to IRF at d/c.  -[facility] Name 05/28/23 1246          Therapy Assessment/Plan (OT)    Patient/Family Therapy Goal Statement (OT) get better  -     Rehab Potential (OT) good, to achieve stated therapy goals  -     Criteria for Skilled Therapeutic Interventions Met (OT) yes;meets criteria;skilled treatment is necessary  -     Therapy Frequency (OT) daily  -     Row Name 05/28/23 1246          Therapy Plan Review/Discharge Plan (OT)    Anticipated Discharge Disposition (OT) inpatient rehabilitation facility  -     Row Name 05/28/23 1246          Vital Signs    Pre Systolic BP Rehab 141  -JR     Pre Treatment Diastolic BP 64  -JR     Post Systolic BP Rehab 149  -JR     Post Treatment Diastolic BP 67  -JR     Pretreatment Heart Rate (beats/min) 64  -JR     Posttreatment Heart Rate (beats/min) 65  -JR     Pre SpO2 (%) 95  -JR     O2 Delivery Pre Treatment room air  -JR     Post SpO2 (%) 93  -JR     O2 Delivery Post Treatment room air  -JR     Pre Patient Position Sitting  -JR      Intra Patient Position Standing  -JR     Post Patient Position Sitting  -JR     Row Name 05/28/23 1246          Positioning and Restraints    Pre-Treatment Position sitting in chair/recliner  -JR     Post Treatment Position chair  -JR     In Chair notified nsg;reclined;call light within reach;encouraged to call for assist;exit alarm on;on mechanical lift sling;with family/caregiver  -           User Key  (r) = Recorded By, (t) = Taken By, (c) = Cosigned By    Initials Name Provider Type    Pat Cross OT Occupational Therapist               Outcome Measures     Row Name 05/28/23 1251          How much help from another is currently needed...    Putting on and taking off regular lower body clothing? 1  -JR     Bathing (including washing, rinsing, and drying) 2  -JR     Toileting (which includes using toilet bed pan or urinal) 2  -JR     Putting on and taking off regular upper body clothing 3  -JR     Taking care of personal grooming (such as brushing teeth) 3  -JR     Eating meals 4  -JR     AM-PAC 6 Clicks Score (OT) 15  -JR     Row Name 05/28/23 1251          Functional Assessment    Outcome Measure Options AM-PAC 6 Clicks Daily Activity (OT)  -           User Key  (r) = Recorded By, (t) = Taken By, (c) = Cosigned By    Initials Name Provider Type    Pat Cross OT Occupational Therapist                Occupational Therapy Education     Title: PT OT SLP Therapies (In Progress)     Topic: Occupational Therapy (In Progress)     Point: ADL training (In Progress)     Description:   Instruct learner(s) on proper safety adaptation and remediation techniques during self care or transfers.   Instruct in proper use of assistive devices.              Learning Progress Summary           Patient Acceptance, E, NR by  at 5/28/2023 1045    Comment: Educated pt and daughter regarding role of therapy and AE for LBD/B                   Point: Home exercise program (Not Started)     Description:   Instruct  learner(s) on appropriate technique for monitoring, assisting and/or progressing therapeutic exercises/activities.              Learner Progress:  Not documented in this visit.          Point: Precautions (Not Started)     Description:   Instruct learner(s) on prescribed precautions during self-care and functional transfers.              Learner Progress:  Not documented in this visit.          Point: Body mechanics (Not Started)     Description:   Instruct learner(s) on proper positioning and spine alignment during self-care, functional mobility activities and/or exercises.              Learner Progress:  Not documented in this visit.                      User Key     Initials Effective Dates Name Provider Type Discipline     02/03/23 -  Fernandez, Pat PHIPPS, OT Occupational Therapist OT              OT Recommendation and Plan  Planned Therapy Interventions (OT): activity tolerance training, adaptive equipment training, BADL retraining, functional balance retraining, occupation/activity based interventions, ROM/therapeutic exercise, strengthening exercise, transfer/mobility retraining, patient/caregiver education/training  Therapy Frequency (OT): daily  Plan of Care Review  Plan of Care Reviewed With: patient, daughter  Outcome Evaluation: OT initial eval and expanded chart review completed. Pt presents with multiple comorbidities and decreased independence with ADL's and mobility from baseline. Recommend continued skilled OT services and transfer to IRF at d/c.     Time Calculation:    Time Calculation- OT     Row Name 05/28/23 1253             Time Calculation- OT    OT Start Time 1045  -JR      OT Received On 05/28/23  -      OT Goal Re-Cert Due Date 06/07/23  -         Timed Charges    50600 - OT Self Care/Mgmt Minutes 15  -JR         Untimed Charges    OT Eval/Re-eval Minutes 50  -JR         Total Minutes    Timed Charges Total Minutes 15  -JR      Untimed Charges Total Minutes 50  -JR       Total Minutes  65  -JR            User Key  (r) = Recorded By, (t) = Taken By, (c) = Cosigned By    Initials Name Provider Type     Pat Presley, OT Occupational Therapist              Therapy Charges for Today     Code Description Service Date Service Provider Modifiers Qty    [id number]  OT SELF CARE/MGMT/TRAIN EA 15 MIN 5/28/2023 Pat Presley, OT GO 1    [id number]  OT EVAL MOD COMPLEXITY 4 5/28/2023 Pat Presley OT GO 1               Pat Presley, OT  5/28/2023

## 2023-11-13 NOTE — PROGRESS NOTES
Pharmacy Consultation Note  (Antibiotic Dosing and Monitoring)    Initial consult date: 11/11/23  Consulting physician/provider: Dr. Ollie Orr  Drug: Vancomycin  Indication: UTI    Age/  Gender Height Weight IBW  Allergy Information   63 y.o./female 5' 4\" 62 kg   Ideal body weight: 54.7 kg (120 lb 9.5 oz)  Adjusted ideal body weight: 57.6 kg (127 lb 0.5 oz)   Patient has no known allergies. Renal Function:  Recent Labs     11/11/23  0526 11/12/23  0509 11/13/23  0554   BUN 61* 55* 48*   CREATININE 2.7* 2.6* 2.4*         Intake/Output Summary (Last 24 hours) at 11/13/2023 1218  Last data filed at 11/13/2023 0355  Gross per 24 hour   Intake 895.33 ml   Output 1700 ml   Net -804.67 ml         Vancomycin Monitoring:  Trough:  No results for input(s): \"VANCOTROUGH\" in the last 72 hours. Random:    Recent Labs     11/11/23  0526 11/13/23  0554   VANCORANDOM 19.2 16.2         No results for input(s): \"BLOODCULT2\" in the last 72 hours. Historical Cultures:  Organism   Date Value Ref Range Status   12/03/2020 Citrobacter freundii (A)  Final     No results for input(s): \"BC\" in the last 72 hours. Vancomycin Administration Times:  Recent vancomycin administrations                     vancomycin (VANCOCIN) 750 mg in sodium chloride 0.9 % 250 mL IVPB (mg) 750 mg New Bag 11/11/23 1651    vancomycin (VANCOCIN) 1,250 mg in sodium chloride 0.9 % 250 mL IVPB (mg) 1,250 mg New Bag 11/10/23 1850                      Assessment:  Patient is a 58 y.o. female who has been initiated on vancomycin  Estimated Creatinine Clearance: 20 mL/min (A) (based on SCr of 2.4 mg/dL (H)).   To dose vancomycin, pharmacy will be utilizing dosing based off of levels because of patient's renal impairment/insufficiency  11/13: Random AM level = 16.2 mcg/mL    Plan:  Re-dose with Vancomycin 750 mg IV x 1  Repeat random level tomorrow morning  Will continue to monitor renal function   Pharmacy to follow      Filemon Petty PharmD, BCPS 11/13/2023

## 2023-11-13 NOTE — PROGRESS NOTES
Physician Progress Note      Sarbjit Lozada  CSN #:                  [id number]  :                       1960  ADMIT DATE:       11/10/2023 11:00 AM  [facility] DATE:  Edi La  PROVIDER #:        Mahogany Brown           QUERY TEXT:    Dear Dr.,    Pt admitted with UTI. Pt noted to have bilateral urinary stents in place. If   possible, please document in the progress notes and discharge summary if you   are evaluating and/or treating any of the following: The medical record reflects the following:  Risk Factors: bilateral urinary stents, CAD, chronic CHF, CKD, HTN, HLD, DM,   PAD  Clinical Indicators: per Urology consult: Esthela Rasmussen represents with a UTI. Braden Schuster She did   have another CT done and the stents remains with good position. Braden Schuster These have   been changed on q 4 month basis. Braden Schuster Her last sent exchange was in August\"  Treatment: IMCU monitoring, Urology consult, Ua with cx, blood cx's, IVF's, IV   Vancomycin, CT scan    Thank You,  Bronwyn Gallardo RN, BSN, CDS  Clinical Documentation Improvement Specialist  Options provided:  -- UTI due to ureteral stent  -- UTI not due to ureteral stent  -- Other - I will add my own diagnosis  -- Disagree - Not applicable / Not valid  -- Disagree - Clinically unable to determine / Unknown  -- Refer to Clinical Documentation Reviewer    PROVIDER RESPONSE TEXT:    UTI is due to ureteral stent. Query created by: Bronwyn Gallardo on 2023 10:14 AM      QUERY TEXT:    Dear Dr.,    Pt admitted with UTI. Pt noted to have LUAN, elevated temp and HR. If possible,   please document in the progress notes and discharge summary if you are   evaluating and /or treating any of the following:     The medical record reflects the following:  Risk Factors: bilateral urinary stents, CAD, chronic CHF, CKD, HTN, HLD, DM,   PAD  Clinical Indicators: per ED note: \"Sepsis with acute renal failure without   septic shock\"; WBC 6.5-4.2; lactic 0.8; procalcitonin 2.25; urine cx:

## 2023-11-13 NOTE — CARE COORDINATION
SOCIAL WORK / DISCHARGE PLANNING:   Sw attempted to see pt today, sound asleep first time and second time pt was vomiting and unable to answer questions. Full assessment /readmit assessment will need to be completed when able to talk. Pt recently discharged 10/13 home with dtr, Pepe Alvarez. Pt has LBKA, has rollater, cane, shower bench and electric w/c. [facility]. Dc with [facility]. PCP Dr [address]              Electronically signed by MAGNUS Dye on 11/13/2023 at 4:47 PM

## 2023-11-14 ENCOUNTER — HOSPITAL ENCOUNTER (OUTPATIENT)
Dept: INFUSION THERAPY | Age: 63
Discharge: HOME | End: 2023-11-14

## 2023-11-14 LAB
ALBUMIN SERPL-MCNC: 2.4 G/DL (ref 3.5–5.2)
ALP SERPL-CCNC: 62 U/L (ref 35–104)
ALT SERPL-CCNC: 7 U/L (ref 0–32)
ANION GAP SERPL CALCULATED.3IONS-SCNC: 12 MMOL/L (ref 7–16)
AST SERPL-CCNC: 13 U/L (ref 0–31)
BASOPHILS # BLD: 0.02 K/UL (ref 0–0.2)
BASOPHILS NFR BLD: 1 % (ref 0–2)
BILIRUB SERPL-MCNC: 0.3 MG/DL (ref 0–1.2)
BUN SERPL-MCNC: 44 MG/DL (ref 6–23)
CALCIUM SERPL-MCNC: 8.9 MG/DL (ref 8.6–10.2)
CHLORIDE SERPL-SCNC: 112 MMOL/L (ref 98–107)
CO2 SERPL-SCNC: 16 MMOL/L (ref 22–29)
CREAT SERPL-MCNC: 2.1 MG/DL (ref 0.5–1)
DATE LAST DOSE: NORMAL
EOSINOPHIL # BLD: 0.05 K/UL (ref 0.05–0.5)
EOSINOPHILS RELATIVE PERCENT: 1 % (ref 0–6)
ERYTHROCYTE [DISTWIDTH] IN BLOOD BY AUTOMATED COUNT: 17.5 % (ref 11.5–15)
GFR SERPL CREATININE-BSD FRML MDRD: 26 ML/MIN/1.73M2
GLUCOSE BLD-MCNC: 112 MG/DL (ref 74–99)
GLUCOSE BLD-MCNC: 123 MG/DL (ref 74–99)
GLUCOSE BLD-MCNC: 132 MG/DL (ref 74–99)
GLUCOSE BLD-MCNC: 142 MG/DL (ref 74–99)
GLUCOSE BLD-MCNC: 153 MG/DL (ref 74–99)
GLUCOSE SERPL-MCNC: 150 MG/DL (ref 74–99)
HCT VFR BLD AUTO: 26 % (ref 34–48)
HGB BLD-MCNC: 8.1 G/DL (ref 11.5–15.5)
IMM GRANULOCYTES # BLD AUTO: 0.11 K/UL (ref 0–0.58)
IMM GRANULOCYTES NFR BLD: 3 % (ref 0–5)
LYMPHOCYTES NFR BLD: 1.1 K/UL (ref 1.5–4)
LYMPHOCYTES RELATIVE PERCENT: 26 % (ref 20–42)
MCH RBC QN AUTO: 25.2 PG (ref 26–35)
MCHC RBC AUTO-ENTMCNC: 31.2 G/DL (ref 32–34.5)
MCV RBC AUTO: 80.7 FL (ref 80–99.9)
MONOCYTES NFR BLD: 0.45 K/UL (ref 0.1–0.95)
MONOCYTES NFR BLD: 11 % (ref 2–12)
NEUTROPHILS NFR BLD: 59 % (ref 43–80)
NEUTS SEG NFR BLD: 2.44 K/UL (ref 1.8–7.3)
PLATELET # BLD AUTO: 387 K/UL (ref 130–450)
PMV BLD AUTO: 10.7 FL (ref 7–12)
POTASSIUM SERPL-SCNC: 4.2 MMOL/L (ref 3.5–5)
PROT SERPL-MCNC: 7.8 G/DL (ref 6.4–8.3)
RBC # BLD AUTO: 3.22 M/UL (ref 3.5–5.5)
SODIUM SERPL-SCNC: 140 MMOL/L (ref 132–146)
TME LAST DOSE: NORMAL H
VANCOMYCIN DOSE: NORMAL MG
VANCOMYCIN SERPL-MCNC: 19.9 UG/ML (ref 5–40)
WBC OTHER # BLD: 4.2 K/UL (ref 4.5–11.5)

## 2023-11-14 PROCEDURE — 36415 COLL VENOUS BLD VENIPUNCTURE: CPT

## 2023-11-14 PROCEDURE — 6370000000 HC RX 637 (ALT 250 FOR IP): Performed by: SPECIALIST

## 2023-11-14 PROCEDURE — 6360000002 HC RX W HCPCS

## 2023-11-14 PROCEDURE — 80202 ASSAY OF VANCOMYCIN: CPT

## 2023-11-14 PROCEDURE — 85025 COMPLETE CBC W/AUTO DIFF WBC: CPT

## 2023-11-14 PROCEDURE — 80053 COMPREHEN METABOLIC PANEL: CPT

## 2023-11-14 PROCEDURE — 97530 THERAPEUTIC ACTIVITIES: CPT | Performed by: PHYSICAL THERAPIST

## 2023-11-14 PROCEDURE — 6360000002 HC RX W HCPCS: Performed by: INTERNAL MEDICINE

## 2023-11-14 PROCEDURE — 2580000003 HC RX 258: Performed by: INTERNAL MEDICINE

## 2023-11-14 PROCEDURE — 6370000000 HC RX 637 (ALT 250 FOR IP)

## 2023-11-14 PROCEDURE — 2060000000 HC ICU INTERMEDIATE R&B

## 2023-11-14 PROCEDURE — 6370000000 HC RX 637 (ALT 250 FOR IP): Performed by: INTERNAL MEDICINE

## 2023-11-14 PROCEDURE — 82962 GLUCOSE BLOOD TEST: CPT

## 2023-11-14 PROCEDURE — 97161 PT EVAL LOW COMPLEX 20 MIN: CPT | Performed by: PHYSICAL THERAPIST

## 2023-11-14 RX ORDER — POLYETHYLENE GLYCOL 3350 17 G/17G
17 POWDER, FOR SOLUTION ORAL DAILY
Status: DISCONTINUED | OUTPATIENT
Start: 2023-11-14 | End: 2023-11-17

## 2023-11-14 RX ORDER — DOCUSATE SODIUM 100 MG/1
100 CAPSULE, LIQUID FILLED ORAL 2 TIMES DAILY
Status: DISCONTINUED | OUTPATIENT
Start: 2023-11-14 | End: 2023-11-20 | Stop reason: HOSPADM

## 2023-11-14 RX ADMIN — ACETAMINOPHEN 650 MG: 325 TABLET ORAL at 20:46

## 2023-11-14 RX ADMIN — CARVEDILOL 12.5 MG: 6.25 TABLET, FILM COATED ORAL at 08:48

## 2023-11-14 RX ADMIN — HYDRALAZINE HYDROCHLORIDE 25 MG: 25 TABLET, FILM COATED ORAL at 12:15

## 2023-11-14 RX ADMIN — ACETAMINOPHEN 650 MG: 325 TABLET ORAL at 15:45

## 2023-11-14 RX ADMIN — SODIUM CHLORIDE, PRESERVATIVE FREE 10 ML: 5 INJECTION INTRAVENOUS at 08:51

## 2023-11-14 RX ADMIN — HYDRALAZINE HYDROCHLORIDE 25 MG: 25 TABLET, FILM COATED ORAL at 20:47

## 2023-11-14 RX ADMIN — ATORVASTATIN CALCIUM 20 MG: 20 TABLET, FILM COATED ORAL at 20:47

## 2023-11-14 RX ADMIN — FENTANYL CITRATE 25 MCG: 0.05 INJECTION, SOLUTION INTRAMUSCULAR; INTRAVENOUS at 18:08

## 2023-11-14 RX ADMIN — HYDRALAZINE HYDROCHLORIDE 25 MG: 25 TABLET, FILM COATED ORAL at 04:53

## 2023-11-14 RX ADMIN — ENOXAPARIN SODIUM 60 MG: 100 INJECTION SUBCUTANEOUS at 16:48

## 2023-11-14 RX ADMIN — SODIUM CHLORIDE, PRESERVATIVE FREE 10 ML: 5 INJECTION INTRAVENOUS at 20:48

## 2023-11-14 RX ADMIN — CARVEDILOL 12.5 MG: 6.25 TABLET, FILM COATED ORAL at 16:48

## 2023-11-14 RX ADMIN — ISOSORBIDE MONONITRATE 30 MG: 30 TABLET, EXTENDED RELEASE ORAL at 08:49

## 2023-11-14 RX ADMIN — DOCUSATE SODIUM 100 MG: 100 CAPSULE, LIQUID FILLED ORAL at 20:47

## 2023-11-14 RX ADMIN — FENTANYL CITRATE 25 MCG: 0.05 INJECTION, SOLUTION INTRAMUSCULAR; INTRAVENOUS at 01:12

## 2023-11-14 RX ADMIN — MIRTAZAPINE 7.5 MG: 15 TABLET, FILM COATED ORAL at 20:47

## 2023-11-14 RX ADMIN — GABAPENTIN 100 MG: 100 CAPSULE ORAL at 20:46

## 2023-11-14 RX ADMIN — GABAPENTIN 100 MG: 100 CAPSULE ORAL at 08:49

## 2023-11-14 ASSESSMENT — PAIN DESCRIPTION - DESCRIPTORS: DESCRIPTORS: ACHING;DISCOMFORT

## 2023-11-14 ASSESSMENT — PAIN SCALES - GENERAL
PAINLEVEL_OUTOF10: 2
PAINLEVEL_OUTOF10: 8

## 2023-11-14 ASSESSMENT — PAIN DESCRIPTION - ORIENTATION: ORIENTATION: LEFT;RIGHT

## 2023-11-14 ASSESSMENT — PAIN SCALES - WONG BAKER: WONGBAKER_NUMERICALRESPONSE: 2

## 2023-11-14 ASSESSMENT — PAIN DESCRIPTION - LOCATION: LOCATION: GENERALIZED

## 2023-11-14 NOTE — PLAN OF CARE
Problem: Discharge Planning  Goal: Discharge to home or other facility with appropriate resources  11/14/2023 0132 by Mg Johnson RN  Outcome: Progressing  11/13/2023 1143 by Heather Knight RN  Outcome: Progressing     Problem: Pain  Goal: Verbalizes/displays adequate comfort level or baseline comfort level  11/14/2023 0132 by Mg Johnson RN  Outcome: Progressing  11/13/2023 1143 by Heather Knight RN  Outcome: Progressing     Problem: Safety - Adult  Goal: Free from fall injury  11/14/2023 0132 by Mg Johnson RN  Outcome: Progressing  11/13/2023 1143 by Heather Knight RN  Outcome: Progressing     Problem: ABCDS Injury Assessment  Goal: Absence of physical injury  11/14/2023 0132 by Mg Johnson RN  Outcome: Progressing  11/13/2023 1143 by Heather Knight RN  Outcome: Progressing     Problem: Chronic Conditions and Co-morbidities  Goal: Patient's chronic conditions and co-morbidity symptoms are monitored and maintained or improved  11/14/2023 0132 by Mg Johnson RN  Outcome: Progressing  11/13/2023 1143 by Heather Knight RN  Outcome: Progressing

## 2023-11-14 NOTE — ACP (ADVANCE CARE PLANNING)
Advance Care Planning   Healthcare Decision Maker:    Primary Decision Maker: Mamie Bo - [facility] [place] - [phone]    Click here to complete Healthcare Decision Makers including selection of the Healthcare Decision Maker Relationship (ie \"Primary\"). Today we documented Decision Maker(s) consistent with ACP documents on file.

## 2023-11-14 NOTE — PROGRESS NOTES
Progress Note    Date:11/14/2023         Patient Ova Reason     YOB: 1960     Age:63 y.o.     FACE TO FACE ENCOUNTER  11/14/23    Assessment/PLAN        Hospital Problems             Last Modified POA    * (Principal) Sepsis secondary to UTI ([address]) 11/10/2023 Yes   Leukopenia   Cons bacteremia prob contaminant   Uti/peylonephritis/hydroneprosis Bilateral double-J ureteral stents in place   -gnr/gpo  -f/u procal  -repeat blood cx  -for stent removal in next few days per pt      vancomycin (VANCOCIN) intermittent dosing (placeholder), RX Placeholder         Review of Systems      Review of Systems  11/14 in bed has no c/o f/c asking about stent change  11/13 c/o pain hands wrist  no f/c/nv/d  11/12 Feels better no f/c/n/v/d  Subjective   Interval History Status:    Pt in bed no issues with atbx rowe in   C/o joint aches  Afebrile on RA  Medications   Scheduled Meds:    lidocaine  1 patch TransDERmal Daily    hydrALAZINE  25 mg Oral 3 times per day    glycerin (ADULT)  1 suppository Rectal Once    vancomycin (VANCOCIN) intermittent dosing (placeholder)   Other RX Placeholder    [Held by provider] apixaban  5 mg Oral BID    atorvastatin  20 mg Oral Nightly    carvedilol  12.5 mg Oral BID WC    isosorbide mononitrate  30 mg Oral Daily    insulin lispro  0-8 Units SubCUTAneous TID WC    insulin lispro  0-4 Units SubCUTAneous Nightly    docusate sodium  100 mg Oral Daily    enoxaparin  1 mg/kg SubCUTAneous Daily    sodium chloride flush  5-40 mL IntraVENous 2 times per day    gabapentin  100 mg Oral BID    mirtazapine  7.5 mg Oral Nightly    sodium chloride  1,000 mL IntraVENous Once     Continuous Infusions:    sodium chloride      sodium chloride 50 mL/hr at 11/12/23 0811    dextrose      sodium chloride       PRN Meds: hydrALAZINE, prochlorperazine, sodium chloride, glucose, dextrose bolus **OR** dextrose bolus, glucagon (rDNA), dextrose, sodium chloride flush, sodium chloride, polyethylene

## 2023-11-14 NOTE — PROGRESS NOTES
Physical Therapy    Physical Therapy Initial Evaluation/Plan of Care    Room #:  6271/6677-96  Patient Name: Mena Neville  YOB: 1960  MRN: [id number]    Date of Service: 11/14/2023     Tentative placement recommendation: Home with supervision/family assist  Equipment recommendation: Patient has needed equipment       Evaluating Physical Therapist: Aleck Brittle, PT  #[id number]      Specific Provider Orders/Date/Referring Provider :     PT eval and treat  Start:  11/14/23 0915,   End:  11/14/23 0915,   ONE TIME,   Standing Count:  1 Occurrences,   R         Sharren Bridegroom, DO     Admitting Diagnosis:   Dehydration [E86.0]  Acute pyelonephritis [N10]  LUAN (acute kidney injury) ([address]) [N17.9]  Sepsis secondary to UTI ([address]) [A41.9, N39.0]  Sepsis with acute renal failure without septic shock, due to unspecified organism, unspecified acute renal failure type ([address]) [A41.9, R65.20, N17.9]    Admitted with    above  Surgery: none  Visit Diagnoses         Codes    Acute pyelonephritis    -  Primary N10    Dehydration     E86.0    Sepsis with acute renal failure without septic shock, due to unspecified organism, unspecified acute renal failure type ([address])     A41.9, R65.20, N17.9    LUAN (acute kidney injury) ([address])     N17.9            Patient Active Problem List   Diagnosis    Epigastric hernia    Umbilical hernia    Essential hypertension    Type 2 diabetes mellitus with diabetic peripheral angiopathy without gangrene, without long-term current use of insulin ([address])    Dyslipidemia    Diabetic peripheral neuropathy ([address])    Hydroureter    Microcytic hypochromic anemia    Paresthesia of left foot    History of arterial ischemic stroke    Hx of diabetic foot ulcer    Hx of BKA, left (HCC)    Chronic diastolic heart failure (HCC)    PVD (peripheral vascular disease) ([address])    Anorexia    Anemia    Pleural effusion    Dyspnea    Acute on chronic diastolic CHF (congestive heart failure) ([address])    Chronic renal disease,

## 2023-11-14 NOTE — PROGRESS NOTES
Internal Medicine Progress Note    [facility]    Radha Desai. Julienne Fulton., SACHIOMarianaIMariana Mejia D.O., BARBARA Dawn, MSN, APRN, NPMEET Tripathi. Celestina Harden, MSN, APRN-CNP  Dinorah Lees, MSN, APRN-CNP     Primary Care Physician: Kym Rossi MD   Admitting Physician:  Anitra Pérez DO  Admission date and time: 11/10/2023 11:00 AM    Room:  [address] [place] [address]  Admitting diagnosis: Dehydration [E86.0]  Acute pyelonephritis [N10]  LUAN (acute kidney injury) ([address]) [N17.9]  Sepsis secondary to UTI ([address]) [A41.9, N39.0]  Sepsis with acute renal failure without septic shock, due to unspecified organism, unspecified acute renal failure type ([address]) [A41.9, R65.20, N17.9]    Patient Name: Brittney Rivas  MRN: [id number]    Date of Service: 11/14/2023     Subjective:  Lizeth Burgos is a 58 y.o. female who was seen and examined today,11/14/2023, at the bedside. Patient is resting comfortably in bed there is no family present during examination. She denies any pain or discomfort. She is scheduled for a stent replacement with urology on Thursday. She did report her last bowel movement from the sorbitol a couple days ago. We will start on MiraLAX and Colace daily. Review of System:   Constitutional:   Denies fever or chills, weight loss or gain, positive malaise  HEENT:   Denies ear pain, sore throat, sinus or eye problems. Cardiovascular:   Denies any chest pain, irregular heartbeats, or palpitations. Respiratory:   Denies shortness of breath, coughing, sputum production, hemoptysis, or wheezing. Gastrointestinal:   Denies nausea, vomiting, diarrhea, or constipation. Denies any abdominal pain. Genitourinary:    Denies any urgency,  hematuria improving. Gerber catheter   extremities:   Denies lower extremity swelling, edema or cyanosis.    Neurology:    Denies any headache or focal neurological deficits, Denies

## 2023-11-14 NOTE — PROGRESS NOTES
11/14/2023 10:46 AM  Edgar Jacob  [id number]    Subjective:        Hydronephrosis    Review of Systems  Constitutional: No fever or chills   Respiratory: negative for cough and hemoptysis  Cardiovascular: negative for chest pain and dyspnea  Gastrointestinal: negative for abdominal pain, diarrhea, nausea and vomiting   : See above  Derm: negative for rash and skin lesion(s)  Neurological: negative for seizures and tremors  Musculoskeletal: Negative    Psychiatric: Negative   All other reviews are negative      Scheduled Meds:   lidocaine  1 patch TransDERmal Daily    hydrALAZINE  25 mg Oral 3 times per day    glycerin (ADULT)  1 suppository Rectal Once    vancomycin (VANCOCIN) intermittent dosing (placeholder)   Other RX Placeholder    [Held by provider] apixaban  5 mg Oral BID    atorvastatin  20 mg Oral Nightly    carvedilol  12.5 mg Oral BID WC    isosorbide mononitrate  30 mg Oral Daily    insulin lispro  0-8 Units SubCUTAneous TID WC    insulin lispro  0-4 Units SubCUTAneous Nightly    docusate sodium  100 mg Oral Daily    enoxaparin  1 mg/kg SubCUTAneous Daily    sodium chloride flush  5-40 mL IntraVENous 2 times per day    gabapentin  100 mg Oral BID    mirtazapine  7.5 mg Oral Nightly    sodium chloride  1,000 mL IntraVENous Once       Objective:  Vitals:    11/14/23 0848   BP: (!) 192/79   Pulse: 92   Resp:    Temp:    SpO2:          Allergies: Patient has no known allergies.     General Appearance: alert and oriented to person, place and time and in no acute distress  Skin: no rash or erythema  Head: normocephalic and atraumatic  Pulmonary/Chest: normal air movement, no respiratory distress  Abdomen: soft, non-tender, non-distended  Genitourinary: rowe with yellow urine   Extremities: no cyanosis, clubbing or edema         Labs:     Recent Labs     11/14/23  0603      K 4.2   *   CO2 16*   BUN 44*   CREATININE 2.1*   GLUCOSE 150*   CALCIUM 8.9       Lab Results   Component Value

## 2023-11-14 NOTE — PROGRESS NOTES
Pharmacy Consultation Note  (Antibiotic Dosing and Monitoring)    Initial consult date: 11/11/23  Consulting physician/provider: Dr. Crow De Los Santos  Drug: Vancomycin  Indication: UTI    Age/  Gender Height Weight IBW  Allergy Information   63 y.o./female 5' 4\" 62 kg   Ideal body weight: 54.7 kg (120 lb 9.5 oz)  Adjusted ideal body weight: 57.6 kg (127 lb 0.5 oz)   Patient has no known allergies. Renal Function:  Recent Labs     11/12/23  0509 11/13/23  0554 11/14/23  0603   BUN 55* 48* 44*   CREATININE 2.6* 2.4* 2.1*         Intake/Output Summary (Last 24 hours) at 11/14/2023 0825  Last data filed at 11/14/2023 6655  Gross per 24 hour   Intake 240 ml   Output 1750 ml   Net -1510 ml         Vancomycin Monitoring:  Trough:  No results for input(s): \"VANCOTROUGH\" in the last 72 hours. Random:    Recent Labs     11/13/23  0554 11/14/23  0603   VANCORANDOM 16.2 19.9         No results for input(s): \"BLOODCULT2\" in the last 72 hours. Historical Cultures:  Organism   Date Value Ref Range Status   12/03/2020 Citrobacter freundii (A)  Final     No results for input(s): \"BC\" in the last 72 hours. Recent vancomycin administrations                     vancomycin (VANCOCIN) 750 mg in sodium chloride 0.9 % 250 mL IVPB (mg) 750 mg New Bag 11/13/23 1252    vancomycin (VANCOCIN) 750 mg in sodium chloride 0.9 % 250 mL IVPB (mg) 750 mg New Bag 11/11/23 1651               Assessment:  Patient is a 58 y.o. female who has been initiated on vancomycin  Estimated Creatinine Clearance: 23 mL/min (A) (based on SCr of 2.1 mg/dL (H)). To dose vancomycin, pharmacy will be utilizing dosing based off of levels because of patient's renal impairment/insufficiency  11/13: Random AM level = 16.2 mcg/mL  11/14: Random AM level 19.9 mcg/ml    Plan:  No dose today.   Repeat random level tomorrow morning  Will continue to monitor renal function   Pharmacy to follow      [address]  11/14/2023, 8:26 AM

## 2023-11-14 NOTE — CARE COORDINATION
Case Management Assessment  Initial Evaluation    Date/Time of Evaluation: 11/14/2023 4:11 PM  Assessment Completed by: MAGNUS Newberry    If patient is discharged prior to next notation, then this note serves as note for discharge by case management. Patient Name: Néstor Resendiz                   YOB: 1960  Diagnosis: Dehydration [E86.0]  Acute pyelonephritis [N10]  LUAN (acute kidney injury) ([address]) [N17.9]  Sepsis secondary to UTI ([address]) [A41.9, N39.0]  Sepsis with acute renal failure without septic shock, due to unspecified organism, unspecified acute renal failure type ([address]) [A41.9, R65.20, N17.9]                   Date / Time: 11/10/2023 11:00 AM    Patient Admission Status: Inpatient   Readmission Risk (Low < 19, Mod (19-27), High > 27): Readmission Risk Score: 27.5    Current PCP: Shasta Harrington MD  PCP verified by CM?  Yes    Chart Reviewed: Yes      History Provided by: Patient  Patient Orientation: Alert and Oriented    Patient Cognition: Alert    Hospitalization in the last 30 days (Readmission):  Yes    Readmission Assessment  Number of Days since last admission?: 8-30 days  Previous Disposition: Home with Home Health  Who is being Interviewed: Patient  What was the patient's/caregiver's perception as to why they think they needed to return back to the hospital?: Other (Comment) (nausea/ vomiting)  Did you visit your Primary Care Physician after you left the hospital, before you returned this time?: Yes  Did you see a specialist, such as Cardiac, Pulmonary, Orthopedic Physician, etc. after you left the hospital?: No  Who advised the patient to return to the hospital?: Self-referral  Does the patient report anything that got in the way of taking their medications?: No  In our efforts to provide the best possible care to you and others like you, can you think of anything that we could have done to help you after you left the hospital the first time, so that you might not have is active with [facility], WILL NEED [facility] order at dc. Dtr will provide home going transport.            MAGNUS Ramirez  Case Management Department  Ph:  Fax:

## 2023-11-15 LAB
ALBUMIN SERPL-MCNC: 2.4 G/DL (ref 3.5–5.2)
ALP SERPL-CCNC: 55 U/L (ref 35–104)
ALT SERPL-CCNC: 7 U/L (ref 0–32)
ANION GAP SERPL CALCULATED.3IONS-SCNC: 12 MMOL/L (ref 7–16)
AST SERPL-CCNC: 12 U/L (ref 0–31)
BASOPHILS # BLD: 0.02 K/UL (ref 0–0.2)
BASOPHILS NFR BLD: 1 % (ref 0–2)
BILIRUB SERPL-MCNC: 0.3 MG/DL (ref 0–1.2)
BUN SERPL-MCNC: 40 MG/DL (ref 6–23)
CALCIUM SERPL-MCNC: 9 MG/DL (ref 8.6–10.2)
CHLORIDE SERPL-SCNC: 111 MMOL/L (ref 98–107)
CO2 SERPL-SCNC: 16 MMOL/L (ref 22–29)
CREAT SERPL-MCNC: 1.8 MG/DL (ref 0.5–1)
DATE LAST DOSE: NORMAL
EOSINOPHIL # BLD: 0.07 K/UL (ref 0.05–0.5)
EOSINOPHILS RELATIVE PERCENT: 2 % (ref 0–6)
ERYTHROCYTE [DISTWIDTH] IN BLOOD BY AUTOMATED COUNT: 17.5 % (ref 11.5–15)
GFR SERPL CREATININE-BSD FRML MDRD: 31 ML/MIN/1.73M2
GLUCOSE BLD-MCNC: 117 MG/DL (ref 74–99)
GLUCOSE BLD-MCNC: 121 MG/DL (ref 74–99)
GLUCOSE SERPL-MCNC: 106 MG/DL (ref 74–99)
HCT VFR BLD AUTO: 26.9 % (ref 34–48)
HGB BLD-MCNC: 8.3 G/DL (ref 11.5–15.5)
IMM GRANULOCYTES # BLD AUTO: 0.07 K/UL (ref 0–0.58)
IMM GRANULOCYTES NFR BLD: 2 % (ref 0–5)
LYMPHOCYTES NFR BLD: 1.16 K/UL (ref 1.5–4)
LYMPHOCYTES RELATIVE PERCENT: 32 % (ref 20–42)
MCH RBC QN AUTO: 24.9 PG (ref 26–35)
MCHC RBC AUTO-ENTMCNC: 30.9 G/DL (ref 32–34.5)
MCV RBC AUTO: 80.8 FL (ref 80–99.9)
MICROORGANISM SPEC CULT: NORMAL
MONOCYTES NFR BLD: 0.36 K/UL (ref 0.1–0.95)
MONOCYTES NFR BLD: 10 % (ref 2–12)
NEUTROPHILS NFR BLD: 53 % (ref 43–80)
NEUTS SEG NFR BLD: 1.9 K/UL (ref 1.8–7.3)
PLATELET # BLD AUTO: 354 K/UL (ref 130–450)
PMV BLD AUTO: 10.6 FL (ref 7–12)
POTASSIUM SERPL-SCNC: 4.2 MMOL/L (ref 3.5–5)
PROT SERPL-MCNC: 7.9 G/DL (ref 6.4–8.3)
RBC # BLD AUTO: 3.33 M/UL (ref 3.5–5.5)
SERVICE CMNT-IMP: NORMAL
SODIUM SERPL-SCNC: 139 MMOL/L (ref 132–146)
SPECIMEN DESCRIPTION: NORMAL
TME LAST DOSE: NORMAL H
VANCOMYCIN DOSE: NORMAL MG
VANCOMYCIN SERPL-MCNC: 13 UG/ML (ref 5–40)
WBC OTHER # BLD: 3.6 K/UL (ref 4.5–11.5)

## 2023-11-15 PROCEDURE — 97110 THERAPEUTIC EXERCISES: CPT

## 2023-11-15 PROCEDURE — 85025 COMPLETE CBC W/AUTO DIFF WBC: CPT

## 2023-11-15 PROCEDURE — 2060000000 HC ICU INTERMEDIATE R&B

## 2023-11-15 PROCEDURE — 97535 SELF CARE MNGMENT TRAINING: CPT

## 2023-11-15 PROCEDURE — 36415 COLL VENOUS BLD VENIPUNCTURE: CPT

## 2023-11-15 PROCEDURE — 6370000000 HC RX 637 (ALT 250 FOR IP): Performed by: INTERNAL MEDICINE

## 2023-11-15 PROCEDURE — 6370000000 HC RX 637 (ALT 250 FOR IP)

## 2023-11-15 PROCEDURE — 97166 OT EVAL MOD COMPLEX 45 MIN: CPT

## 2023-11-15 PROCEDURE — 2580000003 HC RX 258: Performed by: INTERNAL MEDICINE

## 2023-11-15 PROCEDURE — 82962 GLUCOSE BLOOD TEST: CPT

## 2023-11-15 PROCEDURE — 80053 COMPREHEN METABOLIC PANEL: CPT

## 2023-11-15 PROCEDURE — 6370000000 HC RX 637 (ALT 250 FOR IP): Performed by: SPECIALIST

## 2023-11-15 PROCEDURE — 6360000002 HC RX W HCPCS: Performed by: INTERNAL MEDICINE

## 2023-11-15 PROCEDURE — 80202 ASSAY OF VANCOMYCIN: CPT

## 2023-11-15 PROCEDURE — 2580000003 HC RX 258

## 2023-11-15 RX ORDER — GENTAMICIN SULFATE 80 MG/50ML
80 INJECTION, SOLUTION INTRAVENOUS
Status: DISPENSED | OUTPATIENT
Start: 2023-11-15 | End: 2023-11-15

## 2023-11-15 RX ORDER — SODIUM BICARBONATE 650 MG/1
650 TABLET ORAL 2 TIMES DAILY
Status: DISCONTINUED | OUTPATIENT
Start: 2023-11-15 | End: 2023-11-20 | Stop reason: HOSPADM

## 2023-11-15 RX ORDER — SORBITOL SOLUTION 70 %
30 SOLUTION, ORAL MISCELLANEOUS ONCE
Status: COMPLETED | OUTPATIENT
Start: 2023-11-15 | End: 2023-11-15

## 2023-11-15 RX ADMIN — NALOXEGOL OXALATE 25 MG: 12.5 TABLET, FILM COATED ORAL at 05:17

## 2023-11-15 RX ADMIN — ISOSORBIDE MONONITRATE 30 MG: 30 TABLET, EXTENDED RELEASE ORAL at 09:03

## 2023-11-15 RX ADMIN — SODIUM BICARBONATE 650 MG: 650 TABLET ORAL at 11:56

## 2023-11-15 RX ADMIN — HYDRALAZINE HYDROCHLORIDE 25 MG: 25 TABLET, FILM COATED ORAL at 21:22

## 2023-11-15 RX ADMIN — GABAPENTIN 100 MG: 100 CAPSULE ORAL at 20:55

## 2023-11-15 RX ADMIN — DOCUSATE SODIUM 100 MG: 100 CAPSULE, LIQUID FILLED ORAL at 20:55

## 2023-11-15 RX ADMIN — SODIUM BICARBONATE 650 MG: 650 TABLET ORAL at 21:22

## 2023-11-15 RX ADMIN — MIRTAZAPINE 7.5 MG: 15 TABLET, FILM COATED ORAL at 20:55

## 2023-11-15 RX ADMIN — FENTANYL CITRATE 25 MCG: 0.05 INJECTION, SOLUTION INTRAMUSCULAR; INTRAVENOUS at 18:43

## 2023-11-15 RX ADMIN — SODIUM CHLORIDE, PRESERVATIVE FREE 10 ML: 5 INJECTION INTRAVENOUS at 20:55

## 2023-11-15 RX ADMIN — CARVEDILOL 12.5 MG: 6.25 TABLET, FILM COATED ORAL at 17:33

## 2023-11-15 RX ADMIN — CARVEDILOL 12.5 MG: 6.25 TABLET, FILM COATED ORAL at 09:03

## 2023-11-15 RX ADMIN — DOCUSATE SODIUM 100 MG: 100 CAPSULE, LIQUID FILLED ORAL at 09:03

## 2023-11-15 RX ADMIN — ACETAMINOPHEN 650 MG: 325 TABLET ORAL at 05:22

## 2023-11-15 RX ADMIN — FENTANYL CITRATE 25 MCG: 0.05 INJECTION, SOLUTION INTRAMUSCULAR; INTRAVENOUS at 09:04

## 2023-11-15 RX ADMIN — HYDRALAZINE HYDROCHLORIDE 25 MG: 25 TABLET, FILM COATED ORAL at 13:59

## 2023-11-15 RX ADMIN — GABAPENTIN 100 MG: 100 CAPSULE ORAL at 09:04

## 2023-11-15 RX ADMIN — HYDRALAZINE HYDROCHLORIDE 25 MG: 25 TABLET, FILM COATED ORAL at 05:18

## 2023-11-15 RX ADMIN — SORBITOL SOLUTION (BULK) 30 ML: 70 SOLUTION at 13:59

## 2023-11-15 RX ADMIN — ACETAMINOPHEN 650 MG: 325 TABLET ORAL at 20:45

## 2023-11-15 RX ADMIN — ATORVASTATIN CALCIUM 20 MG: 20 TABLET, FILM COATED ORAL at 20:54

## 2023-11-15 RX ADMIN — ACETAMINOPHEN 650 MG: 325 TABLET ORAL at 11:56

## 2023-11-15 RX ADMIN — SODIUM CHLORIDE: 9 INJECTION, SOLUTION INTRAVENOUS at 21:24

## 2023-11-15 RX ADMIN — VANCOMYCIN HYDROCHLORIDE 750 MG: 5 INJECTION, POWDER, LYOPHILIZED, FOR SOLUTION INTRAVENOUS at 14:04

## 2023-11-15 ASSESSMENT — PAIN DESCRIPTION - DESCRIPTORS
DESCRIPTORS: ACHING
DESCRIPTORS: ACHING

## 2023-11-15 ASSESSMENT — PAIN DESCRIPTION - LOCATION
LOCATION: ABDOMEN
LOCATION: ABDOMEN

## 2023-11-15 ASSESSMENT — PAIN SCALES - GENERAL
PAINLEVEL_OUTOF10: 0
PAINLEVEL_OUTOF10: 4
PAINLEVEL_OUTOF10: 3

## 2023-11-15 ASSESSMENT — PAIN DESCRIPTION - ORIENTATION
ORIENTATION: RIGHT;LEFT;MID
ORIENTATION: RIGHT;LEFT;MID

## 2023-11-15 ASSESSMENT — PAIN DESCRIPTION - FREQUENCY: FREQUENCY: CONTINUOUS

## 2023-11-15 ASSESSMENT — PAIN SCALES - WONG BAKER: WONGBAKER_NUMERICALRESPONSE: 2

## 2023-11-15 ASSESSMENT — PAIN DESCRIPTION - PAIN TYPE: TYPE: CHRONIC PAIN

## 2023-11-15 ASSESSMENT — PAIN - FUNCTIONAL ASSESSMENT: PAIN_FUNCTIONAL_ASSESSMENT: PREVENTS OR INTERFERES SOME ACTIVE ACTIVITIES AND ADLS

## 2023-11-15 ASSESSMENT — PAIN DESCRIPTION - ONSET: ONSET: ON-GOING

## 2023-11-15 NOTE — PROGRESS NOTES
Physical Therapy    Physical Therapy Treatment Note/Plan of Care    Room #:  0100/8549-96  Patient Name: Florin Lopez  YOB: 1960  MRN: [id number]    Date of Service: 11/15/2023     Tentative placement recommendation: Home with supervision/family assist  Equipment recommendation: Patient has needed equipment       Evaluating Physical Therapist: Margarita Petty, PT  #[id number]      Specific Provider Orders/Date/Referring Provider :     PT eval and treat  Start:  11/14/23 0915,   End:  11/14/23 0915,   ONE TIME,   Standing Count:  1 Occurrences,   R         Mario Hunt DO     Admitting Diagnosis:   Dehydration [E86.0]  Acute pyelonephritis [N10]  LUAN (acute kidney injury) ([address]) [N17.9]  Sepsis secondary to UTI ([address]) [A41.9, N39.0]  Sepsis with acute renal failure without septic shock, due to unspecified organism, unspecified acute renal failure type ([address]) [A41.9, R65.20, N17.9]    Admitted with    above  Surgery: none  Visit Diagnoses         Codes    Acute pyelonephritis    -  Primary N10    Dehydration     E86.0    Sepsis with acute renal failure without septic shock, due to unspecified organism, unspecified acute renal failure type ([address])     A41.9, R65.20, N17.9    LUAN (acute kidney injury) ([address])     N17.9            Patient Active Problem List   Diagnosis    Epigastric hernia    Umbilical hernia    Essential hypertension    Type 2 diabetes mellitus with diabetic peripheral angiopathy without gangrene, without long-term current use of insulin ([address])    Dyslipidemia    Diabetic peripheral neuropathy ([address])    Hydroureter    Microcytic hypochromic anemia    Paresthesia of left foot    History of arterial ischemic stroke    Hx of diabetic foot ulcer    Hx of BKA, left (HCC)    Chronic diastolic heart failure (HCC)    PVD (peripheral vascular disease) ([address])    Anorexia    Anemia    Pleural effusion    Dyspnea    Acute on chronic diastolic CHF (congestive heart failure) ([address])    Chronic renal disease, stage techniques and provide support and education to maximize respiratory function  -Stairs: Stair training with instruction on proper technique and hand placement on rail    PT long term treatment goals are located in below grid    Patient and or family understand(s) diagnosis, prognosis, and plan of care. Frequency of treatments: Patient will be seen  daily. Prior Level of Function: Patient ambulated independently, with wheeled walker, and prosthesis      Rehab Potential: good   for baseline    Past medical history:   Past Medical History:   Diagnosis Date    CAD (coronary artery disease)     cath 2016    CHF (congestive heart failure) ([address])     Diabetic peripheral neuropathy associated with type 2 diabetes mellitus ([address]) 08/24/2018    Epigastric hernia     Hyperlipidemia     Hypertension     Kidney stone     Schizophrenia ([address])     Type 2 diabetes mellitus with diabetic polyneuropathy, with long-term current use of insulin ([address]) 09/19/2016    no longer requiring medication. 1/13/21 - continues w/o medications as of 11/2/2022     Past Surgical History:   Procedure Laterality Date    BLADDER SURGERY Bilateral 5/2/2022    CYSTOSCOPY RETROGRADE PYELOGRAM BILATERAL STENT CHANGE performed by Flavia Mancia MD at [address] Left 11/7/2022    CYSTOSCOPY BILATERAL RETROGRADE PYELOGRAM LEFT STENT CHANGE POSSIBLE RIGHT STENT CHANGE VS REMOVAL performed by Flavia Mancia MD at [address]  09/30/2016     [facility]    COLONOSCOPY  08/2016    Dr. Prem Jalloh 9/7/2021    CYSTOSCOPY RETROGRADE PYELOGRAM, BILATERAL STENT EXCHANGE performed by Flavia Mancia MD at [address] / [address] [facility] / [place] Bilateral 10/28/2019    CYSTOSCOPY. RETROGRADE.  PYELOGRAM. BILATERAL STENT INSERTION performed by Katy Renee MD at [address] / [address] [facility] / [place] Bilateral 5/19/2020    CYSTOSCOPY RETROGRADE PYELOGRAM

## 2023-11-15 NOTE — PROGRESS NOTES
Pharmacy Consultation Note  (Antibiotic Dosing and Monitoring)    Initial consult date: 11/11/23  Consulting physician/provider: Dr. Rena Bah  Drug: Vancomycin  Indication: UTI    Age/  Gender Height Weight IBW  Allergy Information   63 y.o./female 5' 4\" 62 kg   Ideal body weight: 54.7 kg (120 lb 9.5 oz)  Adjusted ideal body weight: 57.6 kg (127 lb 0.5 oz)   Patient has no known allergies. Renal Function:  Recent Labs     11/13/23  0554 11/14/23  0603 11/15/23  0527   BUN 48* 44* 40*   CREATININE 2.4* 2.1* 1.8*         Intake/Output Summary (Last 24 hours) at 11/15/2023 1130  Last data filed at 11/15/2023 0818  Gross per 24 hour   Intake 120 ml   Output 1250 ml   Net -1130 ml         Vancomycin Monitoring:  Trough:  No results for input(s): \"VANCOTROUGH\" in the last 72 hours. Random:    Recent Labs     11/13/23  0554 11/14/23  0603 11/15/23  0527   VANCORANDOM 16.2 19.9 13.0         No results for input(s): \"BLOODCULT2\" in the last 72 hours. Historical Cultures:  Organism   Date Value Ref Range Status   12/03/2020 Citrobacter freundii (A)  Final     No results for input(s): \"BC\" in the last 72 hours. Recent vancomycin administrations                     vancomycin (VANCOCIN) 750 mg in sodium chloride 0.9 % 250 mL IVPB (mg) 750 mg New Bag 11/13/23 1252               Assessment:  Patient is a 58 y.o. female who has been initiated on vancomycin  Estimated Creatinine Clearance: 27 mL/min (A) (based on SCr of 1.8 mg/dL (H)). To dose vancomycin, pharmacy will be utilizing dosing based off of levels because of patient's renal impairment/insufficiency  11/13: Random AM level = 16.2 mcg/mL  11/14: Random AM level 19.9 mcg/ml  11/15: Random AM level 13, SCr now 1.8, UOP 0.8 ml/kg/h    Plan:  Vancomycin 750mg x 1 dose.   Repeat random level tomorrow morning  Will continue to monitor renal function   Pharmacy to follow      [place], [place]  11/15/2023, 11:30 AM

## 2023-11-15 NOTE — PROGRESS NOTES
lines and tubes intact. Overall patient demonstrated decreased independence and safety during completion of ADL/functional transfer/mobility tasks. Pt would benefit from continued skilled OT to increase safety and independence with completion of ADL/IADL tasks for functional independence and quality of life. Treatment: OT treatment provided this date includes:   Instruction/training on safety and adapted techniques for completion of ADLs   Instruction/training on safe functional mobility/transfer techniques   Instruction/training on energy conservation/work simplification for completion of ADLs    Treatment:      Functional transfers: Facilitated transfers to/from EOB and chair with cues for body alignment, safety and hand placement. ADL completion: Self-care retraining for the above-mentioned ADLs; training on proper hand placement, safety technique, sequencing, and energy conservation techniques. Postural Balance: Sitting/standing balance retraining to improve righting reactions with postural changes during ADLs. Rehab Potential: Good for established goals. Patient / Family Goal: Patient would like to return home and return to [facility] re: ADLs and IADLs        Patient and/or family were instructed on functional diagnosis, prognosis/goals and OT plan of care. Demonstrated good understanding. Eval Complexity: Low  History: Brief review of medical records and additional review of physical, cognitive, or psychosocial history related to current functional performance  Exam: 3+ performance deficits  Assistance/Modification: Mod assistance or modifications required to perform tasks. May have comorbidities that affect occupational performance.     Time In: 11:15 AM   Time Out: 11:40 AM    Total Treatment Time: 10      Min Units   OT Eval Low 97165  X  1    OT Eval Medium 94827      OT Eval High 12772      OT Re-Eval 29365            ADL/Self Care 15314 10 1   Therapeutic Activities 62736       Therapeutic Ex [id number]       Orthotic Management [id number]       Manual [id number]     Neuro Re-Ed 56146       Non-Billable Time        Evaluation Time additionally includes thorough review of current medical information, gathering information on past medical history/social history and prior level of function, interpretation of standardized testing/informal observation of tasks, assessment of data and development of plan of care and goals.         Evaluating OT: Syeda Cruz OTR/L; #[id number]

## 2023-11-15 NOTE — PROGRESS NOTES
Met with the patient for a length of stay visit. The patient expressed that she was to have surgery tomorrow and requested prayer. This  had prayer with the patient and reminded her that [address] [place] [address] is available as needed.

## 2023-11-15 NOTE — CARE COORDINATION
SOCIAL WORK / DISCHARGE PLANNING:   Sw met with pt at bedside. She is to have a cysto next date with stent exchange. Dc plan remains to return home with dtr and [facility] to resume. WILL NEED [facility] ORDER. She has all necessary dme. Dtr luis provide home going transport.              Electronically signed by MAGNUS Lindsey on 11/15/2023 at 10:12 AM    :

## 2023-11-15 NOTE — PROGRESS NOTES
Internal Medicine Progress Note    [facility]    Johnnie Clark. Vivien Peabody., F.A.GERONIMOOMarianaI. Merry Bah D.O., BARBARA Brownlee, MSN, APRN, NP-C  John Mercado. Andrew Thomas, MSN, APRN-CNP  Scot Peter, MSN, APRN-CNP     Primary Care Physician: Colonel Lilli MD   Admitting Physician:  Maeve Huston DO  Admission date and time: 11/10/2023 11:00 AM    Room:  [address] [place] [address]  Admitting diagnosis: Dehydration [E86.0]  Acute pyelonephritis [N10]  LUAN (acute kidney injury) ([address]) [N17.9]  Sepsis secondary to UTI ([address]) [A41.9, N39.0]  Sepsis with acute renal failure without septic shock, due to unspecified organism, unspecified acute renal failure type ([address]) [A41.9, R65.20, N17.9]    Patient Name: Keith Galloway  MRN: [id number]    Date of Service: 11/15/2023     Subjective:  Alfredo Jorge is a 58 y.o. female who was seen and examined today,11/15/2023, at the bedside. Patient is resting comfortably in bed there is no family present during examination. She denies any pain or discomfort but is complaining of some constipation we will start her on sorbitol daily. She is scheduled to have her stent replaced tomorrow. Review of System:   Constitutional:   Denies fever or chills, weight loss or gain, positive malaise  HEENT:   Denies ear pain, sore throat, sinus or eye problems. Cardiovascular:   Denies any chest pain, irregular heartbeats, or palpitations. Respiratory:   Denies shortness of breath, coughing, sputum production, hemoptysis, or wheezing. Gastrointestinal:   Denies nausea, vomiting, diarrhea,  Denies any abdominal pain. Reports constipation  Genitourinary:    Denies any urgency,  hematuria improving. Gerber catheter   extremities:   Denies lower extremity swelling, edema or cyanosis. Neurology:    Denies any headache or focal neurological deficits, Denies generalized weakness or memory difficulty.    Psch:

## 2023-11-15 NOTE — PROGRESS NOTES
Not Detected     Salmonella species Not Detected     SERRATIA MARCESCENS Not Detected     Stenotrophomonas maltophilia Not Detected     Haemophilus influenzae Not Detected     Listeria monocytogenes Not Detected     Bacteroides fragilis Not Detected     Neisseria Meningitidis, NMNI Not Detected     Staphylococcus spp DETECTED     Staphylococcus Aureus Not Detected     Staphylococcus epidermidis DETECTED     Staphylococcus lugdunensis Not Detected     Streptococcus spp Not Detected     Enterococcus faecalis Not Detected     Enterococcus faecium Not Detected     Streptococcus agalactiae (Group B) Not Detected     Streptococcus pneumoniae Not Detected     Streptococcus pyogenes Not Detected     Methicillin Resistance mecA/C  by PCR DETECTED    Culture, Blood 2 [[id number]] Collected: 11/10/23 1546    Order Status: Completed Specimen: Blood Updated: 11/15/23 0956     Specimen Description . BLOOD     Special Requests          Culture NO GROWTH 4 DAYS    Culture, Urine [[id number]]  (Abnormal) Collected: 11/10/23 1106    Order Status: Completed Specimen: Urine, clean catch Updated: 11/12/23 1023     Specimen Description . CLEAN CATCH URINE     Culture Mixed chioma isolated. Further workup and sensitivity testing not routinely indicated and will not be perfomed.       GRAM NEGATIVE RODS      MIXED  Gram Positive Organism              Imaging Last 24 Hours:  CT ABDOMEN PELVIS WO CONTRAST Additional Contrast? None    Result Date: 11/10/2023  EXAM: CT Abdomen and Pelvis Without Intravenous Contrast EXAM DATE/TIME: 11/10/2023 5:43 pm CLINICAL HISTORY: ORDERING SYSTEM PROVIDED HISTORY: UTI, tachycardia, r/o pyelonephritis vs obstruction  TECHNOLOGIST PROVIDED HISTORY:  Reason for exam:->UTI, tachycardia, r/o pyelonephritis vs obstruction  Additional Contrast?->None TECHNIQUE: Axial computed tomography images of the abdomen and pelvis without intravenous contrast.  This CT exam was performed using one or more of the following dose reduction techniques:  automated exposure control, adjustment of the mA and/or kV according to patient size, and/or use of iterative reconstruction technique. COMPARISON: 10/08/2023 FINDINGS: Lung bases:  Mild basilar lung scarring but no acute abnormality of the lower chest is noted. ABDOMEN: Liver:  No acute findings. Gallbladder and bile ducts:  Cholelithiasis within an otherwise unremarkable appearing gallbladder. No ductal dilation. Pancreas:  No acute findings. No ductal dilation. Spleen:  No acute findings. No splenomegaly. Adrenals:  No acute findings. No mass. Kidneys and ureters:  Bilateral double-J ureteral stents in place. However there is increased bilateral pelvocaliectasis, now with moderate bilateral hydronephrosis and some mild left perinephric edema. This could suggest clogging or malfunction of the stents, though pyelonephritis, especially on the left cannot be excluded. Stomach and bowel:  Diffuse constipation excluding the distal sigmoid and rectum. Otherwise nonspecific bowel gas pattern. No obstruction. No mucosal thickening. PELVIS: Appendix:  A normal appearing appendix is noted. Bladder:  No acute findings. No stones. Reproductive:  Unremarkable as visualized. ABDOMEN and PELVIS: Intraperitoneal space:  No acute findings. No free air. No significant fluid collection. Bones/joints:  Multilevel degenerative changes of the spine. No definite acute bony abnormality is noted. No dislocation. Soft tissues:  No acute findings. Vasculature:  Scattered calcified atherosclerotic plaque is noted within the arterial system. No abdominal aortic aneurysm. Lymph nodes:  No acute findings. No enlarged lymph nodes. 1.  Bilateral double-J ureteral stents in place. However there is increased bilateral pelvocaliectasis, now with moderate bilateral hydronephrosis and some mild left perinephric edema.   This could suggest clogging or malfunction of the stents, though pyelonephritis, especially

## 2023-11-16 ENCOUNTER — APPOINTMENT (OUTPATIENT)
Dept: GENERAL RADIOLOGY | Age: 63
DRG: 659 | End: 2023-11-16
Payer: MEDICARE

## 2023-11-16 ENCOUNTER — ANESTHESIA (OUTPATIENT)
Dept: OPERATING ROOM | Age: 63
End: 2023-11-16
Payer: MEDICARE

## 2023-11-16 ENCOUNTER — ANESTHESIA EVENT (OUTPATIENT)
Dept: OPERATING ROOM | Age: 63
End: 2023-11-16
Payer: MEDICARE

## 2023-11-16 LAB
ABO/RH: NORMAL
ALBUMIN SERPL-MCNC: 2.7 G/DL (ref 3.5–5.2)
ALP SERPL-CCNC: 54 U/L (ref 35–104)
ALT SERPL-CCNC: 7 U/L (ref 0–32)
ANION GAP SERPL CALCULATED.3IONS-SCNC: 9 MMOL/L (ref 7–16)
ANTIBODY SCREEN: NEGATIVE
ARM BAND NUMBER: NORMAL
AST SERPL-CCNC: 15 U/L (ref 0–31)
BASOPHILS # BLD: 0.02 K/UL (ref 0–0.2)
BASOPHILS NFR BLD: 1 % (ref 0–2)
BILIRUB SERPL-MCNC: 0.3 MG/DL (ref 0–1.2)
BLOOD BANK BLOOD PRODUCT EXPIRATION DATE: NORMAL
BLOOD BANK DISPENSE STATUS: NORMAL
BLOOD BANK DISPENSE STATUS: NORMAL
BLOOD BANK ISBT PRODUCT BLOOD TYPE: 7300
BLOOD BANK PRODUCT CODE: NORMAL
BLOOD BANK SAMPLE EXPIRATION: NORMAL
BLOOD BANK UNIT TYPE AND RH: NORMAL
BPU ID: NORMAL
BPU ID: NORMAL
BUN SERPL-MCNC: 32 MG/DL (ref 6–23)
CALCIUM SERPL-MCNC: 8.7 MG/DL (ref 8.6–10.2)
CHLORIDE SERPL-SCNC: 113 MMOL/L (ref 98–107)
CO2 SERPL-SCNC: 17 MMOL/L (ref 22–29)
COMPONENT: NORMAL
COMPONENT: NORMAL
CREAT SERPL-MCNC: 1.7 MG/DL (ref 0.5–1)
CROSSMATCH RESULT: NORMAL
CROSSMATCH RESULT: NORMAL
DATE LAST DOSE: NORMAL
EOSINOPHIL # BLD: 0.05 K/UL (ref 0.05–0.5)
EOSINOPHILS RELATIVE PERCENT: 2 % (ref 0–6)
ERYTHROCYTE [DISTWIDTH] IN BLOOD BY AUTOMATED COUNT: 17.4 % (ref 11.5–15)
GFR SERPL CREATININE-BSD FRML MDRD: 34 ML/MIN/1.73M2
GLUCOSE BLD-MCNC: 102 MG/DL (ref 74–99)
GLUCOSE BLD-MCNC: 123 MG/DL (ref 74–99)
GLUCOSE BLD-MCNC: 216 MG/DL (ref 74–99)
GLUCOSE BLD-MCNC: 84 MG/DL (ref 74–99)
GLUCOSE BLD-MCNC: 93 MG/DL (ref 74–99)
GLUCOSE BLD-MCNC: 98 MG/DL (ref 74–99)
GLUCOSE SERPL-MCNC: 87 MG/DL (ref 74–99)
HCT VFR BLD AUTO: 24.3 % (ref 34–48)
HGB BLD-MCNC: 7.6 G/DL (ref 11.5–15.5)
IMM GRANULOCYTES # BLD AUTO: 0.06 K/UL (ref 0–0.58)
IMM GRANULOCYTES NFR BLD: 2 % (ref 0–5)
INR PPP: 1.3
LYMPHOCYTES NFR BLD: 1.04 K/UL (ref 1.5–4)
LYMPHOCYTES RELATIVE PERCENT: 31 % (ref 20–42)
MCH RBC QN AUTO: 25.7 PG (ref 26–35)
MCHC RBC AUTO-ENTMCNC: 31.3 G/DL (ref 32–34.5)
MCV RBC AUTO: 82.1 FL (ref 80–99.9)
MONOCYTES NFR BLD: 0.43 K/UL (ref 0.1–0.95)
MONOCYTES NFR BLD: 13 % (ref 2–12)
NEUTROPHILS NFR BLD: 52 % (ref 43–80)
NEUTS SEG NFR BLD: 1.77 K/UL (ref 1.8–7.3)
PLATELET # BLD AUTO: 329 K/UL (ref 130–450)
PMV BLD AUTO: 10.8 FL (ref 7–12)
POTASSIUM SERPL-SCNC: 4.4 MMOL/L (ref 3.5–5)
PROT SERPL-MCNC: 7.6 G/DL (ref 6.4–8.3)
PROTHROMBIN TIME: 14.4 SEC (ref 9.3–12.4)
RBC # BLD AUTO: 2.96 M/UL (ref 3.5–5.5)
SODIUM SERPL-SCNC: 139 MMOL/L (ref 132–146)
TME LAST DOSE: NORMAL H
TRANSFUSION STATUS: NORMAL
TRANSFUSION STATUS: NORMAL
UNIT DIVISION: 0
UNIT DIVISION: 0
UNIT ISSUE DATE/TIME: NORMAL
VANCOMYCIN DOSE: NORMAL MG
VANCOMYCIN SERPL-MCNC: 17.2 UG/ML (ref 5–40)
WBC OTHER # BLD: 3.4 K/UL (ref 4.5–11.5)

## 2023-11-16 PROCEDURE — 82962 GLUCOSE BLOOD TEST: CPT

## 2023-11-16 PROCEDURE — 6360000002 HC RX W HCPCS: Performed by: NURSE PRACTITIONER

## 2023-11-16 PROCEDURE — 0TP98DZ REMOVAL OF INTRALUMINAL DEVICE FROM URETER, VIA NATURAL OR ARTIFICIAL OPENING ENDOSCOPIC: ICD-10-PCS | Performed by: UROLOGY

## 2023-11-16 PROCEDURE — 3600000013 HC SURGERY LEVEL 3 ADDTL 15MIN: Performed by: UROLOGY

## 2023-11-16 PROCEDURE — 6360000002 HC RX W HCPCS: Performed by: INTERNAL MEDICINE

## 2023-11-16 PROCEDURE — 74400 UROGRAPHY IV +-KUB TOMOG: CPT

## 2023-11-16 PROCEDURE — C1758 CATHETER, URETERAL: HCPCS | Performed by: UROLOGY

## 2023-11-16 PROCEDURE — 6370000000 HC RX 637 (ALT 250 FOR IP): Performed by: INTERNAL MEDICINE

## 2023-11-16 PROCEDURE — 7100000000 HC PACU RECOVERY - FIRST 15 MIN: Performed by: UROLOGY

## 2023-11-16 PROCEDURE — 2060000000 HC ICU INTERMEDIATE R&B

## 2023-11-16 PROCEDURE — 6370000000 HC RX 637 (ALT 250 FOR IP)

## 2023-11-16 PROCEDURE — 3700000001 HC ADD 15 MINUTES (ANESTHESIA): Performed by: UROLOGY

## 2023-11-16 PROCEDURE — 36415 COLL VENOUS BLD VENIPUNCTURE: CPT

## 2023-11-16 PROCEDURE — 2580000003 HC RX 258: Performed by: INTERNAL MEDICINE

## 2023-11-16 PROCEDURE — 2709999900 HC NON-CHARGEABLE SUPPLY: Performed by: UROLOGY

## 2023-11-16 PROCEDURE — 6360000002 HC RX W HCPCS

## 2023-11-16 PROCEDURE — 80202 ASSAY OF VANCOMYCIN: CPT

## 2023-11-16 PROCEDURE — C1769 GUIDE WIRE: HCPCS | Performed by: UROLOGY

## 2023-11-16 PROCEDURE — 7100000001 HC PACU RECOVERY - ADDTL 15 MIN: Performed by: UROLOGY

## 2023-11-16 PROCEDURE — C2617 STENT, NON-COR, TEM W/O DEL: HCPCS | Performed by: UROLOGY

## 2023-11-16 PROCEDURE — 87086 URINE CULTURE/COLONY COUNT: CPT

## 2023-11-16 PROCEDURE — 3600000003 HC SURGERY LEVEL 3 BASE: Performed by: UROLOGY

## 2023-11-16 PROCEDURE — 6360000002 HC RX W HCPCS: Performed by: NURSE ANESTHETIST, CERTIFIED REGISTERED

## 2023-11-16 PROCEDURE — 85610 PROTHROMBIN TIME: CPT

## 2023-11-16 PROCEDURE — BT141ZZ FLUOROSCOPY OF KIDNEYS, URETERS AND BLADDER USING LOW OSMOLAR CONTRAST: ICD-10-PCS | Performed by: UROLOGY

## 2023-11-16 PROCEDURE — 3700000000 HC ANESTHESIA ATTENDED CARE: Performed by: UROLOGY

## 2023-11-16 PROCEDURE — 80053 COMPREHEN METABOLIC PANEL: CPT

## 2023-11-16 PROCEDURE — 85025 COMPLETE CBC W/AUTO DIFF WBC: CPT

## 2023-11-16 PROCEDURE — 0T788DZ DILATION OF BILATERAL URETERS WITH INTRALUMINAL DEVICE, VIA NATURAL OR ARTIFICIAL OPENING ENDOSCOPIC: ICD-10-PCS | Performed by: UROLOGY

## 2023-11-16 PROCEDURE — 97110 THERAPEUTIC EXERCISES: CPT

## 2023-11-16 DEVICE — UNIVERSA FIRM URETERAL STENT AND POSITIONER WITH HYDROPHILIC COATING
Type: IMPLANTABLE DEVICE | Site: URETER | Status: FUNCTIONAL
Brand: UNIVERSA

## 2023-11-16 RX ORDER — MIDAZOLAM HYDROCHLORIDE 1 MG/ML
INJECTION INTRAMUSCULAR; INTRAVENOUS PRN
Status: DISCONTINUED | OUTPATIENT
Start: 2023-11-16 | End: 2023-11-16

## 2023-11-16 RX ORDER — HYDROMORPHONE HYDROCHLORIDE 1 MG/ML
0.5 INJECTION, SOLUTION INTRAMUSCULAR; INTRAVENOUS; SUBCUTANEOUS EVERY 5 MIN PRN
Status: CANCELLED | OUTPATIENT
Start: 2023-11-16

## 2023-11-16 RX ORDER — FENTANYL CITRATE 50 UG/ML
INJECTION, SOLUTION INTRAMUSCULAR; INTRAVENOUS PRN
Status: DISCONTINUED | OUTPATIENT
Start: 2023-11-16 | End: 2023-11-16 | Stop reason: SDUPTHER

## 2023-11-16 RX ORDER — GENTAMICIN SULFATE 80 MG/50ML
80 INJECTION, SOLUTION INTRAVENOUS
Status: COMPLETED | OUTPATIENT
Start: 2023-11-16 | End: 2023-11-16

## 2023-11-16 RX ORDER — HYDRALAZINE HYDROCHLORIDE 20 MG/ML
10 INJECTION INTRAMUSCULAR; INTRAVENOUS
Status: CANCELLED | OUTPATIENT
Start: 2023-11-16

## 2023-11-16 RX ORDER — ONDANSETRON 2 MG/ML
4 INJECTION INTRAMUSCULAR; INTRAVENOUS
Status: CANCELLED | OUTPATIENT
Start: 2023-11-16 | End: 2023-11-17

## 2023-11-16 RX ORDER — PROCHLORPERAZINE EDISYLATE 5 MG/ML
5 INJECTION INTRAMUSCULAR; INTRAVENOUS
Status: CANCELLED | OUTPATIENT
Start: 2023-11-16 | End: 2023-11-17

## 2023-11-16 RX ORDER — PROPOFOL 10 MG/ML
INJECTION, EMULSION INTRAVENOUS PRN
Status: DISCONTINUED | OUTPATIENT
Start: 2023-11-16 | End: 2023-11-16 | Stop reason: SDUPTHER

## 2023-11-16 RX ORDER — MIDAZOLAM HYDROCHLORIDE 1 MG/ML
INJECTION INTRAMUSCULAR; INTRAVENOUS PRN
Status: DISCONTINUED | OUTPATIENT
Start: 2023-11-16 | End: 2023-11-16 | Stop reason: SDUPTHER

## 2023-11-16 RX ORDER — DIPHENHYDRAMINE HYDROCHLORIDE 50 MG/ML
12.5 INJECTION INTRAMUSCULAR; INTRAVENOUS
Status: CANCELLED | OUTPATIENT
Start: 2023-11-16 | End: 2023-11-17

## 2023-11-16 RX ORDER — IPRATROPIUM BROMIDE AND ALBUTEROL SULFATE 2.5; .5 MG/3ML; MG/3ML
1 SOLUTION RESPIRATORY (INHALATION)
Status: CANCELLED | OUTPATIENT
Start: 2023-11-16 | End: 2023-11-17

## 2023-11-16 RX ORDER — MORPHINE SULFATE 2 MG/ML
2 INJECTION, SOLUTION INTRAMUSCULAR; INTRAVENOUS EVERY 5 MIN PRN
Status: CANCELLED | OUTPATIENT
Start: 2023-11-16

## 2023-11-16 RX ORDER — SODIUM CHLORIDE 9 MG/ML
INJECTION, SOLUTION INTRAVENOUS CONTINUOUS
Status: DISCONTINUED | OUTPATIENT
Start: 2023-11-16 | End: 2023-11-18

## 2023-11-16 RX ORDER — LABETALOL HYDROCHLORIDE 5 MG/ML
10 INJECTION, SOLUTION INTRAVENOUS
Status: CANCELLED | OUTPATIENT
Start: 2023-11-16

## 2023-11-16 RX ORDER — MIDAZOLAM HYDROCHLORIDE 1 MG/ML
2 INJECTION INTRAMUSCULAR; INTRAVENOUS
Status: CANCELLED | OUTPATIENT
Start: 2023-11-16 | End: 2023-11-17

## 2023-11-16 RX ADMIN — MIRTAZAPINE 7.5 MG: 15 TABLET, FILM COATED ORAL at 22:03

## 2023-11-16 RX ADMIN — ISOSORBIDE MONONITRATE 30 MG: 30 TABLET, EXTENDED RELEASE ORAL at 09:36

## 2023-11-16 RX ADMIN — NALOXEGOL OXALATE 25 MG: 12.5 TABLET, FILM COATED ORAL at 05:14

## 2023-11-16 RX ADMIN — GABAPENTIN 100 MG: 100 CAPSULE ORAL at 22:03

## 2023-11-16 RX ADMIN — ACETAMINOPHEN 650 MG: 325 TABLET ORAL at 20:15

## 2023-11-16 RX ADMIN — DOCUSATE SODIUM 100 MG: 100 CAPSULE, LIQUID FILLED ORAL at 09:36

## 2023-11-16 RX ADMIN — SODIUM CHLORIDE: 9 INJECTION, SOLUTION INTRAVENOUS at 20:35

## 2023-11-16 RX ADMIN — MIDAZOLAM 1 MG: 1 INJECTION INTRAMUSCULAR; INTRAVENOUS at 13:30

## 2023-11-16 RX ADMIN — SODIUM BICARBONATE 650 MG: 650 TABLET ORAL at 09:36

## 2023-11-16 RX ADMIN — PROPOFOL 50 MCG/KG/MIN: 10 INJECTION, EMULSION INTRAVENOUS at 13:40

## 2023-11-16 RX ADMIN — CARVEDILOL 12.5 MG: 6.25 TABLET, FILM COATED ORAL at 09:36

## 2023-11-16 RX ADMIN — ENOXAPARIN SODIUM 60 MG: 100 INJECTION SUBCUTANEOUS at 17:25

## 2023-11-16 RX ADMIN — MIDAZOLAM 1 MG: 1 INJECTION INTRAMUSCULAR; INTRAVENOUS at 13:37

## 2023-11-16 RX ADMIN — HYDRALAZINE HYDROCHLORIDE 25 MG: 25 TABLET, FILM COATED ORAL at 17:26

## 2023-11-16 RX ADMIN — DEXTROSE MONOHYDRATE 125 ML: 100 INJECTION, SOLUTION INTRAVENOUS at 11:43

## 2023-11-16 RX ADMIN — CARVEDILOL 12.5 MG: 6.25 TABLET, FILM COATED ORAL at 17:25

## 2023-11-16 RX ADMIN — FENTANYL CITRATE 25 MCG: 50 INJECTION, SOLUTION INTRAMUSCULAR; INTRAVENOUS at 13:39

## 2023-11-16 RX ADMIN — ATORVASTATIN CALCIUM 20 MG: 20 TABLET, FILM COATED ORAL at 22:03

## 2023-11-16 RX ADMIN — GLUCAGON 1 MG: KIT at 11:44

## 2023-11-16 RX ADMIN — FENTANYL CITRATE 25 MCG: 0.05 INJECTION, SOLUTION INTRAMUSCULAR; INTRAVENOUS at 19:40

## 2023-11-16 RX ADMIN — SODIUM BICARBONATE 650 MG: 650 TABLET ORAL at 22:03

## 2023-11-16 RX ADMIN — GABAPENTIN 100 MG: 100 CAPSULE ORAL at 09:36

## 2023-11-16 RX ADMIN — DOCUSATE SODIUM 100 MG: 100 CAPSULE, LIQUID FILLED ORAL at 22:03

## 2023-11-16 RX ADMIN — FENTANYL CITRATE 25 MCG: 50 INJECTION, SOLUTION INTRAMUSCULAR; INTRAVENOUS at 13:46

## 2023-11-16 RX ADMIN — HYDRALAZINE HYDROCHLORIDE 25 MG: 25 TABLET, FILM COATED ORAL at 22:03

## 2023-11-16 RX ADMIN — FENTANYL CITRATE 25 MCG: 0.05 INJECTION, SOLUTION INTRAMUSCULAR; INTRAVENOUS at 07:25

## 2023-11-16 RX ADMIN — PROPOFOL 50 MG: 10 INJECTION, EMULSION INTRAVENOUS at 13:39

## 2023-11-16 RX ADMIN — HYDRALAZINE HYDROCHLORIDE 25 MG: 25 TABLET, FILM COATED ORAL at 05:14

## 2023-11-16 RX ADMIN — GENTAMICIN SULFATE 80 MG: 80 INJECTION, SOLUTION INTRAVENOUS at 13:36

## 2023-11-16 ASSESSMENT — PAIN - FUNCTIONAL ASSESSMENT
PAIN_FUNCTIONAL_ASSESSMENT: PREVENTS OR INTERFERES SOME ACTIVE ACTIVITIES AND ADLS
PAIN_FUNCTIONAL_ASSESSMENT: PREVENTS OR INTERFERES WITH MANY ACTIVE NOT PASSIVE ACTIVITIES

## 2023-11-16 ASSESSMENT — PAIN DESCRIPTION - ORIENTATION
ORIENTATION: RIGHT;LEFT;MID
ORIENTATION: RIGHT;MID

## 2023-11-16 ASSESSMENT — PAIN DESCRIPTION - PAIN TYPE
TYPE: CHRONIC PAIN
TYPE: CHRONIC PAIN

## 2023-11-16 ASSESSMENT — PAIN SCALES - GENERAL
PAINLEVEL_OUTOF10: 3
PAINLEVEL_OUTOF10: 8
PAINLEVEL_OUTOF10: 3
PAINLEVEL_OUTOF10: 3
PAINLEVEL_OUTOF10: 7
PAINLEVEL_OUTOF10: 7

## 2023-11-16 ASSESSMENT — PAIN DESCRIPTION - DESCRIPTORS
DESCRIPTORS: THROBBING;STABBING
DESCRIPTORS: ACHING
DESCRIPTORS: ACHING;CRUSHING
DESCRIPTORS: DISCOMFORT

## 2023-11-16 ASSESSMENT — PAIN DESCRIPTION - ONSET
ONSET: ON-GOING
ONSET: ON-GOING

## 2023-11-16 ASSESSMENT — PAIN DESCRIPTION - LOCATION
LOCATION: BACK;ABDOMEN
LOCATION: ABDOMEN
LOCATION: BACK;ABDOMEN;LEG
LOCATION: ABDOMEN;BACK

## 2023-11-16 ASSESSMENT — PAIN DESCRIPTION - FREQUENCY
FREQUENCY: CONTINUOUS
FREQUENCY: CONTINUOUS

## 2023-11-16 ASSESSMENT — PAIN SCALES - WONG BAKER
WONGBAKER_NUMERICALRESPONSE: 2

## 2023-11-16 NOTE — CONSENT
Informed Consent for Blood Component Transfusion Note    I have discussed with the patient the rationale for blood component transfusion; its benefits in treating or preventing fatigue, organ damage, or death; and its risk which includes mild transfusion reactions, rare risk of blood borne infection, or more serious but rare reactions. I have discussed the alternatives to transfusion, including the risk and consequences of not receiving transfusion. The patient had an opportunity to ask questions and had agreed to proceed with transfusion of blood components.     Electronically signed by Zak Osullivan DO on 11/16/23 at 1:06 PM EST

## 2023-11-16 NOTE — PROGRESS NOTES
Progress Note    Date:11/16/2023         Patient Oscar Thakkar     YOB: 1960     Age:63 y.o.     FACE TO FACE ENCOUNTER  11/16/23    Assessment/PLAN        Hospital Problems             Last Modified POA    * (Principal) Sepsis secondary to UTI ([address]) 11/10/2023 Yes   Leukopenia follow   Cons bacteremia prob contaminant   Uti/peylonephritis/hydroneprosis Bilateral double-J ureteral stents in place   -gnr/gpo  -f/u procal  -repeat blood cx  -for stent removal/change today   Spoke with Urology   Ashley Oviedo preop      vancomycin (VANCOCIN) intermittent dosing (placeholder), RX Placeholder         Review of Systems      Review of Systems  11/16 for procedure today ha had BM no f/c  11/15 in bed has no c/o f/c/n/v/d  11/14 in bed has no c/o f/c asking about stent change  11/13 c/o pain hands wrist  no f/c/nv/d  11/12 Feels better no f/c/n/v/d  Subjective   Interval History Status:      Awaiting for surgery family present   Medications   Scheduled Meds:    gentamicin  80 mg IntraVENous On Call to OR    glucagon (rDNA)        sodium bicarbonate  650 mg Oral BID    naloxegol  25 mg Oral QAM AC    polyethylene glycol  17 g Oral Daily    docusate sodium  100 mg Oral BID    lidocaine  1 patch TransDERmal Daily    hydrALAZINE  25 mg Oral 3 times per day    glycerin (ADULT)  1 suppository Rectal Once    vancomycin (VANCOCIN) intermittent dosing (placeholder)   Other RX Placeholder    [Held by provider] apixaban  5 mg Oral BID    atorvastatin  20 mg Oral Nightly    carvedilol  12.5 mg Oral BID WC    isosorbide mononitrate  30 mg Oral Daily    insulin lispro  0-8 Units SubCUTAneous TID WC    insulin lispro  0-4 Units SubCUTAneous Nightly    enoxaparin  1 mg/kg SubCUTAneous Daily    sodium chloride flush  5-40 mL IntraVENous 2 times per day    gabapentin  100 mg Oral BID    mirtazapine  7.5 mg Oral Nightly    sodium chloride  1,000 mL IntraVENous Once     Continuous Infusions:    sodium chloride      sodium chloride Detected     Proteus spp Not Detected     Salmonella species Not Detected     SERRATIA MARCESCENS Not Detected     Stenotrophomonas maltophilia Not Detected     Haemophilus influenzae Not Detected     Listeria monocytogenes Not Detected     Bacteroides fragilis Not Detected     Neisseria Meningitidis, NMNI Not Detected     Staphylococcus spp DETECTED     Staphylococcus Aureus Not Detected     Staphylococcus epidermidis DETECTED     Staphylococcus lugdunensis Not Detected     Streptococcus spp Not Detected     Enterococcus faecalis Not Detected     Enterococcus faecium Not Detected     Streptococcus agalactiae (Group B) Not Detected     Streptococcus pneumoniae Not Detected     Streptococcus pyogenes Not Detected     Methicillin Resistance mecA/C  by PCR DETECTED    Culture, Blood 2 [[id number]] Collected: 11/10/23 1546    Order Status: Completed Specimen: Blood Updated: 11/15/23 2028     Specimen Description . BLOOD     Special Requests          Culture NO GROWTH 5 DAYS    Culture, Urine [[id number]]  (Abnormal) Collected: 11/10/23 1106    Order Status: Completed Specimen: Urine, clean catch Updated: 11/12/23 1023     Specimen Description . CLEAN CATCH URINE     Culture Mixed chioma isolated. Further workup and sensitivity testing not routinely indicated and will not be perfomed.       GRAM NEGATIVE RODS      MIXED  Gram Positive Organism              Imaging Last 24 Hours:  CT ABDOMEN PELVIS WO CONTRAST Additional Contrast? None    Result Date: 11/10/2023  EXAM: CT Abdomen and Pelvis Without Intravenous Contrast EXAM DATE/TIME: 11/10/2023 5:43 pm CLINICAL HISTORY: ORDERING SYSTEM PROVIDED HISTORY: UTI, tachycardia, r/o pyelonephritis vs obstruction  TECHNOLOGIST PROVIDED HISTORY:  Reason for exam:->UTI, tachycardia, r/o pyelonephritis vs obstruction  Additional Contrast?->None TECHNIQUE: Axial computed tomography images of the abdomen and pelvis without intravenous contrast.  This CT exam was performed using one or

## 2023-11-16 NOTE — CARE COORDINATION
SOCIAL WORK / DISCHARGE PLANNING:   Sw spoke with pt and son at bedside earlier today. Pt to have cysto with probable dc next date. Return home with dtr and [facility] ( [facility] Has necessary dme in home. Family will provide home going transport.              Electronically signed by MAGNUS Dye on 11/16/2023 at 2:54 PM

## 2023-11-16 NOTE — PLAN OF CARE
Problem: Discharge Planning  Goal: Discharge to home or other facility with appropriate resources  Outcome: Progressing  Flowsheets  Taken 11/16/2023 0000 by Magno Espinoza RN  Discharge to home or other facility with appropriate resources:   Identify barriers to discharge with patient and caregiver   Arrange for needed discharge resources and transportation as appropriate   Identify discharge learning needs (meds, wound care, etc)   Refer to discharge planning if patient needs post-hospital services based on physician order or complex needs related to functional status, cognitive ability or social support system  Taken 11/15/2023 2000 by Ayanna Parker RN  Discharge to home or other facility with appropriate resources: Identify barriers to discharge with patient and caregiver     Problem: Pain  Goal: Verbalizes/displays adequate comfort level or baseline comfort level  Outcome: Progressing  Flowsheets (Taken 11/15/2023 2045 by Ayanna Parker RN)  Verbalizes/displays adequate comfort level or baseline comfort level:   Encourage patient to monitor pain and request assistance   Assess pain using appropriate pain scale   Administer analgesics based on type and severity of pain and evaluate response     Problem: Safety - Adult  Goal: Free from fall injury  Outcome: Progressing  Flowsheets (Taken 11/16/2023 0000)  Free From Fall Injury: Instruct family/caregiver on patient safety     Problem: ABCDS Injury Assessment  Goal: Absence of physical injury  Outcome: Progressing  Flowsheets (Taken 11/16/2023 0000)  Absence of Physical Injury: Implement safety measures based on patient assessment     Problem: Chronic Conditions and Co-morbidities  Goal: Patient's chronic conditions and co-morbidity symptoms are monitored and maintained or improved  Outcome: Progressing  Flowsheets  Taken 11/16/2023 0000 by Jose Ghotra [facility] - Patient's Chronic Conditions and Co-Morbidity Symptoms are Monitored and Maintained or

## 2023-11-16 NOTE — PROGRESS NOTES
Internal Medicine Progress Note    PARDEEP=[facility]    Geronimo Jorge. [place] Tae., [email or web address] Che D.O., BARBARA Garcia, MSN, APRN, NP-C  Vidal Chan. Nani Fernandez, MSN, APRN-CNP  Pa Caraballo, MSN, APRN-CNP     Primary Care Physician: Antonia Mora MD   Admitting Physician:  Rosita Khan DO  Admission date and time: 11/10/2023 11:00 AM    Room:  [address] [place] [address]  Admitting diagnosis: Dehydration [E86.0]  Acute pyelonephritis [N10]  LUAN (acute kidney injury) ([address]) [N17.9]  Sepsis secondary to UTI ([address]) [A41.9, N39.0]  Sepsis with acute renal failure without septic shock, due to unspecified organism, unspecified acute renal failure type ([address]) [A41.9, R65.20, N17.9]    Patient Name: Dusty Hewitt  MRN: [id number]    Date of Service: 11/16/2023     Subjective:  Jaylen Hernandez is a 58 y.o. female who was seen and examined today,11/16/2023, at the bedside. Patient is resting comfortably in bed there is no family present during examination. She states her bowels are moving, okay now. She is scheduled for a stent replacement with urology this afternoon. Review of System:   Constitutional:   Denies fever or chills, weight loss or gain, positive malaise  HEENT:   Denies ear pain, sore throat, sinus or eye problems. Cardiovascular:   Denies any chest pain, irregular heartbeats, or palpitations. Respiratory:   Denies shortness of breath, coughing, sputum production, hemoptysis, or wheezing. Gastrointestinal:   Denies nausea, vomiting, diarrhea,  Denies any abdominal pain. Reports constipation  Genitourinary:    Denies any urgency,  hematuria improving. Gerber catheter   extremities:   Denies lower extremity swelling, edema or cyanosis. Neurology:    Denies any headache or focal neurological deficits, Denies generalized weakness or memory difficulty. Psch:   Denies being anxious or depressed.   Musculoskeletal:

## 2023-11-16 NOTE — PROGRESS NOTES
Pharmacy Consultation Note  (Antibiotic Dosing and Monitoring)    Initial consult date: 11/11/23  Consulting physician/provider: Dr. Elan Landers  Drug: Vancomycin  Indication: UTI    Age/  Gender Height Weight IBW  Allergy Information   63 y.o./female 5' 4\" 62 kg   Ideal body weight: 54.7 kg (120 lb 9.5 oz)  Adjusted ideal body weight: 57.6 kg (127 lb 0.5 oz)   Patient has no known allergies. Renal Function:  Recent Labs     11/14/23  0603 11/15/23  0527 11/16/23  0514   BUN 44* 40* 32*   CREATININE 2.1* 1.8* 1.7*         Intake/Output Summary (Last 24 hours) at 11/16/2023 0823  Last data filed at 11/16/2023 0351  Gross per 24 hour   Intake --   Output 1450 ml   Net -1450 ml         Vancomycin Monitoring:  Trough:  No results for input(s): \"VANCOTROUGH\" in the last 72 hours. Random:    Recent Labs     11/14/23  0603 11/15/23  0527 11/16/23  0514   VANCORANDOM 19.9 13.0 17.2         No results for input(s): \"BLOODCULT2\" in the last 72 hours. Historical Cultures:  Organism   Date Value Ref Range Status   12/03/2020 Citrobacter freundii (A)  Final     No results for input(s): \"BC\" in the last 72 hours. Recent vancomycin administrations                     vancomycin (VANCOCIN) 750 mg in sodium chloride 0.9 % 250 mL IVPB (mg) 750 mg New Bag 11/15/23 1404    vancomycin (VANCOCIN) 750 mg in sodium chloride 0.9 % 250 mL IVPB (mg) 750 mg New Bag 11/13/23 1252               Assessment:  Patient is a 58 y.o. female who has been initiated on vancomycin  Estimated Creatinine Clearance: 29 mL/min (A) (based on SCr of 1.7 mg/dL (H)). To dose vancomycin, pharmacy will be utilizing dosing based off of levels because of patient's renal impairment/insufficiency  11/13: Random AM level = 16.2 mcg/mL  11/14: Random AM level 19.9 mcg/ml  11/15: Random AM level 13, SCr now 1.8, UOP 0.8 ml/kg/h  11/16: random level =17.2 mcg/ml, scheduled for stent removal today with preoperative gentamicin . Plan:  No dose today.   Repeat random level tomorrow morning.   Will continue to monitor renal function   Pharmacy to follow      [facility], [address] [place] [address]  11/16/2023, 8:23 AM

## 2023-11-16 NOTE — PROGRESS NOTES
for PT treatment. OBJECTIVE:   Initial Evaluation  Date: 11/14/2023 Treatment Date:  11/16/2023       Short Term/ Long Term   Goals   Was pt agreeable to Eval/treatment? Yes yes To be met in 4 days   Pain level   0/10    No number given  Stomach hurts    Bed Mobility  Using rails and head of bed elevated:     Rolling: Minimal assist of 1    Supine to sit: Minimal assist of 1    Sit to supine: Minimal assist of 1    Scooting: Minimal assist of 1   Using rails and head of bed elevated:     Rolling: Not assessed    Supine to sit: Not assessed    Sit to supine: Not assessed    Scooting: Not assessed     Rolling: Independent    Supine to sit:  Independent    Sit to supine: Independent    Scooting: Independent     Transfers Sit to stand: Minimal assist of 1 from bed and chair  Sit to stand: Supervision      Ambulation     40 and 50  feet using  wheeled walker with Minimal assist of 1   for multiplane instability and safety, Patient with shuffling steps, flexed posture, inconsistent step length, and unsteady gait, no loss of balance, and cues for upright posture and safety    75 feet using  wheeled walker with Supervision     Stair negotiation: ascended and descended   Not assessed     10 steps, 1 rail, Minimal assist of 1      ROM Within functional limits        Strength BUE:   4/5  RLE:  3/5  LLE:  3/5  Increase strength in affected mm groups by 1/3 grade   Balance Sitting EOB:  good    Dynamic Standing:  fair minus with wheeled walker   Sitting EOB:  good    Dynamic Standing: good with wheeled walker      Patient is Alert & Oriented x person, place, time, and situation and follows directions    Sensation:  Patient  denies numbness/tingling   Edema:  yes residual limb    Endurance: poor      Vitals: room air   Blood Pressure at rest  Blood Pressure during session    Heart Rate at rest   Heart Rate during session     SPO2 at rest %  SPO2 during session %     Patient education  Patient educated on role of Physical

## 2023-11-16 NOTE — BRIEF OP NOTE
Brief Postoperative Note      Patient: Raymond Singh  YOB: 1960  MRN: [id number]    Date of Procedure: 11/16/2023    Pre-Op diagnosis. Bilateral ureteral strictures with indwelling J stents recent urinary tract infection    Post-Op Diagnosis: Bilateral ureteral strictures bilateral pyonephrosis       Procedure(s):  CYSTOSCOPY BILATERAL STENT EXCHANGE bilateral retrograde pyelograms insertion of Gerber    Surgeon(s):  Lachelle Celeste MD    Assistant:  None    Anesthesia: LMAC    Estimated Blood Loss (mL): Less than 5 cc    Complications: I could not get the wire back into the renal pelvis without the use of a ureteroscope on the right    Specimens:   Culture from each renal pelvis    Implants:  None      Drains:   Urinary Catheter 11/11/23 Gerber (Active)   Catheter Indications Urinary retention (acute or chronic), continuous bladder irrigation or bladder outlet obstruction 11/16/23 0830   Site Assessment No urethral drainage 11/16/23 0830   Urine Color Yellow 11/16/23 0830   Urine Appearance Cloudy 11/16/23 0830   Collection Container Standard 11/16/23 0830   Securement Method Leg strap 11/16/23 0830   Catheter Care  Soap and water 11/16/23 0755   Catheter Best Practices  Drainage tube clipped to bed;Catheter secured to thigh; Tamper seal intact; Bag below bladder;Bag not on floor; Lack of dependent loop in tubing;Drainage bag less than half full 11/16/23 0830   Status Draining;Patent 11/16/23 0830   Output (mL) 850 mL 11/16/23 1034       [REMOVED] External Urinary Catheter (Removed)   Site Assessment Clean,dry & intact 10/12/23 0800   Placement Replaced 10/12/23 0800   Securement Method Tape 10/10/23 1610   Catheter Care Catheter/Wick replaced 10/12/23 0800   Perineal Care Yes 10/12/23 0800   Suction 40 mmgHg continuous 10/12/23 0800   Urine Color Samira 10/10/23 1610   Urine Appearance Cloudy 10/10/23 1610   Output (mL) 350 mL 10/12/23 1730       [REMOVED] External Urinary Catheter (Removed)

## 2023-11-16 NOTE — ANESTHESIA PRE PROCEDURE
Department of Anesthesiology  Preprocedure Note       Name:  Alanis Gonzales   Age:  58 y.o.  :  1960                                          MRN:  [id number]         Date:  2023      Surgeon: Fannie Stearns):  Goran Celeste MD    Procedure: Procedure(s):  CYSTOSCOPY BILATERAL STENT EXCHANGE    Medications prior to admission:   Prior to Admission medications    Medication Sig Start Date End Date Taking? Authorizing Provider   isosorbide mononitrate (IMDUR) 30 MG extended release tablet Take 1 tablet by mouth daily 23   Pedro Harrington MD   mirtazapine (REMERON) 7.5 MG tablet Take 1 tablet by mouth nightly 23   Pedro Harrington MD   carvedilol (COREG) 12.5 MG tablet Take 1 tablet by mouth 2 times daily (with meals) 23   Pedro Harrington MD   gabapentin (NEURONTIN) 100 MG capsule Take 1 capsule by mouth 2 times daily. 23  Pedro Harrington MD   triamcinolone (KENALOG) 0.1 % cream Apply topically 2 times daily for up to 2 weeks then as needed for flareups.  23   Pedro Harrington MD   megestrol (MEGACE) 40 MG tablet Take 1 tablet by mouth daily as needed (Poor Appetite) 23   Pedro Harrington MD   atorvastatin (LIPITOR) 20 MG tablet Take 1 tablet by mouth daily 23   Pedro Harrington MD   vitamin D (CHOLECALCIFEROL) 50 MCG (2000) TABS tablet Take 1 tablet by mouth daily 23   Pedro Harrington MD   vitamin B-6 (PYRIDOXINE) 50 MG tablet take 1 tablet by mouth once daily 10/16/23   Pedro Harrington MD   folic acid (FOLVITE) 1 MG tablet Take 1 tablet by mouth daily 10/14/23   Richard Puente DO   polyethylene glycol (GLYCOLAX) 17 g packet Take 1 packet by mouth daily as needed for Constipation 10/13/23 12/14/23  Richard Puente DO   hydrALAZINE (APRESOLINE) 25 MG tablet  23   Provider, MD Dorothy   bisacodyl (DULCOLAX) 5 MG EC tablet Take 1 tablet by mouth daily as

## 2023-11-16 NOTE — ANESTHESIA POSTPROCEDURE EVALUATION
Department of Anesthesiology  Postprocedure Note    Patient: Weldon Caller  MRN: [id number]  YOB: 1960  Date of evaluation: 11/16/2023      Procedure Summary     Date: 11/16/23 Room / Location: [address] [place] [address] Chase Lopez    Anesthesia Start: 5236 Anesthesia Stop: 9025    Procedure: Darleene Million BILATERAL PYELOGRAMS BILATERAL STENT EXCHANGE RIGHT URETEROSCOPY (Bilateral: Ureter) Diagnosis:       Hydronephrosis, unspecified hydronephrosis type      (Hydronephrosis, unspecified hydronephrosis type [N13.30])    Surgeons: Nicholas Celeste MD Responsible Provider: Shanita Mc MD    Anesthesia Type: MAC ASA Status: 3          Anesthesia Type: No value filed.     Jake Phase I: Jake Score: 10    Jake Phase II:        Anesthesia Post Evaluation    Patient location during evaluation: PACU  Patient participation: complete - patient participated  Level of consciousness: awake  Airway patency: patent  Nausea & Vomiting: no nausea and no vomiting  Complications: no  Cardiovascular status: hemodynamically stable  Respiratory status: acceptable  Hydration status: euvolemic  Pain management: adequate

## 2023-11-17 LAB
ALBUMIN SERPL-MCNC: 2.7 G/DL (ref 3.5–5.2)
ALP SERPL-CCNC: 54 U/L (ref 35–104)
ALT SERPL-CCNC: 8 U/L (ref 0–32)
ANION GAP SERPL CALCULATED.3IONS-SCNC: 9 MMOL/L (ref 7–16)
AST SERPL-CCNC: 11 U/L (ref 0–31)
BASOPHILS # BLD: 0.02 K/UL (ref 0–0.2)
BASOPHILS NFR BLD: 1 % (ref 0–2)
BILIRUB SERPL-MCNC: 0.2 MG/DL (ref 0–1.2)
BUN SERPL-MCNC: 31 MG/DL (ref 6–23)
CALCIUM SERPL-MCNC: 8.7 MG/DL (ref 8.6–10.2)
CHLORIDE SERPL-SCNC: 110 MMOL/L (ref 98–107)
CO2 SERPL-SCNC: 18 MMOL/L (ref 22–29)
CREAT SERPL-MCNC: 1.7 MG/DL (ref 0.5–1)
DATE LAST DOSE: NORMAL
EOSINOPHIL # BLD: 0.05 K/UL (ref 0.05–0.5)
EOSINOPHILS RELATIVE PERCENT: 2 % (ref 0–6)
ERYTHROCYTE [DISTWIDTH] IN BLOOD BY AUTOMATED COUNT: 17.2 % (ref 11.5–15)
GFR SERPL CREATININE-BSD FRML MDRD: 34 ML/MIN/1.73M2
GLUCOSE BLD-MCNC: 100 MG/DL (ref 74–99)
GLUCOSE BLD-MCNC: 113 MG/DL (ref 74–99)
GLUCOSE BLD-MCNC: 116 MG/DL (ref 74–99)
GLUCOSE BLD-MCNC: 132 MG/DL (ref 74–99)
GLUCOSE SERPL-MCNC: 101 MG/DL (ref 74–99)
HCT VFR BLD AUTO: 23.5 % (ref 34–48)
HGB BLD-MCNC: 7.2 G/DL (ref 11.5–15.5)
IMM GRANULOCYTES # BLD AUTO: 0.05 K/UL (ref 0–0.58)
IMM GRANULOCYTES NFR BLD: 2 % (ref 0–5)
LYMPHOCYTES NFR BLD: 1.17 K/UL (ref 1.5–4)
LYMPHOCYTES RELATIVE PERCENT: 38 % (ref 20–42)
MCH RBC QN AUTO: 24.9 PG (ref 26–35)
MCHC RBC AUTO-ENTMCNC: 30.6 G/DL (ref 32–34.5)
MCV RBC AUTO: 81.3 FL (ref 80–99.9)
MONOCYTES NFR BLD: 0.42 K/UL (ref 0.1–0.95)
MONOCYTES NFR BLD: 14 % (ref 2–12)
NEUTROPHILS NFR BLD: 45 % (ref 43–80)
NEUTS SEG NFR BLD: 1.41 K/UL (ref 1.8–7.3)
PLATELET # BLD AUTO: 319 K/UL (ref 130–450)
PMV BLD AUTO: 11.1 FL (ref 7–12)
POTASSIUM SERPL-SCNC: 4.3 MMOL/L (ref 3.5–5)
PROT SERPL-MCNC: 7.5 G/DL (ref 6.4–8.3)
RBC # BLD AUTO: 2.89 M/UL (ref 3.5–5.5)
SODIUM SERPL-SCNC: 137 MMOL/L (ref 132–146)
TME LAST DOSE: NORMAL H
VANCOMYCIN DOSE: NORMAL MG
VANCOMYCIN SERPL-MCNC: 11.3 UG/ML (ref 5–40)
WBC OTHER # BLD: 3.1 K/UL (ref 4.5–11.5)

## 2023-11-17 PROCEDURE — 6370000000 HC RX 637 (ALT 250 FOR IP)

## 2023-11-17 PROCEDURE — 2060000000 HC ICU INTERMEDIATE R&B

## 2023-11-17 PROCEDURE — 36415 COLL VENOUS BLD VENIPUNCTURE: CPT

## 2023-11-17 PROCEDURE — 2580000003 HC RX 258: Performed by: SPECIALIST

## 2023-11-17 PROCEDURE — 6370000000 HC RX 637 (ALT 250 FOR IP): Performed by: INTERNAL MEDICINE

## 2023-11-17 PROCEDURE — 2580000003 HC RX 258: Performed by: INTERNAL MEDICINE

## 2023-11-17 PROCEDURE — 87040 BLOOD CULTURE FOR BACTERIA: CPT

## 2023-11-17 PROCEDURE — 80202 ASSAY OF VANCOMYCIN: CPT

## 2023-11-17 PROCEDURE — 82962 GLUCOSE BLOOD TEST: CPT

## 2023-11-17 PROCEDURE — 6360000002 HC RX W HCPCS: Performed by: SPECIALIST

## 2023-11-17 PROCEDURE — 6360000002 HC RX W HCPCS: Performed by: INTERNAL MEDICINE

## 2023-11-17 PROCEDURE — 6360000002 HC RX W HCPCS

## 2023-11-17 PROCEDURE — 80053 COMPREHEN METABOLIC PANEL: CPT

## 2023-11-17 PROCEDURE — 6370000000 HC RX 637 (ALT 250 FOR IP): Performed by: SPECIALIST

## 2023-11-17 PROCEDURE — 85025 COMPLETE CBC W/AUTO DIFF WBC: CPT

## 2023-11-17 RX ORDER — SENNOSIDES A AND B 8.6 MG/1
1 TABLET, FILM COATED ORAL NIGHTLY
Status: DISCONTINUED | OUTPATIENT
Start: 2023-11-17 | End: 2023-11-20 | Stop reason: HOSPADM

## 2023-11-17 RX ORDER — POLYETHYLENE GLYCOL 3350 17 G/17G
17 POWDER, FOR SOLUTION ORAL 2 TIMES DAILY
Status: DISCONTINUED | OUTPATIENT
Start: 2023-11-17 | End: 2023-11-20 | Stop reason: HOSPADM

## 2023-11-17 RX ORDER — FLUCONAZOLE 2 MG/ML
400 INJECTION, SOLUTION INTRAVENOUS EVERY 24 HOURS
Status: DISCONTINUED | OUTPATIENT
Start: 2023-11-17 | End: 2023-11-19

## 2023-11-17 RX ADMIN — SODIUM BICARBONATE 650 MG: 650 TABLET ORAL at 12:14

## 2023-11-17 RX ADMIN — CARVEDILOL 12.5 MG: 6.25 TABLET, FILM COATED ORAL at 08:54

## 2023-11-17 RX ADMIN — ATORVASTATIN CALCIUM 20 MG: 20 TABLET, FILM COATED ORAL at 20:20

## 2023-11-17 RX ADMIN — GABAPENTIN 100 MG: 100 CAPSULE ORAL at 20:20

## 2023-11-17 RX ADMIN — HYDRALAZINE HYDROCHLORIDE 25 MG: 25 TABLET, FILM COATED ORAL at 15:35

## 2023-11-17 RX ADMIN — DOCUSATE SODIUM 100 MG: 100 CAPSULE, LIQUID FILLED ORAL at 20:21

## 2023-11-17 RX ADMIN — VANCOMYCIN HYDROCHLORIDE 750 MG: 5 INJECTION, POWDER, LYOPHILIZED, FOR SOLUTION INTRAVENOUS at 13:40

## 2023-11-17 RX ADMIN — ACETAMINOPHEN 650 MG: 325 TABLET ORAL at 14:47

## 2023-11-17 RX ADMIN — GABAPENTIN 100 MG: 100 CAPSULE ORAL at 08:55

## 2023-11-17 RX ADMIN — ENOXAPARIN SODIUM 60 MG: 100 INJECTION SUBCUTANEOUS at 17:35

## 2023-11-17 RX ADMIN — FENTANYL CITRATE 25 MCG: 0.05 INJECTION, SOLUTION INTRAMUSCULAR; INTRAVENOUS at 17:36

## 2023-11-17 RX ADMIN — SODIUM CHLORIDE, PRESERVATIVE FREE 10 ML: 5 INJECTION INTRAVENOUS at 08:55

## 2023-11-17 RX ADMIN — DOCUSATE SODIUM 100 MG: 100 CAPSULE, LIQUID FILLED ORAL at 08:54

## 2023-11-17 RX ADMIN — NALOXEGOL OXALATE 25 MG: 12.5 TABLET, FILM COATED ORAL at 06:21

## 2023-11-17 RX ADMIN — MIRTAZAPINE 7.5 MG: 15 TABLET, FILM COATED ORAL at 20:20

## 2023-11-17 RX ADMIN — SENNOSIDES 8.6 MG: 8.6 TABLET, FILM COATED ORAL at 20:20

## 2023-11-17 RX ADMIN — FENTANYL CITRATE 25 MCG: 0.05 INJECTION, SOLUTION INTRAMUSCULAR; INTRAVENOUS at 04:49

## 2023-11-17 RX ADMIN — HYDRALAZINE HYDROCHLORIDE 25 MG: 25 TABLET, FILM COATED ORAL at 04:50

## 2023-11-17 RX ADMIN — HYDRALAZINE HYDROCHLORIDE 25 MG: 25 TABLET, FILM COATED ORAL at 20:21

## 2023-11-17 RX ADMIN — FENTANYL CITRATE 25 MCG: 0.05 INJECTION, SOLUTION INTRAMUSCULAR; INTRAVENOUS at 10:45

## 2023-11-17 RX ADMIN — CEFEPIME 2000 MG: 2 INJECTION, POWDER, FOR SOLUTION INTRAVENOUS at 09:52

## 2023-11-17 RX ADMIN — FLUCONAZOLE IN SODIUM CHLORIDE 400 MG: 2 INJECTION, SOLUTION INTRAVENOUS at 09:48

## 2023-11-17 RX ADMIN — ACETAMINOPHEN 650 MG: 325 TABLET ORAL at 04:50

## 2023-11-17 RX ADMIN — CARVEDILOL 12.5 MG: 6.25 TABLET, FILM COATED ORAL at 17:36

## 2023-11-17 RX ADMIN — SODIUM BICARBONATE 650 MG: 650 TABLET ORAL at 20:20

## 2023-11-17 RX ADMIN — SODIUM CHLORIDE, PRESERVATIVE FREE 10 ML: 5 INJECTION INTRAVENOUS at 20:21

## 2023-11-17 RX ADMIN — ACETAMINOPHEN 650 MG: 325 TABLET ORAL at 20:21

## 2023-11-17 RX ADMIN — ISOSORBIDE MONONITRATE 30 MG: 30 TABLET, EXTENDED RELEASE ORAL at 08:54

## 2023-11-17 ASSESSMENT — PAIN DESCRIPTION - LOCATION
LOCATION: ABDOMEN

## 2023-11-17 ASSESSMENT — PAIN DESCRIPTION - DESCRIPTORS
DESCRIPTORS: ACHING
DESCRIPTORS: ACHING;SORE
DESCRIPTORS: ACHING

## 2023-11-17 ASSESSMENT — PAIN SCALES - GENERAL
PAINLEVEL_OUTOF10: 3
PAINLEVEL_OUTOF10: 7
PAINLEVEL_OUTOF10: 4
PAINLEVEL_OUTOF10: 9
PAINLEVEL_OUTOF10: 5

## 2023-11-17 ASSESSMENT — PAIN DESCRIPTION - ORIENTATION
ORIENTATION: LOWER
ORIENTATION: LOWER;MID
ORIENTATION: LOWER;MID

## 2023-11-17 NOTE — PROGRESS NOTES
Pharmacy Consultation Note  (Antibiotic Dosing and Monitoring)    Initial consult date: 11/11/23  Consulting physician/provider: Dr. Sahara Hayes  Drug: Vancomycin  Indication: UTI    Age/  Gender Height Weight IBW  Allergy Information   63 y.o./female 5' 4\" 62 kg   Ideal body weight: 54.7 kg (120 lb 9.5 oz)  Adjusted ideal body weight: 57.6 kg (127 lb 0.5 oz)   Patient has no known allergies. Renal Function:  Recent Labs     11/15/23  0527 11/16/23  0514 11/17/23  0720   BUN 40* 32* 31*   CREATININE 1.8* 1.7* 1.7*         Intake/Output Summary (Last 24 hours) at 11/17/2023 1123  Last data filed at 11/17/2023 0836  Gross per 24 hour   Intake 390 ml   Output 1150 ml   Net -760 ml         Vancomycin Monitoring:  Trough:  No results for input(s): \"VANCOTROUGH\" in the last 72 hours. Random:    Recent Labs     11/15/23  0527 11/16/23  0514 11/17/23  0720   VANCORANDOM 13.0 17.2 11.3         No results for input(s): \"BLOODCULT2\" in the last 72 hours. Historical Cultures:  Organism   Date Value Ref Range Status   12/03/2020 Citrobacter freundii (A)  Final     No results for input(s): \"BC\" in the last 72 hours. Recent vancomycin administrations                     vancomycin (VANCOCIN) 750 mg in sodium chloride 0.9 % 250 mL IVPB (mg) 750 mg New Bag 11/15/23 1404               Assessment:  Patient is a 58 y.o. female who has been initiated on vancomycin  Estimated Creatinine Clearance: 29 mL/min (A) (based on SCr of 1.7 mg/dL (H)). To dose vancomycin, pharmacy will be utilizing dosing based off of levels because of patient's renal impairment/insufficiency  11/13: Random AM level = 16.2 mcg/mL  11/14: Random AM level 19.9 mcg/ml  11/15: Random AM level 13, SCr now 1.8, UOP 0.8 ml/kg/h  11/16: random level =17.2 mcg/ml, scheduled for stent removal today with preoperative gentamicin . 11/17: random level =11.3 mcg/ml, SCr 1.7, UOP 1.3 ml/kg/h      Plan:  Vancomycin 750 mg x 1 dose today.   Repeat random level tomorrow [facility] ED  [address] [id number]  Phone: [phone]  eMERGENCY dEPARTMENT eNCOUnter      Pt Name: Michel Gomez  MRN: [id number]  [place] 1970  Date of evaluation: 3/22/2018    CHIEF COMPLAINT       Chief Complaint   Patient presents with    Facial Pain     sinus pressure        HISTORY OF PRESENT ILLNESS    Michel Gomez is a 49 y.o. male who presents To the emergency department complaining of nasal congestion and facial pressure and pain. Pain is rated 9/10 behind both of his eyes extending into his ears. He called his family doctor who called in a prescription for antibiotics and it wasn't ready he's having too much discomfort. On the way to the emergency department he was techs did by [employer] status perception was ready. He is more looking for pain control right at this moment. He denies any fevers or chills no neck pain. No blurry vision or double vision. He's had chronic sinus issues ever since his surgery years ago. REVIEW OF SYSTEMS       Constitutional: No fevers or chills   HEENT: No sore throat, positive rhinorrhea. Positive facial pain  Eyes: No blurry vision or double vision no drainage   Cardiovascular: No chest pain or tachycardia   Respiratory: No wheezing or shortness of breath no cough   Gastrointestinal: No nausea, vomiting, diarrhea, constipation, or abdominal pain   : No hematuria or dysuria   Musculoskeletal: No swelling positive facial pain   Skin: No rash   Neurological: No focal neurologic complaints, paresthesias, weakness, or headache     PAST MEDICAL HISTORY    has a past medical history of HIV (human immunodeficiency virus infection) ([facility] [address] SURGICAL HISTORY      has a past surgical history that includes Tonsillectomy and adenoidectomy; Nasal septum surgery; and Hemorrhoid surgery. CURRENT MEDICATIONS       Previous Medications    No medications on file       ALLERGIES     is allergic to vicodin [hydrocodone-acetaminophen].     FAMILY unless otherwise documented above    LABS:  No results found for this visit on 03/22/18. Not indicated unless otherwise documented above    RADIOLOGY:   I reviewed the radiologist interpretations:    No orders to display       Not indicated unless otherwise documented above    EMERGENCY DEPARTMENT COURSE:     The patient was given the following medications:  Orders Placed This Encounter   Medications    predniSONE (DELTASONE) tablet 60 mg    oxyCODONE-acetaminophen (PERCOCET) 5-325 MG per tablet 2 tablet     To go no driving    predniSONE (DELTASONE) 50 MG tablet     Sig: Take 1 tablet by mouth daily for 4 days     Dispense:  4 tablet     Refill:  0        Vitals:    Vitals:    03/22/18 1958   BP: (!) 141/89   Pulse: 80   Resp: 16   Temp: 97.7 °F (36.5 °C)   TempSrc: Oral   SpO2: 96%   Weight: 72.6 kg (160 lb)   Height: 5' 8\" (1.727 m)     -------------------------  BP (!) 141/89   Pulse 80   Temp 97.7 °F (36.5 °C) (Oral)   Resp 16   Ht 5' 8\" (1.727 m)   Wt 72.6 kg (160 lb)   SpO2 96%   BMI 24.33 kg/m²         I have reviewed the disposition diagnosis with the patient and or their family/guardian. I have answered their questions and given discharge instructions. They voiced understanding of these instructions and did not have any further questions or complaints. CRITICAL CARE:    None    CONSULTS:    None    PROCEDURES:    None      OARRS Report if indicated    Attestation: The Prescription Monitoring Report for this patient was reviewed today. (Martir Schneider DO)  Documentation: No signs of potential drug abuse or diversion identified. (Martir Schneider, )        FINAL IMPRESSION      1.  Chronic maxillary sinusitis          DISPOSITION/PLAN   DISPOSITION Discharge - Pending Orders Complete 03/22/2018 08:15:08 PM        PATIENT REFERRED TO:  Adriane Villalpando MD  [address] [place] [address]  [phone]    Schedule an appointment as soon as possible for a visit in 1 week        DISCHARGE MEDICATIONS:  New Prescriptions    PREDNISONE (DELTASONE) 50 MG TABLET    Take 1 tablet by mouth daily for 4 days       (Please note that portions of this note were completed with a voice recognition program.  Efforts were made to edit the dictations but occasionally words are mis-transcribed.)    Nirav Tolentino,   Attending Emergency Physician       Nirav Tolentino DO  03/22/18 2024

## 2023-11-17 NOTE — PROGRESS NOTES
Progress Note    Date:11/17/2023         Patient Antonietta Alan     YOB: 1960     Age:63 y.o.     FACE TO FACE ENCOUNTER  11/17/23    Assessment/PLAN        Hospital Problems             Last Modified POA    * (Principal) Sepsis secondary to UTI ([address]) 11/10/2023 Yes   Postop fevers   Leukopenia follow   Cons bacteremia prob contaminant   Uti/peylonephritis/hydroneprosis Bilateral double-J ureteral stents in place   -s/p 11/16 Bilateral ureteral strictures bilateral pyonephrosis  -gnr/gpo  -f/u procal  -repeat blood cx due to feevrs  -s/p stent removal/change today   Spoke with Urology       S/p gent   Added cefepime  vancomycin (VANCOCIN) intermittent dosing (placeholder), RX Placeholder         Review of Systems      Review of Systems  11/17 in bed afebrile   11/16 for procedure today ha had BM no f/c  11/15 in bed has no c/o f/c/n/v/d  11/14 in bed has no c/o f/c asking about stent change  11/13 c/o pain hands wrist  no f/c/nv/d  11/12 Feels better no f/c/n/v/d  Subjective   Interval History Status:      Awaiting for surgery family present   Pt had fevers postop  Medications   Scheduled Meds:    fluconazole  400 mg IntraVENous Q24H    cefepime  2,000 mg IntraVENous Q24H    sodium bicarbonate  650 mg Oral BID    naloxegol  25 mg Oral QAM AC    polyethylene glycol  17 g Oral Daily    docusate sodium  100 mg Oral BID    lidocaine  1 patch TransDERmal Daily    hydrALAZINE  25 mg Oral 3 times per day    glycerin (ADULT)  1 suppository Rectal Once    vancomycin (VANCOCIN) intermittent dosing (placeholder)   Other RX Placeholder    [Held by provider] apixaban  5 mg Oral BID    atorvastatin  20 mg Oral Nightly    carvedilol  12.5 mg Oral BID WC    isosorbide mononitrate  30 mg Oral Daily    insulin lispro  0-8 Units SubCUTAneous TID WC    insulin lispro  0-4 Units SubCUTAneous Nightly    enoxaparin  1 mg/kg SubCUTAneous Daily    sodium chloride flush  5-40 mL IntraVENous 2 times per day    gabapentin collection. Bones/joints:  Multilevel degenerative changes of the spine. No definite acute bony abnormality is noted. No dislocation. Soft tissues:  No acute findings. Vasculature:  Scattered calcified atherosclerotic plaque is noted within the arterial system. No abdominal aortic aneurysm. Lymph nodes:  No acute findings. No enlarged lymph nodes. 1.  Bilateral double-J ureteral stents in place. However there is increased bilateral pelvocaliectasis, now with moderate bilateral hydronephrosis and some mild left perinephric edema. This could suggest clogging or malfunction of the stents, though pyelonephritis, especially on the left cannot be excluded. 2.  Diffuse constipation excluding the distal sigmoid and rectum. Otherwise nonspecific bowel gas pattern. 3.  Cholelithiasis within an otherwise unremarkable appearing gallbladder. 4.  Otherwise unchanged.         Electronically signed by Shilo Ford MD on 11/12/23 at 4:42 PM EST

## 2023-11-17 NOTE — PROGRESS NOTES
Internal Medicine Progress Note    PARDEEP=[facility]    Kailee Reid. Isaiah Reeder., SACHIO.I. Steward Litten, D.O., SACHIOBARBARA Barrios, MSN, APRN, NP-C  Mae Shah. Elmer Khan, MSN, APRN-CNP  So Steve, MSN, APRN-CNP     Primary Care Physician: Christiano Hills MD   Admitting Physician:  Shweta Solo DO  Admission date and time: 11/10/2023 11:00 AM    Room:  [address] [place] [address]  Admitting diagnosis: Dehydration [E86.0]  Acute pyelonephritis [N10]  LUAN (acute kidney injury) ([address]) [N17.9]  Sepsis secondary to UTI ([address]) [A41.9, N39.0]  Sepsis with acute renal failure without septic shock, due to unspecified organism, unspecified acute renal failure type ([address]) [A41.9, R65.20, N17.9]    Patient Name: Néstor Resendiz  MRN: [id number]    Date of Service: 11/17/2023     Subjective:  Emil Patel is a 58 y.o. female who was seen and examined today,11/17/2023, at the bedside. Sofia spiked high-grade temperature last evening following urologic intervention. She describes ongoing suprapubic pain. Antibiotics have been adjusted as per the infectious disease team and repeat cultures have been obtained. No family members were present during my examination. We discussed ongoing maintenance within the hospital setting. Review of System:   Constitutional:   Ongoing malaise and fatigue   HEENT:   Denies ear pain, sore throat, sinus or eye problems. Cardiovascular:   Denies any chest pain, irregular heartbeats, or palpitations. Respiratory:   Denies shortness of breath, coughing, sputum production, hemoptysis, or wheezing. Gastrointestinal:   Denies nausea, vomiting, diarrhea,  Denies any abdominal pain. 3 days without a bowel movement in the setting of constipation. Genitourinary:    Denies any urgency,  hematuria improving. Voiding with the use of a Gerber catheter.   Extremities:   Denies lower extremity swelling, edema or

## 2023-11-17 NOTE — PROGRESS NOTES
Physical Therapy  Physical Therapy    Room #:   0529/6791-16    Date: 2023       Patient Name: Leobardo Archer  : 1960      MRN: [id number]     Patient unavailable for physical therapy treatment due to  patient not feeling well . Will attempt PT treatment tomorrow. Thank you. Victorina Cee  [facility]  LIC # [id number]

## 2023-11-17 NOTE — PLAN OF CARE
Problem: Discharge Planning  Goal: Discharge to home or other facility with appropriate resources  Outcome: Progressing  Flowsheets (Taken 11/16/2023 2000)  Discharge to home or other facility with appropriate resources:   Identify barriers to discharge with patient and caregiver   Arrange for needed discharge resources and transportation as appropriate   Identify discharge learning needs (meds, wound care, etc)   Refer to discharge planning if patient needs post-hospital services based on physician order or complex needs related to functional status, cognitive ability or social support system     Problem: Pain  Goal: Verbalizes/displays adequate comfort level or baseline comfort level  Outcome: Progressing     Problem: Safety - Adult  Goal: Free from fall injury  Outcome: Progressing  Flowsheets (Taken 11/16/2023 2000)  Free From Fall Injury: Instruct family/caregiver on patient safety     Problem: ABCDS Injury Assessment  Goal: Absence of physical injury  Outcome: Progressing  Flowsheets (Taken 11/16/2023 2000)  Absence of Physical Injury: Implement safety measures based on patient assessment     Problem: Chronic Conditions and Co-morbidities  Goal: Patient's chronic conditions and co-morbidity symptoms are monitored and maintained or improved  Outcome: Progressing  Flowsheets (Taken 11/16/2023 2000)  Care Plan - Patient's Chronic Conditions and Co-Morbidity Symptoms are Monitored and Maintained or Improved:   Monitor and assess patient's chronic conditions and comorbid symptoms for stability, deterioration, or improvement   Collaborate with multidisciplinary team to address chronic and comorbid conditions and prevent exacerbation or deterioration   Update acute care plan with appropriate goals if chronic or comorbid symptoms are exacerbated and prevent overall improvement and discharge     Problem: Skin/Tissue Integrity  Goal: Absence of new skin breakdown  Description: 1.   Monitor for areas of redness and/or

## 2023-11-17 NOTE — PROGRESS NOTES
Nutrition Assessment     Type and Reason for Visit: RD Nutrition Re-Screen/LOS, Initial    Nutrition Recommendations/Plan:   Continue current diet   Glucerna Shake BID      Malnutrition Assessment:  Malnutrition Status: No malnutrition    Nutrition Assessment:  Pt re-screened per LOS protocol. Chart reviewed. Pt currently eating ~50-75% of most meals and w/ no other significant nutritional issues noted at this time. MST 1, no significant wound, no significant weight loss identified. Pt requesting ONS Vanilla Glucerna BID, will add and follow-up per policy. Please consult if RD needed. Nutrition Related Findings:   abd soft, NT, ND, active BSx4, no edema noted, I/O's -7.2L since admit Wound Type: None    Current Nutrition Therapies:    ADULT DIET;  Regular; 4 carb choices (60 gm/meal)  ADULT ORAL NUTRITION SUPPLEMENT; Lunch, Dinner; Diabetic Oral Supplement    Anthropometric Measures:  Height: 162.6 cm (5' 4.02\")  Current Body Wt: 62 kg (136 lb 11 oz) (11/13 bed scale)   BMI: 23.5    Nutrition Diagnosis:   No nutrition diagnosis at this time     Thanh Otero RD  Contact:

## 2023-11-17 NOTE — CARE COORDINATION
SOCIAL WORK / DISCHARGE PLANNING:   Pt has cysto with bilateral stent exchange yesterday. Today started on IV Diflucan/ Iv Cefepime. Dc plan remains for pt to return home with dtr and resume [facility]. Resume order in Epic. Will need notified if dc over the weekend. . [phone]. Pt has all necessary dme needed. Family will provide home going transport.          Electronically signed by MAGNUS Henry on 11/17/2023 at 9:56 AM

## 2023-11-17 NOTE — OP NOTE
[address]                    [address] [place], [address] [facility]                                OPERATIVE REPORT    PATIENT NAME: Dusty Hewitt                     :        1960  MED REC NO:   [id number]                            ROOM:       9431  ACCOUNT NO:   [de-identified]                           ADMIT DATE: 11/10/2023  PROVIDER:     Rosibel Montano MD    DATE OF PROCEDURE:  2023    PREOPERATIVE DIAGNOSIS:  Bilateral ureteral strictures. POSTOPERATIVE DIAGNOSES:  Bilateral ureteral strictures with multiple  strictures on the right difficult to navigate. Also, the patient had  bilateral pyelonephrosis. PROCEDURE:  Cysto, bilateral stent exchange, bilateral retrograde  pyelogram, insertion of Gerber, and exam under anesthesia. SURGEON:  Rosibel Montano MD    ANESTHESIA:  Monitored sedation. BLOOD LOSS:  Less than 5 mL. COMPLICATIONS:  I could not get the wire back into the right collecting  system without assistance of the ureteroscope. SPECIMEN:  There was a culture from each renal pelvis. DESCRIPTION OF PROCEDURE:  The time-out was read by me, the Anesthesia,  and the operating staff, reviewed history, physical, allergy, and  medication. Gerber catheter had been removed. Distention showed mild  calcification when examined with the scope. I previously prepped and  draped in the usual fashion. I used peripheral compression device that  was functional throughout. No undue tension on hips, knees, or  buttocks. There was some trabeculation and cellule formation of the  bladder. No diverticular formation. Bladder capacity was over 300 mL  which was normal.  No cystitis cystica, enterovesical fistula, or  extrinsic imprint. The  film of the abdomen demonstrates both  stents to be well positioned. I placed the wire alongside the right  stent. I actually got that into the renal pelvis.   When I removed the  right stent; however the

## 2023-11-17 NOTE — PROGRESS NOTES
Met with the patient and family at bedside for a length of stay visit. This  asked how the procedure went yesterday. The patient responded that it went well and the discovered some infection and started her on a new antibiotic. This  reminded the patient that [address] [place] [address] is available as needed.

## 2023-11-18 LAB
ALBUMIN SERPL-MCNC: 2.6 G/DL (ref 3.5–5.2)
ALP SERPL-CCNC: 52 U/L (ref 35–104)
ALT SERPL-CCNC: 7 U/L (ref 0–32)
ANION GAP SERPL CALCULATED.3IONS-SCNC: 10 MMOL/L (ref 7–16)
AST SERPL-CCNC: 10 U/L (ref 0–31)
BASOPHILS # BLD: 0.03 K/UL (ref 0–0.2)
BASOPHILS NFR BLD: 1 % (ref 0–2)
BILIRUB SERPL-MCNC: 0.2 MG/DL (ref 0–1.2)
BUN SERPL-MCNC: 29 MG/DL (ref 6–23)
CALCIUM SERPL-MCNC: 8.6 MG/DL (ref 8.6–10.2)
CHLORIDE SERPL-SCNC: 112 MMOL/L (ref 98–107)
CO2 SERPL-SCNC: 17 MMOL/L (ref 22–29)
CREAT SERPL-MCNC: 1.6 MG/DL (ref 0.5–1)
DATE LAST DOSE: NORMAL
EOSINOPHIL # BLD: 0.07 K/UL (ref 0.05–0.5)
EOSINOPHILS RELATIVE PERCENT: 3 % (ref 0–6)
ERYTHROCYTE [DISTWIDTH] IN BLOOD BY AUTOMATED COUNT: 17 % (ref 11.5–15)
GFR SERPL CREATININE-BSD FRML MDRD: 36 ML/MIN/1.73M2
GLUCOSE BLD-MCNC: 121 MG/DL (ref 74–99)
GLUCOSE BLD-MCNC: 124 MG/DL (ref 74–99)
GLUCOSE BLD-MCNC: 99 MG/DL (ref 74–99)
GLUCOSE SERPL-MCNC: 85 MG/DL (ref 74–99)
HCT VFR BLD AUTO: 23.9 % (ref 34–48)
HGB BLD-MCNC: 7.3 G/DL (ref 11.5–15.5)
IMM GRANULOCYTES # BLD AUTO: 0.05 K/UL (ref 0–0.58)
IMM GRANULOCYTES NFR BLD: 2 % (ref 0–5)
LYMPHOCYTES NFR BLD: 1.05 K/UL (ref 1.5–4)
LYMPHOCYTES RELATIVE PERCENT: 38 % (ref 20–42)
MCH RBC QN AUTO: 25.3 PG (ref 26–35)
MCHC RBC AUTO-ENTMCNC: 30.5 G/DL (ref 32–34.5)
MCV RBC AUTO: 82.7 FL (ref 80–99.9)
MICROORGANISM SPEC CULT: ABNORMAL
MICROORGANISM SPEC CULT: NO GROWTH
MICROORGANISM SPEC CULT: NORMAL
MICROORGANISM SPEC CULT: NORMAL
MONOCYTES NFR BLD: 0.38 K/UL (ref 0.1–0.95)
MONOCYTES NFR BLD: 14 % (ref 2–12)
NEUTROPHILS NFR BLD: 43 % (ref 43–80)
NEUTS SEG NFR BLD: 1.21 K/UL (ref 1.8–7.3)
PLATELET # BLD AUTO: 316 K/UL (ref 130–450)
PMV BLD AUTO: 10.7 FL (ref 7–12)
POTASSIUM SERPL-SCNC: 4.2 MMOL/L (ref 3.5–5)
PROT SERPL-MCNC: 7.3 G/DL (ref 6.4–8.3)
RBC # BLD AUTO: 2.89 M/UL (ref 3.5–5.5)
SERVICE CMNT-IMP: NORMAL
SERVICE CMNT-IMP: NORMAL
SODIUM SERPL-SCNC: 139 MMOL/L (ref 132–146)
SPECIMEN DESCRIPTION: ABNORMAL
SPECIMEN DESCRIPTION: NORMAL
TME LAST DOSE: NORMAL H
VANCOMYCIN DOSE: NORMAL MG
VANCOMYCIN SERPL-MCNC: 17.6 UG/ML (ref 5–40)
WBC OTHER # BLD: 2.8 K/UL (ref 4.5–11.5)

## 2023-11-18 PROCEDURE — 36415 COLL VENOUS BLD VENIPUNCTURE: CPT

## 2023-11-18 PROCEDURE — 6370000000 HC RX 637 (ALT 250 FOR IP)

## 2023-11-18 PROCEDURE — 80202 ASSAY OF VANCOMYCIN: CPT

## 2023-11-18 PROCEDURE — 2060000000 HC ICU INTERMEDIATE R&B

## 2023-11-18 PROCEDURE — 2580000003 HC RX 258: Performed by: INTERNAL MEDICINE

## 2023-11-18 PROCEDURE — 85025 COMPLETE CBC W/AUTO DIFF WBC: CPT

## 2023-11-18 PROCEDURE — 6370000000 HC RX 637 (ALT 250 FOR IP): Performed by: SPECIALIST

## 2023-11-18 PROCEDURE — 2580000003 HC RX 258: Performed by: SPECIALIST

## 2023-11-18 PROCEDURE — 76937 US GUIDE VASCULAR ACCESS: CPT

## 2023-11-18 PROCEDURE — 6360000002 HC RX W HCPCS: Performed by: SPECIALIST

## 2023-11-18 PROCEDURE — 82962 GLUCOSE BLOOD TEST: CPT

## 2023-11-18 PROCEDURE — 6370000000 HC RX 637 (ALT 250 FOR IP): Performed by: INTERNAL MEDICINE

## 2023-11-18 PROCEDURE — 80053 COMPREHEN METABOLIC PANEL: CPT

## 2023-11-18 PROCEDURE — 6360000002 HC RX W HCPCS: Performed by: INTERNAL MEDICINE

## 2023-11-18 RX ORDER — LINEZOLID 600 MG/1
600 TABLET, FILM COATED ORAL EVERY 12 HOURS SCHEDULED
Status: DISCONTINUED | OUTPATIENT
Start: 2023-11-18 | End: 2023-11-20 | Stop reason: HOSPADM

## 2023-11-18 RX ORDER — CALCIUM CARBONATE 500 MG/1
1000 TABLET, CHEWABLE ORAL 3 TIMES DAILY PRN
Status: DISCONTINUED | OUTPATIENT
Start: 2023-11-18 | End: 2023-11-20 | Stop reason: HOSPADM

## 2023-11-18 RX ORDER — LINEZOLID 600 MG/1
600 TABLET, FILM COATED ORAL EVERY 12 HOURS SCHEDULED
Qty: 10 TABLET | Refills: 0 | Status: SHIPPED | OUTPATIENT
Start: 2023-11-18 | End: 2023-11-23

## 2023-11-18 RX ADMIN — SODIUM CHLORIDE, PRESERVATIVE FREE 10 ML: 5 INJECTION INTRAVENOUS at 21:26

## 2023-11-18 RX ADMIN — LINEZOLID 600 MG: 600 TABLET, FILM COATED ORAL at 21:25

## 2023-11-18 RX ADMIN — ATORVASTATIN CALCIUM 20 MG: 20 TABLET, FILM COATED ORAL at 21:25

## 2023-11-18 RX ADMIN — FENTANYL CITRATE 25 MCG: 0.05 INJECTION, SOLUTION INTRAMUSCULAR; INTRAVENOUS at 05:52

## 2023-11-18 RX ADMIN — CEFEPIME 2000 MG: 2 INJECTION, POWDER, FOR SOLUTION INTRAVENOUS at 08:18

## 2023-11-18 RX ADMIN — CARVEDILOL 12.5 MG: 6.25 TABLET, FILM COATED ORAL at 18:02

## 2023-11-18 RX ADMIN — SODIUM BICARBONATE 650 MG: 650 TABLET ORAL at 21:26

## 2023-11-18 RX ADMIN — DOCUSATE SODIUM 100 MG: 100 CAPSULE, LIQUID FILLED ORAL at 21:26

## 2023-11-18 RX ADMIN — FLUCONAZOLE IN SODIUM CHLORIDE 400 MG: 2 INJECTION, SOLUTION INTRAVENOUS at 08:16

## 2023-11-18 RX ADMIN — ACETAMINOPHEN 650 MG: 325 TABLET ORAL at 15:02

## 2023-11-18 RX ADMIN — HYDRALAZINE HYDROCHLORIDE 25 MG: 25 TABLET, FILM COATED ORAL at 21:25

## 2023-11-18 RX ADMIN — HYDRALAZINE HYDROCHLORIDE 25 MG: 25 TABLET, FILM COATED ORAL at 05:53

## 2023-11-18 RX ADMIN — HYDRALAZINE HYDROCHLORIDE 25 MG: 25 TABLET, FILM COATED ORAL at 15:02

## 2023-11-18 RX ADMIN — SENNOSIDES 8.6 MG: 8.6 TABLET, FILM COATED ORAL at 21:26

## 2023-11-18 RX ADMIN — MIRTAZAPINE 7.5 MG: 15 TABLET, FILM COATED ORAL at 21:25

## 2023-11-18 RX ADMIN — SODIUM BICARBONATE 650 MG: 650 TABLET ORAL at 08:20

## 2023-11-18 RX ADMIN — ISOSORBIDE MONONITRATE 30 MG: 30 TABLET, EXTENDED RELEASE ORAL at 08:20

## 2023-11-18 RX ADMIN — CARVEDILOL 12.5 MG: 6.25 TABLET, FILM COATED ORAL at 08:20

## 2023-11-18 RX ADMIN — GABAPENTIN 100 MG: 100 CAPSULE ORAL at 21:25

## 2023-11-18 RX ADMIN — DOCUSATE SODIUM 100 MG: 100 CAPSULE, LIQUID FILLED ORAL at 08:20

## 2023-11-18 RX ADMIN — FENTANYL CITRATE 25 MCG: 0.05 INJECTION, SOLUTION INTRAMUSCULAR; INTRAVENOUS at 10:27

## 2023-11-18 RX ADMIN — GABAPENTIN 100 MG: 100 CAPSULE ORAL at 08:20

## 2023-11-18 RX ADMIN — CALCIUM CARBONATE (ANTACID) CHEW TAB 500 MG 1000 MG: 500 CHEW TAB at 18:01

## 2023-11-18 RX ADMIN — NALOXEGOL OXALATE 25 MG: 12.5 TABLET, FILM COATED ORAL at 05:49

## 2023-11-18 RX ADMIN — FENTANYL CITRATE 25 MCG: 0.05 INJECTION, SOLUTION INTRAMUSCULAR; INTRAVENOUS at 21:26

## 2023-11-18 RX ADMIN — APIXABAN 5 MG: 5 TABLET, FILM COATED ORAL at 21:26

## 2023-11-18 ASSESSMENT — PAIN DESCRIPTION - DESCRIPTORS
DESCRIPTORS: ACHING;DISCOMFORT
DESCRIPTORS: ACHING;DISCOMFORT

## 2023-11-18 ASSESSMENT — PAIN SCALES - GENERAL
PAINLEVEL_OUTOF10: 8

## 2023-11-18 ASSESSMENT — PAIN DESCRIPTION - LOCATION
LOCATION: ABDOMEN;GENERALIZED
LOCATION: ABDOMEN

## 2023-11-18 ASSESSMENT — PAIN SCALES - WONG BAKER: WONGBAKER_NUMERICALRESPONSE: 2

## 2023-11-18 ASSESSMENT — PAIN DESCRIPTION - ORIENTATION
ORIENTATION: RIGHT;LEFT;MID
ORIENTATION: RIGHT;LEFT

## 2023-11-18 NOTE — PROGRESS NOTES
Internal Medicine Progress Note    [facility]EP=[facility]    Ary Goncalves. Vic Roberts., [email or web address].GERONIMOOMarianaI. Joey Little D.O., SACHIOOCTAVIO Underwood D.O. Lina Thomas, MSN, APRN, NP-C  Florestine Gilford. Ray Granda, MSN, APRN-CNP  Mil Mitchell, MSN, APRN-CNP     Primary Care Physician: Dusty Reed MD   Admitting Physician:  Andreia Ellis DO  Admission date and time: 11/10/2023 11:00 AM    Room:  [address] [place] [address]  Admitting diagnosis: Dehydration [E86.0]  Acute pyelonephritis [N10]  LUAN (acute kidney injury) ([address]) [N17.9]  Sepsis secondary to UTI ([address]) [A41.9, N39.0]  Sepsis with acute renal failure without septic shock, due to unspecified organism, unspecified acute renal failure type ([address]) [A41.9, R65.20, N17.9]    Patient Name: Philippe Calderon  MRN: [id number]    Date of Service: 11/18/2023     Subjective:  Ashley Goldman is a 58 y.o. female who was seen and examined today,11/18/2023, at the bedside. Ashley Goldman was evaluated in the presence of family today. She is feeling dramatically better and has now been afebrile for greater than 24 hours. She has resolution of her suprapubic abdominal pain as well. Repeat blood cultures are pending. Renal function has returned to baseline and she is approaching baseline. Review of System:   Constitutional:   Significant improvement in presenting weakness and deconditioning. HEENT:   Denies ear pain, sore throat, sinus or eye problems. Cardiovascular:   Denies any chest pain, irregular heartbeats, or palpitations. Respiratory:   Denies shortness of breath, coughing, sputum production, hemoptysis, or wheezing. Gastrointestinal:   's abdominal pain and cramping today. Tolerating a diet. Genitourinary:    Denies any urgency,  hematuria improving. Voiding with the use of a Gerber catheter. Extremities:   Denies lower extremity swelling, edema or cyanosis.    Neurology:    Denies any headache or focal neurological Assessment:  Sepsis secondary to urinary tract infection status post bilateral ureteral stent exchange and complicated by Staphylococcus bacteremia  Acute renal failure with resolution  Asymptomatic cholelithiasis  Coronary artery disease  Chronic, compensated diastolic congestive heart failure  Hyperlipidemia  Essential hypertension  History of PE and DVT on chronic Eliquis therapy  Noninsulin-dependent diabetes mellitus type 2  Schizophrenia  Left below the knee amputation in the setting of peripheral arterial disease  History of alcohol abuse  History of tobacco abuse    Plan:   I am extremely pleased with Sofia's improvement when compared to my examination yesterday. Following adjustments in anti-infective therapy, she has been afebrile for greater than 24 hours. Repeat blood cultures are pending. Renal function has returned to baseline. Her abdominal pain has improved and she is tolerating a diet. An aggressive bowel regimen is being implemented. Anticoagulation therapy will be resumed. IV fluids will be discontinued. She will continue working with the therapy teams and is approaching baseline. We will await final antibiotic recommendations. Family was updated at the bedside. More than 50% of my  time was spent at the bedside counseling/coordinating care with the patient and/or family with face to face contact. This time was spent reviewing notes and laboratory data as well as instructing and counseling the patient. Time I spent with the family or surrogate(s) is included only if the patient was incapable of providing the necessary information or participating in medical decisions. I also discussed the differential diagnosis and all of the proposed management plans with the patient and individuals accompanying the patient.     Jarvis Rasheed  9:36 AM  11/18/2023

## 2023-11-18 NOTE — PROGRESS NOTES
Vancomycin discontinued, pharmacy will sign off. Please re-consult if needed. Aziza Moulton, PharmD 11/18/2023 1:22 PM             Pharmacy Consultation Note  (Antibiotic Dosing and Monitoring)    Initial consult date: 11/11/23  Consulting physician/provider: Dr. Keven Machuca  Drug: Vancomycin  Indication: UTI    Age/  Gender Height Weight IBW  Allergy Information   63 y.o./female 5' 4\" 62 kg   Ideal body weight: 54.7 kg (120 lb 10.8 oz)  Adjusted ideal body weight: 57.6 kg (127 lb 1.2 oz)   Patient has no known allergies. Renal Function:  Recent Labs     11/16/23  0514 11/17/23  0720 11/18/23  0541   BUN 32* 31* 29*   CREATININE 1.7* 1.7* 1.6*         Intake/Output Summary (Last 24 hours) at 11/18/2023 1248  Last data filed at 11/18/2023 0541  Gross per 24 hour   Intake 240 ml   Output 2100 ml   Net -1860 ml         Vancomycin Monitoring:  Trough:  No results for input(s): \"VANCOTROUGH\" in the last 72 hours. Random:    Recent Labs     11/16/23  0514 11/17/23  0720 11/18/23  0541   VANCORANDOM 17.2 11.3 17.6         No results for input(s): \"BLOODCULT2\" in the last 72 hours. Historical Cultures:  Organism   Date Value Ref Range Status   12/03/2020 Citrobacter freundii (A)  Final     No results for input(s): \"BC\" in the last 72 hours. Recent vancomycin administrations                     vancomycin (VANCOCIN) 750 mg in sodium chloride 0.9 % 250 mL IVPB (mg) 750 mg New Bag 11/17/23 1340    vancomycin (VANCOCIN) 750 mg in sodium chloride 0.9 % 250 mL IVPB (mg) 750 mg New Bag 11/15/23 1404                        Assessment:  Patient is a 58 y.o. female who has been initiated on vancomycin  Estimated Creatinine Clearance: 31 mL/min (A) (based on SCr of 1.6 mg/dL (H)).   To dose vancomycin, pharmacy will be utilizing dosing based off of levels because of patient's renal impairment/insufficiency  11/13: Random AM level = 16.2 mcg/mL  11/14: Random AM level 19.9 mcg/ml  11/15: Random AM level 13, SCr now

## 2023-11-19 LAB
ALBUMIN SERPL-MCNC: 2.6 G/DL (ref 3.5–5.2)
ALP SERPL-CCNC: 54 U/L (ref 35–104)
ALT SERPL-CCNC: 7 U/L (ref 0–32)
ANION GAP SERPL CALCULATED.3IONS-SCNC: 10 MMOL/L (ref 7–16)
AST SERPL-CCNC: 11 U/L (ref 0–31)
BASOPHILS # BLD: 0.02 K/UL (ref 0–0.2)
BASOPHILS NFR BLD: 1 % (ref 0–2)
BILIRUB SERPL-MCNC: 0.2 MG/DL (ref 0–1.2)
BUN SERPL-MCNC: 28 MG/DL (ref 6–23)
CALCIUM SERPL-MCNC: 9 MG/DL (ref 8.6–10.2)
CHLORIDE SERPL-SCNC: 108 MMOL/L (ref 98–107)
CO2 SERPL-SCNC: 20 MMOL/L (ref 22–29)
CREAT SERPL-MCNC: 1.5 MG/DL (ref 0.5–1)
EOSINOPHIL # BLD: 0.05 K/UL (ref 0.05–0.5)
EOSINOPHILS RELATIVE PERCENT: 2 % (ref 0–6)
ERYTHROCYTE [DISTWIDTH] IN BLOOD BY AUTOMATED COUNT: 17 % (ref 11.5–15)
GFR SERPL CREATININE-BSD FRML MDRD: 38 ML/MIN/1.73M2
GLUCOSE BLD-MCNC: 102 MG/DL (ref 74–99)
GLUCOSE BLD-MCNC: 107 MG/DL (ref 74–99)
GLUCOSE BLD-MCNC: 119 MG/DL (ref 74–99)
GLUCOSE BLD-MCNC: 174 MG/DL (ref 74–99)
GLUCOSE SERPL-MCNC: 119 MG/DL (ref 74–99)
HCT VFR BLD AUTO: 22.8 % (ref 34–48)
HGB BLD-MCNC: 7 G/DL (ref 11.5–15.5)
IMM GRANULOCYTES # BLD AUTO: 0.04 K/UL (ref 0–0.58)
IMM GRANULOCYTES NFR BLD: 1 % (ref 0–5)
LYMPHOCYTES NFR BLD: 1.04 K/UL (ref 1.5–4)
LYMPHOCYTES RELATIVE PERCENT: 37 % (ref 20–42)
MCH RBC QN AUTO: 24.9 PG (ref 26–35)
MCHC RBC AUTO-ENTMCNC: 30.7 G/DL (ref 32–34.5)
MCV RBC AUTO: 81.1 FL (ref 80–99.9)
MONOCYTES NFR BLD: 0.32 K/UL (ref 0.1–0.95)
MONOCYTES NFR BLD: 12 % (ref 2–12)
NEUTROPHILS NFR BLD: 47 % (ref 43–80)
NEUTS SEG NFR BLD: 1.32 K/UL (ref 1.8–7.3)
PLATELET # BLD AUTO: 330 K/UL (ref 130–450)
PMV BLD AUTO: 10.6 FL (ref 7–12)
POTASSIUM SERPL-SCNC: 4.3 MMOL/L (ref 3.5–5)
PROT SERPL-MCNC: 7.3 G/DL (ref 6.4–8.3)
RBC # BLD AUTO: 2.81 M/UL (ref 3.5–5.5)
SODIUM SERPL-SCNC: 138 MMOL/L (ref 132–146)
WBC OTHER # BLD: 2.8 K/UL (ref 4.5–11.5)

## 2023-11-19 PROCEDURE — 80053 COMPREHEN METABOLIC PANEL: CPT

## 2023-11-19 PROCEDURE — 6370000000 HC RX 637 (ALT 250 FOR IP): Performed by: INTERNAL MEDICINE

## 2023-11-19 PROCEDURE — 6360000002 HC RX W HCPCS: Performed by: INTERNAL MEDICINE

## 2023-11-19 PROCEDURE — 2580000003 HC RX 258: Performed by: INTERNAL MEDICINE

## 2023-11-19 PROCEDURE — 82962 GLUCOSE BLOOD TEST: CPT

## 2023-11-19 PROCEDURE — 6360000002 HC RX W HCPCS: Performed by: SPECIALIST

## 2023-11-19 PROCEDURE — 97530 THERAPEUTIC ACTIVITIES: CPT

## 2023-11-19 PROCEDURE — 6370000000 HC RX 637 (ALT 250 FOR IP): Performed by: NURSE PRACTITIONER

## 2023-11-19 PROCEDURE — 85025 COMPLETE CBC W/AUTO DIFF WBC: CPT

## 2023-11-19 PROCEDURE — 6370000000 HC RX 637 (ALT 250 FOR IP): Performed by: SPECIALIST

## 2023-11-19 PROCEDURE — 36415 COLL VENOUS BLD VENIPUNCTURE: CPT

## 2023-11-19 PROCEDURE — 6370000000 HC RX 637 (ALT 250 FOR IP)

## 2023-11-19 PROCEDURE — 2060000000 HC ICU INTERMEDIATE R&B

## 2023-11-19 PROCEDURE — 2580000003 HC RX 258: Performed by: SPECIALIST

## 2023-11-19 PROCEDURE — 97110 THERAPEUTIC EXERCISES: CPT

## 2023-11-19 RX ORDER — POTASSIUM CHLORIDE 20 MEQ/1
40 TABLET, EXTENDED RELEASE ORAL PRN
Status: DISCONTINUED | OUTPATIENT
Start: 2023-11-19 | End: 2023-11-20 | Stop reason: HOSPADM

## 2023-11-19 RX ORDER — POTASSIUM CHLORIDE 7.45 MG/ML
10 INJECTION INTRAVENOUS PRN
Status: DISCONTINUED | OUTPATIENT
Start: 2023-11-19 | End: 2023-11-20 | Stop reason: HOSPADM

## 2023-11-19 RX ORDER — SODIUM CHLORIDE 9 MG/ML
INJECTION, SOLUTION INTRAVENOUS PRN
Status: DISCONTINUED | OUTPATIENT
Start: 2023-11-19 | End: 2023-11-19

## 2023-11-19 RX ORDER — MAGNESIUM SULFATE 1 G/100ML
1000 INJECTION INTRAVENOUS PRN
Status: DISCONTINUED | OUTPATIENT
Start: 2023-11-19 | End: 2023-11-20 | Stop reason: HOSPADM

## 2023-11-19 RX ORDER — FUROSEMIDE 10 MG/ML
20 INJECTION INTRAMUSCULAR; INTRAVENOUS SEE ADMIN INSTRUCTIONS
Status: DISCONTINUED | OUTPATIENT
Start: 2023-11-19 | End: 2023-11-19

## 2023-11-19 RX ADMIN — SENNOSIDES 8.6 MG: 8.6 TABLET, FILM COATED ORAL at 20:15

## 2023-11-19 RX ADMIN — SODIUM BICARBONATE 650 MG: 650 TABLET ORAL at 20:15

## 2023-11-19 RX ADMIN — DOCUSATE SODIUM 100 MG: 100 CAPSULE, LIQUID FILLED ORAL at 09:42

## 2023-11-19 RX ADMIN — APIXABAN 5 MG: 5 TABLET, FILM COATED ORAL at 20:15

## 2023-11-19 RX ADMIN — MIRTAZAPINE 7.5 MG: 15 TABLET, FILM COATED ORAL at 20:15

## 2023-11-19 RX ADMIN — GABAPENTIN 100 MG: 100 CAPSULE ORAL at 20:15

## 2023-11-19 RX ADMIN — APIXABAN 5 MG: 5 TABLET, FILM COATED ORAL at 09:43

## 2023-11-19 RX ADMIN — SODIUM CHLORIDE, PRESERVATIVE FREE 10 ML: 5 INJECTION INTRAVENOUS at 20:16

## 2023-11-19 RX ADMIN — ISOSORBIDE MONONITRATE 30 MG: 30 TABLET, EXTENDED RELEASE ORAL at 09:42

## 2023-11-19 RX ADMIN — ATORVASTATIN CALCIUM 20 MG: 20 TABLET, FILM COATED ORAL at 20:15

## 2023-11-19 RX ADMIN — HYDRALAZINE HYDROCHLORIDE 25 MG: 25 TABLET, FILM COATED ORAL at 13:34

## 2023-11-19 RX ADMIN — CARVEDILOL 12.5 MG: 6.25 TABLET, FILM COATED ORAL at 16:19

## 2023-11-19 RX ADMIN — CEFEPIME 2000 MG: 2 INJECTION, POWDER, FOR SOLUTION INTRAVENOUS at 09:37

## 2023-11-19 RX ADMIN — LINEZOLID 600 MG: 600 TABLET, FILM COATED ORAL at 20:15

## 2023-11-19 RX ADMIN — FENTANYL CITRATE 25 MCG: 0.05 INJECTION, SOLUTION INTRAMUSCULAR; INTRAVENOUS at 09:41

## 2023-11-19 RX ADMIN — LINEZOLID 600 MG: 600 TABLET, FILM COATED ORAL at 09:43

## 2023-11-19 RX ADMIN — SODIUM BICARBONATE 650 MG: 650 TABLET ORAL at 09:42

## 2023-11-19 RX ADMIN — HYDRALAZINE HYDROCHLORIDE 25 MG: 25 TABLET, FILM COATED ORAL at 06:34

## 2023-11-19 RX ADMIN — FLUCONAZOLE IN SODIUM CHLORIDE 400 MG: 2 INJECTION, SOLUTION INTRAVENOUS at 09:34

## 2023-11-19 RX ADMIN — POLYETHYLENE GLYCOL 3350 17 G: 17 POWDER, FOR SOLUTION ORAL at 09:40

## 2023-11-19 RX ADMIN — FENTANYL CITRATE 25 MCG: 0.05 INJECTION, SOLUTION INTRAMUSCULAR; INTRAVENOUS at 03:27

## 2023-11-19 RX ADMIN — HYDRALAZINE HYDROCHLORIDE 25 MG: 25 TABLET, FILM COATED ORAL at 20:15

## 2023-11-19 RX ADMIN — CARVEDILOL 12.5 MG: 6.25 TABLET, FILM COATED ORAL at 09:43

## 2023-11-19 RX ADMIN — FENTANYL CITRATE 25 MCG: 0.05 INJECTION, SOLUTION INTRAMUSCULAR; INTRAVENOUS at 20:15

## 2023-11-19 RX ADMIN — DOCUSATE SODIUM 100 MG: 100 CAPSULE, LIQUID FILLED ORAL at 20:15

## 2023-11-19 RX ADMIN — NALOXEGOL OXALATE 25 MG: 12.5 TABLET, FILM COATED ORAL at 06:34

## 2023-11-19 RX ADMIN — GABAPENTIN 100 MG: 100 CAPSULE ORAL at 09:43

## 2023-11-19 RX ADMIN — SODIUM CHLORIDE, PRESERVATIVE FREE 10 ML: 5 INJECTION INTRAVENOUS at 09:38

## 2023-11-19 RX ADMIN — ACETAMINOPHEN 650 MG: 325 TABLET ORAL at 14:32

## 2023-11-19 ASSESSMENT — PAIN SCALES - GENERAL
PAINLEVEL_OUTOF10: 3
PAINLEVEL_OUTOF10: 8
PAINLEVEL_OUTOF10: 4
PAINLEVEL_OUTOF10: 8
PAINLEVEL_OUTOF10: 0
PAINLEVEL_OUTOF10: 1

## 2023-11-19 ASSESSMENT — PAIN DESCRIPTION - LOCATION
LOCATION: ABDOMEN
LOCATION: ABDOMEN

## 2023-11-19 ASSESSMENT — PAIN DESCRIPTION - DESCRIPTORS
DESCRIPTORS: ACHING;DISCOMFORT
DESCRIPTORS: ACHING;CRAMPING;DISCOMFORT

## 2023-11-19 ASSESSMENT — PAIN DESCRIPTION - ORIENTATION
ORIENTATION: RIGHT;LEFT
ORIENTATION: RIGHT;LEFT

## 2023-11-19 ASSESSMENT — PAIN - FUNCTIONAL ASSESSMENT: PAIN_FUNCTIONAL_ASSESSMENT: PREVENTS OR INTERFERES SOME ACTIVE ACTIVITIES AND ADLS

## 2023-11-19 NOTE — PROGRESS NOTES
Internal Medicine Progress Note    [facility]    [place] Leaver. Elenita Saab., F.GABRIELLEOMarianaI. Kelsey Issa D.O., RASHIDA Fong D.O. Merlin Peter, MSN, APRN, NP-C  Sunni Tang. Giselle Tlaley, MSN, APRN-CNP  Amanda Abreu, MSN, APRN-CNP     Primary Care Physician: Luci Jesus MD   Admitting Physician:  Roshan Riggins DO  Admission date and time: 11/10/2023 11:00 AM    Room:  [address] [place] [address]  Admitting diagnosis: Dehydration [E86.0]  Acute pyelonephritis [N10]  LUAN (acute kidney injury) ([address]) [N17.9]  Sepsis secondary to UTI ([address]) [A41.9, N39.0]  Sepsis with acute renal failure without septic shock, due to unspecified organism, unspecified acute renal failure type ([address]) [A41.9, R65.20, N17.9]    Patient Name: Weldon Caller  MRN: [id number]    Date of Service: 11/19/2023     Subjective:  Eduardo Gan is a 58 y.o. female who was seen and examined today,11/19/2023, at the bedside. Eduardo Gan is feeling better today. We have reviewed the results of the work-up and plan of care moving forward. She is agreeable with moving forward with transfusion today. She denies any overt blood loss in the stool or urine. We have discussed other medication adjustments. She understands the importance of increased activity. No family present. Review of System:   Constitutional:   Significant improvement in presenting weakness and deconditioning. HEENT:   Denies ear pain, sore throat, sinus or eye problems. Cardiovascular:   Denies any chest pain, irregular heartbeats, or palpitations. Respiratory:   Denies shortness of breath, coughing, sputum production, hemoptysis, or wheezing. Gastrointestinal:   No significant abdominal pain, nausea or vomiting. Genitourinary:    Denies any urgency,  hematuria improving. Voiding with the use of a Gerber catheter. Extremities:   Denies lower extremity swelling, edema or cyanosis.    Neurology:    Denies any BMP:    Lab Results   Component Value Date/Time     11/19/2023 05:00 AM    K 4.3 11/19/2023 05:00 AM    K 5.1 05/09/2023 02:59 PM     11/19/2023 05:00 AM    CO2 20 11/19/2023 05:00 AM    BUN 28 11/19/2023 05:00 AM    LABALBU 2.6 11/19/2023 05:00 AM    LABALBU 4.3 04/26/2012 09:30 AM    CREATININE 1.5 11/19/2023 05:00 AM    CALCIUM 9.0 11/19/2023 05:00 AM    GFRAA >60 06/24/2022 01:34 PM    LABGLOM 38 11/19/2023 05:00 AM    GLUCOSE 119 11/19/2023 05:00 AM    GLUCOSE 171 04/26/2012 09:30 AM         Assessment:  Sepsis secondary to urinary tract infection status post bilateral ureteral stent exchange and complicated by Staphylococcus bacteremia  Acute renal failure with resolution  Asymptomatic cholelithiasis  Coronary artery disease  Chronic, compensated diastolic congestive heart failure  Hyperlipidemia  Essential hypertension  History of PE and DVT on chronic Eliquis therapy  Noninsulin-dependent diabetes mellitus type 2  Schizophrenia  Left below the knee amputation in the setting of peripheral arterial disease  History of alcohol abuse  History of tobacco abuse    Plan:   Christian is feeling better overall today. She remains afebrile and with improved symptoms. Blood counts will be addressed today and we have clarified her anticoagulation regimen, will maintain monotherapy with Eliquis. Renal function have improved and remained at baseline. Abdominal symptoms are well controlled and she is tolerating a diet. She remains on aggressive bowel regimen. Her primary issue now is weakness and deconditioning and she will continue working with the therapy teams and is approaching baseline. We will await final antibiotic recommendations. More than 50% of my  time was spent at the bedside counseling/coordinating care with the patient and/or family with face to face contact. This time was spent reviewing notes and laboratory data as well as instructing and counseling the patient.  Time I spent

## 2023-11-19 NOTE — PROGRESS NOTES
Progress Note    Date:11/19/2023         Patient Shoshana Kelley     YOB: 1960     Age:63 y.o.     FACE TO FACE ENCOUNTER  11/19/23    Assessment/PLAN        Hospital Problems             Last Modified POA    * (Principal) Sepsis secondary to UTI ([address]) 11/10/2023 Yes   Postop fevers   Leukopenia follow   Cons bacteremia prob contaminant   Uti/peylonephritis/hydroneprosis Bilateral double-J ureteral stents in place   -s/p 11/16 Bilateral ureteral strictures bilateral pyonephrosis  -gnr/gpo  -f/u procal better  -repeat blood cx due to feevrs  -s/p stent removal/change today          S/p gent   Stop cefepime  Stop vancomycin   Cont linezolid for vre  Med rec    Review of Systems      Review of Systems  11/19 in  bed has no c/o f/c/nv/d/has rowe family present   11/18 feels better rowe clear no fevers  11/17 in bed afebrile   11/16 for procedure today ha had BM no f/c  11/15 in bed has no c/o f/c/n/v/d  11/14 in bed has no c/o f/c asking about stent change  11/13 c/o pain hands wrist  no f/c/nv/d  11/12 Feels better no f/c/n/v/d  Subjective   Interval History Status:      Awaiting for surgery family present   Pt had fevers postop  Medications   Scheduled Meds:    linezolid  600 mg Oral 2 times per day    fluconazole  400 mg IntraVENous Q24H    polyethylene glycol  17 g Oral BID    senna  1 tablet Oral Nightly    sodium bicarbonate  650 mg Oral BID    naloxegol  25 mg Oral QAM AC    docusate sodium  100 mg Oral BID    lidocaine  1 patch TransDERmal Daily    hydrALAZINE  25 mg Oral 3 times per day    glycerin (ADULT)  1 suppository Rectal Once    apixaban  5 mg Oral BID    atorvastatin  20 mg Oral Nightly    carvedilol  12.5 mg Oral BID WC    isosorbide mononitrate  30 mg Oral Daily    insulin lispro  0-8 Units SubCUTAneous TID WC    insulin lispro  0-4 Units SubCUTAneous Nightly    sodium chloride flush  5-40 mL IntraVENous 2 times per day    gabapentin  100 mg Oral BID    mirtazapine  7.5 mg Oral stents, though pyelonephritis, especially on the left cannot be excluded. Stomach and bowel:  Diffuse constipation excluding the distal sigmoid and rectum. Otherwise nonspecific bowel gas pattern. No obstruction. No mucosal thickening. PELVIS: Appendix:  A normal appearing appendix is noted. Bladder:  No acute findings. No stones. Reproductive:  Unremarkable as visualized. ABDOMEN and PELVIS: Intraperitoneal space:  No acute findings. No free air. No significant fluid collection. Bones/joints:  Multilevel degenerative changes of the spine. No definite acute bony abnormality is noted. No dislocation. Soft tissues:  No acute findings. Vasculature:  Scattered calcified atherosclerotic plaque is noted within the arterial system. No abdominal aortic aneurysm. Lymph nodes:  No acute findings. No enlarged lymph nodes. 1.  Bilateral double-J ureteral stents in place. However there is increased bilateral pelvocaliectasis, now with moderate bilateral hydronephrosis and some mild left perinephric edema. This could suggest clogging or malfunction of the stents, though pyelonephritis, especially on the left cannot be excluded. 2.  Diffuse constipation excluding the distal sigmoid and rectum. Otherwise nonspecific bowel gas pattern. 3.  Cholelithiasis within an otherwise unremarkable appearing gallbladder. 4.  Otherwise unchanged.         Electronically signed by Caleb Paulino MD on 11/12/23 at 4:42 PM EST

## 2023-11-19 NOTE — PROGRESS NOTES
Physical Therapy    Physical Therapy Treatment Note/Plan of Care    Room #:  3873/3862-52  Patient Name: Eliseo Slade  YOB: 1960  MRN: [id number]    Date of Service: 11/19/2023     Tentative placement recommendation: Home with supervision/family assist  Equipment recommendation: Patient has needed equipment       Evaluating Physical Therapist: Rupert Garces, PT  #[id number]      Specific Provider Orders/Date/Referring Provider :     PT eval and treat  Start:  11/14/23 0915,   End:  11/14/23 0915,   ONE TIME,   Standing Count:  1 Occurrences,   R         Rachael Mejía DO     Admitting Diagnosis:   Dehydration [E86.0]  Acute pyelonephritis [N10]  LUAN (acute kidney injury) ([address]) [N17.9]  Sepsis secondary to UTI ([address]) [A41.9, N39.0]  Sepsis with acute renal failure without septic shock, due to unspecified organism, unspecified acute renal failure type ([address]) [A41.9, R65.20, N17.9]    Admitted with    above  Surgery: none  Visit Diagnoses         Codes    Acute pyelonephritis    -  Primary N10    Dehydration     E86.0    Sepsis with acute renal failure without septic shock, due to unspecified organism, unspecified acute renal failure type ([address])     A41.9, R65.20, N17.9    LUAN (acute kidney injury) ([address])     N17.9    Bilateral hydronephrosis     N13.30    Hydronephrosis, unspecified hydronephrosis type     N13.30            Patient Active Problem List   Diagnosis    Epigastric hernia    Umbilical hernia    Essential hypertension    Type 2 diabetes mellitus with diabetic peripheral angiopathy without gangrene, without long-term current use of insulin ([address])    Dyslipidemia    Diabetic peripheral neuropathy ([address])    Hydroureter    Microcytic hypochromic anemia    Paresthesia of left foot    History of arterial ischemic stroke    Hx of diabetic foot ulcer    Hx of BKA, left (HCC)    Chronic diastolic heart failure (HCC)    PVD (peripheral vascular disease) ([address])    Anorexia    Anemia    Pleural effusion

## 2023-11-19 NOTE — PLAN OF CARE
Problem: Discharge Planning  Goal: Discharge to home or other facility with appropriate resources  11/19/2023 1040 by Juan Lara RN  Outcome: Progressing  11/18/2023 2333 by Georgia Dickens RN  Outcome: Progressing     Problem: Pain  Goal: Verbalizes/displays adequate comfort level or baseline comfort level  11/19/2023 1040 by Juan Lara RN  Outcome: Progressing  11/18/2023 2333 by Georgia Dickens RN  Outcome: Progressing     Problem: Safety - Adult  Goal: Free from fall injury  11/19/2023 1040 by Juan Lara RN  Outcome: Progressing  11/18/2023 2333 by Georgia Dickens RN  Outcome: Progressing     Problem: ABCDS Injury Assessment  Goal: Absence of physical injury  11/19/2023 1040 by Juan Lara RN  Outcome: Progressing  11/18/2023 2333 by Georgia Dickens RN  Outcome: Progressing     Problem: Chronic Conditions and Co-morbidities  Goal: Patient's chronic conditions and co-morbidity symptoms are monitored and maintained or improved  11/19/2023 1040 by Juan Lara RN  Outcome: Progressing  11/18/2023 2333 by Georgia Dickens RN  Outcome: Progressing     Problem: Skin/Tissue Integrity  Goal: Absence of new skin breakdown  Description: 1. Monitor for areas of redness and/or skin breakdown  2. Assess vascular access sites hourly  3. Every 4-6 hours minimum:  Change oxygen saturation probe site  4. Every 4-6 hours:  If on nasal continuous positive airway pressure, respiratory therapy assess nares and determine need for appliance change or resting period.   11/19/2023 1040 by Juan Lara RN  Outcome: Progressing  11/18/2023 2333 by Georgia Dickens RN  Outcome: Progressing

## 2023-11-20 VITALS
HEART RATE: 89 BPM | DIASTOLIC BLOOD PRESSURE: 64 MMHG | OXYGEN SATURATION: 99 % | SYSTOLIC BLOOD PRESSURE: 135 MMHG | WEIGHT: 136.69 LBS | RESPIRATION RATE: 16 BRPM | TEMPERATURE: 98.1 F | BODY MASS INDEX: 23.34 KG/M2 | HEIGHT: 64 IN

## 2023-11-20 LAB
ALBUMIN SERPL-MCNC: 2.6 G/DL (ref 3.5–5.2)
ALP SERPL-CCNC: 56 U/L (ref 35–104)
ALT SERPL-CCNC: 10 U/L (ref 0–32)
ANION GAP SERPL CALCULATED.3IONS-SCNC: 9 MMOL/L (ref 7–16)
AST SERPL-CCNC: 16 U/L (ref 0–31)
BASOPHILS # BLD: 0.03 K/UL (ref 0–0.2)
BASOPHILS NFR BLD: 1 % (ref 0–2)
BILIRUB SERPL-MCNC: 0.2 MG/DL (ref 0–1.2)
BUN SERPL-MCNC: 28 MG/DL (ref 6–23)
CALCIUM SERPL-MCNC: 9 MG/DL (ref 8.6–10.2)
CHLORIDE SERPL-SCNC: 105 MMOL/L (ref 98–107)
CO2 SERPL-SCNC: 22 MMOL/L (ref 22–29)
CREAT SERPL-MCNC: 1.5 MG/DL (ref 0.5–1)
EOSINOPHIL # BLD: 0.05 K/UL (ref 0.05–0.5)
EOSINOPHILS RELATIVE PERCENT: 2 % (ref 0–6)
ERYTHROCYTE [DISTWIDTH] IN BLOOD BY AUTOMATED COUNT: 17.1 % (ref 11.5–15)
GFR SERPL CREATININE-BSD FRML MDRD: 39 ML/MIN/1.73M2
GLUCOSE BLD-MCNC: 111 MG/DL (ref 74–99)
GLUCOSE BLD-MCNC: 141 MG/DL (ref 74–99)
GLUCOSE SERPL-MCNC: 87 MG/DL (ref 74–99)
HCT VFR BLD AUTO: 24.9 % (ref 34–48)
HGB BLD-MCNC: 7.6 G/DL (ref 11.5–15.5)
IMM GRANULOCYTES # BLD AUTO: 0.05 K/UL (ref 0–0.58)
IMM GRANULOCYTES NFR BLD: 2 % (ref 0–5)
LYMPHOCYTES NFR BLD: 1.22 K/UL (ref 1.5–4)
LYMPHOCYTES RELATIVE PERCENT: 36 % (ref 20–42)
MAGNESIUM SERPL-MCNC: 1.6 MG/DL (ref 1.6–2.6)
MAGNESIUM SERPL-MCNC: 2.4 MG/DL (ref 1.6–2.6)
MCH RBC QN AUTO: 25.4 PG (ref 26–35)
MCHC RBC AUTO-ENTMCNC: 30.5 G/DL (ref 32–34.5)
MCV RBC AUTO: 83.3 FL (ref 80–99.9)
MONOCYTES NFR BLD: 0.4 K/UL (ref 0.1–0.95)
MONOCYTES NFR BLD: 12 % (ref 2–12)
NEUTROPHILS NFR BLD: 48 % (ref 43–80)
NEUTS SEG NFR BLD: 1.6 K/UL (ref 1.8–7.3)
PHOSPHATE SERPL-MCNC: 3.5 MG/DL (ref 2.5–4.5)
PLATELET # BLD AUTO: 366 K/UL (ref 130–450)
PMV BLD AUTO: 11.6 FL (ref 7–12)
POTASSIUM SERPL-SCNC: 4.9 MMOL/L (ref 3.5–5)
PROT SERPL-MCNC: 7.5 G/DL (ref 6.4–8.3)
RBC # BLD AUTO: 2.99 M/UL (ref 3.5–5.5)
SODIUM SERPL-SCNC: 136 MMOL/L (ref 132–146)
WBC OTHER # BLD: 3.4 K/UL (ref 4.5–11.5)

## 2023-11-20 PROCEDURE — 6370000000 HC RX 637 (ALT 250 FOR IP)

## 2023-11-20 PROCEDURE — 6370000000 HC RX 637 (ALT 250 FOR IP): Performed by: INTERNAL MEDICINE

## 2023-11-20 PROCEDURE — 83735 ASSAY OF MAGNESIUM: CPT

## 2023-11-20 PROCEDURE — 85025 COMPLETE CBC W/AUTO DIFF WBC: CPT

## 2023-11-20 PROCEDURE — 82962 GLUCOSE BLOOD TEST: CPT

## 2023-11-20 PROCEDURE — 6370000000 HC RX 637 (ALT 250 FOR IP): Performed by: NURSE PRACTITIONER

## 2023-11-20 PROCEDURE — 6370000000 HC RX 637 (ALT 250 FOR IP): Performed by: SPECIALIST

## 2023-11-20 PROCEDURE — 6360000002 HC RX W HCPCS: Performed by: NURSE PRACTITIONER

## 2023-11-20 PROCEDURE — 6360000002 HC RX W HCPCS: Performed by: INTERNAL MEDICINE

## 2023-11-20 PROCEDURE — 2580000003 HC RX 258: Performed by: INTERNAL MEDICINE

## 2023-11-20 PROCEDURE — 36415 COLL VENOUS BLD VENIPUNCTURE: CPT

## 2023-11-20 PROCEDURE — 84100 ASSAY OF PHOSPHORUS: CPT

## 2023-11-20 PROCEDURE — 80053 COMPREHEN METABOLIC PANEL: CPT

## 2023-11-20 RX ORDER — HYDROCODONE BITARTRATE AND ACETAMINOPHEN 5; 325 MG/1; MG/1
1 TABLET ORAL EVERY 4 HOURS PRN
Qty: 18 TABLET | Refills: 0 | Status: SHIPPED | OUTPATIENT
Start: 2023-11-20 | End: 2023-11-23

## 2023-11-20 RX ORDER — HYDRALAZINE HYDROCHLORIDE 25 MG/1
25 TABLET, FILM COATED ORAL EVERY 8 HOURS SCHEDULED
Qty: 90 TABLET | Refills: 3 | Status: SHIPPED | OUTPATIENT
Start: 2023-11-20

## 2023-11-20 RX ORDER — PSEUDOEPHEDRINE HCL 30 MG
100 TABLET ORAL 2 TIMES DAILY
Qty: 60 CAPSULE | Refills: 0 | Status: SHIPPED | OUTPATIENT
Start: 2023-11-20 | End: 2023-12-20

## 2023-11-20 RX ADMIN — ACETAMINOPHEN 650 MG: 325 TABLET ORAL at 10:45

## 2023-11-20 RX ADMIN — MAGNESIUM SULFATE IN DEXTROSE 1000 MG: 10 INJECTION, SOLUTION INTRAVENOUS at 12:40

## 2023-11-20 RX ADMIN — SODIUM CHLORIDE, PRESERVATIVE FREE 10 ML: 5 INJECTION INTRAVENOUS at 08:33

## 2023-11-20 RX ADMIN — LINEZOLID 600 MG: 600 TABLET, FILM COATED ORAL at 08:33

## 2023-11-20 RX ADMIN — GABAPENTIN 100 MG: 100 CAPSULE ORAL at 08:32

## 2023-11-20 RX ADMIN — MAGNESIUM SULFATE IN DEXTROSE 1000 MG: 10 INJECTION, SOLUTION INTRAVENOUS at 11:44

## 2023-11-20 RX ADMIN — SODIUM BICARBONATE 650 MG: 650 TABLET ORAL at 08:32

## 2023-11-20 RX ADMIN — APIXABAN 5 MG: 5 TABLET, FILM COATED ORAL at 08:32

## 2023-11-20 RX ADMIN — HYDRALAZINE HYDROCHLORIDE 25 MG: 25 TABLET, FILM COATED ORAL at 14:39

## 2023-11-20 RX ADMIN — POLYETHYLENE GLYCOL 3350 17 G: 17 POWDER, FOR SOLUTION ORAL at 08:32

## 2023-11-20 RX ADMIN — FENTANYL CITRATE 25 MCG: 0.05 INJECTION, SOLUTION INTRAMUSCULAR; INTRAVENOUS at 05:40

## 2023-11-20 RX ADMIN — DOCUSATE SODIUM 100 MG: 100 CAPSULE, LIQUID FILLED ORAL at 08:32

## 2023-11-20 RX ADMIN — HYDRALAZINE HYDROCHLORIDE 25 MG: 25 TABLET, FILM COATED ORAL at 05:37

## 2023-11-20 RX ADMIN — NALOXEGOL OXALATE 25 MG: 12.5 TABLET, FILM COATED ORAL at 05:37

## 2023-11-20 RX ADMIN — ISOSORBIDE MONONITRATE 30 MG: 30 TABLET, EXTENDED RELEASE ORAL at 08:32

## 2023-11-20 RX ADMIN — CARVEDILOL 12.5 MG: 6.25 TABLET, FILM COATED ORAL at 08:32

## 2023-11-20 ASSESSMENT — PAIN DESCRIPTION - ORIENTATION: ORIENTATION: RIGHT;LEFT;MID

## 2023-11-20 ASSESSMENT — PAIN SCALES - GENERAL
PAINLEVEL_OUTOF10: 4
PAINLEVEL_OUTOF10: 7
PAINLEVEL_OUTOF10: 8
PAINLEVEL_OUTOF10: 0

## 2023-11-20 ASSESSMENT — PAIN DESCRIPTION - LOCATION
LOCATION: ABDOMEN;GENERALIZED
LOCATION: GENERALIZED

## 2023-11-20 ASSESSMENT — PAIN SCALES - WONG BAKER: WONGBAKER_NUMERICALRESPONSE: 2

## 2023-11-20 ASSESSMENT — PAIN DESCRIPTION - DESCRIPTORS
DESCRIPTORS: ACHING;DISCOMFORT
DESCRIPTORS: ACHING;DISCOMFORT

## 2023-11-20 ASSESSMENT — PAIN - FUNCTIONAL ASSESSMENT: PAIN_FUNCTIONAL_ASSESSMENT: PREVENTS OR INTERFERES SOME ACTIVE ACTIVITIES AND ADLS

## 2023-11-20 NOTE — DISCHARGE SUMMARY
Internal Medicine Progress Note     [facility]     Berta Gerardo. Ludwin Cruz, F.A.C.OMarianaIMariana Murray D.O., DIDIER.CMarianaOOCTAVIO Cardoso D.O. Edelmira Lawton, MSN, APRN, NP-C  Mika Antonio. Gerald Valera, MSN, APRN-CNP       Internal Medicine  Discharge Summary    NAME: Ty Unger  :  1960  MRN:  [id number]  PCP:Jovana Harrington MD  ADMITTED: 11/10/2023      DISCHARGED: 23    ADMITTING PHYSICIAN: Berta Reynaga DO    CONSULTANT(S):   IP CONSULT TO IV TEAM  IP CONSULT TO UROLOGY  IP CONSULT TO INFECTIOUS DISEASES  IP CONSULT TO INFECTIOUS DISEASES  IP CONSULT TO SOCIAL WORK     ADMITTING DIAGNOSIS:   Dehydration [E86.0]  Acute pyelonephritis [N10]  LUAN (acute kidney injury) ([address]) [N17.9]  Sepsis secondary to UTI ([address]) [A41.9, N39.0]  Sepsis with acute renal failure without septic shock, due to unspecified organism, unspecified acute renal failure type ([address]) [A41.9, R65.20, N17.9]     DISCHARGE DIAGNOSES:   Sepsis secondary to urinary tract infection status post bilateral ureteral stent exchange and complicated by Staphylococcus bacteremia  Acute renal failure with resolution  Asymptomatic cholelithiasis  Coronary artery disease  Chronic, compensated diastolic congestive heart failure  Hyperlipidemia  Essential hypertension  History of PE and DVT on chronic Eliquis therapy  Noninsulin-dependent diabetes mellitus type 2  Schizophrenia  Left below the knee amputation in the setting of peripheral arterial disease  History of alcohol abuse  History of tobacco abuse    BRIEF HISTORY OF PRESENT ILLNESS:   Patient is 56-year-old female presented to the ED due to change in urinary symptoms. Patient bilateral ureteral stent placement a few months ago. Since then she was admitted to the hospital in September for UTI. She was treated to the for this and discharged home.   However over the last few days have noticed foul odor and change in the urine

## 2023-11-20 NOTE — PLAN OF CARE
Problem: Discharge Planning  Goal: Discharge to home or other facility with appropriate resources  11/19/2023 2226 by Roxy Bernabe RN  Outcome: Progressing  11/19/2023 1040 by Tami Tariq RN  Outcome: Progressing     Problem: Pain  Goal: Verbalizes/displays adequate comfort level or baseline comfort level  11/19/2023 2226 by Roxy Bernabe RN  Outcome: Progressing  11/19/2023 1040 by Tami Tariq RN  Outcome: Progressing     Problem: Safety - Adult  Goal: Free from fall injury  11/19/2023 2226 by Roxy Bernabe RN  Outcome: Progressing  11/19/2023 1040 by Tami Tariq RN  Outcome: Progressing     Problem: ABCDS Injury Assessment  Goal: Absence of physical injury  11/19/2023 2226 by Roxy Bernabe RN  Outcome: Progressing  11/19/2023 1040 by Tami Tariq RN  Outcome: Progressing     Problem: Chronic Conditions and Co-morbidities  Goal: Patient's chronic conditions and co-morbidity symptoms are monitored and maintained or improved  11/19/2023 2226 by Roxy Bernabe RN  Outcome: Progressing  11/19/2023 1040 by Tami Tariq RN  Outcome: Progressing     Problem: Skin/Tissue Integrity  Goal: Absence of new skin breakdown  Description: 1. Monitor for areas of redness and/or skin breakdown  2. Assess vascular access sites hourly  3. Every 4-6 hours minimum:  Change oxygen saturation probe site  4. Every 4-6 hours:  If on nasal continuous positive airway pressure, respiratory therapy assess nares and determine need for appliance change or resting period.   11/19/2023 2226 by Roxy Bernabe RN  Outcome: Progressing  11/19/2023 1040 by Tami Tariq RN  Outcome: Progressing

## 2023-11-20 NOTE — PROGRESS NOTES
Physical Therapy  Physical Therapy    Room #:   0798/0078-66    Date: 2023       Patient Name: Néstor Resendiz  : 1960      MRN: [id number]     Patient unavailable for physical therapy treatment due to  patient states she is going home today . Edwardo You.  Jaye  [facility]  LIC # [id number]

## 2023-11-20 NOTE — PROGRESS NOTES
CLINICAL PHARMACY NOTE: MEDS TO BEDS    Total # of Prescriptions Filled: 3   The following medications were delivered to the patient:  Hydrocodone 5-325 mg  Hydralazine 25 mg  Docusate sodium 100 mg    Additional Documentation:   Movantik sent to [facility] on [address]

## 2023-11-20 NOTE — PROGRESS NOTES
Progress Note    Date:11/20/2023         Patient Trudi Syed     YOB: 1960     Age:63 y.o.     FACE TO FACE ENCOUNTER  11/20/23    Assessment/PLAN        Hospital Problems             Last Modified POA    * (Principal) Sepsis secondary to UTI ([address]) 11/10/2023 Yes   Postop fevers   Leukopenia follow   Cons bacteremia prob contaminant   Uti/peylonephritis/hydroneprosis Bilateral double-J ureteral stents in place   -s/p 11/16 Bilateral ureteral strictures bilateral pyonephrosis  -gnr/gpo  -f/u procal better  -repeat blood cx due to fevers  -s/p stent removal/change today          S/p gent   Stop cefepime  Stop vancomycin   D/c with  linezolid for poss vre  Med rec    Review of Systems      Review of Systems  11/20 nurses present family present has a skin break down   Has weakness   11/19 in  bed has no c/o f/c/nv/d/has rowe family present   11/18 feels better rowe clear no fevers  11/17 in bed afebrile   11/16 for procedure today ha had BM no f/c  11/15 in bed has no c/o f/c/n/v/d  11/14 in bed has no c/o f/c asking about stent change  11/13 c/o pain hands wrist  no f/c/nv/d  11/12 Feels better no f/c/n/v/d  Subjective   Interval History Status:      Awaiting for surgery family present   Pt had fevers postop  Medications   Scheduled Meds:    linezolid  600 mg Oral 2 times per day    polyethylene glycol  17 g Oral BID    senna  1 tablet Oral Nightly    sodium bicarbonate  650 mg Oral BID    naloxegol  25 mg Oral QAM AC    docusate sodium  100 mg Oral BID    lidocaine  1 patch TransDERmal Daily    hydrALAZINE  25 mg Oral 3 times per day    glycerin (ADULT)  1 suppository Rectal Once    apixaban  5 mg Oral BID    atorvastatin  20 mg Oral Nightly    carvedilol  12.5 mg Oral BID WC    isosorbide mononitrate  30 mg Oral Daily    insulin lispro  0-8 Units SubCUTAneous TID WC    insulin lispro  0-4 Units SubCUTAneous Nightly    sodium chloride flush  5-40 mL IntraVENous 2 times per day    gabapentin

## 2023-11-20 NOTE — CARE COORDINATION
SOCIAL WORK / DISCHARGE PLANNING:   Dc to return home with dtr and [facility], resume [facility]HC order in chart. Pt to dc on [address], called [phone] [employer], covered with a copay of $4.15. Pt has necessary dme at home. Family will provide home going transport.            Electronically signed by MAGNUS Guadalupe on 11/20/2023 at 11:30 AM

## 2023-11-21 ENCOUNTER — TELEPHONE (OUTPATIENT)
Dept: FAMILY MEDICINE CLINIC | Age: 63
End: 2023-11-21

## 2023-11-21 NOTE — TELEPHONE ENCOUNTER
RN called to report condition:  Pt right foot 2nd and 4th digit are black/blue in color, warm to touch. Bottom of foot is mottled, as are the bottom of the digits on the right foot. Doppler pulses. Warm to touch, not cold. Pt c/o  pain in right foot. Unable to ambulate.  Please advise  Last seen 11/6/2023  Next appt 4/23/2024

## 2023-11-21 NOTE — TELEPHONE ENCOUNTER
Nathaniel Figueroa MD; [facility]evall[facility]   Transition of care call documentation uploaded to media tab regarding d/c on: 11/20/23. Patient has not scheduled hospital follow up visit with your office. TCM offered assistance with scheduling. Please note, this message was also sent to provider pool and TCM CC will outreach your office in 1-2 business days to confirm appointment was scheduled. Patient has agreed to weekly TCM outreach x 4 weeks. Thank you! [address] Matthew Sylvester, RN       Called pt and scheduled appt 11/30/23.

## 2023-11-21 NOTE — TELEPHONE ENCOUNTER
I returned patient's call and explained that she needs to go to the ER to get this evaluated immediately due to vascular problem being the reason her left leg was amputated. She said she just got in the bed and wants to go to the hospital in the morning.

## 2023-11-22 ENCOUNTER — APPOINTMENT (OUTPATIENT)
Dept: GENERAL RADIOLOGY | Age: 63
End: 2023-11-22
Payer: MEDICARE

## 2023-11-22 ENCOUNTER — HOSPITAL ENCOUNTER (EMERGENCY)
Age: 63
Discharge: HOME | End: 2023-11-22
Attending: EMERGENCY MEDICINE
Payer: MEDICARE

## 2023-11-22 VITALS
RESPIRATION RATE: 18 BRPM | OXYGEN SATURATION: 100 % | BODY MASS INDEX: 22.47 KG/M2 | DIASTOLIC BLOOD PRESSURE: 60 MMHG | WEIGHT: 131 LBS | SYSTOLIC BLOOD PRESSURE: 147 MMHG | TEMPERATURE: 98 F | HEART RATE: 101 BPM

## 2023-11-22 DIAGNOSIS — S93.601A SPRAIN OF RIGHT FOOT, INITIAL ENCOUNTER: Primary | ICD-10-CM

## 2023-11-22 DIAGNOSIS — S90.31XA CONTUSION OF RIGHT FOOT, INITIAL ENCOUNTER: ICD-10-CM

## 2023-11-22 PROCEDURE — 73552 X-RAY EXAM OF FEMUR 2/>: CPT

## 2023-11-22 PROCEDURE — 73630 X-RAY EXAM OF FOOT: CPT

## 2023-11-22 PROCEDURE — 99283 EMERGENCY DEPT VISIT LOW MDM: CPT

## 2023-11-22 PROCEDURE — 6370000000 HC RX 637 (ALT 250 FOR IP): Performed by: EMERGENCY MEDICINE

## 2023-11-22 RX ORDER — HYDROCODONE BITARTRATE AND ACETAMINOPHEN 5; 325 MG/1; MG/1
1 TABLET ORAL ONCE
Status: COMPLETED | OUTPATIENT
Start: 2023-11-22 | End: 2023-11-22

## 2023-11-22 RX ADMIN — HYDROCODONE BITARTRATE AND ACETAMINOPHEN 1 TABLET: 5; 325 TABLET ORAL at 11:37

## 2023-11-22 ASSESSMENT — ENCOUNTER SYMPTOMS
ABDOMINAL PAIN: 0
BACK PAIN: 0
DIARRHEA: 0
CHEST TIGHTNESS: 0
VOMITING: 0
COUGH: 0
NAUSEA: 0
WHEEZING: 0
SORE THROAT: 0
SHORTNESS OF BREATH: 0

## 2023-11-29 ENCOUNTER — TELEPHONE (OUTPATIENT)
Dept: FAMILY MEDICINE CLINIC | Age: 63
End: 2023-11-29

## 2023-11-29 NOTE — TELEPHONE ENCOUNTER
Angie Terry the visiting nurse called. She stated the pt right foot 4th digit is black it is not cold. She just wanted to report this to Dr Dc Dick. Please advise.   Last seen 11/6/2023  Next appt 11/30/2023  Angie Terry [phone]

## 2023-12-05 ENCOUNTER — TELEPHONE (OUTPATIENT)
Dept: FAMILY MEDICINE CLINIC | Age: 63
End: 2023-12-05

## 2023-12-05 DIAGNOSIS — M19.071 ARTHRITIS OF FOOT, RIGHT: Primary | ICD-10-CM

## 2023-12-05 NOTE — TELEPHONE ENCOUNTER
Caryn Bowles  a nurse with [facility] called  She is asking for order for Xray for pt right foot, as pt is complaining it is hurting her really bad and she can not put pressure on it. Please advise.   Fax order to [phone]  Last seen 11/6/2023  Next appt 12/11/2023

## 2023-12-06 ENCOUNTER — TELEPHONE (OUTPATIENT)
Dept: FAMILY MEDICINE CLINIC | Age: 63
End: 2023-12-06

## 2023-12-06 NOTE — TELEPHONE ENCOUNTER
Patient called C/O UTI symptoms that started on 12.5.23.  she has urgency and burning upon urination. Pt is asking for Rx. Please advise. Pt stated she will keep her apt.  On 12.11.23  Last seen 11/6/2023  Next appt 12/11/2023  KARLY/GENNA HUERTA

## 2023-12-07 RX ORDER — NITROFURANTOIN 25; 75 MG/1; MG/1
100 CAPSULE ORAL 2 TIMES DAILY
Qty: 20 CAPSULE | Refills: 0 | Status: SHIPPED | OUTPATIENT
Start: 2023-12-07 | End: 2023-12-17

## 2023-12-07 RX ORDER — TRAMADOL HYDROCHLORIDE 50 MG/1
50 TABLET ORAL EVERY 6 HOURS PRN
Qty: 28 TABLET | Refills: 0 | Status: SHIPPED | OUTPATIENT
Start: 2023-12-07 | End: 2023-12-14

## 2023-12-07 NOTE — TELEPHONE ENCOUNTER
Please inform them that patient already had right foot xray in the ED on 11/22 and it showed foot arthritis. Unless she re-injured it, there is no need for another xray. I will send script for Tramadol for her pain.

## 2023-12-07 NOTE — TELEPHONE ENCOUNTER
Patient called to ask if Dr. Maverick Whittington sent RX in.  I informed she has not. I noted patient has appt this afternoon at [facility] and asked if she could go to the [address] [place] [address] prior to. Patient stated she cannot due to the problem with her foot and wasn't going to keep that appt. I informed her that she should notify the [facility] that she's not keeping her appt and that Dr. Maverick Whittington will respond to her msg.      Last seen 11/6/2023  Next appt 12/11/2023

## 2023-12-11 ENCOUNTER — OFFICE VISIT (OUTPATIENT)
Dept: FAMILY MEDICINE CLINIC | Age: 63
End: 2023-12-11
Payer: MEDICARE

## 2023-12-11 VITALS
OXYGEN SATURATION: 98 % | HEART RATE: 130 BPM | SYSTOLIC BLOOD PRESSURE: 132 MMHG | RESPIRATION RATE: 20 BRPM | DIASTOLIC BLOOD PRESSURE: 64 MMHG | BODY MASS INDEX: 22.49 KG/M2 | HEIGHT: 64 IN

## 2023-12-11 DIAGNOSIS — I96 BLACK TOE (HCC): Primary | ICD-10-CM

## 2023-12-11 DIAGNOSIS — R07.9 CHEST PAIN, UNSPECIFIED TYPE: ICD-10-CM

## 2023-12-11 DIAGNOSIS — M19.071 ARTHRITIS OF FOOT, RIGHT: ICD-10-CM

## 2023-12-11 DIAGNOSIS — Z23 NEED FOR PROPHYLACTIC VACCINATION AND INOCULATION AGAINST INFLUENZA: ICD-10-CM

## 2023-12-11 PROCEDURE — 3017F COLORECTAL CA SCREEN DOC REV: CPT | Performed by: FAMILY MEDICINE

## 2023-12-11 PROCEDURE — 1111F DSCHRG MED/CURRENT MED MERGE: CPT | Performed by: FAMILY MEDICINE

## 2023-12-11 PROCEDURE — 1036F TOBACCO NON-USER: CPT | Performed by: FAMILY MEDICINE

## 2023-12-11 PROCEDURE — G8420 CALC BMI NORM PARAMETERS: HCPCS | Performed by: FAMILY MEDICINE

## 2023-12-11 PROCEDURE — G8427 DOCREV CUR MEDS BY ELIG CLIN: HCPCS | Performed by: FAMILY MEDICINE

## 2023-12-11 PROCEDURE — 99214 OFFICE O/P EST MOD 30 MIN: CPT | Performed by: FAMILY MEDICINE

## 2023-12-11 PROCEDURE — G8482 FLU IMMUNIZE ORDER/ADMIN: HCPCS | Performed by: FAMILY MEDICINE

## 2023-12-11 PROCEDURE — 3074F SYST BP LT 130 MM HG: CPT | Performed by: FAMILY MEDICINE

## 2023-12-11 PROCEDURE — 90674 CCIIV4 VAC NO PRSV 0.5 ML IM: CPT | Performed by: FAMILY MEDICINE

## 2023-12-11 PROCEDURE — 3078F DIAST BP <80 MM HG: CPT | Performed by: FAMILY MEDICINE

## 2023-12-11 PROCEDURE — 93000 ELECTROCARDIOGRAM COMPLETE: CPT | Performed by: FAMILY MEDICINE

## 2023-12-11 PROCEDURE — G0008 ADMIN INFLUENZA VIRUS VAC: HCPCS | Performed by: FAMILY MEDICINE

## 2023-12-11 RX ORDER — TRAMADOL HYDROCHLORIDE 50 MG/1
50 TABLET ORAL EVERY 6 HOURS PRN
Qty: 90 TABLET | Refills: 3 | Status: SHIPPED | OUTPATIENT
Start: 2023-12-11 | End: 2024-12-11

## 2023-12-11 ASSESSMENT — ENCOUNTER SYMPTOMS: SHORTNESS OF BREATH: 0

## 2023-12-11 NOTE — PROGRESS NOTES
[address] [facility], [address]   [address] [facility]   Carolyn Koyanagi, MD     Patient: Elie Franklin Birth: 1960  Visit Date: 12/11/23    Livier Lara is a 58y.o. year old female here today for   Chief Complaint   Patient presents with    Foot Injury     ED, pt fell hit rt foot xrays taken , 2 toes are black x 2 weeks        HPI  Patient injured right 2nd and 4th toes on 11/21/23 when she fell getting up from her lift chair and toes got jammed under her wheelchair that was nearby. Toes began to get darker in color, and she developed severe pain. She went to ED 11/22/23 and had xrays--showed no fracture, only arthritis in foot. Pain is constant--10/10 unless she takes Tramadol. Patient has had intermittent chest pain recently. Recent lab results reviewed, including CMP, CBC which are remarkable for anemia, leukopenia and elevated creatinine. Review of Systems   Respiratory:  Negative for shortness of breath. Cardiovascular:  Positive for chest pain (left lateral with exertion, started a week ago) and leg swelling. Past medical, surgical, social and/or family historyreviewed, updated as needed, and are non-contributory (unless otherwise stated). Medications, allergies, and problem list also reviewed and updated as needed in patient's record. Current Outpatient Medications   Medication Sig Dispense Refill    traMADol (ULTRAM) 50 MG tablet Take 1 tablet by mouth every 6 hours as needed for Pain.  Take lowest dose possible to manage pain Max Daily Amount: 200 mg 90 tablet 3    nitrofurantoin, macrocrystal-monohydrate, (MACROBID) 100 MG capsule Take 1 capsule by mouth 2 times daily for 10 days 20 capsule 0    docusate sodium (COLACE, DULCOLAX) 100 MG CAPS Take 100 mg by mouth 2 times daily 60 capsule 0    hydrALAZINE (APRESOLINE) 25 MG tablet Take 1 tablet by mouth every 8 hours 90 tablet 3    naloxegol (MOVANTIK) 25 MG TABS

## 2023-12-26 RX ORDER — HYDRALAZINE HYDROCHLORIDE 25 MG/1
25 TABLET, FILM COATED ORAL EVERY 8 HOURS SCHEDULED
Qty: 90 TABLET | Refills: 3 | Status: SHIPPED | OUTPATIENT
Start: 2023-12-26

## 2023-12-31 PROBLEM — I46.9 CARDIAC ARREST (HCC): Status: ACTIVE | Noted: 2023-12-31

## 2023-12-31 NOTE — PROGRESS NOTES
Pt iv infiltrated.  Rn notified and dr bhatia notified.  Instructions to elevate and put ice on area of infiltrate given to dr Bhatia.    Electronically signed by Lb Valentine on 12/31/2023 at 6:26 PM

## 2023-12-31 NOTE — ED PROVIDER NOTES
Name: Sofia Bo    MRN: [id number]     Date / Time Roomed:  12/31/2023  4:08 PM  ED Bed Assignment:  RYLEY/RYLEY    ------------------ History of Present Illness --------------------  12/31/23, Time: 4:11 PM EST   Chief Complaint   Patient presents with    Wound Check     R foot, toes discolored. Family states wound was discolored approx 1 month. Family reports pt has been weak.     Fatigue      HPI    Sofia Bo is a 63 y.o. female, with hx of CAD, CHF, diabetes, hyperlipidemia, hypertension, kidney stone, schizophrenia, who presents to the ED today for generalized weakness and fatigue.  Patient coming from home, lives with her family who takes care of her.  She does have history of left BKA amputation due to her diabetes.  Patient is alert and oriented x 2, did get the year wrong.  She states she has been having some pain in the right foot.  Her daughter is here who states that the tips of her toes on the right foot have been black for the past few weeks after an injury she had about a month ago which she did come to the ED for that time.  Patient was seen in the doctor's office as well about 3 weeks ago who commented on the black toes and referred her to podiatry and they have not seen yet.  Daughter states that patient has not been eating or drinking much over the past few weeks.  Patient only complains of generalized fatigue and generalized weakness.  Denies any cough congestion or fever.  Denies any chest pain or shortness of breath.  Denies any abdominal pain.  Nausea vomiting or diarrhea.  No urinary complaints. The pt denies other ROS at this time.     PCP: Cortez Harrington MD.    -------------------- PM --------------------    Past Medical History:   has a past medical history of CAD (coronary artery disease), CHF (congestive heart failure) (HCC), Diabetic peripheral neuropathy associated with type 2 diabetes mellitus (HCC) (08/24/2018), Epigastric hernia, Hyperlipidemia, Hypertension,  appear within the   normal range   3. New large consolidative process projecting over the right hemithorax and   the differential would include acute atelectasis hemorrhage or pneumonia with   atelectasis         CTA ABDOMINAL AORTA W BILAT RUNOFF W CONTRAST   Final Result   1. Abrupt cut off of the right superficial femoral artery in the proximal/mid   thigh. Some reconstitution distally in the thigh with a trickle of flow in   the popliteal artery, anterior tibial artery, and peroneal artery. No   appreciable flow seen in the right posterior tibial artery. Multifocal short   segment stenoses throughout the right anterior tibial artery. Single-vessel   runoff to the right ankle via the peroneal artery.   2. The occluded left superficial femoral artery stent.   3. Infectious/inflammatory tree-in-bud nodularity in the left lower lobe.   4. Mild to moderate bilateral ureteral and pelvicaliceal dilation. Bilateral   nephroureteral stents in place. Gas in the urinary bladder and bilateral   renal collecting systems, which may be related to infection or recent   instrumentation.  Recommend clinical and laboratory correlation.   5. Heterogenous perfusion of the kidneys with areas of cortical thinning,   which may be due to infectious or vascular insults.         XR FOOT RIGHT (MIN 3 VIEWS)   Final Result   No signs of fracture or dislocation.  If the patient's symptoms persist   follow-up studies in 10-14 days may be helpful to exclude an occult injury.         CT HEAD WO CONTRAST   Final Result   No acute intracranial abnormality.         XR CHEST PORTABLE   Final Result   Atherosclerotic disease. No additional evidence of active cardiopulmonary   pathology.           No results found.    No results found.    ------------------- NURSING NOTES & VITALS -------------------    The nursing notes within the ED encounter and vital signs were reviewed.     Vitals:    01/01/24 0031   BP: (!) 0/0   Pulse: (!) 0   Resp: (!) 0

## 2023-12-31 NOTE — ED NOTES
Name: Sofia Bo  : 1960  MRN: [id number]    Date: 2023    Benefits of immediately proceeding with Radiology exam outweigh the risks and therefore the following is being waived:      [] Pregnancy test    [] Protocol for Iodine allergy    [] MRI questionnaire    [x] BUN/Creatinine        MD Sriram Newell Shilp, MD  [phone]

## 2023-12-31 NOTE — ED PROVIDER NOTES
ATTENDING PROVIDER ATTESTATION:     Sofia Bo presented to the emergency department for evaluation of wound check, fatigue and was initially evaluated by the resident physician. See Original ED Note for H&P and ED course above.     I have reviewed and discussed the case, including pertinent history (medical, surgical, family and social) and exam findings with the resident.  I have personally performed and/or participated in the history, exam, medical decision making, and procedures and agree with all pertinent clinical information except for any differences as noted below. I was present for all key portions of any procedures performed during the ED course. I personally reviewed any EKG obtained and agree with the resident interpretation unless stated otherwise below.   I have reviewed my findings and recommendations with the assigned resident, the patient, and members of family present at the time of disposition.      HPI: Patient presents via EMS for generalized weakness, fatigue, and right foot pain.  History is limited from patient due to altered mental status.  She does endorse right foot pain and fatigue.  Additional history obtained from patient's daughter who states that she injured her foot about 1 month ago, was seen in the ED, had follow-up with her primary care physician.  Since then has had progression of black discoloration of second and fourth toes.  Was referred to podiatry by her PCP.  Has a history of left BKA by Dr. Moncada in 2020.  No associated fever, cough, chest pain, shortness of breath, abdominal pain, vomiting.  Daughter states that her confusion over the last 2 weeks is a significant change from her normal neurologic baseline.    ROS: negative unless specified above    PHYSICAL EXAM: Patient is ill-appearing, appears fatigued.  In no acute distress.  Hemodynamically stable.  Mild tachycardia on arrival.  Breathing comfortably on room air without respiratory distress.  Lungs clear  bilaterally.  Abdomen soft, nontender, nondistended.  Left lower extremity status post BKA.    Right lower extremity: Multiphasic popliteal Doppler signal.  DP/PT Doppler signals absent.  Sensation intact to light touch of the right foot.  Right foot is cold with delayed cap refill.  There is black discoloration suggestive of necrosis of the second and fourth toes. Able to wiggle toes.    ED Course/MDM:   CTA abdominal aorta with BLE runoff was ordered.  Initial contrast bolus extravasated from the IV site in the left upper arm.  Arm was elevated.  On serial reevaluation compartments are soft, neurovascularly intact in the left upper extremity.  Lab workup notable for acute renal failure with BUN to 40, creatinine 6.5, hyperkalemia 7.7, metabolic acidosis with bicarb 11, anion gap 21.  EKG does not show QRS widening.  Will treat with IV calcium, IV bicarb, Lokelma, insulin, D50, albuterol.  Nephrology consult placed.  CT resulted concerning for acute limb ischemia of the right lower extremity with arterial occlusions.  Will start heparin bolus and infusion, last took Eliquis early this morning.  CT also concerning for gas in the renal collecting system in the setting of indwelling bilateral ureteral stents.  UA obtained.  Reviewed prior urine cultures, Enterococcus sensitive to vancomycin.  Will treat with IV vancomycin and cefepime.  Consults placed to urology and vascular surgery.  During ED course patient had witnessed V-fib cardiac arrest.  ACLS protocol was initiated.  Patient was intubated.  Also treated with multiple doses of IV calcium, IV bicarb, IV lidocaine.  After about 30 minutes, patient did have ROSC.  Hypotensive, started nor epi infusion.  Postarrest twelve-lead EKG does not show STEMI.  Started lidocaine infusion, still having nonsustained V. tach on telemetry.  Some of these runs appear to be polymorphic.  Treating with additional IV magnesium.  Discussion held with family, they understand that

## 2024-01-01 VITALS — WEIGHT: 120 LBS | TEMPERATURE: 95.4 F | BODY MASS INDEX: 20.6 KG/M2

## 2024-01-01 LAB
CA-I BLD-SCNC: 1.67 MMOL/L (ref 1.15–1.33)
EKG ATRIAL RATE: 100 BPM
EKG P AXIS: 76 DEGREES
EKG P-R INTERVAL: 148 MS
EKG Q-T INTERVAL: 346 MS
EKG QRS DURATION: 96 MS
EKG QTC CALCULATION (BAZETT): 446 MS
EKG R AXIS: 74 DEGREES
EKG T AXIS: -59 DEGREES
EKG VENTRICULAR RATE: 100 BPM
GLUCOSE BLD-MCNC: 449 MG/DL (ref 74–99)
PROCALCITONIN SERPL-MCNC: 0.86 NG/ML (ref 0–0.08)
TROPONIN I SERPL HS-MCNC: 96 NG/L (ref 0–9)

## 2024-01-01 PROCEDURE — 94640 AIRWAY INHALATION TREATMENT: CPT

## 2024-01-01 PROCEDURE — 84145 PROCALCITONIN (PCT): CPT

## 2024-01-01 PROCEDURE — 2500000003 HC RX 250 WO HCPCS: Performed by: EMERGENCY MEDICINE

## 2024-01-01 PROCEDURE — 6360000002 HC RX W HCPCS: Performed by: EMERGENCY MEDICINE

## 2024-01-01 PROCEDURE — 36415 COLL VENOUS BLD VENIPUNCTURE: CPT

## 2024-01-01 PROCEDURE — 2580000003 HC RX 258

## 2024-01-01 PROCEDURE — 82962 GLUCOSE BLOOD TEST: CPT

## 2024-01-01 PROCEDURE — 6360000002 HC RX W HCPCS

## 2024-01-01 PROCEDURE — 93010 ELECTROCARDIOGRAM REPORT: CPT | Performed by: INTERNAL MEDICINE

## 2024-01-01 PROCEDURE — 84484 ASSAY OF TROPONIN QUANT: CPT

## 2024-01-01 PROCEDURE — 6370000000 HC RX 637 (ALT 250 FOR IP)

## 2024-01-01 RX ADMIN — EPINEPHRINE 1 MG: 0.1 INJECTION INTRACARDIAC; INTRAVENOUS at 00:26

## 2024-01-01 RX ADMIN — SODIUM BICARBONATE 50 MEQ: 84 INJECTION INTRAVENOUS at 00:27

## 2024-01-01 RX ADMIN — PIPERACILLIN AND TAZOBACTAM 4500 MG: 4; .5 INJECTION, POWDER, LYOPHILIZED, FOR SOLUTION INTRAVENOUS at 00:00

## 2024-01-01 RX ADMIN — IPRATROPIUM BROMIDE AND ALBUTEROL SULFATE 1 DOSE: .5; 2.5 SOLUTION RESPIRATORY (INHALATION) at 00:07

## 2024-01-01 ASSESSMENT — PULMONARY FUNCTION TESTS: PIF_VALUE: 37

## 2024-01-01 NOTE — PROGRESS NOTES
Brigette Velazquez, APRN - CNP ,    Your patient is on a medication that requires a renal dose adjustment.    Renal Function Assessment:    Date Body Weight IBW  Adjusted BW SCr  CrCl Dialysis status   12/31/2023 54.4 kg (120 lb)  Ideal body weight: 54.7 kg (120 lb 9.5 oz) Serum creatinine: 5.4 mg/dL (H) 12/31/23 2215  Estimated creatinine clearance: 9 mL/min (A) N/a       Pharmacy has renally dose-adjusted the following medication(s):    Date Original Order Renally Adjusted Order   12/31/2023 Zosyn 3375mg q8H Zosyn 4500mg q12h       These changes were made per protocol according to the [facility] Renal Function-Based Dose Adjustments Policy    *Please note this dose may need readjusted if your patient's renal function significantly improves.    Please contact pharmacy with any questions regarding these changes.    Sterling Steve RPH  12/31/2023  11:31 PM

## 2024-01-01 NOTE — ED NOTES
PROCEDURE  12/31/23       Time: 2050    CENTRAL LINE INSERTION  Risks, benefits and alternatives (for applicable procedures below) described.   Performed By: Mert King II, DO and EM Attending Physician.    Indication: long term access and need for centrally administered medications.  Informed consent: Verbal consent obtained.  The patient was and family members were counseled regarding the procedure in person, it's indications, risks, potential complications and alternatives and any questions were answered. Verbal consent was obtained.  Procedure: After routine sterile preparation, local anesthesia obtained by infiltration using 1% Lidocaine without epinephrine. A right 3-Lumen 7F Central Venous Catheter was placed by internal jugular vein approach and secured by standard fashion.   Ultrasound Guidance:   used.  Number of Attempts: 1  Post-procedure Findings: A post procedural chest x-ray  was ordered and is still pending at this time.  Patient arrested prior to full suture securement of catheter. 1 Suture was placed for securement, then ACLS started. Tegaderm placed. All 3 ports anahi and flushed well.

## 2024-01-01 NOTE — H&P
Department of Internal Medicine  History and Physical    PCP: Cortez Harrington MD  Admitting Physician: Dr. Reynaga/Rafael  Consultants:   Date of Service: 12/31/2023    CHIEF COMPLAINT:  weakness/fatigue/right foot pain    HISTORY OF PRESENT ILLNESS:    Patient is 63-year-old female presented to the ED due to generalized weakness and fatigue.  HPI obtained from chart review as patient is currently intubated.  Patient came from home and lives with family who take care of her.  Patient presented to the ED alert and oriented x 2.  She has been experiencing right foot pain.  Her toes on the right foot have become necrotic over the last few weeks following an injury she had about a month ago.  Did follow-up with PCP and was referred to podiatry but patient has not seen podiatry yet.  Over the last few weeks patient has been having increased weakness and fatigue.  She has diminished appetite.  Patient had CTA ordered however contrast infiltrated.  Following contrast extravasation patient arrested.  ACLS was initiated patient was treated for ventricular fibrillation with multiple shocks.  Patient achieved ROSC after about 25 minutes.  Lidocaine drip as well as Levophed were initiated.    PAST MEDICAL Hx:  Past Medical History:   Diagnosis Date    CAD (coronary artery disease)     cath 2016    CHF (congestive heart failure) ([facility])     Diabetic peripheral neuropathy associated with type 2 diabetes mellitus ([facility]) 08/24/2018    Epigastric hernia     Hyperlipidemia     Hypertension     Kidney stone     Schizophrenia ([facility])     Type 2 diabetes mellitus with diabetic polyneuropathy, with long-term current use of insulin ([facility]) 09/19/2016    no longer requiring medication.1/13/21 - continues w/o medications as of 11/2/2022       PAST SURGICAL Hx:   Past Surgical History:   Procedure Laterality Date    BLADDER SURGERY Bilateral 5/2/2022    CYSTOSCOPY RETROGRADE PYELOGRAM BILATERAL STENT CHANGE performed by [facility]  04/26/2012 09:30 AM    CALCIUM 12.5 12/31/2023 10:15 PM    BILITOT 0.5 12/31/2023 04:45 PM    ALKPHOS 105 12/31/2023 04:45 PM    AST 11 12/31/2023 04:45 PM    ALT 7 12/31/2023 04:45 PM       ASSESSMENT/PLAN:  V-fib Cardiac Arrest with the return of ROSC  Septic Shock/Cardiogenic Shock 2/2 arrest- acute pyelonephritis   Acute metabolic encephalopathy vs. Anoxia   Acute Renal Failure  Slight hypernatremia  Hyperkalemia  Hypermagnesemia  Hypercalcemia  Hyperglycemia  Hypothermic   Elevated troponin  HAGMA  Lactic acidosis  CAD  CHF  DIABETES  Hyperlipidemia  Hypertension now hypotensive on Levophed  Schizophrenia  Chronic limb ischemia to Right toes      Patient presented with weakness and fatigue.  She had necrotic toes on the right foot.  She was found to have peripheral arterial disease.  She was also found to be in LUAN and had hyperkalemia.  Patient had a cardiac arrest at around 9 PM.  After about 25 minutes of ACLS which included defibrillation for ventricular fibrillation patient achieved ROSC.  She was placed on heparin drip, lidocaine drip.  Patient was to be placed on hypothermic protocol. patient she was treated for hyperkalemia..  Patient however arrested again.  Patient family was notified and they will agree to make patient DNR CC.  Patient eventually passed away at 12:31 AM.  Critical care consulted and patient excepted to the ICU for further management.      Michael Buenrostro DO  3:30 AM  1/1/2024    Electronically signed by Michael Buenrostro DO on 1/1/24 at 3:30 AM EST

## 2024-01-01 NOTE — CONSULTS
Brother          Allergies:         Patient has no known allergies.    Social history:  Social History     Socioeconomic History    Marital status:      Spouse name: Not on file    Number of children: 3    Years of education: Not on file    Highest education level: Not on file   Occupational History    Not on file   Tobacco Use    Smoking status: Former     Current packs/day: 0.00     Average packs/day: 0.5 packs/day for 10.0 years (5.0 ttl pk-yrs)     Types: Cigarettes     Start date: 3/23/2005     Quit date: 3/23/2015     Years since quittin.7    Smokeless tobacco: Never    Tobacco comments:     quit smoking    Vaping Use    Vaping Use: Never used   Substance and Sexual Activity    Alcohol use: No    Drug use: No    Sexual activity: Not Currently   Other Topics Concern    Not on file   Social History Narrative    Not on file     Social Determinants of Health     Financial Resource Strain: Patient Declined (3/8/2023)    Overall Financial Resource Strain (CARDIA)     Difficulty of Paying Living Expenses: Patient declined   Food Insecurity: Not on file (2023)   Transportation Needs: Patient Declined (2023)    PRAPARE - Transportation     Lack of Transportation (Medical): Patient declined     Lack of Transportation (Non-Medical): Patient declined   Physical Activity: Sufficiently Active (2023)    Exercise Vital Sign     Days of Exercise per Week: 6 days     Minutes of Exercise per Session: 60 min   Stress: No Stress Concern Present (10/12/2022)    [employer] [place] [employer] - Occupational Stress Questionnaire     Feeling of Stress : Only a little   Social Connections: Not on file   Intimate Partner Violence: Not on file   Housing Stability: Unknown (2023)    Housing Stability Vital Sign     Unable to Pay for Housing in the Last Year: Patient refused     Number of Places Lived in the Last Year: 1     Unstable Housing in the Last Year: Patient refused       Current  Information  Ventilator Initiate: Yes  Vent Mode: AC/VC    General appearance: ill appearing not in pain or distress, intubated and sedated on mechanical vent  HEENT: Atraumatic/normocephalic, EOMI, MYLENE, pharynx clear, moist mucosa, redness of the uvula appreciated,   Neck: Supple, no jugular venous distension, lymphadenopathy, thyromegaly or carotid bruits  Chest: Equal normal breath sounds, no wheezing, no crackles and no tenderness over ribs   Cardiovascular regular-irregular S1 , S2, runs of PVC's V-tach   Abdomen: Normal sounds present, soft, lax with no tenderness, no hepatosplenomegaly, and no masses  Extremities: No edema. Pulses are equally present.   Skin: intact, no rashes   Neurologic: intubated sedated on vent     Investigations:  Labs, radiological imaging and cardiac work up were reviewed        ICU STAFF PHYSICIAN NOTE OF PERSONAL INVOLVEMENT IN CARE  As the attending physician, I certify that I personally reviewed the patient's history and personally examined the patient to confirm the physical findings described above, and that I reviewed the relevant imaging studies and available reports.  I also discussed the differential diagnosis and all of the proposed management plans with the patient and individuals accompanying the patient to this visit.  They had the opportunity to ask questions about the proposed management plans and to have those questions answered.    This patient has a high probability of sudden, clinically significant deterioration, which requires the highest level of physician preparedness to intervene urgently.  I managed/supervised life or organ supporting interventions that required frequent physician assessment.  I devoted my full attention to the direct care of this patient for the amount of time indicated below.  Time I spent with family or surrogate(s) is included only if the patient was incapable of providing the necessary information or participating in medical

## 2024-01-01 NOTE — PROGRESS NOTES
Pharmacy Consultation Note  (Antibiotic Dosing and Monitoring)    Initial consult date: 12/31/2023  Consulting physician/provider: Marcus  Drug: Vancomycin  Indication: Aspiration Pneumonia     Age/  Gender Height Weight IBW  Allergy Information   63 y.o./female   54.4 kg (120 lb)     Ideal body weight: 54.7 kg (120 lb 9.5 oz)   Patient has no known allergies.      Renal Function:  Recent Labs     12/31/23  1645 12/31/23  2215   * 206*   CREATININE 6.5* 5.4*     No intake or output data in the 24 hours ending 12/31/23 2339    Vancomycin Monitoring:  Trough:  No results for input(s): \"VANCOTROUGH\" in the last 72 hours.  Random:  No results for input(s): \"VANCORANDOM\" in the last 72 hours.    Vancomycin Administration Times:  Recent vancomycin administrations        No vancomycin IV orders with administrations found.            Orders not given:            vancomycin (VANCOCIN) 1,000 mg in sodium chloride 0.9 % 250 mL IVPB ([id number])                    Assessment:  Patient is a 63 y.o. female who has been initiated on vancomycin  Estimated Creatinine Clearance: 9 mL/min (A) (based on SCr of 5.4 mg/dL (H)).  To dose vancomycin, pharmacy will be utilizing dosing based off of levels because of patient's renal impairment/insufficiency    Plan:  Will check vancomycin levels when appropriate  Will continue to monitor renal function   Pharmacy to follow      Sterling Steve RPH 12/31/2023 11:39 PM

## 2024-01-01 NOTE — ED NOTES
PROCEDURE  12/31/23       Time: 2100    INTUBATION  Risks, benefits and alternatives if able (for applicable procedures below) described.   Performed By: Mert King II, DO and EM Attending Physician.    Indication:  Respiratory failure.   Informed consent: Consent unable to be obtained due to the emergent nature of this procedure..  Procedure: Following Preoxygenation the patient was pretreated with None and etomidate followed by None. Intubation was performed after single attempt(s) by direct laryngoscopy using a Glidescope and 7.0mm cuffed endotracheal tube was inserted .  Initial post procedure placement:  confirmed by bilateral breath sounds, ETCO2 detection, and absence of sounds over stomach.  Tube Secured @ 23cm at the Lip.   Post procedure chest x-ray: showed appropriate tube position.  Procedural Complications: None.   Anesthesia Consult:  No.

## 2024-01-03 LAB
MICROORGANISM SPEC CULT: ABNORMAL
SPECIMEN DESCRIPTION: ABNORMAL

## 2025-02-07 NOTE — TELEPHONE ENCOUNTER
Please inform patient that she should be taking it twice a day. They did have it on her discharge medications. fall

## (undated) DEVICE — CAMERA STRYKER 1488 HD GEN

## (undated) DEVICE — 4-PORT MANIFOLD: Brand: NEPTUNE 2

## (undated) DEVICE — BAG DRNGE COMB PK

## (undated) DEVICE — Z INACTIVE USE 2660664 SOLUTION IRRIG 3000ML 0.9% SOD CHL USP UROMATIC PLAS CONT

## (undated) DEVICE — SOLUTION IRRIG 1000ML STRL H2O USP PLAS POUR BTL

## (undated) DEVICE — READY WET SKIN SCRUB TRAY-LF: Brand: MEDLINE INDUSTRIES, INC.

## (undated) DEVICE — CATHETER URET 5FR L70CM OPN END SGL LUMN INJ HUB FLEXIMA

## (undated) DEVICE — SOLUTION IRRIG 3000ML STRL H2O USP UROMATIC PLAS CONT

## (undated) DEVICE — Device: Brand: DEFENDO VALVE AND CONNECTOR KIT

## (undated) DEVICE — PENCIL ES L3M BTTN SWCH HOLSTER W/ BLDE ELECTRD EDGE

## (undated) DEVICE — TUBING, SUCTION, 1/4" X 10', STRAIGHT: Brand: MEDLINE

## (undated) DEVICE — BAG DRAINAGE CONTAINER 15 LT FLUID COLLCTN

## (undated) DEVICE — CYSTO PACK: Brand: MEDLINE INDUSTRIES, INC.

## (undated) DEVICE — GAUZE,SPONGE,4"X4",16PLY,XRAY,STRL,LF: Brand: MEDLINE

## (undated) DEVICE — TOWEL,OR,DSP,ST,BLUE,STD,6/PK,12PK/CS: Brand: MEDLINE

## (undated) DEVICE — GLOVE SURG SIGNATURE LTX ESS LTX PF 7.5

## (undated) DEVICE — 3M™ STERI-DRAPE™ U-DRAPE 1015: Brand: STERI-DRAPE™

## (undated) DEVICE — Z DUP USE 2139333 GUIDEWIRE UROLOGICAL STR STD 0.035 IN X150 CM REG ZIPWIRE LF

## (undated) DEVICE — SYRINGE IRRIG 60ML SFT PLIABLE BLB EZ TO GRP 1 HND USE W/

## (undated) DEVICE — GOWN,SIRUS,FABRNF,XL,20/CS: Brand: MEDLINE

## (undated) DEVICE — GUIDEWIRE ENDOSCP L150CM DIA0.035IN TIP 3CM PTFE NIT

## (undated) DEVICE — SWAB SPEC COLL SHFT L5.25IN POLYUR FOAM TIP SFT DBL MEDIA

## (undated) DEVICE — PAD,ABDOMINAL,5"X9",ST,LF,25/BX: Brand: MEDLINE INDUSTRIES, INC.

## (undated) DEVICE — DRAINBAG,ANTI-REFLUX TOWER,L/F,2000ML,LL: Brand: MEDLINE

## (undated) DEVICE — DRAIN PENROSE L12IN 3/8IN PROMOTE DRNAGE IN OPN SURG WND

## (undated) DEVICE — GLOVE ORANGE PI 7 1/2   MSG9075

## (undated) DEVICE — MEDIA CONTRAST ISOVUE  300 10X50ML

## (undated) DEVICE — GAUZE,SPONGE,4"X4",8PLY,STRL,LF,10/TRAY: Brand: MEDLINE

## (undated) DEVICE — FORCEPS BX L240CM JAW DIA2.8MM L CAP W/ NDL MIC MESH TOOTH

## (undated) DEVICE — ELECTRODE PT RET AD L9FT HI MOIST COND ADH HYDRGEL CORDED

## (undated) DEVICE — SOLUTION IRRIG 3000ML 0.9% SOD CHL USP UROMATIC PLAS CONT

## (undated) DEVICE — BLOCK BITE 60FR CAREGUARD

## (undated) DEVICE — GUIDEWIRE ENDOSCP L150CM DIA0.035IN TIP L15CM BENT PTFE

## (undated) DEVICE — SOLUTION IV IRRIG WATER 1000ML POUR BRL 2F7114

## (undated) DEVICE — GOWN,SIRUS,NONRNF,SETINSLV,XL,20/CS: Brand: MEDLINE

## (undated) DEVICE — SET SURG BASIN MAYO REUSABLE

## (undated) DEVICE — INTENDED FOR TISSUE SEPARATION, AND OTHER PROCEDURES THAT REQUIRE A SHARP SURGICAL BLADE TO PUNCTURE OR CUT.: Brand: BARD-PARKER ® STAINLESS STEEL BLADES

## (undated) DEVICE — HOSE CONN FOR WST MGMT SYS NEPTUNE 2

## (undated) DEVICE — CIRCON 30/70 URO LENS

## (undated) DEVICE — CONTAINER,SPECIMEN,STRL PATH,4OZ: Brand: MEDLINE

## (undated) DEVICE — GARMENT,MEDLINE,DVT,INT,CALF,MED, GEN2: Brand: MEDLINE

## (undated) DEVICE — SOLUTION SURG PREP ANTIMICROBIAL 4 OZ SKIN WND EXIDINE

## (undated) DEVICE — GUIDEWIRE URO L150CM DIA0.035IN TAPR 3CM NIT HYDRPHLC ANG

## (undated) DEVICE — Z INACTIVE USE 2635503 SOLUTION IRRIG 3000ML ST H2O USP UROMATIC PLAS CONT

## (undated) DEVICE — DOUBLE BASIN SET: Brand: MEDLINE INDUSTRIES, INC.

## (undated) DEVICE — SOLUTION IRRIG 1500ML 0.9% SOD CHL USP POUR PLAS BTL

## (undated) DEVICE — CHLORAPREP 26ML ORANGE

## (undated) DEVICE — CIRCON 21F CYSTO TRAY

## (undated) DEVICE — WIPES SKIN CLOTH READYPREP 9 X 10.5 IN 2% CHLORHEX GLUCONATE CHG PREOP

## (undated) DEVICE — CATHETER URETH 16FR BLLN 5CC L16IN SIL F INDWL 2 W SHT LEN

## (undated) DEVICE — GOWN ISOLATN REG YEL M WT MULTIPLY SIDETIE LEV 2

## (undated) DEVICE — SPONGE LAP W18XL18IN WHT COT 4 PLY FLD STRUNG RADPQ DISP ST

## (undated) DEVICE — SET CYSTOSCOPE 21FR

## (undated) DEVICE — Z DISCONTINUED USE 2275686 GLOVE SURG SZ 8 L12IN FNGR THK13MIL WHT ISOLEX POLYISOPRENE

## (undated) DEVICE — 6 X 9  1.75MIL 4-WALL LABGUARD: Brand: MINIGRIP COMMERCIAL LLC

## (undated) DEVICE — COVER,LIGHT HANDLE,FLX,1/PK: Brand: MEDLINE INDUSTRIES, INC.

## (undated) DEVICE — YANKAUER,BULB TIP,W/O VENT,RIGID,STERILE: Brand: MEDLINE

## (undated) DEVICE — CATHETER F BLLN 5CC 18FR 2 W HYDRGEL COAT LESS TRAUM LUB

## (undated) DEVICE — DRESSING PETRO W3XL8IN N ADH OIL EMUL GZ CURAD

## (undated) DEVICE — SHEET DRAPE FULL 70X100

## (undated) DEVICE — DISCONTINUED USE 393900 LIDOCAINE JELLY 2% UROJET PFS25X5ML

## (undated) DEVICE — GUIDEWIRE HYDROPHILIC ZIP WIRE 0.025IN X 150CM

## (undated) DEVICE — CONTAINER VACUTAINER ANAER CULTURE SWAB

## (undated) DEVICE — SPONGE GZ 4IN 4IN 4 PLY N WVN AVANT

## (undated) DEVICE — GOWN,SIRUS,POLYRNF,BRTHSLV,XLN/XL,20/CS: Brand: MEDLINE

## (undated) DEVICE — CONTAINER SPEC COLL 960ML POLYPR TRIANG GRAD INTAKE/OUTPUT

## (undated) DEVICE — CATHETER F BLLN 5CC 16FR 2 W HYDRGEL COAT LESS TRAUM LUB

## (undated) DEVICE — BANDAGE,GAUZE,4.5"X4.1YD,STERILE,LF: Brand: MEDLINE

## (undated) DEVICE — LENS CYSTOSCOPE 30 DEG

## (undated) DEVICE — LUBRICANT SURG JELLY ST BACTER TUBE 4.25OZ

## (undated) DEVICE — BASIC SINGLE BASIN 1-LF: Brand: MEDLINE INDUSTRIES, INC.

## (undated) DEVICE — MARKER,SKIN,WI/RULER AND LABELS: Brand: MEDLINE

## (undated) DEVICE — PACK,EXTREMITY,ORTHOMAX,5/CS: Brand: MEDLINE

## (undated) DEVICE — AMD ANTIMICROBIAL SUPER SPONGES,MEDIUM: Brand: KERLIX

## (undated) DEVICE — Device

## (undated) DEVICE — NDL CNTR 40CT FM MAG: Brand: MEDLINE INDUSTRIES, INC.

## (undated) DEVICE — SHEET,DRAPE,40X58,STERILE: Brand: MEDLINE

## (undated) DEVICE — MASK,FACE,MAXFLUIDPROTECT,SHIELD/ERLPS: Brand: MEDLINE

## (undated) DEVICE — SPONGE,LAP,12"X12",XR,ST,5/PK,40PK/CS: Brand: MEDLINE

## (undated) DEVICE — KIT BEDSIDE REVITAL OX 500ML

## (undated) DEVICE — KENDALL 450 SERIES MONITORING FOAM ELECTRODE - RECTANGULAR SHAPE ( 3/PK): Brand: KENDALL

## (undated) DEVICE — GAUZE,SPONGE,4"X4",16PLY,STRL,LF,10/TRAY: Brand: MEDLINE

## (undated) DEVICE — SET MAJOR INSTR ORTHO

## (undated) DEVICE — GUIDEWIRE UROLOGICAL STR STD 0.035 IN X150 CM (QTY = BOX OF 5)